# Patient Record
Sex: FEMALE | Race: WHITE | NOT HISPANIC OR LATINO | Employment: OTHER | URBAN - METROPOLITAN AREA
[De-identification: names, ages, dates, MRNs, and addresses within clinical notes are randomized per-mention and may not be internally consistent; named-entity substitution may affect disease eponyms.]

---

## 2017-01-25 ENCOUNTER — GENERIC CONVERSION - ENCOUNTER (OUTPATIENT)
Dept: OTHER | Facility: OTHER | Age: 67
End: 2017-01-25

## 2017-03-26 ENCOUNTER — GENERIC CONVERSION - ENCOUNTER (OUTPATIENT)
Dept: OTHER | Facility: OTHER | Age: 67
End: 2017-03-26

## 2017-05-19 ENCOUNTER — ALLSCRIPTS OFFICE VISIT (OUTPATIENT)
Dept: OTHER | Facility: OTHER | Age: 67
End: 2017-05-19

## 2017-06-07 ENCOUNTER — ALLSCRIPTS OFFICE VISIT (OUTPATIENT)
Dept: OTHER | Facility: OTHER | Age: 67
End: 2017-06-07

## 2017-06-07 DIAGNOSIS — E78.00 PURE HYPERCHOLESTEROLEMIA: ICD-10-CM

## 2017-06-07 DIAGNOSIS — E55.9 VITAMIN D DEFICIENCY: ICD-10-CM

## 2017-06-12 ENCOUNTER — HOSPITAL ENCOUNTER (OUTPATIENT)
Dept: RADIOLOGY | Age: 67
Discharge: HOME | End: 2017-06-12
Payer: MEDICARE

## 2017-06-12 DIAGNOSIS — M81.0 AGE-RELATED OSTEOPOROSIS WITHOUT CURRENT PATHOLOGICAL FRACTURE: ICD-10-CM

## 2017-06-12 PROCEDURE — 77080 DXA BONE DENSITY AXIAL: CPT

## 2017-07-06 ENCOUNTER — GENERIC CONVERSION - ENCOUNTER (OUTPATIENT)
Dept: OTHER | Facility: OTHER | Age: 67
End: 2017-07-06

## 2017-07-12 ENCOUNTER — ALLSCRIPTS OFFICE VISIT (OUTPATIENT)
Dept: OTHER | Facility: OTHER | Age: 67
End: 2017-07-12

## 2017-10-03 ENCOUNTER — ALLSCRIPTS OFFICE VISIT (OUTPATIENT)
Dept: OTHER | Facility: OTHER | Age: 67
End: 2017-10-03

## 2017-10-03 DIAGNOSIS — M25.572 PAIN IN LEFT ANKLE: ICD-10-CM

## 2017-10-03 DIAGNOSIS — M79.671 PAIN OF RIGHT FOOT: ICD-10-CM

## 2017-10-04 ENCOUNTER — HOSPITAL ENCOUNTER (OUTPATIENT)
Dept: RADIOLOGY | Facility: HOSPITAL | Age: 67
Discharge: HOME | End: 2017-10-04
Payer: MEDICARE

## 2017-10-04 DIAGNOSIS — M79.671 PAIN OF RIGHT FOOT: ICD-10-CM

## 2017-10-04 DIAGNOSIS — M25.572 PAIN IN LEFT ANKLE: ICD-10-CM

## 2017-10-04 PROCEDURE — 73630 X-RAY EXAM OF FOOT: CPT

## 2017-10-04 PROCEDURE — 73610 X-RAY EXAM OF ANKLE: CPT

## 2017-10-05 NOTE — PROGRESS NOTES
Assessment  1  Right foot pain (729 5) (M79 671)   2  Urinary incontinence (788 30) (R32)   3  Acute left ankle pain (719 47) (M25 572)   4  Anxiety (300 00) (F41 9)    Plan  Anxiety    · DiazePAM 5 MG Oral Tablet; take 1 tablet daily prn  Right foot pain    · * XR FOOT 3+ VIEW RIGHT; Status:Active; Requested [id number];   Urinary incontinence    · 2 - Kiki HERNÁNDEZ, oSnia Mcgregor (Urology) Co-Management  *  Status: Hold For - Scheduling   Requested for: [id number]  Care Summary provided  : Yes    Discussion/Summary    Explained to patient could be urine overflow from not emptying bladder  Need Urology evaluation  XR and call for results  The patient was counseled regarding instructions for management  Educational resources provided: None  Possible side effects of new medications were reviewed with the patient/guardian today  The treatment plan was reviewed with the patient/guardian  The patient/guardian understands and agrees with the treatment plan     Self Referrals: No      Chief Complaint  pt c/o right foot pain underneath and lump on left ankle      History of Present Illness  HPI: Injured right foot 1 5 years ago  Hurts to put weight on  Pain plantar surface, along lateral foot up and around to balls of foot  Also mild swelling and tender lateral malleolus left anklenot making to bathroom on time sometimes  Started happening more frequently  controlled  Review of Systems    Constitutional: No fever, no chills, feels well, no tiredness, no recent weight gain or loss  ENT: no ear ache, no loss of hearing, no nosebleeds or nasal discharge, no sore throat or hoarseness  Cardiovascular: no complaints of slow or fast heart rate, no chest pain, no palpitations, no leg claudication or lower extremity edema  Respiratory: no complaints of shortness of breath, no wheezing, no dyspnea on exertion, no orthopnea or PND     Gastrointestinal: no complaints of abdominal pain, no constipation, no nausea or diarrhea, no vomiting, no bloody stools  Genitourinary: as noted in HPI  Musculoskeletal: joint swelling, but-as noted in HPI  Integumentary: no complaints of skin rash or lesion, no itching or dry skin, no skin wounds  Active Problems  1  Acute pain of both knees (338 19,719 46) (M25 561,M25 562)   2  Allergic rhinitis (477 9) (J30 9)   3  Anxiety (300 00) (F41 9)   4  Anxiety disorder (300 00) (F41 9)   5  Breast cancer screening (V76 10) (Z12 39)   6  Chronic obstructive pulmonary disease (496) (J44 9)   7  Contusion of skin (924 9) (T14 8XXA)   8  Depression (311) (F32 9)   9  Depression screening (V79 0) (Z13 89)   10  Dystrophy, muscular, 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)   11  Effusion of knee (719 06) (M25 469)   12  Foot arch pain, left (729 5) (M79 672)   13  GERD without esophagitis (530 81) (K21 9)   14  Glaucoma screening (V80 1) (Z13 5)   15  Hypercholesteremia (272 0) (E78 00)   16  Hypercholesterolemia (272 0) (E78 00)   17  Medicare annual wellness visit, subsequent (V70 0) (Z00 00)   18  Muscular dystrophy (359 1) (G71 0)   19  Osteoporosis (733 00) (M81 0)   20  Overactive bladder (596 51) (N32 81)   21  Pre-op evaluation (V72 84) (Z01 818)   22  Restrictive lung disease (518 89) (J98 4)   23  Rib pain on right side (786 50) (R07 81)   24  Scalp pain (784 0) (R51)   25  Sleep related hypoxia (327 24) (G47 34)   26  Strain of right shoulder, initial encounter (840 9) (S46 911A)   27  Vitamin D deficiency (268 9) (E55 9)   28  Wheelchair dependent (V46 3) (Z99 3)    Past Medical History  1  History of Breast cancer, left (174 9) (C50 912)   2  History of athlete's foot (V12 09) (Z86 19)   3  History of upper respiratory infection (V12 09) (Z87 09)   4  History of Pneumonitis (486) (J18 9)   5  History of Shoulder pain, left (719 41) (M25 512)   6  History of Shoulder pain, right (719 41) (M25 511)   7  History of Skin disorder (709 9) (L98 9)   8   History of Visit for screening mammogram (V76 12) (Z12 31)    Family History  Mother    1  Family history of Bone loss  Father    2  Family history of Bone loss   3  Family history of Family Health Status Of Father -    4  Family history of Hypertension, benign   5  Family history of Skin Cancer (V16 8)  Brother    10  Family history of Muscular Dystrophy Of The Limb-girdle  Family History    7  Family history of Muscular Dystrophy Of The Limb-girdle    Social History   · Being A Social Drinker   · Currently On Permanent Disability   · Daily caffeinated cola consumption   · Denied: Daily Coffee Consumption (___ Cups/Day)   · Former smoker (V15 82) (Z87 891)   · 1ppd x 41 years, quit    · Wheelchair dependent (V46 3) (Z99 3)  The social history was reviewed and updated today  The social history was reviewed and is unchanged  Surgical History  1  History of Mastectomy Left Breast   2  History of Oral Surgery Tooth Extraction   3  History of Tonsillectomy   4  History of Tubal Ligation    Current Meds   1  Citalopram Hydrobromide 20 MG Oral Tablet; take 1 tablet by mouth once daily; Therapy: 02Mib9761 to (Evaluate:01Bwu1218)  Requested for: 2017; Last   [id number] Ordered   2  Fluticasone Propionate 50 MCG/ACT Nasal Suspension; USE 2 SPRAYS IN EACH   NOSTRIL ONCE DAILY; Therapy: 13VVQ6737 to (Evaluate:58Mqg6258)  Requested for: 56IEI5473; Last   Rx:2017 Ordered   3  VESIcare 10 MG Oral Tablet; Take 1 tablet daily; Therapy: 45IYI1270 to (Evaluate:93Zll1657)  Requested for: 59JIB0236; Last   Rx:2017 Ordered    The medication list was reviewed and updated today  Allergies  1  Amoxicillin TABS    Vitals   Recorded: 99XVB5344 03:44PM   Temperature 98 3 F, Oral   Heart Rate 70   Systolic 334, RUE   Diastolic 84, RUE   Height 5 ft    O2 Saturation 96     Physical Exam    Constitutional   General appearance: No acute distress, well appearing and well nourished      Eyes   Conjunctiva and lids: No swelling, erythema or discharge  Ears, Nose, Mouth, and Throat   External inspection of ears and nose: Normal     Pulmonary   Respiratory effort: No increased work of breathing or signs of respiratory distress  Musculoskeletal   Gait and station: Abnormal  -Wheel chair dependant  Digits and nails: Normal without clubbing or cyanosis  Inspection/palpation of joints, bones, and muscles: Abnormal  -(Tender right bottom foot laterally and left lateral ankle)        Future Appointments    Date/Time Provider Specialty Site   11/08/2017 01:40 PM BREANNE Borrego   Pulmonary Medicine [facility] [place]   12/13/2017 02:30 PM Surya Salomon DO Family Medicine [facility]     Signatures   Electronically signed by : Val Ocampo DO; Oct  4 2017 12:17PM EST                       (Author)

## 2017-10-11 ENCOUNTER — GENERIC CONVERSION - ENCOUNTER (OUTPATIENT)
Dept: OTHER | Facility: OTHER | Age: 67
End: 2017-10-11

## 2017-10-12 ENCOUNTER — HOSPITAL ENCOUNTER (OUTPATIENT)
Facility: HOSPITAL | Age: 67
Setting detail: OBSERVATION
End: 2017-10-13
Attending: EMERGENCY MEDICINE | Admitting: INTERNAL MEDICINE
Payer: MEDICARE

## 2017-10-12 ENCOUNTER — GENERIC CONVERSION - ENCOUNTER (OUTPATIENT)
Dept: OTHER | Facility: OTHER | Age: 67
End: 2017-10-12

## 2017-10-12 DIAGNOSIS — R53.1 WEAKNESS: ICD-10-CM

## 2017-10-12 DIAGNOSIS — G71.00 MUSCULAR DYSTROPHY (HCC): ICD-10-CM

## 2017-10-12 DIAGNOSIS — M79.671 RIGHT FOOT PAIN: Primary | ICD-10-CM

## 2017-10-12 PROBLEM — R26.2 AMBULATORY DYSFUNCTION: Status: ACTIVE | Noted: 2017-10-12

## 2017-10-12 PROBLEM — M79.673 FOOT PAIN: Status: ACTIVE | Noted: 2017-10-12

## 2017-10-12 LAB
ANION GAP SERPL CALCULATED.3IONS-SCNC: 8 MMOL/L (ref 4–13)
BASOPHILS # BLD AUTO: 0.03 THOUSANDS/ΜL (ref 0–0.1)
BASOPHILS NFR BLD AUTO: 0 % (ref 0–1)
BILIRUB UR QL STRIP: NEGATIVE
BUN SERPL-MCNC: 17 MG/DL (ref 5–25)
CALCIUM SERPL-MCNC: 9.4 MG/DL (ref 8.3–10.1)
CHLORIDE SERPL-SCNC: 101 MMOL/L (ref 100–108)
CLARITY UR: ABNORMAL
CLARITY, POC: NORMAL
CO2 SERPL-SCNC: 31 MMOL/L (ref 21–32)
COLOR UR: YELLOW
COLOR, POC: YELLOW
CREAT SERPL-MCNC: 0.39 MG/DL (ref 0.6–1.3)
EOSINOPHIL # BLD AUTO: 0.13 THOUSAND/ΜL (ref 0–0.61)
EOSINOPHIL NFR BLD AUTO: 1 % (ref 0–6)
ERYTHROCYTE [DISTWIDTH] IN BLOOD BY AUTOMATED COUNT: 14.4 % (ref 11.6–15.1)
GFR SERPL CREATININE-BSD FRML MDRD: 109 ML/MIN/1.73SQ M
GLUCOSE SERPL-MCNC: 112 MG/DL (ref 65–140)
GLUCOSE UR STRIP-MCNC: NEGATIVE MG/DL
HCT VFR BLD AUTO: 42.2 % (ref 34.8–46.1)
HGB BLD-MCNC: 14.5 G/DL (ref 11.5–15.4)
HGB UR QL STRIP.AUTO: NEGATIVE
KETONES UR STRIP-MCNC: ABNORMAL MG/DL
LEUKOCYTE ESTERASE UR QL STRIP: NEGATIVE
LYMPHOCYTES # BLD AUTO: 2.66 THOUSANDS/ΜL (ref 0.6–4.47)
LYMPHOCYTES NFR BLD AUTO: 22 % (ref 14–44)
MCH RBC QN AUTO: 30 PG (ref 26.8–34.3)
MCHC RBC AUTO-ENTMCNC: 34.4 G/DL (ref 31.4–37.4)
MCV RBC AUTO: 87 FL (ref 82–98)
MONOCYTES # BLD AUTO: 0.63 THOUSAND/ΜL (ref 0.17–1.22)
MONOCYTES NFR BLD AUTO: 5 % (ref 4–12)
NEUTROPHILS # BLD AUTO: 8.42 THOUSANDS/ΜL (ref 1.85–7.62)
NEUTS SEG NFR BLD AUTO: 72 % (ref 43–75)
NITRITE UR QL STRIP: NEGATIVE
NRBC BLD AUTO-RTO: 0 /100 WBCS
PH UR STRIP.AUTO: 7.5 [PH] (ref 4.5–8)
PLATELET # BLD AUTO: 356 THOUSANDS/UL (ref 149–390)
PLATELET # BLD AUTO: 365 THOUSANDS/UL (ref 149–390)
PMV BLD AUTO: 10.4 FL (ref 8.9–12.7)
PMV BLD AUTO: 10.7 FL (ref 8.9–12.7)
POTASSIUM SERPL-SCNC: 3.6 MMOL/L (ref 3.5–5.3)
PROT UR STRIP-MCNC: NEGATIVE MG/DL
RBC # BLD AUTO: 4.83 MILLION/UL (ref 3.81–5.12)
SODIUM SERPL-SCNC: 140 MMOL/L (ref 136–145)
SP GR UR STRIP.AUTO: 1.02 (ref 1–1.03)
UROBILINOGEN UR QL STRIP.AUTO: 0.2 E.U./DL
WBC # BLD AUTO: 11.87 THOUSAND/UL (ref 4.31–10.16)

## 2017-10-12 PROCEDURE — 85025 COMPLETE CBC W/AUTO DIFF WBC: CPT | Performed by: EMERGENCY MEDICINE

## 2017-10-12 PROCEDURE — 81003 URINALYSIS AUTO W/O SCOPE: CPT

## 2017-10-12 PROCEDURE — 97163 PT EVAL HIGH COMPLEX 45 MIN: CPT

## 2017-10-12 PROCEDURE — 36415 COLL VENOUS BLD VENIPUNCTURE: CPT | Performed by: EMERGENCY MEDICINE

## 2017-10-12 PROCEDURE — 85049 AUTOMATED PLATELET COUNT: CPT | Performed by: PHYSICIAN ASSISTANT

## 2017-10-12 PROCEDURE — 80048 BASIC METABOLIC PNL TOTAL CA: CPT | Performed by: EMERGENCY MEDICINE

## 2017-10-12 PROCEDURE — G8979 MOBILITY GOAL STATUS: HCPCS

## 2017-10-12 PROCEDURE — 97167 OT EVAL HIGH COMPLEX 60 MIN: CPT

## 2017-10-12 PROCEDURE — G8988 SELF CARE GOAL STATUS: HCPCS

## 2017-10-12 PROCEDURE — 81002 URINALYSIS NONAUTO W/O SCOPE: CPT | Performed by: EMERGENCY MEDICINE

## 2017-10-12 PROCEDURE — 99284 EMERGENCY DEPT VISIT MOD MDM: CPT

## 2017-10-12 PROCEDURE — G8978 MOBILITY CURRENT STATUS: HCPCS

## 2017-10-12 PROCEDURE — G8987 SELF CARE CURRENT STATUS: HCPCS

## 2017-10-12 RX ORDER — DIAZEPAM 5 MG/1
TABLET ORAL
COMMUNITY
Start: 2012-07-09 | End: 2018-01-29 | Stop reason: SDUPTHER

## 2017-10-12 RX ORDER — DIAZEPAM 5 MG/1
5 TABLET ORAL
Status: DISCONTINUED | OUTPATIENT
Start: 2017-10-12 | End: 2017-10-13 | Stop reason: HOSPADM

## 2017-10-12 RX ORDER — HEPARIN SODIUM 5000 [USP'U]/ML
5000 INJECTION, SOLUTION INTRAVENOUS; SUBCUTANEOUS EVERY 8 HOURS SCHEDULED
Status: DISCONTINUED | OUTPATIENT
Start: 2017-10-12 | End: 2017-10-13 | Stop reason: HOSPADM

## 2017-10-12 RX ORDER — ONDANSETRON 2 MG/ML
4 INJECTION INTRAMUSCULAR; INTRAVENOUS EVERY 6 HOURS PRN
Status: DISCONTINUED | OUTPATIENT
Start: 2017-10-12 | End: 2017-10-13 | Stop reason: HOSPADM

## 2017-10-12 RX ORDER — TRAMADOL HYDROCHLORIDE 50 MG/1
50 TABLET ORAL EVERY 6 HOURS PRN
Status: DISCONTINUED | OUTPATIENT
Start: 2017-10-12 | End: 2017-10-13 | Stop reason: HOSPADM

## 2017-10-12 RX ORDER — SOLIFENACIN SUCCINATE 10 MG/1
TABLET, FILM COATED ORAL
COMMUNITY
Start: 2016-12-07 | End: 2018-05-18 | Stop reason: SDUPTHER

## 2017-10-12 RX ORDER — CITALOPRAM 20 MG/1
20 TABLET ORAL
Status: DISCONTINUED | OUTPATIENT
Start: 2017-10-12 | End: 2017-10-13 | Stop reason: HOSPADM

## 2017-10-12 RX ORDER — TOLTERODINE 4 MG/1
4 CAPSULE, EXTENDED RELEASE ORAL
Status: DISCONTINUED | OUTPATIENT
Start: 2017-10-12 | End: 2017-10-13 | Stop reason: HOSPADM

## 2017-10-12 RX ORDER — OXYCODONE HYDROCHLORIDE 5 MG/1
5 TABLET ORAL EVERY 4 HOURS PRN
Status: DISCONTINUED | OUTPATIENT
Start: 2017-10-12 | End: 2017-10-13 | Stop reason: HOSPADM

## 2017-10-12 RX ORDER — ACETAMINOPHEN 325 MG/1
650 TABLET ORAL EVERY 6 HOURS PRN
Status: DISCONTINUED | OUTPATIENT
Start: 2017-10-12 | End: 2017-10-13 | Stop reason: HOSPADM

## 2017-10-12 RX ORDER — CITALOPRAM 20 MG/1
TABLET ORAL
COMMUNITY
Start: 2014-09-02 | End: 2018-07-25 | Stop reason: SDUPTHER

## 2017-10-12 RX ADMIN — DIAZEPAM 5 MG: 5 TABLET ORAL at 22:37

## 2017-10-12 RX ADMIN — CITALOPRAM HYDROBROMIDE 20 MG: 20 TABLET ORAL at 22:37

## 2017-10-12 RX ADMIN — TOLTERODINE TARTRATE 4 MG: 4 CAPSULE, EXTENDED RELEASE ORAL at 22:37

## 2017-10-12 RX ADMIN — TRAMADOL HYDROCHLORIDE 50 MG: 50 TABLET, FILM COATED ORAL at 20:06

## 2017-10-12 RX ADMIN — HEPARIN SODIUM 5000 UNITS: 5000 INJECTION, SOLUTION INTRAVENOUS; SUBCUTANEOUS at 22:37

## 2017-10-12 NOTE — PHYSICAL THERAPY NOTE
PT EVALUATION  There is no problem list on file for this patient  Past Medical History:   Diagnosis Date    GERD (gastroesophageal reflux disease)     Hyperlipidemia     Muscular dystrophy ([address] )      Past Surgical History:   Procedure Laterality Date    MASTECTOMY        10/12/17 4135   Note Type   Note type Eval only   Pain Assessment   Pain Assessment 0-10   Pain Score 8  (during wt  bearing only)   Pain Type Acute pain   Pain Location Foot   Pain Orientation Right   Hospital Pain Intervention(s) Repositioned; Emotional support   Home Living   Type of Home Apartment   Bathroom Equipment Toilet raiser   Bathroom Accessibility Not accessible   Home Equipment Wheelchair-electric; Walker;Cane;Reacher   Prior Function   Level of [place] Modified independent with wheelchair   [facility] ([facility]x)   Comments home health aides 8hrs/wk   Restrictions/Precautions   Weight Bearing Precautions Per Order Yes   RLE Weight Bearing Per Order WBAT  (as per Dr Amanda Cheema)   Other Precautions Fall Risk;Pain   General   Additional Pertinent History muscular dystrophy   Family/Caregiver Present No   Cognition   Overall Cognitive Status WFL   Arousal/Participation Alert   Orientation Level Oriented X4   Following Commands Follows all commands and directions without difficulty   RUE Assessment   RUE Assessment (defer to OT)   LUE Assessment   LUE Assessment (defer to OT)   RLE Assessment   RLE Assessment WFL   Strength RLE   RLE Overall Strength 3+/5   R Hip Flexion 3-/5   LLE Assessment   LLE Assessment WFL   Strength LLE   LLE Overall Strength 3+/5   L Hip Flexion 3-/5   Bed Mobility   Supine to Sit 3  Moderate assistance   Additional Comments unable to assess, as pt OOB in her w/c pre & post session   Transfers   Sit to Stand 3  Moderate assistance   Additional items Assist x 1; Armrests; Increased time required;Verbal cues   Stand to Sit 3  Moderate assistance   Additional items Assist x 1; Armrests; Increased time required;Verbal cues   Toilet transfer 3  Moderate assistance   Additional items Assist x 1; Increased time required;Verbal cues; Commode   Ambulation/Elevation   Gait pattern Decreased foot clearance; Antalgic;Decreased R stance; Foward flexed; Short stride; Step to;Excessively slow   Gait Assistance 3  Moderate assist   Additional items Assist x 1;Verbal cues; Tactile cues   Assistive Device Rolling walker   Distance 5'x2   Balance   Static Sitting Fair +   Static Standing Poor +   Ambulatory Poor   Endurance Deficit   Endurance Deficit Yes   Endurance Deficit Description pain   Activity Tolerance   Activity Tolerance Patient limited by pain   Nurse Made Aware Anabel   Assessment   Prognosis Fair   Problem List Decreased strength;Decreased endurance; Impaired balance;Decreased mobility;Obesity;Pain   Assessment Pt  79 y  o female presented w/ severe R foot pain  Pt referred to PT for mobility assessment & D/C planning  PTA, pt reports being I w/ w/c (motorized) mobility & was able to ambulate short distances w/ RW up until R foot pain started; h/o falls x5  On eval, pt demonstrate dec mobility, balance, endurance & amb 2* to R foot pain  Pt require modAx1 for most functional mobility + cues for techniques  Gait deviations as above but no gross LOB noted  Dec amb tolerance 2* to inc R foot pain  Noted mild R pes cavus deformity  No pain at rest & w/ ROM  Also noted L ankle swelling  Pt may benefit from further imaging & ortho or podiatry consult  Will continue skilled PT to improve function & safety  At this time, due to above mentioned deficits & high risk for falls, pt will benefit from inpt rehab at D/C  CM to follow  Nsg staff to continue to mobilized pt as tolerated to prevent further decline in function  Nsg notified      Barriers to Discharge Decreased caregiver support   Barriers to Discharge Comments pt home alone   Goals   Patient Goals to know what's causing her R foot pain   STG Expiration Date 10/19/17   Short Term Goal #1 Goals to be met in 7 days: 1) inc strength & balance by 1/2 grade; 2) inc functional mobility to S; 3) inc amb w/ RW approx  150' w/ minAx1; 4) inc barthel score to 75; 5) pt/caregiver ed   Treatment Day 0   Plan   Treatment/Interventions Functional transfer training;LE strengthening/ROM; Therapeutic exercise; Endurance training;Patient/family training;Bed mobility;Gait training;OT;Spoke to nursing   PT Frequency 5x/wk   Recommendation   Recommendation Short-term skilled PT; Other (Comment)  (ortho or podiatry consult)   Equipment Recommended Walker; Wheelchair   PT - OK to Discharge Yes  (to inpt rehab)   Barthel Index   Feeding 10   Bathing 0   Grooming Score 5   Dressing Score 5   Bladder Score 10   Bowels Score 10   Toilet Use Score 5   Transfers (Bed/Chair) Score 5   Mobility (Level Surface) Score 5   Stairs Score 0   Barthel Index Score 55   Hx/personal factors: co-morbidities; fall risk; currently functioning below baseline; home alone; pain  Examination: dec mobility, dec balance, dec endurance, dec amb, high risk for falls, pain  Clinical: unpredictable (pain mgt; fall risk)  Complexity: high    David Borja, PT

## 2017-10-12 NOTE — SOCIAL WORK
[address] unable to accept pt without in pt admission prior  [facility] is still evaluating  They're recommending a neuro work up  Pt made herself an OP appointment with [facility] tomorrow at 12:30 but CM suggested she reschedule that because pt continually states that she does not feel she is safe to return home  Pt does not meet criteria for SNF admission  CM recommends that pt comes into hospital as OBS to give [facility] more time to eval pt

## 2017-10-12 NOTE — SOCIAL WORK
CM met with pt in ED  Pt requested to see case management because she is concerned due to her current mobility issues, she is not safe at home  Pt lives in an apartment; she uses a roller walker while in the home and uses a motorized wheelchair while in the community  Her apartment is not set up with WC access and bathroom does not fit a wheelchair  Pt has [facility] and has a home health aid for 8 hours a week who does her laundry and helps her with a shower  She reports her  through [employer], [employer], has told her she is at the maximum amount of time she is allowed through [employer]  Pt states she is not set up at home for her current issues with mobility  Pt was diagnoses with MD in the 18's at the age of 32  She reports her disability has slowly progressed and over the past 2 weeks she has been unable to walk and has significant foot/ankel pain  She goes to a MD out patient clinic with Dr Concha Estrada at [facility]  CM will explore options for rehab  Requested PT/OT consult  CM following

## 2017-10-12 NOTE — PLAN OF CARE
Problem: OCCUPATIONAL THERAPY ADULT  Goal: Performs self-care activities at highest level of function for planned discharge setting  See evaluation for individualized goals  Treatment Interventions: ADL retraining, Functional transfer training, UE strengthening/ROM, Endurance training, Patient/family training, Equipment evaluation/education, Compensatory technique education          See flowsheet documentation for full assessment, interventions and recommendations  Limitation: Decreased ADL status, Decreased UE ROM, Decreased UE strength, Decreased Safe judgement during ADL, Decreased endurance  Prognosis: Good  Assessment: Pt is a 68y/o female admitted to the hospital 2* increased R ankle pain; x-ray(R ankle, 10/4)=neg acute injury  Pt denies acute trauma/injury  ED-MD(Dr Mariela Hurst) allowing WBAT R LE  PTA pt states that she had assistance with her ADLs; states independence with transfers/ambulation--with RW, limited distances; +falls=5, neg ; [facility] 8hrs/wk During initial eval, pt demonstrated deficits with her functional balance, functional mobility, ADL status, b/l UE strength, activity tolerance(currently fair=15-20mins), and transfer safety  Pt would benefit from continued OT tx for the above deficits  3-5x/1-2wks     OT Discharge Recommendation: Short Term Rehab

## 2017-10-12 NOTE — PLAN OF CARE
Problem: PHYSICAL THERAPY ADULT  Goal: Performs mobility at highest level of function for planned discharge setting  See evaluation for individualized goals  Treatment/Interventions: Functional transfer training, LE strengthening/ROM, Therapeutic exercise, Endurance training, Patient/family training, Bed mobility, Gait training, OT, Spoke to nursing  Equipment Recommended: Stacy Basket, Wheelchair       See flowsheet documentation for full assessment, interventions and recommendations  Outcome: Progressing  Prognosis: Fair  Problem List: Decreased strength, Decreased endurance, Impaired balance, Decreased mobility, Obesity, Pain  Assessment: Pt  79 y  o female presented w/ severe R foot pain  Pt referred to PT for mobility assessment & D/C planning  PTA, pt reports being I w/ w/c (motorized) mobility & was able to ambulate short distances w/ RW up until R foot pain started; h/o falls x5  On eval, pt demonstrate dec mobility, balance, endurance & amb 2* to R foot pain  Pt require modAx1 for most functional mobility + cues for techniques  Gait deviations as above but no gross LOB noted  Dec amb tolerance 2* to inc R foot pain  Noted mild R pes cavus deformity  No pain at rest & w/ ROM  Also noted L ankle swelling  Pt may benefit from further imaging & ortho or podiatry consult  Will continue skilled PT to improve function & safety  At this time, due to above mentioned deficits & high risk for falls, pt will benefit from inpt rehab at D/C  CM to follow  Nsg staff to continue to mobilized pt as tolerated to prevent further decline in function  Nsg notified  Barriers to Discharge: Decreased caregiver support  Barriers to Discharge Comments: pt home alone  Recommendation: Short-term skilled PT, Other (Comment) (ortho or podiatry consult)     PT - OK to Discharge: Yes (to inpt rehab)    See flowsheet documentation for full assessment

## 2017-10-12 NOTE — H&P
History and Physical - [facility]    Patient Information: Jalyn Gallagher 79 y o  female MRN: [id number]  Unit/Bed#: [address] [id number] Encounter: [id number]  Admitting Physician: Charline Sin PA-C  PCP: Melida Eason DO  Date of Admission:  10/12/17    Assessment/Plan:    Hospital Problem List:     Principal Problem:    Ambulatory dysfunction  Active Problems:    Foot pain    Muscular dystrophy ([facility] [address] )      Plan for the Primary Problem(s):  · Ambulatory dysfunction  · 2* muscular dystrophy   · Appreciate PT/OT/CM eval, pt not safe to go home and will likely have placement at [place] [id number] prior to neurologic evaluation after discussion w/ CM  · Will monitor overnight, continue PT/OT, will provide further workup as noted below for patient's foot pain    Plan for Additional Problems:   · Right foot pain/paresthesias  · 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 as noted by podiatry who will set up OP bracing  · As pt has mild paresthesias on right foot will check magnesium and phosphorus  · Will kindly consult neurology at noted above per [facility] rehab's request concerning for a possible 'muscular dystrophy exacerbation'  · D/w attending Dr Coles Adjutant     Leukocytosis   No s/sx of infection, appears to be physiologic    Anxiety   Stable   Continue lexapro/valium    OAB   No cauda equina s/sx   Substitute detrol for vesicare as is non formulary   F/u w/ urology in OP setting    VTE Prophylaxis: Heparin  / sequential compression device   Code Status: Full Code  POLST: There is no POLST form on file for this patient (pre-hospital)    Anticipated Length of Stay:  Patient will be admitted on an Observation basis with an anticipated length of stay of  Less than 2 midnights  Justification for Hospital Stay: ambulatory dysfunction    Total Time for Visit, including Counseling / Coordination of Care: 30 minutes  Greater than 50% of this total time spent on direct patient counseling and coordination of care  Chief Complaint:   Right foot pain    History of Present Illness:    Juan Castañeda is a 79 y o  female who presents with pmh of muscular dystrophy coming into ED for worsening right foot pain for the last several weeks  She reports it is a throbbing pain throughout the foot worse with standing  She reports unfortunately over the last 2 days the pain worsened and the day prior to admission shot up to her thigh but now is localized again to her foot  She reports significant difficulty walking on it and has seen her PCP in the OP setting for this  She reports she received OP xrays which were negative for fx or bony abnormality  She reported the pain was so severe that she couldn't stand it in and came in for evaluation  She initially denied numbness/tingling in the right foot but believes she feels some paresthesias in the right foot throughout  She reports she has some decreased sensation on filament test as well  She denies radiating pain from back or throughout let  She denies claudication otherwise  She denies wounds or open sores  No n/v/f/c or abdominal pain or blurry vision  No HA/lightheadedness or dizziness  No SOB  At baseline she uses a walker but also has a WC for ease of mobility  Review of Systems:    Review of Systems   Constitutional: Negative for appetite change, chills and fever  HENT: Negative for congestion, dental problem and sore throat  Eyes: Negative for photophobia and visual disturbance  Respiratory: Negative for cough and shortness of breath  Cardiovascular: Positive for leg swelling (mild pedal edema)  Negative for chest pain     Gastrointestinal: Negative for abdominal pain, nausea and vomiting  Genitourinary: Positive for urgency  Negative for dysuria  Musculoskeletal: Positive for gait problem  Skin: Negative for color change  Neurological: Positive for weakness and numbness  Negative for dizziness, light-headedness and headaches  Psychiatric/Behavioral: The patient is nervous/anxious  All other systems reviewed and are negative  Past Medical and Surgical History:     Past Medical History:   Diagnosis Date    GERD (gastroesophageal reflux disease)     Hyperlipidemia     Muscular dystrophy ([address] )        Past Surgical History:   Procedure Laterality Date    MASTECTOMY         Meds/Allergies:    Prior to Admission medications    Medication Sig Start Date End Date Taking? Authorizing Provider   citalopram (CeleXA) 20 mg tablet Take by mouth 9/2/14  Yes Historical Provider, MD   diazepam (VALIUM) 5 mg tablet Take by mouth 7/9/12  Yes Historical Provider, MD   solifenacin (VESICARE) 10 MG tablet Take by mouth 12/7/16  Yes Historical Provider, MD     I have reviewed home medications with patient personally  Allergies: Allergies   Allergen Reactions    Amoxicillin        Social History:     Marital Status:    Occupation:   Patient Pre-hospital Living Situation:   Patient Pre-hospital Level of Mobility:   Patient Pre-hospital Diet Restrictions:   Substance Use History:   History   Alcohol Use No     History   Smoking Status    Former Smoker   Smokeless Tobacco    Never Used     History   Drug Use No       Family History:    History reviewed  No pertinent family history      Physical Exam:     Vitals:   Blood Pressure: (!) 174/72 (10/12/17 1703)  Pulse: 72 (10/12/17 1703)  Temperature: (!) 97 4 °F (36 3 °C) (10/12/17 1050)  Temp Source: Oral (10/12/17 1050)  Respirations: 16 (10/12/17 1703)  Weight - Scale: 77 1 kg (170 lb) (10/12/17 1051)  SpO2: 95 % (10/12/17 1703)    Physical Exam   Constitutional: She appears well-developed and well-nourished  No distress  HENT:   Head: Normocephalic and atraumatic  Right Ear: External ear normal    Left Ear: External ear normal    Mouth/Throat: Oropharynx is clear and moist  No oropharyngeal exudate  Eyes: Conjunctivae and EOM are normal  Pupils are equal, round, and reactive to light  Right eye exhibits no discharge  Left eye exhibits no discharge  No scleral icterus  Cardiovascular: Normal rate, regular rhythm, normal heart sounds and intact distal pulses  Exam reveals no gallop and no friction rub  No murmur heard  Pulmonary/Chest: Effort normal and breath sounds normal  No respiratory distress  She has no wheezes  She has no rales  She exhibits no tenderness  Abdominal: Soft  She exhibits no distension  There is no tenderness  There is no rebound and no guarding  Musculoskeletal: She exhibits edema (trace pedal edema  no increased calf size at tibial plateau or unilateral leg swelling  no posterior calf tenderness or angelito's)  Lymphadenopathy:     She has no cervical adenopathy  Neurological: She is alert  Pt reports crude sensation intact in LE b/l  Skin: Skin is warm and dry  She is not diaphoretic  No erythema  Psychiatric: She has a normal mood and affect  Vitals reviewed  (  Be Sure to Include Physical Exam: Delete this entire line when you have entered your exam)    Additional Data:     Lab Results: I have personally reviewed pertinent reports  Results from last 7 days  Lab Units 10/12/17  1540   WBC Thousand/uL 11 87*   HEMOGLOBIN g/dL 14 5   HEMATOCRIT % 42 2   PLATELETS Thousands/uL 365   NEUTROS PCT % 72   LYMPHS PCT % 22   MONOS PCT % 5   EOS PCT % 1       Results from last 7 days  Lab Units 10/12/17  1540   SODIUM mmol/L 140   POTASSIUM mmol/L 3 6   CHLORIDE mmol/L 101   CO2 mmol/L 31   BUN mg/dL 17   CREATININE mg/dL 0 39*   CALCIUM mg/dL 9 4   GLUCOSE RANDOM mg/dL 112           Imaging: I have personally reviewed pertinent reports        Martina Cardenas Ankle 3+ Vw Left    Result Date: 10/8/2017  Narrative: LEFT ANKLE INDICATION:  Tender just anterior to the lateral malleolus  Pain of the right foot  COMPARISON: Priors dating to July 3, 2011 although these involve the foot only  VIEWS:  3 IMAGES:  3 FINDINGS: There is no acute fracture or dislocation  No degenerative changes  No lytic or blastic lesions are seen  Soft tissues are unremarkable  Impression: No acute osseous abnormality  Consider MRI if there is continued pain or concern for internal derangement  Workstation performed: [id number]     Xr Foot 3+ Vw Right    Result Date: 10/8/2017  Narrative: RIGHT FOOT INDICATION:  20-year-old female with pain in the base of the fifth metatarsal and cuboid area  Patient had prior injury one half years ago  COMPARISON: July 3, 2011 VIEWS:  3 IMAGES:  3 FINDINGS: There is no acute fracture or dislocation  Mild hallux valgus deformity on frontal view  The cuboid and fifth metatarsal bones appear intact without deformity  No degenerative changes  No lytic or blastic lesions are seen  Soft tissues are unremarkable  Impression: No acute osseous abnormality  Workstation performed: [id number]       EKG, Pathology, and Other Studies Reviewed on Admission:   · EKG:     Allscripts / Epic Records Reviewed: No     ** Please Note: This note has been constructed using a voice recognition system   **

## 2017-10-12 NOTE — CONSULTS
Consult - Jignesh Tourekarsten Doherty 79 y o  female MRN: [id number]  Unit/Bed#: ED 15 Encounter: [id number]    Assessment/Plan     Assessment:  3  78 y/o female with bilateral foot pain most likely due to cavus foot type secondary to muscular dystrophy   2  [place] Muscular dystrophy   3  Left lateral ankle pain with possible lipoma     Plan:  - pain is mostly on plantar forefoot and plantar lateral foot upon ambulation; right is worse than the left  This is  secondary to the cavus foot type that patient has, which is fortunately flexible  Thus she can benefit from bracing which can be set up on an outpatient basis  - Xray images reviewed; no acute osseous abnormality noted   - Right lateral ankle mass likely benign lipoma; can follow up on an outpatient basis   - discussed this plan with Dr Siria Kumar     History of Present Illness     HPI:  Bony Rain is a 79 y o  female who presents with bilateral foot pain that has been going for a year or so however it has been getting worse for the past 2 weeks  She was unable to stand and walk since yesterday therefore she decided to come in to the ED  She has talked about this to her PCP who recommended to see a podiatrist  She has already made an appointment for tomorrow at [facility] facility for the same problem  Complains of tingling to her toes  Left ankle pain laterally  Xrays were done on an out patient basis which are negative  She was diagnosed with muscular dystrophy at age 32  She states her brother and her father has the same diagnosis  She has done genetic testing a year ago which showed that she has "[place] muscular dystrophy " Patient does not normally wear shoes; she ambulated in slippers only at home  Outside of home she gets around in electric wheelchair  Inpatient consult to Podiatry  Consult performed by: Eligio Hurst ordered by: Betty Muñoz        Review of Systems   Constitutional: Negative  HENT: Negative  Eyes: Negative  Respiratory: Negative  Cardiovascular: Negative  Gastrointestinal: Negative  Musculoskeletal: b/l LE pain    Skin:Negative  Neurological: tingling to b/l LE   Psych: negative  Historical Information   Past Medical History:   Diagnosis Date    GERD (gastroesophageal reflux disease)     Hyperlipidemia     Muscular dystrophy ([address] )      Past Surgical History:   Procedure Laterality Date    MASTECTOMY       Social History   History   Alcohol Use No     History   Drug Use No     History   Smoking Status    Former Smoker   Smokeless Tobacco    Never Used     Family History: History reviewed  No pertinent family history      Meds/Allergies     (Not in a hospital admission)  Allergies   Allergen Reactions    Amoxicillin        Objective   First Vitals:   Blood Pressure: 146/66 (10/12/17 1050)  Pulse: 73 (10/12/17 1050)  Temperature: (!) 97 4 °F (36 3 °C) (10/12/17 1050)  Temp Source: Oral (10/12/17 1050)  Respirations: 16 (10/12/17 1050)  Weight - Scale: 77 1 kg (170 lb) (10/12/17 1051)  SpO2: 100 % (10/12/17 1050)    Current Vitals:   Blood Pressure: 143/69 (10/12/17 1418)  Pulse: 72 (10/12/17 1418)  Temperature: (!) 97 4 °F (36 3 °C) (10/12/17 1050)  Temp Source: Oral (10/12/17 1050)  Respirations: 16 (10/12/17 1418)  Weight - Scale: 77 1 kg (170 lb) (10/12/17 1051)  SpO2: 96 % (10/12/17 1418)        /69   Pulse 72   Temp (!) 97 4 °F (36 3 °C) (Oral)   Resp 16   Wt 77 1 kg (170 lb)   SpO2 96%      General Appearance:    Alert, cooperative, no distress   Head:    Normocephalic, without obvious abnormality, atraumatic   Eyes:    PERRL, conjunctiva/corneas clear, EOM's intact        Nose:   Moist mucous membranes   Neck:   Supple, symmetrical, trachea midline   Back:     Symmetric   Lungs:     Respirations unlabored   Heart:    Regular rate and rhythm, S1 and S2 normal, no murmur, rub   or gallop   Abdomen:     Soft, non-tender   Extremities:   B/L flaxible cavus foot type; Right side is worse than left  Pain is only upon ambulation to the plantar forefoot and plantar lateral midfoot secondary to putting weight there  MMT 5/5 except 4/5 on dorsiflexion b/l  Pain on palpation of the left ankle laterally close to sinus tarsi where palpable mass likely lipoma present  Pulses:   DP 2/4, PT 1/4 b/l  Cap refill WNL  Skin temperature is normal     Skin:   Skin is shiny however no open wounds or signs of infection  Neurologic:   Gross sensation is intact  Subjective tingling  Tinel's sign (-)  Lab Results:   No visits with results within 1 Day(s) from this visit     Latest known visit with results is:   Hospital Outpatient Visit on 12/09/2015   Component Date Value    Color, UA 12/09/2015 Yellow     Clarity, UA 12/09/2015 Hazy     Glucose, UA 12/09/2015 Negative     Bilirubin, UA 12/09/2015 Negative     Ketones, UA 12/09/2015 Negative     Specific Gravity, UA 12/09/2015 1 025     Blood, UA 12/09/2015 Trace*    pH, UA 12/09/2015 6 5     Protein, UA 12/09/2015 Negative     Urobilinogen, UA 12/09/2015 0 2     Nitrite, UA 12/09/2015 Negative     Leukocytes, UA 12/09/2015 Negative     Epithelial Cells 12/09/2015 Occasional     WBC, UA 12/09/2015 0-1     RBC, UA 12/09/2015 1-2     Bacteria, UA 12/09/2015 None Seen     WBC 12/09/2015 14 94*    RBC 12/09/2015 4 87     Hemoglobin 12/09/2015 14 3     Hematocrit 12/09/2015 41 5     MCV 12/09/2015 85     MCH 12/09/2015 29 4     MCHC 12/09/2015 34 5     RDW 12/09/2015 14 6     MPV 12/09/2015 10 4     Platelets 99/89/4223 410*    nRBC 12/09/2015 0     Neutrophils Relative 12/09/2015 68     Lymphocytes Relative 12/09/2015 22     Monocytes Relative 12/09/2015 9     Eosinophils Relative 12/09/2015 1     Neutrophils Absolute 12/09/2015 10 16*    Lymphocytes Absolute 12/09/2015 3 29     Monocytes Absolute 12/09/2015 1 34*    Eosinophils Absolute 12/09/2015 0 15     Basophils Absolute 12/09/2015 0 04     Lipase 12/09/2015 116     Sodium 12/09/2015 141     Potassium 12/09/2015 3 8     Chloride 12/09/2015 101     CO2 12/09/2015 32 0     Anion Gap 12/09/2015 8     Total Bilirubin 12/09/2015 0 36     Total Protein 12/09/2015 7 9     Alkaline Phosphatase 12/09/2015 102     ALT 12/09/2015 33     AST 12/09/2015 23     Glucose 12/09/2015 81     Albumin 12/09/2015 3 7     BUN 12/09/2015 13     Calcium 12/09/2015 8 9     Creatinine 12/09/2015 0 33*    AAGFR 12/09/2015 >60     NAAGFR 12/09/2015 >60     Microbiology 12/10/2015 Specimen: Urine Clean Catch     Microbiology 12/10/2015 Collected: 12/09/2015 14:51     Microbiology 12/10/2015       Microbiology 12/10/2015 Status: Final      Last Updated: 12/10/2015 17:45           Microbiology 12/10/2015       Microbiology 12/10/2015       Microbiology 12/10/2015   CULT RSLT URINE (Final)     Microbiology 12/10/2015     Merritt Count <10,000 CFU/mL Mixed Contaminants X2     Microbiology 12/10/2015       Microbiology 12/10/2015                      Invalid input(s): LABAEARO            Imaging: I have personally reviewed pertinent films in PACS  EKG, Pathology, and Other Studies: I have personally reviewed pertinent reports        Code Status: No Order  Advance Directive and Living Will:      Power of :    POLST:

## 2017-10-12 NOTE — ED PROVIDER NOTES
History  Chief Complaint   Patient presents with    Foot Pain     Pt reports right foot pain x 3 weeks  pt reports has evaluated by PCP on week and had xray performed  Pt reports pain worsened last night  Pt reports difficulty ambulating due to pain      59-year-old female with a history of muscular dystrophy complains of intermittent right foot pain for 3 weeks  Pt states she saw her PCP last week who ordered an Xray of the foot and ankle  Xray showed no abnormalities  Pt followed up with PCP yesterday who explained to the pt this may all be related to soft tissue injury and referred her to a podiatrist  Last night patient was in the kitchen standing on her feet when she felt a sharp pain radiate up the right ankle up to the medial knee  Pt states the pain almost dropped her to the floor but was able to grab onto the counter  The pain continue to intensify afterwards  Tylenol has indra found to provide minimal relief but causing discomfort especially with ambulation  Denies trauma, injury, fall, manipulation,or orthopedic procedures in the past  No numbness, weakness, or skin changes noted  Uses an electric wheelchair and walker at baseline  Pt lives alone and is concerned no one would know if she fell a home  History provided by:  Patient   used: No        Prior to Admission Medications   Prescriptions Last Dose Informant Patient Reported? Taking?   citalopram (CeleXA) 20 mg tablet   Yes Yes   Sig: Take by mouth   diazepam (VALIUM) 5 mg tablet   Yes Yes   Sig: Take by mouth   solifenacin (VESICARE) 10 MG tablet   Yes Yes   Sig: Take by mouth      Facility-Administered Medications: None       Past Medical History:   Diagnosis Date    GERD (gastroesophageal reflux disease)     Hyperlipidemia     Muscular dystrophy ([facility] [address] )        Past Surgical History:   Procedure Laterality Date    MASTECTOMY         History reviewed  No pertinent family history    I have reviewed and agree with the history as documented  Social History   Substance Use Topics    Smoking status: Former Smoker    Smokeless tobacco: Never Used    Alcohol use No        Review of Systems   Respiratory: Negative for cough, chest tightness and shortness of breath  Cardiovascular: Negative for chest pain, palpitations and leg swelling  Musculoskeletal: Positive for arthralgias, gait problem and joint swelling  Skin: Negative for color change, pallor and rash  Neurological: Negative for weakness and numbness  Hematological: Does not bruise/bleed easily  All other systems reviewed and are negative  Physical Exam  ED Triage Vitals   Temperature Pulse Respirations Blood Pressure SpO2   10/12/17 1050 10/12/17 1050 10/12/17 1050 10/12/17 1050 10/12/17 1050   (!) 97 4 °F (36 3 °C) 73 16 146/66 100 %      Temp Source Heart Rate Source Patient Position - Orthostatic VS BP Location FiO2 (%)   10/12/17 1050 10/12/17 1050 10/12/17 1050 10/12/17 1050 --   Oral Monitor Sitting Right arm       Pain Score       10/12/17 1051       2           Physical Exam   Constitutional: She is oriented to person, place, and time  She appears well-developed and well-nourished  Sitting comfortably in electrical wheelchair    HENT:   Head: Normocephalic and atraumatic  Cardiovascular: Normal rate, regular rhythm and normal heart sounds  Exam reveals no friction rub  No murmur heard  Pulmonary/Chest: Effort normal and breath sounds normal  No respiratory distress  Abdominal: Soft  Bowel sounds are normal  She exhibits no distension  There is no tenderness  Musculoskeletal:        Right knee: She exhibits no swelling, no effusion, no ecchymosis and no deformity  No tenderness found  Right ankle: She exhibits decreased range of motion and swelling  She exhibits no ecchymosis, no deformity, no laceration and normal pulse  Tenderness  Restricted movement with dorsiflexions  Pedal pulse bounding   Minimal swelling and tenderness in the lateral malleolus  No erythema or warmth  Neurological: She is alert and oriented to person, place, and time  Skin: Skin is warm and dry  Psychiatric: She has a normal mood and affect  Her behavior is normal    Nursing note and vitals reviewed  ED Medications  Medications - No data to display    Diagnostic Studies  Labs Reviewed   CBC AND DIFFERENTIAL - Abnormal        Result Value Ref Range Status    WBC 11 87 (*) 4 31 - 10 16 Thousand/uL Final    Neutrophils Absolute 8 42 (*) 1 85 - 7 62 Thousands/µL Final    RBC 4 83  3 81 - 5 12 Million/uL Final    Hemoglobin 14 5  11 5 - 15 4 g/dL Final    Hematocrit 42 2  34 8 - 46 1 % Final    MCV 87  82 - 98 fL Final    MCH 30 0  26 8 - 34 3 pg Final    MCHC 34 4  31 4 - 37 4 g/dL Final    RDW 14 4  11 6 - 15 1 % Final    MPV 10 7  8 9 - 12 7 fL Final    Platelets 181  336 - 390 Thousands/uL Final    nRBC 0  /100 WBCs Final    Neutrophils Relative 72  43 - 75 % Final    Lymphocytes Relative 22  14 - 44 % Final    Monocytes Relative 5  4 - 12 % Final    Eosinophils Relative 1  0 - 6 % Final    Basophils Relative 0  0 - 1 % Final    Lymphocytes Absolute 2 66  0 60 - 4 47 Thousands/µL Final    Monocytes Absolute 0 63  0 17 - 1 22 Thousand/µL Final    Eosinophils Absolute 0 13  0 00 - 0 61 Thousand/µL Final    Basophils Absolute 0 03  0 00 - 0 10 Thousands/µL Final   BASIC METABOLIC PANEL - Abnormal     Creatinine 0 39 (*) 0 60 - 1 30 mg/dL Final    Comment: Standardized to IDMS reference method    Sodium 140  136 - 145 mmol/L Final    Potassium 3 6  3 5 - 5 3 mmol/L Final    Chloride 101  100 - 108 mmol/L Final    CO2 31  21 - 32 mmol/L Final    Anion Gap 8  4 - 13 mmol/L Final    BUN 17  5 - 25 mg/dL Final    Glucose 112  65 - 140 mg/dL Final    Comment:   If the patient is fasting, the ADA then defines impaired fasting glucose as > 100 mg/dL and diabetes as > or equal to 123 mg/dL    Specimen collection should occur prior to Sulfasalazine administration due to the potential for falsely depressed results  Specimen collection should occur prior to Sulfapyridine administration due to the potential for falsely elevated results  Calcium 9 4  8 3 - 10 1 mg/dL Final    eGFR 109  ml/min/1 73sq m Final    Narrative:     National Kidney Disease Education Program recommendations are as follows:  GFR calculation is accurate only with a steady state creatinine  Chronic Kidney disease less than 60 ml/min/1 73 sq  meters  Kidney failure less than 15 ml/min/1 73 sq  meters  ED URINE MACROSCOPIC - Abnormal     Ketones, UA Trace (*) Negative mg/dl Final    Color, UA Yellow   Final    Clarity, UA Cloudy   Final    pH, UA 7 5  4 5 - 8 0 Final    Leukocytes, UA Negative  Negative Final    Nitrite, UA Negative  Negative Final    Protein, UA Negative  Negative mg/dl Final    Glucose, UA Negative  Negative mg/dl Final    Urobilinogen, UA 0 2  0 2, 1 0 E U /dl E U /dl Final    Bilirubin, UA Negative  Negative Final    Blood, UA Negative  Negative Final    Specific Gravity, UA 1 020  1 003 - 1 030 Final    Narrative:     CLINITEK RESULT   POCT URINALYSIS DIPSTICK - Normal    Color, UA yellow   Final    Clarity, UA cloudy   Final       No orders to display       Procedures  Procedures      Phone Consults  ED Phone Contact    ED Course  ED Course as of Oct 12 1823   Thu Oct 12, 2017   1215 Case management consulted and saw pt at bedside  Recommend PT/OT eval  Consult ordered  [facility]  CritCare Time    Disposition  Final diagnoses:   Right foot pain   Weakness   Muscular dystrophy [facility])     ED Disposition     ED Disposition Condition Comment    Admit  Case was discussed with Liza and the patient's admission status was agreed to be Admission Status: observation status to the service of Dr Carmelina Regan           Follow-up Information    None       Current Discharge Medication List      CONTINUE these medications which have NOT CHANGED    Details   citalopram (CeleXA) 20 mg tablet Take by mouth      diazepam (VALIUM) 5 mg tablet Take by mouth      solifenacin (VESICARE) 10 MG tablet Take by mouth           No discharge procedures on file  ED Provider  Attending physically available and evaluated Harsha Jan RUSSELL managed the patient along with the ED Attending      Electronically Signed by       Rosalva San MD  Resident  10/12/17 7627

## 2017-10-12 NOTE — OCCUPATIONAL THERAPY NOTE
OccupationalTherapy Evaluation(time=0105-1842)     Patient Name: Ml Zhao  GUBNQ'U Date: 10/12/2017  Problem List  There is no problem list on file for this patient      Past Medical History  Past Medical History:   Diagnosis Date    GERD (gastroesophageal reflux disease)     Hyperlipidemia     Muscular dystrophy ([address] )      Past Surgical History  Past Surgical History:   Procedure Laterality Date    MASTECTOMY             10/12/17 1416   Note Type   Note type Eval only   Restrictions/Precautions   Weight Bearing Precautions Per Order Yes   RLE Weight Bearing Per Order WBAT  (Per Dr Dickson Gutierrez)   Pain Assessment   Pain Assessment 0-10   Pain Score 2  (while sitting)   Pain Type Acute pain;Chronic pain   Pain Location Ankle   Pain Orientation Right   Home Living   Type of Home Apartment  (0ste)   Home Equipment Walker;Cane;Wheelchair-electric;Reacher  (RTS, showerchair)   Prior Function   Lives With Alone   Lifestyle   Autonomy PTA pt states that she had assistance with her ADLs; states independence with transfers/ambulation--with RW, limited distances; +falls=5, neg ;HHA 8hrs/wk   Reciprocal Relationships limited family support   Service to Others homemaker   Intrinsic Gratification taking her dog for a walk   Psychosocial   Psychosocial (WDL) WDL   Subjective   Subjective "I cannot always use my wheelchair in the apt, so I have to walk a little "   ADL   Where Assessed Wheelchair   Eating Assistance 5  Supervision/Setup   Grooming Assistance 5  Supervision/Setup   UB Bathing Assistance 4  Minimal Assistance   LB Bathing Assistance 3  Moderate Assistance   UB Dressing Assistance 4  Minimal [address] 2  Maximal [address]  4  Minimal Assistance   Toileting Deficit Clothing management down;Clothing management up   Transfers   Sit to Stand 3  Moderate assistance   Additional items Assist x 1   Stand to Sit 4  Minimal assistance   Additional items Assist x 1   Functional Mobility   Functional Mobility 3  Moderate assistance   Additional Comments x1   Additional items Rolling walker   Balance   Static Sitting Good   Dynamic Sitting Fair +   Static Standing Fair -   Dynamic Standing Poor +   Activity Tolerance   Activity Tolerance Patient limited by fatigue;Patient limited by pain   Medical Staff Made Aware CEE Lee, MD   RUE Assessment   RUE Assessment X  (limited AROM shr(i e flex=40-50*);elbow-distal=grossly WFLs)   RUE Strength   RUE Overall Strength (shr=3-/5, elbow-distal=3+/5)   LUE Assessment   LUE Assessment X  (limited shr AROM(i e flex=30-40*),elbow-distal=grossly WFLS)   LUE Strength   LUE Overall Strength (shr=3-/5, elbow-distal=3+/5)   Hand Function   Gross Motor Coordination Functional   Fine Motor Coordination Functional   Sensation   Light Touch No apparent deficits   Proprioception   Proprioception No apparent deficits   Vision-Basic Assessment   Current Vision Wears glasses all the time   Vision - Complex Assessment   Acuity Able to read clock/calendar on wall without difficulty   Perception   Inattention/Neglect Appears intact   Cognition   Arousal/Participation Alert   Attention Within functional limits   Orientation Level Oriented X4   Memory Within functional limits   Following Commands Follows all commands and directions without difficulty   Assessment   Limitation Decreased ADL status; Decreased UE ROM; Decreased UE strength;Decreased Safe judgement during ADL;Decreased endurance   Prognosis Good   Assessment Pt is a 68y/o female admitted to the hospital 2* increased R ankle pain; x-ray(R ankle, 10/4)=neg acute injury  Pt denies acute trauma/injury  ED-MD(  [facility]) allowing WBAT R LE   PTA pt states that she had assistance with her ADLs; states independence with transfers/ambulation--with RW, limited distances; +falls=5, neg ; HHA 8hrs/wk During initial eval, pt demonstrated deficits with her functional balance, functional mobility, ADL status, b/l UE strength, activity tolerance(currently fair=15-20mins), and transfer safety  Pt would benefit from continued OT tx for the above deficits  3-5x/1-2wks   Goals   Patient Goals "to be able get around better "   STG Time Frame 3-5   Short Term Goal #1 Pt will demonstrate mod I with their sit-stand transfers to assist with completion of their LE dressing  Short Term Goal #2 Pt will demonstrate proper walker/transfer safety 100% of the time  Short Term Goal  Pt will demonstrate improved activity tolerance to good(20-30mins) and standing tolerance to 3-5mins to assist with ADLs  LTG Time Frame (5-10days)   Long Term Goal #1 Pt will demonstrate g/g- balance with all functional activities  Long Term Goal #2 Pt will demonstrate mod I with their UE and LE bathing/dresssing  Long Term Goal Pt will demonstrate improved b/l UE strength by 1/2 MM grade to assist with ADLs/transfers  Plan   Treatment Interventions ADL retraining;Functional transfer training;UE strengthening/ROM; Endurance training;Patient/family training;Equipment evaluation/education; Compensatory technique education   Goal Expiration Date 10/23/17   Treatment Day 0   OT Frequency 3-5x/wk   Recommendation   OT Discharge Recommendation Short Term Rehab   Barthel Index   Feeding 10   Bathing 0   Grooming Score 5   Dressing Score 5   Bladder Score 5   Bowels Score 10   Toilet Use Score 5   Transfers (Bed/Chair) Score 5   Mobility (Level Surface) Score 0   Stairs Score 0   Barthel Index Score 45   Diamond Spaulding, OT

## 2017-10-12 NOTE — ED NOTES
Dinner tray ordered at this time to be sent to room P O  [address] [facility]  10/12/17 9354

## 2017-10-12 NOTE — ED ATTENDING ATTESTATION
Patito Branham MD, saw and evaluated the patient  I have discussed the patient with the resident/non-physician practitioner and agree with the resident's/non-physician practitioner's findings, Plan of Care, and MDM as documented in the resident's/non-physician practitioner's note, except where noted  All available labs and Radiology studies were reviewed  At this point I agree with the current assessment done in the Emergency Department  I have conducted an independent evaluation of this patient a history and physical is as follows:    27-year-old female presents for evaluation of acute on chronic right foot pain  Patient states he has been having pain in her right foot/ankle for the past year to year and a half  She denies precipitating event or trauma  She states she has sharp throbbing pain that radiates through her ankle  She states that it has been getting worse for the past 3 weeks  She states the pain is worse when she steps on it  She had a recent normal do x-ray 1 week ago by her primary care physician  Patient states last night the pain got so severe she had helped herself to the floor because she thought that she is about to pass out  Patient is concerned because she lives alone would not be able to get back up  Patient denies calf pain or calf swelling, history of DVT, risk factors for DVT or pulmonary embolism, redness, swelling, warmth, cough, UR symptoms  10 systems reviewed otherwise negative  On exam acute distress, HEENT normal lungs normal cardiac normal, abdomen normal, right knee/calf exam within normal limits, patient is tender to palpation over the diffuse right ankle, there is no redness, swelling, warmth noted  Patient has full range of motion with pain  Patient is neurovascularly intact  Medical decision making;-patient with muscular dystrophy and right ankle pain-no history exam findings suggest fracture, infectious etiology/gout, DVT    Will consult case management      Critical Care Time  CritCare Time

## 2017-10-13 VITALS
WEIGHT: 170 LBS | OXYGEN SATURATION: 96 % | TEMPERATURE: 96.4 F | RESPIRATION RATE: 18 BRPM | SYSTOLIC BLOOD PRESSURE: 134 MMHG | HEART RATE: 71 BPM | DIASTOLIC BLOOD PRESSURE: 62 MMHG

## 2017-10-13 LAB
MAGNESIUM SERPL-MCNC: 1.9 MG/DL (ref 1.6–2.6)
VIT B12 SERPL-MCNC: 462 PG/ML (ref 100–900)

## 2017-10-13 PROCEDURE — 83735 ASSAY OF MAGNESIUM: CPT | Performed by: PHYSICIAN ASSISTANT

## 2017-10-13 PROCEDURE — 82607 VITAMIN B-12: CPT | Performed by: PHYSICIAN ASSISTANT

## 2017-10-13 RX ADMIN — OXYCODONE HYDROCHLORIDE 5 MG: 5 TABLET ORAL at 10:57

## 2017-10-13 RX ADMIN — HEPARIN SODIUM 5000 UNITS: 5000 INJECTION, SOLUTION INTRAVENOUS; SUBCUTANEOUS at 05:45

## 2017-10-13 NOTE — SOCIAL WORK
Pt is accepted at [address] [place] [address] arranged [facility] there today at 1:30 PM

## 2017-10-13 NOTE — PLAN OF CARE
DISCHARGE PLANNING     Discharge to home or other facility with appropriate resources Progressing        MUSCULOSKELETAL - ADULT     Maintain or return mobility to safest level of function Progressing     Maintain proper alignment of affected body part Progressing        PAIN - ADULT     Verbalizes/displays adequate comfort level or baseline comfort level Progressing        Potential for Falls     Patient will remain free of falls Progressing        Prexisting or High Potential for Compromised Skin Integrity     Skin integrity is maintained or improved Progressing

## 2017-10-13 NOTE — PLAN OF CARE
Problem: Potential for Falls  Goal: Patient will remain free of falls  INTERVENTIONS:  - Assess patient frequently for physical needs  -  Identify cognitive and physical deficits and behaviors that affect risk of falls    -  [place] fall precautions as indicated by assessment   - Educate patient/family on patient safety including physical limitations  - Instruct patient to call for assistance with activity based on assessment  - Modify environment to reduce risk of injury  - Consider OT/PT consult to assist with strengthening/mobility   Outcome: Progressing      Problem: Prexisting or High Potential for Compromised Skin Integrity  Goal: Skin integrity is maintained or improved  INTERVENTIONS:  - Identify patients at risk for skin breakdown  - Assess and monitor skin integrity  - Assess and monitor nutrition and hydration status  - Monitor labs (i e  albumin)  - Assess for incontinence   - Turn and reposition patient  - Assist with mobility/ambulation  - Relieve pressure over bony prominences  - Avoid friction and shearing  - Provide appropriate hygiene as needed including keeping skin clean and dry  - Evaluate need for skin moisturizer/barrier cream  - Collaborate with interdisciplinary team (i e  Nutrition, Rehabilitation, etc )   - Patient/family teaching   Outcome: Progressing      Problem: PAIN - ADULT  Goal: Verbalizes/displays adequate comfort level or baseline comfort level  Interventions:  - Encourage patient to monitor pain and request assistance  - Assess pain using appropriate pain scale  - Administer analgesics based on type and severity of pain and evaluate response  - Implement non-pharmacological measures as appropriate and evaluate response  - Consider cultural and social influences on pain and pain management  - Notify physician/advanced practitioner if interventions unsuccessful or patient reports new pain   Outcome: Progressing      Problem: DISCHARGE PLANNING  Goal: Discharge to home or other facility with appropriate resources  INTERVENTIONS:  - Identify barriers to discharge w/patient and caregiver  - Arrange for needed discharge resources and transportation as appropriate  - Identify discharge learning needs (meds, wound care, etc )  - Arrange for interpretive services to assist at discharge as needed  - Refer to Case Management Department for coordinating discharge planning if the patient needs post-hospital services based on physician/advanced practitioner order or complex needs related to functional status, cognitive ability, or social support system   Outcome: Progressing      Problem: MUSCULOSKELETAL - ADULT  Goal: Maintain or return mobility to safest level of function  INTERVENTIONS:  - Assess patient's ability to carry out ADLs; assess patient's baseline for ADL function and identify physical deficits which impact ability to perform ADLs (bathing, care of mouth/teeth, toileting, grooming, dressing, etc )  - Assess/evaluate cause of self-care deficits   - Assess range of motion  - Assess patient's mobility; develop plan if impaired  - Assess patient's need for assistive devices and provide as appropriate  - Encourage maximum independence but intervene and supervise when necessary  - Involve family in performance of ADLs  - Assess for home care needs following discharge   - Request OT consult to assist with ADL evaluation and planning for discharge  - Provide patient education as appropriate  Outcome: Progressing    Goal: Maintain proper alignment of affected body part  INTERVENTIONS:  - Support, maintain and protect limb and body alignment  - Provide pt/fam with appropriate education  Outcome: Progressing

## 2017-10-13 NOTE — DISCHARGE SUMMARY
Discharge Summary - [address] [facility]    Patient Information: Juan Castañeda 79 y o  female MRN: [id number]  Unit/Bed#: [address] 87 219-01 Encounter: [id number]    Discharging Physician / Practitioner: Fernanda Goyal MD  PCP: Jaswant Chamberlain DO  Admission Date: 10/12/2017  Discharge Date: 10/13/17    Reason for Admission:  Bilateral foot pain    Discharge Diagnoses:  Ambulatory dysfunction with bilateral foot pain secondary to muscular dystrophy    Principal Problem:    Ambulatory dysfunction  Active Problems:    Foot pain    Muscular dystrophy ([address] )  Resolved Problems:    * No resolved hospital problems  *      Consultations During Hospital Stay:  · Podiatry    Procedures Performed:     Xr Ankle 3+ Vw Left    Result Date: 10/8/2017  Narrative: LEFT ANKLE INDICATION:  Tender just anterior to the lateral malleolus  Pain of the right foot  COMPARISON: [facility] dating to July 3, 2011 although these involve the foot only  VIEWS:  3 IMAGES:  3 FINDINGS: There is no acute fracture or dislocation  No degenerative changes  No lytic or blastic lesions are seen  Soft tissues are unremarkable  Impression: No acute osseous abnormality  Consider MRI if there is continued pain or concern for internal derangement  Workstation performed: [id number]     Xr Foot 3+ Vw Right    Result Date: 10/8/2017  Narrative: RIGHT FOOT INDICATION:  59-year-old female with pain in the base of the fifth metatarsal and cuboid area  Patient had prior injury one half years ago  COMPARISON: July 3, 2011 VIEWS:  3 IMAGES:  3 FINDINGS: There is no acute fracture or dislocation  Mild hallux valgus deformity on frontal view  The cuboid and fifth metatarsal bones appear intact without deformity  No degenerative changes  No lytic or blastic lesions are seen  Soft tissues are unremarkable  Impression: No acute osseous abnormality   Workstation performed: [id number]         Significant Findings:     · 59-year-old with a past medical history of muscular dystrophy with worsening right foot pain for last several weeks especially worsening in last 2 days prompting ED evaluation  She had an outpatient workup to include x-rays without evidence of osteomyelitis or acute fracture  Her baseline is usage of a walker but also has a wheelchair for use  · Pain referred mostly to the plantar forefoot and plantar lateral foot upon attempts at ambulation seen by Podiatry x-rays were reviewed without evidence of osseous abnormality right ankle mass felt to be benign lipoma  Impression being [place] muscular dystrophy with left ankle pain and possible lipoma  They felt that the patient suffering from consequences of that cavus foot type that remains flexible and may benefit from bracing which can set up as outpatient  · Should benefit from a rehab course, we gave her oxycodone for p r n  pain relief along with tramadol these will both deep be discontinued and be deferred to rehab for ongoing management for foot pain    Incidental Findings:   ·     Test Results Pending at Discharge (will require follow up):   · No     Outpatient Tests Requested:  · None    Complications:  None    Hospital Course:     Mandei Whalen is a 79 y o  female patient who originally presented to the hospital on 10/12/2017 due to ambulatory dysfunction from foot pain and muscular dystrophy  Please see above short hospital course in preparation for rehab placement    Condition at Discharge: good     Discharge Day Visit / Exam:     * Please refer to separate progress for these details *    Discharge instructions/Information to patient and family:   See after visit summary for information provided to patient and family  Provisions for Follow-Up Care:  See after visit summary for information related to follow-up care and any pertinent home health orders        Disposition:     Acute Rehab at [facility]    For Discharges to [facility]F:   · Not Applicable to this Patient - Not Applicable to this Patient      Discharge Statement:  I spent 45 minutes discharging the patient  This time was spent on the day of discharge  I had direct contact with the patient on the day of discharge  Greater than 50% of the total time was spent examining patient, answering all patient questions, arranging and discussing plan of care with patient as well as directly providing post-discharge instructions  Additional time then spent on discharge activities  Discharge Medications:  See after visit summary for reconciled discharge medications provided to patient and family  ** Please Note: Dragon 360 Dictation voice to text software may have been used in the creation of this document   **

## 2017-10-13 NOTE — CASE MANAGEMENT
Initial Clinical Review    Admission: Date/Time/Statement: OBS 10/12 @ 1657    Orders Placed This Encounter   Procedures    Place in Observation (expected length of stay for this patient is less than two midnights)     Standing Status:   Standing     Number of Occurrences:   1     Order Specific Question:   Admitting Physician     Answer:   Oralia Aleman [[id number]]     Order Specific Question:   Level of Care     Answer:   Med Surg [16]     ED: Date/Time/Mode of Arrival:   ED Arrival Information     Expected Arrival Acuity Means of Arrival Escorted By Service Admission Type    - 10/12/2017 10:41 Urgent Wheelchair Self General Medicine Urgent    Arrival Complaint    foot pain        Chief Complaint:   Chief Complaint   Patient presents with    Foot Pain     Pt reports right foot pain x 3 weeks  pt reports has evaluated by PCP on week and had xray performed  Pt reports pain worsened last night  Pt reports difficulty ambulating due to pain        History of Illness: 51-year-old female with a history of muscular dystrophy complains of intermittent right foot pain for 3 weeks  Pt states she saw her PCP last week who ordered an Xray of the foot and ankle  Xray showed no abnormalities  Pt followed up with PCP yesterday who explained to the pt this may all be related to soft tissue injury and referred her to a podiatrist  Last night patient was in the kitchen standing on her feet when she felt a sharp pain radiate up the right ankle up to the medial knee  Pt states the pain almost dropped her to the floor but was able to grab onto the counter  The pain continue to intensify afterwards  Tylenol has indra found to provide minimal relief but causing discomfort especially with ambulation  Denies trauma, injury, fall, manipulation,or orthopedic procedures in the past  No numbness, weakness, or skin changes noted  Uses an electric wheelchair and walker at baseline   Pt lives alone and is concerned no one would know if she fell a home          Right knee: She exhibits no swelling, no effusion, no ecchymosis and no deformity  No tenderness found  Right ankle: She exhibits decreased range of motion and swelling  She exhibits no ecchymosis, no deformity, no laceration and normal pulse  Tenderness  Restricted movement with dorsiflexions  Pedal pulse bounding  Minimal swelling and tenderness in the lateral malleolus  No erythema or warmth  ED Vital Signs:   ED Triage Vitals   Temperature Pulse Respirations Blood Pressure SpO2   10/12/17 1050 10/12/17 1050 10/12/17 1050 10/12/17 1050 10/12/17 1050   (!) 97 4 °F (36 3 °C) 73 16 146/66 100 %      Temp Source Heart Rate Source Patient Position - Orthostatic VS BP Location FiO2 (%)   10/12/17 1050 10/12/17 1050 10/12/17 1050 10/12/17 1050 --   Oral Monitor Sitting Right arm       Pain Score       10/12/17 1051       2        Wt Readings from Last 1 Encounters:   10/12/17 77 1 kg (170 lb)       Vital Signs (abnormal): /72    Abnormal Labs/Diagnostic Test Results: wbc's 11 87,  Ans 8 42    ED Treatment:   Medication Administration from 10/12/2017 1040 to 10/12/2017 1801     None          Past Medical/Surgical History: Active Ambulatory Problems     Diagnosis Date Noted    No Active Ambulatory Problems     Resolved Ambulatory Problems     Diagnosis Date Noted    No Resolved Ambulatory Problems     Past Medical History:   Diagnosis Date    GERD (gastroesophageal reflux disease)     Hyperlipidemia     Muscular dystrophy ([facility] )        Admitting Diagnosis: Foot pain [M79 673]  Mus[facility] dystrophy ([facility] ) [G71 0]  Weakness [R53 1]  Right foot pain [M79 671]    Age/Sex: 79 y o  female    Assessment/Plan: Principal Problem:    Ambulatory dysfunction  Active Problems:    Foot pain    Muscular dystrophy ([facility] )        Plan for the Primary Problem(s):  · Ambulatory dysfunction  ? 2* muscular dystrophy   ?  Appreciate PT/OT/CM eval, pt not safe to go home and will likely have placement at [facility] prior to neurologic evaluation after discussion w/ CM  ? Will monitor overnight, continue PT/OT, will provide further workup as noted below for patient's foot pain     Plan for Additional Problems:   · Right foot pain/paresthesias  ? 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 Appreciate podiatry recommendations  ? Op xrays reviewed w/o s/sx of infection, acute osseous abnormality, calcium unremarkable, low suspicion for pain from central nervous origin given no no radicular pain or back pain  ? Pt to be fitted for MAFO brace and therapy at [facility] as noted by podiatry who will set up OP bracing  ? As pt has mild paresthesias on right foot will check magnesium and phosphorus  ? Will kindly consult neurology at noted above per [facility] rehab's request concerning for a possible 'muscular dystrophy exacerbation'  ? D/w attending Dr Shell Nicolas      Leukocytosis                  No s/sx of infection, appears to be physiologic     Anxiety                  Stable                  Continue lexapro/valium     OAB                  No cauda equina s/sx                  Substitute detrol for vesicare as is non formulary                  F/u w/ urology in OP setting     VTE Prophylaxis: Heparin  / sequential compression device   Code Status: Full Code  POLST: There is no POLST form on file for this patient (pre-hospital)  Anticipated Length of Stay:  Patient will be admitted on an Observation basis with an anticipated length of stay of  Less than 2 midnights     Justification for Hospital Stay: ambulatory dysfunction    Admission Orders:  OBS  PT / OT EVAL  WBAT  Consult Podiatry  Consult Neuro  SCD's    Scheduled Meds:   citalopram 20 mg Oral HS   diazepam 5 mg Oral HS   heparin (porcine) 5,000 Units Subcutaneous Q8H [facility]   tolterodine 4 mg Oral HS     Continuous Infusions:    PRN Meds:   acetaminophen    ondansetron    oxyCODONE    traMADol  X 1      St  [address] [place] [address] in the [place] by Bora Sahni for 2017  Network Utilization Review Department  Phone: [phone]; Fax [phone]  ATTENTION: The Network Utilization Review Department is now centralized for our 7 Facilities  Make a note that we have a new phone and fax numbers for our Department  Please call with any questions or concerns to [phone] and carefully follow the prompts so that you are directed to the right person  All voicemails are confidential  Fax any determinations, approvals, denials, and requests for initial or continue stay review clinical to [phone]  Due to HIGH CALL volume, it would be easier if you could please send faxed requests to expedite your requests and in part, help us provide discharge notifications faster

## 2017-12-18 ENCOUNTER — ALLSCRIPTS OFFICE VISIT (OUTPATIENT)
Dept: OTHER | Facility: OTHER | Age: 67
End: 2017-12-18

## 2018-01-13 VITALS
BODY MASS INDEX: 32.79 KG/M2 | HEART RATE: 70 BPM | DIASTOLIC BLOOD PRESSURE: 64 MMHG | HEIGHT: 60 IN | RESPIRATION RATE: 16 BRPM | OXYGEN SATURATION: 95 % | SYSTOLIC BLOOD PRESSURE: 116 MMHG | WEIGHT: 167 LBS | TEMPERATURE: 97.3 F

## 2018-01-13 NOTE — MISCELLANEOUS
Message  Pt called and spoke with Nikki, c/o congestion, facial pressure, post nasal drip, ear pressure, headache and fatigue, she did not have fever or rhinitis  She thinks she has a sinus infection because she's had one before  Per Dr Monserrat Tobar, pt can take Zpak and Medrol dose gorge but she is to hold the Simvastatin while she is taking these meds  Pt received and understood instructions given  Medication was called into pharmacy  BF/VFP      Active Problems    1  Allergic rhinitis (477 9) (J30 9)   2  Allergic rhinitis due to pollen (477 0) (J30 1)   3  Anxiety (300 00) (F41 9)   4  Anxiety disorder (300 00) (F41 9)   5  Bronchitis, chronic obstructive, with exacerbation (491 21) (J44 1)   6  Chronic obstructive pulmonary disease (496) (J44 9)   7  Depression (311) (F32 9)   8  Depression screening (V79 0) (Z13 89)   9  Dystrophy, muscular, 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)   10  Edema (782 3) (R60 9)   11  Effusion of knee (719 06) (M25 469)   12  Esophageal reflux (530 81) (K21 9)   13  Glaucoma screening (V80 1) (Z13 5)   14  Hypercholesteremia (272 0) (E78 0)   15  Hypercholesterolemia (272 0) (E78 0)   16  Overactive bladder (596 51) (N32 81)   17  Pain in joint of right shoulder region (719 41) (M25 511)   18  Restrictive lung disease (518 89) (J98 4)   19  Right knee pain (719 46) (M25 561)   20  Scalp pain (784 0) (R51)   21  Screening for genitourinary condition (V81 6) (Z13 89)   22  Screening for neurological condition (V80 09) (Z13 89)   23  Shin splints (844 9) (S86 899A)   24  Shoulder pain, left (719 41) (M25 512)   25  Shoulder pain, right (719 41) (M25 511)   26  Visit for screening mammogram (V76 12) (Z12 31)   27  Vitamin D deficiency (268 9) (E55 9)   28  Wheelchair dependent (V46 3) (Z99 3)    Current Meds   1  Advair -21 MCG/ACT Inhalation Aerosol; INHALE 2 PUFFS,  BY MOUTH, TWICE   DAILY;    Therapy: 76Vgf8689 to (Renew:57Rdo6615)  Requested for: 55SKQ1438; Last Rx:[id number] Ordered   2  Citalopram Hydrobromide 20 MG Oral Tablet; take 1 tablet by mouth once daily; Therapy: 06Xpz5755 to (Evaluate:19Jun2016)  Requested for: 18Iel1913; Last   Rx:20Aqw0616 Ordered   3  Desonide 0 05 % External Cream; APPLY SPARINGLY TO AFFECTED AREA(S) TWICE   DAILY; Therapy: (Recorded:12Mar2015) to Recorded   4  Diazepam 5 MG Oral Tablet; TAKE 1 TABLET AT BEDTIME AS NEEDED FOR SLEEP; Therapy: 72VTH8586 to (Evaluate:19Jun2016)  Requested for: 10Lbl5945; Last   Rx:22Dec2015 Ordered   5  Fluticasone Propionate 50 MCG/ACT Nasal Suspension; USE 2 SPRAYS IN EACH   NOSTRIL ONCE DAILY; Therapy: 71QOZ5169 to (Evaluate:21May2016)  Requested for: 70ERD5686; Last   Rx:22Mar2016 Ordered   6  Omeprazole 20 MG Oral Capsule Delayed Release; take 1 capsule by mouth once daily; Therapy: 77BHD1497 to (Evaluate:11May2016)  Requested for: 80ITH6170; Last   Rx:11Feb2016 Ordered   7  Simvastatin 20 MG Oral Tablet; take 1 tablet by mouth once daily; Therapy: 41UOH1218 to (Evaluate:88Nwh0177)  Requested for: 74QRT5470; Last   Rx:05Jan2016 Ordered   8  Spiriva HandiHaler 18 MCG Inhalation Capsule; inhale contents of 1 capsule by mouth   once daily; Therapy: 46JWO7116 to (Evaluate:11Mar2017)  Requested for: 70UIY9393; Last   Rx:16Mar2016 Ordered   9  VESIcare 5 MG Oral Tablet; TAKE 1 TABLET DAILY; Therapy: 59JLI0639 to (Evaluate:11May2016)  Requested for: 34QQK8648; Last   Rx:75Pau3454 Ordered    Allergies    1   Amoxicillin TABS    Plan  Allergic rhinitis    · Azithromycin 250 MG Oral Tablet; TAKE 2 TABLETS ON DAY 1 THEN TAKE 1  TABLET A DAY FOR 4 DAYS   · Medrol (Kristopher) 4 MG Oral Tablet (MethylPREDNISolone (Kristopher)); TAKE AS DIRECTED    Signatures   Electronically signed by : Twin Piedra DO; Mar 28 2016  1:25PM EST                       (Author)

## 2018-01-14 VITALS
HEART RATE: 73 BPM | SYSTOLIC BLOOD PRESSURE: 128 MMHG | DIASTOLIC BLOOD PRESSURE: 62 MMHG | BODY MASS INDEX: 32.98 KG/M2 | TEMPERATURE: 98.3 F | HEIGHT: 60 IN | OXYGEN SATURATION: 97 % | WEIGHT: 168 LBS

## 2018-01-14 VITALS
HEIGHT: 60 IN | SYSTOLIC BLOOD PRESSURE: 136 MMHG | HEART RATE: 71 BPM | OXYGEN SATURATION: 96 % | DIASTOLIC BLOOD PRESSURE: 70 MMHG | TEMPERATURE: 97.9 F

## 2018-01-15 VITALS
HEIGHT: 60 IN | TEMPERATURE: 98.3 F | SYSTOLIC BLOOD PRESSURE: 122 MMHG | OXYGEN SATURATION: 96 % | DIASTOLIC BLOOD PRESSURE: 84 MMHG | HEART RATE: 70 BPM

## 2018-01-22 VITALS
SYSTOLIC BLOOD PRESSURE: 124 MMHG | DIASTOLIC BLOOD PRESSURE: 76 MMHG | HEART RATE: 70 BPM | OXYGEN SATURATION: 97 % | TEMPERATURE: 97.6 F | HEIGHT: 60 IN

## 2018-01-23 VITALS
HEART RATE: 80 BPM | TEMPERATURE: 98.1 F | DIASTOLIC BLOOD PRESSURE: 80 MMHG | SYSTOLIC BLOOD PRESSURE: 116 MMHG | RESPIRATION RATE: 15 BRPM

## 2018-01-23 NOTE — PROGRESS NOTES
Assessment    1  Medicare annual wellness visit, subsequent (V70 0) (Z00 00)   2  Anxiety (300 00) (F41 9)   3  Depression (311) (F32 9)   4  Acute left ankle pain (719 47) (M25 572)   5  Osteoporosis (733 00) (M81 0)    Plan  Allergic rhinitis    · Fluticasone Propionate 50 MCG/ACT Nasal Suspension; USE 2 SPRAYS IN  EACH NOSTRIL ONCE DAILY  Anxiety disorder    · Citalopram Hydrobromide 20 MG Oral Tablet; take 1 tablet by mouth once daily  Osteoporosis    · Follow-up visit in 4 Months Evaluation and Treatment  Follow-up  Status: Hold For -  Scheduling  Requested for: 43SSC5193  Overactive bladder    · VESIcare 10 MG Oral Tablet; Take 1 tablet daily    Discussion/Summary    At least need POA and advanced directives  Possible side effects of new medications were reviewed with the patient/guardian today  The treatment plan was reviewed with the patient/guardian  The patient/guardian understands and agrees with the treatment plan      Chief Complaint  Pt is here for subsequent Medicare pe  History of Present Illness  HPI: Medicare PE  Had braces made for foot - ankle support  Difficulty getting to see different Drs  Deja to [employer] and Wellness Visits: The patient is being seen for the subsequent annual wellness visit  Medicare Screening and Risk Factors   Hospitalizations: she has been previously hospitalizied and GI bleed  Medicare Screening Tests Risk Questions   Abdominal aortic aneurysm risk assessment: none indicated  Osteoporosis risk assessment: none indicated  HIV risk assessment: none indicated  Drug and Alcohol Use: The patient is a former cigarette smoker  The patient reports never drinking alcohol  Alcohol concern:   The patient has no concerns about alcohol abuse  She has never used illicit drugs  Diet and Physical Activity: Current diet includes well balanced meals  The patient does not exercise     Mood Disorder and Cognitive Impairment Screening: She denies feeling down, depressed, or hopeless over the past two weeks  She denies feeling little interest or pleasure in doing things over the past two weeks  Cognitive impairment screening: denies difficulty learning/retaining new information, denies difficulty handling complex tasks, denies difficulty with reasoning, denies difficulty with spatial ability and orientation, denies difficulty with language and denies difficulty with behavior  Functional Ability/Level of Safety: She denies hearing difficulties  She does not use a hearing aid  The patient is currently able to do activities of daily living with limitations  Activities of daily living details: transportation help needed, needs help doing housework and needs help doing laundry, but does not need help using the phone, does not need help shopping, no meal preparation help needed, does not need help managing medications and does not need help managing money  Injury History: no alcohol use, no visual impairment and no previous fall  Home safety risk factors:  no unfamiliar surroundings, no loose rugs, no poor household lighting, no uneven floors, no household clutter, grab bars in the bathroom and handrails on the stairs  Advance Directives: Advance directives: no living will, no durable power of  for health care directives and no advance directives  end of life decisions were reviewed with the patient  Co-Managers and Medical Equipment/Suppliers: See Patient Care Team   Reviewed Updated [address] [place] [address] [address]:   Last Medicare Wellness Visit Information was reviewed, patient interviewed and updates made to the record today  Patient Care Team    Care Team Member Role Specialty Office Number   Kiersten NUNN   Specialist Pulmonary Medicine [phone]   Kimberley Mcgee DO  Dermatology [phone]   Vlad Romo MD  Plastic Surgery [phone]   [facility]  Obstetrics/Gynecology [phone]   1 [facility] DO  Surgical Oncology [phone]   Darius Espinal MD  Neurology [phone]   Darius Espinal MD  Neurology [phone], Alexandra [facility] [phone]     Review of Systems    Constitutional: negative  Eyes: negative  ENT: negative  Cardiovascular: negative  Respiratory: negative  Gastrointestinal: negative  Genitourinary: negative  Musculoskeletal: negative  Integumentary and Breasts: negative  Neurological: negative  Psychiatric: negative  Endocrine: negative  Hematologic and Lymphatic: negative  Active Problems    1  Acute left ankle pain (719 47) (M25 572)   2  Acute pain of both knees (338 19,719 46) (M25 561,M25 562)   3  Allergic rhinitis (477 9) (J30 9)   4  Anxiety (300 00) (F41 9)   5  Anxiety disorder (300 00) (F41 9)   6  Breast cancer screening (V76 10) (Z12 39)   7  Chronic obstructive pulmonary disease (496) (J44 9)   8  Contusion of skin (924 9) (T14 8XXA)   9  Depression (311) (F32 9)   10  Depression screening (V79 0) (Z13 89)   11  Dystrophy, muscular, 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)   12  Effusion of knee (719 06) (M25 469)   13  Foot arch pain, left (729 5) (M79 672)   14  GERD without esophagitis (530 81) (K21 9)   15  Glaucoma screening (V80 1) (Z13 5)   16  Hypercholesteremia (272 0) (E78 00)   17  Hypercholesterolemia (272 0) (E78 00)   18  Medicare annual wellness visit, subsequent (V70 0) (Z00 00)   19  Muscular dystrophy (359 1) (G71 0)   20  Osteoporosis (733 00) (M81 0)   21  Overactive bladder (596 51) (N32 81)   22  Pre-op evaluation (V72 84) (Z01 818)   23  Restrictive lung disease (518 89) (J98 4)   24  Rib pain on right side (786 50) (R07 81)   25  Right foot pain (729 5) (M79 671)   26  Scalp pain (784 0) (R51)   27  Sleep related hypoxia (327 24) (G47 34)   28  Strain of right shoulder, initial encounter (840 9) (S46 911A)   29  Urinary incontinence (788 30) (R32)   30  Vitamin D deficiency (268 9) (E55 9)   31   Wheelchair dependent (V46 3) (Z99 3)    Past Medical History    · History of Breast cancer, left (174 9) (C50 912)   · History of athlete's foot (V12 09) (Z86 19)   · History of upper respiratory infection (V12 09) (Z87 09)   · History of Pneumonitis (486) (J18 9)   · History of Shoulder pain, left (719 41) (M25 512)   · History of Shoulder pain, right (719 41) (M25 511)   · History of Skin disorder (709 9) (L98 9)   · History of Visit for screening mammogram (V76 12) (Z12 31)    Surgical History    · History of Mastectomy Left Breast   · History of Oral Surgery Tooth Extraction   · History of Tonsillectomy   · History of Tubal Ligation    Family History  Mother    · Family history of Bone loss  Father    · Family history of Bone loss   · Family history of Family Health Status Of Father -    · Family history of Hypertension, benign   · Family history of Skin Cancer (V16 8)  Brother    · Family history of Muscular Dystrophy Of The Limb-girdle  Family History    · Family history of Muscular Dystrophy Of The Limb-girdle    Social History    · Being A Social Drinker   · Currently On Permanent Disability   · Daily caffeinated cola consumption   · Denied: Daily Coffee Consumption (___ Cups/Day)   · Former smoker (V15 82) (Z87 891)   · 1ppd x 41 years, quit    · Wheelchair dependent (V46 3) (Z99 3)  The social history was reviewed and updated today  The social history was reviewed and is unchanged  Current Meds   1  Citalopram Hydrobromide 20 MG Oral Tablet; take 1 tablet by mouth once daily; Therapy: 57Dmb2335 to (Evaluate:99Jfe9827)  Requested for: 2017; Last   KS:55QNE5296 Ordered   2  DiazePAM 5 MG Oral Tablet; take 1 tablet daily prn; Therapy: 08BMZ7813 to (Evaluate:2018)  Requested for: 35AOJ6017; Last   Rx:2017 Ordered   3  Fluticasone Propionate 50 MCG/ACT Nasal Suspension; USE 2 SPRAYS IN EACH   NOSTRIL ONCE DAILY;    Therapy: 76KUD6522 to (Evaluate:78Cqd5654)  Requested for: 41EJF4556; Last   Rx:2017 Ordered   4  VESIcare 10 MG Oral Tablet; Take 1 tablet daily; Therapy: 40FWC1045 to (Evaluate:84Nph6803)  Requested for: 00MDT4667; Last   Rx:07Jun2017 Ordered    The medication list was reviewed and updated today  Allergies    1  Amoxicillin TABS    Immunizations   ** Printed in Appendix #1 below  Vitals  Signs    Temperature: 98 1 F, Oral  Heart Rate: 80  Respiration: 15  Systolic: 548, RUE  Diastolic: 80, RUE  Weight Unobtainable: Yes    Physical Exam    Constitutional   General appearance: No acute distress, well appearing and well nourished  Eyes   Conjunctiva and lids: No swelling, erythema or discharge  Pupils and irises: Equal, round and reactive to light  Ears, Nose, Mouth, and Throat   External inspection of ears and nose: Normal     Otoscopic examination: Tympanic membranes translucent with normal light reflex  Canals patent without erythema  Oropharynx: Normal with no erythema, edema, exudate or lesions  Pulmonary   Respiratory effort: No increased work of breathing or signs of respiratory distress  Auscultation of lungs: Clear to auscultation  Cardiovascular   Auscultation of heart: Normal rate and rhythm, normal S1 and S2, without murmurs  Examination of extremities for edema and/or varicosities: Normal     Abdomen   Abdomen: Non-tender, no masses  Lymphatic   Palpation of lymph nodes in neck: No lymphadenopathy  Musculoskeletal   Gait and station: Abnormal   Wheel chair  Neurologic   Sensation: No sensory loss  Psychiatric   Orientation to person, place, and time: Normal     Mood and affect: Normal        Results/Data  PHQ-9 Adult Depression Screening 76HPV3892 02:19PM UserGreg     Test Name Result Flag Reference   PHQ-9 Adult Depression Score 8     Over the last two weeks, how often have you been bothered by any of the following problems?   Little interest or pleasure in doing things: Several days - 1  Feeling down, depressed, or hopeless: Not at all - 0  Trouble falling or staying asleep, or sleeping too much: Nearly every day - 3  Feeling tired or having little energy: Several days - 1  Poor appetite or over eating: Nearly every day - 3  Feeling bad about yourself - or that you are a failure or have let yourself or your family down: Not at all - 0  Trouble concentrating on things, such as reading the newspaper or watching television: Not at all - 0  Moving or speaking so slowly that other people could have noticed  Or the opposite -  being so fidgety or restless that you have been moving around a lot more than usual: Not at all - 0  Thoughts that you would be better off dead, or of hurting yourself in some way: Not at all - 0   PHQ-9 Adult Depression Screening Negative     PHQ-9 Difficulty Level Somewhat difficult     PHQ-9 Severity Mild Depression         Health Management  Breast cancer screening   * MAMMO SCREENING BILATERAL W CAD; every 1 year; Last 81Fli4579; Next Due:  53Smh9201; Overdue  Depression screening   *VB - Urinary Incontinence Screen (Dx Z13 89 Screen for UI); every 1 year; Last  25Apr2016; Next Due: 79Nly6653; Overdue  Glaucoma screening   *VB - Glaucoma Screen; every 1 year; Next Due: 29QRT8756; Overdue  History of Screening for genitourinary condition   *VB - Urinary Incontinence Screen (Dx Z13 89 Screen for UI); every 1 year; Last  25Apr2016; Next Due: 01Smc6411; Overdue  History of Screening for neurological condition   *VB - Fall Risk Assessment  (Dx Z13 89 Screen for Neurologic Disorder); every 1 year; Last 83Ine7326; Next Due: 07QVJ7787; Overdue  *VB - Urinary Incontinence Screen (Dx Z13 89 Screen for UI); every 1 year; Last  48Fkk0067; Next Due: 34Mqo3785; Overdue  Hypercholesteremia   (1) LIPID PANEL, FASTING; every 1 year; Last 86BNJ9135; Next Due: 56CHE3201; Overdue  Health Maintenance   Medicare Annual Wellness Visit; every 1 year; Last 25GJB4068; Next Due: 08MWS7917;   Overdue    Future Appointments    Date/Time Provider Specialty Site   2018 03:20 PM BREANNE Gamble   Pulmonary Medicine [facility] [place]   2018 02:15 PM Fabrice Main DO [facility]     Signatures   Electronically signed by : Preet Hernandez DO; Dec 19 2017  9:54AM EST                       (Author)    Appendix #1     Patient: Arielle Tinajero ; : 1950; MRN: [id number]      4 2 3 4 5 6    Influenza  Oct 2011 Sep 2014 Oct 2015 2016 Oct-2011 Oct-2012    PCV  Oct 2015         PPSV  Oct 2011 10/11/11 2012 05-Jul-2017 2011     Varicella  Sep 2014

## 2018-01-25 ENCOUNTER — OFFICE VISIT (OUTPATIENT)
Dept: PULMONOLOGY | Facility: CLINIC | Age: 68
End: 2018-01-25

## 2018-01-29 DIAGNOSIS — F41.9 ANXIETY: Primary | ICD-10-CM

## 2018-01-30 RX ORDER — DIAZEPAM 5 MG/1
5 TABLET ORAL DAILY
Qty: 30 TABLET | Refills: 0 | Status: SHIPPED | OUTPATIENT
Start: 2018-01-30 | End: 2018-03-06 | Stop reason: SDUPTHER

## 2018-02-01 ENCOUNTER — OFFICE VISIT (OUTPATIENT)
Dept: FAMILY MEDICINE CLINIC | Facility: CLINIC | Age: 68
End: 2018-02-01
Payer: MEDICARE

## 2018-02-01 VITALS
HEART RATE: 77 BPM | TEMPERATURE: 97.7 F | DIASTOLIC BLOOD PRESSURE: 84 MMHG | SYSTOLIC BLOOD PRESSURE: 126 MMHG | HEIGHT: 60 IN | OXYGEN SATURATION: 97 %

## 2018-02-01 DIAGNOSIS — K21.00 REFLUX ESOPHAGITIS: ICD-10-CM

## 2018-02-01 DIAGNOSIS — R06.2 WHEEZE: ICD-10-CM

## 2018-02-01 DIAGNOSIS — J20.9 ACUTE BRONCHITIS, UNSPECIFIED ORGANISM: Primary | ICD-10-CM

## 2018-02-01 DIAGNOSIS — L21.0 SEBORRHEA CAPITIS: ICD-10-CM

## 2018-02-01 DIAGNOSIS — R05.9 COUGH: ICD-10-CM

## 2018-02-01 PROCEDURE — 99214 OFFICE O/P EST MOD 30 MIN: CPT | Performed by: FAMILY MEDICINE

## 2018-02-01 RX ORDER — FLUTICASONE PROPIONATE 50 MCG
2 SPRAY, SUSPENSION (ML) NASAL DAILY
Refills: 0 | COMMUNITY
Start: 2017-12-18 | End: 2018-10-23 | Stop reason: SDUPTHER

## 2018-02-01 RX ORDER — OMEPRAZOLE 20 MG/1
20 CAPSULE, DELAYED RELEASE ORAL EVERY MORNING
Refills: 0 | COMMUNITY
Start: 2017-10-31 | End: 2018-08-14 | Stop reason: SDUPTHER

## 2018-02-01 RX ORDER — CEFDINIR 300 MG/1
300 CAPSULE ORAL EVERY 12 HOURS SCHEDULED
Qty: 14 CAPSULE | Refills: 1 | Status: SHIPPED | OUTPATIENT
Start: 2018-02-01 | End: 2018-02-08

## 2018-02-01 RX ORDER — KETOCONAZOLE 20 MG/ML
1 SHAMPOO TOPICAL 2 TIMES WEEKLY
Qty: 120 ML | Refills: 0 | Status: SHIPPED | OUTPATIENT
Start: 2018-02-01 | End: 2018-04-16 | Stop reason: SDUPTHER

## 2018-02-01 NOTE — PROGRESS NOTES
Assessment/Plan:patient here today and c/o wheezing,sore throat and Chest tightness     No problem-specific Assessment & Plan notes found for this encounter  There are no diagnoses linked to this encounter  1  Acute bronchitis, unspecified organism     2  Cough     3  Wheeze     4  Reflux esophagitis         Subjective:      Patient ID: Fernando Lawton is a 76 y o  female  Started getting sick 3 days ago  Feels going into chest, mild wheezing  Drinks maybe couple pints of fluid a day  Reflux acting up, restarted Prilosec  Gets a rash with Amoxicillin  The following portions of the patient's history were reviewed and updated as appropriate: allergies, current medications, past family history, past medical history, past social history, past surgical history and problem list     Review of Systems   Constitutional: Negative  HENT: Positive for congestion  Eyes: Negative  Respiratory: Positive for wheezing  Cardiovascular: Negative  Gastrointestinal: Negative  Objective:     Physical Exam   Constitutional: She appears well-developed and well-nourished  HENT:   Head: Normocephalic and atraumatic  Cardiovascular: Normal rate and regular rhythm  Pulmonary/Chest: Effort normal and breath sounds normal    Skin: Skin is warm and dry

## 2018-02-13 DIAGNOSIS — F41.9 ANXIETY: ICD-10-CM

## 2018-02-16 RX ORDER — DIAZEPAM 5 MG/1
5 TABLET ORAL DAILY
Qty: 90 TABLET | Refills: 0 | Status: CANCELLED | OUTPATIENT
Start: 2018-02-16 | End: 2018-05-17

## 2018-02-16 NOTE — TELEPHONE ENCOUNTER
From: Leopoldo Gals  Sent: 2/13/2018 4:39 PM EST  Subject: Medication Renewal Request    Erica Pope   Danilo Ramirez would like a refill of the following medications:     diazepam (VALIUM) 5 mg tablet SHONNA Pat    [facility]: [facility] [address]  : [id number]

## 2018-03-06 DIAGNOSIS — F41.9 ANXIETY: ICD-10-CM

## 2018-03-06 RX ORDER — DIAZEPAM 5 MG/1
5 TABLET ORAL DAILY
Qty: 90 TABLET | Refills: 0 | Status: SHIPPED | OUTPATIENT
Start: 2018-03-06 | End: 2018-10-23

## 2018-04-16 DIAGNOSIS — L21.0 SEBORRHEA CAPITIS: ICD-10-CM

## 2018-04-16 RX ORDER — KETOCONAZOLE 20 MG/ML
1 SHAMPOO TOPICAL 2 TIMES WEEKLY
Qty: 120 ML | Refills: 0 | Status: SHIPPED | OUTPATIENT
Start: 2018-04-16 | End: 2018-06-29 | Stop reason: SDUPTHER

## 2018-04-26 ENCOUNTER — TELEPHONE (OUTPATIENT)
Dept: PULMONOLOGY | Facility: CLINIC | Age: 68
End: 2018-04-26

## 2018-04-26 DIAGNOSIS — J44.9 CHRONIC OBSTRUCTIVE PULMONARY DISEASE, UNSPECIFIED COPD TYPE (HCC): Primary | ICD-10-CM

## 2018-04-26 NOTE — TELEPHONE ENCOUNTER
Patient called requesting refill for Incruse Ellipta, med not listed please place the order and send to [employer]   Thanks

## 2018-05-18 DIAGNOSIS — N32.81 OVERACTIVE BLADDER: Primary | ICD-10-CM

## 2018-05-18 RX ORDER — SOLIFENACIN SUCCINATE 10 MG/1
10 TABLET, FILM COATED ORAL DAILY
Qty: 90 TABLET | Refills: 1 | Status: SHIPPED | OUTPATIENT
Start: 2018-05-18 | End: 2018-11-13 | Stop reason: SDUPTHER

## 2018-06-29 DIAGNOSIS — L21.0 SEBORRHEA CAPITIS: ICD-10-CM

## 2018-06-29 RX ORDER — KETOCONAZOLE 20 MG/ML
SHAMPOO TOPICAL
Qty: 120 ML | Refills: 0 | Status: SHIPPED | OUTPATIENT
Start: 2018-06-29 | End: 2018-08-17 | Stop reason: SDUPTHER

## 2018-07-10 ENCOUNTER — OFFICE VISIT (OUTPATIENT)
Dept: PULMONOLOGY | Facility: CLINIC | Age: 68
End: 2018-07-10
Payer: MEDICARE

## 2018-07-10 VITALS
WEIGHT: 170 LBS | OXYGEN SATURATION: 93 % | SYSTOLIC BLOOD PRESSURE: 120 MMHG | TEMPERATURE: 98.4 F | HEIGHT: 60 IN | HEART RATE: 64 BPM | BODY MASS INDEX: 33.38 KG/M2 | DIASTOLIC BLOOD PRESSURE: 54 MMHG

## 2018-07-10 DIAGNOSIS — J43.2 CENTRILOBULAR EMPHYSEMA (HCC): ICD-10-CM

## 2018-07-10 DIAGNOSIS — J44.9 CHRONIC OBSTRUCTIVE PULMONARY DISEASE, UNSPECIFIED COPD TYPE (HCC): ICD-10-CM

## 2018-07-10 DIAGNOSIS — G47.34 SLEEP RELATED HYPOXIA: ICD-10-CM

## 2018-07-10 DIAGNOSIS — J98.4 RESTRICTIVE LUNG DISEASE: Primary | ICD-10-CM

## 2018-07-10 PROBLEM — G71.09: Status: ACTIVE | Noted: 2017-10-12

## 2018-07-10 PROBLEM — R32 URINARY INCONTINENCE: Status: ACTIVE | Noted: 2017-10-03

## 2018-07-10 PROCEDURE — 99214 OFFICE O/P EST MOD 30 MIN: CPT | Performed by: INTERNAL MEDICINE

## 2018-07-10 RX ORDER — FLUTICASONE PROPIONATE 50 MCG
2 SPRAY, SUSPENSION (ML) NASAL DAILY
COMMUNITY
Start: 2017-06-07 | End: 2018-07-10 | Stop reason: SDUPTHER

## 2018-07-10 RX ORDER — CITALOPRAM 20 MG/1
1 TABLET ORAL DAILY
COMMUNITY
Start: 2014-09-02 | End: 2018-07-10 | Stop reason: SDUPTHER

## 2018-07-10 RX ORDER — DIAZEPAM 5 MG/1
TABLET ORAL
Refills: 0 | COMMUNITY
Start: 2018-06-06 | End: 2018-10-16 | Stop reason: SDUPTHER

## 2018-07-10 RX ORDER — SOLIFENACIN SUCCINATE 10 MG/1
1 TABLET, FILM COATED ORAL DAILY
COMMUNITY
Start: 2016-12-07 | End: 2018-07-10 | Stop reason: SDUPTHER

## 2018-07-10 RX ORDER — KETOCONAZOLE 20 MG/G
CREAM TOPICAL
Refills: 0 | Status: ON HOLD | COMMUNITY
Start: 2018-06-04 | End: 2019-01-21 | Stop reason: HOSPADM

## 2018-07-10 NOTE — ASSESSMENT & PLAN NOTE
· Last spirometry showed very severe restriction with FEV1 and forced vital capacity in the 45% predicted range  This was a couple of years ago    · Anticipate that this will gradually progress as she continues to age

## 2018-07-10 NOTE — LETTER
July 10, 2018     DO Karl Houston [address] [place] [address]    Patient: Maritza Has   YOB: 1950   Date of Visit: 7/10/2018       Dear Dr Tien Maria: Thank you for referring Cassi Sandoval to me for evaluation  Below are my notes for this consultation  If you have questions, please do not hesitate to call me  I look forward to following your patient along with you  Sincerely,        Zach Gallegos MD        CC: No Recipients  Zach Gallegos MD  7/10/2018 12:56 PM  Sign at close encounter    Progress note - Pulmonary Medicine   Maritza Has 76 y o  female MRN: [id number]       Impression & Plan:     Chronic obstructive pulmonary disease ([address] )  · This is primarily a component of emphysema  · She is not currently on bronchodilators but has not been very active  · She plans to resume physical therapy and therefore I have renewed Incruse Ellipta which I would like her to take once daily    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  · Last spirometry showed very severe restriction with FEV1 and forced vital capacity in the 45% predicted range  This was a couple of years ago  · Anticipate that this will gradually progress as she continues to age    Sleep related hypoxia  · Currently on 3 L  · Seems to tolerate this well but does have morning headaches occasionally  · She believes the headaches may be related to her pillow and musculature but would be important to rule out sleep-related hypoxia despite oxygen  · Nocturnal oximetry on 3 L was ordered      I will be in contact with Marleni Patrick with the results of her oximetry  She will also let me know if in cruise is working well for her when I follow-up  ______________________________________________________________________    HPI:    Maritza Has presents today for follow-up of restrictive lung disease related to muscular dystrophy with associated COPD/emphysema and sleep-related hypoxemia      It is been more than a year since I saw Yonas Silverio  She has not had much change in her pulmonary symptoms but she does admit that she has been quite a bit more sedentary  She injured her foot which required her to be wheelchair bound much longer than usual   Previously she was weight-bearing and doing physical therapy fairly routinely  Over the last several months she has not done much weight-bearing or exercise  When she does, her symptoms are more significant  She does occasionally get some chest tightness  She has Incruse Ellipta at home but does not use it with any regularity  She still has a few samples that I gave her from over a year ago  She does not report cough or phlegm production  She does not have any chest pain  She does not typically wheeze  She does have some shortness of breath, particularly with exertion which is predominantly from her restrictive lung disease  At best, she only notices a very mild improvement in symptoms when taking bronchodilators  She is sleeping regularly with the 3 L of oxygen  She does awaken sometimes with a headache  She does not feel particularly fatigued during the day and the headache resolves as soon a she sits upright  She believes it is musculoskeletal in etiology  She does get some cramping in her shoulder and neck  Review of Systems:  Review of Systems   Constitutional: Positive for activity change  Negative for fever and unexpected weight change  HENT: Negative  Respiratory:        As per HPI   Cardiovascular: Negative for chest pain, palpitations and leg swelling  Gastrointestinal: Negative  Musculoskeletal:        As per HPI   Allergic/Immunologic: Positive for environmental allergies ( seasonal)  Psychiatric/Behavioral: Negative  All other systems reviewed and are negative          Past medical history, surgical history, and family history were reviewed and updated as appropriate    Social history updates:  History   Smoking Status    Former Smoker    Packs/day: 1 00    Years: 41 00    Start date: 1967    Quit date: 2008   Smokeless Tobacco    Never Used       PhysicalExamination:  Vitals:   /54 (BP Location: Right arm, Patient Position: Sitting)   Pulse 64   Temp 98 4 °F (36 9 °C) (Tympanic)   Ht 5' (1 524 m)   Wt 77 1 kg (170 lb) Comment: stated  LMP  (LMP Unknown)   SpO2 93%   BMI 33 20 kg/m²      Gen:  Comfortable on room air and without respiratory distress  HEENT: PERRL  Oropharynx is slightly crowded  Neck: Supple  There is no JVD, lymphadenopathy or thyromegaly appreciated  Trachea is midline  Chest:  Chest excursion is reduced  Breath sounds are diminished but equal bilaterally  No wheezes, rales, or rhonchi  Cardiac: Regular rate and rhythm  There are no murmurs  Abdomen: Soft and nontender  Benign  Extremities: No clubbing, cyanosis or edema  Neurologic:  She is in a wheelchair and is significantly debilitated from muscular dystrophy  Skin: No appreciable rashes  Diagnostic Data:  Labs:   I personally reviewed the most recent laboratory data pertinent to today's visit    Lab Results   Component Value Date    WBC 11 87 (H) 10/12/2017    HGB 14 5 10/12/2017    HCT 42 2 10/12/2017    MCV 87 10/12/2017     10/12/2017     Lab Results   Component Value Date    GLUCOSE 112 10/12/2017    CALCIUM 9 4 10/12/2017     10/12/2017    K 3 6 10/12/2017    CO2 31 10/12/2017     10/12/2017    BUN 17 10/12/2017    CREATININE 0 39 (L) 10/12/2017       PFT results:  Last office spirometry was remote but shows severe restriction with FEV1 and forced vital capacity in the mid 40% range    Imaging:  No recent imaging      Magdy Mtz MD

## 2018-07-10 NOTE — PROGRESS NOTES
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  · Last spirometry showed very severe restriction with FEV1 and forced vital capacity in the 45% predicted range  This was a couple of years ago  · Anticipate that this will gradually progress as she continues to age    Sleep related hypoxia  · Currently on 3 L  · Seems to tolerate this well but does have morning headaches occasionally  · She believes the headaches may be related to her pillow and musculature but would be important to rule out sleep-related hypoxia despite oxygen  · Nocturnal oximetry on 3 L was ordered      I will be in contact with Katelynn Goss with the results of her oximetry  She will also let me know if in cruise is working well for her when I follow-up  ______________________________________________________________________    HPI:    Rita Ridley presents today for follow-up of restrictive lung disease related to muscular dystrophy with associated COPD/emphysema and sleep-related hypoxemia  It is been more than a year since I saw Katelynn Goss  She has not had much change in her pulmonary symptoms but she does admit that she has been quite a bit more sedentary  She injured her foot which required her to be wheelchair bound much longer than usual   Previously she was weight-bearing and doing physical therapy fairly routinely  Over the last several months she has not done much weight-bearing or exercise  When she does, her symptoms are more significant  She does occasionally get some chest tightness    She has Incruse Ellipta at home but does not use it with any regularity  She still has a few samples that I gave her from over a year ago  She does not report cough or phlegm production  She does not have any chest pain  She does not typically wheeze  She does have some shortness of breath, particularly with exertion which is predominantly from her restrictive lung disease  At best, she only notices a very mild improvement in symptoms when taking bronchodilators  She is sleeping regularly with the 3 L of oxygen  She does awaken sometimes with a headache  She does not feel particularly fatigued during the day and the headache resolves as soon a she sits upright  She believes it is musculoskeletal in etiology  She does get some cramping in her shoulder and neck  Review of Systems:  Review of Systems   Constitutional: Positive for activity change  Negative for fever and unexpected weight change  HENT: Negative  Respiratory:        As per HPI   Cardiovascular: Negative for chest pain, palpitations and leg swelling  Gastrointestinal: Negative  Musculoskeletal:        As per HPI   Allergic/Immunologic: Positive for environmental allergies ( seasonal)  Psychiatric/Behavioral: Negative  All other systems reviewed and are negative  Past medical history, surgical history, and family history were reviewed and updated as appropriate    Social history updates:  History   Smoking Status    Former Smoker    Packs/day: 1 00    Years: 41 00    Start date: 1967    Quit date: 2008   Smokeless Tobacco    Never Used       PhysicalExamination:  Vitals:   /54 (BP Location: Right arm, Patient Position: Sitting)   Pulse 64   Temp 98 4 °F (36 9 °C) (Tympanic)   Ht 5' (1 524 m)   Wt 77 1 kg (170 lb) Comment: stated  LMP  (LMP Unknown)   SpO2 93%   BMI 33 20 kg/m²     Gen:  Comfortable on room air and without respiratory distress  HEENT: PERRL  Oropharynx is slightly crowded  Neck: Supple   There is no JVD, lymphadenopathy or thyromegaly appreciated  Trachea is midline  Chest:  Chest excursion is reduced  Breath sounds are diminished but equal bilaterally  No wheezes, rales, or rhonchi  Cardiac: Regular rate and rhythm  There are no murmurs  Abdomen: Soft and nontender  Benign  Extremities: No clubbing, cyanosis or edema  Neurologic:  She is in a wheelchair and is significantly debilitated from muscular dystrophy  Skin: No appreciable rashes  Diagnostic Data:  Labs:   I personally reviewed the most recent laboratory data pertinent to today's visit    Lab Results   Component Value Date    WBC 11 87 (H) 10/12/2017    HGB 14 5 10/12/2017    HCT 42 2 10/12/2017    MCV 87 10/12/2017     10/12/2017     Lab Results   Component Value Date    GLUCOSE 112 10/12/2017    CALCIUM 9 4 10/12/2017     10/12/2017    K 3 6 10/12/2017    CO2 31 10/12/2017     10/12/2017    BUN 17 10/12/2017    CREATININE 0 39 (L) 10/12/2017       PFT results:  Last office spirometry was remote but shows severe restriction with FEV1 and forced vital capacity in the mid 40% range    Imaging:  No recent imaging      Natalia Padilla MD

## 2018-07-10 NOTE — ASSESSMENT & PLAN NOTE
· This is primarily a component of emphysema  · She is not currently on bronchodilators but has not been very active  · She plans to resume physical therapy and therefore I have renewed Incruse Ellipta which I would like her to take once daily

## 2018-07-10 NOTE — ASSESSMENT & PLAN NOTE
· Currently on 3 L  · Seems to tolerate this well but does have morning headaches occasionally  · She believes the headaches may be related to her pillow and musculature but would be important to rule out sleep-related hypoxia despite oxygen  · Nocturnal oximetry on 3 L was ordered

## 2018-07-25 DIAGNOSIS — F41.9 ANXIETY: Primary | ICD-10-CM

## 2018-07-27 RX ORDER — CITALOPRAM 20 MG/1
20 TABLET ORAL DAILY
Qty: 90 TABLET | Refills: 0 | Status: SHIPPED | OUTPATIENT
Start: 2018-07-27 | End: 2018-10-23 | Stop reason: SDUPTHER

## 2018-08-14 DIAGNOSIS — R12 HEART BURN: Primary | ICD-10-CM

## 2018-08-14 RX ORDER — OMEPRAZOLE 20 MG/1
20 CAPSULE, DELAYED RELEASE ORAL EVERY MORNING
Qty: 90 CAPSULE | Refills: 0 | Status: SHIPPED | OUTPATIENT
Start: 2018-08-14 | End: 2018-10-23 | Stop reason: SDUPTHER

## 2018-08-17 DIAGNOSIS — L21.0 SEBORRHEA CAPITIS: ICD-10-CM

## 2018-08-17 RX ORDER — KETOCONAZOLE 20 MG/ML
1 SHAMPOO TOPICAL 2 TIMES WEEKLY
Qty: 120 ML | Refills: 3 | Status: SHIPPED | OUTPATIENT
Start: 2018-08-20 | End: 2019-04-23 | Stop reason: SDUPTHER

## 2018-09-17 DIAGNOSIS — F41.9 ANXIETY: Primary | ICD-10-CM

## 2018-09-17 DIAGNOSIS — F41.9 ANXIETY: ICD-10-CM

## 2018-09-17 RX ORDER — DIAZEPAM 5 MG/1
5 TABLET ORAL DAILY PRN
Qty: 90 TABLET | Refills: 0 | Status: SHIPPED | OUTPATIENT
Start: 2018-09-17 | End: 2018-10-23

## 2018-09-17 RX ORDER — DIAZEPAM 5 MG/1
5 TABLET ORAL DAILY
Qty: 90 TABLET | Refills: 0 | OUTPATIENT
Start: 2018-09-17 | End: 2018-12-16

## 2018-10-16 DIAGNOSIS — F41.9 ANXIETY: Primary | ICD-10-CM

## 2018-10-16 RX ORDER — DIAZEPAM 5 MG/1
5 TABLET ORAL DAILY
Qty: 90 TABLET | Refills: 0 | OUTPATIENT
Start: 2018-10-16 | End: 2018-10-23 | Stop reason: SDUPTHER

## 2018-10-23 ENCOUNTER — OFFICE VISIT (OUTPATIENT)
Dept: FAMILY MEDICINE CLINIC | Facility: CLINIC | Age: 68
End: 2018-10-23
Payer: MEDICARE

## 2018-10-23 VITALS
BODY MASS INDEX: 33.2 KG/M2 | OXYGEN SATURATION: 96 % | HEIGHT: 60 IN | HEART RATE: 60 BPM | TEMPERATURE: 97.6 F | DIASTOLIC BLOOD PRESSURE: 74 MMHG | SYSTOLIC BLOOD PRESSURE: 124 MMHG

## 2018-10-23 DIAGNOSIS — E55.9 VITAMIN D DEFICIENCY: ICD-10-CM

## 2018-10-23 DIAGNOSIS — G71.09 DYSTROPHY, MUSCULAR, HEREDITARY PROGRESSIVE (HCC): ICD-10-CM

## 2018-10-23 DIAGNOSIS — E78.00 HYPERCHOLESTEREMIA: ICD-10-CM

## 2018-10-23 DIAGNOSIS — R12 HEART BURN: ICD-10-CM

## 2018-10-23 DIAGNOSIS — H61.21 EXCESSIVE EAR WAX, RIGHT: ICD-10-CM

## 2018-10-23 DIAGNOSIS — L40.9 PSORIASIS: ICD-10-CM

## 2018-10-23 DIAGNOSIS — F41.9 ANXIETY: ICD-10-CM

## 2018-10-23 DIAGNOSIS — J00 ACUTE RHINITIS: Primary | ICD-10-CM

## 2018-10-23 PROCEDURE — 99215 OFFICE O/P EST HI 40 MIN: CPT | Performed by: FAMILY MEDICINE

## 2018-10-23 PROCEDURE — 69210 REMOVE IMPACTED EAR WAX UNI: CPT | Performed by: FAMILY MEDICINE

## 2018-10-23 RX ORDER — DIAZEPAM 5 MG/1
5 TABLET ORAL DAILY
Qty: 90 TABLET | Refills: 1 | Status: SHIPPED | OUTPATIENT
Start: 2018-10-23 | End: 2019-03-22 | Stop reason: SDUPTHER

## 2018-10-23 RX ORDER — OMEPRAZOLE 20 MG/1
20 CAPSULE, DELAYED RELEASE ORAL EVERY MORNING
Qty: 90 CAPSULE | Refills: 1 | Status: SHIPPED | OUTPATIENT
Start: 2018-10-23 | End: 2019-04-23 | Stop reason: SDUPTHER

## 2018-10-23 RX ORDER — CITALOPRAM 20 MG/1
20 TABLET ORAL DAILY
Qty: 90 TABLET | Refills: 2 | Status: SHIPPED | OUTPATIENT
Start: 2018-10-23 | End: 2019-04-23 | Stop reason: SDUPTHER

## 2018-10-23 RX ORDER — FLUTICASONE PROPIONATE 50 MCG
2 SPRAY, SUSPENSION (ML) NASAL DAILY
Qty: 16 G | Refills: 3 | Status: SHIPPED | OUTPATIENT
Start: 2018-10-23 | End: 2019-07-10 | Stop reason: SDUPTHER

## 2018-10-23 RX ORDER — TRIAMCINOLONE ACETONIDE 1 MG/G
CREAM TOPICAL 2 TIMES DAILY
Qty: 30 G | Refills: 0 | Status: SHIPPED | OUTPATIENT
Start: 2018-10-23 | End: 2019-01-07 | Stop reason: SDUPTHER

## 2018-10-23 NOTE — PROGRESS NOTES
Assessment/Plan: patient here today for follow up for medication refills and BW order   Pt c/o clogged right ear  Pt would like to check hep C    No problem-specific Assessment & Plan notes found for this encounter  Diagnoses and all orders for this visit:    Acute rhinitis  -     fluticasone (FLONASE) 50 mcg/act nasal spray; 2 sprays into each nostril daily    Anxiety  -     citalopram (CeleXA) 20 mg tablet; Take 1 tablet (20 mg total) by mouth daily for 180 days  -     diazepam (VALIUM) 5 mg tablet; Take 1 tablet (5 mg total) by mouth daily for 90 days    Heart burn  -     omeprazole (PriLOSEC) 20 mg delayed release capsule; Take 1 capsule (20 mg total) by mouth every morning for 90 days    Psoriasis  -     triamcinolone (KENALOG) 0 1 % cream; Apply topically 2 (two) times a day    Hypercholesteremia  -     Basic metabolic panel; Future  -     CBC and Platelet; Future  -     Hepatic function panel; Future  -     Lipid panel; Future    Vitamin D deficiency  -     Vitamin D 25 hydroxy; Future    Excessive ear wax, right    Dystrophy, muscular, hereditary progressive          Subjective:      Patient ID: Markell Zarate is a 76 y o  female  Follow up  Battery wheel chair  Right ear clogged - Q-Tips  Has been putting cream in ear  Psoriasis right scalp above right ear  Reflux controlled  Anxiety controlled  Needs vitamin D supplement          The following portions of the patient's history were reviewed and updated as appropriate: allergies, current medications, past family history, past medical history, past social history, past surgical history and problem list     Current Outpatient Prescriptions:     citalopram (CeleXA) 20 mg tablet, Take 1 tablet (20 mg total) by mouth daily for 180 days, Disp: 90 tablet, Rfl: 2    diazepam (VALIUM) 5 mg tablet, Take 1 tablet (5 mg total) by mouth daily for 90 days, Disp: 90 tablet, Rfl: 1    fluticasone (FLONASE) 50 mcg/act nasal spray, 2 sprays into each nostril daily, Disp: 16 g, Rfl: 3    ketoconazole (NIZORAL) 2 % cream, , Disp: , Rfl: 0    ketoconazole (NIZORAL) 2 % shampoo, Apply 1 application topically 2 (two) times a week, Disp: 120 mL, Rfl: 3    omeprazole (PriLOSEC) 20 mg delayed release capsule, Take 1 capsule (20 mg total) by mouth every morning for 90 days, Disp: 90 capsule, Rfl: 1    solifenacin (VESICARE) 10 MG tablet, Take 1 tablet (10 mg total) by mouth daily, Disp: 90 tablet, Rfl: 1    umeclidinium bromide (INCRUSE ELLIPTA) 62 5 mcg/inh AEPB inhaler, Inhale 1 puff daily, Disp: 1 each, Rfl: 6    triamcinolone (KENALOG) 0 1 % cream, Apply topically 2 (two) times a day, Disp: 30 g, Rfl: 0    Review of Systems   Constitutional: Negative  HENT: Negative  Eyes: Negative  Respiratory: Negative  Cardiovascular: Negative  Gastrointestinal: Negative  Genitourinary: Negative  Musculoskeletal: Positive for gait problem  Skin: Negative  Psychiatric/Behavioral: Negative  Objective:      /74 (BP Location: Left arm, Patient Position: Sitting, Cuff Size: Standard)   Pulse 60   Temp 97 6 °F (36 4 °C) (Oral)   Ht 5' (1 524 m)   LMP  (LMP Unknown)   SpO2 96%   BMI 33 20 kg/m²          Physical Exam   Constitutional: She is oriented to person, place, and time  She appears well-developed and well-nourished  HENT:   Head: Normocephalic and atraumatic  Left Ear: External ear normal    Nose: Nose normal    Mouth/Throat: Oropharynx is clear and moist    Wax right canal deep  Eyes: Pupils are equal, round, and reactive to light  Conjunctivae and EOM are normal    Neck: Normal range of motion  Neck supple  Cardiovascular: Normal rate, regular rhythm, normal heart sounds and intact distal pulses  Pulmonary/Chest: Effort normal and breath sounds normal    Abdominal: Soft  Bowel sounds are normal    Neurological: She is alert and oriented to person, place, and time  Skin: Skin is warm and dry     Seb Derm scalp, Psoriasis right scalp and ear  Psychiatric: She has a normal mood and affect   Her behavior is normal  Judgment and thought content normal

## 2018-11-13 DIAGNOSIS — N32.81 OVERACTIVE BLADDER: ICD-10-CM

## 2018-11-13 RX ORDER — SOLIFENACIN SUCCINATE 10 MG/1
TABLET, FILM COATED ORAL
Qty: 90 TABLET | Refills: 1 | Status: SHIPPED | OUTPATIENT
Start: 2018-11-13 | End: 2019-08-14 | Stop reason: SDUPTHER

## 2019-01-07 DIAGNOSIS — L40.9 PSORIASIS: ICD-10-CM

## 2019-01-07 RX ORDER — TRIAMCINOLONE ACETONIDE 1 MG/G
CREAM TOPICAL 2 TIMES DAILY
Qty: 30 G | Refills: 0 | Status: SHIPPED | OUTPATIENT
Start: 2019-01-07 | End: 2019-02-19 | Stop reason: SDUPTHER

## 2019-01-07 NOTE — TELEPHONE ENCOUNTER
Patient called to request prescription refill request of Triamcinolone cream to be sent to [facility]

## 2019-01-21 ENCOUNTER — ANESTHESIA EVENT (INPATIENT)
Dept: GASTROENTEROLOGY | Facility: HOSPITAL | Age: 69
DRG: 395 | End: 2019-01-21
Payer: MEDICARE

## 2019-01-21 ENCOUNTER — APPOINTMENT (EMERGENCY)
Dept: RADIOLOGY | Facility: HOSPITAL | Age: 69
DRG: 395 | End: 2019-01-21
Payer: MEDICARE

## 2019-01-21 ENCOUNTER — HOSPITAL ENCOUNTER (INPATIENT)
Facility: HOSPITAL | Age: 69
LOS: 2 days | Discharge: HOME HEALTH | DRG: 395 | End: 2019-01-23
Attending: EMERGENCY MEDICINE | Admitting: COLON & RECTAL SURGERY
Payer: MEDICARE

## 2019-01-21 DIAGNOSIS — K55.9 ISCHEMIC COLITIS (HCC): ICD-10-CM

## 2019-01-21 DIAGNOSIS — R10.9 ABDOMINAL PAIN: Primary | ICD-10-CM

## 2019-01-21 LAB
ALBUMIN SERPL BCP-MCNC: 3.5 G/DL (ref 3.5–5)
ALP SERPL-CCNC: 119 U/L (ref 46–116)
ALT SERPL W P-5'-P-CCNC: 27 U/L (ref 12–78)
ANION GAP SERPL CALCULATED.3IONS-SCNC: 5 MMOL/L (ref 4–13)
AST SERPL W P-5'-P-CCNC: 18 U/L (ref 5–45)
ATRIAL RATE: 61 BPM
ATRIAL RATE: 63 BPM
BASOPHILS # BLD AUTO: 0.05 THOUSANDS/ΜL (ref 0–0.1)
BASOPHILS NFR BLD AUTO: 0 % (ref 0–1)
BILIRUB SERPL-MCNC: 0.22 MG/DL (ref 0.2–1)
BUN SERPL-MCNC: 23 MG/DL (ref 5–25)
CALCIUM SERPL-MCNC: 9.1 MG/DL (ref 8.3–10.1)
CHLORIDE SERPL-SCNC: 102 MMOL/L (ref 100–108)
CO2 SERPL-SCNC: 30 MMOL/L (ref 21–32)
CREAT SERPL-MCNC: 0.52 MG/DL (ref 0.6–1.3)
EOSINOPHIL # BLD AUTO: 0.19 THOUSAND/ΜL (ref 0–0.61)
EOSINOPHIL NFR BLD AUTO: 1 % (ref 0–6)
ERYTHROCYTE [DISTWIDTH] IN BLOOD BY AUTOMATED COUNT: 13.9 % (ref 11.6–15.1)
GFR SERPL CREATININE-BSD FRML MDRD: 98 ML/MIN/1.73SQ M
GLUCOSE SERPL-MCNC: 105 MG/DL (ref 65–140)
HCT VFR BLD AUTO: 45 % (ref 34.8–46.1)
HGB BLD-MCNC: 14.6 G/DL (ref 11.5–15.4)
IMM GRANULOCYTES # BLD AUTO: 0.05 THOUSAND/UL (ref 0–0.2)
IMM GRANULOCYTES NFR BLD AUTO: 0 % (ref 0–2)
LACTATE SERPL-SCNC: 2.2 MMOL/L (ref 0.5–2)
LIPASE SERPL-CCNC: 139 U/L (ref 73–393)
LYMPHOCYTES # BLD AUTO: 2.84 THOUSANDS/ΜL (ref 0.6–4.47)
LYMPHOCYTES NFR BLD AUTO: 18 % (ref 14–44)
MCH RBC QN AUTO: 29 PG (ref 26.8–34.3)
MCHC RBC AUTO-ENTMCNC: 32.4 G/DL (ref 31.4–37.4)
MCV RBC AUTO: 90 FL (ref 82–98)
MONOCYTES # BLD AUTO: 0.98 THOUSAND/ΜL (ref 0.17–1.22)
MONOCYTES NFR BLD AUTO: 6 % (ref 4–12)
NEUTROPHILS # BLD AUTO: 11.38 THOUSANDS/ΜL (ref 1.85–7.62)
NEUTS SEG NFR BLD AUTO: 75 % (ref 43–75)
NRBC BLD AUTO-RTO: 0 /100 WBCS
P AXIS: -24 DEGREES
P AXIS: 0 DEGREES
PLATELET # BLD AUTO: 382 THOUSANDS/UL (ref 149–390)
PMV BLD AUTO: 10.7 FL (ref 8.9–12.7)
POTASSIUM SERPL-SCNC: 5 MMOL/L (ref 3.5–5.3)
PR INTERVAL: 142 MS
PR INTERVAL: 152 MS
PROT SERPL-MCNC: 7.3 G/DL (ref 6.4–8.2)
QRS AXIS: 45 DEGREES
QRS AXIS: 50 DEGREES
QRSD INTERVAL: 76 MS
QRSD INTERVAL: 78 MS
QT INTERVAL: 408 MS
QT INTERVAL: 412 MS
QTC INTERVAL: 410 MS
QTC INTERVAL: 421 MS
RBC # BLD AUTO: 5.03 MILLION/UL (ref 3.81–5.12)
SODIUM SERPL-SCNC: 137 MMOL/L (ref 136–145)
T WAVE AXIS: 52 DEGREES
T WAVE AXIS: 64 DEGREES
TROPONIN I SERPL-MCNC: <0.02 NG/ML
VENTRICULAR RATE: 61 BPM
VENTRICULAR RATE: 63 BPM
WBC # BLD AUTO: 15.49 THOUSAND/UL (ref 4.31–10.16)

## 2019-01-21 PROCEDURE — 87147 CULTURE TYPE IMMUNOLOGIC: CPT | Performed by: EMERGENCY MEDICINE

## 2019-01-21 PROCEDURE — 84484 ASSAY OF TROPONIN QUANT: CPT | Performed by: EMERGENCY MEDICINE

## 2019-01-21 PROCEDURE — 96374 THER/PROPH/DIAG INJ IV PUSH: CPT

## 2019-01-21 PROCEDURE — 87493 C DIFF AMPLIFIED PROBE: CPT | Performed by: SURGERY

## 2019-01-21 PROCEDURE — 87505 NFCT AGENT DETECTION GI: CPT | Performed by: SURGERY

## 2019-01-21 PROCEDURE — 96361 HYDRATE IV INFUSION ADD-ON: CPT

## 2019-01-21 PROCEDURE — 96375 TX/PRO/DX INJ NEW DRUG ADDON: CPT

## 2019-01-21 PROCEDURE — 87040 BLOOD CULTURE FOR BACTERIA: CPT | Performed by: EMERGENCY MEDICINE

## 2019-01-21 PROCEDURE — 74177 CT ABD & PELVIS W/CONTRAST: CPT

## 2019-01-21 PROCEDURE — 36415 COLL VENOUS BLD VENIPUNCTURE: CPT | Performed by: EMERGENCY MEDICINE

## 2019-01-21 PROCEDURE — 80053 COMPREHEN METABOLIC PANEL: CPT | Performed by: EMERGENCY MEDICINE

## 2019-01-21 PROCEDURE — 83605 ASSAY OF LACTIC ACID: CPT | Performed by: EMERGENCY MEDICINE

## 2019-01-21 PROCEDURE — 85025 COMPLETE CBC W/AUTO DIFF WBC: CPT | Performed by: EMERGENCY MEDICINE

## 2019-01-21 PROCEDURE — 1123F ACP DISCUSS/DSCN MKR DOCD: CPT | Performed by: PATHOLOGY

## 2019-01-21 PROCEDURE — 93010 ELECTROCARDIOGRAM REPORT: CPT | Performed by: INTERNAL MEDICINE

## 2019-01-21 PROCEDURE — 71260 CT THORAX DX C+: CPT

## 2019-01-21 PROCEDURE — 99285 EMERGENCY DEPT VISIT HI MDM: CPT

## 2019-01-21 PROCEDURE — 93005 ELECTROCARDIOGRAM TRACING: CPT

## 2019-01-21 PROCEDURE — 83690 ASSAY OF LIPASE: CPT | Performed by: EMERGENCY MEDICINE

## 2019-01-21 RX ORDER — HYDROMORPHONE HCL/PF 1 MG/ML
0.5 SYRINGE (ML) INJECTION
Status: DISCONTINUED | OUTPATIENT
Start: 2019-01-21 | End: 2019-01-23 | Stop reason: HOSPADM

## 2019-01-21 RX ORDER — CEFAZOLIN SODIUM 2 G/50ML
2000 SOLUTION INTRAVENOUS ONCE
Status: DISCONTINUED | OUTPATIENT
Start: 2019-01-21 | End: 2019-01-21

## 2019-01-21 RX ORDER — PANTOPRAZOLE SODIUM 40 MG/1
40 TABLET, DELAYED RELEASE ORAL
Status: DISCONTINUED | OUTPATIENT
Start: 2019-01-22 | End: 2019-01-23 | Stop reason: HOSPADM

## 2019-01-21 RX ORDER — ONDANSETRON 2 MG/ML
4 INJECTION INTRAMUSCULAR; INTRAVENOUS ONCE
Status: COMPLETED | OUTPATIENT
Start: 2019-01-21 | End: 2019-01-21

## 2019-01-21 RX ORDER — HYDROMORPHONE HCL/PF 1 MG/ML
0.5 SYRINGE (ML) INJECTION ONCE
Status: COMPLETED | OUTPATIENT
Start: 2019-01-21 | End: 2019-01-21

## 2019-01-21 RX ORDER — HEPARIN SODIUM 5000 [USP'U]/ML
5000 INJECTION, SOLUTION INTRAVENOUS; SUBCUTANEOUS EVERY 8 HOURS SCHEDULED
Status: DISCONTINUED | OUTPATIENT
Start: 2019-01-21 | End: 2019-01-23 | Stop reason: HOSPADM

## 2019-01-21 RX ORDER — FLUTICASONE PROPIONATE 50 MCG
2 SPRAY, SUSPENSION (ML) NASAL DAILY
Status: DISCONTINUED | OUTPATIENT
Start: 2019-01-22 | End: 2019-01-23 | Stop reason: HOSPADM

## 2019-01-21 RX ORDER — CITALOPRAM 20 MG/1
20 TABLET ORAL DAILY
Status: DISCONTINUED | OUTPATIENT
Start: 2019-01-22 | End: 2019-01-23 | Stop reason: HOSPADM

## 2019-01-21 RX ORDER — DIAZEPAM 5 MG/1
5 TABLET ORAL DAILY
Status: DISCONTINUED | OUTPATIENT
Start: 2019-01-22 | End: 2019-01-23 | Stop reason: HOSPADM

## 2019-01-21 RX ORDER — DIPHENHYDRAMINE HCL 25 MG
25 TABLET ORAL
COMMUNITY
End: 2020-02-18

## 2019-01-21 RX ORDER — SODIUM CHLORIDE 9 MG/ML
125 INJECTION, SOLUTION INTRAVENOUS CONTINUOUS
Status: DISCONTINUED | OUTPATIENT
Start: 2019-01-21 | End: 2019-01-22

## 2019-01-21 RX ORDER — HYDROMORPHONE HCL/PF 1 MG/ML
0.2 SYRINGE (ML) INJECTION
Status: DISCONTINUED | OUTPATIENT
Start: 2019-01-21 | End: 2019-01-23 | Stop reason: HOSPADM

## 2019-01-21 RX ORDER — ONDANSETRON 2 MG/ML
4 INJECTION INTRAMUSCULAR; INTRAVENOUS EVERY 4 HOURS PRN
Status: DISCONTINUED | OUTPATIENT
Start: 2019-01-21 | End: 2019-01-23 | Stop reason: HOSPADM

## 2019-01-21 RX ADMIN — METRONIDAZOLE 500 MG: 500 INJECTION, SOLUTION INTRAVENOUS at 17:51

## 2019-01-21 RX ADMIN — ONDANSETRON 4 MG: 2 INJECTION INTRAMUSCULAR; INTRAVENOUS at 17:52

## 2019-01-21 RX ADMIN — HEPARIN SODIUM 5000 UNITS: 5000 INJECTION INTRAVENOUS; SUBCUTANEOUS at 13:14

## 2019-01-21 RX ADMIN — SODIUM CHLORIDE 1000 ML: 0.9 INJECTION, SOLUTION INTRAVENOUS at 07:18

## 2019-01-21 RX ADMIN — SODIUM CHLORIDE 1000 ML: 0.9 INJECTION, SOLUTION INTRAVENOUS at 11:09

## 2019-01-21 RX ADMIN — HEPARIN SODIUM 5000 UNITS: 5000 INJECTION INTRAVENOUS; SUBCUTANEOUS at 22:24

## 2019-01-21 RX ADMIN — SODIUM CHLORIDE 125 ML/HR: 0.9 INJECTION, SOLUTION INTRAVENOUS at 13:09

## 2019-01-21 RX ADMIN — HYDROMORPHONE HYDROCHLORIDE 0.5 MG: 1 INJECTION, SOLUTION INTRAMUSCULAR; INTRAVENOUS; SUBCUTANEOUS at 11:12

## 2019-01-21 RX ADMIN — IOHEXOL 100 ML: 350 INJECTION, SOLUTION INTRAVENOUS at 09:17

## 2019-01-21 RX ADMIN — ONDANSETRON 4 MG: 2 INJECTION INTRAMUSCULAR; INTRAVENOUS at 11:11

## 2019-01-21 RX ADMIN — POLYETHYLENE GLYCOL 3350, SODIUM SULFATE ANHYDROUS, SODIUM BICARBONATE, SODIUM CHLORIDE, POTASSIUM CHLORIDE 4000 ML: 236; 22.74; 6.74; 5.86; 2.97 POWDER, FOR SOLUTION ORAL at 16:43

## 2019-01-21 NOTE — H&P
H&P Exam - Colorectal  Trudy Pacheco 76 y o  female MRN: [id number]  Unit/Bed#: ED 14 Encounter: [id number]        HPI: Claire Denis is a 76 y o  female who presented after having abdominal pain mid abdomen that awoke her from sleep  Patient states she felt she had to move her bowels and had a vaso-vagal type symptoms  She was straining on the toilet and felt weak and dizzy like she may pass out  She called EMS at this time  She did have a large BM since being in the ED and the pain seemed to be a bit less intense but still present  She states the pain is similar to times when she's had diverticulitis  She has had colonic bleeding episode in March 2010, colonoscopy was done by Dr Efraín Deal at that time which showed shallow ulcerations in her left colon suggestive of ischemic colitis  She has not noted any blood in her stools lately  No nausea, vomiting, fever, chills  Denies hx of abdominal surgery      ROS:   General: Muscular dystrophy dx at age 32  Good appetite, no recent unexplained weight loss, is able to walk about 10 ft with a walker otherwise uses a scooter  HEENT:  No excessive headaches no history of migraines, wears glasses for distance and near, no spontaneous epistaxis, no hearing loss, no dysphagia, no frequent sore throats  Breasts:  Left mastectomy for breast cancer, implants x3 then removal    Cardiac:  No orthopnea no shortness of breath  Respiratory:  No hemoptysis  GI:  History of diverticulosis in the past, history of constipation  CNS:  No history of seizures, syncope    Historical Information   Past Medical History:   Diagnosis Date    Anxiety     [facility] cancer ([facility] )     left    Colitis     [facility]     Depression     Difficult intubation     Excessive daytime sleepiness     GERD (gastroesophageal reflux disease)     Hyperlipidemia     Muscular dystrophy     Limb-girdle    Osteoporosis     Overactive bladder     Pneumonitis     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     Skin cancer     Skin disorder     Vitamin D deficiency      Past Surgical History:   Procedure Laterality Date    MASTECTOMY Left 2007    left breast mastectomy     TONSILLECTOMY      TOOTH EXTRACTION      TUBAL LIGATION       Social History   History   Alcohol Use No     Comment: social drinker per [employer]      History   Drug Use No     History   Smoking Status    Former Smoker    Packs/day: 1 00    Years: 41 00    Start date: 5    Quit date: 2008   Smokeless Tobacco    Never Used     Family History: non-contributory    Meds/Allergies   all medications and allergies reviewed  Allergies   Allergen Reactions    Amoxicillin     Codeine      Other reaction(s): Other (See Comments)  n/v       Objective   First Vitals:   Blood Pressure: 143/76 (01/21/19 0636)  Pulse: 63 (01/21/19 0636)  Temperature: (!) 97 1 °F (36 2 °C) (01/21/19 0636)  Temp Source: Oral (01/21/19 0636)  Respirations: 18 (01/21/19 0636)  Height: 5' (152 4 cm) (01/21/19 0636)  Weight - Scale: 77 kg (169 lb 12 1 oz) (01/21/19 0636)  SpO2: 95 % (01/21/19 0636)    Current Vitals:   Blood Pressure: 153/73 (01/21/19 1000)  Pulse: 74 (01/21/19 1000)  Temperature: (!) 97 1 °F (36 2 °C) (01/21/19 0636)  Temp Source: Oral (01/21/19 0636)  Respirations: (!) 28 (01/21/19 1000)  Height: 5' (152 4 cm) (01/21/19 0636)  Weight - Scale: 77 kg (169 lb 12 1 oz) (01/21/19 0636)  SpO2: 97 % (01/21/19 1000)      Intake/Output Summary (Last 24 hours) at 01/21/19 1112  Last data filed at 01/21/19 0850   Gross per 24 hour   Intake             1000 ml   Output                0 ml   Net             1000 ml       Invasive Devices     Peripheral Intravenous Line            Peripheral IV 01/21/19 Right Antecubital less than 1 day                Physical Exam   Constitutional: She is oriented to person, place, and time  She appears well-developed and well-nourished  No distress  HENT:   Head: Normocephalic  Nose: Nose normal    Mouth/Throat: Oropharynx is clear and moist  No oropharyngeal exudate  Eyes: Pupils are equal, round, and reactive to light  Conjunctivae are normal  Right eye exhibits no discharge  Left eye exhibits no discharge  No scleral icterus  Neck: Neck supple  No JVD present  No tracheal deviation present  No thyromegaly present  No carotid bruits   Cardiovascular: Normal rate, regular rhythm and normal heart sounds  Exam reveals no friction rub  No murmur heard  Pulmonary/Chest: Breath sounds normal  No stridor  No respiratory distress  She has no wheezes  She has no rales  She exhibits no tenderness  Abdominal: Soft  Bowel sounds are normal  She exhibits no distension and no mass  There is tenderness  There is no rebound and no guarding  Slight tenderness LLQ and suprapubic area   Genitourinary: Rectal exam shows guaiac negative stool  Musculoskeletal: She exhibits no edema or tenderness  No calf tenderness   Lymphadenopathy:     She has no cervical adenopathy  Neurological: She is alert and oriented to person, place, and time  Skin: Skin is warm and dry  No rash noted  She is not diaphoretic  No erythema  Psychiatric: She has a normal mood and affect  Her behavior is normal  Thought content normal        Lab Results: WBC: 15 49; Hgb 14 6; Creat: 0 52  Imaging: CTAP: long segment sigmoid colon with ischemic changes  EKG, Pathology, and Other Studies: I have personally reviewed pertinent reports  Assessment:  1  Abdominal pain  2  Ischemic colitis  3  Muscular dystrophy    Plan:  1  Admit to Dr Hal Oconnor  2  IV fluids  3  Clear liquid diet  4  Colonoscopy in am  5  Stool cultures, C-diff  6  Check am labs    Code Status: Level one  Advance Directive and Living Will:      Power of :    POLST:      Counseling / Coordination of Care  Total floor / unit time spent today 60 minutes    Greater than 50% of total time was spent with the patient and / or family counseling and / or coordination of care    A description of the counseling / coordination of care:

## 2019-01-21 NOTE — ED PROVIDER NOTES
History  Chief Complaint   Patient presents with    Abdominal Pain     Patient states she started having lower abdominal pain around 0500; was trying to use the bathroom and felt very constipated and lightheaded; did not pass out      HPI    69yo pmhx breast cancer, COPD, GERD, HLD, restrictive lung disease 2/2 muscle dystrophy, osteoporosis presents for evaluation lightheadedness and abdominal pain  Patient says she was woken up around 5am due to lower abdominal pain  Patient says she felt like she had to go to the bathroom  While sitting on the toilet, patient was unable to go and became lightheaded and short of breath  She also notes feeling weak  Patient denies LOC or falling or head trauma  Patient says pain is improved, currently a 5/10, localizes it to the LLQ with radiation towards the right  Patient says she has had similar abdominal pain before  Patient notes she went to bed feeling fine  Denies fever, chills, chest pain, nausea, vomiting, diarrhea, or dysuria  Patient denies any changes in medication  Prior to Admission Medications   Prescriptions Last Dose Informant Patient Reported? Taking?    VESICARE 10 MG tablet   No Yes   Sig: take 1 tablet by mouth once daily   citalopram (CeleXA) 20 mg tablet   No Yes   Sig: Take 1 tablet (20 mg total) by mouth daily for 180 days   diazepam (VALIUM) 5 mg tablet   No Yes   Sig: Take 1 tablet (5 mg total) by mouth daily for 90 days   fluticasone (FLONASE) 50 mcg/act nasal spray   No Yes   Si sprays into each nostril daily   ketoconazole (NIZORAL) 2 % cream   Yes Yes   ketoconazole (NIZORAL) 2 % shampoo   No Yes   Sig: Apply 1 application topically 2 (two) times a week   omeprazole (PriLOSEC) 20 mg delayed release capsule   No Yes   Sig: Take 1 capsule (20 mg total) by mouth every morning for 90 days   triamcinolone (KENALOG) 0 1 % cream   No Yes   Sig: Apply topically 2 (two) times a day for 90 days   umeclidinium bromide (INCRUSE ELLIPTA) 62 5 mcg/inh AEPB inhaler   No Yes   Sig: Inhale 1 puff daily      Facility-Administered Medications: None       Past Medical History:   Diagnosis Date    Anxiety     Breast cancer ([address] )     left    Colitis     COPD (chronic obstructive pulmonary disease) (HCC)     Depression     Difficult intubation     Excessive daytime sleepiness     GERD (gastroesophageal reflux disease)     Hyperlipidemia     Muscular dystrophy     Limb-girdle    Osteoporosis     Overactive bladder     Pneumonitis     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     Skin cancer     Skin disorder     Vitamin D deficiency        Past Surgical History:   Procedure Laterality Date    MASTECTOMY Left 2007    left breast mastectomy     TONSILLECTOMY      TOOTH EXTRACTION      TUBAL LIGATION         Family History   Problem Relation Age of Onset    Other Mother         bone loss    Skin cancer Father     Hypertension Father         benign     Other Father         bone loss    Muscular dystrophy Brother         of the limb gridle     Muscular dystrophy Family         of the limb girdle     I have reviewed and agree with the history as documented  Social History   Substance Use Topics    Smoking status: Former Smoker     Packs/day: 1 00     Years: 41 00     Start date: 1967     Quit date: 2008    Smokeless tobacco: Never Used    Alcohol use No      Comment: social drinker per [employer]scripts         Review of Systems   Constitutional: Negative for chills, diaphoresis, fatigue and fever  HENT: Negative for congestion, rhinorrhea and sore throat  Eyes: Negative for photophobia and visual disturbance  Respiratory: Positive for shortness of breath  Negative for cough and chest tightness  Cardiovascular: Negative for chest pain and palpitations  Gastrointestinal: Positive for abdominal pain and constipation  Negative for blood in stool, diarrhea, nausea and vomiting     Genitourinary: Negative for decreased urine volume, dysuria, frequency and hematuria  Musculoskeletal: Positive for gait problem  Negative for back pain, myalgias, neck pain and neck stiffness  Chronic 2/2 muscular dystrophy   Skin: Negative for pallor and rash  Neurological: Positive for weakness and light-headedness  Negative for numbness and headaches  Hematological: Negative for adenopathy  Does not bruise/bleed easily  All other systems reviewed and are negative  Physical Exam  ED Triage Vitals [01/21/19 0636]   Temperature Pulse Respirations Blood Pressure SpO2   (!) 97 1 °F (36 2 °C) 63 18 143/76 95 %      Temp Source Heart Rate Source Patient Position - Orthostatic VS BP Location FiO2 (%)   Oral Monitor Lying Right arm --      Pain Score       5           Orthostatic Vital Signs  Vitals:    01/21/19 0845 01/21/19 0930 01/21/19 1000 01/21/19 1030   BP: 134/66 155/79 153/73 160/74   Pulse: 68 70 74 78   Patient Position - Orthostatic VS:           Physical Exam   Constitutional: She is oriented to person, place, and time  No distress  Patient is alert and oriented, appears nontoxic, in no acute distress   HENT:   Head: Normocephalic and atraumatic  Mouth/Throat: Mucous membranes are dry  No oropharyngeal exudate  Eyes: Pupils are equal, round, and reactive to light  Conjunctivae and EOM are normal    Neck: Normal range of motion  Neck supple  Cardiovascular: Normal rate, regular rhythm, normal heart sounds and intact distal pulses  Pulmonary/Chest: Effort normal and breath sounds normal  No respiratory distress  Crackles at bases   Abdominal: Soft  Bowel sounds are normal  She exhibits no distension  There is tenderness  There is no guarding  LLQ and suprapubic tenderness on palpation    Musculoskeletal: Normal range of motion  Lymphadenopathy:     She has no cervical adenopathy  Neurological: She is alert and oriented to person, place, and time  No cranial nerve deficit or sensory deficit  She exhibits normal muscle tone  Patient notes decreased strength throughout 2/2 muscular dystrophy, muscle strength 3/5 throughout    Skin: Skin is warm and dry  Capillary refill takes less than 2 seconds  No rash noted  She is not diaphoretic  No erythema  No pallor  Psychiatric: She has a normal mood and affect  Her behavior is normal  Judgment and thought content normal    Nursing note and vitals reviewed  ED Medications  Medications   sodium chloride 0 9 % bolus 1,000 mL (0 mL Intravenous Stopped 1/21/19 0850)   iohexol (OMNIPAQUE) 350 MG/ML injection (MULTI-DOSE) 100 mL (100 mL Intravenous Given 1/21/19 0917)   sodium chloride 0 9 % bolus 1,000 mL (0 mL Intravenous Stopped 1/21/19 1223)   HYDROmorphone (DILAUDID) injection 0 5 mg (0 5 mg Intravenous Given 1/21/19 1112)   ondansetron (ZOFRAN) injection 4 mg (4 mg Intravenous Given 1/21/19 1111)       Diagnostic Studies  Results Reviewed     Procedure Component Value Units Date/Time    Lactic acid, plasma [[id number]]  (Abnormal) Collected:  01/21/19 1056    Lab Status:  Final result Specimen:  Blood from Arm, Right Updated:  01/21/19 1131     LACTIC ACID 2 2 (HH) mmol/L     Narrative:         Result may be elevated if tourniquet was used during collection  Blood culture #2 [[id number]] Collected:  01/21/19 1056    Lab Status: In process Specimen:  Blood from Arm, Right Updated:  01/21/19 1101    Blood culture #1 [[id number]] Collected:  01/21/19 1036    Lab Status:   In process Specimen:  Blood from Arm, Left Updated:  01/21/19 1040    Comprehensive metabolic panel [[id number]]  (Abnormal) Collected:  01/21/19 0715    Lab Status:  Final result Specimen:  Blood from Arm, Right Updated:  01/21/19 0744     Sodium 137 mmol/L      Potassium 5 0 mmol/L      Chloride 102 mmol/L      CO2 30 mmol/L      ANION GAP 5 mmol/L      BUN 23 mg/dL      Creatinine 0 52 (L) mg/dL      Glucose 105 mg/dL      Calcium 9 1 mg/dL      AST 18 U/L      ALT 27 U/L      Alkaline Phosphatase 119 (H) U/L      Total Protein 7 3 g/dL      Albumin 3 5 g/dL      Total Bilirubin 0 22 mg/dL      eGFR 98 ml/min/1 73sq m     Narrative:         National Kidney Disease Education Program recommendations are as follows:  GFR calculation is accurate only with a steady state creatinine  Chronic Kidney disease less than 60 ml/min/1 73 sq  meters  Kidney failure less than 15 ml/min/1 73 sq  meters  Troponin I [[id number]]  (Normal) Collected:  01/21/19 0715    Lab Status:  Final result Specimen:  Blood from Arm, Right Updated:  01/21/19 0744     Troponin I <0 02 ng/mL     Lipase [[id number]]  (Normal) Collected:  01/21/19 0715    Lab Status:  Final result Specimen:  Blood from Arm, Right Updated:  01/21/19 0738     Lipase 139 u/L     CBC and differential [[id number]]  (Abnormal) Collected:  01/21/19 0715    Lab Status:  Final result Specimen:  Blood from Arm, Right Updated:  01/21/19 0732     WBC 15 49 (H) Thousand/uL      RBC 5 03 Million/uL      Hemoglobin 14 6 g/dL      Hematocrit 45 0 %      MCV 90 fL      MCH 29 0 pg      MCHC 32 4 g/dL      RDW 13 9 %      MPV 10 7 fL      Platelets 662 Thousands/uL      nRBC 0 /100 WBCs      Neutrophils Relative 75 %      Immat GRANS % 0 %      Lymphocytes Relative 18 %      Monocytes Relative 6 %      Eosinophils Relative 1 %      Basophils Relative 0 %      Neutrophils Absolute 11 38 (H) Thousands/µL      Immature Grans Absolute 0 05 Thousand/uL      Lymphocytes Absolute 2 84 Thousands/µL      Monocytes Absolute 0 98 Thousand/µL      Eosinophils Absolute 0 19 Thousand/µL      Basophils Absolute 0 05 Thousands/µL     POCT urinalysis dipstick [[id number]]     Lab Status:  No result Specimen:  Urine                  CT chest abdomen pelvis w contrast   ED Interpretation by Salas Hinton MD (01/21 1217)   CT ordered by my resident and the report from radiologist has been reviewed         Final Result by Delfina Jefferson MD (01/21 0957)      Long segment of abnormal left colonic hyperemic mucosal enhancement, wall thickening and edema with pericolonic inflammation  Appearance and distribution favoring ischemic colitis  Diverticulosis noted without focal diverticulitis evident  Stable myomatous uterus  Stable hepatic hypodensities favoring cysts  Minimal bibasilar linear scarring and dependent atelectasis  Prior left mastectomy  The study was marked in [facility] for immediate notification  Workstation performed: [id number]               Procedures  ECG 12 Lead Documentation  Date/Time: 1/21/2019 6:54 AM  Performed by: Emaline Opitz by: Julio C Ruiz     Indications / Diagnosis:  Pre-syncope  ECG reviewed by me, the ED Provider: yes    Patient location:  ED and bedside  Previous ECG:     Previous ECG:  Compared to current    Similarity:  Changes noted    Comparison to cardiac monitor: Yes    Interpretation:     Interpretation: abnormal    Rate:     ECG rate:  61    ECG rate assessment: normal    Rhythm:     Rhythm: sinus rhythm    Ectopy:     Ectopy: none    QRS:     QRS axis:  Normal    QRS intervals:  Normal  Conduction:     Conduction: normal    ST segments:     ST segments:  Abnormal    Elevation:  II, III, aVF, V2, V3, V4 and V5  T waves:     T waves: normal            Phone Consults  ED Phone Contact    ED Course  ED Course as of Jan 21 1227   Mon Jan 21, 2019   6281 Patient had a bowel movement     1020 CTAP: Long segment of abnormal left colonic hyperemic mucosal enhancement, wall thickening and edema with pericolonic inflammation  Appearance and distribution favoring ischemic colitis  Diverticulosis noted without focal diverticulitis evident  [id number] Consulted white surgery             Identification of Seniors at Risk      Most Recent Value   (ISAR) Identification of Seniors at Risk   Before the illness or injury that brought you to the Emergency, did you need someone to help you on a regular basis?   0 Filed at: 01/21/2019 0641   In the last 24 hours, have you needed more help than usual?  0 Filed at: 01/21/2019 5481   Have you been hospitalized for one or more nights during the past 6 months? 0 Filed at: 01/21/2019 0641   In general, do you see well?  0 Filed at: 01/21/2019 0641   In general, do you have serious problems with your memory? 0 Filed at: 01/21/2019 6645   Do you take more than three different medications every day? 1 Filed at: 01/21/2019 0641   ISAR Score  1 Filed at: 01/21/2019 6878                    Initial Sepsis Screening     Row Name 01/21/19 1206                Is the patient's history suggestive of a new or worsening infection? No  -CL        Suspected source of infection acute abdominal infection  -CL        Are two or more of the following signs & symptoms of infection both present and new to the patient? No  -CL        Indicate SIRS criteria Leukocytosis (WBC > 39812 IJL)  -CL        If the answer is yes to both questions, suspicion of sepsis is present          If severe sepsis is present AND tissue hypoperfusion perists in the hour after fluid resuscitation or lactate > 4, the patient meets criteria for SEPTIC SHOCK          Are any of the following organ dysfunction criteria present within 6 hours of suspected infection and SIRS criteria that are NOT considered to be chronic conditions? No  -CL        Organ dysfunction Lactate > 2 0 mmol/L  -CL        Date of presentation of severe sepsis          Time of presentation of severe sepsis          Tissue hypoperfusion persists in the hour after crystalloid fluid administration, evidenced, by either:          Was hypotension present within one hour of the conclusion of crystalloid fluid administration?           Date of presentation of septic shock          Time of presentation of septic shock            User Key  (r) = Recorded By, (t) = Taken By, (c) = Cosigned By    [address] Name Provider Rosalia Oconnor MD Resident                  MDM  Number of Diagnoses or Management Options  Abdominal pain:   Diagnosis management comments: Impression: 65yo female presents for evaluation of abdominal pain and lightheadedness  Ddx: ACS, diverticulitis, constipation   Plan: cardiac, UA, cmp, lipase    CritCare Time    Disposition  Final diagnoses:   Abdominal pain     Time reflects when diagnosis was documented in both MDM as applicable and the Disposition within this note     Time User Action Codes Description Comment    1/21/2019 10:51 AM Steph Ewing Altagracia [R10 9] Abdominal pain       ED Disposition     None      Follow-up Information    None         Current Discharge Medication List      CONTINUE these medications which have NOT CHANGED    Details   citalopram (CeleXA) 20 mg tablet Take 1 tablet (20 mg total) by mouth daily for 180 days  Qty: 90 tablet, Refills: 2    Associated Diagnoses: Anxiety      diazepam (VALIUM) 5 mg tablet Take 1 tablet (5 mg total) by mouth daily for 90 days  Qty: 90 tablet, Refills: 1    Associated Diagnoses: Anxiety      fluticasone (FLONASE) 50 mcg/act nasal spray 2 sprays into each nostril daily  Qty: 16 g, Refills: 3    Associated Diagnoses: Acute rhinitis      ketoconazole (NIZORAL) 2 % cream Refills: 0      ketoconazole (NIZORAL) 2 % shampoo Apply 1 application topically 2 (two) times a week  Qty: 120 mL, Refills: 3    Associated Diagnoses: Seborrhea capitis      omeprazole (PriLOSEC) 20 mg delayed release capsule Take 1 capsule (20 mg total) by mouth every morning for 90 days  Qty: 90 capsule, Refills: 1    Associated Diagnoses: Heart burn      triamcinolone (KENALOG) 0 1 % cream Apply topically 2 (two) times a day for 90 days  Qty: 30 g, Refills: 0    Associated Diagnoses: Psoriasis      umeclidinium bromide (INCRUSE ELLIPTA) 62 5 mcg/inh AEPB inhaler Inhale 1 puff daily  Qty: 1 each, Refills: 6    Associated Diagnoses: Chronic obstructive pulmonary disease, unspecified COPD type (HCC)      VESICARE 10 MG tablet take 1 tablet by mouth once daily  Qty: 90 tablet, Refills: 1    Associated Diagnoses: Overactive bladder           No discharge procedures on file  ED Provider  Attending physically available and evaluated Merritt Harry RUSSELL managed the patient along with the ED Attending      Electronically Signed by         Stephanie Mcknight MD  01/21/19 8499

## 2019-01-21 NOTE — SOCIAL WORK
CM met with pt and Padmini Summers pt sister [phone] and all aware cm role at discharge  Prior to admission pt was living in an apartment  with assistance from [employer] ( [employer])   Pt is able to ambulate short distances with a walker and uses a wheel chair   Pt has a walker , cane , electric w/c , shower bench , raised toilet seat and has home oxygen for night 3 liters from [facility]  Pt [facility]  is [facility] [phone] and will assists if pt need increased services   Pt currently gets 3 days a week Monday -Wednesday - Friday  5 hours   Pt states [facility] is agency that comes to the house   Dara Soulier Pt also has meals on wheels   Pt denies any mental health or [facility]  [facility] [address]   Pt was at [facility] in the past   When medically clear pt requested w/c van   Pt also uses [facility] for doctors appointments    Cm will follow for OT and PT recommendations   CM reviewed d/c planning process including the following: identifying help at home, patient preference for d/c planning needs, Discharge Lounge, [facility] Meds to Bed program, availability of treatment team to discuss questions or concerns patient and/or family may have regarding understanding medications and recognizing signs and symptoms once discharged  CM also encouraged patient to follow up with all recommended appointments after discharge  Patient advised of importance for patient and family to participate in managing patients medical well being  Discharge checklist discussed with patient and family

## 2019-01-21 NOTE — ED NOTES
Stool sample needs to be collected  Pt aware we need sample         Janis Mitchell RN  84/07/33 8590

## 2019-01-21 NOTE — ED ATTENDING ATTESTATION
Patty Portillo MD, saw and evaluated the patient  All available labs and X-rays were ordered by me or the resident and have been reviewed by myself  I discussed the patient with the resident / non-physician and agree with the resident's / non-physician practitioner's findings and plan as documented in the resident's / non-physician practicitioner's note, except where noted  At this point, I agree with the current assessment done in the ED  Chief Complaint   Patient presents with    Abdominal Pain     Patient states she started having lower abdominal pain around 0500; was trying to use the bathroom and felt very constipated and lightheaded; did not pass out      This is a 75 y/o F presenting for evaluation of abd pain  She states that she was doing well until about 4-5am when she got up to use the bathroom  While sitting down to poop, she remembers feeling lightheaded and felt like she would pass out which is why she called EMS as she lives alone  She felt SOB at the same time  She denies f/ch/n/v/cp  Denies falls, trauma  Denies diaphoresis  She did have LLQ pain like a previous bout of diverticulitis she has had in the past  She was unable to have a BM  By the time EMS arrived, she a little better, pain down to 5/10 without intervention  Upon arrival continues to have LLQ pain  Still feels "weak" which has persisted the entire time  SOB has resolved  Hx of diverticulitis  No f/ch/n/v/cp  Denies any urinary tract infection symptoms (burning, itching, pain, blood, frequency)  Denies any upper respiratory tract infection symptoms (cough, congestion, rhinorrhea, sore throat)  No changes in mentation    PMH:  - Muscular dystrophy w/ resultant ambulatory dysfunction  - GERD  - COPD  - Depression/Anxiety  - Osteoporosis  - Hx of diverticulitis  PSH:  - Mastectomy  - Tubal ligation  - Tonsillectomy  - Tooth extraction  Former smoker  No alcohol/drugs  PE:  Vitals:    01/22/19 1530 01/22/19 1854 01/23/19 0723 01/23/19 0945   BP: 121/60 111/57 129/56 118/69   BP Location: Right arm Right arm Right arm Right arm   Pulse: 82 87 76 74   Resp: 22 22 18 18   Temp: 97 9 °F (36 6 °C) 97 5 °F (36 4 °C) 97 6 °F (36 4 °C) 98 3 °F (36 8 °C)   TempSrc: Oral Oral Oral Oral   SpO2: 95% 93% 97% 94%   Weight:       Height:       General: VSS, NAD, awake, alert  Well-nourished, well-developed  Appears stated age  Speaking normally in full sentences  Head: Normocephalic, atraumatic, nontender  Eyes: PERRL, EOM-I  No diplopia  No hyphema  No subconjunctival hemorrhages  Symmetrical lids  ENT: Atraumatic external nose and ears  Dry MM  No malocclusion  No stridor  Normal phonation  No drooling  Normal swallowing  Neck: Symmetric, trachea midline  No JVD  CV: RRR  +S1/S2  No murmurs or gallops  Peripheral pulses +2 throughout  No chest wall tenderness  Lungs:   Unlabored No retractions  CTAB, lungs sounds equal bilateral    No tachypnea  Abd: +BS, soft, LLQ moderate, mild LUQ tenderness  Says that she felt like she needs to have a BM when I was pressing on her  ND  Heel strike negative  MSK:   FROM   Back:   No rashes  Skin: Dry, intact  Neuro: AAOx3, GCS 15, CN II-XII grossly intact  Motor grossly intact  Psychiatric/Behavioral: Appropriate mood and affect   Exam: deferred  A:  - SOB  - LLQ abdominal pain  P:  - Labs  - CT for acute abdominal pain  - Urine  - Cardiac workup  - Dispo per workup   - 13 point ROS was performed and all are normal unless stated in the history above  - Nursing note reviewed  Vitals reviewed  - Orders placed by myself and/or advanced practitioner / resident     - Previous chart was reviewed  - No language barrier    - History obtained from patient  - There are no limitations to the history obtained  - Critical care time: Not applicable for this patient  Final Diagnosis:  1  Abdominal pain    2   Ischemic colitis [facility])        ED Course as of Jan 25 1846   Mon Jan 21, 2019   6502 Procedure Note: EKG  Date/Time: 01/21/19 7:23 AM   Performed by: Leslye Sharif  Authorized by: Leslye Sharif   Indications / Diagnosis: SOB  ECG reviewed by me, the ED Provider: yes   The EKG demonstrates:  Rhythm: 61normal sinus  Intervals: normal intervals  Axis: normal axis  QRS/Blocks: normal QRS  ST Changes: No acute ST Changes, no STD/ANGELES  Similar to previous ECG of 03-AUG-2012 16:38    0825 WBC: (!) 15 49   1216 LACTIC ACID: (!!) 2 2     Medications   ceFAZolin (ANCEF) 1 g in sodium chloride 0 9% 50 ml IVPB (0 mg Intravenous Stopped 1/22/19 2149)   sodium chloride 0 9 % bolus 1,000 mL (0 mL Intravenous Stopped 1/21/19 0850)   iohexol (OMNIPAQUE) 350 MG/ML injection (MULTI-DOSE) 100 mL (100 mL Intravenous Given 1/21/19 0917)   sodium chloride 0 9 % bolus 1,000 mL (0 mL Intravenous Stopped 1/21/19 1223)   HYDROmorphone (DILAUDID) injection 0 5 mg (0 5 mg Intravenous Given 1/21/19 1112)   ondansetron (ZOFRAN) injection 4 mg (4 mg Intravenous Given 1/21/19 1111)   polyethylene glycol (GOLYTELY) bowel prep 4,000 mL (4,000 mL Oral Given 1/21/19 1643)   potassium chloride (K-DUR,KLOR-CON) CR tablet 40 mEq (40 mEq Oral Given 1/22/19 1720)   potassium chloride (K-DUR,KLOR-CON) CR tablet 40 mEq (40 mEq Oral Given 1/22/19 1115)     CT chest abdomen pelvis w contrast   ED Interpretation   CT ordered by my resident and the report from radiologist has been reviewed  Final Result      Long segment of abnormal left colonic hyperemic mucosal enhancement, wall thickening and edema with pericolonic inflammation  Appearance and distribution favoring ischemic colitis  Diverticulosis noted without focal diverticulitis evident  Stable myomatous uterus  Stable hepatic hypodensities favoring cysts  Minimal bibasilar linear scarring and dependent atelectasis  Prior left mastectomy  The [facility] was [facility] in [facility] for immediate notification              Workstation performed: [id number]           Orders Placed This Encounter   Procedures    ED ECG Documentation Only    Blood culture #1    Blood culture #2    Stool Enteric Bacterial Panel by PCR    Clostridium difficile toxin by PCR    CT chest abdomen pelvis w contrast    CBC and differential    Comprehensive metabolic panel    Lipase    Troponin I    Lactic acid, plasma    Basic metabolic panel    CBC (With Platelets)    Magnesium    Phosphorus    Basic metabolic panel    CBC and differential    Basic metabolic panel    Magnesium    Discharge Diet    Continuous cardiac monitoring    Continuous pulse oximetry    Activity as tolerated    Call provider for:  persistent nausea or vomiting    Call provider for:  severe uncontrolled pain    OT eval and treat    EKG RESULTS    ECG 12 lead    ECG 12 lead    ECG 12 lead    ECG 12 lead    Inpatient Admission     Labs Reviewed   CBC AND DIFFERENTIAL - Abnormal        Result Value Ref Range Status    WBC 15 49 (*) 4 31 - 10 16 Thousand/uL Final    RBC 5 03  3 81 - 5 12 Million/uL Final    Hemoglobin 14 6  11 5 - 15 4 g/dL Final    Hematocrit 45 0  34 8 - 46 1 % Final    MCV 90  82 - 98 fL Final    MCH 29 0  26 8 - 34 3 pg Final    MCHC 32 4  31 4 - 37 4 g/dL Final    RDW 13 9  11 6 - 15 1 % Final    MPV 10 7  8 9 - 12 7 fL Final    Platelets 014  973 - 390 Thousands/uL Final    nRBC 0  /100 WBCs Final    Neutrophils Relative 75  43 - 75 % Final    Immat GRANS % 0  0 - 2 % Final    Lymphocytes Relative 18  14 - 44 % Final    Monocytes Relative 6  4 - 12 % Final    Eosinophils Relative 1  0 - 6 % Final    Basophils Relative 0  0 - 1 % Final    Neutrophils Absolute 11 38 (*) 1 85 - 7 62 Thousands/µL Final    Immature Grans Absolute 0 05  0 00 - 0 20 Thousand/uL Final    Lymphocytes Absolute 2 84  0 60 - 4 47 Thousands/µL Final    Monocytes Absolute 0 98  0 17 - 1 22 Thousand/µL Final    Eosinophils Absolute 0 19  0 00 - 0 61 Thousand/µL Final    Basophils Absolute 0 05  0 00 - 0 10 Thousands/µL Final   COMPREHENSIVE METABOLIC PANEL - Abnormal     Sodium 137  136 - 145 mmol/L Final    Potassium 5 0  3 5 - 5 3 mmol/L Final    Chloride 102  100 - 108 mmol/L Final    CO2 30  21 - 32 mmol/L Final    ANION GAP 5  4 - 13 mmol/L Final    BUN 23  5 - 25 mg/dL Final    Creatinine 0 52 (*) 0 60 - 1 30 mg/dL Final    Comment: Standardized to IDMS reference method    Glucose 105  65 - 140 mg/dL Final    Comment:   If the patient is fasting, the ADA then defines impaired fasting glucose as > 100 mg/dL and diabetes as > or equal to 123 mg/dL  Specimen collection should occur prior to Sulfasalazine administration due to the potential for falsely depressed results  Specimen collection should occur prior to Sulfapyridine administration due to the potential for falsely elevated results  Calcium 9 1  8 3 - 10 1 mg/dL Final    AST 18  5 - 45 U/L Final    Comment:   Specimen collection should occur prior to Sulfasalazine administration due to the potential for falsely depressed results  ALT 27  12 - 78 U/L Final    Comment:   Specimen collection should occur prior to Sulfasalazine and/or Sulfapyridine administration due to the potential for falsely depressed results  Alkaline Phosphatase 119 (*) 46 - 116 U/L Final    Total Protein 7 3  6 4 - 8 2 g/dL Final    Albumin 3 5  3 5 - 5 0 g/dL Final    Total Bilirubin 0 22  0 20 - 1 00 mg/dL Final    eGFR 98  ml/min/1 73sq m Final    Narrative:     National Kidney Disease Education Program recommendations are as follows:  GFR calculation is accurate only with a steady state creatinine  Chronic Kidney disease less than 60 ml/min/1 73 sq  meters  Kidney failure less than 15 ml/min/1 73 sq  meters  LACTIC ACID, PLASMA - Abnormal     LACTIC ACID 2 2 (*) 0 5 - 2 0 mmol/L Final    Narrative:     Result may be elevated if tourniquet was used during collection     LIPASE - Normal    Lipase 139  73 - 393 u/L Final   TROPONIN I - Normal Troponin I <0 02  <=0 04 ng/mL Final    Comment:   [employer] Chemistry analyzer 99% cutoff is > 0 04 ng/mL in network labs     o cTnI 99% cutoff is useful only when applied to patients in the clinical setting of myocardial ischemia   o cTnI 99% cutoff should be interpreted in the context of clinical history, ECG findings and possibly cardiac imaging to establish correct diagnosis  o cTnI 99% cutoff may be suggestive but clearly not indicative of a coronary event without the clinical setting of myocardial ischemia         Time reflects when diagnosis was documented in both MDM as applicable and the Disposition within this note     Time User Action Codes Description Comment    1/21/2019 10:51 AM Cloria Nick Add [R10 9] Abdominal pain     1/22/2019  8:55 AM Floresita Harp A Add [K55 9] Ischemic colitis ([facility] [address] )     1/22/2019  9:12 AM Ambika Yepez Modify [K55 9] Ischemic colitis [facility])       ED Disposition     None      MD Documentation      Most Recent Value   Accepting Facility Name, [place] [address]   home       RN Documentation      [facility] Name, [address]       Follow-up Information     Follow up With Specialties Details Why Contact Info    Bridgett Mccullough MD Colon and Rectal Surgery Schedule an appointment as soon as possible for a visit in 2 week(s)  [address] [facility]  [address], [address] Schedule an appointment as soon as possible for a visit in 2 week(s)  [address]  Follow up  [address]  [id number] [phone]        Discharge Medication List as of 1/23/2019  2:33 PM      START taking these medications    Details   cephalexin (KEFLEX) 500 mg capsule Take 1 capsule (500 mg total) by mouth every 6 (six) hours for 5 days, Starting Wed 1/23/2019, Until Mon 1/28/2019, Print      metroNIDAZOLE (FLAGYL) 500 mg tablet Take 1 tablet (500 mg total) by mouth every 8 (eight) hours for 5 days, Starting Wed 1/23/2019, Until Mon 1/28/2019, Print         CONTINUE these medications which have NOT CHANGED    Details   citalopram (CeleXA) 20 mg tablet Take 1 tablet (20 mg total) by mouth daily for 180 days, Starting Tue 10/23/2018, Until Sun 4/21/2019, Normal      diazepam (VALIUM) 5 mg tablet Take 1 tablet (5 mg total) by mouth daily for 90 days, Starting Tue 10/23/2018, Until Mon 1/21/2019, Print      diphenhydrAMINE (BENADRYL) 25 mg tablet Take 25 mg by mouth daily at bedtime, Historical Med      fluticasone (FLONASE) 50 mcg/act nasal spray 2 sprays into each nostril daily, Starting Tue 10/23/2018, Normal      ketoconazole (NIZORAL) 2 % shampoo Apply 1 application topically 2 (two) times a week, Starting Mon 8/20/2018, Normal      omeprazole (PriLOSEC) 20 mg delayed release capsule Take 1 capsule (20 mg total) by mouth every morning for 90 days, Starting Tue 10/23/2018, Until Mon 1/21/2019, Normal      triamcinolone (KENALOG) 0 1 % cream Apply topically 2 (two) times a day for 90 days, Starting Mon 1/7/2019, Until Sun 4/7/2019, Normal      umeclidinium bromide (INCRUSE ELLIPTA) 62 5 mcg/inh AEPB inhaler Inhale 1 puff daily, Starting Tue 7/10/2018, Normal      VESICARE 10 MG tablet take 1 tablet by mouth once daily, Normal             Outpatient Discharge Orders  Discharge Diet     Activity as tolerated     Call provider for:  persistent nausea or vomiting     Call provider for:  severe uncontrolled pain       Prior to Admission Medications   Prescriptions Last Dose Informant Patient Reported? Taking?    VESICARE 10 MG tablet   No Yes   Sig: take 1 tablet by mouth once daily   citalopram (CeleXA) 20 mg tablet   No Yes   Sig: Take 1 tablet (20 mg total) by mouth daily for 180 days   diazepam (VALIUM) 5 mg tablet   No Yes   Sig: Take 1 tablet (5 mg total) by mouth daily for 90 days   diphenhydrAMINE (BENADRYL) 25 mg tablet  Self Yes Yes   Sig: Take 25 mg by mouth daily at bedtime   fluticasone (FLONASE) 50 mcg/act nasal spray   No Yes   Si sprays into each nostril daily   ketoconazole (NIZORAL) 2 % shampoo   No Yes   Sig: Apply 1 application topically 2 (two) times a week   omeprazole (PriLOSEC) 20 mg delayed release capsule   No Yes   Sig: Take 1 capsule (20 mg total) by mouth every morning for 90 days   triamcinolone (KENALOG) 0 1 % cream   No Yes   Sig: Apply topically 2 (two) times a day for 90 days   umeclidinium bromide (INCRUSE ELLIPTA) 62 5 mcg/inh AEPB inhaler   No Yes   Sig: Inhale 1 puff daily      Facility-Administered Medications: None       Portions of the record may have been created with voice recognition software  Occasional wrong word or "sound a like" substitutions may have occurred due to the inherent limitations of voice recognition software  Read the chart carefully and recognize, using context, where substitutions have occurred      Electronically signed by:  Wiliam Guillen

## 2019-01-21 NOTE — PLAN OF CARE
Problem: DISCHARGE PLANNING - CARE MANAGEMENT  Goal: Discharge to post-acute care or home with appropriate resources  INTERVENTIONS:  - Conduct assessment to determine patient/family and health care team treatment goals, and need for post-acute services based on payer coverage, community resources, and patient preferences, and barriers to discharge  - Address psychosocial, clinical, and financial barriers to discharge as identified in assessment in conjunction with the patient/family and health care team  - Arrange appropriate level of post-acute services according to patient's   needs and preference and payer coverage in collaboration with the physician and health care team  - Communicate with and update the patient/family, physician, and health care team regarding progress on the discharge plan  - Arrange appropriate transportation to post-acute venues  Home vs snf      Outcome: Progressing

## 2019-01-21 NOTE — CONSULTS
Consultation - General Surgery   Alexandre Mayes 76 y o  female MRN: [id number]  Unit/Bed#: ED 14 Encounter: [id number]    Assessment/Plan     Assessment:  ***  Plan:  ***  History of Present Illness     HPI:  Alexandre Mayes is a 76 y o  female who presented after having abdominal pain and vaso-vagal type symptoms since early this AM  She woke up at 5 AM with lower abdominal pain that felt like she needed to have a BM  She was straining on the toilet and felt weak and dizzy like she may pass out  She called EMS at this time  She did have a large BM since being in the ED and the pain seemed to be a bit less intense but still present  She states the pain is similar to times when she's had diverticulitis  She has had colonic bleeding episode in March 2010, colonoscopy was done by Dr Terese George at that time which showed shallow ulcerations in her left colon suggestive of ischemic colitis  She has not noted any blood in her stools lately  No nausea, vomiting, fever, chills   Denies hx of abdominal surgery    Consults    Review of Systems    Historical Information   Past Medical History:   Diagnosis Date    Anxiety     Breast cancer ([facility] [address] )     left    Colitis     COPD (chronic obstructive pulmonary disease) (HCC)     Depression     Excessive daytime sleepiness     GERD (gastroesophageal reflux disease)     Hyperlipidemia     Muscular dystrophy     Limb-girdle    Osteoporosis     Overactive bladder     Pneumonitis     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     Skin cancer     Skin disorder     Vitamin D deficiency      Past Surgical History:   Procedure Laterality Date    MASTECTOMY Left 2007    left breast mastectomy     TONSILLECTOMY      TOOTH EXTRACTION      TUBAL LIGATION       Social History   History   Alcohol Use No     Comment: social drinker per allscripts      History   Drug Use No     History   Smoking Status    Former Smoker    Packs/day: 1 00    Years: 41 00    Start date: 1967    Quit date:    Smokeless Tobacco    Never Used     Family History:   Family History   Problem Relation Age of Onset    Other Mother         bone loss    Skin cancer Father     Hypertension Father         benign     Other Father         bone loss    Muscular dystrophy Brother         of the limb gridle     Muscular dystrophy Family         of the limb girdle       Meds/Allergies   current meds:   Current Facility-Administered Medications   Medication Dose Route Frequency    ceFAZolin (ANCEF) IVPB (premix) 2,000 mg  2,000 mg Intravenous Once    HYDROmorphone (DILAUDID) injection 0 5 mg  0 5 mg Intravenous Once    metroNIDAZOLE (FLAGYL) IVPB (premix) 500 mg  500 mg Intravenous Once    ondansetron (ZOFRAN) injection 4 mg  4 mg Intravenous Once    sodium chloride 0 9 % bolus 1,000 mL  1,000 mL Intravenous Once    and PTA meds:   Prior to Admission Medications   Prescriptions Last Dose Informant Patient Reported? Taking? VESICARE 10 MG tablet   No Yes   Sig: take 1 tablet by mouth once daily   citalopram (CeleXA) 20 mg tablet   No Yes   Sig: Take 1 tablet (20 mg total) by mouth daily for 180 days   diazepam (VALIUM) 5 mg tablet   No Yes   Sig: Take 1 tablet (5 mg total) by mouth daily for 90 days   fluticasone (FLONASE) 50 mcg/act nasal spray   No Yes   Si sprays into each nostril daily   ketoconazole (NIZORAL) 2 % cream   Yes Yes   ketoconazole (NIZORAL) 2 % shampoo   No Yes   Sig: Apply 1 application topically 2 (two) times a week   omeprazole (PriLOSEC) 20 mg delayed release capsule   No Yes   Sig: Take 1 capsule (20 mg total) by mouth every morning for 90 days   triamcinolone (KENALOG) 0 1 % cream   No Yes   Sig: Apply topically 2 (two) times a day for 90 days   umeclidinium bromide (INCRUSE ELLIPTA) 62 5 mcg/inh AEPB inhaler   No Yes   Sig: Inhale 1 puff daily      Facility-Administered Medications: None     Allergies   Allergen Reactions    Amoxicillin     Codeine      Other reaction(s):  Other (See Comments)  n/v       Objective   First Vitals:   Blood Pressure: 143/76 (01/21/19 0636)  Pulse: 63 (01/21/19 0636)  Temperature: (!) 97 1 °F (36 2 °C) (01/21/19 0636)  Temp Source: Oral (01/21/19 0636)  Respirations: 18 (01/21/19 0636)  Height: 5' (152 4 cm) (01/21/19 0636)  Weight - Scale: 77 kg (169 lb 12 1 oz) (01/21/19 0636)  SpO2: 95 % (01/21/19 0636)    Current Vitals:   Blood Pressure: 153/73 (01/21/19 1000)  Pulse: 74 (01/21/19 1000)  Temperature: (!) 97 1 °F (36 2 °C) (01/21/19 0636)  Temp Source: Oral (01/21/19 0636)  Respirations: (!) 28 (01/21/19 1000)  Height: 5' (152 4 cm) (01/21/19 0636)  Weight - Scale: 77 kg (169 lb 12 1 oz) (01/21/19 0636)  SpO2: 97 % (01/21/19 1000)      Intake/Output Summary (Last 24 hours) at 01/21/19 1053  Last data filed at 01/21/19 0850   Gross per 24 hour   Intake             1000 ml   Output                0 ml   Net             1000 ml       Invasive Devices     Peripheral Intravenous Line            Peripheral IV 01/21/19 Right Antecubital less than 1 day                Physical Exam    Lab Results:   CBC:   Lab Results   Component Value Date    WBC 15 49 (H) 01/21/2019    HGB 14 6 01/21/2019    HCT 45 0 01/21/2019    MCV 90 01/21/2019     01/21/2019    MCH 29 0 01/21/2019    MCHC 32 4 01/21/2019    RDW 13 9 01/21/2019    MPV 10 7 01/21/2019    NRBC 0 01/21/2019   , CMP:   Lab Results   Component Value Date    SODIUM 137 01/21/2019    K 5 0 01/21/2019     01/21/2019    CO2 30 01/21/2019    BUN 23 01/21/2019    CREATININE 0 52 (L) 01/21/2019    CALCIUM 9 1 01/21/2019    AST 18 01/21/2019    ALT 27 01/21/2019    ALKPHOS 119 (H) 01/21/2019    EGFR 98 01/21/2019     Imaging: I have personally reviewed pertinent reports  CT chest abdomen pelvis w contrast   Final Result by Noe Manzanares MD (01/21 1671)      Long segment of abnormal left colonic hyperemic mucosal enhancement, wall thickening and edema with pericolonic inflammation    Appearance and distribution favoring ischemic colitis  Diverticulosis noted without focal diverticulitis evident  Stable myomatous uterus  Stable hepatic hypodensities favoring cysts  Minimal bibasilar linear scarring and dependent atelectasis  Prior left mastectomy  The study was marked in [facility] for immediate notification  Workstation performed: [id number]             EKG, Pathology, and Other Studies: I have personally reviewed pertinent reports  Counseling / Coordination of Care  Total floor / unit time spent today *** minutes  Greater than 50% of total time was spent with the patient and / or family counseling and / or coordination of care  A description of the counseling / coordination of care: ***

## 2019-01-22 ENCOUNTER — ANESTHESIA (INPATIENT)
Dept: GASTROENTEROLOGY | Facility: HOSPITAL | Age: 69
DRG: 395 | End: 2019-01-22
Payer: MEDICARE

## 2019-01-22 LAB
ANION GAP SERPL CALCULATED.3IONS-SCNC: 6 MMOL/L (ref 4–13)
ANION GAP SERPL CALCULATED.3IONS-SCNC: 8 MMOL/L (ref 4–13)
BUN SERPL-MCNC: 7 MG/DL (ref 5–25)
BUN SERPL-MCNC: 8 MG/DL (ref 5–25)
C DIFF TOX GENS STL QL NAA+PROBE: NORMAL
CALCIUM SERPL-MCNC: 7.3 MG/DL (ref 8.3–10.1)
CALCIUM SERPL-MCNC: 7.5 MG/DL (ref 8.3–10.1)
CAMPYLOBACTER DNA SPEC NAA+PROBE: NORMAL
CHLORIDE SERPL-SCNC: 109 MMOL/L (ref 100–108)
CHLORIDE SERPL-SCNC: 110 MMOL/L (ref 100–108)
CO2 SERPL-SCNC: 25 MMOL/L (ref 21–32)
CO2 SERPL-SCNC: 26 MMOL/L (ref 21–32)
CREAT SERPL-MCNC: 0.17 MG/DL (ref 0.6–1.3)
CREAT SERPL-MCNC: 0.44 MG/DL (ref 0.6–1.3)
ERYTHROCYTE [DISTWIDTH] IN BLOOD BY AUTOMATED COUNT: 14.1 % (ref 11.6–15.1)
GFR SERPL CREATININE-BSD FRML MDRD: 104 ML/MIN/1.73SQ M
GFR SERPL CREATININE-BSD FRML MDRD: 142 ML/MIN/1.73SQ M
GLUCOSE SERPL-MCNC: 142 MG/DL (ref 65–140)
GLUCOSE SERPL-MCNC: 92 MG/DL (ref 65–140)
HCT VFR BLD AUTO: 35.8 % (ref 34.8–46.1)
HGB BLD-MCNC: 11.6 G/DL (ref 11.5–15.4)
MAGNESIUM SERPL-MCNC: 1.8 MG/DL (ref 1.6–2.6)
MCH RBC QN AUTO: 28.8 PG (ref 26.8–34.3)
MCHC RBC AUTO-ENTMCNC: 32.4 G/DL (ref 31.4–37.4)
MCV RBC AUTO: 89 FL (ref 82–98)
PHOSPHATE SERPL-MCNC: 2.3 MG/DL (ref 2.3–4.1)
PLATELET # BLD AUTO: 320 THOUSANDS/UL (ref 149–390)
PMV BLD AUTO: 10.9 FL (ref 8.9–12.7)
POTASSIUM SERPL-SCNC: 2.8 MMOL/L (ref 3.5–5.3)
POTASSIUM SERPL-SCNC: 4.5 MMOL/L (ref 3.5–5.3)
RBC # BLD AUTO: 4.03 MILLION/UL (ref 3.81–5.12)
SALMONELLA DNA SPEC QL NAA+PROBE: NORMAL
SHIGA TOXIN STX GENE SPEC NAA+PROBE: NORMAL
SHIGELLA DNA SPEC QL NAA+PROBE: NORMAL
SODIUM SERPL-SCNC: 142 MMOL/L (ref 136–145)
SODIUM SERPL-SCNC: 142 MMOL/L (ref 136–145)
WBC # BLD AUTO: 12.72 THOUSAND/UL (ref 4.31–10.16)

## 2019-01-22 PROCEDURE — 80048 BASIC METABOLIC PNL TOTAL CA: CPT | Performed by: SURGERY

## 2019-01-22 PROCEDURE — 88305 TISSUE EXAM BY PATHOLOGIST: CPT | Performed by: PATHOLOGY

## 2019-01-22 PROCEDURE — 99233 SBSQ HOSP IP/OBS HIGH 50: CPT | Performed by: COLON & RECTAL SURGERY

## 2019-01-22 PROCEDURE — 80048 BASIC METABOLIC PNL TOTAL CA: CPT | Performed by: PHYSICIAN ASSISTANT

## 2019-01-22 PROCEDURE — 85027 COMPLETE CBC AUTOMATED: CPT | Performed by: SURGERY

## 2019-01-22 PROCEDURE — 84100 ASSAY OF PHOSPHORUS: CPT | Performed by: SURGERY

## 2019-01-22 PROCEDURE — 45380 COLONOSCOPY AND BIOPSY: CPT | Performed by: COLON & RECTAL SURGERY

## 2019-01-22 PROCEDURE — 83735 ASSAY OF MAGNESIUM: CPT | Performed by: SURGERY

## 2019-01-22 PROCEDURE — 0DBM8ZX EXCISION OF DESCENDING COLON, VIA NATURAL OR ARTIFICIAL OPENING ENDOSCOPIC, DIAGNOSTIC: ICD-10-PCS | Performed by: COLON & RECTAL SURGERY

## 2019-01-22 PROCEDURE — 0DBL8ZX EXCISION OF TRANSVERSE COLON, VIA NATURAL OR ARTIFICIAL OPENING ENDOSCOPIC, DIAGNOSTIC: ICD-10-PCS | Performed by: COLON & RECTAL SURGERY

## 2019-01-22 RX ORDER — POTASSIUM CHLORIDE 14.9 MG/ML
20 INJECTION INTRAVENOUS
Status: DISCONTINUED | OUTPATIENT
Start: 2019-01-22 | End: 2019-01-22

## 2019-01-22 RX ORDER — DIPHENHYDRAMINE HCL 25 MG
25 TABLET ORAL
Status: DISCONTINUED | OUTPATIENT
Start: 2019-01-22 | End: 2019-01-23 | Stop reason: HOSPADM

## 2019-01-22 RX ORDER — PROPOFOL 10 MG/ML
INJECTION, EMULSION INTRAVENOUS AS NEEDED
Status: DISCONTINUED | OUTPATIENT
Start: 2019-01-22 | End: 2019-01-22 | Stop reason: SURG

## 2019-01-22 RX ORDER — DEXTROSE, SODIUM CHLORIDE, AND POTASSIUM CHLORIDE 5; .45; .15 G/100ML; G/100ML; G/100ML
75 INJECTION INTRAVENOUS CONTINUOUS
Status: DISCONTINUED | OUTPATIENT
Start: 2019-01-22 | End: 2019-01-23

## 2019-01-22 RX ORDER — PROPOFOL 10 MG/ML
INJECTION, EMULSION INTRAVENOUS CONTINUOUS PRN
Status: DISCONTINUED | OUTPATIENT
Start: 2019-01-22 | End: 2019-01-22 | Stop reason: SURG

## 2019-01-22 RX ORDER — POTASSIUM CHLORIDE 20 MEQ/1
40 TABLET, EXTENDED RELEASE ORAL ONCE
Status: COMPLETED | OUTPATIENT
Start: 2019-01-22 | End: 2019-01-22

## 2019-01-22 RX ORDER — POTASSIUM CHLORIDE 20 MEQ/1
40 TABLET, EXTENDED RELEASE ORAL 2 TIMES DAILY
Status: COMPLETED | OUTPATIENT
Start: 2019-01-22 | End: 2019-01-22

## 2019-01-22 RX ADMIN — POTASSIUM CHLORIDE 40 MEQ: 1500 TABLET, EXTENDED RELEASE ORAL at 17:20

## 2019-01-22 RX ADMIN — POTASSIUM CHLORIDE 20 MEQ: 200 INJECTION, SOLUTION INTRAVENOUS at 09:36

## 2019-01-22 RX ADMIN — METRONIDAZOLE 500 MG: 500 INJECTION, SOLUTION INTRAVENOUS at 09:36

## 2019-01-22 RX ADMIN — CEFAZOLIN SODIUM 1000 MG: 10 INJECTION, POWDER, FOR SOLUTION INTRAVENOUS at 19:35

## 2019-01-22 RX ADMIN — FLUTICASONE PROPIONATE 2 SPRAY: 50 SPRAY, METERED NASAL at 09:38

## 2019-01-22 RX ADMIN — CEFAZOLIN SODIUM 1000 MG: 10 INJECTION, POWDER, FOR SOLUTION INTRAVENOUS at 10:39

## 2019-01-22 RX ADMIN — CITALOPRAM HYDROBROMIDE 20 MG: 20 TABLET ORAL at 09:36

## 2019-01-22 RX ADMIN — DIPHENHYDRAMINE HCL 25 MG: 25 TABLET, FILM COATED ORAL at 22:00

## 2019-01-22 RX ADMIN — DIAZEPAM 5 MG: 5 TABLET ORAL at 09:36

## 2019-01-22 RX ADMIN — HEPARIN SODIUM 5000 UNITS: 5000 INJECTION INTRAVENOUS; SUBCUTANEOUS at 13:34

## 2019-01-22 RX ADMIN — HEPARIN SODIUM 5000 UNITS: 5000 INJECTION INTRAVENOUS; SUBCUTANEOUS at 05:09

## 2019-01-22 RX ADMIN — POTASSIUM CHLORIDE 40 MEQ: 1500 TABLET, EXTENDED RELEASE ORAL at 11:15

## 2019-01-22 RX ADMIN — HEPARIN SODIUM 5000 UNITS: 5000 INJECTION INTRAVENOUS; SUBCUTANEOUS at 21:51

## 2019-01-22 RX ADMIN — POTASSIUM CHLORIDE 40 MEQ: 1500 TABLET, EXTENDED RELEASE ORAL at 08:00

## 2019-01-22 RX ADMIN — METRONIDAZOLE 500 MG: 500 INJECTION, SOLUTION INTRAVENOUS at 01:49

## 2019-01-22 RX ADMIN — SODIUM CHLORIDE: 0.9 INJECTION, SOLUTION INTRAVENOUS at 08:36

## 2019-01-22 RX ADMIN — PROPOFOL 50 MG: 10 INJECTION, EMULSION INTRAVENOUS at 08:42

## 2019-01-22 RX ADMIN — METRONIDAZOLE 500 MG: 500 INJECTION, SOLUTION INTRAVENOUS at 17:20

## 2019-01-22 RX ADMIN — PROPOFOL 60 MCG/KG/MIN: 10 INJECTION, EMULSION INTRAVENOUS at 08:42

## 2019-01-22 RX ADMIN — SODIUM CHLORIDE 125 ML/HR: 0.9 INJECTION, SOLUTION INTRAVENOUS at 05:29

## 2019-01-22 RX ADMIN — DEXTROSE, SODIUM CHLORIDE, AND POTASSIUM CHLORIDE 75 ML/HR: 5; .45; .15 INJECTION INTRAVENOUS at 10:39

## 2019-01-22 RX ADMIN — PROPOFOL 30 MG: 10 INJECTION, EMULSION INTRAVENOUS at 08:46

## 2019-01-22 RX ADMIN — POTASSIUM CHLORIDE 20 MEQ: 200 INJECTION, SOLUTION INTRAVENOUS at 06:32

## 2019-01-22 NOTE — ANESTHESIA PREPROCEDURE EVALUATION
Review of Systems/Medical History  Patient summary reviewed  Chart reviewed  History of anesthetic complications difficult airway    Cardiovascular  EKG reviewed, Exercise tolerance (METS): <4,  Hyperlipidemia,    Pulmonary  COPD severe- O2 dependent ,        GI/Hepatic    GERD poorly controlled,   Comment: Abdominal pain     Negative  ROS        Endo/Other  Negative endo/other ROS      GYN    Breast cancer left mastectomy       Hematology  Negative hematology ROS      Musculoskeletal  Muscular dystrophy ,        Neurology    Neuromuscular disease ,    Psychology   Anxiety, Depression , being treated for depression,              Physical Exam    Airway    Mallampati score: IV  TM Distance: <3 FB  Neck ROM: limited     Dental       Cardiovascular  Rhythm: regular, Rate: normal,     Pulmonary  Breath sounds clear to auscultation,     Other Findings        Anesthesia Plan  ASA Score- 3     Anesthesia Type- IV sedation with anesthesia with ASA Monitors  Additional Monitors:   Airway Plan:         Plan Factors-    Induction-     Postoperative Plan-     Informed Consent- Anesthetic plan and risks discussed with patient  I personally reviewed this patient with the CRNA  Discussed and agreed on the Anesthesia Plan with the CRNA  Dipika Pedraza

## 2019-01-22 NOTE — PROGRESS NOTES
Progress Note - General Surgery   Roby Crumble 76 y o  female MRN: [id number]  [facility] Encounter: [id number]    Assessment:  77 yo female with ischemic colitis    Plan:  NPO  Colonoscopy today  Continue antibiotics    Subjective/Objective      Subjective: No acute events  Patient states she has had many bowel movements  Does not know if they are clear by this morning  States her abdominal pain has gone away  Objective:   Blood pressure 132/84, pulse 83, temperature 97 6 °F (36 4 °C), temperature source Oral, resp  rate 20, height 5' (1 524 m), weight 79 3 kg (174 lb 14 4 oz), SpO2 100 %  ,Body mass index is 34 16 kg/m²        Intake/Output Summary (Last 24 hours) at 01/22/19 0532  Last data filed at 01/22/19 4318   Gross per 24 hour   Intake          4039 58 ml   Output              650 ml   Net          3389 58 ml       Invasive Devices     Peripheral Intravenous Line            Peripheral IV 01/21/19 Right Hand less than 1 day                Physical Exam:   Gen: NAD, A&O, Comfortable   Chest: Normal work of breathing, no resp distress  Abd: S, ND, NT  Ext: No edema  Skin: warm, dry, intact    Lab, Imaging and other studies:  CBC:   Lab Results   Component Value Date    WBC 15 49 (H) 01/21/2019    HGB 14 6 01/21/2019    HCT 45 0 01/21/2019    MCV 90 01/21/2019     01/21/2019    MCH 29 0 01/21/2019    MCHC 32 4 01/21/2019    RDW 13 9 01/21/2019    MPV 10 7 01/21/2019    NRBC 0 01/21/2019   , CMP:   Lab Results   Component Value Date    SODIUM 137 01/21/2019    K 5 0 01/21/2019     01/21/2019    CO2 30 01/21/2019    BUN 23 01/21/2019    CREATININE 0 52 (L) 01/21/2019    CALCIUM 9 1 01/21/2019    AST 18 01/21/2019    ALT 27 01/21/2019    ALKPHOS 119 (H) 01/21/2019    EGFR 98 01/21/2019   , Coagulation: No results found for: PT, INR, APTT, Urinalysis: No results found for: [address], [address] [facility], [address] [facility], NITRITE, [address] [facility], GLUCOSEU, KETONESU, [address] [facility], BLOODU, Amylase: No results found for: AMYLASE, Lipase:   Lab Results   Component Value Date    LIPASE 139 01/21/2019     VTE Pharmacologic Prophylaxis: Heparin  VTE Mechanical Prophylaxis: sequential compression device

## 2019-01-22 NOTE — UTILIZATION REVIEW
Initial Clinical Review    Admission: Date/Time/Statement: 1/21/19 @ 1120   Orders Placed This Encounter   Procedures    Inpatient Admission     Standing Status:   Standing     Number of Occurrences:   1     Order Specific Question:   Admitting Physician     Answer:   Desmond Hoang     Order Specific Question:   Level of Care     Answer:   Med Surg [16]     Order Specific Question:   Estimated length of stay     Answer:   More than 2 Midnights     Order Specific Question:   Certification     Answer:   I certify that inpatient services are medically necessary for this patient for a duration of greater than two midnights  See H&P and MD Progress Notes for additional information about the patient's course of treatment  ED: Date/Time/Mode of Arrival:   ED Arrival Information     Expected Arrival Acuity Means of Arrival Escorted By Service Admission Type    - 1/21/2019 06:33 Urgent Ambulance [facility] Colorectal Urgent    Arrival Complaint    Weakness        Chief Complaint:   Chief Complaint   Patient presents with    Abdominal Pain     Patient states she started having lower abdominal pain around 0500; was trying to use the bathroom and felt very constipated and lightheaded; did not pass out      History of Illness: Andrés Burkitt a 76 y  o  female who presented after having abdominal pain mid abdomen that awoke her from sleep  Patient states she felt she had to move her bowels and had a vaso-vagal type symptoms  She was straining on the toilet and felt weak and dizzy like she may pass out  She called EMS at this time  She did have a large BM since being in the ED and the pain seemed to be a bit less intense but still present  She states the pain is similar to times when she's had diverticulitis  She has had colonic bleeding episode in March 2010, colonoscopy was done by Dr Jonathan Anaya at that time which showed shallow ulcerations in her left colon suggestive of ischemic colitis   She has not noted any blood in her stools lately  No nausea, vomiting, fever, chills  Denies hx of abdominal surgery  ED Vital Signs:   ED Triage Vitals [01/21/19 0636]   Temperature Pulse Respirations Blood Pressure SpO2   (!) 97 1 °F (36 2 °C) 63 18 143/76 95 %      Temp Source Heart Rate Source Patient Position - Orthostatic VS BP Location FiO2 (%)   Oral Monitor Lying Right arm --      Pain Score       5        Wt Readings from Last 1 Encounters:   01/21/19 79 3 kg (174 lb 14 4 oz)     Vital Signs (abnormal):   01/22/19 0902  --  82  16   89/52  94 %  None (Room air)   01/21/19 1000  --  74   28  153/73  97 %  --   01/21/19 0930  --  70   28  155/79  97 %  --   01/21/19 0845  --  68   28  134/66  99 %  --   01/21/19 0636   97 1 °F (36 2 °C)  63  18  143/76  95 %  None (Room air)     Pertinent Labs:    K 2 8  CREAT 0 52, 0 17  TR 7 3  ALK PHOS 119  LACTIC ACID 2 2  TROP WNL   WBC 15 49, 12 72  C DIFF TOXIN - NEGATIVE     Diagnostic Test Results:     1/21 CT CHEST, ABD, PELVIS - Long segment of abnormal left colonic hyperemic mucosal enhancement, wall thickening and edema with pericolonic inflammation  Appearance and distribution favoring ischemic colitis  Diverticulosis noted without focal diverticulitis evident  Stable myomatous uterus  Stable hepatic hypodensities favoring cysts  Minimal bibasilar linear scarring and dependent atelectasis  Prior left mastectomy      ED Treatment:   Medication Administration from 01/21/2019 0633 to 01/21/2019 1226    Date/Time Order Dose Route Action   01/21/2019 0718 sodium chloride 0 9 % bolus 1,000 mL 1,000 mL Intravenous New Bag   01/21/2019 0917 iohexol (OMNIPAQUE) 350 MG/ML injection (MULTI-DOSE) 100 mL 100 mL Intravenous Given   01/21/2019 1109 sodium chloride 0 9 % bolus 1,000 mL 1,000 mL Intravenous New Bag   01/21/2019 1112 HYDROmorphone (DILAUDID) injection 0 5 mg 0 5 mg Intravenous Given   01/21/2019 1111 ondansetron (ZOFRAN) injection 4 mg 4 mg Intravenous Given Past Medical/Surgical History: Active Ambulatory Problems     Diagnosis Date Noted    Foot pain 10/12/2017    Dystrophy, muscular, hereditary progressive 10/12/2017    Ambulatory dysfunction 10/12/2017    Urinary incontinence 10/03/2017    Sleep related hypoxia 05/26/2016    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 10/04/2012    Osteoporosis 12/07/2016    Hypercholesterolemia 01/04/2013    GERD without esophagitis 10/04/2012    [facility] 10/04/2012    Allergic rhinitis 10/09/2013    Difficult intubation      Resolved Ambulatory Problems     Diagnosis Date Noted    No Resolved Ambulatory Problems     Past Medical History:   Diagnosis Date    Anxiety     [facility] ([facility] )     Colitis     COPD [facility]chronic obstructive pulmonary disease) ([facility] )     Depression     Difficult intubation     Excessive daytime sleepiness     GERD (gastroesophageal reflux disease)     Hyperlipidemia     Muscular dystrophy     Osteoporosis     Overactive bladder     Pneumonitis     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     Skin cancer     Skin disorder     Vitamin D deficiency      Admitting Diagnosis: Abdominal pain [R10 9]  Age/Sex: 76 y o  female     Assessment/Plan:   1  Abdominal pain  2  Ischemic colitis  3  Muscular dystrophy     Plan:  1  Admit to Dr Reinaldo Peralta  2  IV fluids  3  Clear liquid diet  4  Colonoscopy in am  5  Stool cultures, C-diff  6   Check am labs     Code Status: Level one  Admission Orders:  Scheduled Meds:   Current Facility-Administered Medications:  cefazolin 1,000 mg Intravenous Q8H Last Rate: 1,000 mg (01/22/19 1039)   citalopram 20 mg Oral Daily    dextrose 5 % and sodium chloride 0 45 % with KCl 20 mEq/L 75 mL/hr Intravenous Continuous Last Rate: 75 mL/hr (01/22/19 1039)   diazepam 5 mg Oral Daily    fluticasone 2 spray Nasal Daily    heparin (porcine) 5,000 Units Subcutaneous Q8H [address]    HYDROmorphone 0 2 mg Intravenous Q3H PRN    HYDROmorphone 0 5 mg Intravenous Q3H PRN    metroNIDAZOLE 500 mg Intravenous Q8H Last Rate: 500 mg (01/22/19 0936)   ondansetron 4 mg Intravenous Q4H PRN    pantoprazole 40 mg Oral Early Morning    potassium chloride 40 mEq Oral BID      Continuous Infusions:   dextrose 5 % and sodium chloride 0 45 % with KCl 20 mEq/L 75 mL/hr Last Rate: 75 mL/hr (01/22/19 1039)     PRN Meds: HYDROmorphone    HYDROmorphone    Ondansetron X1    TELE  SCDS  OOB AS GERMÁN   REGULAR DIET LO FIBER, LO RESIDUE  CONS COLORECTAL SURGERY   PT/OT EVAL/TX   ________________________  1/22 COLONOSCOPY PROCEDURE NOTE  Patchy mucosal ischemia of the splenic flexure and descending colon  Biopsies taken  Brisk bleeding from biopsy site  No signs of concerning transmural ischemia  Sigmoid diverticulosis  Otherwise normal colon    Fair prep would not allow polyp smaller than 5 mm to be detected reliably         IMPRESSIONS:     Mild left-sided ischemic colitis

## 2019-01-22 NOTE — SOCIAL WORK
CM discussed pt during care coordination rounds and pt for tentative d/c tomorrow   Cm requested PT and OT orders for recommendations  Cm will follow

## 2019-01-22 NOTE — OP NOTE
Alexandria Ramírez 76 y o  female MRN: [id number]  ITE:[birth year]23[birth year]32  Unit/Bed#: ENDO POOL Encounter: [id number]  01/22/19   @NOW    PCP: Diann Frey,     COLONOSCOPY    PROCEDURE: Colonoscopy/ Biopsy    INDICATIONS: Abdominal Pain, Chronic    POST-OP DIAGNOSIS: See the impression below    SEDATION: Monitored anesthesia care, check anesthesia records    PHYSICAL EXAM:    /83   Pulse 86   Temp 98 2 °F (36 8 °C) (Oral)   Resp 16   Ht 5' (1 524 m)   Wt 79 3 kg (174 lb 14 4 oz)   LMP  (LMP Unknown)   SpO2 95%   BMI 34 16 kg/m²   Body mass index is 34 16 kg/m²  General: NAD  Heart: S1 & S2 normal, RRR  Lungs: CTA, No rales or rhonchi  Abdomen: Soft, nontender, nondistended, good bowel sounds    CONSENT:  Informed consent was obtained for the procedure, including sedation after explaining the risks and benefits of the procedure  Risks including but not limited to bleeding, perforation, infection, aspiration were discussed in detail  Also explained about less than 100%$ sensitivity with the exam and other alternatives  PREPARATION:   EKG tracing, pulse oximetry, blood pressure were monitored throughout the procedure  Patient was identified by myself both verbally and by visual inspection of ID band  DESCRIPTION:   Patient was placed in the left lateral decubitus position and was sedated with the above medication  Digital rectal examination was performed  The colonoscope was introduced in to the anal canal and advanced up to cecum, which was identified by the appendiceal orifice and IC valve  A careful inspection was made as the colonoscope was withdrawn, including a retroflexed view of the rectum; findings and interventions are described below  Appropriate photodocumentation was obtained  The quality of the colonic preparation was fair  FINDINGS:    Patchy mucosal ischemia of the splenic flexure and descending colon  Biopsies taken  Brisk bleeding from biopsy site    No signs of concerning transmural ischemia  Sigmoid diverticulosis  Otherwise normal colon  Fair prep would not allow polyp smaller than 5 mm to be detected reliably         IMPRESSIONS:      Mild left-sided ischemic colitis    RECOMMENDATIONS:    Return to floor  Advanced diet as tolerated  Continue antibiotics hydration  Home hopefully soon  COMPLICATIONS:  None; patient tolerated the procedure well      DISPOSITION: PACU           CONDITION: Stable

## 2019-01-23 VITALS
DIASTOLIC BLOOD PRESSURE: 69 MMHG | HEIGHT: 60 IN | RESPIRATION RATE: 18 BRPM | OXYGEN SATURATION: 94 % | WEIGHT: 174.9 LBS | BODY MASS INDEX: 34.34 KG/M2 | SYSTOLIC BLOOD PRESSURE: 118 MMHG | TEMPERATURE: 98.3 F | HEART RATE: 74 BPM

## 2019-01-23 LAB
ANION GAP SERPL CALCULATED.3IONS-SCNC: 5 MMOL/L (ref 4–13)
BASOPHILS # BLD AUTO: 0.03 THOUSANDS/ΜL (ref 0–0.1)
BASOPHILS NFR BLD AUTO: 0 % (ref 0–1)
BUN SERPL-MCNC: 5 MG/DL (ref 5–25)
CALCIUM SERPL-MCNC: 8.2 MG/DL (ref 8.3–10.1)
CHLORIDE SERPL-SCNC: 107 MMOL/L (ref 100–108)
CO2 SERPL-SCNC: 28 MMOL/L (ref 21–32)
CREAT SERPL-MCNC: 0.24 MG/DL (ref 0.6–1.3)
EOSINOPHIL # BLD AUTO: 0.25 THOUSAND/ΜL (ref 0–0.61)
EOSINOPHIL NFR BLD AUTO: 3 % (ref 0–6)
ERYTHROCYTE [DISTWIDTH] IN BLOOD BY AUTOMATED COUNT: 14.5 % (ref 11.6–15.1)
GFR SERPL CREATININE-BSD FRML MDRD: 127 ML/MIN/1.73SQ M
GLUCOSE SERPL-MCNC: 104 MG/DL (ref 65–140)
HCT VFR BLD AUTO: 36.8 % (ref 34.8–46.1)
HGB BLD-MCNC: 11.9 G/DL (ref 11.5–15.4)
IMM GRANULOCYTES # BLD AUTO: 0.02 THOUSAND/UL (ref 0–0.2)
IMM GRANULOCYTES NFR BLD AUTO: 0 % (ref 0–2)
LYMPHOCYTES # BLD AUTO: 2.19 THOUSANDS/ΜL (ref 0.6–4.47)
LYMPHOCYTES NFR BLD AUTO: 25 % (ref 14–44)
MAGNESIUM SERPL-MCNC: 2.1 MG/DL (ref 1.6–2.6)
MCH RBC QN AUTO: 29.2 PG (ref 26.8–34.3)
MCHC RBC AUTO-ENTMCNC: 32.3 G/DL (ref 31.4–37.4)
MCV RBC AUTO: 90 FL (ref 82–98)
MONOCYTES # BLD AUTO: 0.74 THOUSAND/ΜL (ref 0.17–1.22)
MONOCYTES NFR BLD AUTO: 8 % (ref 4–12)
NEUTROPHILS # BLD AUTO: 5.59 THOUSANDS/ΜL (ref 1.85–7.62)
NEUTS SEG NFR BLD AUTO: 64 % (ref 43–75)
NRBC BLD AUTO-RTO: 0 /100 WBCS
PLATELET # BLD AUTO: 295 THOUSANDS/UL (ref 149–390)
PMV BLD AUTO: 11.2 FL (ref 8.9–12.7)
POTASSIUM SERPL-SCNC: 4.3 MMOL/L (ref 3.5–5.3)
RBC # BLD AUTO: 4.08 MILLION/UL (ref 3.81–5.12)
SODIUM SERPL-SCNC: 140 MMOL/L (ref 136–145)
WBC # BLD AUTO: 8.82 THOUSAND/UL (ref 4.31–10.16)

## 2019-01-23 PROCEDURE — 97166 OT EVAL MOD COMPLEX 45 MIN: CPT

## 2019-01-23 PROCEDURE — 83735 ASSAY OF MAGNESIUM: CPT | Performed by: PHYSICIAN ASSISTANT

## 2019-01-23 PROCEDURE — G8989 SELF CARE D/C STATUS: HCPCS

## 2019-01-23 PROCEDURE — G8988 SELF CARE GOAL STATUS: HCPCS

## 2019-01-23 PROCEDURE — G8979 MOBILITY GOAL STATUS: HCPCS

## 2019-01-23 PROCEDURE — 80048 BASIC METABOLIC PNL TOTAL CA: CPT | Performed by: PHYSICIAN ASSISTANT

## 2019-01-23 PROCEDURE — G8978 MOBILITY CURRENT STATUS: HCPCS

## 2019-01-23 PROCEDURE — 99238 HOSP IP/OBS DSCHRG MGMT 30/<: CPT | Performed by: COLON & RECTAL SURGERY

## 2019-01-23 PROCEDURE — 97162 PT EVAL MOD COMPLEX 30 MIN: CPT

## 2019-01-23 PROCEDURE — G8987 SELF CARE CURRENT STATUS: HCPCS

## 2019-01-23 PROCEDURE — 85025 COMPLETE CBC W/AUTO DIFF WBC: CPT | Performed by: PHYSICIAN ASSISTANT

## 2019-01-23 RX ORDER — METRONIDAZOLE 500 MG/1
500 TABLET ORAL EVERY 8 HOURS SCHEDULED
Qty: 15 TABLET | Refills: 0 | Status: SHIPPED | OUTPATIENT
Start: 2019-01-23 | End: 2019-01-28

## 2019-01-23 RX ORDER — CEPHALEXIN 500 MG/1
500 CAPSULE ORAL EVERY 6 HOURS SCHEDULED
Qty: 20 CAPSULE | Refills: 0 | Status: SHIPPED | OUTPATIENT
Start: 2019-01-23 | End: 2019-01-28

## 2019-01-23 RX ADMIN — PANTOPRAZOLE SODIUM 40 MG: 40 TABLET, DELAYED RELEASE ORAL at 05:18

## 2019-01-23 RX ADMIN — CEFAZOLIN SODIUM 1000 MG: 10 INJECTION, POWDER, FOR SOLUTION INTRAVENOUS at 04:36

## 2019-01-23 RX ADMIN — METRONIDAZOLE 500 MG: 500 INJECTION, SOLUTION INTRAVENOUS at 12:06

## 2019-01-23 RX ADMIN — DEXTROSE, SODIUM CHLORIDE, AND POTASSIUM CHLORIDE 75 ML/HR: 5; .45; .15 INJECTION INTRAVENOUS at 00:41

## 2019-01-23 RX ADMIN — HEPARIN SODIUM 5000 UNITS: 5000 INJECTION INTRAVENOUS; SUBCUTANEOUS at 05:18

## 2019-01-23 RX ADMIN — METRONIDAZOLE 500 MG: 500 INJECTION, SOLUTION INTRAVENOUS at 03:01

## 2019-01-23 NOTE — DISCHARGE SUMMARY
Discharge Summary - Colorectal Surgery   Jaci Barrera 76 y o  female MRN: [id number]  [facility] Encounter: [id number]        Admitting Diagnosis: Abdominal pain    Admit Date: 1/21/19    Discharge Diagnosis: Ischemic colitis    Discharge Date: 1/23/19    HPI: Carroll Livingston a 76 y  o  female who presented after having abdominal pain mid abdomen that awoke her from sleep  Patient states she felt she had to move her bowels and had a vaso-vagal type symptoms  She was straining on the toilet and felt weak and dizzy like she may pass out  She called EMS at this time  She did have a large BM since being in the ED and the pain seemed to be a bit less intense but still present  She states the pain is similar to times when she's had diverticulitis  She has had colonic bleeding episode in March 2010, colonoscopy was done by Dr Esther Shay at that time which showed shallow ulcerations in her left colon suggestive of ischemic colitis  She has not noted any blood in her stools lately  No nausea, vomiting, fever, chills  Denies hx of abdominal surgery  Procedures Performed: 1/22/19 Colonoscopy - Dr Marielos Abarca Course: Patient was admitted and kept on a clear liquid diet with IV fluids  Ancef/Flagyl was started after stool cultures and C-diff was sent  The C-diff and stool cultures were all negative  Patient underwent a colonoscopy the following morning revealing patchy mucosal ischemia of the splenic flexure and descending colon  Biopsies taken, bisk bleeding from the biopsy sites  No transmural ischemia seen  Sigmoid diverticulosis also seen  Patient is much better now with very little abdominal pain  Patient is now tolerating a low residue diet  She will be discharged home today with five more days of Keflex 500 mg q6 hrs and Flagyl 500 mg q8 hrs  Patient would prefer not to be on Augmentin  Patient will be transported home in a wheelchair van   Patient has help already at home for her muscular dystrophy  She has a scooter and wheelchair at home to use  Patient was followed with our restorative technician throughout the admission  Complications: None      Condition at Discharge: good     Discharge instructions/Information to patient and family:   See after visit summary for information provided to patient and family  Provisions for Follow-Up Care:  See after visit summary for information related to follow-up care and any pertinent home health orders  Disposition: Home    Planned Readmission: No    Discharge Statement   I spent 40 minutes discharging the patient  This time was spent on the day of discharge  I had direct contact with the patient on the day of discharge  Additional documentation is required if more than 30 minutes were spent on discharge  Discharge Medications:  See after visit summary for reconciled discharge medications provided to patient and family

## 2019-01-23 NOTE — PHYSICIAN ADVISOR
Current patient class: Inpatient  The patient is currently on Hospital Day: 2 at [facility]      The patient was admitted to the hospital at 1120 on 1/21/19 for the following diagnosis:  Abdominal pain [R10 9]       There is documentation in the medical record of an expected length of stay of at least 2 midnights  The patient is therefore expected to satisfy the 2 midnight benchmark and given the 2 midnight presumption is appropriate for INPATIENT ADMISSION  Given this expectation of a satisfying stay, CMS instructs us that the patient is most often appropriate for inpatient admission under part A provided medical necessity is documented in the chart  After review of the relevant documentation, labs, vital signs and test results, the patient is appropriate for INPATIENT ADMISSION  Admission to the hospital as an inpatient is a complex decision making process which requires the practitioner to consider the patients presenting complaint, history and physical examination and all relevant testing  With this in mind, in this case, the patient was deemed appropriate for INPATIENT ADMISSION  After review of the documentation and testing available at the time of the admission I concur with this clinical determination of medical necessity  Rationale is as follows: The patient is a 76 yrs old Female who presented to the ED at 1/21/2019  6:33 AM with a chief complaint of Abdominal Pain (Patient states she started having lower abdominal pain around 0500; was trying to use the bathroom and felt very constipated and lightheaded; did not pass out )     The patient presented with abdominal pain in the mid-abdomen  The patient is being admitted with abdominal pain, ischemic colitis  The plan of care includes IVF, clear liquids diet, colonoscopy  This patient is appropriate for INPATIENT admission       The patients vitals on arrival were ED Triage Vitals [01/21/19 0636] Temperature Pulse Respirations Blood Pressure SpO2   (!) 97 1 °F (36 2 °C) 63 18 143/76 95 %      Temp Source Heart Rate Source Patient Position - Orthostatic VS BP Location FiO2 (%)   Oral Monitor Lying Right arm --      Pain Score       5           Past Medical History:   Diagnosis Date    Anxiety     Breast cancer (HCC)     left    Colitis     COPD (chronic obstructive pulmonary disease) (HCC)     Depression     Difficult intubation     Excessive daytime sleepiness     GERD (gastroesophageal reflux disease)     Hyperlipidemia     Muscular dystrophy     Limb-girdle    Osteoporosis     Overactive bladder     Pneumonitis     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     Skin cancer     Skin disorder     Vitamin D deficiency      Past Surgical History:   Procedure Laterality Date    MASTECTOMY Left 2007    left breast mastectomy     TONSILLECTOMY      TOOTH EXTRACTION      TUBAL LIGATION             Consults have been placed to:   IP CONSULT TO COLORECTAL SURGERY    Vitals:    01/22/19 0917 01/22/19 1100 01/22/19 1530 01/22/19 1854   BP: 102/59 128/60 121/60 111/57   BP Location:  Right arm Right arm Right arm   Pulse: 78 87 82 87   Resp: 17 16 22 22   Temp:  98 °F (36 7 °C) 97 9 °F (36 6 °C) 97 5 °F (36 4 °C)   TempSrc:  Oral Oral Oral   SpO2: 97% 96% 95% 93%   Weight:       Height:           Most recent labs:    Recent Labs      01/21/19   0715  01/22/19   0432  01/22/19   1416   WBC  15 49*  12 72*   --    HGB  14 6  11 6   --    HCT  45 0  35 8   --    PLT  382  320   --    K  5 0  2 8*  4 5   CALCIUM  9 1  7 3*  7 5*   BUN  23  8  7   CREATININE  0 52*  0 17*  0 44*   LIPASE  139   --    --    TROPONINI  <0 02   --    --    AST  18   --    --    ALT  27   --    --    ALKPHOS  119*   --    --        Scheduled Meds:  Current Facility-Administered Medications:  cefazolin 1,000 mg Intravenous Q8H Vicky Jennings PA-C Last Rate: 1,000 mg (01/22/19 1935)   citalopram 20 mg Oral Daily Jailene Weaver MD    dextrose 5 % and sodium chloride 0 45 % with KCl 20 mEq/L 75 mL/hr Intravenous Continuous Vicky Jennings PA-C Last Rate: 75 mL/hr (01/22/19 1039)   diazepam 5 mg Oral Daily Jailene Weaver MD    fluticasone 2 spray Nasal Daily Jailene Weaver MD    heparin (porcine) 5,000 Units Subcutaneous [facility] Jailene Weaver MD    HYDROmorphone 0 2 mg Intravenous Q3H PRN Jailene Weaver MD    HYDROmorphone 0 5 mg Intravenous Q3H PRN Jailene Weaver MD    metroNIDAZOLE 500 mg Intravenous Q8H Jailene Weaver MD Last Rate: 500 mg (01/22/19 1720)   ondansetron 4 mg Intravenous Q4H PRN Jailene Weaver MD    pantoprazole 40 mg Oral Early Morning Jailene Weaver MD      Continuous Infusions:  dextrose 5 % and sodium chloride 0 45 % with KCl 20 mEq/L 75 mL/hr Last Rate: 75 mL/hr (01/22/19 1039)     PRN Meds:  HYDROmorphone    HYDROmorphone    ondansetron    Surgical procedures (if appropriate):  Procedure(s):  COLONOSCOPY

## 2019-01-23 NOTE — PHYSICAL THERAPY NOTE
Physical Therapy Initial Evaluation     01/23/19 4545   Note Type   Note type Eval only   Pain Assessment   Pain Assessment No/denies pain   Home Living   Type of [address] One level  (flat entrance)   Bathroom Shower/Tub Tub/shower unit   Bathroom Toilet Raised   Bathroom Equipment Tub transfer [place]; Wheelchair-manual;Electric scooter;Reacher;Sock aid;Grab bars   Additional Comments pt uses 2 different sized scooters depending on where she is using them   Prior Function   Level of [place] Other (Comment)   Lives With Alone   Receives Help From Home health   ADL Assistance Needs assistance   IADLs Needs assistance   Falls in the last 6 months 1 to 4   Comments pt primarily performs SPT to/from scooter without AD, also ambulates short distances in apt using RW, HHA comes MWF for 5 hours to assist with showers and IADL's     Pt gets MOW   Restrictions/Precautions   Weight Bearing Precautions Per Order No   Braces or Orthoses (pt reports she has custom shoes but does not wear them)   Other Precautions Fall Risk   General   Family/Caregiver Present No   Cognition   Overall Cognitive Status WFL   Arousal/Participation Alert   Orientation Level Oriented X4   Memory Within functional limits   Following Commands Follows all commands and directions without difficulty   RUE Assessment   RUE Assessment X  (distally 3+/5, shoulder 2/5 (chronic))   LUE Assessment   LUE Assessment X  (distally 3+/5, shoulder 2/5 (chronic))   RLE Assessment   RLE Assessment X   Strength RLE   RLE Overall Strength (hip flex at least 2+/5, distally at least 3+/5)   LLE Assessment   LLE Assessment X   Strength LLE   LLE Overall Strength (hip flex at least 2+/5, distally at least 3+/5)   Coordination   Movements are Fluid and Coordinated 1   Sensation WFL   Light Touch   RLE Light Touch Grossly intact   LLE Light Touch Grossly intact   Transfers   Sit to Stand 6  Modified independent   Stand to Sit 6 Modified independent   Additional Comments required 2 attempts (reports this is what she does at home), also uses lift chair at times at home    Ambulation/Elevation   Gait pattern Decreased foot clearance; Short stride; Excessively slow   Gait Assistance 5  Supervision   Assistive Device Rolling walker   Distance 6 ft   Balance   Static Sitting Fair +   Dynamic Sitting Fair   Static Standing Fair   Dynamic Standing Fair   Ambulatory Fair   Endurance Deficit   Endurance Deficit Yes   Endurance Deficit Description chronic endurance/strength deficits 2/2 hx of muscular dystrophy  Increased mobility and resistive muscle testing deferred 2/2 pt plan for d/c to home today and does not want to fatigue    Activity Tolerance   Activity Tolerance Patient limited by fatigue   Nurse Made Aware RN Jeannette aware, cleared pt for PT session   Assessment   Prognosis Good   Problem List Decreased strength;Decreased endurance; Impaired balance;Decreased mobility   Assessment Pt is a 76year old female admitted to [facility] on 1/21/19 with abdominal pain 2/2 ischemic colitis  Pt has PMhx including but not limited to: muscular dystrophy breast Ca s/p L Mastectomy  Pt lives alone in an apt, transfers independently to/from scooter without AD, but also ambulates short distances occasionally using RW  Pt currently presenting with chronic strength, balance, and endurance deficits, but does report feeling more fatigued than usual at this time  Pt currently at a mod I level with transfers, supervision level ambulating 6 ft with RW  Pt requested no resistance during MMT and limited gait distance 2/2 likely d/c to home today and does not want to fatigue herself too much  Pt has HHA 3 days per week for 5 hours who assist with ADL's and IADL's as needed and pt has all necessary DME at home  Anticipate safe d/c to home with continued assistance from [facility]- would recommend home PT services to which pt is agreeable, CM aware    PT will continue to follow pt if not d/c'd today to address the above impairments and maximize safety and independence with functional mobility  Barriers to Discharge None   Goals   Patient Goals go home in my own environment   STG Expiration Date 02/02/19   Short Term Goal #1 1  Pt will perform all bed mobility tasks at a mod I level to facilitate OOB mobility at home 2  Pt will ambulate 15 ft with RW mod I to facilitate pt's goal of functionining safely in her own home 3  Pt will perform SPT without AD to facilitate safety with transfers to/from pt's scooter at home 4  Increase B LE strengt by 1/2 grade to increase safety and independence with upright mobility 5  Pt will improve overall standing balance to Fair+ to decrease fall risk   Treatment Day 0   Plan   Treatment/Interventions Functional transfer training;LE strengthening/ROM; Therapeutic exercise; Endurance training;Bed mobility;Gait training;Spoke to nursing;Spoke to case management   PT Frequency Other (Comment)  (3-5x/wk)   Recommendation   Recommendation Home PT  (and continued assist from [facility] )   Equipment Recommended (continued use of DME at home- pt has no new DME needs )   PT - OK to Discharge Yes   Barthel Index   Feeding 10   Bathing 0   Grooming Score 5   Dressing Score 5   Bladder Score 10   Bowels Score 10   Toilet Use Score 5   Transfers (Bed/Chair) Score 10   Mobility (Level Surface) Score 0   Stairs Score 0   Barthel Index Score 55     Lulu Parikh, PT

## 2019-01-23 NOTE — OCCUPATIONAL THERAPY NOTE
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    Osteoporosis    Hypercholesterolemia    GERD without esophagitis    Chronic obstructive pulmonary disease ([facility] [address] )    Allergic rhinitis    Difficult intubation    [facility] )     Past Medical History  Past Medical History:   Diagnosis Date    Anxiety     Breast cancer ([facility] )     left    Colitis     COPD (chronic obstructive pulmonary disease) ([facility]     Depression     Difficult intubation     Excessive daytime sleepiness     GERD (gastroesophageal reflux disease)     Hyperlipidemia     Muscular dystrophy     Limb-girdle    Osteoporosis     Overactive bladder     Pneumonitis     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     Skin cancer     Skin disorder     Vitamin D deficiency      Past Surgical History  Past Surgical History:   Procedure Laterality Date    COLONOSCOPY N/A 1/22/2019    Procedure: COLONOSCOPY;  Surgeon: Jim Barboza MD;  Location: [facility]; Service: Colorectal    MASTECTOMY Left 2007    left breast mastectomy     TONSILLECTOMY      TOOTH EXTRACTION      TUBAL LIGATION           01/23/19 1200   Note Type   Note type Eval only   Restrictions/Precautions   Weight Bearing Precautions Per Order No   Other Precautions Fall Risk   Pain Assessment   Pain Assessment No/denies pain   Pain Score No Pain   Home Living   Type of [address] One [address]  (0 ANGELES)   Bathroom Shower/Tub Tub/shower unit   Bathroom Toilet Raised   Bathroom Equipment Toilet raiser;Tub transfer bench;Commode   [facility]; Wheelchair-manual;Electric scooter;Reacher;Sock aid;Grab bars Prior Function   Level of [place] Needs assistance with ADLs and functional mobility  (exception of bathing)   Lives With Alone   Receives Help From Home health   ADL Assistance Independent   IADLs Needs assistance   Falls in the last 6 months 1 to 4   Vocational Retired   Lifestyle   Autonomy Pt mostly I w/ ADLs-- she has a HHA that comes 3x/wk for 5 hours at a time to assist w/ bathing and IADLs  Pt also gets MOW  She primarily uses her electric w/c within the home and no AD for transfers  Recently she has been attempting to ambulate more w/ RW within the home to improve her endurance  Reciprocal Relationships Lives alone   Has HHA 3x/wk for 5 hours/day   Service to Others Retired   Intrinsic Gratification mostly sedentary   Lizett Duarte [address] (WDL) WDL   Subjective   Subjective "I feel good enough to go home"   ADL   Where Assessed Chair   Eating Assistance 6  Modified independent   Eating Deficit Setup   Grooming Assistance 5  Supervision/Setup   UB Bathing Assistance 5  Supervision/Setup   LB Bathing Assistance 4  Minimal Assistance   [address] 5  Supervision/Setup   LB Dressing Assistance 5  Supervision/Setup   LB Dressing Deficit Use of adaptive equipment   Toileting Assistance  5  Supervision/Setup   Bed Mobility   Additional Comments Pt seated OOB in chair upon arrival and end of session   Transfers   Sit to Stand 6  Modified independent   Additional items Increased time required   Stand to Sit 6  Modified independent   Additional items Increased time required   Stand pivot 6  Modified independent   Additional items Increased time required   Additional Comments Pt used RW for sit <>stand and to take steps within her room   Balance   Static Sitting Fair +   Dynamic Sitting Fair +   Static Standing Fair   Dynamic Standing Fair -   Activity Tolerance   Activity Tolerance Patient tolerated treatment well;Patient limited by fatigue   Nurse Made Aware Okay to see per RN   RUE Assessment   RUE Assessment WFL   LUE Assessment   LUE Assessment WFL   Hand Function   Gross Motor Coordination Functional   Fine Motor Coordination Functional   Cognition   Overall Cognitive Status WFL   Arousal/Participation Cooperative; Alert; Responsive   Attention Within functional limits   Orientation Level Oriented X4   Memory Within functional limits   Following Commands Follows all commands and directions without difficulty   Comments pleasant and agreeable to session   Assessment   Limitation Decreased endurance;Decreased high-level ADLs; Decreased UE strength   Prognosis Good   Assessment Pt is a 76 y o  female who was [facility] to [facility] on 1/21/2019 with Ischemic colitis ([address] )  Pt presented w/ abdominal pain and was dx w/ shallow ulcerations in her L colon suggestive of ischemic colonitis  Comorbidities include muscle dystrophy, amb dysfunction, restrictive lung disease, osteoporosis, GERD, and COPD  At baseline pt was mostly I w/ ADLs-- she has a HHA that comes 3x/wk for 5 hours at a time to assist w/ bathing and IADLs  Pt also gets MOW  She primarily uses her electric w/c within the home and no AD for transfers  Recently she has been attempting to ambulate more w/ RW within the home to improve her endurance  Pt lives alone in one level apt w/ no ANGELES  Currently pt requires S-min A w/ use of adaptive equipment for overall ADLS and S-mod I for functional mobility/transfers w/ RW  Pt's limitations include decreased endurance and activity tolerance from baseline  She reports she feels close enough to her baseline to be able to return home, however she wants to continue to use the exercises she learned while a rehab once she is home  Pt reports she has adequate home support and denies any further questions or concerns from an OT standpoint  Rec pt return home w previous level of support upon d/c  No further acute OT needs identified at this time   Rec pt cont participation in self care and mobility w/ non OT staff while in the hospital  /DC OT      Goals   Patient Goals to go home   Plan   OT Frequency Eval only   Recommendation   OT Discharge Recommendation Home with family support   OT - OK to Discharge Yes  (when medically stable)   Barthel Index   Feeding 10   Bathing 0   Grooming Score 5   Dressing Score 10   Bladder Score 10   Bowels Score 10   Toilet Use Score 10   Transfers (Bed/Chair) Score 10   Mobility (Level Surface) Score 0   Stairs Score 0   Barthel Index Score 65   Modified Island Scale   Modified Island Scale 3       Elizabeth Smith, OTR/L

## 2019-01-23 NOTE — PROGRESS NOTES
Progress Note - Colorectal Surgery  Sergio Ruiz 76 y o  female MRN: [id number]  [facility] Encounter: [id number]    Assessment:  77 yo female with ischemic colitis, s/p c-scope 1/22   - colonoscopy demonstrated patchy ischemia but no transmural lesions, no active bleeding    Plan:  - continue lo res  - d/c IVF  - transition to oral abx today, complete 10d course  - d/c home today w/outpt f/u      Subjective/Objective      Subjective: denies abdominal pain, tolerating diet with no nausea or vomiting  Afebrile     Objective:   Blood pressure 111/57, pulse 87, temperature 97 5 °F (36 4 °C), temperature source Oral, resp  rate 22, height 5' (1 524 m), weight 79 3 kg (174 lb 14 4 oz), SpO2 93 %  ,Body mass index is 34 16 kg/m²  Intake/Output Summary (Last 24 hours) at 01/23/19 0509  Last data filed at 01/23/19 0401   Gross per 24 hour   Intake          3259 58 ml   Output             2075 ml   Net          1184 58 ml       Invasive Devices     Peripheral Intravenous Line            Peripheral IV 01/21/19 Right Hand 1 day                Physical Exam:   NAD, alert and oriented x3  Normocephalic, atraumatic  MMM, EOMI, PERRLA  Norm resp effort on RA  RRR  Abd soft, NT/ND  No calf tenderness or peripheral edema  Motor/sensation intact in distal extremities  CN grossly intact  -rash/lesions      Lab, Imaging and other studies:  CBC:   No results found for: WBC, HGB, HCT, MCV, PLT, ADJUSTEDWBC, MCH, MCHC, RDW, MPV, NRBC, CMP:   Lab Results   Component Value Date    SODIUM 142 01/22/2019    K 4 5 01/22/2019     (H) 01/22/2019    CO2 26 01/22/2019    BUN 7 01/22/2019    CREATININE 0 44 (L) 01/22/2019    CALCIUM 7 5 (L) 01/22/2019    EGFR 104 01/22/2019   , Coagulation: No results found for: PT, INR, APTT, Urinalysis: No results found for: [place], SPECGRAV, PHUR, LEUKOCYTESUR, NITRITE, PROTEINUA, GLUCOSEU, KETONESU, BILIRUBINUR, BLOODU, Amylase: No results found for: AMYLASE, Lipase:    No results found for: LIPASE  VTE Pharmacologic Prophylaxis: Heparin  VTE Mechanical Prophylaxis: sequential compression device

## 2019-01-23 NOTE — PLAN OF CARE
Problem: PHYSICAL THERAPY ADULT  Goal: Performs mobility at highest level of function for planned discharge setting  See evaluation for individualized goals  Treatment/Interventions: Functional transfer training, LE strengthening/ROM, Therapeutic exercise, Endurance training, Bed mobility, Gait training, Spoke to nursing, Spoke to case management  Equipment Recommended:  (continued use of DME at home- pt has no new DME needs )        See flowsheet documentation for full assessment, interventions and recommendations  Prognosis: Good  Problem List: Decreased strength, Decreased endurance, Impaired balance, Decreased mobility  Assessment: Pt is a 76year old female admitted to [facility] on 1/21/19 with abdominal pain 2/2 ischemic colitis  Pt has PMhx including but not limited to: muscular dystrophy breast Ca s/p L Mastectomy  Pt lives alone in an apt, transfers independently to/from scooter without AD, but also ambulates short distances occasionally using RW  Pt currently presenting with chronic strength, balance, and endurance deficits, but does report feeling more fatigued than usual at this time  Pt currently at a mod I level with transfers, supervision level ambulating 6 ft with RW  Pt requested no resistance during MMT and limited gait distance 2/2 likely d/c to home today and does not want to fatigue herself too much  Pt has HHA 3 days per week for 5 hours who assist with ADL's and IADL's as needed and pt has all necessary DME at home  Anticipate safe d/c to home with continued assistance from [facility]- would recommend home PT services to which pt is agreeable, CM aware  PT will continue to follow pt if not d/c'd today to address the above impairments and maximize safety and independence with functional mobility  Barriers to Discharge: None     Recommendation: Home PT (and continued assist from [facility] )     PT - OK to Discharge: Yes    See flowsheet documentation for full assessment       Delilah Mathias PT

## 2019-01-23 NOTE — NURSING NOTE
Pt  Seen and discharged by surgery  Discharge instructions and medication list reviewed with pt , verbalized understanding of same   Discharged via transport to home

## 2019-01-23 NOTE — RESTORATIVE TECHNICIAN NOTE
Restorative Specialist Mobility Note       Activity: Chair, Dangle, Stand at bedside, Turn (Pt left sitting in chair at bedside with call bell, phone, and tray within reach )     Assistive Device: Front wheel walker        Repositioned: Up in chair, Sitting

## 2019-01-23 NOTE — SOCIAL WORK
CM discussed pt with PT and OT and needs vna for nursing PT and OT   Cm met with pt and pt agreeable to referral to omni , referral in ecin as such

## 2019-01-23 NOTE — PROGRESS NOTES
Pt refusing celexa, valium, and her nasal spray; states "the pills make me drowsy and I take them at bedtime anyway   I also take my nasal spray at bedtime"; flagyl and ancef re-timed to 1200 as they were recently given at 4am; alicia cobb pac aware

## 2019-01-23 NOTE — SOCIAL WORK
CM discussed pt during care coordination rounds and pt medically clear for discharge today    Cm met with pt and pt needs transportation   Cm set up wheel chair [place] for today 2:30   Cm notified Flora MITCHELL , pt nurse , pt  And pt will call her sister   Pt aware fees and agreeable   Cm will followl

## 2019-01-24 LAB
BACTERIA BLD CULT: ABNORMAL
GRAM STN SPEC: ABNORMAL

## 2019-01-25 ENCOUNTER — TRANSITIONAL CARE MANAGEMENT (OUTPATIENT)
Dept: FAMILY MEDICINE CLINIC | Facility: CLINIC | Age: 69
End: 2019-01-25

## 2019-01-26 LAB — BACTERIA BLD CULT: NORMAL

## 2019-01-28 ENCOUNTER — OFFICE VISIT (OUTPATIENT)
Dept: FAMILY MEDICINE CLINIC | Facility: CLINIC | Age: 69
End: 2019-01-28
Payer: MEDICARE

## 2019-01-28 VITALS
DIASTOLIC BLOOD PRESSURE: 68 MMHG | TEMPERATURE: 97.3 F | BODY MASS INDEX: 34.16 KG/M2 | HEART RATE: 90 BPM | HEIGHT: 60 IN | SYSTOLIC BLOOD PRESSURE: 126 MMHG | OXYGEN SATURATION: 98 %

## 2019-01-28 DIAGNOSIS — L85.3 DRY SKIN DERMATITIS: ICD-10-CM

## 2019-01-28 DIAGNOSIS — IMO0001 TRANSITION OF CARE PERFORMED WITH SHARING OF CLINICAL SUMMARY: Primary | ICD-10-CM

## 2019-01-28 DIAGNOSIS — J45.20 MILD INTERMITTENT REACTIVE AIRWAY DISEASE WITHOUT COMPLICATION: ICD-10-CM

## 2019-01-28 DIAGNOSIS — K55.9 ISCHEMIC COLITIS (HCC): ICD-10-CM

## 2019-01-28 DIAGNOSIS — B35.6 TINEA CRURIS: ICD-10-CM

## 2019-01-28 PROCEDURE — 99496 TRANSJ CARE MGMT HIGH F2F 7D: CPT | Performed by: FAMILY MEDICINE

## 2019-01-28 PROCEDURE — 1111F DSCHRG MED/CURRENT MED MERGE: CPT | Performed by: FAMILY MEDICINE

## 2019-01-28 RX ORDER — NYSTATIN AND TRIAMCINOLONE ACETONIDE 100000; 1 [USP'U]/G; MG/G
OINTMENT TOPICAL 2 TIMES DAILY
Qty: 30 G | Refills: 1 | Status: SHIPPED | OUTPATIENT
Start: 2019-01-28 | End: 2019-09-07

## 2019-01-28 NOTE — PROGRESS NOTES
Assessment/Plan: pt here today for follow up from hospital   Pt c/o rash on rear end x 2-3 months     TCM Call (since 12/28/2018)     Date and time call was made  1/25/2019 12:09 PM    Hospital care reviewed  Records reviewed    Patient was hospitial[facility] at  [facility]    Date of Admission  01/21/19    Date of discharge  01/23/19    Diagnosis  Ischemic Colitis     Disposition  Home; Home health services    Current Symptoms  Fatigue; Weakness    Weakness severity  Mild    Fatigue severity  Mild      TCM Call (since 12/28/2018)     Scheduled for follow up? Yes    Patients specialists  Other (comment)    Other specialists names  Dr Giselle Marie surgeon, will schedule shortly    Did you obtain your prescribed medications  Yes    Do you need help managing your prescriptions or medications  No    Is transportation to your appointment needed  No (Will schedule with Lanta Bus to bring and take home for appointment)    I have advised the patient to call PCP with any new or worsening symptoms  Layla Estevez MA     Living Arrangements  Alone    Are you recieving home care services  Yes    Types of home care services  Home PT; Nurse visit    Interperter language line needed  No    Counseling  Patient          No problem-specific Assessment & Plan notes found for this encounter  Diagnoses and all orders for this visit:    Transition of care performed with sharing of clinical summary    Mild intermittent reactive airway disease without complication    Dry skin dermatitis  -     nystatin-triamcinolone (MYCOLOG-II) ointment; Apply topically 2 (two) times a day    Tinea cruris  -     nystatin-triamcinolone (MYCOLOG-II) ointment; Apply topically 2 (two) times a day    Ischemic colitis ([address] )          Subjective:      Patient ID: Frances Daigle is a 76 y o  female  Follow up for ischemic colitis  Finishing last days of Antibiotic, feeling better  Also butt rash          The following portions of the patient's history were reviewed and updated as appropriate: allergies, current medications, past family history, past medical history, past social history, past surgical history and problem list     Current Outpatient Prescriptions:     cephalexin (KEFLEX) 500 mg capsule, Take 1 capsule (500 mg total) by mouth every 6 (six) hours for 5 days, Disp: 20 capsule, Rfl: 0    citalopram (CeleXA) 20 mg tablet, Take 1 tablet (20 mg total) by mouth daily for 180 days, Disp: 90 tablet, Rfl: 2    diazepam (VALIUM) 5 mg tablet, Take 1 tablet (5 mg total) by mouth daily for 90 days, Disp: 90 tablet, Rfl: 1    diphenhydrAMINE (BENADRYL) 25 mg tablet, Take 25 mg by mouth daily at bedtime, Disp: , Rfl:     fluticasone (FLONASE) 50 mcg/act nasal spray, 2 sprays into each nostril daily, Disp: 16 g, Rfl: 3    ketoconazole (NIZORAL) 2 % shampoo, Apply 1 application topically 2 (two) times a week, Disp: 120 mL, Rfl: 3    metroNIDAZOLE (FLAGYL) 500 mg tablet, Take 1 tablet (500 mg total) by mouth every 8 (eight) hours for 5 days, Disp: 15 tablet, Rfl: 0    omeprazole (PriLOSEC) 20 mg delayed release capsule, Take 1 capsule (20 mg total) by mouth every morning for 90 days, Disp: 90 capsule, Rfl: 1    triamcinolone (KENALOG) 0 1 % cream, Apply topically 2 (two) times a day for 90 days, Disp: 30 g, Rfl: 0    umeclidinium bromide (INCRUSE ELLIPTA) 62 5 mcg/inh AEPB inhaler, Inhale 1 puff daily, Disp: 1 each, Rfl: 6    VESICARE 10 MG tablet, take 1 tablet by mouth once daily, Disp: 90 tablet, Rfl: 1    nystatin-triamcinolone (MYCOLOG-II) ointment, Apply topically 2 (two) times a day, Disp: 30 g, Rfl: 1    Review of Systems   Constitutional: Negative  HENT: Negative  Eyes: Negative  Respiratory: Negative  Cardiovascular: Negative  Gastrointestinal: Positive for diarrhea  Genitourinary: Negative  Musculoskeletal: Negative  Skin: Positive for rash  Neurological: Negative  Psychiatric/Behavioral: Negative  Objective:      /68 (BP Location: Right arm, Patient Position: Sitting, Cuff Size: Standard)   Pulse 90   Temp (!) 97 3 °F (36 3 °C) (Oral)   Ht 5' (1 524 m)   LMP  (LMP Unknown)   SpO2 98%   BMI 34 16 kg/m²          Physical Exam   Constitutional: She is oriented to person, place, and time  She appears well-developed and well-nourished  HENT:   Head: Normocephalic and atraumatic  Right Ear: External ear normal    Left Ear: External ear normal    Nose: Nose normal    Mouth/Throat: Oropharynx is clear and moist    Eyes: Pupils are equal, round, and reactive to light  Conjunctivae and EOM are normal    Neck: Normal range of motion  Neck supple  Cardiovascular: Normal rate, regular rhythm and normal heart sounds  Pulmonary/Chest: Effort normal and breath sounds normal    Abdominal: Soft  Neurological: She is alert and oriented to person, place, and time  Skin: Skin is warm and dry  Rash noted  Intense red rash right buttocks  Skin too dry  Psychiatric: She has a normal mood and affect   Her behavior is normal  Judgment and thought content normal

## 2019-02-04 ENCOUNTER — TELEPHONE (OUTPATIENT)
Dept: FAMILY MEDICINE CLINIC | Facility: CLINIC | Age: 69
End: 2019-02-04

## 2019-02-04 NOTE — TELEPHONE ENCOUNTER
Patient said her insurance is not covering Nystatin cream and she would have to pay $93 00 which is out of her budget  Patient was not told of any alternatives

## 2019-02-19 DIAGNOSIS — L40.9 PSORIASIS: ICD-10-CM

## 2019-02-19 RX ORDER — TRIAMCINOLONE ACETONIDE 1 MG/G
CREAM TOPICAL 2 TIMES DAILY
Qty: 80 G | Refills: 3 | Status: SHIPPED | OUTPATIENT
Start: 2019-02-19 | End: 2020-03-31 | Stop reason: HOSPADM

## 2019-03-22 DIAGNOSIS — F41.9 ANXIETY: ICD-10-CM

## 2019-03-22 RX ORDER — DIAZEPAM 5 MG/1
5 TABLET ORAL DAILY
Qty: 90 TABLET | Refills: 0 | Status: SHIPPED | OUTPATIENT
Start: 2019-03-22 | End: 2019-06-19 | Stop reason: SDUPTHER

## 2019-03-22 NOTE — TELEPHONE ENCOUNTER
Patient called for refills for Valium 5 mg to be sent to [facility]  Patient states she has about 4 pills left  She is scheduled for follow up on 4/4/19

## 2019-04-04 ENCOUNTER — OFFICE VISIT (OUTPATIENT)
Dept: PULMONOLOGY | Facility: CLINIC | Age: 69
End: 2019-04-04
Payer: MEDICARE

## 2019-04-04 VITALS
HEART RATE: 71 BPM | DIASTOLIC BLOOD PRESSURE: 74 MMHG | TEMPERATURE: 97.8 F | HEIGHT: 60 IN | SYSTOLIC BLOOD PRESSURE: 112 MMHG | OXYGEN SATURATION: 99 % | WEIGHT: 171 LBS | BODY MASS INDEX: 33.57 KG/M2

## 2019-04-04 DIAGNOSIS — J43.2 CENTRILOBULAR EMPHYSEMA (HCC): Primary | ICD-10-CM

## 2019-04-04 DIAGNOSIS — J98.4 RESTRICTIVE LUNG DISEASE: ICD-10-CM

## 2019-04-04 DIAGNOSIS — G47.34 SLEEP RELATED HYPOXIA: ICD-10-CM

## 2019-04-04 DIAGNOSIS — J44.9 CHRONIC OBSTRUCTIVE PULMONARY DISEASE, UNSPECIFIED COPD TYPE (HCC): ICD-10-CM

## 2019-04-04 PROCEDURE — 99213 OFFICE O/P EST LOW 20 MIN: CPT | Performed by: INTERNAL MEDICINE

## 2019-04-04 RX ORDER — CLOTRIMAZOLE AND BETAMETHASONE DIPROPIONATE 10; .5 MG/ML; MG/ML
LOTION TOPICAL
Refills: 0 | COMMUNITY
Start: 2019-03-18

## 2019-04-23 ENCOUNTER — OFFICE VISIT (OUTPATIENT)
Dept: FAMILY MEDICINE CLINIC | Facility: CLINIC | Age: 69
End: 2019-04-23
Payer: MEDICARE

## 2019-04-23 VITALS
OXYGEN SATURATION: 93 % | SYSTOLIC BLOOD PRESSURE: 116 MMHG | DIASTOLIC BLOOD PRESSURE: 74 MMHG | TEMPERATURE: 97.5 F | HEART RATE: 64 BPM

## 2019-04-23 DIAGNOSIS — R12 HEART BURN: ICD-10-CM

## 2019-04-23 DIAGNOSIS — M79.605 LEFT LEG PAIN: ICD-10-CM

## 2019-04-23 DIAGNOSIS — L21.0 SEBORRHEA CAPITIS: ICD-10-CM

## 2019-04-23 DIAGNOSIS — F41.9 ANXIETY: ICD-10-CM

## 2019-04-23 DIAGNOSIS — Z00.00 MEDICARE ANNUAL WELLNESS VISIT, SUBSEQUENT: ICD-10-CM

## 2019-04-23 DIAGNOSIS — R26.2 AMBULATORY DYSFUNCTION: ICD-10-CM

## 2019-04-23 DIAGNOSIS — G71.09 DYSTROPHY, MUSCULAR, HEREDITARY PROGRESSIVE (HCC): ICD-10-CM

## 2019-04-23 DIAGNOSIS — G47.00 INSOMNIA, UNSPECIFIED TYPE: Primary | ICD-10-CM

## 2019-04-23 PROCEDURE — 99214 OFFICE O/P EST MOD 30 MIN: CPT | Performed by: FAMILY MEDICINE

## 2019-04-23 PROCEDURE — G0438 PPPS, INITIAL VISIT: HCPCS | Performed by: FAMILY MEDICINE

## 2019-04-23 RX ORDER — CITALOPRAM 20 MG/1
20 TABLET ORAL DAILY
Qty: 90 TABLET | Refills: 0 | Status: SHIPPED | OUTPATIENT
Start: 2019-04-23 | End: 2019-09-07

## 2019-04-23 RX ORDER — TRAZODONE HYDROCHLORIDE 50 MG/1
50 TABLET ORAL
Qty: 90 TABLET | Refills: 1 | Status: SHIPPED | OUTPATIENT
Start: 2019-04-23 | End: 2019-08-14 | Stop reason: SDUPTHER

## 2019-04-23 RX ORDER — KETOCONAZOLE 20 MG/ML
1 SHAMPOO TOPICAL 2 TIMES WEEKLY
Qty: 120 ML | Refills: 1 | Status: SHIPPED | OUTPATIENT
Start: 2019-04-25 | End: 2021-07-09 | Stop reason: SDUPTHER

## 2019-04-23 RX ORDER — OMEPRAZOLE 20 MG/1
CAPSULE, DELAYED RELEASE ORAL
Qty: 90 CAPSULE | Refills: 1 | OUTPATIENT
Start: 2019-04-23

## 2019-04-23 RX ORDER — OMEPRAZOLE 20 MG/1
20 CAPSULE, DELAYED RELEASE ORAL EVERY MORNING
Qty: 90 CAPSULE | Refills: 1 | Status: SHIPPED | OUTPATIENT
Start: 2019-04-23 | End: 2019-08-14 | Stop reason: SDUPTHER

## 2019-05-21 DIAGNOSIS — N32.81 OVERACTIVE BLADDER: ICD-10-CM

## 2019-05-21 RX ORDER — SOLIFENACIN SUCCINATE 10 MG/1
TABLET, FILM COATED ORAL
Qty: 90 TABLET | Refills: 1 | OUTPATIENT
Start: 2019-05-21

## 2019-06-19 DIAGNOSIS — F41.9 ANXIETY: ICD-10-CM

## 2019-06-19 RX ORDER — DIAZEPAM 5 MG/1
TABLET ORAL
Qty: 90 TABLET | Refills: 1 | Status: SHIPPED | OUTPATIENT
Start: 2019-06-19 | End: 2019-06-19

## 2019-06-21 RX ORDER — DIAZEPAM 5 MG/1
5 TABLET ORAL DAILY
Qty: 90 TABLET | Refills: 1 | OUTPATIENT
Start: 2019-06-21 | End: 2019-08-14 | Stop reason: SDUPTHER

## 2019-07-10 DIAGNOSIS — J00 ACUTE RHINITIS: ICD-10-CM

## 2019-07-10 RX ORDER — FLUTICASONE PROPIONATE 50 MCG
2 SPRAY, SUSPENSION (ML) NASAL DAILY
Qty: 16 G | Refills: 3 | Status: SHIPPED | OUTPATIENT
Start: 2019-07-10 | End: 2020-01-22 | Stop reason: SDUPTHER

## 2019-08-14 ENCOUNTER — OFFICE VISIT (OUTPATIENT)
Dept: FAMILY MEDICINE CLINIC | Facility: CLINIC | Age: 69
End: 2019-08-14
Payer: MEDICARE

## 2019-08-14 VITALS
HEIGHT: 60 IN | SYSTOLIC BLOOD PRESSURE: 114 MMHG | BODY MASS INDEX: 33.4 KG/M2 | TEMPERATURE: 97.8 F | OXYGEN SATURATION: 95 % | HEART RATE: 75 BPM | DIASTOLIC BLOOD PRESSURE: 82 MMHG

## 2019-08-14 DIAGNOSIS — G71.09 DYSTROPHY, MUSCULAR, HEREDITARY PROGRESSIVE (HCC): Primary | ICD-10-CM

## 2019-08-14 DIAGNOSIS — K21.9 GERD WITHOUT ESOPHAGITIS: ICD-10-CM

## 2019-08-14 DIAGNOSIS — R12 HEART BURN: ICD-10-CM

## 2019-08-14 DIAGNOSIS — G47.00 INSOMNIA, UNSPECIFIED TYPE: ICD-10-CM

## 2019-08-14 DIAGNOSIS — N32.81 OVERACTIVE BLADDER: ICD-10-CM

## 2019-08-14 DIAGNOSIS — F41.9 ANXIETY: ICD-10-CM

## 2019-08-14 PROCEDURE — 99215 OFFICE O/P EST HI 40 MIN: CPT | Performed by: FAMILY MEDICINE

## 2019-08-14 PROCEDURE — 1124F ACP DISCUSS-NO DSCNMKR DOCD: CPT | Performed by: FAMILY MEDICINE

## 2019-08-14 RX ORDER — OMEPRAZOLE 20 MG/1
20 CAPSULE, DELAYED RELEASE ORAL EVERY MORNING
Qty: 90 CAPSULE | Refills: 3 | Status: SHIPPED | OUTPATIENT
Start: 2019-08-14 | End: 2021-06-16 | Stop reason: SDUPTHER

## 2019-08-14 RX ORDER — DIAZEPAM 5 MG/1
5 TABLET ORAL DAILY
Qty: 90 TABLET | Refills: 3 | Status: SHIPPED | OUTPATIENT
Start: 2019-08-14 | End: 2020-04-15 | Stop reason: ALTCHOICE

## 2019-08-14 RX ORDER — SOLIFENACIN SUCCINATE 10 MG/1
10 TABLET, FILM COATED ORAL DAILY
Qty: 90 TABLET | Refills: 3 | Status: SHIPPED | OUTPATIENT
Start: 2019-08-14 | End: 2021-04-26 | Stop reason: SDUPTHER

## 2019-08-14 RX ORDER — TRAZODONE HYDROCHLORIDE 50 MG/1
50 TABLET ORAL
Qty: 90 TABLET | Refills: 3 | Status: SHIPPED | OUTPATIENT
Start: 2019-08-14 | End: 2020-06-23 | Stop reason: SDUPTHER

## 2019-08-14 NOTE — PROGRESS NOTES
Chief Complaint   Patient presents with    Follow-up    Constipation     Assessment/Plan: Patient requires frequent changes in body positions and has an immediate need for change in body positions  The patient has a history of falls  I did complete a risk assessment for falls  A plan of care for falls was documented  Diagnoses and all orders for this visit:    Dystrophy, muscular, 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[facility] [address] )  -     Bed    Heart burn  -     omeprazole (PriLOSEC) 20 mg delayed release capsule; Take 1 capsule (20 mg total) by mouth every morning for 113 days    Insomnia, unspecified type  -     traZODone (DESYREL) 50 mg tablet; Take 1 tablet (50 mg total) by mouth daily at bedtime    Overactive bladder  -     solifenacin (VESICARE) 10 MG tablet; Take 1 tablet (10 mg total) by mouth daily    Anxiety  -     diazepam (VALIUM) 5 mg tablet; Take 1 tablet (5 mg total) by mouth daily    GERD without esophagitis          Subjective:      Patient ID: Huseyin Martines is a 71 y o  female  Needs electric bed that accomodates patient getting out of bed, christie with history of MD  Patient with history of MD and has history of falls  Need wheelchair that elevates LE's  Constipation  Reflux, anxiety and bladder are controlled  Unsteady on feet  LE's can swell after sitting too long with knees bent  Muscles too weak in legs, has to lift legs up  Will be applying for new wheelchair that lifts LE's  SH: neg tob or OH  PMH: no changes  The following portions of the patient's history were reviewed and updated as appropriate: allergies, current medications, past family history, past medical history, past social history, past surgical history and problem list     Review of Systems   Constitutional: Negative  HENT: Negative  Eyes: Negative  Respiratory: Negative  Cardiovascular: Negative  Gastrointestinal: Positive for constipation  Genitourinary: Negative      Musculoskeletal: Positive for back pain and gait problem  Skin: Negative  Neurological: Positive for weakness  Negative for numbness  Muscle weakness  Psychiatric/Behavioral: Negative  Objective:               Physical Exam   Constitutional: She is oriented to person, place, and time  She appears well-developed and well-nourished  HENT:   Head: Normocephalic and atraumatic  Right Ear: External ear normal    Left Ear: External ear normal    Nose: Nose normal    Mouth/Throat: Oropharynx is clear and moist    Eyes: Pupils are equal, round, and reactive to light  Conjunctivae and EOM are normal    Neck: Normal range of motion  Neck supple  Cardiovascular: Normal rate, regular rhythm and normal heart sounds  Pulmonary/Chest: Effort normal and breath sounds normal    Abdominal: Soft  Bowel sounds are normal    Neurological: She is alert and oriented to person, place, and time  She has normal reflexes  Skin: Skin is warm and dry  Psychiatric: She has a normal mood and affect   Her behavior is normal  Judgment and thought content normal

## 2019-09-07 ENCOUNTER — APPOINTMENT (EMERGENCY)
Dept: RADIOLOGY | Facility: HOSPITAL | Age: 69
End: 2019-09-07
Payer: MEDICARE

## 2019-09-07 ENCOUNTER — HOSPITAL ENCOUNTER (EMERGENCY)
Facility: HOSPITAL | Age: 69
Discharge: HOME | End: 2019-09-07
Attending: EMERGENCY MEDICINE
Payer: MEDICARE

## 2019-09-07 VITALS
HEART RATE: 86 BPM | OXYGEN SATURATION: 95 % | TEMPERATURE: 97.9 F | SYSTOLIC BLOOD PRESSURE: 143 MMHG | WEIGHT: 171.08 LBS | RESPIRATION RATE: 18 BRPM | DIASTOLIC BLOOD PRESSURE: 65 MMHG | BODY MASS INDEX: 33.41 KG/M2

## 2019-09-07 DIAGNOSIS — R10.30 LOWER ABDOMINAL PAIN: Primary | ICD-10-CM

## 2019-09-07 LAB
ALBUMIN SERPL BCP-MCNC: 3.6 G/DL (ref 3.5–5)
ALP SERPL-CCNC: 91 U/L (ref 46–116)
ALT SERPL W P-5'-P-CCNC: 35 U/L (ref 12–78)
ANION GAP SERPL CALCULATED.3IONS-SCNC: 4 MMOL/L (ref 4–13)
AST SERPL W P-5'-P-CCNC: 22 U/L (ref 5–45)
BASOPHILS # BLD AUTO: 0.03 THOUSANDS/ΜL (ref 0–0.1)
BASOPHILS NFR BLD AUTO: 0 % (ref 0–1)
BILIRUB SERPL-MCNC: 0.35 MG/DL (ref 0.2–1)
BUN SERPL-MCNC: 14 MG/DL (ref 5–25)
CALCIUM SERPL-MCNC: 9.1 MG/DL (ref 8.3–10.1)
CHLORIDE SERPL-SCNC: 103 MMOL/L (ref 100–108)
CO2 SERPL-SCNC: 31 MMOL/L (ref 21–32)
CREAT SERPL-MCNC: 0.32 MG/DL (ref 0.6–1.3)
EOSINOPHIL # BLD AUTO: 0.17 THOUSAND/ΜL (ref 0–0.61)
EOSINOPHIL NFR BLD AUTO: 1 % (ref 0–6)
ERYTHROCYTE [DISTWIDTH] IN BLOOD BY AUTOMATED COUNT: 14.5 % (ref 11.6–15.1)
GFR SERPL CREATININE-BSD FRML MDRD: 115 ML/MIN/1.73SQ M
GLUCOSE SERPL-MCNC: 96 MG/DL (ref 65–140)
HCT VFR BLD AUTO: 43.1 % (ref 34.8–46.1)
HGB BLD-MCNC: 13.9 G/DL (ref 11.5–15.4)
IMM GRANULOCYTES # BLD AUTO: 0.03 THOUSAND/UL (ref 0–0.2)
IMM GRANULOCYTES NFR BLD AUTO: 0 % (ref 0–2)
LACTATE SERPL-SCNC: 1.2 MMOL/L (ref 0.5–2)
LIPASE SERPL-CCNC: 79 U/L (ref 73–393)
LYMPHOCYTES # BLD AUTO: 2.18 THOUSANDS/ΜL (ref 0.6–4.47)
LYMPHOCYTES NFR BLD AUTO: 16 % (ref 14–44)
MCH RBC QN AUTO: 28.7 PG (ref 26.8–34.3)
MCHC RBC AUTO-ENTMCNC: 32.3 G/DL (ref 31.4–37.4)
MCV RBC AUTO: 89 FL (ref 82–98)
MONOCYTES # BLD AUTO: 1.2 THOUSAND/ΜL (ref 0.17–1.22)
MONOCYTES NFR BLD AUTO: 9 % (ref 4–12)
NEUTROPHILS # BLD AUTO: 10.08 THOUSANDS/ΜL (ref 1.85–7.62)
NEUTS SEG NFR BLD AUTO: 74 % (ref 43–75)
NRBC BLD AUTO-RTO: 0 /100 WBCS
PLATELET # BLD AUTO: 394 THOUSANDS/UL (ref 149–390)
PMV BLD AUTO: 10.2 FL (ref 8.9–12.7)
POTASSIUM SERPL-SCNC: 4.2 MMOL/L (ref 3.5–5.3)
PROT SERPL-MCNC: 7.5 G/DL (ref 6.4–8.2)
RBC # BLD AUTO: 4.84 MILLION/UL (ref 3.81–5.12)
SODIUM SERPL-SCNC: 138 MMOL/L (ref 136–145)
WBC # BLD AUTO: 13.69 THOUSAND/UL (ref 4.31–10.16)

## 2019-09-07 PROCEDURE — 99285 EMERGENCY DEPT VISIT HI MDM: CPT

## 2019-09-07 PROCEDURE — 74177 CT ABD & PELVIS W/CONTRAST: CPT

## 2019-09-07 PROCEDURE — 83605 ASSAY OF LACTIC ACID: CPT | Performed by: EMERGENCY MEDICINE

## 2019-09-07 PROCEDURE — 80053 COMPREHEN METABOLIC PANEL: CPT | Performed by: EMERGENCY MEDICINE

## 2019-09-07 PROCEDURE — 85025 COMPLETE CBC W/AUTO DIFF WBC: CPT | Performed by: EMERGENCY MEDICINE

## 2019-09-07 PROCEDURE — 83690 ASSAY OF LIPASE: CPT | Performed by: EMERGENCY MEDICINE

## 2019-09-07 PROCEDURE — 99284 EMERGENCY DEPT VISIT MOD MDM: CPT | Performed by: EMERGENCY MEDICINE

## 2019-09-07 PROCEDURE — 36415 COLL VENOUS BLD VENIPUNCTURE: CPT

## 2019-09-07 RX ADMIN — IOHEXOL 100 ML: 350 INJECTION, SOLUTION INTRAVENOUS at 19:15

## 2019-09-07 NOTE — ED NOTES
Dr Staci Dempsey at bedside with ultrasound trying to get an IV       Verner Pomfret, RN  09/07/19 9331

## 2019-09-07 NOTE — ED PROVIDER NOTES
History  Chief Complaint   Patient presents with    Abdominal Pain     Pt has c/o lower abdominal pain for the last month but more so in the last week     69F PMH muscular dystrophy, ischemic colitis (admission in January 2019) presenting for worsening abdominal pain and constipation  Per patient, she has been having issues with constipation since her admission in January  She has noticed worsening belching, flatus, increased straining for defecation, increased sensation of fullness and bloating, occassional mild nausea, but has not noticed a decrease in the frequency of bowel movements  Over the last week, she developed an abdominal pain "like a band" across her RLQ, LLQ, and suprapubic area  She describes the pain as a 4-5/10, non-radiating, with no aggravating or alleviating symptoms  She set up an appointment with her gastroenterologist on Tuesday, but was encouraged by her friend to present to the ED today for evaluation  She has been tolerating PO diet and last ate lunch today with no issues  History provided by:  Patient  Abdominal Pain   Pain location:  LLQ, RLQ and suprapubic  Pain quality: dull and fullness    Pain radiates to:  Does not radiate  Pain severity:  Moderate  Duration:  1 week  Timing:  Constant  Progression:  Unchanged  Chronicity:  New  Relieved by:  Nothing  Worsened by:  Nothing  Ineffective treatments:  None tried  Associated symptoms: belching, constipation, flatus and nausea    Associated symptoms: no chest pain, no diarrhea, no dysuria, no fever, no shortness of breath, no vaginal bleeding, no vaginal discharge and no vomiting    Risk factors: being elderly        Prior to Admission Medications   Prescriptions Last Dose Informant Patient Reported? Taking?    clotrimazole-betamethasone (LOTRISONE) 1-0 05 % lotion  Self Yes Yes   diazepam (VALIUM) 5 mg tablet   No Yes   Sig: Take 1 tablet (5 mg total) by mouth daily   diphenhydrAMINE (BENADRYL) 25 mg tablet  Self Yes Yes   Sig: Take 25 mg by mouth daily at bedtime   fluticasone (FLONASE) 50 mcg/act nasal spray   No Yes   Si sprays into each nostril daily   ketoconazole (NIZORAL) 2 % shampoo   No Yes   Sig: Apply 1 application topically 2 (two) times a week   omeprazole (PriLOSEC) 20 mg delayed release capsule   No Yes   Sig: Take 1 capsule (20 mg total) by mouth every morning for 113 days   solifenacin (VESICARE) 10 MG tablet   No Yes   Sig: Take 1 tablet (10 mg total) by mouth daily   traZODone (DESYREL) 50 mg tablet   No Yes   Sig: Take 1 tablet (50 mg total) by mouth daily at bedtime   triamcinolone (KENALOG) 0 1 % cream  Self No No   Sig: Apply topically 2 (two) times a day for 90 days   umeclidinium bromide (INCRUSE ELLIPTA) 62 5 mcg/inh AEPB inhaler   No Yes   Sig: Inhale 1 puff daily      Facility-Administered Medications: None       Past Medical History:   Diagnosis Date    Anxiety     Breast cancer (HCC)     left    Colitis     [facility]ch[facility]     Depression     Difficult intubation     Excessive daytime sleepiness     GERD (gastroesophageal reflux disease)     Hyperlipidemia     Muscular dystrophy ([facility]     Limb-girdle    Osteoporosis     Overactive bladder     Pneumonitis     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[facility] [address] )     Skin cancer     Skin disorder     Vitamin D deficiency        Past Surgical History:   Procedure Laterality Date    COLONOSCOPY N/A 2019    Procedure: COLONOSCOPY;  Surgeon: Daniela Solis MD;  Location: [facility];   Service: Colorectal    MASTECTOMY Left 2007    left breast mastectomy     TONSILLECTOMY      TOOTH EXTRACTION      TUBAL LIGATION         Family History   Problem Relation Age of Onset    Other Mother         bone loss    Skin cancer Father     Hypertension Father         benign     Other Father         bone loss    Muscular dystrophy Brother         of the limb gridle     Muscular dystrophy Family of the limb girdle     I have reviewed and agree with the history as documented  Social History     Tobacco Use    Smoking status: Former Smoker     Packs/day: 1 00     Years: 41 00     Pack years: 41 00     Start date:      Last attempt to quit:      Years since quittin 6    Smokeless tobacco: Never Used   Substance Use Topics    Alcohol use: No     Comment: social drinker per allscripts     Drug use: No        Review of Systems   Constitutional: Negative for appetite change and fever  HENT: Negative  Eyes: Negative  Respiratory: Negative for shortness of breath  Cardiovascular: Negative for chest pain  Gastrointestinal: Positive for abdominal pain, constipation, flatus and nausea  Negative for abdominal distention, blood in stool, diarrhea and vomiting  Genitourinary: Negative for difficulty urinating, dysuria, frequency, vaginal bleeding and vaginal discharge  Neurological: Negative  Psychiatric/Behavioral: Negative  All other systems reviewed and are negative  Physical Exam  ED Triage Vitals [19 1725]   Temperature Pulse Respirations Blood Pressure SpO2   97 9 °F (36 6 °C) 88 18 160/87 99 %      Temp Source Heart Rate Source Patient Position - Orthostatic VS BP Location FiO2 (%)   Oral Monitor Sitting Right arm --      Pain Score       4             Orthostatic Vital Signs  Vitals:    19 1725 19 1745 19 1800   BP: 160/87 153/71 143/65   Pulse: 88 86 86   Patient Position - Orthostatic VS: Sitting Sitting Sitting       Physical Exam   Constitutional: She is oriented to person, place, and time  She appears well-developed and well-nourished  No distress  HENT:   Head: Normocephalic and atraumatic  Right Ear: External ear normal    Left Ear: External ear normal    Eyes: Pupils are equal, round, and reactive to light  Conjunctivae and EOM are normal    Neck: No JVD present  No tracheal deviation present     Cardiovascular: Normal rate, regular rhythm, normal heart sounds and intact distal pulses  No murmur heard  Pulmonary/Chest: Effort normal and breath sounds normal  No stridor  No respiratory distress  She has no wheezes  She has no rales  Abdominal: Soft  Normal appearance and bowel sounds are normal  She exhibits no shifting dullness, no distension, no fluid wave, no pulsatile midline mass and no mass  There is tenderness in the right lower quadrant, suprapubic area and left lower quadrant  There is no rigidity, no rebound, no guarding and no tenderness at McBurney's point  No hernia  Genitourinary:   Genitourinary Comments: Deferred   Musculoskeletal: She exhibits no edema, tenderness or deformity  Neurological: She is alert and oriented to person, place, and time  She exhibits normal muscle tone  Coordination normal    Skin: Skin is warm and dry  Capillary refill takes less than 2 seconds  No rash noted  She is not diaphoretic  No erythema  No pallor  Psychiatric: She has a normal mood and affect  Her behavior is normal  Judgment and thought content normal        ED Medications  Medications   iohexol (OMNIPAQUE) 350 MG/ML injection (MULTI-DOSE) 100 mL (100 mL Intravenous Given 9/7/19 1915)       Diagnostic Studies  Results Reviewed     Procedure Component Value Units Date/Time    Lactic acid, plasma [[id number]]  (Normal) Collected:  09/07/19 1856    Lab Status:  Final result Specimen:  Blood from Arm, Right Updated:  09/07/19 1920     LACTIC ACID 1 2 mmol/L     Narrative:       Result may be elevated if tourniquet was used during collection      Comprehensive metabolic panel [[id number]]  (Abnormal) Collected:  09/07/19 1736    Lab Status:  Final result Specimen:  Blood from Arm, Right Updated:  09/07/19 1822     Sodium 138 mmol/L      Potassium 4 2 mmol/L      Chloride 103 mmol/L      CO2 31 mmol/L      ANION GAP 4 mmol/L      BUN 14 mg/dL      Creatinine 0 32 mg/dL      Glucose 96 mg/dL      Calcium 9 1 mg/dL      AST 22 U/L      ALT 35 U/L      Alkaline Phosphatase 91 U/L      Total Protein 7 5 g/dL      Albumin 3 6 g/dL      Total Bilirubin 0 35 mg/dL      eGFR 115 ml/min/1 73sq m     Narrative:       [place] guidelines for Chronic Kidney Disease (CKD):     Stage 1 with normal or high GFR (GFR > 90 mL/min/1 73 square meters)    Stage 2 Mild CKD (GFR = 60-89 mL/min/1 73 square meters)    Stage 3A Moderate CKD (GFR = 45-59 mL/min/1 73 square meters)    Stage 3B Moderate CKD (GFR = 30-44 mL/min/1 73 square meters)    Stage 4 Severe CKD (GFR = 15-29 mL/min/1 73 square meters)    Stage 5 End Stage CKD (GFR <15 mL/min/1 73 square meters)  Note: GFR calculation is accurate only with a steady state creatinine    Lipase [[id number]]  (Normal) Collected:  09/07/19 1736    Lab Status:  Final result Specimen:  Blood from Arm, Right Updated:  09/07/19 1806     Lipase 79 u/L     CBC and differential [[id number]]  (Abnormal) Collected:  09/07/19 1736    Lab Status:  Final result Specimen:  Blood from Arm, Right Updated:  09/07/19 1749     WBC 13 69 Thousand/uL      RBC 4 84 Million/uL      Hemoglobin 13 9 g/dL      Hematocrit 43 1 %      MCV 89 fL      MCH 28 7 pg      MCHC 32 3 g/dL      RDW 14 5 %      MPV 10 2 fL      Platelets 025 Thousands/uL      nRBC 0 /100 WBCs      Neutrophils Relative 74 %      Immat GRANS % 0 %      Lymphocytes Relative 16 %      Monocytes Relative 9 %      Eosinophils Relative 1 %      Basophils Relative 0 %      Neutrophils Absolute 10 08 Thousands/µL      Immature Grans Absolute 0 03 Thousand/uL      Lymphocytes Absolute 2 18 Thousands/µL      Monocytes Absolute 1 20 Thousand/µL      Eosinophils Absolute 0 17 Thousand/µL      Basophils Absolute 0 03 Thousands/µL                  CT abdomen pelvis with contrast   Final Result by Caron Goldstein MD (09/07 2009)      No evidence of acute abnormality in the abdomen or pelvis              Workstation performed: [id number] Procedures  Procedures        ED Course  ED Course as of Sep 08 0116   Sat Sep 07, 2019   1950 WBC(!): 13 69           Identification of Seniors at Risk      Most Recent Value   (ISAR) Identification of Seniors at Risk   Before the illness or injury that brought you to the Emergency, did you need someone to help you on a regular basis? 0 Filed at: 09/07/2019 1726   In the last 24 hours, have you needed more help than usual?  0 Filed at: 09/07/2019 1726   Have you been hospitalized for one or more nights during the past 6 months? 0 Filed at: 09/07/2019 1726   In general, do you see well?  0 Filed at: 09/07/2019 1726   In general, do you have serious problems with your memory? 0 Filed at: 09/07/2019 1726   Do you take more than three different medications every day? 1 Filed at: 09/07/2019 1726   ISAR Score  1 Filed at: 09/07/2019 1726                          MDM  Number of Diagnoses or Management Options  Lower abdominal pain: new and requires workup  Diagnosis management comments: 69F PMH muscular dystrophy, ischemic colitis (admission in January 2019) presenting for worsening abdominal pain and constipation  She has noticed worsening belching, flatus, increased straining for defecation, increased sensation of fullness and bloating, occassional mild nausea, but has not noticed a decrease in the frequency of bowel movements over the last two weeks with abdominal pain over the last week across her RLQ, LLQ, and suprapubic area  She describes the pain as a 4-5/10, non-radiating, with no aggravating or alleviating symptoms  Given her complex medical history, a CTAP, CBC, CMP, lipase, lactic acid was obtained  Differential diagnosis:  Colitis, diverticulitis, pancreatitis, constipation  Workup without acute findings  Patient be discharged home  Patient given return precautions for increasing abdominal pain, inability tolerate p o  Intake, fevers, blood per rectum, vomiting    Patient expressed understanding with these return precautions  Amount and/or Complexity of Data Reviewed  Clinical lab tests: ordered and reviewed  Tests in the radiology section of CPT®: ordered and reviewed  Decide to obtain previous medical records or to obtain history from someone other than the patient: yes  Review and summarize past medical records: yes  Independent visualization of images, tracings, or specimens: yes        Disposition  Final diagnoses:   Lower abdominal pain     Time reflects when diagnosis was documented in both MDM as applicable and the Disposition within this note     Time User Action Codes Description Comment    9/7/2019  9:07 PM Jose Francisco Add [R10 30] Lower abdominal pain       ED Disposition     ED Disposition Condition Date/Time Comment    Discharge Stable Sat Sep 7, 2019  9:07 PM Roise Modest discharge to home/self care              Follow-up Information     Follow up With Specialties Details Why Contact Info Additional [address]  [facility], DO Family Medicine In 2 days  [address] [facility] [phone]       [address] [place] [address] Emergency Department Emergency Medicine  If symptoms worsen [address]  [phone]  ED, [address] [place] [address], [id number]          Discharge Medication List as of 9/7/2019  9:07 PM      CONTINUE these medications which have NOT CHANGED    Details   clotrimazole-betamethasone (LOTRISONE) 1-0 05 % lotion Starting Mon 3/18/2019, Historical Med      diazepam (VALIUM) 5 mg tablet Take 1 tablet (5 mg total) by mouth daily, Starting Wed 8/14/2019, Print      diphenhydrAMINE (BENADRYL) 25 mg tablet Take 25 mg by mouth daily at bedtime, Historical Med      fluticasone (FLONASE) 50 mcg/act nasal spray 2 sprays into each nostril daily, Starting Wed 7/10/2019, Normal      ketoconazole (NIZORAL) 2 % shampoo Apply 1 application topically 2 (two) times a week, Starting Thu 4/25/2019, Normal omeprazole (PriLOSEC) 20 mg delayed release capsule Take 1 capsule (20 mg total) by mouth every morning for 113 days, Starting Wed 8/14/2019, Until Thu 12/5/2019, Normal      solifenacin (VESICARE) 10 MG tablet Take 1 tablet (10 mg total) by mouth daily, Starting Wed 8/14/2019, Normal      traZODone (DESYREL) 50 mg tablet Take 1 tablet (50 mg total) by mouth daily at bedtime, Starting Wed 8/14/2019, Normal      umeclidinium bromide (INCRUSE ELLIPTA) 62 5 mcg/inh AEPB inhaler Inhale 1 puff daily, Starting Thu 4/4/2019, Normal      triamcinolone (KENALOG) 0 1 % cream Apply topically 2 (two) times a day for 90 days, Starting Tue 2/19/2019, Until Wed 8/14/2019, Normal           No discharge procedures on file  ED Provider  Attending physically available and evaluated [place] RUSSELL managed the patient along with the ED Attending      Electronically Signed by         Elizabeth Mora,   09/08/19 [address] [place] [address],   09/08/19 125

## 2019-09-08 NOTE — ED NOTES
Transportation arranged with wheelchair van with [facility] at [address]        Aaliyah Nicole RN  09/07/19 5040

## 2019-09-10 PROBLEM — R10.30 LOWER ABDOMINAL PAIN: Status: ACTIVE | Noted: 2019-09-10

## 2019-09-20 NOTE — ED ATTENDING ATTESTATION
9/7/2019  IGuerita DO, saw and evaluated the patient  I have discussed the patient with the resident/non-physician practitioner and agree with the resident's/non-physician practitioner's findings, Plan of Care, and MDM as documented in the resident's/non-physician practitioner's note, except where noted  All available labs and Radiology studies were reviewed  I was present for key portions of any procedure(s) performed by the resident/non-physician practitioner and I was immediately available to provide assistance  At this point I agree with the current assessment done in the Emergency Department  I have conducted an independent evaluation of this patient a history and physical is as follows:    70-year-old female with history of muscular just free and ischemic colitis presents for abdominal pain  Patient reports she has worsening gas and bandlike pain over her lower abdomen  It is not necessarily new in terms of her chronic abdominal pain but somewhat different  She has appointment with Gastroenterology on Tuesday of this next week  She denies any vomiting or bloody stool    Plan is to his CT of his abdomen pelvis, lactic, reassess    ED Course         Critical Care Time  Procedures

## 2019-10-22 ENCOUNTER — OFFICE VISIT (OUTPATIENT)
Dept: PULMONOLOGY | Facility: CLINIC | Age: 69
End: 2019-10-22
Payer: MEDICARE

## 2019-10-22 VITALS
WEIGHT: 171 LBS | HEART RATE: 73 BPM | DIASTOLIC BLOOD PRESSURE: 70 MMHG | TEMPERATURE: 97.6 F | HEIGHT: 60 IN | RESPIRATION RATE: 14 BRPM | BODY MASS INDEX: 33.57 KG/M2 | OXYGEN SATURATION: 98 % | SYSTOLIC BLOOD PRESSURE: 118 MMHG

## 2019-10-22 DIAGNOSIS — J98.4 RESTRICTIVE LUNG DISEASE: Primary | ICD-10-CM

## 2019-10-22 DIAGNOSIS — J43.2 CENTRILOBULAR EMPHYSEMA (HCC): ICD-10-CM

## 2019-10-22 DIAGNOSIS — G47.34 SLEEP RELATED HYPOXIA: ICD-10-CM

## 2019-10-22 PROCEDURE — 99213 OFFICE O/P EST LOW 20 MIN: CPT | Performed by: INTERNAL MEDICINE

## 2019-10-22 RX ORDER — ALBUTEROL SULFATE 90 UG/1
2 AEROSOL, METERED RESPIRATORY (INHALATION) EVERY 6 HOURS PRN
Qty: 1 INHALER | Refills: 2 | Status: SHIPPED | OUTPATIENT
Start: 2019-10-22 | End: 2021-10-05 | Stop reason: ALTCHOICE

## 2019-10-22 NOTE — ASSESSMENT & PLAN NOTE
· Using supplemental oxygen at 2 L nocturnally  · No problems with oxygen and sleeping well    Exertional desaturation is due to her muscular dystrophy and restrictive lung disease

## 2019-10-22 NOTE — PROGRESS NOTES
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  · Symptoms have been very slowly progressive  Does not report any new symptoms  · Continue to monitor    Sleep related hypoxia  · Using supplemental oxygen at 2 L nocturnally  · No problems with oxygen and sleeping well  Exertional desaturation is due to her muscular dystrophy and restrictive lung disease    Chronic obstructive pulmonary disease (HCC)  · COPD component is fairly mild  · Uses Incruse Ellipta sporadically  · Has a very old rescue inhaler that  in   I did renew this for emergency      Already received flu vaccination for this season  I will see her back in 6 months for routine pulmonary follow-up  ______________________________________________________________________    HPI:    Hossein Santiago presents today for follow-up of restrictive lung disease secondary to muscular dystrophy, sleep-related hypoxemia, and mild component of COPD  She has been doing well  She reports no new pulmonary symptoms  She does get short of breath with exertion and she has noticed that this has been slowly progressive over many years  She gets a little bit more winded with less activity  No wheezing or chronic cough  She rarely requires bronchodilator therapy  If she has some crackling"she will take Incruse Ellipta and this seems to help  She does have an old and rescue inhaler which she has only used 5 doses out of in several years  She does carry a with her however  No headache or visual change  No sinus complaints  No sore throat or upper respiratory complaints  No chest pain or palpitations  No cardiovascular complaints  No leg swelling  No abdominal pain or GERD  She did have a recent hospitalization and was determined to be constipated  Symptoms now improved  No worsening arthralgias or myalgias    She has chronic neuromuscular weakness and is in a motorized wheelchair for certain activities    She is still able to ambulate with difficulty and an assistive device    Current Medications:    Current Outpatient Medications:     clotrimazole-betamethasone (LOTRISONE) 1-0 05 % lotion, , Disp: , Rfl: 0    diazepam (VALIUM) 5 mg tablet, Take 1 tablet (5 mg total) by mouth daily, Disp: 90 tablet, Rfl: 3    diphenhydrAMINE (BENADRYL) 25 mg tablet, Take 25 mg by mouth daily at bedtime, Disp: , Rfl:     fluticasone (FLONASE) 50 mcg/act nasal spray, 2 sprays into each nostril daily, Disp: 16 g, Rfl: 3    ketoconazole (NIZORAL) 2 % shampoo, Apply 1 application topically 2 (two) times a week, Disp: 120 mL, Rfl: 1    omeprazole (PriLOSEC) 20 mg delayed release capsule, Take 1 capsule (20 mg total) by mouth every morning for 113 days, Disp: 90 capsule, Rfl: 3    polyethylene glycol (MIRALAX) 17 g packet, Take 17 g by mouth daily, Disp: , Rfl:     solifenacin (VESICARE) 10 MG tablet, Take 1 tablet (10 mg total) by mouth daily, Disp: 90 tablet, Rfl: 3    traZODone (DESYREL) 50 mg tablet, Take 1 tablet (50 mg total) by mouth daily at bedtime, Disp: 90 tablet, Rfl: 3    triamcinolone (KENALOG) 0 1 % cream, Apply topically 2 (two) times a day for 90 days, Disp: 80 g, Rfl: 3    umeclidinium bromide (INCRUSE ELLIPTA) 62 5 mcg/inh AEPB inhaler, Inhale 1 puff daily, Disp: 1 each, Rfl: 6    albuterol (PROVENTIL HFA,VENTOLIN HFA) 90 mcg/act inhaler, Inhale 2 puffs every 6 (six) hours as needed for wheezing, Disp: 1 Inhaler, Rfl: 2    polyethylene glycol (GLYCOLAX) powder, Take 17 g by mouth daily As directed (Patient not taking: Reported on 10/22/2019), Disp: 17 g, Rfl: 3    polyethylene glycol (GLYCOLAX) powder, Take 31 g by mouth daily As directed (Patient not taking: Reported on 10/22/2019), Disp: 510 g, Rfl: 3    Review of Systems:  Aside from what is mentioned in the HPI is otherwise negative    Past medical history, surgical history, and family history were reviewed and updated as appropriate    Social history updates:  Social History     Tobacco Use   Smoking Status Former Smoker    Packs/day: 1 00    Years: 41 00    Pack years: 41 00    Types: Cigarettes    Start date: 5    Last attempt to quit:     Years since quittin 8   Smokeless Tobacco Never Used       PhysicalExamination:  Vitals:   /70   Pulse 73   Temp 97 6 °F (36 4 °C)   Resp 14   Ht 5' (1 524 m)   Wt 77 6 kg (171 lb)   LMP  (LMP Unknown)   SpO2 98%   BMI 33 40 kg/m²     Gen: Comfortable at rest on room air  Non-labored  HEENT: PERRL  O/P: clear  moist  Neck: Trachea is midline  No JVD  No adenopathy  No thyromegaly  Chest:  Limited chest wall excursion  Breath sounds are clear bilaterally today  Cardiac:  Regular  no murmur  Abdomen: Soft and nontender  Bowel sounds are present  Extremities: No edema  Neuro:  Presents in a motorized wheelchair  Awake and interactive  Appropriate  Skin:  No appreciable rash      Diagnostic Data:  Labs:   I personally reviewed the most recent laboratory data pertinent to today's visit    Lab Results   Component Value Date    WBC 13 69 (H) 2019    HGB 13 9 2019    HCT 43 1 2019    MCV 89 2019     (H) 2019     Lab Results   Component Value Date    SODIUM 138 2019    K 4 2 2019    CO2 31 2019     2019    BUN 14 2019    CREATININE 0 32 (L) 2019    CALCIUM 9 1 2019         No recent pulmonary diagnostic testing    Cande Espinoza MD

## 2019-10-22 NOTE — ASSESSMENT & PLAN NOTE
· Symptoms have been very slowly progressive    Does not report any new symptoms  · Continue to monitor

## 2019-10-22 NOTE — ASSESSMENT & PLAN NOTE
· COPD component is fairly mild  · Uses Incruse Ellipta sporadically  · Has a very old rescue inhaler that  in     I did renew this for emergency

## 2019-11-17 ENCOUNTER — AMB VIDEO VISIT (OUTPATIENT)
Dept: OTHER | Facility: HOSPITAL | Age: 69
End: 2019-11-17

## 2019-11-17 PROCEDURE — EVISIT: Performed by: FAMILY MEDICINE

## 2019-11-17 NOTE — CARE ANYWHERE EVISITS
Visit Summary for Mariela Liu - Gender: Female - Date of Birth: [id number]  Date: [id number] - Duration: 11 minutes  Patient: Mariela Liu  Provider: Alexis Amaro    Patient Contact Information  Address  [address] [place] [address]  [phone]    Visit Topics  sore throat, drippy nose, coughing, yellow mucus, low grade fever [Added By: Self - 2019-11-17]    Conversation Transcripts  [0A][0A] [Notification] Alejandro Castro, Global Staff, will help you prepare for your visit  He is assisting Alexis Amaro, Family Physician [0A][Navin George] Hi there, let me check if there is a currently available provider who can see you more   quickly  In that event, would you like to be transferred? [0A][Notification] Alejandro Castro has left the room  [0A][Notification] You are connected with Alexis Amaro Family Physician [0A][Notification] SUSY Almazan is located in   [place]  [0A][Notification] Mariela Liu has shared health history  Tamar Console  [0A][Notification] Alexis Shammarisabel has added a diagnosis/procedure code (see the "Visit Notes" tab)  [0A][Notification] [place] [facility] has added a prescription (see the   "Visit Notes" tab)  [0A][Notification] Alexis Shames has added a diagnosis/procedure code (see the "Visit Notes" tab)  [0A]    Diagnosis  Acute upper respiratory infection, unspecified    Procedures  Value: [id number] Code: CPT-4 ONLINE E/M BY PHYS/QHP    Medications Prescribed    cefdinir  Dose : 1 capsule  Route : oral  Frequency : every 12 hours  Refills : 0  Instructions to the Pharmacist : Substitutions allowed      Provider Notes  [0A][0A] Video visit[0A][0A][0A][0A]S: Sore throat, cough, runny nose, low grade fever started 3 days ago  Has crackling and gurling in her lungs  Has chest tightness  Only has 50 % lung capacity due to muscular dystrophy  History of bronchitis and   pneumonia    She is housebound in a wheel chair  [0A][0A]PMH:  muscular dystrophy (has 50% lung function), depression[0A][0A][0A][0A]Meds: oxygen, proair, celexa, valium, vesicare, prilosec[0A][0A][0A][0A]Allergies: Amox[0A][0A][0A][0A]O:  Alert, in no   apparent distress[0A][0A]Talking and breathing normally  No respiratory distress  [0A][0A]Conjunctivae normal   EOMI [0A][0A]No facial asymmetry  [0A][0A]Nose, lips normal [0A][0A][0A][0A]A: Cough[0A]URI[0A][0A][0A][0A]P: Cefdinir [0A][0A]Please follow   up with another online visit or with your primary care provider, urgent care or ER if your symptoms change, persist or worsen  [0A][0A][0A][0A]I recommend that you have an in-person exam with your primary care provider annually  [0A][0A]I recommend that   you share a copy of this visit with your primary care provider to be included in your medical records  [0A][0A][0A][0A]Please call the pharmacy help line at  if you have any questions about your prescriptions  [0A]    Electronically signed bySangeetha Decker(NPI [id number])

## 2019-11-22 ENCOUNTER — OFFICE VISIT (OUTPATIENT)
Dept: FAMILY MEDICINE CLINIC | Facility: CLINIC | Age: 69
End: 2019-11-22
Payer: MEDICARE

## 2019-11-22 VITALS
RESPIRATION RATE: 16 BRPM | HEART RATE: 90 BPM | TEMPERATURE: 98.1 F | OXYGEN SATURATION: 95 % | SYSTOLIC BLOOD PRESSURE: 126 MMHG | DIASTOLIC BLOOD PRESSURE: 88 MMHG

## 2019-11-22 DIAGNOSIS — J43.2 CENTRILOBULAR EMPHYSEMA (HCC): ICD-10-CM

## 2019-11-22 DIAGNOSIS — G71.09 DYSTROPHY, MUSCULAR, HEREDITARY PROGRESSIVE (HCC): ICD-10-CM

## 2019-11-22 DIAGNOSIS — J98.4 RESTRICTIVE LUNG DISEASE: ICD-10-CM

## 2019-11-22 DIAGNOSIS — J40 BRONCHITIS: Primary | ICD-10-CM

## 2019-11-22 DIAGNOSIS — R05.9 COUGH: ICD-10-CM

## 2019-11-22 PROCEDURE — 99214 OFFICE O/P EST MOD 30 MIN: CPT | Performed by: FAMILY MEDICINE

## 2019-11-22 RX ORDER — CEFDINIR 300 MG/1
300 CAPSULE ORAL EVERY 12 HOURS
Refills: 0 | COMMUNITY
Start: 2019-11-18 | End: 2020-03-31 | Stop reason: HOSPADM

## 2019-11-22 RX ORDER — METHYLPREDNISOLONE 4 MG/1
TABLET ORAL
Qty: 21 EACH | Refills: 0 | Status: SHIPPED | OUTPATIENT
Start: 2019-11-22 | End: 2020-02-18

## 2019-11-22 NOTE — PROGRESS NOTES
Chief Complaint   Patient presents with    Cough     productive, some times dry       Assessment/Plan:  Deep breaths  PO water for hydration  Diagnoses and all orders for this visit:    Bronchitis  -     methylPREDNISolone 4 MG tablet therapy pack; Use as directed on package    Cough  -     methylPREDNISolone 4 MG tablet therapy pack; Use as directed on package    Dystrophy, muscular, 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 [address] )    Other orders  -     cefdinir (OMNICEF) 300 mg capsule; Take 300 mg by mouth every 12 (twelve) hours          Subjective:      Patient ID: Sharee Giang is a 71 y o  female  Sick past week  Started in head and traveling to chest   Started Cefdnir 4 days ago  Coughing and hears a crackling in upper chest   Cough with yellow production  Not drinking much water  Drinking grape juice with double water  Eating 75 % of normal   Continues with inhaler for restrictive lung disease  SH: Neg tob or  OH  PMH: no changes  The following portions of the patient's history were reviewed and updated as appropriate: allergies, current medications, past family history, past medical history, past social history, past surgical history and problem list     Review of Systems   Constitutional: Negative  HENT: Positive for congestion and sore throat  Eyes: Negative  Respiratory: Positive for cough  Cardiovascular: Negative  Gastrointestinal: Negative  Genitourinary: Negative  Musculoskeletal: Positive for gait problem  Wheelchair bound   Skin: Negative  Psychiatric/Behavioral: Negative            Objective:      /88 (BP Location: Right arm, Patient Position: Sitting, Cuff Size: Standard)   Pulse 90   Temp 98 1 °F (36 7 °C) (Oral)   Resp 16   LMP  (LMP Unknown)   SpO2 95%       Current Outpatient Medications:     albuterol (PROVENTIL HFA,VENTOLIN HFA) 90 mcg/act inhaler, Inhale 2 puffs every 6 (six) hours as needed for wheezing, Disp: 1 Inhaler, Rfl: 2    clotrimazole-betamethasone (LOTRISONE) 1-0 05 % lotion, , Disp: , Rfl: 0    diazepam (VALIUM) 5 mg tablet, Take 1 tablet (5 mg total) by mouth daily, Disp: 90 tablet, Rfl: 3    diphenhydrAMINE (BENADRYL) 25 mg tablet, Take 25 mg by mouth daily at bedtime, Disp: , Rfl:     fluticasone (FLONASE) 50 mcg/act nasal spray, 2 sprays into each nostril daily, Disp: 16 g, Rfl: 3    omeprazole (PriLOSEC) 20 mg delayed release capsule, Take 1 capsule (20 mg total) by mouth every morning for 113 days, Disp: 90 capsule, Rfl: 3    polyethylene glycol (MIRALAX) 17 g packet, Take 17 g by mouth daily, Disp: , Rfl:     solifenacin (VESICARE) 10 MG tablet, Take 1 tablet (10 mg total) by mouth daily, Disp: 90 tablet, Rfl: 3    traZODone (DESYREL) 50 mg tablet, Take 1 tablet (50 mg total) by mouth daily at bedtime, Disp: 90 tablet, Rfl: 3    umeclidinium bromide (INCRUSE ELLIPTA) 62 5 mcg/inh AEPB inhaler, Inhale 1 puff daily, Disp: 1 each, Rfl: 6    cefdinir (OMNICEF) 300 mg capsule, Take 300 mg by mouth every 12 (twelve) hours, Disp: , Rfl: 0    ketoconazole (NIZORAL) 2 % shampoo, Apply 1 application topically 2 (two) times a week, Disp: 120 mL, Rfl: 1    polyethylene glycol (GLYCOLAX) powder, Take 17 g by mouth daily As directed (Patient not taking: Reported on 10/22/2019), Disp: 17 g, Rfl: 3    polyethylene glycol (GLYCOLAX) powder, Take 31 g by mouth daily As directed (Patient not taking: Reported on 10/22/2019), Disp: 510 g, Rfl: 3    triamcinolone (KENALOG) 0 1 % cream, Apply topically 2 (two) times a day for 90 days, Disp: 80 g, Rfl: 3     Physical Exam   Constitutional: She is oriented to person, place, and time  She appears well-developed and well-nourished  HENT:   Head: Normocephalic and atraumatic     Right Ear: External ear normal    Left Ear: External ear normal    Nose: Nose normal    Mouth/Throat: Oropharynx is clear and moist  Eyes: Pupils are equal, round, and reactive to light  Conjunctivae and EOM are normal    Neck: Normal range of motion  Neck supple  Cardiovascular: Normal rate, regular rhythm and normal heart sounds  Pulmonary/Chest: Effort normal    Mild large air way sounds  Neurological: She is alert and oriented to person, place, and time  She has normal reflexes  Skin: Skin is warm and dry  Psychiatric: She has a normal mood and affect   Her behavior is normal  Judgment and thought content normal

## 2019-12-06 ENCOUNTER — TELEPHONE (OUTPATIENT)
Dept: FAMILY MEDICINE CLINIC | Facility: CLINIC | Age: 69
End: 2019-12-06

## 2019-12-06 DIAGNOSIS — G71.09 DYSTROPHY, MUSCULAR, HEREDITARY PROGRESSIVE (HCC): Primary | ICD-10-CM

## 2019-12-06 NOTE — TELEPHONE ENCOUNTER
Franklin Dhillon from disability services called and said patient would need a script for new alternating pressure mattress for her hospital bed due to her muscular dystrophy  Her current one is in poor shape  Is this ok to send for patient?

## 2020-01-22 DIAGNOSIS — J00 ACUTE RHINITIS: ICD-10-CM

## 2020-01-22 RX ORDER — FLUTICASONE PROPIONATE 50 MCG
2 SPRAY, SUSPENSION (ML) NASAL DAILY
Qty: 16 G | Refills: 3 | Status: SHIPPED | OUTPATIENT
Start: 2020-01-22 | End: 2021-04-07 | Stop reason: SDUPTHER

## 2020-02-18 ENCOUNTER — OFFICE VISIT (OUTPATIENT)
Dept: FAMILY MEDICINE CLINIC | Facility: CLINIC | Age: 70
End: 2020-02-18
Payer: MEDICARE

## 2020-02-18 VITALS
OXYGEN SATURATION: 98 % | DIASTOLIC BLOOD PRESSURE: 76 MMHG | TEMPERATURE: 97.6 F | SYSTOLIC BLOOD PRESSURE: 122 MMHG | HEART RATE: 80 BPM

## 2020-02-18 DIAGNOSIS — R26.2 AMBULATORY DYSFUNCTION: ICD-10-CM

## 2020-02-18 DIAGNOSIS — K21.9 GERD WITHOUT ESOPHAGITIS: ICD-10-CM

## 2020-02-18 DIAGNOSIS — J98.4 RESTRICTIVE LUNG DISEASE: ICD-10-CM

## 2020-02-18 DIAGNOSIS — G71.09 DYSTROPHY, MUSCULAR, HEREDITARY PROGRESSIVE (HCC): Primary | ICD-10-CM

## 2020-02-18 PROCEDURE — 1036F TOBACCO NON-USER: CPT | Performed by: FAMILY MEDICINE

## 2020-02-18 PROCEDURE — 1160F RVW MEDS BY RX/DR IN RCRD: CPT | Performed by: FAMILY MEDICINE

## 2020-02-18 PROCEDURE — 4040F PNEUMOC VAC/ADMIN/RCVD: CPT | Performed by: FAMILY MEDICINE

## 2020-02-18 PROCEDURE — 99214 OFFICE O/P EST MOD 30 MIN: CPT | Performed by: FAMILY MEDICINE

## 2020-02-18 NOTE — PROGRESS NOTES
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    Ambulatory dysfunction    GERD without esophagitis          Subjective:      Patient ID: Reed Leon is a 79 y o  female  Patient does have overactive bladder and experiences urinary incontinence  Present wheelchair is 6to 5years old  Needs new wheelchair  Discussed the need for new power wheel chair with elevating leg rests, tilt, recline, arm rests that lift up and swing away and a seat that will elevate for easier reaching of items  Present medical condition of muscle weakness due to [place] Myopathy Muscular Dystrophy continues to decline and will continue to decline  There is no medical treatment available for [place] Myopathy  Reflux controlled, sleeping OK  The following portions of the patient's history were reviewed and updated as appropriate: allergies, past family history, past medical history, past social history and problem list   I have spent 25 minutes with Patient  today in which greater than 50% of this time was spent in counseling/coordination of care regarding Diagnostic results, Intructions for management, Risk factor reductions and Impressions  Review of Systems   Constitutional: Negative  HENT: Negative  Eyes: Negative  Respiratory: Positive for chest tightness  Cardiovascular: Negative  Gastrointestinal: Negative  Genitourinary: Negative  Musculoskeletal: Positive for gait problem  Skin: Negative  Neurological: Positive for weakness  Negative for tremors  Psychiatric/Behavioral: Negative            Objective:      BP 122/76 (BP Location: Left arm, Patient Position: Sitting, Cuff Size: Standard)   Pulse 80   Temp 97 6 °F (36 4 °C) (Oral)   LMP  (LMP Unknown)   SpO2 98%       Current Outpatient Medications:     albuterol (PROVENTIL HFA,VENTOLIN HFA) 90 mcg/act inhaler, Inhale 2 puffs every 6 (six) hours as needed for wheezing, Disp: 1 Inhaler, Rfl: 2    clotrimazole-betamethasone (LOTRISONE) 1-0 05 % lotion, , Disp: , Rfl: 0    diazepam (VALIUM) 5 mg tablet, Take 1 tablet (5 mg total) by mouth daily, Disp: 90 tablet, Rfl: 3    fluticasone (FLONASE) 50 mcg/act nasal spray, 2 sprays into each nostril daily, Disp: 16 g, Rfl: 3    ketoconazole (NIZORAL) 2 % shampoo, Apply 1 application topically 2 (two) times a week, Disp: 120 mL, Rfl: 1    omeprazole (PriLOSEC) 20 mg delayed release capsule, Take 1 capsule (20 mg total) by mouth every morning for 113 days, Disp: 90 capsule, Rfl: 3    polyethylene glycol (GLYCOLAX) powder, Take 17 g by mouth daily As directed, Disp: 17 g, Rfl: 3    polyethylene glycol (GLYCOLAX) powder, Take 31 g by mouth daily As directed, Disp: 510 g, Rfl: 3    solifenacin (VESICARE) 10 MG tablet, Take 1 tablet (10 mg total) by mouth daily, Disp: 90 tablet, Rfl: 3    traZODone (DESYREL) 50 mg tablet, Take 1 tablet (50 mg total) by mouth daily at bedtime, Disp: 90 tablet, Rfl: 3    triamcinolone (KENALOG) 0 1 % cream, Apply topically 2 (two) times a day for 90 days, Disp: 80 g, Rfl: 3    umeclidinium bromide (INCRUSE ELLIPTA) 62 5 mcg/inh AEPB inhaler, Inhale 1 puff daily, Disp: 1 each, Rfl: 6    cefdinir (OMNICEF) 300 mg capsule, Take 300 mg by mouth every 12 (twelve) hours, Disp: , Rfl: 0    polyethylene glycol (MIRALAX) 17 g packet, Take 17 g by mouth daily, Disp: , Rfl:      Allergies   Allergen Reactions    Amoxicillin     Codeine      Other reaction(s): Other (See Comments)  n/v        Physical Exam   Constitutional: She appears well-developed and well-nourished     HENT:   Head: Normocephalic and atraumatic  Right Ear: External ear normal    L ear canal with hard wax  Eyes: Pupils are equal, round, and reactive to light  Conjunctivae and EOM are normal    Neck: Normal range of motion  Neck supple  Cardiovascular: Normal rate and regular rhythm     Pulmonary/Chest: Effort normal and breath sounds normal

## 2020-03-13 DIAGNOSIS — J44.9 CHRONIC OBSTRUCTIVE PULMONARY DISEASE, UNSPECIFIED COPD TYPE (HCC): ICD-10-CM

## 2020-03-24 ENCOUNTER — OFFICE VISIT (OUTPATIENT)
Dept: FAMILY MEDICINE CLINIC | Facility: CLINIC | Age: 70
End: 2020-03-24
Payer: MEDICARE

## 2020-03-24 VITALS
DIASTOLIC BLOOD PRESSURE: 84 MMHG | OXYGEN SATURATION: 95 % | HEART RATE: 86 BPM | TEMPERATURE: 98.1 F | SYSTOLIC BLOOD PRESSURE: 116 MMHG | RESPIRATION RATE: 18 BRPM

## 2020-03-24 DIAGNOSIS — K57.32 DIVERTICULITIS OF LARGE INTESTINE WITHOUT PERFORATION OR ABSCESS WITHOUT BLEEDING: Primary | ICD-10-CM

## 2020-03-24 DIAGNOSIS — G71.09 DYSTROPHY, MUSCULAR, HEREDITARY PROGRESSIVE (HCC): ICD-10-CM

## 2020-03-24 DIAGNOSIS — F41.9 ANXIETY: ICD-10-CM

## 2020-03-24 DIAGNOSIS — R10.30 LOWER ABDOMINAL PAIN: ICD-10-CM

## 2020-03-24 DIAGNOSIS — K21.9 GERD WITHOUT ESOPHAGITIS: ICD-10-CM

## 2020-03-24 PROCEDURE — 4040F PNEUMOC VAC/ADMIN/RCVD: CPT | Performed by: FAMILY MEDICINE

## 2020-03-24 PROCEDURE — 99214 OFFICE O/P EST MOD 30 MIN: CPT | Performed by: FAMILY MEDICINE

## 2020-03-24 PROCEDURE — 1160F RVW MEDS BY RX/DR IN RCRD: CPT | Performed by: FAMILY MEDICINE

## 2020-03-24 PROCEDURE — 1036F TOBACCO NON-USER: CPT | Performed by: FAMILY MEDICINE

## 2020-03-24 RX ORDER — CIPROFLOXACIN 500 MG/1
500 TABLET, FILM COATED ORAL EVERY 12 HOURS SCHEDULED
Qty: 14 TABLET | Refills: 1 | Status: SHIPPED | OUTPATIENT
Start: 2020-03-24 | End: 2020-03-31 | Stop reason: HOSPADM

## 2020-03-24 RX ORDER — METRONIDAZOLE 250 MG/1
250 TABLET ORAL EVERY 8 HOURS SCHEDULED
Qty: 21 TABLET | Refills: 1 | Status: SHIPPED | OUTPATIENT
Start: 2020-03-24 | End: 2020-03-31 | Stop reason: HOSPADM

## 2020-03-24 NOTE — PROGRESS NOTES
Chief Complaint   Patient presents with    Abdominal Pain     lower abd pain x 3 weeks, consistent sharp pain across abdomen    Gas     excessive burping    Anorexia       Assessment/Plan:  PO fluids, soft diet  Call by Friday for update or sooner if get worse  PHQ-9 Depression Screening    PHQ-9:    Frequency of the following problems over the past two weeks:       Little interest or pleasure in doing things:  1 - several days  Feeling down, depressed, or hopeless:  1 - several days  PHQ-2 Score:  2          Diagnoses and all orders for this visit:    Diverticulitis of large intestine without perforation or abscess without bleeding  -     ciprofloxacin (CIPRO) 500 mg tablet; Take 1 tablet (500 mg total) by mouth every 12 (twelve) hours for 7 days  -     metroNIDAZOLE (FLAGYL) 250 mg tablet; Take 1 tablet (250 mg total) by mouth every 8 (eight) hours for 7 days    GERD without esophagitis    Dystrophy, muscular, 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[address] )    Lower abdominal pain    Anxiety          Subjective:      Patient ID: Jeani Severs is a 79 y o  female  Lower abdominal pains past 3 weeks  Normal BM's and urination  PO intake causes no changes  Patient noted increase burping and decreased appetite  Patient has been stress w/r to COVID - 19  Pain comes and goes but not getting worse or better  Difficult historian to describe the type of pain - sharp, dull, achey    Discomfort across lower abdomen but localized to LLQ on palpation  Has history of diverticulitis times 3  Reflux controlled and anxiety up over COVID        The following portions of the patient's history were reviewed and updated as appropriate: allergies, current medications, past medical history, past social history, past surgical history and problem list   I have spent 25 minutes with Patient  today in which greater than 50% of this time was spent in counseling/coordination of care regarding Risks and benefits of tx options, Intructions for management, Risk factor reductions and Impressions  Review of Systems   Constitutional: Negative  HENT: Negative  Respiratory: Negative  Cardiovascular: Negative  Gastrointestinal: Positive for abdominal pain  Negative for constipation, diarrhea, nausea, rectal pain and vomiting  Genitourinary: Negative  Skin: Negative  Psychiatric/Behavioral: The patient is nervous/anxious  Objective:      /84 (BP Location: Right arm, Patient Position: Sitting, Cuff Size: Standard)   Pulse 86   Temp 98 1 °F (36 7 °C) (Oral)   Resp 18   LMP  (LMP Unknown)   SpO2 95%          Physical Exam   Constitutional: She is oriented to person, place, and time  She appears well-developed and well-nourished  HENT:   Head: Normocephalic and atraumatic  Right Ear: External ear normal    Left Ear: External ear normal    Nose: Nose normal    Mouth/Throat: Oropharynx is clear and moist    Eyes: Pupils are equal, round, and reactive to light  Conjunctivae and EOM are normal    Neck: Normal range of motion  Neck supple  Cardiovascular: Normal rate, regular rhythm and normal heart sounds  Pulmonary/Chest: Effort normal and breath sounds normal    Abdominal: Soft  Bowel sounds are normal  There is tenderness  LLQ tender to palpation  Neurological: She is alert and oriented to person, place, and time  She has normal reflexes  Skin: Skin is warm and dry  Psychiatric: She has a normal mood and affect   Her behavior is normal  Judgment and thought content normal

## 2020-03-27 ENCOUNTER — TELEPHONE (OUTPATIENT)
Dept: FAMILY MEDICINE CLINIC | Facility: CLINIC | Age: 70
End: 2020-03-27

## 2020-03-27 NOTE — TELEPHONE ENCOUNTER
LM for pt to continue medications as prescribed at last OV and to call back if symptoms worsen or do not improve

## 2020-03-27 NOTE — TELEPHONE ENCOUNTER
Pt called stated that at last OV she was told to call back on Friday 3/27/2020 to give an update on symptoms, pt stated that she is still having lower abdominal sharp pain when pushing down on abdomen, pt did mention that her burping has improved, pt denies fever at this time  and pt is still on Cipro as prescribed at last OV

## 2020-03-28 ENCOUNTER — NURSE TRIAGE (OUTPATIENT)
Dept: OTHER | Facility: OTHER | Age: 70
End: 2020-03-28

## 2020-03-28 ENCOUNTER — HOSPITAL ENCOUNTER (INPATIENT)
Facility: HOSPITAL | Age: 70
LOS: 3 days | Discharge: HOME | DRG: 872 | End: 2020-03-31
Attending: EMERGENCY MEDICINE | Admitting: INTERNAL MEDICINE
Payer: MEDICARE

## 2020-03-28 ENCOUNTER — APPOINTMENT (EMERGENCY)
Dept: CT IMAGING | Facility: HOSPITAL | Age: 70
DRG: 872 | End: 2020-03-28
Payer: MEDICARE

## 2020-03-28 DIAGNOSIS — R63.0 DECREASED APPETITE: ICD-10-CM

## 2020-03-28 DIAGNOSIS — K57.32 SIGMOID DIVERTICULITIS: Primary | ICD-10-CM

## 2020-03-28 PROBLEM — D72.829 LEUKOCYTOSIS: Status: ACTIVE | Noted: 2020-03-28

## 2020-03-28 PROBLEM — E87.6 HYPOKALEMIA: Status: ACTIVE | Noted: 2020-03-28

## 2020-03-28 PROBLEM — K57.92 DIVERTICULITIS: Status: ACTIVE | Noted: 2020-03-28

## 2020-03-28 LAB
ALBUMIN SERPL BCP-MCNC: 3.6 G/DL (ref 3.5–5)
ALP SERPL-CCNC: 95 U/L (ref 46–116)
ALT SERPL W P-5'-P-CCNC: 28 U/L (ref 12–78)
ANION GAP SERPL CALCULATED.3IONS-SCNC: 4 MMOL/L (ref 4–13)
AST SERPL W P-5'-P-CCNC: 20 U/L (ref 5–45)
BASOPHILS # BLD AUTO: 0.05 THOUSANDS/ΜL (ref 0–0.1)
BASOPHILS NFR BLD AUTO: 0 % (ref 0–1)
BILIRUB DIRECT SERPL-MCNC: 0.08 MG/DL (ref 0–0.2)
BILIRUB SERPL-MCNC: 0.28 MG/DL (ref 0.2–1)
BILIRUB UR QL STRIP: NEGATIVE
BUN SERPL-MCNC: 18 MG/DL (ref 5–25)
CALCIUM SERPL-MCNC: 9.4 MG/DL (ref 8.3–10.1)
CHLORIDE SERPL-SCNC: 100 MMOL/L (ref 100–108)
CLARITY UR: CLEAR
CO2 SERPL-SCNC: 35 MMOL/L (ref 21–32)
COLOR UR: YELLOW
CREAT SERPL-MCNC: 0.41 MG/DL (ref 0.6–1.3)
EOSINOPHIL # BLD AUTO: 0.11 THOUSAND/ΜL (ref 0–0.61)
EOSINOPHIL NFR BLD AUTO: 1 % (ref 0–6)
ERYTHROCYTE [DISTWIDTH] IN BLOOD BY AUTOMATED COUNT: 14.4 % (ref 11.6–15.1)
GFR SERPL CREATININE-BSD FRML MDRD: 105 ML/MIN/1.73SQ M
GLUCOSE SERPL-MCNC: 90 MG/DL (ref 65–140)
GLUCOSE UR STRIP-MCNC: NEGATIVE MG/DL
HCT VFR BLD AUTO: 46.4 % (ref 34.8–46.1)
HGB BLD-MCNC: 14.9 G/DL (ref 11.5–15.4)
HGB UR QL STRIP.AUTO: NEGATIVE
IMM GRANULOCYTES # BLD AUTO: 0.04 THOUSAND/UL (ref 0–0.2)
IMM GRANULOCYTES NFR BLD AUTO: 0 % (ref 0–2)
KETONES UR STRIP-MCNC: ABNORMAL MG/DL
LEUKOCYTE ESTERASE UR QL STRIP: NEGATIVE
LIPASE SERPL-CCNC: 96 U/L (ref 73–393)
LYMPHOCYTES # BLD AUTO: 2.52 THOUSANDS/ΜL (ref 0.6–4.47)
LYMPHOCYTES NFR BLD AUTO: 17 % (ref 14–44)
MCH RBC QN AUTO: 28.9 PG (ref 26.8–34.3)
MCHC RBC AUTO-ENTMCNC: 32.1 G/DL (ref 31.4–37.4)
MCV RBC AUTO: 90 FL (ref 82–98)
MONOCYTES # BLD AUTO: 1.14 THOUSAND/ΜL (ref 0.17–1.22)
MONOCYTES NFR BLD AUTO: 8 % (ref 4–12)
NEUTROPHILS # BLD AUTO: 10.9 THOUSANDS/ΜL (ref 1.85–7.62)
NEUTS SEG NFR BLD AUTO: 74 % (ref 43–75)
NITRITE UR QL STRIP: NEGATIVE
NRBC BLD AUTO-RTO: 0 /100 WBCS
PH UR STRIP.AUTO: 6 [PH] (ref 4.5–8)
PLATELET # BLD AUTO: 456 THOUSANDS/UL (ref 149–390)
PMV BLD AUTO: 10 FL (ref 8.9–12.7)
POTASSIUM SERPL-SCNC: 3.4 MMOL/L (ref 3.5–5.3)
PROT SERPL-MCNC: 8.1 G/DL (ref 6.4–8.2)
PROT UR STRIP-MCNC: NEGATIVE MG/DL
RBC # BLD AUTO: 5.16 MILLION/UL (ref 3.81–5.12)
SODIUM SERPL-SCNC: 139 MMOL/L (ref 136–145)
SP GR UR STRIP.AUTO: 1.02 (ref 1–1.03)
UROBILINOGEN UR QL STRIP.AUTO: 0.2 E.U./DL
WBC # BLD AUTO: 14.76 THOUSAND/UL (ref 4.31–10.16)

## 2020-03-28 PROCEDURE — 80076 HEPATIC FUNCTION PANEL: CPT | Performed by: PHYSICIAN ASSISTANT

## 2020-03-28 PROCEDURE — 85025 COMPLETE CBC W/AUTO DIFF WBC: CPT | Performed by: PHYSICIAN ASSISTANT

## 2020-03-28 PROCEDURE — 36415 COLL VENOUS BLD VENIPUNCTURE: CPT | Performed by: PHYSICIAN ASSISTANT

## 2020-03-28 PROCEDURE — 99285 EMERGENCY DEPT VISIT HI MDM: CPT

## 2020-03-28 PROCEDURE — 74177 CT ABD & PELVIS W/CONTRAST: CPT

## 2020-03-28 PROCEDURE — 81003 URINALYSIS AUTO W/O SCOPE: CPT

## 2020-03-28 PROCEDURE — 80048 BASIC METABOLIC PNL TOTAL CA: CPT | Performed by: PHYSICIAN ASSISTANT

## 2020-03-28 PROCEDURE — 96360 HYDRATION IV INFUSION INIT: CPT

## 2020-03-28 PROCEDURE — 96361 HYDRATE IV INFUSION ADD-ON: CPT

## 2020-03-28 PROCEDURE — 83690 ASSAY OF LIPASE: CPT | Performed by: PHYSICIAN ASSISTANT

## 2020-03-28 PROCEDURE — 99285 EMERGENCY DEPT VISIT HI MDM: CPT | Performed by: PHYSICIAN ASSISTANT

## 2020-03-28 PROCEDURE — 99223 1ST HOSP IP/OBS HIGH 75: CPT | Performed by: PHYSICIAN ASSISTANT

## 2020-03-28 RX ORDER — HEPARIN SODIUM 5000 [USP'U]/ML
5000 INJECTION, SOLUTION INTRAVENOUS; SUBCUTANEOUS EVERY 8 HOURS SCHEDULED
Status: DISCONTINUED | OUTPATIENT
Start: 2020-03-29 | End: 2020-03-31 | Stop reason: HOSPADM

## 2020-03-28 RX ORDER — PANTOPRAZOLE SODIUM 20 MG/1
20 TABLET, DELAYED RELEASE ORAL
Status: DISCONTINUED | OUTPATIENT
Start: 2020-03-29 | End: 2020-03-31 | Stop reason: HOSPADM

## 2020-03-28 RX ORDER — TRAZODONE HYDROCHLORIDE 50 MG/1
50 TABLET ORAL
Status: DISCONTINUED | OUTPATIENT
Start: 2020-03-28 | End: 2020-03-31 | Stop reason: HOSPADM

## 2020-03-28 RX ORDER — OXYBUTYNIN CHLORIDE 10 MG/1
10 TABLET, EXTENDED RELEASE ORAL DAILY
Status: DISCONTINUED | OUTPATIENT
Start: 2020-03-29 | End: 2020-03-31 | Stop reason: HOSPADM

## 2020-03-28 RX ORDER — ACETAMINOPHEN 325 MG/1
650 TABLET ORAL EVERY 6 HOURS PRN
Status: DISCONTINUED | OUTPATIENT
Start: 2020-03-28 | End: 2020-03-31 | Stop reason: HOSPADM

## 2020-03-28 RX ORDER — CEFAZOLIN SODIUM 2 G/50ML
2000 SOLUTION INTRAVENOUS ONCE
Status: COMPLETED | OUTPATIENT
Start: 2020-03-28 | End: 2020-03-28

## 2020-03-28 RX ORDER — DIAZEPAM 5 MG/1
5 TABLET ORAL
Status: DISCONTINUED | OUTPATIENT
Start: 2020-03-29 | End: 2020-03-31 | Stop reason: HOSPADM

## 2020-03-28 RX ORDER — OXYCODONE HYDROCHLORIDE 10 MG/1
10 TABLET ORAL EVERY 4 HOURS PRN
Status: DISCONTINUED | OUTPATIENT
Start: 2020-03-28 | End: 2020-03-31 | Stop reason: HOSPADM

## 2020-03-28 RX ORDER — OXYCODONE HYDROCHLORIDE 5 MG/1
5 TABLET ORAL EVERY 4 HOURS PRN
Status: DISCONTINUED | OUTPATIENT
Start: 2020-03-28 | End: 2020-03-31 | Stop reason: HOSPADM

## 2020-03-28 RX ORDER — HEPARIN SODIUM 5000 [USP'U]/ML
5000 INJECTION, SOLUTION INTRAVENOUS; SUBCUTANEOUS EVERY 8 HOURS SCHEDULED
Status: DISCONTINUED | OUTPATIENT
Start: 2020-03-28 | End: 2020-03-28

## 2020-03-28 RX ORDER — FLUTICASONE PROPIONATE 50 MCG
2 SPRAY, SUSPENSION (ML) NASAL DAILY
Status: DISCONTINUED | OUTPATIENT
Start: 2020-03-29 | End: 2020-03-31 | Stop reason: HOSPADM

## 2020-03-28 RX ORDER — DIAZEPAM 5 MG/1
5 TABLET ORAL DAILY
Status: DISCONTINUED | OUTPATIENT
Start: 2020-03-29 | End: 2020-03-28

## 2020-03-28 RX ORDER — ALBUTEROL SULFATE 90 UG/1
2 AEROSOL, METERED RESPIRATORY (INHALATION) EVERY 6 HOURS PRN
Status: DISCONTINUED | OUTPATIENT
Start: 2020-03-28 | End: 2020-03-31 | Stop reason: HOSPADM

## 2020-03-28 RX ORDER — ONDANSETRON 2 MG/ML
4 INJECTION INTRAMUSCULAR; INTRAVENOUS EVERY 6 HOURS PRN
Status: DISCONTINUED | OUTPATIENT
Start: 2020-03-28 | End: 2020-03-31 | Stop reason: HOSPADM

## 2020-03-28 RX ADMIN — TRAZODONE HYDROCHLORIDE 50 MG: 50 TABLET ORAL at 22:56

## 2020-03-28 RX ADMIN — HEPARIN SODIUM 5000 UNITS: 5000 INJECTION INTRAVENOUS; SUBCUTANEOUS at 22:56

## 2020-03-28 RX ADMIN — DIAZEPAM 5 MG: 5 TABLET ORAL at 23:53

## 2020-03-28 RX ADMIN — SODIUM CHLORIDE 500 ML: 0.9 INJECTION, SOLUTION INTRAVENOUS at 19:13

## 2020-03-28 RX ADMIN — IOHEXOL 100 ML: 350 INJECTION, SOLUTION INTRAVENOUS at 20:08

## 2020-03-28 RX ADMIN — METRONIDAZOLE 500 MG: 500 INJECTION, SOLUTION INTRAVENOUS at 21:14

## 2020-03-28 RX ADMIN — CEFAZOLIN SODIUM 2000 MG: 2 SOLUTION INTRAVENOUS at 22:56

## 2020-03-28 NOTE — TELEPHONE ENCOUNTER
Regarding: Diverticulitis   ----- Message from Tesfaye Fabian sent at 3/28/2020  1:48 PM EDT -----  "I have a sharp pain lower left side  Pain has gotten worse even on antibiotics    I started them on Tuesday evening "

## 2020-03-28 NOTE — TELEPHONE ENCOUNTER
Reason for Disposition   [1] SEVERE pain AND [2] age > 61    Answer Assessment - Initial Assessment Questions  1  LOCATION: "Where does it hurt?"       Left lower side of the abdomen  2  RADIATION: "Does the pain shoot anywhere else?" (e g , chest, back)      None  3  ONSET: "When did the pain begin?" (e g , minutes, hours or days ago)         Three weeks ago   Started on Cipro/Flagyl for 5 days   4  SUDDEN: "Gradual or sudden onset?"      Gradual  5  PATTERN "Does the pain come and go, or is it constant?"     - If constant: "Is it getting better, staying the same, or worsening?"       (Note: Constant means the pain never goes away completely; most serious pain is constant and it progresses)      - If intermittent: "How long does it last?" "Do you have pain now?"      (Note: Intermittent means the pain goes away completely between bouts)      The pain is constant  6  SEVERITY: "How bad is the pain?"  (e g , Scale 1-10; mild, moderate, or severe)    - MILD (1-3): doesn't interfere with normal activities, abdomen soft and not tender to touch     - MODERATE (4-7): interferes with normal activities or awakens from sleep, tender to touch     - SEVERE (8-10): excruciating pain, doubled over, unable to do any normal activities       Pain level is # 7 if pressed on its a 10  7  RECURRENT SYMPTOM: "Have you ever had this type of abdominal pain before?" If so, ask: "When was the last time?" and "What happened that time?"       She has had flare ups in the past but they usually responds to treatment  8  CAUSE: "What do you think is causing the abdominal pain?"      Antibiotic didn't work  9  RELIEVING/AGGRAVATING FACTORS: "What makes it better or worse?" (e g , movement, antacids, bowel movement)      Pressing on it makes it worse    10  OTHER SYMPTOMS: "Has there been any vomiting, diarrhea, constipation, or urine problems?"        None    Protocols used: ABDOMINAL PAIN - [facility]

## 2020-03-29 ENCOUNTER — APPOINTMENT (INPATIENT)
Dept: RADIOLOGY | Facility: HOSPITAL | Age: 70
DRG: 872 | End: 2020-03-29
Payer: MEDICARE

## 2020-03-29 LAB
ANION GAP SERPL CALCULATED.3IONS-SCNC: 5 MMOL/L (ref 4–13)
BASOPHILS # BLD AUTO: 0.03 THOUSANDS/ΜL (ref 0–0.1)
BASOPHILS NFR BLD AUTO: 0 % (ref 0–1)
BUN SERPL-MCNC: 9 MG/DL (ref 5–25)
CALCIUM SERPL-MCNC: 8.3 MG/DL (ref 8.3–10.1)
CHLORIDE SERPL-SCNC: 105 MMOL/L (ref 100–108)
CO2 SERPL-SCNC: 32 MMOL/L (ref 21–32)
CREAT SERPL-MCNC: 0.34 MG/DL (ref 0.6–1.3)
EOSINOPHIL # BLD AUTO: 0.16 THOUSAND/ΜL (ref 0–0.61)
EOSINOPHIL NFR BLD AUTO: 2 % (ref 0–6)
ERYTHROCYTE [DISTWIDTH] IN BLOOD BY AUTOMATED COUNT: 14.5 % (ref 11.6–15.1)
GFR SERPL CREATININE-BSD FRML MDRD: 112 ML/MIN/1.73SQ M
GLUCOSE SERPL-MCNC: 99 MG/DL (ref 65–140)
HCT VFR BLD AUTO: 38.3 % (ref 34.8–46.1)
HGB BLD-MCNC: 12.3 G/DL (ref 11.5–15.4)
IMM GRANULOCYTES # BLD AUTO: 0.01 THOUSAND/UL (ref 0–0.2)
IMM GRANULOCYTES NFR BLD AUTO: 0 % (ref 0–2)
LYMPHOCYTES # BLD AUTO: 2.22 THOUSANDS/ΜL (ref 0.6–4.47)
LYMPHOCYTES NFR BLD AUTO: 24 % (ref 14–44)
MCH RBC QN AUTO: 28.9 PG (ref 26.8–34.3)
MCHC RBC AUTO-ENTMCNC: 32.1 G/DL (ref 31.4–37.4)
MCV RBC AUTO: 90 FL (ref 82–98)
MONOCYTES # BLD AUTO: 0.98 THOUSAND/ΜL (ref 0.17–1.22)
MONOCYTES NFR BLD AUTO: 10 % (ref 4–12)
NEUTROPHILS # BLD AUTO: 6 THOUSANDS/ΜL (ref 1.85–7.62)
NEUTS SEG NFR BLD AUTO: 64 % (ref 43–75)
NRBC BLD AUTO-RTO: 0 /100 WBCS
NT-PROBNP SERPL-MCNC: 41 PG/ML
PLATELET # BLD AUTO: 362 THOUSANDS/UL (ref 149–390)
PMV BLD AUTO: 10.5 FL (ref 8.9–12.7)
POTASSIUM SERPL-SCNC: 3.4 MMOL/L (ref 3.5–5.3)
RBC # BLD AUTO: 4.25 MILLION/UL (ref 3.81–5.12)
SODIUM SERPL-SCNC: 142 MMOL/L (ref 136–145)
WBC # BLD AUTO: 9.4 THOUSAND/UL (ref 4.31–10.16)

## 2020-03-29 PROCEDURE — 99232 SBSQ HOSP IP/OBS MODERATE 35: CPT | Performed by: INTERNAL MEDICINE

## 2020-03-29 PROCEDURE — 83880 ASSAY OF NATRIURETIC PEPTIDE: CPT | Performed by: INTERNAL MEDICINE

## 2020-03-29 PROCEDURE — 71045 X-RAY EXAM CHEST 1 VIEW: CPT

## 2020-03-29 PROCEDURE — 80048 BASIC METABOLIC PNL TOTAL CA: CPT | Performed by: PHYSICIAN ASSISTANT

## 2020-03-29 PROCEDURE — 85025 COMPLETE CBC W/AUTO DIFF WBC: CPT | Performed by: PHYSICIAN ASSISTANT

## 2020-03-29 RX ORDER — POTASSIUM CHLORIDE 20 MEQ/1
40 TABLET, EXTENDED RELEASE ORAL ONCE
Status: COMPLETED | OUTPATIENT
Start: 2020-03-29 | End: 2020-03-29

## 2020-03-29 RX ADMIN — ACETAMINOPHEN 650 MG: 325 TABLET ORAL at 21:13

## 2020-03-29 RX ADMIN — CEFTRIAXONE 1000 MG: 1 INJECTION, POWDER, FOR SOLUTION INTRAMUSCULAR; INTRAVENOUS at 05:21

## 2020-03-29 RX ADMIN — METRONIDAZOLE 500 MG: 500 INJECTION, SOLUTION INTRAVENOUS at 21:12

## 2020-03-29 RX ADMIN — ONDANSETRON 4 MG: 2 INJECTION INTRAMUSCULAR; INTRAVENOUS at 09:06

## 2020-03-29 RX ADMIN — OXYBUTYNIN 10 MG: 10 TABLET, FILM COATED, EXTENDED RELEASE ORAL at 08:18

## 2020-03-29 RX ADMIN — DIAZEPAM 5 MG: 5 TABLET ORAL at 21:12

## 2020-03-29 RX ADMIN — METRONIDAZOLE 500 MG: 500 INJECTION, SOLUTION INTRAVENOUS at 12:27

## 2020-03-29 RX ADMIN — OXYCODONE HYDROCHLORIDE 5 MG: 5 TABLET ORAL at 08:18

## 2020-03-29 RX ADMIN — FLUTICASONE PROPIONATE 2 SPRAY: 50 SPRAY, METERED NASAL at 08:19

## 2020-03-29 RX ADMIN — HEPARIN SODIUM 5000 UNITS: 5000 INJECTION INTRAVENOUS; SUBCUTANEOUS at 21:12

## 2020-03-29 RX ADMIN — POTASSIUM CHLORIDE 40 MEQ: 1500 TABLET, EXTENDED RELEASE ORAL at 16:15

## 2020-03-29 RX ADMIN — HEPARIN SODIUM 5000 UNITS: 5000 INJECTION INTRAVENOUS; SUBCUTANEOUS at 14:13

## 2020-03-29 RX ADMIN — HEPARIN SODIUM 5000 UNITS: 5000 INJECTION INTRAVENOUS; SUBCUTANEOUS at 05:21

## 2020-03-29 RX ADMIN — PANTOPRAZOLE SODIUM 20 MG: 20 TABLET, DELAYED RELEASE ORAL at 05:21

## 2020-03-29 RX ADMIN — METRONIDAZOLE 500 MG: 500 INJECTION, SOLUTION INTRAVENOUS at 05:59

## 2020-03-29 RX ADMIN — TRAZODONE HYDROCHLORIDE 50 MG: 50 TABLET ORAL at 21:12

## 2020-03-30 LAB
ANION GAP SERPL CALCULATED.3IONS-SCNC: 5 MMOL/L (ref 4–13)
BASOPHILS # BLD AUTO: 0.03 THOUSANDS/ΜL (ref 0–0.1)
BASOPHILS NFR BLD AUTO: 0 % (ref 0–1)
BUN SERPL-MCNC: 7 MG/DL (ref 5–25)
CALCIUM SERPL-MCNC: 8.6 MG/DL (ref 8.3–10.1)
CHLORIDE SERPL-SCNC: 106 MMOL/L (ref 100–108)
CO2 SERPL-SCNC: 28 MMOL/L (ref 21–32)
CREAT SERPL-MCNC: 0.3 MG/DL (ref 0.6–1.3)
EOSINOPHIL # BLD AUTO: 0.13 THOUSAND/ΜL (ref 0–0.61)
EOSINOPHIL NFR BLD AUTO: 2 % (ref 0–6)
ERYTHROCYTE [DISTWIDTH] IN BLOOD BY AUTOMATED COUNT: 14.4 % (ref 11.6–15.1)
GFR SERPL CREATININE-BSD FRML MDRD: 116 ML/MIN/1.73SQ M
GLUCOSE SERPL-MCNC: 95 MG/DL (ref 65–140)
HCT VFR BLD AUTO: 42.1 % (ref 34.8–46.1)
HGB BLD-MCNC: 13 G/DL (ref 11.5–15.4)
IMM GRANULOCYTES # BLD AUTO: 0.03 THOUSAND/UL (ref 0–0.2)
IMM GRANULOCYTES NFR BLD AUTO: 0 % (ref 0–2)
LYMPHOCYTES # BLD AUTO: 2.13 THOUSANDS/ΜL (ref 0.6–4.47)
LYMPHOCYTES NFR BLD AUTO: 25 % (ref 14–44)
MCH RBC QN AUTO: 28.4 PG (ref 26.8–34.3)
MCHC RBC AUTO-ENTMCNC: 30.9 G/DL (ref 31.4–37.4)
MCV RBC AUTO: 92 FL (ref 82–98)
MONOCYTES # BLD AUTO: 0.79 THOUSAND/ΜL (ref 0.17–1.22)
MONOCYTES NFR BLD AUTO: 9 % (ref 4–12)
NEUTROPHILS # BLD AUTO: 5.56 THOUSANDS/ΜL (ref 1.85–7.62)
NEUTS SEG NFR BLD AUTO: 64 % (ref 43–75)
NRBC BLD AUTO-RTO: 0 /100 WBCS
PLATELET # BLD AUTO: 379 THOUSANDS/UL (ref 149–390)
PMV BLD AUTO: 10.3 FL (ref 8.9–12.7)
POTASSIUM SERPL-SCNC: 4.3 MMOL/L (ref 3.5–5.3)
RBC # BLD AUTO: 4.57 MILLION/UL (ref 3.81–5.12)
SODIUM SERPL-SCNC: 139 MMOL/L (ref 136–145)
WBC # BLD AUTO: 8.67 THOUSAND/UL (ref 4.31–10.16)

## 2020-03-30 PROCEDURE — 80048 BASIC METABOLIC PNL TOTAL CA: CPT | Performed by: INTERNAL MEDICINE

## 2020-03-30 PROCEDURE — 99232 SBSQ HOSP IP/OBS MODERATE 35: CPT | Performed by: INTERNAL MEDICINE

## 2020-03-30 PROCEDURE — 85025 COMPLETE CBC W/AUTO DIFF WBC: CPT | Performed by: INTERNAL MEDICINE

## 2020-03-30 RX ORDER — FLUTICASONE FUROATE AND VILANTEROL 200; 25 UG/1; UG/1
1 POWDER RESPIRATORY (INHALATION) DAILY
Status: DISCONTINUED | OUTPATIENT
Start: 2020-03-30 | End: 2020-03-31 | Stop reason: HOSPADM

## 2020-03-30 RX ADMIN — HEPARIN SODIUM 5000 UNITS: 5000 INJECTION INTRAVENOUS; SUBCUTANEOUS at 14:37

## 2020-03-30 RX ADMIN — METRONIDAZOLE 500 MG: 500 INJECTION, SOLUTION INTRAVENOUS at 12:21

## 2020-03-30 RX ADMIN — ONDANSETRON 4 MG: 2 INJECTION INTRAMUSCULAR; INTRAVENOUS at 20:54

## 2020-03-30 RX ADMIN — HEPARIN SODIUM 5000 UNITS: 5000 INJECTION INTRAVENOUS; SUBCUTANEOUS at 05:15

## 2020-03-30 RX ADMIN — ACETAMINOPHEN 650 MG: 325 TABLET ORAL at 08:25

## 2020-03-30 RX ADMIN — CEFTRIAXONE 1000 MG: 1 INJECTION, POWDER, FOR SOLUTION INTRAMUSCULAR; INTRAVENOUS at 05:15

## 2020-03-30 RX ADMIN — METRONIDAZOLE 500 MG: 500 INJECTION, SOLUTION INTRAVENOUS at 20:58

## 2020-03-30 RX ADMIN — FLUTICASONE FUROATE AND VILANTEROL TRIFENATATE 1 PUFF: 200; 25 POWDER RESPIRATORY (INHALATION) at 15:52

## 2020-03-30 RX ADMIN — OXYBUTYNIN 10 MG: 10 TABLET, FILM COATED, EXTENDED RELEASE ORAL at 08:25

## 2020-03-30 RX ADMIN — FLUTICASONE PROPIONATE 2 SPRAY: 50 SPRAY, METERED NASAL at 08:25

## 2020-03-30 RX ADMIN — TRAZODONE HYDROCHLORIDE 50 MG: 50 TABLET ORAL at 21:04

## 2020-03-30 RX ADMIN — HEPARIN SODIUM 5000 UNITS: 5000 INJECTION INTRAVENOUS; SUBCUTANEOUS at 21:02

## 2020-03-30 RX ADMIN — DIAZEPAM 5 MG: 5 TABLET ORAL at 21:05

## 2020-03-30 RX ADMIN — ACETAMINOPHEN 650 MG: 325 TABLET ORAL at 20:52

## 2020-03-30 RX ADMIN — PANTOPRAZOLE SODIUM 20 MG: 20 TABLET, DELAYED RELEASE ORAL at 05:15

## 2020-03-30 RX ADMIN — METRONIDAZOLE 500 MG: 500 INJECTION, SOLUTION INTRAVENOUS at 06:07

## 2020-03-31 VITALS
DIASTOLIC BLOOD PRESSURE: 67 MMHG | TEMPERATURE: 97.9 F | SYSTOLIC BLOOD PRESSURE: 119 MMHG | OXYGEN SATURATION: 99 % | RESPIRATION RATE: 18 BRPM | HEART RATE: 72 BPM

## 2020-03-31 PROBLEM — E87.6 HYPOKALEMIA: Status: RESOLVED | Noted: 2020-03-28 | Resolved: 2020-03-31

## 2020-03-31 PROBLEM — D72.829 LEUKOCYTOSIS: Status: RESOLVED | Noted: 2020-03-28 | Resolved: 2020-03-31

## 2020-03-31 LAB
ANION GAP SERPL CALCULATED.3IONS-SCNC: 4 MMOL/L (ref 4–13)
BUN SERPL-MCNC: 6 MG/DL (ref 5–25)
CALCIUM SERPL-MCNC: 8.3 MG/DL (ref 8.3–10.1)
CHLORIDE SERPL-SCNC: 105 MMOL/L (ref 100–108)
CO2 SERPL-SCNC: 32 MMOL/L (ref 21–32)
CREAT SERPL-MCNC: 0.29 MG/DL (ref 0.6–1.3)
ERYTHROCYTE [DISTWIDTH] IN BLOOD BY AUTOMATED COUNT: 14.4 % (ref 11.6–15.1)
GFR SERPL CREATININE-BSD FRML MDRD: 118 ML/MIN/1.73SQ M
GLUCOSE SERPL-MCNC: 92 MG/DL (ref 65–140)
HCT VFR BLD AUTO: 37.9 % (ref 34.8–46.1)
HGB BLD-MCNC: 11.9 G/DL (ref 11.5–15.4)
MCH RBC QN AUTO: 28.8 PG (ref 26.8–34.3)
MCHC RBC AUTO-ENTMCNC: 31.4 G/DL (ref 31.4–37.4)
MCV RBC AUTO: 92 FL (ref 82–98)
PLATELET # BLD AUTO: 353 THOUSANDS/UL (ref 149–390)
PMV BLD AUTO: 10.2 FL (ref 8.9–12.7)
POTASSIUM SERPL-SCNC: 3.3 MMOL/L (ref 3.5–5.3)
RBC # BLD AUTO: 4.13 MILLION/UL (ref 3.81–5.12)
SODIUM SERPL-SCNC: 141 MMOL/L (ref 136–145)
WBC # BLD AUTO: 8.26 THOUSAND/UL (ref 4.31–10.16)

## 2020-03-31 PROCEDURE — 85027 COMPLETE CBC AUTOMATED: CPT | Performed by: INTERNAL MEDICINE

## 2020-03-31 PROCEDURE — 80048 BASIC METABOLIC PNL TOTAL CA: CPT | Performed by: INTERNAL MEDICINE

## 2020-03-31 PROCEDURE — 99239 HOSP IP/OBS DSCHRG MGMT >30: CPT | Performed by: INTERNAL MEDICINE

## 2020-03-31 RX ORDER — OXYCODONE HYDROCHLORIDE 5 MG/1
5 TABLET ORAL EVERY 6 HOURS PRN
Qty: 8 TABLET | Refills: 0 | Status: SHIPPED | OUTPATIENT
Start: 2020-03-31 | End: 2020-04-10

## 2020-03-31 RX ORDER — CEPHALEXIN 500 MG/1
500 CAPSULE ORAL EVERY 12 HOURS SCHEDULED
Qty: 12 CAPSULE | Refills: 0 | Status: SHIPPED | OUTPATIENT
Start: 2020-03-31 | End: 2020-04-06

## 2020-03-31 RX ORDER — CEPHALEXIN 500 MG/1
500 CAPSULE ORAL EVERY 6 HOURS SCHEDULED
Refills: 0 | Status: CANCELLED | OUTPATIENT
Start: 2020-03-31

## 2020-03-31 RX ORDER — POTASSIUM CHLORIDE 20 MEQ/1
20 TABLET, EXTENDED RELEASE ORAL ONCE
Status: COMPLETED | OUTPATIENT
Start: 2020-03-31 | End: 2020-03-31

## 2020-03-31 RX ORDER — METRONIDAZOLE 500 MG/1
500 TABLET ORAL EVERY 8 HOURS SCHEDULED
Qty: 18 TABLET | Refills: 0 | Status: SHIPPED | OUTPATIENT
Start: 2020-03-31 | End: 2020-04-06

## 2020-03-31 RX ADMIN — CEFTRIAXONE 1000 MG: 1 INJECTION, POWDER, FOR SOLUTION INTRAMUSCULAR; INTRAVENOUS at 05:57

## 2020-03-31 RX ADMIN — FLUTICASONE PROPIONATE 2 SPRAY: 50 SPRAY, METERED NASAL at 09:36

## 2020-03-31 RX ADMIN — METRONIDAZOLE 500 MG: 500 INJECTION, SOLUTION INTRAVENOUS at 04:53

## 2020-03-31 RX ADMIN — POTASSIUM CHLORIDE 20 MEQ: 1500 TABLET, EXTENDED RELEASE ORAL at 11:28

## 2020-03-31 RX ADMIN — OXYBUTYNIN 10 MG: 10 TABLET, FILM COATED, EXTENDED RELEASE ORAL at 09:35

## 2020-03-31 RX ADMIN — HEPARIN SODIUM 5000 UNITS: 5000 INJECTION INTRAVENOUS; SUBCUTANEOUS at 05:54

## 2020-03-31 RX ADMIN — PANTOPRAZOLE SODIUM 20 MG: 20 TABLET, DELAYED RELEASE ORAL at 05:54

## 2020-03-31 RX ADMIN — ACETAMINOPHEN 650 MG: 325 TABLET ORAL at 06:01

## 2020-03-31 RX ADMIN — FLUTICASONE FUROATE AND VILANTEROL TRIFENATATE 1 PUFF: 200; 25 POWDER RESPIRATORY (INHALATION) at 09:36

## 2020-04-01 ENCOUNTER — TRANSITIONAL CARE MANAGEMENT (OUTPATIENT)
Dept: FAMILY MEDICINE CLINIC | Facility: CLINIC | Age: 70
End: 2020-04-01

## 2020-04-02 DIAGNOSIS — F41.9 ANXIETY: ICD-10-CM

## 2020-04-03 DIAGNOSIS — F41.9 ANXIETY: ICD-10-CM

## 2020-04-03 RX ORDER — DIAZEPAM 5 MG/1
5 TABLET ORAL DAILY
Qty: 90 TABLET | Refills: 0 | OUTPATIENT
Start: 2020-04-03 | End: 2020-07-02

## 2020-04-08 DIAGNOSIS — F41.9 ANXIETY: ICD-10-CM

## 2020-04-08 RX ORDER — DIAZEPAM 5 MG/1
5 TABLET ORAL DAILY
Qty: 90 TABLET | Refills: 3 | OUTPATIENT
Start: 2020-04-08

## 2020-04-08 RX ORDER — DIAZEPAM 5 MG/1
5 TABLET ORAL DAILY
Qty: 90 TABLET | OUTPATIENT
Start: 2020-04-08

## 2020-04-14 ENCOUNTER — TELEPHONE (OUTPATIENT)
Dept: FAMILY MEDICINE CLINIC | Facility: CLINIC | Age: 70
End: 2020-04-14

## 2020-04-15 ENCOUNTER — TELEMEDICINE (OUTPATIENT)
Dept: FAMILY MEDICINE CLINIC | Facility: CLINIC | Age: 70
End: 2020-04-15
Payer: MEDICARE

## 2020-04-15 DIAGNOSIS — K21.9 GASTROESOPHAGEAL REFLUX DISEASE WITHOUT ESOPHAGITIS: ICD-10-CM

## 2020-04-15 DIAGNOSIS — G47.00 INSOMNIA, UNSPECIFIED TYPE: Primary | ICD-10-CM

## 2020-04-15 DIAGNOSIS — G71.09 DYSTROPHY, MUSCULAR, HEREDITARY PROGRESSIVE (HCC): ICD-10-CM

## 2020-04-15 DIAGNOSIS — R26.2 AMBULATORY DYSFUNCTION: ICD-10-CM

## 2020-04-15 DIAGNOSIS — F41.9 ANXIETY: ICD-10-CM

## 2020-04-15 DIAGNOSIS — K57.32 DIVERTICULITIS OF LARGE INTESTINE WITHOUT PERFORATION OR ABSCESS WITHOUT BLEEDING: ICD-10-CM

## 2020-04-15 PROCEDURE — 1036F TOBACCO NON-USER: CPT | Performed by: FAMILY MEDICINE

## 2020-04-15 PROCEDURE — 1160F RVW MEDS BY RX/DR IN RCRD: CPT | Performed by: FAMILY MEDICINE

## 2020-04-15 PROCEDURE — 1111F DSCHRG MED/CURRENT MED MERGE: CPT | Performed by: FAMILY MEDICINE

## 2020-04-15 PROCEDURE — 99215 OFFICE O/P EST HI 40 MIN: CPT | Performed by: FAMILY MEDICINE

## 2020-04-15 PROCEDURE — 4040F PNEUMOC VAC/ADMIN/RCVD: CPT | Performed by: FAMILY MEDICINE

## 2020-04-20 ENCOUNTER — TELEMEDICINE (OUTPATIENT)
Dept: PULMONOLOGY | Facility: CLINIC | Age: 70
End: 2020-04-20
Payer: MEDICARE

## 2020-04-20 VITALS — HEIGHT: 60 IN | BODY MASS INDEX: 32.39 KG/M2 | WEIGHT: 165 LBS | HEART RATE: 78 BPM | OXYGEN SATURATION: 92 %

## 2020-04-20 DIAGNOSIS — J43.2 CENTRILOBULAR EMPHYSEMA (HCC): Primary | ICD-10-CM

## 2020-04-20 DIAGNOSIS — G47.34 SLEEP RELATED HYPOXIA: ICD-10-CM

## 2020-04-20 DIAGNOSIS — J98.4 RESTRICTIVE LUNG DISEASE: ICD-10-CM

## 2020-04-20 PROCEDURE — 99214 OFFICE O/P EST MOD 30 MIN: CPT | Performed by: PHYSICIAN ASSISTANT

## 2020-04-20 RX ORDER — INDOLE-3-CARBINOL
POWDER (GRAM) MISCELLANEOUS
COMMUNITY

## 2020-04-20 RX ORDER — MULTIVIT WITH MINERALS/LUTEIN
1000 TABLET ORAL DAILY
COMMUNITY
End: 2022-06-29

## 2020-04-20 RX ORDER — CALCIUM CARBONATE 260MG(650)
TABLET,CHEWABLE ORAL
COMMUNITY
End: 2022-06-29

## 2020-04-20 RX ORDER — UREA 10 %
500 LOTION (ML) TOPICAL 2 TIMES DAILY
COMMUNITY
End: 2022-06-29

## 2020-04-27 ENCOUNTER — TELEPHONE (OUTPATIENT)
Dept: FAMILY MEDICINE CLINIC | Facility: CLINIC | Age: 70
End: 2020-04-27

## 2020-04-27 DIAGNOSIS — K57.32 DIVERTICULITIS OF LARGE INTESTINE WITHOUT PERFORATION OR ABSCESS WITHOUT BLEEDING: Primary | ICD-10-CM

## 2020-04-27 RX ORDER — CEPHALEXIN 500 MG/1
500 CAPSULE ORAL EVERY 12 HOURS SCHEDULED
Qty: 20 CAPSULE | Refills: 0 | Status: SHIPPED | OUTPATIENT
Start: 2020-04-27 | End: 2020-05-07

## 2020-04-27 RX ORDER — METRONIDAZOLE 500 MG/1
500 TABLET ORAL EVERY 8 HOURS SCHEDULED
Qty: 30 TABLET | Refills: 0 | Status: SHIPPED | OUTPATIENT
Start: 2020-04-27 | End: 2020-05-07

## 2020-05-04 ENCOUNTER — TELEPHONE (OUTPATIENT)
Dept: FAMILY MEDICINE CLINIC | Facility: CLINIC | Age: 70
End: 2020-05-04

## 2020-05-04 DIAGNOSIS — G71.09 DYSTROPHY, MUSCULAR, HEREDITARY PROGRESSIVE (HCC): Primary | ICD-10-CM

## 2020-05-04 DIAGNOSIS — J43.2 CENTRILOBULAR EMPHYSEMA (HCC): ICD-10-CM

## 2020-05-04 DIAGNOSIS — R26.2 AMBULATORY DYSFUNCTION: ICD-10-CM

## 2020-05-04 DIAGNOSIS — J98.4 RESTRICTIVE LUNG DISEASE: ICD-10-CM

## 2020-05-04 DIAGNOSIS — N32.81 OVERACTIVE BLADDER: ICD-10-CM

## 2020-05-15 DIAGNOSIS — N32.81 OVERACTIVE BLADDER: ICD-10-CM

## 2020-05-16 ENCOUNTER — NURSE TRIAGE (OUTPATIENT)
Dept: OTHER | Facility: OTHER | Age: 70
End: 2020-05-16

## 2020-05-16 ENCOUNTER — TELEPHONE (OUTPATIENT)
Dept: FAMILY MEDICINE CLINIC | Facility: CLINIC | Age: 70
End: 2020-05-16

## 2020-05-16 DIAGNOSIS — R39.9 UTI SYMPTOMS: Primary | ICD-10-CM

## 2020-05-16 RX ORDER — SULFAMETHOXAZOLE AND TRIMETHOPRIM 800; 160 MG/1; MG/1
1 TABLET ORAL EVERY 12 HOURS SCHEDULED
Qty: 6 TABLET | Refills: 0 | Status: SHIPPED | OUTPATIENT
Start: 2020-05-16 | End: 2020-05-19

## 2020-05-19 RX ORDER — SOLIFENACIN SUCCINATE 10 MG/1
10 TABLET, FILM COATED ORAL DAILY
Qty: 90 TABLET | Refills: 3 | OUTPATIENT
Start: 2020-05-19

## 2020-05-20 ENCOUNTER — TELEMEDICINE (OUTPATIENT)
Dept: FAMILY MEDICINE CLINIC | Facility: CLINIC | Age: 70
End: 2020-05-20
Payer: MEDICARE

## 2020-05-20 DIAGNOSIS — G71.09 DYSTROPHY, MUSCULAR, HEREDITARY PROGRESSIVE (HCC): ICD-10-CM

## 2020-05-20 DIAGNOSIS — F41.0 PANIC ATTACKS: ICD-10-CM

## 2020-05-20 DIAGNOSIS — K21.9 GERD WITHOUT ESOPHAGITIS: ICD-10-CM

## 2020-05-20 DIAGNOSIS — F41.9 ANXIETY: Primary | ICD-10-CM

## 2020-05-20 DIAGNOSIS — G47.34 SLEEP RELATED HYPOXIA: ICD-10-CM

## 2020-05-20 DIAGNOSIS — J98.4 RESTRICTIVE LUNG DISEASE: ICD-10-CM

## 2020-05-20 PROCEDURE — 1036F TOBACCO NON-USER: CPT | Performed by: FAMILY MEDICINE

## 2020-05-20 PROCEDURE — 4040F PNEUMOC VAC/ADMIN/RCVD: CPT | Performed by: FAMILY MEDICINE

## 2020-05-20 PROCEDURE — 1160F RVW MEDS BY RX/DR IN RCRD: CPT | Performed by: FAMILY MEDICINE

## 2020-05-20 PROCEDURE — 1111F DSCHRG MED/CURRENT MED MERGE: CPT | Performed by: FAMILY MEDICINE

## 2020-05-20 PROCEDURE — 99214 OFFICE O/P EST MOD 30 MIN: CPT | Performed by: FAMILY MEDICINE

## 2020-05-20 RX ORDER — DIAZEPAM 5 MG/1
5 TABLET ORAL DAILY PRN
Qty: 30 TABLET | Refills: 0 | Status: SHIPPED | OUTPATIENT
Start: 2020-05-20 | End: 2020-11-23 | Stop reason: ALTCHOICE

## 2020-06-23 ENCOUNTER — OFFICE VISIT (OUTPATIENT)
Dept: FAMILY MEDICINE CLINIC | Facility: CLINIC | Age: 70
End: 2020-06-23
Payer: MEDICARE

## 2020-06-23 VITALS
TEMPERATURE: 98.1 F | SYSTOLIC BLOOD PRESSURE: 124 MMHG | DIASTOLIC BLOOD PRESSURE: 72 MMHG | HEART RATE: 79 BPM | HEIGHT: 60 IN | OXYGEN SATURATION: 96 % | BODY MASS INDEX: 32.22 KG/M2

## 2020-06-23 DIAGNOSIS — G47.00 INSOMNIA, UNSPECIFIED TYPE: ICD-10-CM

## 2020-06-23 DIAGNOSIS — R26.2 AMBULATORY DYSFUNCTION: ICD-10-CM

## 2020-06-23 DIAGNOSIS — G71.09 DYSTROPHY, MUSCULAR, HEREDITARY PROGRESSIVE (HCC): ICD-10-CM

## 2020-06-23 DIAGNOSIS — Z00.00 MEDICARE ANNUAL WELLNESS VISIT, SUBSEQUENT: Primary | ICD-10-CM

## 2020-06-23 DIAGNOSIS — G47.34 SLEEP RELATED HYPOXIA: ICD-10-CM

## 2020-06-23 DIAGNOSIS — J98.4 RESTRICTIVE LUNG DISEASE: ICD-10-CM

## 2020-06-23 DIAGNOSIS — K21.9 GERD WITHOUT ESOPHAGITIS: ICD-10-CM

## 2020-06-23 PROCEDURE — 1036F TOBACCO NON-USER: CPT | Performed by: FAMILY MEDICINE

## 2020-06-23 PROCEDURE — 1125F AMNT PAIN NOTED PAIN PRSNT: CPT | Performed by: FAMILY MEDICINE

## 2020-06-23 PROCEDURE — 99215 OFFICE O/P EST HI 40 MIN: CPT | Performed by: FAMILY MEDICINE

## 2020-06-23 PROCEDURE — 1160F RVW MEDS BY RX/DR IN RCRD: CPT | Performed by: FAMILY MEDICINE

## 2020-06-23 PROCEDURE — 1170F FXNL STATUS ASSESSED: CPT | Performed by: FAMILY MEDICINE

## 2020-06-23 PROCEDURE — 4040F PNEUMOC VAC/ADMIN/RCVD: CPT | Performed by: FAMILY MEDICINE

## 2020-06-23 PROCEDURE — G0439 PPPS, SUBSEQ VISIT: HCPCS | Performed by: FAMILY MEDICINE

## 2020-06-23 RX ORDER — TRAZODONE HYDROCHLORIDE 50 MG/1
50 TABLET ORAL
Qty: 90 TABLET | Refills: 3 | Status: SHIPPED | OUTPATIENT
Start: 2020-06-23 | End: 2020-09-01 | Stop reason: SDUPTHER

## 2020-07-14 ENCOUNTER — TELEPHONE (OUTPATIENT)
Dept: PSYCHIATRY | Facility: CLINIC | Age: 70
End: 2020-07-14

## 2020-08-08 DIAGNOSIS — N32.81 OVERACTIVE BLADDER: ICD-10-CM

## 2020-08-11 RX ORDER — SOLIFENACIN SUCCINATE 10 MG/1
10 TABLET, FILM COATED ORAL DAILY
Qty: 90 TABLET | Refills: 3 | OUTPATIENT
Start: 2020-08-11

## 2020-08-12 ENCOUNTER — TELEPHONE (OUTPATIENT)
Dept: NEUROLOGY | Facility: CLINIC | Age: 70
End: 2020-08-12

## 2020-08-12 NOTE — TELEPHONE ENCOUNTER
Pt called lvm   Returned pt call at 11:46am [facility] for patient to return call

## 2020-08-14 ENCOUNTER — OFFICE VISIT (OUTPATIENT)
Dept: FAMILY MEDICINE CLINIC | Facility: CLINIC | Age: 70
End: 2020-08-14
Payer: MEDICARE

## 2020-08-14 VITALS
HEART RATE: 73 BPM | BODY MASS INDEX: 32.22 KG/M2 | DIASTOLIC BLOOD PRESSURE: 74 MMHG | OXYGEN SATURATION: 92 % | HEIGHT: 60 IN | TEMPERATURE: 97.6 F | SYSTOLIC BLOOD PRESSURE: 132 MMHG

## 2020-08-14 DIAGNOSIS — K59.00 CONSTIPATION, UNSPECIFIED CONSTIPATION TYPE: Primary | ICD-10-CM

## 2020-08-14 DIAGNOSIS — F33.0 MILD EPISODE OF RECURRENT MAJOR DEPRESSIVE DISORDER (HCC): ICD-10-CM

## 2020-08-14 DIAGNOSIS — R14.2 BELCHING: ICD-10-CM

## 2020-08-14 DIAGNOSIS — G71.09 DYSTROPHY, MUSCULAR, HEREDITARY PROGRESSIVE (HCC): ICD-10-CM

## 2020-08-14 DIAGNOSIS — J98.4 RESTRICTIVE LUNG DISEASE: ICD-10-CM

## 2020-08-14 DIAGNOSIS — F41.8 ANXIETY ABOUT HEALTH: ICD-10-CM

## 2020-08-14 DIAGNOSIS — G47.34 SLEEP RELATED HYPOXIA: ICD-10-CM

## 2020-08-14 DIAGNOSIS — K21.9 GERD WITHOUT ESOPHAGITIS: ICD-10-CM

## 2020-08-14 DIAGNOSIS — R14.3 FLATULENCE: ICD-10-CM

## 2020-08-14 DIAGNOSIS — F41.9 ANXIETY: ICD-10-CM

## 2020-08-14 PROCEDURE — 4040F PNEUMOC VAC/ADMIN/RCVD: CPT | Performed by: FAMILY MEDICINE

## 2020-08-14 PROCEDURE — 99214 OFFICE O/P EST MOD 30 MIN: CPT | Performed by: FAMILY MEDICINE

## 2020-08-14 PROCEDURE — 1160F RVW MEDS BY RX/DR IN RCRD: CPT | Performed by: FAMILY MEDICINE

## 2020-08-14 PROCEDURE — 1036F TOBACCO NON-USER: CPT | Performed by: FAMILY MEDICINE

## 2020-08-14 RX ORDER — CITALOPRAM 10 MG/1
10 TABLET ORAL DAILY
Qty: 90 TABLET | Refills: 1 | Status: SHIPPED | OUTPATIENT
Start: 2020-08-14 | End: 2021-06-09 | Stop reason: SDUPTHER

## 2020-08-14 NOTE — PROGRESS NOTES
Chief Complaint   Patient presents with    Abdominal Pain     x 5 months, lower     Shortness of Breath    Constipation    Gas     burping      Assessment/Plan:  If face book irritates you, stop face book  Turn off the news  GI evaluation per patient request          Diagnoses and all orders for this visit:    Constipation, unspecified constipation type  -     Ambulatory referral to Gastroenterology; Future    Belching  -     Ambulatory referral to Gastroenterology; Future    Flatulence  -     Ambulatory referral to Gastroenterology; Future    Anxiety    Mild episode of recurrent major depressive disorder (HCC)  -     citalopram (CeleXA) 10 mg tablet; Take 1 tablet (10 mg total) by mouth daily    Anxiety about health    GERD without esophagitis    Sleep related hypoxia    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    Dystrophy, muscular, 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[facility] [address] )          Subjective:      Patient ID: Alexandria Ramírez is a 79 y o  female  Doesn't feel right since past March when had diverticulitis  Very concerned about covid  Stays home in apt and away from neighbors  Feels bloated, passing gas, some lower abdominal discomfort  This is worse when spends time of face book getting irritated/yelling   at people  Had stopped Celexa 6 months ago and is feeling depressed  with what is going on around country/world  Would like to restart Celexa  Follows with pulmonary for lung disease  Off Ativan at HS  [facility] to have a new power wheel chair  SH: Neg tob or [place]  PMH: No changes  The following portions of the patient's history were reviewed and updated as appropriate: allergies, current medications, past medical history, past social history, past surgical history and problem list     Review of Systems   Constitutional: Negative  HENT: Negative  Eyes: Negative  Respiratory: Negative  Cardiovascular: Negative  Gastrointestinal: Positive for abdominal distention and constipation  Negative for abdominal pain  Genitourinary: Negative  Musculoskeletal: Positive for gait problem  Skin: Negative  Psychiatric/Behavioral: The patient is nervous/anxious            Objective:      /74 (BP Location: Left arm, Patient Position: Sitting, Cuff Size: Standard)   Pulse 73   Temp 97 6 °F (36 4 °C) (Tympanic)   Ht 5' (1 524 m)   LMP  (LMP Unknown)   SpO2 92%   Breastfeeding No   BMI 32 22 kg/m²       Current Outpatient Medications:     albuterol (PROVENTIL HFA,VENTOLIN HFA) 90 mcg/act inhaler, Inhale 2 puffs every 6 (six) hours as needed for wheezing, Disp: 1 Inhaler, Rfl: 2    Ascorbic Acid (VITAMIN C) 1000 MG tablet, Take 1,000 mg by mouth daily, Disp: , Rfl:     clotrimazole-betamethasone (LOTRISONE) 1-0 05 % lotion, , Disp: , Rfl: 0    diazepam (VALIUM) 5 mg tablet, Take 1 tablet (5 mg total) by mouth daily as needed for anxiety, Disp: 30 tablet, Rfl: 0    ELDERBERRY PO, Take by mouth, Disp: , Rfl:     FLAXSEED, LINSEED, PO, Take 10 mg by mouth, Disp: , Rfl:     fluticasone (FLONASE) 50 mcg/act nasal spray, 2 sprays into each nostril daily, Disp: 16 g, Rfl: 3    Indole-3-Carbinol POWD, by Does not apply route, Disp: , Rfl:     ketoconazole (NIZORAL) 2 % shampoo, Apply 1 application topically 2 (two) times a week, Disp: 120 mL, Rfl: 1    magnesium gluconate (MAGONATE) 500 mg tablet, Take 500 mg by mouth 2 (two) times a day, Disp: , Rfl:     Melatonin 3 MG CAPS, Take 3 mg by mouth, Disp: , Rfl:     omeprazole (PriLOSEC) 20 mg delayed release capsule, Take 1 capsule (20 mg total) by mouth every morning for 113 days, Disp: 90 capsule, Rfl: 3    Potassium (POTASSIMIN PO), Take 550 mg by mouth, Disp: , Rfl:     solifenacin (VESICARE) 10 MG tablet, Take 1 tablet (10 mg total) by mouth daily, Disp: 90 tablet, Rfl: 3    traZODone (DESYREL) 50 mg tablet, Take 1 tablet (50 mg total) by mouth daily at bedtime, Disp: 90 tablet, Rfl: 3    umeclidinium bromide (Incruse Ellipta) 62 5 mcg/inh AEPB inhaler, Inhale 1 puff daily, Disp: 1 each, Rfl: 1    VITAMIN D PO, Take by mouth, Disp: , Rfl:     Zinc 10 MG LOZG, Apply to the mouth or throat, Disp: , Rfl:     citalopram (CeleXA) 10 mg tablet, Take 1 tablet (10 mg total) by mouth daily, Disp: 90 tablet, Rfl: 1    STARLA D ARCO PO, Take by mouth, Disp: , Rfl:     Allergies   Allergen Reactions    Amoxicillin     Codeine      Other reaction(s): Other (See Comments)  n/v       Past Surgical History:   Procedure Laterality Date    COLONOSCOPY N/A 1/22/2019    Procedure: COLONOSCOPY;  Surgeon: Madhav Jaimes MD;  Location: [facility]; Service: Colorectal    MASTECTOMY Left 2007    left breast mastectomy     TONSILLECTOMY      TOOTH EXTRACTION      TUBAL LIGATION            Physical Exam  Constitutional:       Appearance: She is well-developed  HENT:      Head: Normocephalic and atraumatic  Right Ear: External ear normal       Left Ear: External ear normal       Nose: Nose normal    Eyes:      Conjunctiva/sclera: Conjunctivae normal       Pupils: Pupils are equal, round, and reactive to light  Neck:      Musculoskeletal: Normal range of motion and neck supple  Cardiovascular:      Rate and Rhythm: Normal rate and regular rhythm  Heart sounds: Normal heart sounds  Pulmonary:      Effort: Pulmonary effort is normal       Breath sounds: Normal breath sounds  Abdominal:      General: Bowel sounds are normal       Palpations: Abdomen is soft  Tenderness: There is no abdominal tenderness  Skin:     General: Skin is warm and dry  Neurological:      Mental Status: She is alert and oriented to person, place, and time  Deep Tendon Reflexes: Reflexes are normal and symmetric  Psychiatric:         Behavior: Behavior normal          Thought Content:  Thought content normal          Judgment: Judgment normal

## 2020-09-01 DIAGNOSIS — G47.00 INSOMNIA, UNSPECIFIED TYPE: ICD-10-CM

## 2020-09-01 RX ORDER — TRAZODONE HYDROCHLORIDE 50 MG/1
50 TABLET ORAL
Qty: 90 TABLET | Refills: 1 | Status: SHIPPED | OUTPATIENT
Start: 2020-09-01 | End: 2020-09-18 | Stop reason: SDUPTHER

## 2020-09-03 ENCOUNTER — CONSULT (OUTPATIENT)
Dept: GASTROENTEROLOGY | Facility: CLINIC | Age: 70
End: 2020-09-03
Payer: MEDICARE

## 2020-09-03 VITALS
WEIGHT: 166 LBS | TEMPERATURE: 99.1 F | DIASTOLIC BLOOD PRESSURE: 87 MMHG | SYSTOLIC BLOOD PRESSURE: 160 MMHG | HEIGHT: 60 IN | HEART RATE: 82 BPM | BODY MASS INDEX: 32.59 KG/M2

## 2020-09-03 DIAGNOSIS — K59.00 CONSTIPATION, UNSPECIFIED CONSTIPATION TYPE: ICD-10-CM

## 2020-09-03 DIAGNOSIS — R14.3 FLATULENCE: ICD-10-CM

## 2020-09-03 DIAGNOSIS — R14.2 BELCHING: ICD-10-CM

## 2020-09-03 DIAGNOSIS — K57.32 DIVERTICULITIS LARGE INTESTINE W/O PERFORATION OR ABSCESS W/O BLEEDING: Primary | ICD-10-CM

## 2020-09-03 PROCEDURE — 99204 OFFICE O/P NEW MOD 45 MIN: CPT | Performed by: INTERNAL MEDICINE

## 2020-09-03 RX ORDER — SACCHAROMYCES BOULARDII 50 MG
CAPSULE ORAL
COMMUNITY

## 2020-09-03 RX ORDER — TRIAMCINOLONE ACETONIDE 1 MG/G
CREAM TOPICAL
COMMUNITY
Start: 2020-06-18 | End: 2022-01-03 | Stop reason: SDUPTHER

## 2020-09-03 RX ORDER — ALCLOMETASONE DIPROPIONATE 0.5 MG/G
CREAM TOPICAL
COMMUNITY
Start: 2020-06-18 | End: 2022-01-03

## 2020-09-03 RX ORDER — KETOCONAZOLE 20 MG/G
CREAM TOPICAL
COMMUNITY
Start: 2020-06-18

## 2020-09-03 RX ORDER — SODIUM, POTASSIUM,MAG SULFATES 17.5-3.13G
180 SOLUTION, RECONSTITUTED, ORAL ORAL ONCE
Qty: 180 ML | Refills: 0 | Status: SHIPPED | OUTPATIENT
Start: 2020-09-03 | End: 2020-10-23 | Stop reason: HOSPADM

## 2020-09-03 NOTE — PATIENT INSTRUCTIONS
Colon/EGD scheduled for 10/23/20 with Dr Mesa [facility] instructions given by Trevin RICH in the office

## 2020-09-03 NOTE — PROGRESS NOTES
[facility] - Outpatient Consultation  Andrew Lobo 79 y o  female MRN: [id number]  Encounter: [id number]          ASSESSMENT AND PLAN:        1  Constipation, unspecified constipation type  2  Diverticulitis large intestine w/o perforation or abscess w/o bleeding    Will assess recent bout of diverticulitis with colonoscopy and evalaute her constipation at this time  She had episode of ischemic colitis and should be evaluated  Also will give her miralax sample for constipation  She does have hernia so will have to be careful with the colonoscope  - Ambulatory referral to Gastroenterology  - Colonoscopy; Future  - Suprep Bowel Prep Kit 17 5-3 13-1 6 GM/177ML SOLN; Take 180 mL by mouth once for 1 dose  Dispense: 180 mL; Refill: 0      2  Belching  3  Flatulence  Avoid all lactose containing foods including milks/cheeses/dairy  Low fiber diet  Small, frequent meals  Avoid drinking from straws  Avoid all carbonated beverages  Will proceed with Gastric emtpying study to assess for bloating and flatulence along with EGD with biopsy of stomach and duodenum   - Ambulatory referral to Gastroenterology  - NM gastric emptying; Future  - EGD; Future    The rationale for colonoscopy and endoscopy with possible biopsy, possible polypectomy, with IV sedation as well as all risks, benefits, and alternatives were discussed with the patient in detail  Risks including but not limited to perforation, bleeding, need for blood transfusion or emergent surgery, and missed neoplasm were reviewed in detail with the patient  The patient demonstrates full understanding and wishes to proceed  ______________________________________________________________    HPI:  Andrew Lobo is a 79y o  year old female who presents to the office for evaluation of abdominal discomfort, bloating/belching and diverticulitis  He has history of muscular dystrophy      She was admitted to [facility] with diverticulitis in March  She had a CT scan which revealed thickening and pericolonic inflammatory stranding involving the proximal sigmoid colon concerning for acute diverticulitis without perforation or abscess  Since the diverticulitis, she hasn't felt normal  She has discomfort on and off  The covid also made things worse  She has excessive burping  She burps 10-15x an hour  She has days with no appetite  She will occasionally feel full after eating  She has persistent discomfort in the abdomen  There is a sharp pain across her belly  She takes prilosec which helps her acid reflux significantly  She did have colonoscopy for ischemia in her colon in jan 2019  She had fair prep at the time and she had patchy mucosal ischemia  She saw her PCP earlier this month and she brought up concerns bloating/flatulence along with GERD  She had a colonoscopy last     REVIEW OF SYSTEMS:    CONSTITUTIONAL: Denies any fever, chills, rigors, and weight loss  HEENT: No earache or tinnitus  Denies hearing loss or visual disturbances  CARDIOVASCULAR: No chest pain or palpitations  RESPIRATORY: Denies any cough, hemoptysis, shortness of breath or dyspnea on exertion  GASTROINTESTINAL: As noted in the History of Present Illness  GENITOURINARY: No problems with urination  Denies any hematuria or dysuria  NEUROLOGIC: No dizziness or vertigo, denies headaches  MUSCULOSKELETAL: Denies any muscle or joint pain  SKIN: Denies skin rashes or itching  ENDOCRINE: Denies excessive thirst  Denies intolerance to heat or cold  PSYCHOSOCIAL: Denies depression or anxiety  Denies any recent memory loss         Historical Information   Past Medical History:   Diagnosis Date    Anxiety     Breast cancer ([facility] )     left    Colitis     [facility]     Depression     Difficult intubation     Excessive daytime sleepiness     GERD (gastroesophageal reflux disease)     Hyperlipidemia  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    Osteoporosis     Overactive bladder     Pneumonitis     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[facility] )     Skin cancer     Skin disorder     Vitamin D deficiency      Past Surgical History:   Procedure Laterality Date    COLONOSCOPY N/A 2019    Procedure: COLONOSCOPY;  Surgeon: Jessy Ellington MD;  Location:  GI LAB;   Service: Colorectal    MASTECTOMY Left 2007    left breast mastectomy     TONSILLECTOMY      TOOTH EXTRACTION      TUBAL LIGATION       Social History   Social History     Substance and Sexual Activity   Alcohol Use No    Comment: social drinker per [employer]      Social History     Substance and Sexual Activity   Drug Use Yes    Types: Marijuana    Comment: oral use     Social History     Tobacco Use   Smoking Status Former Smoker    Packs/day: 1 00    Years: 41 00    Pack years: 41 00    Types: Cigarettes    Start date:     Last attempt to quit:     Years since quittin 6   Smokeless Tobacco Never Used     Family History   Problem Relation Age of Onset    Other Mother         bone loss    Skin cancer Father     Hypertension Father         benign     Other Father         bone loss    Muscular dystrophy Brother         of the limb gridle     Muscular dystrophy Family         of the limb girdle       Meds/Allergies       Current Outpatient Medications:     albuterol (PROVENTIL HFA,VENTOLIN HFA) 90 mcg/act inhaler    alclomethasone (ACLOVATE) 0 05 % cream    citalopram (CeleXA) 10 mg tablet    diazepam (VALIUM) 5 mg tablet    fluticasone (FLONASE) 50 mcg/act nasal spray    Probiotic Product (Florastor Plus) CAPS    S-Adenosylmethionine (SAMe) 400 MG TABS    solifenacin (VESICARE) 10 MG tablet    traZODone (DESYREL) 50 mg tablet    triamcinolone (KENALOG) 0 1 % cream    umeclidinium bromide (Incruse Ellipta) 62 5 mcg/inh AEPB inhaler    Ascorbic Acid (VITAMIN C) 1000 MG tablet   clotrimazole-betamethasone (LOTRISONE) 1-0 05 % lotion    ELDERBERRY PO    FLAXSEED, LINSEED, PO    Indole-3-Carbinol POWD    ketoconazole (NIZORAL) 2 % cream    ketoconazole (NIZORAL) 2 % shampoo    magnesium gluconate (MAGONATE) 500 mg tablet    Melatonin 3 MG CAPS    omeprazole (PriLOSEC) 20 mg delayed release capsule    STARLA D ARCO PO    Potassium (POTASSIMIN PO)    VITAMIN D PO    Zinc 10 MG LOZG    Allergies   Allergen Reactions    Amoxicillin     Codeine      Other reaction(s): Other (See Comments)  n/v           Objective     Blood pressure 160/87, pulse 82, temperature 99 1 °F (37 3 °C), temperature source Tympanic, height 5' (1 524 m), weight 75 3 kg (166 lb), not currently breastfeeding  Body mass index is 32 42 kg/m²  PHYSICAL EXAM:      General Appearance:   Alert, cooperative, no distress   HEENT:   Normocephalic, atraumatic, anicteric  Lungs:   Equal chest rise and unlabored breathing, normal cough   Heart:   No visualized JVD   Abdomen:   Soft, non-tender, non-distended; no masses, no organomegaly    Genitalia:   Deferred    Rectal:   Deferred    Extremities:  No cyanosis, clubbing or edema    Pulses:  Musculoskeletal:  2+ and symmetric  Normal range of motion visualized    Skin:  Neuro:  No jaundice, rashes, or lesions   Alert and appropriate       Lab Results:   No visits with results within 1 Day(s) from this visit     Latest known visit with results is:   Admission on 03/28/2020, Discharged on 03/31/2020   Component Date Value    WBC 03/28/2020 14 76*    RBC 03/28/2020 5 16*    Hemoglobin 03/28/2020 14 9     Hematocrit 03/28/2020 46 4*    MCV 03/28/2020 90     MCH 03/28/2020 28 9     MCHC 03/28/2020 32 1     RDW 03/28/2020 14 4     MPV 03/28/2020 10 0     Platelets 44/17/4924 456*    nRBC 03/28/2020 0     Neutrophils Relative 03/28/2020 74     Immat GRANS % 03/28/2020 0     Lymphocytes Relative 03/28/2020 17     Monocytes Relative 03/28/2020 8     Eosinophils Relative 03/28/2020 1     Basophils Relative 03/28/2020 0     Neutrophils Absolute 03/28/2020 10 90*    Immature Grans Absolute 03/28/2020 0 04     Lymphocytes Absolute 03/28/2020 2 52     Monocytes Absolute 03/28/2020 1 14     Eosinophils Absolute 03/28/2020 0 11     Basophils Absolute 03/28/2020 0 05     Sodium 03/28/2020 139     Potassium 03/28/2020 3 4*    Chloride 03/28/2020 100     CO2 03/28/2020 35*    ANION GAP 03/28/2020 4     BUN 03/28/2020 18     Creatinine 03/28/2020 0 41*    Glucose 03/28/2020 90     Calcium 03/28/2020 9 4     eGFR 03/28/2020 105     Total Bilirubin 03/28/2020 0 28     Bilirubin, Direct 03/28/2020 0 08     Alkaline Phosphatase 03/28/2020 95     AST 03/28/2020 20     ALT 03/28/2020 28     Total Protein 03/28/2020 8 1     Albumin 03/28/2020 3 6     Lipase 03/28/2020 96     Color, UA 03/28/2020 Yellow     Clarity, UA 03/28/2020 Clear     pH, UA 03/28/2020 6 0     Leukocytes, UA 03/28/2020 Negative     Nitrite, UA 03/28/2020 Negative     Protein, UA 03/28/2020 Negative     Glucose, UA 03/28/2020 Negative     Ketones, UA 03/28/2020 >=160 (4+)*    Urobilinogen, UA 03/28/2020 0 2     Bilirubin, UA 03/28/2020 Negative     Blood, UA 03/28/2020 Negative     Specific Gravity, UA 03/28/2020 1 025     Sodium 03/29/2020 142     Potassium 03/29/2020 3 4*    Chloride 03/29/2020 105     CO2 03/29/2020 32     ANION GAP 03/29/2020 5     BUN 03/29/2020 9     Creatinine 03/29/2020 0 34*    Glucose 03/29/2020 99     Calcium 03/29/2020 8 3     eGFR 03/29/2020 112     WBC 03/29/2020 9 40     RBC 03/29/2020 4 25     Hemoglobin 03/29/2020 12 3     Hematocrit 03/29/2020 38 3     MCV 03/29/2020 90     MCH 03/29/2020 28 9     MCHC 03/29/2020 32 1     RDW 03/29/2020 14 5     MPV 03/29/2020 10 5     Platelets 60/62/6038 362     nRBC 03/29/2020 0     Neutrophils Relative 03/29/2020 64     Immat GRANS % 03/29/2020 0     Lymphocytes Relative 03/29/2020 24     Monocytes Relative 03/29/2020 10     Eosinophils Relative 03/29/2020 2     Basophils Relative 03/29/2020 0     Neutrophils Absolute 03/29/2020 6 00     Immature Grans Absolute 03/29/2020 0 01     Lymphocytes Absolute 03/29/2020 2 22     Monocytes Absolute 03/29/2020 0 98     Eosinophils Absolute 03/29/2020 0 16     Basophils Absolute 03/29/2020 0 03     NT-proBNP 03/29/2020 41     WBC 03/30/2020 8 67     RBC 03/30/2020 4 57     Hemoglobin 03/30/2020 13 0     Hematocrit 03/30/2020 42 1     MCV 03/30/2020 92     MCH 03/30/2020 28 4     MCHC 03/30/2020 30 9*    RDW 03/30/2020 14 4     MPV 03/30/2020 10 3     Platelets 24/86/0538 379     nRBC 03/30/2020 0     Neutrophils Relative 03/30/2020 64     Immat GRANS % 03/30/2020 0     Lymphocytes Relative 03/30/2020 25     Monocytes Relative 03/30/2020 9     Eosinophils Relative 03/30/2020 2     Basophils Relative 03/30/2020 0     Neutrophils Absolute 03/30/2020 5 56     Immature Grans Absolute 03/30/2020 0 03     Lymphocytes Absolute 03/30/2020 2 13     Monocytes Absolute 03/30/2020 0 79     Eosinophils Absolute 03/30/2020 0 13     Basophils Absolute 03/30/2020 0 03     Sodium 03/30/2020 139     Potassium 03/30/2020 4 3     Chloride 03/30/2020 106     CO2 03/30/2020 28     ANION GAP 03/30/2020 5     BUN 03/30/2020 7     Creatinine 03/30/2020 0 30*    Glucose 03/30/2020 95     Calcium 03/30/2020 8 6     eGFR 03/30/2020 116     WBC 03/31/2020 8 26     RBC 03/31/2020 4 13     Hemoglobin 03/31/2020 11 9     Hematocrit 03/31/2020 37 9     MCV 03/31/2020 92     MCH 03/31/2020 28 8     MCHC 03/31/2020 31 4     RDW 03/31/2020 14 4     Platelets 60/30/6973 353     MPV 03/31/2020 10 2     Sodium 03/31/2020 141     Potassium 03/31/2020 3 3*    Chloride 03/31/2020 105     CO2 03/31/2020 32     ANION GAP 03/31/2020 4     BUN 03/31/2020 6     Creatinine 03/31/2020 0 29*    Glucose 03/31/2020 92     Calcium 03/31/2020 8 3     eGFR 03/31/2020 118          Radiology Results:   No results found    Answers for HPI/ROS submitted by the patient on 9/2/2020   Abdominal pain  Chronicity: chronic  Onset: more than 1 month ago  Onset quality: sudden  Frequency: constantly  Episode duration: 24 weeks  Progression since onset: waxing and waning  Pain location: LLQ  Pain - numeric: 8/10  Pain quality: sharp  Radiates to: suprapubic region  anorexia: Yes  arthralgias: No  belching: Yes  constipation: Yes  diarrhea: No  dysuria: No  fever: No  flatus: Yes  frequency: No  headaches: No  hematochezia: No  hematuria: No  melena: No  myalgias: No  nausea: No  weight loss: Yes  vomiting: No  Aggravated by: nothing  Relieved by: nothing  Diagnostic workup: CT scan, lower endoscopy

## 2020-09-04 DIAGNOSIS — K59.09 OTHER CONSTIPATION: Primary | ICD-10-CM

## 2020-09-04 RX ORDER — POLYETHYLENE GLYCOL 3350 17 G/17G
POWDER, FOR SOLUTION ORAL
Qty: 16 EACH | Refills: 0 | Status: SHIPPED | COMMUNITY
Start: 2020-09-04 | End: 2020-10-23 | Stop reason: HOSPADM

## 2020-09-11 DIAGNOSIS — K57.32 DIVERTICULITIS LARGE INTESTINE W/O PERFORATION OR ABSCESS W/O BLEEDING: Primary | ICD-10-CM

## 2020-09-11 DIAGNOSIS — K59.00 CONSTIPATION, UNSPECIFIED CONSTIPATION TYPE: ICD-10-CM

## 2020-09-11 RX ORDER — DICYCLOMINE HCL 20 MG
20 TABLET ORAL EVERY 6 HOURS
Qty: 120 TABLET | Refills: 1 | Status: SHIPPED | OUTPATIENT
Start: 2020-09-11 | End: 2020-10-23 | Stop reason: HOSPADM

## 2020-09-11 NOTE — TELEPHONE ENCOUNTER
Patients GI provider:  Dr Bg Coronado    Number to return call: [phone]    Reason for call: Pt calling because she is now currently having low grade fevers(pt states her normal temp is always 97 4 and now her temp is 98 4) as well as abd pain     Scheduled procedure/appointment date if applicable: 1/0/15

## 2020-09-11 NOTE — TELEPHONE ENCOUNTER
Spoke with patient  She would like to try the bentyl  I explained this can cause constipation  She is aware if she starts to become constipated, she can use her miralax prn  Patient will call back if symptoms worsen

## 2020-09-11 NOTE — TELEPHONE ENCOUNTER
Agree with the patient reassurance you provided, she can try gas-x or beano OTC  We could consider bentyl for the pain if this is ineffective but bentyl can worsen constipation and she has some underlying constipation

## 2020-09-11 NOTE — TELEPHONE ENCOUNTER
Dr Flaca Manzanares patient hx constipation, diverticulitis, belching, flatulence    Patient calls today with continued lower abdominal pain/ cramping  She states this pain is "random" and not related to what she eats  She also has sharp pains that come and go, states once she passes gas the pain goes away  Denies chills, vomiting, nausea  Has decreased appetite and bloating  Patient states ever since she was hospitalized for diverticulitis in March, she has "not felt the same"  Her temperature today was 98 7, patient is very concerned and states this is a "low grade temperature because her normal temperature is 97 8"  She denies chills  I explained 98 7 is not a fever, temperature can fluctuate throughout the day based on many factors (exercise, room temperature, clothing, etc )  Patient started crying and stated she is afraid she has diverticulitis again and is unsure if she should call an ambulance  I reassured her that she does not need to be evaluated in the emergency room at this time  Patient is taking omeprazole 20 mg daily, and many nutritional supplements her daughter has recommended as she is a nutritionist  Patient just recently started taking swedish bitters  She takes multiple probiotics as well  She states she stopped all of these supplements as she thought they may be causing her symptoms but did not notice a difference and has restarted them  Patient has been having daily formed BMs, denies blood or mucus, uses miralax prn  She is also concerned as she has lost 3 pounds over the past 2 weeks which she states is a lot for her as she is not trying to lose weight  Patient is avoiding lactose, drinking through straws and carbonated beverages  She is on a low fiber diet and is eating small frequent meals  Avoiding trigger foods  She is scheduled for a gastric emptying study on 10/17  Colonoscopy/ EGD scheduled for 10/23   Patient will be taking tylenol and gaviscon today to help with her symptoms as I have recommended  Patient asked that I let the provider know about her continued symptoms and wants to know if anything else can be prescribed for her symptoms prior to her gastric emptying study  I have reassured her that I do not believe her symptoms are consistent with diverticulitis but she would like a provider to reassure her as well  Please advise

## 2020-09-18 ENCOUNTER — OFFICE VISIT (OUTPATIENT)
Dept: FAMILY MEDICINE CLINIC | Facility: CLINIC | Age: 70
End: 2020-09-18
Payer: MEDICARE

## 2020-09-18 VITALS
DIASTOLIC BLOOD PRESSURE: 88 MMHG | HEART RATE: 95 BPM | OXYGEN SATURATION: 96 % | SYSTOLIC BLOOD PRESSURE: 136 MMHG | TEMPERATURE: 98.1 F

## 2020-09-18 DIAGNOSIS — G47.00 INSOMNIA, UNSPECIFIED TYPE: ICD-10-CM

## 2020-09-18 DIAGNOSIS — Z91.14 OVERUSE OF MEDICATION: ICD-10-CM

## 2020-09-18 DIAGNOSIS — H61.20 WAX IN EAR: Primary | ICD-10-CM

## 2020-09-18 PROCEDURE — 99213 OFFICE O/P EST LOW 20 MIN: CPT | Performed by: FAMILY MEDICINE

## 2020-09-18 RX ORDER — TRAZODONE HYDROCHLORIDE 50 MG/1
50 TABLET ORAL
Qty: 90 TABLET | Refills: 1 | Status: SHIPPED | OUTPATIENT
Start: 2020-09-18 | End: 2021-06-09 | Stop reason: SDUPTHER

## 2020-09-18 NOTE — PROGRESS NOTES
Chief Complaint   Patient presents with    Ear Fullness     right ear feels clogged    Nasal Drainage    Nasal Congestion       Assessment/Plan:  DeBrox daily for 4 days, DC the [address] is too drying for you  Wax may need to be sucked out  Diagnoses and all orders for this visit:    Wax in ear    Insomnia, unspecified type  -     traZODone (DESYREL) 50 mg tablet; Take 1 tablet (50 mg total) by mouth daily at bedtime    Overuse of medication          Subjective:      Patient ID: Alexandria Ramírez is a 79 y o  female  Nasal congestion past 2 weeks  Right ear feels full, uses q tips  Using Benadryl 25 mg  at HS to help sleep  The following portions of the patient's history were reviewed and updated as appropriate: allergies, current medications, past medical history, past social history and problem list     Review of Systems   Constitutional: Negative  HENT: Positive for congestion and hearing loss           Right ear         Objective:      /88 (BP Location: Left arm, Patient Position: Sitting, Cuff Size: Standard)   Pulse 95   Temp 98 1 °F (36 7 °C) (Tympanic)   LMP  (LMP Unknown)   SpO2 96%       Current Outpatient Medications:     albuterol (PROVENTIL HFA,VENTOLIN HFA) 90 mcg/act inhaler, Inhale 2 puffs every 6 (six) hours as needed for wheezing, Disp: 1 Inhaler, Rfl: 2    alclomethasone (ACLOVATE) 0 05 % cream, APPLY A THIN LAYER TWICE A DAY TO AFFECTED AREA(S), Disp: , Rfl:     Ascorbic Acid (VITAMIN C) 1000 MG tablet, Take 1,000 mg by mouth daily, Disp: , Rfl:     citalopram (CeleXA) 10 mg tablet, Take 1 tablet (10 mg total) by mouth daily, Disp: 90 tablet, Rfl: 1    clotrimazole-betamethasone (LOTRISONE) 1-0 05 % lotion, , Disp: , Rfl: 0    diazepam (VALIUM) 5 mg tablet, Take 1 tablet (5 mg total) by mouth daily as needed for anxiety, Disp: 30 tablet, Rfl: 0    dicyclomine (BENTYL) 20 mg tablet, Take 1 tablet (20 mg total) by mouth every 6 (six) hours, Disp: 120 tablet, Rfl: 1    ELDERBERRY PO, Take by mouth, Disp: , Rfl:     FLAXSEED, LINSEED, PO, Take 10 mg by mouth, Disp: , Rfl:     fluticasone (FLONASE) 50 mcg/act nasal spray, 2 sprays into each nostril daily, Disp: 16 g, Rfl: 3    Indole-3-Carbinol POWD, by Does not apply route, Disp: , Rfl:     ketoconazole (NIZORAL) 2 % cream, APPLY TWICE A DAY TO THE AFFECTED AREA(S), Disp: , Rfl:     ketoconazole (NIZORAL) 2 % shampoo, Apply 1 application topically 2 (two) times a week, Disp: 120 mL, Rfl: 1    magnesium gluconate (MAGONATE) 500 mg tablet, Take 500 mg by mouth 2 (two) times a day, Disp: , Rfl:     Melatonin 3 MG CAPS, Take 3 mg by mouth, Disp: , Rfl:     STARLA D ARCO PO, Take by mouth, Disp: , Rfl:     polyethylene glycol (MIRALAX) 17 g packet, Lot [id number] Exp 04/21, Disp: 16 each, Rfl: 0    Potassium (POTASSIMIN PO), Take 550 mg by mouth, Disp: , Rfl:     Probiotic Product (Florastor Plus) CAPS, , Disp: , Rfl:     S-Adenosylmethionine (SAMe) 400 MG TABS, , Disp: , Rfl:     solifenacin (VESICARE) 10 MG tablet, Take 1 tablet (10 mg total) by mouth daily, Disp: 90 tablet, Rfl: 3    traZODone (DESYREL) 50 mg tablet, Take 1 tablet (50 mg total) by mouth daily at bedtime, Disp: 90 tablet, Rfl: 1    triamcinolone (KENALOG) 0 1 % cream, APPLY A THIN LAYER TWICE A DAY TO THE AFFECTED AREA(S) OF TRUNK, Disp: , Rfl:     umeclidinium bromide (Incruse Ellipta) 62 5 mcg/inh AEPB inhaler, Inhale 1 puff daily, Disp: 1 each, Rfl: 1    VITAMIN D PO, Take by mouth, Disp: , Rfl:     Zinc 10 MG LOZG, Apply to the mouth or throat, Disp: , Rfl:     omeprazole (PriLOSEC) 20 mg delayed release capsule, Take 1 capsule (20 mg total) by mouth every morning for 113 days, Disp: 90 capsule, Rfl: 3    Suprep Bowel Prep Kit 17 5-3 13-1 6 GM/177ML SOLN, Take 180 mL by mouth once for 1 dose, Disp: 180 mL, Rfl: 0     Physical Exam  Constitutional:       Appearance: Normal appearance  HENT:      Head: Normocephalic and atraumatic  Right Ear: External ear normal       Ears:      Comments: Wax packed deep in canal      Nose:      Comments: Septum is dry, no secretions  Mouth/Throat:      Mouth: Mucous membranes are dry  Pharynx: Oropharynx is clear  Neurological:      Mental Status: She is alert

## 2020-09-23 ENCOUNTER — HOSPITAL ENCOUNTER (INPATIENT)
Facility: HOSPITAL | Age: 70
LOS: 30 days | Discharge: NURSING, REHAB OR LONG-TERM CARE | DRG: 854 | End: 2020-10-23
Attending: EMERGENCY MEDICINE | Admitting: SURGERY
Payer: MEDICARE

## 2020-09-23 ENCOUNTER — APPOINTMENT (EMERGENCY)
Dept: CT IMAGING | Facility: HOSPITAL | Age: 70
DRG: 854 | End: 2020-09-23
Payer: MEDICARE

## 2020-09-23 DIAGNOSIS — J98.4 RESTRICTIVE LUNG DISEASE: ICD-10-CM

## 2020-09-23 DIAGNOSIS — J43.2 CENTRILOBULAR EMPHYSEMA (HCC): ICD-10-CM

## 2020-09-23 DIAGNOSIS — A41.9 SEPSIS (HCC): ICD-10-CM

## 2020-09-23 DIAGNOSIS — G47.34 SLEEP RELATED HYPOXIA: ICD-10-CM

## 2020-09-23 DIAGNOSIS — D75.839 THROMBOCYTOSIS: ICD-10-CM

## 2020-09-23 DIAGNOSIS — R10.30 LOWER ABDOMINAL PAIN: ICD-10-CM

## 2020-09-23 DIAGNOSIS — K57.92 DIVERTICULITIS: ICD-10-CM

## 2020-09-23 DIAGNOSIS — G71.09 DYSTROPHY, MUSCULAR, HEREDITARY PROGRESSIVE (HCC): Primary | ICD-10-CM

## 2020-09-23 DIAGNOSIS — K57.20 DIVERTICULITIS OF LARGE INTESTINE WITH ABSCESS: ICD-10-CM

## 2020-09-23 DIAGNOSIS — K57.80 PERFORATED DIVERTICULUM: ICD-10-CM

## 2020-09-23 LAB
ALBUMIN SERPL BCP-MCNC: 2.8 G/DL (ref 3.5–5)
ALP SERPL-CCNC: 111 U/L (ref 46–116)
ALT SERPL W P-5'-P-CCNC: 23 U/L (ref 12–78)
ANION GAP SERPL CALCULATED.3IONS-SCNC: 11 MMOL/L (ref 4–13)
AST SERPL W P-5'-P-CCNC: 16 U/L (ref 5–45)
ATRIAL RATE: 93 BPM
BACTERIA UR QL AUTO: ABNORMAL /HPF
BASOPHILS # BLD AUTO: 0.06 THOUSANDS/ΜL (ref 0–0.1)
BASOPHILS NFR BLD AUTO: 0 % (ref 0–1)
BILIRUB SERPL-MCNC: 0.34 MG/DL (ref 0.2–1)
BILIRUB UR QL STRIP: ABNORMAL
BUN SERPL-MCNC: 11 MG/DL (ref 5–25)
CALCIUM ALBUM COR SERPL-MCNC: 10.2 MG/DL (ref 8.3–10.1)
CALCIUM SERPL-MCNC: 9.2 MG/DL (ref 8.3–10.1)
CHLORIDE SERPL-SCNC: 98 MMOL/L (ref 100–108)
CLARITY UR: ABNORMAL
CO2 SERPL-SCNC: 30 MMOL/L (ref 21–32)
COLOR UR: YELLOW
CREAT SERPL-MCNC: 0.39 MG/DL (ref 0.6–1.3)
EOSINOPHIL # BLD AUTO: 0.02 THOUSAND/ΜL (ref 0–0.61)
EOSINOPHIL NFR BLD AUTO: 0 % (ref 0–6)
ERYTHROCYTE [DISTWIDTH] IN BLOOD BY AUTOMATED COUNT: 13.3 % (ref 11.6–15.1)
GFR SERPL CREATININE-BSD FRML MDRD: 107 ML/MIN/1.73SQ M
GLUCOSE SERPL-MCNC: 98 MG/DL (ref 65–140)
GLUCOSE UR STRIP-MCNC: NEGATIVE MG/DL
HCT VFR BLD AUTO: 45.4 % (ref 34.8–46.1)
HGB BLD-MCNC: 14.1 G/DL (ref 11.5–15.4)
HGB UR QL STRIP.AUTO: ABNORMAL
IMM GRANULOCYTES # BLD AUTO: 0.11 THOUSAND/UL (ref 0–0.2)
IMM GRANULOCYTES NFR BLD AUTO: 1 % (ref 0–2)
KETONES UR STRIP-MCNC: ABNORMAL MG/DL
LACTATE SERPL-SCNC: 1.2 MMOL/L (ref 0.5–2)
LEUKOCYTE ESTERASE UR QL STRIP: NEGATIVE
LIPASE SERPL-CCNC: 56 U/L (ref 73–393)
LYMPHOCYTES # BLD AUTO: 1.92 THOUSANDS/ΜL (ref 0.6–4.47)
LYMPHOCYTES NFR BLD AUTO: 11 % (ref 14–44)
MCH RBC QN AUTO: 27.8 PG (ref 26.8–34.3)
MCHC RBC AUTO-ENTMCNC: 31.1 G/DL (ref 31.4–37.4)
MCV RBC AUTO: 90 FL (ref 82–98)
MONOCYTES # BLD AUTO: 1.57 THOUSAND/ΜL (ref 0.17–1.22)
MONOCYTES NFR BLD AUTO: 9 % (ref 4–12)
NEUTROPHILS # BLD AUTO: 14.3 THOUSANDS/ΜL (ref 1.85–7.62)
NEUTS SEG NFR BLD AUTO: 79 % (ref 43–75)
NITRITE UR QL STRIP: NEGATIVE
NON-SQ EPI CELLS URNS QL MICRO: ABNORMAL /HPF
NRBC BLD AUTO-RTO: 0 /100 WBCS
P AXIS: -3 DEGREES
PH UR STRIP.AUTO: 6 [PH] (ref 4.5–8)
PLATELET # BLD AUTO: 712 THOUSANDS/UL (ref 149–390)
PLATELET # BLD AUTO: 788 THOUSANDS/UL (ref 149–390)
PMV BLD AUTO: 10 FL (ref 8.9–12.7)
PMV BLD AUTO: 9.5 FL (ref 8.9–12.7)
POTASSIUM SERPL-SCNC: 3.8 MMOL/L (ref 3.5–5.3)
PR INTERVAL: 138 MS
PROT SERPL-MCNC: 8.2 G/DL (ref 6.4–8.2)
PROT UR STRIP-MCNC: ABNORMAL MG/DL
QRS AXIS: 48 DEGREES
QRSD INTERVAL: 74 MS
QT INTERVAL: 350 MS
QTC INTERVAL: 435 MS
RBC # BLD AUTO: 5.07 MILLION/UL (ref 3.81–5.12)
RBC #/AREA URNS AUTO: ABNORMAL /HPF
SODIUM SERPL-SCNC: 139 MMOL/L (ref 136–145)
SP GR UR STRIP.AUTO: 1.01 (ref 1–1.03)
T WAVE AXIS: 54 DEGREES
UROBILINOGEN UR QL STRIP.AUTO: 0.2 E.U./DL
VENTRICULAR RATE: 93 BPM
WBC # BLD AUTO: 17.98 THOUSAND/UL (ref 4.31–10.16)
WBC #/AREA URNS AUTO: ABNORMAL /HPF

## 2020-09-23 PROCEDURE — 96361 HYDRATE IV INFUSION ADD-ON: CPT

## 2020-09-23 PROCEDURE — 93005 ELECTROCARDIOGRAM TRACING: CPT

## 2020-09-23 PROCEDURE — G1004 CDSM NDSC: HCPCS

## 2020-09-23 PROCEDURE — 74177 CT ABD & PELVIS W/CONTRAST: CPT

## 2020-09-23 PROCEDURE — 80053 COMPREHEN METABOLIC PANEL: CPT | Performed by: PHYSICIAN ASSISTANT

## 2020-09-23 PROCEDURE — 83605 ASSAY OF LACTIC ACID: CPT | Performed by: PHYSICIAN ASSISTANT

## 2020-09-23 PROCEDURE — 83690 ASSAY OF LIPASE: CPT | Performed by: PHYSICIAN ASSISTANT

## 2020-09-23 PROCEDURE — 96375 TX/PRO/DX INJ NEW DRUG ADDON: CPT

## 2020-09-23 PROCEDURE — 93010 ELECTROCARDIOGRAM REPORT: CPT | Performed by: INTERNAL MEDICINE

## 2020-09-23 PROCEDURE — 99285 EMERGENCY DEPT VISIT HI MDM: CPT

## 2020-09-23 PROCEDURE — 81001 URINALYSIS AUTO W/SCOPE: CPT

## 2020-09-23 PROCEDURE — 85025 COMPLETE CBC W/AUTO DIFF WBC: CPT | Performed by: PHYSICIAN ASSISTANT

## 2020-09-23 PROCEDURE — 87040 BLOOD CULTURE FOR BACTERIA: CPT | Performed by: PHYSICIAN ASSISTANT

## 2020-09-23 PROCEDURE — 99285 EMERGENCY DEPT VISIT HI MDM: CPT | Performed by: PHYSICIAN ASSISTANT

## 2020-09-23 PROCEDURE — 36415 COLL VENOUS BLD VENIPUNCTURE: CPT | Performed by: PHYSICIAN ASSISTANT

## 2020-09-23 PROCEDURE — 85049 AUTOMATED PLATELET COUNT: CPT | Performed by: SURGERY

## 2020-09-23 PROCEDURE — 1124F ACP DISCUSS-NO DSCNMKR DOCD: CPT | Performed by: PHYSICIAN ASSISTANT

## 2020-09-23 PROCEDURE — 96365 THER/PROPH/DIAG IV INF INIT: CPT

## 2020-09-23 RX ORDER — KETOROLAC TROMETHAMINE 30 MG/ML
15 INJECTION, SOLUTION INTRAMUSCULAR; INTRAVENOUS ONCE
Status: COMPLETED | OUTPATIENT
Start: 2020-09-23 | End: 2020-09-23

## 2020-09-23 RX ORDER — OXYCODONE HYDROCHLORIDE 5 MG/1
5 TABLET ORAL EVERY 4 HOURS PRN
Status: DISCONTINUED | OUTPATIENT
Start: 2020-09-23 | End: 2020-10-23 | Stop reason: HOSPADM

## 2020-09-23 RX ORDER — HEPARIN SODIUM 5000 [USP'U]/ML
5000 INJECTION, SOLUTION INTRAVENOUS; SUBCUTANEOUS EVERY 8 HOURS SCHEDULED
Status: DISCONTINUED | OUTPATIENT
Start: 2020-09-23 | End: 2020-10-02

## 2020-09-23 RX ORDER — HYDROMORPHONE HCL/PF 1 MG/ML
0.5 SYRINGE (ML) INJECTION EVERY 4 HOURS PRN
Status: DISCONTINUED | OUTPATIENT
Start: 2020-09-23 | End: 2020-10-15

## 2020-09-23 RX ORDER — PANTOPRAZOLE SODIUM 40 MG/1
40 INJECTION, POWDER, FOR SOLUTION INTRAVENOUS
Status: DISCONTINUED | OUTPATIENT
Start: 2020-09-24 | End: 2020-09-24

## 2020-09-23 RX ORDER — SODIUM CHLORIDE 9 MG/ML
150 INJECTION, SOLUTION INTRAVENOUS CONTINUOUS
Status: DISCONTINUED | OUTPATIENT
Start: 2020-09-23 | End: 2020-09-24

## 2020-09-23 RX ORDER — ONDANSETRON 2 MG/ML
4 INJECTION INTRAMUSCULAR; INTRAVENOUS ONCE
Status: COMPLETED | OUTPATIENT
Start: 2020-09-23 | End: 2020-09-23

## 2020-09-23 RX ORDER — OXYCODONE HYDROCHLORIDE 10 MG/1
10 TABLET ORAL EVERY 4 HOURS PRN
Status: DISCONTINUED | OUTPATIENT
Start: 2020-09-23 | End: 2020-10-23 | Stop reason: HOSPADM

## 2020-09-23 RX ORDER — ONDANSETRON 2 MG/ML
4 INJECTION INTRAMUSCULAR; INTRAVENOUS EVERY 6 HOURS PRN
Status: DISCONTINUED | OUTPATIENT
Start: 2020-09-23 | End: 2020-10-05

## 2020-09-23 RX ADMIN — SODIUM CHLORIDE 125 ML/HR: 0.9 INJECTION, SOLUTION INTRAVENOUS at 16:40

## 2020-09-23 RX ADMIN — KETOROLAC TROMETHAMINE 15 MG: 30 INJECTION, SOLUTION INTRAMUSCULAR at 13:02

## 2020-09-23 RX ADMIN — CEFEPIME HYDROCHLORIDE 2000 MG: 2 INJECTION, POWDER, FOR SOLUTION INTRAVENOUS at 15:42

## 2020-09-23 RX ADMIN — SODIUM CHLORIDE 1000 ML: 0.9 INJECTION, SOLUTION INTRAVENOUS at 13:02

## 2020-09-23 RX ADMIN — HEPARIN SODIUM 5000 UNITS: 5000 INJECTION INTRAVENOUS; SUBCUTANEOUS at 16:41

## 2020-09-23 RX ADMIN — PIPERACILLIN AND TAZOBACTAM 4.5 G: 4; .5 INJECTION, POWDER, LYOPHILIZED, FOR SOLUTION INTRAVENOUS at 21:44

## 2020-09-23 RX ADMIN — HEPARIN SODIUM 5000 UNITS: 5000 INJECTION INTRAVENOUS; SUBCUTANEOUS at 21:44

## 2020-09-23 RX ADMIN — ONDANSETRON 4 MG: 2 INJECTION INTRAMUSCULAR; INTRAVENOUS at 13:02

## 2020-09-23 RX ADMIN — PIPERACILLIN AND TAZOBACTAM 4.5 G: 4; .5 INJECTION, POWDER, LYOPHILIZED, FOR SOLUTION INTRAVENOUS at 17:42

## 2020-09-23 RX ADMIN — IOHEXOL 100 ML: 350 INJECTION, SOLUTION INTRAVENOUS at 14:19

## 2020-09-23 RX ADMIN — OXYCODONE HYDROCHLORIDE 5 MG: 5 TABLET ORAL at 21:44

## 2020-09-24 ENCOUNTER — TELEPHONE (OUTPATIENT)
Dept: GASTROENTEROLOGY | Facility: CLINIC | Age: 70
End: 2020-09-24

## 2020-09-24 ENCOUNTER — APPOINTMENT (INPATIENT)
Dept: CT IMAGING | Facility: HOSPITAL | Age: 70
DRG: 854 | End: 2020-09-24
Payer: MEDICARE

## 2020-09-24 PROBLEM — K57.80 PERFORATED DIVERTICULUM: Status: ACTIVE | Noted: 2020-03-28

## 2020-09-24 PROBLEM — K57.92 ACUTE DIVERTICULITIS: Status: ACTIVE | Noted: 2020-09-24

## 2020-09-24 LAB
ALBUMIN SERPL BCP-MCNC: 2.5 G/DL (ref 3.5–5)
ALP SERPL-CCNC: 109 U/L (ref 46–116)
ALT SERPL W P-5'-P-CCNC: 23 U/L (ref 12–78)
ANION GAP SERPL CALCULATED.3IONS-SCNC: 16 MMOL/L (ref 4–13)
AST SERPL W P-5'-P-CCNC: 18 U/L (ref 5–45)
BASOPHILS # BLD AUTO: 0.05 THOUSANDS/ΜL (ref 0–0.1)
BASOPHILS NFR BLD AUTO: 0 % (ref 0–1)
BILIRUB SERPL-MCNC: 0.43 MG/DL (ref 0.2–1)
BUN SERPL-MCNC: 9 MG/DL (ref 5–25)
CALCIUM ALBUM COR SERPL-MCNC: 9.8 MG/DL (ref 8.3–10.1)
CALCIUM SERPL-MCNC: 8.6 MG/DL (ref 8.3–10.1)
CHLORIDE SERPL-SCNC: 103 MMOL/L (ref 100–108)
CO2 SERPL-SCNC: 22 MMOL/L (ref 21–32)
CREAT FLD-MCNC: 0.29 MG/DL
CREAT SERPL-MCNC: 0.32 MG/DL (ref 0.6–1.3)
EOSINOPHIL # BLD AUTO: 0.05 THOUSAND/ΜL (ref 0–0.61)
EOSINOPHIL NFR BLD AUTO: 0 % (ref 0–6)
ERYTHROCYTE [DISTWIDTH] IN BLOOD BY AUTOMATED COUNT: 13.6 % (ref 11.6–15.1)
GFR SERPL CREATININE-BSD FRML MDRD: 114 ML/MIN/1.73SQ M
GLUCOSE SERPL-MCNC: 62 MG/DL (ref 65–140)
HCT VFR BLD AUTO: 37.3 % (ref 34.8–46.1)
HGB BLD-MCNC: 11.8 G/DL (ref 11.5–15.4)
IMM GRANULOCYTES # BLD AUTO: 0.09 THOUSAND/UL (ref 0–0.2)
IMM GRANULOCYTES NFR BLD AUTO: 1 % (ref 0–2)
INR PPP: 1.12 (ref 0.84–1.19)
LYMPHOCYTES # BLD AUTO: 1.71 THOUSANDS/ΜL (ref 0.6–4.47)
LYMPHOCYTES NFR BLD AUTO: 9 % (ref 14–44)
MCH RBC QN AUTO: 28.9 PG (ref 26.8–34.3)
MCHC RBC AUTO-ENTMCNC: 31.6 G/DL (ref 31.4–37.4)
MCV RBC AUTO: 91 FL (ref 82–98)
MONOCYTES # BLD AUTO: 1.79 THOUSAND/ΜL (ref 0.17–1.22)
MONOCYTES NFR BLD AUTO: 10 % (ref 4–12)
NEUTROPHILS # BLD AUTO: 14.67 THOUSANDS/ΜL (ref 1.85–7.62)
NEUTS SEG NFR BLD AUTO: 80 % (ref 43–75)
NRBC BLD AUTO-RTO: 0 /100 WBCS
PLATELET # BLD AUTO: 577 THOUSANDS/UL (ref 149–390)
PMV BLD AUTO: 9.5 FL (ref 8.9–12.7)
POTASSIUM SERPL-SCNC: 3.7 MMOL/L (ref 3.5–5.3)
PROT SERPL-MCNC: 7.8 G/DL (ref 6.4–8.2)
PROTHROMBIN TIME: 14.2 SECONDS (ref 11.6–14.5)
RBC # BLD AUTO: 4.08 MILLION/UL (ref 3.81–5.12)
SODIUM SERPL-SCNC: 141 MMOL/L (ref 136–145)
WBC # BLD AUTO: 18.36 THOUSAND/UL (ref 4.31–10.16)

## 2020-09-24 PROCEDURE — 99232 SBSQ HOSP IP/OBS MODERATE 35: CPT | Performed by: HOSPITALIST

## 2020-09-24 PROCEDURE — 99152 MOD SED SAME PHYS/QHP 5/>YRS: CPT | Performed by: RADIOLOGY

## 2020-09-24 PROCEDURE — 87205 SMEAR GRAM STAIN: CPT | Performed by: SURGERY

## 2020-09-24 PROCEDURE — 49406 IMAGE CATH FLUID PERI/RETRO: CPT | Performed by: RADIOLOGY

## 2020-09-24 PROCEDURE — 87075 CULTR BACTERIA EXCEPT BLOOD: CPT | Performed by: SURGERY

## 2020-09-24 PROCEDURE — 85025 COMPLETE CBC W/AUTO DIFF WBC: CPT | Performed by: SURGERY

## 2020-09-24 PROCEDURE — NC001 PR NO CHARGE: Performed by: PHYSICIAN ASSISTANT

## 2020-09-24 PROCEDURE — 80053 COMPREHEN METABOLIC PANEL: CPT | Performed by: HOSPITALIST

## 2020-09-24 PROCEDURE — 10030 IMG GID FLU COLL DRG SFT TIS: CPT

## 2020-09-24 PROCEDURE — 99152 MOD SED SAME PHYS/QHP 5/>YRS: CPT

## 2020-09-24 PROCEDURE — 85610 PROTHROMBIN TIME: CPT | Performed by: PHYSICIAN ASSISTANT

## 2020-09-24 PROCEDURE — 99222 1ST HOSP IP/OBS MODERATE 55: CPT | Performed by: INTERNAL MEDICINE

## 2020-09-24 PROCEDURE — NC001 PR NO CHARGE: Performed by: SURGERY

## 2020-09-24 PROCEDURE — 99223 1ST HOSP IP/OBS HIGH 75: CPT | Performed by: SURGERY

## 2020-09-24 PROCEDURE — 99223 1ST HOSP IP/OBS HIGH 75: CPT | Performed by: NURSE PRACTITIONER

## 2020-09-24 PROCEDURE — C1769 GUIDE WIRE: HCPCS

## 2020-09-24 PROCEDURE — 0W9J30Z DRAINAGE OF PELVIC CAVITY WITH DRAINAGE DEVICE, PERCUTANEOUS APPROACH: ICD-10-PCS | Performed by: RADIOLOGY

## 2020-09-24 PROCEDURE — 99153 MOD SED SAME PHYS/QHP EA: CPT

## 2020-09-24 PROCEDURE — 82570 ASSAY OF URINE CREATININE: CPT | Performed by: SURGERY

## 2020-09-24 PROCEDURE — C9113 INJ PANTOPRAZOLE SODIUM, VIA: HCPCS | Performed by: NURSE PRACTITIONER

## 2020-09-24 PROCEDURE — C1729 CATH, DRAINAGE: HCPCS

## 2020-09-24 PROCEDURE — 87070 CULTURE OTHR SPECIMN AEROBIC: CPT | Performed by: SURGERY

## 2020-09-24 PROCEDURE — C9113 INJ PANTOPRAZOLE SODIUM, VIA: HCPCS | Performed by: SURGERY

## 2020-09-24 PROCEDURE — 94640 AIRWAY INHALATION TREATMENT: CPT

## 2020-09-24 RX ORDER — FENTANYL CITRATE 50 UG/ML
INJECTION, SOLUTION INTRAMUSCULAR; INTRAVENOUS CODE/TRAUMA/SEDATION MEDICATION
Status: COMPLETED | OUTPATIENT
Start: 2020-09-24 | End: 2020-09-24

## 2020-09-24 RX ORDER — MIDAZOLAM HYDROCHLORIDE 2 MG/2ML
INJECTION, SOLUTION INTRAMUSCULAR; INTRAVENOUS CODE/TRAUMA/SEDATION MEDICATION
Status: COMPLETED | OUTPATIENT
Start: 2020-09-24 | End: 2020-09-24

## 2020-09-24 RX ORDER — LIDOCAINE WITH 8.4% SOD BICARB 0.9%(10ML)
SYRINGE (ML) INJECTION CODE/TRAUMA/SEDATION MEDICATION
Status: COMPLETED | OUTPATIENT
Start: 2020-09-24 | End: 2020-09-24

## 2020-09-24 RX ORDER — POTASSIUM CHLORIDE 14.9 MG/ML
20 INJECTION INTRAVENOUS ONCE
Status: COMPLETED | OUTPATIENT
Start: 2020-09-24 | End: 2020-09-24

## 2020-09-24 RX ORDER — PANTOPRAZOLE SODIUM 40 MG/1
40 INJECTION, POWDER, FOR SOLUTION INTRAVENOUS EVERY 12 HOURS SCHEDULED
Status: DISCONTINUED | OUTPATIENT
Start: 2020-09-24 | End: 2020-09-26

## 2020-09-24 RX ORDER — IPRATROPIUM BROMIDE AND ALBUTEROL SULFATE 2.5; .5 MG/3ML; MG/3ML
3 SOLUTION RESPIRATORY (INHALATION) 3 TIMES DAILY
Status: DISCONTINUED | OUTPATIENT
Start: 2020-09-24 | End: 2020-09-28

## 2020-09-24 RX ORDER — SODIUM CHLORIDE 9 MG/ML
75 INJECTION, SOLUTION INTRAVENOUS CONTINUOUS
Status: DISCONTINUED | OUTPATIENT
Start: 2020-09-24 | End: 2020-09-28

## 2020-09-24 RX ADMIN — PIPERACILLIN AND TAZOBACTAM 4.5 G: 4; .5 INJECTION, POWDER, LYOPHILIZED, FOR SOLUTION INTRAVENOUS at 17:25

## 2020-09-24 RX ADMIN — PANTOPRAZOLE SODIUM 40 MG: 40 INJECTION, POWDER, FOR SOLUTION INTRAVENOUS at 22:06

## 2020-09-24 RX ADMIN — PIPERACILLIN AND TAZOBACTAM 4.5 G: 4; .5 INJECTION, POWDER, LYOPHILIZED, FOR SOLUTION INTRAVENOUS at 03:40

## 2020-09-24 RX ADMIN — PIPERACILLIN AND TAZOBACTAM 4.5 G: 4; .5 INJECTION, POWDER, LYOPHILIZED, FOR SOLUTION INTRAVENOUS at 12:25

## 2020-09-24 RX ADMIN — IPRATROPIUM BROMIDE AND ALBUTEROL SULFATE 3 ML: .5; 3 SOLUTION RESPIRATORY (INHALATION) at 19:28

## 2020-09-24 RX ADMIN — OXYCODONE HYDROCHLORIDE 5 MG: 5 TABLET ORAL at 05:13

## 2020-09-24 RX ADMIN — MIDAZOLAM 1 MG: 1 INJECTION INTRAMUSCULAR; INTRAVENOUS at 11:20

## 2020-09-24 RX ADMIN — FENTANYL CITRATE 50 MCG: 50 INJECTION, SOLUTION INTRAMUSCULAR; INTRAVENOUS at 11:39

## 2020-09-24 RX ADMIN — MIDAZOLAM 1 MG: 1 INJECTION INTRAMUSCULAR; INTRAVENOUS at 11:29

## 2020-09-24 RX ADMIN — HEPARIN SODIUM 5000 UNITS: 5000 INJECTION INTRAVENOUS; SUBCUTANEOUS at 13:43

## 2020-09-24 RX ADMIN — OXYCODONE HYDROCHLORIDE 5 MG: 5 TABLET ORAL at 20:29

## 2020-09-24 RX ADMIN — SODIUM CHLORIDE 125 ML/HR: 0.9 INJECTION, SOLUTION INTRAVENOUS at 17:24

## 2020-09-24 RX ADMIN — HEPARIN SODIUM 5000 UNITS: 5000 INJECTION INTRAVENOUS; SUBCUTANEOUS at 05:13

## 2020-09-24 RX ADMIN — FENTANYL CITRATE 25 MCG: 50 INJECTION, SOLUTION INTRAMUSCULAR; INTRAVENOUS at 11:29

## 2020-09-24 RX ADMIN — FENTANYL CITRATE 25 MCG: 50 INJECTION, SOLUTION INTRAMUSCULAR; INTRAVENOUS at 11:20

## 2020-09-24 RX ADMIN — Medication 10 ML: at 11:29

## 2020-09-24 RX ADMIN — SODIUM CHLORIDE 125 ML/HR: 0.9 INJECTION, SOLUTION INTRAVENOUS at 01:44

## 2020-09-24 RX ADMIN — IPRATROPIUM BROMIDE AND ALBUTEROL SULFATE 3 ML: .5; 3 SOLUTION RESPIRATORY (INHALATION) at 13:19

## 2020-09-24 RX ADMIN — SODIUM CHLORIDE 125 ML/HR: 0.9 INJECTION, SOLUTION INTRAVENOUS at 10:27

## 2020-09-24 RX ADMIN — PANTOPRAZOLE SODIUM 40 MG: 40 INJECTION, POWDER, FOR SOLUTION INTRAVENOUS at 08:17

## 2020-09-24 RX ADMIN — POTASSIUM CHLORIDE 20 MEQ: 14.9 INJECTION, SOLUTION INTRAVENOUS at 08:16

## 2020-09-24 RX ADMIN — HEPARIN SODIUM 5000 UNITS: 5000 INJECTION INTRAVENOUS; SUBCUTANEOUS at 22:07

## 2020-09-24 RX ADMIN — OXYCODONE HYDROCHLORIDE 5 MG: 5 TABLET ORAL at 10:25

## 2020-09-24 RX ADMIN — PIPERACILLIN AND TAZOBACTAM 4.5 G: 4; .5 INJECTION, POWDER, LYOPHILIZED, FOR SOLUTION INTRAVENOUS at 22:06

## 2020-09-24 NOTE — TELEPHONE ENCOUNTER
Procedure cancelled per Dr Law request  Pt admitted at [facility]    Patient will need 3-4 month f-up

## 2020-09-25 LAB
ANION GAP SERPL CALCULATED.3IONS-SCNC: 9 MMOL/L (ref 4–13)
BUN SERPL-MCNC: 4 MG/DL (ref 5–25)
CALCIUM SERPL-MCNC: 8.5 MG/DL (ref 8.3–10.1)
CHLORIDE SERPL-SCNC: 102 MMOL/L (ref 100–108)
CO2 SERPL-SCNC: 25 MMOL/L (ref 21–32)
CREAT SERPL-MCNC: 0.32 MG/DL (ref 0.6–1.3)
ERYTHROCYTE [DISTWIDTH] IN BLOOD BY AUTOMATED COUNT: 13.6 % (ref 11.6–15.1)
GFR SERPL CREATININE-BSD FRML MDRD: 114 ML/MIN/1.73SQ M
GLUCOSE SERPL-MCNC: 79 MG/DL (ref 65–140)
HCT VFR BLD AUTO: 37.4 % (ref 34.8–46.1)
HGB BLD-MCNC: 11.6 G/DL (ref 11.5–15.4)
MCH RBC QN AUTO: 28.4 PG (ref 26.8–34.3)
MCHC RBC AUTO-ENTMCNC: 31 G/DL (ref 31.4–37.4)
MCV RBC AUTO: 92 FL (ref 82–98)
PLATELET # BLD AUTO: 603 THOUSANDS/UL (ref 149–390)
PMV BLD AUTO: 9.5 FL (ref 8.9–12.7)
POTASSIUM SERPL-SCNC: 3.6 MMOL/L (ref 3.5–5.3)
RBC # BLD AUTO: 4.08 MILLION/UL (ref 3.81–5.12)
SODIUM SERPL-SCNC: 136 MMOL/L (ref 136–145)
WBC # BLD AUTO: 19.36 THOUSAND/UL (ref 4.31–10.16)

## 2020-09-25 PROCEDURE — 99232 SBSQ HOSP IP/OBS MODERATE 35: CPT | Performed by: SURGERY

## 2020-09-25 PROCEDURE — 85027 COMPLETE CBC AUTOMATED: CPT | Performed by: SURGERY

## 2020-09-25 PROCEDURE — 97167 OT EVAL HIGH COMPLEX 60 MIN: CPT

## 2020-09-25 PROCEDURE — 80048 BASIC METABOLIC PNL TOTAL CA: CPT | Performed by: SURGERY

## 2020-09-25 PROCEDURE — 94640 AIRWAY INHALATION TREATMENT: CPT

## 2020-09-25 PROCEDURE — C9113 INJ PANTOPRAZOLE SODIUM, VIA: HCPCS | Performed by: NURSE PRACTITIONER

## 2020-09-25 PROCEDURE — 94762 N-INVAS EAR/PLS OXIMTRY CONT: CPT

## 2020-09-25 PROCEDURE — 99232 SBSQ HOSP IP/OBS MODERATE 35: CPT | Performed by: PHYSICIAN ASSISTANT

## 2020-09-25 PROCEDURE — 99232 SBSQ HOSP IP/OBS MODERATE 35: CPT | Performed by: HOSPITALIST

## 2020-09-25 PROCEDURE — 99232 SBSQ HOSP IP/OBS MODERATE 35: CPT | Performed by: NURSE PRACTITIONER

## 2020-09-25 PROCEDURE — 99223 1ST HOSP IP/OBS HIGH 75: CPT | Performed by: INTERNAL MEDICINE

## 2020-09-25 PROCEDURE — 97163 PT EVAL HIGH COMPLEX 45 MIN: CPT

## 2020-09-25 RX ORDER — CALCIUM CARBONATE 200(500)MG
500 TABLET,CHEWABLE ORAL DAILY PRN
Status: DISCONTINUED | OUTPATIENT
Start: 2020-09-25 | End: 2020-10-23 | Stop reason: HOSPADM

## 2020-09-25 RX ORDER — ALBUTEROL SULFATE 90 UG/1
2 AEROSOL, METERED RESPIRATORY (INHALATION) EVERY 6 HOURS PRN
Status: DISCONTINUED | OUTPATIENT
Start: 2020-09-25 | End: 2020-10-23 | Stop reason: HOSPADM

## 2020-09-25 RX ORDER — OXYBUTYNIN CHLORIDE 10 MG/1
10 TABLET, EXTENDED RELEASE ORAL DAILY
Status: DISCONTINUED | OUTPATIENT
Start: 2020-09-25 | End: 2020-10-23 | Stop reason: HOSPADM

## 2020-09-25 RX ADMIN — METRONIDAZOLE 500 MG: 500 INJECTION, SOLUTION INTRAVENOUS at 23:47

## 2020-09-25 RX ADMIN — OXYCODONE HYDROCHLORIDE 5 MG: 5 TABLET ORAL at 05:23

## 2020-09-25 RX ADMIN — CEFTRIAXONE 1000 MG: 2 INJECTION, POWDER, FOR SOLUTION INTRAMUSCULAR; INTRAVENOUS at 16:00

## 2020-09-25 RX ADMIN — OXYCODONE HYDROCHLORIDE 5 MG: 5 TABLET ORAL at 11:33

## 2020-09-25 RX ADMIN — SODIUM CHLORIDE 125 ML/HR: 0.9 INJECTION, SOLUTION INTRAVENOUS at 20:41

## 2020-09-25 RX ADMIN — METRONIDAZOLE 500 MG: 500 INJECTION, SOLUTION INTRAVENOUS at 15:14

## 2020-09-25 RX ADMIN — PANTOPRAZOLE SODIUM 40 MG: 40 INJECTION, POWDER, FOR SOLUTION INTRAVENOUS at 08:00

## 2020-09-25 RX ADMIN — HYDROMORPHONE HYDROCHLORIDE 0.5 MG: 1 INJECTION, SOLUTION INTRAMUSCULAR; INTRAVENOUS; SUBCUTANEOUS at 23:50

## 2020-09-25 RX ADMIN — IPRATROPIUM BROMIDE AND ALBUTEROL SULFATE 3 ML: .5; 3 SOLUTION RESPIRATORY (INHALATION) at 13:00

## 2020-09-25 RX ADMIN — PANTOPRAZOLE SODIUM 40 MG: 40 INJECTION, POWDER, FOR SOLUTION INTRAVENOUS at 21:10

## 2020-09-25 RX ADMIN — CALCIUM CARBONATE (ANTACID) CHEW TAB 500 MG 500 MG: 500 CHEW TAB at 15:20

## 2020-09-25 RX ADMIN — HEPARIN SODIUM 5000 UNITS: 5000 INJECTION INTRAVENOUS; SUBCUTANEOUS at 21:10

## 2020-09-25 RX ADMIN — HEPARIN SODIUM 5000 UNITS: 5000 INJECTION INTRAVENOUS; SUBCUTANEOUS at 15:00

## 2020-09-25 RX ADMIN — HEPARIN SODIUM 5000 UNITS: 5000 INJECTION INTRAVENOUS; SUBCUTANEOUS at 05:24

## 2020-09-25 RX ADMIN — IPRATROPIUM BROMIDE AND ALBUTEROL SULFATE 3 ML: .5; 3 SOLUTION RESPIRATORY (INHALATION) at 08:34

## 2020-09-25 RX ADMIN — OXYBUTYNIN 10 MG: 10 TABLET, FILM COATED, EXTENDED RELEASE ORAL at 15:13

## 2020-09-25 RX ADMIN — OXYCODONE HYDROCHLORIDE 5 MG: 5 TABLET ORAL at 16:05

## 2020-09-25 RX ADMIN — SODIUM CHLORIDE 125 ML/HR: 0.9 INJECTION, SOLUTION INTRAVENOUS at 02:20

## 2020-09-25 RX ADMIN — IPRATROPIUM BROMIDE AND ALBUTEROL SULFATE 3 ML: .5; 3 SOLUTION RESPIRATORY (INHALATION) at 19:46

## 2020-09-25 RX ADMIN — PIPERACILLIN AND TAZOBACTAM 4.5 G: 4; .5 INJECTION, POWDER, LYOPHILIZED, FOR SOLUTION INTRAVENOUS at 10:54

## 2020-09-25 RX ADMIN — OXYCODONE HYDROCHLORIDE 5 MG: 5 TABLET ORAL at 21:09

## 2020-09-25 RX ADMIN — PIPERACILLIN AND TAZOBACTAM 4.5 G: 4; .5 INJECTION, POWDER, LYOPHILIZED, FOR SOLUTION INTRAVENOUS at 04:09

## 2020-09-26 LAB
ANION GAP SERPL CALCULATED.3IONS-SCNC: 12 MMOL/L (ref 4–13)
BUN SERPL-MCNC: 2 MG/DL (ref 5–25)
CALCIUM SERPL-MCNC: 8.3 MG/DL (ref 8.3–10.1)
CHLORIDE SERPL-SCNC: 101 MMOL/L (ref 100–108)
CO2 SERPL-SCNC: 24 MMOL/L (ref 21–32)
CREAT SERPL-MCNC: 0.23 MG/DL (ref 0.6–1.3)
ERYTHROCYTE [DISTWIDTH] IN BLOOD BY AUTOMATED COUNT: 13.5 % (ref 11.6–15.1)
GFR SERPL CREATININE-BSD FRML MDRD: 127 ML/MIN/1.73SQ M
GLUCOSE SERPL-MCNC: 76 MG/DL (ref 65–140)
HCT VFR BLD AUTO: 36.7 % (ref 34.8–46.1)
HGB BLD-MCNC: 11.6 G/DL (ref 11.5–15.4)
MAGNESIUM SERPL-MCNC: 1.7 MG/DL (ref 1.6–2.6)
MCH RBC QN AUTO: 28.4 PG (ref 26.8–34.3)
MCHC RBC AUTO-ENTMCNC: 31.6 G/DL (ref 31.4–37.4)
MCV RBC AUTO: 90 FL (ref 82–98)
PLATELET # BLD AUTO: 601 THOUSANDS/UL (ref 149–390)
PMV BLD AUTO: 9.9 FL (ref 8.9–12.7)
POTASSIUM SERPL-SCNC: 2.8 MMOL/L (ref 3.5–5.3)
RBC # BLD AUTO: 4.08 MILLION/UL (ref 3.81–5.12)
SODIUM SERPL-SCNC: 137 MMOL/L (ref 136–145)
WBC # BLD AUTO: 18.14 THOUSAND/UL (ref 4.31–10.16)

## 2020-09-26 PROCEDURE — 80048 BASIC METABOLIC PNL TOTAL CA: CPT | Performed by: INTERNAL MEDICINE

## 2020-09-26 PROCEDURE — 99232 SBSQ HOSP IP/OBS MODERATE 35: CPT | Performed by: INTERNAL MEDICINE

## 2020-09-26 PROCEDURE — 83735 ASSAY OF MAGNESIUM: CPT | Performed by: HOSPITALIST

## 2020-09-26 PROCEDURE — 85027 COMPLETE CBC AUTOMATED: CPT | Performed by: INTERNAL MEDICINE

## 2020-09-26 PROCEDURE — C9113 INJ PANTOPRAZOLE SODIUM, VIA: HCPCS | Performed by: NURSE PRACTITIONER

## 2020-09-26 PROCEDURE — 94640 AIRWAY INHALATION TREATMENT: CPT

## 2020-09-26 PROCEDURE — 99232 SBSQ HOSP IP/OBS MODERATE 35: CPT | Performed by: SURGERY

## 2020-09-26 PROCEDURE — 99232 SBSQ HOSP IP/OBS MODERATE 35: CPT | Performed by: HOSPITALIST

## 2020-09-26 RX ORDER — POTASSIUM CHLORIDE 20 MEQ/1
40 TABLET, EXTENDED RELEASE ORAL 2 TIMES DAILY
Status: COMPLETED | OUTPATIENT
Start: 2020-09-26 | End: 2020-09-27

## 2020-09-26 RX ORDER — POTASSIUM CHLORIDE 14.9 MG/ML
20 INJECTION INTRAVENOUS
Status: COMPLETED | OUTPATIENT
Start: 2020-09-26 | End: 2020-09-26

## 2020-09-26 RX ORDER — PANTOPRAZOLE SODIUM 40 MG/1
40 TABLET, DELAYED RELEASE ORAL
Status: DISCONTINUED | OUTPATIENT
Start: 2020-09-26 | End: 2020-10-15

## 2020-09-26 RX ORDER — POTASSIUM CHLORIDE 29.8 MG/ML
40 INJECTION INTRAVENOUS ONCE
Status: DISCONTINUED | OUTPATIENT
Start: 2020-09-26 | End: 2020-09-26 | Stop reason: SDUPTHER

## 2020-09-26 RX ORDER — POTASSIUM CHLORIDE 20 MEQ/1
20 TABLET, EXTENDED RELEASE ORAL ONCE
Status: COMPLETED | OUTPATIENT
Start: 2020-09-26 | End: 2020-09-26

## 2020-09-26 RX ORDER — MAGNESIUM SULFATE HEPTAHYDRATE 40 MG/ML
4 INJECTION, SOLUTION INTRAVENOUS ONCE
Status: COMPLETED | OUTPATIENT
Start: 2020-09-26 | End: 2020-09-26

## 2020-09-26 RX ADMIN — CALCIUM CARBONATE (ANTACID) CHEW TAB 500 MG 500 MG: 500 CHEW TAB at 21:23

## 2020-09-26 RX ADMIN — POTASSIUM CHLORIDE 40 MEQ: 1500 TABLET, EXTENDED RELEASE ORAL at 18:08

## 2020-09-26 RX ADMIN — OXYCODONE HYDROCHLORIDE 10 MG: 10 TABLET ORAL at 21:23

## 2020-09-26 RX ADMIN — CEFTRIAXONE 1000 MG: 2 INJECTION, POWDER, FOR SOLUTION INTRAMUSCULAR; INTRAVENOUS at 15:28

## 2020-09-26 RX ADMIN — HEPARIN SODIUM 5000 UNITS: 5000 INJECTION INTRAVENOUS; SUBCUTANEOUS at 21:24

## 2020-09-26 RX ADMIN — METRONIDAZOLE 500 MG: 500 INJECTION, SOLUTION INTRAVENOUS at 15:29

## 2020-09-26 RX ADMIN — CARBAMIDE PEROXIDE 6.5% 5 DROP: 6.5 LIQUID AURICULAR (OTIC) at 18:14

## 2020-09-26 RX ADMIN — MAGNESIUM SULFATE IN WATER 4 G: 40 INJECTION, SOLUTION INTRAVENOUS at 11:08

## 2020-09-26 RX ADMIN — HEPARIN SODIUM 5000 UNITS: 5000 INJECTION INTRAVENOUS; SUBCUTANEOUS at 05:07

## 2020-09-26 RX ADMIN — IPRATROPIUM BROMIDE AND ALBUTEROL SULFATE 3 ML: .5; 3 SOLUTION RESPIRATORY (INHALATION) at 08:53

## 2020-09-26 RX ADMIN — POTASSIUM CHLORIDE 20 MEQ: 14.9 INJECTION, SOLUTION INTRAVENOUS at 12:37

## 2020-09-26 RX ADMIN — POTASSIUM CHLORIDE 20 MEQ: 14.9 INJECTION, SOLUTION INTRAVENOUS at 16:41

## 2020-09-26 RX ADMIN — HYDROMORPHONE HYDROCHLORIDE 0.5 MG: 1 INJECTION, SOLUTION INTRAMUSCULAR; INTRAVENOUS; SUBCUTANEOUS at 08:23

## 2020-09-26 RX ADMIN — POTASSIUM CHLORIDE 20 MEQ: 14.9 INJECTION, SOLUTION INTRAVENOUS at 10:27

## 2020-09-26 RX ADMIN — SODIUM CHLORIDE 125 ML/HR: 0.9 INJECTION, SOLUTION INTRAVENOUS at 05:09

## 2020-09-26 RX ADMIN — PANTOPRAZOLE SODIUM 40 MG: 40 INJECTION, POWDER, FOR SOLUTION INTRAVENOUS at 08:17

## 2020-09-26 RX ADMIN — OXYBUTYNIN 10 MG: 10 TABLET, FILM COATED, EXTENDED RELEASE ORAL at 08:17

## 2020-09-26 RX ADMIN — IPRATROPIUM BROMIDE AND ALBUTEROL SULFATE 3 ML: .5; 3 SOLUTION RESPIRATORY (INHALATION) at 20:40

## 2020-09-26 RX ADMIN — POTASSIUM CHLORIDE 20 MEQ: 1500 TABLET, EXTENDED RELEASE ORAL at 10:27

## 2020-09-26 RX ADMIN — METRONIDAZOLE 500 MG: 500 INJECTION, SOLUTION INTRAVENOUS at 06:47

## 2020-09-26 RX ADMIN — HEPARIN SODIUM 5000 UNITS: 5000 INJECTION INTRAVENOUS; SUBCUTANEOUS at 14:30

## 2020-09-26 RX ADMIN — OXYCODONE HYDROCHLORIDE 10 MG: 10 TABLET ORAL at 06:47

## 2020-09-26 RX ADMIN — POTASSIUM CHLORIDE 20 MEQ: 14.9 INJECTION, SOLUTION INTRAVENOUS at 14:29

## 2020-09-26 RX ADMIN — PANTOPRAZOLE SODIUM 40 MG: 40 TABLET, DELAYED RELEASE ORAL at 18:09

## 2020-09-26 RX ADMIN — METRONIDAZOLE 500 MG: 500 INJECTION, SOLUTION INTRAVENOUS at 23:15

## 2020-09-27 ENCOUNTER — APPOINTMENT (INPATIENT)
Dept: CT IMAGING | Facility: HOSPITAL | Age: 70
DRG: 854 | End: 2020-09-27
Payer: MEDICARE

## 2020-09-27 LAB
ANION GAP SERPL CALCULATED.3IONS-SCNC: 11 MMOL/L (ref 4–13)
BACTERIA SPEC ANAEROBE CULT: NO GROWTH
BACTERIA SPEC BFLD CULT: NO GROWTH
BASOPHILS # BLD AUTO: 0.06 THOUSANDS/ΜL (ref 0–0.1)
BASOPHILS NFR BLD AUTO: 0 % (ref 0–1)
BUN SERPL-MCNC: 1 MG/DL (ref 5–25)
CALCIUM SERPL-MCNC: 8.3 MG/DL (ref 8.3–10.1)
CHLORIDE SERPL-SCNC: 99 MMOL/L (ref 100–108)
CO2 SERPL-SCNC: 26 MMOL/L (ref 21–32)
CREAT SERPL-MCNC: 0.3 MG/DL (ref 0.6–1.3)
EOSINOPHIL # BLD AUTO: 0.06 THOUSAND/ΜL (ref 0–0.61)
EOSINOPHIL NFR BLD AUTO: 0 % (ref 0–6)
ERYTHROCYTE [DISTWIDTH] IN BLOOD BY AUTOMATED COUNT: 13.5 % (ref 11.6–15.1)
GFR SERPL CREATININE-BSD FRML MDRD: 116 ML/MIN/1.73SQ M
GLUCOSE SERPL-MCNC: 99 MG/DL (ref 65–140)
GRAM STN SPEC: NORMAL
HCT VFR BLD AUTO: 36.5 % (ref 34.8–46.1)
HGB BLD-MCNC: 11.7 G/DL (ref 11.5–15.4)
IMM GRANULOCYTES # BLD AUTO: 0.17 THOUSAND/UL (ref 0–0.2)
IMM GRANULOCYTES NFR BLD AUTO: 1 % (ref 0–2)
LYMPHOCYTES # BLD AUTO: 1.93 THOUSANDS/ΜL (ref 0.6–4.47)
LYMPHOCYTES NFR BLD AUTO: 9 % (ref 14–44)
MAGNESIUM SERPL-MCNC: 2.1 MG/DL (ref 1.6–2.6)
MCH RBC QN AUTO: 28.5 PG (ref 26.8–34.3)
MCHC RBC AUTO-ENTMCNC: 32.1 G/DL (ref 31.4–37.4)
MCV RBC AUTO: 89 FL (ref 82–98)
MONOCYTES # BLD AUTO: 1.97 THOUSAND/ΜL (ref 0.17–1.22)
MONOCYTES NFR BLD AUTO: 9 % (ref 4–12)
NEUTROPHILS # BLD AUTO: 17.34 THOUSANDS/ΜL (ref 1.85–7.62)
NEUTS SEG NFR BLD AUTO: 81 % (ref 43–75)
NRBC BLD AUTO-RTO: 0 /100 WBCS
PLATELET # BLD AUTO: 595 THOUSANDS/UL (ref 149–390)
PMV BLD AUTO: 9.4 FL (ref 8.9–12.7)
POTASSIUM SERPL-SCNC: 4 MMOL/L (ref 3.5–5.3)
RBC # BLD AUTO: 4.11 MILLION/UL (ref 3.81–5.12)
SODIUM SERPL-SCNC: 136 MMOL/L (ref 136–145)
WBC # BLD AUTO: 21.53 THOUSAND/UL (ref 4.31–10.16)

## 2020-09-27 PROCEDURE — 99232 SBSQ HOSP IP/OBS MODERATE 35: CPT | Performed by: INTERNAL MEDICINE

## 2020-09-27 PROCEDURE — 74177 CT ABD & PELVIS W/CONTRAST: CPT

## 2020-09-27 PROCEDURE — 94640 AIRWAY INHALATION TREATMENT: CPT

## 2020-09-27 PROCEDURE — G1004 CDSM NDSC: HCPCS

## 2020-09-27 PROCEDURE — 83735 ASSAY OF MAGNESIUM: CPT | Performed by: HOSPITALIST

## 2020-09-27 PROCEDURE — 85025 COMPLETE CBC W/AUTO DIFF WBC: CPT | Performed by: SURGERY

## 2020-09-27 PROCEDURE — 99232 SBSQ HOSP IP/OBS MODERATE 35: CPT | Performed by: HOSPITALIST

## 2020-09-27 PROCEDURE — NC001 PR NO CHARGE: Performed by: SURGERY

## 2020-09-27 PROCEDURE — 80048 BASIC METABOLIC PNL TOTAL CA: CPT | Performed by: SURGERY

## 2020-09-27 RX ADMIN — IOHEXOL 100 ML: 350 INJECTION, SOLUTION INTRAVENOUS at 15:28

## 2020-09-27 RX ADMIN — PANTOPRAZOLE SODIUM 40 MG: 40 TABLET, DELAYED RELEASE ORAL at 16:00

## 2020-09-27 RX ADMIN — POTASSIUM CHLORIDE 40 MEQ: 1500 TABLET, EXTENDED RELEASE ORAL at 09:33

## 2020-09-27 RX ADMIN — CARBAMIDE PEROXIDE 6.5% 5 DROP: 6.5 LIQUID AURICULAR (OTIC) at 09:33

## 2020-09-27 RX ADMIN — METRONIDAZOLE 500 MG: 500 INJECTION, SOLUTION INTRAVENOUS at 16:00

## 2020-09-27 RX ADMIN — IPRATROPIUM BROMIDE AND ALBUTEROL SULFATE 3 ML: .5; 3 SOLUTION RESPIRATORY (INHALATION) at 08:13

## 2020-09-27 RX ADMIN — CARBAMIDE PEROXIDE 6.5% 5 DROP: 6.5 LIQUID AURICULAR (OTIC) at 18:10

## 2020-09-27 RX ADMIN — IPRATROPIUM BROMIDE AND ALBUTEROL SULFATE 3 ML: .5; 3 SOLUTION RESPIRATORY (INHALATION) at 13:58

## 2020-09-27 RX ADMIN — OXYCODONE HYDROCHLORIDE 5 MG: 5 TABLET ORAL at 02:47

## 2020-09-27 RX ADMIN — HEPARIN SODIUM 5000 UNITS: 5000 INJECTION INTRAVENOUS; SUBCUTANEOUS at 16:00

## 2020-09-27 RX ADMIN — SODIUM CHLORIDE 75 ML/HR: 0.9 INJECTION, SOLUTION INTRAVENOUS at 16:02

## 2020-09-27 RX ADMIN — OXYCODONE HYDROCHLORIDE 10 MG: 10 TABLET ORAL at 09:33

## 2020-09-27 RX ADMIN — SODIUM CHLORIDE 75 ML/HR: 0.9 INJECTION, SOLUTION INTRAVENOUS at 01:19

## 2020-09-27 RX ADMIN — CALCIUM CARBONATE (ANTACID) CHEW TAB 500 MG 500 MG: 500 CHEW TAB at 22:51

## 2020-09-27 RX ADMIN — METRONIDAZOLE 500 MG: 500 INJECTION, SOLUTION INTRAVENOUS at 07:05

## 2020-09-27 RX ADMIN — OXYCODONE HYDROCHLORIDE 5 MG: 5 TABLET ORAL at 18:04

## 2020-09-27 RX ADMIN — HEPARIN SODIUM 5000 UNITS: 5000 INJECTION INTRAVENOUS; SUBCUTANEOUS at 05:36

## 2020-09-27 RX ADMIN — METRONIDAZOLE 500 MG: 500 INJECTION, SOLUTION INTRAVENOUS at 22:51

## 2020-09-27 RX ADMIN — HEPARIN SODIUM 5000 UNITS: 5000 INJECTION INTRAVENOUS; SUBCUTANEOUS at 22:27

## 2020-09-27 RX ADMIN — OXYBUTYNIN 10 MG: 10 TABLET, FILM COATED, EXTENDED RELEASE ORAL at 09:33

## 2020-09-27 RX ADMIN — PANTOPRAZOLE SODIUM 40 MG: 40 TABLET, DELAYED RELEASE ORAL at 07:05

## 2020-09-27 RX ADMIN — IPRATROPIUM BROMIDE AND ALBUTEROL SULFATE 3 ML: .5; 3 SOLUTION RESPIRATORY (INHALATION) at 20:03

## 2020-09-27 RX ADMIN — CALCIUM CARBONATE (ANTACID) CHEW TAB 500 MG 500 MG: 500 CHEW TAB at 18:04

## 2020-09-27 RX ADMIN — CEFTRIAXONE 1000 MG: 2 INJECTION, POWDER, FOR SOLUTION INTRAMUSCULAR; INTRAVENOUS at 16:00

## 2020-09-28 ENCOUNTER — ANESTHESIA EVENT (INPATIENT)
Dept: PERIOP | Facility: HOSPITAL | Age: 70
DRG: 854 | End: 2020-09-28
Payer: MEDICARE

## 2020-09-28 ENCOUNTER — APPOINTMENT (INPATIENT)
Dept: RADIOLOGY | Facility: HOSPITAL | Age: 70
DRG: 854 | End: 2020-09-28
Payer: MEDICARE

## 2020-09-28 PROBLEM — A41.9 SEPSIS (HCC): Status: ACTIVE | Noted: 2020-09-28

## 2020-09-28 PROBLEM — D75.839 THROMBOCYTOSIS: Status: ACTIVE | Noted: 2020-09-28

## 2020-09-28 LAB
ANION GAP SERPL CALCULATED.3IONS-SCNC: 17 MMOL/L (ref 4–13)
BACTERIA BLD CULT: NORMAL
BACTERIA BLD CULT: NORMAL
BASOPHILS # BLD AUTO: 0.06 THOUSANDS/ΜL (ref 0–0.1)
BASOPHILS NFR BLD AUTO: 0 % (ref 0–1)
BUN SERPL-MCNC: 2 MG/DL (ref 5–25)
CALCIUM SERPL-MCNC: 8.5 MG/DL (ref 8.3–10.1)
CHLORIDE SERPL-SCNC: 95 MMOL/L (ref 100–108)
CO2 SERPL-SCNC: 22 MMOL/L (ref 21–32)
CREAT SERPL-MCNC: 0.3 MG/DL (ref 0.6–1.3)
EOSINOPHIL # BLD AUTO: 0.06 THOUSAND/ΜL (ref 0–0.61)
EOSINOPHIL NFR BLD AUTO: 0 % (ref 0–6)
ERYTHROCYTE [DISTWIDTH] IN BLOOD BY AUTOMATED COUNT: 13.4 % (ref 11.6–15.1)
GFR SERPL CREATININE-BSD FRML MDRD: 116 ML/MIN/1.73SQ M
GLUCOSE SERPL-MCNC: 83 MG/DL (ref 65–140)
HCT VFR BLD AUTO: 37.4 % (ref 34.8–46.1)
HGB BLD-MCNC: 12 G/DL (ref 11.5–15.4)
IMM GRANULOCYTES # BLD AUTO: 0.25 THOUSAND/UL (ref 0–0.2)
IMM GRANULOCYTES NFR BLD AUTO: 1 % (ref 0–2)
INR PPP: 1.21 (ref 0.84–1.19)
LYMPHOCYTES # BLD AUTO: 1.86 THOUSANDS/ΜL (ref 0.6–4.47)
LYMPHOCYTES NFR BLD AUTO: 8 % (ref 14–44)
MCH RBC QN AUTO: 28.2 PG (ref 26.8–34.3)
MCHC RBC AUTO-ENTMCNC: 32.1 G/DL (ref 31.4–37.4)
MCV RBC AUTO: 88 FL (ref 82–98)
MONOCYTES # BLD AUTO: 2.04 THOUSAND/ΜL (ref 0.17–1.22)
MONOCYTES NFR BLD AUTO: 9 % (ref 4–12)
NEUTROPHILS # BLD AUTO: 18.39 THOUSANDS/ΜL (ref 1.85–7.62)
NEUTS SEG NFR BLD AUTO: 82 % (ref 43–75)
NRBC BLD AUTO-RTO: 0 /100 WBCS
PLATELET # BLD AUTO: 695 THOUSANDS/UL (ref 149–390)
PMV BLD AUTO: 10.1 FL (ref 8.9–12.7)
POTASSIUM SERPL-SCNC: 3.7 MMOL/L (ref 3.5–5.3)
PROTHROMBIN TIME: 15.1 SECONDS (ref 11.6–14.5)
RBC # BLD AUTO: 4.26 MILLION/UL (ref 3.81–5.12)
SODIUM SERPL-SCNC: 134 MMOL/L (ref 136–145)
WBC # BLD AUTO: 22.66 THOUSAND/UL (ref 4.31–10.16)

## 2020-09-28 PROCEDURE — 97530 THERAPEUTIC ACTIVITIES: CPT

## 2020-09-28 PROCEDURE — 75984 XRAY CONTROL CATHETER CHANGE: CPT

## 2020-09-28 PROCEDURE — 99233 SBSQ HOSP IP/OBS HIGH 50: CPT | Performed by: INTERNAL MEDICINE

## 2020-09-28 PROCEDURE — 99232 SBSQ HOSP IP/OBS MODERATE 35: CPT | Performed by: INTERNAL MEDICINE

## 2020-09-28 PROCEDURE — 85025 COMPLETE CBC W/AUTO DIFF WBC: CPT | Performed by: SURGERY

## 2020-09-28 PROCEDURE — C1769 GUIDE WIRE: HCPCS

## 2020-09-28 PROCEDURE — 97535 SELF CARE MNGMENT TRAINING: CPT

## 2020-09-28 PROCEDURE — 0W2JX0Z CHANGE DRAINAGE DEVICE IN PELVIC CAVITY, EXTERNAL APPROACH: ICD-10-PCS | Performed by: ANESTHESIOLOGY

## 2020-09-28 PROCEDURE — 49423 EXCHANGE DRAINAGE CATHETER: CPT | Performed by: RADIOLOGY

## 2020-09-28 PROCEDURE — 94640 AIRWAY INHALATION TREATMENT: CPT

## 2020-09-28 PROCEDURE — 49423 EXCHANGE DRAINAGE CATHETER: CPT

## 2020-09-28 PROCEDURE — 85610 PROTHROMBIN TIME: CPT | Performed by: SURGERY

## 2020-09-28 PROCEDURE — 99232 SBSQ HOSP IP/OBS MODERATE 35: CPT | Performed by: SURGERY

## 2020-09-28 PROCEDURE — C1729 CATH, DRAINAGE: HCPCS

## 2020-09-28 PROCEDURE — 75984 XRAY CONTROL CATHETER CHANGE: CPT | Performed by: RADIOLOGY

## 2020-09-28 PROCEDURE — 80048 BASIC METABOLIC PNL TOTAL CA: CPT | Performed by: SURGERY

## 2020-09-28 RX ORDER — FENTANYL CITRATE 50 UG/ML
INJECTION, SOLUTION INTRAMUSCULAR; INTRAVENOUS CODE/TRAUMA/SEDATION MEDICATION
Status: DISCONTINUED | OUTPATIENT
Start: 2020-09-28 | End: 2020-10-23 | Stop reason: HOSPADM

## 2020-09-28 RX ORDER — IPRATROPIUM BROMIDE AND ALBUTEROL SULFATE 2.5; .5 MG/3ML; MG/3ML
3 SOLUTION RESPIRATORY (INHALATION) 3 TIMES DAILY PRN
Status: DISCONTINUED | OUTPATIENT
Start: 2020-09-28 | End: 2020-10-11

## 2020-09-28 RX ORDER — SODIUM CHLORIDE 9 MG/ML
100 INJECTION, SOLUTION INTRAVENOUS CONTINUOUS
Status: DISCONTINUED | OUTPATIENT
Start: 2020-09-28 | End: 2020-09-30

## 2020-09-28 RX ADMIN — IPRATROPIUM BROMIDE AND ALBUTEROL SULFATE 3 ML: 2.5; .5 SOLUTION RESPIRATORY (INHALATION) at 13:26

## 2020-09-28 RX ADMIN — HEPARIN SODIUM 5000 UNITS: 5000 INJECTION INTRAVENOUS; SUBCUTANEOUS at 14:13

## 2020-09-28 RX ADMIN — IPRATROPIUM BROMIDE AND ALBUTEROL SULFATE 3 ML: .5; 3 SOLUTION RESPIRATORY (INHALATION) at 07:15

## 2020-09-28 RX ADMIN — CARBAMIDE PEROXIDE 6.5% 5 DROP: 6.5 LIQUID AURICULAR (OTIC) at 08:25

## 2020-09-28 RX ADMIN — OXYCODONE HYDROCHLORIDE 5 MG: 5 TABLET ORAL at 21:39

## 2020-09-28 RX ADMIN — METRONIDAZOLE 500 MG: 500 INJECTION, SOLUTION INTRAVENOUS at 14:13

## 2020-09-28 RX ADMIN — HEPARIN SODIUM 5000 UNITS: 5000 INJECTION INTRAVENOUS; SUBCUTANEOUS at 21:36

## 2020-09-28 RX ADMIN — SODIUM CHLORIDE 100 ML/HR: 0.9 INJECTION, SOLUTION INTRAVENOUS at 06:30

## 2020-09-28 RX ADMIN — OXYCODONE HYDROCHLORIDE 5 MG: 5 TABLET ORAL at 17:30

## 2020-09-28 RX ADMIN — SODIUM CHLORIDE 100 ML/HR: 0.9 INJECTION, SOLUTION INTRAVENOUS at 20:38

## 2020-09-28 RX ADMIN — FENTANYL CITRATE 50 MCG: 50 INJECTION, SOLUTION INTRAMUSCULAR; INTRAVENOUS at 11:13

## 2020-09-28 RX ADMIN — CARBAMIDE PEROXIDE 6.5% 5 DROP: 6.5 LIQUID AURICULAR (OTIC) at 17:30

## 2020-09-28 RX ADMIN — CEFTRIAXONE 1000 MG: 2 INJECTION, POWDER, FOR SOLUTION INTRAMUSCULAR; INTRAVENOUS at 15:01

## 2020-09-28 RX ADMIN — OXYCODONE HYDROCHLORIDE 10 MG: 10 TABLET ORAL at 04:28

## 2020-09-28 RX ADMIN — PANTOPRAZOLE SODIUM 40 MG: 40 TABLET, DELAYED RELEASE ORAL at 15:01

## 2020-09-28 RX ADMIN — PANTOPRAZOLE SODIUM 40 MG: 40 TABLET, DELAYED RELEASE ORAL at 06:30

## 2020-09-28 RX ADMIN — IOHEXOL 15 ML: 350 INJECTION, SOLUTION INTRAVENOUS at 11:29

## 2020-09-28 RX ADMIN — METRONIDAZOLE 500 MG: 500 INJECTION, SOLUTION INTRAVENOUS at 06:30

## 2020-09-28 RX ADMIN — METRONIDAZOLE 500 MG: 500 INJECTION, SOLUTION INTRAVENOUS at 22:15

## 2020-09-28 RX ADMIN — OXYBUTYNIN 10 MG: 10 TABLET, FILM COATED, EXTENDED RELEASE ORAL at 08:25

## 2020-09-28 RX ADMIN — OXYCODONE HYDROCHLORIDE 5 MG: 5 TABLET ORAL at 09:48

## 2020-09-29 PROBLEM — C50.919 BREAST CANCER (HCC): Status: ACTIVE | Noted: 2020-09-29

## 2020-09-29 LAB
ANION GAP SERPL CALCULATED.3IONS-SCNC: 15 MMOL/L (ref 4–13)
BASOPHILS # BLD AUTO: 0.05 THOUSANDS/ΜL (ref 0–0.1)
BASOPHILS NFR BLD AUTO: 0 % (ref 0–1)
BUN SERPL-MCNC: 2 MG/DL (ref 5–25)
CALCIUM SERPL-MCNC: 8.2 MG/DL (ref 8.3–10.1)
CHLORIDE SERPL-SCNC: 98 MMOL/L (ref 100–108)
CO2 SERPL-SCNC: 23 MMOL/L (ref 21–32)
CREAT SERPL-MCNC: 0.24 MG/DL (ref 0.6–1.3)
EOSINOPHIL # BLD AUTO: 0.28 THOUSAND/ΜL (ref 0–0.61)
EOSINOPHIL NFR BLD AUTO: 1 % (ref 0–6)
ERYTHROCYTE [DISTWIDTH] IN BLOOD BY AUTOMATED COUNT: 13.6 % (ref 11.6–15.1)
GFR SERPL CREATININE-BSD FRML MDRD: 125 ML/MIN/1.73SQ M
GLUCOSE SERPL-MCNC: 88 MG/DL (ref 65–140)
HCT VFR BLD AUTO: 37.3 % (ref 34.8–46.1)
HGB BLD-MCNC: 12 G/DL (ref 11.5–15.4)
IMM GRANULOCYTES # BLD AUTO: 0.2 THOUSAND/UL (ref 0–0.2)
IMM GRANULOCYTES NFR BLD AUTO: 1 % (ref 0–2)
LYMPHOCYTES # BLD AUTO: 1.64 THOUSANDS/ΜL (ref 0.6–4.47)
LYMPHOCYTES NFR BLD AUTO: 8 % (ref 14–44)
MAGNESIUM SERPL-MCNC: 1.7 MG/DL (ref 1.6–2.6)
MCH RBC QN AUTO: 28.3 PG (ref 26.8–34.3)
MCHC RBC AUTO-ENTMCNC: 32.2 G/DL (ref 31.4–37.4)
MCV RBC AUTO: 88 FL (ref 82–98)
MONOCYTES # BLD AUTO: 1.87 THOUSAND/ΜL (ref 0.17–1.22)
MONOCYTES NFR BLD AUTO: 9 % (ref 4–12)
NEUTROPHILS # BLD AUTO: 16.85 THOUSANDS/ΜL (ref 1.85–7.62)
NEUTS SEG NFR BLD AUTO: 81 % (ref 43–75)
NRBC BLD AUTO-RTO: 0 /100 WBCS
PLATELET # BLD AUTO: 698 THOUSANDS/UL (ref 149–390)
PMV BLD AUTO: 9.8 FL (ref 8.9–12.7)
POTASSIUM SERPL-SCNC: 2.9 MMOL/L (ref 3.5–5.3)
RBC # BLD AUTO: 4.24 MILLION/UL (ref 3.81–5.12)
SODIUM SERPL-SCNC: 136 MMOL/L (ref 136–145)
WBC # BLD AUTO: 20.89 THOUSAND/UL (ref 4.31–10.16)

## 2020-09-29 PROCEDURE — 99232 SBSQ HOSP IP/OBS MODERATE 35: CPT | Performed by: INTERNAL MEDICINE

## 2020-09-29 PROCEDURE — 83735 ASSAY OF MAGNESIUM: CPT | Performed by: SURGERY

## 2020-09-29 PROCEDURE — 99232 SBSQ HOSP IP/OBS MODERATE 35: CPT | Performed by: SURGERY

## 2020-09-29 PROCEDURE — 85025 COMPLETE CBC W/AUTO DIFF WBC: CPT | Performed by: SURGERY

## 2020-09-29 PROCEDURE — 80048 BASIC METABOLIC PNL TOTAL CA: CPT | Performed by: SURGERY

## 2020-09-29 PROCEDURE — 99233 SBSQ HOSP IP/OBS HIGH 50: CPT | Performed by: INTERNAL MEDICINE

## 2020-09-29 RX ORDER — POTASSIUM CHLORIDE 20 MEQ/1
40 TABLET, EXTENDED RELEASE ORAL ONCE
Status: COMPLETED | OUTPATIENT
Start: 2020-09-29 | End: 2020-09-29

## 2020-09-29 RX ORDER — POTASSIUM CHLORIDE 20MEQ/15ML
40 LIQUID (ML) ORAL 2 TIMES DAILY
Status: DISCONTINUED | OUTPATIENT
Start: 2020-09-29 | End: 2020-10-01

## 2020-09-29 RX ORDER — CEFAZOLIN SODIUM 2 G/50ML
2000 SOLUTION INTRAVENOUS
Status: COMPLETED | OUTPATIENT
Start: 2020-09-30 | End: 2020-09-30

## 2020-09-29 RX ORDER — POTASSIUM CHLORIDE 14.9 MG/ML
20 INJECTION INTRAVENOUS
Status: DISCONTINUED | OUTPATIENT
Start: 2020-09-29 | End: 2020-09-29

## 2020-09-29 RX ORDER — POTASSIUM CHLORIDE 14.9 MG/ML
20 INJECTION INTRAVENOUS ONCE
Status: DISCONTINUED | OUTPATIENT
Start: 2020-09-29 | End: 2020-09-29

## 2020-09-29 RX ORDER — POTASSIUM CHLORIDE 20MEQ/15ML
40 LIQUID (ML) ORAL ONCE
Status: DISCONTINUED | OUTPATIENT
Start: 2020-09-29 | End: 2020-09-29

## 2020-09-29 RX ADMIN — OXYBUTYNIN 10 MG: 10 TABLET, FILM COATED, EXTENDED RELEASE ORAL at 08:04

## 2020-09-29 RX ADMIN — OXYCODONE HYDROCHLORIDE 5 MG: 5 TABLET ORAL at 15:01

## 2020-09-29 RX ADMIN — POTASSIUM CHLORIDE 20 MEQ: 14.9 INJECTION, SOLUTION INTRAVENOUS at 08:04

## 2020-09-29 RX ADMIN — METRONIDAZOLE 500 MG: 500 INJECTION, SOLUTION INTRAVENOUS at 22:52

## 2020-09-29 RX ADMIN — HEPARIN SODIUM 5000 UNITS: 5000 INJECTION INTRAVENOUS; SUBCUTANEOUS at 05:39

## 2020-09-29 RX ADMIN — METRONIDAZOLE 500 MG: 500 INJECTION, SOLUTION INTRAVENOUS at 14:28

## 2020-09-29 RX ADMIN — OXYCODONE HYDROCHLORIDE 5 MG: 5 TABLET ORAL at 10:48

## 2020-09-29 RX ADMIN — SODIUM CHLORIDE 100 ML/HR: 0.9 INJECTION, SOLUTION INTRAVENOUS at 05:43

## 2020-09-29 RX ADMIN — HEPARIN SODIUM 5000 UNITS: 5000 INJECTION INTRAVENOUS; SUBCUTANEOUS at 14:26

## 2020-09-29 RX ADMIN — PANTOPRAZOLE SODIUM 40 MG: 40 TABLET, DELAYED RELEASE ORAL at 15:07

## 2020-09-29 RX ADMIN — METRONIDAZOLE 500 MG: 500 INJECTION, SOLUTION INTRAVENOUS at 06:12

## 2020-09-29 RX ADMIN — CALCIUM CARBONATE (ANTACID) CHEW TAB 500 MG 500 MG: 500 CHEW TAB at 15:01

## 2020-09-29 RX ADMIN — PANTOPRAZOLE SODIUM 40 MG: 40 TABLET, DELAYED RELEASE ORAL at 06:12

## 2020-09-29 RX ADMIN — OXYCODONE HYDROCHLORIDE 5 MG: 5 TABLET ORAL at 19:48

## 2020-09-29 RX ADMIN — OXYCODONE HYDROCHLORIDE 5 MG: 5 TABLET ORAL at 23:32

## 2020-09-29 RX ADMIN — HEPARIN SODIUM 5000 UNITS: 5000 INJECTION INTRAVENOUS; SUBCUTANEOUS at 21:49

## 2020-09-29 RX ADMIN — CEFTRIAXONE 1000 MG: 2 INJECTION, POWDER, FOR SOLUTION INTRAMUSCULAR; INTRAVENOUS at 15:10

## 2020-09-29 RX ADMIN — CARBAMIDE PEROXIDE 6.5% 5 DROP: 6.5 LIQUID AURICULAR (OTIC) at 08:04

## 2020-09-29 RX ADMIN — CARBAMIDE PEROXIDE 6.5% 5 DROP: 6.5 LIQUID AURICULAR (OTIC) at 17:13

## 2020-09-29 RX ADMIN — POTASSIUM CHLORIDE 40 MEQ: 20 SOLUTION ORAL at 10:48

## 2020-09-29 RX ADMIN — POTASSIUM CHLORIDE 40 MEQ: 1500 TABLET, EXTENDED RELEASE ORAL at 08:04

## 2020-09-29 RX ADMIN — OXYCODONE HYDROCHLORIDE 5 MG: 5 TABLET ORAL at 03:54

## 2020-09-29 RX ADMIN — SODIUM CHLORIDE 100 ML/HR: 0.9 INJECTION, SOLUTION INTRAVENOUS at 16:04

## 2020-09-29 NOTE — ANESTHESIA PREPROCEDURE EVALUATION
Procedure:  HARTMANS PROCEDURE (N/A Hip)    Relevant Problems   ANESTHESIA   (+) Difficult intubation      CARDIO  stress test nml 2014   (+) Hypercholesterolemia      GI/HEPATIC   (+) GERD without esophagitis      GYN   (+) Breast cancer (HCC) (Left)      MUSCULOSKELETAL   (+) Dystrophy, muscular, 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[address] ) (mildly progressive type, can transfer, but doesn't ambulate)      PULMONARY   (+) COPD (chronic obstructive pulmonary disease) (HCC) (O2 dependent at night)      Other   (+) Acute diverticulitis   (+) Perforated diverticulum   (+) 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   (+) Sleep related hypoxia      · Last spirometry showed very severe restriction with FEV1 and forced vital capacity in the 45% predicted range      Physical Exam    Airway    Mallampati score: IV  TM Distance: <3 FB  Neck ROM: limited     Dental       Cardiovascular  Rhythm: regular, Rate: normal,     Pulmonary  Breath sounds clear to auscultation,     Other Findings     Results for Shandra Main (MRN [id number]) as of 9/29/2020 08:16   Ref   Range 9/29/2020 05:06   Sodium Latest Ref Range: 136 - 145 mmol/L 136   Potassium Latest Ref Range: 3 5 - 5 3 mmol/L 2 9 (L)   Chloride Latest Ref Range: 100 - 108 mmol/L 98 (L)   CO2 Latest Ref Range: 21 - 32 mmol/L 23   Anion Gap Latest Ref Range: 4 - 13 mmol/L 15 (H)   BUN Latest Ref Range: 5 - 25 mg/dL 2 (L)   Creatinine Latest Ref Range: 0 60 - 1 30 mg/dL 0 24 (L)   Glucose, Random Latest Ref Range: 65 - 140 mg/dL 88   Calcium Latest Ref Range: 8 3 - 10 1 mg/dL 8 2 (L)   eGFR Latest Units: ml/min/1 73sq m 125   WBC Latest Ref Range: 4 31 - 10 16 Thousand/uL 20 89 (H)   Red Blood Cell Count Latest Ref Range: 3 81 - 5 12 Million/uL 4 24   Hemoglobin Latest Ref Range: 11 5 - 15 4 g/dL 12 0   HCT Latest Ref Range: 34 8 - 46 1 % 37 3   MCV Latest Ref Range: 82 - 98 fL 88   MCH Latest Ref Range: 26 8 - 34 3 pg 28 3   MCHC Latest Ref Range: 31 4 - 37 4 g/dL 32 2   RDW Latest Ref Range: 11 6 - 15 1 % 13 6   Platelet Count Latest Ref Range: 149 - 390 Thousands/uL 698 (H)   MPV Latest Ref Range: 8 9 - 12 7 fL 9 8       Anesthesia Plan  ASA Score- 3     Anesthesia Type- general with ASA Monitors  Additional Monitors:   Airway Plan: ETT  Comment: H/o difficult airway, avoid succ, consider glidescope  Plan Factors-Exercise tolerance (METS): <4 METS  Chart reviewed  EKG reviewed  Imaging results reviewed  Existing labs reviewed  Patient summary reviewed  Patient is not a current smoker  Patient not instructed to abstain from smoking on day of procedure  Patient did not smoke on day of surgery  Induction- intravenous and awake intubation  Postoperative Plan- Plan for postoperative opioid use  Planned trial extubation    Informed Consent- Anesthetic plan and risks discussed with patient

## 2020-09-30 ENCOUNTER — TELEPHONE (OUTPATIENT)
Dept: UROLOGY | Facility: CLINIC | Age: 70
End: 2020-09-30

## 2020-09-30 ENCOUNTER — ANESTHESIA (INPATIENT)
Dept: PERIOP | Facility: HOSPITAL | Age: 70
DRG: 854 | End: 2020-09-30
Payer: MEDICARE

## 2020-09-30 ENCOUNTER — APPOINTMENT (INPATIENT)
Dept: RADIOLOGY | Facility: HOSPITAL | Age: 70
DRG: 854 | End: 2020-09-30
Payer: MEDICARE

## 2020-09-30 ENCOUNTER — APPOINTMENT (INPATIENT)
Dept: CT IMAGING | Facility: HOSPITAL | Age: 70
DRG: 854 | End: 2020-09-30
Payer: MEDICARE

## 2020-09-30 VITALS — HEART RATE: 91 BPM

## 2020-09-30 DIAGNOSIS — S37.20XS INJURY OF BLADDER, SEQUELA: Primary | ICD-10-CM

## 2020-09-30 PROBLEM — S37.20XA BLADDER INJURY: Status: ACTIVE | Noted: 2020-09-30

## 2020-09-30 PROBLEM — J96.11 CHRONIC RESPIRATORY FAILURE WITH HYPOXIA (HCC): Status: ACTIVE | Noted: 2020-09-30

## 2020-09-30 PROBLEM — K57.92 ACUTE DIVERTICULITIS: Status: RESOLVED | Noted: 2020-09-24 | Resolved: 2020-09-30

## 2020-09-30 PROBLEM — C50.919 BREAST CANCER (HCC): Status: RESOLVED | Noted: 2020-09-29 | Resolved: 2020-09-30

## 2020-09-30 LAB
ABO GROUP BLD: NORMAL
ABO GROUP BLD: NORMAL
ANION GAP SERPL CALCULATED.3IONS-SCNC: 13 MMOL/L (ref 4–13)
ANION GAP SERPL CALCULATED.3IONS-SCNC: 14 MMOL/L (ref 4–13)
BASOPHILS # BLD AUTO: 0.06 THOUSANDS/ΜL (ref 0–0.1)
BASOPHILS NFR BLD AUTO: 0 % (ref 0–1)
BLD GP AB SCN SERPL QL: NEGATIVE
BUN SERPL-MCNC: 2 MG/DL (ref 5–25)
BUN SERPL-MCNC: 3 MG/DL (ref 5–25)
CALCIUM SERPL-MCNC: 6.7 MG/DL (ref 8.3–10.1)
CALCIUM SERPL-MCNC: 8.5 MG/DL (ref 8.3–10.1)
CHLORIDE SERPL-SCNC: 105 MMOL/L (ref 100–108)
CHLORIDE SERPL-SCNC: 95 MMOL/L (ref 100–108)
CO2 SERPL-SCNC: 20 MMOL/L (ref 21–32)
CO2 SERPL-SCNC: 25 MMOL/L (ref 21–32)
CREAT SERPL-MCNC: 0.25 MG/DL (ref 0.6–1.3)
CREAT SERPL-MCNC: 0.25 MG/DL (ref 0.6–1.3)
EOSINOPHIL # BLD AUTO: 0.11 THOUSAND/ΜL (ref 0–0.61)
EOSINOPHIL NFR BLD AUTO: 1 % (ref 0–6)
ERYTHROCYTE [DISTWIDTH] IN BLOOD BY AUTOMATED COUNT: 13.5 % (ref 11.6–15.1)
GFR SERPL CREATININE-BSD FRML MDRD: 124 ML/MIN/1.73SQ M
GFR SERPL CREATININE-BSD FRML MDRD: 124 ML/MIN/1.73SQ M
GLUCOSE SERPL-MCNC: 159 MG/DL (ref 65–140)
GLUCOSE SERPL-MCNC: 88 MG/DL (ref 65–140)
HCT VFR BLD AUTO: 38.7 % (ref 34.8–46.1)
HGB BLD-MCNC: 12.6 G/DL (ref 11.5–15.4)
IMM GRANULOCYTES # BLD AUTO: 0.36 THOUSAND/UL (ref 0–0.2)
IMM GRANULOCYTES NFR BLD AUTO: 2 % (ref 0–2)
LYMPHOCYTES # BLD AUTO: 1.88 THOUSANDS/ΜL (ref 0.6–4.47)
LYMPHOCYTES NFR BLD AUTO: 9 % (ref 14–44)
MCH RBC QN AUTO: 28.3 PG (ref 26.8–34.3)
MCHC RBC AUTO-ENTMCNC: 32.6 G/DL (ref 31.4–37.4)
MCV RBC AUTO: 87 FL (ref 82–98)
MONOCYTES # BLD AUTO: 1.86 THOUSAND/ΜL (ref 0.17–1.22)
MONOCYTES NFR BLD AUTO: 9 % (ref 4–12)
NEUTROPHILS # BLD AUTO: 16.8 THOUSANDS/ΜL (ref 1.85–7.62)
NEUTS SEG NFR BLD AUTO: 79 % (ref 43–75)
NRBC BLD AUTO-RTO: 0 /100 WBCS
PLATELET # BLD AUTO: 670 THOUSANDS/UL (ref 149–390)
PMV BLD AUTO: 9.3 FL (ref 8.9–12.7)
POTASSIUM SERPL-SCNC: 3 MMOL/L (ref 3.5–5.3)
POTASSIUM SERPL-SCNC: 3.3 MMOL/L (ref 3.5–5.3)
RBC # BLD AUTO: 4.45 MILLION/UL (ref 3.81–5.12)
RH BLD: POSITIVE
RH BLD: POSITIVE
SODIUM SERPL-SCNC: 134 MMOL/L (ref 136–145)
SODIUM SERPL-SCNC: 138 MMOL/L (ref 136–145)
SPECIMEN EXPIRATION DATE: NORMAL
WBC # BLD AUTO: 21.07 THOUSAND/UL (ref 4.31–10.16)

## 2020-09-30 PROCEDURE — 0DTN0ZZ RESECTION OF SIGMOID COLON, OPEN APPROACH: ICD-10-PCS | Performed by: SURGERY

## 2020-09-30 PROCEDURE — 36569 INSJ PICC 5 YR+ W/O IMAGING: CPT

## 2020-09-30 PROCEDURE — 02HV33Z INSERTION OF INFUSION DEVICE INTO SUPERIOR VENA CAVA, PERCUTANEOUS APPROACH: ICD-10-PCS | Performed by: INTERNAL MEDICINE

## 2020-09-30 PROCEDURE — 80048 BASIC METABOLIC PNL TOTAL CA: CPT | Performed by: SURGERY

## 2020-09-30 PROCEDURE — 86901 BLOOD TYPING SEROLOGIC RH(D): CPT | Performed by: SURGERY

## 2020-09-30 PROCEDURE — 88304 TISSUE EXAM BY PATHOLOGIST: CPT | Performed by: PATHOLOGY

## 2020-09-30 PROCEDURE — 87176 TISSUE HOMOGENIZATION CULTR: CPT | Performed by: SURGERY

## 2020-09-30 PROCEDURE — 71045 X-RAY EXAM CHEST 1 VIEW: CPT

## 2020-09-30 PROCEDURE — 0DN80ZZ RELEASE SMALL INTESTINE, OPEN APPROACH: ICD-10-PCS | Performed by: SURGERY

## 2020-09-30 PROCEDURE — 87106 FUNGI IDENTIFICATION YEAST: CPT | Performed by: SURGERY

## 2020-09-30 PROCEDURE — 71260 CT THORAX DX C+: CPT

## 2020-09-30 PROCEDURE — 0TQB0ZZ REPAIR BLADDER, OPEN APPROACH: ICD-10-PCS | Performed by: UROLOGY

## 2020-09-30 PROCEDURE — 0T9880Z DRAINAGE OF BILATERAL URETERS WITH DRAINAGE DEVICE, VIA NATURAL OR ARTIFICIAL OPENING ENDOSCOPIC: ICD-10-PCS | Performed by: UROLOGY

## 2020-09-30 PROCEDURE — 99291 CRITICAL CARE FIRST HOUR: CPT | Performed by: NURSE PRACTITIONER

## 2020-09-30 PROCEDURE — 51860 REPAIR OF BLADDER WOUND: CPT | Performed by: UROLOGY

## 2020-09-30 PROCEDURE — 87077 CULTURE AEROBIC IDENTIFY: CPT | Performed by: SURGERY

## 2020-09-30 PROCEDURE — 0D1L0Z4 BYPASS TRANSVERSE COLON TO CUTANEOUS, OPEN APPROACH: ICD-10-PCS | Performed by: SURGERY

## 2020-09-30 PROCEDURE — 87205 SMEAR GRAM STAIN: CPT | Performed by: SURGERY

## 2020-09-30 PROCEDURE — 87070 CULTURE OTHR SPECIMN AEROBIC: CPT | Performed by: SURGERY

## 2020-09-30 PROCEDURE — 86850 RBC ANTIBODY SCREEN: CPT | Performed by: SURGERY

## 2020-09-30 PROCEDURE — 74177 CT ABD & PELVIS W/CONTRAST: CPT

## 2020-09-30 PROCEDURE — 52005 CYSTO W/URTRL CATHJ: CPT | Performed by: UROLOGY

## 2020-09-30 PROCEDURE — 87075 CULTR BACTERIA EXCEPT BLOOD: CPT | Performed by: SURGERY

## 2020-09-30 PROCEDURE — 0DTJ0ZZ RESECTION OF APPENDIX, OPEN APPROACH: ICD-10-PCS | Performed by: SURGERY

## 2020-09-30 PROCEDURE — 88307 TISSUE EXAM BY PATHOLOGIST: CPT | Performed by: PATHOLOGY

## 2020-09-30 PROCEDURE — G9197 ORDER FOR CEPH: HCPCS | Performed by: SURGERY

## 2020-09-30 PROCEDURE — 85025 COMPLETE CBC W/AUTO DIFF WBC: CPT | Performed by: SURGERY

## 2020-09-30 PROCEDURE — 44141 PARTIAL REMOVAL OF COLON: CPT | Performed by: SURGERY

## 2020-09-30 PROCEDURE — C9290 INJ, BUPIVACAINE LIPOSOME: HCPCS | Performed by: ANESTHESIOLOGY

## 2020-09-30 PROCEDURE — 86900 BLOOD TYPING SEROLOGIC ABO: CPT | Performed by: SURGERY

## 2020-09-30 PROCEDURE — 99024 POSTOP FOLLOW-UP VISIT: CPT | Performed by: SURGERY

## 2020-09-30 PROCEDURE — 80048 BASIC METABOLIC PNL TOTAL CA: CPT | Performed by: NURSE PRACTITIONER

## 2020-09-30 PROCEDURE — C1751 CATH, INF, PER/CENT/MIDLINE: HCPCS

## 2020-09-30 PROCEDURE — 0DNN0ZZ RELEASE SIGMOID COLON, OPEN APPROACH: ICD-10-PCS | Performed by: SURGERY

## 2020-09-30 PROCEDURE — 87186 SC STD MICRODIL/AGAR DIL: CPT | Performed by: SURGERY

## 2020-09-30 PROCEDURE — G1004 CDSM NDSC: HCPCS

## 2020-09-30 RX ORDER — SODIUM CHLORIDE 9 MG/ML
INJECTION, SOLUTION INTRAVENOUS CONTINUOUS PRN
Status: DISCONTINUED | OUTPATIENT
Start: 2020-09-30 | End: 2020-09-30

## 2020-09-30 RX ORDER — MIDAZOLAM HYDROCHLORIDE 2 MG/2ML
INJECTION, SOLUTION INTRAMUSCULAR; INTRAVENOUS AS NEEDED
Status: DISCONTINUED | OUTPATIENT
Start: 2020-09-30 | End: 2020-09-30

## 2020-09-30 RX ORDER — PROPOFOL 10 MG/ML
INJECTION, EMULSION INTRAVENOUS CONTINUOUS PRN
Status: DISCONTINUED | OUTPATIENT
Start: 2020-09-30 | End: 2020-09-30

## 2020-09-30 RX ORDER — SODIUM CHLORIDE, SODIUM LACTATE, POTASSIUM CHLORIDE, CALCIUM CHLORIDE 600; 310; 30; 20 MG/100ML; MG/100ML; MG/100ML; MG/100ML
50 INJECTION, SOLUTION INTRAVENOUS CONTINUOUS
Status: DISCONTINUED | OUTPATIENT
Start: 2020-09-30 | End: 2020-10-01

## 2020-09-30 RX ORDER — LIDOCAINE HYDROCHLORIDE 20 MG/ML
INJECTION, SOLUTION INFILTRATION; PERINEURAL AS NEEDED
Status: DISCONTINUED | OUTPATIENT
Start: 2020-09-30 | End: 2020-09-30

## 2020-09-30 RX ORDER — POTASSIUM CHLORIDE 14.9 MG/ML
20 INJECTION INTRAVENOUS
Status: DISCONTINUED | OUTPATIENT
Start: 2020-09-30 | End: 2020-09-30

## 2020-09-30 RX ORDER — ONDANSETRON 2 MG/ML
INJECTION INTRAMUSCULAR; INTRAVENOUS AS NEEDED
Status: DISCONTINUED | OUTPATIENT
Start: 2020-09-30 | End: 2020-09-30

## 2020-09-30 RX ORDER — ONDANSETRON 2 MG/ML
4 INJECTION INTRAMUSCULAR; INTRAVENOUS EVERY 6 HOURS PRN
Status: DISCONTINUED | OUTPATIENT
Start: 2020-09-30 | End: 2020-09-30 | Stop reason: HOSPADM

## 2020-09-30 RX ORDER — FENTANYL CITRATE 50 UG/ML
INJECTION, SOLUTION INTRAMUSCULAR; INTRAVENOUS AS NEEDED
Status: DISCONTINUED | OUTPATIENT
Start: 2020-09-30 | End: 2020-09-30

## 2020-09-30 RX ORDER — ALBUMIN, HUMAN INJ 5% 5 %
SOLUTION INTRAVENOUS CONTINUOUS PRN
Status: DISCONTINUED | OUTPATIENT
Start: 2020-09-30 | End: 2020-09-30

## 2020-09-30 RX ORDER — MAGNESIUM HYDROXIDE 1200 MG/15ML
LIQUID ORAL AS NEEDED
Status: DISCONTINUED | OUTPATIENT
Start: 2020-09-30 | End: 2020-09-30 | Stop reason: HOSPADM

## 2020-09-30 RX ORDER — ROCURONIUM BROMIDE 10 MG/ML
INJECTION, SOLUTION INTRAVENOUS AS NEEDED
Status: DISCONTINUED | OUTPATIENT
Start: 2020-09-30 | End: 2020-09-30

## 2020-09-30 RX ORDER — HYDROMORPHONE HCL/PF 1 MG/ML
0.5 SYRINGE (ML) INJECTION
Status: COMPLETED | OUTPATIENT
Start: 2020-09-30 | End: 2020-09-30

## 2020-09-30 RX ORDER — LORAZEPAM 2 MG/ML
0.5 INJECTION INTRAMUSCULAR ONCE
Status: COMPLETED | OUTPATIENT
Start: 2020-09-30 | End: 2020-09-30

## 2020-09-30 RX ORDER — PROPOFOL 10 MG/ML
INJECTION, EMULSION INTRAVENOUS AS NEEDED
Status: DISCONTINUED | OUTPATIENT
Start: 2020-09-30 | End: 2020-09-30

## 2020-09-30 RX ORDER — HYDROMORPHONE HCL/PF 1 MG/ML
0.5 SYRINGE (ML) INJECTION
Status: DISCONTINUED | OUTPATIENT
Start: 2020-09-30 | End: 2020-09-30 | Stop reason: HOSPADM

## 2020-09-30 RX ADMIN — SODIUM CHLORIDE: 0.9 INJECTION, SOLUTION INTRAVENOUS at 18:49

## 2020-09-30 RX ADMIN — MIDAZOLAM 1 MG: 1 INJECTION INTRAMUSCULAR; INTRAVENOUS at 15:27

## 2020-09-30 RX ADMIN — OXYBUTYNIN 10 MG: 10 TABLET, FILM COATED, EXTENDED RELEASE ORAL at 08:53

## 2020-09-30 RX ADMIN — SODIUM CHLORIDE, SODIUM LACTATE, POTASSIUM CHLORIDE, AND CALCIUM CHLORIDE 100 ML/HR: .6; .31; .03; .02 INJECTION, SOLUTION INTRAVENOUS at 21:50

## 2020-09-30 RX ADMIN — FENTANYL CITRATE 50 MCG: 50 INJECTION, SOLUTION INTRAMUSCULAR; INTRAVENOUS at 16:24

## 2020-09-30 RX ADMIN — HYDROMORPHONE HYDROCHLORIDE 0.5 MG: 1 INJECTION, SOLUTION INTRAMUSCULAR; INTRAVENOUS; SUBCUTANEOUS at 19:32

## 2020-09-30 RX ADMIN — REMIFENTANIL HYDROCHLORIDE 0.1 MCG/KG/MIN: 1 INJECTION, POWDER, LYOPHILIZED, FOR SOLUTION INTRAVENOUS at 15:27

## 2020-09-30 RX ADMIN — PHENYLEPHRINE HYDROCHLORIDE 200 MCG: 10 INJECTION INTRAVENOUS at 17:30

## 2020-09-30 RX ADMIN — POTASSIUM CHLORIDE 20 MEQ: 14.9 INJECTION, SOLUTION INTRAVENOUS at 13:13

## 2020-09-30 RX ADMIN — HYDROMORPHONE HYDROCHLORIDE 0.5 MG: 1 INJECTION, SOLUTION INTRAMUSCULAR; INTRAVENOUS; SUBCUTANEOUS at 19:42

## 2020-09-30 RX ADMIN — PHENYLEPHRINE HYDROCHLORIDE 200 MCG: 10 INJECTION INTRAVENOUS at 16:00

## 2020-09-30 RX ADMIN — FENTANYL CITRATE 50 MCG: 50 INJECTION, SOLUTION INTRAMUSCULAR; INTRAVENOUS at 17:56

## 2020-09-30 RX ADMIN — PHENYLEPHRINE HYDROCHLORIDE 100 MCG: 10 INJECTION INTRAVENOUS at 16:17

## 2020-09-30 RX ADMIN — PHENYLEPHRINE HYDROCHLORIDE 100 MCG: 10 INJECTION INTRAVENOUS at 16:37

## 2020-09-30 RX ADMIN — SUGAMMADEX 200 MG: 100 INJECTION, SOLUTION INTRAVENOUS at 18:50

## 2020-09-30 RX ADMIN — CARBAMIDE PEROXIDE 6.5% 5 DROP: 6.5 LIQUID AURICULAR (OTIC) at 08:53

## 2020-09-30 RX ADMIN — PHENYLEPHRINE HYDROCHLORIDE 200 MCG: 10 INJECTION INTRAVENOUS at 15:43

## 2020-09-30 RX ADMIN — OXYCODONE HYDROCHLORIDE 5 MG: 5 TABLET ORAL at 03:35

## 2020-09-30 RX ADMIN — HEPARIN SODIUM 5000 UNITS: 5000 INJECTION INTRAVENOUS; SUBCUTANEOUS at 22:13

## 2020-09-30 RX ADMIN — LORAZEPAM 0.5 MG: 2 INJECTION INTRAMUSCULAR; INTRAVENOUS at 20:00

## 2020-09-30 RX ADMIN — ROCURONIUM BROMIDE 10 MG: 10 INJECTION, SOLUTION INTRAVENOUS at 16:31

## 2020-09-30 RX ADMIN — PHENYLEPHRINE HYDROCHLORIDE 50 MCG/MIN: 10 INJECTION INTRAVENOUS at 17:29

## 2020-09-30 RX ADMIN — HYDROMORPHONE HYDROCHLORIDE 0.5 MG: 1 INJECTION, SOLUTION INTRAMUSCULAR; INTRAVENOUS; SUBCUTANEOUS at 20:34

## 2020-09-30 RX ADMIN — METRONIDAZOLE 500 MG: 500 INJECTION, SOLUTION INTRAVENOUS at 22:37

## 2020-09-30 RX ADMIN — ALBUMIN (HUMAN): 12.5 INJECTION, SOLUTION INTRAVENOUS at 17:29

## 2020-09-30 RX ADMIN — METRONIDAZOLE 500 MG: 500 INJECTION, SOLUTION INTRAVENOUS at 06:12

## 2020-09-30 RX ADMIN — PHENYLEPHRINE HYDROCHLORIDE 100 MCG: 10 INJECTION INTRAVENOUS at 15:49

## 2020-09-30 RX ADMIN — LIDOCAINE HYDROCHLORIDE 3 ML: 20 INJECTION, SOLUTION INFILTRATION; PERINEURAL at 15:27

## 2020-09-30 RX ADMIN — SODIUM CHLORIDE: 0.9 INJECTION, SOLUTION INTRAVENOUS at 15:22

## 2020-09-30 RX ADMIN — HYDROMORPHONE HYDROCHLORIDE 0.5 MG: 1 INJECTION, SOLUTION INTRAMUSCULAR; INTRAVENOUS; SUBCUTANEOUS at 19:13

## 2020-09-30 RX ADMIN — IOHEXOL 100 ML: 350 INJECTION, SOLUTION INTRAVENOUS at 08:52

## 2020-09-30 RX ADMIN — CALCIUM CARBONATE (ANTACID) CHEW TAB 500 MG 500 MG: 500 CHEW TAB at 08:56

## 2020-09-30 RX ADMIN — OXYCODONE HYDROCHLORIDE 10 MG: 10 TABLET ORAL at 22:37

## 2020-09-30 RX ADMIN — OXYCODONE HYDROCHLORIDE 5 MG: 5 TABLET ORAL at 11:17

## 2020-09-30 RX ADMIN — PHENYLEPHRINE HYDROCHLORIDE 200 MCG: 10 INJECTION INTRAVENOUS at 17:15

## 2020-09-30 RX ADMIN — PHENYLEPHRINE HYDROCHLORIDE 200 MCG: 10 INJECTION INTRAVENOUS at 15:39

## 2020-09-30 RX ADMIN — FENTANYL CITRATE 100 MCG: 50 INJECTION, SOLUTION INTRAMUSCULAR; INTRAVENOUS at 15:27

## 2020-09-30 RX ADMIN — PHENYLEPHRINE HYDROCHLORIDE 200 MCG: 10 INJECTION INTRAVENOUS at 16:54

## 2020-09-30 RX ADMIN — HYDROMORPHONE HYDROCHLORIDE 0.5 MG: 1 INJECTION, SOLUTION INTRAMUSCULAR; INTRAVENOUS; SUBCUTANEOUS at 19:23

## 2020-09-30 RX ADMIN — MIDAZOLAM 1 MG: 1 INJECTION INTRAMUSCULAR; INTRAVENOUS at 15:13

## 2020-09-30 RX ADMIN — PROPOFOL 150 MG: 10 INJECTION, EMULSION INTRAVENOUS at 15:27

## 2020-09-30 RX ADMIN — PHENYLEPHRINE HYDROCHLORIDE 100 MCG: 10 INJECTION INTRAVENOUS at 16:22

## 2020-09-30 RX ADMIN — PHENYLEPHRINE HYDROCHLORIDE 100 MCG: 10 INJECTION INTRAVENOUS at 16:27

## 2020-09-30 RX ADMIN — PANTOPRAZOLE SODIUM 40 MG: 40 TABLET, DELAYED RELEASE ORAL at 06:13

## 2020-09-30 RX ADMIN — ROCURONIUM BROMIDE 10 MG: 10 INJECTION, SOLUTION INTRAVENOUS at 17:51

## 2020-09-30 RX ADMIN — Medication 500 MG: at 15:22

## 2020-09-30 RX ADMIN — CEFAZOLIN SODIUM 2000 MG: 2 SOLUTION INTRAVENOUS at 15:09

## 2020-09-30 RX ADMIN — ROCURONIUM BROMIDE 50 MG: 10 INJECTION, SOLUTION INTRAVENOUS at 15:27

## 2020-09-30 RX ADMIN — ONDANSETRON 4 MG: 2 INJECTION INTRAMUSCULAR; INTRAVENOUS at 18:35

## 2020-09-30 RX ADMIN — IOHEXOL 50 ML: 240 INJECTION, SOLUTION INTRATHECAL; INTRAVASCULAR; INTRAVENOUS; ORAL at 04:45

## 2020-09-30 RX ADMIN — ROCURONIUM BROMIDE 10 MG: 10 INJECTION, SOLUTION INTRAVENOUS at 17:42

## 2020-09-30 RX ADMIN — PROPOFOL 100 MCG/KG/MIN: 10 INJECTION, EMULSION INTRAVENOUS at 15:27

## 2020-09-30 RX ADMIN — FENTANYL CITRATE 50 MCG: 50 INJECTION, SOLUTION INTRAMUSCULAR; INTRAVENOUS at 16:31

## 2020-09-30 RX ADMIN — PHENYLEPHRINE HYDROCHLORIDE 100 MCG: 10 INJECTION INTRAVENOUS at 15:36

## 2020-09-30 RX ADMIN — FENTANYL CITRATE 50 MCG: 50 INJECTION, SOLUTION INTRAMUSCULAR; INTRAVENOUS at 18:50

## 2020-09-30 NOTE — ANESTHESIA PROCEDURE NOTES
Peripheral Block    Start time: 9/30/2020 3:44 PM  Reason for block: at surgeon's request and post-op pain management  Staffing  Anesthesiologist: Geri Bee DO  Performed: anesthesiologist   Preanesthetic Checklist  Completed: patient identified, site marked, surgical consent, pre-op evaluation, timeout performed, IV checked, risks and benefits discussed and monitors and equipment checked  Peripheral Block  Patient position: supine  Prep: ChloraPrep  Patient monitoring: heart rate, continuous pulse ox and frequent blood pressure checks  Block type: TAP  Laterality: left  Injection technique: single-shot  Procedures: ultrasound guided, Ultrasound guidance required for the procedure to increase accuracy and safety of medication placement and decrease risk of complications    Ultrasound permanent image saved  Needle  Needle type: Stimuplex   Needle gauge: 22 G  Needle length: 10 cm  Needle localization: ultrasound guidance  Assessment  Injection assessment: incremental injection, negative aspiration for heme and no paresthesia on injection  Paresthesia pain: none  Heart rate change: no  Slow fractionated injection: yes  Post-procedure:  site cleaned  patient tolerated the procedure well with no immediate complications

## 2020-09-30 NOTE — TELEPHONE ENCOUNTER
Patient had apparent bladder injury at the time of surgery  This was repaired  Patient will need cystogram in 2 weeks with Henry catheter removal to follow

## 2020-10-01 ENCOUNTER — TELEPHONE (OUTPATIENT)
Dept: SURGERY | Facility: CLINIC | Age: 70
End: 2020-10-01

## 2020-10-01 LAB
ANION GAP SERPL CALCULATED.3IONS-SCNC: 14 MMOL/L (ref 4–13)
BASOPHILS # BLD AUTO: 0.07 THOUSANDS/ΜL (ref 0–0.1)
BASOPHILS NFR BLD AUTO: 0 % (ref 0–1)
BUN SERPL-MCNC: 4 MG/DL (ref 5–25)
CALCIUM SERPL-MCNC: 7.5 MG/DL (ref 8.3–10.1)
CHLORIDE SERPL-SCNC: 103 MMOL/L (ref 100–108)
CO2 SERPL-SCNC: 20 MMOL/L (ref 21–32)
CREAT SERPL-MCNC: 0.28 MG/DL (ref 0.6–1.3)
EOSINOPHIL # BLD AUTO: 0 THOUSAND/ΜL (ref 0–0.61)
EOSINOPHIL NFR BLD AUTO: 0 % (ref 0–6)
ERYTHROCYTE [DISTWIDTH] IN BLOOD BY AUTOMATED COUNT: 14.3 % (ref 11.6–15.1)
GFR SERPL CREATININE-BSD FRML MDRD: 119 ML/MIN/1.73SQ M
GLUCOSE SERPL-MCNC: 108 MG/DL (ref 65–140)
HCT VFR BLD AUTO: 34.6 % (ref 34.8–46.1)
HGB BLD-MCNC: 10.9 G/DL (ref 11.5–15.4)
IMM GRANULOCYTES # BLD AUTO: 0.49 THOUSAND/UL (ref 0–0.2)
IMM GRANULOCYTES NFR BLD AUTO: 2 % (ref 0–2)
LYMPHOCYTES # BLD AUTO: 1.75 THOUSANDS/ΜL (ref 0.6–4.47)
LYMPHOCYTES NFR BLD AUTO: 6 % (ref 14–44)
MAGNESIUM SERPL-MCNC: 1.2 MG/DL (ref 1.6–2.6)
MCH RBC QN AUTO: 28.6 PG (ref 26.8–34.3)
MCHC RBC AUTO-ENTMCNC: 31.5 G/DL (ref 31.4–37.4)
MCV RBC AUTO: 91 FL (ref 82–98)
MONOCYTES # BLD AUTO: 2.68 THOUSAND/ΜL (ref 0.17–1.22)
MONOCYTES NFR BLD AUTO: 9 % (ref 4–12)
NEUTROPHILS # BLD AUTO: 25.87 THOUSANDS/ΜL (ref 1.85–7.62)
NEUTS SEG NFR BLD AUTO: 83 % (ref 43–75)
NRBC BLD AUTO-RTO: 0 /100 WBCS
PHOSPHATE SERPL-MCNC: 4.7 MG/DL (ref 2.3–4.1)
PLATELET # BLD AUTO: 661 THOUSANDS/UL (ref 149–390)
PMV BLD AUTO: 9.2 FL (ref 8.9–12.7)
POTASSIUM SERPL-SCNC: 3.8 MMOL/L (ref 3.5–5.3)
RBC # BLD AUTO: 3.81 MILLION/UL (ref 3.81–5.12)
SODIUM SERPL-SCNC: 137 MMOL/L (ref 136–145)
WBC # BLD AUTO: 30.86 THOUSAND/UL (ref 4.31–10.16)

## 2020-10-01 PROCEDURE — 99232 SBSQ HOSP IP/OBS MODERATE 35: CPT | Performed by: INTERNAL MEDICINE

## 2020-10-01 PROCEDURE — 99024 POSTOP FOLLOW-UP VISIT: CPT | Performed by: PHYSICIAN ASSISTANT

## 2020-10-01 PROCEDURE — 99233 SBSQ HOSP IP/OBS HIGH 50: CPT | Performed by: INTERNAL MEDICINE

## 2020-10-01 PROCEDURE — 99024 POSTOP FOLLOW-UP VISIT: CPT | Performed by: SURGERY

## 2020-10-01 PROCEDURE — 84100 ASSAY OF PHOSPHORUS: CPT | Performed by: SURGERY

## 2020-10-01 PROCEDURE — 85025 COMPLETE CBC W/AUTO DIFF WBC: CPT | Performed by: SURGERY

## 2020-10-01 PROCEDURE — 83735 ASSAY OF MAGNESIUM: CPT | Performed by: SURGERY

## 2020-10-01 PROCEDURE — 80048 BASIC METABOLIC PNL TOTAL CA: CPT | Performed by: SURGERY

## 2020-10-01 RX ORDER — CITALOPRAM 20 MG/1
10 TABLET ORAL DAILY
Status: DISCONTINUED | OUTPATIENT
Start: 2020-10-02 | End: 2020-10-23 | Stop reason: HOSPADM

## 2020-10-01 RX ORDER — POTASSIUM CHLORIDE 14.9 MG/ML
20 INJECTION INTRAVENOUS
Status: COMPLETED | OUTPATIENT
Start: 2020-10-01 | End: 2020-10-01

## 2020-10-01 RX ORDER — DEXTROSE, SODIUM CHLORIDE, AND POTASSIUM CHLORIDE 5; .9; .15 G/100ML; G/100ML; G/100ML
50 INJECTION INTRAVENOUS CONTINUOUS
Status: DISCONTINUED | OUTPATIENT
Start: 2020-10-01 | End: 2020-10-04

## 2020-10-01 RX ORDER — CALCIUM GLUCONATE 20 MG/ML
1 INJECTION, SOLUTION INTRAVENOUS ONCE
Status: COMPLETED | OUTPATIENT
Start: 2020-10-01 | End: 2020-10-01

## 2020-10-01 RX ADMIN — POTASSIUM CHLORIDE 20 MEQ: 14.9 INJECTION, SOLUTION INTRAVENOUS at 03:30

## 2020-10-01 RX ADMIN — OXYCODONE HYDROCHLORIDE 10 MG: 10 TABLET ORAL at 01:35

## 2020-10-01 RX ADMIN — OXYCODONE HYDROCHLORIDE 10 MG: 10 TABLET ORAL at 07:43

## 2020-10-01 RX ADMIN — METRONIDAZOLE 500 MG: 500 INJECTION, SOLUTION INTRAVENOUS at 23:00

## 2020-10-01 RX ADMIN — MAGNESIUM SULFATE HEPTAHYDRATE 3 G: 500 INJECTION, SOLUTION INTRAMUSCULAR; INTRAVENOUS at 06:30

## 2020-10-01 RX ADMIN — HYDROMORPHONE HYDROCHLORIDE 0.5 MG: 1 INJECTION, SOLUTION INTRAMUSCULAR; INTRAVENOUS; SUBCUTANEOUS at 21:29

## 2020-10-01 RX ADMIN — SODIUM CHLORIDE, SODIUM LACTATE, POTASSIUM CHLORIDE, AND CALCIUM CHLORIDE 100 ML/HR: .6; .31; .03; .02 INJECTION, SOLUTION INTRAVENOUS at 06:28

## 2020-10-01 RX ADMIN — OXYBUTYNIN 10 MG: 10 TABLET, FILM COATED, EXTENDED RELEASE ORAL at 09:58

## 2020-10-01 RX ADMIN — OXYCODONE HYDROCHLORIDE 10 MG: 10 TABLET ORAL at 20:08

## 2020-10-01 RX ADMIN — DEXTROSE, SODIUM CHLORIDE, AND POTASSIUM CHLORIDE 100 ML/HR: 5; .9; .15 INJECTION INTRAVENOUS at 21:25

## 2020-10-01 RX ADMIN — HEPARIN SODIUM 5000 UNITS: 5000 INJECTION INTRAVENOUS; SUBCUTANEOUS at 13:32

## 2020-10-01 RX ADMIN — HYDROMORPHONE HYDROCHLORIDE 0.5 MG: 1 INJECTION, SOLUTION INTRAMUSCULAR; INTRAVENOUS; SUBCUTANEOUS at 09:57

## 2020-10-01 RX ADMIN — CEFTRIAXONE 1000 MG: 2 INJECTION, POWDER, FOR SOLUTION INTRAMUSCULAR; INTRAVENOUS at 14:22

## 2020-10-01 RX ADMIN — POTASSIUM CHLORIDE 20 MEQ: 14.9 INJECTION, SOLUTION INTRAVENOUS at 08:38

## 2020-10-01 RX ADMIN — METRONIDAZOLE 500 MG: 500 INJECTION, SOLUTION INTRAVENOUS at 14:54

## 2020-10-01 RX ADMIN — CALCIUM GLUCONATE 1 G: 20 INJECTION, SOLUTION INTRAVENOUS at 04:00

## 2020-10-01 RX ADMIN — POTASSIUM CHLORIDE 20 MEQ: 14.9 INJECTION, SOLUTION INTRAVENOUS at 05:00

## 2020-10-01 RX ADMIN — HEPARIN SODIUM 5000 UNITS: 5000 INJECTION INTRAVENOUS; SUBCUTANEOUS at 06:24

## 2020-10-01 RX ADMIN — CARBAMIDE PEROXIDE 6.5% 5 DROP: 6.5 LIQUID AURICULAR (OTIC) at 21:26

## 2020-10-01 RX ADMIN — PANTOPRAZOLE SODIUM 40 MG: 40 TABLET, DELAYED RELEASE ORAL at 17:29

## 2020-10-01 RX ADMIN — HEPARIN SODIUM 5000 UNITS: 5000 INJECTION INTRAVENOUS; SUBCUTANEOUS at 21:33

## 2020-10-01 RX ADMIN — POTASSIUM CHLORIDE 20 MEQ: 14.9 INJECTION, SOLUTION INTRAVENOUS at 10:51

## 2020-10-01 RX ADMIN — POTASSIUM CHLORIDE 40 MEQ: 20 SOLUTION ORAL at 17:29

## 2020-10-01 RX ADMIN — PANTOPRAZOLE SODIUM 40 MG: 40 TABLET, DELAYED RELEASE ORAL at 07:49

## 2020-10-01 RX ADMIN — METRONIDAZOLE 500 MG: 500 INJECTION, SOLUTION INTRAVENOUS at 07:51

## 2020-10-01 NOTE — ANESTHESIA POSTPROCEDURE EVALUATION
Post-Op Assessment Note    CV Status:  Stable  Pain Score: 4    Pain management: adequate     Mental Status:  Alert and awake   Hydration Status:  Euvolemic   PONV Controlled:  Controlled   Airway Patency:  Patent      Post Op Vitals Reviewed: Yes      Staff: Anesthesiologist         No complications documented      BP      Temp      Pulse     Resp      SpO2

## 2020-10-02 PROBLEM — D64.9 ANEMIA: Status: ACTIVE | Noted: 2020-10-02

## 2020-10-02 PROBLEM — R00.0 SINUS TACHYCARDIA: Status: ACTIVE | Noted: 2020-10-02

## 2020-10-02 LAB
ANION GAP SERPL CALCULATED.3IONS-SCNC: 8 MMOL/L (ref 4–13)
BUN SERPL-MCNC: 8 MG/DL (ref 5–25)
CALCIUM SERPL-MCNC: 8.1 MG/DL (ref 8.3–10.1)
CHLORIDE SERPL-SCNC: 106 MMOL/L (ref 100–108)
CO2 SERPL-SCNC: 24 MMOL/L (ref 21–32)
CREAT SERPL-MCNC: 0.51 MG/DL (ref 0.6–1.3)
ERYTHROCYTE [DISTWIDTH] IN BLOOD BY AUTOMATED COUNT: 14.6 % (ref 11.6–15.1)
GFR SERPL CREATININE-BSD FRML MDRD: 98 ML/MIN/1.73SQ M
GLUCOSE SERPL-MCNC: 184 MG/DL (ref 65–140)
HCT VFR BLD AUTO: 28.1 % (ref 34.8–46.1)
HGB BLD-MCNC: 8.5 G/DL (ref 11.5–15.4)
HGB BLD-MCNC: 9.6 G/DL (ref 11.5–15.4)
MCH RBC QN AUTO: 28.4 PG (ref 26.8–34.3)
MCHC RBC AUTO-ENTMCNC: 30.2 G/DL (ref 31.4–37.4)
MCV RBC AUTO: 94 FL (ref 82–98)
PLATELET # BLD AUTO: 477 THOUSANDS/UL (ref 149–390)
PMV BLD AUTO: 9.2 FL (ref 8.9–12.7)
POTASSIUM SERPL-SCNC: 5 MMOL/L (ref 3.5–5.3)
RBC # BLD AUTO: 2.99 MILLION/UL (ref 3.81–5.12)
SODIUM SERPL-SCNC: 138 MMOL/L (ref 136–145)
WBC # BLD AUTO: 24.15 THOUSAND/UL (ref 4.31–10.16)

## 2020-10-02 PROCEDURE — 97530 THERAPEUTIC ACTIVITIES: CPT

## 2020-10-02 PROCEDURE — 99024 POSTOP FOLLOW-UP VISIT: CPT | Performed by: PHYSICIAN ASSISTANT

## 2020-10-02 PROCEDURE — 97535 SELF CARE MNGMENT TRAINING: CPT

## 2020-10-02 PROCEDURE — 99222 1ST HOSP IP/OBS MODERATE 55: CPT | Performed by: INTERNAL MEDICINE

## 2020-10-02 PROCEDURE — 99024 POSTOP FOLLOW-UP VISIT: CPT | Performed by: SURGERY

## 2020-10-02 PROCEDURE — 92610 EVALUATE SWALLOWING FUNCTION: CPT

## 2020-10-02 PROCEDURE — 99233 SBSQ HOSP IP/OBS HIGH 50: CPT | Performed by: INTERNAL MEDICINE

## 2020-10-02 PROCEDURE — 97110 THERAPEUTIC EXERCISES: CPT

## 2020-10-02 PROCEDURE — 85027 COMPLETE CBC AUTOMATED: CPT | Performed by: NURSE PRACTITIONER

## 2020-10-02 PROCEDURE — 85018 HEMOGLOBIN: CPT | Performed by: NURSE PRACTITIONER

## 2020-10-02 PROCEDURE — 80048 BASIC METABOLIC PNL TOTAL CA: CPT | Performed by: NURSE PRACTITIONER

## 2020-10-02 RX ADMIN — CITALOPRAM HYDROBROMIDE 10 MG: 20 TABLET ORAL at 16:48

## 2020-10-02 RX ADMIN — HYDROMORPHONE HYDROCHLORIDE 0.5 MG: 1 INJECTION, SOLUTION INTRAMUSCULAR; INTRAVENOUS; SUBCUTANEOUS at 18:43

## 2020-10-02 RX ADMIN — ONDANSETRON 4 MG: 2 INJECTION INTRAMUSCULAR; INTRAVENOUS at 22:20

## 2020-10-02 RX ADMIN — CEFEPIME HYDROCHLORIDE 2000 MG: 2 INJECTION, POWDER, FOR SOLUTION INTRAVENOUS at 17:15

## 2020-10-02 RX ADMIN — SODIUM CHLORIDE, SODIUM LACTATE, POTASSIUM CHLORIDE, AND CALCIUM CHLORIDE 500 ML: .6; .31; .03; .02 INJECTION, SOLUTION INTRAVENOUS at 05:04

## 2020-10-02 RX ADMIN — CALCIUM CARBONATE (ANTACID) CHEW TAB 500 MG 500 MG: 500 CHEW TAB at 14:48

## 2020-10-02 RX ADMIN — DEXTROSE, SODIUM CHLORIDE, AND POTASSIUM CHLORIDE 100 ML/HR: 5; .9; .15 INJECTION INTRAVENOUS at 08:54

## 2020-10-02 RX ADMIN — OXYCODONE HYDROCHLORIDE 10 MG: 10 TABLET ORAL at 04:58

## 2020-10-02 RX ADMIN — METRONIDAZOLE 500 MG: 500 INJECTION, SOLUTION INTRAVENOUS at 14:47

## 2020-10-02 RX ADMIN — CARBAMIDE PEROXIDE 6.5% 5 DROP: 6.5 LIQUID AURICULAR (OTIC) at 08:54

## 2020-10-02 RX ADMIN — CARBAMIDE PEROXIDE 6.5% 5 DROP: 6.5 LIQUID AURICULAR (OTIC) at 17:16

## 2020-10-02 RX ADMIN — ONDANSETRON 4 MG: 2 INJECTION INTRAMUSCULAR; INTRAVENOUS at 15:01

## 2020-10-02 RX ADMIN — OXYBUTYNIN 10 MG: 10 TABLET, FILM COATED, EXTENDED RELEASE ORAL at 16:48

## 2020-10-02 RX ADMIN — PANTOPRAZOLE SODIUM 40 MG: 40 TABLET, DELAYED RELEASE ORAL at 16:48

## 2020-10-02 RX ADMIN — DEXTROSE, SODIUM CHLORIDE, AND POTASSIUM CHLORIDE 100 ML/HR: 5; .9; .15 INJECTION INTRAVENOUS at 19:32

## 2020-10-02 RX ADMIN — HYDROMORPHONE HYDROCHLORIDE 0.5 MG: 1 INJECTION, SOLUTION INTRAMUSCULAR; INTRAVENOUS; SUBCUTANEOUS at 06:23

## 2020-10-02 RX ADMIN — METRONIDAZOLE 500 MG: 500 INJECTION, SOLUTION INTRAVENOUS at 08:53

## 2020-10-02 RX ADMIN — HYDROMORPHONE HYDROCHLORIDE 0.5 MG: 1 INJECTION, SOLUTION INTRAMUSCULAR; INTRAVENOUS; SUBCUTANEOUS at 14:55

## 2020-10-02 RX ADMIN — MICAFUNGIN SODIUM 100 MG: 50 INJECTION, POWDER, LYOPHILIZED, FOR SOLUTION INTRAVENOUS at 17:16

## 2020-10-02 RX ADMIN — HEPARIN SODIUM 5000 UNITS: 5000 INJECTION INTRAVENOUS; SUBCUTANEOUS at 05:05

## 2020-10-02 RX ADMIN — CEFTRIAXONE 1000 MG: 2 INJECTION, POWDER, FOR SOLUTION INTRAMUSCULAR; INTRAVENOUS at 14:48

## 2020-10-03 LAB
ANION GAP SERPL CALCULATED.3IONS-SCNC: 4 MMOL/L (ref 4–13)
BACTERIA SPEC ANAEROBE CULT: NORMAL
BACTERIA TISS AEROBE CULT: ABNORMAL
BACTERIA TISS AEROBE CULT: ABNORMAL
BASOPHILS # BLD MANUAL: 0 THOUSAND/UL (ref 0–0.1)
BASOPHILS NFR MAR MANUAL: 0 % (ref 0–1)
BUN SERPL-MCNC: 6 MG/DL (ref 5–25)
CALCIUM SERPL-MCNC: 8.1 MG/DL (ref 8.3–10.1)
CHLORIDE SERPL-SCNC: 108 MMOL/L (ref 100–108)
CO2 SERPL-SCNC: 26 MMOL/L (ref 21–32)
CREAT SERPL-MCNC: 0.47 MG/DL (ref 0.6–1.3)
EOSINOPHIL # BLD MANUAL: 0 THOUSAND/UL (ref 0–0.4)
EOSINOPHIL NFR BLD MANUAL: 0 % (ref 0–6)
ERYTHROCYTE [DISTWIDTH] IN BLOOD BY AUTOMATED COUNT: 14.8 % (ref 11.6–15.1)
GFR SERPL CREATININE-BSD FRML MDRD: 100 ML/MIN/1.73SQ M
GLUCOSE SERPL-MCNC: 215 MG/DL (ref 65–140)
GLUCOSE SERPL-MCNC: 216 MG/DL (ref 65–140)
GRAM STN SPEC: ABNORMAL
GRAM STN SPEC: ABNORMAL
HCT VFR BLD AUTO: 28.4 % (ref 34.8–46.1)
HGB BLD-MCNC: 8.9 G/DL (ref 11.5–15.4)
LYMPHOCYTES # BLD AUTO: 0.99 THOUSAND/UL (ref 0.6–4.47)
LYMPHOCYTES # BLD AUTO: 4 % (ref 14–44)
MAGNESIUM SERPL-MCNC: 1.4 MG/DL (ref 1.6–2.6)
MCH RBC QN AUTO: 28.7 PG (ref 26.8–34.3)
MCHC RBC AUTO-ENTMCNC: 31.3 G/DL (ref 31.4–37.4)
MCV RBC AUTO: 92 FL (ref 82–98)
MONOCYTES # BLD AUTO: 0.99 THOUSAND/UL (ref 0–1.22)
MONOCYTES NFR BLD: 4 % (ref 4–12)
NEUTROPHILS # BLD MANUAL: 22.71 THOUSAND/UL (ref 1.85–7.62)
NEUTS BAND NFR BLD MANUAL: 5 % (ref 0–8)
NEUTS SEG NFR BLD AUTO: 87 % (ref 43–75)
NRBC BLD AUTO-RTO: 0 /100 WBCS
PHOSPHATE SERPL-MCNC: 2.7 MG/DL (ref 2.3–4.1)
PLATELET # BLD AUTO: 524 THOUSANDS/UL (ref 149–390)
PLATELET BLD QL SMEAR: ABNORMAL
PMV BLD AUTO: 9.2 FL (ref 8.9–12.7)
POTASSIUM SERPL-SCNC: 4.5 MMOL/L (ref 3.5–5.3)
RBC # BLD AUTO: 3.1 MILLION/UL (ref 3.81–5.12)
RBC MORPH BLD: NORMAL
SODIUM SERPL-SCNC: 138 MMOL/L (ref 136–145)
TOTAL CELLS COUNTED SPEC: 100
WBC # BLD AUTO: 24.69 THOUSAND/UL (ref 4.31–10.16)

## 2020-10-03 PROCEDURE — 99024 POSTOP FOLLOW-UP VISIT: CPT | Performed by: SURGERY

## 2020-10-03 PROCEDURE — 99232 SBSQ HOSP IP/OBS MODERATE 35: CPT | Performed by: INTERNAL MEDICINE

## 2020-10-03 PROCEDURE — 85027 COMPLETE CBC AUTOMATED: CPT | Performed by: SURGERY

## 2020-10-03 PROCEDURE — 85007 BL SMEAR W/DIFF WBC COUNT: CPT | Performed by: SURGERY

## 2020-10-03 PROCEDURE — 82948 REAGENT STRIP/BLOOD GLUCOSE: CPT

## 2020-10-03 PROCEDURE — 80048 BASIC METABOLIC PNL TOTAL CA: CPT | Performed by: SURGERY

## 2020-10-03 PROCEDURE — 83735 ASSAY OF MAGNESIUM: CPT | Performed by: SURGERY

## 2020-10-03 PROCEDURE — 84100 ASSAY OF PHOSPHORUS: CPT | Performed by: SURGERY

## 2020-10-03 RX ORDER — MAGNESIUM SULFATE HEPTAHYDRATE 40 MG/ML
2 INJECTION, SOLUTION INTRAVENOUS ONCE
Status: COMPLETED | OUTPATIENT
Start: 2020-10-03 | End: 2020-10-03

## 2020-10-03 RX ORDER — HEPARIN SODIUM 5000 [USP'U]/ML
5000 INJECTION, SOLUTION INTRAVENOUS; SUBCUTANEOUS EVERY 8 HOURS SCHEDULED
Status: DISCONTINUED | OUTPATIENT
Start: 2020-10-03 | End: 2020-10-23 | Stop reason: HOSPADM

## 2020-10-03 RX ORDER — METOCLOPRAMIDE HYDROCHLORIDE 5 MG/ML
5 INJECTION INTRAMUSCULAR; INTRAVENOUS EVERY 6 HOURS SCHEDULED
Status: COMPLETED | OUTPATIENT
Start: 2020-10-03 | End: 2020-10-03

## 2020-10-03 RX ADMIN — CEFEPIME HYDROCHLORIDE 2000 MG: 2 INJECTION, POWDER, FOR SOLUTION INTRAVENOUS at 01:02

## 2020-10-03 RX ADMIN — METRONIDAZOLE 500 MG: 500 INJECTION, SOLUTION INTRAVENOUS at 00:11

## 2020-10-03 RX ADMIN — CITALOPRAM HYDROBROMIDE 10 MG: 20 TABLET ORAL at 08:35

## 2020-10-03 RX ADMIN — CARBAMIDE PEROXIDE 6.5% 5 DROP: 6.5 LIQUID AURICULAR (OTIC) at 09:13

## 2020-10-03 RX ADMIN — DEXTROSE, SODIUM CHLORIDE, AND POTASSIUM CHLORIDE 100 ML/HR: 5; .9; .15 INJECTION INTRAVENOUS at 17:38

## 2020-10-03 RX ADMIN — MAGNESIUM SULFATE IN WATER 2 G: 40 INJECTION, SOLUTION INTRAVENOUS at 12:07

## 2020-10-03 RX ADMIN — DEXTROSE, SODIUM CHLORIDE, AND POTASSIUM CHLORIDE 100 ML/HR: 5; .9; .15 INJECTION INTRAVENOUS at 07:30

## 2020-10-03 RX ADMIN — MICAFUNGIN SODIUM 100 MG: 50 INJECTION, POWDER, LYOPHILIZED, FOR SOLUTION INTRAVENOUS at 18:34

## 2020-10-03 RX ADMIN — HYDROMORPHONE HYDROCHLORIDE 0.5 MG: 1 INJECTION, SOLUTION INTRAMUSCULAR; INTRAVENOUS; SUBCUTANEOUS at 16:19

## 2020-10-03 RX ADMIN — CEFEPIME HYDROCHLORIDE 2000 MG: 2 INJECTION, POWDER, FOR SOLUTION INTRAVENOUS at 08:35

## 2020-10-03 RX ADMIN — CEFEPIME HYDROCHLORIDE 2000 MG: 2 INJECTION, POWDER, FOR SOLUTION INTRAVENOUS at 17:36

## 2020-10-03 RX ADMIN — METRONIDAZOLE 500 MG: 500 INJECTION, SOLUTION INTRAVENOUS at 16:19

## 2020-10-03 RX ADMIN — METOCLOPRAMIDE 5 MG: 5 INJECTION, SOLUTION INTRAMUSCULAR; INTRAVENOUS at 17:39

## 2020-10-03 RX ADMIN — HYDROMORPHONE HYDROCHLORIDE 0.5 MG: 1 INJECTION, SOLUTION INTRAMUSCULAR; INTRAVENOUS; SUBCUTANEOUS at 05:25

## 2020-10-03 RX ADMIN — OXYCODONE HYDROCHLORIDE 10 MG: 10 TABLET ORAL at 23:21

## 2020-10-03 RX ADMIN — PANTOPRAZOLE SODIUM 40 MG: 40 TABLET, DELAYED RELEASE ORAL at 17:39

## 2020-10-03 RX ADMIN — HYDROMORPHONE HYDROCHLORIDE 0.5 MG: 1 INJECTION, SOLUTION INTRAMUSCULAR; INTRAVENOUS; SUBCUTANEOUS at 11:22

## 2020-10-03 RX ADMIN — CARBAMIDE PEROXIDE 6.5% 5 DROP: 6.5 LIQUID AURICULAR (OTIC) at 17:40

## 2020-10-03 RX ADMIN — METOCLOPRAMIDE 5 MG: 5 INJECTION, SOLUTION INTRAMUSCULAR; INTRAVENOUS at 12:24

## 2020-10-03 RX ADMIN — METRONIDAZOLE 500 MG: 500 INJECTION, SOLUTION INTRAVENOUS at 07:30

## 2020-10-03 RX ADMIN — PANTOPRAZOLE SODIUM 40 MG: 40 TABLET, DELAYED RELEASE ORAL at 08:35

## 2020-10-03 RX ADMIN — METOCLOPRAMIDE 5 MG: 5 INJECTION, SOLUTION INTRAMUSCULAR; INTRAVENOUS at 08:35

## 2020-10-03 RX ADMIN — OXYBUTYNIN 10 MG: 10 TABLET, FILM COATED, EXTENDED RELEASE ORAL at 08:35

## 2020-10-03 RX ADMIN — HEPARIN SODIUM 5000 UNITS: 5000 INJECTION INTRAVENOUS; SUBCUTANEOUS at 23:23

## 2020-10-03 RX ADMIN — HEPARIN SODIUM 5000 UNITS: 5000 INJECTION INTRAVENOUS; SUBCUTANEOUS at 14:08

## 2020-10-03 RX ADMIN — METRONIDAZOLE 500 MG: 500 INJECTION, SOLUTION INTRAVENOUS at 23:23

## 2020-10-03 RX ADMIN — ONDANSETRON 4 MG: 2 INJECTION INTRAMUSCULAR; INTRAVENOUS at 05:23

## 2020-10-03 RX ADMIN — SODIUM PHOSPHATE, MONOBASIC, MONOHYDRATE 6 MMOL: 276; 142 INJECTION, SOLUTION INTRAVENOUS at 14:00

## 2020-10-04 LAB
ALBUMIN SERPL BCP-MCNC: 1.5 G/DL (ref 3.5–5)
ALP SERPL-CCNC: 63 U/L (ref 46–116)
ALT SERPL W P-5'-P-CCNC: 11 U/L (ref 12–78)
ANION GAP SERPL CALCULATED.3IONS-SCNC: 4 MMOL/L (ref 4–13)
AST SERPL W P-5'-P-CCNC: 8 U/L (ref 5–45)
BASOPHILS # BLD MANUAL: 0 THOUSAND/UL (ref 0–0.1)
BASOPHILS NFR MAR MANUAL: 0 % (ref 0–1)
BILIRUB DIRECT SERPL-MCNC: 0.08 MG/DL (ref 0–0.2)
BILIRUB SERPL-MCNC: 0.15 MG/DL (ref 0.2–1)
BUN SERPL-MCNC: 5 MG/DL (ref 5–25)
CALCIUM SERPL-MCNC: 7.9 MG/DL (ref 8.3–10.1)
CHLORIDE SERPL-SCNC: 110 MMOL/L (ref 100–108)
CO2 SERPL-SCNC: 28 MMOL/L (ref 21–32)
CREAT SERPL-MCNC: 0.38 MG/DL (ref 0.6–1.3)
EOSINOPHIL # BLD MANUAL: 0 THOUSAND/UL (ref 0–0.4)
EOSINOPHIL NFR BLD MANUAL: 0 % (ref 0–6)
ERYTHROCYTE [DISTWIDTH] IN BLOOD BY AUTOMATED COUNT: 15 % (ref 11.6–15.1)
GFR SERPL CREATININE-BSD FRML MDRD: 108 ML/MIN/1.73SQ M
GLUCOSE SERPL-MCNC: 155 MG/DL (ref 65–140)
HCT VFR BLD AUTO: 28 % (ref 34.8–46.1)
HGB BLD-MCNC: 8.6 G/DL (ref 11.5–15.4)
LG PLATELETS BLD QL SMEAR: PRESENT
LYMPHOCYTES # BLD AUTO: 11 % (ref 14–44)
LYMPHOCYTES # BLD AUTO: 2.01 THOUSAND/UL (ref 0.6–4.47)
MCH RBC QN AUTO: 28.4 PG (ref 26.8–34.3)
MCHC RBC AUTO-ENTMCNC: 30.7 G/DL (ref 31.4–37.4)
MCV RBC AUTO: 92 FL (ref 82–98)
MONOCYTES # BLD AUTO: 1.64 THOUSAND/UL (ref 0–1.22)
MONOCYTES NFR BLD: 9 % (ref 4–12)
NEUTROPHILS # BLD MANUAL: 14.6 THOUSAND/UL (ref 1.85–7.62)
NEUTS BAND NFR BLD MANUAL: 3 % (ref 0–8)
NEUTS SEG NFR BLD AUTO: 77 % (ref 43–75)
NRBC BLD AUTO-RTO: 0 /100 WBCS
PLATELET # BLD AUTO: 525 THOUSANDS/UL (ref 149–390)
PLATELET BLD QL SMEAR: ABNORMAL
PMV BLD AUTO: 9.1 FL (ref 8.9–12.7)
POLYCHROMASIA BLD QL SMEAR: PRESENT
POTASSIUM SERPL-SCNC: 4 MMOL/L (ref 3.5–5.3)
PROT SERPL-MCNC: 5.3 G/DL (ref 6.4–8.2)
RBC # BLD AUTO: 3.03 MILLION/UL (ref 3.81–5.12)
SODIUM SERPL-SCNC: 142 MMOL/L (ref 136–145)
TOTAL CELLS COUNTED SPEC: 100
TRIGL SERPL-MCNC: 74 MG/DL
WBC # BLD AUTO: 18.25 THOUSAND/UL (ref 4.31–10.16)

## 2020-10-04 PROCEDURE — 85007 BL SMEAR W/DIFF WBC COUNT: CPT | Performed by: INTERNAL MEDICINE

## 2020-10-04 PROCEDURE — 84478 ASSAY OF TRIGLYCERIDES: CPT | Performed by: SURGERY

## 2020-10-04 PROCEDURE — 85027 COMPLETE CBC AUTOMATED: CPT | Performed by: INTERNAL MEDICINE

## 2020-10-04 PROCEDURE — 99232 SBSQ HOSP IP/OBS MODERATE 35: CPT | Performed by: INTERNAL MEDICINE

## 2020-10-04 PROCEDURE — 80048 BASIC METABOLIC PNL TOTAL CA: CPT | Performed by: SURGERY

## 2020-10-04 PROCEDURE — 80076 HEPATIC FUNCTION PANEL: CPT | Performed by: SURGERY

## 2020-10-04 PROCEDURE — 99024 POSTOP FOLLOW-UP VISIT: CPT | Performed by: SURGERY

## 2020-10-04 RX ORDER — METOCLOPRAMIDE HYDROCHLORIDE 5 MG/ML
10 INJECTION INTRAMUSCULAR; INTRAVENOUS EVERY 6 HOURS PRN
Status: DISCONTINUED | OUTPATIENT
Start: 2020-10-04 | End: 2020-10-05

## 2020-10-04 RX ADMIN — METOCLOPRAMIDE 10 MG: 5 INJECTION, SOLUTION INTRAMUSCULAR; INTRAVENOUS at 22:29

## 2020-10-04 RX ADMIN — ONDANSETRON 4 MG: 2 INJECTION INTRAMUSCULAR; INTRAVENOUS at 08:27

## 2020-10-04 RX ADMIN — Medication: at 22:13

## 2020-10-04 RX ADMIN — CEFEPIME HYDROCHLORIDE 2000 MG: 2 INJECTION, POWDER, FOR SOLUTION INTRAVENOUS at 09:15

## 2020-10-04 RX ADMIN — ONDANSETRON 4 MG: 2 INJECTION INTRAMUSCULAR; INTRAVENOUS at 19:42

## 2020-10-04 RX ADMIN — METOCLOPRAMIDE 10 MG: 5 INJECTION, SOLUTION INTRAMUSCULAR; INTRAVENOUS at 13:10

## 2020-10-04 RX ADMIN — PANTOPRAZOLE SODIUM 40 MG: 40 TABLET, DELAYED RELEASE ORAL at 06:21

## 2020-10-04 RX ADMIN — METRONIDAZOLE 500 MG: 500 INJECTION, SOLUTION INTRAVENOUS at 16:27

## 2020-10-04 RX ADMIN — OXYCODONE HYDROCHLORIDE 10 MG: 10 TABLET ORAL at 08:29

## 2020-10-04 RX ADMIN — OXYCODONE HYDROCHLORIDE 10 MG: 10 TABLET ORAL at 04:22

## 2020-10-04 RX ADMIN — OXYCODONE HYDROCHLORIDE 10 MG: 10 TABLET ORAL at 13:27

## 2020-10-04 RX ADMIN — METRONIDAZOLE 500 MG: 500 INJECTION, SOLUTION INTRAVENOUS at 22:13

## 2020-10-04 RX ADMIN — OXYBUTYNIN 10 MG: 10 TABLET, FILM COATED, EXTENDED RELEASE ORAL at 09:15

## 2020-10-04 RX ADMIN — HYDROMORPHONE HYDROCHLORIDE 0.5 MG: 1 INJECTION, SOLUTION INTRAMUSCULAR; INTRAVENOUS; SUBCUTANEOUS at 01:17

## 2020-10-04 RX ADMIN — CEFEPIME HYDROCHLORIDE 2000 MG: 2 INJECTION, POWDER, FOR SOLUTION INTRAVENOUS at 00:13

## 2020-10-04 RX ADMIN — CEFEPIME HYDROCHLORIDE 2000 MG: 2 INJECTION, POWDER, FOR SOLUTION INTRAVENOUS at 17:22

## 2020-10-04 RX ADMIN — HEPARIN SODIUM 5000 UNITS: 5000 INJECTION INTRAVENOUS; SUBCUTANEOUS at 06:21

## 2020-10-04 RX ADMIN — METRONIDAZOLE 500 MG: 500 INJECTION, SOLUTION INTRAVENOUS at 06:21

## 2020-10-04 RX ADMIN — PANTOPRAZOLE SODIUM 40 MG: 40 TABLET, DELAYED RELEASE ORAL at 16:31

## 2020-10-04 RX ADMIN — HEPARIN SODIUM 5000 UNITS: 5000 INJECTION INTRAVENOUS; SUBCUTANEOUS at 22:13

## 2020-10-04 RX ADMIN — HEPARIN SODIUM 5000 UNITS: 5000 INJECTION INTRAVENOUS; SUBCUTANEOUS at 13:10

## 2020-10-04 RX ADMIN — CEFEPIME HYDROCHLORIDE 2000 MG: 2 INJECTION, POWDER, FOR SOLUTION INTRAVENOUS at 23:41

## 2020-10-04 RX ADMIN — MICAFUNGIN SODIUM 100 MG: 50 INJECTION, POWDER, LYOPHILIZED, FOR SOLUTION INTRAVENOUS at 18:16

## 2020-10-04 RX ADMIN — DEXTROSE, SODIUM CHLORIDE, AND POTASSIUM CHLORIDE 100 ML/HR: 5; .9; .15 INJECTION INTRAVENOUS at 03:51

## 2020-10-05 LAB
ANION GAP SERPL CALCULATED.3IONS-SCNC: 5 MMOL/L (ref 4–13)
ATRIAL RATE: 75 BPM
BASOPHILS # BLD AUTO: 0.05 THOUSANDS/ΜL (ref 0–0.1)
BASOPHILS NFR BLD AUTO: 0 % (ref 0–1)
BUN SERPL-MCNC: 6 MG/DL (ref 5–25)
CALCIUM SERPL-MCNC: 7.9 MG/DL (ref 8.3–10.1)
CHLORIDE SERPL-SCNC: 105 MMOL/L (ref 100–108)
CO2 SERPL-SCNC: 30 MMOL/L (ref 21–32)
CREAT SERPL-MCNC: 0.43 MG/DL (ref 0.6–1.3)
EOSINOPHIL # BLD AUTO: 0.08 THOUSAND/ΜL (ref 0–0.61)
EOSINOPHIL NFR BLD AUTO: 1 % (ref 0–6)
ERYTHROCYTE [DISTWIDTH] IN BLOOD BY AUTOMATED COUNT: 14.7 % (ref 11.6–15.1)
GFR SERPL CREATININE-BSD FRML MDRD: 103 ML/MIN/1.73SQ M
GLUCOSE SERPL-MCNC: 148 MG/DL (ref 65–140)
HCT VFR BLD AUTO: 28.5 % (ref 34.8–46.1)
HGB BLD-MCNC: 8.9 G/DL (ref 11.5–15.4)
IMM GRANULOCYTES # BLD AUTO: >0.5 THOUSAND/UL (ref 0–0.2)
IMM GRANULOCYTES NFR BLD AUTO: 5 % (ref 0–2)
LYMPHOCYTES # BLD AUTO: 2.02 THOUSANDS/ΜL (ref 0.6–4.47)
LYMPHOCYTES NFR BLD AUTO: 12 % (ref 14–44)
MAGNESIUM SERPL-MCNC: 1.9 MG/DL (ref 1.6–2.6)
MCH RBC QN AUTO: 28.6 PG (ref 26.8–34.3)
MCHC RBC AUTO-ENTMCNC: 31.2 G/DL (ref 31.4–37.4)
MCV RBC AUTO: 92 FL (ref 82–98)
MONOCYTES # BLD AUTO: 1.86 THOUSAND/ΜL (ref 0.17–1.22)
MONOCYTES NFR BLD AUTO: 11 % (ref 4–12)
NEUTROPHILS # BLD AUTO: 12.12 THOUSANDS/ΜL (ref 1.85–7.62)
NEUTS SEG NFR BLD AUTO: 71 % (ref 43–75)
NRBC BLD AUTO-RTO: 0 /100 WBCS
P AXIS: 42 DEGREES
PHOSPHATE SERPL-MCNC: 2.6 MG/DL (ref 2.3–4.1)
PLATELET # BLD AUTO: 546 THOUSANDS/UL (ref 149–390)
PMV BLD AUTO: 9.1 FL (ref 8.9–12.7)
POTASSIUM SERPL-SCNC: 3.4 MMOL/L (ref 3.5–5.3)
PR INTERVAL: 150 MS
QRS AXIS: -22 DEGREES
QRSD INTERVAL: 75 MS
QT INTERVAL: 417 MS
QTC INTERVAL: 466 MS
RBC # BLD AUTO: 3.11 MILLION/UL (ref 3.81–5.12)
SODIUM SERPL-SCNC: 140 MMOL/L (ref 136–145)
T WAVE AXIS: -2 DEGREES
VENTRICULAR RATE: 75 BPM
WBC # BLD AUTO: 17.01 THOUSAND/UL (ref 4.31–10.16)

## 2020-10-05 PROCEDURE — 99232 SBSQ HOSP IP/OBS MODERATE 35: CPT | Performed by: STUDENT IN AN ORGANIZED HEALTH CARE EDUCATION/TRAINING PROGRAM

## 2020-10-05 PROCEDURE — 80048 BASIC METABOLIC PNL TOTAL CA: CPT | Performed by: SURGERY

## 2020-10-05 PROCEDURE — 92526 ORAL FUNCTION THERAPY: CPT

## 2020-10-05 PROCEDURE — 83735 ASSAY OF MAGNESIUM: CPT | Performed by: SURGERY

## 2020-10-05 PROCEDURE — 99232 SBSQ HOSP IP/OBS MODERATE 35: CPT | Performed by: INTERNAL MEDICINE

## 2020-10-05 PROCEDURE — 84100 ASSAY OF PHOSPHORUS: CPT | Performed by: SURGERY

## 2020-10-05 PROCEDURE — 85027 COMPLETE CBC AUTOMATED: CPT | Performed by: NURSE PRACTITIONER

## 2020-10-05 PROCEDURE — 99024 POSTOP FOLLOW-UP VISIT: CPT | Performed by: SURGERY

## 2020-10-05 PROCEDURE — 93010 ELECTROCARDIOGRAM REPORT: CPT

## 2020-10-05 PROCEDURE — 99222 1ST HOSP IP/OBS MODERATE 55: CPT | Performed by: NURSE PRACTITIONER

## 2020-10-05 PROCEDURE — 93005 ELECTROCARDIOGRAM TRACING: CPT

## 2020-10-05 RX ORDER — ONDANSETRON 2 MG/ML
4 INJECTION INTRAMUSCULAR; INTRAVENOUS EVERY 6 HOURS
Status: DISCONTINUED | OUTPATIENT
Start: 2020-10-05 | End: 2020-10-15

## 2020-10-05 RX ORDER — POTASSIUM CHLORIDE 20 MEQ/1
40 TABLET, EXTENDED RELEASE ORAL ONCE
Status: DISCONTINUED | OUTPATIENT
Start: 2020-10-05 | End: 2020-10-06

## 2020-10-05 RX ORDER — LANOLIN ALCOHOL/MO/W.PET/CERES
3 CREAM (GRAM) TOPICAL
Status: DISCONTINUED | OUTPATIENT
Start: 2020-10-05 | End: 2020-10-23 | Stop reason: HOSPADM

## 2020-10-05 RX ORDER — POTASSIUM CHLORIDE 14.9 MG/ML
20 INJECTION INTRAVENOUS ONCE
Status: COMPLETED | OUTPATIENT
Start: 2020-10-05 | End: 2020-10-05

## 2020-10-05 RX ORDER — METOCLOPRAMIDE HYDROCHLORIDE 5 MG/ML
10 INJECTION INTRAMUSCULAR; INTRAVENOUS EVERY 6 HOURS SCHEDULED
Status: COMPLETED | OUTPATIENT
Start: 2020-10-05 | End: 2020-10-06

## 2020-10-05 RX ORDER — PROCHLORPERAZINE MALEATE 10 MG
5 TABLET ORAL EVERY 6 HOURS PRN
Status: DISCONTINUED | OUTPATIENT
Start: 2020-10-05 | End: 2020-10-05

## 2020-10-05 RX ADMIN — METOCLOPRAMIDE 10 MG: 5 INJECTION, SOLUTION INTRAMUSCULAR; INTRAVENOUS at 12:34

## 2020-10-05 RX ADMIN — METOCLOPRAMIDE 10 MG: 5 INJECTION, SOLUTION INTRAMUSCULAR; INTRAVENOUS at 17:08

## 2020-10-05 RX ADMIN — METRONIDAZOLE 500 MG: 500 INJECTION, SOLUTION INTRAVENOUS at 07:57

## 2020-10-05 RX ADMIN — Medication: at 20:52

## 2020-10-05 RX ADMIN — ONDANSETRON 4 MG: 2 INJECTION INTRAMUSCULAR; INTRAVENOUS at 09:07

## 2020-10-05 RX ADMIN — HEPARIN SODIUM 5000 UNITS: 5000 INJECTION INTRAVENOUS; SUBCUTANEOUS at 05:45

## 2020-10-05 RX ADMIN — ONDANSETRON 4 MG: 2 INJECTION INTRAMUSCULAR; INTRAVENOUS at 20:34

## 2020-10-05 RX ADMIN — METRONIDAZOLE 500 MG: 500 INJECTION, SOLUTION INTRAVENOUS at 23:06

## 2020-10-05 RX ADMIN — OXYCODONE HYDROCHLORIDE 10 MG: 10 TABLET ORAL at 13:48

## 2020-10-05 RX ADMIN — METOCLOPRAMIDE 10 MG: 5 INJECTION, SOLUTION INTRAMUSCULAR; INTRAVENOUS at 05:45

## 2020-10-05 RX ADMIN — CEFEPIME HYDROCHLORIDE 2000 MG: 2 INJECTION, POWDER, FOR SOLUTION INTRAVENOUS at 09:07

## 2020-10-05 RX ADMIN — MICAFUNGIN SODIUM 100 MG: 50 INJECTION, POWDER, LYOPHILIZED, FOR SOLUTION INTRAVENOUS at 15:42

## 2020-10-05 RX ADMIN — HEPARIN SODIUM 5000 UNITS: 5000 INJECTION INTRAVENOUS; SUBCUTANEOUS at 23:06

## 2020-10-05 RX ADMIN — HEPARIN SODIUM 5000 UNITS: 5000 INJECTION INTRAVENOUS; SUBCUTANEOUS at 14:34

## 2020-10-05 RX ADMIN — METOCLOPRAMIDE 10 MG: 5 INJECTION, SOLUTION INTRAMUSCULAR; INTRAVENOUS at 09:07

## 2020-10-05 RX ADMIN — HYDROMORPHONE HYDROCHLORIDE 0.5 MG: 1 INJECTION, SOLUTION INTRAMUSCULAR; INTRAVENOUS; SUBCUTANEOUS at 20:46

## 2020-10-05 RX ADMIN — METRONIDAZOLE 500 MG: 500 INJECTION, SOLUTION INTRAVENOUS at 14:34

## 2020-10-05 RX ADMIN — CEFEPIME HYDROCHLORIDE 2000 MG: 2 INJECTION, POWDER, FOR SOLUTION INTRAVENOUS at 17:08

## 2020-10-05 RX ADMIN — POTASSIUM CHLORIDE 20 MEQ: 14.9 INJECTION, SOLUTION INTRAVENOUS at 09:56

## 2020-10-05 RX ADMIN — OXYCODONE HYDROCHLORIDE 10 MG: 10 TABLET ORAL at 18:26

## 2020-10-05 RX ADMIN — ONDANSETRON 4 MG: 2 INJECTION INTRAMUSCULAR; INTRAVENOUS at 14:35

## 2020-10-05 RX ADMIN — OXYBUTYNIN 10 MG: 10 TABLET, FILM COATED, EXTENDED RELEASE ORAL at 09:06

## 2020-10-05 RX ADMIN — PANTOPRAZOLE SODIUM 40 MG: 40 TABLET, DELAYED RELEASE ORAL at 15:42

## 2020-10-05 RX ADMIN — CITALOPRAM HYDROBROMIDE 10 MG: 20 TABLET ORAL at 09:06

## 2020-10-05 RX ADMIN — PANTOPRAZOLE SODIUM 40 MG: 40 TABLET, DELAYED RELEASE ORAL at 07:06

## 2020-10-05 RX ADMIN — ONDANSETRON 4 MG: 2 INJECTION INTRAMUSCULAR; INTRAVENOUS at 01:52

## 2020-10-06 PROBLEM — F32.A DEPRESSION: Status: ACTIVE | Noted: 2020-10-06

## 2020-10-06 LAB
ANION GAP SERPL CALCULATED.3IONS-SCNC: 3 MMOL/L (ref 4–13)
BUN SERPL-MCNC: 10 MG/DL (ref 5–25)
CALCIUM SERPL-MCNC: 8.1 MG/DL (ref 8.3–10.1)
CHLORIDE SERPL-SCNC: 106 MMOL/L (ref 100–108)
CO2 SERPL-SCNC: 32 MMOL/L (ref 21–32)
CREAT SERPL-MCNC: 0.47 MG/DL (ref 0.6–1.3)
ERYTHROCYTE [DISTWIDTH] IN BLOOD BY AUTOMATED COUNT: 14.8 % (ref 11.6–15.1)
GFR SERPL CREATININE-BSD FRML MDRD: 100 ML/MIN/1.73SQ M
GLUCOSE SERPL-MCNC: 156 MG/DL (ref 65–140)
HCT VFR BLD AUTO: 26.7 % (ref 34.8–46.1)
HGB BLD-MCNC: 8.6 G/DL (ref 11.5–15.4)
MAGNESIUM SERPL-MCNC: 1.9 MG/DL (ref 1.6–2.6)
MCH RBC QN AUTO: 29.2 PG (ref 26.8–34.3)
MCHC RBC AUTO-ENTMCNC: 32.2 G/DL (ref 31.4–37.4)
MCV RBC AUTO: 91 FL (ref 82–98)
PLATELET # BLD AUTO: 488 THOUSANDS/UL (ref 149–390)
PMV BLD AUTO: 9 FL (ref 8.9–12.7)
POTASSIUM SERPL-SCNC: 3.3 MMOL/L (ref 3.5–5.3)
PROCALCITONIN SERPL-MCNC: <0.05 NG/ML
RBC # BLD AUTO: 2.95 MILLION/UL (ref 3.81–5.12)
SODIUM SERPL-SCNC: 141 MMOL/L (ref 136–145)
WBC # BLD AUTO: 19.54 THOUSAND/UL (ref 4.31–10.16)

## 2020-10-06 PROCEDURE — 84145 PROCALCITONIN (PCT): CPT | Performed by: STUDENT IN AN ORGANIZED HEALTH CARE EDUCATION/TRAINING PROGRAM

## 2020-10-06 PROCEDURE — 80048 BASIC METABOLIC PNL TOTAL CA: CPT | Performed by: SURGERY

## 2020-10-06 PROCEDURE — 99232 SBSQ HOSP IP/OBS MODERATE 35: CPT | Performed by: INTERNAL MEDICINE

## 2020-10-06 PROCEDURE — 99232 SBSQ HOSP IP/OBS MODERATE 35: CPT | Performed by: STUDENT IN AN ORGANIZED HEALTH CARE EDUCATION/TRAINING PROGRAM

## 2020-10-06 PROCEDURE — NC001 PR NO CHARGE: Performed by: SURGERY

## 2020-10-06 PROCEDURE — 83735 ASSAY OF MAGNESIUM: CPT | Performed by: STUDENT IN AN ORGANIZED HEALTH CARE EDUCATION/TRAINING PROGRAM

## 2020-10-06 PROCEDURE — 97110 THERAPEUTIC EXERCISES: CPT

## 2020-10-06 PROCEDURE — 97530 THERAPEUTIC ACTIVITIES: CPT

## 2020-10-06 PROCEDURE — 85027 COMPLETE CBC AUTOMATED: CPT | Performed by: STUDENT IN AN ORGANIZED HEALTH CARE EDUCATION/TRAINING PROGRAM

## 2020-10-06 RX ORDER — POTASSIUM CHLORIDE 20MEQ/15ML
20 LIQUID (ML) ORAL
Status: DISCONTINUED | OUTPATIENT
Start: 2020-10-06 | End: 2020-10-06

## 2020-10-06 RX ADMIN — OXYCODONE HYDROCHLORIDE 5 MG: 5 TABLET ORAL at 17:15

## 2020-10-06 RX ADMIN — PANTOPRAZOLE SODIUM 40 MG: 40 TABLET, DELAYED RELEASE ORAL at 06:20

## 2020-10-06 RX ADMIN — HEPARIN SODIUM 5000 UNITS: 5000 INJECTION INTRAVENOUS; SUBCUTANEOUS at 14:41

## 2020-10-06 RX ADMIN — MICAFUNGIN SODIUM 100 MG: 50 INJECTION, POWDER, LYOPHILIZED, FOR SOLUTION INTRAVENOUS at 18:15

## 2020-10-06 RX ADMIN — HEPARIN SODIUM 5000 UNITS: 5000 INJECTION INTRAVENOUS; SUBCUTANEOUS at 06:20

## 2020-10-06 RX ADMIN — METRONIDAZOLE 500 MG: 500 INJECTION, SOLUTION INTRAVENOUS at 06:19

## 2020-10-06 RX ADMIN — CEFEPIME HYDROCHLORIDE 2000 MG: 2 INJECTION, POWDER, FOR SOLUTION INTRAVENOUS at 00:11

## 2020-10-06 RX ADMIN — CARBAMIDE PEROXIDE 6.5% 5 DROP: 6.5 LIQUID AURICULAR (OTIC) at 08:03

## 2020-10-06 RX ADMIN — HEPARIN SODIUM 5000 UNITS: 5000 INJECTION INTRAVENOUS; SUBCUTANEOUS at 22:17

## 2020-10-06 RX ADMIN — METOCLOPRAMIDE 10 MG: 5 INJECTION, SOLUTION INTRAMUSCULAR; INTRAVENOUS at 00:11

## 2020-10-06 RX ADMIN — Medication: at 22:15

## 2020-10-06 RX ADMIN — OXYBUTYNIN 10 MG: 10 TABLET, FILM COATED, EXTENDED RELEASE ORAL at 08:02

## 2020-10-06 RX ADMIN — ONDANSETRON 4 MG: 2 INJECTION INTRAMUSCULAR; INTRAVENOUS at 08:03

## 2020-10-06 RX ADMIN — PANTOPRAZOLE SODIUM 40 MG: 40 TABLET, DELAYED RELEASE ORAL at 17:15

## 2020-10-06 RX ADMIN — CEFEPIME HYDROCHLORIDE 2000 MG: 2 INJECTION, POWDER, FOR SOLUTION INTRAVENOUS at 17:14

## 2020-10-06 RX ADMIN — CITALOPRAM HYDROBROMIDE 10 MG: 20 TABLET ORAL at 08:02

## 2020-10-06 RX ADMIN — MELATONIN 3 MG: at 00:11

## 2020-10-06 RX ADMIN — ONDANSETRON 4 MG: 2 INJECTION INTRAMUSCULAR; INTRAVENOUS at 19:51

## 2020-10-06 RX ADMIN — METRONIDAZOLE 500 MG: 500 INJECTION, SOLUTION INTRAVENOUS at 14:41

## 2020-10-06 RX ADMIN — CARBAMIDE PEROXIDE 6.5% 5 DROP: 6.5 LIQUID AURICULAR (OTIC) at 17:15

## 2020-10-06 RX ADMIN — CEFEPIME HYDROCHLORIDE 2000 MG: 2 INJECTION, POWDER, FOR SOLUTION INTRAVENOUS at 08:03

## 2020-10-06 RX ADMIN — OXYCODONE HYDROCHLORIDE 5 MG: 5 TABLET ORAL at 08:15

## 2020-10-06 RX ADMIN — ONDANSETRON 4 MG: 2 INJECTION INTRAMUSCULAR; INTRAVENOUS at 14:41

## 2020-10-06 RX ADMIN — METRONIDAZOLE 500 MG: 500 INJECTION, SOLUTION INTRAVENOUS at 22:18

## 2020-10-06 RX ADMIN — ONDANSETRON 4 MG: 2 INJECTION INTRAMUSCULAR; INTRAVENOUS at 03:09

## 2020-10-07 ENCOUNTER — APPOINTMENT (INPATIENT)
Dept: CT IMAGING | Facility: HOSPITAL | Age: 70
DRG: 854 | End: 2020-10-07
Payer: MEDICARE

## 2020-10-07 LAB
ALBUMIN SERPL BCP-MCNC: 1.7 G/DL (ref 3.5–5)
ALP SERPL-CCNC: 61 U/L (ref 46–116)
ALT SERPL W P-5'-P-CCNC: 13 U/L (ref 12–78)
ANION GAP SERPL CALCULATED.3IONS-SCNC: 2 MMOL/L (ref 4–13)
AST SERPL W P-5'-P-CCNC: 17 U/L (ref 5–45)
BILIRUB SERPL-MCNC: 0.23 MG/DL (ref 0.2–1)
BUN SERPL-MCNC: 13 MG/DL (ref 5–25)
CALCIUM ALBUM COR SERPL-MCNC: 9.7 MG/DL (ref 8.3–10.1)
CALCIUM SERPL-MCNC: 7.9 MG/DL (ref 8.3–10.1)
CHLORIDE SERPL-SCNC: 104 MMOL/L (ref 100–108)
CO2 SERPL-SCNC: 34 MMOL/L (ref 21–32)
CREAT SERPL-MCNC: 0.39 MG/DL (ref 0.6–1.3)
ERYTHROCYTE [DISTWIDTH] IN BLOOD BY AUTOMATED COUNT: 14.5 % (ref 11.6–15.1)
GFR SERPL CREATININE-BSD FRML MDRD: 107 ML/MIN/1.73SQ M
GLUCOSE SERPL-MCNC: 137 MG/DL (ref 65–140)
HCT VFR BLD AUTO: 27 % (ref 34.8–46.1)
HGB BLD-MCNC: 8.8 G/DL (ref 11.5–15.4)
MAGNESIUM SERPL-MCNC: 1.6 MG/DL (ref 1.6–2.6)
MCH RBC QN AUTO: 28.9 PG (ref 26.8–34.3)
MCHC RBC AUTO-ENTMCNC: 32.6 G/DL (ref 31.4–37.4)
MCV RBC AUTO: 89 FL (ref 82–98)
NRBC BLD AUTO-RTO: 0 /100 WBCS
PHOSPHATE SERPL-MCNC: 2.8 MG/DL (ref 2.3–4.1)
PLATELET # BLD AUTO: 500 THOUSANDS/UL (ref 149–390)
PMV BLD AUTO: 9.1 FL (ref 8.9–12.7)
POTASSIUM SERPL-SCNC: 3.4 MMOL/L (ref 3.5–5.3)
PROCALCITONIN SERPL-MCNC: <0.05 NG/ML
PROT SERPL-MCNC: 5.5 G/DL (ref 6.4–8.2)
RBC # BLD AUTO: 3.05 MILLION/UL (ref 3.81–5.12)
SODIUM SERPL-SCNC: 140 MMOL/L (ref 136–145)
WBC # BLD AUTO: 23.65 THOUSAND/UL (ref 4.31–10.16)

## 2020-10-07 PROCEDURE — 99232 SBSQ HOSP IP/OBS MODERATE 35: CPT | Performed by: STUDENT IN AN ORGANIZED HEALTH CARE EDUCATION/TRAINING PROGRAM

## 2020-10-07 PROCEDURE — 99024 POSTOP FOLLOW-UP VISIT: CPT | Performed by: SURGERY

## 2020-10-07 PROCEDURE — 84145 PROCALCITONIN (PCT): CPT | Performed by: STUDENT IN AN ORGANIZED HEALTH CARE EDUCATION/TRAINING PROGRAM

## 2020-10-07 PROCEDURE — 92526 ORAL FUNCTION THERAPY: CPT

## 2020-10-07 PROCEDURE — 85027 COMPLETE CBC AUTOMATED: CPT | Performed by: NURSE PRACTITIONER

## 2020-10-07 PROCEDURE — 84100 ASSAY OF PHOSPHORUS: CPT | Performed by: SURGERY

## 2020-10-07 PROCEDURE — 83735 ASSAY OF MAGNESIUM: CPT | Performed by: SURGERY

## 2020-10-07 PROCEDURE — 74177 CT ABD & PELVIS W/CONTRAST: CPT

## 2020-10-07 PROCEDURE — 80053 COMPREHEN METABOLIC PANEL: CPT | Performed by: SURGERY

## 2020-10-07 PROCEDURE — G1004 CDSM NDSC: HCPCS

## 2020-10-07 PROCEDURE — 71260 CT THORAX DX C+: CPT

## 2020-10-07 PROCEDURE — 99232 SBSQ HOSP IP/OBS MODERATE 35: CPT | Performed by: INTERNAL MEDICINE

## 2020-10-07 RX ORDER — FUROSEMIDE 10 MG/ML
40 INJECTION INTRAMUSCULAR; INTRAVENOUS ONCE
Status: COMPLETED | OUTPATIENT
Start: 2020-10-07 | End: 2020-10-07

## 2020-10-07 RX ORDER — MAGNESIUM SULFATE HEPTAHYDRATE 40 MG/ML
2 INJECTION, SOLUTION INTRAVENOUS ONCE
Status: COMPLETED | OUTPATIENT
Start: 2020-10-07 | End: 2020-10-07

## 2020-10-07 RX ORDER — METOCLOPRAMIDE HYDROCHLORIDE 5 MG/ML
5 INJECTION INTRAMUSCULAR; INTRAVENOUS EVERY 6 HOURS SCHEDULED
Status: DISCONTINUED | OUTPATIENT
Start: 2020-10-07 | End: 2020-10-15

## 2020-10-07 RX ADMIN — METOCLOPRAMIDE HYDROCHLORIDE 5 MG: 5 INJECTION INTRAMUSCULAR; INTRAVENOUS at 11:29

## 2020-10-07 RX ADMIN — POTASSIUM PHOSPHATE, MONOBASIC POTASSIUM PHOSPHATE, DIBASIC 12 MMOL: 224; 236 INJECTION, SOLUTION, CONCENTRATE INTRAVENOUS at 11:04

## 2020-10-07 RX ADMIN — ONDANSETRON 4 MG: 2 INJECTION INTRAMUSCULAR; INTRAVENOUS at 14:07

## 2020-10-07 RX ADMIN — HEPARIN SODIUM 5000 UNITS: 5000 INJECTION INTRAVENOUS; SUBCUTANEOUS at 05:50

## 2020-10-07 RX ADMIN — IOHEXOL 100 ML: 350 INJECTION, SOLUTION INTRAVENOUS at 16:11

## 2020-10-07 RX ADMIN — PANTOPRAZOLE SODIUM 40 MG: 40 TABLET, DELAYED RELEASE ORAL at 05:51

## 2020-10-07 RX ADMIN — METRONIDAZOLE 500 MG: 500 INJECTION, SOLUTION INTRAVENOUS at 05:52

## 2020-10-07 RX ADMIN — CALCIUM CARBONATE (ANTACID) CHEW TAB 500 MG 500 MG: 500 CHEW TAB at 00:49

## 2020-10-07 RX ADMIN — HEPARIN SODIUM 5000 UNITS: 5000 INJECTION INTRAVENOUS; SUBCUTANEOUS at 14:07

## 2020-10-07 RX ADMIN — LIDOCAINE HYDROCHLORIDE 10 ML: 20 SOLUTION ORAL; TOPICAL at 00:49

## 2020-10-07 RX ADMIN — OXYBUTYNIN 10 MG: 10 TABLET, FILM COATED, EXTENDED RELEASE ORAL at 08:12

## 2020-10-07 RX ADMIN — METRONIDAZOLE 500 MG: 500 INJECTION, SOLUTION INTRAVENOUS at 16:47

## 2020-10-07 RX ADMIN — ONDANSETRON 4 MG: 2 INJECTION INTRAMUSCULAR; INTRAVENOUS at 01:02

## 2020-10-07 RX ADMIN — OXYCODONE HYDROCHLORIDE 5 MG: 5 TABLET ORAL at 00:49

## 2020-10-07 RX ADMIN — ONDANSETRON 4 MG: 2 INJECTION INTRAMUSCULAR; INTRAVENOUS at 08:12

## 2020-10-07 RX ADMIN — HEPARIN SODIUM 5000 UNITS: 5000 INJECTION INTRAVENOUS; SUBCUTANEOUS at 21:40

## 2020-10-07 RX ADMIN — CEFEPIME HYDROCHLORIDE 2000 MG: 2 INJECTION, POWDER, FOR SOLUTION INTRAVENOUS at 08:12

## 2020-10-07 RX ADMIN — CITALOPRAM HYDROBROMIDE 10 MG: 20 TABLET ORAL at 08:12

## 2020-10-07 RX ADMIN — PANTOPRAZOLE SODIUM 40 MG: 40 TABLET, DELAYED RELEASE ORAL at 16:47

## 2020-10-07 RX ADMIN — METOCLOPRAMIDE HYDROCHLORIDE 5 MG: 5 INJECTION INTRAMUSCULAR; INTRAVENOUS at 08:11

## 2020-10-07 RX ADMIN — CARBAMIDE PEROXIDE 6.5% 5 DROP: 6.5 LIQUID AURICULAR (OTIC) at 08:12

## 2020-10-07 RX ADMIN — CEFEPIME HYDROCHLORIDE 2000 MG: 2 INJECTION, POWDER, FOR SOLUTION INTRAVENOUS at 17:55

## 2020-10-07 RX ADMIN — OXYCODONE HYDROCHLORIDE 10 MG: 10 TABLET ORAL at 19:32

## 2020-10-07 RX ADMIN — CARBAMIDE PEROXIDE 6.5% 5 DROP: 6.5 LIQUID AURICULAR (OTIC) at 16:59

## 2020-10-07 RX ADMIN — METOCLOPRAMIDE HYDROCHLORIDE 5 MG: 5 INJECTION INTRAMUSCULAR; INTRAVENOUS at 16:47

## 2020-10-07 RX ADMIN — ONDANSETRON 4 MG: 2 INJECTION INTRAMUSCULAR; INTRAVENOUS at 19:33

## 2020-10-07 RX ADMIN — MAGNESIUM SULFATE IN WATER 2 G: 40 INJECTION, SOLUTION INTRAVENOUS at 08:53

## 2020-10-07 RX ADMIN — FUROSEMIDE 40 MG: 10 INJECTION, SOLUTION INTRAMUSCULAR; INTRAVENOUS at 19:34

## 2020-10-07 RX ADMIN — MICAFUNGIN SODIUM 100 MG: 50 INJECTION, POWDER, LYOPHILIZED, FOR SOLUTION INTRAVENOUS at 18:31

## 2020-10-07 RX ADMIN — Medication: at 21:40

## 2020-10-07 RX ADMIN — CEFEPIME HYDROCHLORIDE 2000 MG: 2 INJECTION, POWDER, FOR SOLUTION INTRAVENOUS at 00:50

## 2020-10-08 ENCOUNTER — APPOINTMENT (INPATIENT)
Dept: RADIOLOGY | Facility: HOSPITAL | Age: 70
DRG: 854 | End: 2020-10-08
Payer: MEDICARE

## 2020-10-08 LAB
ANION GAP SERPL CALCULATED.3IONS-SCNC: 3 MMOL/L (ref 4–13)
ANISOCYTOSIS BLD QL SMEAR: PRESENT
BACTERIA UR QL AUTO: ABNORMAL /HPF
BASOPHILS # BLD MANUAL: 0 THOUSAND/UL (ref 0–0.1)
BASOPHILS NFR MAR MANUAL: 0 % (ref 0–1)
BILIRUB UR QL STRIP: NEGATIVE
BUN SERPL-MCNC: 17 MG/DL (ref 5–25)
CA-I BLD-SCNC: 1.04 MMOL/L (ref 1.12–1.32)
CALCIUM SERPL-MCNC: 7.8 MG/DL (ref 8.3–10.1)
CHLORIDE SERPL-SCNC: 100 MMOL/L (ref 100–108)
CLARITY UR: CLEAR
CO2 SERPL-SCNC: 35 MMOL/L (ref 21–32)
COLOR UR: YELLOW
CREAT SERPL-MCNC: 0.48 MG/DL (ref 0.6–1.3)
EOSINOPHIL # BLD MANUAL: 0.55 THOUSAND/UL (ref 0–0.4)
EOSINOPHIL NFR BLD MANUAL: 2 % (ref 0–6)
ERYTHROCYTE [DISTWIDTH] IN BLOOD BY AUTOMATED COUNT: 14.6 % (ref 11.6–15.1)
GFR SERPL CREATININE-BSD FRML MDRD: 100 ML/MIN/1.73SQ M
GLUCOSE SERPL-MCNC: 152 MG/DL (ref 65–140)
GLUCOSE UR STRIP-MCNC: NEGATIVE MG/DL
HCT VFR BLD AUTO: 28.7 % (ref 34.8–46.1)
HGB BLD-MCNC: 9.1 G/DL (ref 11.5–15.4)
HGB UR QL STRIP.AUTO: ABNORMAL
KETONES UR STRIP-MCNC: NEGATIVE MG/DL
LEUKOCYTE ESTERASE UR QL STRIP: NEGATIVE
LYMPHOCYTES # BLD AUTO: 11 % (ref 14–44)
LYMPHOCYTES # BLD AUTO: 3.01 THOUSAND/UL (ref 0.6–4.47)
MAGNESIUM SERPL-MCNC: 1.8 MG/DL (ref 1.6–2.6)
MCH RBC QN AUTO: 28.3 PG (ref 26.8–34.3)
MCHC RBC AUTO-ENTMCNC: 31.7 G/DL (ref 31.4–37.4)
MCV RBC AUTO: 89 FL (ref 82–98)
METAMYELOCYTES NFR BLD MANUAL: 1 % (ref 0–1)
MONOCYTES # BLD AUTO: 0.82 THOUSAND/UL (ref 0–1.22)
MONOCYTES NFR BLD: 3 % (ref 4–12)
NEUTROPHILS # BLD MANUAL: 22.7 THOUSAND/UL (ref 1.85–7.62)
NEUTS BAND NFR BLD MANUAL: 5 % (ref 0–8)
NEUTS SEG NFR BLD AUTO: 78 % (ref 43–75)
NITRITE UR QL STRIP: NEGATIVE
NON-SQ EPI CELLS URNS QL MICRO: ABNORMAL /HPF
NRBC BLD AUTO-RTO: 0 /100 WBCS
PH UR STRIP.AUTO: 6 [PH]
PHOSPHATE SERPL-MCNC: 3.6 MG/DL (ref 2.3–4.1)
PLATELET # BLD AUTO: 475 THOUSANDS/UL (ref 149–390)
PLATELET BLD QL SMEAR: ABNORMAL
PMV BLD AUTO: 9.1 FL (ref 8.9–12.7)
POTASSIUM SERPL-SCNC: 3.4 MMOL/L (ref 3.5–5.3)
PROT UR STRIP-MCNC: ABNORMAL MG/DL
RBC # BLD AUTO: 3.22 MILLION/UL (ref 3.81–5.12)
RBC #/AREA URNS AUTO: ABNORMAL /HPF
SODIUM SERPL-SCNC: 138 MMOL/L (ref 136–145)
SP GR UR STRIP.AUTO: 1.02 (ref 1–1.03)
TOTAL CELLS COUNTED SPEC: 100
UROBILINOGEN UR QL STRIP.AUTO: 0.2 E.U./DL
WBC # BLD AUTO: 27.35 THOUSAND/UL (ref 4.31–10.16)
WBC #/AREA URNS AUTO: ABNORMAL /HPF

## 2020-10-08 PROCEDURE — 87493 C DIFF AMPLIFIED PROBE: CPT | Performed by: SURGERY

## 2020-10-08 PROCEDURE — 81001 URINALYSIS AUTO W/SCOPE: CPT | Performed by: PHYSICIAN ASSISTANT

## 2020-10-08 PROCEDURE — 97530 THERAPEUTIC ACTIVITIES: CPT

## 2020-10-08 PROCEDURE — 80048 BASIC METABOLIC PNL TOTAL CA: CPT | Performed by: STUDENT IN AN ORGANIZED HEALTH CARE EDUCATION/TRAINING PROGRAM

## 2020-10-08 PROCEDURE — 97110 THERAPEUTIC EXERCISES: CPT

## 2020-10-08 PROCEDURE — 87040 BLOOD CULTURE FOR BACTERIA: CPT | Performed by: STUDENT IN AN ORGANIZED HEALTH CARE EDUCATION/TRAINING PROGRAM

## 2020-10-08 PROCEDURE — 84100 ASSAY OF PHOSPHORUS: CPT | Performed by: SURGERY

## 2020-10-08 PROCEDURE — 83735 ASSAY OF MAGNESIUM: CPT | Performed by: STUDENT IN AN ORGANIZED HEALTH CARE EDUCATION/TRAINING PROGRAM

## 2020-10-08 PROCEDURE — 85007 BL SMEAR W/DIFF WBC COUNT: CPT | Performed by: STUDENT IN AN ORGANIZED HEALTH CARE EDUCATION/TRAINING PROGRAM

## 2020-10-08 PROCEDURE — 85027 COMPLETE CBC AUTOMATED: CPT | Performed by: STUDENT IN AN ORGANIZED HEALTH CARE EDUCATION/TRAINING PROGRAM

## 2020-10-08 PROCEDURE — 82330 ASSAY OF CALCIUM: CPT | Performed by: SURGERY

## 2020-10-08 PROCEDURE — 99232 SBSQ HOSP IP/OBS MODERATE 35: CPT | Performed by: INTERNAL MEDICINE

## 2020-10-08 PROCEDURE — 97535 SELF CARE MNGMENT TRAINING: CPT

## 2020-10-08 PROCEDURE — 99024 POSTOP FOLLOW-UP VISIT: CPT | Performed by: SURGERY

## 2020-10-08 PROCEDURE — 99232 SBSQ HOSP IP/OBS MODERATE 35: CPT | Performed by: STUDENT IN AN ORGANIZED HEALTH CARE EDUCATION/TRAINING PROGRAM

## 2020-10-08 RX ORDER — POTASSIUM CHLORIDE 14.9 MG/ML
20 INJECTION INTRAVENOUS ONCE
Status: COMPLETED | OUTPATIENT
Start: 2020-10-08 | End: 2020-10-09

## 2020-10-08 RX ORDER — FUROSEMIDE 10 MG/ML
20 INJECTION INTRAMUSCULAR; INTRAVENOUS ONCE
Status: COMPLETED | OUTPATIENT
Start: 2020-10-08 | End: 2020-10-08

## 2020-10-08 RX ORDER — POTASSIUM CHLORIDE 29.8 MG/ML
40 INJECTION INTRAVENOUS ONCE
Status: COMPLETED | OUTPATIENT
Start: 2020-10-08 | End: 2020-10-08

## 2020-10-08 RX ORDER — MAGNESIUM SULFATE 1 G/100ML
1 INJECTION INTRAVENOUS ONCE
Status: COMPLETED | OUTPATIENT
Start: 2020-10-08 | End: 2020-10-09

## 2020-10-08 RX ORDER — POTASSIUM CHLORIDE 20 MEQ/1
40 TABLET, EXTENDED RELEASE ORAL ONCE
Status: DISCONTINUED | OUTPATIENT
Start: 2020-10-08 | End: 2020-10-08

## 2020-10-08 RX ADMIN — ONDANSETRON 4 MG: 2 INJECTION INTRAMUSCULAR; INTRAVENOUS at 19:47

## 2020-10-08 RX ADMIN — PANTOPRAZOLE SODIUM 40 MG: 40 TABLET, DELAYED RELEASE ORAL at 15:34

## 2020-10-08 RX ADMIN — MAGNESIUM SULFATE HEPTAHYDRATE 1 G: 1 INJECTION, SOLUTION INTRAVENOUS at 11:00

## 2020-10-08 RX ADMIN — CARBAMIDE PEROXIDE 6.5% 5 DROP: 6.5 LIQUID AURICULAR (OTIC) at 10:12

## 2020-10-08 RX ADMIN — HEPARIN SODIUM 5000 UNITS: 5000 INJECTION INTRAVENOUS; SUBCUTANEOUS at 13:17

## 2020-10-08 RX ADMIN — METOCLOPRAMIDE HYDROCHLORIDE 5 MG: 5 INJECTION INTRAMUSCULAR; INTRAVENOUS at 23:43

## 2020-10-08 RX ADMIN — OXYCODONE HYDROCHLORIDE 10 MG: 10 TABLET ORAL at 00:28

## 2020-10-08 RX ADMIN — POTASSIUM CHLORIDE 40 MEQ: 29.8 INJECTION, SOLUTION INTRAVENOUS at 16:57

## 2020-10-08 RX ADMIN — OXYCODONE HYDROCHLORIDE 10 MG: 10 TABLET ORAL at 23:43

## 2020-10-08 RX ADMIN — Medication: at 22:20

## 2020-10-08 RX ADMIN — OXYCODONE HYDROCHLORIDE 10 MG: 10 TABLET ORAL at 19:46

## 2020-10-08 RX ADMIN — PANTOPRAZOLE SODIUM 40 MG: 40 TABLET, DELAYED RELEASE ORAL at 06:36

## 2020-10-08 RX ADMIN — ONDANSETRON 4 MG: 2 INJECTION INTRAMUSCULAR; INTRAVENOUS at 01:24

## 2020-10-08 RX ADMIN — METOCLOPRAMIDE HYDROCHLORIDE 5 MG: 5 INJECTION INTRAMUSCULAR; INTRAVENOUS at 17:18

## 2020-10-08 RX ADMIN — OXYCODONE HYDROCHLORIDE 10 MG: 10 TABLET ORAL at 15:10

## 2020-10-08 RX ADMIN — CEFEPIME HYDROCHLORIDE 2000 MG: 2 INJECTION, POWDER, FOR SOLUTION INTRAVENOUS at 10:08

## 2020-10-08 RX ADMIN — CEFEPIME HYDROCHLORIDE 2000 MG: 2 INJECTION, POWDER, FOR SOLUTION INTRAVENOUS at 01:16

## 2020-10-08 RX ADMIN — OXYBUTYNIN 10 MG: 10 TABLET, FILM COATED, EXTENDED RELEASE ORAL at 10:09

## 2020-10-08 RX ADMIN — METOCLOPRAMIDE HYDROCHLORIDE 5 MG: 5 INJECTION INTRAMUSCULAR; INTRAVENOUS at 12:45

## 2020-10-08 RX ADMIN — METRONIDAZOLE 500 MG: 500 INJECTION, SOLUTION INTRAVENOUS at 06:33

## 2020-10-08 RX ADMIN — HEPARIN SODIUM 5000 UNITS: 5000 INJECTION INTRAVENOUS; SUBCUTANEOUS at 06:30

## 2020-10-08 RX ADMIN — POTASSIUM CHLORIDE 20 MEQ: 14.9 INJECTION, SOLUTION INTRAVENOUS at 12:46

## 2020-10-08 RX ADMIN — METOCLOPRAMIDE HYDROCHLORIDE 5 MG: 5 INJECTION INTRAMUSCULAR; INTRAVENOUS at 06:37

## 2020-10-08 RX ADMIN — HEPARIN SODIUM 5000 UNITS: 5000 INJECTION INTRAVENOUS; SUBCUTANEOUS at 22:20

## 2020-10-08 RX ADMIN — MELATONIN 3 MG: at 00:32

## 2020-10-08 RX ADMIN — MICAFUNGIN SODIUM 100 MG: 50 INJECTION, POWDER, LYOPHILIZED, FOR SOLUTION INTRAVENOUS at 15:37

## 2020-10-08 RX ADMIN — METOCLOPRAMIDE HYDROCHLORIDE 5 MG: 5 INJECTION INTRAMUSCULAR; INTRAVENOUS at 00:13

## 2020-10-08 RX ADMIN — ONDANSETRON 4 MG: 2 INJECTION INTRAMUSCULAR; INTRAVENOUS at 13:17

## 2020-10-08 RX ADMIN — ONDANSETRON 4 MG: 2 INJECTION INTRAMUSCULAR; INTRAVENOUS at 10:09

## 2020-10-08 RX ADMIN — METRONIDAZOLE 500 MG: 500 INJECTION, SOLUTION INTRAVENOUS at 00:13

## 2020-10-08 RX ADMIN — FUROSEMIDE 20 MG: 10 INJECTION, SOLUTION INTRAMUSCULAR; INTRAVENOUS at 12:45

## 2020-10-08 RX ADMIN — CITALOPRAM HYDROBROMIDE 10 MG: 20 TABLET ORAL at 10:09

## 2020-10-08 RX ADMIN — HYDROMORPHONE HYDROCHLORIDE 0.5 MG: 1 INJECTION, SOLUTION INTRAMUSCULAR; INTRAVENOUS; SUBCUTANEOUS at 15:37

## 2020-10-09 ENCOUNTER — APPOINTMENT (INPATIENT)
Dept: RADIOLOGY | Facility: HOSPITAL | Age: 70
DRG: 854 | End: 2020-10-09
Payer: MEDICARE

## 2020-10-09 PROBLEM — E87.6 HYPOKALEMIA: Status: RESOLVED | Noted: 2020-03-28 | Resolved: 2020-10-09

## 2020-10-09 LAB
ALBUMIN SERPL BCP-MCNC: 1.8 G/DL (ref 3.5–5)
ALP SERPL-CCNC: 66 U/L (ref 46–116)
ALT SERPL W P-5'-P-CCNC: 15 U/L (ref 12–78)
ANION GAP SERPL CALCULATED.3IONS-SCNC: 2 MMOL/L (ref 4–13)
AST SERPL W P-5'-P-CCNC: 21 U/L (ref 5–45)
BILIRUB SERPL-MCNC: 0.22 MG/DL (ref 0.2–1)
BUN SERPL-MCNC: 18 MG/DL (ref 5–25)
C DIFF TOX B TCDB STL QL NAA+PROBE: NEGATIVE
CALCIUM ALBUM COR SERPL-MCNC: 10 MG/DL (ref 8.3–10.1)
CALCIUM SERPL-MCNC: 8.2 MG/DL (ref 8.3–10.1)
CHLORIDE SERPL-SCNC: 98 MMOL/L (ref 100–108)
CO2 SERPL-SCNC: 34 MMOL/L (ref 21–32)
CREAT SERPL-MCNC: 0.52 MG/DL (ref 0.6–1.3)
ERYTHROCYTE [DISTWIDTH] IN BLOOD BY AUTOMATED COUNT: 15.1 % (ref 11.6–15.1)
GFR SERPL CREATININE-BSD FRML MDRD: 97 ML/MIN/1.73SQ M
GLUCOSE SERPL-MCNC: 152 MG/DL (ref 65–140)
HCT VFR BLD AUTO: 29.9 % (ref 34.8–46.1)
HGB BLD-MCNC: 9.4 G/DL (ref 11.5–15.4)
MAGNESIUM SERPL-MCNC: 1.8 MG/DL (ref 1.6–2.6)
MCH RBC QN AUTO: 28.1 PG (ref 26.8–34.3)
MCHC RBC AUTO-ENTMCNC: 31.4 G/DL (ref 31.4–37.4)
MCV RBC AUTO: 90 FL (ref 82–98)
NRBC BLD AUTO-RTO: 0 /100 WBCS
PHOSPHATE SERPL-MCNC: 3.3 MG/DL (ref 2.3–4.1)
PLATELET # BLD AUTO: 550 THOUSANDS/UL (ref 149–390)
PMV BLD AUTO: 9.6 FL (ref 8.9–12.7)
POTASSIUM SERPL-SCNC: 4.3 MMOL/L (ref 3.5–5.3)
PROT SERPL-MCNC: 5.8 G/DL (ref 6.4–8.2)
RBC # BLD AUTO: 3.34 MILLION/UL (ref 3.81–5.12)
SODIUM SERPL-SCNC: 134 MMOL/L (ref 136–145)
WBC # BLD AUTO: 30.22 THOUSAND/UL (ref 4.31–10.16)

## 2020-10-09 PROCEDURE — 99222 1ST HOSP IP/OBS MODERATE 55: CPT | Performed by: INTERNAL MEDICINE

## 2020-10-09 PROCEDURE — 84100 ASSAY OF PHOSPHORUS: CPT | Performed by: SURGERY

## 2020-10-09 PROCEDURE — 97530 THERAPEUTIC ACTIVITIES: CPT

## 2020-10-09 PROCEDURE — 71045 X-RAY EXAM CHEST 1 VIEW: CPT

## 2020-10-09 PROCEDURE — 83735 ASSAY OF MAGNESIUM: CPT | Performed by: SURGERY

## 2020-10-09 PROCEDURE — NC001 PR NO CHARGE: Performed by: SURGERY

## 2020-10-09 PROCEDURE — 99232 SBSQ HOSP IP/OBS MODERATE 35: CPT | Performed by: STUDENT IN AN ORGANIZED HEALTH CARE EDUCATION/TRAINING PROGRAM

## 2020-10-09 PROCEDURE — 97110 THERAPEUTIC EXERCISES: CPT

## 2020-10-09 PROCEDURE — 99232 SBSQ HOSP IP/OBS MODERATE 35: CPT | Performed by: INTERNAL MEDICINE

## 2020-10-09 PROCEDURE — 80053 COMPREHEN METABOLIC PANEL: CPT | Performed by: SURGERY

## 2020-10-09 PROCEDURE — 94760 N-INVAS EAR/PLS OXIMETRY 1: CPT

## 2020-10-09 PROCEDURE — 94640 AIRWAY INHALATION TREATMENT: CPT

## 2020-10-09 PROCEDURE — 88185 FLOWCYTOMETRY/TC ADD-ON: CPT

## 2020-10-09 PROCEDURE — 88184 FLOWCYTOMETRY/ TC 1 MARKER: CPT | Performed by: SURGERY

## 2020-10-09 PROCEDURE — 85027 COMPLETE CBC AUTOMATED: CPT | Performed by: SURGERY

## 2020-10-09 RX ADMIN — CITALOPRAM HYDROBROMIDE 10 MG: 20 TABLET ORAL at 08:57

## 2020-10-09 RX ADMIN — ONDANSETRON 4 MG: 2 INJECTION INTRAMUSCULAR; INTRAVENOUS at 03:28

## 2020-10-09 RX ADMIN — ONDANSETRON 4 MG: 2 INJECTION INTRAMUSCULAR; INTRAVENOUS at 08:57

## 2020-10-09 RX ADMIN — METOCLOPRAMIDE HYDROCHLORIDE 5 MG: 5 INJECTION INTRAMUSCULAR; INTRAVENOUS at 23:58

## 2020-10-09 RX ADMIN — PANTOPRAZOLE SODIUM 40 MG: 40 TABLET, DELAYED RELEASE ORAL at 06:18

## 2020-10-09 RX ADMIN — ONDANSETRON 4 MG: 2 INJECTION INTRAMUSCULAR; INTRAVENOUS at 15:15

## 2020-10-09 RX ADMIN — OXYCODONE HYDROCHLORIDE 10 MG: 10 TABLET ORAL at 03:43

## 2020-10-09 RX ADMIN — METOCLOPRAMIDE HYDROCHLORIDE 5 MG: 5 INJECTION INTRAMUSCULAR; INTRAVENOUS at 17:30

## 2020-10-09 RX ADMIN — METOCLOPRAMIDE HYDROCHLORIDE 5 MG: 5 INJECTION INTRAMUSCULAR; INTRAVENOUS at 06:19

## 2020-10-09 RX ADMIN — OXYCODONE HYDROCHLORIDE 5 MG: 5 TABLET ORAL at 09:02

## 2020-10-09 RX ADMIN — CARBAMIDE PEROXIDE 6.5% 5 DROP: 6.5 LIQUID AURICULAR (OTIC) at 17:30

## 2020-10-09 RX ADMIN — CARBAMIDE PEROXIDE 6.5% 5 DROP: 6.5 LIQUID AURICULAR (OTIC) at 08:58

## 2020-10-09 RX ADMIN — HEPARIN SODIUM 5000 UNITS: 5000 INJECTION INTRAVENOUS; SUBCUTANEOUS at 15:15

## 2020-10-09 RX ADMIN — ONDANSETRON 4 MG: 2 INJECTION INTRAMUSCULAR; INTRAVENOUS at 20:21

## 2020-10-09 RX ADMIN — IPRATROPIUM BROMIDE AND ALBUTEROL SULFATE 3 ML: 2.5; .5 SOLUTION RESPIRATORY (INHALATION) at 06:08

## 2020-10-09 RX ADMIN — HEPARIN SODIUM 5000 UNITS: 5000 INJECTION INTRAVENOUS; SUBCUTANEOUS at 21:44

## 2020-10-09 RX ADMIN — PANTOPRAZOLE SODIUM 40 MG: 40 TABLET, DELAYED RELEASE ORAL at 15:15

## 2020-10-09 RX ADMIN — METOCLOPRAMIDE HYDROCHLORIDE 5 MG: 5 INJECTION INTRAMUSCULAR; INTRAVENOUS at 12:02

## 2020-10-09 RX ADMIN — Medication: at 21:44

## 2020-10-09 RX ADMIN — OXYBUTYNIN 10 MG: 10 TABLET, FILM COATED, EXTENDED RELEASE ORAL at 08:57

## 2020-10-09 RX ADMIN — HEPARIN SODIUM 5000 UNITS: 5000 INJECTION INTRAVENOUS; SUBCUTANEOUS at 06:18

## 2020-10-10 LAB
ANION GAP SERPL CALCULATED.3IONS-SCNC: 3 MMOL/L (ref 4–13)
BUN SERPL-MCNC: 15 MG/DL (ref 5–25)
CALCIUM SERPL-MCNC: 8.2 MG/DL (ref 8.3–10.1)
CHLORIDE SERPL-SCNC: 101 MMOL/L (ref 100–108)
CO2 SERPL-SCNC: 33 MMOL/L (ref 21–32)
CREAT SERPL-MCNC: 0.42 MG/DL (ref 0.6–1.3)
ERYTHROCYTE [DISTWIDTH] IN BLOOD BY AUTOMATED COUNT: 15.6 % (ref 11.6–15.1)
GFR SERPL CREATININE-BSD FRML MDRD: 104 ML/MIN/1.73SQ M
GLUCOSE SERPL-MCNC: 139 MG/DL (ref 65–140)
HCT VFR BLD AUTO: 28.5 % (ref 34.8–46.1)
HGB BLD-MCNC: 8.9 G/DL (ref 11.5–15.4)
MCH RBC QN AUTO: 28.4 PG (ref 26.8–34.3)
MCHC RBC AUTO-ENTMCNC: 31.2 G/DL (ref 31.4–37.4)
MCV RBC AUTO: 91 FL (ref 82–98)
NRBC BLD AUTO-RTO: 0 /100 WBCS
PLATELET # BLD AUTO: 477 THOUSANDS/UL (ref 149–390)
PMV BLD AUTO: 10.1 FL (ref 8.9–12.7)
POTASSIUM SERPL-SCNC: 5.2 MMOL/L (ref 3.5–5.3)
RBC # BLD AUTO: 3.13 MILLION/UL (ref 3.81–5.12)
SODIUM SERPL-SCNC: 137 MMOL/L (ref 136–145)
WBC # BLD AUTO: 24.54 THOUSAND/UL (ref 4.31–10.16)

## 2020-10-10 PROCEDURE — 85027 COMPLETE CBC AUTOMATED: CPT | Performed by: STUDENT IN AN ORGANIZED HEALTH CARE EDUCATION/TRAINING PROGRAM

## 2020-10-10 PROCEDURE — 94640 AIRWAY INHALATION TREATMENT: CPT

## 2020-10-10 PROCEDURE — 99232 SBSQ HOSP IP/OBS MODERATE 35: CPT | Performed by: INTERNAL MEDICINE

## 2020-10-10 PROCEDURE — 99232 SBSQ HOSP IP/OBS MODERATE 35: CPT | Performed by: STUDENT IN AN ORGANIZED HEALTH CARE EDUCATION/TRAINING PROGRAM

## 2020-10-10 PROCEDURE — 94760 N-INVAS EAR/PLS OXIMETRY 1: CPT

## 2020-10-10 PROCEDURE — 80048 BASIC METABOLIC PNL TOTAL CA: CPT | Performed by: STUDENT IN AN ORGANIZED HEALTH CARE EDUCATION/TRAINING PROGRAM

## 2020-10-10 PROCEDURE — NC001 PR NO CHARGE: Performed by: SURGERY

## 2020-10-10 RX ORDER — FUROSEMIDE 10 MG/ML
40 INJECTION INTRAMUSCULAR; INTRAVENOUS ONCE
Status: COMPLETED | OUTPATIENT
Start: 2020-10-10 | End: 2020-10-10

## 2020-10-10 RX ADMIN — Medication: at 22:18

## 2020-10-10 RX ADMIN — IPRATROPIUM BROMIDE AND ALBUTEROL SULFATE 3 ML: 2.5; .5 SOLUTION RESPIRATORY (INHALATION) at 11:15

## 2020-10-10 RX ADMIN — METOCLOPRAMIDE HYDROCHLORIDE 5 MG: 5 INJECTION INTRAMUSCULAR; INTRAVENOUS at 16:23

## 2020-10-10 RX ADMIN — HEPARIN SODIUM 5000 UNITS: 5000 INJECTION INTRAVENOUS; SUBCUTANEOUS at 14:34

## 2020-10-10 RX ADMIN — ONDANSETRON 4 MG: 2 INJECTION INTRAMUSCULAR; INTRAVENOUS at 19:24

## 2020-10-10 RX ADMIN — OXYCODONE HYDROCHLORIDE 5 MG: 5 TABLET ORAL at 14:34

## 2020-10-10 RX ADMIN — OXYCODONE HYDROCHLORIDE 5 MG: 5 TABLET ORAL at 08:21

## 2020-10-10 RX ADMIN — METOCLOPRAMIDE HYDROCHLORIDE 5 MG: 5 INJECTION INTRAMUSCULAR; INTRAVENOUS at 12:49

## 2020-10-10 RX ADMIN — PANTOPRAZOLE SODIUM 40 MG: 40 TABLET, DELAYED RELEASE ORAL at 05:45

## 2020-10-10 RX ADMIN — CITALOPRAM HYDROBROMIDE 10 MG: 20 TABLET ORAL at 08:19

## 2020-10-10 RX ADMIN — CARBAMIDE PEROXIDE 6.5% 5 DROP: 6.5 LIQUID AURICULAR (OTIC) at 08:32

## 2020-10-10 RX ADMIN — CARBAMIDE PEROXIDE 6.5% 5 DROP: 6.5 LIQUID AURICULAR (OTIC) at 16:24

## 2020-10-10 RX ADMIN — OXYBUTYNIN 10 MG: 10 TABLET, FILM COATED, EXTENDED RELEASE ORAL at 08:19

## 2020-10-10 RX ADMIN — HEPARIN SODIUM 5000 UNITS: 5000 INJECTION INTRAVENOUS; SUBCUTANEOUS at 22:18

## 2020-10-10 RX ADMIN — METOCLOPRAMIDE HYDROCHLORIDE 5 MG: 5 INJECTION INTRAMUSCULAR; INTRAVENOUS at 05:45

## 2020-10-10 RX ADMIN — ONDANSETRON 4 MG: 2 INJECTION INTRAMUSCULAR; INTRAVENOUS at 08:19

## 2020-10-10 RX ADMIN — OXYCODONE HYDROCHLORIDE 5 MG: 5 TABLET ORAL at 18:30

## 2020-10-10 RX ADMIN — FUROSEMIDE 40 MG: 10 INJECTION, SOLUTION INTRAMUSCULAR; INTRAVENOUS at 19:23

## 2020-10-10 RX ADMIN — HEPARIN SODIUM 5000 UNITS: 5000 INJECTION INTRAVENOUS; SUBCUTANEOUS at 05:45

## 2020-10-10 RX ADMIN — ONDANSETRON 4 MG: 2 INJECTION INTRAMUSCULAR; INTRAVENOUS at 14:34

## 2020-10-10 RX ADMIN — PANTOPRAZOLE SODIUM 40 MG: 40 TABLET, DELAYED RELEASE ORAL at 16:23

## 2020-10-10 RX ADMIN — ONDANSETRON 4 MG: 2 INJECTION INTRAMUSCULAR; INTRAVENOUS at 02:57

## 2020-10-10 RX ADMIN — OXYCODONE HYDROCHLORIDE 10 MG: 10 TABLET ORAL at 22:30

## 2020-10-11 ENCOUNTER — APPOINTMENT (INPATIENT)
Dept: RADIOLOGY | Facility: HOSPITAL | Age: 70
DRG: 854 | End: 2020-10-11
Payer: MEDICARE

## 2020-10-11 PROBLEM — A41.9 SEPSIS (HCC): Status: RESOLVED | Noted: 2020-09-28 | Resolved: 2020-10-11

## 2020-10-11 LAB
ANION GAP SERPL CALCULATED.3IONS-SCNC: 0 MMOL/L (ref 4–13)
BUN SERPL-MCNC: 20 MG/DL (ref 5–25)
CALCIUM SERPL-MCNC: 8.2 MG/DL (ref 8.3–10.1)
CHLORIDE SERPL-SCNC: 97 MMOL/L (ref 100–108)
CO2 SERPL-SCNC: 38 MMOL/L (ref 21–32)
CREAT SERPL-MCNC: 0.45 MG/DL (ref 0.6–1.3)
ERYTHROCYTE [DISTWIDTH] IN BLOOD BY AUTOMATED COUNT: 15.8 % (ref 11.6–15.1)
GFR SERPL CREATININE-BSD FRML MDRD: 102 ML/MIN/1.73SQ M
GLUCOSE SERPL-MCNC: 119 MG/DL (ref 65–140)
GLUCOSE SERPL-MCNC: 179 MG/DL (ref 65–140)
HCT VFR BLD AUTO: 29.5 % (ref 34.8–46.1)
HGB BLD-MCNC: 9 G/DL (ref 11.5–15.4)
MAGNESIUM SERPL-MCNC: 1.9 MG/DL (ref 1.6–2.6)
MCH RBC QN AUTO: 28 PG (ref 26.8–34.3)
MCHC RBC AUTO-ENTMCNC: 30.5 G/DL (ref 31.4–37.4)
MCV RBC AUTO: 92 FL (ref 82–98)
NRBC BLD AUTO-RTO: 0 /100 WBCS
NT-PROBNP SERPL-MCNC: 62 PG/ML
PLATELET # BLD AUTO: 487 THOUSANDS/UL (ref 149–390)
PMV BLD AUTO: 10.1 FL (ref 8.9–12.7)
POTASSIUM SERPL-SCNC: 5 MMOL/L (ref 3.5–5.3)
RBC # BLD AUTO: 3.22 MILLION/UL (ref 3.81–5.12)
SODIUM SERPL-SCNC: 135 MMOL/L (ref 136–145)
WBC # BLD AUTO: 23.94 THOUSAND/UL (ref 4.31–10.16)

## 2020-10-11 PROCEDURE — 83735 ASSAY OF MAGNESIUM: CPT | Performed by: STUDENT IN AN ORGANIZED HEALTH CARE EDUCATION/TRAINING PROGRAM

## 2020-10-11 PROCEDURE — 94640 AIRWAY INHALATION TREATMENT: CPT

## 2020-10-11 PROCEDURE — 99223 1ST HOSP IP/OBS HIGH 75: CPT | Performed by: INTERNAL MEDICINE

## 2020-10-11 PROCEDURE — 99232 SBSQ HOSP IP/OBS MODERATE 35: CPT | Performed by: STUDENT IN AN ORGANIZED HEALTH CARE EDUCATION/TRAINING PROGRAM

## 2020-10-11 PROCEDURE — 97110 THERAPEUTIC EXERCISES: CPT

## 2020-10-11 PROCEDURE — 94002 VENT MGMT INPAT INIT DAY: CPT

## 2020-10-11 PROCEDURE — NC001 PR NO CHARGE: Performed by: SURGERY

## 2020-10-11 PROCEDURE — 82948 REAGENT STRIP/BLOOD GLUCOSE: CPT

## 2020-10-11 PROCEDURE — 80048 BASIC METABOLIC PNL TOTAL CA: CPT | Performed by: STUDENT IN AN ORGANIZED HEALTH CARE EDUCATION/TRAINING PROGRAM

## 2020-10-11 PROCEDURE — 71045 X-RAY EXAM CHEST 1 VIEW: CPT

## 2020-10-11 PROCEDURE — 85027 COMPLETE CBC AUTOMATED: CPT | Performed by: STUDENT IN AN ORGANIZED HEALTH CARE EDUCATION/TRAINING PROGRAM

## 2020-10-11 PROCEDURE — 83880 ASSAY OF NATRIURETIC PEPTIDE: CPT | Performed by: INTERNAL MEDICINE

## 2020-10-11 PROCEDURE — 94760 N-INVAS EAR/PLS OXIMETRY 1: CPT

## 2020-10-11 RX ORDER — IPRATROPIUM BROMIDE AND ALBUTEROL SULFATE 2.5; .5 MG/3ML; MG/3ML
3 SOLUTION RESPIRATORY (INHALATION) 3 TIMES DAILY
Status: DISCONTINUED | OUTPATIENT
Start: 2020-10-11 | End: 2020-10-16

## 2020-10-11 RX ADMIN — OXYCODONE HYDROCHLORIDE 10 MG: 10 TABLET ORAL at 21:26

## 2020-10-11 RX ADMIN — ONDANSETRON 4 MG: 2 INJECTION INTRAMUSCULAR; INTRAVENOUS at 14:18

## 2020-10-11 RX ADMIN — HEPARIN SODIUM 5000 UNITS: 5000 INJECTION INTRAVENOUS; SUBCUTANEOUS at 05:30

## 2020-10-11 RX ADMIN — SODIUM CHLORIDE: 234 INJECTION INTRAMUSCULAR; INTRAVENOUS; SUBCUTANEOUS at 21:27

## 2020-10-11 RX ADMIN — METOCLOPRAMIDE HYDROCHLORIDE 5 MG: 5 INJECTION INTRAMUSCULAR; INTRAVENOUS at 12:19

## 2020-10-11 RX ADMIN — HEPARIN SODIUM 5000 UNITS: 5000 INJECTION INTRAVENOUS; SUBCUTANEOUS at 14:18

## 2020-10-11 RX ADMIN — METOCLOPRAMIDE HYDROCHLORIDE 5 MG: 5 INJECTION INTRAMUSCULAR; INTRAVENOUS at 17:40

## 2020-10-11 RX ADMIN — CITALOPRAM HYDROBROMIDE 10 MG: 20 TABLET ORAL at 08:45

## 2020-10-11 RX ADMIN — HYDROMORPHONE HYDROCHLORIDE 0.5 MG: 1 INJECTION, SOLUTION INTRAMUSCULAR; INTRAVENOUS; SUBCUTANEOUS at 12:30

## 2020-10-11 RX ADMIN — METOCLOPRAMIDE HYDROCHLORIDE 5 MG: 5 INJECTION INTRAMUSCULAR; INTRAVENOUS at 05:30

## 2020-10-11 RX ADMIN — CARBAMIDE PEROXIDE 6.5% 5 DROP: 6.5 LIQUID AURICULAR (OTIC) at 17:41

## 2020-10-11 RX ADMIN — OXYCODONE HYDROCHLORIDE 10 MG: 10 TABLET ORAL at 08:45

## 2020-10-11 RX ADMIN — HEPARIN SODIUM 5000 UNITS: 5000 INJECTION INTRAVENOUS; SUBCUTANEOUS at 21:25

## 2020-10-11 RX ADMIN — OXYBUTYNIN 10 MG: 10 TABLET, FILM COATED, EXTENDED RELEASE ORAL at 08:44

## 2020-10-11 RX ADMIN — ONDANSETRON 4 MG: 2 INJECTION INTRAMUSCULAR; INTRAVENOUS at 08:46

## 2020-10-11 RX ADMIN — CARBAMIDE PEROXIDE 6.5% 5 DROP: 6.5 LIQUID AURICULAR (OTIC) at 08:53

## 2020-10-11 RX ADMIN — IPRATROPIUM BROMIDE AND ALBUTEROL SULFATE 3 ML: 2.5; .5 SOLUTION RESPIRATORY (INHALATION) at 14:24

## 2020-10-11 RX ADMIN — ONDANSETRON 4 MG: 2 INJECTION INTRAMUSCULAR; INTRAVENOUS at 03:16

## 2020-10-11 RX ADMIN — PANTOPRAZOLE SODIUM 40 MG: 40 TABLET, DELAYED RELEASE ORAL at 17:40

## 2020-10-11 RX ADMIN — ONDANSETRON 4 MG: 2 INJECTION INTRAMUSCULAR; INTRAVENOUS at 21:26

## 2020-10-11 RX ADMIN — METOCLOPRAMIDE HYDROCHLORIDE 5 MG: 5 INJECTION INTRAMUSCULAR; INTRAVENOUS at 00:12

## 2020-10-11 RX ADMIN — OXYCODONE HYDROCHLORIDE 10 MG: 10 TABLET ORAL at 17:42

## 2020-10-11 RX ADMIN — IPRATROPIUM BROMIDE AND ALBUTEROL SULFATE 3 ML: 2.5; .5 SOLUTION RESPIRATORY (INHALATION) at 19:14

## 2020-10-11 RX ADMIN — PANTOPRAZOLE SODIUM 40 MG: 40 TABLET, DELAYED RELEASE ORAL at 05:30

## 2020-10-12 ENCOUNTER — APPOINTMENT (INPATIENT)
Dept: NON INVASIVE DIAGNOSTICS | Facility: HOSPITAL | Age: 70
DRG: 854 | End: 2020-10-12
Payer: MEDICARE

## 2020-10-12 LAB
ALBUMIN SERPL BCP-MCNC: 1.8 G/DL (ref 3.5–5)
ALP SERPL-CCNC: 67 U/L (ref 46–116)
ALT SERPL W P-5'-P-CCNC: 24 U/L (ref 12–78)
ANION GAP SERPL CALCULATED.3IONS-SCNC: 3 MMOL/L (ref 4–13)
AST SERPL W P-5'-P-CCNC: 31 U/L (ref 5–45)
BASOPHILS # BLD AUTO: 0.1 THOUSANDS/ΜL (ref 0–0.1)
BASOPHILS NFR BLD AUTO: 1 % (ref 0–1)
BILIRUB SERPL-MCNC: 0.24 MG/DL (ref 0.2–1)
BUN SERPL-MCNC: 17 MG/DL (ref 5–25)
CALCIUM ALBUM COR SERPL-MCNC: 9.3 MG/DL (ref 8.3–10.1)
CALCIUM SERPL-MCNC: 7.5 MG/DL (ref 8.3–10.1)
CHLORIDE SERPL-SCNC: 94 MMOL/L (ref 100–108)
CO2 SERPL-SCNC: 32 MMOL/L (ref 21–32)
CREAT SERPL-MCNC: 0.59 MG/DL (ref 0.6–1.3)
EOSINOPHIL # BLD AUTO: 0.66 THOUSAND/ΜL (ref 0–0.61)
EOSINOPHIL NFR BLD AUTO: 3 % (ref 0–6)
ERYTHROCYTE [DISTWIDTH] IN BLOOD BY AUTOMATED COUNT: 17 % (ref 11.6–15.1)
GFR SERPL CREATININE-BSD FRML MDRD: 93 ML/MIN/1.73SQ M
GLUCOSE SERPL-MCNC: 926 MG/DL (ref 65–140)
HCT VFR BLD AUTO: 28.2 % (ref 34.8–46.1)
HGB BLD-MCNC: 8.6 G/DL (ref 11.5–15.4)
IMM GRANULOCYTES # BLD AUTO: 0.42 THOUSAND/UL (ref 0–0.2)
IMM GRANULOCYTES NFR BLD AUTO: 2 % (ref 0–2)
LYMPHOCYTES # BLD AUTO: 2.97 THOUSANDS/ΜL (ref 0.6–4.47)
LYMPHOCYTES NFR BLD AUTO: 14 % (ref 14–44)
MCH RBC QN AUTO: 30.1 PG (ref 26.8–34.3)
MCHC RBC AUTO-ENTMCNC: 30.5 G/DL (ref 31.4–37.4)
MCV RBC AUTO: 99 FL (ref 82–98)
MONOCYTES # BLD AUTO: 2.05 THOUSAND/ΜL (ref 0.17–1.22)
MONOCYTES NFR BLD AUTO: 10 % (ref 4–12)
NEUTROPHILS # BLD AUTO: 15.39 THOUSANDS/ΜL (ref 1.85–7.62)
NEUTS SEG NFR BLD AUTO: 70 % (ref 43–75)
NRBC BLD AUTO-RTO: 0 /100 WBCS
PLATELET # BLD AUTO: 433 THOUSANDS/UL (ref 149–390)
PMV BLD AUTO: 10.7 FL (ref 8.9–12.7)
POTASSIUM SERPL-SCNC: 4.3 MMOL/L (ref 3.5–5.3)
PROT SERPL-MCNC: 5.9 G/DL (ref 6.4–8.2)
RBC # BLD AUTO: 2.86 MILLION/UL (ref 3.81–5.12)
SODIUM SERPL-SCNC: 129 MMOL/L (ref 136–145)
WBC # BLD AUTO: 21.59 THOUSAND/UL (ref 4.31–10.16)

## 2020-10-12 PROCEDURE — 97530 THERAPEUTIC ACTIVITIES: CPT

## 2020-10-12 PROCEDURE — 99232 SBSQ HOSP IP/OBS MODERATE 35: CPT | Performed by: INTERNAL MEDICINE

## 2020-10-12 PROCEDURE — 94760 N-INVAS EAR/PLS OXIMETRY 1: CPT

## 2020-10-12 PROCEDURE — 94660 CPAP INITIATION&MGMT: CPT

## 2020-10-12 PROCEDURE — 85025 COMPLETE CBC W/AUTO DIFF WBC: CPT | Performed by: SURGERY

## 2020-10-12 PROCEDURE — 97110 THERAPEUTIC EXERCISES: CPT

## 2020-10-12 PROCEDURE — 99024 POSTOP FOLLOW-UP VISIT: CPT | Performed by: SURGERY

## 2020-10-12 PROCEDURE — 93306 TTE W/DOPPLER COMPLETE: CPT

## 2020-10-12 PROCEDURE — 93306 TTE W/DOPPLER COMPLETE: CPT | Performed by: INTERNAL MEDICINE

## 2020-10-12 PROCEDURE — 97535 SELF CARE MNGMENT TRAINING: CPT

## 2020-10-12 PROCEDURE — 94640 AIRWAY INHALATION TREATMENT: CPT

## 2020-10-12 PROCEDURE — 80053 COMPREHEN METABOLIC PANEL: CPT | Performed by: SURGERY

## 2020-10-12 RX ADMIN — IPRATROPIUM BROMIDE AND ALBUTEROL SULFATE 3 ML: 2.5; .5 SOLUTION RESPIRATORY (INHALATION) at 19:30

## 2020-10-12 RX ADMIN — OXYCODONE HYDROCHLORIDE 10 MG: 10 TABLET ORAL at 17:21

## 2020-10-12 RX ADMIN — CITALOPRAM HYDROBROMIDE 10 MG: 20 TABLET ORAL at 09:14

## 2020-10-12 RX ADMIN — METOCLOPRAMIDE HYDROCHLORIDE 5 MG: 5 INJECTION INTRAMUSCULAR; INTRAVENOUS at 12:45

## 2020-10-12 RX ADMIN — HYDROMORPHONE HYDROCHLORIDE 0.5 MG: 1 INJECTION, SOLUTION INTRAMUSCULAR; INTRAVENOUS; SUBCUTANEOUS at 20:21

## 2020-10-12 RX ADMIN — METOCLOPRAMIDE HYDROCHLORIDE 5 MG: 5 INJECTION INTRAMUSCULAR; INTRAVENOUS at 06:27

## 2020-10-12 RX ADMIN — IPRATROPIUM BROMIDE AND ALBUTEROL SULFATE 3 ML: 2.5; .5 SOLUTION RESPIRATORY (INHALATION) at 13:28

## 2020-10-12 RX ADMIN — HEPARIN SODIUM 5000 UNITS: 5000 INJECTION INTRAVENOUS; SUBCUTANEOUS at 06:08

## 2020-10-12 RX ADMIN — HEPARIN SODIUM 5000 UNITS: 5000 INJECTION INTRAVENOUS; SUBCUTANEOUS at 21:08

## 2020-10-12 RX ADMIN — OXYCODONE HYDROCHLORIDE 10 MG: 10 TABLET ORAL at 09:14

## 2020-10-12 RX ADMIN — ONDANSETRON 4 MG: 2 INJECTION INTRAMUSCULAR; INTRAVENOUS at 20:17

## 2020-10-12 RX ADMIN — ONDANSETRON 4 MG: 2 INJECTION INTRAMUSCULAR; INTRAVENOUS at 09:14

## 2020-10-12 RX ADMIN — ONDANSETRON 4 MG: 2 INJECTION INTRAMUSCULAR; INTRAVENOUS at 03:31

## 2020-10-12 RX ADMIN — HEPARIN SODIUM 5000 UNITS: 5000 INJECTION INTRAVENOUS; SUBCUTANEOUS at 14:24

## 2020-10-12 RX ADMIN — METOCLOPRAMIDE HYDROCHLORIDE 5 MG: 5 INJECTION INTRAMUSCULAR; INTRAVENOUS at 17:21

## 2020-10-12 RX ADMIN — HYDROMORPHONE HYDROCHLORIDE 0.5 MG: 1 INJECTION, SOLUTION INTRAMUSCULAR; INTRAVENOUS; SUBCUTANEOUS at 12:45

## 2020-10-12 RX ADMIN — ONDANSETRON 4 MG: 2 INJECTION INTRAMUSCULAR; INTRAVENOUS at 14:24

## 2020-10-12 RX ADMIN — OXYBUTYNIN 10 MG: 10 TABLET, FILM COATED, EXTENDED RELEASE ORAL at 09:14

## 2020-10-12 RX ADMIN — PANTOPRAZOLE SODIUM 40 MG: 40 TABLET, DELAYED RELEASE ORAL at 06:08

## 2020-10-12 RX ADMIN — PANTOPRAZOLE SODIUM 40 MG: 40 TABLET, DELAYED RELEASE ORAL at 17:22

## 2020-10-12 RX ADMIN — METOCLOPRAMIDE HYDROCHLORIDE 5 MG: 5 INJECTION INTRAMUSCULAR; INTRAVENOUS at 01:02

## 2020-10-12 RX ADMIN — IPRATROPIUM BROMIDE AND ALBUTEROL SULFATE 3 ML: 2.5; .5 SOLUTION RESPIRATORY (INHALATION) at 08:22

## 2020-10-13 LAB
ANION GAP SERPL CALCULATED.3IONS-SCNC: 3 MMOL/L (ref 4–13)
BACTERIA BLD CULT: NORMAL
BACTERIA BLD CULT: NORMAL
BASOPHILS # BLD AUTO: 0.09 THOUSANDS/ΜL (ref 0–0.1)
BASOPHILS NFR BLD AUTO: 0 % (ref 0–1)
BUN SERPL-MCNC: 17 MG/DL (ref 5–25)
CALCIUM SERPL-MCNC: 8.9 MG/DL (ref 8.3–10.1)
CHLORIDE SERPL-SCNC: 98 MMOL/L (ref 100–108)
CO2 SERPL-SCNC: 35 MMOL/L (ref 21–32)
CREAT SERPL-MCNC: 0.39 MG/DL (ref 0.6–1.3)
EOSINOPHIL # BLD AUTO: 0.62 THOUSAND/ΜL (ref 0–0.61)
EOSINOPHIL NFR BLD AUTO: 3 % (ref 0–6)
ERYTHROCYTE [DISTWIDTH] IN BLOOD BY AUTOMATED COUNT: 16.4 % (ref 11.6–15.1)
GFR SERPL CREATININE-BSD FRML MDRD: 107 ML/MIN/1.73SQ M
GLUCOSE SERPL-MCNC: 90 MG/DL (ref 65–140)
HCT VFR BLD AUTO: 26.8 % (ref 34.8–46.1)
HGB BLD-MCNC: 8.2 G/DL (ref 11.5–15.4)
IMM GRANULOCYTES # BLD AUTO: 0.33 THOUSAND/UL (ref 0–0.2)
IMM GRANULOCYTES NFR BLD AUTO: 2 % (ref 0–2)
LYMPHOCYTES # BLD AUTO: 3.5 THOUSANDS/ΜL (ref 0.6–4.47)
LYMPHOCYTES NFR BLD AUTO: 16 % (ref 14–44)
MCH RBC QN AUTO: 28.3 PG (ref 26.8–34.3)
MCHC RBC AUTO-ENTMCNC: 30.6 G/DL (ref 31.4–37.4)
MCV RBC AUTO: 92 FL (ref 82–98)
MONOCYTES # BLD AUTO: 2.09 THOUSAND/ΜL (ref 0.17–1.22)
MONOCYTES NFR BLD AUTO: 10 % (ref 4–12)
NEUTROPHILS # BLD AUTO: 14.74 THOUSANDS/ΜL (ref 1.85–7.62)
NEUTS SEG NFR BLD AUTO: 69 % (ref 43–75)
NRBC BLD AUTO-RTO: 0 /100 WBCS
PLATELET # BLD AUTO: 430 THOUSANDS/UL (ref 149–390)
PMV BLD AUTO: 10.5 FL (ref 8.9–12.7)
POTASSIUM SERPL-SCNC: 4.5 MMOL/L (ref 3.5–5.3)
RBC # BLD AUTO: 2.9 MILLION/UL (ref 3.81–5.12)
SODIUM SERPL-SCNC: 136 MMOL/L (ref 136–145)
WBC # BLD AUTO: 21.37 THOUSAND/UL (ref 4.31–10.16)

## 2020-10-13 PROCEDURE — 94760 N-INVAS EAR/PLS OXIMETRY 1: CPT

## 2020-10-13 PROCEDURE — 80048 BASIC METABOLIC PNL TOTAL CA: CPT | Performed by: SURGERY

## 2020-10-13 PROCEDURE — 99232 SBSQ HOSP IP/OBS MODERATE 35: CPT | Performed by: INTERNAL MEDICINE

## 2020-10-13 PROCEDURE — 85025 COMPLETE CBC W/AUTO DIFF WBC: CPT | Performed by: SURGERY

## 2020-10-13 PROCEDURE — 94640 AIRWAY INHALATION TREATMENT: CPT

## 2020-10-13 PROCEDURE — 94660 CPAP INITIATION&MGMT: CPT

## 2020-10-13 PROCEDURE — 99024 POSTOP FOLLOW-UP VISIT: CPT | Performed by: SURGERY

## 2020-10-13 RX ORDER — IPRATROPIUM BROMIDE AND ALBUTEROL SULFATE 2.5; .5 MG/3ML; MG/3ML
3 SOLUTION RESPIRATORY (INHALATION) 3 TIMES DAILY
Refills: 0 | Status: CANCELLED
Start: 2020-10-13

## 2020-10-13 RX ADMIN — ONDANSETRON 4 MG: 2 INJECTION INTRAMUSCULAR; INTRAVENOUS at 01:13

## 2020-10-13 RX ADMIN — IPRATROPIUM BROMIDE AND ALBUTEROL SULFATE 3 ML: 2.5; .5 SOLUTION RESPIRATORY (INHALATION) at 08:11

## 2020-10-13 RX ADMIN — PANTOPRAZOLE SODIUM 40 MG: 40 TABLET, DELAYED RELEASE ORAL at 17:23

## 2020-10-13 RX ADMIN — OXYCODONE HYDROCHLORIDE 5 MG: 5 TABLET ORAL at 05:11

## 2020-10-13 RX ADMIN — OXYCODONE HYDROCHLORIDE 10 MG: 10 TABLET ORAL at 00:52

## 2020-10-13 RX ADMIN — OXYCODONE HYDROCHLORIDE 5 MG: 5 TABLET ORAL at 09:26

## 2020-10-13 RX ADMIN — IPRATROPIUM BROMIDE AND ALBUTEROL SULFATE 3 ML: 2.5; .5 SOLUTION RESPIRATORY (INHALATION) at 13:32

## 2020-10-13 RX ADMIN — OXYBUTYNIN 10 MG: 10 TABLET, FILM COATED, EXTENDED RELEASE ORAL at 09:27

## 2020-10-13 RX ADMIN — METOCLOPRAMIDE HYDROCHLORIDE 5 MG: 5 INJECTION INTRAMUSCULAR; INTRAVENOUS at 12:03

## 2020-10-13 RX ADMIN — CITALOPRAM HYDROBROMIDE 10 MG: 20 TABLET ORAL at 09:27

## 2020-10-13 RX ADMIN — OXYCODONE HYDROCHLORIDE 5 MG: 5 TABLET ORAL at 14:58

## 2020-10-13 RX ADMIN — ONDANSETRON 4 MG: 2 INJECTION INTRAMUSCULAR; INTRAVENOUS at 14:57

## 2020-10-13 RX ADMIN — HEPARIN SODIUM 5000 UNITS: 5000 INJECTION INTRAVENOUS; SUBCUTANEOUS at 21:02

## 2020-10-13 RX ADMIN — ONDANSETRON 4 MG: 2 INJECTION INTRAMUSCULAR; INTRAVENOUS at 09:26

## 2020-10-13 RX ADMIN — HEPARIN SODIUM 5000 UNITS: 5000 INJECTION INTRAVENOUS; SUBCUTANEOUS at 05:11

## 2020-10-13 RX ADMIN — HYDROMORPHONE HYDROCHLORIDE 0.5 MG: 1 INJECTION, SOLUTION INTRAMUSCULAR; INTRAVENOUS; SUBCUTANEOUS at 12:03

## 2020-10-13 RX ADMIN — METOCLOPRAMIDE HYDROCHLORIDE 5 MG: 5 INJECTION INTRAMUSCULAR; INTRAVENOUS at 23:23

## 2020-10-13 RX ADMIN — HYDROMORPHONE HYDROCHLORIDE 0.5 MG: 1 INJECTION, SOLUTION INTRAMUSCULAR; INTRAVENOUS; SUBCUTANEOUS at 17:22

## 2020-10-13 RX ADMIN — IPRATROPIUM BROMIDE AND ALBUTEROL SULFATE 3 ML: 2.5; .5 SOLUTION RESPIRATORY (INHALATION) at 19:44

## 2020-10-13 RX ADMIN — METOCLOPRAMIDE HYDROCHLORIDE 5 MG: 5 INJECTION INTRAMUSCULAR; INTRAVENOUS at 17:22

## 2020-10-13 RX ADMIN — ONDANSETRON 4 MG: 2 INJECTION INTRAMUSCULAR; INTRAVENOUS at 20:51

## 2020-10-13 RX ADMIN — METOCLOPRAMIDE HYDROCHLORIDE 5 MG: 5 INJECTION INTRAMUSCULAR; INTRAVENOUS at 00:53

## 2020-10-13 RX ADMIN — OXYCODONE HYDROCHLORIDE 10 MG: 10 TABLET ORAL at 21:02

## 2020-10-13 RX ADMIN — PANTOPRAZOLE SODIUM 40 MG: 40 TABLET, DELAYED RELEASE ORAL at 05:11

## 2020-10-13 RX ADMIN — HEPARIN SODIUM 5000 UNITS: 5000 INJECTION INTRAVENOUS; SUBCUTANEOUS at 14:57

## 2020-10-13 RX ADMIN — METOCLOPRAMIDE HYDROCHLORIDE 5 MG: 5 INJECTION INTRAMUSCULAR; INTRAVENOUS at 05:19

## 2020-10-14 DIAGNOSIS — R12 HEART BURN: ICD-10-CM

## 2020-10-14 LAB
ANION GAP SERPL CALCULATED.3IONS-SCNC: 3 MMOL/L (ref 4–13)
BASOPHILS # BLD AUTO: 0.09 THOUSANDS/ΜL (ref 0–0.1)
BASOPHILS NFR BLD AUTO: 1 % (ref 0–1)
BUN SERPL-MCNC: 13 MG/DL (ref 5–25)
CALCIUM SERPL-MCNC: 8.7 MG/DL (ref 8.3–10.1)
CHLORIDE SERPL-SCNC: 98 MMOL/L (ref 100–108)
CO2 SERPL-SCNC: 36 MMOL/L (ref 21–32)
CREAT SERPL-MCNC: 0.42 MG/DL (ref 0.6–1.3)
EOSINOPHIL # BLD AUTO: 0.51 THOUSAND/ΜL (ref 0–0.61)
EOSINOPHIL NFR BLD AUTO: 3 % (ref 0–6)
ERYTHROCYTE [DISTWIDTH] IN BLOOD BY AUTOMATED COUNT: 16.8 % (ref 11.6–15.1)
GFR SERPL CREATININE-BSD FRML MDRD: 104 ML/MIN/1.73SQ M
GLUCOSE SERPL-MCNC: 97 MG/DL (ref 65–140)
HCT VFR BLD AUTO: 26.3 % (ref 34.8–46.1)
HGB BLD-MCNC: 8.2 G/DL (ref 11.5–15.4)
IMM GRANULOCYTES # BLD AUTO: 0.19 THOUSAND/UL (ref 0–0.2)
IMM GRANULOCYTES NFR BLD AUTO: 1 % (ref 0–2)
LYMPHOCYTES # BLD AUTO: 2.48 THOUSANDS/ΜL (ref 0.6–4.47)
LYMPHOCYTES NFR BLD AUTO: 13 % (ref 14–44)
MCH RBC QN AUTO: 28.8 PG (ref 26.8–34.3)
MCHC RBC AUTO-ENTMCNC: 31.2 G/DL (ref 31.4–37.4)
MCV RBC AUTO: 92 FL (ref 82–98)
MONOCYTES # BLD AUTO: 1.77 THOUSAND/ΜL (ref 0.17–1.22)
MONOCYTES NFR BLD AUTO: 9 % (ref 4–12)
NEUTROPHILS # BLD AUTO: 14.09 THOUSANDS/ΜL (ref 1.85–7.62)
NEUTS SEG NFR BLD AUTO: 73 % (ref 43–75)
NRBC BLD AUTO-RTO: 0 /100 WBCS
PLATELET # BLD AUTO: 411 THOUSANDS/UL (ref 149–390)
PMV BLD AUTO: 10.7 FL (ref 8.9–12.7)
POTASSIUM SERPL-SCNC: 4.1 MMOL/L (ref 3.5–5.3)
RBC # BLD AUTO: 2.85 MILLION/UL (ref 3.81–5.12)
SODIUM SERPL-SCNC: 137 MMOL/L (ref 136–145)
WBC # BLD AUTO: 19.13 THOUSAND/UL (ref 4.31–10.16)

## 2020-10-14 PROCEDURE — 99024 POSTOP FOLLOW-UP VISIT: CPT | Performed by: SURGERY

## 2020-10-14 PROCEDURE — 80048 BASIC METABOLIC PNL TOTAL CA: CPT | Performed by: STUDENT IN AN ORGANIZED HEALTH CARE EDUCATION/TRAINING PROGRAM

## 2020-10-14 PROCEDURE — 85025 COMPLETE CBC W/AUTO DIFF WBC: CPT | Performed by: STUDENT IN AN ORGANIZED HEALTH CARE EDUCATION/TRAINING PROGRAM

## 2020-10-14 PROCEDURE — 97530 THERAPEUTIC ACTIVITIES: CPT

## 2020-10-14 PROCEDURE — 97110 THERAPEUTIC EXERCISES: CPT

## 2020-10-14 PROCEDURE — 94640 AIRWAY INHALATION TREATMENT: CPT

## 2020-10-14 PROCEDURE — 97535 SELF CARE MNGMENT TRAINING: CPT

## 2020-10-14 PROCEDURE — 94760 N-INVAS EAR/PLS OXIMETRY 1: CPT

## 2020-10-14 PROCEDURE — 99232 SBSQ HOSP IP/OBS MODERATE 35: CPT | Performed by: INTERNAL MEDICINE

## 2020-10-14 RX ORDER — OMEPRAZOLE 20 MG/1
CAPSULE, DELAYED RELEASE ORAL
Qty: 90 CAPSULE | Refills: 3 | OUTPATIENT
Start: 2020-10-14

## 2020-10-14 RX ADMIN — METOCLOPRAMIDE HYDROCHLORIDE 5 MG: 5 INJECTION INTRAMUSCULAR; INTRAVENOUS at 17:00

## 2020-10-14 RX ADMIN — HEPARIN SODIUM 5000 UNITS: 5000 INJECTION INTRAVENOUS; SUBCUTANEOUS at 13:16

## 2020-10-14 RX ADMIN — IPRATROPIUM BROMIDE AND ALBUTEROL SULFATE 3 ML: 2.5; .5 SOLUTION RESPIRATORY (INHALATION) at 07:20

## 2020-10-14 RX ADMIN — OXYCODONE HYDROCHLORIDE 10 MG: 10 TABLET ORAL at 20:54

## 2020-10-14 RX ADMIN — CALCIUM CARBONATE (ANTACID) CHEW TAB 500 MG 500 MG: 500 CHEW TAB at 22:25

## 2020-10-14 RX ADMIN — ONDANSETRON 4 MG: 2 INJECTION INTRAMUSCULAR; INTRAVENOUS at 08:45

## 2020-10-14 RX ADMIN — METOCLOPRAMIDE HYDROCHLORIDE 5 MG: 5 INJECTION INTRAMUSCULAR; INTRAVENOUS at 13:17

## 2020-10-14 RX ADMIN — OXYBUTYNIN 10 MG: 10 TABLET, FILM COATED, EXTENDED RELEASE ORAL at 08:45

## 2020-10-14 RX ADMIN — OXYCODONE HYDROCHLORIDE 10 MG: 10 TABLET ORAL at 16:54

## 2020-10-14 RX ADMIN — IPRATROPIUM BROMIDE AND ALBUTEROL SULFATE 3 ML: 2.5; .5 SOLUTION RESPIRATORY (INHALATION) at 19:08

## 2020-10-14 RX ADMIN — ONDANSETRON 4 MG: 2 INJECTION INTRAMUSCULAR; INTRAVENOUS at 02:24

## 2020-10-14 RX ADMIN — PANTOPRAZOLE SODIUM 40 MG: 40 TABLET, DELAYED RELEASE ORAL at 15:02

## 2020-10-14 RX ADMIN — ONDANSETRON 4 MG: 2 INJECTION INTRAMUSCULAR; INTRAVENOUS at 20:55

## 2020-10-14 RX ADMIN — ONDANSETRON 4 MG: 2 INJECTION INTRAMUSCULAR; INTRAVENOUS at 13:17

## 2020-10-14 RX ADMIN — HEPARIN SODIUM 5000 UNITS: 5000 INJECTION INTRAVENOUS; SUBCUTANEOUS at 06:02

## 2020-10-14 RX ADMIN — HEPARIN SODIUM 5000 UNITS: 5000 INJECTION INTRAVENOUS; SUBCUTANEOUS at 22:25

## 2020-10-14 RX ADMIN — METOCLOPRAMIDE HYDROCHLORIDE 5 MG: 5 INJECTION INTRAMUSCULAR; INTRAVENOUS at 06:10

## 2020-10-14 RX ADMIN — CITALOPRAM HYDROBROMIDE 10 MG: 20 TABLET ORAL at 08:45

## 2020-10-14 RX ADMIN — OXYCODONE HYDROCHLORIDE 5 MG: 5 TABLET ORAL at 02:27

## 2020-10-14 RX ADMIN — PANTOPRAZOLE SODIUM 40 MG: 40 TABLET, DELAYED RELEASE ORAL at 06:02

## 2020-10-15 ENCOUNTER — APPOINTMENT (INPATIENT)
Dept: RADIOLOGY | Facility: HOSPITAL | Age: 70
DRG: 854 | End: 2020-10-15
Payer: MEDICARE

## 2020-10-15 LAB
ANION GAP SERPL CALCULATED.3IONS-SCNC: 6 MMOL/L (ref 4–13)
BASOPHILS # BLD AUTO: 0.09 THOUSANDS/ΜL (ref 0–0.1)
BASOPHILS NFR BLD AUTO: 1 % (ref 0–1)
BUN SERPL-MCNC: 14 MG/DL (ref 5–25)
CALCIUM SERPL-MCNC: 9.2 MG/DL (ref 8.3–10.1)
CHLORIDE SERPL-SCNC: 97 MMOL/L (ref 100–108)
CO2 SERPL-SCNC: 34 MMOL/L (ref 21–32)
CREAT SERPL-MCNC: 0.42 MG/DL (ref 0.6–1.3)
EOSINOPHIL # BLD AUTO: 0.37 THOUSAND/ΜL (ref 0–0.61)
EOSINOPHIL NFR BLD AUTO: 2 % (ref 0–6)
ERYTHROCYTE [DISTWIDTH] IN BLOOD BY AUTOMATED COUNT: 16.5 % (ref 11.6–15.1)
GFR SERPL CREATININE-BSD FRML MDRD: 104 ML/MIN/1.73SQ M
GLUCOSE SERPL-MCNC: 154 MG/DL (ref 65–140)
HCT VFR BLD AUTO: 27.1 % (ref 34.8–46.1)
HGB BLD-MCNC: 8.4 G/DL (ref 11.5–15.4)
IMM GRANULOCYTES # BLD AUTO: 0.15 THOUSAND/UL (ref 0–0.2)
IMM GRANULOCYTES NFR BLD AUTO: 1 % (ref 0–2)
LYMPHOCYTES # BLD AUTO: 1.81 THOUSANDS/ΜL (ref 0.6–4.47)
LYMPHOCYTES NFR BLD AUTO: 11 % (ref 14–44)
MCH RBC QN AUTO: 28.5 PG (ref 26.8–34.3)
MCHC RBC AUTO-ENTMCNC: 31 G/DL (ref 31.4–37.4)
MCV RBC AUTO: 92 FL (ref 82–98)
MONOCYTES # BLD AUTO: 1.31 THOUSAND/ΜL (ref 0.17–1.22)
MONOCYTES NFR BLD AUTO: 8 % (ref 4–12)
NEUTROPHILS # BLD AUTO: 12.58 THOUSANDS/ΜL (ref 1.85–7.62)
NEUTS SEG NFR BLD AUTO: 77 % (ref 43–75)
NRBC BLD AUTO-RTO: 0 /100 WBCS
PLATELET # BLD AUTO: 427 THOUSANDS/UL (ref 149–390)
PMV BLD AUTO: 11 FL (ref 8.9–12.7)
POTASSIUM SERPL-SCNC: 3.8 MMOL/L (ref 3.5–5.3)
RBC # BLD AUTO: 2.95 MILLION/UL (ref 3.81–5.12)
SARS-COV-2 RNA RESP QL NAA+PROBE: NEGATIVE
SODIUM SERPL-SCNC: 137 MMOL/L (ref 136–145)
WBC # BLD AUTO: 16.31 THOUSAND/UL (ref 4.31–10.16)

## 2020-10-15 PROCEDURE — 85025 COMPLETE CBC W/AUTO DIFF WBC: CPT | Performed by: SURGERY

## 2020-10-15 PROCEDURE — 94760 N-INVAS EAR/PLS OXIMETRY 1: CPT

## 2020-10-15 PROCEDURE — 99232 SBSQ HOSP IP/OBS MODERATE 35: CPT | Performed by: INTERNAL MEDICINE

## 2020-10-15 PROCEDURE — 99024 POSTOP FOLLOW-UP VISIT: CPT | Performed by: SURGERY

## 2020-10-15 PROCEDURE — 80048 BASIC METABOLIC PNL TOTAL CA: CPT | Performed by: SURGERY

## 2020-10-15 PROCEDURE — 74430 CONTRAST X-RAY BLADDER: CPT

## 2020-10-15 PROCEDURE — 87635 SARS-COV-2 COVID-19 AMP PRB: CPT | Performed by: INTERNAL MEDICINE

## 2020-10-15 PROCEDURE — 94640 AIRWAY INHALATION TREATMENT: CPT

## 2020-10-15 RX ORDER — POTASSIUM CHLORIDE 14.9 MG/ML
20 INJECTION INTRAVENOUS ONCE
Status: COMPLETED | OUTPATIENT
Start: 2020-10-15 | End: 2020-10-15

## 2020-10-15 RX ORDER — PANTOPRAZOLE SODIUM 40 MG/1
40 TABLET, DELAYED RELEASE ORAL
Status: DISCONTINUED | OUTPATIENT
Start: 2020-10-16 | End: 2020-10-18

## 2020-10-15 RX ORDER — POTASSIUM CHLORIDE 20 MEQ/1
20 TABLET, EXTENDED RELEASE ORAL ONCE
Status: DISCONTINUED | OUTPATIENT
Start: 2020-10-15 | End: 2020-10-15

## 2020-10-15 RX ADMIN — ONDANSETRON 4 MG: 2 INJECTION INTRAMUSCULAR; INTRAVENOUS at 02:39

## 2020-10-15 RX ADMIN — OXYCODONE HYDROCHLORIDE 10 MG: 10 TABLET ORAL at 22:41

## 2020-10-15 RX ADMIN — PANTOPRAZOLE SODIUM 40 MG: 40 TABLET, DELAYED RELEASE ORAL at 15:15

## 2020-10-15 RX ADMIN — IPRATROPIUM BROMIDE AND ALBUTEROL SULFATE 3 ML: 2.5; .5 SOLUTION RESPIRATORY (INHALATION) at 19:34

## 2020-10-15 RX ADMIN — IOHEXOL 175 ML: 350 INJECTION, SOLUTION INTRAVENOUS at 11:25

## 2020-10-15 RX ADMIN — OXYCODONE HYDROCHLORIDE 10 MG: 10 TABLET ORAL at 01:12

## 2020-10-15 RX ADMIN — ONDANSETRON 4 MG: 2 INJECTION INTRAMUSCULAR; INTRAVENOUS at 13:33

## 2020-10-15 RX ADMIN — METOCLOPRAMIDE HYDROCHLORIDE 5 MG: 5 INJECTION INTRAMUSCULAR; INTRAVENOUS at 12:36

## 2020-10-15 RX ADMIN — HEPARIN SODIUM 5000 UNITS: 5000 INJECTION INTRAVENOUS; SUBCUTANEOUS at 22:35

## 2020-10-15 RX ADMIN — HEPARIN SODIUM 5000 UNITS: 5000 INJECTION INTRAVENOUS; SUBCUTANEOUS at 05:18

## 2020-10-15 RX ADMIN — METOCLOPRAMIDE HYDROCHLORIDE 5 MG: 5 INJECTION INTRAMUSCULAR; INTRAVENOUS at 01:12

## 2020-10-15 RX ADMIN — CALCIUM CARBONATE (ANTACID) CHEW TAB 500 MG 500 MG: 500 CHEW TAB at 22:48

## 2020-10-15 RX ADMIN — IPRATROPIUM BROMIDE AND ALBUTEROL SULFATE 3 ML: 2.5; .5 SOLUTION RESPIRATORY (INHALATION) at 12:45

## 2020-10-15 RX ADMIN — METOCLOPRAMIDE HYDROCHLORIDE 5 MG: 5 INJECTION INTRAMUSCULAR; INTRAVENOUS at 05:18

## 2020-10-15 RX ADMIN — IPRATROPIUM BROMIDE AND ALBUTEROL SULFATE 3 ML: 2.5; .5 SOLUTION RESPIRATORY (INHALATION) at 07:39

## 2020-10-15 RX ADMIN — OXYBUTYNIN 10 MG: 10 TABLET, FILM COATED, EXTENDED RELEASE ORAL at 09:18

## 2020-10-15 RX ADMIN — POTASSIUM CHLORIDE 20 MEQ: 14.9 INJECTION, SOLUTION INTRAVENOUS at 09:54

## 2020-10-15 RX ADMIN — HEPARIN SODIUM 5000 UNITS: 5000 INJECTION INTRAVENOUS; SUBCUTANEOUS at 13:33

## 2020-10-15 RX ADMIN — CITALOPRAM HYDROBROMIDE 10 MG: 20 TABLET ORAL at 09:18

## 2020-10-15 RX ADMIN — OXYCODONE HYDROCHLORIDE 10 MG: 10 TABLET ORAL at 18:04

## 2020-10-15 RX ADMIN — ONDANSETRON 4 MG: 2 INJECTION INTRAMUSCULAR; INTRAVENOUS at 09:18

## 2020-10-15 RX ADMIN — CALCIUM CARBONATE (ANTACID) CHEW TAB 500 MG 500 MG: 500 CHEW TAB at 01:12

## 2020-10-15 RX ADMIN — PANTOPRAZOLE SODIUM 40 MG: 40 TABLET, DELAYED RELEASE ORAL at 05:18

## 2020-10-16 PROCEDURE — NC001 PR NO CHARGE: Performed by: SURGERY

## 2020-10-16 PROCEDURE — 99232 SBSQ HOSP IP/OBS MODERATE 35: CPT | Performed by: INTERNAL MEDICINE

## 2020-10-16 PROCEDURE — 94640 AIRWAY INHALATION TREATMENT: CPT

## 2020-10-16 PROCEDURE — 97535 SELF CARE MNGMENT TRAINING: CPT

## 2020-10-16 PROCEDURE — 94760 N-INVAS EAR/PLS OXIMETRY 1: CPT

## 2020-10-16 PROCEDURE — 97530 THERAPEUTIC ACTIVITIES: CPT

## 2020-10-16 PROCEDURE — 97110 THERAPEUTIC EXERCISES: CPT

## 2020-10-16 RX ORDER — ONDANSETRON 2 MG/ML
4 INJECTION INTRAMUSCULAR; INTRAVENOUS ONCE
Status: COMPLETED | OUTPATIENT
Start: 2020-10-16 | End: 2020-10-16

## 2020-10-16 RX ORDER — IPRATROPIUM BROMIDE AND ALBUTEROL SULFATE 2.5; .5 MG/3ML; MG/3ML
3 SOLUTION RESPIRATORY (INHALATION)
Status: DISCONTINUED | OUTPATIENT
Start: 2020-10-16 | End: 2020-10-22

## 2020-10-16 RX ADMIN — OXYBUTYNIN 10 MG: 10 TABLET, FILM COATED, EXTENDED RELEASE ORAL at 08:00

## 2020-10-16 RX ADMIN — HEPARIN SODIUM 5000 UNITS: 5000 INJECTION INTRAVENOUS; SUBCUTANEOUS at 21:50

## 2020-10-16 RX ADMIN — IPRATROPIUM BROMIDE AND ALBUTEROL SULFATE 3 ML: 2.5; .5 SOLUTION RESPIRATORY (INHALATION) at 20:12

## 2020-10-16 RX ADMIN — HEPARIN SODIUM 5000 UNITS: 5000 INJECTION INTRAVENOUS; SUBCUTANEOUS at 06:13

## 2020-10-16 RX ADMIN — OXYCODONE HYDROCHLORIDE 10 MG: 10 TABLET ORAL at 13:21

## 2020-10-16 RX ADMIN — CITALOPRAM HYDROBROMIDE 10 MG: 20 TABLET ORAL at 08:00

## 2020-10-16 RX ADMIN — IPRATROPIUM BROMIDE AND ALBUTEROL SULFATE 3 ML: 2.5; .5 SOLUTION RESPIRATORY (INHALATION) at 07:18

## 2020-10-16 RX ADMIN — HEPARIN SODIUM 5000 UNITS: 5000 INJECTION INTRAVENOUS; SUBCUTANEOUS at 13:22

## 2020-10-16 RX ADMIN — ONDANSETRON 4 MG: 2 INJECTION INTRAMUSCULAR; INTRAVENOUS at 14:42

## 2020-10-16 RX ADMIN — OXYCODONE HYDROCHLORIDE 10 MG: 10 TABLET ORAL at 06:13

## 2020-10-16 RX ADMIN — OXYCODONE HYDROCHLORIDE 5 MG: 5 TABLET ORAL at 21:51

## 2020-10-16 RX ADMIN — PANTOPRAZOLE SODIUM 40 MG: 40 TABLET, DELAYED RELEASE ORAL at 06:13

## 2020-10-17 PROCEDURE — 99232 SBSQ HOSP IP/OBS MODERATE 35: CPT | Performed by: INTERNAL MEDICINE

## 2020-10-17 PROCEDURE — 94640 AIRWAY INHALATION TREATMENT: CPT

## 2020-10-17 PROCEDURE — NC001 PR NO CHARGE: Performed by: SURGERY

## 2020-10-17 PROCEDURE — 94760 N-INVAS EAR/PLS OXIMETRY 1: CPT

## 2020-10-17 RX ORDER — ONDANSETRON 4 MG/1
4 TABLET, ORALLY DISINTEGRATING ORAL EVERY 6 HOURS PRN
Status: DISCONTINUED | OUTPATIENT
Start: 2020-10-17 | End: 2020-10-23 | Stop reason: HOSPADM

## 2020-10-17 RX ADMIN — IPRATROPIUM BROMIDE AND ALBUTEROL SULFATE 3 ML: 2.5; .5 SOLUTION RESPIRATORY (INHALATION) at 13:42

## 2020-10-17 RX ADMIN — OXYBUTYNIN 10 MG: 10 TABLET, FILM COATED, EXTENDED RELEASE ORAL at 09:02

## 2020-10-17 RX ADMIN — HEPARIN SODIUM 5000 UNITS: 5000 INJECTION INTRAVENOUS; SUBCUTANEOUS at 21:17

## 2020-10-17 RX ADMIN — PANTOPRAZOLE SODIUM 40 MG: 40 TABLET, DELAYED RELEASE ORAL at 06:01

## 2020-10-17 RX ADMIN — OXYCODONE HYDROCHLORIDE 5 MG: 5 TABLET ORAL at 21:19

## 2020-10-17 RX ADMIN — HEPARIN SODIUM 5000 UNITS: 5000 INJECTION INTRAVENOUS; SUBCUTANEOUS at 05:36

## 2020-10-17 RX ADMIN — ONDANSETRON 4 MG: 4 TABLET, ORALLY DISINTEGRATING ORAL at 10:58

## 2020-10-17 RX ADMIN — ONDANSETRON 4 MG: 4 TABLET, ORALLY DISINTEGRATING ORAL at 17:46

## 2020-10-17 RX ADMIN — HEPARIN SODIUM 5000 UNITS: 5000 INJECTION INTRAVENOUS; SUBCUTANEOUS at 14:32

## 2020-10-17 RX ADMIN — IPRATROPIUM BROMIDE AND ALBUTEROL SULFATE 3 ML: 2.5; .5 SOLUTION RESPIRATORY (INHALATION) at 07:54

## 2020-10-17 RX ADMIN — CITALOPRAM HYDROBROMIDE 10 MG: 20 TABLET ORAL at 09:02

## 2020-10-17 RX ADMIN — IPRATROPIUM BROMIDE AND ALBUTEROL SULFATE 3 ML: 2.5; .5 SOLUTION RESPIRATORY (INHALATION) at 19:26

## 2020-10-18 PROCEDURE — 94640 AIRWAY INHALATION TREATMENT: CPT

## 2020-10-18 PROCEDURE — 99232 SBSQ HOSP IP/OBS MODERATE 35: CPT | Performed by: INTERNAL MEDICINE

## 2020-10-18 PROCEDURE — 99024 POSTOP FOLLOW-UP VISIT: CPT | Performed by: SURGERY

## 2020-10-18 PROCEDURE — 94760 N-INVAS EAR/PLS OXIMETRY 1: CPT

## 2020-10-18 RX ORDER — PANTOPRAZOLE SODIUM 40 MG/1
40 TABLET, DELAYED RELEASE ORAL
Status: DISCONTINUED | OUTPATIENT
Start: 2020-10-19 | End: 2020-10-23 | Stop reason: HOSPADM

## 2020-10-18 RX ADMIN — IPRATROPIUM BROMIDE AND ALBUTEROL SULFATE 3 ML: 2.5; .5 SOLUTION RESPIRATORY (INHALATION) at 20:25

## 2020-10-18 RX ADMIN — HEPARIN SODIUM 5000 UNITS: 5000 INJECTION INTRAVENOUS; SUBCUTANEOUS at 21:37

## 2020-10-18 RX ADMIN — CITALOPRAM HYDROBROMIDE 10 MG: 20 TABLET ORAL at 09:08

## 2020-10-18 RX ADMIN — HEPARIN SODIUM 5000 UNITS: 5000 INJECTION INTRAVENOUS; SUBCUTANEOUS at 05:24

## 2020-10-18 RX ADMIN — IPRATROPIUM BROMIDE AND ALBUTEROL SULFATE 3 ML: 2.5; .5 SOLUTION RESPIRATORY (INHALATION) at 13:39

## 2020-10-18 RX ADMIN — PANTOPRAZOLE SODIUM 40 MG: 40 TABLET, DELAYED RELEASE ORAL at 05:24

## 2020-10-18 RX ADMIN — OXYBUTYNIN 10 MG: 10 TABLET, FILM COATED, EXTENDED RELEASE ORAL at 09:09

## 2020-10-18 RX ADMIN — HEPARIN SODIUM 5000 UNITS: 5000 INJECTION INTRAVENOUS; SUBCUTANEOUS at 15:16

## 2020-10-19 LAB — SCAN RESULT: NORMAL

## 2020-10-19 PROCEDURE — 99024 POSTOP FOLLOW-UP VISIT: CPT | Performed by: SURGERY

## 2020-10-19 PROCEDURE — 94640 AIRWAY INHALATION TREATMENT: CPT

## 2020-10-19 PROCEDURE — 99232 SBSQ HOSP IP/OBS MODERATE 35: CPT | Performed by: INTERNAL MEDICINE

## 2020-10-19 PROCEDURE — 94760 N-INVAS EAR/PLS OXIMETRY 1: CPT

## 2020-10-19 RX ADMIN — PANTOPRAZOLE SODIUM 40 MG: 40 TABLET, DELAYED RELEASE ORAL at 05:32

## 2020-10-19 RX ADMIN — HEPARIN SODIUM 5000 UNITS: 5000 INJECTION INTRAVENOUS; SUBCUTANEOUS at 05:33

## 2020-10-19 RX ADMIN — IPRATROPIUM BROMIDE AND ALBUTEROL SULFATE 3 ML: 2.5; .5 SOLUTION RESPIRATORY (INHALATION) at 18:23

## 2020-10-19 RX ADMIN — NYSTATIN 500000 UNITS: 100000 SUSPENSION ORAL at 22:30

## 2020-10-19 RX ADMIN — IPRATROPIUM BROMIDE AND ALBUTEROL SULFATE 3 ML: 2.5; .5 SOLUTION RESPIRATORY (INHALATION) at 12:52

## 2020-10-19 RX ADMIN — IPRATROPIUM BROMIDE AND ALBUTEROL SULFATE 3 ML: 2.5; .5 SOLUTION RESPIRATORY (INHALATION) at 08:26

## 2020-10-19 RX ADMIN — HEPARIN SODIUM 5000 UNITS: 5000 INJECTION INTRAVENOUS; SUBCUTANEOUS at 22:30

## 2020-10-19 RX ADMIN — OXYBUTYNIN 10 MG: 10 TABLET, FILM COATED, EXTENDED RELEASE ORAL at 08:11

## 2020-10-19 RX ADMIN — HEPARIN SODIUM 5000 UNITS: 5000 INJECTION INTRAVENOUS; SUBCUTANEOUS at 14:32

## 2020-10-19 RX ADMIN — NYSTATIN 500000 UNITS: 100000 SUSPENSION ORAL at 14:32

## 2020-10-19 RX ADMIN — CITALOPRAM HYDROBROMIDE 10 MG: 20 TABLET ORAL at 08:11

## 2020-10-20 LAB
ALBUMIN SERPL BCP-MCNC: 2.7 G/DL (ref 3.5–5)
ALP SERPL-CCNC: 72 U/L (ref 46–116)
ALT SERPL W P-5'-P-CCNC: 75 U/L (ref 12–78)
ANION GAP SERPL CALCULATED.3IONS-SCNC: 7 MMOL/L (ref 4–13)
AST SERPL W P-5'-P-CCNC: 36 U/L (ref 5–45)
BILIRUB SERPL-MCNC: 0.29 MG/DL (ref 0.2–1)
BUN SERPL-MCNC: 11 MG/DL (ref 5–25)
CALCIUM ALBUM COR SERPL-MCNC: 10.1 MG/DL (ref 8.3–10.1)
CALCIUM SERPL-MCNC: 9.1 MG/DL (ref 8.3–10.1)
CHLORIDE SERPL-SCNC: 98 MMOL/L (ref 100–108)
CO2 SERPL-SCNC: 32 MMOL/L (ref 21–32)
CREAT SERPL-MCNC: 0.3 MG/DL (ref 0.6–1.3)
ERYTHROCYTE [DISTWIDTH] IN BLOOD BY AUTOMATED COUNT: 16.1 % (ref 11.6–15.1)
GFR SERPL CREATININE-BSD FRML MDRD: 116 ML/MIN/1.73SQ M
GLUCOSE SERPL-MCNC: 94 MG/DL (ref 65–140)
HCT VFR BLD AUTO: 32.1 % (ref 34.8–46.1)
HGB BLD-MCNC: 10.2 G/DL (ref 11.5–15.4)
MCH RBC QN AUTO: 28.9 PG (ref 26.8–34.3)
MCHC RBC AUTO-ENTMCNC: 31.8 G/DL (ref 31.4–37.4)
MCV RBC AUTO: 91 FL (ref 82–98)
PLATELET # BLD AUTO: 513 THOUSANDS/UL (ref 149–390)
PMV BLD AUTO: 10.7 FL (ref 8.9–12.7)
POTASSIUM SERPL-SCNC: 3.5 MMOL/L (ref 3.5–5.3)
PROT SERPL-MCNC: 7.3 G/DL (ref 6.4–8.2)
RBC # BLD AUTO: 3.53 MILLION/UL (ref 3.81–5.12)
SODIUM SERPL-SCNC: 137 MMOL/L (ref 136–145)
WBC # BLD AUTO: 10.31 THOUSAND/UL (ref 4.31–10.16)

## 2020-10-20 PROCEDURE — 80053 COMPREHEN METABOLIC PANEL: CPT | Performed by: INTERNAL MEDICINE

## 2020-10-20 PROCEDURE — 97530 THERAPEUTIC ACTIVITIES: CPT

## 2020-10-20 PROCEDURE — 94640 AIRWAY INHALATION TREATMENT: CPT

## 2020-10-20 PROCEDURE — 99232 SBSQ HOSP IP/OBS MODERATE 35: CPT | Performed by: INTERNAL MEDICINE

## 2020-10-20 PROCEDURE — 97110 THERAPEUTIC EXERCISES: CPT

## 2020-10-20 PROCEDURE — 99024 POSTOP FOLLOW-UP VISIT: CPT | Performed by: SURGERY

## 2020-10-20 PROCEDURE — 85027 COMPLETE CBC AUTOMATED: CPT | Performed by: INTERNAL MEDICINE

## 2020-10-20 PROCEDURE — 97535 SELF CARE MNGMENT TRAINING: CPT

## 2020-10-20 PROCEDURE — 94760 N-INVAS EAR/PLS OXIMETRY 1: CPT

## 2020-10-20 RX ORDER — TEMAZEPAM 15 MG/1
15 CAPSULE ORAL
Status: DISCONTINUED | OUTPATIENT
Start: 2020-10-20 | End: 2020-10-23 | Stop reason: HOSPADM

## 2020-10-20 RX ORDER — POTASSIUM CHLORIDE 20 MEQ/1
40 TABLET, EXTENDED RELEASE ORAL ONCE
Status: COMPLETED | OUTPATIENT
Start: 2020-10-20 | End: 2020-10-20

## 2020-10-20 RX ADMIN — PANTOPRAZOLE SODIUM 40 MG: 40 TABLET, DELAYED RELEASE ORAL at 05:45

## 2020-10-20 RX ADMIN — OXYBUTYNIN 10 MG: 10 TABLET, FILM COATED, EXTENDED RELEASE ORAL at 08:37

## 2020-10-20 RX ADMIN — HEPARIN SODIUM 5000 UNITS: 5000 INJECTION INTRAVENOUS; SUBCUTANEOUS at 13:32

## 2020-10-20 RX ADMIN — NYSTATIN 500000 UNITS: 100000 SUSPENSION ORAL at 08:38

## 2020-10-20 RX ADMIN — NYSTATIN 500000 UNITS: 100000 SUSPENSION ORAL at 17:38

## 2020-10-20 RX ADMIN — HEPARIN SODIUM 5000 UNITS: 5000 INJECTION INTRAVENOUS; SUBCUTANEOUS at 05:44

## 2020-10-20 RX ADMIN — NYSTATIN 500000 UNITS: 100000 SUSPENSION ORAL at 11:53

## 2020-10-20 RX ADMIN — POTASSIUM CHLORIDE 40 MEQ: 1500 TABLET, EXTENDED RELEASE ORAL at 17:38

## 2020-10-20 RX ADMIN — HEPARIN SODIUM 5000 UNITS: 5000 INJECTION INTRAVENOUS; SUBCUTANEOUS at 22:13

## 2020-10-20 RX ADMIN — CITALOPRAM HYDROBROMIDE 10 MG: 20 TABLET ORAL at 08:38

## 2020-10-20 RX ADMIN — TEMAZEPAM 15 MG: 15 CAPSULE ORAL at 22:13

## 2020-10-20 RX ADMIN — IPRATROPIUM BROMIDE AND ALBUTEROL SULFATE 3 ML: 2.5; .5 SOLUTION RESPIRATORY (INHALATION) at 19:09

## 2020-10-20 RX ADMIN — NYSTATIN 500000 UNITS: 100000 SUSPENSION ORAL at 22:13

## 2020-10-20 RX ADMIN — IPRATROPIUM BROMIDE AND ALBUTEROL SULFATE 3 ML: 2.5; .5 SOLUTION RESPIRATORY (INHALATION) at 07:00

## 2020-10-21 PROBLEM — G47.00 INSOMNIA: Status: ACTIVE | Noted: 2020-10-21

## 2020-10-21 PROCEDURE — 94640 AIRWAY INHALATION TREATMENT: CPT

## 2020-10-21 PROCEDURE — 94760 N-INVAS EAR/PLS OXIMETRY 1: CPT

## 2020-10-21 PROCEDURE — 99232 SBSQ HOSP IP/OBS MODERATE 35: CPT | Performed by: INTERNAL MEDICINE

## 2020-10-21 PROCEDURE — 99024 POSTOP FOLLOW-UP VISIT: CPT | Performed by: SURGERY

## 2020-10-21 RX ADMIN — NYSTATIN 500000 UNITS: 100000 SUSPENSION ORAL at 21:13

## 2020-10-21 RX ADMIN — IPRATROPIUM BROMIDE AND ALBUTEROL SULFATE 3 ML: 2.5; .5 SOLUTION RESPIRATORY (INHALATION) at 07:54

## 2020-10-21 RX ADMIN — CITALOPRAM HYDROBROMIDE 10 MG: 20 TABLET ORAL at 10:11

## 2020-10-21 RX ADMIN — PANTOPRAZOLE SODIUM 40 MG: 40 TABLET, DELAYED RELEASE ORAL at 06:34

## 2020-10-21 RX ADMIN — NYSTATIN 500000 UNITS: 100000 SUSPENSION ORAL at 10:11

## 2020-10-21 RX ADMIN — OXYBUTYNIN 10 MG: 10 TABLET, FILM COATED, EXTENDED RELEASE ORAL at 10:10

## 2020-10-21 RX ADMIN — HEPARIN SODIUM 5000 UNITS: 5000 INJECTION INTRAVENOUS; SUBCUTANEOUS at 21:13

## 2020-10-21 RX ADMIN — IPRATROPIUM BROMIDE AND ALBUTEROL SULFATE 3 ML: 2.5; .5 SOLUTION RESPIRATORY (INHALATION) at 13:06

## 2020-10-21 RX ADMIN — HEPARIN SODIUM 5000 UNITS: 5000 INJECTION INTRAVENOUS; SUBCUTANEOUS at 06:33

## 2020-10-21 RX ADMIN — NYSTATIN 500000 UNITS: 100000 SUSPENSION ORAL at 12:35

## 2020-10-21 RX ADMIN — IPRATROPIUM BROMIDE AND ALBUTEROL SULFATE 3 ML: 2.5; .5 SOLUTION RESPIRATORY (INHALATION) at 19:37

## 2020-10-21 RX ADMIN — TEMAZEPAM 15 MG: 15 CAPSULE ORAL at 21:13

## 2020-10-21 RX ADMIN — HEPARIN SODIUM 5000 UNITS: 5000 INJECTION INTRAVENOUS; SUBCUTANEOUS at 15:14

## 2020-10-21 RX ADMIN — NYSTATIN 500000 UNITS: 100000 SUSPENSION ORAL at 17:15

## 2020-10-22 PROCEDURE — 94760 N-INVAS EAR/PLS OXIMETRY 1: CPT

## 2020-10-22 PROCEDURE — 99024 POSTOP FOLLOW-UP VISIT: CPT | Performed by: SURGERY

## 2020-10-22 PROCEDURE — 97110 THERAPEUTIC EXERCISES: CPT

## 2020-10-22 PROCEDURE — 94640 AIRWAY INHALATION TREATMENT: CPT

## 2020-10-22 RX ORDER — IPRATROPIUM BROMIDE AND ALBUTEROL SULFATE 2.5; .5 MG/3ML; MG/3ML
3 SOLUTION RESPIRATORY (INHALATION) EVERY 6 HOURS PRN
Status: DISCONTINUED | OUTPATIENT
Start: 2020-10-22 | End: 2020-10-23 | Stop reason: HOSPADM

## 2020-10-22 RX ADMIN — CALCIUM CARBONATE (ANTACID) CHEW TAB 500 MG 500 MG: 500 CHEW TAB at 17:56

## 2020-10-22 RX ADMIN — NYSTATIN 500000 UNITS: 100000 SUSPENSION ORAL at 09:52

## 2020-10-22 RX ADMIN — IPRATROPIUM BROMIDE AND ALBUTEROL SULFATE 3 ML: 2.5; .5 SOLUTION RESPIRATORY (INHALATION) at 07:56

## 2020-10-22 RX ADMIN — HEPARIN SODIUM 5000 UNITS: 5000 INJECTION INTRAVENOUS; SUBCUTANEOUS at 21:07

## 2020-10-22 RX ADMIN — OXYBUTYNIN 10 MG: 10 TABLET, FILM COATED, EXTENDED RELEASE ORAL at 09:07

## 2020-10-22 RX ADMIN — NYSTATIN 500000 UNITS: 100000 SUSPENSION ORAL at 17:48

## 2020-10-22 RX ADMIN — HEPARIN SODIUM 5000 UNITS: 5000 INJECTION INTRAVENOUS; SUBCUTANEOUS at 05:14

## 2020-10-22 RX ADMIN — NYSTATIN 500000 UNITS: 100000 SUSPENSION ORAL at 21:07

## 2020-10-22 RX ADMIN — HEPARIN SODIUM 5000 UNITS: 5000 INJECTION INTRAVENOUS; SUBCUTANEOUS at 13:05

## 2020-10-22 RX ADMIN — TEMAZEPAM 15 MG: 15 CAPSULE ORAL at 21:07

## 2020-10-22 RX ADMIN — NYSTATIN 500000 UNITS: 100000 SUSPENSION ORAL at 13:08

## 2020-10-22 RX ADMIN — PANTOPRAZOLE SODIUM 40 MG: 40 TABLET, DELAYED RELEASE ORAL at 05:14

## 2020-10-23 VITALS
TEMPERATURE: 98.3 F | OXYGEN SATURATION: 98 % | RESPIRATION RATE: 18 BRPM | BODY MASS INDEX: 33.24 KG/M2 | WEIGHT: 169.31 LBS | HEART RATE: 95 BPM | DIASTOLIC BLOOD PRESSURE: 69 MMHG | HEIGHT: 60 IN | SYSTOLIC BLOOD PRESSURE: 134 MMHG

## 2020-10-23 PROBLEM — B37.0 ORAL CANDIDIASIS: Status: ACTIVE | Noted: 2020-10-23

## 2020-10-23 LAB — SARS-COV-2 RNA RESP QL NAA+PROBE: NEGATIVE

## 2020-10-23 PROCEDURE — NC001 PR NO CHARGE: Performed by: SURGERY

## 2020-10-23 PROCEDURE — 87635 SARS-COV-2 COVID-19 AMP PRB: CPT | Performed by: SURGERY

## 2020-10-23 PROCEDURE — 99024 POSTOP FOLLOW-UP VISIT: CPT | Performed by: SURGERY

## 2020-10-23 PROCEDURE — 99232 SBSQ HOSP IP/OBS MODERATE 35: CPT | Performed by: INTERNAL MEDICINE

## 2020-10-23 RX ADMIN — PANTOPRAZOLE SODIUM 40 MG: 40 TABLET, DELAYED RELEASE ORAL at 05:19

## 2020-10-23 RX ADMIN — OXYBUTYNIN 10 MG: 10 TABLET, FILM COATED, EXTENDED RELEASE ORAL at 08:35

## 2020-10-23 RX ADMIN — NYSTATIN 500000 UNITS: 100000 SUSPENSION ORAL at 08:35

## 2020-10-23 RX ADMIN — NYSTATIN 500000 UNITS: 100000 SUSPENSION ORAL at 16:02

## 2020-10-23 RX ADMIN — HEPARIN SODIUM 5000 UNITS: 5000 INJECTION INTRAVENOUS; SUBCUTANEOUS at 05:19

## 2020-10-23 RX ADMIN — NYSTATIN 500000 UNITS: 100000 SUSPENSION ORAL at 12:10

## 2020-10-23 RX ADMIN — HEPARIN SODIUM 5000 UNITS: 5000 INJECTION INTRAVENOUS; SUBCUTANEOUS at 14:09

## 2020-10-26 ENCOUNTER — TRANSITIONAL CARE MANAGEMENT (OUTPATIENT)
Dept: FAMILY MEDICINE CLINIC | Facility: CLINIC | Age: 70
End: 2020-10-26

## 2020-10-26 NOTE — TELEPHONE ENCOUNTER
Looks like cystogram was done on 10/15/20 while patient was in house  What follow up would patient need?

## 2020-10-26 NOTE — TELEPHONE ENCOUNTER
Cystogram and gomez removal completed while inpatient  Can followup in 8 weeks for symptom check, no testing needed

## 2020-10-27 ENCOUNTER — TELEPHONE (OUTPATIENT)
Dept: GASTROENTEROLOGY | Facility: CLINIC | Age: 70
End: 2020-10-27

## 2020-10-29 NOTE — TELEPHONE ENCOUNTER
This is a patient that was in the hospital and saw Dr Francy Clark from [facility] called and asked if patient had bilateral stents and it shows she needs an appointment  Please call Gama Clark at [phone]

## 2020-10-29 NOTE — TELEPHONE ENCOUNTER
Her ureteral catheters were removed at the time of surgery  Can be AP appointment if there is something available

## 2020-10-30 NOTE — TELEPHONE ENCOUNTER
Called and spoke with Pattie Republic at [facility]  Patient scheduled 01/08/21 at 1:30pm with Mckenzie Pemberton in the [facility] office

## 2020-12-29 ENCOUNTER — TELEPHONE (OUTPATIENT)
Dept: UROLOGY | Facility: CLINIC | Age: 70
End: 2020-12-29

## 2021-01-12 ENCOUNTER — TELEMEDICINE (OUTPATIENT)
Dept: UROLOGY | Age: 71
End: 2021-01-12
Payer: MEDICARE

## 2021-01-12 DIAGNOSIS — S37.20XS INJURY OF BLADDER, SEQUELA: Primary | ICD-10-CM

## 2021-01-12 PROCEDURE — 99213 OFFICE O/P EST LOW 20 MIN: CPT | Performed by: UROLOGY

## 2021-01-12 NOTE — PATIENT INSTRUCTIONS
Overactive Bladder   AMBULATORY CARE:   Overactive bladder  is a sudden urge to urinate that is difficult for you to control  It occurs when the muscles of the bladder contract (tighten) more than normal  This causes a frequent or sudden need to urinate  You usually have to urinate more than 8 times in 24 hours  You may need to get up more than once in the middle of the night to urinate  You may also leak urine before you are able to make it to the bathroom  Call your doctor if:   · Your urine is pink, or you notice blood in your urine  · You have pain with urination  · You continue to have symptoms even after you take your medicine  · You have questions or concerns about your condition or care  Self-care:   · Train your bladder  Go to the bathroom at set times, such as every 2 hours, even if you do not feel the urge to go  You can also try to hold your urine when you feel the urge to go  For example, hold your urine for 5 minutes when you feel the urge to go  As that becomes easier, hold your urine for 10 minutes  Work up to every 3 or 4 hours to help control your bladder  · Limit liquids as directed  This may help decrease the amount you urinate  Ask how much liquid to drink each day and which liquids are best for you  Avoid liquids several hours before you go to sleep  Limit caffeine and alcohol  · Exercise regularly and maintain a healthy weight  Ask your healthcare provider how much you should weigh and about the best exercise plan for you  Extra weight puts pressure on your bladder and may make your symptoms worse  Ask him or her to help you create a weight loss plan if you are overweight  · Do pelvic muscle exercises often  Your pelvic muscles help you stop urinating  Squeeze these muscles tightly for 5 seconds, then relax for 5 seconds  Gradually work up to squeezing for 10 seconds  Do 3 sets of 15 repetitions a day, or as directed   This will help strengthen your pelvic muscles and improve bladder control  Medicines:   · Medicines  may be given to relax your bladder and decrease urination  · Take your medicine as directed  Contact your healthcare provider if you think your medicine is not helping or if you have side effects  Tell him or her if you are allergic to any medicine  Keep a list of the medicines, vitamins, and herbs you take  Include the amounts, and when and why you take them  Bring the list or the pill bottles to follow-up visits  Carry your medicine list with you in case of an emergency  Follow up with your doctor as directed: If you keep a urination log, take it with you to your follow-up visits  Write down your questions so you remember to ask them during your visits  © Copyright [address] Information is for End User's use only and may not be sold, redistributed or otherwise used for commercial purposes  All illustrations and images included in CareNotes® are the copyrighted property of A D A M , Inc  or [facility] Hernando Velasquez   The above information is an  only  It is not intended as medical advice for individual conditions or treatments  Talk to your doctor, nurse or pharmacist before following any medical regimen to see if it is safe and effective for you

## 2021-01-12 NOTE — ASSESSMENT & PLAN NOTE
Her bladder injury is healed and she is back to baseline  She is satisfied with her voiding pattern  She will follow up with Urology on a p r n  Basis

## 2021-01-12 NOTE — PROGRESS NOTES
Virtual Regular Visit    It was my intent to perform this visit via video technology but the patient was not able to do a video connection so the visit was completed via audio telephone only  Assessment/Plan:    She is doing well from a urologic standpoint  She appears to have resolved the acute effects of her surgery and bladder injury  Ureteral catheters were placed at the time of surgery but were both removed by October 2nd  She remains on VESIcare and is satisfied with her voiding pattern  She will return to urology on a p r n  Basis  Reason for visit is intraoperative bladder injury      Encounter provider Jeffy Grullon MD    Provider located at [address] [place] [address] [address] [phone]      Recent Visits  No visits were found meeting these conditions  Showing recent visits within past 7 days and meeting all other requirements     Today's Visits  Date Type Provider Dept   01/12/21 Telemedicine Jeffy Grullon MD [facility]   Showing today's visits and meeting all other requirements     Future Appointments  No visits were found meeting these conditions  Showing future appointments within next 150 days and meeting all other requirements        The patient was identified by name and date of birth  Lucía Reyna was informed that this is a telemedicine visit and that the visit is being conducted through [employer] telephone  and patient was informed that this is a secure, HIPAA-compliant platform  She agrees to proceed  My office door was closed  No one else was in the room  She acknowledged consent and understanding of privacy and security of the video platform  The patient has agreed to participate and understands they can discontinue the visit at any time  Patient is aware this is a billable service       Subjective  Lucía Reyna is a 79 y o  female with bladder injury at time of diverticulitis surgery on September 30, 2020  Bladder injury was noted and repaired intraoperatively  Follow-up cystogram on October 15 was unremarkable  Chief complaint:  Bladder injury    HPI:  20-year-old female who had a bladder injury noted intraoperatively during colon surgery on September 30th 2020  The injury was noted intraoperatively and Urology was consulted  The bladder was repaired  Ureteral catheters were placed and removed postoperatively  A cystogram done on October 15th was negative  Her Henry was removed and initially she did have some bladder pain with urination  The patient reports that at the present time her voiding is back to baseline  She is on VESIcare for frequency and urgency  She is satisfied with her voiding pattern  There is no bladder pain, gross hematuria or symptoms of infection at this time  Past Medical History:   Diagnosis Date    Anxiety     Breast cancer ([place] [address] )     left    Breast cancer ([place] [address] ) 9/29/2020    Colitis     [facility]) [facility]     Depression     Difficult intubation     Excessive daytime sleepiness     GERD (gastroesophageal reflux disease)     Hyperlipidemia     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    Osteoporosis     Overactive bladder     Pneumonitis     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[place] [address] )     Skin cancer     Skin disorder     Vitamin D deficiency        Past Surgical History:   Procedure Laterality Date    COLONOSCOPY N/A 1/22/2019    Procedure: COLONOSCOPY;  Surgeon: Coral Cazares MD;  Location:  GI LAB;   Service: Colorectal    CYSTOSCOPY N/A 9/30/2020    Procedure: CYSTOSCOPY; BILATERAL URETERAL CATHETER PLACEMENT, CYSTOTOMY;  Surgeon: Shima Escalante MD;  Location: [facility] OR;  Service: Urology    FL CYSTOGRAM  10/15/2020    HARTMANS PROCEDURE N/A 9/30/2020    Procedure: EXPLORATORY LAPAROTOMY; LYSIS OF ADHESIONS; WASHOUT PELVIC ABSCESS; COMPLEX SIGMOID COLON RESECTION; LOOP TRANSVERSE COLOSTOMY;  Surgeon: Oumou Gonzales MD;  Location[facility];  Service: General    IR DRAINAGE TUBE PLACEMENT  9/24/2020    MASTECTOMY Left 2007    left breast mastectomy     TONSILLECTOMY      TOOTH EXTRACTION      TUBAL LIGATION         Current Outpatient Medications   Medication Sig Dispense Refill    albuterol (PROVENTIL HFA,VENTOLIN HFA) 90 mcg/act inhaler Inhale 2 puffs every 6 (six) hours as needed for wheezing 1 Inhaler 2    alclomethasone (ACLOVATE) 0 05 % cream APPLY A THIN LAYER TWICE A DAY TO AFFECTED AREA(S)      Ascorbic Acid (VITAMIN C) 1000 MG tablet Take 1,000 mg by mouth daily      citalopram (CeleXA) 10 mg tablet Take 1 tablet (10 mg total) by mouth daily 90 tablet 1    clotrimazole-betamethasone (LOTRISONE) 1-0 05 % lotion   0    ELDERBERRY PO Take by mouth      FLAXSEED, LINSEED, PO Take 10 mg by mouth      fluticasone (FLONASE) 50 mcg/act nasal spray 2 sprays into each nostril daily 16 g 3    Indole-3-Carbinol POWD by Does not apply route      ketoconazole (NIZORAL) 2 % cream APPLY TWICE A DAY TO THE AFFECTED AREA(S)      ketoconazole (NIZORAL) 2 % shampoo Apply 1 application topically 2 (two) times a week 120 mL 1    magnesium gluconate (MAGONATE) 500 mg tablet Take 500 mg by mouth 2 (two) times a day      Melatonin 3 MG CAPS Take 3 mg by mouth      omeprazole (PriLOSEC) 20 mg delayed release capsule Take 1 capsule (20 mg total) by mouth every morning for 113 days 90 capsule 3    STARLA D ARCO PO Take by mouth      Potassium (POTASSIMIN PO) Take 550 mg by mouth      Probiotic Product (Florastor Plus) CAPS       S-Adenosylmethionine (SAMe) 400 MG TABS       solifenacin (VESICARE) 10 MG tablet Take 1 tablet (10 mg total) by mouth daily 90 tablet 3    traZODone (DESYREL) 50 mg tablet Take 1 tablet (50 mg total) by mouth daily at bedtime 90 tablet 1    triamcinolone (KENALOG) 0 1 % cream APPLY A THIN LAYER TWICE A DAY TO THE AFFECTED AREA(S) OF TRUNK      umeclidinium bromide (Incruse Ellipta) 62 5 mcg/inh AEPB inhaler Inhale 1 puff daily 1 each 1    VITAMIN D PO Take by mouth      Zinc 10 MG LOZG Apply to the mouth or throat       No current facility-administered medications for this visit  Allergies   Allergen Reactions    Amoxicillin      Vague rash in the 90s, tolerated zosyn on 9/2020 admission     Codeine      Other reaction(s): Other (See Comments)  n/v       Review of Systems   Constitutional: Negative  Negative for activity change, appetite change, fatigue and fever  HENT: Negative  Eyes: Negative  Respiratory: Negative  Cardiovascular: Negative  Gastrointestinal: Negative for blood in stool, constipation, diarrhea and vomiting  Ostomy   Endocrine: Negative  Genitourinary:        See HPI   Musculoskeletal: Negative  Skin: Negative  Allergic/Immunologic: Negative  Neurological: Negative  Hematological: Negative  Psychiatric/Behavioral: Negative  Video Exam    There were no vitals filed for this visit  Physical Exam  Pulmonary:      Effort: Pulmonary effort is normal    Neurological:      Mental Status: She is alert  Psychiatric:         Mood and Affect: Mood normal          Thought Content: Thought content normal          Judgment: Judgment normal           I spent 20 minutes directly with the patient during this visit      VIRTUAL VISIT [id number] [facility] acknowledges that she has consented to an online visit or consultation  She understands that the online visit is based solely on information provided by her, and that, in the absence of a face-to-face physical evaluation by the physician, the diagnosis she receives is both limited and provisional in terms of accuracy and completeness  This is not intended to replace a full medical face-to-face evaluation by the physician  Zain Ferrera understands and accepts these terms

## 2021-03-02 ENCOUNTER — TELEPHONE (OUTPATIENT)
Dept: FAMILY MEDICINE CLINIC | Facility: CLINIC | Age: 71
End: 2021-03-02

## 2021-03-02 DIAGNOSIS — R50.81 FEVER IN OTHER DISEASES: ICD-10-CM

## 2021-03-02 DIAGNOSIS — R07.89 CHEST TIGHTNESS: Primary | ICD-10-CM

## 2021-03-02 NOTE — TELEPHONE ENCOUNTER
Called pt visiting nurse told him that pt should go to ED and be evaluated per DR Hudson  Nurse stated that pt refused to go to ED for evaluation earlier today but will try calling pt again to tell her that Dr Hudson thinks she should go to ED

## 2021-03-02 NOTE — TELEPHONE ENCOUNTER
Visiting nurse called stated that pt has a fever of 101 5 temp was taken by tympanic thermometer, pt has chest tightness as well  Nurse stated that pt lungs are currently clear  Please advise what pt should do

## 2021-03-02 NOTE — TELEPHONE ENCOUNTER
Where is the fever coming from ? How log has she had the fever ? Does it respond to tylenol ? Any abdominal issues or concerns ?

## 2021-03-02 NOTE — TELEPHONE ENCOUNTER
Pt is having a pasty stool, stool was harder than normal so pt is taking miralax at this time pt has not taken tylenol and denies abdominal pain

## 2021-03-09 ENCOUNTER — TELEPHONE (OUTPATIENT)
Dept: PULMONOLOGY | Facility: CLINIC | Age: 71
End: 2021-03-09

## 2021-03-09 NOTE — TELEPHONE ENCOUNTER
Patient calling to schedule a follow up  I offered her a date in April and she stated she can't wait that long and she might be in the ER by then  I asked if she was sick she stated she has been since 2/10  I clarified this and she stated with stomach issues  I asked her if she was having any issues with her lungs and she stated yes that she has been getting very easily short of breath even just talking to me  She states she has been having these issues since sometime in march  Ever since her abdominal surgery in [facility] she has been in the bed but now since trying to do things she has noticed this sob and tightness  She stated she doesn't want to schedule an appt unless it's sooner   Please advise [phone]

## 2021-03-11 ENCOUNTER — OFFICE VISIT (OUTPATIENT)
Dept: PULMONOLOGY | Facility: CLINIC | Age: 71
End: 2021-03-11
Payer: MEDICARE

## 2021-03-11 VITALS
OXYGEN SATURATION: 97 % | SYSTOLIC BLOOD PRESSURE: 120 MMHG | DIASTOLIC BLOOD PRESSURE: 70 MMHG | TEMPERATURE: 98 F | HEART RATE: 74 BPM | RESPIRATION RATE: 16 BRPM

## 2021-03-11 DIAGNOSIS — G71.09 DYSTROPHY, MUSCULAR, HEREDITARY PROGRESSIVE (HCC): ICD-10-CM

## 2021-03-11 DIAGNOSIS — J98.4 RESTRICTIVE LUNG DISEASE: Primary | ICD-10-CM

## 2021-03-11 DIAGNOSIS — J43.2 CENTRILOBULAR EMPHYSEMA (HCC): ICD-10-CM

## 2021-03-11 PROCEDURE — 99214 OFFICE O/P EST MOD 30 MIN: CPT | Performed by: INTERNAL MEDICINE

## 2021-03-12 NOTE — ASSESSMENT & PLAN NOTE
· This has worsened after her recent hospital stay   · I do think a lot of her worsening respiratory symptoms are due to neuromuscular weakness  · She has become more dependent upon supplemental oxygen at nighttime and has noticed more shortness of breath in the last exercise tolerance throughout the day  She has even reported some conversational dyspnea  · I have recommended a complete pulmonary function test with assessment of respiratory muscle parameters  · I believe she may be getting close to the point where she may need BiPAP therapy at nighttime    She does not wish to pursue this at this time  · Given the small pleural effusion on imaging, I did suggest that we obtain a PA and lateral chest x-ray as well

## 2021-03-12 NOTE — ASSESSMENT & PLAN NOTE
· He has had less exercise tolerance and very weak after recent surgery and prolonged hospitalization      · Suspect rehabilitation will need to be prolonged in order to come close to prehospitalization baseline

## 2021-03-12 NOTE — ASSESSMENT & PLAN NOTE
· Previously maintained on Incruse Ellipta with reasonable response to therapy   · Has rescue inhaler   · During her stay in the nursing home, has been transition to [employer]    Given her increased symptomatology, I do think it is reasonable to continue this for now pending the results of her pulmonary function study

## 2021-03-12 NOTE — PROGRESS NOTES
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  · This has worsened after her recent hospital stay   · I do think a lot of her worsening respiratory symptoms are due to neuromuscular weakness  · She has become more dependent upon supplemental oxygen at nighttime and has noticed more shortness of breath in the last exercise tolerance throughout the day  She has even reported some conversational dyspnea  · I have recommended a complete pulmonary function test with assessment of respiratory muscle parameters  · I believe she may be getting close to the point where she may need BiPAP therapy at nighttime  She does not wish to pursue this at this time  · Given the small pleural effusion on imaging, I did suggest that we obtain a PA and lateral chest x-ray as well    COPD (chronic obstructive pulmonary disease) ([address] )  · Previously maintained on Incruse Ellipta with reasonable response to therapy   · Has rescue inhaler   · During her stay in the nursing home, has been transition to [employer]  Given her increased symptomatology, I do think it is reasonable to continue this for now pending the results of her pulmonary function study    Dystrophy, muscular, 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[address] )  · He has had less exercise tolerance and very weak after recent surgery and prolonged hospitalization  · Suspect rehabilitation will need to be prolonged in order to come close to prehospitalization baseline    I will contact her with the results of the testing  We will determine the appropriate follow-up interval or any changes pending the results of the ordered studies  ______________________________________________________________________    HPI:    Una Damon presents today for follow-up of hospitalization and her combined restrictive and obstructive lung disease from COPD and muscular dystrophy    She had a very prolonged hospitalization after perforated diverticulum surgery  She had several abscesses in remained in the hospital for the better part of a month  She did get discharged to rehab in a very weak condition  She was substantially below her prehospitalization baseline  She has had some improvement in her strength and stamina but still reports that she is quite short of breath with minimal activity and also reports that she has not been able to make further gains in her strength due to shortness of breath  After the series illness, she has moved closer to her sister insulating to an area  She has no reports of chest pain, palpitations, or cardiac complaints  No leg swelling  No other signs or symptoms of DVT or PE  No headache or visual change  No fever, chills, or sick contacts  She is frustrated by her shortness of breath  She has shortness of breath at nighttime  She has to sleep on her side  She uses supplemental oxygen for sleep  She cannot sleep laying flat  If she takes a nap in the chair, she may awaken with shortness of breath  She does not typically use her oxygen for naps      Current Medications:    Current Outpatient Medications:     albuterol (PROVENTIL HFA,VENTOLIN HFA) 90 mcg/act inhaler, Inhale 2 puffs every 6 (six) hours as needed for wheezing, Disp: 1 Inhaler, Rfl: 2    alclomethasone (ACLOVATE) 0 05 % cream, APPLY A THIN LAYER TWICE A DAY TO AFFECTED AREA(S), Disp: , Rfl:     Ascorbic Acid (VITAMIN C) 1000 MG tablet, Take 1,000 mg by mouth daily, Disp: , Rfl:     citalopram (CeleXA) 10 mg tablet, Take 1 tablet (10 mg total) by mouth daily, Disp: 90 tablet, Rfl: 1    clotrimazole-betamethasone (LOTRISONE) 1-0 05 % lotion, , Disp: , Rfl: 0    ELDERBERRY PO, Take by mouth, Disp: , Rfl:     FLAXSEED, LINSEED, PO, Take 10 mg by mouth, Disp: , Rfl:     fluticasone (FLONASE) 50 mcg/act nasal spray, 2 sprays into each nostril daily, Disp: 16 g, Rfl: 3   Indole-3-Carbinol POWD, by Does not apply route, Disp: , Rfl:     ketoconazole (NIZORAL) 2 % cream, APPLY TWICE A DAY TO THE AFFECTED AREA(S), Disp: , Rfl:     ketoconazole (NIZORAL) 2 % shampoo, Apply 1 application topically 2 (two) times a week, Disp: 120 mL, Rfl: 1    magnesium gluconate (MAGONATE) 500 mg tablet, Take 500 mg by mouth 2 (two) times a day, Disp: , Rfl:     Melatonin 3 MG CAPS, Take 3 mg by mouth, Disp: , Rfl:     STARLA D ARCO PO, Take by mouth, Disp: , Rfl:     Potassium (POTASSIMIN PO), Take 550 mg by mouth, Disp: , Rfl:     Probiotic Product (Florastor Plus) CAPS, , Disp: , Rfl:     S-Adenosylmethionine (SAMe) 400 MG TABS, , Disp: , Rfl:     solifenacin (VESICARE) 10 MG tablet, Take 1 tablet (10 mg total) by mouth daily, Disp: 90 tablet, Rfl: 3    traZODone (DESYREL) 50 mg tablet, Take 1 tablet (50 mg total) by mouth daily at bedtime, Disp: 90 tablet, Rfl: 1    triamcinolone (KENALOG) 0 1 % cream, APPLY A THIN LAYER TWICE A DAY TO THE AFFECTED AREA(S) OF TRUNK, Disp: , Rfl:     umeclidinium bromide (Incruse Ellipta) 62 5 mcg/inh AEPB inhaler, Inhale 1 puff daily, Disp: 1 each, Rfl: 1    VITAMIN D PO, Take by mouth, Disp: , Rfl:     Zinc 10 MG LOZG, Apply to the mouth or throat, Disp: , Rfl:     omeprazole (PriLOSEC) 20 mg delayed release capsule, Take 1 capsule (20 mg total) by mouth every morning for 113 days, Disp: 90 capsule, Rfl: 3    umeclidinium-vilanterol (ANORO ELLIPTA) 62 5-25 MCG/INH inhaler, Inhale 1 puff daily, Disp: 1 Inhaler, Rfl: 6    Review of Systems:  Review of Systems   Constitutional: Positive for activity change and fatigue  Negative for fever and unexpected weight change  HENT: Negative for postnasal drip and sore throat  Eyes: Negative for visual disturbance  Respiratory: Negative for cough, choking and wheezing  Cardiovascular: Negative for chest pain and palpitations  Musculoskeletal: Positive for gait problem (Uses a motorized wheelchair)  Allergic/Immunologic: Negative for immunocompromised state  Neurological: Negative for headaches  All other systems reviewed and are negative  Aside from what is mentioned in the HPI, the review of systems is otherwise negative    Past medical history, surgical history, and family history were reviewed and updated as appropriate    Social history updates:  Social History     Tobacco Use   Smoking Status Former Smoker    Packs/day: 1 50    Years: 41 00    Pack years: 61 50    Types: Cigarettes    Start date:     Quit date:     Years since quittin 2   Smokeless Tobacco Never Used       PhysicalExamination:  Vitals:   /70   Pulse 74   Temp 98 °F (36 7 °C)   Resp 16   LMP  (LMP Unknown)   SpO2 97%   Gen: Comfortable at rest on room air  She does get a little bit of breathlessness with prolonged conversation  HEENT: PERRL  O/P:  Exam deferred due to COVID-19 and face mask  Neck: Trachea is midline  No JVD  No adenopathy  No thyromegaly  Chest:  Limited chest wall excursion  Air entry is decreased  No rales or crackles  No wheezes  Cardiac:  Regular  no murmur  Abdomen: Soft and nontender  Bowel sounds are present  Extremities:  Trace edema  Neuro:  Awake, alert  Mentation and speech normal   She presents in a wheelchair due to chronic neuromuscular weakness  Skin:  No appreciable rash      Diagnostic Data:  Labs:   I personally reviewed the most recent laboratory data pertinent to today's visit    Lab Results   Component Value Date    WBC 10 31 (H) 10/20/2020    HGB 10 2 (L) 10/20/2020    HCT 32 1 (L) 10/20/2020    MCV 91 10/20/2020     (H) 10/20/2020     Lab Results   Component Value Date    SODIUM 137 10/20/2020    K 3 5 10/20/2020    CO2 32 10/20/2020    CL 98 (L) 10/20/2020    BUN 11 10/20/2020    CREATININE 0 30 (L) 10/20/2020    CALCIUM 9 1 10/20/2020       Imaging:  I personally reviewed the images on the [facility] system pertinent to today's visit  Last chest x-ray from the hospital stay still it did show some basilar atelectasis and a tiny pleural effusion      Ton Olsen MD

## 2021-03-24 ENCOUNTER — TELEPHONE (OUTPATIENT)
Dept: PULMONOLOGY | Facility: CLINIC | Age: 71
End: 2021-03-24

## 2021-03-29 ENCOUNTER — HOSPITAL ENCOUNTER (OUTPATIENT)
Dept: PULMONOLOGY | Facility: HOSPITAL | Age: 71
Discharge: HOME | End: 2021-03-29
Attending: INTERNAL MEDICINE
Payer: MEDICARE

## 2021-03-29 DIAGNOSIS — J43.2 CENTRILOBULAR EMPHYSEMA (HCC): ICD-10-CM

## 2021-03-29 DIAGNOSIS — J98.4 RESTRICTIVE LUNG DISEASE: ICD-10-CM

## 2021-03-29 DIAGNOSIS — G71.09 DYSTROPHY, MUSCULAR, HEREDITARY PROGRESSIVE (HCC): ICD-10-CM

## 2021-03-29 PROCEDURE — 94760 N-INVAS EAR/PLS OXIMETRY 1: CPT

## 2021-03-29 PROCEDURE — 94010 BREATHING CAPACITY TEST: CPT

## 2021-03-29 PROCEDURE — 94200 LUNG FUNCTION TEST (MBC/MVV): CPT

## 2021-03-29 PROCEDURE — 94729 DIFFUSING CAPACITY: CPT

## 2021-03-29 PROCEDURE — 94726 PLETHYSMOGRAPHY LUNG VOLUMES: CPT | Performed by: INTERNAL MEDICINE

## 2021-03-29 PROCEDURE — 94726 PLETHYSMOGRAPHY LUNG VOLUMES: CPT

## 2021-03-29 PROCEDURE — 94010 BREATHING CAPACITY TEST: CPT | Performed by: INTERNAL MEDICINE

## 2021-03-29 PROCEDURE — 94729 DIFFUSING CAPACITY: CPT | Performed by: INTERNAL MEDICINE

## 2021-04-02 ENCOUNTER — OFFICE VISIT (OUTPATIENT)
Dept: FAMILY MEDICINE CLINIC | Facility: CLINIC | Age: 71
End: 2021-04-02
Payer: MEDICARE

## 2021-04-02 VITALS
SYSTOLIC BLOOD PRESSURE: 122 MMHG | WEIGHT: 154 LBS | HEIGHT: 60 IN | OXYGEN SATURATION: 96 % | BODY MASS INDEX: 30.23 KG/M2 | HEART RATE: 86 BPM | DIASTOLIC BLOOD PRESSURE: 70 MMHG | TEMPERATURE: 98.6 F

## 2021-04-02 DIAGNOSIS — D75.839 THROMBOCYTOSIS: ICD-10-CM

## 2021-04-02 DIAGNOSIS — K57.80 PERFORATED DIVERTICULUM: ICD-10-CM

## 2021-04-02 DIAGNOSIS — G71.09 DYSTROPHY, MUSCULAR, HEREDITARY PROGRESSIVE (HCC): ICD-10-CM

## 2021-04-02 DIAGNOSIS — Z87.891 FORMER SMOKER: ICD-10-CM

## 2021-04-02 DIAGNOSIS — J43.2 CENTRILOBULAR EMPHYSEMA (HCC): ICD-10-CM

## 2021-04-02 DIAGNOSIS — Z87.898 HISTORY OF ABDOMINAL ABSCESS: ICD-10-CM

## 2021-04-02 DIAGNOSIS — J96.11 CHRONIC RESPIRATORY FAILURE WITH HYPOXIA (HCC): ICD-10-CM

## 2021-04-02 DIAGNOSIS — I05.9 MITRAL ANNULAR CALCIFICATION: ICD-10-CM

## 2021-04-02 DIAGNOSIS — Z90.49 HX OF COLECTOMY: ICD-10-CM

## 2021-04-02 DIAGNOSIS — I07.1 TRICUSPID VALVE INSUFFICIENCY, UNSPECIFIED ETIOLOGY: ICD-10-CM

## 2021-04-02 DIAGNOSIS — R14.2 BELCHING: ICD-10-CM

## 2021-04-02 DIAGNOSIS — R00.0 INCREASED HEART RATE: ICD-10-CM

## 2021-04-02 DIAGNOSIS — Z99.81 ON HOME OXYGEN THERAPY: ICD-10-CM

## 2021-04-02 DIAGNOSIS — R01.1 CARDIAC MURMUR: ICD-10-CM

## 2021-04-02 DIAGNOSIS — D05.12 DUCTAL CARCINOMA IN SITU (DCIS) OF LEFT BREAST: ICD-10-CM

## 2021-04-02 DIAGNOSIS — I35.8 AORTIC VALVE SCLEROSIS: ICD-10-CM

## 2021-04-02 DIAGNOSIS — J98.4 RESTRICTIVE LUNG DISEASE: ICD-10-CM

## 2021-04-02 DIAGNOSIS — Z87.19 HISTORY OF ISCHEMIC COLITIS: ICD-10-CM

## 2021-04-02 DIAGNOSIS — Z86.59 HISTORY OF MAJOR DEPRESSION: ICD-10-CM

## 2021-04-02 DIAGNOSIS — Z93.3 COLOSTOMY STATUS (HCC): ICD-10-CM

## 2021-04-02 DIAGNOSIS — R10.9 ABDOMINAL PAIN, UNSPECIFIED ABDOMINAL LOCATION: ICD-10-CM

## 2021-04-02 DIAGNOSIS — K31.84 GASTROPARESIS: ICD-10-CM

## 2021-04-02 DIAGNOSIS — Z76.89 ENCOUNTER TO ESTABLISH CARE WITH NEW DOCTOR: Primary | ICD-10-CM

## 2021-04-02 PROBLEM — I34.81 MITRAL ANNULAR CALCIFICATION: Status: ACTIVE | Noted: 2021-04-02

## 2021-04-02 PROBLEM — F33.0 MILD EPISODE OF RECURRENT MAJOR DEPRESSIVE DISORDER (HCC): Status: ACTIVE | Noted: 2021-04-02

## 2021-04-02 PROBLEM — Z85.828 HISTORY OF MALIGNANT NEOPLASM OF SKIN: Status: ACTIVE | Noted: 2018-09-13

## 2021-04-02 PROBLEM — Z90.12 HISTORY OF LEFT MASTECTOMY: Status: ACTIVE | Noted: 2021-04-02

## 2021-04-02 PROCEDURE — 99215 OFFICE O/P EST HI 40 MIN: CPT | Performed by: FAMILY MEDICINE

## 2021-04-02 NOTE — PROGRESS NOTES
Assessment/Plan:         Diagnoses and all orders for this visit:    Encounter to establish care with new doctor    [place]  -     Ambulatory referral to Gastroenterology; Future    Abdominal pain, unspecified abdominal location  -     Ambulatory referral to Gastroenterology; Future  -     CT abdomen pelvis w wo contrast; Future  -     CBC and differential; Future  -     Comprehensive metabolic panel; Future    Gastroparesis  -     Ambulatory referral to Gastroenterology; Future    History of abdominal abscess  -     Ambulatory referral to Gastroenterology; Future  -     CT abdomen pelvis w wo contrast; Future    Hx of colectomy  -     Ambulatory referral to Gastroenterology; Future  -     CT abdomen pelvis w wo contrast; Future    Colostomy status ([facility] )  -     Ambulatory referral to Gastroenterology; Future  -     CT abdomen pelvis w wo contrast; Future    Perforated diverticulum  Comments:  history 09/2020    History of ischemic colitis    Dystrophy, muscular, 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[facility] )  -     Ambulatory referral to Gastroenterology; Future  -     Comprehensive metabolic panel; Future    Centrilobular emphysema ([facility] )  Comments:  managed by [facility] pulmonology, cpm    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    Chronic respiratory failure with hypoxia ([facility] )    On home oxygen therapy  Comments:  just uses overnight    Former smoker    Increased heart rate  Comments:  normal HR here today; pt has noticed her heart rate at home is higher than normal, but no associated sx; she had EKG in hospital 10/2020 that was abnormal  Orders:  -     Ambulatory referral to Cardiology; Future  -     Echo complete with contrast if indicated; Future    Cardiac murmur    Aortic valve sclerosis  -     Ambulatory referral to Cardiology; Future  -     Echo complete with contrast if indicated; Future    Tricuspid valve insufficiency, unspecified etiology  -     Ambulatory referral to Cardiology;  Future  -     Echo complete with contrast if indicated; Future    Mitral annular calcification  -     Ambulatory referral to Cardiology; Future  -     Echo complete with contrast if indicated; Future    Thrombocytosis (HCC)  -     CT abdomen pelvis w wo contrast; Future  -     CBC and differential; Future    History of major depression    Ductal carcinoma in situ (DCIS) of left breast  Comments:  left breast 2007, s/p left mastectomy; just had f/u with her GYN this month, cpm          Subjective:   Chief Complaint   Patient presents with   [address] patient   Abdominal Pain     Patient is havng abdominal discomfort since having abdominal surgery   DME     Patient would like to discuss new DME   Mattress, bra, colostomy supplies, and other asseccories of dme    GI Problem     Discuss Gastro peresis        Patient ID: Yesenia Landaverde is a 70 y o  female      New pt to our office, but not to [facility], here with her caregiver - ok to be in room per pt  Just moved to [facility]  Stomach pain bothering even more, lost a coupl more pounds, was supposed to go in for gastr test ion October, but was in [facility] in ICU,, colostomy Nancy Leo got abd disc and I dont know what it is, and concerning bec last time had stomch pain ended up almost dead, the lead up to my big event was very deceptive  Have it every day, does get worse when lay on my side for a long time, and have had low grade fever couple days this last week, then fine  Never sees blood or black stools in bag; no N/V  Admits she did have period of time after surgery that pain was gone  Have excessive burping all day every day and now feel sort of hollow up high and volume of food consuming has decreased   back is hurting, prob from mattress- need new mattress for hosp bed  Colostomy placed October 1st  Was in a NH until feb 1st recovering  Wears adult briefs when out of homoe or in home when she knows she's going tot be alone for hours because of difficulty with her physical disabilities, uses female urinal at night  Sees Dr Thao once or twice a year- she comes up from [facility] to [facility] [place]  Jan 20th was second COVID dose at [facility]  Received shingles vaccine at [facility] about 3 yrs ago  discussing the murmur I hear and the echo done 10/2020- pt denies any card c/o except reports last 4-5 months HR about 5-10 beats higher than normal, no sx palp or racing      9/23/2020 - 10/23/2020 (30 days)  [facility]  Summary of Hospital Course: The patient was admitted to the hospital and commenced on intravenous antibiotics on the 23rd of September  A CT scan at the time of admission showed acute mid sigmoid diverticulitis with a peridiverticular abscess as well as some  Free pelvic fluid  On hospital day 1, she underwent IR drain placement which returned 90 cc of fluid  Initial cultures from the fluid aspirate did not return  Growth of any organisms  A repeat CT scan was done on 09/27 which showed a new enlarging collection as well as multiple new foci of possible developing abscess  On 09/28 interventional Radiology was consulted again and reposition her drain into the pelvic collection returning some fluid  A PICC line was placed on 09/29  The patient has leukocytosis, pain, and overall clinical condition continued to worsen over the ensuing days, until a CT scan obtained on 09/30 showed perforated diverticulitis with new foci of abscesses  At this time her abdomen was exquisitely tender, and she was taken to the operating room for exploratory laparotomy, lysis of adhesions, sigmoidectomy, anterior bladder repair, bilateral ureteral stenting, and creation of a transverse loop colostomy  Following the operation, she was maintained with urethral stents and Henry catheter in place and was transferred back to the hospital floor    She received intravenous regimen via PICC line of cefepime, Flagyl, and micafungin through the 8th of October for leukocytosis and intraoperative contamination  Gradually, her diet was advanced and she began to have ostomy function  TPN was initiated on postoperative day 4, and continued for a total of 1 week until the patient began to tolerate p o  intake more consistently  The stoma dena was removed on postoperative day 5  On postoperative day 15, she underwent a fluoroscopically monitored cystogram which showed no bladder leakage, and her Henry catheter was uneventfully removed  On postoperative day 17, the patient's PICC line was removed as she no longer required daily laboratory analyses, and was also tolerating enteral nutrition with no further need for TPN or intravenous antibiotics  Throughout her admission, the patient was closely involved with physical and occupational therapy and working toward functional rehabilitation, efforts were made over the course of her admission to place her in an acute rehabilitation facility to help her regain strength lost due to her acute illness  An additional 7 days of admission past as the patient awaited placement due in large part to [employer] and [employer] paperwork which took an exorbitant length of time to be verified  Over the course of her last 7 days of hospitalization, the patient remained clinically well, had her staples removed from her incision, continue to tolerated diet, have stoma output, and work with her physical and occupational therapists  On postoperative day 23/hospital day number 30,  The patient was verified for placement in a skilled nursing facility for acute rehabilitation, and arrangements were made for transportation with case management  The patient was discharged to this facility in good  Physical condition without need for further surgical follow-up       Consultation - Pulmonary Medicine   Yesenia Landaverde 79 y o  female MRN: [id number]  Unit/Bed#: E5 -01 Encounter: [id number]  Dipika Sotvall MD  Physician  Pulmonology  Consults  Signed  Date of Service:  10/11/2020 10:54 AM  Physician Requesting Consult: Dr Silver Mueller  Reason for Consult: restrictive lung disease, COPD, shortness of breath  Assessment/Plan:  1  Restrictive lung disease - secondary to muscular dystrophy and possible component of obesity  Previous spirometry from 2016 was reviewed  Patient has had overall progression of her MDS is now mostly wheelchair bound  · Have change DuoNebs to t i d   · Continue pulmonary toilet:  Sit upright in bed, use incentive spirometer, bronchodilators, have asked for flutter valve  · MIP/MEP  2  COPD- without evidence of exacerbation  Patient quit smoking over 10 years ago  · Continue bronchodilators  No need for steroids at this time  3  Chronic hypoxic respiratory failure is- nighttime hypoxia  - continue supplemental O2 at night   -have asked RT to start BiPAP at night to see if she tolerates it  She does qualify based upon muscular dystrophy  4  Obesity-weight loss encouraged  5  Shortness of breath- likely multifactorial due to muscular dystrophy, atelectasis, COPD, deconditioning, recent prolonged hospitalization and previous abdominal surgery   -treatment as above  -echocardiogram pending  -have ordered BNP  -will need repeat outpatient PFTs  -checking mid been map  ______________________________________________________________________     HPI:    Tianna Morrow is a 79 y o  female with history of chronic hypoxic respiratory failure with underlying progressive neuromuscular disease muscular dystrophy, mild COPD, obesity who was initially admitted on September 23rd due to diverticulitis resulting in abscess  Initially underwent IR drainage on September 24th with repositioning of trans gluteal drain September 28    Patient is status post perforated diverticulitis status post exploratory laparotomy, sigmoid resection and transverse loop colostomy on September 30, 2020       Patient was previously seen by the pulmonary service for the aforementioned respiratory conditions  At that time supplemental 02 at night was recommended after overnight oximetry: 2L She qualified for BiPAP based upon progressive neuromuscular disease  DuoNebs were recommended        Patient is normally wheelchair bound  Quit tobacco use greater than 10 years ago      Home respiratory meds:  Albuterol inhaler, Incruse Ellipta     Patient reports increased exertional dyspnea recently  Repeat chest x-ray was obtained today shows overall improvement      Patient seen this afternoon and did not have any acute complaints  She does tell me that she has had progressive dyspnea over the past 6 months  She initially noticed it when she lied down  She uses her supplemental O2 at night  She also noticed gasping in her recliner during naps  She has not had any significant coughing or wheezing  She endorses postnasal drip  She denies any other new complaints      PFT results:  Previous pulmonary function test showed a severe restrictive ventilatory limitation  This shows severe restriction with an FEV1 of 0 89 L which is 44% predicted and an FVC of 1 20 L which is 45% predicted       Review of Systems:    Transthoracic Echocardiogram  2D, M-mode, Doppler, and Color Doppler     Study date:  12-Oct-2020     Patient: Moises Hinojosa  MR number: [id number]  Account number: [de-identified]  : 1950  Age: 79 years  Gender: Female  Status: Inpatient  Location: Bedside  Height: 60 in 60 in  Weight: 171 6 lb 172 lb  BP: 136/ 65 mmHg     Indications: SOB     Diagnoses: R06 02 - Shortness of breath    SUMMARY:  1  This is a technically adequate study  2  Left ventricle is normal in size with borderline hyperdynamic systolic function  Left ventricular ejection fraction is estimated at 70%  3  Moderate concentric left ventricular hypertrophy  4  Grade 1 diastolic dysfunction  5  Left atrium is top-normal in size  6   Aortic valve sclerotic with adequate separation  7  Mild mitral annular calcification with trace mitral regurgitation  8  Trace tricuspid regurgitation with pulmonary artery systolic pressure is estimated at 40-45 mm Hg  9  There is no study for comparison        The following portions of the patient's history were reviewed and updated as appropriate: allergies, current medications, past family history, past medical history, past social history, past surgical history and problem list     Review of Systems      Objective:      /70 (BP Location: Right arm, Patient Position: Sitting, Cuff Size: Standard)   Pulse 86   Temp 98 6 °F (37 °C) (Tympanic)   Ht 5' (1 524 m)   Wt 69 9 kg (154 lb)   LMP  (LMP Unknown)   SpO2 96%   BMI 30 08 kg/m²          Physical Exam  Vitals signs and nursing note reviewed  Constitutional:       General: She is not in acute distress  Appearance: She is well-developed  She is not toxic-appearing or diaphoretic  Comments: Appears much younger than stated age, wheelchair-bound, comfortable, extremely pleasant   HENT:      Head: Normocephalic and atraumatic  Eyes:      General: Lids are normal       Conjunctiva/sclera: Conjunctivae normal       Pupils: Pupils are equal, round, and reactive to light  Neck:      Musculoskeletal: Neck supple  Thyroid: No thyroid mass or thyromegaly  Vascular: No JVD  Trachea: Trachea normal    Cardiovascular:      Rate and Rhythm: Normal rate and regular rhythm  Pulses: Normal pulses  Heart sounds: S1 normal and S2 normal  Murmur present  No friction rub  No gallop  Comments: No edema  Pulmonary:      Effort: Pulmonary effort is normal       Breath sounds: Normal breath sounds  Abdominal:      General: Bowel sounds are increased  There is no distension  Palpations: Abdomen is soft  There is no hepatomegaly, splenomegaly or mass  Tenderness: There is generalized abdominal tenderness  There is no guarding or rebound  Hernia: There is no hernia in the ventral area  Lymphadenopathy:      Cervical: No cervical adenopathy  Upper Body:      Right upper body: No supraclavicular adenopathy  Left upper body: No supraclavicular adenopathy  Skin:     General: Skin is warm and dry  Capillary Refill: Capillary refill takes less than 2 seconds  Coloration: Skin is not pale  Neurological:      Mental Status: She is alert and oriented to person, place, and time  Cranial Nerves: No facial asymmetry  Psychiatric:         Mood and Affect: Mood normal          Behavior: Behavior normal  Behavior is cooperative

## 2021-04-06 ENCOUNTER — TELEPHONE (OUTPATIENT)
Dept: LAB | Facility: HOSPITAL | Age: 71
End: 2021-04-06

## 2021-04-06 ENCOUNTER — TELEPHONE (OUTPATIENT)
Dept: FAMILY MEDICINE CLINIC | Facility: CLINIC | Age: 71
End: 2021-04-06

## 2021-04-06 NOTE — TELEPHONE ENCOUNTER
Pam Mattson from [facility] called for records for home oxygen  Pam Mattson made aware patient uses oxygen overnight per note with Dr Mackenzie Yusuf 4/2/2021  She will be faxing a request to our office for documentation

## 2021-04-07 DIAGNOSIS — J00 ACUTE RHINITIS: ICD-10-CM

## 2021-04-07 NOTE — TELEPHONE ENCOUNTER
Message left requesting a refill of Flonase  Pharmacy is [facility]      Last refill date: 1/22/20  16g with three refills  Last appointment: 4/2/21  Next appointment: 5/13/21

## 2021-04-09 RX ORDER — FLUTICASONE PROPIONATE 50 MCG
2 SPRAY, SUSPENSION (ML) NASAL DAILY
Qty: 16 G | Refills: 3 | Status: SHIPPED | OUTPATIENT
Start: 2021-04-09 | End: 2021-06-16

## 2021-04-10 ENCOUNTER — NURSE TRIAGE (OUTPATIENT)
Dept: OTHER | Facility: OTHER | Age: 71
End: 2021-04-10

## 2021-04-10 ENCOUNTER — TELEMEDICINE (OUTPATIENT)
Dept: FAMILY MEDICINE CLINIC | Facility: CLINIC | Age: 71
End: 2021-04-10
Payer: MEDICARE

## 2021-04-10 DIAGNOSIS — R30.0 DYSURIA: Primary | ICD-10-CM

## 2021-04-10 DIAGNOSIS — R35.0 URINARY FREQUENCY: ICD-10-CM

## 2021-04-10 DIAGNOSIS — J96.11 CHRONIC RESPIRATORY FAILURE WITH HYPOXIA (HCC): ICD-10-CM

## 2021-04-10 DIAGNOSIS — G71.00 MUSCULAR DYSTROPHY (HCC): ICD-10-CM

## 2021-04-10 PROCEDURE — 99213 OFFICE O/P EST LOW 20 MIN: CPT | Performed by: FAMILY MEDICINE

## 2021-04-10 RX ORDER — NITROFURANTOIN 25; 75 MG/1; MG/1
100 CAPSULE ORAL 2 TIMES DAILY
Qty: 14 CAPSULE | Refills: 0 | Status: SHIPPED | OUTPATIENT
Start: 2021-04-10 | End: 2021-04-17

## 2021-04-10 NOTE — PROGRESS NOTES
Virtual Brief Visit "on-the-fly"    Assessment/Plan:  Oh Rosario was seen today for virtual brief visit  Diagnoses and all orders for this visit:    Dysuria  -     nitrofurantoin (MACROBID) 100 mg capsule; Take 1 capsule (100 mg total) by mouth 2 (two) times a day for 7 days    Urinary frequency  -     nitrofurantoin (MACROBID) 100 mg capsule; Take 1 capsule (100 mg total) by mouth 2 (two) times a day for 7 days    Muscular dystrophy ([address] )  Comments:  comorbidity  Orders:  -     nitrofurantoin (MACROBID) 100 mg capsule; Take 1 capsule (100 mg total) by mouth 2 (two) times a day for 7 days    Chronic respiratory failure with hypoxia (HCC)  Comments:  comorbidity  Orders:  -     nitrofurantoin (MACROBID) 100 mg capsule;  Take 1 capsule (100 mg total) by mouth 2 (two) times a day for 7 days    call back if sx no better next 48hours, go to ER if sx worsen    Problem List Items Addressed This Visit        Respiratory    Chronic respiratory failure with hypoxia (HCC)    Relevant Medications    nitrofurantoin (MACROBID) 100 mg capsule       Musculoskeletal and Integument    Muscular dystrophy ([address] )    Relevant Medications    nitrofurantoin (MACROBID) 100 mg capsule      Other Visit Diagnoses     Dysuria    -  Primary    Relevant Medications    nitrofurantoin (MACROBID) 100 mg capsule    Urinary frequency        Relevant Medications    nitrofurantoin (MACROBID) 100 mg capsule                Reason for visit is called answering service regarding acute urinary symptoms  Chief Complaint   Patient presents with    Virtual Brief Visit        Encounter provider Zbigniew Riddle DO    Provider located at [address] [place] [address], [place]  [address] [place] [id number]    Recent Visits  Date Type Provider Dept   04/06/21 Telephone Liz Paget [address] recent visits within past 7 days and meeting all other requirements     Today's Visits  Date Type Provider Dept 04/10/21 Telemedicine Julienne Tucker DO [facility] today's visits and meeting all other requirements     Future Appointments  No visits were found meeting these conditions  Showing future appointments within next 150 days and meeting all other requirements       It was my intent to perform this visit via video technology but the patient was not able to do a video connection so the visit was completed via audio telephone only  After connecting through telephone, the patient was identified by name and date of birth  Henry Chacon was informed that this is a telemedicine visit and that the visit is being conducted through telephone  My office door was closed  No one else was in the room  She acknowledged consent and understanding of privacy and security of the platform  The patient has agreed to participate and understands she can discontinue the visit at any time  Patient is aware this is a billable service  Subjective    Henry Chacon is a 70 y o  female who called answering service regarding acute urinary symptoms     Sx started Thursday, getting worse  States she does not get recurrent UTI's - "20 yrs ago was the only time I had one, the home health aide thinks it might be that when she is not here I wear a pullup because cant get on and off the toilet by myself, but have rehabbed enough that right now if wear a skirt, can get my skirt up and get on the toilet"  "I'm very upset about this incontinence, with this UTI business, it's dribbling as soon as feel urge to go it's dripping down my leg"   No hematuria  Had fever on Thursday, none since, no chills  No N/V  Pulse ox is her normal baseline  "Have home health aide 7-8 hrs a day right now, hope to get back to where I was that only needed one 12hours a week"    HPI     Past Medical History:   Diagnosis Date    Anxiety     Colitis     COPD (chronic obstructive pulmonary disease) ([address] )     Depression     Difficult intubation     Excessive daytime sleepiness     GERD (gastroesophageal reflux disease)     Hyperlipidemia     Muscular dystrophy ([facility] )     Limb-girdle    Osteoporosis     Overactive bladder     Pneumonitis     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[facility] )     Skin cancer     Skin disorder     Vitamin D deficiency        Past Surgical History:   Procedure Laterality Date    COLONOSCOPY N/A 1/22/2019    Procedure: COLONOSCOPY;  Surgeon: Jaycee Oliveira MD;  Location:  GI LAB;   Service: Colorectal    CYSTOSCOPY N/A 9/30/2020    Procedure: CYSTOSCOPY; BILATERAL URETERAL CATHETER PLACEMENT, CYSTOTOMY;  Surgeon: Camden Diamond MD;  [facility];  Service: Urology    FL CYSTOGRAM  10/15/2020    HARTMANS PROCEDURE N/A 9/30/2020    Procedure: EXPLORATORY LAPAROTOMY; LYSIS OF ADHESIONS; WASHOUT PELVIC ABSCESS; COMPLEX SIGMOID COLON RESECTION; LOOP TRANSVERSE COLOSTOMY;  Surgeon: Mala Penaloza MD;  [facility];  Service: General    IR DRAINAGE TUBE PLACEMENT  9/24/2020    MASTECTOMY Left 2007    left breast mastectomy     TONSILLECTOMY      TOOTH EXTRACTION      TUBAL LIGATION         Current Outpatient Medications   Medication Sig Dispense Refill    albuterol (PROVENTIL HFA,VENTOLIN HFA) 90 mcg/act inhaler Inhale 2 puffs every 6 (six) hours as needed for wheezing 1 Inhaler 2    alclomethasone (ACLOVATE) 0 05 % cream APPLY A THIN LAYER TWICE A DAY TO AFFECTED AREA(S)      Ascorbic Acid (VITAMIN C) 1000 MG tablet Take 1,000 mg by mouth daily      citalopram (CeleXA) 10 mg tablet Take 1 tablet (10 mg total) by mouth daily (Patient not taking: Reported on 4/2/2021) 90 tablet 1    clotrimazole-betamethasone (LOTRISONE) 1-0 05 % lotion   0    ELDERBERRY PO Take by mouth      FLAXSEED, LINSEED, PO Take 10 mg by mouth      fluticasone (FLONASE) 50 mcg/act nasal spray 2 sprays into each nostril daily 16 g 3    Indole-3-Carbinol POWD by Does not apply route  ketoconazole (NIZORAL) 2 % cream APPLY TWICE A DAY TO THE AFFECTED AREA(S)      ketoconazole (NIZORAL) 2 % shampoo Apply 1 application topically 2 (two) times a week 120 mL 1    magnesium gluconate (MAGONATE) 500 mg tablet Take 500 mg by mouth 2 (two) times a day      Melatonin 3 MG CAPS Take 3 mg by mouth      nitrofurantoin (MACROBID) 100 mg capsule Take 1 capsule (100 mg total) by mouth 2 (two) times a day for 7 days 14 capsule 0    omeprazole (PriLOSEC) 20 mg delayed release capsule Take 1 capsule (20 mg total) by mouth every morning for 113 days 90 capsule 3    STARLA D ARCO PO Take by mouth      Potassium (POTASSIMIN PO) Take 550 mg by mouth      Probiotic Product (Florastor Plus) CAPS       S-Adenosylmethionine (SAMe) 400 MG TABS       solifenacin (VESICARE) 10 MG tablet Take 1 tablet (10 mg total) by mouth daily 90 tablet 3    traZODone (DESYREL) 50 mg tablet Take 1 tablet (50 mg total) by mouth daily at bedtime 90 tablet 1    triamcinolone (KENALOG) 0 1 % cream APPLY A THIN LAYER TWICE A DAY TO THE AFFECTED AREA(S) OF TRUNK      umeclidinium bromide (Incruse Ellipta) 62 5 mcg/inh AEPB inhaler Inhale 1 puff daily 1 each 1    umeclidinium-vilanterol (ANORO ELLIPTA) 62 5-25 MCG/INH inhaler Inhale 1 puff daily 1 Inhaler 6    VITAMIN D PO Take by mouth      Zinc 10 MG LOZG Apply to the mouth or throat       No current facility-administered medications for this visit  Allergies   Allergen Reactions    Amoxicillin      Vague rash in the 90s, tolerated zosyn on 9/2020 admission     Codeine      Other reaction(s): Other (See Comments)  n/v    Medical Tape Itching       Review of Systems    There were no vitals filed for this visit  I spent 17 minutes directly with the patient during this visit    VIRTUAL VISIT [id number] [facility] acknowledges that she has consented to an online visit or consultation   She understands that the online visit is based solely on information provided by her, and that, in the absence of a face-to-face physical evaluation by the physician, the diagnosis she receives is both limited and provisional in terms of accuracy and completeness  This is not intended to replace a full medical face-to-face evaluation by the physician  Mary Ford understands and accepts these terms

## 2021-04-10 NOTE — TELEPHONE ENCOUNTER
Reason for Disposition   Urinating more frequently than usual (i e , frequency)    Answer Assessment - Initial Assessment Questions  1  SYMPTOM: "What's the main symptom you're concerned about?" (e g , frequency, incontinence)      frequency  2  ONSET: "When did the  S/S start?"      Couple days ago  3  PAIN: "Is there any pain?" If so, ask: "How bad is it?" (Scale: 1-10; mild, moderate, severe)      denies  4  CAUSE: "What do you think is causing the symptoms?"      "UTI, was wearing depends and I am disabled and was sitting in urine until my aid comes to change me"  5   OTHER SYMPTOMS: "Do you have any other symptoms?" (e g , fever, flank pain, blood in urine, pain with urination)      Burning with urination    Protocols used: [facility]

## 2021-04-10 NOTE — TELEPHONE ENCOUNTER
Notified patient that Dr Elfego Wadsworth would be calling patient for a virtual visit soon  Patient verbalized understanding

## 2021-04-14 NOTE — TELEPHONE ENCOUNTER
Karissa Francisco returned call that patient needs an overnight oximetry test, a fact to face with the provider after oximetry test and a new oxygen script

## 2021-04-14 NOTE — TELEPHONE ENCOUNTER
[facility] called and left [facility] to return the call for patients oxygen  They provided an acct # of [id number]    I returned a call to Lucila August at [phone] to see if she is able to help assist with this patient

## 2021-04-15 NOTE — TELEPHONE ENCOUNTER
I called and left a msg for Pramod Solitario- will she accept the Oximetry report from 5/2016? If not, this request will have to go through her Pulmonologist, Dr Demetria Ely

## 2021-04-19 ENCOUNTER — APPOINTMENT (OUTPATIENT)
Dept: LAB | Facility: HOSPITAL | Age: 71
End: 2021-04-19
Payer: MEDICARE

## 2021-04-19 DIAGNOSIS — G71.09 DYSTROPHY, MUSCULAR, HEREDITARY PROGRESSIVE (HCC): ICD-10-CM

## 2021-04-19 DIAGNOSIS — D75.839 THROMBOCYTOSIS: ICD-10-CM

## 2021-04-19 DIAGNOSIS — R10.9 ABDOMINAL PAIN, UNSPECIFIED ABDOMINAL LOCATION: ICD-10-CM

## 2021-04-19 LAB
ALBUMIN SERPL BCP-MCNC: 3.4 G/DL (ref 3.5–5)
ALP SERPL-CCNC: 84 U/L (ref 46–116)
ALT SERPL W P-5'-P-CCNC: 18 U/L (ref 12–78)
ANION GAP SERPL CALCULATED.3IONS-SCNC: 3 MMOL/L (ref 4–13)
AST SERPL W P-5'-P-CCNC: 13 U/L (ref 5–45)
BASOPHILS # BLD AUTO: 0.07 THOUSANDS/ΜL (ref 0–0.1)
BASOPHILS NFR BLD AUTO: 1 % (ref 0–1)
BILIRUB SERPL-MCNC: 0.29 MG/DL (ref 0.2–1)
BUN SERPL-MCNC: 16 MG/DL (ref 5–25)
CALCIUM ALBUM COR SERPL-MCNC: 9.7 MG/DL (ref 8.3–10.1)
CALCIUM SERPL-MCNC: 9.2 MG/DL (ref 8.3–10.1)
CHLORIDE SERPL-SCNC: 104 MMOL/L (ref 100–108)
CO2 SERPL-SCNC: 33 MMOL/L (ref 21–32)
CREAT SERPL-MCNC: 0.37 MG/DL (ref 0.6–1.3)
EOSINOPHIL # BLD AUTO: 0.53 THOUSAND/ΜL (ref 0–0.61)
EOSINOPHIL NFR BLD AUTO: 6 % (ref 0–6)
ERYTHROCYTE [DISTWIDTH] IN BLOOD BY AUTOMATED COUNT: 15.6 % (ref 11.6–15.1)
GFR SERPL CREATININE-BSD FRML MDRD: 108 ML/MIN/1.73SQ M
GLUCOSE P FAST SERPL-MCNC: 91 MG/DL (ref 65–99)
HCT VFR BLD AUTO: 41.2 % (ref 34.8–46.1)
HGB BLD-MCNC: 12.6 G/DL (ref 11.5–15.4)
IMM GRANULOCYTES # BLD AUTO: 0.03 THOUSAND/UL (ref 0–0.2)
IMM GRANULOCYTES NFR BLD AUTO: 0 % (ref 0–2)
LYMPHOCYTES # BLD AUTO: 2.46 THOUSANDS/ΜL (ref 0.6–4.47)
LYMPHOCYTES NFR BLD AUTO: 28 % (ref 14–44)
MCH RBC QN AUTO: 27 PG (ref 26.8–34.3)
MCHC RBC AUTO-ENTMCNC: 30.6 G/DL (ref 31.4–37.4)
MCV RBC AUTO: 88 FL (ref 82–98)
MONOCYTES # BLD AUTO: 0.71 THOUSAND/ΜL (ref 0.17–1.22)
MONOCYTES NFR BLD AUTO: 8 % (ref 4–12)
NEUTROPHILS # BLD AUTO: 5.04 THOUSANDS/ΜL (ref 1.85–7.62)
NEUTS SEG NFR BLD AUTO: 57 % (ref 43–75)
NRBC BLD AUTO-RTO: 0 /100 WBCS
PLATELET # BLD AUTO: 468 THOUSANDS/UL (ref 149–390)
PMV BLD AUTO: 10.9 FL (ref 8.9–12.7)
POTASSIUM SERPL-SCNC: 4.6 MMOL/L (ref 3.5–5.3)
PROT SERPL-MCNC: 7.4 G/DL (ref 6.4–8.2)
RBC # BLD AUTO: 4.67 MILLION/UL (ref 3.81–5.12)
SODIUM SERPL-SCNC: 140 MMOL/L (ref 136–145)
WBC # BLD AUTO: 8.84 THOUSAND/UL (ref 4.31–10.16)

## 2021-04-19 PROCEDURE — 85025 COMPLETE CBC W/AUTO DIFF WBC: CPT

## 2021-04-19 PROCEDURE — 80053 COMPREHEN METABOLIC PANEL: CPT

## 2021-04-19 PROCEDURE — 36415 COLL VENOUS BLD VENIPUNCTURE: CPT

## 2021-04-21 ENCOUNTER — HOSPITAL ENCOUNTER (OUTPATIENT)
Dept: CT IMAGING | Facility: HOSPITAL | Age: 71
Discharge: HOME | End: 2021-04-21
Payer: MEDICARE

## 2021-04-21 DIAGNOSIS — Z90.49 HX OF COLECTOMY: ICD-10-CM

## 2021-04-21 DIAGNOSIS — R10.9 ABDOMINAL PAIN, UNSPECIFIED ABDOMINAL LOCATION: ICD-10-CM

## 2021-04-21 DIAGNOSIS — D75.839 THROMBOCYTOSIS: ICD-10-CM

## 2021-04-21 DIAGNOSIS — Z87.898 HISTORY OF ABDOMINAL ABSCESS: ICD-10-CM

## 2021-04-21 DIAGNOSIS — Z93.3 COLOSTOMY STATUS (HCC): ICD-10-CM

## 2021-04-21 PROCEDURE — 74177 CT ABD & PELVIS W/CONTRAST: CPT

## 2021-04-21 RX ADMIN — IOHEXOL 100 ML: 350 INJECTION, SOLUTION INTRAVENOUS at 14:17

## 2021-04-23 NOTE — TELEPHONE ENCOUNTER
I called Anahi Plascencia of [facility] for clarification of what is needed for the oxygen order based on the message left 04/22/2021  After her research, she stated they did not get the full office note from 04/02/2021 - only had 5 of 12 pages - nor did they receive the oximetry report from 2016  All was refaxed to to her at fax# [phone], while we still on the phone  Anahi Plascencia did inform that patients need to re-qualify every five years per Medicare guidelines and this is where we are at with this patient  Once all the steps are completed, they will replace the patient's concentrator, which she will have for the next five years  Anahi Plascencia is aware that if what we sent is not sufficient, they will be referred to contact Dr Theodore Frye stated she knows him well and understood  Anahi Plascencia stated that once the fax comes through to her she will review and let us know what to do next

## 2021-04-23 NOTE — TELEPHONE ENCOUNTER
04/22/2021 2:37 pm  Message left on office voice mail from [facility] (?) of [facility]  Due to a strong accent and way of speaking I was not able to understand everything she said  From what I could understand, she is looking for an addendum of the 04/02/2021 office note for benefit, usage with special level, patient benefits, and plan of care  It was not specifically mentioned for oxygen, however, that is what we have sent to [facility] and have been working on

## 2021-04-26 DIAGNOSIS — N32.81 OVERACTIVE BLADDER: ICD-10-CM

## 2021-04-26 NOTE — TELEPHONE ENCOUNTER
RECEIVED DISC FROM [facility] SENDING [facility] [address] TO [facility] RADIOLOGY TO BE UPLOADED

## 2021-04-26 NOTE — TELEPHONE ENCOUNTER
Message left requesting a refill of [facility]  Pharmacy is [facility]       Last refill date: not filled by office yet  Last appointment: 4/12/21  Next appointment: 5/13/21

## 2021-04-29 RX ORDER — SOLIFENACIN SUCCINATE 10 MG/1
10 TABLET, FILM COATED ORAL DAILY
Qty: 90 TABLET | Refills: 1 | Status: SHIPPED | OUTPATIENT
Start: 2021-04-29 | End: 2021-11-03 | Stop reason: SDUPTHER

## 2021-05-07 ENCOUNTER — HOSPITAL ENCOUNTER (OUTPATIENT)
Dept: NON INVASIVE DIAGNOSTICS | Facility: HOSPITAL | Age: 71
Discharge: HOME | End: 2021-05-07
Payer: MEDICARE

## 2021-05-07 DIAGNOSIS — I07.1 TRICUSPID VALVE INSUFFICIENCY, UNSPECIFIED ETIOLOGY: ICD-10-CM

## 2021-05-07 DIAGNOSIS — I35.8 AORTIC VALVE SCLEROSIS: ICD-10-CM

## 2021-05-07 DIAGNOSIS — R00.0 INCREASED HEART RATE: ICD-10-CM

## 2021-05-07 DIAGNOSIS — I05.9 MITRAL ANNULAR CALCIFICATION: ICD-10-CM

## 2021-05-07 PROCEDURE — 93306 TTE W/DOPPLER COMPLETE: CPT

## 2021-05-07 PROCEDURE — 93306 TTE W/DOPPLER COMPLETE: CPT | Performed by: INTERNAL MEDICINE

## 2021-05-09 ENCOUNTER — NURSE TRIAGE (OUTPATIENT)
Dept: OTHER | Facility: OTHER | Age: 71
End: 2021-05-09

## 2021-05-09 DIAGNOSIS — G71.00 MUSCULAR DYSTROPHY (HCC): ICD-10-CM

## 2021-05-09 DIAGNOSIS — R30.0 DYSURIA: Primary | ICD-10-CM

## 2021-05-09 DIAGNOSIS — R35.0 URINARY FREQUENCY: ICD-10-CM

## 2021-05-09 RX ORDER — NITROFURANTOIN 25; 75 MG/1; MG/1
100 CAPSULE ORAL 2 TIMES DAILY
Qty: 10 CAPSULE | Refills: 0 | Status: SHIPPED | OUTPATIENT
Start: 2021-05-09 | End: 2021-05-14

## 2021-05-09 NOTE — TELEPHONE ENCOUNTER
TC to On-call, "Pt calling with urinary symptoms of frequency, urgency and some burning with urination  Started yesterday, afebrile  Pt states she has muscular dystrophy, uses a wheelchair and is recovering from prior surgery  Due to above she has been having difficulty with mobility and is using depends or incontinence pads frequently  Pt states she had a UTI 1 month ago and was prescribed Macrobid  Pt asking for Abx as she was up all night trying to get to the bathroom  Pt states she does not drive and would be difficult for her to go anywhere but she has someone that picks up Rx's for her  How should I advise?"    Per provider, will order abx, follow up with virtual appt tomorrow  Pt advised, appt scheduled  Reason for Disposition   Urinating more frequently than usual (i e , frequency)    Answer Assessment - Initial Assessment Questions  1  SYMPTOM: "What's the main symptom you're concerned about?" (e g , frequency, incontinence)      Urgency/frequency, urinating small amounts  2  ONSET: "When did the s/s start?"      7pm  3  PAIN: "Is there any pain?" If so, ask: "How bad is it?" (Scale: 1-10; mild, moderate, severe)      Mild  4  CAUSE: "What do you think is causing the symptoms?"      UTI? 5  OTHER SYMPTOMS: "Do you have any other symptoms?" (e g , fever, flank pain, blood in urine, pain with urination)     Denies    Pads/ Pull-ups recently, difficulty with mobility        Muscular dystrophy    Protocols used: [facility]

## 2021-05-09 NOTE — TELEPHONE ENCOUNTER
Regarding: Possible UTI  ----- Message from Candy Noble sent at 5/9/2021 10:31 AM EDT -----  "I believe I have another UTI  I just got one recently   I am in a wheelchair and don't have a vehicle, so I can't go to a clinic, but I do have someone who can  a prescription for me if prescribed "

## 2021-05-10 ENCOUNTER — TELEMEDICINE (OUTPATIENT)
Dept: FAMILY MEDICINE CLINIC | Facility: CLINIC | Age: 71
End: 2021-05-10
Payer: MEDICARE

## 2021-05-10 DIAGNOSIS — G71.09 DYSTROPHY, MUSCULAR, HEREDITARY PROGRESSIVE (HCC): ICD-10-CM

## 2021-05-10 DIAGNOSIS — R39.9 UTI SYMPTOMS: Primary | ICD-10-CM

## 2021-05-10 PROCEDURE — 99213 OFFICE O/P EST LOW 20 MIN: CPT | Performed by: FAMILY MEDICINE

## 2021-05-10 NOTE — PROGRESS NOTES
Virtual Regular Visit      Assessment/Plan:    Problem List Items Addressed This Visit        Musculoskeletal and Integument    Dystrophy, muscular, 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[facility] [address] )      Other Visit Diagnoses     UTI symptoms    -  Primary    resolving on macrobid, cpm             reschedule 05/13 appt for next month      Reason for visit is acute problem  Chief Complaint   Patient presents with    Virtual Regular Visit        Encounter provider Christie Caba DO    Provider located at [address] [place] [address], [place]  [facility] [id number]      Recent Visits  Date Type Provider Dept   05/10/21 Telemedicine Christie Caba, [address] [place] [address] recent visits within past 7 days and meeting all other requirements     Future Appointments  No visits were found meeting these conditions  Showing future appointments within next 150 days and meeting all other requirements      It was my intent to perform this visit via video technology but the patient was not able to do a video connection so the visit was completed via audio telephone only  The patient was identified by name and date of birth  Rocky Sawyer was informed that this is a telemedicine visit and that the visit is being conducted through telephone  My office door was closed  No one else was in the room  She acknowledged consent and understanding of privacy and security of the video platform  The patient has agreed to participate and understands they can discontinue the visit at any time  Patient is aware this is a billable service  Subjective  Rocky Sawyer is a 70 y o  female   Pt had called answering service on Sunday- had developed urinary frequency and urgency "came on me so quickly" and then later burning dysuria on Saturday and was concerned about another UTI- had similar in March  macrobid was escribed and she has already noticed improvement  No fever  No hematuria  Also states, "My muscles have gotten stronger that I can stand up longer and be able to pull up my panties myself, so won't be as dependent on pull-ups now when my aide is not here"      HPI     Past Medical History:   Diagnosis Date    Anxiety     Colitis     COPD [facility]ronic obstructive [facility] disease) [facility]     Depression     Difficult intubation     Excessive daytime sleepiness     GERD (gastroesophageal reflux disease)     Hyperlipidemia     Muscular dystrophy ([facility] )     Limb-girdle    Osteoporosis     Overactive bladder     Pneumonitis     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[facility] )     Skin cancer     Skin disorder     Vitamin D deficiency        Past Surgical History:   Procedure Laterality Date    COLONOSCOPY N/A 1/22/2019    Procedure: COLONOSCOPY;  Surgeon: Wendy Pelletier MD;  Location:  GI LAB;   Service: Colorectal    CYSTOSCOPY N/A 9/30/2020    Procedure: CYSTOSCOPY; BILATERAL URETERAL CATHETER PLACEMENT, CYSTOTOMY;  Surgeon: Binta Elkins MD;  [facility];  Service: Urology    FL CYSTOGRAM  10/15/2020    HARTMANS PROCEDURE N/A 9/30/2020    Procedure: EXPLORATORY LAPAROTOMY; LYSIS OF ADHESIONS; WASHOUT PELVIC ABSCESS; COMPLEX SIGMOID COLON RESECTION; LOOP TRANSVERSE COLOSTOMY;  Surgeon: Deloris Hickman MD;  [facility];  Service: General    IR DRAINAGE TUBE PLACEMENT  9/24/2020    MASTECTOMY Left 2007    left breast mastectomy     TONSILLECTOMY      TOOTH EXTRACTION      TUBAL LIGATION         Current Outpatient Medications   Medication Sig Dispense Refill    albuterol (PROVENTIL HFA,VENTOLIN HFA) 90 mcg/act inhaler Inhale 2 puffs every 6 (six) hours as needed for wheezing 1 Inhaler 2    alclomethasone (ACLOVATE) 0 05 % cream APPLY A THIN LAYER TWICE A DAY TO AFFECTED AREA(S)      Ascorbic Acid (VITAMIN C) 1000 MG tablet Take 1,000 mg by mouth daily      citalopram (CeleXA) 10 mg tablet Take 1 tablet (10 mg total) by mouth daily (Patient not taking: Reported on 4/2/2021) 90 tablet 1    clotrimazole-betamethasone (LOTRISONE) 1-0 05 % lotion   0    ELDERBERRY PO Take by mouth      FLAXSEED, LINSEED, PO Take 10 mg by mouth      fluticasone (FLONASE) 50 mcg/act nasal spray 2 sprays into each nostril daily 16 g 3    Indole-3-Carbinol POWD by Does not apply route      ketoconazole (NIZORAL) 2 % cream APPLY TWICE A DAY TO THE AFFECTED AREA(S)      ketoconazole (NIZORAL) 2 % shampoo Apply 1 application topically 2 (two) times a week 120 mL 1    magnesium gluconate (MAGONATE) 500 mg tablet Take 500 mg by mouth 2 (two) times a day      Melatonin 3 MG CAPS Take 3 mg by mouth      omeprazole (PriLOSEC) 20 mg delayed release capsule Take 1 capsule (20 mg total) by mouth every morning for 113 days 90 capsule 3    STARLA D ARCO PO Take by mouth      Potassium (POTASSIMIN PO) Take 550 mg by mouth      Probiotic Product (Florastor Plus) CAPS       S-Adenosylmethionine (SAMe) 400 MG TABS       solifenacin (VESIcare) 10 MG tablet Take 1 tablet (10 mg total) by mouth daily 90 tablet 1    traZODone (DESYREL) 50 mg tablet Take 1 tablet (50 mg total) by mouth daily at bedtime 90 tablet 1    triamcinolone (KENALOG) 0 1 % cream APPLY A THIN LAYER TWICE A DAY TO THE AFFECTED AREA(S) OF TRUNK      umeclidinium bromide (Incruse Ellipta) 62 5 mcg/inh AEPB inhaler Inhale 1 puff daily 1 each 1    umeclidinium-vilanterol (ANORO ELLIPTA) 62 5-25 MCG/INH inhaler Inhale 1 puff daily 1 Inhaler 6    VITAMIN D PO Take by mouth      Zinc 10 MG LOZG Apply to the mouth or throat       No current facility-administered medications for this visit  Allergies   Allergen Reactions    Amoxicillin      Vague rash in the 90s, tolerated zosyn on 9/2020 admission     Codeine      Other reaction(s): Other (See Comments)  n/v    Medical Tape Itching       Review of Systems   Gastrointestinal: Negative  Genitourinary: Negative for hematuria         Video Exam    There were no vitals filed for this visit  Physical Exam  Constitutional:       General: She is not in acute distress  Appearance: She is well-developed  She is not toxic-appearing or diaphoretic  HENT:      Head: Normocephalic and atraumatic  Mouth/Throat:      Pharynx: Uvula midline  Neck:      Trachea: Phonation normal    Pulmonary:      Effort: Pulmonary effort is normal    Skin:     Coloration: Skin is not pale  Neurological:      Mental Status: She is alert and oriented to person, place, and time  Psychiatric:         Behavior: Behavior is cooperative  I spent 10 minutes directly with the patient during this visit      VIRTUAL VISIT [id number] [facility] acknowledges that she has consented to an online visit or consultation  She understands that the online visit is based solely on information provided by her, and that, in the absence of a face-to-face physical evaluation by the physician, the diagnosis she receives is both limited and provisional in terms of accuracy and completeness  This is not intended to replace a full medical face-to-face evaluation by the physician  Mary Peter understands and accepts these terms

## 2021-05-12 NOTE — TELEPHONE ENCOUNTER
Spoke with Kirstin Thacker of [facility] and she is looking to confirm that the patient's O2 order is complete  She will call back when she has an answer

## 2021-05-19 NOTE — TELEPHONE ENCOUNTER
After two requests of [employer] as to whether any further information is needed in regard to this patient's oxygen order with no response or request for further information, this task is being closed

## 2021-05-26 ENCOUNTER — CONSULT (OUTPATIENT)
Dept: GASTROENTEROLOGY | Facility: MEDICAL CENTER | Age: 71
End: 2021-05-26
Payer: MEDICARE

## 2021-05-26 VITALS — HEART RATE: 89 BPM | DIASTOLIC BLOOD PRESSURE: 78 MMHG | SYSTOLIC BLOOD PRESSURE: 124 MMHG | TEMPERATURE: 98.7 F

## 2021-05-26 DIAGNOSIS — Z12.11 ENCOUNTER FOR COLORECTAL CANCER SCREENING: ICD-10-CM

## 2021-05-26 DIAGNOSIS — Z12.12 ENCOUNTER FOR COLORECTAL CANCER SCREENING: ICD-10-CM

## 2021-05-26 DIAGNOSIS — K55.9 ISCHEMIC COLITIS (HCC): ICD-10-CM

## 2021-05-26 DIAGNOSIS — R14.2 BELCHING: Primary | ICD-10-CM

## 2021-05-26 DIAGNOSIS — K57.20 DIVERTICULITIS OF LARGE INTESTINE WITH PERFORATION WITHOUT BLEEDING: ICD-10-CM

## 2021-05-26 PROCEDURE — 99214 OFFICE O/P EST MOD 30 MIN: CPT | Performed by: INTERNAL MEDICINE

## 2021-05-26 RX ORDER — SIMETHICONE 125 MG
125 TABLET,CHEWABLE ORAL EVERY 6 HOURS PRN
Qty: 30 TABLET | Refills: 3 | Status: SHIPPED | OUTPATIENT
Start: 2021-05-26 | End: 2022-06-29

## 2021-05-26 NOTE — PROGRESS NOTES
[facility] - Outpatient Consultation  Andrew Lobo 70 y o  female MRN: [id number]  Encounter: [id number]    HPI:    Andrew Lobo is a 70 y o  female who presents muscular dystrophy, COPD, valvular heart disease, h/o ischemic colitis, and history of perforated diverticulitis requiring L hemicolectomy with colostomy  She was referred to our clinic to discuss intermittent lower abdominal pain  She reports history of abdominal pain for several years and her last colonoscopy in 2019 showed patch ischemic colitis  She then developed worsening LLQ pain in 9/2020 and was hospitalized and found to have perforated sigmoid diverticulitis with abscess and sepsis  She underwent left hemicolectomy with Clayton's pouch and end colostomy  She also required bladder repair   She had a a prolonged hospital admission and was critically ill  She reports that, overall, she has done well since her surgery  She reports intermittent lower abdominal pain that is 2-3/10 ever since her surgery that can be exacerbated by movement (e g  from a bumpy ride in a vehicle)  The pain is not related to food or bowel function  The colostomy has been working well  She reports that for the past several weeks, this abdominal pain has improved and really does not bother her currently  She also had a non-contrast CT of the abdomen and pelvis on 4/21  which did not show abnormalities  Other issues we discussed today:  - Frequent belching  She was suspected to have gastroparesis in the past but has not had GES  She is not nauseated and does not have GERD symptoms  She takes daily omeprazole  She is not losing weight and, overall, is eating normally  - There is no FH of colon cancer and she has not had polyps previously  - She still has occasional nonbloody rectal discharge  1/22/2019 Colonoscopy:  Patchy mucosal ischemia of the splenic flexure and descending colon  Biopsies taken    Brisk bleeding from biopsy site  No signs of concerning transmural ischemia  Sigmoid diverticulosis  Otherwise normal colon  Fair prep would not allow polyp smaller than 5 mm to be detected reliably  A  Large Intestine, Left/Descending Colon, descending colon bx-cold:  -Benign colonic mucosa with superficial mucosal necrosis, regenerative- appearing crypts in the deeper portion of the mucosa and a hyalinized appearance of lamina propria  -Separate fragment of necroinflammatory exudate present  -No intravascular fibrin thrombi observed   -No evidence of dysplasia or malignancy  Note:  These features are suggestive of ischemic colitis, however Pseudo membranes colitis can not be ruled out  Clinical correlation is recommended  CT a/p 4/21/2021  STOMACH AND BOWEL:  Limited evaluation without enteric contrast   Left lower quadrant colostomy containing the transverse colon  Status post partial colonic resection  Rectal pouch identified  CT a/p 9/23/2020  STOMACH AND BOWEL:  Again identified are scattered colonic diverticula particularly in the sigmoid colon  There is evidence for acute diverticulitis in the mid sigmoid colon with a focal contained perforation along the mesenteric aspect of the colon   immediately adjacent to and with mass effect upon the left posterior uterine fundus  There is both gas and fluid identified in this vicinity measuring overall 3 4 x 2 7 x 3 0 cm, consistent with a developing abscess  Surrounding mesenteric inflammatory   change is identified  There is bowel wall thickening and pericolic inflammatory stranding along the length of the sigmoid colon present  No intestinal obstruction is identified  REVIEW OF SYSTEMS:  CONSTITUTIONAL: Denies any fever, chills, rigors, and weight loss  HEENT: No earache or tinnitus  Denies hearing loss or visual disturbances  CARDIOVASCULAR: No chest pain or palpitations     RESPIRATORY: Denies any cough, hemoptysis, shortness of breath or dyspnea on exertion  GASTROINTESTINAL: As noted in the History of Present Illness  GENITOURINARY: No problems with urination  Denies any hematuria or dysuria  NEUROLOGIC: No dizziness or vertigo, denies headaches  MUSCULOSKELETAL: Chronic muscle pain  SKIN: Denies skin rashes or itching  ENDOCRINE: Denies excessive thirst  Denies intolerance to heat or cold  PSYCHOSOCIAL: Denies depression or anxiety  Denies any recent memory loss  Historical Information   Past Medical History:   Diagnosis Date    Anxiety     Colitis     COPD (chronic obstructive pulmonary disease) ([place] [address] )     Depression     Difficult intubation     Excessive daytime sleepiness     GERD (gastroesophageal reflux disease)     Hyperlipidemia     Muscular dystrophy ([place] [address] )     Limb-girdle    Osteoporosis     Overactive bladder     Pneumonitis     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[place] [address] )     Skin cancer     Skin disorder     Vitamin D deficiency      Past Surgical History:   Procedure Laterality Date    COLONOSCOPY N/A 1/22/2019    Procedure: COLONOSCOPY;  Surgeon: Corie Corrigan MD;  Location:  GI LAB;   Service: Colorectal    CYSTOSCOPY N/A 9/30/2020    Procedure: CYSTOSCOPY; BILATERAL URETERAL CATHETER PLACEMENT, CYSTOTOMY;  Surgeon: Alie Cross MD;  [facility];  Service: Urology    FL CYSTOGRAM  10/15/2020    HARTMANS PROCEDURE N/A 9/30/2020    Procedure: EXPLORATORY LAPAROTOMY; LYSIS OF ADHESIONS; WASHOUT PELVIC ABSCESS; COMPLEX SIGMOID COLON RESECTION; LOOP TRANSVERSE COLOSTOMY;  Surgeon: Crystal Lopez MD;  [facility];  Service: General    IR DRAINAGE TUBE PLACEMENT  9/24/2020    MASTECTOMY Left 2007    left breast mastectomy     TONSILLECTOMY      TOOTH EXTRACTION      TUBAL LIGATION       Social History   Social History     Substance and Sexual Activity   Alcohol Use Not Currently    Frequency: Monthly or less    Drinks per session: 1 or 2    Binge frequency: Never    Comment: social drinker per allscripts      Social History     Substance and Sexual Activity   Drug Use Yes    Types: Marijuana    Comment: oral use     Social History     Tobacco Use   Smoking Status Former Smoker    Packs/day: 1 50    Years: 41 00    Pack years: 61 50    Types: Cigarettes    Start date: 5    Quit date:     Years since quittin 4   Smokeless Tobacco Never Used     Family History   Problem Relation Age of Onset    Other Mother         bone loss    Skin cancer Father     Hypertension Father         benign     Other Father         bone loss    Muscular dystrophy Father     Muscular dystrophy Brother         of the limb gridle     Muscular dystrophy Family         of the limb girdle    Hypertension Sister     No Known Problems Sister     No Known Problems Brother        Meds/Allergies       Current Outpatient Medications:     albuterol (PROVENTIL HFA,VENTOLIN HFA) 90 mcg/act inhaler    alclomethasone (ACLOVATE) 0 05 % cream    Ascorbic Acid (VITAMIN C) 1000 MG tablet    clotrimazole-betamethasone (LOTRISONE) 1-0 05 % lotion    ELDERBERRY PO    FLAXSEED, LINSEED, PO    fluticasone (FLONASE) 50 mcg/act nasal spray    Indole-3-Carbinol POWD    ketoconazole (NIZORAL) 2 % cream    ketoconazole (NIZORAL) 2 % shampoo    magnesium gluconate (MAGONATE) 500 mg tablet    Melatonin 3 MG CAPS    STARLA D ARCO PO    Potassium (POTASSIMIN PO)    Probiotic Product (Florastor Plus) CAPS    S-Adenosylmethionine (SAMe) 400 MG TABS    solifenacin (VESIcare) 10 MG tablet    triamcinolone (KENALOG) 0 1 % cream    umeclidinium bromide (Incruse Ellipta) 62 5 mcg/inh AEPB inhaler    umeclidinium-vilanterol (ANORO ELLIPTA) 62 5-25 MCG/INH inhaler    VITAMIN D PO    Zinc 10 MG LOZG    citalopram (CeleXA) 10 mg tablet    omeprazole (PriLOSEC) 20 mg delayed release capsule    simethicone (MYLICON) 679 MG chewable tablet    traZODone (DESYREL) 50 mg tablet    Allergies   Allergen Reactions    Amoxicillin      Vague rash in the 90s, tolerated zosyn on 9/2020 admission     Codeine      Other reaction(s): Other (See Comments)  n/v    Medical Tape Itching       Objective   Blood pressure 124/78, pulse 89, temperature 98 7 °F (37 1 °C), not currently breastfeeding  There is no height or weight on file to calculate BMI  PHYSICAL EXAM:    General Appearance:   Alert, cooperative, no distress   HEENT:   Normocephalic, atraumatic, anicteric  Neck:  Supple, symmetrical, trachea midline   Lungs:   Clear to auscultation bilaterally; no rales, rhonchi or wheezing; respirations unlabored    Heart[de-identified]   Regular rate and rhythm; no murmur, rub, or gallop  Abdomen:   Soft, mild RLQ ttp, non-distended; normal bowel sounds; no masses, no organomegaly    Genitalia:   Deferred    Rectal:   Deferred    Extremities:  No cyanosis, clubbing or edema    Pulses:  2+ and symmetric    Skin:  No jaundice, rashes, or lesions    Lymph nodes:  No palpable cervical lymphadenopathy        Lab Results:   No visits with results within 1 Day(s) from this visit     Latest known visit with results is:   Appointment on 04/19/2021   Component Date Value    WBC 04/19/2021 8 84     RBC 04/19/2021 4 67     Hemoglobin 04/19/2021 12 6     Hematocrit 04/19/2021 41 2     MCV 04/19/2021 88     MCH 04/19/2021 27 0     MCHC 04/19/2021 30 6*    RDW 04/19/2021 15 6*    MPV 04/19/2021 10 9     Platelets 56/38/4744 468*    nRBC 04/19/2021 0     Neutrophils Relative 04/19/2021 57     Immat GRANS % 04/19/2021 0     Lymphocytes Relative 04/19/2021 28     Monocytes Relative 04/19/2021 8     Eosinophils Relative 04/19/2021 6     Basophils Relative 04/19/2021 1     Neutrophils Absolute 04/19/2021 5 04     Immature Grans Absolute 04/19/2021 0 03     Lymphocytes Absolute 04/19/2021 2 46     Monocytes Absolute 04/19/2021 0 71     Eosinophils Absolute 04/19/2021 0 53     Basophils Absolute 04/19/2021 0 07     Sodium 04/19/2021 140     Potassium 04/19/2021 4 6     Chloride 04/19/2021 104     CO2 04/19/2021 33*    ANION GAP 04/19/2021 3*    BUN 04/19/2021 16     Creatinine 04/19/2021 0 37*    Glucose, Fasting 04/19/2021 91     Calcium 04/19/2021 9 2     Corrected Calcium 04/19/2021 9 7     AST 04/19/2021 13     ALT 04/19/2021 18     Alkaline Phosphatase 04/19/2021 84     Total Protein 04/19/2021 7 4     Albumin 04/19/2021 3 4*    Total Bilirubin 04/19/2021 0 29     eGFR 04/19/2021 108        Lab Results   Component Value Date    WBC 8 84 04/19/2021    HGB 12 6 04/19/2021    HCT 41 2 04/19/2021    MCV 88 04/19/2021     (H) 04/19/2021       Lab Results   Component Value Date     12/09/2015    SODIUM 140 04/19/2021    K 4 6 04/19/2021     04/19/2021    CO2 33 (H) 04/19/2021    ANIONGAP 8 12/09/2015    AGAP 3 (L) 04/19/2021    BUN 16 04/19/2021    CREATININE 0 37 (L) 04/19/2021    GLUC 94 10/20/2020    GLUF 91 04/19/2021    CALCIUM 9 2 04/19/2021    AST 13 04/19/2021    ALT 18 04/19/2021    ALKPHOS 84 04/19/2021    PROT 7 9 12/09/2015    TP 7 4 04/19/2021    BILITOT 0 36 12/09/2015    TBILI 0 29 04/19/2021    EGFR 108 04/19/2021       No results found for: CRP    No results found for: [id number], TSH    No results found for: IRON, TIBC, FERRITIN    Radiology Results:   No results found  ______________________________________________________________________  ASSESSMENT AND PLAN:    Antonio Cervantes is a 70 y o  female with muscular dystrophy, COPD, restrictive lung disease, and recent history of ischemic colitis and perforated diverticulitis  She has intermittent lower abdominal pain, which is chronic and has recently improved  She also has suspected gastroparesis but has not had GES and is tolerating POs  We discussed the following issues  (1) Abdominal pain  Possibly related to adhesions  Not bothering her currently  CT reassuring    We are not planning additional workup at this time  (2) Colorectal cancer screening  No history of polyps in the past and no FH of colon cancer  She is at high risk for complications due to chronic lung disease and muscular dystrophy  Last colonoscopy in 2019  Not due for screening now  Can discuss risks and benefits of future scopes (Clayton pouch and through colostomy) but probably risks > benefits especially if she is asymptomatic  (3) Mucus discharge from rectum - suspect she has diversion colitis  Explained that the remaining rectum will continue to produce mucus and may be prone to bleeding  (4) Suspected gastroparesis in the past - again, not something that is an issue fr her now  We can consider GES in the future but she is able to eat currently  Recommend small frequent meals  She is on daily PPI for GERD  (5) Belching - will try simethicone  She is at risk for SIBO especially if she has a motility issue  Return in 3 months    1  Belching    2  Ischemic colitis ([address] )    3  Diverticulitis of large intestine with perforation without bleeding    4  Encounter for colorectal cancer screening        No orders of the defined types were placed in this encounter

## 2021-06-02 ENCOUNTER — OFFICE VISIT (OUTPATIENT)
Dept: CARDIOLOGY CLINIC | Facility: CLINIC | Age: 71
End: 2021-06-02
Payer: MEDICARE

## 2021-06-02 VITALS
OXYGEN SATURATION: 96 % | HEART RATE: 91 BPM | SYSTOLIC BLOOD PRESSURE: 118 MMHG | BODY MASS INDEX: 30.08 KG/M2 | HEIGHT: 60 IN | DIASTOLIC BLOOD PRESSURE: 72 MMHG

## 2021-06-02 DIAGNOSIS — J43.2 CENTRILOBULAR EMPHYSEMA (HCC): Primary | ICD-10-CM

## 2021-06-02 DIAGNOSIS — J96.11 CHRONIC RESPIRATORY FAILURE WITH HYPOXIA (HCC): ICD-10-CM

## 2021-06-02 DIAGNOSIS — R00.0 TACHYCARDIA: ICD-10-CM

## 2021-06-02 DIAGNOSIS — I35.8 AORTIC VALVE SCLEROSIS: ICD-10-CM

## 2021-06-02 DIAGNOSIS — I05.9 MITRAL ANNULAR CALCIFICATION: ICD-10-CM

## 2021-06-02 DIAGNOSIS — I07.1 TRICUSPID VALVE INSUFFICIENCY, UNSPECIFIED ETIOLOGY: ICD-10-CM

## 2021-06-02 DIAGNOSIS — R00.0 INCREASED HEART RATE: ICD-10-CM

## 2021-06-02 PROCEDURE — 99203 OFFICE O/P NEW LOW 30 MIN: CPT | Performed by: INTERNAL MEDICINE

## 2021-06-02 NOTE — PROGRESS NOTES
Consultation - Cardiology   Li Gordillo 70 y o  female MRN: [id number]  Unit/Bed#:  Encounter: [id number]  Physician Requesting Consult: No att  providers found  Reason for Consult / Principal Problem: cardiac evaluation    Assessment:  1  Aortic sclerosis  2  Pulmonary HTN  3  Tachycardia  4  COPD  5  Multiple Sclerosis / on O2 at night    Plan:  I reviewed all of her records and her cardiac testing  Her echos show a normal EF with normal valve function  Her Sinus Tachycardia, resting HR in the 90s, and mild pulmonary HTN are secondary to her MS and COPD  No treatment is needed  She does not have significant risk factors for CAD  I have reviewed her medications and made no changes  No cardiac testing is needed  Return to office as needed  History of Present Illness     HPI: Li Gordillo is a 70y o  year old female who denies any past cardiac history including MI, CAD, cardiac arrhythmia, CHF  She has MS and needs O2 at night  She is a former smoker and has mild COPD     2 recent echos have shown a normal EF with normal valve function  She did have  mild aortic sclerosis and mild pulmonary HTN  BP is well controlled  Her resting HR is in the 90s  It does increased appropriately with activity  I reviewed her records for 2020 when she was very sick with a perforated colon and required emergent surgery and colostomy  She had no cardiac problems or cardiac arrhythmias at that time  She denies CP, SOB, palpitations, dizziness        Review of Systems:    Alert awake oriented, comfortable, denies any complaints  No fevers chills nausea vomiting  No weakness, dizziness, seizures  no cough, shortness of breath, or wheezing  Denies any palpitations, chest pain, diaphoresis  Denies leg edema, pain or paresthesias  Denies any skin rashes  Denies abdominal pain, bloody stools, masses  Denies any depression or suicidal ideations      Historical Information   Past Medical History:   Diagnosis Date    Anxiety     Colitis     COPD (chronic obstructive pulmonary disease) (HCC)     Depression     Difficult intubation     Excessive daytime sleepiness     GERD (gastroesophageal reflux disease)     Hyperlipidemia     Muscular dystrophy ([facility] )     Limb-girdle    Osteoporosis     Overactive bladder     Pneumonitis     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[facility] )     Skin cancer     Skin disorder     Vitamin D deficiency      Past Surgical History:   Procedure Laterality Date    COLONOSCOPY N/A 2019    Procedure: COLONOSCOPY;  Surgeon: Bridgett Mccullough MD;  Location:  GI LAB;   Service: Colorectal    CYSTOSCOPY N/A 2020    Procedure: CYSTOSCOPY; BILATERAL URETERAL CATHETER PLACEMENT, CYSTOTOMY;  Surgeon: Brant Marshall MD;  [facility];  Service: Urology    FL CYSTOGRAM  10/15/2020    HARTMANS PROCEDURE N/A 2020    Procedure: EXPLORATORY LAPAROTOMY; LYSIS OF ADHESIONS; WASHOUT PELVIC ABSCESS; COMPLEX SIGMOID COLON RESECTION; LOOP TRANSVERSE COLOSTOMY;  Surgeon: Sangeetha Yun MD;  [facility];  Service: General    IR DRAINAGE TUBE PLACEMENT  2020    MASTECTOMY Left 2007    left breast mastectomy     TONSILLECTOMY      TOOTH EXTRACTION      TUBAL LIGATION       Social History     Substance and Sexual Activity   Alcohol Use Not Currently    Frequency: Monthly or less    Drinks per session: 1 or 2    Binge frequency: Never    Comment: social drinker per allscripts      Social History     Substance and Sexual Activity   Drug Use Yes    Types: Marijuana    Comment: oral use     Social History     Tobacco Use   Smoking Status Former Smoker    Packs/day: 1 50    Years: 41 00    Pack years: 61 50    Types: Cigarettes    Start date: 5    Quit date:     Years since quittin 4   Smokeless Tobacco Never Used     Family History: non-contributory    Meds/Allergies   all current active meds have been reviewed  Allergies Allergen Reactions    Amoxicillin      Vague rash in the 90s, tolerated zosyn on 9/2020 admission     Codeine      Other reaction(s): Other (See Comments)  n/v    Medical Tape Itching       Objective   Vitals: Blood pressure 118/72, pulse 91, height 5' (1 524 m), SpO2 96 %, not currently breastfeeding , Body mass index is 30 08 kg/m²  ,   Weight (last 2 days)     None                    Physical Exam:  GEN: Alexandre Gerber appears well, alert and oriented x 3, pleasant and cooperative   HEENT: pupils equal, round, and reactive to light; extraocular muscles intact  NECK: supple, no carotid bruits   HEART: regular rhythm, normal S1 and S2, no murmurs, clicks, gallops or rubs   LUNGS: clear to auscultation bilaterally; no wheezes, rales, or rhonchi   ABDOMEN: normal bowel sounds, soft, no tenderness, no distention  EXTREMITIES: peripheral pulses normal; no clubbing, cyanosis, or edema  NEURO: no focal findings   SKIN: normal without suspicious lesions on exposed skin    Lab Results:   No visits with results within 1 Day(s) from this visit     Latest known visit with results is:   Appointment on 04/19/2021   Component Date Value Ref Range Status    WBC 04/19/2021 8 84  4 31 - 10 16 Thousand/uL Final    RBC 04/19/2021 4 67  3 81 - 5 12 Million/uL Final    Hemoglobin 04/19/2021 12 6  11 5 - 15 4 g/dL Final    Hematocrit 04/19/2021 41 2  34 8 - 46 1 % Final    MCV 04/19/2021 88  82 - 98 fL Final    MCH 04/19/2021 27 0  26 8 - 34 3 pg Final    MCHC 04/19/2021 30 6* 31 4 - 37 4 g/dL Final    RDW 04/19/2021 15 6* 11 6 - 15 1 % Final    MPV 04/19/2021 10 9  8 9 - 12 7 fL Final    Platelets 09/15/9130 468* 149 - 390 Thousands/uL Final    nRBC 04/19/2021 0  /100 WBCs Final    Neutrophils Relative 04/19/2021 57  43 - 75 % Final    Immat GRANS % 04/19/2021 0  0 - 2 % Final    Lymphocytes Relative 04/19/2021 28  14 - 44 % Final    Monocytes Relative 04/19/2021 8  4 - 12 % Final    Eosinophils Relative 04/19/2021 6  0 - 6 % Final    Basophils Relative 04/19/2021 1  0 - 1 % Final    Neutrophils Absolute 04/19/2021 5 04  1 85 - 7 62 Thousands/µL Final    Immature Grans Absolute 04/19/2021 0 03  0 00 - 0 20 Thousand/uL Final    Lymphocytes Absolute 04/19/2021 2 46  0 60 - 4 47 Thousands/µL Final    Monocytes Absolute 04/19/2021 0 71  0 17 - 1 22 Thousand/µL Final    Eosinophils Absolute 04/19/2021 0 53  0 00 - 0 61 Thousand/µL Final    Basophils Absolute 04/19/2021 0 07  0 00 - 0 10 Thousands/µL Final    Sodium 04/19/2021 140  136 - 145 mmol/L Final    Potassium 04/19/2021 4 6  3 5 - 5 3 mmol/L Final    Chloride 04/19/2021 104  100 - 108 mmol/L Final    CO2 04/19/2021 33* 21 - 32 mmol/L Final    ANION GAP 04/19/2021 3* 4 - 13 mmol/L Final    BUN 04/19/2021 16  5 - 25 mg/dL Final    Creatinine 04/19/2021 0 37* 0 60 - 1 30 mg/dL Final    Standardized to IDMS reference method    Glucose, Fasting 04/19/2021 91  65 - 99 mg/dL Final    Specimen collection should occur prior to Sulfasalazine administration due to the potential for falsely depressed results  Specimen collection should occur prior to Sulfapyridine administration due to the potential for falsely elevated results   Calcium 04/19/2021 9 2  8 3 - 10 1 mg/dL Final    Corrected Calcium 04/19/2021 9 7  8 3 - 10 1 mg/dL Final    AST 04/19/2021 13  5 - 45 U/L Final    Specimen collection should occur prior to Sulfasalazine administration due to the potential for falsely depressed results   ALT 04/19/2021 18  12 - 78 U/L Final    Specimen collection should occur prior to Sulfasalazine and/or Sulfapyridine administration due to the potential for falsely depressed results       Alkaline Phosphatase 04/19/2021 84  46 - 116 U/L Final    Total Protein 04/19/2021 7 4  6 4 - 8 2 g/dL Final    Albumin 04/19/2021 3 4* 3 5 - 5 0 g/dL Final    Total Bilirubin 04/19/2021 0 29  0 20 - 1 00 mg/dL Final    Use of this assay is not recommended for patients undergoing treatment with eltrombopag due to the potential for falsely elevated results   eGFR 04/19/2021 108  ml/min/1 73sq m Final                       Imaging: I have personally reviewed pertinent reports

## 2021-06-07 DIAGNOSIS — J44.9 CHRONIC OBSTRUCTIVE PULMONARY DISEASE, UNSPECIFIED COPD TYPE (HCC): ICD-10-CM

## 2021-06-09 DIAGNOSIS — G47.00 INSOMNIA, UNSPECIFIED TYPE: ICD-10-CM

## 2021-06-09 DIAGNOSIS — F33.0 MILD EPISODE OF RECURRENT MAJOR DEPRESSIVE DISORDER (HCC): ICD-10-CM

## 2021-06-09 NOTE — TELEPHONE ENCOUNTER
Last refill date:     trazadone  9/18/2020  Fluticasone  4/9/2021    Last appointment: 5/10/2021  Next appointment: 6/28/2021

## 2021-06-10 RX ORDER — CITALOPRAM 10 MG/1
10 TABLET ORAL DAILY
Qty: 90 TABLET | Refills: 1 | Status: SHIPPED | OUTPATIENT
Start: 2021-06-10 | End: 2022-03-15

## 2021-06-10 RX ORDER — TRAZODONE HYDROCHLORIDE 50 MG/1
50 TABLET ORAL
Qty: 90 TABLET | Refills: 1 | Status: SHIPPED | OUTPATIENT
Start: 2021-06-10 | End: 2021-11-26

## 2021-06-10 RX ORDER — UMECLIDINIUM 62.5 UG/1
1 AEROSOL, POWDER ORAL DAILY
Qty: 1 EACH | Refills: 1 | Status: SHIPPED | OUTPATIENT
Start: 2021-06-10 | End: 2021-10-05 | Stop reason: ALTCHOICE

## 2021-06-16 DIAGNOSIS — J00 ACUTE RHINITIS: ICD-10-CM

## 2021-06-16 DIAGNOSIS — R12 HEART BURN: ICD-10-CM

## 2021-06-16 RX ORDER — FLUTICASONE PROPIONATE 50 MCG
SPRAY, SUSPENSION (ML) NASAL
Qty: 16 G | Refills: 3 | Status: SHIPPED | OUTPATIENT
Start: 2021-06-16

## 2021-06-16 NOTE — TELEPHONE ENCOUNTER
Message left requesting a refill of Omeprazole  Pharmacy is [facility] in [facility]      Last refill date: 8/14/19 #90 with three refills   Last appointment: 5/10/21  Next appointment: 6/28/21

## 2021-06-18 RX ORDER — OMEPRAZOLE 20 MG/1
20 CAPSULE, DELAYED RELEASE ORAL EVERY MORNING
Qty: 90 CAPSULE | Refills: 1 | Status: SHIPPED | OUTPATIENT
Start: 2021-06-18 | End: 2021-09-29

## 2021-06-24 ENCOUNTER — TELEPHONE (OUTPATIENT)
Dept: FAMILY MEDICINE CLINIC | Facility: CLINIC | Age: 71
End: 2021-06-24

## 2021-06-24 DIAGNOSIS — G71.09 DYSTROPHY, MUSCULAR, HEREDITARY PROGRESSIVE (HCC): ICD-10-CM

## 2021-06-24 DIAGNOSIS — Z93.3 COLOSTOMY STATUS (HCC): ICD-10-CM

## 2021-06-24 DIAGNOSIS — K94.02: Primary | ICD-10-CM

## 2021-06-24 DIAGNOSIS — J98.4 RESTRICTIVE LUNG DISEASE: ICD-10-CM

## 2021-06-24 DIAGNOSIS — Z99.81 ON HOME OXYGEN THERAPY: ICD-10-CM

## 2021-06-24 NOTE — TELEPHONE ENCOUNTER
Pt is looking to get in home wound  nurse care  She's having issues around her stoma? Says it is all red and inflamed  Itchy and oozing  Please advise thank you

## 2021-06-24 NOTE — TELEPHONE ENCOUNTER
Please ask patient if she has a homecare company in mind to use, or is it ok with her for me to send order to [address] [place] [address]?

## 2021-06-25 ENCOUNTER — TELEPHONE (OUTPATIENT)
Dept: FAMILY MEDICINE CLINIC | Facility: CLINIC | Age: 71
End: 2021-06-25

## 2021-06-25 NOTE — TELEPHONE ENCOUNTER
Right now, patient's concern is her new ostomy abnormality, but any general nursing assessment would be appreciated

## 2021-06-25 NOTE — TELEPHONE ENCOUNTER
Isaiah Gordon called to thank you for the referral of this patient  She wants to confirm that you are asking only for nursing for wound care? Please confirm

## 2021-06-28 ENCOUNTER — OFFICE VISIT (OUTPATIENT)
Dept: FAMILY MEDICINE CLINIC | Facility: CLINIC | Age: 71
End: 2021-06-28
Payer: MEDICARE

## 2021-06-28 VITALS
TEMPERATURE: 97.6 F | SYSTOLIC BLOOD PRESSURE: 120 MMHG | RESPIRATION RATE: 17 BRPM | DIASTOLIC BLOOD PRESSURE: 74 MMHG | WEIGHT: 155 LBS | HEIGHT: 60 IN | OXYGEN SATURATION: 98 % | BODY MASS INDEX: 30.43 KG/M2 | HEART RATE: 80 BPM

## 2021-06-28 DIAGNOSIS — J96.11 CHRONIC RESPIRATORY FAILURE WITH HYPOXIA (HCC): ICD-10-CM

## 2021-06-28 DIAGNOSIS — Z11.59 NEED FOR HEPATITIS C SCREENING TEST: ICD-10-CM

## 2021-06-28 DIAGNOSIS — Z93.3 COLOSTOMY STATUS (HCC): ICD-10-CM

## 2021-06-28 DIAGNOSIS — J98.4 RESTRICTIVE LUNG DISEASE: ICD-10-CM

## 2021-06-28 DIAGNOSIS — Z13.29 THYROID DISORDER SCREEN: ICD-10-CM

## 2021-06-28 DIAGNOSIS — D75.839 THROMBOCYTOSIS: ICD-10-CM

## 2021-06-28 DIAGNOSIS — M81.0 AGE-RELATED OSTEOPOROSIS WITHOUT CURRENT PATHOLOGICAL FRACTURE: ICD-10-CM

## 2021-06-28 DIAGNOSIS — N32.81 OVERACTIVE BLADDER: ICD-10-CM

## 2021-06-28 DIAGNOSIS — Z86.39 HISTORY OF VITAMIN D DEFICIENCY: ICD-10-CM

## 2021-06-28 DIAGNOSIS — G71.00 MUSCULAR DYSTROPHY (HCC): ICD-10-CM

## 2021-06-28 DIAGNOSIS — Z78.0 POSTMENOPAUSAL: ICD-10-CM

## 2021-06-28 DIAGNOSIS — M81.0 OSTEOPOROSIS WITHOUT CURRENT PATHOLOGICAL FRACTURE, UNSPECIFIED OSTEOPOROSIS TYPE: ICD-10-CM

## 2021-06-28 DIAGNOSIS — D05.12 DUCTAL CARCINOMA IN SITU (DCIS) OF LEFT BREAST: ICD-10-CM

## 2021-06-28 DIAGNOSIS — Z99.81 ON HOME OXYGEN THERAPY: ICD-10-CM

## 2021-06-28 DIAGNOSIS — J43.2 CENTRILOBULAR EMPHYSEMA (HCC): ICD-10-CM

## 2021-06-28 DIAGNOSIS — K55.9 ISCHEMIC COLITIS (HCC): ICD-10-CM

## 2021-06-28 DIAGNOSIS — Z00.00 MEDICARE ANNUAL WELLNESS VISIT, SUBSEQUENT: Primary | ICD-10-CM

## 2021-06-28 DIAGNOSIS — Z87.898 HISTORY OF ABDOMINAL ABSCESS: ICD-10-CM

## 2021-06-28 DIAGNOSIS — Z86.59 HISTORY OF DEPRESSION: ICD-10-CM

## 2021-06-28 DIAGNOSIS — Z13.220 LIPID SCREENING: ICD-10-CM

## 2021-06-28 PROCEDURE — 1123F ACP DISCUSS/DSCN MKR DOCD: CPT | Performed by: FAMILY MEDICINE

## 2021-06-28 PROCEDURE — G0439 PPPS, SUBSEQ VISIT: HCPCS | Performed by: FAMILY MEDICINE

## 2021-06-28 PROCEDURE — 99214 OFFICE O/P EST MOD 30 MIN: CPT | Performed by: FAMILY MEDICINE

## 2021-06-28 RX ORDER — DIAZEPAM 5 MG/1
5 TABLET ORAL EVERY 6 HOURS PRN
COMMUNITY
End: 2022-01-03 | Stop reason: SDUPTHER

## 2021-06-28 NOTE — PATIENT INSTRUCTIONS
Medicare Preventive Visit Patient Instructions  Thank you for completing your Welcome to Medicare Visit or Medicare Annual Wellness Visit today  Your next wellness visit will be due in one year (6/29/2022)  The screening/preventive services that you may require over the next 5-10 years are detailed below  Some tests may not apply to you based off risk factors and/or age  Screening tests ordered at today's visit but not completed yet may show as past due  Also, please note that scanned in results may not display below  Preventive Screenings:  Service Recommendations Previous Testing/Comments   Colorectal Cancer Screening  * Colonoscopy    * Fecal Occult Blood Test (FOBT)/Fecal Immunochemical Test (FIT)  * Fecal DNA/Cologuard Test  * Flexible Sigmoidoscopy Age: 54-65 years old   Colonoscopy: every 10 years (may be performed more frequently if at higher risk)  OR  FOBT/FIT: every 1 year  OR  Cologuard: every 3 years  OR  Sigmoidoscopy: every 5 years  Screening may be recommended earlier than age 48 if at higher risk for colorectal cancer  Also, an individualized decision between you and your healthcare provider will decide whether screening between the ages of 74-80 would be appropriate  Colonoscopy: 01/22/2019  FOBT/FIT: Not on file  Cologuard: Not on file  Sigmoidoscopy: Not on file    Screening Current     Breast Cancer Screening Age: 36 years old  Frequency: every 1-2 years  Not required if history of left and right mastectomy Mammogram: 10/17/2019    History Breast Cancer   Cervical Cancer Screening Between the ages of 21-29, pap smear recommended once every 3 years  Between the ages of 33-67, can perform pap smear with HPV co-testing every 5 years     Recommendations may differ for women with a history of total hysterectomy, cervical cancer, or abnormal pap smears in past  Pap Smear: Not on file    Screening Not Indicated   Hepatitis C Screening Once for adults born between 1945 and 1965  More frequently in patients at high risk for Hepatitis C Hep C Antibody: Not on file        Diabetes Screening 1-2 times per year if you're at risk for diabetes or have pre-diabetes Fasting glucose: 91 mg/dL   A1C: No results in last 5 years    Screening Current   Cholesterol Screening Once every 5 years if you don't have a lipid disorder  May order more often based on risk factors  Lipid panel: Not on file    Screening Not Indicated  History Lipid Disorder     Other Preventive Screenings Covered by Medicare:  1  Abdominal Aortic Aneurysm (AAA) Screening: covered once if your at risk  You're considered to be at risk if you have a family history of AAA  2  Lung Cancer Screening: covers low dose CT scan once per year if you meet all of the following conditions: (1) Age 50-69; (2) No signs or symptoms of lung cancer; (3) Current smoker or have quit smoking within the last 15 years; (4) You have a tobacco smoking history of at least 30 pack years (packs per day multiplied by number of years you smoked); (5) You get a written order from a healthcare provider  3  Glaucoma Screening: covered annually if you're considered high risk: (1) You have diabetes OR (2) Family history of glaucoma OR (3)  aged 48 and older OR (3)  American aged 72 and older  3  Osteoporosis Screening: covered every 2 years if you meet one of the following conditions: (1) You're estrogen deficient and at risk for osteoporosis based off medical history and other findings; (2) Have a vertebral abnormality; (3) On glucocorticoid therapy for more than 3 months; (4) Have primary hyperparathyroidism; (5) On osteoporosis medications and need to assess response to drug therapy  · Last bone density test (DXA Scan): 03/19/2019   5  HIV Screening: covered annually if you're between the age of 15-65  Also covered annually if you are younger than 13 and older than 72 with risk factors for HIV infection   For pregnant patients, it is covered up to 3 times per pregnancy  Immunizations:  Immunization Recommendations   Influenza Vaccine Annual influenza vaccination during flu season is recommended for all persons aged >= 6 months who do not have contraindications   Pneumococcal Vaccine (Prevnar and Pneumovax)  * Prevnar = PCV13  * Pneumovax = PPSV23   Adults 25-60 years old: 1-3 doses may be recommended based on certain risk factors  Adults 72 years old: Prevnar (PCV13) vaccine recommended followed by Pneumovax (PPSV23) vaccine  If already received PPSV23 since turning 65, then PCV13 recommended at least one year after PPSV23 dose  Hepatitis B Vaccine 3 dose series if at intermediate or high risk (ex: diabetes, end stage renal disease, liver disease)   Tetanus (Td) Vaccine - COST NOT COVERED BY MEDICARE PART B Following completion of primary series, a booster dose should be given every 10 years to maintain immunity against tetanus  Td may also be given as tetanus wound prophylaxis  Tdap Vaccine - COST NOT COVERED BY MEDICARE PART B Recommended at least once for all adults  For pregnant patients, recommended with each pregnancy  Shingles Vaccine (Shingrix) - COST NOT COVERED BY MEDICARE PART B  2 shot series recommended in those aged 48 and above     Health Maintenance Due:      Topic Date Due    Hepatitis C Screening  Never done    Colorectal Cancer Screening  01/22/2029     Immunizations Due:      Topic Date Due    COVID-19 Vaccine (2 - Pfizer 2-dose series) 01/20/2021     Advance Directives   What are advance directives? Advance directives are legal documents that state your wishes and plans for medical care  These plans are made ahead of time in case you lose your ability to make decisions for yourself  Advance directives can apply to any medical decision, such as the treatments you want, and if you want to donate organs  What are the types of advance directives?   There are many types of advance directives, and each state has rules about how to use them  You may choose a combination of any of the following:  · Living will: This is a written record of the treatment you want  You can also choose which treatments you do not want, which to limit, and which to stop at a certain time  This includes surgery, medicine, IV fluid, and tube feedings  · Durable power of  for healthcare [facility]): This is a written record that states who you want to make healthcare choices for you when you are unable to make them for yourself  This person, called a proxy, is usually a family member or a friend  You may choose more than 1 proxy  · Do not resuscitate (DNR) order:  A DNR order is used in case your heart stops beating or you stop breathing  It is a request not to have certain forms of treatment, such as CPR  A DNR order may be included in other types of advance directives  · Medical directive: This covers the care that you want if you are in a coma, near death, or unable to make decisions for yourself  You can list the treatments you want for each condition  Treatment may include pain medicine, surgery, blood transfusions, dialysis, IV or tube feedings, and a ventilator (breathing machine)  · Values history: This document has questions about your views, beliefs, and how you feel and think about life  This information can help others choose the care that you would choose  Why are advance directives important? An advance directive helps you control your care  Although spoken wishes may be used, it is better to have your wishes written down  Spoken wishes can be misunderstood, or not followed  Treatments may be given even if you do not want them  An advance directive may make it easier for your family to make difficult choices about your care  Urinary Incontinence   Urinary incontinence (UI)  is when you lose control of your bladder  UI develops because your bladder cannot store or empty urine properly   The 3 most common types of UI are stress incontinence, urge incontinence, or both  Medicines:   · May be given to help strengthen your bladder control  Report any side effects of medication to your healthcare provider  Do pelvic muscle exercises often:  Your pelvic muscles help you stop urinating  Squeeze these muscles tight for 5 seconds, then relax for 5 seconds  Gradually work up to squeezing for 10 seconds  Do 3 sets of 15 repetitions a day, or as directed  This will help strengthen your pelvic muscles and improve bladder control  Train your bladder:  Go to the bathroom at set times, such as every 2 hours, even if you do not feel the urge to go  You can also try to hold your urine when you feel the urge to go  For example, hold your urine for 5 minutes when you feel the urge to go  As that becomes easier, hold your urine for 10 minutes  Self-care:   · Keep a UI record  Write down how often you leak urine and how much you leak  Make a note of what you were doing when you leaked urine  · Drink liquids as directed  You may need to limit the amount of liquid you drink to help control your urine leakage  Do not drink any liquid right before you go to bed  Limit or do not have drinks that contain caffeine or alcohol  · Prevent constipation  Eat a variety of high-fiber foods  Good examples are high-fiber cereals, beans, vegetables, and whole-grain breads  Walking is the best way to trigger your intestines to have a bowel movement  · Exercise regularly and maintain a healthy weight  Weight loss and exercise will decrease pressure on your bladder and help you control your leakage  · Use a catheter as directed  to help empty your bladder  A catheter is a tiny, plastic tube that is put into your bladder to drain your urine  · Go to behavior therapy as directed  Behavior therapy may be used to help you learn to control your urge to urinate      Weight Management   Why it is important to manage your weight:  Being overweight increases your risk of health conditions such as heart disease, high blood pressure, type 2 diabetes, and certain types of cancer  It can also increase your risk for osteoarthritis, sleep apnea, and other respiratory problems  Aim for a slow, steady weight loss  Even a small amount of weight loss can lower your risk of health problems  How to lose weight safely:  A safe and healthy way to lose weight is to eat fewer calories and get regular exercise  You can lose up about 1 pound a week by decreasing the number of calories you eat by 500 calories each day  Healthy meal plan for weight management:  A healthy meal plan includes a variety of foods, contains fewer calories, and helps you stay healthy  A healthy meal plan includes the following:  · Eat whole-grain foods more often  A healthy meal plan should contain fiber  Fiber is the part of grains, fruits, and vegetables that is not broken down by your body  Whole-grain foods are healthy and provide extra fiber in your diet  Some examples of whole-grain foods are whole-wheat breads and pastas, oatmeal, brown rice, and bulgur  · Eat a variety of vegetables every day  Include dark, leafy greens such as spinach, kale, kyara greens, and mustard greens  Eat yellow and orange vegetables such as carrots, sweet potatoes, and winter squash  · Eat a variety of fruits every day  Choose fresh or canned fruit (canned in its own juice or light syrup) instead of juice  Fruit juice has very little or no fiber  · Eat low-fat dairy foods  Drink fat-free (skim) milk or 1% milk  Eat fat-free yogurt and low-fat cottage cheese  Try low-fat cheeses such as mozzarella and other reduced-fat cheeses  · Choose meat and other protein foods that are low in fat  Choose beans or other legumes such as split peas or lentils  Choose fish, skinless poultry (chicken or turkey), or lean cuts of red meat (beef or pork)  Before you cook meat or poultry, cut off any visible fat  · Use less fat and oil    Try baking foods instead of frying them  Add less fat, such as margarine, sour cream, regular salad dressing and mayonnaise to foods  Eat fewer high-fat foods  Some examples of high-fat foods include french fries, doughnuts, ice cream, and cakes  · Eat fewer sweets  Limit foods and drinks that are high in sugar  This includes candy, cookies, regular soda, and sweetened drinks  Exercise:  Exercise at least 30 minutes per day on most days of the week  Some examples of exercise include walking, biking, dancing, and swimming  You can also fit in more physical activity by taking the stairs instead of the elevator or parking farther away from stores  Ask your healthcare provider about the best exercise plan for you  © Copyright [employer] 2018 Information is for End User's use only and may not be sold, redistributed or otherwise used for commercial purposes   All illustrations and images included in CareNotes® are the copyrighted property of [employer]  or [address] [place] [address]

## 2021-06-28 NOTE — PROGRESS NOTES
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    COPD (chronic obstructive pulmonary disease) ([facility] )    Chronic respiratory failure with hypoxia (HCC)       Musculoskeletal and Integument    Osteoporosis    Relevant Orders    TSH, 3rd generation with Free T4 reflex    DXA bone density spine hip and pelvis    Muscular dystrophy ([facility] )    Relevant Orders    Comprehensive metabolic panel    UA w Reflex to Microscopic w Reflex to Culture -Lab Collect    TSH, 3rd generation with Free T4 reflex    Ambulatory referral to Urogynecology       Hematopoietic and Hemostatic    Thrombocytosis    Relevant Orders    CBC and differential       Other    RESOLVED: On home oxygen therapy    Medicare annual wellness visit, subsequent - Primary    History of vitamin D deficiency    Relevant Orders    Vitamin D 25 hydroxy    History of depression    History of abdominal abscess    Ductal carcinoma in situ (DCIS) of left breast    Colostomy status ([place] [address] )      Other Visit Diagnoses     Overactive bladder        Relevant Orders    UA w Reflex to Microscopic w Reflex to Culture -Lab Collect    Ambulatory referral to Urogynecology    Postmenopausal        Relevant Orders    DXA bone density spine hip and pelvis    Thyroid disorder screen        Relevant Orders    TSH, 3rd generation with Free T4 reflex    Need for hepatitis C screening test        Relevant Orders    Hepatitis C antibody    Lipid screening        Relevant Orders    Lipid panel    Body mass index (BMI)30 0-30 9, adult         Relevant Orders    Vitamin D 25 hydroxy           Preventive health issues were discussed with patient, and age appropriate screening tests were ordered as noted in patient's After Visit Summary    Personalized health advice and appropriate referrals for health education or preventive services given if needed, as noted in patient's After Visit Summary       History of Present Illness:     Patient presents for Medicare Annual Wellness visit    Patient Care Team:  Kaleigh Deras DO as PCP - General (Family Medicine)  MD Tara Bloom DO Barth Creamer, MD as Endoscopist     Problem List:     Patient Active Problem List   Diagnosis    Dystrophy, muscular, 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[address] )    Ambulatory dysfunction    Urinary incontinence    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    Osteoporosis    GERD without esophagitis    COPD (chronic obstructive pulmonary disease) ([facility] [address] )    Allergic rhinitis    Difficult intubation    Ischemic colitis ([address] )    Leukocytosis    Perforated diverticulum    Thrombocytosis    Chronic respiratory failure with hypoxia ([address] )    Bladder injury    Anemia    Sinus tachycardia    Depression    Insomnia    Oral candidiasis    Muscular dystrophy ([address] )    Belching    Abdominal pain    Gastroparesis    Aortic valve sclerosis    Tricuspid valve insufficiency    Mitral annular calcification    Increased heart rate    Ductal carcinoma in situ (DCIS) of left breast    History of malignant neoplasm of skin    Hx of colectomy    Colostomy status ([facility] [address] )    History of abdominal abscess    History of depression    Former smoker    History of left mastectomy    Tachycardia    Medicare annual wellness visit, subsequent    History of vitamin D deficiency      Past Medical and Surgical History:     Past Medical History:   Diagnosis Date    Anxiety     Colitis     COPD (chronic obstructive pulmonary disease) ([address] )     Depression     Difficult intubation     Excessive daytime sleepiness     GERD (gastroesophageal reflux disease)     Hyperlipidemia     Muscular dystrophy ([address] )     Limb-girdle    Osteoporosis     Overactive bladder     Pneumonitis     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 )     Skin cancer     Skin disorder     Vitamin D deficiency      Past Surgical History:   Procedure Laterality Date    COLONOSCOPY N/A 2019    Procedure: COLONOSCOPY;  Surgeon: Shawn Ansari MD;  [facility];   Service: Colorectal    CYSTOSCOPY N/A 2020    Procedure: CYSTOSCOPY; BILATERAL URETERAL CATHETER PLACEMENT, CYSTOTOMY;  Surgeon: Peggyann Bosworth, MD;  [facility];  Service: Urology    FL CYSTOGRAM  10/15/2020    HARTMANS PROCEDURE N/A 2020    Procedure: EXPLORATORY LAPAROTOMY; LYSIS OF ADHESIONS; WASHOUT PELVIC ABSCESS; COMPLEX SIGMOID COLON RESECTION; LOOP TRANSVERSE COLOSTOMY;  Surgeon: Clark Khan MD;  [facility];  Service: General    IR DRAINAGE TUBE PLACEMENT  2020    MASTECTOMY Left 2007    left breast mastectomy     TONSILLECTOMY      TOOTH EXTRACTION      TUBAL LIGATION        Family History:     Family History   Problem Relation Age of Onset    Other Mother         bone loss    Arthritis Mother     Skin cancer Father     Hypertension Father         benign     Other Father         bone loss    Muscular dystrophy Father     Muscular dystrophy Brother         of the limb gridle     Muscular dystrophy Family         of the limb girdle    Hypertension Sister     No Known Problems Sister     No Known Problems Brother       Social History:     Social History     Socioeconomic History    Marital status:      Spouse name: None    Number of children: None    Years of education: None    Highest education level: None   Occupational History    None   Tobacco Use    Smoking status: Former Smoker     Packs/day: 1 50     Years: 41 00     Pack years: 61 50     Types: Cigarettes     Start date:      Quit date:      Years since quittin 8    Smokeless tobacco: Never Used   Vaping Use    Vaping Use: Never used   Substance and Sexual Activity    Alcohol use: Not Currently     Comment: social drinker per allscripts     Drug use: Yes     Types: Marijuana     Comment: oral use    Sexual activity: Not Currently   Other Topics Concern    None   Social History Narrative    Currently on permanent disability due to musc dystrophy    Denied daily coffee consumption     Wheelchair dependent since about 2010    No children     Social Determinants of Health     Financial Resource Strain:     Difficulty of Paying Living Expenses:    Food Insecurity:     Worried About Running Out of Food in the Last Year:     Ran Out of Food in the Last Year:    Transportation Needs: Unmet Transportation Needs    Lack of Transportation (Medical):  Yes    Lack of Transportation (Non-Medical): Yes   Physical Activity:     Days of Exercise per Week:     Minutes of Exercise per Session:    Stress:     Feeling of Stress :    Social Connections:     Frequency of Communication with Friends and Family:     Frequency of Social Gatherings with Friends and Family:     Attends Religion Services:     Active Member of Clubs or Organizations:     Attends Club or Organization Meetings:     Marital Status:    Intimate Partner Violence:     Fear of Current or Ex-Partner:     Emotionally Abused:     Physically Abused:     Sexually Abused:       Medications and Allergies:     Current Outpatient Medications   Medication Sig Dispense Refill    alclomethasone (ACLOVATE) 0 05 % cream APPLY A THIN LAYER TWICE A DAY TO AFFECTED AREA(S)      Ascorbic Acid (VITAMIN C) 1000 MG tablet Take 1,000 mg by mouth daily      citalopram (CeleXA) 10 mg tablet Take 1 tablet (10 mg total) by mouth daily 90 tablet 1    clotrimazole-betamethasone (LOTRISONE) 1-0 05 % lotion   0    diazepam (VALIUM) 5 mg tablet Take 5 mg by mouth every 6 (six) hours as needed for anxiety      ELDERBERRY PO Take by mouth      FLAXSEED, LINSEED, PO Take 10 mg by mouth      fluticasone (FLONASE) 50 mcg/act nasal spray instill 2 sprays into each nostril once daily 16 g 3    Indole-3-Carbinol POWD by Does not apply route      ketoconazole (NIZORAL) 2 % cream APPLY TWICE A DAY TO THE AFFECTED AREA(S)      magnesium gluconate (MAGONATE) 500 mg tablet Take 500 mg by mouth 2 (two) times a day      Melatonin 3 MG CAPS Take 3 mg by mouth      STARLA D ARCO PO Take by mouth      Potassium (POTASSIMIN PO) Take 550 mg by mouth      Probiotic Product (Florastor Plus) CAPS       S-Adenosylmethionine (SAMe) 400 MG TABS       simethicone (MYLICON) 072 MG chewable tablet Chew 1 tablet (125 mg total) every 6 (six) hours as needed for flatulence 30 tablet 3    traZODone (DESYREL) 50 mg tablet Take 1 tablet (50 mg total) by mouth daily at bedtime 90 tablet 1    triamcinolone (KENALOG) 0 1 % cream APPLY A THIN LAYER TWICE A DAY TO THE AFFECTED AREA(S) OF TRUNK      VITAMIN D PO Take by mouth      Zinc 10 MG LOZG Apply to the mouth or throat      ketoconazole (NIZORAL) 2 % shampoo Apply 1 application topically 2 (two) times a week 120 mL 1    nystatin (MYCOSTATIN) powder Apply topically 2 (two) times a day 30 g 1    omeprazole (PriLOSEC) 20 mg delayed release capsule take 1 capsule by mouth every morning 90 capsule 1    solifenacin (VESIcare) 10 MG tablet Take 1 tablet (10 mg total) by mouth daily 90 tablet 1    umeclidinium-vilanterol (ANORO ELLIPTA) 62 5-25 MCG/INH inhaler Inhale 1 puff daily 60 blister 1     No current facility-administered medications for this visit  Allergies   Allergen Reactions    Amoxicillin      Vague rash in the 90s, tolerated zosyn on 9/2020 admission     Codeine      Other reaction(s):  Other (See Comments)  n/v    Medical Tape Itching      Immunizations:     Immunization History   Administered Date(s) Administered    COVID-19 PFIZER VACCINE 0 3 ML IM 12/30/2020, 01/20/2021, 09/14/2021    INFLUENZA 09/01/2014, 09/03/2014, 10/13/2015, 10/13/2015, 09/10/2016, 11/01/2016, 08/31/2017, 09/28/2018, 08/18/2020, 09/14/2021    Influenza Split High Dose Preservative Free IM 10/01/2015, 11/01/2016    Influenza, nasal 10/01/2011    Influenza, seasonal, injectable 1950, 1950, 09/01/2014    Pneumococcal Conjugate 13-Valent 10/01/2015    Pneumococcal Polysaccharide PPV23 1950, 10/01/2011, 10/11/2011, 01/01/2012, 07/05/2017    Tdap 07/05/2017    Varicella 09/01/2014    influenza, trivalent, adjuvanted 09/15/2019      Health Maintenance:         Topic Date Due    Hepatitis C Screening  Never done    Breast Cancer Screening: Mammogram  03/22/2022    Colorectal Cancer Screening  01/22/2029     There are no preventive care reminders to display for this patient  Medicare Health Risk Assessment:     /74 (BP Location: Right arm, Patient Position: Sitting)   Pulse 80   Temp 97 6 °F (36 4 °C)   Resp 17   Ht 5' (1 524 m)   Wt 70 3 kg (155 lb) Comment: per patient, patient in wheelchair  LMP  (LMP Unknown)   SpO2 98%   BMI 30 27 kg/m²      Shanae Hairston is here for her Subsequent Wellness visit  Health Risk Assessment:   Patient rates overall health as fair  Patient feels that their physical health rating is slightly better  Patient is dissatisfied with their life  Eyesight was rated as slightly worse  Hearing was rated as slightly worse  Patient feels that their emotional and mental health rating is slightly better  Patients states they are sometimes angry  Patient states they are sometimes unusually tired/fatigued  Pain experienced in the last 7 days has been some  Patient's pain rating has been 2/10  Patient states that she has experienced no weight loss or gain in last 6 months  Depression Screening:   PHQ-2 Score: 1  PHQ-9 Score: 8      Fall Risk Screening: In the past year, patient has experienced: no history of falling in past year      Urinary Incontinence Screening:   Patient has leaked urine accidently in the last six months  Home Safety:  Patient has trouble with stairs inside or outside of their home   Patient has working smoke alarms and has no working carbon monoxide detector  Home safety hazards include: none  Nutrition:   Current diet is Regular  Medications:   Patient is currently taking over-the-counter supplements  OTC medications include: see medication list  Patient is able to manage medications  Activities of Daily Living (ADLs)/Instrumental Activities of Daily Living (IADLs):   Walk and transfer into and out of bed and chair?: Yes  Dress and groom yourself?: No    Bathe or shower yourself?: No    Feed yourself? Yes  Do your laundry/housekeeping?: No  Manage your money, pay your bills and track your expenses?: Yes  Make your own meals?: No    Do your own shopping?: No    Previous Hospitalizations:   Any hospitalizations or ED visits within the last 12 months?: Yes    How many hospitalizations have you had in the last year?: 1-2    Advance Care Planning:   Living will: Yes    Advanced directive: Yes      PREVENTIVE SCREENINGS      Cardiovascular Screening:    General: Screening Not Indicated and History Lipid Disorder      Diabetes Screening:     General: Screening Current      Colorectal Cancer Screening:     General: Screening Current      Breast Cancer Screening:     General: History Breast Cancer      Cervical Cancer Screening:    General: Screening Not Indicated      Osteoporosis Screening:    General: History Osteoporosis and Risks and Benefits Discussed    Due for: DXA Axial      Abdominal Aortic Aneurysm (AAA) Screening:        General: Screening Not Indicated      Lung Cancer Screening:     General: Screening Not Indicated      Hepatitis C Screening:    General: Risks and Benefits Discussed    Hep C Screening Accepted: Yes      Screening, Brief Intervention, and Referral to Treatment (SBIRT)    Screening  Typical number of drinks in a day: 0  Typical number of drinks in a week: 0  Interpretation: Low risk drinking behavior      AUDIT-C Screenin) How often did you have a drink containing alcohol in the past year? never  2) How many drinks did you have on a typical day when you were drinking in the past year? 0  3) How often did you have 6 or more drinks on one occasion in the past year? never    AUDIT-C Score: 0  Interpretation: Score 0-2 (female): Negative screen for alcohol misuse    Single Item Drug Screening:  How often have you used an illegal drug (including marijuana) or a prescription medication for non-medical reasons in the past year? never    Single Item Drug Screen Score: 0  Interpretation: Negative screen for possible drug use disorder      Regina Cook, DO

## 2021-06-28 NOTE — PROGRESS NOTES
Assessment/Plan:     Diagnoses and all orders for this visit:    Medicare annual wellness visit, subsequent    Muscular dystrophy ([facility] )  Comments:  stable, cpm  Orders:  -     Comprehensive metabolic panel; Future  -     UA w Reflex to Microscopic w Reflex to Culture -Lab Collect  -     TSH, 3rd generation with Free T4 reflex; Future  -     Ambulatory referral to Urogynecology; Future    History of depression    Ischemic colitis ([facility] )  Comments:  stable, cpm  Orders:  -     Comprehensive metabolic panel; Future    History of abdominal abscess    Centrilobular emphysema (HCC)  Comments:  stable, cpm    Chronic respiratory failure with hypoxia (HCC)  Comments:  stable, cpm    On home oxygen therapy    Thrombocytosis (HCC)  Comments:  recheck  Orders:  -     CBC and differential; Future    History of vitamin D deficiency  -     Vitamin D 25 hydroxy; Future    Overactive bladder  -     UA w Reflex to Microscopic w Reflex to Culture -Lab Collect  -     Ambulatory referral to Urogynecology; Future    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    Ductal carcinoma in situ (DCIS) of left breast    Postmenopausal  -     DXA bone density spine hip and pelvis; Future    Thyroid disorder screen  -     TSH, 3rd generation with Free T4 reflex; Future    Osteoporosis without current pathological fracture, unspecified osteoporosis type  -     DXA bone density spine hip and pelvis; Future    Colostomy status ([place] [address] )  Comments:  stable, cpm    Need for hepatitis C screening test  -     Hepatitis C antibody; Future    Lipid screening  -     Lipid panel; Future    Body mass index (BMI)30 0-30 9, adult   -     Vitamin D 25 hydroxy; Future    Age-related osteoporosis without current pathological fracture   -     TSH, 3rd generation with Free T4 reflex; Future    Other orders  -     diazepam (VALIUM) 5 mg tablet;  Take 5 mg by mouth every 6 (six) hours as needed for anxiety          Subjective:   Chief Complaint   Patient presents with    Follow-up    Skin Irritation     Around stoma - does have a visiting nurse coming to house tomorrow     Medication Management     Discuss possible dosage increase of the [address] Medicare Wellness Visit        Patient ID: Henry Chacon is a 70 y o  female  Medicare wellness and f/u  Telephone message that "She's having issues around her stoma Says it is all red and inflamed   Itchy and oozing"  Pt states visiting nurse comes tomorrow  "For now just not applying the skin prep in case that's irritating it- is a new product have been using"  "Skin starting to weep enough that going to have failure 24-36 hours after putting it on, usually stays on for a week"  Bag/wafer of her ostomy bag look the same- "same product for months and months"  "Seems like need something stronger than the vesicare- been on that for years, but bladder control getting to be an issue"  Swelling in feet and ankles even in winter, but much worse in heat/humidity  "This is the first year noticed breathing affected by the humidity, also harder to breath with mask on ,but separately harder in the humidity, but breathing better than I was 6 months ago - 6 months ago I was short of breath carrying on a conversation like this and can lay down in bed without gasping for breath"  "Have had poor short term memory since was a child- worse in older age"  "Postnasal drip constant from allergies- worse this year"  Pt is clearing her throat frequently throughout visit, so I asked if she has any reflux sx, pt states, "have Gastroparesis"- and admits that she does get reflux with it      The following portions of the patient's history were reviewed and updated as appropriate: allergies, current medications, past family history, past medical history, past social history, past surgical history and problem list     Review of Systems   Constitutional: Negative for activity change, appetite change, chills, diaphoresis, fever and unexpected weight change  HENT: Negative for mouth sores, nosebleeds and trouble swallowing  Respiratory: Negative for apnea, choking, chest tightness, wheezing and stridor  Cardiovascular: Negative for chest pain  Gastrointestinal: Negative for abdominal distention, abdominal pain, nausea and vomiting  Genitourinary: Negative for decreased urine volume, difficulty urinating and hematuria  Hematological: Negative  Objective:      /74 (BP Location: Right arm, Patient Position: Sitting)   Pulse 80   Temp 97 6 °F (36 4 °C)   Resp 17   Ht 5' (1 524 m)   Wt 70 3 kg (155 lb) Comment: per patient, patient in wheelchair  LMP  (LMP Unknown)   SpO2 98%   BMI 30 27 kg/m²          Physical Exam  Vitals and nursing note reviewed  Constitutional:       General: She is not in acute distress  Appearance: She is well-developed  She is not toxic-appearing or diaphoretic  Comments: Unchanged general physical apperance   HENT:      Head: Normocephalic and atraumatic  Eyes:      General: Lids are normal       Conjunctiva/sclera: Conjunctivae normal       Pupils: Pupils are equal, round, and reactive to light  Neck:      Thyroid: No thyroid mass or thyromegaly  Vascular: No JVD  Trachea: Trachea normal    Cardiovascular:      Rate and Rhythm: Normal rate and regular rhythm  Heart sounds: Normal heart sounds  Comments: Trace pretib edema b/l  Pulmonary:      Effort: Pulmonary effort is normal       Breath sounds: Normal breath sounds  Abdominal:      General: Bowel sounds are normal  There is no distension  Palpations: Abdomen is soft  There is no hepatomegaly, splenomegaly or mass  Tenderness: There is no abdominal tenderness  Musculoskeletal:      Cervical back: Neck supple  Lymphadenopathy:      Cervical: No cervical adenopathy  Upper Body:      Right upper body: No supraclavicular adenopathy  Left upper body: No supraclavicular adenopathy     Skin: General: Skin is warm and dry  Coloration: Skin is not pale  Neurological:      Mental Status: She is alert and oriented to person, place, and time  Psychiatric:         Mood and Affect: Mood normal          Behavior: Behavior normal  Behavior is cooperative

## 2021-06-29 ENCOUNTER — TELEPHONE (OUTPATIENT)
Dept: FAMILY MEDICINE CLINIC | Facility: CLINIC | Age: 71
End: 2021-06-29

## 2021-06-29 DIAGNOSIS — Z93.3 COLOSTOMY STATUS (HCC): Primary | ICD-10-CM

## 2021-06-29 DIAGNOSIS — R21 RASH: ICD-10-CM

## 2021-06-29 RX ORDER — NYSTATIN 100000 [USP'U]/G
POWDER TOPICAL 2 TIMES DAILY
Qty: 30 G | Refills: 1 | Status: SHIPPED | OUTPATIENT
Start: 2021-06-29

## 2021-06-29 NOTE — TELEPHONE ENCOUNTER
Called and spoke with Trisha Rico states that she looked at patients stoma area  Trisha is asking if PCP can order Nystatin powder to help with the irritation  Patient did have appointment with PCP yesterday, June 28 where stoma irritation was mentioned  Please advise  Medications that were in questioned were reviewed with Trisha

## 2021-06-29 NOTE — TELEPHONE ENCOUNTER
Trisha of [facility] left message on office voicemail stating she met with patient for start of care today  She needs to review medication discrepancies  Also, she asked that Nystatin powder be called in to [facility] for the patient

## 2021-06-30 PROBLEM — Z86.59 HISTORY OF DEPRESSION: Status: ACTIVE | Noted: 2021-04-02

## 2021-07-02 ENCOUNTER — TELEPHONE (OUTPATIENT)
Dept: FAMILY MEDICINE CLINIC | Facility: CLINIC | Age: 71
End: 2021-07-02

## 2021-07-08 ENCOUNTER — TELEPHONE (OUTPATIENT)
Dept: FAMILY MEDICINE CLINIC | Facility: CLINIC | Age: 71
End: 2021-07-08

## 2021-07-08 DIAGNOSIS — R39.9 UTI SYMPTOMS: Primary | ICD-10-CM

## 2021-07-08 RX ORDER — NITROFURANTOIN 25; 75 MG/1; MG/1
100 CAPSULE ORAL 2 TIMES DAILY
Qty: 10 CAPSULE | Refills: 0 | Status: SHIPPED | OUTPATIENT
Start: 2021-07-08 | End: 2021-07-13

## 2021-07-08 NOTE — TELEPHONE ENCOUNTER
07/08/2021  8:22 pm  Patient left message stating she has UTI and would like prescription sent into [facility] today so that her care aide can pick it up for her and she can start it as soon as possible  Please call her back when it is completed  Thank you

## 2021-07-09 DIAGNOSIS — L21.0 SEBORRHEA CAPITIS: ICD-10-CM

## 2021-07-10 RX ORDER — KETOCONAZOLE 20 MG/ML
1 SHAMPOO TOPICAL 2 TIMES WEEKLY
Qty: 120 ML | Refills: 1 | Status: SHIPPED | OUTPATIENT
Start: 2021-07-12 | End: 2022-04-25

## 2021-07-16 ENCOUNTER — TELEPHONE (OUTPATIENT)
Dept: FAMILY MEDICINE CLINIC | Facility: CLINIC | Age: 71
End: 2021-07-16

## 2021-07-16 NOTE — TELEPHONE ENCOUNTER
Trisha of [facility] called stating the she needs to add visits next week due to fungal rash around the patient's ostomy  She will accept a verbal     She will be out of work some of next week as she has to travel out of town to place her father on hospice

## 2021-07-20 ENCOUNTER — TELEPHONE (OUTPATIENT)
Dept: FAMILY MEDICINE CLINIC | Facility: CLINIC | Age: 71
End: 2021-07-20

## 2021-07-20 NOTE — TELEPHONE ENCOUNTER
[facility] called to follow up their fax dated 07/15/2021 asking for chart notes and testing results to renew patient's O2  I do not see anything in chart     Fax to:  [phone]

## 2021-07-21 NOTE — TELEPHONE ENCOUNTER
I spoke with Mckenna Paul who transferred me to [place] [facility] will contact patients Pulmonologist for the necessary paperwork

## 2021-07-21 NOTE — TELEPHONE ENCOUNTER
Dr Matthew Hawley -- do you by any chance have O2 paperwork to complete on this patient? Just checking as I don't see everything coming in via fax  Thank you!

## 2021-08-03 ENCOUNTER — TELEPHONE (OUTPATIENT)
Dept: FAMILY MEDICINE CLINIC | Facility: CLINIC | Age: 71
End: 2021-08-03

## 2021-08-03 NOTE — TELEPHONE ENCOUNTER
Eliezer Goodell from [facility] called to see if an order can be placed for a ? The agency that patient is currently using is having a hard time with staff showing up at patients home

## 2021-08-10 DIAGNOSIS — J98.4 RESTRICTIVE LUNG DISEASE: ICD-10-CM

## 2021-08-10 DIAGNOSIS — R26.2 AMBULATORY DYSFUNCTION: ICD-10-CM

## 2021-08-10 DIAGNOSIS — Z99.81 ON HOME OXYGEN THERAPY: ICD-10-CM

## 2021-08-10 DIAGNOSIS — J96.11 CHRONIC RESPIRATORY FAILURE WITH HYPOXIA (HCC): ICD-10-CM

## 2021-08-10 DIAGNOSIS — J43.2 CENTRILOBULAR EMPHYSEMA (HCC): ICD-10-CM

## 2021-08-10 DIAGNOSIS — G71.09 DYSTROPHY, MUSCULAR, HEREDITARY PROGRESSIVE (HCC): Primary | ICD-10-CM

## 2021-08-10 DIAGNOSIS — Z93.3 COLOSTOMY STATUS (HCC): ICD-10-CM

## 2021-08-19 ENCOUNTER — TELEPHONE (OUTPATIENT)
Dept: FAMILY MEDICINE CLINIC | Facility: CLINIC | Age: 71
End: 2021-08-19

## 2021-08-19 DIAGNOSIS — G71.09 DYSTROPHY, MUSCULAR, HEREDITARY PROGRESSIVE (HCC): Primary | ICD-10-CM

## 2021-08-19 DIAGNOSIS — Z99.81 ON HOME OXYGEN THERAPY: ICD-10-CM

## 2021-08-19 DIAGNOSIS — J96.11 CHRONIC RESPIRATORY FAILURE WITH HYPOXIA (HCC): ICD-10-CM

## 2021-08-19 DIAGNOSIS — Z93.3 COLOSTOMY STATUS (HCC): ICD-10-CM

## 2021-08-19 DIAGNOSIS — N32.81 OVERACTIVE BLADDER: ICD-10-CM

## 2021-08-19 DIAGNOSIS — R26.2 AMBULATORY DYSFUNCTION: ICD-10-CM

## 2021-08-19 NOTE — TELEPHONE ENCOUNTER
Trisha, nurse with [facility], called requesting an order for them to have home health aid come in to assist the patient  Patient's regular care givers have not been coming and patient has not had a shower in a month, and Trisha/Steph would like to help the patient further  Please advise

## 2021-09-29 ENCOUNTER — TELEPHONE (OUTPATIENT)
Dept: FAMILY MEDICINE CLINIC | Facility: CLINIC | Age: 71
End: 2021-09-29

## 2021-09-29 DIAGNOSIS — R12 HEART BURN: ICD-10-CM

## 2021-09-29 RX ORDER — OMEPRAZOLE 20 MG/1
CAPSULE, DELAYED RELEASE ORAL
Qty: 90 CAPSULE | Refills: 1 | Status: SHIPPED | OUTPATIENT
Start: 2021-09-29 | End: 2022-05-17

## 2021-09-29 NOTE — TELEPHONE ENCOUNTER
Trisha from [facility] left message on line  Trisha states that she was seeing patient for her skin  Patient is requesting a telephone discharge  Trisha is calling for a verbal from provider for okay to do discharge

## 2021-10-05 ENCOUNTER — DOCUMENTATION (OUTPATIENT)
Dept: PULMONOLOGY | Facility: CLINIC | Age: 71
End: 2021-10-05

## 2021-10-05 ENCOUNTER — OFFICE VISIT (OUTPATIENT)
Dept: PULMONOLOGY | Facility: CLINIC | Age: 71
End: 2021-10-05
Payer: MEDICARE

## 2021-10-05 VITALS
WEIGHT: 155 LBS | HEIGHT: 60 IN | RESPIRATION RATE: 18 BRPM | OXYGEN SATURATION: 99 % | TEMPERATURE: 97.3 F | SYSTOLIC BLOOD PRESSURE: 120 MMHG | DIASTOLIC BLOOD PRESSURE: 70 MMHG | HEART RATE: 89 BPM | BODY MASS INDEX: 30.43 KG/M2

## 2021-10-05 DIAGNOSIS — J96.11 CHRONIC RESPIRATORY FAILURE WITH HYPOXIA (HCC): ICD-10-CM

## 2021-10-05 DIAGNOSIS — J98.4 RESTRICTIVE LUNG DISEASE: ICD-10-CM

## 2021-10-05 DIAGNOSIS — G71.09 DYSTROPHY, MUSCULAR, HEREDITARY PROGRESSIVE (HCC): ICD-10-CM

## 2021-10-05 DIAGNOSIS — J43.2 CENTRILOBULAR EMPHYSEMA (HCC): Primary | ICD-10-CM

## 2021-10-05 PROBLEM — Z99.81 ON HOME OXYGEN THERAPY: Status: RESOLVED | Noted: 2021-04-02 | Resolved: 2021-10-05

## 2021-10-05 PROCEDURE — 99213 OFFICE O/P EST LOW 20 MIN: CPT | Performed by: INTERNAL MEDICINE

## 2021-11-03 DIAGNOSIS — N32.81 OVERACTIVE BLADDER: ICD-10-CM

## 2021-11-03 RX ORDER — SOLIFENACIN SUCCINATE 10 MG/1
10 TABLET, FILM COATED ORAL DAILY
Qty: 90 TABLET | Refills: 1 | Status: SHIPPED | OUTPATIENT
Start: 2021-11-03 | End: 2022-05-05

## 2021-11-17 ENCOUNTER — TELEMEDICINE (OUTPATIENT)
Dept: GASTROENTEROLOGY | Facility: MEDICAL CENTER | Age: 71
End: 2021-11-17
Payer: MEDICARE

## 2021-11-17 DIAGNOSIS — K55.9 ISCHEMIC COLITIS (HCC): Primary | ICD-10-CM

## 2021-11-17 DIAGNOSIS — K31.84 GASTROPARESIS: ICD-10-CM

## 2021-11-17 PROCEDURE — G2010 REMOT IMAGE SUBMIT BY PT: HCPCS | Performed by: INTERNAL MEDICINE

## 2021-11-26 DIAGNOSIS — G47.00 INSOMNIA, UNSPECIFIED TYPE: ICD-10-CM

## 2021-11-26 RX ORDER — TRAZODONE HYDROCHLORIDE 50 MG/1
TABLET ORAL
Qty: 90 TABLET | Refills: 1 | Status: SHIPPED | OUTPATIENT
Start: 2021-11-26 | End: 2022-06-28 | Stop reason: ALTCHOICE

## 2022-01-03 ENCOUNTER — OFFICE VISIT (OUTPATIENT)
Dept: FAMILY MEDICINE CLINIC | Facility: CLINIC | Age: 72
End: 2022-01-03
Payer: MEDICARE

## 2022-01-03 VITALS
DIASTOLIC BLOOD PRESSURE: 78 MMHG | OXYGEN SATURATION: 97 % | SYSTOLIC BLOOD PRESSURE: 110 MMHG | HEART RATE: 75 BPM | TEMPERATURE: 97.3 F

## 2022-01-03 DIAGNOSIS — J98.4 RESTRICTIVE LUNG DISEASE DUE TO MUSCULAR DYSTROPHY (HCC): ICD-10-CM

## 2022-01-03 DIAGNOSIS — D75.839 THROMBOCYTOSIS, UNSPECIFIED: ICD-10-CM

## 2022-01-03 DIAGNOSIS — J43.2 CENTRILOBULAR EMPHYSEMA (HCC): ICD-10-CM

## 2022-01-03 DIAGNOSIS — Z93.3 COLOSTOMY STATUS (HCC): ICD-10-CM

## 2022-01-03 DIAGNOSIS — J96.11 CHRONIC RESPIRATORY FAILURE WITH HYPOXIA (HCC): ICD-10-CM

## 2022-01-03 DIAGNOSIS — G71.00 MUSCULAR DYSTROPHY (HCC): Primary | ICD-10-CM

## 2022-01-03 DIAGNOSIS — G71.00 MUSCULAR DYSTROPHY (HCC): ICD-10-CM

## 2022-01-03 DIAGNOSIS — G71.09 DYSTROPHY, MUSCULAR, HEREDITARY PROGRESSIVE (HCC): Primary | ICD-10-CM

## 2022-01-03 DIAGNOSIS — F33.0 MILD EPISODE OF RECURRENT MAJOR DEPRESSIVE DISORDER (HCC): ICD-10-CM

## 2022-01-03 DIAGNOSIS — G71.00 RESTRICTIVE LUNG DISEASE DUE TO MUSCULAR DYSTROPHY (HCC): ICD-10-CM

## 2022-01-03 DIAGNOSIS — R21 RASH: ICD-10-CM

## 2022-01-03 DIAGNOSIS — H61.21 IMPACTED CERUMEN OF RIGHT EAR: ICD-10-CM

## 2022-01-03 PROBLEM — K55.9 ISCHEMIC COLITIS (HCC): Status: RESOLVED | Noted: 2019-01-21 | Resolved: 2022-01-03

## 2022-01-03 PROBLEM — D47.3 ESSENTIAL (HEMORRHAGIC) THROMBOCYTHEMIA (HCC): Status: ACTIVE | Noted: 2020-09-28

## 2022-01-03 PROBLEM — D72.829 LEUKOCYTOSIS: Status: RESOLVED | Noted: 2020-03-28 | Resolved: 2022-01-03

## 2022-01-03 PROCEDURE — 99214 OFFICE O/P EST MOD 30 MIN: CPT | Performed by: FAMILY MEDICINE

## 2022-01-03 RX ORDER — DIAZEPAM 5 MG/1
5 TABLET ORAL EVERY 6 HOURS PRN
Qty: 30 TABLET | Refills: 1 | Status: CANCELLED | OUTPATIENT
Start: 2022-01-03

## 2022-01-03 RX ORDER — DIAZEPAM 5 MG/1
5 TABLET ORAL EVERY 8 HOURS PRN
Qty: 30 TABLET | Refills: 0 | Status: SHIPPED | OUTPATIENT
Start: 2022-01-03 | End: 2022-03-03 | Stop reason: SDUPTHER

## 2022-01-03 RX ORDER — TRIAMCINOLONE ACETONIDE 1 MG/G
CREAM TOPICAL 2 TIMES DAILY
Qty: 80 G | Refills: 1 | Status: SHIPPED | OUTPATIENT
Start: 2022-01-03

## 2022-01-03 NOTE — PROGRESS NOTES
Assessment/Plan:         Diagnoses and all orders for this visit:    Dystrophy, muscular, 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[facility] )  Comments:  stable, cpm    Muscular dystrophy ([facility] )    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[facility] )  Comments:  stable, cpm    Centrilobular emphysema ([facility] )  Comments:  stable, cpm  Orders:  -     umeclidinium-vilanterol (ANORO ELLIPTA) 62 5-25 MCG/INH inhaler; Inhale 1 puff daily    Chronic respiratory failure with hypoxia (HCC)  Comments:  stable, cpm    Colostomy status (HCC)  Comments:  no issues, cpm    Thrombocytosis, unspecified  Comments:  stable, mild, had seen Hem/Onc in hospital in 2020    Mild episode of recurrent major depressive disorder ([facility] )  Comments:  controlled, cpm    Rash  -     triamcinolone (KENALOG) 0 1 % cream; Apply topically 2 (two) times a day    Impacted cerumen of right ear  Comments:  advised debrox drops and return for flushing if necessary, and/or ENT eval          Subjective:   Chief Complaint   Patient presents with    Follow-up     6 month        Patient ID: Fernando Lawton is a 70 y o  female      Routine f/u  Reports "Had no home health aides for 4 days, didn't have anyone to help," so has been wearing extra pad- always wears liner-type pad due to leakage when she stands up to use toilet  "Have been having ear pan right ear last couple of days- only lay on the right- zings and goes up head- just few seconds, but it will repeat itself, once up and moving around, I'm ok"  Otherwise feels in usual state of health, no interval acute problems or illnesses  Reports that her third sister (and brother-in-law) moved to [place] from [place] where they had lived for over 30 years, now all three of them, sisters, live near each other, which makes pt happy      The following portions of the patient's history were reviewed and updated as appropriate: allergies, current medications, past family history, past medical history, past social history, past surgical history and problem list     Review of Systems      Objective:      /78 (BP Location: Left arm, Patient Position: Sitting, Cuff Size: Standard)   Pulse 75   Temp (!) 97 3 °F (36 3 °C) (Tympanic)   LMP  (LMP Unknown)   SpO2 97%          Physical Exam  Vitals and nursing note reviewed  Constitutional:       General: She is not in acute distress  Appearance: She is well-developed  She is not ill-appearing, toxic-appearing or diaphoretic  HENT:      Head: Normocephalic and atraumatic  Right Ear: Decreased hearing noted  There is impacted cerumen  Left Ear: Hearing, tympanic membrane, ear canal and external ear normal    Eyes:      General: Lids are normal       Conjunctiva/sclera: Conjunctivae normal       Pupils: Pupils are equal, round, and reactive to light  Neck:      Thyroid: No thyroid mass or thyromegaly  Vascular: No JVD  Trachea: Trachea normal    Cardiovascular:      Rate and Rhythm: Normal rate and regular rhythm  Pulses: Normal pulses  Heart sounds: Normal heart sounds  Comments: No edema  Pulmonary:      Effort: Pulmonary effort is normal       Breath sounds: Normal breath sounds  Chest:   Breasts:      Right: No supraclavicular adenopathy  Left: No supraclavicular adenopathy  Abdominal:      General: Bowel sounds are normal  There is no distension  Palpations: Abdomen is soft  There is no hepatomegaly, splenomegaly or mass  Tenderness: There is no abdominal tenderness  Musculoskeletal:      Cervical back: Neck supple  Lymphadenopathy:      Cervical: No cervical adenopathy  Upper Body:      Right upper body: No supraclavicular adenopathy  Left upper body: No supraclavicular adenopathy  Skin:     General: Skin is warm and dry  Coloration: Skin is not pale  Neurological:      Mental Status: She is alert and oriented to person, place, and time        Comments: Unchanged w/c bound status   Psychiatric:         Mood and Affect: Mood normal          Behavior: Behavior normal  Behavior is cooperative

## 2022-01-04 ENCOUNTER — TELEPHONE (OUTPATIENT)
Dept: FAMILY MEDICINE CLINIC | Facility: CLINIC | Age: 72
End: 2022-01-04

## 2022-01-24 ENCOUNTER — TELEPHONE (OUTPATIENT)
Dept: FAMILY MEDICINE CLINIC | Facility: CLINIC | Age: 72
End: 2022-01-24

## 2022-01-24 DIAGNOSIS — Z99.81 ON HOME OXYGEN THERAPY: ICD-10-CM

## 2022-01-24 DIAGNOSIS — R32 URINARY INCONTINENCE, UNSPECIFIED TYPE: ICD-10-CM

## 2022-01-24 DIAGNOSIS — J98.4 RESTRICTIVE LUNG DISEASE DUE TO MUSCULAR DYSTROPHY (HCC): ICD-10-CM

## 2022-01-24 DIAGNOSIS — N32.81 OVERACTIVE BLADDER: ICD-10-CM

## 2022-01-24 DIAGNOSIS — Z93.3 COLOSTOMY STATUS (HCC): ICD-10-CM

## 2022-01-24 DIAGNOSIS — J43.2 CENTRILOBULAR EMPHYSEMA (HCC): ICD-10-CM

## 2022-01-24 DIAGNOSIS — D05.12 DUCTAL CARCINOMA IN SITU (DCIS) OF LEFT BREAST: ICD-10-CM

## 2022-01-24 DIAGNOSIS — G71.00 RESTRICTIVE LUNG DISEASE DUE TO MUSCULAR DYSTROPHY (HCC): ICD-10-CM

## 2022-01-24 DIAGNOSIS — G71.00 MUSCULAR DYSTROPHY (HCC): Primary | ICD-10-CM

## 2022-01-24 DIAGNOSIS — J96.11 CHRONIC RESPIRATORY FAILURE WITH HYPOXIA (HCC): ICD-10-CM

## 2022-01-24 DIAGNOSIS — R26.2 AMBULATORY DYSFUNCTION: ICD-10-CM

## 2022-01-24 NOTE — TELEPHONE ENCOUNTER
Pt called for a script for a new hospital bed  She stated she uses    [facility]- [phone]      Script will have to be faxed with chart notes and demographics

## 2022-01-25 DIAGNOSIS — Z93.3 COLOSTOMY STATUS (HCC): ICD-10-CM

## 2022-01-25 DIAGNOSIS — J96.11 CHRONIC RESPIRATORY FAILURE WITH HYPOXIA (HCC): ICD-10-CM

## 2022-01-25 DIAGNOSIS — Z99.81 ON HOME OXYGEN THERAPY: ICD-10-CM

## 2022-01-25 DIAGNOSIS — N32.81 OVERACTIVE BLADDER: ICD-10-CM

## 2022-01-25 DIAGNOSIS — J98.4 RESTRICTIVE LUNG DISEASE DUE TO MUSCULAR DYSTROPHY (HCC): ICD-10-CM

## 2022-01-25 DIAGNOSIS — G71.00 RESTRICTIVE LUNG DISEASE DUE TO MUSCULAR DYSTROPHY (HCC): ICD-10-CM

## 2022-01-25 DIAGNOSIS — D05.12 DUCTAL CARCINOMA IN SITU (DCIS) OF LEFT BREAST: ICD-10-CM

## 2022-01-25 DIAGNOSIS — R26.2 AMBULATORY DYSFUNCTION: ICD-10-CM

## 2022-01-25 DIAGNOSIS — G71.00 MUSCULAR DYSTROPHY (HCC): Primary | ICD-10-CM

## 2022-01-26 NOTE — TELEPHONE ENCOUNTER
[facility] called and relayed that there needs to be an order for a Gel Overlay for the Hospital Bed  She stated that it can be added to the order on the hospital bed script or a whole separate order  Please advise  Fax order to [phone]

## 2022-02-07 ENCOUNTER — TELEPHONE (OUTPATIENT)
Dept: FAMILY MEDICINE CLINIC | Facility: CLINIC | Age: 72
End: 2022-02-07

## 2022-02-07 NOTE — TELEPHONE ENCOUNTER
[facility] called to see if CMN for hospital bed has been rec'd  As of now it has not, waiting for Ann Gaona to refax

## 2022-02-17 ENCOUNTER — OFFICE VISIT (OUTPATIENT)
Dept: PULMONOLOGY | Facility: CLINIC | Age: 72
End: 2022-02-17
Payer: MEDICARE

## 2022-02-17 ENCOUNTER — DOCUMENTATION (OUTPATIENT)
Dept: PULMONOLOGY | Facility: CLINIC | Age: 72
End: 2022-02-17

## 2022-02-17 VITALS
DIASTOLIC BLOOD PRESSURE: 66 MMHG | TEMPERATURE: 97.7 F | BODY MASS INDEX: 32.59 KG/M2 | SYSTOLIC BLOOD PRESSURE: 110 MMHG | WEIGHT: 166 LBS | RESPIRATION RATE: 18 BRPM | HEIGHT: 60 IN | OXYGEN SATURATION: 95 % | HEART RATE: 80 BPM

## 2022-02-17 DIAGNOSIS — J43.2 CENTRILOBULAR EMPHYSEMA (HCC): Primary | ICD-10-CM

## 2022-02-17 DIAGNOSIS — J96.11 CHRONIC RESPIRATORY FAILURE WITH HYPOXIA (HCC): ICD-10-CM

## 2022-02-17 DIAGNOSIS — J98.4 RESTRICTIVE LUNG DISEASE DUE TO MUSCULAR DYSTROPHY (HCC): ICD-10-CM

## 2022-02-17 DIAGNOSIS — G71.00 RESTRICTIVE LUNG DISEASE DUE TO MUSCULAR DYSTROPHY (HCC): ICD-10-CM

## 2022-02-17 DIAGNOSIS — G71.09 DYSTROPHY, MUSCULAR, HEREDITARY PROGRESSIVE (HCC): ICD-10-CM

## 2022-02-17 DIAGNOSIS — J43.2 CENTRILOBULAR EMPHYSEMA (HCC): ICD-10-CM

## 2022-02-17 PROBLEM — R14.2 BELCHING: Status: RESOLVED | Noted: 2021-04-02 | Resolved: 2022-02-17

## 2022-02-17 PROBLEM — R00.0 SINUS TACHYCARDIA: Status: RESOLVED | Noted: 2020-10-02 | Resolved: 2022-02-17

## 2022-02-17 PROBLEM — B37.0 ORAL CANDIDIASIS: Status: RESOLVED | Noted: 2020-10-23 | Resolved: 2022-02-17

## 2022-02-17 PROBLEM — K57.80 PERFORATED DIVERTICULUM: Status: RESOLVED | Noted: 2020-03-28 | Resolved: 2022-02-17

## 2022-02-17 PROCEDURE — 94618 PULMONARY STRESS TESTING: CPT | Performed by: INTERNAL MEDICINE

## 2022-02-17 PROCEDURE — 99214 OFFICE O/P EST MOD 30 MIN: CPT | Performed by: INTERNAL MEDICINE

## 2022-02-17 NOTE — PROGRESS NOTES
Progress note - Pulmonary Medicine   Rocky Sawyer 67 y o  female MRN: [id number]       Impression & Plan:     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[facility] )  · Has significant restrictive lung disease from her muscular dystrophy   · This has progressed over the years and is essentially wheelchair bound with only limited activity outside the wheelchair   · Does report shortness of breath did progress significantly after her major surgery last year  · She has not had blood work since June of last year  Carbon dioxide level at top normal  · Will check blood work and VBG    COPD (chronic obstructive pulmonary disease) ([facility] )  · Using Anoro Ellipta with reasonable response to therapy  She does notice that it helps with some of her mucus and shortness of breath  · Continue present therapy    Chronic respiratory failure with hypoxia (HCC)  · Previously on 2 L of supplemental oxygen at nighttime but has more dyspnea during the daytime   · Performed a very limited ambulatory oximetry today  She did promptly desaturate after 8 m in 1 minute to 88%   · She had improvement on oxygen but had to perform only 1 minute of ambulation because of leg weakness  Titration likely incomplete but maintain saturation on 2 L  · Home oxygen ordered  If she can qualify, portable concentrator may be the best device  She has difficulty with oxygen cords in the house and her mobile wheelchair  Would not be able to wheel a tank based oxygen  Follow-up in 6 months    ______________________________________________________________________    HPI:    Rocky Sawyer presents today for follow-up of restrictive lung disease and COPD  She has had progressive shortness of breath, particularly noticeable since her last surgery and ICU stay  She has regained some capacity but still notices she has very limited ability to perform rehabilitation and gets winded very easily  No increased cough or phlegm production    She takes Anoro Ellipta which she does find helpful for shortness of breath and a rattling/mucus production  She has been more consistent with regular use recently  No chest pain to report  No lightheadedness or dizziness  No abdominal pain      Current Medications:    Current Outpatient Medications:     Ascorbic Acid (VITAMIN C) 1000 MG tablet, Take 1,000 mg by mouth daily, Disp: , Rfl:     citalopram (CeleXA) 10 mg tablet, Take 1 tablet (10 mg total) by mouth daily, Disp: 90 tablet, Rfl: 1    clotrimazole-betamethasone (LOTRISONE) 1-0 05 % lotion, , Disp: , Rfl: 0    diazepam (VALIUM) 5 mg tablet, Take 1 tablet (5 mg total) by mouth every 8 (eight) hours as needed for anxiety or muscle spasms, Disp: 30 tablet, Rfl: 0    ELDERBERRY PO, Take by mouth, Disp: , Rfl:     FLAXSEED, LINSEED, PO, Take 10 mg by mouth, Disp: , Rfl:     fluticasone (FLONASE) 50 mcg/act nasal spray, instill 2 sprays into each nostril once daily, Disp: 16 g, Rfl: 3    Indole-3-Carbinol POWD, by Does not apply route, Disp: , Rfl:     ketoconazole (NIZORAL) 2 % cream, APPLY TWICE A DAY TO THE AFFECTED AREA(S), Disp: , Rfl:     ketoconazole (NIZORAL) 2 % shampoo, Apply 1 application topically 2 (two) times a week, Disp: 120 mL, Rfl: 1    magnesium gluconate (MAGONATE) 500 mg tablet, Take 500 mg by mouth 2 (two) times a day, Disp: , Rfl:     Melatonin 3 MG CAPS, Take 3 mg by mouth, Disp: , Rfl:     nystatin (MYCOSTATIN) powder, Apply topically 2 (two) times a day, Disp: 30 g, Rfl: 1    omeprazole (PriLOSEC) 20 mg delayed release capsule, take 1 capsule by mouth every morning, Disp: 90 capsule, Rfl: 1    STARLA D ARCO PO, Take by mouth, Disp: , Rfl:     Potassium (POTASSIMIN PO), Take 550 mg by mouth, Disp: , Rfl:     Probiotic Product (Florastor Plus) CAPS, , Disp: , Rfl:     S-Adenosylmethionine (SAMe) 400 MG TABS, , Disp: , Rfl:     simethicone (MYLICON) 406 MG chewable tablet, Chew 1 tablet (125 mg total) every 6 (six) hours as needed for flatulence, Disp: 30 tablet, Rfl: 3    solifenacin (VESIcare) 10 MG tablet, Take 1 tablet (10 mg total) by mouth daily, Disp: 90 tablet, Rfl: 1    traZODone (DESYREL) 50 mg tablet, take 1 tablet by mouth at bedtime, Disp: 90 tablet, Rfl: 1    triamcinolone (KENALOG) 0 1 % cream, Apply topically 2 (two) times a day, Disp: 80 g, Rfl: 1    umeclidinium-vilanterol (ANORO ELLIPTA) 62 5-25 MCG/INH inhaler, Inhale 1 puff daily, Disp: 60 blister, Rfl: 6    VITAMIN D PO, Take by mouth, Disp: , Rfl:     Zinc 10 MG LOZG, Apply to the mouth or throat, Disp: , Rfl:     Review of Systems:  Answers for HPI/ROS submitted by the patient on 2/16/2022  Do you have shortness of breath?: Yes  Chronicity: chronic  When did you first notice your symptoms?: more than 1 year ago  How often do your symptoms occur?: constantly  Since you first noticed this problem, how has it changed?: unchanged  Have you had a change in appetite?: No  Do you have chest pain?: No  Do you have shortness of breath that occurs with effort or exertion?: Yes  Do you have ear congestion?: No  Do you have ear pain?: No  Do you have a fever?: No  Do you have headaches?: No  Do you have heartburn?: No  Do you have fatigue?: Yes  Do you have muscle pain?: Yes  Do you have nasal congestion?: Yes  Do you have shortness of breath when lying flat?: Yes  Do you have shortness of breath when you wake up?: Yes  Do you have post-nasal drip?: Yes  Do you have a runny nose?: Yes  Do you have sneezing?: No  Do you have a sore throat?: No  Do you have sweats?: No  Do you have trouble swallowing?: No  Have you experienced weight loss?: No  Which of the following makes your symptoms worse?: animal exposure, any activity, change in weather, climbing stairs, exposure to fumes, exposure to smoke, lying down, minimal activity, pollen, strenuous activity, URI  Which of the following makes your symptoms better?: cold air, steroid inhaler  Risk factors for lung disease: smoking/tobacco exposure     Aside from what is mentioned in the HPI, the review of systems is otherwise negative    Past medical history, surgical history, and family history were reviewed and updated as appropriate    Social history updates:  Social History     Tobacco Use   Smoking Status Former Smoker    Packs/day: 1 50    Years: 37 00    Pack years: 55 50    Types: Cigarettes    Start date:     Quit date:     Years since quittin 1   Smokeless Tobacco Never Used       PhysicalExamination:  Vitals:   /66   Pulse 80   Temp 97 7 °F (36 5 °C)   Resp 18   Ht 5' (1 524 m)   Wt 75 3 kg (166 lb)   LMP  (LMP Unknown)   SpO2 95%   BMI 32 42 kg/m²   Gen: Comfortable at rest on room air  HEENT:  conjugate gaze  Sclerae anicteric  Oropharynx moist  Neck: Trachea is midline  No JVD  No adenopathy  No thyromegaly  Chest:  Very limited chest wall excursion  No rales or crackles  No wheezes  Cardiac:  Regular  no murmur  Abdomen:  Benign  Extremities: No edema  Neuro:  normal speech and mentation        Diagnostic Data:  Labs: I personally reviewed the most recent laboratory data pertinent to today's visit    Lab Results   Component Value Date    WBC 8 84 2021    HGB 12 6 2021    HCT 41 2 2021    MCV 88 2021     (H) 2021     Lab Results   Component Value Date    SODIUM 140 2021    K 4 6 2021    CO2 33 (H) 2021     2021    BUN 16 2021    CREATININE 0 37 (L) 2021    CALCIUM 9 2 2021     6 minute walk test:  Patient underwent limited ambulatory oximetry in the office today  Resting room air saturation 98%  She was ambulated but only for a very short distance  After 2 minutes had demonstrated desaturation to 88%  She was placed on supplemental oxygen at 2 L  Saturation on supplemental oxygen at rest was 100%    Due to her muscular dystrophy, was only able to walk for a very short distance with oxygen saturation maintained at 100%  This is likely an incomplete titration  Based on her decline in oxygenation with ambulation on room air, she does qualify for supplemental oxygen        William Metz MD

## 2022-02-17 NOTE — ASSESSMENT & PLAN NOTE
· Previously on 2 L of supplemental oxygen at nighttime but has more dyspnea during the daytime   · Performed a very limited ambulatory oximetry today  She did promptly desaturate after 8 m in 1 minute to 88%   · She had improvement on oxygen but had to perform only 1 minute of ambulation because of leg weakness  Titration likely incomplete but maintain saturation on 2 L  · Home oxygen ordered  If she can qualify, portable concentrator may be the best device  She has difficulty with oxygen cords in the house and her mobile wheelchair  Would not be able to wheel a tank based oxygen

## 2022-02-17 NOTE — ASSESSMENT & PLAN NOTE
· Using Anoro Ellipta with reasonable response to therapy    She does notice that it helps with some of her mucus and shortness of breath  · Continue present therapy

## 2022-02-17 NOTE — ASSESSMENT & PLAN NOTE
· Has significant restrictive lung disease from her muscular dystrophy   · This has progressed over the years and is essentially wheelchair bound with only limited activity outside the wheelchair   · Does report shortness of breath did progress significantly after her major surgery last year  · She has not had blood work since June of last year    Carbon dioxide level at top normal  · Will check blood work and VBG

## 2022-02-25 LAB

## 2022-03-03 DIAGNOSIS — Z93.3 COLOSTOMY STATUS (HCC): ICD-10-CM

## 2022-03-03 DIAGNOSIS — G71.00 MUSCULAR DYSTROPHY (HCC): ICD-10-CM

## 2022-03-03 DIAGNOSIS — J98.4 RESTRICTIVE LUNG DISEASE DUE TO MUSCULAR DYSTROPHY (HCC): ICD-10-CM

## 2022-03-03 DIAGNOSIS — G71.00 RESTRICTIVE LUNG DISEASE DUE TO MUSCULAR DYSTROPHY (HCC): ICD-10-CM

## 2022-03-06 RX ORDER — DIAZEPAM 5 MG/1
5 TABLET ORAL EVERY 8 HOURS PRN
Qty: 30 TABLET | Refills: 0 | Status: SHIPPED | OUTPATIENT
Start: 2022-03-06 | End: 2022-05-09 | Stop reason: SDUPTHER

## 2022-03-07 ENCOUNTER — TELEPHONE (OUTPATIENT)
Dept: FAMILY MEDICINE CLINIC | Facility: CLINIC | Age: 72
End: 2022-03-07

## 2022-03-07 NOTE — TELEPHONE ENCOUNTER
Received notification that prior authorization is required for Diazepam  Prior authorization started on covermymeds  Key code is [address]  Awaiting response from insurance

## 2022-03-14 NOTE — TELEPHONE ENCOUNTER
Called [facility] and spoke to Won  The primary diagnosis used was the muscular dystrophy  An appeal would need to be completed   Awaiting fax to complete for appeal   Call reference number is [id number]

## 2022-03-14 NOTE — TELEPHONE ENCOUNTER
Please contact insurance to correct their inaccurate info   The Primary diagnosis for which the diazepam is rx'd is <<Muscular dystrophy ([facility] ) (G71 00)>>   Two Secondary diagnoses on the rx are:<< 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[facility] ) (J98 4 , G71 00) and Colostomy status ([facility] ) (Z93 3)>> but these are Secondary diagnoses

## 2022-04-22 ENCOUNTER — APPOINTMENT (OUTPATIENT)
Dept: LAB | Facility: HOSPITAL | Age: 72
End: 2022-04-22
Payer: MEDICARE

## 2022-04-22 DIAGNOSIS — J96.11 CHRONIC RESPIRATORY FAILURE WITH HYPOXIA (HCC): ICD-10-CM

## 2022-04-22 DIAGNOSIS — M81.0 AGE-RELATED OSTEOPOROSIS WITHOUT CURRENT PATHOLOGICAL FRACTURE: ICD-10-CM

## 2022-04-22 DIAGNOSIS — Z86.39 HISTORY OF VITAMIN D DEFICIENCY: ICD-10-CM

## 2022-04-22 DIAGNOSIS — G71.09 DYSTROPHY, MUSCULAR, HEREDITARY PROGRESSIVE (HCC): ICD-10-CM

## 2022-04-22 DIAGNOSIS — Z11.59 NEED FOR HEPATITIS C SCREENING TEST: ICD-10-CM

## 2022-04-22 DIAGNOSIS — K55.9 ISCHEMIC COLITIS (HCC): ICD-10-CM

## 2022-04-22 DIAGNOSIS — G71.00 MUSCULAR DYSTROPHY (HCC): ICD-10-CM

## 2022-04-22 DIAGNOSIS — Z13.220 LIPID SCREENING: ICD-10-CM

## 2022-04-22 DIAGNOSIS — Z13.29 THYROID DISORDER SCREEN: ICD-10-CM

## 2022-04-22 DIAGNOSIS — D75.839 THROMBOCYTOSIS: ICD-10-CM

## 2022-04-22 DIAGNOSIS — L21.0 SEBORRHEA CAPITIS: ICD-10-CM

## 2022-04-22 LAB
25(OH)D3 SERPL-MCNC: 31.6 NG/ML (ref 30–100)
ALBUMIN SERPL BCP-MCNC: 3.5 G/DL (ref 3.5–5)
ALP SERPL-CCNC: 97 U/L (ref 46–116)
ALT SERPL W P-5'-P-CCNC: 19 U/L (ref 12–78)
ANION GAP SERPL CALCULATED.3IONS-SCNC: 6 MMOL/L (ref 4–13)
AST SERPL W P-5'-P-CCNC: 18 U/L (ref 5–45)
BASE EX.OXY STD BLDV CALC-SCNC: 69.4 % (ref 60–80)
BASE EXCESS BLDV CALC-SCNC: 4.5 MMOL/L
BASOPHILS # BLD AUTO: 0.06 THOUSANDS/ΜL (ref 0–0.1)
BASOPHILS NFR BLD AUTO: 1 % (ref 0–1)
BILIRUB SERPL-MCNC: 0.18 MG/DL (ref 0.2–1)
BILIRUB UR QL STRIP: NEGATIVE
BUN SERPL-MCNC: 15 MG/DL (ref 5–25)
CALCIUM SERPL-MCNC: 9 MG/DL (ref 8.3–10.1)
CHLORIDE SERPL-SCNC: 105 MMOL/L (ref 100–108)
CHOLEST SERPL-MCNC: 234 MG/DL
CLARITY UR: CLEAR
CO2 SERPL-SCNC: 33 MMOL/L (ref 21–32)
COLOR UR: YELLOW
CREAT SERPL-MCNC: 0.42 MG/DL (ref 0.6–1.3)
EOSINOPHIL # BLD AUTO: 0.33 THOUSAND/ΜL (ref 0–0.61)
EOSINOPHIL NFR BLD AUTO: 3 % (ref 0–6)
ERYTHROCYTE [DISTWIDTH] IN BLOOD BY AUTOMATED COUNT: 14.8 % (ref 11.6–15.1)
GFR SERPL CREATININE-BSD FRML MDRD: 102 ML/MIN/1.73SQ M
GLUCOSE P FAST SERPL-MCNC: 85 MG/DL (ref 65–99)
GLUCOSE UR STRIP-MCNC: NEGATIVE MG/DL
HCO3 BLDV-SCNC: 33.5 MMOL/L (ref 24–30)
HCT VFR BLD AUTO: 43.3 % (ref 34.8–46.1)
HCV AB SER QL: NORMAL
HDLC SERPL-MCNC: 82 MG/DL
HGB BLD-MCNC: 13 G/DL (ref 11.5–15.4)
HGB UR QL STRIP.AUTO: NEGATIVE
IMM GRANULOCYTES # BLD AUTO: 0.03 THOUSAND/UL (ref 0–0.2)
IMM GRANULOCYTES NFR BLD AUTO: 0 % (ref 0–2)
KETONES UR STRIP-MCNC: NEGATIVE MG/DL
LDLC SERPL CALC-MCNC: 131 MG/DL (ref 0–100)
LEUKOCYTE ESTERASE UR QL STRIP: NEGATIVE
LYMPHOCYTES # BLD AUTO: 2.43 THOUSANDS/ΜL (ref 0.6–4.47)
LYMPHOCYTES NFR BLD AUTO: 24 % (ref 14–44)
MCH RBC QN AUTO: 26.3 PG (ref 26.8–34.3)
MCHC RBC AUTO-ENTMCNC: 30 G/DL (ref 31.4–37.4)
MCV RBC AUTO: 88 FL (ref 82–98)
MONOCYTES # BLD AUTO: 0.87 THOUSAND/ΜL (ref 0.17–1.22)
MONOCYTES NFR BLD AUTO: 9 % (ref 4–12)
NEUTROPHILS # BLD AUTO: 6.29 THOUSANDS/ΜL (ref 1.85–7.62)
NEUTS SEG NFR BLD AUTO: 63 % (ref 43–75)
NITRITE UR QL STRIP: NEGATIVE
NONHDLC SERPL-MCNC: 152 MG/DL
NRBC BLD AUTO-RTO: 0 /100 WBCS
O2 CT BLDV-SCNC: 13.8 ML/DL
PCO2 BLDV: 70.7 MM HG (ref 42–50)
PH BLDV: 7.29 [PH] (ref 7.3–7.4)
PH UR STRIP.AUTO: 7 [PH]
PLATELET # BLD AUTO: 462 THOUSANDS/UL (ref 149–390)
PMV BLD AUTO: 10 FL (ref 8.9–12.7)
PO2 BLDV: 41.4 MM HG (ref 35–45)
POTASSIUM SERPL-SCNC: 4.5 MMOL/L (ref 3.5–5.3)
PROT SERPL-MCNC: 7.8 G/DL (ref 6.4–8.2)
PROT UR STRIP-MCNC: NEGATIVE MG/DL
RBC # BLD AUTO: 4.95 MILLION/UL (ref 3.81–5.12)
SODIUM SERPL-SCNC: 144 MMOL/L (ref 136–145)
SP GR UR STRIP.AUTO: 1.02 (ref 1–1.03)
TRIGL SERPL-MCNC: 103 MG/DL
TSH SERPL DL<=0.05 MIU/L-ACNC: 1.53 UIU/ML (ref 0.45–4.5)
UROBILINOGEN UR QL STRIP.AUTO: 0.2 E.U./DL
WBC # BLD AUTO: 10.01 THOUSAND/UL (ref 4.31–10.16)

## 2022-04-22 PROCEDURE — 85025 COMPLETE CBC W/AUTO DIFF WBC: CPT

## 2022-04-22 PROCEDURE — 36415 COLL VENOUS BLD VENIPUNCTURE: CPT

## 2022-04-22 PROCEDURE — 86803 HEPATITIS C AB TEST: CPT

## 2022-04-22 PROCEDURE — 80061 LIPID PANEL: CPT

## 2022-04-22 PROCEDURE — 80053 COMPREHEN METABOLIC PANEL: CPT

## 2022-04-22 PROCEDURE — 81003 URINALYSIS AUTO W/O SCOPE: CPT | Performed by: FAMILY MEDICINE

## 2022-04-22 PROCEDURE — 82306 VITAMIN D 25 HYDROXY: CPT

## 2022-04-22 PROCEDURE — 82805 BLOOD GASES W/O2 SATURATION: CPT

## 2022-04-22 PROCEDURE — 84443 ASSAY THYROID STIM HORMONE: CPT

## 2022-04-25 RX ORDER — KETOCONAZOLE 20 MG/ML
SHAMPOO TOPICAL
Qty: 120 ML | Refills: 1 | Status: SHIPPED | OUTPATIENT
Start: 2022-04-25

## 2022-05-04 DIAGNOSIS — N32.81 OVERACTIVE BLADDER: ICD-10-CM

## 2022-05-05 RX ORDER — SOLIFENACIN SUCCINATE 10 MG/1
TABLET, FILM COATED ORAL
Qty: 90 TABLET | Refills: 1 | Status: SHIPPED | OUTPATIENT
Start: 2022-05-05 | End: 2022-05-09 | Stop reason: SDUPTHER

## 2022-05-09 DIAGNOSIS — Z93.3 COLOSTOMY STATUS (HCC): ICD-10-CM

## 2022-05-09 DIAGNOSIS — J98.4 RESTRICTIVE LUNG DISEASE DUE TO MUSCULAR DYSTROPHY (HCC): ICD-10-CM

## 2022-05-09 DIAGNOSIS — G71.00 MUSCULAR DYSTROPHY (HCC): ICD-10-CM

## 2022-05-09 DIAGNOSIS — G71.00 RESTRICTIVE LUNG DISEASE DUE TO MUSCULAR DYSTROPHY (HCC): ICD-10-CM

## 2022-05-09 DIAGNOSIS — N32.81 OVERACTIVE BLADDER: ICD-10-CM

## 2022-05-09 RX ORDER — SOLIFENACIN SUCCINATE 10 MG/1
10 TABLET, FILM COATED ORAL DAILY
Qty: 90 TABLET | Refills: 1 | Status: SHIPPED | OUTPATIENT
Start: 2022-05-09

## 2022-05-11 RX ORDER — DIAZEPAM 5 MG/1
5 TABLET ORAL EVERY 8 HOURS PRN
Qty: 30 TABLET | Refills: 0 | Status: SHIPPED | OUTPATIENT
Start: 2022-05-11

## 2022-05-11 NOTE — TELEPHONE ENCOUNTER
Please schedule appt for our new documentation requirements for controlled substances- 40 minutes please  Thank you

## 2022-05-17 DIAGNOSIS — R12 HEART BURN: ICD-10-CM

## 2022-05-17 RX ORDER — OMEPRAZOLE 20 MG/1
CAPSULE, DELAYED RELEASE ORAL
Qty: 90 CAPSULE | Refills: 1 | Status: SHIPPED | OUTPATIENT
Start: 2022-05-17

## 2022-06-02 ENCOUNTER — TELEPHONE (OUTPATIENT)
Dept: PULMONOLOGY | Facility: CLINIC | Age: 72
End: 2022-06-02

## 2022-06-24 NOTE — TELEPHONE ENCOUNTER
Pt called and scheduled appt for 9/14/22 @ 10:40am w/ Dr Heidi Gorman in [facility]

## 2022-06-28 RX ORDER — BUPROPION HYDROCHLORIDE 75 MG/1
TABLET ORAL
COMMUNITY
Start: 2022-05-26

## 2022-06-29 ENCOUNTER — OFFICE VISIT (OUTPATIENT)
Dept: FAMILY MEDICINE CLINIC | Facility: CLINIC | Age: 72
End: 2022-06-29
Payer: MEDICARE

## 2022-06-29 VITALS
DIASTOLIC BLOOD PRESSURE: 82 MMHG | OXYGEN SATURATION: 95 % | HEART RATE: 75 BPM | TEMPERATURE: 97.2 F | SYSTOLIC BLOOD PRESSURE: 120 MMHG

## 2022-06-29 DIAGNOSIS — Z12.31 BREAST CANCER SCREENING BY MAMMOGRAM: ICD-10-CM

## 2022-06-29 DIAGNOSIS — D05.12 DUCTAL CARCINOMA IN SITU (DCIS) OF LEFT BREAST: ICD-10-CM

## 2022-06-29 DIAGNOSIS — J43.2 CENTRILOBULAR EMPHYSEMA (HCC): ICD-10-CM

## 2022-06-29 DIAGNOSIS — J98.4 RESTRICTIVE LUNG DISEASE DUE TO MUSCULAR DYSTROPHY (HCC): ICD-10-CM

## 2022-06-29 DIAGNOSIS — M81.0 OSTEOPOROSIS WITHOUT CURRENT PATHOLOGICAL FRACTURE, UNSPECIFIED OSTEOPOROSIS TYPE: ICD-10-CM

## 2022-06-29 DIAGNOSIS — Z00.00 MEDICARE ANNUAL WELLNESS VISIT, SUBSEQUENT: Primary | ICD-10-CM

## 2022-06-29 DIAGNOSIS — Z93.3 COLOSTOMY STATUS (HCC): ICD-10-CM

## 2022-06-29 DIAGNOSIS — G71.00 RESTRICTIVE LUNG DISEASE DUE TO MUSCULAR DYSTROPHY (HCC): ICD-10-CM

## 2022-06-29 DIAGNOSIS — J96.11 CHRONIC RESPIRATORY FAILURE WITH HYPOXIA (HCC): ICD-10-CM

## 2022-06-29 DIAGNOSIS — D75.839 THROMBOCYTOSIS, UNSPECIFIED: ICD-10-CM

## 2022-06-29 DIAGNOSIS — R32 URINARY INCONTINENCE, UNSPECIFIED TYPE: ICD-10-CM

## 2022-06-29 DIAGNOSIS — G71.09 DYSTROPHY, MUSCULAR, HEREDITARY PROGRESSIVE (HCC): ICD-10-CM

## 2022-06-29 DIAGNOSIS — Z23 IMMUNIZATION DUE: ICD-10-CM

## 2022-06-29 PROCEDURE — 99214 OFFICE O/P EST MOD 30 MIN: CPT | Performed by: FAMILY MEDICINE

## 2022-06-29 PROCEDURE — 90677 PCV20 VACCINE IM: CPT

## 2022-06-29 PROCEDURE — G0439 PPPS, SUBSEQ VISIT: HCPCS | Performed by: FAMILY MEDICINE

## 2022-06-29 PROCEDURE — G0009 ADMIN PNEUMOCOCCAL VACCINE: HCPCS

## 2022-06-29 NOTE — PROGRESS NOTES
Assessment and Plan:   Kathy Dhillon was seen today for medicare wellness visit  Diagnoses and all orders for this visit:    Medicare annual wellness visit, subsequent    Chronic respiratory failure with hypoxia ([facility] )    Centrilobular emphysema ([facility] )    Ductal carcinoma in situ (DCIS) of left breast  -     Mammo screening right w 3d & cad; Future    Breast cancer screening by mammogram  -     Mammo screening right w 3d & cad; Future    Colostomy status ([facility] )    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[facility] )    Dystrophy, muscular, 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[facility] )    Urinary incontinence, unspecified type    Thrombocytosis, unspecified    Osteoporosis without current pathological fracture, unspecified osteoporosis type    Immunization due  -     Pneumococcal Conjugate Vaccine 20-valent (Pcv20)        Problem List Items Addressed This Visit        Respiratory    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[facility] )    COPD (chronic obstructive pulmonary disease) ([facility] )    Chronic respiratory failure with hypoxia ([facility] [facility] )       Musculoskeletal and Integument    Osteoporosis    Dystrophy, muscular, 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[facility] )       Hematopoietic and Hemostatic    Thrombocytosis, unspecified       Other    Urinary incontinence    Medicare annual wellness visit, subsequent - Primary    Ductal carcinoma in situ (DCIS) of left breast    Relevant Orders    Mammo screening right w 3d & cad    Colostomy status ([facility] )      Other Visit Diagnoses     Breast cancer screening by mammogram        Relevant Orders    Mammo screening right w 3d & cad    Immunization due        Relevant Orders    Pneumococcal Conjugate Vaccine 20-valent (Pcv20) (Completed)           Preventive health issues were discussed with patient, and age appropriate screening tests were ordered as noted in patient's After Visit Summary    Personalized health advice and appropriate referrals for health education or preventive services given if needed, as noted in patient's After Visit Summary       History of Present Illness:     Patient presents for a Medicare Wellness Visit with f/u    Medicare wellness and f/u visit  Pt here with her aide, who drove her  Pt states, "I am doing some counseling and they're doing some prescribing, I had been complaining of some restless legs to them and they said that can happen from the trazodone, so they took me of it and put me on the valium to help me sleep and they will take over prescribing that" states she is going to [facility]     Patient Care Team:  Rodolfo Mayberry DO as PCP - General (Family Medicine)  Sharren Leaven, MD Yolande Mcburney, DO Ethel Shape, MD as Endoscopist     Review of Systems:     Review of Systems     Problem List:     Patient Active Problem List   Diagnosis    Dystrophy, muscular, 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[facility] )    Ambulatory dysfunction    Urinary incontinence    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[facility] )    Osteoporosis    GERD without esophagitis    COPD (chronic obstructive pulmonary disease) ([facility] [facility]    Allergic rhinitis    Difficult intubation    Thrombocytosis, unspecified    Chronic respiratory failure with hypoxia ([address]    Bladder injury    Anemia    Depression    Insomnia    Abdominal pain    Gastroparesis    Aortic valve sclerosis    Tricuspid valve insufficiency    Mitral annular calcification    Increased heart rate    Ductal carcinoma in situ (DCIS) of left breast    History of malignant neoplasm of skin    Hx of colectomy    Colostomy status ([address] )    History of abdominal abscess    History of depression    Former smoker    History of left mastectomy    Tachycardia    Medicare annual wellness visit, subsequent    History of vitamin D deficiency    Mild episode of recurrent major depressive disorder ([facility] [facility] )      Past Medical and Surgical History:     Past Medical History:   Diagnosis Date    Anxiety     Colitis     COPD (chronic obstructive pulmonary disease) (HCC)     Depression     Difficult intubation     Excessive daytime sleepiness     GERD (gastroesophageal reflux disease)     Hyperlipidemia     [facility]schemic colitis ([facility] ) 1/21/2019    Leukocytosis 3/28/2020    Muscular dystrophy ([facility] )     Limb-girdle    Osteoporosis     Overactive bladder     Perforated diverticulum 3/28/2020    Pneumonitis     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[facility] )     Skin cancer     Skin disorder     Vitamin D deficiency      Past Surgical History:   Procedure Laterality Date    COLONOSCOPY N/A 1/22/2019    Procedure: COLONOSCOPY;  Surgeon: Kyra Mclean MD;  Location: [facility];   Service: Colorectal    CYSTOSCOPY N/A 9/30/2020    Procedure: CYSTOSCOPY; BILATERAL URETERAL CATHETER PLACEMENT, CYSTOTOMY;  Surgeon: Tariq Markham MD;  [facility];  Service: Urology    [place] CYSTOGRAM  10/15/2020    HARTMANS PROCEDURE N/A 9/30/2020    Procedure: EXPLORATORY LAPAROTOMY; LYSIS OF ADHESIONS; WASHOUT PELVIC ABSCESS; COMPLEX SIGMOID COLON RESECTION; LOOP TRANSVERSE COLOSTOMY;  Surgeon: Hank Bal MD;  Location: [facility];  Service: General    IR DRAINAGE TUBE PLACEMENT  9/24/2020    MASTECTOMY Left 2007    left breast mastectomy     TONSILLECTOMY      TOOTH EXTRACTION      TUBAL LIGATION        Family History:     Family History   Problem Relation Age of Onset    Other Mother         bone loss    Arthritis Mother     Skin cancer Father     Hypertension Father         benign     Other Father         bone loss    Muscular dystrophy Father     Muscular dystrophy Brother         of the limb gridle     Muscular dystrophy Family         of the limb girdle    Hypertension Sister     No Known Problems Sister     No Known Problems Brother       Social History:     Social History     Socioeconomic History    Marital status:      Spouse name: None    Number of children: None    Years of education: None    Highest education level: None   Occupational History    None   Tobacco Use    Smoking status: Former Smoker     Packs/day: 1 50     Years: 37 00     Pack years: 55 50     Types: Cigarettes     Start date:      Quit date:      Years since quittin 5    Smokeless tobacco: Never Used   Vaping Use    Vaping Use: Never used   Substance and Sexual Activity    Alcohol use: Not Currently     Comment: social drinker per allscripts     Drug use: Yes     Types: Marijuana     Comment: oral use    Sexual activity: Not Currently   Other Topics Concern    None   Social History Narrative    Currently on permanent disability due to musc dystrophy    Denied daily coffee consumption     Wheelchair dependent since about     No children     Social Determinants of Health     Financial Resource Strain: Not on file   Food Insecurity: Not on file   Transportation Needs: Not on file   Physical Activity: Not on file   Stress: Not on file   Social Connections: Not on file   Intimate Partner Violence: Not on file   Housing Stability: Not on file      Medications and Allergies:     Current Outpatient Medications   Medication Sig Dispense Refill    buPROPion (WELLBUTRIN) 75 mg tablet take 1 tablet by mouth every morning for DEPRESSION-MOTIVATION,AND FOCUS      citalopram (CeleXA) 10 mg tablet take 1 tablet by mouth once daily 90 tablet 1    clotrimazole-betamethasone (LOTRISONE) 1-0 05 % lotion   0    diazepam (VALIUM) 5 mg tablet Take 1 tablet (5 mg total) by mouth every 8 (eight) hours as needed for anxiety or muscle spasms 30 tablet 0    fluticasone (FLONASE) 50 mcg/act nasal spray instill 2 sprays into each nostril once daily 16 g 3    ketoconazole (NIZORAL) 2 % cream APPLY TWICE A DAY TO THE AFFECTED AREA(S)      ketoconazole (NIZORAL) 2 % shampoo apply to affected area two times a week 120 mL 1    Melatonin 3 MG CAPS Take 3 mg by mouth      omeprazole (PriLOSEC) 20 mg delayed release capsule take 1 capsule by mouth every morning 90 capsule 1    Probiotic Product (Florastor Plus) CAPS       solifenacin (VESICARE) 10 MG tablet Take 1 tablet (10 mg total) by mouth daily 90 tablet 1    triamcinolone (KENALOG) 0 1 % cream Apply topically 2 (two) times a day 80 g 1    umeclidinium-vilanterol (ANORO ELLIPTA) 62 5-25 MCG/INH inhaler Inhale 1 puff daily 60 blister 6    Indole-3-Carbinol POWD by Does not apply route (Patient not taking: Reported on 6/29/2022)      nystatin (MYCOSTATIN) powder Apply topically 2 (two) times a day (Patient not taking: Reported on 6/29/2022) 30 g 1     No current facility-administered medications for this visit  Allergies   Allergen Reactions    Amoxicillin      Vague rash in the 90s, tolerated zosyn on 9/2020 admission     Codeine      Other reaction(s):  Other (See Comments)  n/v    Medical Tape Itching      Immunizations:     Immunization History   Administered Date(s) Administered    COVID-19 PFIZER VACCINE 0 3 ML IM 12/30/2020, 01/20/2021, 09/14/2021    INFLUENZA 09/01/2014, 09/03/2014, 10/13/2015, 10/13/2015, 09/10/2016, 11/01/2016, 08/31/2017, 09/28/2018, 08/18/2020, 09/14/2021    Influenza Split High Dose Preservative Free IM 10/01/2015, 11/01/2016    Influenza, nasal 10/01/2011    Influenza, seasonal, injectable 1950, 1950, 09/01/2014    Pneumococcal Conjugate 13-Valent 10/01/2015    Pneumococcal Conjugate Vaccine 20-valent (Pcv20), Polysace 06/29/2022    Pneumococcal Polysaccharide PPV23 1950, 10/01/2011, 10/11/2011, 01/01/2012, 07/05/2017    Tdap 07/05/2017    Varicella 09/01/2014    influenza, trivalent, adjuvanted 09/15/2019      Health Maintenance:         Topic Date Due    Breast Cancer Screening: Mammogram  03/22/2022    Colorectal Cancer Screening  01/22/2029    Hepatitis C Screening  Completed         Topic Date Due    COVID-19 Vaccine (4 - Booster for [email or web address] series) 01/14/2022      Medicare Screening Tests and Risk Assessments: Health Risk Assessment:   Patient rates overall health as fair  Patient feels that their physical health rating is same  Patient is satisfied with their life  Eyesight was rated as slightly worse  Hearing was rated as same  Patient feels that their emotional and mental health rating is slightly better  Patients states they are never, rarely angry  Pain experienced in the last 7 days has been some  Patient's pain rating has been 3/10  Patient states that she has experienced weight loss or gain in last 6 months  Depression Screening:   PHQ-9 Score: 9      Fall Risk Screening: In the past year, patient has experienced: history of falling in past year    Number of falls: 1  Injured during fall?: No    Feels unsteady when standing or walking?: Yes    Worried about falling?: Yes      Urinary Incontinence Screening:   Patient has leaked urine accidently in the last six months  Home Safety:  Patient has trouble with stairs inside or outside of their home  Patient has working smoke alarms and has no working carbon monoxide detector  Home safety hazards include: none  Nutrition:   Current diet is Regular  Medications:   Patient is currently taking over-the-counter supplements  OTC medications include: see medication list  Patient is able to manage medications  Activities of Daily Living (ADLs)/Instrumental Activities of Daily Living (IADLs):   Walk and transfer into and out of bed and chair?: No  Dress and groom yourself?: No    Bathe or shower yourself?: No    Feed yourself?  Yes  Do your laundry/housekeeping?: No  Manage your money, pay your bills and track your expenses?: Yes  Make your own meals?: No    Do your own shopping?: No    Previous Hospitalizations:   Any hospitalizations or ED visits within the last 12 months?: No      PREVENTIVE SCREENINGS      Cardiovascular Screening:    General: Screening Current      Diabetes Screening:     General: Screening Current      Colorectal Cancer Screening:     General: Screening Current      Breast Cancer Screening:     General: History Breast Cancer and Risks and Benefits Discussed    Due for: Mammogram        Cervical Cancer Screening:    General: Screening Not Indicated      Osteoporosis Screening:    General: History Osteoporosis and Risks and Benefits Discussed    Due for: DXA Axial      Abdominal Aortic Aneurysm (AAA) Screening:        General: Screening Not Indicated      Lung Cancer Screening:     General: Screening Not Indicated      Hepatitis C Screening:    General: Screening Current    Screening, Brief Intervention, and Referral to Treatment (SBIRT)    Screening  Typical number of drinks in a day: 0  Typical number of drinks in a week: 0  Interpretation: Low risk drinking behavior  Single Item Drug Screening:  How often have you used an illegal drug (including marijuana) or a prescription medication for non-medical reasons in the past year? never    Single Item Drug Screen Score: 0  Interpretation: Negative screen for possible drug use disorder    No exam data present     Physical Exam:     /82 (BP Location: Left arm, Patient Position: Sitting, Cuff Size: Standard)   Pulse 75   Temp (!) 97 2 °F (36 2 °C) (Tympanic)   LMP  (LMP Unknown)   SpO2 95%     Physical Exam  Constitutional:       General: She is not in acute distress  Appearance: She is not toxic-appearing or diaphoretic  Comments: Extremely pleasant as always, unchanged chronically ill appearance, wheelchair-bound   HENT:      Head: Normocephalic and atraumatic  Nose: Nose normal       Mouth/Throat:      Mouth: Mucous membranes are moist    Eyes:      General: Lids are normal       Conjunctiva/sclera: Conjunctivae normal    Cardiovascular:      Rate and Rhythm: Normal rate and regular rhythm  Heart sounds: S1 normal and S2 normal      No friction rub  No gallop     Pulmonary:      Effort: Pulmonary effort is normal       Breath sounds: Normal breath sounds and air entry  Abdominal:      Palpations: Abdomen is soft  Tenderness: There is no abdominal tenderness  Musculoskeletal:      Right lower leg: No edema  Left lower leg: No edema  Skin:     General: Skin is warm and dry  Coloration: Skin is not pale  Neurological:      Mental Status: She is alert and oriented to person, place, and time  Psychiatric:         Mood and Affect: Mood normal          Behavior: Behavior normal  Behavior is cooperative        Comments: MMSE score 30/31 recall was 2/3 after 5 minutes          Ladonna Gomez DO

## 2022-06-29 NOTE — PATIENT INSTRUCTIONS
Medicare Preventive Visit Patient Instructions  Thank you for completing your Welcome to Medicare Visit or Medicare Annual Wellness Visit today  Your next wellness visit will be due in one year (6/30/2023)  The screening/preventive services that you may require over the next 5-10 years are detailed below  Some tests may not apply to you based off risk factors and/or age  Screening tests ordered at today's visit but not completed yet may show as past due  Also, please note that scanned in results may not display below  Preventive Screenings:  Service Recommendations Previous Testing/Comments   Colorectal Cancer Screening  * Colonoscopy    * Fecal Occult Blood Test (FOBT)/Fecal Immunochemical Test (FIT)  * Fecal DNA/Cologuard Test  * Flexible Sigmoidoscopy Age: 54-65 years old   Colonoscopy: every 10 years (may be performed more frequently if at higher risk)  OR  FOBT/FIT: every 1 year  OR  Cologuard: every 3 years  OR  Sigmoidoscopy: every 5 years  Screening may be recommended earlier than age 48 if at higher risk for colorectal cancer  Also, an individualized decision between you and your healthcare provider will decide whether screening between the ages of 74-80 would be appropriate  Colonoscopy: 01/22/2019  FOBT/FIT: Not on file  Cologuard: Not on file  Sigmoidoscopy: Not on file    Screening Current     Breast Cancer Screening Age: 36 years old  Frequency: every 1-2 years  Not required if history of left and right mastectomy Mammogram: 03/22/2021    History Breast Cancer   Cervical Cancer Screening Between the ages of 21-29, pap smear recommended once every 3 years  Between the ages of 33-67, can perform pap smear with HPV co-testing every 5 years     Recommendations may differ for women with a history of total hysterectomy, cervical cancer, or abnormal pap smears in past  Pap Smear: Not on file    Screening Not Indicated   Hepatitis C Screening Once for adults born between 1945 and 1965  More frequently in patients at high risk for Hepatitis C Hep C Antibody: 04/22/2022    Screening Current   Diabetes Screening 1-2 times per year if you're at risk for diabetes or have pre-diabetes Fasting glucose: 85 mg/dL   A1C: No results in last 5 years    Screening Current   Cholesterol Screening Once every 5 years if you don't have a lipid disorder  May order more often based on risk factors  Lipid panel: 04/22/2022    Screening Current     Other Preventive Screenings Covered by Medicare:  1  Abdominal Aortic Aneurysm (AAA) Screening: covered once if your at risk  You're considered to be at risk if you have a family history of AAA  2  Lung Cancer Screening: covers low dose CT scan once per year if you meet all of the following conditions: (1) Age 50-69; (2) No signs or symptoms of lung cancer; (3) Current smoker or have quit smoking within the last 15 years; (4) You have a tobacco smoking history of at least 30 pack years (packs per day multiplied by number of years you smoked); (5) You get a written order from a healthcare provider  3  Glaucoma Screening: covered annually if you're considered high risk: (1) You have diabetes OR (2) Family history of glaucoma OR (3)  aged 48 and older OR (3)  American aged 72 and older  3  Osteoporosis Screening: covered every 2 years if you meet one of the following conditions: (1) You're estrogen deficient and at risk for osteoporosis based off medical history and other findings; (2) Have a vertebral abnormality; (3) On glucocorticoid therapy for more than 3 months; (4) Have primary hyperparathyroidism; (5) On osteoporosis medications and need to assess response to drug therapy  · Last bone density test (DXA Scan): 03/19/2019   5  HIV Screening: covered annually if you're between the age of 15-65  Also covered annually if you are younger than 13 and older than 72 with risk factors for HIV infection   For pregnant patients, it is covered up to 3 times per pregnancy  Immunizations:  Immunization Recommendations   Influenza Vaccine Annual influenza vaccination during flu season is recommended for all persons aged >= 6 months who do not have contraindications   Pneumococcal Vaccine (Prevnar and Pneumovax)  * Prevnar = PCV13  * Pneumovax = PPSV23   Adults 25-60 years old: 1-3 doses may be recommended based on certain risk factors  Adults 72 years old: Prevnar (PCV13) vaccine recommended followed by Pneumovax (PPSV23) vaccine  If already received PPSV23 since turning 65, then PCV13 recommended at least one year after PPSV23 dose  Hepatitis B Vaccine 3 dose series if at intermediate or high risk (ex: diabetes, end stage renal disease, liver disease)   Tetanus (Td) Vaccine - COST NOT COVERED BY MEDICARE PART B Following completion of primary series, a booster dose should be given every 10 years to maintain immunity against tetanus  Td may also be given as tetanus wound prophylaxis  Tdap Vaccine - COST NOT COVERED BY MEDICARE PART B Recommended at least once for all adults  For pregnant patients, recommended with each pregnancy  Shingles Vaccine (Shingrix) - COST NOT COVERED BY MEDICARE PART B  2 shot series recommended in those aged 48 and above     Health Maintenance Due:      Topic Date Due    Breast Cancer Screening: Mammogram  03/22/2022    Colorectal Cancer Screening  01/22/2029    Hepatitis C Screening  Completed     Immunizations Due:      Topic Date Due    COVID-19 Vaccine (4 - Booster for Oconnor Peter series) 01/14/2022     Advance Directives   What are advance directives? Advance directives are legal documents that state your wishes and plans for medical care  These plans are made ahead of time in case you lose your ability to make decisions for yourself  Advance directives can apply to any medical decision, such as the treatments you want, and if you want to donate organs  What are the types of advance directives?   There are many types of advance directives, and each state has rules about how to use them  You may choose a combination of any of the following:  · Living will: This is a written record of the treatment you want  You can also choose which treatments you do not want, which to limit, and which to stop at a certain time  This includes surgery, medicine, IV fluid, and tube feedings  · Durable power of  for healthcare [facility]): This is a written record that states who you want to make healthcare choices for you when you are unable to make them for yourself  This person, called a proxy, is usually a family member or a friend  You may choose more than 1 proxy  · Do not resuscitate (DNR) order:  A DNR order is used in case your heart stops beating or you stop breathing  It is a request not to have certain forms of treatment, such as CPR  A DNR order may be included in other types of advance directives  · Medical directive: This covers the care that you want if you are in a coma, near death, or unable to make decisions for yourself  You can list the treatments you want for each condition  Treatment may include pain medicine, surgery, blood transfusions, dialysis, IV or tube feedings, and a ventilator (breathing machine)  · Values history: This document has questions about your views, beliefs, and how you feel and think about life  This information can help others choose the care that you would choose  Why are advance directives important? An advance directive helps you control your care  Although spoken wishes may be used, it is better to have your wishes written down  Spoken wishes can be misunderstood, or not followed  Treatments may be given even if you do not want them  An advance directive may make it easier for your family to make difficult choices about your care  Fall Prevention    Fall prevention  includes ways to make your home and other areas safer  It also includes ways you can move more carefully to prevent a fall   Health conditions that cause changes in your blood pressure, vision, or muscle strength and coordination may increase your risk for falls  Medicines may also increase your risk for falls if they make you dizzy, weak, or sleepy  Fall prevention tips:   · Stand or sit up slowly  · Use assistive devices as directed  · Wear shoes that fit well and have soles that   · Wear a personal alarm  · Stay active  · Manage your medical conditions  Home Safety Tips:  · Add items to prevent falls in the bathroom  · Keep paths clear  · Install bright lights in your home  · Keep items you use often on shelves within reach  · Paint or place reflective tape on the edges of your stairs  Urinary Incontinence   Urinary incontinence (UI)  is when you lose control of your bladder  UI develops because your bladder cannot store or empty urine properly  The 3 most common types of UI are stress incontinence, urge incontinence, or both  Medicines:   · May be given to help strengthen your bladder control  Report any side effects of medication to your healthcare provider  Do pelvic muscle exercises often:  Your pelvic muscles help you stop urinating  Squeeze these muscles tight for 5 seconds, then relax for 5 seconds  Gradually work up to squeezing for 10 seconds  Do 3 sets of 15 repetitions a day, or as directed  This will help strengthen your pelvic muscles and improve bladder control  Train your bladder:  Go to the bathroom at set times, such as every 2 hours, even if you do not feel the urge to go  You can also try to hold your urine when you feel the urge to go  For example, hold your urine for 5 minutes when you feel the urge to go  As that becomes easier, hold your urine for 10 minutes  Self-care:   · Keep a UI record  Write down how often you leak urine and how much you leak  Make a note of what you were doing when you leaked urine  · Drink liquids as directed   You may need to limit the amount of liquid you drink to help control your urine leakage  Do not drink any liquid right before you go to bed  Limit or do not have drinks that contain caffeine or alcohol  · Prevent constipation  Eat a variety of high-fiber foods  Good examples are high-fiber cereals, beans, vegetables, and whole-grain breads  Walking is the best way to trigger your intestines to have a bowel movement  · Exercise regularly and maintain a healthy weight  Weight loss and exercise will decrease pressure on your bladder and help you control your leakage  · Use a catheter as directed  to help empty your bladder  A catheter is a tiny, plastic tube that is put into your bladder to drain your urine  · Go to behavior therapy as directed  Behavior therapy may be used to help you learn to control your urge to urinate  Weight Management   Why it is important to manage your weight:  Being overweight increases your risk of health conditions such as heart disease, high blood pressure, type 2 diabetes, and certain types of cancer  It can also increase your risk for osteoarthritis, sleep apnea, and other respiratory problems  Aim for a slow, steady weight loss  Even a small amount of weight loss can lower your risk of health problems  How to lose weight safely:  A safe and healthy way to lose weight is to eat fewer calories and get regular exercise  You can lose up about 1 pound a week by decreasing the number of calories you eat by 500 calories each day  Healthy meal plan for weight management:  A healthy meal plan includes a variety of foods, contains fewer calories, and helps you stay healthy  A healthy meal plan includes the following:  · Eat whole-grain foods more often  A healthy meal plan should contain fiber  Fiber is the part of grains, fruits, and vegetables that is not broken down by your body  Whole-grain foods are healthy and provide extra fiber in your diet   Some examples of whole-grain foods are whole-wheat breads and pastas, oatmeal, brown rice, and bulgur  · Eat a variety of vegetables every day  Include dark, leafy greens such as spinach, kale, kyara greens, and mustard greens  Eat yellow and orange vegetables such as carrots, sweet potatoes, and winter squash  · Eat a variety of fruits every day  Choose fresh or canned fruit (canned in its own juice or light syrup) instead of juice  Fruit juice has very little or no fiber  · Eat low-fat dairy foods  Drink fat-free (skim) milk or 1% milk  Eat fat-free yogurt and low-fat cottage cheese  Try low-fat cheeses such as mozzarella and other reduced-fat cheeses  · Choose meat and other protein foods that are low in fat  Choose beans or other legumes such as split peas or lentils  Choose fish, skinless poultry (chicken or turkey), or lean cuts of red meat (beef or pork)  Before you cook meat or poultry, cut off any visible fat  · Use less fat and oil  Try baking foods instead of frying them  Add less fat, such as margarine, sour cream, regular salad dressing and mayonnaise to foods  Eat fewer high-fat foods  Some examples of high-fat foods include french fries, doughnuts, ice cream, and cakes  · Eat fewer sweets  Limit foods and drinks that are high in sugar  This includes candy, cookies, regular soda, and sweetened drinks  Exercise:  Exercise at least 30 minutes per day on most days of the week  Some examples of exercise include walking, biking, dancing, and swimming  You can also fit in more physical activity by taking the stairs instead of the elevator or parking farther away from stores  Ask your healthcare provider about the best exercise plan for you  © Copyright [employer] 2018 Information is for End User's use only and may not be sold, redistributed or otherwise used for commercial purposes   All illustrations and images included in CareNotes® are the copyrighted property of [employer]  or [address] [place] [address]

## 2022-07-01 ENCOUNTER — TELEPHONE (OUTPATIENT)
Dept: FAMILY MEDICINE CLINIC | Facility: CLINIC | Age: 72
End: 2022-07-01

## 2022-07-01 NOTE — TELEPHONE ENCOUNTER
Pt called had Pneumococcal Conjugate Vaccine 20-valent on Wednesday  The upper arm in the area is 2 in  very red around the injection site, raised, red, swollen, warm to the touch  Has not taken any pain medication or ice  She is asking if this is "normal" reaction from injection?

## 2022-07-05 NOTE — TELEPHONE ENCOUNTER
Please let pt know that this is a common reaction to many vaccines   If not resolving/resolved, then would need eval

## 2022-07-13 ENCOUNTER — TELEPHONE (OUTPATIENT)
Dept: FAMILY MEDICINE CLINIC | Facility: CLINIC | Age: 72
End: 2022-07-13

## 2022-07-13 NOTE — TELEPHONE ENCOUNTER
Called pt back, pt stated she did research and thinks its the vagus nerve, she followed some home massage video on you tube and feels 90% better  Told pt if it happens again she needs to go to the ER  Pt was happy and said will do

## 2022-07-13 NOTE — TELEPHONE ENCOUNTER
Pt called states for the last 3 days she has been experiencing Jaw pain and tightness in her chest   She thinks she needs a breathing treatment in the office  When asked if she had any shortness of breath she states no  She is asking for direction as to what to do  ( Pt doesn't want to go to the ER)  Please let pt know  Please advise    Thank you

## 2022-07-20 ENCOUNTER — HOSPITAL ENCOUNTER (OUTPATIENT)
Dept: MAMMOGRAPHY | Facility: HOSPITAL | Age: 72
Discharge: HOME | End: 2022-07-20
Payer: MEDICARE

## 2022-07-20 ENCOUNTER — HOSPITAL ENCOUNTER (OUTPATIENT)
Dept: BONE DENSITY | Facility: HOSPITAL | Age: 72
Discharge: HOME | End: 2022-07-20
Payer: MEDICARE

## 2022-07-20 VITALS — HEIGHT: 60 IN | BODY MASS INDEX: 33.38 KG/M2 | WEIGHT: 170 LBS

## 2022-07-20 DIAGNOSIS — D05.12 DUCTAL CARCINOMA IN SITU (DCIS) OF LEFT BREAST: ICD-10-CM

## 2022-07-20 DIAGNOSIS — Z12.31 BREAST CANCER SCREENING BY MAMMOGRAM: ICD-10-CM

## 2022-07-20 DIAGNOSIS — M81.0 OSTEOPOROSIS WITHOUT CURRENT PATHOLOGICAL FRACTURE, UNSPECIFIED OSTEOPOROSIS TYPE: ICD-10-CM

## 2022-07-20 DIAGNOSIS — Z78.0 POSTMENOPAUSAL: ICD-10-CM

## 2022-07-20 PROCEDURE — 77080 DXA BONE DENSITY AXIAL: CPT

## 2022-07-20 PROCEDURE — 77067 SCR MAMMO BI INCL CAD: CPT

## 2022-07-20 PROCEDURE — 77063 BREAST TOMOSYNTHESIS BI: CPT

## 2022-09-14 ENCOUNTER — OFFICE VISIT (OUTPATIENT)
Dept: PULMONOLOGY | Facility: CLINIC | Age: 72
End: 2022-09-14
Payer: MEDICARE

## 2022-09-14 ENCOUNTER — TELEPHONE (OUTPATIENT)
Dept: PULMONOLOGY | Facility: CLINIC | Age: 72
End: 2022-09-14

## 2022-09-14 VITALS
HEART RATE: 73 BPM | SYSTOLIC BLOOD PRESSURE: 120 MMHG | BODY MASS INDEX: 33.2 KG/M2 | TEMPERATURE: 97.7 F | DIASTOLIC BLOOD PRESSURE: 74 MMHG | OXYGEN SATURATION: 93 % | HEIGHT: 60 IN

## 2022-09-14 DIAGNOSIS — G71.09 DYSTROPHY, MUSCULAR, HEREDITARY PROGRESSIVE (HCC): ICD-10-CM

## 2022-09-14 DIAGNOSIS — J43.2 CENTRILOBULAR EMPHYSEMA (HCC): ICD-10-CM

## 2022-09-14 DIAGNOSIS — G71.00 RESTRICTIVE LUNG DISEASE DUE TO MUSCULAR DYSTROPHY (HCC): Primary | ICD-10-CM

## 2022-09-14 DIAGNOSIS — J96.11 CHRONIC RESPIRATORY FAILURE WITH HYPOXIA (HCC): ICD-10-CM

## 2022-09-14 DIAGNOSIS — J98.4 RESTRICTIVE LUNG DISEASE DUE TO MUSCULAR DYSTROPHY (HCC): Primary | ICD-10-CM

## 2022-09-14 LAB
DME PARACHUTE DELIVERY DATE REQUESTED: NORMAL
DME PARACHUTE ITEM DESCRIPTION: NORMAL
DME PARACHUTE ORDER STATUS: NORMAL
DME PARACHUTE SUPPLIER NAME: NORMAL
DME PARACHUTE SUPPLIER PHONE: NORMAL

## 2022-09-14 PROCEDURE — 99214 OFFICE O/P EST MOD 30 MIN: CPT | Performed by: INTERNAL MEDICINE

## 2022-09-14 RX ORDER — TRAZODONE HYDROCHLORIDE 50 MG/1
50 TABLET ORAL
COMMUNITY
Start: 2022-08-12

## 2022-09-14 NOTE — ASSESSMENT & PLAN NOTE
· COPD component is mild    · Not benefiting from bronchodilator therapy and stopped Anoro Ellipta  · No need for inhaler therapy at this time

## 2022-09-14 NOTE — ASSESSMENT & PLAN NOTE
· Using oxygen only at nighttime   · Does not typically required oxygen during the daytime  · I do suspect she has element of hypercapnia and would likely benefit from nocturnal BiPAP  · Will have her undergo arterial blood gas testing  · Check nocturnal oximetry on 2 L  · Her need for BiPAP is on the basis of neuromuscular disease

## 2022-09-14 NOTE — ASSESSMENT & PLAN NOTE
· Has severe restrictive lung disease on the basis of muscular dystrophy this has been progressive  · We discussed possibility of performing inspiratory muscle testing but this is difficult for her to get to and therefore will be deferred   · I suspect that she likely has respiratory muscle function in the realm of 30-40% predicted

## 2022-09-14 NOTE — PROGRESS NOTES
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[facility] )  · Has severe restrictive lung disease on the basis of muscular dystrophy this has been progressive  · We discussed possibility of performing inspiratory muscle testing but this is difficult for her to get to and therefore will be deferred   · I suspect that she likely has respiratory muscle function in the realm of 30-40% predicted    Chronic respiratory failure with hypoxia (H[facility])  · Using oxygen only at nighttime   · Does not typically required oxygen during the daytime  · I do suspect she has element of hypercapnia and would likely benefit from nocturnal BiPAP  · Will have her undergo arterial blood gas testing  · Check nocturnal oximetry on 2 L  · Her need for BiPAP is on the basis of neuromuscular disease    Follow-up will be dependent upon the results of the testing but would anticipate that this will be 3 months after the initiation of BiPAP    ______________________________________________________________________    HPI:    Merritt Mcdonald presents today for follow-up of COPD, restrictive lung disease, and chronic respiratory failure with hypoxia  Predominance of her breathing issues on the basis of her muscular dystrophy and restrictive lung disease  Recently she has stopped Anoro Ellipta and has not noticed any difference in her breathing since discontinuation  She continues to use oxygen nocturnally at 2 L  She does still have issues where she will wake up with a headache from time to time which may indicate suboptimal treatment    He was recently seen I [facility] for the neuromuscular clinic and she was asked to discussed the possibility of need for BiPAP therapy  She does not report snoring and does not report any signs or symptoms to suggest obstructive sleep apnea    Otherwise she reports no new symptoms  She she does have significant weakness  She reports that she has a brother that also has muscular dystrophy and was recently diagnosed with Parkinson's as well       Current Medications:    Current Outpatient Medications:     buPROPion (WELLBUTRIN) 75 mg tablet, take 1 tablet by mouth every morning for DEPRESSION-MOTIVATION,AND FOCUS, Disp: , Rfl:     citalopram (CeleXA) 10 mg tablet, take 1 tablet by mouth once daily, Disp: 90 tablet, Rfl: 1    clotrimazole-betamethasone (LOTRISONE) 1-0 05 % lotion, , Disp: , Rfl: 0    diazepam (VALIUM) 5 mg tablet, Take 1 tablet (5 mg total) by mouth every 8 (eight) hours as needed for anxiety or muscle spasms, Disp: 30 tablet, Rfl: 0    fluticasone (FLONASE) 50 mcg/act nasal spray, instill 2 sprays into each nostril once daily, Disp: 16 g, Rfl: 3    ketoconazole (NIZORAL) 2 % cream, APPLY TWICE A DAY TO THE AFFECTED AREA(S), Disp: , Rfl:     ketoconazole (NIZORAL) 2 % shampoo, apply to affected area two times a week, Disp: 120 mL, Rfl: 1    Melatonin 3 MG CAPS, Take 3 mg by mouth, Disp: , Rfl:     omeprazole (PriLOSEC) 20 mg delayed release capsule, take 1 capsule by mouth every morning, Disp: 90 capsule, Rfl: 1    Probiotic Product (Florastor Plus) CAPS, , Disp: , Rfl:     solifenacin (VESICARE) 10 MG tablet, Take 1 tablet (10 mg total) by mouth daily, Disp: 90 tablet, Rfl: 1    traZODone (DESYREL) 50 mg tablet, Take 50 mg by mouth daily at bedtime as needed, Disp: , Rfl:     triamcinolone (KENALOG) 0 1 % cream, Apply topically 2 (two) times a day, Disp: 80 g, Rfl: 1    Review of Systems:  Aside from what is mentioned in the HPI, the review of systems is otherwise negative    Past medical history, surgical history, and family history were reviewed and updated as appropriate    Social history updates:  Social History     Tobacco Use   Smoking Status Former Smoker    Packs/day: 1 50    Years: 37 00    Pack years: 55 50    Types: Cigarettes    Start date:     Quit date:     Years since quittin 7   Smokeless Tobacco Never Used       PhysicalExamination:  Vitals:   /74   Pulse 73   Temp 97 7 °F (36 5 °C)   Ht 5' (1 524 m)   LMP  (LMP Unknown)   SpO2 93% Comment: with 3 lts of oxygen  BMI 33 20 kg/m²   Gen:  She was placed on 3 L of oxygen but suspect the plethysmography was poor due to very cold fingers  She was not symptomatic at this time  HEENT:  Conjugate gaze  sclerae anicteric  Oropharynx moist  Neck: Trachea is midline  No JVD  No adenopathy  Chest:  Very shallow breath sounds and very limited chest wall excursion  Lungs are otherwise clear however  Cardiac:  Regular  no murmur  Abdomen:  benign  Extremities: No edema  Neuro:  Normal speech and mentation    Diagnostic Data:  Labs: I personally reviewed the most recent laboratory data pertinent to today's visit    Lab Results   Component Value Date    WBC 10 01 2022    HGB 13 0 2022    HCT 43 3 2022    MCV 88 2022     (H) 2022     Lab Results   Component Value Date    SODIUM 144 2022    K 4 5 2022    CO2 33 (H) 2022     2022    BUN 15 2022    CREATININE 0 42 (L) 2022    CALCIUM 9 0 2022       Imaging:  I personally reviewed the images on the [facility] system pertinent to today's visit  Last chest x-ray was 2021 and did show some subtle reticular changes    This was performed at the time of clinical infection however      Venous blood gas from this past April did suggest significant component of carbon dioxide elevation    Bala Cochran MD

## 2022-09-20 ENCOUNTER — HOSPITAL ENCOUNTER (OUTPATIENT)
Dept: PULMONOLOGY | Facility: HOSPITAL | Age: 72
Discharge: HOME | End: 2022-09-20
Attending: INTERNAL MEDICINE
Payer: MEDICARE

## 2022-09-20 DIAGNOSIS — J98.4 RESTRICTIVE LUNG DISEASE DUE TO MUSCULAR DYSTROPHY (HCC): ICD-10-CM

## 2022-09-20 DIAGNOSIS — G71.09 DYSTROPHY, MUSCULAR, HEREDITARY PROGRESSIVE (HCC): ICD-10-CM

## 2022-09-20 DIAGNOSIS — G71.00 RESTRICTIVE LUNG DISEASE DUE TO MUSCULAR DYSTROPHY (HCC): ICD-10-CM

## 2022-09-20 LAB
ARTERIAL PATENCY WRIST A: YES
BASE EXCESS BLDA CALC-SCNC: 7.2 MMOL/L
HCO3 BLDA-SCNC: 32.9 MMOL/L (ref 22–28)
NON VENT ROOM AIR: 0.21 %
O2 CT BLDA-SCNC: 16 ML/DL (ref 16–23)
OXYHGB MFR BLDA: 93.3 % (ref 94–97)
PCO2 BLDA: 51.4 MM HG (ref 36–44)
PH BLDA: 7.42 [PH] (ref 7.35–7.45)
PO2 BLDA: 67.5 MM HG (ref 75–129)
SPECIMEN SOURCE: ABNORMAL

## 2022-09-20 PROCEDURE — 36600 WITHDRAWAL OF ARTERIAL BLOOD: CPT

## 2022-09-20 PROCEDURE — 82805 BLOOD GASES W/O2 SATURATION: CPT

## 2022-09-28 NOTE — RESULT ENCOUNTER NOTE
I reviewed this result with the patient by phone  She still needs to complete the overnight oximetry    I did inform her that I will likely order BiPAP once I have the results of that study

## 2022-10-12 PROBLEM — Z00.00 MEDICARE ANNUAL WELLNESS VISIT, SUBSEQUENT: Status: RESOLVED | Noted: 2021-06-28 | Resolved: 2022-10-12

## 2022-11-09 DIAGNOSIS — J96.12 CHRONIC RESPIRATORY FAILURE WITH HYPOXIA AND HYPERCAPNIA (HCC): ICD-10-CM

## 2022-11-09 DIAGNOSIS — G71.00 RESTRICTIVE LUNG DISEASE DUE TO MUSCULAR DYSTROPHY (HCC): Primary | ICD-10-CM

## 2022-11-09 DIAGNOSIS — J96.11 CHRONIC RESPIRATORY FAILURE WITH HYPOXIA AND HYPERCAPNIA (HCC): ICD-10-CM

## 2022-11-09 DIAGNOSIS — J98.4 RESTRICTIVE LUNG DISEASE DUE TO MUSCULAR DYSTROPHY (HCC): Primary | ICD-10-CM

## 2022-11-09 DIAGNOSIS — G71.09 DYSTROPHY, MUSCULAR, HEREDITARY PROGRESSIVE (HCC): ICD-10-CM

## 2022-11-09 NOTE — PROGRESS NOTES
Telephone note- Pulmonary Medicine   Vaughn Kelley 67 y o  female MRN: [id number]    Reason for call:  Reviewed ABG and overnight oximetry      Pertinent details:  Discuss the results of the testing with the patient  Her ABG does show compensated hypercapnia with a pCO2 of 51 and her overnight oximetry did demonstrate desaturation for 25 minutes despite the use of continuous 2 L of supplemental oxygen  Respiratory muscle strength has deteriorated and she would benefit from BiPAP nocturnally on the basis of her restrictive lung disease from muscular dystrophy and evidence of hypercapnia    She does also report impaired concentration/mental acuity, excessive sleeping, and fatigued throughout the day    This should help with some of her symptomatology as well    I will see her back in 2-3 months after initiation    Ton Olsen

## 2022-11-14 DIAGNOSIS — N32.81 OVERACTIVE BLADDER: ICD-10-CM

## 2022-11-14 RX ORDER — SOLIFENACIN SUCCINATE 10 MG/1
10 TABLET, FILM COATED ORAL DAILY
Qty: 90 TABLET | Refills: 1 | Status: SHIPPED | OUTPATIENT
Start: 2022-11-14

## 2022-11-21 LAB

## 2022-12-01 ENCOUNTER — OFFICE VISIT (OUTPATIENT)
Dept: FAMILY MEDICINE CLINIC | Facility: CLINIC | Age: 72
End: 2022-12-01

## 2022-12-01 VITALS
SYSTOLIC BLOOD PRESSURE: 136 MMHG | DIASTOLIC BLOOD PRESSURE: 72 MMHG | HEIGHT: 60 IN | TEMPERATURE: 97.3 F | OXYGEN SATURATION: 93 % | BODY MASS INDEX: 33.2 KG/M2 | HEART RATE: 82 BPM

## 2022-12-01 DIAGNOSIS — E55.9 VITAMIN D DEFICIENCY: ICD-10-CM

## 2022-12-01 DIAGNOSIS — G71.00 MUSCULAR DYSTROPHY (HCC): ICD-10-CM

## 2022-12-01 DIAGNOSIS — Z86.39 HISTORY OF VITAMIN D DEFICIENCY: ICD-10-CM

## 2022-12-01 DIAGNOSIS — R27.0 ATAXIA, UNSPECIFIED: ICD-10-CM

## 2022-12-01 DIAGNOSIS — R42 ACUTE ONSET OF SEVERE VERTIGO: ICD-10-CM

## 2022-12-01 DIAGNOSIS — K55.9 ISCHEMIC COLITIS (HCC): ICD-10-CM

## 2022-12-01 DIAGNOSIS — R42 VERTIGO: Primary | ICD-10-CM

## 2022-12-01 RX ORDER — MECLIZINE HYDROCHLORIDE 25 MG/1
25 TABLET ORAL 3 TIMES DAILY PRN
Qty: 30 TABLET | Refills: 0 | Status: SHIPPED | OUTPATIENT
Start: 2022-12-01 | End: 2022-12-15 | Stop reason: SDUPTHER

## 2022-12-01 NOTE — PROGRESS NOTES
Name: Levis Soulier      : 1950      MRN: [id number]  Encounter Provider: Jorge Garay DO  Encounter Date: 2022   Encounter department: [address] [place] [address]     1  Vertigo  -     meclizine (ANTIVERT) 25 mg tablet; Take 1 tablet (25 mg total) by mouth 3 (three) times a day as needed for dizziness    2  Acute onset of severe vertigo  -     Vitamin B12; Future  -     Vitamin D 25 hydroxy; Future  -     Magnesium; Future  -     Zinc; Future    3  Muscular dystrophy ([facility] [place] [facility] )  -     Vitamin B12; Future  -     Vitamin D 25 hydroxy; Future  -     Magnesium; Future  -     Zinc; Future    4  Ataxia, unspecified  -     Vitamin B12; Future    5  Ischemic colitis ([facility] [place] [facility] )  Comments:  chronic problem, unchanged  Orders:  -     Vitamin B12; Future  -     Vitamin D 25 hydroxy; Future  -     Magnesium; Future  -     Zinc; Future    6  History of vitamin D deficiency  -     Vitamin D 25 hydroxy; Future    7  Vitamin D deficiency  -     Vitamin D 25 hydroxy;  Future           Subjective      Chief Complaint   Patient presents with   • Dizziness     Episodes of dizziness for the last 4 or 5 days, no headache or vision disturbances        Here for Acute problem - vertigo episodes started Saturday or    "It was terrifying- was alone when it was first happening"  Each episode lasting for about 30-60 seconds of the vertigo itself, "but then can't move and get up for another several minutes, and if I'm standing up and they come on bad, I would definitely fall - if my aide didn't maneuver me to the toilet" or chair - wherever she had been standing up from her wheelchair to transfer to   "Moving the head and changing elevation of the head is mostly what brings it on" per pt  No nausea assoc  Never had this problem before   "at first I thought it was getting better, because I was having mid-level ones, but now back - right now about 5%, and gives you a foggy brain"  +admist ear plugging "sometimes"  "I have a drippy nose, but it's the drippy nose I've had for awhile"   no fever, ST, ear pain or other acute illness sx  No HA sx  Pt here with her sister today- states she rode her motorized w/c here from her home in [place], with her sister walking alongside    Review of Systems    Current Outpatient Medications on File Prior to Visit   Medication Sig   • buPROPion (WELLBUTRIN) 75 mg tablet take 1 tablet by mouth every morning for DEPRESSION-MOTIVATION,AND FOCUS   • citalopram (CeleXA) 10 mg tablet take 1 tablet by mouth once daily   • clotrimazole-betamethasone (LOTRISONE) 1-0 05 % lotion    • diazepam (VALIUM) 5 mg tablet Take 1 tablet (5 mg total) by mouth every 8 (eight) hours as needed for anxiety or muscle spasms   • fluticasone (FLONASE) 50 mcg/act nasal spray instill 2 sprays into each nostril once daily   • ketoconazole (NIZORAL) 2 % cream APPLY TWICE A DAY TO THE AFFECTED AREA(S)   • ketoconazole (NIZORAL) 2 % shampoo apply to affected area two times a week   • Melatonin 3 MG CAPS Take 3 mg by mouth   • omeprazole (PriLOSEC) 20 mg delayed release capsule take 1 capsule by mouth every morning   • Probiotic Product (Florastor Plus) CAPS    • solifenacin (VESICARE) 10 MG tablet Take 1 tablet (10 mg total) by mouth daily   • traZODone (DESYREL) 50 mg tablet Take 50 mg by mouth daily at bedtime as needed   • triamcinolone (KENALOG) 0 1 % cream Apply topically 2 (two) times a day       Objective     /72 (BP Location: Right arm, Patient Position: Sitting, Cuff Size: Standard)   Pulse 82   Temp (!) 97 3 °F (36 3 °C) (Tympanic)   Ht 5' (1 524 m)   LMP  (LMP Unknown)   SpO2 93%   BMI 33 20 kg/m²     Physical Exam  Vitals and nursing note reviewed  Constitutional:       General: She is not in acute distress  Appearance: She is well-developed and well-groomed  She is not toxic-appearing or diaphoretic        Comments: Unchanged chronically-ill appearance, w/c bound in motorized w/c HENT:      Head: Normocephalic and atraumatic  Right Ear: Hearing, tympanic membrane, ear canal and external ear normal       Left Ear: Hearing, tympanic membrane, ear canal and external ear normal       Nose: Nose normal       Mouth/Throat:      Lips: Pink  Mouth: Mucous membranes are moist       Pharynx: Oropharynx is clear  Uvula midline  Eyes:      General: Lids are normal       Extraocular Movements: Extraocular movements intact  Right eye: No nystagmus  Left eye: No nystagmus  Conjunctiva/sclera: Conjunctivae normal    Neck:      Thyroid: No thyroid mass, thyromegaly or thyroid tenderness  Trachea: Trachea and phonation normal    Pulmonary:      Effort: Pulmonary effort is normal    Musculoskeletal:      Cervical back: Neck supple  Lymphadenopathy:      Cervical: No cervical adenopathy  Skin:     Coloration: Skin is not pale  Neurological:      Mental Status: She is alert and oriented to person, place, and time  Psychiatric:         Mood and Affect: Mood normal          Behavior: Behavior normal  Behavior is cooperative         Corie Seat, DO

## 2022-12-13 DIAGNOSIS — R12 HEART BURN: ICD-10-CM

## 2022-12-14 LAB
DME PARACHUTE DELIVERY DATE ACTUAL: NORMAL
DME PARACHUTE DELIVERY DATE EXPECTED: NORMAL
DME PARACHUTE DELIVERY DATE REQUESTED: NORMAL
DME PARACHUTE ITEM DESCRIPTION: NORMAL
DME PARACHUTE ORDER STATUS: NORMAL
DME PARACHUTE SUPPLIER NAME: NORMAL
DME PARACHUTE SUPPLIER PHONE: NORMAL

## 2022-12-14 RX ORDER — OMEPRAZOLE 20 MG/1
CAPSULE, DELAYED RELEASE ORAL
Qty: 90 CAPSULE | Refills: 1 | Status: SHIPPED | OUTPATIENT
Start: 2022-12-14

## 2022-12-15 DIAGNOSIS — R42 VERTIGO: ICD-10-CM

## 2022-12-15 RX ORDER — MECLIZINE HYDROCHLORIDE 25 MG/1
25 TABLET ORAL 3 TIMES DAILY PRN
Qty: 30 TABLET | Refills: 0 | Status: SHIPPED | OUTPATIENT
Start: 2022-12-15 | End: 2022-12-19 | Stop reason: SDUPTHER

## 2022-12-19 ENCOUNTER — OFFICE VISIT (OUTPATIENT)
Dept: FAMILY MEDICINE CLINIC | Facility: CLINIC | Age: 72
End: 2022-12-19

## 2022-12-19 ENCOUNTER — APPOINTMENT (OUTPATIENT)
Dept: LAB | Facility: CLINIC | Age: 72
End: 2022-12-19

## 2022-12-19 ENCOUNTER — PATIENT OUTREACH (OUTPATIENT)
Dept: FAMILY MEDICINE CLINIC | Facility: CLINIC | Age: 72
End: 2022-12-19

## 2022-12-19 VITALS
TEMPERATURE: 96.4 F | BODY MASS INDEX: 33.2 KG/M2 | HEIGHT: 60 IN | SYSTOLIC BLOOD PRESSURE: 130 MMHG | OXYGEN SATURATION: 92 % | HEART RATE: 69 BPM | DIASTOLIC BLOOD PRESSURE: 63 MMHG

## 2022-12-19 DIAGNOSIS — E55.9 VITAMIN D DEFICIENCY: ICD-10-CM

## 2022-12-19 DIAGNOSIS — R42 VERTIGO: Primary | ICD-10-CM

## 2022-12-19 DIAGNOSIS — T88.7XXA MEDICATION SIDE EFFECT: ICD-10-CM

## 2022-12-19 DIAGNOSIS — G71.09 DYSTROPHY, MUSCULAR, HEREDITARY PROGRESSIVE (HCC): ICD-10-CM

## 2022-12-19 DIAGNOSIS — Z86.39 HISTORY OF VITAMIN D DEFICIENCY: ICD-10-CM

## 2022-12-19 DIAGNOSIS — R42 ACUTE ONSET OF SEVERE VERTIGO: ICD-10-CM

## 2022-12-19 DIAGNOSIS — G71.00 MUSCULAR DYSTROPHY (HCC): ICD-10-CM

## 2022-12-19 DIAGNOSIS — K55.9 ISCHEMIC COLITIS (HCC): ICD-10-CM

## 2022-12-19 DIAGNOSIS — R27.0 ATAXIA, UNSPECIFIED: ICD-10-CM

## 2022-12-19 DIAGNOSIS — Z99.3 WHEELCHAIR DEPENDENT: ICD-10-CM

## 2022-12-19 LAB
25(OH)D3 SERPL-MCNC: 25.8 NG/ML (ref 30–100)
MAGNESIUM SERPL-MCNC: 2.3 MG/DL (ref 1.6–2.6)
VIT B12 SERPL-MCNC: 723 PG/ML (ref 100–900)

## 2022-12-19 RX ORDER — MECLIZINE HYDROCHLORIDE 25 MG/1
25 TABLET ORAL 3 TIMES DAILY PRN
Qty: 30 TABLET | Refills: 0 | Status: SHIPPED | OUTPATIENT
Start: 2022-12-19 | End: 2023-01-03 | Stop reason: SDUPTHER

## 2022-12-19 NOTE — PROGRESS NOTES
OP CM called to pt and she states that she has been approved for 8 hours of a home health aide a day 7 days a week  Pt states that her daytime aide is taking a week off but now her nighttime aide will be filling in  Pt has a  Anthony Goyal through [facility] [phone]  Explained to pt that anything with her aide services needs to go through her   Pt states that Dr Jayde Hernandez would like her to have more hours but pt feels she has enough  Explained again if she decides she wants more hours she can discuss this with her coordinator Anthony Goyal  Pt states her home health aide through [facility] drives her to appts  Pt is wheelchair bound  Pt is on 02 and has a bipap  Pt has a hospital bed  Pt resides alone in an apartment  Pt needs a new psychiatrist and therapist and messaged PCP to place order  Pt was at [facility] but they shut down  Pt states for a distraction she likes to like a variety of Youtube videos  Pt started to laugh and states she likes watching Pimple Poppers  Emotional support provided

## 2022-12-19 NOTE — PROGRESS NOTES
Name: Cindy Kay      : 1950      MRN: [id number]  Encounter Provider: Selena Weber DO  Encounter Date: 2022   Encounter department: [address] [place] [address]  Vertigo  Comments:  vestibular thx ordered  Orders:  -     meclizine (ANTIVERT) 25 mg tablet; Take 1 tablet (25 mg total) by mouth 3 (three) times a day as needed for dizziness  -     Ambulatory Referral to Physical Therapy; Future  -     Ambulatory Referral to Social Work Care Management Program; Future    2  Medication side effect  Comments:  meclizine makes pt very drowsy, but if she doesn't take med, her vertigo worsens    3  Dystrophy, muscular, 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[facility] )  Comments:  pt having difficulties with staffing issues with her home health aide company, and family not always available or capable of helping in the home- pt would agree to short term rehab if/when that became necessary- for now will try to see if possible to use other home health aide company that has better staffing to fit hours for which pt is approved  Orders:  -     Ambulatory Referral to Physical Therapy; Future  -     Ambulatory Referral to Social Work Care Management Program; Future    4   Wheelchair dependent  -     Ambulatory Referral to Social Work Care Management Program; Future           Subjective      Chief Complaint   Patient presents with   • Follow-up     palm of hands are dry peeling  for a few weeks, pt had flu vaccine but can't remember when       Scheduled short interval f/u of visit couple of weeks ago for acute severe vertigo sx- here with her sister and one of her home health aides   "Slightly better dizziness sx, though today is bad" per pt   admits the meclizine is helping, but states, "haven't been taking 3x a day because makes very drowsy"  "Was going to try weaning off," but sister "said not to- things got a little worse" when tried not taking per pt  Concerned she is going to fall- seems certain of that- states "haven't fallen in 5 years- the level of cautiousness prevented that," but now doesn't feel as steady  Also reports "No caregivers available- as it is she's going to be taking a week off and we're scrambling, as it is there are hours that approved for and no staff to come"   "Can stay in the recliner for the whole 3-6 hours between aides" and wears adult incontinence briefs  Also states, "Want to go off the vesicare- not really working and now that lost so much muscle tone and wearing pullups all the time"  wellbutrin on med list- pt states was rx'd by psychiatrist- "they're closed now, had a phone interview with new one, haven't really gotten started with- hard to fnd ones who will take patients on medicare- was going for counseling"  Also c/o "Also-going on for a week noticing when  get pain in here (rubs her DIP joints), but when isolate then they don't hurt"- no discoloration in fingers per pt  Agrees for Home PT/OT once vestibular thx completed  Sister, Alicia Tang, who is with pt today had written down some questions/concerns about her sister:   need medical POA- (I advised would need to see a  about this); also, as pt had discussed- vertigo med makes pt groggy, sleeping more, losing muscle tone, increased trouble transferring, can't get BiPap on independently, unable to get out of bed or dress independently, and home health aides not always available and family not always capable or available, and believes pt is in denial of current situation   I ask pt whether she would consider short term rehab until vertigo improved- she does not believe this is necessary at this time    Review of Systems    Current Outpatient Medications on File Prior to Visit   Medication Sig   • buPROPion (WELLBUTRIN) 75 mg tablet take 1 tablet by mouth every morning for DEPRESSION-MOTIVATION,AND FOCUS   • citalopram (CeleXA) 10 mg tablet take 1 tablet by mouth once daily   • clotrimazole-betamethasone (LOTRISONE) 1-0 05 % lotion    • diazepam (VALIUM) 5 mg tablet Take 1 tablet (5 mg total) by mouth every 8 (eight) hours as needed for anxiety or muscle spasms   • fluticasone (FLONASE) 50 mcg/act nasal spray instill 2 sprays into each nostril once daily   • ketoconazole (NIZORAL) 2 % cream APPLY TWICE A DAY TO THE AFFECTED AREA(S)   • ketoconazole (NIZORAL) 2 % shampoo apply to affected area two times a week   • Melatonin 3 MG CAPS Take 3 mg by mouth   • omeprazole (PriLOSEC) 20 mg delayed release capsule take 1 capsule by mouth every morning   • Probiotic Product (Florastor Plus) CAPS    • triamcinolone (KENALOG) 0 1 % cream Apply topically 2 (two) times a day   • traZODone (DESYREL) 50 mg tablet Take 50 mg by mouth daily at bedtime as needed       Objective     /63 (BP Location: Right arm, Patient Position: Sitting, Cuff Size: Standard)   Pulse 69   Temp (!) 96 4 °F (35 8 °C) (Tympanic)   Ht 5' (1 524 m)   LMP  (LMP Unknown)   SpO2 92%   BMI 33 20 kg/m²     Physical Exam  Vitals and nursing note reviewed  Constitutional:       General: She is not in acute distress  Appearance: She is well-developed and well-groomed  She is not toxic-appearing or diaphoretic  Comments: Unchanged chronically-ill appearance and remains w/c-bound, but appears somewhat shaky today as she sits in w/c during visit   HENT:      Head: Normocephalic and atraumatic  Mouth/Throat:      Pharynx: Uvula midline  Eyes:      General: Lids are normal       Conjunctiva/sclera: Conjunctivae normal    Neck:      Trachea: Phonation normal    Cardiovascular:      Rate and Rhythm: Normal rate and regular rhythm  Pulses: Normal pulses  Heart sounds: S1 normal and S2 normal    Pulmonary:      Effort: Pulmonary effort is normal       Breath sounds: Normal breath sounds and air entry  Abdominal:      Palpations: Abdomen is soft  Tenderness: There is no abdominal tenderness     Musculoskeletal:      Right lower leg: No edema  Left lower leg: No edema  Skin:     General: Skin is warm and dry  Coloration: Skin is not pale  Neurological:      Mental Status: She is alert and oriented to person, place, and time  Psychiatric:         Attention and Perception: Attention normal          Mood and Affect: Mood normal          Behavior: Behavior normal  Behavior is cooperative           Cognition and Memory: Cognition normal        Jessie Chau DO

## 2022-12-22 ENCOUNTER — TELEPHONE (OUTPATIENT)
Dept: FAMILY MEDICINE CLINIC | Facility: CLINIC | Age: 72
End: 2022-12-22

## 2022-12-22 LAB — ZINC SERPL-MCNC: 68 UG/DL (ref 44–115)

## 2022-12-22 NOTE — TELEPHONE ENCOUNTER
Pt called, she is requesting for an order for Home care at home to treat vertigo  ( pt is homebound due to wheelchair)  Pt was given a phone number to call [address] [place] [address], [phone]  They do not treat at the home for vertigo perhaps [facility] might  Pt states she suffers from vertigo and has trouble moving around because she is wheelchair bound as well     Assessment  would be needed for homecare -- eval and treat

## 2022-12-26 NOTE — TELEPHONE ENCOUNTER
I don't believe that any of the homecare PT/OT companies do vestibular therapy in the home, but please call [facility] to make sure

## 2022-12-27 NOTE — TELEPHONE ENCOUNTER
Left a message with Gen from [facility] ([phone]) asking if in home vestibular therapy is a service that [facility] offers  Awaiting a call back

## 2022-12-27 NOTE — TELEPHONE ENCOUNTER
Gen contacted office back stating that the physical therapist with [facility] are trained in vestibular therapy but are not certified  If it is just due to vertigo the physical therapists should be able to handle it  Robin Munoz does recommended that the order is written for PT and OT due to the vertigo

## 2023-01-03 DIAGNOSIS — R42 VERTIGO: Primary | ICD-10-CM

## 2023-01-03 DIAGNOSIS — G71.09 DYSTROPHY, MUSCULAR, HEREDITARY PROGRESSIVE (HCC): ICD-10-CM

## 2023-01-03 DIAGNOSIS — Z99.3 WHEELCHAIR DEPENDENT: ICD-10-CM

## 2023-01-03 DIAGNOSIS — R42 VERTIGO: ICD-10-CM

## 2023-01-03 RX ORDER — MECLIZINE HYDROCHLORIDE 25 MG/1
25 TABLET ORAL 3 TIMES DAILY PRN
Qty: 30 TABLET | Refills: 0 | Status: SHIPPED | OUTPATIENT
Start: 2023-01-03

## 2023-02-09 ENCOUNTER — TELEPHONE (OUTPATIENT)
Dept: PULMONOLOGY | Facility: CLINIC | Age: 73
End: 2023-02-09

## 2023-02-09 NOTE — TELEPHONE ENCOUNTER
Saw the notes on appointment desk regarding patients Bipap and needing to take SD card to [facility]  I called the patient to advise her if she could to take SD card to [facility] prior to office visit  Closest one to her is [facility]  She is not able to take SD card prior to office visit but will  Rescheduled 31-91 day compliance appointment on  3/16 at 10am      Patient also feels she does not have the proper machine or mask  She will take everything to [facility] to make sure she has the right machine and mask

## 2023-02-19 ENCOUNTER — TELEPHONE (OUTPATIENT)
Dept: OTHER | Facility: OTHER | Age: 73
End: 2023-02-19

## 2023-02-19 NOTE — TELEPHONE ENCOUNTER
Patient is calling regarding cancelling an appointment      Date/Time:2- @ 11:20 am    Patient was rescheduled: YES [] NO [x]    Patient requesting call back to reschedule: YES [x] NO []

## 2023-03-06 ENCOUNTER — TELEPHONE (OUTPATIENT)
Dept: PULMONOLOGY | Facility: CLINIC | Age: 73
End: 2023-03-06

## 2023-03-06 NOTE — TELEPHONE ENCOUNTER
Yonas won't be available on 3/21, lm to reschedule appt  We have openings 3/7 & 3/8 in [place] w/ [facility]

## 2023-03-08 ENCOUNTER — OFFICE VISIT (OUTPATIENT)
Dept: PULMONOLOGY | Facility: CLINIC | Age: 73
End: 2023-03-08

## 2023-03-08 VITALS — DIASTOLIC BLOOD PRESSURE: 68 MMHG | HEART RATE: 82 BPM | SYSTOLIC BLOOD PRESSURE: 130 MMHG | OXYGEN SATURATION: 91 %

## 2023-03-08 DIAGNOSIS — J43.2 CENTRILOBULAR EMPHYSEMA (HCC): ICD-10-CM

## 2023-03-08 DIAGNOSIS — J96.11 CHRONIC RESPIRATORY FAILURE WITH HYPOXIA AND HYPERCAPNIA (HCC): ICD-10-CM

## 2023-03-08 DIAGNOSIS — G71.00 RESTRICTIVE LUNG DISEASE DUE TO MUSCULAR DYSTROPHY (HCC): Primary | ICD-10-CM

## 2023-03-08 DIAGNOSIS — J96.12 CHRONIC RESPIRATORY FAILURE WITH HYPOXIA AND HYPERCAPNIA (HCC): ICD-10-CM

## 2023-03-08 DIAGNOSIS — J98.4 RESTRICTIVE LUNG DISEASE DUE TO MUSCULAR DYSTROPHY (HCC): Primary | ICD-10-CM

## 2023-03-08 NOTE — PROGRESS NOTES
Pulmonary Follow Up Note   Carlota Pierson 68 y o  female MRN: [id number]  3/8/2023      Assessment/Plan:     Chronic respiratory failure with hypoxia and hypercapnia (HCC)  Patient's oxygen saturation on room air at beginning of visit was initially 87%  This increased to 91%, suspect this can be due to the patient's very cold hands  Patient is currently without any respiratory distress or shortness of breath  Patient unable to perform a 6-minute walk secondary to muscular dystrophy as she is wheelchair-bound  She uses 2L NC at nighttime with her BiPAP  I discussed with patient that she should continue to wear the BiPAP throughout the whole night, and to also wear during naps  At today's visit, I only have access to her BiPAP compliance report from 1/17/2023 to 2/15/2023  During that time, patient used the BiPAP 30 out of 30 days with 100% compliance  She used the BiPAP for greater than 4 hours for 25 of those days, or 83%  The average usage on days used is 5 hours and 25 minutes  She uses an AirCurve 10 Vauto with a max IPAP of 20 cmH2O, min EPAP of 6 cmH2O  She also has a pressure support of 6  Her median leak is 0 7 L/min and she has an AHI of 1 0  We will have her follow up in 6 months and as long as the BiPAP continues to work well for her, can hopefully follow up once a year  COPD (chronic obstructive pulmonary disease) ([facility] [address] )  This is mild and patient is maintained without use of inhalers at this time  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 [facility])  Patient is currently in no respiratory distress  She uses BiPAP at night with 2 L of oxygen  Visit orders:    Problem List Items Addressed This Visit        Respiratory    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 [facility]) - Primary     Patient is currently in no respiratory distress  She uses BiPAP at night with 2 L of oxygen           COPD (chronic obstructive pulmonary disease) ([address] )     This is mild and patient is maintained without use of inhalers at this time  Chronic respiratory failure with hypoxia and hypercapnia (HCC)     Patient's oxygen saturation on room air at beginning of visit was initially 87%  This increased to 91%, suspect this can be due to the patient's very cold hands  Patient is currently without any respiratory distress or shortness of breath  Patient unable to perform a 6-minute walk secondary to muscular dystrophy as she is wheelchair-bound  She uses 2L NC at nighttime with her BiPAP  I discussed with patient that she should continue to wear the BiPAP throughout the whole night, and to also wear during naps  At today's visit, I only have access to her BiPAP compliance report from 1/17/2023 to 2/15/2023  During that time, patient used the BiPAP 30 out of 30 days with 100% compliance  She used the BiPAP for greater than 4 hours for 25 of those days, or 83%  The average usage on days used is 5 hours and 25 minutes  She uses an AirCurve 10 Vauto with a max IPAP of 20 cmH2O, min EPAP of 6 cmH2O  She also has a pressure support of 6  Her median leak is 0 7 L/min and she has an AHI of 1 0  We will have her follow up in 6 months and as long as the BiPAP continues to work well for her, can hopefully follow up once a year  Return in about 6 months (around 9/8/2023), or if symptoms worsen or fail to improve  History of Present Illness   HPI:  Shannon Shay is a 68 y o  female who presents for follow-up after starting BiPAP  Patient has a past medical history significant for restrictive lung disease secondary to muscular dystrophy, COPD, and chronic respiratory failure with hypoxia  Patient offers no complaints today  She denies any shortness of breath, cough, chest pain, or wheezing  She states that she spends most of her days in her recliner or bed  She uses 2 L nasal cannula at bedtime  She has been using her BiPAP continuously at night    She does state that she has increased vivid dreams since starting the BiPAP  Review of Systems   Constitutional: Negative for activity change, chills, fatigue, fever and unexpected weight change  HENT: Negative for congestion, postnasal drip and rhinorrhea  Respiratory: Negative for cough, chest tightness, shortness of breath and wheezing  Cardiovascular: Negative for chest pain  Musculoskeletal: Positive for gait problem (wheelchair bound)  Psychiatric/Behavioral: Positive for sleep disturbance (vivid dreams)  All other systems reviewed and are negative  Medical, Family and Social history reviewed and updated as appropriate    Historical Information   Past Medical History:   Diagnosis Date   • Anxiety    • Breast cancer ([place] [address] ) 2007   • Colitis    • COPD (chronic obstructive pulmonary disease) ([place] [address] )    • Depression    • Difficult intubation    • Excessive daytime sleepiness    • GERD (gastroesophageal reflux disease)    • Hyperlipidemia    • Ischemic colitis ([place] [address] ) 01/21/2019   • Leukocytosis 03/28/2020   • Muscular dystrophy ([place] [address] )     Limb-girdle   • Osteoporosis    • Overactive bladder    • Perforated diverticulum 03/28/2020   • Pneumonitis    • 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[place] [address] )    • Skin cancer    • Skin disorder    • Vitamin D deficiency      Past Surgical History:   Procedure Laterality Date   • COLONOSCOPY N/A 1/22/2019    Procedure: COLONOSCOPY;  Surgeon: Manoj Hammonds MD;  Location: [facility];   Service: Colorectal   • CYSTOSCOPY N/A 9/30/2020    Procedure: CYSTOSCOPY; BILATERAL URETERAL CATHETER PLACEMENT, CYSTOTOMY;  Surgeon: Jac Ling MD;  Location: [facility];  Service: Urology   • FL CYSTOGRAM  10/15/2020   • HARTMANS PROCEDURE N/A 9/30/2020    Procedure: EXPLORATORY LAPAROTOMY; LYSIS OF ADHESIONS; WASHOUT PELVIC ABSCESS; COMPLEX SIGMOID COLON RESECTION; LOOP TRANSVERSE COLOSTOMY;  Surgeon: Simon Holter, MD;  Location: [facility];  Service: General   • IR DRAINAGE TUBE PLACEMENT  2020   • MASTECTOMY Left 2007    left breast mastectomy    • TONSILLECTOMY     • TOOTH EXTRACTION     • TUBAL LIGATION       Family History   Problem Relation Age of Onset   • Other Mother         bone loss   • Arthritis Mother    • Skin cancer Father    • Hypertension Father         benign    • Other Father         bone loss   • Muscular dystrophy Father    • Hypertension Sister    • No Known Problems Sister    • Muscular dystrophy Brother         of the limb gridle    • Parkinsonism Brother    • No Known Problems Brother    • No Known Problems Maternal Grandmother    • No Known Problems Paternal Grandmother    • No Known Problems Maternal Aunt    • Muscular dystrophy Family         of the limb girdle       Social History     Tobacco Use   Smoking Status Former   • Packs/day: 1 50   • Years: 37 00   • Pack years: 55 50   • Types: Cigarettes   • Start date:    • Quit date:    • Years since quittin 1   Smokeless Tobacco Never         Meds/Allergies     Current Outpatient Medications:   •  buPROPion (WELLBUTRIN) 75 mg tablet, take 1 tablet by mouth every morning for DEPRESSION-MOTIVATION,AND FOCUS, Disp: , Rfl:   •  citalopram (CeleXA) 10 mg tablet, take 1 tablet by mouth once daily, Disp: 90 tablet, Rfl: 1  •  clotrimazole-betamethasone (LOTRISONE) 1-0 05 % lotion, , Disp: , Rfl: 0  •  diazepam (VALIUM) 5 mg tablet, Take 1 tablet (5 mg total) by mouth every 8 (eight) hours as needed for anxiety or muscle spasms, Disp: 30 tablet, Rfl: 0  •  fluticasone (FLONASE) 50 mcg/act nasal spray, instill 2 sprays into each nostril once daily, Disp: 16 g, Rfl: 3  •  ketoconazole (NIZORAL) 2 % cream, APPLY TWICE A DAY TO THE AFFECTED AREA(S), Disp: , Rfl:   •  ketoconazole (NIZORAL) 2 % shampoo, apply to affected area two times a week, Disp: 120 mL, Rfl: 1  •  Melatonin 3 MG CAPS, Take 3 mg by mouth, Disp: , Rfl:   •  omeprazole (PriLOSEC) 20 mg delayed release capsule, take 1 capsule by mouth every morning, Disp: 90 capsule, Rfl: 1  •  Probiotic Product (Florastor Plus) CAPS, , Disp: , Rfl:   •  triamcinolone (KENALOG) 0 1 % cream, Apply topically 2 (two) times a day, Disp: 80 g, Rfl: 1  Allergies   Allergen Reactions   • Amoxicillin      Vague rash in the 90s, tolerated zosyn on 9/2020 admission    • Codeine      Other reaction(s): Other (See Comments)  n/v   • Medical Tape Itching       Vitals: Blood pressure 130/68, pulse 82, SpO2 91 %, not currently breastfeeding  There is no height or weight on file to calculate BMI  Oxygen Therapy  SpO2: 91 %  Oxygen Therapy: None (Room air)      Physical Exam  Vitals reviewed  Constitutional:       General: She is not in acute distress  Appearance: She is not ill-appearing or toxic-appearing  HENT:      Head: Normocephalic and atraumatic  Nose: Nose normal       Mouth/Throat:      Pharynx: Oropharynx is clear  Eyes:      Conjunctiva/sclera: Conjunctivae normal    Cardiovascular:      Rate and Rhythm: Normal rate and regular rhythm  Heart sounds: Normal heart sounds  Pulmonary:      Effort: Pulmonary effort is normal  No respiratory distress  Breath sounds: No stridor  No wheezing, rhonchi or rales  Chest:      Chest wall: No tenderness  Abdominal:      Palpations: Abdomen is soft  Musculoskeletal:      Cervical back: Normal range of motion  Right lower leg: Edema present  Left lower leg: Edema present  Comments: Patient is wheelchair bound     Skin:     General: Skin is warm and dry  Neurological:      General: No focal deficit present  Mental Status: She is alert and oriented to person, place, and time  Psychiatric:         Mood and Affect: Mood normal          Behavior: Behavior normal          Labs: I have personally reviewed pertinent lab results    Lab Results   Component Value Date    WBC 10 01 04/22/2022    HGB 13 0 04/22/2022    HCT 43 3 04/22/2022    MCV 88 04/22/2022     (H) 04/22/2022 Lab Results   Component Value Date    GLUCOSE 81 12/09/2015    CALCIUM 9 0 04/22/2022     12/09/2015    K 4 5 04/22/2022    CO2 33 (H) 04/22/2022     04/22/2022    BUN 15 04/22/2022    CREATININE 0 42 (L) 04/22/2022     No results found for: IGE  Lab Results   Component Value Date    ALT 19 04/22/2022    AST 18 04/22/2022    ALKPHOS 97 04/22/2022    BILITOT 0 36 12/09/2015       Imaging and other studies: I have personally reviewed pertinent reports  and I have personally reviewed pertinent films in PACS     Pulmonary function testing:  Performed 3/29/21 and personally interpreted  FEV1/FVC ratio 86%   FEV1 44% predicted  FVC 40% predicted  TLC 48 % predicted  RV 58 % predicted  DLCO corrected for hemoglobin 44 % predicted  Struct of pattern on spirometry  Lung volumes indicative of restrictive lung disease  Moderate decrease in diffusion capacity  Restrictive pattern on flow volume loops  Other Studies: I have personally reviewed pertinent reports  POCT 6 minute walk 2/18/22: Due to patient's muscular dystrophy, patient was only able to ambulate a very short distance and desaturated to 88% after 2 minutes  She was placed on supplemental oxygen at 2 L/min  Based on her declining oxygen with ambulation on room air, she qualified for supplemental oxygen at that time

## 2023-03-08 NOTE — ASSESSMENT & PLAN NOTE
Patient's oxygen saturation on room air at beginning of visit was initially 87%  This increased to 91%, suspect this can be due to the patient's very cold hands  Patient is currently without any respiratory distress or shortness of breath  Patient unable to perform a 6-minute walk secondary to muscular dystrophy as she is wheelchair-bound  She uses 2L NC at nighttime with her BiPAP  I discussed with patient that she should continue to wear the BiPAP throughout the whole night, and to also wear during naps  At today's visit, I only have access to her BiPAP compliance report from 1/17/2023 to 2/15/2023  During that time, patient used the BiPAP 30 out of 30 days with 100% compliance  She used the BiPAP for greater than 4 hours for 25 of those days, or 83%  The average usage on days used is 5 hours and 25 minutes  She uses an AirCurve 10 Vauto with a max IPAP of 20 cmH2O, min EPAP of 6 cmH2O  She also has a pressure support of 6  Her median leak is 0 7 L/min and she has an AHI of 1 0  We will have her follow up in 6 months and as long as the BiPAP continues to work well for her, can hopefully follow up once a year

## 2023-03-31 ENCOUNTER — RA CDI HCC (OUTPATIENT)
Dept: OTHER | Facility: HOSPITAL | Age: 73
End: 2023-03-31

## 2023-04-06 ENCOUNTER — APPOINTMENT (OUTPATIENT)
Dept: LAB | Facility: CLINIC | Age: 73
End: 2023-04-06

## 2023-04-06 DIAGNOSIS — Z13.1 ENCOUNTER FOR SCREENING EXAMINATION FOR IMPAIRED GLUCOSE REGULATION AND DIABETES MELLITUS: ICD-10-CM

## 2023-04-06 DIAGNOSIS — J96.11 CHRONIC RESPIRATORY FAILURE WITH HYPOXIA AND HYPERCAPNIA (HCC): ICD-10-CM

## 2023-04-06 DIAGNOSIS — M79.641 BILATERAL HAND PAIN: ICD-10-CM

## 2023-04-06 DIAGNOSIS — Z13.220 LIPID SCREENING: ICD-10-CM

## 2023-04-06 DIAGNOSIS — F33.0 MILD EPISODE OF RECURRENT MAJOR DEPRESSIVE DISORDER (HCC): ICD-10-CM

## 2023-04-06 DIAGNOSIS — D75.839 THROMBOCYTOSIS, UNSPECIFIED: ICD-10-CM

## 2023-04-06 DIAGNOSIS — G71.09 DYSTROPHY, MUSCULAR, HEREDITARY PROGRESSIVE (HCC): ICD-10-CM

## 2023-04-06 DIAGNOSIS — J96.12 CHRONIC RESPIRATORY FAILURE WITH HYPOXIA AND HYPERCAPNIA (HCC): ICD-10-CM

## 2023-04-06 DIAGNOSIS — M81.0 OSTEOPOROSIS WITHOUT CURRENT PATHOLOGICAL FRACTURE, UNSPECIFIED OSTEOPOROSIS TYPE: ICD-10-CM

## 2023-04-06 DIAGNOSIS — M79.642 BILATERAL HAND PAIN: ICD-10-CM

## 2023-04-06 DIAGNOSIS — D64.9 ANEMIA, UNSPECIFIED TYPE: ICD-10-CM

## 2023-04-06 DIAGNOSIS — Z86.39 HISTORY OF VITAMIN D DEFICIENCY: ICD-10-CM

## 2023-04-06 DIAGNOSIS — Z13.29 THYROID DISORDER SCREEN: ICD-10-CM

## 2023-04-06 PROBLEM — Z87.898 HISTORY OF PERIPHERAL EDEMA: Status: ACTIVE | Noted: 2023-04-06

## 2023-04-06 LAB
25(OH)D3 SERPL-MCNC: 24 NG/ML (ref 30–100)
ALBUMIN SERPL BCP-MCNC: 3.2 G/DL (ref 3.5–5)
ALP SERPL-CCNC: 77 U/L (ref 46–116)
ALT SERPL W P-5'-P-CCNC: 19 U/L (ref 12–78)
ANION GAP SERPL CALCULATED.3IONS-SCNC: -1 MMOL/L (ref 4–13)
AST SERPL W P-5'-P-CCNC: 15 U/L (ref 5–45)
BASOPHILS # BLD AUTO: 0.04 THOUSANDS/ÂΜL (ref 0–0.1)
BASOPHILS NFR BLD AUTO: 1 % (ref 0–1)
BILIRUB SERPL-MCNC: 0.23 MG/DL (ref 0.2–1)
BUN SERPL-MCNC: 14 MG/DL (ref 5–25)
CALCIUM ALBUM COR SERPL-MCNC: 10.1 MG/DL (ref 8.3–10.1)
CALCIUM SERPL-MCNC: 9.5 MG/DL (ref 8.3–10.1)
CHLORIDE SERPL-SCNC: 102 MMOL/L (ref 96–108)
CHOLEST SERPL-MCNC: 229 MG/DL
CK SERPL-CCNC: 80 U/L (ref 26–192)
CO2 SERPL-SCNC: 35 MMOL/L (ref 21–32)
CREAT SERPL-MCNC: 0.32 MG/DL (ref 0.6–1.3)
CRP SERPL QL: 10.8 MG/L
EOSINOPHIL # BLD AUTO: 0.21 THOUSAND/ÂΜL (ref 0–0.61)
EOSINOPHIL NFR BLD AUTO: 2 % (ref 0–6)
ERYTHROCYTE [DISTWIDTH] IN BLOOD BY AUTOMATED COUNT: 15.4 % (ref 11.6–15.1)
GFR SERPL CREATININE-BSD FRML MDRD: 111 ML/MIN/1.73SQ M
GLUCOSE P FAST SERPL-MCNC: 90 MG/DL (ref 65–99)
HCT VFR BLD AUTO: 41.4 % (ref 34.8–46.1)
HDLC SERPL-MCNC: 110 MG/DL
HGB BLD-MCNC: 12.6 G/DL (ref 11.5–15.4)
IMM GRANULOCYTES # BLD AUTO: 0.02 THOUSAND/UL (ref 0–0.2)
IMM GRANULOCYTES NFR BLD AUTO: 0 % (ref 0–2)
LDLC SERPL CALC-MCNC: 100 MG/DL (ref 0–100)
LYMPHOCYTES # BLD AUTO: 2.39 THOUSANDS/ÂΜL (ref 0.6–4.47)
LYMPHOCYTES NFR BLD AUTO: 27 % (ref 14–44)
MCH RBC QN AUTO: 26.8 PG (ref 26.8–34.3)
MCHC RBC AUTO-ENTMCNC: 30.4 G/DL (ref 31.4–37.4)
MCV RBC AUTO: 88 FL (ref 82–98)
MONOCYTES # BLD AUTO: 0.63 THOUSAND/ÂΜL (ref 0.17–1.22)
MONOCYTES NFR BLD AUTO: 7 % (ref 4–12)
NEUTROPHILS # BLD AUTO: 5.53 THOUSANDS/ÂΜL (ref 1.85–7.62)
NEUTS SEG NFR BLD AUTO: 63 % (ref 43–75)
NONHDLC SERPL-MCNC: 119 MG/DL
NRBC BLD AUTO-RTO: 0 /100 WBCS
PLATELET # BLD AUTO: 384 THOUSANDS/UL (ref 149–390)
PMV BLD AUTO: 11.4 FL (ref 8.9–12.7)
POTASSIUM SERPL-SCNC: 4.3 MMOL/L (ref 3.5–5.3)
PROT SERPL-MCNC: 7.3 G/DL (ref 6.4–8.4)
RBC # BLD AUTO: 4.71 MILLION/UL (ref 3.81–5.12)
SODIUM SERPL-SCNC: 136 MMOL/L (ref 135–147)
TRIGL SERPL-MCNC: 94 MG/DL
TSH SERPL DL<=0.05 MIU/L-ACNC: 2.16 UIU/ML (ref 0.45–4.5)
WBC # BLD AUTO: 8.82 THOUSAND/UL (ref 4.31–10.16)

## 2023-04-07 LAB
ANA TITR SER IF: NEGATIVE {TITER}
RHEUMATOID FACT SER QL LA: NEGATIVE

## 2023-04-09 PROBLEM — M79.642 BILATERAL HAND PAIN: Status: ACTIVE | Noted: 2023-04-09

## 2023-04-09 PROBLEM — M79.641 BILATERAL HAND PAIN: Status: ACTIVE | Noted: 2023-04-09

## 2023-04-28 NOTE — PLAN OF CARE
CBI resumed per EDMD verbal order   DISCHARGE PLANNING - CARE MANAGEMENT     Discharge to post-acute care or home with appropriate resources Completed        PAIN - ADULT     Verbalizes/displays adequate comfort level or baseline comfort level Completed        Potential for Falls     Patient will remain free of falls Completed        Prexisting or High Potential for Compromised Skin Integrity     Skin integrity is maintained or improved Completed        SAFETY ADULT     Maintain or return to baseline ADL function Completed     Maintain or return mobility status to optimal level Completed Occupational Therapy Evaluation     Visit Count: 1     Referred by: No ref. provider found;   Medical Diagnosis (from order): See medical order.  Treatment Diagnosis: impaired muscle strength, specific developmental disorder of motor function, unspecified lack of coordination, impaired motor function/performance/coordination, attention deficits, sensory processing and regulation deficits   YOB: 2012, 9 year old  Corrected Gestational Age:blank      SUBJECTIVE   Present and reporting subjective information: father and mother.      Function:   Activities Reported by Family as Painful and/or Challenging: dressing, bathing, grooming, inability to perform motor milestones at age appropriate level  Personal Occupations Profile Affected: bathing/showering, toileting/toilet hygiene, upper body dressing, lower body dressing, personal hygiene/grooming, play/play participation, school/school participation, social participation peer, social participation friend  Patient/Family Goals/Concerns: Pauline's parents reports concerns with attention, fine motor strength and motor planning, and ability to complete age-appropriate ADLs independently.    Pain: Patient reports pain is not an issue/concern     Prior Treatment:   Early Intervention Therapy History: Physical, Occupational, Developmental and Speech Therapy weekly until 36 months.   Early Childhood Program School: Attended school 4 days a week for 2 ½ hours; received Speech Therapy, Occupational Therapy, Physical Therapy and  at [employer] [facility] [employer] in [facility].  Outpatient Therapies: Pauline received Occupational Therapy, Physical Therapy, and Speech Therapy weekly at either [facility] located in [place] or [facility].     Current Therapy: Pauline currently receives OT and PT at [facility]. She also recieves OT, PT, and ST through school.   Pauline's mother reports she is currently working on \"hand  exercises\" and handwriting during school. In OP OT, she is working on motor planning, turn-taking, and buttoning.  Home Environment/Social Support: Patient lives with parent(s)/guardian.  Patient has caregiver assist as needed.      Safety:  Do you feel safe at home, work and/or school? yes, per patient  Falls: balance history in last year (< or equal to 2 falls/near falls and/or reasons) does not indicate further testing      OBJECTIVE   Standardized Assessments:  Bruininks-Oseretsky Test of Motor Proficiency, Second Edition  Subtest Total Point Score Scale Score Standard Score Percentile Rank Age Equivalent (years:months) Descriptive Category   Fine Motor Precision 11 2   <4 Well Below Average   Fine Motor Integration 10 2   4.0-4.1 Well Below Average   Fine Manual Control  4 20 <1%     Manual Dexterity 17 5   5.4-5.5 Well Below Average   Upper Limb Coordination 9 3   5.2-5.3 Well Below Average   Manual Coordination  8 24 1%     Bilateral Coordination         Balance         Body Coordination         Running Speed and Agility         Strength         Strength and Agility         Summary of test performance: Pauline was observed to rosales through tasks. She required ongoing redirection and repetition of directions for improved follow though as she was observed to initiate testing activities prior to therapist completing/providing all instructions.     Fine Motor Precision: During paper-pencil tasks, Pauline utilized a very tight  with her right hand. She was observed to utilize the back of the chair as well as the table for postural support. She was observed to rush through these tasks and often times verbalize, \"I am not good at this.\" She benefited from verbal encouragement and positive reinforcement for engagement and increased effort during tasks. Pauline was observed to perform better when visual cues were provided (ie connect the dots and complete the maze). She demonstrated increased difficulty completing  tasks that required attention to detail and bilateral hand coordination such as cutting a [place].     Fine Motor Integration: Pauline was observed to rosales through shape-copying tasks. For the shapes she was awarded points, she often had difficulty with overall size and shape closure. She was not awarded points for copying a triangle, jessica, star, or overlapping pencils.    Manual Dexterity: Pauline was very motivated by timed trials and task completion. She required redirection and repetition of directions for follow through as she was observed to rush through tasks and not listen to directions. Increased difficulty with tip pinch manipulation of playing cards observed as Pauline was unable to manipulation only one playing card at a time.     Upper Limb Coordination: Pauline demonstrated difficulty with hand-eye coordination and body mechanics during ball handling tasks including playing catch, dribbling ball, and throwing ball at a target. Of note, abdominal circumference appeared to affect Joshuas mobility and ability to retrieve items from the floor using proper body mechanics. Ongoing assessment for gravitational insecurities and/or vestibular deficits warranted.    Fine Motor  Emerging skills: cutting a [place], cutting a square.  Hand Preference: right    Behavioral Observations:  a decreased attention span, distractibility    Sensory Processing:  Quadrants Section Raw Score Total Description Category   Seeking/Seeker 42/95  Just like the majority of others   Avoiding/Avoider 46/100  Just like the majority of others   Sensitivity/Sensory 42/95  Just like the majority of others   Registration/Bystander  44/110  More than others   Sensory Sections      Auditory 18/40  Just like the majority of others   Visual 9/30  Just like the majority of others   Touch 22/55  More than others   Movement 22/40  More than others   Body Position 11/40  Just like the majority of others   Oral  27/50 More than others    Behavioral Sections      Conduct 16/45  Just like the majority of others   Social Emotional  39/70  More than others   Attentional  22/50  Just like the majority of others     Pauline's parents completed the Sensory Profile 2 indicating she has difficulty with registering sensory information 'more than others.' This was observed during tasks at the table as Pauline relied on the back of the chair and the surface of the table to increase her body awareness and provide postural supports. Pauline is affected by touch, movement, and oral sensory stimulation 'more than other children.' Finally, Joshuas social emotional development and skills are affected by the above-mentioned sensory deficits 'more than other children.'    The following are sensory scenarios Pauline's parents report as \"almost always\" or \"frequently\" interfering with Pauline's daily life:  - Pauline almost always struggles to complete tasks when the tv or music is playing.   - Pauline frequently reacts strongly to unexpected or loud noises.  - Pauline almost always seems unaware of pain. This may be correlated to Pauline's decreased body awareness and sensory registration.   - Pauline frequently becomes anxious when standing close to others (ie in a line).  - Pauline frequently becomes unaware of temperature changes.  - Pauline frequently pursues movement to the point it interferes with daily routines. It is likely Pauline is seeking input to increase her body awareness.  - Pauline frequently rocks in her chair, floor, or while standing. It is likely Pauline is seeking input to increase her body awareness.  - Pauline almost always hesitates going up/down curbs or steps. This may be correlated to Pauline's decreased body awareness as well as gravitation insecurities.  - Pauline frequently bumps into things, failing to notice objects or people in the way. This may be correlated to Pauline's decreased body awareness and sensory registration.   -  Pauline almost always only eats certain tastes.  - Pauline almost always shows a strong preference for certain tastes.  - Pauline frequently craves certain foods, tastes, or smells.  - Pauline frequently puts objects in her mouth.  - Pauline almost always rushes through coloring, writing, or drawing.  - Pauline almost always needs positive support to return to challenging situations.  - Pauline almost always has definite, predictable fears.  - Pauline almost always interacts or participates in groups less than same-aged children.  - Pauline almost always has difficulty with friendships.  - Pauline frequently looks away from tasks to notice all actions in the room.  - Pauline almost always has a hard time finding an object in competing backgrounds.    Muscle Tone:  not assessed    Neuromotor:   Impaired Not Impaired   RUE PROM     LUE PROM     RUE AROM     LUE AROM      RUE strength X    LUE strength X    Trunk strength X    All motions within functional/normal limits except as noted.   Comments: No formal assessment of ROM or strength was completed. However, it is evident that Pauline has core weakness and correlated UE weakness affecting her ability to complete fine motor and bilateral hand activities at an age appropriate level.     Visual Skills:  No concerns.     Self-Care Skills:  Feeding Skills  Pauline's parent report she is a very picky eater. She does not orally consume enough to meet her nutritional needs, therefore, 2 supplemental feeds are provided via G-tube per day.     Dressing Skills  Parents reports Pauline is able to done clothings, however, requires assistance for orientation. She is working on manupulation of fasteners, specifically buttons, in OP OT at this time.      Toileting/Bathing/Grooming Skills  Earlsam is toilet trained. She required assistance for hygeine following a BM.    Sleeping Habits  Parents report no concerns with sleep habits. Pauline does utilize oxygen when sleeping at  night.    Initial Treatment    Initial evaluation completed.    ASSESSMENT   9 year old female patient seen for a formal occupational therapy evaluation in cardiac school readiness clinic.    Functional Strengths: Pauline is a very sweet little girl with an extremely supportive family. She is also very supported at school and within the community as she receives school-based and outpatient occupational therapy services. During formal testing, Pauline benefited from and responded well to verbal encouragement, positive reinforcement, and redirection.      Functional Challenges: Pauline presents with sensory regulation and processing deficits affecting her attention, body mechanics, and progression of fine motor skills. She has difficulty with body awareness and gravitation insecurity which is affecting her balance as well as ability to engage in dynamic sitting and standing tasks with age appropriate coordination. She would benefit from continued outpatient and school-based occupational therapy services to address sensory processing and regulation skills, fine motor precision and integration, and independence with ADLs.    PLAN   Treatment Recommendations:  1) Continue school-based and outpatient occupational therapy services. Consider discussing with current therapist integration of sensory heavy work prior to fine motor activities to improve attention and coordination, vestibular-based activities to improve gravitation insecurities, trial SPIO vest for static compression to improve body awareness and registration, and explore other sensory-rich activities to progress processing and regulation.    Recommendations:  1. Encourage pre-writing skills by tracing and copying simple lines using various types of media (paint, food, chalk, sand, crayons, markers, shaving cream, etc.).  2. Use squeeze bottles, squirt bottles, clothespins, and safety scissors or Play Jabier scissors to help strengthen the small muscles of the hand  and for practice with pre-cutting skills.  3. Encourage activities that promote general strengthening such as bearing weight over arms, four-point/hands and knees position, crawling up or over objects, and pulling to stand.  4. Encourage play with different types of textures on the hands and feet  5. Use heavy work and big movements to help \"wake-up\" or \"jump-start\" your child's body; this type of sensory input can be alerting to the brain and muscles and set the foundation for strengthening the core and legs.  These types of activities include pushing or pulling something heavy, climbing, doing obstacle courses, weight bearing (animal walks or wheelbarrow walks), swinging on swings, etc.   6. Practice bilateral coordination activities with the hands using activities like threading a dry noodle through a colander, or stringing a noodle onto a   7. Encourage multi-sensory play whenever possible  8. Encourage activities to promote full body strengthening, gross motor coordination, and social emotional skill development such as swimming, dance, soccer, or gymnastics  9. Encourage play on the playground for gross motor coordination and strengthening  10. Use a \"backward chaining\" technique to help teach buttoning.  Complete the first few steps for your child, then have him or her finish the task. Use hand-over-hand help if needed.  11. Read lots of books! This is a great activity to work on cognition, language, and fine motor/hand/reaching skills.  12. Encourage participation with dressing and undressing activities.  13. Focus on BIG MOVEMENTS while playing.  Set up fun, simple (2-3 step) obstacle courses that include activities like crawling over pillows, rolling, jumping, stomping, etc.    14. Please see fine motor coordination handout provided.   15. Please see sensory handout including activities and environmental modifications.     Goals: To be obtained by end of this plan of care:  1. Complete eval in  clinic  2. Provide HEP to caregiver      Frequency/Duration: patient seen one time for 60 minutes.    The plan of care and goals were established with the patient and patient's parent(s) who concurs.     Patient/Caregiver Education:   Who will be receiving education: patient and patient's parent(s)  Are they ready to learn: yes  Preferred learning style: written, verbal, demonstration  Barriers to learning: no barriers apparent at this time   Result of initial outlined education: Verbalizes understanding    Procedures and total treatment time documented in Time Entry flowsheet.

## 2023-05-01 DIAGNOSIS — F33.0 MILD EPISODE OF RECURRENT MAJOR DEPRESSIVE DISORDER (HCC): Primary | ICD-10-CM

## 2023-05-01 NOTE — TELEPHONE ENCOUNTER
Requested medication(s) are due for refill today: Yes  Patient has already received a courtesy refill: No  Other reason request has been forwarded to provider: Pt just needs a 30 day supply till her appointment at [facility] dr [address]  No longer in p[ractice

## 2023-05-04 RX ORDER — BUPROPION HYDROCHLORIDE 75 MG/1
75 TABLET ORAL DAILY
Qty: 30 TABLET | Refills: 0 | Status: SHIPPED | OUTPATIENT
Start: 2023-05-04

## 2023-06-11 ENCOUNTER — APPOINTMENT (EMERGENCY)
Dept: RADIOLOGY | Facility: HOSPITAL | Age: 73
End: 2023-06-11
Payer: COMMERCIAL

## 2023-06-11 ENCOUNTER — HOSPITAL ENCOUNTER (EMERGENCY)
Facility: HOSPITAL | Age: 73
Discharge: HOME | End: 2023-06-11
Attending: EMERGENCY MEDICINE | Admitting: EMERGENCY MEDICINE
Payer: COMMERCIAL

## 2023-06-11 VITALS
HEART RATE: 85 BPM | SYSTOLIC BLOOD PRESSURE: 154 MMHG | OXYGEN SATURATION: 95 % | TEMPERATURE: 98.9 F | DIASTOLIC BLOOD PRESSURE: 70 MMHG | RESPIRATION RATE: 20 BRPM

## 2023-06-11 DIAGNOSIS — J06.9 VIRAL URI: ICD-10-CM

## 2023-06-11 DIAGNOSIS — J44.1 COPD EXACERBATION (HCC): Primary | ICD-10-CM

## 2023-06-11 DIAGNOSIS — R06.02 SHORTNESS OF BREATH: ICD-10-CM

## 2023-06-11 LAB
ALBUMIN SERPL BCP-MCNC: 3.7 G/DL (ref 3.5–5)
ALP SERPL-CCNC: 70 U/L (ref 34–104)
ALT SERPL W P-5'-P-CCNC: 10 U/L (ref 7–52)
ANION GAP SERPL CALCULATED.3IONS-SCNC: 6 MMOL/L (ref 4–13)
AST SERPL W P-5'-P-CCNC: 13 U/L (ref 13–39)
ATRIAL RATE: 79 BPM
BASOPHILS # BLD AUTO: 0.04 THOUSANDS/ÂΜL (ref 0–0.1)
BASOPHILS NFR BLD AUTO: 0 % (ref 0–1)
BILIRUB SERPL-MCNC: 0.35 MG/DL (ref 0.2–1)
BNP SERPL-MCNC: 26 PG/ML (ref 0–100)
BUN SERPL-MCNC: 14 MG/DL (ref 5–25)
CALCIUM SERPL-MCNC: 8.5 MG/DL (ref 8.4–10.2)
CARDIAC TROPONIN I PNL SERPL HS: 3 NG/L
CHLORIDE SERPL-SCNC: 97 MMOL/L (ref 96–108)
CO2 SERPL-SCNC: 35 MMOL/L (ref 21–32)
CREAT SERPL-MCNC: 0.26 MG/DL (ref 0.6–1.3)
EOSINOPHIL # BLD AUTO: 0.15 THOUSAND/ÂΜL (ref 0–0.61)
EOSINOPHIL NFR BLD AUTO: 1 % (ref 0–6)
ERYTHROCYTE [DISTWIDTH] IN BLOOD BY AUTOMATED COUNT: 15.9 % (ref 11.6–15.1)
FLUAV RNA RESP QL NAA+PROBE: NEGATIVE
FLUBV RNA RESP QL NAA+PROBE: NEGATIVE
GFR SERPL CREATININE-BSD FRML MDRD: 119 ML/MIN/1.73SQ M
GLUCOSE SERPL-MCNC: 84 MG/DL (ref 65–140)
HCT VFR BLD AUTO: 37.7 % (ref 34.8–46.1)
HGB BLD-MCNC: 11.4 G/DL (ref 11.5–15.4)
IMM GRANULOCYTES # BLD AUTO: 0.03 THOUSAND/UL (ref 0–0.2)
IMM GRANULOCYTES NFR BLD AUTO: 0 % (ref 0–2)
LYMPHOCYTES # BLD AUTO: 2.28 THOUSANDS/ÂΜL (ref 0.6–4.47)
LYMPHOCYTES NFR BLD AUTO: 19 % (ref 14–44)
MCH RBC QN AUTO: 26.8 PG (ref 26.8–34.3)
MCHC RBC AUTO-ENTMCNC: 30.2 G/DL (ref 31.4–37.4)
MCV RBC AUTO: 89 FL (ref 82–98)
MONOCYTES # BLD AUTO: 0.95 THOUSAND/ÂΜL (ref 0.17–1.22)
MONOCYTES NFR BLD AUTO: 8 % (ref 4–12)
NEUTROPHILS # BLD AUTO: 8.39 THOUSANDS/ÂΜL (ref 1.85–7.62)
NEUTS SEG NFR BLD AUTO: 72 % (ref 43–75)
NRBC BLD AUTO-RTO: 0 /100 WBCS
P AXIS: -26 DEGREES
PLATELET # BLD AUTO: 353 THOUSANDS/UL (ref 149–390)
PMV BLD AUTO: 10.7 FL (ref 8.9–12.7)
POTASSIUM SERPL-SCNC: 3.8 MMOL/L (ref 3.5–5.3)
PR INTERVAL: 142 MS
PROCALCITONIN SERPL-MCNC: <0.05 NG/ML
PROT SERPL-MCNC: 6.6 G/DL (ref 6.4–8.4)
QRS AXIS: 39 DEGREES
QRSD INTERVAL: 74 MS
QT INTERVAL: 368 MS
QTC INTERVAL: 421 MS
RBC # BLD AUTO: 4.26 MILLION/UL (ref 3.81–5.12)
RSV RNA RESP QL NAA+PROBE: NEGATIVE
SARS-COV-2 RNA RESP QL NAA+PROBE: NEGATIVE
SODIUM SERPL-SCNC: 138 MMOL/L (ref 135–147)
T WAVE AXIS: 59 DEGREES
VENTRICULAR RATE: 79 BPM
WBC # BLD AUTO: 11.84 THOUSAND/UL (ref 4.31–10.16)

## 2023-06-11 PROCEDURE — 94760 N-INVAS EAR/PLS OXIMETRY 1: CPT

## 2023-06-11 PROCEDURE — 84145 PROCALCITONIN (PCT): CPT | Performed by: EMERGENCY MEDICINE

## 2023-06-11 PROCEDURE — 36415 COLL VENOUS BLD VENIPUNCTURE: CPT | Performed by: EMERGENCY MEDICINE

## 2023-06-11 PROCEDURE — 85025 COMPLETE CBC W/AUTO DIFF WBC: CPT | Performed by: EMERGENCY MEDICINE

## 2023-06-11 PROCEDURE — 94644 CONT INHLJ TX 1ST HOUR: CPT

## 2023-06-11 PROCEDURE — 83880 ASSAY OF NATRIURETIC PEPTIDE: CPT | Performed by: EMERGENCY MEDICINE

## 2023-06-11 PROCEDURE — 99285 EMERGENCY DEPT VISIT HI MDM: CPT

## 2023-06-11 PROCEDURE — 93010 ELECTROCARDIOGRAM REPORT: CPT | Performed by: INTERNAL MEDICINE

## 2023-06-11 PROCEDURE — 93005 ELECTROCARDIOGRAM TRACING: CPT

## 2023-06-11 PROCEDURE — 84484 ASSAY OF TROPONIN QUANT: CPT | Performed by: EMERGENCY MEDICINE

## 2023-06-11 PROCEDURE — 80053 COMPREHEN METABOLIC PANEL: CPT | Performed by: EMERGENCY MEDICINE

## 2023-06-11 PROCEDURE — 94150 VITAL CAPACITY TEST: CPT

## 2023-06-11 PROCEDURE — 96365 THER/PROPH/DIAG IV INF INIT: CPT

## 2023-06-11 PROCEDURE — 0241U HB NFCT DS VIR RESP RNA 4 TRGT: CPT | Performed by: EMERGENCY MEDICINE

## 2023-06-11 PROCEDURE — 96375 TX/PRO/DX INJ NEW DRUG ADDON: CPT

## 2023-06-11 PROCEDURE — 71045 X-RAY EXAM CHEST 1 VIEW: CPT

## 2023-06-11 RX ORDER — SODIUM CHLORIDE FOR INHALATION 0.9 %
3 VIAL, NEBULIZER (ML) INHALATION ONCE
Status: COMPLETED | OUTPATIENT
Start: 2023-06-11 | End: 2023-06-11

## 2023-06-11 RX ORDER — METHYLPREDNISOLONE SODIUM SUCCINATE 125 MG/2ML
80 INJECTION, POWDER, LYOPHILIZED, FOR SOLUTION INTRAMUSCULAR; INTRAVENOUS ONCE
Status: COMPLETED | OUTPATIENT
Start: 2023-06-11 | End: 2023-06-11

## 2023-06-11 RX ORDER — PREDNISONE 20 MG/1
40 TABLET ORAL DAILY
Qty: 8 TABLET | Refills: 0 | Status: SHIPPED | OUTPATIENT
Start: 2023-06-11 | End: 2023-06-15

## 2023-06-11 RX ORDER — MAGNESIUM SULFATE HEPTAHYDRATE 40 MG/ML
2 INJECTION, SOLUTION INTRAVENOUS ONCE
Status: COMPLETED | OUTPATIENT
Start: 2023-06-11 | End: 2023-06-11

## 2023-06-11 RX ADMIN — IPRATROPIUM BROMIDE 1 MG: 0.5 SOLUTION RESPIRATORY (INHALATION) at 16:42

## 2023-06-11 RX ADMIN — MAGNESIUM SULFATE HEPTAHYDRATE 2 G: 40 INJECTION, SOLUTION INTRAVENOUS at 16:35

## 2023-06-11 RX ADMIN — METHYLPREDNISOLONE SODIUM SUCCINATE 80 MG: 125 INJECTION, POWDER, FOR SOLUTION INTRAMUSCULAR; INTRAVENOUS at 16:35

## 2023-06-11 RX ADMIN — ISODIUM CHLORIDE 3 ML: 0.03 SOLUTION RESPIRATORY (INHALATION) at 16:42

## 2023-06-11 RX ADMIN — ALBUTEROL SULFATE 10 MG: 2.5 SOLUTION RESPIRATORY (INHALATION) at 16:41

## 2023-06-11 NOTE — ED PROVIDER NOTES
History  Chief Complaint   Patient presents with   • Shortness of Breath     Patient reports SOB with hypoxia for approximately 1 week  Worsened by the poor air quality for the past few days  O2 saturations at home while on 2L NC ranging 82-90%  • Headache     Patient reports starting over the past 2 days; sore throat, congestion  Congestion and sore throat for 3-4 days  Sob coming on and worsening over the last few days  Chest tightness  Coughing starting today with small amount of mucous   Home pulse ox reading at 82% on her normal 2L NC  Denies abdominal pain, N/V/D/constipation, dysuria, hematuria  Prior to Admission Medications   Prescriptions Last Dose Informant Patient Reported? Taking?    Melatonin 3 MG CAPS Past Week Self Yes Yes   Sig: Take 3 mg by mouth   Probiotic Product (Florastor Plus) CAPS 6/11/2023 Self Yes Yes   buPROPion (WELLBUTRIN) 75 mg tablet 6/11/2023  No Yes   Sig: Take 1 tablet (75 mg total) by mouth in the morning   citalopram (CeleXA) 10 mg tablet 6/10/2023  No Yes   Sig: Take 1 tablet (10 mg total) by mouth daily   clotrimazole-betamethasone (LOTRISONE) 1-0 05 % lotion Past Week Self Yes Yes   diazepam (VALIUM) 5 mg tablet Past Week  No Yes   Sig: Take 1 tablet (5 mg total) by mouth every 8 (eight) hours as needed for anxiety or muscle spasms   fluticasone (FLONASE) 50 mcg/act nasal spray Past Month Self No Yes   Sig: instill 2 sprays into each nostril once daily   ketoconazole (NIZORAL) 2 % cream Past Week Self Yes Yes   Sig: APPLY TWICE A DAY TO THE AFFECTED AREA(S)   ketoconazole (NIZORAL) 2 % shampoo   No No   Sig: apply to affected area two times a week   omeprazole (PriLOSEC) 20 mg delayed release capsule 6/11/2023  No Yes   Sig: take 1 capsule by mouth every morning   triamcinolone (KENALOG) 0 1 % cream Past Week Self No Yes   Sig: Apply topically 2 (two) times a day      Facility-Administered Medications: None       Past Medical History:   Diagnosis Date   • Anxiety    • Breast cancer ([place] [address] ) 2007   • Colitis    • COPD (chronic obstructive pulmonary disease) ([facility])    • Depression    • Difficult intubation    • Excessive daytime sleepiness    • GERD (gastroesophageal reflux disease)    • Hyperlipidemia    • Ischemic colitis ([place] [address] ) 01/21/2019   • Leukocytosis 03/28/2020   • 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   • Osteoporosis    • Overactive bladder    • Perforated diverticulum 03/28/2020   • Pneumonitis    • 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[place] [address] )    • Skin cancer    • Skin disorder    • Vitamin D deficiency        Past Surgical History:   Procedure Laterality Date   • COLONOSCOPY N/A 1/22/2019    Procedure: COLONOSCOPY;  Surgeon: Crystal Barbour MD;  Location:  GI LAB;   Service: Colorectal   • CYSTOSCOPY N/A 9/30/2020    Procedure: CYSTOSCOPY; BILATERAL URETERAL CATHETER PLACEMENT, CYSTOTOMY;  Surgeon: Alexandria Daigle MD;  Location: [facility];  Service: Urology   • FL CYSTOGRAM  10/15/2020   • HARTMANS PROCEDURE N/A 9/30/2020    Procedure: EXPLORATORY LAPAROTOMY; LYSIS OF ADHESIONS; WASHOUT PELVIC ABSCESS; COMPLEX SIGMOID COLON RESECTION; LOOP TRANSVERSE COLOSTOMY;  Surgeon: Nikunj Kaminski MD;  Location: [facility];  Service: General   • IR DRAINAGE TUBE PLACEMENT  9/24/2020   • MASTECTOMY Left 2007    left breast mastectomy    • TONSILLECTOMY     • TOOTH EXTRACTION     • TUBAL LIGATION         Family History   Problem Relation Age of Onset   • Other Mother         bone loss   • Arthritis Mother    • Skin cancer Father    • Hypertension Father         benign    • Other Father         bone loss   • Muscular dystrophy Father    • Hypertension Sister    • No Known Problems Sister    • Muscular dystrophy Brother         of the limb gridle    • Parkinsonism Brother    • No Known Problems Brother    • No Known Problems Maternal Grandmother    • No Known Problems Paternal Grandmother    • No Known Problems Maternal Aunt    • Muscular dystrophy Family         of the limb girdle     I have reviewed and agree with the history as documented  E-Cigarette/Vaping   • E-Cigarette Use Never User      E-Cigarette/Vaping Substances   • Nicotine No    • THC No    • CBD No    • Flavoring No      Social History     Tobacco Use   • Smoking status: Former     Packs/day: 1 50     Years: 37 00     Total pack years: 55 50     Types: Cigarettes     Start date: 5     Quit date:      Years since quittin 4   • Smokeless tobacco: Never   Vaping Use   • Vaping Use: Never used   Substance Use Topics   • Alcohol use: Not Currently     Comment: social drinker per allscripts    • Drug use: Yes     Types: Marijuana     Comment: oral use       Review of Systems   HENT: Positive for congestion and sore throat  Respiratory: Positive for cough, chest tightness and shortness of breath  All other systems reviewed and are negative  Physical Exam  Physical Exam  Vitals and nursing note reviewed  Constitutional:       General: She is not in acute distress  Appearance: She is well-developed  She is not diaphoretic  Interventions: Nasal cannula in place  Comments: Baseline wears nasal cannula  On her home 2 L  HENT:      Head: Normocephalic and atraumatic  Right Ear: External ear normal       Left Ear: External ear normal       Nose: Nose normal       Mouth/Throat:      Mouth: Mucous membranes are moist       Pharynx: Oropharynx is clear  No pharyngeal swelling or oropharyngeal exudate  Eyes:      General: No scleral icterus  Right eye: No discharge  Left eye: No discharge  Conjunctiva/sclera: Conjunctivae normal       Pupils: Pupils are equal, round, and reactive to light  Neck:      Thyroid: No thyromegaly  Vascular: No hepatojugular reflux or JVD  Trachea: No tracheal deviation  Cardiovascular:      Rate and Rhythm: Normal rate and regular rhythm  Heart sounds: Normal heart sounds   No murmur heard      No friction rub  No gallop  Pulmonary:      Effort: Pulmonary effort is normal  No respiratory distress  Breath sounds: No stridor  Examination of the right-upper field reveals wheezing  Examination of the left-upper field reveals wheezing  Examination of the right-middle field reveals wheezing  Examination of the left-middle field reveals wheezing  Examination of the right-lower field reveals wheezing  Examination of the left-lower field reveals wheezing  Wheezing present  No decreased breath sounds, rhonchi or rales  Abdominal:      General: Bowel sounds are normal  There is no distension  Palpations: Abdomen is soft  There is no mass  Tenderness: There is no abdominal tenderness  There is no guarding  Musculoskeletal:         General: No tenderness or deformity  Normal range of motion  Cervical back: Normal range of motion and neck supple  Right lower leg: No tenderness  Edema (1+) present  Left lower leg: No tenderness  Edema (1+) present  Lymphadenopathy:      Cervical: No cervical adenopathy  Skin:     General: Skin is warm and dry  Coloration: Skin is not pale  Findings: No erythema or rash  Neurological:      Mental Status: She is alert and oriented to person, place, and time  Cranial Nerves: No cranial nerve deficit  Sensory: No sensory deficit  Motor: No abnormal muscle tone  Psychiatric:         Behavior: Behavior normal          Thought Content:  Thought content normal          Judgment: Judgment normal          Vital Signs  ED Triage Vitals [06/11/23 1533]   Temperature Pulse Respirations Blood Pressure SpO2   98 9 °F (37 2 °C) 85 20 154/70 (!) 80 %      Temp Source Heart Rate Source Patient Position - Orthostatic VS BP Location FiO2 (%)   Tympanic Monitor Sitting Right arm --      Pain Score       --           Vitals:    06/11/23 1533   BP: 154/70   Pulse: 85   Patient Position - Orthostatic VS: Sitting         Visual Acuity      ED Medications  Medications   albuterol inhalation solution 10 mg (10 mg Nebulization Given 6/11/23 1641)     And   ipratropium (ATROVENT) 0 02 % inhalation solution 1 mg (1 mg Nebulization Given 6/11/23 1642)     And   sodium chloride 0 9 % inhalation solution 3 mL (3 mL Nebulization Given 6/11/23 1642)   magnesium sulfate 2 g/50 mL IVPB (premix) 2 g (0 g Intravenous Stopped 6/11/23 1710)   methylPREDNISolone sodium succinate (Solu-MEDROL) injection 80 mg (80 mg Intravenous Given 6/11/23 1635)       Diagnostic Studies  Results Reviewed     Procedure Component Value Units Date/Time    FLU/RSV/COVID - if FLU/RSV clinically relevant [[id number]]  (Normal) Collected: 06/11/23 1653    Lab Status: Final result Specimen: Nares from Nose Updated: 06/11/23 1740     SARS-CoV-2 Negative     INFLUENZA A PCR Negative     INFLUENZA B PCR Negative     RSV PCR Negative    Narrative:      FOR PEDIATRIC PATIENTS - copy/paste COVID Guidelines URL to browser: [email or web address]  [email or web address]    SARS-CoV-2 assay is a Nucleic Acid Amplification assay intended for the  qualitative detection of nucleic acid from SARS-CoV-2 in nasopharyngeal  swabs  Results are for the presumptive identification of SARS-CoV-2 RNA  Positive results are indicative of infection with SARS-CoV-2, the virus  causing COVID-19, but do not rule out bacterial infection or co-infection  with other viruses  Laboratories within the [place] and its  territories are required to report all positive results to the appropriate  public health authorities  Negative results do not preclude SARS-CoV-2  infection and should not be used as the sole basis for treatment or other  patient management decisions  Negative results must be combined with  clinical observations, patient history, and epidemiological information  This test has not been FDA cleared or approved      This test has been authorized by FDA under an Emergency Use Authorization  (EUA)  This test is only authorized for the duration of time the  declaration that circumstances exist justifying the authorization of the  emergency use of an in vitro diagnostic tests for detection of SARS-CoV-2  virus and/or diagnosis of COVID-19 infection under section 564(b)(1) of  the Act, 21 U  S C  333CPA-7(N)(9), unless the authorization is terminated  or revoked sooner  The test has been validated but independent review by FDA  and CLIA is pending  Test performed using [employer] GeneXpert: This RT-PCR assay targets N2,  a region unique to SARS-CoV-2  A conserved region in the E-gene was chosen  for pan-Sarbecovirus detection which includes SARS-CoV-2  According to CMS-2020-01-R, this platform meets the definition of high-throughput technology      Procalcitonin [[id number]]  (Normal) Collected: 06/11/23 1619    Lab Status: Final result Specimen: Blood from Arm, Right Updated: 06/11/23 1731     Procalcitonin <0 05 ng/ml     B-Type Natriuretic Peptide(BNP) [[id number]]  (Normal) Collected: 06/11/23 1619    Lab Status: Final result Specimen: Blood from Arm, Right Updated: 06/11/23 1654     BNP 26 pg/mL     HS Troponin 0hr (reflex protocol) [[id number]]  (Normal) Collected: 06/11/23 1619    Lab Status: Final result Specimen: Blood from Arm, Right Updated: 06/11/23 1654     hs TnI 0hr 3 ng/L     Comprehensive metabolic panel [[id number]]  (Abnormal) Collected: 06/11/23 1619    Lab Status: Final result Specimen: Blood from Arm, Right Updated: 06/11/23 1653     Sodium 138 mmol/L      Potassium 3 8 mmol/L      Chloride 97 mmol/L      CO2 35 mmol/L      ANION GAP 6 mmol/L      BUN 14 mg/dL      Creatinine 0 26 mg/dL      Glucose 84 mg/dL      Calcium 8 5 mg/dL      AST 13 U/L      ALT 10 U/L      Alkaline Phosphatase 70 U/L      Total Protein 6 6 g/dL      Albumin 3 7 g/dL      Total Bilirubin 0 35 mg/dL      eGFR 119 ml/min/1 73sq m     Narrative:      [place] guidelines for Chronic Kidney Disease (CKD):   •  Stage 1 with normal or high GFR (GFR > 90 mL/min/1 73 square meters)  •  Stage 2 Mild CKD (GFR = 60-89 mL/min/1 73 square meters)  •  Stage 3A Moderate CKD (GFR = 45-59 mL/min/1 73 square meters)  •  Stage 3B Moderate CKD (GFR = 30-44 mL/min/1 73 square meters)  •  Stage 4 Severe CKD (GFR = 15-29 mL/min/1 73 square meters)  •  Stage 5 End Stage CKD (GFR <15 mL/min/1 73 square meters)  Note: GFR calculation is accurate only with a steady state creatinine    CBC and differential [[id number]]  (Abnormal) Collected: 06/11/23 1619    Lab Status: Final result Specimen: Blood from Arm, Right Updated: 06/11/23 1628     WBC 11 84 Thousand/uL      RBC 4 26 Million/uL      Hemoglobin 11 4 g/dL      Hematocrit 37 7 %      MCV 89 fL      MCH 26 8 pg      MCHC 30 2 g/dL      RDW 15 9 %      MPV 10 7 fL      Platelets 209 Thousands/uL      nRBC 0 /100 WBCs      Neutrophils Relative 72 %      Immat GRANS % 0 %      Lymphocytes Relative 19 %      Monocytes Relative 8 %      Eosinophils Relative 1 %      Basophils Relative 0 %      Neutrophils Absolute 8 39 Thousands/µL      Immature Grans Absolute 0 03 Thousand/uL      Lymphocytes Absolute 2 28 Thousands/µL      Monocytes Absolute 0 95 Thousand/µL      Eosinophils Absolute 0 15 Thousand/µL      Basophils Absolute 0 04 Thousands/µL                  XR chest 1 view portable    (Results Pending)              Procedures  ECG 12 Lead Documentation Only    Date/Time: 6/11/2023 6:23 PM    Performed by: Casey Rico DO  Authorized by: Casey Rico DO    Patient location:  ED  Interpretation:     Interpretation: normal    Rate:     ECG rate:  79    ECG rate assessment: normal    Rhythm:     Rhythm: sinus rhythm    Ectopy:     Ectopy: none    QRS:     QRS axis:  Normal    QRS intervals:  Normal  Conduction:     Conduction: normal    ST segments:     ST segments:  Normal  T waves:     T waves: normal    CriticalCare Time    Date/Time: 6/11/2023 6:23 PM    Performed by: Olivier Negro DO  Authorized by: Olivier Negro DO    Critical care provider statement:     Critical care time (minutes):  35    Critical care time was exclusive of:  Separately billable procedures and treating other patients and teaching time    Critical care was necessary to treat or prevent imminent or life-threatening deterioration of the following conditions:  Respiratory failure    Critical care was time spent personally by me on the following activities:  Blood draw for specimens, obtaining history from patient or surrogate, development of treatment plan with patient or surrogate, evaluation of patient's response to treatment, examination of patient, review of old charts, ordering and review of radiographic studies, re-evaluation of patient's condition, ordering and review of laboratory studies, ordering and performing treatments and interventions and interpretation of cardiac output measurements    I assumed direction of critical care for this patient from another provider in my specialty: no               ED Course  ED Course as of 06/11/23 1824   Sun Jun 11, 2023   1706 NIF with a value of -30 per respiratory  1731 Procalcitonin: <0 05   1731 hs TnI 0hr: 3   1731 BNP: 26   1820 SpO2(!): 80 %  Initial O2 saturation reportedly in triage  Upon my evaluation patient never below 90% in the room on her normal nasal cannula oxygen  Patient given hour-long albuterol and ipratropium nebulizer along with Solu-Medrol magnesium with significant improvement in oxygenation along with patient subjective breathing and objective wheezing on my examination               HEART Risk Score    Flowsheet Row Most Recent Value   Heart Score Risk Calculator    History 0 Filed at: 06/11/2023 1824   ECG 0 Filed at: 06/11/2023 1824   Age 2 Filed at: 06/11/2023 1824   Risk Factors 2 Filed at: 06/11/2023 1824   Troponin 0 Filed at: 06/11/2023 1824   HEART Score 4 Filed at: 06/11/2023 1824                        SBIRT 20yo+    Flowsheet Row Most Recent Value   Initial Alcohol Screen: US AUDIT-C     1  How often do you have a drink containing alcohol? 0 Filed at: 06/11/2023 1535   2  How many drinks containing alcohol do you have on a typical day you are drinking? 0 Filed at: 06/11/2023 1535   3a  Male UNDER 65: How often do you have five or more drinks on one occasion? 0 Filed at: 06/11/2023 1535   3b  FEMALE Any Age, or MALE 65+: How often do you have 4 or more drinks on one occassion? 0 Filed at: 06/11/2023 1535   Audit-C Score 0 Filed at: 06/11/2023 1535   BAKARI: How many times in the past year have you    Used an illegal drug or used a prescription medication for non-medical reasons? Never Filed at: 06/11/2023 1535                    Medical Decision Making  Initially presenting with shortness of breath at home along with reports of low O2 saturation with home O2 monitor  Patient with wheezing on examination initially  With baseline COPD and muscular dystrophy on 2 L nasal cannula  Cardiac work-up along with procalcitonin ordered with negative procalcitonin, negative troponin, nonischemic EKG, chest x-ray showing no obvious signs of pneumonia and improvement of patient's pulmonary exam with hour-long ipratropium and albuterol neb, Solu-Medrol and magnesium  NIF measured at -30  Patient feeling significantly better after symptomatic treatment  Will place patient on prednisone burst for 4 more days and recommend follow-up with her pulmonologist   Did send message via [employer] to patient's pulmonologist to try to expedite outpatient appointment  Strict return precautions discussed and provided with patient and patient's family members in the room  Patient patient's family members all state they feel comfortable going home at this time and understand strict return precautions  Amount and/or Complexity of Data Reviewed  Labs: ordered   Decision-making details documented in ED Course  Radiology: ordered  Risk  Prescription drug management  Disposition  Final diagnoses:   COPD exacerbation ([address] )   Viral URI   Shortness of breath     Time reflects when diagnosis was documented in both MDM as applicable and the Disposition within this note     Time User Action Codes Description Comment    6/11/2023  6:18 PM Alaina Sleigh Add [J44 1] COPD exacerbation ([address] )     6/11/2023  6:18 PM Alaina Sleigh Add [J06 9] Viral URI     6/11/2023  6:18 PM Alaina Sleigh Add [R06 02] Shortness of breath       ED Disposition     ED Disposition   Discharge    Condition   Stable    Date/Time   Sun Jun 11, 2023  6:18 PM    [address] discharge to home/self care  Follow-up Information     Follow up With Specialties Details Why Contact Info Additional [address], [facility] [address]  [address], MD Pulmonary Disease, Pulmonology, Critical Care Medicine, Intensive Care   [address] Emergency Department Emergency Medicine Go to  As needed, If symptoms worsen [address]  [place] [address]  [facility] Emergency Department, [address], [address], [address]          Patient's Medications   Discharge Prescriptions    PREDNISONE 20 MG TABLET    Take 2 tablets (40 mg total) by mouth daily for 4 days       Start Date: 6/11/2023 End Date: 6/15/2023       Order Dose: 40 mg       Quantity: 8 tablet    Refills: 0       No discharge procedures on file      PDMP Review       Value Time User    PDMP Reviewed  Yes 4/17/2023 12:02 AM Julia Staton DO          ED Provider  Electronically Signed by           Gemma Mendez DO  06/11/23 3444

## 2023-06-11 NOTE — DISCHARGE INSTRUCTIONS
If you develop any new or worsening symptoms please immediately return to your nearest emergency department  Please make sure to follow up with your Pulmonologist as soon as possible

## 2023-06-12 ENCOUNTER — VBI (OUTPATIENT)
Dept: ADMINISTRATIVE | Facility: OTHER | Age: 73
End: 2023-06-12

## 2023-06-12 NOTE — TELEPHONE ENCOUNTER
Jesusita Hawkins    ED Visit Information     Ed visit date: 6/11/23  Diagnosis Description:   COPD exacerbation ([address] )   In Network? Yes, Lyn Holiday   Discharge status: Home  Discharged with meds ? Yes  Number of ED visits to date: 1  ED Severity:N/A     Outreach Information    Outreach successful: Yes 1  Date letter mailed:N/A  Date Finalized:6/11/23    Care Coordination    Follow up appointment with pcp: no patient states awaiting a call back from PCP regarding F/U  Transportation issues ? No    Value [employer] type: 7 Day Outreach  [facility] PCP: Yes  Transportation: Friend/Family Transport  Ambulance - Was Pt given choice of of ED: No  If able to choose an ED  Would you have chosen [facility]: Yes  Called PCP first?: No  Feels able to call PCP for urgent problems ?: Yes  Understands what emergencies can be handled by PCP ?: Yes  Ever any problems getting appointment with PCP for minor emergency/urgency problems?: No  Practice Contacted Patient ?: No  Pt had ED follow up with pcp/staff ?: No    Seen for follow-up out of network ?: No  Reason Patient went to ED instead of Urgent Care or PCP?: Perceived Severity of Illness  Urgent care Education?: Yes  06/12/2023 10:44 AM EDT by Cruz Carrizales 06/12/2023 10:44 AM EDT by Cruz Baker (Self) [phone] (QQMAGX)[phone] (Mobile)  [phone] (Mobile) Remove  - Call Complete    Spoke with patient regarding recent visit in ED - stated doing better since discharged   Pt called pcp to schedule f/u and awaiting a call back from them

## 2023-06-15 ENCOUNTER — OFFICE VISIT (OUTPATIENT)
Dept: FAMILY MEDICINE CLINIC | Facility: CLINIC | Age: 73
End: 2023-06-15
Payer: MEDICARE

## 2023-06-15 VITALS
SYSTOLIC BLOOD PRESSURE: 150 MMHG | BODY MASS INDEX: 31.72 KG/M2 | DIASTOLIC BLOOD PRESSURE: 80 MMHG | HEART RATE: 73 BPM | WEIGHT: 162.4 LBS | TEMPERATURE: 97.8 F | OXYGEN SATURATION: 98 %

## 2023-06-15 DIAGNOSIS — J31.0 RHINITIS, UNSPECIFIED TYPE: ICD-10-CM

## 2023-06-15 DIAGNOSIS — Z99.81 DEPENDENCE ON NOCTURNAL OXYGEN THERAPY: ICD-10-CM

## 2023-06-15 DIAGNOSIS — J00 ACUTE RHINITIS: ICD-10-CM

## 2023-06-15 DIAGNOSIS — J44.1 COPD WITH ACUTE EXACERBATION (HCC): Primary | ICD-10-CM

## 2023-06-15 DIAGNOSIS — J01.90 ACUTE NON-RECURRENT SINUSITIS, UNSPECIFIED LOCATION: ICD-10-CM

## 2023-06-15 DIAGNOSIS — J43.2 CENTRILOBULAR EMPHYSEMA (HCC): ICD-10-CM

## 2023-06-15 PROCEDURE — 99214 OFFICE O/P EST MOD 30 MIN: CPT | Performed by: FAMILY MEDICINE

## 2023-06-15 RX ORDER — FLUTICASONE PROPIONATE 50 MCG
2 SPRAY, SUSPENSION (ML) NASAL DAILY
Qty: 16 G | Refills: 3 | Status: SHIPPED | OUTPATIENT
Start: 2023-06-15

## 2023-06-15 RX ORDER — AZITHROMYCIN 250 MG/1
TABLET, FILM COATED ORAL
Qty: 6 TABLET | Refills: 0 | Status: SHIPPED | OUTPATIENT
Start: 2023-06-15 | End: 2023-06-19

## 2023-06-15 RX ORDER — BUDESONIDE 0.5 MG/2ML
0.5 INHALANT ORAL 2 TIMES DAILY
Qty: 60 ML | Refills: 0 | Status: SHIPPED | OUTPATIENT
Start: 2023-06-15

## 2023-06-15 RX ORDER — ALBUTEROL SULFATE 2.5 MG/3ML
2.5 SOLUTION RESPIRATORY (INHALATION) EVERY 4 HOURS PRN
Qty: 180 ML | Refills: 0 | Status: SHIPPED | OUTPATIENT
Start: 2023-06-15

## 2023-06-15 NOTE — PROGRESS NOTES
"Name: Bronwyn Officer      : 1950      MRN: [id number]  Encounter Provider: Samuel Galvan DO  Encounter Date: 6/15/2023   Encounter department: [address] [place] [address]     1  COPD with acute exacerbation (HCC)  -     Nebulizer  -     Nebulizer Supplies  -     azithromycin (ZITHROMAX) 250 mg tablet; Take 2 tablets today then 1 tablet daily x 4 days  -     albuterol (2 5 mg/3 mL) 0 083 % nebulizer solution; Take 3 mL (2 5 mg total) by nebulization every 4 (four) hours as needed for wheezing or shortness of breath    2  Dependence on nocturnal oxygen therapy    3  Acute non-recurrent sinusitis, unspecified location  -     azithromycin (ZITHROMAX) 250 mg tablet; Take 2 tablets today then 1 tablet daily x 4 days    4  Centrilobular emphysema (HCC)  -     budesonide (Pulmicort) 0 5 mg/2 mL nebulizer solution; Take 2 mL (0 5 mg total) by nebulization 2 (two) times a day Rinse mouth after use  5  Rhinitis, unspecified type  -     fluticasone (FLONASE) 50 mcg/act nasal spray; 2 sprays into each nostril daily    6   Acute rhinitis    pt's COPD had been mild and maintained without use of inhalers until now; I am starting pt on nebulized meds instead of handheld inhalers due to the restrictive component of her lung disease/her MS- not likely to generate enough force/volume to get the meds down into the lungs using handheld inhaler   she follows with pulmonologist routinely and has f/u there in a few days  Her oxygen levels have been back to 90% range       Subjective      Chief Complaint   Patient presents with   • Follow-up     Er follow 23 for sob       ER f/u  Has appt with her pulmonologist in 5 days  Finished steroid course this morning from ER  \"I think that smoke (from [employer]) put me over the edge and think I have a sinus infection- what's coming out of nasal passages and what I'm coughing up is yellow\"   \"from cheeks up, my whole head is in a vice\"  \"I do have " "a dull sore throat down low- it's worked its way down\"  Pt asks whether I could rx a daily inhaler- pt's med list no inhalers at all- I ask pt about this why she doesn't have any at home- she reports she used to have Advair a couple of years ago, but didn't notice a benefit, so she stopped taking   Per pt's pulmonologist f/u visit notes in March this year: \"COPD (chronic obstructive pulmonary disease) (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 [facility]) Patient is currently in no respiratory distress  She uses BiPAP at night with 2 L of oxygen  \"  I discuss with pt that I would advise nebulized meds instead of handheld inhalers due to the restrictive component of her lung disease/her MS- not likely to generate enough force/volume to get the meds down into the lungs using handheld inhalers        6/11/2023 (2 hours)  [facility]  History  Chief Complaint  Patient presents with  • Shortness of Breath      Patient reports SOB with hypoxia for approximately 1 week  Worsened by the poor air quality for the past few days  O2 saturations at home while on 2L NC ranging 82-90%  • Headache      Patient reports starting over the past 2 days; sore throat, congestion  Congestion and sore throat for 3-4 days  Sob coming on and worsening over the last few days     Chest tightness  Coughing starting today with small amount of mucous   Home pulse ox reading at 82% on her normal 2L NC  Denies abdominal pain, N/V/D/constipation, dysuria, hematuria  ED Medications  Medications  albuterol inhalation solution 10 mg (10 mg Nebulization Given 6/11/23 1641)    And  ipratropium (ATROVENT) 0 02 % inhalation solution 1 mg (1 mg Nebulization Given 6/11/23 1642)    And  sodium chloride 0 9 % inhalation solution 3 mL (3 mL Nebulization Given 6/11/23 1642)  magnesium sulfate 2 g/50 mL IVPB (premix) 2 g (0 g Intravenous " Stopped 6/11/23 1710)  methylPREDNISolone sodium succinate (Solu-MEDROL) injection 80 mg (80 mg Intravenous Given 6/11/23 1635)  Diagnostic Studies  Results Reviewed     Procedure Component Value Units Date/Time    FLU/RSV/COVID - if FLU/RSV clinically relevant ([id number])  (Normal) Collected: 06/11/23 1653    Lab Status: Final result Specimen: Nares from Nose Updated: 06/11/23 9400      SARS-CoV-2 Negative      INFLUENZA A PCR Negative      INFLUENZA B PCR Negative      RSV PCR Negative  Medical Decision Making  Initially presenting with shortness of breath at home along with reports of low O2 saturation with home O2 monitor  Patient with wheezing on examination initially  With baseline COPD and muscular dystrophy on 2 L nasal cannula  Cardiac work-up along with procalcitonin ordered with negative procalcitonin, negative troponin, nonischemic EKG, chest x-ray showing no obvious signs of pneumonia and improvement of patient's pulmonary exam with hour-long ipratropium and albuterol neb, Solu-Medrol and magnesium  NIF measured at -27    Patient feeling significantly better after symptomatic treatment  Will place patient on prednisone burst for 4 more days and recommend follow-up with her pulmonologist   Did send message via [employer] to patient's pulmonologist to try to expedite outpatient appointment  Strict return precautions discussed and provided with patient and patient's family members in the room    Patient patient's family members all state they feel comfortable going home at this time and understand strict return precautions          Review of Systems    Current Outpatient Medications on File Prior to Visit   Medication Sig   • buPROPion (WELLBUTRIN) 75 mg tablet Take 1 tablet (75 mg total) by mouth in the morning   • citalopram (CeleXA) 10 mg tablet Take 1 tablet (10 mg total) by mouth daily   • diazepam (VALIUM) 5 mg tablet Take 1 tablet (5 mg total) by mouth every 8 (eight) hours as needed for anxiety or muscle spasms   • ketoconazole (NIZORAL) 2 % cream APPLY TWICE A DAY TO THE AFFECTED AREA(S)   • ketoconazole (NIZORAL) 2 % shampoo apply to affected area two times a week   • Melatonin 3 MG CAPS Take 3 mg by mouth   • omeprazole (PriLOSEC) 20 mg delayed release capsule take 1 capsule by mouth every morning   • Probiotic Product (Florastor Plus) CAPS    • clotrimazole-betamethasone (LOTRISONE) 1-0 05 % lotion  (Patient not taking: Reported on 6/15/2023)   • triamcinolone (KENALOG) 0 1 % cream Apply topically 2 (two) times a day (Patient not taking: Reported on 6/15/2023)       Objective     /80 (BP Location: Left arm, Patient Position: Sitting, Cuff Size: Standard)   Pulse 73   Temp 97 8 °F (36 6 °C) (Tympanic)   Wt 73 7 kg (162 lb 6 4 oz)   LMP  (LMP Unknown)   SpO2 98%   BMI 31 72 kg/m²     Physical Exam  Sushil Bending, DO

## 2023-06-18 PROBLEM — Z99.81 DEPENDENCE ON NOCTURNAL OXYGEN THERAPY: Status: ACTIVE | Noted: 2023-06-18

## 2023-06-20 ENCOUNTER — OFFICE VISIT (OUTPATIENT)
Dept: PULMONOLOGY | Facility: CLINIC | Age: 73
End: 2023-06-20
Payer: COMMERCIAL

## 2023-06-20 VITALS
WEIGHT: 162 LBS | DIASTOLIC BLOOD PRESSURE: 82 MMHG | HEART RATE: 80 BPM | OXYGEN SATURATION: 91 % | BODY MASS INDEX: 31.64 KG/M2 | SYSTOLIC BLOOD PRESSURE: 130 MMHG

## 2023-06-20 DIAGNOSIS — J44.1 COPD WITH ACUTE EXACERBATION (HCC): Primary | ICD-10-CM

## 2023-06-20 PROCEDURE — 99213 OFFICE O/P EST LOW 20 MIN: CPT

## 2023-06-20 NOTE — PROGRESS NOTES
Pulmonary Follow Up Note   Lois Rutherford 68 y o  female MRN: [id number]  6/21/2023      Assessment/Plan:     COPD with acute exacerbation [facility])  This is overall been stable until recently, likely in the setting of acute sinusitis/poor air quality  Patient noticed tightness in her chest and was seen in the emergency department and received an hour-long nebulizer treatment  She was treated with p o  steroids and a Z-Kristopher with improvement in symptoms  Her PCP ordered her Pulmicort nebulized twice daily as well as albuterol nebulized as needed  She has been unable to use this medication as she has not received her nebulizer yet  I have placed an order for a nebulizer and will ask staff to facilitate this for her  Visit orders:    Diagnoses and all orders for this visit:    [facility]  -     [facility]        Return in about 3 months (around 9/20/2023), or if symptoms worsen or fail to improve  History of Present Illness   HPI:  Lois Rutherford is a 68 y o  female who presents to the office today for follow-up after recent emergency department visit regarding shortness of breath  She was seen on 6/11 as her shortness of breath has been worsening over the past week secondary to the poor air quality  She states that she was given an hour-long breathing treatment and discharged home with a 5 day course of prednisone  She also states that she was congested and had a sore throat prior to her emergency department visit  She notes that she had a low pulse ox reading at home between 82 and 84%  She increased her oxygen from 2 L to 3 L  At today's visit, she is feeling less congested in her nose and her chest   She still has yellow mucus and some tightness in her chest but this has improved    She states that her PCP sent her in azithromycin and a nebulizer last week however she has not received this yet, although she has the medication as needed for this       Review of Systems   Constitutional: Negative for activity change, chills, diaphoresis, fatigue and fever  HENT: Positive for congestion and postnasal drip  Negative for ear pain, sore throat and trouble swallowing  Respiratory: Positive for cough, chest tightness and shortness of breath (chronic )  Negative for wheezing  Cardiovascular: Negative for chest pain  Musculoskeletal: Positive for gait problem  Allergic/Immunologic: Positive for environmental allergies  All other systems reviewed and are negative  Medical, Family and Social history reviewed and updated as appropriate    Historical Information   Past Medical History:   Diagnosis Date   • Anxiety    • Breast cancer ([place] [address] ) 2007   • Colitis    • COPD (chronic obstructive pulmonary disease) ([place] [address] )    • Depression    • Difficult intubation    • Excessive daytime sleepiness    • GERD (gastroesophageal reflux disease)    • Hyperlipidemia    • Ischemic colitis ([place] [address] ) 01/21/2019   • Leukocytosis 03/28/2020   • 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   • Osteoporosis    • Overactive bladder    • Perforated diverticulum 03/28/2020   • Pneumonitis    • 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[place] [address] )    • Skin cancer    • Skin disorder    • Vitamin D deficiency      Past Surgical History:   Procedure Laterality Date   • COLONOSCOPY N/A 1/22/2019    Procedure: COLONOSCOPY;  Surgeon: Elva Shoemaker MD;  Location:  GI LAB;   Service: Colorectal   • CYSTOSCOPY N/A 9/30/2020    Procedure: CYSTOSCOPY; BILATERAL URETERAL CATHETER PLACEMENT, CYSTOTOMY;  Surgeon: Aleks Simeon MD;  Location: [facility];  Service: Urology   • FL CYSTOGRAM  10/15/2020   • HARTMANS PROCEDURE N/A 9/30/2020    Procedure: EXPLORATORY LAPAROTOMY; LYSIS OF ADHESIONS; WASHOUT PELVIC ABSCESS; COMPLEX SIGMOID COLON RESECTION; LOOP TRANSVERSE COLOSTOMY;  Surgeon: Saba Saldivar MD;  Location: [facility];  Service: General   • IR DRAINAGE TUBE PLACEMENT 2020   • MASTECTOMY Left 2007    left breast mastectomy    • TONSILLECTOMY     • TOOTH EXTRACTION     • TUBAL LIGATION       Family History   Problem Relation Age of Onset   • Other Mother         bone loss   • Arthritis Mother    • Skin cancer Father    • Hypertension Father         benign    • Other Father         bone loss   • Muscular dystrophy Father    • Hypertension Sister    • No Known Problems Sister    • Muscular dystrophy Brother         of the limb gridle    • Parkinsonism Brother    • No Known Problems Brother    • No Known Problems Maternal Grandmother    • No Known Problems Paternal Grandmother    • No Known Problems Maternal Aunt    • Muscular dystrophy Family         of the limb girdle       Social History     Tobacco Use   Smoking Status Former   • Packs/day: 1 50   • Years: 37 00   • Total pack years: 55 50   • Types: Cigarettes   • Start date:    • Quit date:    • Years since quittin 4   Smokeless Tobacco Never         Meds/Allergies     Current Outpatient Medications:   •  albuterol (2 5 mg/3 mL) 0 083 % nebulizer solution, Take 3 mL (2 5 mg total) by nebulization every 4 (four) hours as needed for wheezing or shortness of breath, Disp: 180 mL, Rfl: 0  •  budesonide (Pulmicort) 0 5 mg/2 mL nebulizer solution, Take 2 mL (0 5 mg total) by nebulization 2 (two) times a day Rinse mouth after use , Disp: 60 mL, Rfl: 0  •  buPROPion (WELLBUTRIN) 75 mg tablet, Take 1 tablet (75 mg total) by mouth in the morning, Disp: 30 tablet, Rfl: 0  •  citalopram (CeleXA) 10 mg tablet, Take 1 tablet (10 mg total) by mouth daily, Disp: 90 tablet, Rfl: 1  •  diazepam (VALIUM) 5 mg tablet, Take 1 tablet (5 mg total) by mouth every 8 (eight) hours as needed for anxiety or muscle spasms, Disp: 30 tablet, Rfl: 0  •  fluticasone (FLONASE) 50 mcg/act nasal spray, 2 sprays into each nostril daily, Disp: 16 g, Rfl: 3  •  ketoconazole (NIZORAL) 2 % cream, APPLY TWICE A DAY TO THE AFFECTED AREA(S), Disp: , Rfl:   •  ketoconazole (NIZORAL) 2 % shampoo, apply to affected area two times a week, Disp: 120 mL, Rfl: 1  •  Melatonin 3 MG CAPS, Take 3 mg by mouth, Disp: , Rfl:   •  omeprazole (PriLOSEC) 20 mg delayed release capsule, take 1 capsule by mouth every morning, Disp: 90 capsule, Rfl: 1  •  Probiotic Product (Florastor Plus) CAPS, , Disp: , Rfl:   Allergies   Allergen Reactions   • Amoxicillin      Vague rash in the 90s, tolerated zosyn on 9/2020 admission    • Codeine      Other reaction(s): Other (See Comments)  n/v   • Medical Tape Itching       Vitals: Blood pressure 130/82, pulse 80, weight 73 5 kg (162 lb), SpO2 91 %, not currently breastfeeding  Body mass index is 31 64 kg/m²  Oxygen Therapy  SpO2: 91 %  Oxygen Therapy: None (Room air)    Physical Exam  Vitals reviewed  Constitutional:       General: She is not in acute distress  Appearance: She is not ill-appearing or toxic-appearing  HENT:      Head: Normocephalic and atraumatic  Nose: Congestion present  Mouth/Throat:      Pharynx: Oropharynx is clear  Eyes:      Conjunctiva/sclera: Conjunctivae normal    Cardiovascular:      Rate and Rhythm: Normal rate and regular rhythm  Heart sounds: Normal heart sounds  Pulmonary:      Effort: Pulmonary effort is normal  No tachypnea, accessory muscle usage, respiratory distress or retractions  Breath sounds: Normal breath sounds and air entry  No stridor or decreased air movement  No decreased breath sounds, wheezing, rhonchi or rales  Chest:      Chest wall: No tenderness  Abdominal:      Palpations: Abdomen is soft  Musculoskeletal:      Cervical back: Normal range of motion  Right lower leg: No edema  Left lower leg: No edema  Skin:     General: Skin is warm and dry  Neurological:      General: No focal deficit present  Mental Status: She is alert and oriented to person, place, and time     Psychiatric:         Mood and Affect: Mood normal          Behavior: "Behavior normal          Labs: I have personally reviewed pertinent lab results  Lab Results   Component Value Date    WBC 11 84 (H) 06/11/2023    HGB 11 4 (L) 06/11/2023    HCT 37 7 06/11/2023    MCV 89 06/11/2023     06/11/2023     Lab Results   Component Value Date    GLUCOSE 81 12/09/2015    CALCIUM 8 5 06/11/2023     12/09/2015    K 3 8 06/11/2023    CO2 35 (H) 06/11/2023    CL 97 06/11/2023    BUN 14 06/11/2023    CREATININE 0 26 (L) 06/11/2023     No results found for: \"IGE\"  Lab Results   Component Value Date    ALT 10 06/11/2023    AST 13 06/11/2023    ALKPHOS 70 06/11/2023    BILITOT 0 36 12/09/2015       Imaging and other studies: I have personally reviewed pertinent reports  and I have personally reviewed pertinent films in PACS     6/11/23 CXR: No acute cardiopulmonary disease  Other Studies: I have personally reviewed pertinent reports        "

## 2023-06-20 NOTE — PATIENT INSTRUCTIONS
Start taking Claritin  Start using Flonase  Use budesonide twice daily, rinse mouth out after each use   Use the albuterol in your nebulizer 4 times daily, if needed

## 2023-06-21 LAB
DME PARACHUTE DELIVERY DATE ACTUAL: NORMAL
DME PARACHUTE DELIVERY DATE REQUESTED: NORMAL
DME PARACHUTE ITEM DESCRIPTION: NORMAL
DME PARACHUTE ORDER STATUS: NORMAL
DME PARACHUTE SUPPLIER NAME: NORMAL
DME PARACHUTE SUPPLIER PHONE: NORMAL

## 2023-06-21 NOTE — ASSESSMENT & PLAN NOTE
This is overall been stable until recently when the air quality worsened in the [facility]  Patient noticed tightness in her chest and was seen in the emergency department and received an hour-long nebulizer treatment  She visited her PCP who prescribed her nebulized Pulmicort and albuterol to use with the nebulizer but states that she has not received her nebulizer yet so she has been unable to use these medications  Overall she feels improvement since her encounter in the ED

## 2023-06-21 NOTE — ASSESSMENT & PLAN NOTE
This is overall been stable until recently, likely in the setting of acute sinusitis/poor air quality  Patient noticed tightness in her chest and was seen in the emergency department and received an hour-long nebulizer treatment  She was treated with p o  steroids and a Z-Kristopher with improvement in symptoms  Her PCP ordered her Pulmicort nebulized twice daily as well as albuterol nebulized as needed  She has been unable to use this medication as she has not received her nebulizer yet  I have placed an order for a nebulizer and will ask staff to facilitate this for her

## 2023-06-26 ENCOUNTER — TELEPHONE (OUTPATIENT)
Dept: FAMILY MEDICINE CLINIC | Facility: CLINIC | Age: 73
End: 2023-06-26

## 2023-06-26 NOTE — TELEPHONE ENCOUNTER
Patient called into the office this afternoon as she is still experiencing the same sinus symptoms as she did on her visit on 6/15  Patient would like to know if it would be possible to have another antibiotic to be placed

## 2023-06-26 NOTE — TELEPHONE ENCOUNTER
Please advise if pt can have more of the antibiotic, she is still having the symptoms that she had at her visit on 06-

## 2023-06-27 DIAGNOSIS — J32.9 SINUSITIS, UNSPECIFIED CHRONICITY, UNSPECIFIED LOCATION: Primary | ICD-10-CM

## 2023-06-27 RX ORDER — AZITHROMYCIN 250 MG/1
TABLET, FILM COATED ORAL
Qty: 6 TABLET | Refills: 0 | Status: SHIPPED | OUTPATIENT
Start: 2023-06-27 | End: 2023-07-01

## 2023-07-05 ENCOUNTER — OFFICE VISIT (OUTPATIENT)
Dept: FAMILY MEDICINE CLINIC | Facility: CLINIC | Age: 73
End: 2023-07-05
Payer: COMMERCIAL

## 2023-07-05 VITALS
TEMPERATURE: 97.7 F | DIASTOLIC BLOOD PRESSURE: 82 MMHG | WEIGHT: 162 LBS | BODY MASS INDEX: 31.64 KG/M2 | HEART RATE: 80 BPM | OXYGEN SATURATION: 97 % | SYSTOLIC BLOOD PRESSURE: 142 MMHG

## 2023-07-05 DIAGNOSIS — G71.00 RESTRICTIVE LUNG DISEASE DUE TO MUSCULAR DYSTROPHY (HCC): ICD-10-CM

## 2023-07-05 DIAGNOSIS — Z12.31 ENCOUNTER FOR SCREENING MAMMOGRAM FOR BREAST CANCER: ICD-10-CM

## 2023-07-05 DIAGNOSIS — Z59.82 INABILITY TO ACQUIRE TRANSPORTATION: ICD-10-CM

## 2023-07-05 DIAGNOSIS — N39.498 TOTAL URINARY INCONTINENCE: ICD-10-CM

## 2023-07-05 DIAGNOSIS — Z00.00 MEDICARE ANNUAL WELLNESS VISIT, SUBSEQUENT: Primary | ICD-10-CM

## 2023-07-05 DIAGNOSIS — G71.00 MUSCULAR DYSTROPHY (HCC): ICD-10-CM

## 2023-07-05 DIAGNOSIS — H61.21 IMPACTED CERUMEN OF RIGHT EAR: ICD-10-CM

## 2023-07-05 DIAGNOSIS — Z93.3 COLOSTOMY STATUS (HCC): ICD-10-CM

## 2023-07-05 DIAGNOSIS — F33.0 MILD EPISODE OF RECURRENT MAJOR DEPRESSIVE DISORDER (HCC): ICD-10-CM

## 2023-07-05 DIAGNOSIS — J98.4 RESTRICTIVE LUNG DISEASE DUE TO MUSCULAR DYSTROPHY (HCC): ICD-10-CM

## 2023-07-05 DIAGNOSIS — R21 RASH: ICD-10-CM

## 2023-07-05 PROCEDURE — 99214 OFFICE O/P EST MOD 30 MIN: CPT | Performed by: FAMILY MEDICINE

## 2023-07-05 PROCEDURE — G0439 PPPS, SUBSEQ VISIT: HCPCS | Performed by: FAMILY MEDICINE

## 2023-07-05 RX ORDER — TRIAMCINOLONE ACETONIDE 1 MG/G
CREAM TOPICAL 2 TIMES DAILY PRN
Qty: 30 G | Refills: 0 | Status: SHIPPED | OUTPATIENT
Start: 2023-07-05

## 2023-07-05 RX ORDER — DIAZEPAM 5 MG/1
5 TABLET ORAL EVERY 8 HOURS PRN
Qty: 30 TABLET | Refills: 0 | Status: SHIPPED | OUTPATIENT
Start: 2023-07-05

## 2023-07-05 RX ORDER — BUPROPION HYDROCHLORIDE 75 MG/1
75 TABLET ORAL DAILY
Qty: 90 TABLET | Refills: 1 | Status: SHIPPED | OUTPATIENT
Start: 2023-07-05

## 2023-07-05 RX ORDER — DIAZEPAM 5 MG/1
5 TABLET ORAL EVERY 8 HOURS PRN
Qty: 30 TABLET | Refills: 0 | Status: CANCELLED | OUTPATIENT
Start: 2023-07-05

## 2023-07-05 SDOH — ECONOMIC STABILITY - TRANSPORTATION SECURITY: TRANSPORTATION INSECURITY: Z59.82

## 2023-07-05 NOTE — PATIENT INSTRUCTIONS
Debrox ear wax drops    Medicare Preventive Visit Patient Instructions  Thank you for completing your Welcome to Medicare Visit or Medicare Annual Wellness Visit today. Your next wellness visit will be due in one year (7/5/2024). The screening/preventive services that you may require over the next 5-10 years are detailed below. Some tests may not apply to you based off risk factors and/or age. Screening tests ordered at today's visit but not completed yet may show as past due. Also, please note that scanned in results may not display below. Preventive Screenings:  Service Recommendations Previous Testing/Comments   Colorectal Cancer Screening  * Colonoscopy    * Fecal Occult Blood Test (FOBT)/Fecal Immunochemical Test (FIT)  * Fecal DNA/Cologuard Test  * Flexible Sigmoidoscopy Age: 43-73 years old   Colonoscopy: every 10 years (may be performed more frequently if at higher risk)  OR  FOBT/FIT: every 1 year  OR  Cologuard: every 3 years  OR  Sigmoidoscopy: every 5 years  Screening may be recommended earlier than age 39 if at higher risk for colorectal cancer. Also, an individualized decision between you and your healthcare provider will decide whether screening between the ages of 77-80 would be appropriate. Colonoscopy: 01/22/2019  FOBT/FIT: Not on file  Cologuard: Not on file  Sigmoidoscopy: Not on file    Screening Current     Breast Cancer Screening Age: 36 years old  Frequency: every 1-2 years  Not required if history of left and right mastectomy Mammogram: 07/20/2022    History Breast Cancer   Cervical Cancer Screening Between the ages of 21-29, pap smear recommended once every 3 years. Between the ages of 32-69, can perform pap smear with HPV co-testing every 5 years.    Recommendations may differ for women with a history of total hysterectomy, cervical cancer, or abnormal pap smears in past. Pap Smear: Not on file    Screening Not Indicated   Hepatitis C Screening Once for adults born between 1945 and 1965  More frequently in patients at high risk for Hepatitis C Hep C Antibody: 04/22/2022    Screening Current   Diabetes Screening 1-2 times per year if you're at risk for diabetes or have pre-diabetes Fasting glucose: 90 mg/dL (4/6/2023)  A1C: No results in last 5 years (No results in last 5 years)  Screening Current   Cholesterol Screening Once every 5 years if you don't have a lipid disorder. May order more often based on risk factors. Lipid panel: 04/06/2023    Screening Current     Other Preventive Screenings Covered by Medicare:  Abdominal Aortic Aneurysm (AAA) Screening: covered once if your at risk. You're considered to be at risk if you have a family history of AAA. Lung Cancer Screening: covers low dose CT scan once per year if you meet all of the following conditions: (1) Age 48-67; (2) No signs or symptoms of lung cancer; (3) Current smoker or have quit smoking within the last 15 years; (4) You have a tobacco smoking history of at least 20 pack years (packs per day multiplied by number of years you smoked); (5) You get a written order from a healthcare provider. Glaucoma Screening: covered annually if you're considered high risk: (1) You have diabetes OR (2) Family history of glaucoma OR (3)  aged 48 and older OR (3)  American aged 72 and older  Osteoporosis Screening: covered every 2 years if you meet one of the following conditions: (1) You're estrogen deficient and at risk for osteoporosis based off medical history and other findings; (2) Have a vertebral abnormality; (3) On glucocorticoid therapy for more than 3 months; (4) Have primary hyperparathyroidism; (5) On osteoporosis medications and need to assess response to drug therapy. Last bone density test (DXA Scan): 07/20/2022. HIV Screening: covered annually if you're between the age of 14-79. Also covered annually if you are younger than 13 and older than 72 with risk factors for HIV infection.  For pregnant patients, it is covered up to 3 times per pregnancy. Immunizations:  Immunization Recommendations   Influenza Vaccine Annual influenza vaccination during flu season is recommended for all persons aged >= 6 months who do not have contraindications   Pneumococcal Vaccine   * Pneumococcal conjugate vaccine = PCV13 (Prevnar 13), PCV15 (Vaxneuvance), PCV20 (Prevnar 20)  * Pneumococcal polysaccharide vaccine = PPSV23 (Pneumovax) Adults 20-63 years old: 1-3 doses may be recommended based on certain risk factors  Adults 72 years old: 1-2 doses may be recommended based off what pneumonia vaccine you previously received   Hepatitis B Vaccine 3 dose series if at intermediate or high risk (ex: diabetes, end stage renal disease, liver disease)   Tetanus (Td) Vaccine - COST NOT COVERED BY MEDICARE PART B Following completion of primary series, a booster dose should be given every 10 years to maintain immunity against tetanus. Td may also be given as tetanus wound prophylaxis. Tdap Vaccine - COST NOT COVERED BY MEDICARE PART B Recommended at least once for all adults. For pregnant patients, recommended with each pregnancy. Shingles Vaccine (Shingrix) - COST NOT COVERED BY MEDICARE PART B  2 shot series recommended in those aged 48 and above     Health Maintenance Due:      Topic Date Due    Breast Cancer Screening: Mammogram  07/20/2023    Colorectal Cancer Screening  01/22/2029    Hepatitis C Screening  Completed     Immunizations Due:      Topic Date Due    COVID-19 Vaccine (5 - Pfizer series) 02/08/2023    Influenza Vaccine (1) 09/01/2023     Advance Directives   What are advance directives? Advance directives are legal documents that state your wishes and plans for medical care. These plans are made ahead of time in case you lose your ability to make decisions for yourself. Advance directives can apply to any medical decision, such as the treatments you want, and if you want to donate organs.    What are the types of advance directives? There are many types of advance directives, and each state has rules about how to use them. You may choose a combination of any of the following:  Living will: This is a written record of the treatment you want. You can also choose which treatments you do not want, which to limit, and which to stop at a certain time. This includes surgery, medicine, IV fluid, and tube feedings. Durable power of  for [facility]): This is a written record that states who you want to make healthcare choices for you when you are unable to make them for yourself. This person, called a proxy, is usually a family member or a friend. You may choose more than 1 proxy. Do not resuscitate (DNR) order:  A DNR order is used in case your heart stops beating or you stop breathing. It is a request not to have certain forms of treatment, such as CPR. A DNR order may be included in other types of advance directives. Medical directive: This covers the care that you want if you are in a coma, near death, or unable to make decisions for yourself. You can list the treatments you want for each condition. Treatment may include pain medicine, surgery, blood transfusions, dialysis, IV or tube feedings, and a ventilator (breathing machine). Values history: This document has questions about your views, beliefs, and how you feel and think about life. This information can help others choose the care that you would choose. Why are advance directives important? An advance directive helps you control your care. Although spoken wishes may be used, it is better to have your wishes written down. Spoken wishes can be misunderstood, or not followed. Treatments may be given even if you do not want them. An advance directive may make it easier for your family to make difficult choices about your care. Urinary Incontinence   Urinary incontinence (UI)  is when you lose control of your bladder.  UI develops because your bladder cannot store or empty urine properly. The 3 most common types of UI are stress incontinence, urge incontinence, or both. Medicines:   May be given to help strengthen your bladder control. Report any side effects of medication to your healthcare provider. Do pelvic muscle exercises often:  Your pelvic muscles help you stop urinating. Squeeze these muscles tight for 5 seconds, then relax for 5 seconds. Gradually work up to squeezing for 10 seconds. Do 3 sets of 15 repetitions a day, or as directed. This will help strengthen your pelvic muscles and improve bladder control. Train your bladder:  Go to the bathroom at set times, such as every 2 hours, even if you do not feel the urge to go. You can also try to hold your urine when you feel the urge to go. For example, hold your urine for 5 minutes when you feel the urge to go. As that becomes easier, hold your urine for 10 minutes. Self-care:   Keep a UI record. Write down how often you leak urine and how much you leak. Make a note of what you were doing when you leaked urine. Drink liquids as directed. You may need to limit the amount of liquid you drink to help control your urine leakage. Do not drink any liquid right before you go to bed. Limit or do not have drinks that contain caffeine or alcohol. Prevent constipation. Eat a variety of high-fiber foods. Good examples are high-fiber cereals, beans, vegetables, and whole-grain breads. Walking is the best way to trigger your intestines to have a bowel movement. Exercise regularly and maintain a healthy weight. Weight loss and exercise will decrease pressure on your bladder and help you control your leakage. Use a catheter as directed  to help empty your bladder. A catheter is a tiny, plastic tube that is put into your bladder to drain your urine. Go to behavior therapy as directed. Behavior therapy may be used to help you learn to control your urge to urinate.     Weight Management   Why it is important to manage your weight:  Being overweight increases your risk of health conditions such as heart disease, high blood pressure, type 2 diabetes, and certain types of cancer. It can also increase your risk for osteoarthritis, sleep apnea, and other respiratory problems. Aim for a slow, steady weight loss. Even a small amount of weight loss can lower your risk of health problems. How to lose weight safely:  A safe and healthy way to lose weight is to eat fewer calories and get regular exercise. You can lose up about 1 pound a week by decreasing the number of calories you eat by 500 calories each day. Healthy meal plan for weight management:  A healthy meal plan includes a variety of foods, contains fewer calories, and helps you stay healthy. A healthy meal plan includes the following:  Eat whole-grain foods more often. A healthy meal plan should contain fiber. Fiber is the part of grains, fruits, and vegetables that is not broken down by your body. Whole-grain foods are healthy and provide extra fiber in your diet. Some examples of whole-grain foods are whole-wheat breads and pastas, oatmeal, brown rice, and bulgur. Eat a variety of vegetables every day. Include dark, leafy greens such as spinach, kale, kyara greens, and mustard greens. Eat yellow and orange vegetables such as carrots, sweet potatoes, and winter squash. Eat a variety of fruits every day. Choose fresh or canned fruit (canned in its own juice or light syrup) instead of juice. Fruit juice has very little or no fiber. Eat low-fat dairy foods. Drink fat-free (skim) milk or 1% milk. Eat fat-free yogurt and low-fat cottage cheese. Try low-fat cheeses such as mozzarella and other reduced-fat cheeses. Choose meat and other protein foods that are low in fat. Choose beans or other legumes such as split peas or lentils. Choose fish, skinless poultry (chicken or turkey), or lean cuts of red meat (beef or pork).  Before you cook meat or poultry, cut off any visible fat. Use less fat and oil. Try baking foods instead of frying them. Add less fat, such as margarine, sour cream, regular salad dressing and mayonnaise to foods. Eat fewer high-fat foods. Some examples of high-fat foods include french fries, doughnuts, ice cream, and cakes. Eat fewer sweets. Limit foods and drinks that are high in sugar. This includes candy, cookies, regular soda, and sweetened drinks. Exercise:  Exercise at least 30 minutes per day on most days of the week. Some examples of exercise include walking, biking, dancing, and swimming. You can also fit in more physical activity by taking the stairs instead of the elevator or parking farther away from stores. Ask your healthcare provider about the best exercise plan for you. © Copyright [employer] 2018 Information is for End User's use only and may not be sold, redistributed or otherwise used for commercial purposes. All illustrations and images included in CareNotes® are the copyrighted property of [employer] or [address] [place] [address]    Urinary Incontinence   AMBULATORY CARE:   Urinary incontinence (UI)  is when you lose control of your bladder. UI develops because your bladder cannot store or empty urine properly. The 3 most common types of UI are stress incontinence, urge incontinence, or both. Common symptoms include the following: You feel like your bladder does not empty completely when you urinate. You urinate often and need to urinate immediately. You leak urine when you sleep, or you wake up with the urge to urinate. You leak urine when you cough, sneeze, exercise, or laugh. Call your doctor if:   You have severe pain. You are confused or cannot think clearly. You have a fever. You see blood in your urine. You have pain when you urinate. You have new or worse pain, even after treatment. Your mouth feels dry or you have vision changes. Your urine is cloudy or smells bad.     You have questions or concerns about your condition or care. Medicines:   Medicines  may be given to help strengthen your bladder control. Take your medicine as directed. Contact your healthcare provider if you think your medicine is not helping or if you have side effects. Tell your provider if you are allergic to any medicine. Keep a list of the medicines, vitamins, and herbs you take. Include the amounts, and when and why you take them. Bring the list or the pill bottles to follow-up visits. Carry your medicine list with you in case of an emergency. Do pelvic muscle exercises often:  Your pelvic muscles help you stop urinating. Squeeze these muscles tight for 5 seconds, then relax for 5 seconds. Gradually work up to squeezing for 10 seconds. Do 3 sets of 15 repetitions a day, or as directed. This will help strengthen your pelvic muscles and improve bladder control. Train your bladder:  Go to the bathroom at set times, such as every 2 hours, even if you do not feel the urge to go. You can also try to hold your urine when you feel the urge to go. For example, hold your urine for 5 minutes when you feel the urge to go. As that becomes easier, hold your urine for 10 minutes. Self-care:   Keep a UI record. Write down how often you leak urine and how much you leak. Make a note of what you were doing when you leaked urine. Drink liquids as directed. Ask your healthcare provider how much liquid to drink each day and which liquids are best for you. You may need to limit the amount of liquid you drink to help control your urine leakage. Do not drink any liquid right before you go to bed. Limit or do not have drinks that contain caffeine or alcohol. Prevent constipation. Eat a variety of high-fiber foods. Good examples are high-fiber cereals, beans, vegetables, and whole-grain breads. Prune juice may help make your bowel movement softer.  Walking is the best way to trigger your intestines to have a bowel movement. Exercise regularly and maintain a healthy weight. Ask your healthcare provider how much you should weigh and about the best exercise plan for you. Weight loss and exercise will decrease pressure on your bladder and help you control your leakage. Ask him or her to help you create a weight loss plan if you are overweight. Use a catheter as directed  to help empty your bladder. A catheter is a tiny, plastic tube that is put into your bladder to drain your urine. Your healthcare provider may tell you to use a catheter to prevent your bladder from getting too full and leaking urine. Go to behavior therapy as directed. Behavior therapy may be used to help you learn to control your urge to urinate. Follow up with your doctor as directed:  Write down your questions so you remember to ask them during your visits. © Copyright [employer] 2022 Information is for End User's use only and may not be sold, redistributed or otherwise used for commercial purposes. The above information is an  only. It is not intended as medical advice for individual conditions or treatments. Talk to your doctor, nurse or pharmacist before following any medical regimen to see if it is safe and effective for you.

## 2023-07-05 NOTE — PROGRESS NOTES
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[address])    Relevant Medications    diazepam (VALIUM) 5 mg tablet       Other    Medicare annual wellness visit, subsequent - Primary    Depression    Relevant Medications    buPROPion (WELLBUTRIN) 75 mg tablet    diazepam (VALIUM) 5 mg tablet    Colostomy status (HCC)    Relevant Medications    diazepam (VALIUM) 5 mg tablet   Other Visit Diagnoses     Muscular dystrophy ([address])        Relevant Medications    diazepam (VALIUM) 5 mg tablet    Inability to acquire transportation        Rash        Relevant Medications    triamcinolone (KENALOG) 0.1 % cream    Total urinary incontinence        Impacted cerumen of right ear        Encounter for screening mammogram for breast cancer        Relevant Orders    Mammo screening bilateral w 3d & cad          Urinary Incontinence Plan of Care: counseling topics discussed: limiting fluid intake 3-4 hours before bed and preventing constipation. Preventive health issues were discussed with patient, and age appropriate screening tests were ordered as noted in patient's After Visit Summary. Personalized health advice and appropriate referrals for health education or preventive services given if needed, as noted in patient's After Visit Summary.   Pt was instructed to use OTC Debrox x 1-2 weeks for the right ear cerumen impaction and if sx no better, then return for cerumen removal here; also advise dto stop using Q-tips in the ear       Chief Complaint   Patient presents with   • Medicare Wellness Visit     Last AWV 6/29/2022, also has a clogged ear          History of Present Illness:     Patient presents for a Medicare Wellness Visit + f/u    Medicare AWV + f/u visit  C/o "Right ear feels like I'm under water past 2 days" no remedies tried, pt admits she uses Q-tips in her ears "even though I know I shouldn't"       Patient Care Team:  Diamond Randall DO as PCP - General (Family Medicine)  Diamond Randall DO as PCP - [facility] ([facility])  MD Mar Negron DO Corean Claw, MD as Endoscopist     Review of Systems:     Review of Systems     Problem List:     Patient Active Problem List   Diagnosis   • [facility], muscular[facility]   • Ambulatory dysfunction   • Urinary incontinence   • 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[address])   • Osteoporosis   • GERD without esophagitis   • COPD (chronic obstructive pulmonary disease) ([address])   • Allergic rhinitis   • Difficult intubation   • Thrombocytosis, unspecified   • Chronic respiratory failure with hypoxia and hypercapnia ([address])   • Bladder injury   • Anemia   • Depression   • Insomnia   • Abdominal pain   • Gastroparesis   • Aortic valve sclerosis   • Tricuspid valve insufficiency   • Mitral annular calcification   • Increased heart rate   • Ductal carcinoma in situ (DCIS) of left breast   • History of malignant neoplasm of skin   • Hx of colectomy   • Colostomy status ([address])   • History of abdominal abscess   • History of depression   • Former smoker   • History of left mastectomy   • Tachycardia   • Medicare annual wellness visit, subsequent   • History of vitamin D deficiency   • Mild episode of recurrent major depressive disorder ([address])   • History of peripheral edema   • Bilateral hand pain   • COPD with acute exacerbation ([address])   • Dependence on nocturnal oxygen therapy      Past Medical and Surgical History:     Past Medical History:   Diagnosis Date   • Anxiety    • Breast cancer ([address]) 2007   • Colitis    • COPD (chronic obstructive pulmonary disease) ([address])    • Depression    • Difficult intubation    • Excessive daytime sleepiness    • GERD (gastroesophageal reflux disease)    • Hyperlipidemia    • Ischemic colitis ([address]) 01/21/2019   • Leukocytosis 03/28/2020   • Muscular dystrophy ([address])     Limb-girdle   • Osteoporosis • Overactive bladder    • Perforated diverticulum 03/28/2020   • Pneumonitis    • 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[address])    • Skin cancer    • Skin disorder    • Vitamin D deficiency      Past Surgical History:   Procedure Laterality Date   • COLONOSCOPY N/A 1/22/2019    Procedure: COLONOSCOPY;  Surgeon: Verenice Padilla MD;  Location:  GI LAB;   Service: Colorectal   • CYSTOSCOPY N/A 9/30/2020    Procedure: CYSTOSCOPY; BILATERAL URETERAL CATHETER PLACEMENT, CYSTOTOMY;  Surgeon: Suki Gonzalez MD;  Location: [facility];  Service: Urology   • FL CYSTOGRAM  10/15/2020   • HARTMANS PROCEDURE N/A 9/30/2020    Procedure: EXPLORATORY LAPAROTOMY; LYSIS OF ADHESIONS; WASHOUT PELVIC ABSCESS; COMPLEX SIGMOID COLON RESECTION; LOOP TRANSVERSE COLOSTOMY;  Surgeon: Erika Jean MD;  Location: [facility];  Service: General   • IR DRAINAGE TUBE PLACEMENT  9/24/2020   • MASTECTOMY Left 2007    left breast mastectomy    • TONSILLECTOMY     • TOOTH EXTRACTION     • TUBAL LIGATION        Family History:     Family History   Problem Relation Age of Onset   • Other Mother         bone loss   • Arthritis Mother    • Skin cancer Father    • Hypertension Father         benign    • Other Father         bone loss   • Muscular dystrophy Father    • Hypertension Sister    • No Known Problems Sister    • Muscular dystrophy Brother         of the limb gridle    • Parkinsonism Brother    • No Known Problems Brother    • No Known Problems Maternal Grandmother    • No Known Problems Paternal Grandmother    • No Known Problems Maternal Aunt    • Muscular dystrophy Family         of the limb girdle      Social History:     Social History     Socioeconomic History   • Marital status:      Spouse name: None   • Number of children: None   • Years of education: None   • Highest education level: None   Occupational History   • None   Tobacco Use   • Smoking status: Former     Packs/day: 1.50     Years: 37.00 Total pack years: 55.50     Types: Cigarettes     Start date: 5     Quit date: 2004     Years since quittin.5   • Smokeless tobacco: Never   Vaping Use   • Vaping Use: Never used   Substance and Sexual Activity   • Alcohol use: Not Currently     Comment: social drinker per allscripts    • Drug use: Yes     Types: Marijuana     Comment: oral use   • Sexual activity: Not Currently   Other Topics Concern   • None   Social History Narrative    Currently on permanent disability due to musc dystrophy    Denied daily coffee consumption     Wheelchair dependent since about     No children     Social Determinants of Health     Financial Resource Strain: Low Risk  (2023)    Overall Financial Resource Strain (CARDIA)    • Difficulty of Paying Living Expenses: Not hard at all   Food Insecurity: Not on file   Transportation Needs: No Transportation Needs (2023)    PRAPARE - Transportation    • Lack of Transportation (Medical): No    • Lack of Transportation (Non-Medical): No   Physical Activity: Not on file   Stress: Not on file   Social Connections: Not on file   Intimate Partner Violence: Not on file   Housing Stability: Not on file      Medications and Allergies:     Current Outpatient Medications   Medication Sig Dispense Refill   • albuterol (2.5 mg/3 mL) 0.083 % nebulizer solution Take 3 mL (2.5 mg total) by nebulization every 4 (four) hours as needed for wheezing or shortness of breath 180 mL 0   • budesonide (Pulmicort) 0.5 mg/2 mL nebulizer solution Take 2 mL (0.5 mg total) by nebulization 2 (two) times a day Rinse mouth after use.  60 mL 0   • buPROPion (WELLBUTRIN) 75 mg tablet Take 1 tablet (75 mg total) by mouth in the morning 90 tablet 1   • citalopram (CeleXA) 10 mg tablet Take 1 tablet (10 mg total) by mouth daily 90 tablet 1   • diazepam (VALIUM) 5 mg tablet Take 1 tablet (5 mg total) by mouth every 8 (eight) hours as needed for anxiety or muscle spasms 30 tablet 0   • fluticasone (FLONASE) 50 mcg/act nasal spray 2 sprays into each nostril daily 16 g 3   • ketoconazole (NIZORAL) 2 % cream APPLY TWICE A DAY TO THE AFFECTED AREA(S)     • ketoconazole (NIZORAL) 2 % shampoo apply to affected area two times a week 120 mL 1   • Melatonin 3 MG CAPS Take 3 mg by mouth     • omeprazole (PriLOSEC) 20 mg delayed release capsule take 1 capsule by mouth every morning 90 capsule 1   • Probiotic Product (Florastor Plus) CAPS      • triamcinolone (KENALOG) 0.1 % cream Apply topically 2 (two) times a day as needed for irritation or rash 30 g 0     No current facility-administered medications for this visit. Allergies   Allergen Reactions   • Amoxicillin      Vague rash in the 90s, tolerated zosyn on 9/2020 admission    • Codeine      Other reaction(s):  Other (See Comments)  n/v   • Medical Tape Itching      Immunizations:     Immunization History   Administered Date(s) Administered   • COVID-19 PFIZER VACCINE 0.3 ML IM 12/30/2020, 01/20/2021, 09/14/2021   • COVID-19 Pfizer vac (Jb-sucrose, gray cap) 12 yr+ IM 12/14/2022   • INFLUENZA 09/01/2014, 09/03/2014, 10/13/2015, 10/13/2015, 09/10/2016, 11/01/2016, 08/31/2017, 09/28/2018, 08/18/2020, 09/14/2021, 08/16/2022   • Influenza Split High Dose Preservative Free IM 10/01/2015, 11/01/2016   • Influenza, nasal 10/01/2011   • Influenza, seasonal, injectable 1950, 1950, 09/01/2014   • Pneumococcal Conjugate 13-Valent 10/01/2015   • Pneumococcal Conjugate Vaccine 20-valent (Pcv20), Polysace 06/29/2022   • Pneumococcal Polysaccharide PPV23 1950, 10/01/2011, 10/11/2011, 01/01/2012, 07/05/2017   • Tdap 07/05/2017   • Varicella 09/01/2014   • influenza, trivalent, adjuvanted 09/15/2019      Health Maintenance:         Topic Date Due   • Breast Cancer Screening: Mammogram  07/20/2023   • Colorectal Cancer Screening  01/22/2029   • Hepatitis C Screening  Completed         Topic Date Due   • COVID-19 Vaccine (5 - Pfizer series) 02/08/2023   • Influenza Vaccine (1) 09/01/2023      Medicare Screening Tests and Risk Assessments:     Jaciel Briseno is here for her Subsequent Wellness visit. Last Medicare Wellness visit information reviewed, patient interviewed and updates made to the record today. Health Risk Assessment:   Patient rates overall health as good. Patient feels that their physical health rating is slightly worse. Patient is satisfied with their life. Eyesight was rated as slightly worse. Hearing was rated as same. Patient feels that their emotional and mental health rating is slightly better. Patients states they are sometimes angry. Patient states they are always unusually tired/fatigued. Pain experienced in the last 7 days has been some. Patient's pain rating has been 3/10. Patient states that she has experienced no weight loss or gain in last 6 months. Depression Screening:   PHQ-9 Score: 8      Fall Risk Screening: In the past year, patient has experienced: no history of falling in past year      Urinary Incontinence Screening:   Patient has leaked urine accidently in the last six months. Home Safety:  Patient has trouble with stairs inside or outside of their home. Patient has working smoke alarms and has no working carbon monoxide detector. Home safety hazards include: none. Nutrition:   Current diet is Regular. Medications:   Patient is currently taking over-the-counter supplements. OTC medications include: see medication list. Patient is able to manage medications. Activities of Daily Living (ADLs)/Instrumental Activities of Daily Living (IADLs):   Walk and transfer into and out of bed and chair?: Yes  Dress and groom yourself?: Yes    Bathe or shower yourself?: No    Feed yourself?  Yes  Do your laundry/housekeeping?: No  Manage your money, pay your bills and track your expenses?: Yes  Make your own meals?: No    Do your own shopping?: No    Previous Hospitalizations:   Any hospitalizations or ED visits within the last 12 months?: No Advance Care Planning:   Living will: Yes    Durable POA for healthcare: Yes    Advanced directive: Yes      Cognitive Screening:   Provider or family/friend/caregiver concerned regarding cognition?: No    PREVENTIVE SCREENINGS      Cardiovascular Screening:    General: Screening Current      Diabetes Screening:     General: Screening Current      Colorectal Cancer Screening:     General: Screening Current      Breast Cancer Screening:     General: History Breast Cancer and Risks and Benefits Discussed    Due for: Mammogram        Cervical Cancer Screening:    General: Screening Not Indicated      Osteoporosis Screening:    General: History Osteoporosis and Screening Current      Abdominal Aortic Aneurysm (AAA) Screening:        General: Screening Not Indicated      Lung Cancer Screening:     General: Screening Not Indicated      Hepatitis C Screening:    General: Screening Current    Screening, Brief Intervention, and Referral to Treatment (SBIRT)    Screening  Typical number of drinks in a day: 0  Typical number of drinks in a week: 0  Interpretation: Low risk drinking behavior. Single Item Drug Screening:  How often have you used an illegal drug (including marijuana) or a prescription medication for non-medical reasons in the past year? never    Single Item Drug Screen Score: 0  Interpretation: Negative screen for possible drug use disorder    No results found. Physical Exam:     /82 (BP Location: Right arm, Patient Position: Sitting, Cuff Size: Standard)   Pulse 80   Temp 97.7 °F (36.5 °C) (Tympanic)   Wt 73.5 kg (162 lb)   LMP  (LMP Unknown)   SpO2 97%   BMI 31.64 kg/m²     Physical Exam  Vitals and nursing note reviewed. Constitutional:       General: She is not in acute distress. Appearance: She is well-groomed. She is ill-appearing (unchanged chronically ill appearance). She is not toxic-appearing or diaphoretic.       Comments: W/c- dependent   HENT:      Head: Normocephalic and atraumatic. Right Ear: There is impacted cerumen. Left Ear: Tympanic membrane and ear canal normal.      Ears:      Comments: Scant dry wax left canal     Nose: Nose normal.      Mouth/Throat:      Lips: Pink. Mouth: Mucous membranes are moist.      Pharynx: Oropharynx is clear. Eyes:      Conjunctiva/sclera: Conjunctivae normal.   Neck:      Vascular: No JVD. Cardiovascular:      Rate and Rhythm: Normal rate and regular rhythm. Pulses: Normal pulses. Heart sounds: S1 normal and S2 normal.      No friction rub. No gallop. Pulmonary:      Effort: Pulmonary effort is normal.      Breath sounds: Normal breath sounds and air entry. Abdominal:      Palpations: Abdomen is soft. Tenderness: There is no abdominal tenderness. Musculoskeletal:      Cervical back: Neck supple. Right lower leg: No edema. Left lower leg: No edema. Skin:     General: Skin is warm and dry. Coloration: Skin is not pale. Neurological:      Mental Status: She is alert and oriented to person, place, and time. Psychiatric:         Mood and Affect: Mood normal.         Behavior: Behavior normal. Behavior is cooperative.           Elidia Miles DO

## 2023-09-03 PROBLEM — Z00.00 MEDICARE ANNUAL WELLNESS VISIT, SUBSEQUENT: Status: RESOLVED | Noted: 2021-06-28 | Resolved: 2023-09-03

## 2023-09-14 ENCOUNTER — OFFICE VISIT (OUTPATIENT)
Dept: PULMONOLOGY | Facility: CLINIC | Age: 73
End: 2023-09-14
Payer: COMMERCIAL

## 2023-09-14 VITALS
RESPIRATION RATE: 18 BRPM | OXYGEN SATURATION: 97 % | HEART RATE: 72 BPM | HEIGHT: 60 IN | BODY MASS INDEX: 31.64 KG/M2 | DIASTOLIC BLOOD PRESSURE: 68 MMHG | SYSTOLIC BLOOD PRESSURE: 118 MMHG

## 2023-09-14 DIAGNOSIS — J43.2 CENTRILOBULAR EMPHYSEMA (HCC): ICD-10-CM

## 2023-09-14 DIAGNOSIS — J98.4 RESTRICTIVE LUNG DISEASE DUE TO MUSCULAR DYSTROPHY (HCC): Primary | ICD-10-CM

## 2023-09-14 DIAGNOSIS — G71.00 RESTRICTIVE LUNG DISEASE DUE TO MUSCULAR DYSTROPHY (HCC): Primary | ICD-10-CM

## 2023-09-14 PROBLEM — Z99.81 DEPENDENCE ON NOCTURNAL OXYGEN THERAPY: Status: RESOLVED | Noted: 2023-06-18 | Resolved: 2023-09-14

## 2023-09-14 PROBLEM — J44.1 COPD WITH ACUTE EXACERBATION (HCC): Status: RESOLVED | Noted: 2023-06-15 | Resolved: 2023-09-14

## 2023-09-14 PROCEDURE — 99214 OFFICE O/P EST MOD 30 MIN: CPT | Performed by: INTERNAL MEDICINE

## 2023-09-14 RX ORDER — RAMELTEON 8 MG/1
8 TABLET ORAL
COMMUNITY
Start: 2023-08-17

## 2023-09-14 NOTE — ASSESSMENT & PLAN NOTE
· Has been using AutoSet CPAP at nighttime with excellent compliance  · Machine brought today and device reviewed.   AutoSet 20/6  · Averaging over 10 hours nightly  · Average AHI is 0.1  · 2 L with Pap therapy

## 2023-09-14 NOTE — ASSESSMENT & PLAN NOTE
· COPD component is mild  · Using Pulmicort when needed and albuterol nebulizer when needed.   Cannot utilize inhalers due to muscular dystrophy

## 2023-09-14 NOTE — PROGRESS NOTES
Progress note - Pulmonary Medicine   Patricio Carmen 68 y.o. 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 No new issues. · Degree of restriction and activity level seems stable    COPD (chronic obstructive pulmonary disease) (HCC)  · COPD component is mild  · Using Pulmicort when needed and albuterol nebulizer when needed. Cannot utilize inhalers due to muscular dystrophy    Chronic respiratory failure with hypoxia and hypercapnia (HCC)  · Has been using AutoSet CPAP at nighttime with excellent compliance  · Machine brought today and device reviewed. AutoSet 20/6  · Averaging over 10 hours nightly  · Average AHI is 0.1  · 2 L with Pap therapy    Follow-up in 6 months    ______________________________________________________________________    HPI:    Patricio Carmen presents today for follow-up of restrictive lung disease secondary to muscular dystrophy, chronic hypoxemic and hypercapnic respiratory failure and mild component of COPD. She has been doing well. She is using CPAP nightly. She is averaging woman 10 hours a night with her CPAP and does have a full facemask without leak problems. She is using oxygen with the CPAP at 2 L although likely does not require oxygen for this. She does use oxygen during the daytime when she is active with therapist or doing anything slightly strenuous. Most of the time she is in her motorized wheelchair however. She does also use supplemental oxygen for her naps as it is too difficult for her to be able to move her CPAP device and she does well while sleeping during a nap in her recliner with the portable oxygen    No appetite or weight change. No new respiratory complaints. No chest pain or cardiac issues.   No new abdominal pain or abdominal issues    Current Medications:    Current Outpatient Medications:   •  albuterol (2.5 mg/3 mL) 0.083 % nebulizer solution, Take 3 mL (2.5 mg total) by nebulization every 4 (four) hours as needed for wheezing or shortness of breath, Disp: 180 mL, Rfl: 0  •  buPROPion (WELLBUTRIN) 75 mg tablet, Take 1 tablet (75 mg total) by mouth in the morning, Disp: 90 tablet, Rfl: 1  •  citalopram (CeleXA) 10 mg tablet, Take 1 tablet (10 mg total) by mouth daily, Disp: 90 tablet, Rfl: 1  •  diazepam (VALIUM) 5 mg tablet, Take 1 tablet (5 mg total) by mouth every 8 (eight) hours as needed for anxiety or muscle spasms, Disp: 30 tablet, Rfl: 0  •  fluticasone (FLONASE) 50 mcg/act nasal spray, 2 sprays into each nostril daily, Disp: 16 g, Rfl: 3  •  ketoconazole (NIZORAL) 2 % cream, APPLY TWICE A DAY TO THE AFFECTED AREA(S), Disp: , Rfl:   •  ketoconazole (NIZORAL) 2 % shampoo, apply to affected area two times a week, Disp: 120 mL, Rfl: 1  •  omeprazole (PriLOSEC) 20 mg delayed release capsule, take 1 capsule by mouth every morning, Disp: 90 capsule, Rfl: 1  •  Probiotic Product (Florastor Plus) CAPS, , Disp: , Rfl:   •  triamcinolone (KENALOG) 0.1 % cream, Apply topically 2 (two) times a day as needed for irritation or rash, Disp: 30 g, Rfl: 0  •  budesonide (Pulmicort) 0.5 mg/2 mL nebulizer solution, Take 2 mL (0.5 mg total) by nebulization 2 (two) times a day Rinse mouth after use., Disp: 60 mL, Rfl: 0  •  ramelteon (ROZEREM) 8 mg tablet, Take 8 mg by mouth daily at bedtime, Disp: , Rfl:     Review of Systems:  Aside from what is mentioned in the HPI, the review of systems is otherwise negative    Past medical history, surgical history, and family history were reviewed and updated as appropriate    Social history updates:  Social History     Tobacco Use   Smoking Status Former   • Packs/day: 1.50   • Years: 37.00   • Total pack years: 55.50   • Types: Cigarettes   • Start date:    • Quit date:    • Years since quittin.7   Smokeless Tobacco Never       PhysicalExamination:  Vitals:   /68 (BP Location: Right arm, Patient Position: Sitting, Cuff Size: Standard)   Pulse 72   Resp 18   Ht 5' (1.524 m)   LMP  (LMP Unknown)   SpO2 97%   BMI 31.64 kg/m²   Gen: Presents in her motorized wheelchair comfortable on room air. No conversational dyspnea  HEENT:  Conjugate gaze. sclerae anicteric. Oropharynx moist  Neck: Trachea is midline. No JVD. No adenopathy  Chest: Symmetric chest wall excursion. The degree of chest wall excursion is very limited however due to her muscular dystrophy. No wheezes or crackles appreciated  Cardiac: Regular. no murmur  Abdomen:  benign  Extremities: No edema  Neuro:  Normal speech and mentation    Diagnostic Data:  Labs: I personally reviewed the most recent laboratory data pertinent to today's visit    Lab Results   Component Value Date    WBC 11.84 (H) 06/11/2023    HGB 11.4 (L) 06/11/2023    HCT 37.7 06/11/2023    MCV 89 06/11/2023     06/11/2023     Lab Results   Component Value Date    SODIUM 138 06/11/2023    K 3.8 06/11/2023    CO2 35 (H) 06/11/2023    CL 97 06/11/2023    BUN 14 06/11/2023    CREATININE 0.26 (L) 06/11/2023    GLUCOSE 81 12/09/2015    CALCIUM 8.5 06/11/2023         Imaging:  I personally reviewed the images on the [facility] system pertinent to today's visit  She did have a chest x-ray in June which shows shallow air entry but otherwise clear lung fields    Former smoker. Quit 19 years ago.   Does not meet lung cancer screening criteria    Rosas Aburto MD

## 2023-09-25 ENCOUNTER — TELEMEDICINE (OUTPATIENT)
Dept: FAMILY MEDICINE CLINIC | Facility: CLINIC | Age: 73
End: 2023-09-25
Payer: COMMERCIAL

## 2023-09-25 DIAGNOSIS — R05.8 PRODUCTIVE COUGH: Primary | ICD-10-CM

## 2023-09-25 DIAGNOSIS — R09.81 SINUS CONGESTION: ICD-10-CM

## 2023-09-25 PROCEDURE — 99213 OFFICE O/P EST LOW 20 MIN: CPT | Performed by: FAMILY MEDICINE

## 2023-09-25 RX ORDER — AZITHROMYCIN 250 MG/1
TABLET, FILM COATED ORAL
Qty: 6 TABLET | Refills: 1 | Status: SHIPPED | OUTPATIENT
Start: 2023-09-25 | End: 2023-09-29

## 2023-09-25 NOTE — PROGRESS NOTES
Virtual Brief Visit    This Visit is being completed by telephone. The Patient is located at Home and in the following state in which I hold an active license [place]    It was my intent to perform this visit via video technology but the patient was not able to do a video connection so the visit was completed via audio telephone only. The patient was identified by name and date of birth. Candelaria Yo was informed that this is a telemedicine visit and that the visit is being conducted through Telephone. My office door was closed. No one else was in the room. She acknowledged consent and understanding of privacy and security of the video platform. The patient has agreed to participate and understands they can discontinue the visit at any time. Patient is aware this is a billable service. Chief Complaint   Patient presents with   • Virtual Regular Visit     Headache, sinus pressure/congestion, coughing up yellow mucus, started with sore throat for 2 days, no fever, symptoms started Saturday   • Virtual Brief Visit       Productive cough- both collecting at back of the throat and from feeling chest congestion per pt  SOB her "usual"  Admits "chest tightness a little, but have my nebulizer here and that helps"  Admits sinus tenderness    Assessment/Plan:    Problem List Items Addressed This Visit    None  Visit Diagnoses     Productive cough    -  Primary    Relevant Medications    azithromycin (ZITHROMAX) 250 mg tablet    Sinus congestion        Relevant Medications    azithromycin (ZITHROMAX) 250 mg tablet          Recent Visits  No visits were found meeting these conditions. Showing recent visits within past 7 days and meeting all other requirements  Today's Visits  Date Type Provider Dept   09/25/23 Telemedicine Ying Faulkner DO [facility] [address] today's visits and meeting all other requirements  Future Appointments  No visits were found meeting these conditions.   Showing future Your patient Dillon Reyes 2023 was transferred out of PICU to the General Pediatric Unit at [facility] on 2023. The admitting physician is Dr. Johnson, who can be reached via [employer]. Thank you.   appointments within next 150 days and meeting all other requirements         Visit Time  Total Visit Duration: 15

## 2023-10-06 ENCOUNTER — TELEPHONE (OUTPATIENT)
Dept: PULMONOLOGY | Facility: CLINIC | Age: 73
End: 2023-10-06

## 2023-10-06 NOTE — TELEPHONE ENCOUNTER
[facility] called, they were notified that the Doctor wanted to get a BiPap ordered for this member. He said the order does not meet the guidelines for medicare. There is no diagnosis for MICHEL and not enough clinical notes supporting this. They will need to set up a peer to peer in order to move forward.  This needs to be completed no later than 10/10 at 2pm. Please advise    [facility]  Direct #[phone]

## 2023-10-06 NOTE — TELEPHONE ENCOUNTER
Spoke with the rep from [facility]. Explained the patients need for Bipap is on the basis of her neuromuscular disease. I have also explained  that we provided documentation of the patients overnight testing and ABG results. I also faxed him a copy of the records again and the Medicare guidelines.

## 2023-10-09 ENCOUNTER — TELEPHONE (OUTPATIENT)
Dept: FAMILY MEDICINE CLINIC | Facility: CLINIC | Age: 73
End: 2023-10-09

## 2023-10-10 NOTE — TELEPHONE ENCOUNTER
Patient called back into the office this afternoon stating that she will need to be re-evaluated by the doctor as she is still not feeling better. Informed her that I would discuss it with the doctor as she is booked up for appointments.

## 2023-11-08 RX ORDER — AZITHROMYCIN 250 MG/1
TABLET, FILM COATED ORAL
COMMUNITY
Start: 2023-10-01 | End: 2023-11-09 | Stop reason: ALTCHOICE

## 2023-11-09 ENCOUNTER — OFFICE VISIT (OUTPATIENT)
Dept: FAMILY MEDICINE CLINIC | Facility: CLINIC | Age: 73
End: 2023-11-09
Payer: COMMERCIAL

## 2023-11-09 VITALS
DIASTOLIC BLOOD PRESSURE: 68 MMHG | SYSTOLIC BLOOD PRESSURE: 122 MMHG | BODY MASS INDEX: 31.64 KG/M2 | HEART RATE: 72 BPM | HEIGHT: 60 IN | OXYGEN SATURATION: 92 % | TEMPERATURE: 96.6 F

## 2023-11-09 DIAGNOSIS — N32.81 OVERACTIVE BLADDER: ICD-10-CM

## 2023-11-09 DIAGNOSIS — I07.1 TRICUSPID VALVE INSUFFICIENCY, UNSPECIFIED ETIOLOGY: ICD-10-CM

## 2023-11-09 DIAGNOSIS — R21 RASH: ICD-10-CM

## 2023-11-09 DIAGNOSIS — G71.09 DYSTROPHY, MUSCULAR, HEREDITARY PROGRESSIVE (HCC): Primary | ICD-10-CM

## 2023-11-09 DIAGNOSIS — I35.8 AORTIC VALVE SCLEROSIS: ICD-10-CM

## 2023-11-09 DIAGNOSIS — F33.1 MODERATE EPISODE OF RECURRENT MAJOR DEPRESSIVE DISORDER (HCC): ICD-10-CM

## 2023-11-09 DIAGNOSIS — I34.81 MITRAL ANNULAR CALCIFICATION: ICD-10-CM

## 2023-11-09 DIAGNOSIS — I34.0 MITRAL VALVE INSUFFICIENCY, UNSPECIFIED ETIOLOGY: ICD-10-CM

## 2023-11-09 DIAGNOSIS — R00.2 PALPITATIONS: ICD-10-CM

## 2023-11-09 PROBLEM — R00.0 TACHYCARDIA: Status: RESOLVED | Noted: 2021-06-02 | Resolved: 2023-11-09

## 2023-11-09 PROCEDURE — 99214 OFFICE O/P EST MOD 30 MIN: CPT | Performed by: FAMILY MEDICINE

## 2023-11-09 RX ORDER — TRIAMCINOLONE ACETONIDE 1 MG/G
CREAM TOPICAL 2 TIMES DAILY PRN
Qty: 30 G | Refills: 0 | Status: SHIPPED | OUTPATIENT
Start: 2023-11-09

## 2023-11-09 RX ORDER — SOLIFENACIN SUCCINATE 10 MG/1
10 TABLET, FILM COATED ORAL DAILY
Qty: 90 TABLET | Refills: 1 | Status: SHIPPED | OUTPATIENT
Start: 2023-11-09

## 2023-11-09 RX ORDER — SOLIFENACIN SUCCINATE 10 MG/1
10 TABLET, FILM COATED ORAL DAILY
COMMUNITY
End: 2023-11-09 | Stop reason: SDUPTHER

## 2023-11-09 NOTE — PROGRESS NOTES
Name: Rylie David      : 1950      MRN: [id number]  Encounter Provider: Diann Farrell DO  Encounter Date: 2023   Encounter department: [address] [place] [address]     1. Dystrophy, muscular, 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[address])  -     Holter monitor; Future; Expected date: 2023  -     Echo complete w/ contrast if indicated; Future; Expected date: 2023    2. Moderate episode of recurrent major depressive disorder (HCC)    3. Palpitations  -     Holter monitor; Future; Expected date: 2023  -     Echo complete w/ contrast if indicated; Future; Expected date: 2023    4. Aortic valve sclerosis  -     Holter monitor; Future; Expected date: 2023  -     Echo complete w/ contrast if indicated; Future; Expected date: 2023    5. Mitral annular calcification  -     Holter monitor; Future; Expected date: 2023  -     Echo complete w/ contrast if indicated; Future; Expected date: 2023    6. Tricuspid valve insufficiency, unspecified etiology  -     Holter monitor; Future; Expected date: 2023  -     Echo complete w/ contrast if indicated; Future; Expected date: 2023    7. Mitral valve insufficiency, unspecified etiology  -     Holter monitor; Future; Expected date: 2023  -     Echo complete w/ contrast if indicated; Future; Expected date: 2023    8. Rash  -     triamcinolone (KENALOG) 0.1 % cream; Apply topically 2 (two) times a day as needed for irritation or rash    9. Overactive bladder  -     solifenacin (VESICARE) 10 MG tablet; Take 1 tablet (10 mg total) by mouth daily    Just refilling triamcinolone today- no active rash    Depression Screening Follow-up Plan: Patient's depression screening was positive with a PHQ-2 score of . Their PHQ-9 score was 9. Continue regular follow-up with their psychologist/therapist/psychiatrist who is managing their mental health condition(s).          Subjective      Chief Complaint Patient presents with   • Follow-up     4-6 mo f/u, would like to start Vesicare again        Scheduled f/u  "Give it a try again- I wake up too many times at night"  Received flu vaccine and first of 2 shingles vaccines at pharmacy  "Going in for talk therapy and they require me to come in every 6 weeks to see the nurse practitioner who prescribes the meds - he's weaning me off of valium right now"  "Oh, I was supposed to say something to you- a few years ago in the hospital they said it was a heart palpitation- I used to get them just once in a blue moon, but the last several weeks been getting them more often- have been under a lot of stress last several weeks - haven't been able to get a daytime aide" - "I happened to mention this to the nurse practitioner and she said go to your family doctor- my mother used to get heart palpitations that would run for hours"  Last EKG was in June  Talks about her colon perf and abd abscesses- 2020 perfed divertic, necessitating colostomy- "Used to be able to  the kitchen and make a sandwich - lost all the core muscle strength - also my breathing muscles got a lot worse, too"  "I have to be careful not to overeat- the regurg from the gastroparesis- and belching"  No CP or SOB, no edema "in fact you gave me a script for compressions stockings in the spring, but I didn't need them - last summer they were real swollen, not this sumemr"  "Only other thing is having irritation around the skin of the ostomy bag and using the triamcinolone cream"    Review of Systems    Current Outpatient Medications on File Prior to Visit   Medication Sig   • albuterol (2.5 mg/3 mL) 0.083 % nebulizer solution Take 3 mL (2.5 mg total) by nebulization every 4 (four) hours as needed for wheezing or shortness of breath   • budesonide (Pulmicort) 0.5 mg/2 mL nebulizer solution Take 2 mL (0.5 mg total) by nebulization 2 (two) times a day Rinse mouth after use.    • buPROPion (WELLBUTRIN) 75 mg tablet Take 1 tablet (75 mg total) by mouth in the morning   • citalopram (CeleXA) 10 mg tablet Take 1 tablet (10 mg total) by mouth daily   • diazepam (VALIUM) 5 mg tablet Take 1 tablet (5 mg total) by mouth every 8 (eight) hours as needed for anxiety or muscle spasms   • fluticasone (FLONASE) 50 mcg/act nasal spray 2 sprays into each nostril daily   • ketoconazole (NIZORAL) 2 % cream APPLY TWICE A DAY TO THE AFFECTED AREA(S)   • ketoconazole (NIZORAL) 2 % shampoo apply to affected area two times a week   • omeprazole (PriLOSEC) 20 mg delayed release capsule take 1 capsule by mouth every morning   • Probiotic Product (Florastor Plus) CAPS    • ramelteon (ROZEREM) 8 mg tablet Take 8 mg by mouth daily at bedtime       Objective     /68 (BP Location: Left arm, Patient Position: Sitting, Cuff Size: Standard)   Pulse 72   Temp (!) 96.6 °F (35.9 °C) (Tympanic)   Ht 5' (1.524 m)   LMP  (LMP Unknown)   SpO2 92%   BMI 31.64 kg/m²     Physical Exam  Vitals and nursing note reviewed. Constitutional:       General: She is not in acute distress. Appearance: She is well-groomed. She is ill-appearing (unchanged chronically-ill appearance). She is not toxic-appearing or diaphoretic. HENT:      Head: Normocephalic and atraumatic. Mouth/Throat:      Pharynx: Uvula midline. Eyes:      General: Lids are normal.      Conjunctiva/sclera: Conjunctivae normal.   Neck:      Thyroid: No thyroid mass, thyromegaly or thyroid tenderness. Vascular: No JVD. Trachea: Trachea and phonation normal.   Cardiovascular:      Rate and Rhythm: Normal rate and regular rhythm. Pulses: Normal pulses. Heart sounds: S1 normal and S2 normal. No murmur heard. No friction rub. No gallop. Pulmonary:      Effort: Pulmonary effort is normal.      Breath sounds: Normal breath sounds and air entry. Abdominal:      Palpations: Abdomen is soft. Tenderness: There is no abdominal tenderness.    Musculoskeletal: Cervical back: Neck supple. Right lower leg: No edema. Left lower leg: No edema. Lymphadenopathy:      Cervical: No cervical adenopathy. Skin:     General: Skin is warm and dry. Coloration: Skin is not pale. Neurological:      Mental Status: She is alert and oriented to person, place, and time. Psychiatric:         Mood and Affect: Mood normal.         Behavior: Behavior normal. Behavior is cooperative.          Cognition and Memory: Cognition normal.     Solange Garcia, DO

## 2023-12-20 DIAGNOSIS — R12 HEART BURN: ICD-10-CM

## 2023-12-20 NOTE — TELEPHONE ENCOUNTER
Requested medication(s) are due for refill today: Yes  Patient has already received a courtesy refill: No  Other reason request has been forwarded to provider: pt has not gotten med refilled for a year, is med appropriate to be refilled?

## 2023-12-23 RX ORDER — OMEPRAZOLE 20 MG/1
20 CAPSULE, DELAYED RELEASE ORAL EVERY MORNING
Qty: 90 CAPSULE | Refills: 0 | Status: SHIPPED | OUTPATIENT
Start: 2023-12-23

## 2024-01-17 DIAGNOSIS — F33.0 MILD EPISODE OF RECURRENT MAJOR DEPRESSIVE DISORDER (HCC): ICD-10-CM

## 2024-01-17 RX ORDER — BUPROPION HYDROCHLORIDE 75 MG/1
75 TABLET ORAL DAILY
Qty: 90 TABLET | Refills: 1 | Status: SHIPPED | OUTPATIENT
Start: 2024-01-17

## 2024-01-17 NOTE — TELEPHONE ENCOUNTER
Reason for call:   [x] Refill   [] Prior Auth  [] Other:     Office:   [x] PCP/Provider - Caitlin  [] Specialty/Provider -     Medication: buPROPion (WELLBUTRIN) 75 mg tablet     Dose/Frequency: Take 1 tablet (75 mg total) by mouth in the morning     Quantity: 90    Pharmacy: [facility]     Does the patient have enough for 3 days?   [x] Yes   [] No - Send as HP to POD

## 2024-01-26 ENCOUNTER — HOSPITAL ENCOUNTER (OUTPATIENT)
Dept: NON INVASIVE DIAGNOSTICS | Facility: HOSPITAL | Age: 74
Discharge: HOME | End: 2024-01-26
Payer: COMMERCIAL

## 2024-01-26 VITALS
WEIGHT: 162 LBS | SYSTOLIC BLOOD PRESSURE: 122 MMHG | DIASTOLIC BLOOD PRESSURE: 68 MMHG | HEIGHT: 60 IN | BODY MASS INDEX: 31.8 KG/M2 | HEART RATE: 64 BPM

## 2024-01-26 DIAGNOSIS — I07.1 TRICUSPID VALVE INSUFFICIENCY, UNSPECIFIED ETIOLOGY: ICD-10-CM

## 2024-01-26 DIAGNOSIS — R00.2 PALPITATIONS: ICD-10-CM

## 2024-01-26 DIAGNOSIS — I34.81 MITRAL ANNULAR CALCIFICATION: ICD-10-CM

## 2024-01-26 DIAGNOSIS — I34.0 MITRAL VALVE INSUFFICIENCY, UNSPECIFIED ETIOLOGY: ICD-10-CM

## 2024-01-26 DIAGNOSIS — I35.8 AORTIC VALVE SCLEROSIS: ICD-10-CM

## 2024-01-26 DIAGNOSIS — G71.09 DYSTROPHY, MUSCULAR, HEREDITARY PROGRESSIVE (HCC): ICD-10-CM

## 2024-01-26 LAB
AORTIC ROOT: 2.5 CM
AORTIC VALVE MEAN VELOCITY: 9.5 M/S
ASCENDING AORTA: 3 CM
AV LVOT MEAN GRADIENT: 2 MMHG
AV LVOT PEAK GRADIENT: 5 MMHG
AV MEAN GRADIENT: 4 MMHG
AV PEAK GRADIENT: 8 MMHG
AV VELOCITY RATIO: 0.78
BSA FOR ECHO PROCEDURE: 1.71 M2
DOP CALC AO PEAK VEL: 1.39 M/S
DOP CALC AO VTI: 31.69 CM
DOP CALC LVOT PEAK VEL VTI: 27.06 CM
DOP CALC LVOT PEAK VEL: 1.08 M/S
E WAVE DECELERATION TIME: 193 MS
E/A RATIO: 0.95
FRACTIONAL SHORTENING: 40 (ref 28–44)
INTERVENTRICULAR SEPTUM IN DIASTOLE (PARASTERNAL SHORT AXIS VIEW): 0.8 CM
INTERVENTRICULAR SEPTUM: 0.8 CM (ref 0.6–1.1)
IVC: 19 MM
LAAS-AP2: 14.8 CM2
LAAS-AP4: 7.3 CM2
LEFT ATRIUM SIZE: 2.6 CM
LEFT ATRIUM VOLUME (MOD BIPLANE): 25 ML
LEFT ATRIUM VOLUME INDEX (MOD BIPLANE): 14.6 ML/M2
LEFT INTERNAL DIMENSION IN SYSTOLE: 2.4 CM (ref 2.1–4)
LEFT VENTRICULAR INTERNAL DIMENSION IN DIASTOLE: 4 CM (ref 3.5–6)
LEFT VENTRICULAR POSTERIOR WALL IN END DIASTOLE: 0.8 CM
LEFT VENTRICULAR STROKE VOLUME: 51 ML
LVSV (TEICH): 51 ML
MV E'TISSUE VEL-SEP: 7 CM/S
MV PEAK A VEL: 0.81 M/S
MV PEAK E VEL: 77 CM/S
RA PRESSURE ESTIMATED: 8 MMHG
RIGHT ATRIUM AREA SYSTOLE A4C: 9.6 CM2
RIGHT VENTRICLE ID DIMENSION: 2.7 CM
RV PSP: 15 MMHG
SL CV LEFT ATRIUM LENGTH A2C: 3.8 CM
SL CV LV EF: 55
SL CV PED ECHO LEFT VENTRICLE DIASTOLIC VOLUME (MOD BIPLANE) 2D: 71 ML
SL CV PED ECHO LEFT VENTRICLE SYSTOLIC VOLUME (MOD BIPLANE) 2D: 19 ML
TR MAX PG: 7 MMHG
TR PEAK VELOCITY: 1.3 M/S
TRICUSPID ANNULAR PLANE SYSTOLIC EXCURSION: 1.9 CM
TRICUSPID VALVE PEAK REGURGITATION VELOCITY: 1.33 M/S

## 2024-01-26 PROCEDURE — 93306 TTE W/DOPPLER COMPLETE: CPT | Performed by: INTERNAL MEDICINE

## 2024-01-26 PROCEDURE — 93306 TTE W/DOPPLER COMPLETE: CPT

## 2024-01-26 PROCEDURE — 93226 XTRNL ECG REC<48 HR SCAN A/R: CPT

## 2024-01-26 PROCEDURE — 93225 XTRNL ECG REC<48 HRS REC: CPT

## 2024-01-31 ENCOUNTER — HOSPITAL ENCOUNTER (OUTPATIENT)
Dept: MAMMOGRAPHY | Facility: HOSPITAL | Age: 74
Discharge: HOME | End: 2024-01-31
Payer: COMMERCIAL

## 2024-01-31 DIAGNOSIS — Z12.31 ENCOUNTER FOR SCREENING MAMMOGRAM FOR BREAST CANCER: ICD-10-CM

## 2024-01-31 PROCEDURE — 77067 SCR MAMMO BI INCL CAD: CPT

## 2024-01-31 PROCEDURE — 77063 BREAST TOMOSYNTHESIS BI: CPT

## 2024-02-02 PROCEDURE — 93227 XTRNL ECG REC<48 HR R&I: CPT | Performed by: INTERNAL MEDICINE

## 2024-03-07 ENCOUNTER — TELEPHONE (OUTPATIENT)
Age: 74
End: 2024-03-07

## 2024-03-07 NOTE — TELEPHONE ENCOUNTER
[facility] medical is calling for an update on the paperwork that was sent over to be filled out and faxed back. I do see that it was scanned into the chart 2/27 but was not completed. Please advise

## 2024-04-09 ENCOUNTER — OFFICE VISIT (OUTPATIENT)
Dept: PULMONOLOGY | Facility: CLINIC | Age: 74
End: 2024-04-09
Payer: COMMERCIAL

## 2024-04-09 VITALS
TEMPERATURE: 98 F | WEIGHT: 162 LBS | HEART RATE: 72 BPM | SYSTOLIC BLOOD PRESSURE: 136 MMHG | DIASTOLIC BLOOD PRESSURE: 78 MMHG | HEIGHT: 60 IN | OXYGEN SATURATION: 93 % | BODY MASS INDEX: 31.8 KG/M2

## 2024-04-09 DIAGNOSIS — J43.2 CENTRILOBULAR EMPHYSEMA (HCC): ICD-10-CM

## 2024-04-09 DIAGNOSIS — J30.1 SEASONAL ALLERGIC RHINITIS DUE TO POLLEN: Primary | ICD-10-CM

## 2024-04-09 DIAGNOSIS — J96.12 CHRONIC RESPIRATORY FAILURE WITH HYPOXIA AND HYPERCAPNIA (HCC): ICD-10-CM

## 2024-04-09 DIAGNOSIS — J98.4 RESTRICTIVE LUNG DISEASE DUE TO MUSCULAR DYSTROPHY (HCC): ICD-10-CM

## 2024-04-09 DIAGNOSIS — G71.00 RESTRICTIVE LUNG DISEASE DUE TO MUSCULAR DYSTROPHY (HCC): ICD-10-CM

## 2024-04-09 DIAGNOSIS — J96.11 CHRONIC RESPIRATORY FAILURE WITH HYPOXIA AND HYPERCAPNIA (HCC): ICD-10-CM

## 2024-04-09 PROCEDURE — 99214 OFFICE O/P EST MOD 30 MIN: CPT | Performed by: INTERNAL MEDICINE

## 2024-04-10 NOTE — ASSESSMENT & PLAN NOTE
Using BiPAP with 2 L oxygen  Most recent compliance reviewed and has excellent nightly compliance  Does not report any problems with mask, machine, humidification, or leak.  She feels that the machine works well for her and has led to increased level of alertness and energy during the day  Continue present BiPAP

## 2024-04-10 NOTE — ASSESSMENT & PLAN NOTE
She is not on any maintenance inhalers but does use budesonide and albuterol when she gets sick or if she has flare of her COPD  Currently has done well without daily therapy

## 2024-04-10 NOTE — ASSESSMENT & PLAN NOTE
She does have allergic rhinitis and she has had increased nasal congestion recently with the spring bloom  Continue as needed OTC antihistamine therapy

## 2024-04-10 NOTE — ASSESSMENT & PLAN NOTE
Claire does report that her stamina and shortness of breath have increased since the last visit with me.  This is anticipated given her muscular dystrophy  Does not require oxygen at rest but does wear with her BiPAP at night

## 2024-04-10 NOTE — PROGRESS NOTES
Progress note - Pulmonary Medicine   Claire Doherty 74 y.o. 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  This is anticipated given her muscular dystrophy  Does not require oxygen at rest but does wear with her BiPAP at night    COPD (chronic obstructive pulmonary disease) ([facility])  She is not on any maintenance inhalers but does use budesonide and albuterol when she gets sick or if she has flare of her COPD  Currently has done well without daily therapy    Chronic respiratory failure with hypoxia and hypercapnia ([facility])  Using BiPAP with 2 L oxygen  Most recent compliance reviewed and has excellent nightly compliance  Does not report any problems with mask, machine, humidification, or leak.  She feels that the machine works well for her and has led to increased level of alertness and energy during the day  Continue present BiPAP    Allergic rhinitis  She does have allergic rhinitis and she has had increased nasal congestion recently with the spring bloom  Continue as needed OTC antihistamine therapy    Return in about 6 months (around 10/9/2024).      ______________________________________________________________________    HPI:    Claire Doherty presents today for follow-up of restrictive lung disease secondary to muscular dystrophy, component of COPD, and her chronic respiratory failure with hypoxemia and hypercapnia predominantly related to her neuromuscular disease.  She is on BiPAP with oxygen and doing well at nighttime.  No reported concerns with this.  She does have increased shortness of breath with activity which is anticipated given slow progression of her muscular dystrophy.  She otherwise has done well and reports no new symptomatology.  No increased cough, phlegm, wheezing, or other respiratory symptoms.  She has now quit smoking  approximately 20 years ago    No fever or chills.  No sick contacts.  No headache or neurologic symptoms.  No abdominal pain.  No increasing GERD symptoms.  Weight and appetite remain stable    Current Medications:    Current Outpatient Medications:     albuterol (2.5 mg/3 mL) 0.083 % nebulizer solution, Take 3 mL (2.5 mg total) by nebulization every 4 (four) hours as needed for wheezing or shortness of breath, Disp: 180 mL, Rfl: 0    budesonide (Pulmicort) 0.5 mg/2 mL nebulizer solution, Take 2 mL (0.5 mg total) by nebulization 2 (two) times a day Rinse mouth after use., Disp: 60 mL, Rfl: 0    buPROPion (WELLBUTRIN) 75 mg tablet, Take 1 tablet (75 mg total) by mouth in the morning, Disp: 90 tablet, Rfl: 1    citalopram (CeleXA) 10 mg tablet, Take 1 tablet (10 mg total) by mouth daily, Disp: 90 tablet, Rfl: 1    diazepam (VALIUM) 5 mg tablet, Take 1 tablet (5 mg total) by mouth every 8 (eight) hours as needed for anxiety or muscle spasms, Disp: 30 tablet, Rfl: 0    fluticasone (FLONASE) 50 mcg/act nasal spray, 2 sprays into each nostril daily, Disp: 16 g, Rfl: 3    ketoconazole (NIZORAL) 2 % cream, APPLY TWICE A DAY TO THE AFFECTED AREA(S), Disp: , Rfl:     ketoconazole (NIZORAL) 2 % shampoo, apply to affected area two times a week, Disp: 120 mL, Rfl: 1    omeprazole (PriLOSEC) 20 mg delayed release capsule, Take 1 capsule (20 mg total) by mouth every morning, Disp: 90 capsule, Rfl: 0    Probiotic Product (Florastor Plus) CAPS, , Disp: , Rfl:     ramelteon (ROZEREM) 8 mg tablet, Take 8 mg by mouth daily at bedtime, Disp: , Rfl:     solifenacin (VESICARE) 10 MG tablet, Take 1 tablet (10 mg total) by mouth daily, Disp: 90 tablet, Rfl: 1    triamcinolone (KENALOG) 0.1 % cream, Apply topically 2 (two) times a day as needed for irritation or rash, Disp: 30 g, Rfl: 0    Review of Systems:    Aside from what is mentioned in the HPI, the review of systems is otherwise negative    Past medical history, surgical history, and  family history were reviewed and updated as appropriate    Social history updates:  Social History     Tobacco Use   Smoking Status Former    Current packs/day: 0.00    Average packs/day: 1.5 packs/day for 37.0 years (55.5 ttl pk-yrs)    Types: Cigarettes    Start date:     Quit date:     Years since quittin.2   Smokeless Tobacco Never       PhysicalExamination:  Vitals:   /78 (BP Location: Right arm, Patient Position: Sitting, Cuff Size: Standard)   Pulse 72   Temp 98 °F (36.7 °C) (Tympanic)   Ht 5' (1.524 m)   Wt 73.5 kg (162 lb)   LMP  (LMP Unknown)   SpO2 93%   BMI 31.64 kg/m²     Gen:  Comfortable on room air.  No conversational dyspnea  HEENT:  Conjugate gaze.  sclerae anicteric.  Oropharynx moist  Neck: Trachea is midline. No JVD. No adenopathy  Chest: Very limited chest wall excursion with clear breath sounds otherwise  Cardiac: Regular. no murmur  Abdomen:  benign  Extremities: No edema  Neuro:  Normal speech and mentation    Diagnostic Data:  Labs:  I personally reviewed the most recent laboratory data pertinent to today's visit    Lab Results   Component Value Date    WBC 11.84 (H) 2023    HGB 11.4 (L) 2023    HCT 37.7 2023    MCV 89 2023     2023     Lab Results   Component Value Date    SODIUM 138 2023    K 3.8 2023    CO2 35 (H) 2023    CL 97 2023    BUN 14 2023    CREATININE 0.26 (L) 2023    GLUCOSE 81 2015    CALCIUM 8.5 2023       Imaging:  I personally reviewed the images on the PAC system pertinent to today's visit  Most recent imaging is a portable chest x-ray in 2023.  At that time no focal pulmonary abnormality noted    Israel Mandujano MD

## 2024-04-30 DIAGNOSIS — N32.81 OVERACTIVE BLADDER: ICD-10-CM

## 2024-05-01 RX ORDER — SOLIFENACIN SUCCINATE 10 MG/1
10 TABLET, FILM COATED ORAL DAILY
Qty: 90 TABLET | Refills: 1 | Status: SHIPPED | OUTPATIENT
Start: 2024-05-01

## 2024-05-06 ENCOUNTER — TELEPHONE (OUTPATIENT)
Age: 74
End: 2024-05-06

## 2024-05-06 NOTE — TELEPHONE ENCOUNTER
[facility] calling for chart notes and office visit notes for continued use of oxygen machine. They will be sending over a fax request as well    Fax #[phone]

## 2024-05-06 NOTE — TELEPHONE ENCOUNTER
Brittani from [facility] called regarding pts last office note and continuous use of oxygen. Brittani faxed over for request of all she needs to continue providing oxygen for pt, please look out for fax, and for further information you can contact her at [phone] thank you  .

## 2024-05-06 NOTE — TELEPHONE ENCOUNTER
Attempted to call Brittani back. Unable to get through due to the automated voice box asking for an extension. If Brittani or anyone else calls back please give them the fax number [phone] to refax forms as we did not receive them. Will keep looking out for fax in the mean time.

## 2024-05-13 NOTE — TELEPHONE ENCOUNTER
[facility] is calling because they need the oxygen test from the patient 's last visit showing O2 lower than  88%.     Fax: [phone]   4

## 2024-05-20 ENCOUNTER — TELEPHONE (OUTPATIENT)
Age: 74
End: 2024-05-20

## 2024-05-20 NOTE — TELEPHONE ENCOUNTER
[employer] called stating they faxed over a sheet for the doctor to fill out for incontinence supplies for the patient on 05/11 . Made her aware we didn't receive it , will be refaxing .

## 2024-05-24 ENCOUNTER — TELEPHONE (OUTPATIENT)
Age: 74
End: 2024-05-24

## 2024-05-24 NOTE — TELEPHONE ENCOUNTER
Called Paula RAYMUNDO from [employer] so that she can tell me how to go about dealing with the O2 for this patient since her muscular dystrophy doesn't allow us to perform a 6MWT that may qualify her for O2. Awaiting Paula's response.

## 2024-05-29 ENCOUNTER — DOCUMENTATION (OUTPATIENT)
Dept: PULMONOLOGY | Facility: CLINIC | Age: 74
End: 2024-05-29

## 2024-05-29 ENCOUNTER — OFFICE VISIT (OUTPATIENT)
Dept: PULMONOLOGY | Facility: CLINIC | Age: 74
End: 2024-05-29
Payer: COMMERCIAL

## 2024-05-29 VITALS
HEART RATE: 72 BPM | HEIGHT: 60 IN | BODY MASS INDEX: 31.64 KG/M2 | SYSTOLIC BLOOD PRESSURE: 122 MMHG | OXYGEN SATURATION: 92 % | DIASTOLIC BLOOD PRESSURE: 72 MMHG | TEMPERATURE: 97.9 F

## 2024-05-29 DIAGNOSIS — J01.10 ACUTE NON-RECURRENT FRONTAL SINUSITIS: ICD-10-CM

## 2024-05-29 DIAGNOSIS — J98.4 RESTRICTIVE LUNG DISEASE DUE TO MUSCULAR DYSTROPHY (HCC): Primary | ICD-10-CM

## 2024-05-29 DIAGNOSIS — G71.00 RESTRICTIVE LUNG DISEASE DUE TO MUSCULAR DYSTROPHY (HCC): Primary | ICD-10-CM

## 2024-05-29 DIAGNOSIS — J96.11 CHRONIC RESPIRATORY FAILURE WITH HYPOXIA AND HYPERCAPNIA (HCC): ICD-10-CM

## 2024-05-29 DIAGNOSIS — J30.1 SEASONAL ALLERGIC RHINITIS DUE TO POLLEN: ICD-10-CM

## 2024-05-29 DIAGNOSIS — G71.09 DYSTROPHY, MUSCULAR, HEREDITARY PROGRESSIVE (HCC): ICD-10-CM

## 2024-05-29 DIAGNOSIS — J43.2 CENTRILOBULAR EMPHYSEMA (HCC): ICD-10-CM

## 2024-05-29 DIAGNOSIS — J96.12 CHRONIC RESPIRATORY FAILURE WITH HYPOXIA AND HYPERCAPNIA (HCC): ICD-10-CM

## 2024-05-29 PROCEDURE — 99214 OFFICE O/P EST MOD 30 MIN: CPT | Performed by: INTERNAL MEDICINE

## 2024-05-29 PROCEDURE — G2211 COMPLEX E/M VISIT ADD ON: HCPCS | Performed by: INTERNAL MEDICINE

## 2024-05-29 RX ORDER — AZITHROMYCIN 250 MG/1
TABLET, FILM COATED ORAL
Qty: 6 TABLET | Refills: 0 | Status: SHIPPED | OUTPATIENT
Start: 2024-05-29 | End: 2024-06-02

## 2024-05-29 NOTE — ASSESSMENT & PLAN NOTE
Does not require oxygen at rest but does wear with her BiPAP at night  GAY test in 2022 - qualified for O2 recert

## 2024-05-29 NOTE — PROGRESS NOTES
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 female.    PABLO Rangel is a 73 yo female who presents to the office today for follow up of her restrictive lung disease secondary to muscular dystrophy and COPD. Her last visit was 4/9/2024. Today she presents for possible recert for O2 therapy.         Review of Systems   Constitutional:  Negative for fatigue and fever.   HENT:  Negative for congestion.    Respiratory:  Negative for cough and shortness of breath.    Allergic/Immunologic: Positive for environmental allergies.         Objective:    Physical Exam          Imaging- none pertinent    Lab Review:   Lab Results   Component Value Date     12/09/2015    K 3.8 06/11/2023    K 3.8 12/09/2015    CL 97 06/11/2023     12/09/2015    CO2 35 (H) 06/11/2023    CO2 32.0 12/09/2015    BUN 14 06/11/2023    BUN 13 12/09/2015    CREATININE 0.26 (L) 06/11/2023    CREATININE 0.33 (L) 12/09/2015    GLUCOSE 81 12/09/2015    CALCIUM 8.5 06/11/2023    CALCIUM 8.9 12/09/2015     Lab Results   Component Value Date    WBC 11.84 (H) 06/11/2023    WBC 14.94 (H) 12/09/2015    HGB 11.4 (L) 06/11/2023    HGB 14.3 12/09/2015    HCT 37.7 06/11/2023    HCT 41.5 12/09/2015    MCV 89 06/11/2023    MCV 85 12/09/2015     06/11/2023     (H) 12/09/2015

## 2024-06-04 ENCOUNTER — TELEPHONE (OUTPATIENT)
Age: 74
End: 2024-06-04

## 2024-06-04 NOTE — TELEPHONE ENCOUNTER
[facility]    Please fax [phone]    05/29/24 O2 Recert Certificate  O2 Medical Necessity Letter  05/29/24 OV Note

## 2024-06-11 ENCOUNTER — TELEPHONE (OUTPATIENT)
Age: 74
End: 2024-06-11

## 2024-06-11 NOTE — TELEPHONE ENCOUNTER
[facility] needs pts most recent OV notes / O2 testing results/ Med necessity note/ and note stating usage of O2 with saturation level of 88% or lower.    [phone]    PHONE [phone] Option 5

## 2024-07-08 ENCOUNTER — OFFICE VISIT (OUTPATIENT)
Dept: FAMILY MEDICINE CLINIC | Facility: CLINIC | Age: 74
End: 2024-07-08
Payer: COMMERCIAL

## 2024-07-08 VITALS
HEART RATE: 81 BPM | SYSTOLIC BLOOD PRESSURE: 126 MMHG | OXYGEN SATURATION: 96 % | BODY MASS INDEX: 32.2 KG/M2 | TEMPERATURE: 98.1 F | HEIGHT: 60 IN | WEIGHT: 164 LBS | DIASTOLIC BLOOD PRESSURE: 80 MMHG

## 2024-07-08 DIAGNOSIS — R21 RASH: ICD-10-CM

## 2024-07-08 DIAGNOSIS — G89.29 CHRONIC LEFT SHOULDER PAIN: ICD-10-CM

## 2024-07-08 DIAGNOSIS — Z00.00 MEDICARE ANNUAL WELLNESS VISIT, SUBSEQUENT: Primary | ICD-10-CM

## 2024-07-08 DIAGNOSIS — J98.4 RESTRICTIVE LUNG DISEASE DUE TO MUSCULAR DYSTROPHY (HCC): ICD-10-CM

## 2024-07-08 DIAGNOSIS — G71.09 DYSTROPHY, MUSCULAR, HEREDITARY PROGRESSIVE (HCC): ICD-10-CM

## 2024-07-08 DIAGNOSIS — F33.0 MILD RECURRENT MAJOR DEPRESSION (HCC): ICD-10-CM

## 2024-07-08 DIAGNOSIS — R32 URINARY INCONTINENCE, UNSPECIFIED TYPE: ICD-10-CM

## 2024-07-08 DIAGNOSIS — Z99.81 ON HOME OXYGEN THERAPY: ICD-10-CM

## 2024-07-08 DIAGNOSIS — Z93.3 COLOSTOMY STATUS (HCC): ICD-10-CM

## 2024-07-08 DIAGNOSIS — M25.9 SHOULDER JOINT DYSFUNCTION: ICD-10-CM

## 2024-07-08 DIAGNOSIS — G71.00 RESTRICTIVE LUNG DISEASE DUE TO MUSCULAR DYSTROPHY (HCC): ICD-10-CM

## 2024-07-08 DIAGNOSIS — M25.512 CHRONIC LEFT SHOULDER PAIN: ICD-10-CM

## 2024-07-08 PROBLEM — R00.0 INCREASED HEART RATE: Status: RESOLVED | Noted: 2021-04-02 | Resolved: 2024-07-08

## 2024-07-08 PROCEDURE — G0439 PPPS, SUBSEQ VISIT: HCPCS | Performed by: FAMILY MEDICINE

## 2024-07-08 PROCEDURE — 99214 OFFICE O/P EST MOD 30 MIN: CPT | Performed by: FAMILY MEDICINE

## 2024-07-08 RX ORDER — TRIAMCINOLONE ACETONIDE 1 MG/G
CREAM TOPICAL 2 TIMES DAILY PRN
Qty: 30 G | Refills: 2 | Status: SHIPPED | OUTPATIENT
Start: 2024-07-08

## 2024-07-08 NOTE — PROGRESS NOTES
"Ambulatory Visit  Name: Claire Doherty      : 1950      MRN: [id number]  Encounter Provider: Julienne Tucker DO  Encounter Date: 2024   Encounter department: [facility]    Assessment & Plan   1. Medicare annual wellness visit, subsequent  2. Dystrophy, muscular, 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HCC)  -     [employer] [employer]  3. 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 On home oxygen therapy  5. Rash  Comments:  just refilling triamcinolone  Orders:  -     triamcinolone (KENALOG) 0.1 % cream; Apply topically 2 (two) times a day as needed for irritation or rash  6. Colostomy status ([facility])  7. 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[facility])  8. Chronic left shoulder pain  -     XR shoulder 2+ vw left; Future; Expected date: 2024  9. Shoulder joint dysfunction  -     XR shoulder 2+ vw left; Future; Expected date: 2024  10. Urinary incontinence, unspecified type  -     [employer] [employer]  Monitor palpitations sx  If shoulder xray neg then will place order for homecare OT/PT     Preventive health issues were discussed with patient, and age appropriate screening tests were ordered as noted in patient's After Visit Summary. Personalized health advice and appropriate referrals for health education or preventive services given if needed, as noted in patient's After Visit Summary.  Depression Screening Follow-up Plan: Patient's depression screening was positive with a PHQ-2 score of . Their PHQ-9 score was . Patient advised to follow-up with PCP for further management.          Chief Complaint   Patient presents with    Medicare Wellness Visit    Follow-up       History of Present Illness     Scheduled Medicare AWV + f/u  Here with home health aide/caregiver; pt states she dove her motorized w/c here- wore sunscreen  C/o \"Getting palpitations\", on and off for a couple of months, \"mainly in the evening, mainly when reclining, sometimes feel a little " "lightheaded\"  \"My mother had palpitations for decades\"  Pt had reported this problem at visit here in November and Holter+echo was ordered- normal - EKG was normal May last year - pt declines cardiol eval  Needs new hospital bed mattress- worn out, \"huge valley in the center\"  Also c/o \"Skin tags- popping up alot last 6 months - I have a dermatologist- my sister got tea tree oil to try\"  Also c/o \"Was having a lot of restless legs- was taking loratadine for allergies- but went back to claritin and that took care of it\"  Also c/o \"Been having a lot of interrupted sleep\"  Saw her pulmonol for f/u in May  Also c/o \"My shoulder (left) - comes on and off- lifting arm out and up really hurts - have had problems on and off for several years, but getting worse- happening more often - and am left handed - think it's the muscular dystrophy\"         Patient Care Team:  Julienne Tucker DO as PCP - General (Family Medicine)  Julienne Tucker DO as PCP - [facility] ([facility])  MD Robin Gonzalez DO Daniel Joseph Bowers, MD as Endoscopist    Review of Systems   Constitutional:  Negative for activity change, appetite change, chills, fever and unexpected weight change.   Respiratory: Negative.     Cardiovascular: Negative.    Hematological: Negative.      Medical History Reviewed by provider this encounter:  Tobacco  Allergies  Meds  Problems  Med Hx  Surg Hx  Fam Hx       Annual Wellness Visit Questionnaire   Claire is here for her Subsequent Wellness visit. Last Medicare Wellness visit information reviewed, patient interviewed and updates made to the record today.      Health Risk Assessment:   Patient rates overall health as good. Patient feels that their physical health rating is slightly worse. Patient is very satisfied with their life. Eyesight was rated as slightly worse. Hearing was rated as same. Patient feels that their emotional and mental health rating is much better. Patients states " they are never, rarely angry. Patient states they are sometimes unusually tired/fatigued. Pain experienced in the last 7 days has been some. Patient's pain rating has been 2/10. Patient states that she has experienced no weight loss or gain in last 6 months.     Fall Risk Screening:   In the past year, patient has experienced: no history of falling in past year      Urinary Incontinence Screening:   Patient has leaked urine accidently in the last six months.     Home Safety:  Patient has trouble with stairs inside or outside of their home. Patient has working smoke alarms and has no working carbon monoxide detector. Home safety hazards include: none.     Nutrition:   Current diet is Regular.     Medications:   Patient is currently taking over-the-counter supplements. OTC medications include: see medication list. Patient is able to manage medications.     Activities of Daily Living (ADLs)/Instrumental Activities of Daily Living (IADLs):   Walk and transfer into and out of bed and chair?: No  Dress and groom yourself?: Yes    Bathe or shower yourself?: No    Feed yourself? Yes  Do your laundry/housekeeping?: Yes  Manage your money, pay your bills and track your expenses?: No  Make your own meals?: No    Do your own shopping?: No    Previous Hospitalizations:   Any hospitalizations or ED visits within the last 12 months?: No      PREVENTIVE SCREENINGS      Cardiovascular Screening:    General: Screening Current      Colorectal Cancer Screening:     General: Screening Current      Breast Cancer Screening:     General: History Breast Cancer      Cervical Cancer Screening:    General: Screening Not Indicated      Osteoporosis Screening:    General: Screening Not Indicated and History Osteoporosis      Lung Cancer Screening:     General: Screening Not Indicated      Hepatitis C Screening:    General: Screening Current    Screening, Brief Intervention, and Referral to Treatment (SBIRT)    Screening  Typical number of drinks  in a day: 0  Typical number of drinks in a week: 0  Interpretation: Low risk drinking behavior.    Single Item Drug Screening:  How often have you used an illegal drug (including marijuana) or a prescription medication for non-medical reasons in the past year? never    Single Item Drug Screen Score: 0  Interpretation: Negative screen for possible drug use disorder    Social Determinants of Health     Financial Resource Strain: Low Risk  (7/5/2023)    Overall Financial Resource Strain (CARDIA)     Difficulty of Paying Living Expenses: Not hard at all   Food Insecurity: No Food Insecurity (7/8/2024)    Hunger Vital Sign     Worried About Running Out of Food in the Last Year: Never true     Ran Out of Food in the Last Year: Never true   Transportation Needs: No Transportation Needs (7/8/2024)    PRAPARE - Transportation     Lack of Transportation (Medical): No     Lack of Transportation (Non-Medical): No   Housing Stability: Unknown (7/8/2024)    Housing Stability Vital Sign     Unable to Pay for Housing in the Last Year: No   Utilities: Not At Risk (7/8/2024)    [facility] Utilities     Threatened with loss of utilities: No     No results found.    Objective     /80 (BP Location: Left arm, Patient Position: Sitting, Cuff Size: Adult)   Pulse 81   Temp 98.1 °F (36.7 °C)   Ht 5' (1.524 m)   Wt 74.4 kg (164 lb)   LMP  (LMP Unknown)   SpO2 96%   BMI 32.03 kg/m²     Physical Exam  Vitals and nursing note reviewed.   Constitutional:       General: She is not in acute distress.     Appearance: She is well-groomed. She is not ill-appearing, toxic-appearing or diaphoretic.      Comments: W/c-bound   HENT:      Head: Normocephalic and atraumatic.      Nose: Nose normal.      Mouth/Throat:      Lips: Pink.      Mouth: Mucous membranes are moist.      Pharynx: Oropharynx is clear. Uvula midline.   Eyes:      General: Lids are normal.      Conjunctiva/sclera: Conjunctivae normal.      Pupils: Pupils are equal, round, and  reactive to light.   Cardiovascular:      Rate and Rhythm: Normal rate and regular rhythm.      Pulses: Normal pulses.      Heart sounds: Normal heart sounds.   Pulmonary:      Effort: Pulmonary effort is normal.      Breath sounds: Normal breath sounds and air entry.   Abdominal:      General: Bowel sounds are normal.      Palpations: Abdomen is soft. There is no hepatomegaly, splenomegaly or mass.      Tenderness: There is no abdominal tenderness.       Musculoskeletal:      Left shoulder: Tenderness present. No swelling, deformity, effusion, bony tenderness or crepitus. Decreased range of motion. Normal strength. Normal pulse.      Right lower leg: No edema.      Left lower leg: No edema.   Skin:     General: Skin is warm and dry.      Coloration: Skin is not pale.   Neurological:      Mental Status: She is alert and oriented to person, place, and time.      Comments: Chronic unchanged abn neurological findings   Psychiatric:         Mood and Affect: Mood normal.         Behavior: Behavior normal. Behavior is cooperative.         Cognition and Memory: Cognition and memory normal.       Administrative Statements

## 2024-07-11 PROBLEM — R32 URINARY INCONTINENCE, UNSPECIFIED TYPE: Status: ACTIVE | Noted: 2024-07-11

## 2024-07-22 DIAGNOSIS — F33.0 MILD EPISODE OF RECURRENT MAJOR DEPRESSIVE DISORDER (HCC): ICD-10-CM

## 2024-07-22 RX ORDER — BUPROPION HYDROCHLORIDE 75 MG/1
75 TABLET ORAL EVERY MORNING
Qty: 100 TABLET | Refills: 1 | Status: SHIPPED | OUTPATIENT
Start: 2024-07-22

## 2024-07-23 ENCOUNTER — APPOINTMENT (EMERGENCY)
Dept: CT IMAGING | Facility: HOSPITAL | Age: 74
DRG: 603 | End: 2024-07-23
Payer: COMMERCIAL

## 2024-07-23 ENCOUNTER — HOSPITAL ENCOUNTER (OUTPATIENT)
Dept: ULTRASOUND IMAGING | Facility: HOSPITAL | Age: 74
Discharge: HOME | End: 2024-07-23

## 2024-07-23 ENCOUNTER — APPOINTMENT (EMERGENCY)
Dept: RADIOLOGY | Facility: HOSPITAL | Age: 74
DRG: 603 | End: 2024-07-23
Payer: COMMERCIAL

## 2024-07-23 ENCOUNTER — HOSPITAL ENCOUNTER (INPATIENT)
Facility: HOSPITAL | Age: 74
LOS: 1 days | Discharge: HOME HEALTH | DRG: 603 | End: 2024-07-24
Attending: EMERGENCY MEDICINE | Admitting: INTERNAL MEDICINE
Payer: COMMERCIAL

## 2024-07-23 DIAGNOSIS — L03.90 CELLULITIS: Primary | ICD-10-CM

## 2024-07-23 DIAGNOSIS — N30.90 CYSTITIS: ICD-10-CM

## 2024-07-23 DIAGNOSIS — L03.115 CELLULITIS OF RIGHT FOOT: ICD-10-CM

## 2024-07-23 DIAGNOSIS — D72.829 LEUKOCYTOSIS: ICD-10-CM

## 2024-07-23 PROBLEM — J96.12 CHRONIC RESPIRATORY FAILURE WITH HYPOXIA AND HYPERCAPNIA (HCC): Chronic | Status: ACTIVE | Noted: 2020-09-30

## 2024-07-23 PROBLEM — Z86.59 HISTORY OF DEPRESSION: Status: RESOLVED | Noted: 2021-04-02 | Resolved: 2024-07-23

## 2024-07-23 PROBLEM — F32.A DEPRESSION: Chronic | Status: ACTIVE | Noted: 2020-10-06

## 2024-07-23 PROBLEM — Z87.898 HISTORY OF PERIPHERAL EDEMA: Status: RESOLVED | Noted: 2023-04-06 | Resolved: 2024-07-23

## 2024-07-23 PROBLEM — Z87.898 HISTORY OF ABDOMINAL ABSCESS: Status: RESOLVED | Noted: 2021-04-02 | Resolved: 2024-07-23

## 2024-07-23 PROBLEM — Z90.12 HISTORY OF LEFT MASTECTOMY: Status: RESOLVED | Noted: 2021-04-02 | Resolved: 2024-07-23

## 2024-07-23 PROBLEM — Z85.828 HISTORY OF MALIGNANT NEOPLASM OF SKIN: Status: RESOLVED | Noted: 2018-09-13 | Resolved: 2024-07-23

## 2024-07-23 PROBLEM — R32 URINARY INCONTINENCE: Chronic | Status: ACTIVE | Noted: 2017-10-03

## 2024-07-23 PROBLEM — J96.11 CHRONIC RESPIRATORY FAILURE WITH HYPOXIA AND HYPERCAPNIA (HCC): Chronic | Status: ACTIVE | Noted: 2020-09-30

## 2024-07-23 PROBLEM — N30.00 ACUTE CYSTITIS: Status: ACTIVE | Noted: 2024-07-23

## 2024-07-23 PROBLEM — R10.9 ABDOMINAL PAIN: Status: RESOLVED | Noted: 2021-04-02 | Resolved: 2024-07-23

## 2024-07-23 LAB
ALBUMIN SERPL BCG-MCNC: 4.1 G/DL (ref 3.5–5)
ALP SERPL-CCNC: 73 U/L (ref 34–104)
ALT SERPL W P-5'-P-CCNC: 14 U/L (ref 7–52)
ANION GAP SERPL CALCULATED.3IONS-SCNC: 7 MMOL/L (ref 4–13)
APTT PPP: 28 SECONDS (ref 23–37)
AST SERPL W P-5'-P-CCNC: 18 U/L (ref 13–39)
ATRIAL RATE: 89 BPM
BASE EX.OXY STD BLDV CALC-SCNC: 66.6 % (ref 60–80)
BASE EXCESS BLDV CALC-SCNC: 1.6 MMOL/L
BASOPHILS # BLD AUTO: 0.07 THOUSANDS/ÂΜL (ref 0–0.1)
BASOPHILS NFR BLD AUTO: 0 % (ref 0–1)
BILIRUB DIRECT SERPL-MCNC: 0.04 MG/DL (ref 0–0.2)
BILIRUB SERPL-MCNC: 0.26 MG/DL (ref 0.2–1)
BILIRUB UR QL STRIP: NEGATIVE
BUN SERPL-MCNC: 24 MG/DL (ref 5–25)
CALCIUM SERPL-MCNC: 9.2 MG/DL (ref 8.4–10.2)
CARDIAC TROPONIN I PNL SERPL HS: 3 NG/L
CHLORIDE SERPL-SCNC: 100 MMOL/L (ref 96–108)
CLARITY UR: CLEAR
CO2 SERPL-SCNC: 30 MMOL/L (ref 21–32)
COLOR UR: YELLOW
CREAT SERPL-MCNC: 0.36 MG/DL (ref 0.6–1.3)
D DIMER PPP FEU-MCNC: 0.59 UG/ML FEU
EOSINOPHIL # BLD AUTO: 0.24 THOUSAND/ÂΜL (ref 0–0.61)
EOSINOPHIL NFR BLD AUTO: 1 % (ref 0–6)
ERYTHROCYTE [DISTWIDTH] IN BLOOD BY AUTOMATED COUNT: 18 % (ref 11.6–15.1)
FLUAV RNA RESP QL NAA+PROBE: NEGATIVE
FLUBV RNA RESP QL NAA+PROBE: NEGATIVE
GFR SERPL CREATININE-BSD FRML MDRD: 106 ML/MIN/1.73SQ M
GLUCOSE SERPL-MCNC: 143 MG/DL (ref 65–140)
GLUCOSE UR STRIP-MCNC: NEGATIVE MG/DL
HCO3 BLDV-SCNC: 27.6 MMOL/L (ref 24–30)
HCT VFR BLD AUTO: 43.4 % (ref 34.8–46.1)
HGB BLD-MCNC: 13.2 G/DL (ref 11.5–15.4)
HGB UR QL STRIP.AUTO: NEGATIVE
IMM GRANULOCYTES # BLD AUTO: 0.13 THOUSAND/UL (ref 0–0.2)
IMM GRANULOCYTES NFR BLD AUTO: 1 % (ref 0–2)
INR PPP: 0.9 (ref 0.84–1.19)
KETONES UR STRIP-MCNC: NEGATIVE MG/DL
LACTATE SERPL-SCNC: 1.5 MMOL/L (ref 0.5–2)
LEUKOCYTE ESTERASE UR QL STRIP: NEGATIVE
LIPASE SERPL-CCNC: 22 U/L (ref 11–82)
LYMPHOCYTES # BLD AUTO: 3.5 THOUSANDS/ÂΜL (ref 0.6–4.47)
LYMPHOCYTES NFR BLD AUTO: 14 % (ref 14–44)
MAGNESIUM SERPL-MCNC: 2 MG/DL (ref 1.9–2.7)
MCH RBC QN AUTO: 25.9 PG (ref 26.8–34.3)
MCHC RBC AUTO-ENTMCNC: 30.4 G/DL (ref 31.4–37.4)
MCV RBC AUTO: 85 FL (ref 82–98)
MONOCYTES # BLD AUTO: 1.66 THOUSAND/ÂΜL (ref 0.17–1.22)
MONOCYTES NFR BLD AUTO: 7 % (ref 4–12)
NEUTROPHILS # BLD AUTO: 19.78 THOUSANDS/ÂΜL (ref 1.85–7.62)
NEUTS SEG NFR BLD AUTO: 77 % (ref 43–75)
NITRITE UR QL STRIP: NEGATIVE
NRBC BLD AUTO-RTO: 0 /100 WBCS
O2 CT BLDV-SCNC: 13.4 ML/DL
P AXIS: 54 DEGREES
PCO2 BLDV: 49 MM HG (ref 42–50)
PH BLDV: 7.37 [PH] (ref 7.3–7.4)
PH UR STRIP.AUTO: 5.5 [PH]
PLATELET # BLD AUTO: 466 THOUSANDS/UL (ref 149–390)
PMV BLD AUTO: 10.4 FL (ref 8.9–12.7)
PO2 BLDV: 36.7 MM HG (ref 35–45)
POTASSIUM SERPL-SCNC: 4.4 MMOL/L (ref 3.5–5.3)
PR INTERVAL: 154 MS
PROCALCITONIN SERPL-MCNC: <0.05 NG/ML
PROT SERPL-MCNC: 7.5 G/DL (ref 6.4–8.4)
PROT UR STRIP-MCNC: NEGATIVE MG/DL
PROTHROMBIN TIME: 12.3 SECONDS (ref 11.6–14.5)
QRS AXIS: 22 DEGREES
QRSD INTERVAL: 78 MS
QT INTERVAL: 368 MS
QTC INTERVAL: 447 MS
RBC # BLD AUTO: 5.1 MILLION/UL (ref 3.81–5.12)
RSV RNA RESP QL NAA+PROBE: NEGATIVE
SARS-COV-2 RNA RESP QL NAA+PROBE: NEGATIVE
SODIUM SERPL-SCNC: 137 MMOL/L (ref 135–147)
SP GR UR STRIP.AUTO: 1.01
T WAVE AXIS: 44 DEGREES
UROBILINOGEN UR QL STRIP.AUTO: 0.2 E.U./DL
VENTRICULAR RATE: 89 BPM
WBC # BLD AUTO: 25.38 THOUSAND/UL (ref 4.31–10.16)

## 2024-07-23 PROCEDURE — 93010 ELECTROCARDIOGRAM REPORT: CPT | Performed by: INTERNAL MEDICINE

## 2024-07-23 PROCEDURE — 83690 ASSAY OF LIPASE: CPT | Performed by: EMERGENCY MEDICINE

## 2024-07-23 PROCEDURE — 81003 URINALYSIS AUTO W/O SCOPE: CPT | Performed by: EMERGENCY MEDICINE

## 2024-07-23 PROCEDURE — 99285 EMERGENCY DEPT VISIT HI MDM: CPT

## 2024-07-23 PROCEDURE — 99285 EMERGENCY DEPT VISIT HI MDM: CPT | Performed by: EMERGENCY MEDICINE

## 2024-07-23 PROCEDURE — 74177 CT ABD & PELVIS W/CONTRAST: CPT

## 2024-07-23 PROCEDURE — 80076 HEPATIC FUNCTION PANEL: CPT | Performed by: EMERGENCY MEDICINE

## 2024-07-23 PROCEDURE — 36415 COLL VENOUS BLD VENIPUNCTURE: CPT | Performed by: EMERGENCY MEDICINE

## 2024-07-23 PROCEDURE — 85025 COMPLETE CBC W/AUTO DIFF WBC: CPT | Performed by: EMERGENCY MEDICINE

## 2024-07-23 PROCEDURE — 99223 1ST HOSP IP/OBS HIGH 75: CPT | Performed by: INTERNAL MEDICINE

## 2024-07-23 PROCEDURE — 85379 FIBRIN DEGRADATION QUANT: CPT | Performed by: EMERGENCY MEDICINE

## 2024-07-23 PROCEDURE — 85610 PROTHROMBIN TIME: CPT | Performed by: EMERGENCY MEDICINE

## 2024-07-23 PROCEDURE — 84145 PROCALCITONIN (PCT): CPT | Performed by: EMERGENCY MEDICINE

## 2024-07-23 PROCEDURE — 84484 ASSAY OF TROPONIN QUANT: CPT | Performed by: EMERGENCY MEDICINE

## 2024-07-23 PROCEDURE — 94760 N-INVAS EAR/PLS OXIMETRY 1: CPT

## 2024-07-23 PROCEDURE — 87040 BLOOD CULTURE FOR BACTERIA: CPT | Performed by: EMERGENCY MEDICINE

## 2024-07-23 PROCEDURE — 94664 DEMO&/EVAL PT USE INHALER: CPT

## 2024-07-23 PROCEDURE — 85730 THROMBOPLASTIN TIME PARTIAL: CPT | Performed by: EMERGENCY MEDICINE

## 2024-07-23 PROCEDURE — 83735 ASSAY OF MAGNESIUM: CPT | Performed by: EMERGENCY MEDICINE

## 2024-07-23 PROCEDURE — 0241U HB NFCT DS VIR RESP RNA 4 TRGT: CPT | Performed by: EMERGENCY MEDICINE

## 2024-07-23 PROCEDURE — 71260 CT THORAX DX C+: CPT

## 2024-07-23 PROCEDURE — 83605 ASSAY OF LACTIC ACID: CPT | Performed by: EMERGENCY MEDICINE

## 2024-07-23 PROCEDURE — 96365 THER/PROPH/DIAG IV INF INIT: CPT

## 2024-07-23 PROCEDURE — 82805 BLOOD GASES W/O2 SATURATION: CPT | Performed by: EMERGENCY MEDICINE

## 2024-07-23 PROCEDURE — 80048 BASIC METABOLIC PNL TOTAL CA: CPT | Performed by: EMERGENCY MEDICINE

## 2024-07-23 PROCEDURE — 93005 ELECTROCARDIOGRAM TRACING: CPT

## 2024-07-23 PROCEDURE — 71045 X-RAY EXAM CHEST 1 VIEW: CPT

## 2024-07-23 RX ORDER — ONDANSETRON 2 MG/ML
4 INJECTION INTRAMUSCULAR; INTRAVENOUS EVERY 6 HOURS PRN
Status: DISCONTINUED | OUTPATIENT
Start: 2024-07-23 | End: 2024-07-24 | Stop reason: HOSPADM

## 2024-07-23 RX ORDER — BUPROPION HYDROCHLORIDE 75 MG/1
75 TABLET ORAL
Status: DISCONTINUED | OUTPATIENT
Start: 2024-07-23 | End: 2024-07-24 | Stop reason: HOSPADM

## 2024-07-23 RX ORDER — ALBUTEROL SULFATE 2.5 MG/3ML
2.5 SOLUTION RESPIRATORY (INHALATION) EVERY 4 HOURS PRN
Status: DISCONTINUED | OUTPATIENT
Start: 2024-07-23 | End: 2024-07-24 | Stop reason: HOSPADM

## 2024-07-23 RX ORDER — TRIAMCINOLONE ACETONIDE 1 MG/G
CREAM TOPICAL 2 TIMES DAILY PRN
Status: DISCONTINUED | OUTPATIENT
Start: 2024-07-23 | End: 2024-07-24 | Stop reason: HOSPADM

## 2024-07-23 RX ORDER — DIAZEPAM 5 MG/1
5 TABLET ORAL EVERY 8 HOURS PRN
Status: DISCONTINUED | OUTPATIENT
Start: 2024-07-23 | End: 2024-07-24 | Stop reason: HOSPADM

## 2024-07-23 RX ORDER — SODIUM CHLORIDE 9 MG/ML
125 INJECTION, SOLUTION INTRAVENOUS CONTINUOUS
Status: DISCONTINUED | OUTPATIENT
Start: 2024-07-23 | End: 2024-07-23

## 2024-07-23 RX ORDER — VANCOMYCIN HYDROCHLORIDE 1 G/200ML
15 INJECTION, SOLUTION INTRAVENOUS ONCE
Status: DISCONTINUED | OUTPATIENT
Start: 2024-07-23 | End: 2024-07-23

## 2024-07-23 RX ORDER — ENOXAPARIN SODIUM 100 MG/ML
40 INJECTION SUBCUTANEOUS DAILY
Status: DISCONTINUED | OUTPATIENT
Start: 2024-07-23 | End: 2024-07-24 | Stop reason: HOSPADM

## 2024-07-23 RX ORDER — BUDESONIDE 0.5 MG/2ML
0.5 INHALANT ORAL 2 TIMES DAILY
Status: DISCONTINUED | OUTPATIENT
Start: 2024-07-23 | End: 2024-07-23

## 2024-07-23 RX ORDER — CEFEPIME HYDROCHLORIDE 2 G/50ML
2000 INJECTION, SOLUTION INTRAVENOUS ONCE
Status: COMPLETED | OUTPATIENT
Start: 2024-07-23 | End: 2024-07-23

## 2024-07-23 RX ORDER — RAMELTEON 8 MG/1
8 TABLET ORAL
Status: DISCONTINUED | OUTPATIENT
Start: 2024-07-23 | End: 2024-07-24 | Stop reason: HOSPADM

## 2024-07-23 RX ORDER — METHYLPREDNISOLONE SOD SUCC 125 MG
1 VIAL (EA) INJECTION ONCE
Status: COMPLETED | OUTPATIENT
Start: 2024-07-23 | End: 2024-07-23

## 2024-07-23 RX ORDER — OXYBUTYNIN CHLORIDE 5 MG/1
10 TABLET, EXTENDED RELEASE ORAL
Status: DISCONTINUED | OUTPATIENT
Start: 2024-07-23 | End: 2024-07-24 | Stop reason: HOSPADM

## 2024-07-23 RX ORDER — CEFTRIAXONE 2 G/50ML
2000 INJECTION, SOLUTION INTRAVENOUS EVERY 24 HOURS
Status: DISCONTINUED | OUTPATIENT
Start: 2024-07-23 | End: 2024-07-23

## 2024-07-23 RX ORDER — CEFTRIAXONE 1 G/50ML
1000 INJECTION, SOLUTION INTRAVENOUS ONCE
Status: DISCONTINUED | OUTPATIENT
Start: 2024-07-23 | End: 2024-07-23

## 2024-07-23 RX ORDER — CITALOPRAM HYDROBROMIDE 10 MG/1
10 TABLET ORAL
Status: DISCONTINUED | OUTPATIENT
Start: 2024-07-23 | End: 2024-07-24 | Stop reason: HOSPADM

## 2024-07-23 RX ORDER — BUDESONIDE 0.5 MG/2ML
0.5 INHALANT ORAL 2 TIMES DAILY PRN
Status: DISCONTINUED | OUTPATIENT
Start: 2024-07-23 | End: 2024-07-24 | Stop reason: HOSPADM

## 2024-07-23 RX ORDER — SACCHAROMYCES BOULARDII 250 MG
250 CAPSULE ORAL 2 TIMES DAILY
Status: DISCONTINUED | OUTPATIENT
Start: 2024-07-23 | End: 2024-07-24 | Stop reason: HOSPADM

## 2024-07-23 RX ORDER — ONDANSETRON 2 MG/ML
1 INJECTION INTRAMUSCULAR; INTRAVENOUS ONCE
Status: COMPLETED | OUTPATIENT
Start: 2024-07-23 | End: 2024-07-23

## 2024-07-23 RX ORDER — CEFAZOLIN SODIUM 2 G/50ML
2000 SOLUTION INTRAVENOUS EVERY 8 HOURS
Status: DISCONTINUED | OUTPATIENT
Start: 2024-07-23 | End: 2024-07-24 | Stop reason: HOSPADM

## 2024-07-23 RX ORDER — IPRATROPIUM BROMIDE AND ALBUTEROL SULFATE .5; 3 MG/3ML; MG/3ML
1 SOLUTION RESPIRATORY (INHALATION) ONCE
Status: COMPLETED | OUTPATIENT
Start: 2024-07-23 | End: 2024-07-23

## 2024-07-23 RX ORDER — ACETAMINOPHEN 325 MG/1
650 TABLET ORAL EVERY 4 HOURS PRN
Status: DISCONTINUED | OUTPATIENT
Start: 2024-07-23 | End: 2024-07-24 | Stop reason: HOSPADM

## 2024-07-23 RX ADMIN — OXYBUTYNIN 10 MG: 5 TABLET, FILM COATED, EXTENDED RELEASE ORAL at 23:05

## 2024-07-23 RX ADMIN — ACETAMINOPHEN 650 MG: 325 TABLET ORAL at 20:55

## 2024-07-23 RX ADMIN — ENOXAPARIN SODIUM 40 MG: 40 INJECTION SUBCUTANEOUS at 08:48

## 2024-07-23 RX ADMIN — Medication 250 MG: at 08:48

## 2024-07-23 RX ADMIN — CEFAZOLIN SODIUM 2000 MG: 2 SOLUTION INTRAVENOUS at 11:25

## 2024-07-23 RX ADMIN — CEFEPIME HYDROCHLORIDE 2000 MG: 2 INJECTION, SOLUTION INTRAVENOUS at 04:00

## 2024-07-23 RX ADMIN — Medication 250 MG: at 17:56

## 2024-07-23 RX ADMIN — CITALOPRAM HYDROBROMIDE 10 MG: 10 TABLET ORAL at 23:06

## 2024-07-23 RX ADMIN — ACETAMINOPHEN 650 MG: 325 TABLET ORAL at 06:28

## 2024-07-23 RX ADMIN — COLLAGENASE SANTYL: 250 OINTMENT TOPICAL at 12:29

## 2024-07-23 RX ADMIN — IOHEXOL 100 ML: 350 INJECTION, SOLUTION INTRAVENOUS at 03:30

## 2024-07-23 RX ADMIN — ACETAMINOPHEN 650 MG: 325 TABLET ORAL at 12:56

## 2024-07-23 RX ADMIN — BUPROPION HYDROCHLORIDE TABLETS 75 MG: 75 TABLET, FILM COATED ORAL at 23:05

## 2024-07-23 RX ADMIN — SODIUM CHLORIDE 125 ML/HR: 0.9 INJECTION, SOLUTION INTRAVENOUS at 05:30

## 2024-07-23 RX ADMIN — CEFAZOLIN SODIUM 2000 MG: 2 SOLUTION INTRAVENOUS at 20:55

## 2024-07-23 NOTE — ASSESSMENT & PLAN NOTE
Patient presents to the ED secondary to sudden nausea, chest pain, flank pain and shortness of breath,  Hx of right foot wound last week  WBC 25k  Lactic and procalcitonin normal  UA negative  IV abx  Podiatry consult for wound recommendations

## 2024-07-23 NOTE — PLAN OF CARE
Assumed pt care- pt in stable condition. Pt is A&Ox4. She has noted limited mobility, but attempts to reposition in bed- RN offered repositioning wedges- pt stated she is not sure if she wants them- RN educated what they are and how they help with skin breakdown. Pt swallows small pills with water, but needs larger pills in applesauce. Wound care order noted- RN to complete wound care once medication delivered to bedside from pharmacy. Safety precautions maintained during shift. Hourly shift rounding and medication education and teach back completed with pt during shift.     Update 1238: Wound care completed. Pt tolerated well.     Update 1256: Pt reported her pain was 2/10 in her chest, and 5/10 for a headache. Advised RN can call provider for another order for pain with 5/10 score for headache- pt declined and stated she does not want to take anything other than tylenol. Tylenol administered per order.     Update 1356: Pt's headache 0/10 and chest pain 2/10 with deep breaths.       Problem: PAIN - ADULT  Goal: Verbalizes/displays adequate comfort level or baseline comfort level  Description: Interventions:  - Encourage patient to monitor pain and request assistance  - Assess pain using appropriate pain scale  - Administer analgesics based on type and severity of pain and evaluate response  - Implement non-pharmacological measures as appropriate and evaluate response  - Consider cultural and social influences on pain and pain management  - Notify physician/advanced practitioner if interventions unsuccessful or patient reports new pain  Outcome: Progressing     Problem: SAFETY ADULT  Goal: Patient will remain free of falls  Description: INTERVENTIONS:  - Educate patient/family on patient safety including physical limitations  - Instruct patient to call for assistance with activity   - Consult OT/PT to assist with strengthening/mobility   - Keep Call bell within reach  - Keep bed low and locked with side rails  Patient does have shortness of breath on exertion  She did have a PFT done in November 2021 that showed a restrictive pattern  Patient have a CTA of her chest on that I personally reviewed  Lungs were clear and no PE was seen  She is hypoxic now with exertion but defers any supplemental oxygen  She does follow with her primary care physician as well as cardiology  Have ordered a chest x-ray for her    She has agreed to follow-up with adjusted as appropriate  - Keep care items and personal belongings within reach  - Initiate and maintain comfort rounds  - Make Fall Risk Sign visible to staff  - Offer Toileting every 2 Hours, in advance of need  - Initiate/Maintain bed alarm  - Apply yellow socks and bracelet for high fall risk patients  - Consider moving patient to room near nurses station  Outcome: Progressing     Problem: DISCHARGE PLANNING  Goal: Discharge to home or other facility with appropriate resources  Description: INTERVENTIONS:  - Identify barriers to discharge w/patient and caregiver  - Arrange for needed discharge resources and transportation as appropriate  - Identify discharge learning needs (meds, wound care, etc.)    - Refer to Case Management Department for coordinating discharge planning if the patient - Arrange for ineeds post-hospital services based on physician/advanced practitioner order or complex needs related to functional status, cognitive ability, or social support system  Outcome: Progressing     Problem: Knowledge Deficit  Goal: Patient/family/caregiver demonstrates understanding of disease process, treatment plan, medications, and discharge instructions  Description: Complete learning assessment and assess knowledge base.  Interventions:  - Provide teaching at level of understanding  - Provide teaching via preferred learning methods  Outcome: Progressing     Problem: RESPIRATORY - ADULT  Goal: Achieves optimal ventilation and oxygenation  Description: INTERVENTIONS:  - Assess for changes in respiratory status  - Assess for changes in mentation and behavior  - Position to facilitate oxygenation and minimize respiratory effort  - Oxygen administered by appropriate delivery if ordered  - Initiate smoking cessation education as indicated  - Encourage broncho-pulmonary hygiene including cough, deep breathe, Incentive Spirometry  - Assess the need for suctioning and aspirate as needed  - Assess and instruct to report SOB  or any respiratory difficulty  - Respiratory Therapy support as indicated  Outcome: Progressing     Problem: SKIN/TISSUE INTEGRITY - ADULT  Goal: Skin Integrity remains intact(Skin Breakdown Prevention)  Description: Assess:  -Perform Collins assessment every shift  -Clean and moisturize skin every shift  -Inspect skin when repositioning, toileting, and assisting with ADLS  -Assess under medical devices such as masimo every shift  -Assess extremities for adequate circulation and sensation     Bed Management:  -Have minimal linens on bed & keep smooth, unwrinkled  -Change linens as needed when moist or perspiring  -Avoid sitting or lying in one position for more than 2 hours while in bed  -Keep HOB at 30 degrees     Toileting:  -Offer bedside commode  -Assess for incontinence every 2 hours  -Use incontinent care products after each incontinent episode such as hydraguard    Activity:    -Encourage or provide ROM exercises   -Turn and reposition patient every 2 Hours  -Use appropriate equipment to lift or move patient in bed  -Instruct/ Assist with weight shifting every 20 min when out of bed in chair  -Consider limitation of chair time 2 hour intervals    Skin Care:  -Avoid use of baby powder, tape, friction and shearing, hot water or constrictive clothing  -Relieve pressure over bony prominences using hydraguard  -Do not massage red bony areas    Outcome: Progressing

## 2024-07-23 NOTE — DISCHARGE INSTR - OTHER ORDERS
Discharge Instructions - Podiatry    Weight Bearing Status: Weight bearing instructions not necessary at this time                    Pain: Continue analgesics as directed    Follow-up appointment instructions: Please make an appointment within one week of discharge with [facility] wound care, Dr. Guy or Dr. Ruiz. Contact sooner if any increase in pain, or signs of infection occur    Wound Care: Leave dressings clean, dry, and intact between professional dressing changes    Nursing Instructions: Please apply santyl, xeroform. Then cover with Gauze and secure with Kerlix and tape. Please change dressing every Monday, Wednesday, and Friday .

## 2024-07-23 NOTE — ED PROVIDER NOTES
"History  Chief Complaint   Patient presents with    Shortness of Breath     SOB that began after \"doing something difficult\"; pt received 125mg solumedrol, 1 duoneb, and 4mg zofran from EMS     74-year-old female with history of muscular dystrophy, COPD, presents with shortness of breath and wheezing.  EMS gave DuoNeb prior to arrival.          Prior to Admission Medications   Prescriptions Last Dose Informant Patient Reported? Taking?   Probiotic Product (Florastor Plus) CAPS 2024 at 0900 Self Yes Yes   albuterol (2.5 mg/3 mL) 0.083 % nebulizer solution 2024 at 0100  No Yes   Sig: Take 3 mL (2.5 mg total) by nebulization every 4 (four) hours as needed for wheezing or shortness of breath   buPROPion (WELLBUTRIN) 75 mg tablet 2024 at 1800  No Yes   Sig: take 1 tablet by mouth every morning   budesonide (Pulmicort) 0.5 mg/2 mL nebulizer solution Unknown  No No   Sig: Take 2 mL (0.5 mg total) by nebulization 2 (two) times a day Rinse mouth after use.   citalopram (CeleXA) 10 mg tablet 2024 at 2230  No Yes   Sig: Take 1 tablet (10 mg total) by mouth daily   diazepam (VALIUM) 5 mg tablet More than a month  No No   Sig: Take 1 tablet (5 mg total) by mouth every 8 (eight) hours as needed for anxiety or muscle spasms   fluticasone (FLONASE) 50 mcg/act nasal spray Past Month  No Yes   Si sprays into each nostril daily   ketoconazole (NIZORAL) 2 % cream Past Month Self Yes Yes   Sig: APPLY TWICE A DAY TO THE AFFECTED AREA(S)   ketoconazole (NIZORAL) 2 % shampoo Past Month  No Yes   Sig: apply to affected area two times a week   ramelteon (ROZEREM) 8 mg tablet 2024 at 2230  Yes Yes   Sig: Take 8 mg by mouth daily at bedtime   solifenacin (VESICARE) 10 MG tablet 2024 at 0900  No Yes   Sig: take 1 tablet by mouth once daily   triamcinolone (KENALOG) 0.1 % cream Past Month  No Yes   Sig: Apply topically 2 (two) times a day as needed for irritation or rash      Facility-Administered Medications: " None       Past Medical History:   Diagnosis Date    Anxiety     Breast cancer ([facility]) 2007    Colitis     Depression     Difficult intubation     Excessive daytime sleepiness     GERD (gastroesophageal reflux disease)     Hyperlipidemia     Ischemic colitis ([facility]) 01/21/2019    Leukocytosis 03/28/2020    Muscular dystrophy ([facility])     Limb-girdle    Osteoporosis     Overactive bladder     Perforated diverticulum 03/28/2020    Pneumonitis     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[facility])     Skin cancer     Skin disorder     Tachycardia 06/02/2021    Vitamin D deficiency        Past Surgical History:   Procedure Laterality Date    COLONOSCOPY N/A 1/22/2019    Procedure: COLONOSCOPY;  Surgeon: Anthony Mejía MD;  Location: [facility];  Service: Colorectal    CYSTOSCOPY N/A 9/30/2020    Procedure: CYSTOSCOPY; BILATERAL URETERAL CATHETER PLACEMENT, CYSTOTOMY;  Surgeon: Zach Tyler MD;  Location: [facility];  Service: Urology    FL CYSTOGRAM  10/15/2020    HARTMANS PROCEDURE N/A 9/30/2020    Procedure: EXPLORATORY LAPAROTOMY; LYSIS OF ADHESIONS; WASHOUT PELVIC ABSCESS; COMPLEX SIGMOID COLON RESECTION; LOOP TRANSVERSE COLOSTOMY;  Surgeon: Thania Jaffe MD;  Location: [facility];  Service: General    IR DRAINAGE TUBE PLACEMENT  9/24/2020    MASTECTOMY Left 2007    left breast mastectomy     TONSILLECTOMY      TOOTH EXTRACTION      TUBAL LIGATION         Family History   Problem Relation Age of Onset    Other Mother         bone loss    Arthritis Mother     Skin cancer Father     Hypertension Father         benign     Other Father         bone loss    Muscular dystrophy Father     Hypertension Sister     No Known Problems Sister     Muscular dystrophy Brother         of the limb gridle     Parkinsonism Brother     No Known Problems Brother     No Known Problems Maternal Grandmother     No Known Problems Paternal Grandmother     No Known Problems Maternal Aunt     Muscular dystrophy Family          of the limb girdle     I have reviewed and agree with the history as documented.    E-Cigarette/Vaping    E-Cigarette Use Never User      E-Cigarette/Vaping Substances    Nicotine No     THC No     CBD No     Flavoring No      Social History     Tobacco Use    Smoking status: Former     Current packs/day: 0.00     Average packs/day: 1.5 packs/day for 37.0 years (55.5 ttl pk-yrs)     Types: Cigarettes     Start date:      Quit date:      Years since quittin.5    Smokeless tobacco: Never   Vaping Use    Vaping status: Never Used   Substance Use Topics    Alcohol use: Not Currently     Comment: social drinker per allscripts     Drug use: Yes     Types: Marijuana     Comment: medical marijuana       Review of Systems    Physical Exam  Physical Exam  Vitals and nursing note reviewed.   Constitutional:       Appearance: Normal appearance. She is well-developed.   HENT:      Head: Normocephalic and atraumatic.   Eyes:      Conjunctiva/sclera: Conjunctivae normal.      Pupils: Pupils are equal, round, and reactive to light.   Neck:      Trachea: No tracheal deviation.   Cardiovascular:      Rate and Rhythm: Normal rate and regular rhythm.      Heart sounds: Normal heart sounds. No murmur heard.  Pulmonary:      Effort: Pulmonary effort is normal. No respiratory distress.      Breath sounds: Wheezing (scattered) present. No rales.   Abdominal:      General: Bowel sounds are normal. There is no distension.      Palpations: Abdomen is soft.      Tenderness: There is no abdominal tenderness.      Comments: ostomy   Musculoskeletal:         General: No deformity.      Cervical back: Normal range of motion and neck supple.   Skin:     General: Skin is warm and dry.      Capillary Refill: Capillary refill takes less than 2 seconds.      Comments: Small ulceration approximately 3 cm across the dorsum of the right foot with surrounding erythema, no crepitus no pain out of proportion no discharge   Neurological:       General: No focal deficit present.      Mental Status: She is alert and oriented to person, place, and time.      Sensory: No sensory deficit.   Psychiatric:         Mood and Affect: Mood normal.         Judgment: Judgment normal.         Vital Signs  ED Triage Vitals   Temperature Pulse Respirations Blood Pressure SpO2   07/23/24 0232 07/23/24 0228 07/23/24 0228 07/23/24 0228 07/23/24 0228   (!) 97.2 °F (36.2 °C) 86 20 125/60 92 %      Temp Source Heart Rate Source Patient Position - Orthostatic VS BP Location FiO2 (%)   07/23/24 0232 07/23/24 0228 07/23/24 0228 07/23/24 0228 --   Tympanic Monitor Lying Right arm       Pain Score       07/23/24 0531       7           Vitals:    07/23/24 0228 07/23/24 0245 07/23/24 0533   BP: 125/60 133/61 135/71   Pulse: 86 87 96   Patient Position - Orthostatic VS: Lying           Visual Acuity      ED Medications  Medications   acetaminophen (TYLENOL) tablet 650 mg (has no administration in time range)   ondansetron (ZOFRAN) injection 4 mg (has no administration in time range)   sodium chloride 0.9 % infusion (has no administration in time range)   enoxaparin (LOVENOX) subcutaneous injection 40 mg (has no administration in time range)   cefTRIAXone (ROCEPHIN) IVPB (premix in dextrose) 2,000 mg 50 mL (has no administration in time range)   budesonide (PULMICORT) inhalation solution 0.5 mg (has no administration in time range)   buPROPion (WELLBUTRIN) tablet 75 mg (has no administration in time range)   citalopram (CeleXA) tablet 10 mg (has no administration in time range)   ramelteon (ROZEREM) tablet 8 mg (has no administration in time range)   oxybutynin (DITROPAN-XL) 24 hr tablet 10 mg (has no administration in time range)   albuterol inhalation solution 2.5 mg (has no administration in time range)   diazepam (VALIUM) tablet 5 mg (has no administration in time range)   triamcinolone (KENALOG) 0.1 % cream (has no administration in time range)   saccharomyces boulardii (FLORASTOR)  capsule 250 mg (has no administration in time range)   ipratropium-albuterol (FOR EMS ONLY) (DUO-NEB) 0.5-2.5 mg/3 mL inhalation solution 3 mL (0 mL Does not apply Given to EMS 7/23/24 0237)   methylPREDNISolone sodium succinate (FOR EMS ONLY) (Solu-MEDROL) 125 MG injection 125 mg (0 mg Does not apply Given to EMS 7/23/24 0238)   ondansetron (FOR EMS ONLY) (ZOFRAN) 4 mg/2 mL injection 4 mg (0 mg Does not apply Given to EMS 7/23/24 0238)   cefepime (MAXIPIME) IVPB (premix in dextrose) 2,000 mg 50 mL (0 mg Intravenous Stopped 7/23/24 0430)   iohexol (OMNIPAQUE) 350 MG/ML injection (MULTI-DOSE) 100 mL (100 mL Intravenous Given 7/23/24 0330)       Diagnostic Studies  Results Reviewed       Procedure Component Value Units Date/Time    UA w Reflex to Microscopic w Reflex to Culture [[id number]]  (Normal) Collected: 07/23/24 0446    Lab Status: Final result Specimen: Urine, Other Updated: 07/23/24 0452     Color, UA Yellow     Clarity, UA Clear     Specific Gravity, UA 1.015     pH, UA 5.5     Leukocytes, UA Negative     Nitrite, UA Negative     Protein, UA Negative mg/dl      Glucose, UA Negative mg/dl      Ketones, UA Negative mg/dl      Urobilinogen, UA 0.2 E.U./dl      Bilirubin, UA Negative     Occult Blood, UA Negative    Blood culture #2 [[id number]] Collected: 07/23/24 0359    Lab Status: In process Specimen: Blood from Arm, Right Updated: 07/23/24 0407    Blood culture #1 [[id number]] Collected: 07/23/24 0359    Lab Status: In process Specimen: Blood from Hand, Right Updated: 07/23/24 0407    Procalcitonin [[id number]]  (Normal) Collected: 07/23/24 0317    Lab Status: Final result Specimen: Blood from Arm, Right Updated: 07/23/24 0347     Procalcitonin <0.05 ng/ml     Lactic acid [[id number]]  (Normal) Collected: 07/23/24 0317    Lab Status: Final result Specimen: Blood from Arm, Right Updated: 07/23/24 0338     LACTIC ACID 1.5 mmol/L     Narrative:      Result may be elevated if tourniquet was used during collection.     FLU/RSV/COVID - if FLU/RSV clinically relevant [[id number]]  (Normal) Collected: 07/23/24 0239    Lab Status: Final result Specimen: Nares from Nose Updated: 07/23/24 0322     SARS-CoV-2 Negative     INFLUENZA A PCR Negative     INFLUENZA B PCR Negative     RSV PCR Negative    Narrative:      FOR PEDIATRIC PATIENTS - copy/paste COVID Guidelines URL to browser: [email or web address]    SARS-CoV-2 assay is a Nucleic Acid Amplification assay intended for the  qualitative detection of nucleic acid from SARS-CoV-2 in nasopharyngeal  swabs. Results are for the presumptive identification of SARS-CoV-2 RNA.    Positive results are indicative of infection with SARS-CoV-2, the virus  causing COVID-19, but do not rule out bacterial infection or co-infection  with other viruses. Laboratories within the [place] and its  territories are required to report all positive results to the appropriate  public health authorities. Negative results do not preclude SARS-CoV-2  infection and should not be used as the sole basis for treatment or other  patient management decisions. Negative results must be combined with  clinical observations, patient history, and epidemiological information.  This test has not been FDA cleared or approved.    This test has been authorized by FDA under an Emergency Use Authorization  (EUA). This test is only authorized for the duration of time the  declaration that circumstances exist justifying the authorization of the  emergency use of an in vitro diagnostic tests for detection of SARS-CoV-2  virus and/or diagnosis of COVID-19 infection under section 564(b)(1) of  the Act, 21 U.S.C. 360bbb-3(b)(1), unless the authorization is terminated  or revoked sooner. The test has been validated but independent review by FDA  and CLIA is pending.    Test performed using [employer]: This RT-PCR assay targets N2,  a region unique to SARS-CoV-2. A  conserved region in the E-gene was chosen  for pan-Sarbecovirus detection which includes SARS-CoV-2.    According to CMS-2020-01-R, this platform meets the definition of high-throughput technology.    HS Troponin 0hr (reflex protocol) [[id number]]  (Normal) Collected: 07/23/24 0238    Lab Status: Final result Specimen: Blood from Arm, Right Updated: 07/23/24 0310     hs TnI 0hr 3 ng/L     Hepatic function panel [[id number]]  (Normal) Collected: 07/23/24 0238    Lab Status: Final result Specimen: Blood from Arm, Right Updated: 07/23/24 0306     Total Bilirubin 0.26 mg/dL      Bilirubin, Direct 0.04 mg/dL      Alkaline Phosphatase 73 U/L      AST 18 U/L      ALT 14 U/L      Total Protein 7.5 g/dL      Albumin 4.1 g/dL     Lipase [[id number]]  (Normal) Collected: 07/23/24 0238    Lab Status: Final result Specimen: Blood from Arm, Right Updated: 07/23/24 0306     Lipase 22 u/L     Basic metabolic panel [[id number]]  (Abnormal) Collected: 07/23/24 0238    Lab Status: Final result Specimen: Blood from Arm, Right Updated: 07/23/24 0306     Sodium 137 mmol/L      Potassium 4.4 mmol/L      Chloride 100 mmol/L      CO2 30 mmol/L      ANION GAP 7 mmol/L      BUN 24 mg/dL      Creatinine 0.36 mg/dL      Glucose 143 mg/dL      Calcium 9.2 mg/dL      eGFR 106 ml/min/1.73sq m     Narrative:      National Kidney Disease Foundation guidelines for Chronic Kidney Disease (CKD):     Stage 1 with normal or high GFR (GFR > 90 mL/min/1.73 square meters)    Stage 2 Mild CKD (GFR = 60-89 mL/min/1.73 square meters)    Stage 3A Moderate CKD (GFR = 45-59 mL/min/1.73 square meters)    Stage 3B Moderate CKD (GFR = 30-44 mL/min/1.73 square meters)    Stage 4 Severe CKD (GFR = 15-29 mL/min/1.73 square meters)    Stage 5 End Stage CKD (GFR <15 mL/min/1.73 square meters)  Note: GFR calculation is accurate only with a steady state creatinine    Magnesium [[id number]]  (Normal) Collected: 07/23/24 0238    Lab Status: Final result Specimen: Blood from Arm,  Right Updated: 07/23/24 0306     Magnesium 2.0 mg/dL     D-Dimer [[id number]]  (Abnormal) Collected: 07/23/24 0238    Lab Status: Final result Specimen: Blood from Arm, Right Updated: 07/23/24 0302     D-Dimer, Quant 0.59 ug/ml FEU     Narrative:      In the evaluation for possible pulmonary embolism, in the appropriate (Well's Score of 4 or less) patient, the age adjusted d-dimer cutoff for this patient can be calculated as:    Age x 0.01 (in ug/mL) for Age-adjusted D-dimer exclusion threshold for a patient over 50 years.    Protime-INR [[id number]]  (Normal) Collected: 07/23/24 0238    Lab Status: Final result Specimen: Blood from Arm, Right Updated: 07/23/24 0259     Protime 12.3 seconds      INR 0.90    APTT [[id number]]  (Normal) Collected: 07/23/24 0238    Lab Status: Final result Specimen: Blood from Arm, Right Updated: 07/23/24 0259     PTT 28 seconds     CBC and differential [[id number]]  (Abnormal) Collected: 07/23/24 0238    Lab Status: Final result Specimen: Blood from Arm, Right Updated: 07/23/24 0245     WBC 25.38 Thousand/uL      RBC 5.10 Million/uL      Hemoglobin 13.2 g/dL      Hematocrit 43.4 %      MCV 85 fL      MCH 25.9 pg      MCHC 30.4 g/dL      RDW 18.0 %      MPV 10.4 fL      Platelets 466 Thousands/uL      nRBC 0 /100 WBCs      Segmented % 77 %      Immature Grans % 1 %      Lymphocytes % 14 %      Monocytes % 7 %      Eosinophils Relative 1 %      Basophils Relative 0 %      Absolute Neutrophils 19.78 Thousands/µL      Absolute Immature Grans 0.13 Thousand/uL      Absolute Lymphocytes 3.50 Thousands/µL      Absolute Monocytes 1.66 Thousand/µL      Eosinophils Absolute 0.24 Thousand/µL      Basophils Absolute 0.07 Thousands/µL     Blood gas, venous [[id number]] Collected: 07/23/24 0238    Lab Status: Final result Specimen: Blood from Arm, Right Updated: 07/23/24 0245     pH, Rodo 7.369     pCO2, Rodo 49.0 mm Hg      pO2, Rodo 36.7 mm Hg      HCO3, Rodo 27.6 mmol/L      Base Excess, Rodo 1.6 mmol/L       O2 Content, Rodo 13.4 ml/dL      O2 HGB, VENOUS 66.6 %                    CT chest abdomen pelvis w contrast   Final Result by Ana Bruno MD (07/23 0357)      Findings suggestive of cystitis.      Bibasilar atelectasis               Workstation performed: [id number]         XR chest 1 view portable    (Results Pending)              Procedures  Procedures         ED Course  ED Course as of 07/23/24 0600   Tue Jul 23, 2024   0245 Xrays viewed and interpreted independently by me: No acute disease.     0253 WBC(!): 25.38  Patient has wound on the dorsum of the right foot which could be the source however also complaining chest pain shortness of breath although no obvious infiltrate on x-ray.   0317 History of diverticulitis in the past with similar robust leukocytosis                                 SBIRT 22yo+      Flowsheet Row Most Recent Value   Initial Alcohol Screen: US AUDIT-C     1. How often do you have a drink containing alcohol? 0 Filed at: 07/23/2024 0229   2. How many drinks containing alcohol do you have on a typical day you are drinking?  0 Filed at: 07/23/2024 0229   3a. Male UNDER 65: How often do you have five or more drinks on one occasion? 0 Filed at: 07/23/2024 0229   3b. FEMALE Any Age, or MALE 65+: How often do you have 4 or more drinks on one occassion? 0 Filed at: 07/23/2024 0229   Audit-C Score 0 Filed at: 07/23/2024 0229   BAKARI: How many times in the past year have you...    Used an illegal drug or used a prescription medication for non-medical reasons? Never Filed at: 07/23/2024 0229                      Medical Decision Making  74-year-old female history of muscular dystrophy, COPD presents with shortness of breath, as well as pleuritic chest pain.    Differential includes PE, COPD exacerbation although only scattered wheezing at this time.  Patient says symptoms have improved since she made contact with EMS who provided DuoNeb and Medrol.  Will also get troponin.  EKG nonischemic.   Will get a chest x-ray to look for pneumothorax and pneumonia.    Problems Addressed:  Cellulitis: acute illness or injury  Cystitis: acute illness or injury  Leukocytosis: acute illness or injury    Amount and/or Complexity of Data Reviewed  Labs: ordered. Decision-making details documented in ED Course.  Radiology: ordered and independent interpretation performed. Decision-making details documented in ED Course.    Risk  Prescription drug management.  Decision regarding hospitalization.                 Disposition  Final diagnoses:   Cellulitis   Cystitis   Leukocytosis     Time reflects when diagnosis was documented in both MDM as applicable and the Disposition within this note       Time User Action Codes Description Comment    7/23/2024  4:08 AM Jason De La Cruz [L03.90] Cellulitis     7/23/2024  4:08 AM Jason De La Cruz [N30.90] Cystitis     7/23/2024  4:09 AM Jason De La Cruz [D72.829] Leukocytosis           ED Disposition       ED Disposition   Admit    Condition   Stable    Date/Time   Tue Jul 23, 2024 0408    Comment   Case was discussed with efrain and the patient's admission status was agreed to be Admission Status: inpatient status to the service of Dr. lozada .               Follow-up Information    None         Current Discharge Medication List        CONTINUE these medications which have NOT CHANGED    Details   albuterol (2.5 mg/3 mL) 0.083 % nebulizer solution Take 3 mL (2.5 mg total) by nebulization every 4 (four) hours as needed for wheezing or shortness of breath  Qty: 180 mL, Refills: 0    Associated Diagnoses: COPD with acute exacerbation (HCC)      buPROPion (WELLBUTRIN) 75 mg tablet take 1 tablet by mouth every morning  Qty: 100 tablet, Refills: 1    Associated Diagnoses: Mild episode of recurrent major depressive disorder (HCC)      citalopram (CeleXA) 10 mg tablet Take 1 tablet (10 mg total) by mouth daily  Qty: 90 tablet, Refills: 1    Associated Diagnoses: Mild episode of  recurrent major depressive disorder (HCC)      fluticasone (FLONASE) 50 mcg/act nasal spray 2 sprays into each nostril daily  Qty: 16 g, Refills: 3    Associated Diagnoses: Rhinitis, unspecified type      ketoconazole (NIZORAL) 2 % cream APPLY TWICE A DAY TO THE AFFECTED AREA(S)      ketoconazole (NIZORAL) 2 % shampoo apply to affected area two times a week  Qty: 120 mL, Refills: 1    Associated Diagnoses: Seborrhea capitis      Probiotic Product (Florastor Plus) CAPS       ramelteon (ROZEREM) 8 mg tablet Take 8 mg by mouth daily at bedtime      solifenacin (VESICARE) 10 MG tablet take 1 tablet by mouth once daily  Qty: 90 tablet, Refills: 1    Associated Diagnoses: Overactive bladder      triamcinolone (KENALOG) 0.1 % cream Apply topically 2 (two) times a day as needed for irritation or rash  Qty: 30 g, Refills: 2    Associated Diagnoses: Rash      budesonide (Pulmicort) 0.5 mg/2 mL nebulizer solution Take 2 mL (0.5 mg total) by nebulization 2 (two) times a day Rinse mouth after use.  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    PDMP Review         Value Time User    PDMP Reviewed  Yes 7/5/2023 11:42 AM Julienne Tucker DO            ED Provider  Electronically Signed by             Jason De La Cruz DO  07/23/24 0600

## 2024-07-23 NOTE — CONSULTS
Podiatry - Consultation    Patient Information:   Claire Doherty 74 y.o. female MRN: [id number]  Unit/Bed#: -01 Encounter: [id number]  PCP: Julienne Tucker DO  Date of Admission:  7/23/2024  Date of Consultation: 07/23/24  Requesting Physician: Sosa Tang DO      ASSESSMENT:    Claire Doherty is a 74 y.o. female with:    Dorsal right foot wound with localized erythema  Former smoker      PLAN:    Right foot dressings changed, no need for podiatric surgical intervention at this time. Noted localized erythema, low concern for infection at this time, can continue antibiotic course per primary team.  Local wound care consisting of santyl, xeroform, DSD. Wound care instructions placed.  Elevation on green foam wedges or pillows when non-ambulatory.  Rest of care per primary team.  Recommend VNA 3x a week for wound care upon discharge.  F/u outpatient in woundcare.   Podiatry signing off at this time, please reconsult as needed.    Weightbearing status: as tolerated    SUBJECTIVE:    History of Present Illness:    Claire Doherty is a 74 y.o. female who is originally admitted 7/23/2024 due to chest/flank pain with SOB. Patient has a past medical history of restrictive lung disease due to muscular dystrophy, chronic respiratory failure, depression, GERD, urinary incontinence.    We are consulted for right foot wound. Patient report fall last week where she scraped her foot and has since has noticed redness. She states redness has remained about the same since last week. She denies and pain, or swelling to foot.    Review of Systems:    Constitutional: Negative.    HENT: Negative.    Eyes: Negative.    Respiratory: SOB   Cardiovascular: chest pain  Gastrointestinal: Negative.    Musculoskeletal: muscular dystrophy, arthralgias  Skin:right foot wound  Neurological: generalized weakness  Psych: Negative.     Past Medical and Surgical History:     Past Medical History:   Diagnosis Date    Anxiety      Breast cancer ([facility]) 2007    Colitis     Depression     Difficult intubation     Excessive daytime sleepiness     GERD (gastroesophageal reflux disease)     Hyperlipidemia     Ischemic colitis ([facility]) 01/21/2019    Leukocytosis 03/28/2020    Muscular dystrophy ([facility])     Limb-girdle    Osteoporosis     Overactive bladder     Perforated diverticulum 03/28/2020    Pneumonitis     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[facility])     Skin cancer     Skin disorder     Tachycardia 06/02/2021    Vitamin D deficiency        Past Surgical History:   Procedure Laterality Date    COLONOSCOPY N/A 1/22/2019    Procedure: COLONOSCOPY;  Surgeon: Anthony Mejía MD;  Location: [facility];  Service: Colorectal    CYSTOSCOPY N/A 9/30/2020    Procedure: CYSTOSCOPY; BILATERAL URETERAL CATHETER PLACEMENT, CYSTOTOMY;  Surgeon: Zach Tyler MD;  Location[facility];  Service: Urology    FL CYSTOGRAM  10/15/2020    HARTMANS PROCEDURE N/A 9/30/2020    Procedure: EXPLORATORY LAPAROTOMY; LYSIS OF ADHESIONS; WASHOUT PELVIC ABSCESS; COMPLEX SIGMOID COLON RESECTION; LOOP TRANSVERSE COLOSTOMY;  Surgeon: Thania Jaffe MD;  Location: [facility];  Service: General    IR DRAINAGE TUBE PLACEMENT  9/24/2020    MASTECTOMY Left 2007    left breast mastectomy     TONSILLECTOMY      TOOTH EXTRACTION      TUBAL LIGATION         Meds/Allergies:      Medications Prior to Admission:     albuterol (2.5 mg/3 mL) 0.083 % nebulizer solution    buPROPion (WELLBUTRIN) 75 mg tablet    citalopram (CeleXA) 10 mg tablet    fluticasone (FLONASE) 50 mcg/act nasal spray    ketoconazole (NIZORAL) 2 % cream    ketoconazole (NIZORAL) 2 % shampoo    Probiotic Product (Florastor Plus) CAPS    ramelteon (ROZEREM) 8 mg tablet    solifenacin (VESICARE) 10 MG tablet    triamcinolone (KENALOG) 0.1 % cream    budesonide (Pulmicort) 0.5 mg/2 mL nebulizer solution    diazepam (VALIUM) 5 mg tablet    Allergies   Allergen Reactions    Amoxicillin      Vague rash in  the 90s, tolerated zosyn on 2020 admission     Codeine      Other reaction(s): Other (See Comments)  n/v    Medical Tape Itching     bandaids       Social History:     Marital Status:     Substance Use History:   Social History     Substance and Sexual Activity   Alcohol Use Not Currently    Comment: social drinker per allscripts      Social History     Tobacco Use   Smoking Status Former    Current packs/day: 0.00    Average packs/day: 1.5 packs/day for 37.0 years (55.5 ttl pk-yrs)    Types: Cigarettes    Start date:     Quit date:     Years since quittin.5   Smokeless Tobacco Never     Social History     Substance and Sexual Activity   Drug Use Yes    Types: Marijuana    Comment: medical marijuana       Family History:    Family History   Problem Relation Age of Onset    Other Mother         bone loss    Arthritis Mother     Skin cancer Father     Hypertension Father         benign     Other Father         bone loss    Muscular dystrophy Father     Hypertension Sister     No Known Problems Sister     Muscular dystrophy Brother         of the limb gridle     Parkinsonism Brother     No Known Problems Brother     No Known Problems Maternal Grandmother     No Known Problems Paternal Grandmother     No Known Problems Maternal Aunt     Muscular dystrophy Family         of the limb girdle         OBJECTIVE:    Vitals:   Blood Pressure: 145/73 (24 0742)  Pulse: 97 (24 0742)  Temperature: 98.6 °F (37 °C) (24 0742)  Temp Source: Tympanic (24 0742)  Respirations: 18 (24 0742)  SpO2: 91 % (24 0742)    Physical Exam:    General Appearance: Alert, cooperative, no distress.  HEENT: Head normocephalic, atraumatic, without obvious abnormality.  Heart: Normal rate and rhythm.  Lungs: Non-labored breathing. No respiratory distress.  Abdomen: Without distension.  Psychiatric: AAOx3  Lower Extremity:  Vascular:   Right DP and PT pulses are present. Left DP and PT pulses are  present. CRT < 3 seconds at the digits. +0/4 edema noted at bilateral lower extremities. Pedal hair is absent. Skin temperature is WNL bilaterally.    Musculoskeletal:  MMT is 4/5 in all muscle compartments bilaterally. ROM at the 1st MPJ and ankle joint are inact bilaterally with the leg extended. Pain on palpation of right dorsal foot wound. No gross deformities noted.     Dermatological:  Lower extremity wound(s) as noted below:    Wound #: 1  Location: right dorsal foot  Length 1cm: Width 0.8cm: Depth 0.2cm:   Deepest Tissue Noted in Base: subcutaneous  Probe to Bone: No  Peripheral Skin Description: Attached  Granulation: 10% Fibrotic Tissue: 90% Necrotic Tissue: 0%   Drainage Amount: minimal, clear  Signs of Infection: No      Neurological:  Gross sensation is intact. Protective sensation is intact. Patient Denies numbness and/or paresthesias.    Clinical Images 07/23/24:      Additional data:     Lab Results: I have personally reviewed pertinent labs including:    Results from last 7 days   Lab Units 07/23/24  0238   WBC Thousand/uL 25.38*   HEMOGLOBIN g/dL 13.2   HEMATOCRIT % 43.4   PLATELETS Thousands/uL 466*   SEGS PCT % 77*   LYMPHO PCT % 14   MONO PCT % 7   EOS PCT % 1     Results from last 7 days   Lab Units 07/23/24  0238   POTASSIUM mmol/L 4.4   CHLORIDE mmol/L 100   CO2 mmol/L 30   BUN mg/dL 24   CREATININE mg/dL 0.36*   CALCIUM mg/dL 9.2   ALK PHOS U/L 73   ALT U/L 14   AST U/L 18     Results from last 7 days   Lab Units 07/23/24  0238   INR  0.90       Cultures: I have personally reviewed pertinent cultures including:    Results from last 7 days   Lab Units 07/23/24  0359   BLOOD CULTURE  Received in Microbiology Lab. Culture in Progress.  Received in Microbiology Lab. Culture in Progress.           Imaging: I have personally reviewed pertinent reports in PACS.  EKG, Pathology, and Other Studies: I have personally reviewed pertinent reports.        ** Please Note: Portions of the record may have  "been created with voice recognition software. Occasional wrong word or \"sound a like\" substitutions may have occurred due to the inherent limitations of voice recognition software. Read the chart carefully and recognize, using context, where substitutions have occurred. **   "  used

## 2024-07-23 NOTE — H&P
[facility]  H&P  Name: Claire Doherty 74 y.o. female I MRN: [id number]  Unit/Bed#: -01 I Date of Admission: 7/23/2024   Date of Service: 7/23/2024 I Hospital Day: 0      Assessment & Plan   * Cellulitis of right foot  Assessment & Plan  Patient presents to the ED secondary to sudden nausea, chest pain, flank pain and shortness of breath,  Hx of right foot wound last week  WBC 25k  Lactic and procalcitonin normal  UA negative  IV abx  Podiatry consult for wound recommendations      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)  Assessment & Plan  Supportive care  Respiratory protocol    Chronic respiratory failure with hypoxia and hypercapnia (HCC)  Assessment & Plan  Continue home oxygen    Depression  Assessment & Plan  Continue home medications    GERD without esophagitis  Assessment & Plan  Continue PPI    Urinary incontinence  Assessment & Plan  Oxybutynin substituted for Vesicare         VTE Pharmacologic Prophylaxis: VTE Score: 7 High Risk (Score >/= 5) - Pharmacological DVT Prophylaxis Ordered: enoxaparin (Lovenox). Sequential Compression Devices Ordered.  Code Status: Level 3  Discussion with patient    Anticipated Length of Stay: Patient will be admitted on an inpatient basis with an anticipated length of stay of greater than 2 midnights secondary to IV abx, follow up cultures.    Total Time Spent on Date of Encounter in care of patient: 75 mins. This time was spent on one or more of the following: performing physical exam; counseling and coordination of care; obtaining or reviewing history; documenting in the medical record; reviewing/ordering tests, medications or procedures; communicating with other healthcare professionals and discussing with patient's family/caregivers.    Chief Complaint: nausea, flank pain, chest pain, sob.     History of Present Illness:  Claire Doherty is a 74 y.o. female with a PMH of muscular dystrophy, restrictive lung disease on 2L NC  chronic, depression, overactive bladder, history of breast cancer who presents with nausea, flank pain, chest pain, sob. Patient was brought in by EMS secondary to sudden onset nausea, flank pain, chest pain and SOB. EMS gave her a DuoNeb, solumedrol 125mg and 4mg Zofran. Wears chronic home oxygen. Patient reports pain in left side, chest pain. She noted she did physical challenging trying to get out of bed and then laid back down, She tried to go back to sleep she felt nauseas and dizzy, had low oxygen. She feels that her chest hurts when she breathes. She had a fall last Thursday or Friday and scraped her foot, she notes some redness around wound.     Review of Systems:  Review of Systems   Constitutional:  Negative for chills and fever.   HENT:  Negative for rhinorrhea, sore throat and trouble swallowing.    Eyes:  Negative for discharge and redness.   Respiratory:  Positive for shortness of breath. Negative for cough.    Cardiovascular:  Positive for chest pain. Negative for leg swelling.   Gastrointestinal:  Negative for abdominal pain, diarrhea, nausea and vomiting.   Genitourinary:  Negative for dysuria and hematuria.   Musculoskeletal:  Positive for arthralgias. Negative for back pain, myalgias and neck pain.   Skin:  Positive for color change and wound.   Neurological:  Positive for weakness. Negative for dizziness and headaches.   Psychiatric/Behavioral:  Negative for agitation and confusion.        Past Medical and Surgical History:   Past Medical History:   Diagnosis Date    Anxiety     Breast cancer ([facility]) 2007    Colitis     Depression     Difficult intubation     Excessive daytime sleepiness     GERD (gastroesophageal reflux disease)     Hyperlipidemia     Ischemic colitis ([facility]) 01/21/2019    Leukocytosis 03/28/2020    Muscular dystrophy ([facility])     Limb-girdle    Osteoporosis     Overactive bladder     Perforated diverticulum 03/28/2020    Pneumonitis     Restrictive lung disease due to muscular  dystrophy (HCC)     Skin cancer     Skin disorder     Tachycardia 06/02/2021    Vitamin D deficiency        Past Surgical History:   Procedure Laterality Date    COLONOSCOPY N/A 1/22/2019    Procedure: COLONOSCOPY;  Surgeon: Anthony Mejía MD;  Location: [facility];  Service: Colorectal    CYSTOSCOPY N/A 9/30/2020    Procedure: CYSTOSCOPY; BILATERAL URETERAL CATHETER PLACEMENT, CYSTOTOMY;  Surgeon: Zach Tyler MD;  [facility];  Service: Urology    FL CYSTOGRAM  10/15/2020    HARTMANS PROCEDURE N/A 9/30/2020    Procedure: EXPLORATORY LAPAROTOMY; LYSIS OF ADHESIONS; WASHOUT PELVIC ABSCESS; COMPLEX SIGMOID COLON RESECTION; LOOP TRANSVERSE COLOSTOMY;  Surgeon: Thania Jaffe MD;  [facility];  Service: General    IR DRAINAGE TUBE PLACEMENT  9/24/2020    MASTECTOMY Left 2007    left breast mastectomy     TONSILLECTOMY      TOOTH EXTRACTION      TUBAL LIGATION         Meds/Allergies:  Prior to Admission medications    Medication Sig Start Date End Date Taking? Authorizing Provider   albuterol (2.5 mg/3 mL) 0.083 % nebulizer solution Take 3 mL (2.5 mg total) by nebulization every 4 (four) hours as needed for wheezing or shortness of breath 6/15/23   Julienne Tucker,    budesonide (Pulmicort) 0.5 mg/2 mL nebulizer solution Take 2 mL (0.5 mg total) by nebulization 2 (two) times a day Rinse mouth after use. 6/15/23   Julienne Tucker, DO   buPROPion (WELLBUTRIN) 75 mg tablet take 1 tablet by mouth every morning 7/22/24   Julienne Tucker DO   citalopram (CeleXA) 10 mg tablet Take 1 tablet (10 mg total) by mouth daily 4/6/23   Julienne Tucker DO   diazepam (VALIUM) 5 mg tablet Take 1 tablet (5 mg total) by mouth every 8 (eight) hours as needed for anxiety or muscle spasms 7/5/23   Julienne Tucker DO   fluticasone (FLONASE) 50 mcg/act nasal spray 2 sprays into each nostril daily 6/15/23   Julienne Tucker DO   ketoconazole (NIZORAL) 2 % cream APPLY TWICE A DAY TO THE AFFECTED  AREA(S) 20   Historical Provider, MD   ketoconazole (NIZORAL) 2 % shampoo apply to affected area two times a week 22   Julienne Tucker DO   omeprazole (PriLOSEC) 20 mg delayed release capsule Take 1 capsule (20 mg total) by mouth every morning 23   Julienne Tucker DO   Probiotic Product (Florastor Plus) CAPS     Historical Provider, MD   ramelteon (ROZEREM) 8 mg tablet Take 8 mg by mouth daily at bedtime 23   Historical Provider, MD   solifenacin (VESICARE) 10 MG tablet take 1 tablet by mouth once daily 24   Julienne Tucker DO   triamcinolone (KENALOG) 0.1 % cream Apply topically 2 (two) times a day as needed for irritation or rash 24   Julienne Tucker DO     I have reviewed home medications with patient personally.    Allergies:   Allergies   Allergen Reactions    Amoxicillin      Vague rash in the 90s, tolerated zosyn on 2020 admission     Codeine      Other reaction(s): Other (See Comments)  n/v    Medical Tape Itching     bandaids       Social History:  Marital Status:    Occupation: Retired  Patient Pre-hospital Living Situation: Home  Patient Pre-hospital Level of Mobility: power wheelchair  Patient Pre-hospital Diet Restrictions: none  Substance Use History:   Social History     Substance and Sexual Activity   Alcohol Use Not Currently    Comment: social drinker per allscripts      Social History     Tobacco Use   Smoking Status Former    Current packs/day: 0.00    Average packs/day: 1.5 packs/day for 37.0 years (55.5 ttl pk-yrs)    Types: Cigarettes    Start date:     Quit date:     Years since quittin.5   Smokeless Tobacco Never     Social History     Substance and Sexual Activity   Drug Use Yes    Types: Marijuana    Comment: medical marijuana       Family History:  Family History   Problem Relation Age of Onset    Other Mother         bone loss    Arthritis Mother     Skin cancer Father     Hypertension Father         benign     Other Father          bone loss    Muscular dystrophy Father     Hypertension Sister     No Known Problems Sister     Muscular dystrophy Brother         of the limb gridle     Parkinsonism Brother     No Known Problems Brother     No Known Problems Maternal Grandmother     No Known Problems Paternal Grandmother     No Known Problems Maternal Aunt     Muscular dystrophy Family         of the limb girdle       Physical Exam:     Vitals:   Blood Pressure: 135/71 (07/23/24 0533)  Pulse: 96 (07/23/24 0533)  Temperature: (!) 97.2 °F (36.2 °C) (07/23/24 0232)  Temp Source: Tympanic (07/23/24 0232)  Respirations: 17 (07/23/24 0533)  SpO2: 94 % (07/23/24 0533)    Physical Exam  Vitals reviewed.   Constitutional:       Appearance: Normal appearance. She is obese.      Comments: Elderly  female with nasal cannula in place   HENT:      Head: Normocephalic and atraumatic.      Nose: Nose normal.   Eyes:      General:         Right eye: No discharge.         Left eye: No discharge.      Extraocular Movements: Extraocular movements intact.      Conjunctiva/sclera: Conjunctivae normal.      Comments: Glasses in place   Cardiovascular:      Rate and Rhythm: Normal rate and regular rhythm.   Pulmonary:      Effort: Pulmonary effort is normal. No respiratory distress.      Breath sounds: Decreased breath sounds present. No wheezing.   Abdominal:      General: Bowel sounds are normal. There is no distension.      Palpations: Abdomen is soft.      Tenderness: There is no abdominal tenderness. There is no guarding.   Musculoskeletal:         General: No swelling or tenderness. Normal range of motion.      Cervical back: Normal range of motion.      Right lower leg: No edema.      Left lower leg: No edema.   Skin:     General: Skin is warm and dry.      Capillary Refill: Capillary refill takes less than 2 seconds.      Comments: Right foot with erythema and warmth and small ulceration dorsum left foot    Neurological:      General: No focal deficit  "present.      Mental Status: She is alert and oriented to person, place, and time. Mental status is at baseline.      Motor: Weakness present.   Psychiatric:         Mood and Affect: Mood normal.         Behavior: Behavior normal.         Thought Content: Thought content normal.         Judgment: Judgment normal.            Additional Data:     Lab Results:  Results from last 7 days   Lab Units 07/23/24  0238   WBC Thousand/uL 25.38*   HEMOGLOBIN g/dL 13.2   HEMATOCRIT % 43.4   PLATELETS Thousands/uL 466*   SEGS PCT % 77*   LYMPHO PCT % 14   MONO PCT % 7   EOS PCT % 1     Results from last 7 days   Lab Units 07/23/24  0238   SODIUM mmol/L 137   POTASSIUM mmol/L 4.4   CHLORIDE mmol/L 100   CO2 mmol/L 30   BUN mg/dL 24   CREATININE mg/dL 0.36*   ANION GAP mmol/L 7   CALCIUM mg/dL 9.2   ALBUMIN g/dL 4.1   TOTAL BILIRUBIN mg/dL 0.26   ALK PHOS U/L 73   ALT U/L 14   AST U/L 18   GLUCOSE RANDOM mg/dL 143*     Results from last 7 days   Lab Units 07/23/24  0238   INR  0.90         No results found for: \"HGBA1C\"  Results from last 7 days   Lab Units 07/23/24  0317   LACTIC ACID mmol/L 1.5   PROCALCITONIN ng/ml <0.05       Lines/Drains:  Invasive Devices       Peripheral Intravenous Line  Duration             Peripheral IV 07/23/24 Dorsal (posterior);Right Wrist <1 day    Peripheral IV 07/23/24 Right;Ventral (anterior) Forearm <1 day              Drain  Duration             Colostomy Loop LUQ 1391 days    Ureteral Drain/Stent Left ureter 5 Fr. 1391 days    Ureteral Drain/Stent Right ureter 5 Fr. 1391 days                  Imaging: Reviewed radiology reports from this admission including: chest CT scan and abdominal/pelvic CT  CT chest abdomen pelvis w contrast   Final Result by Ana Bruno MD (07/23 0357)      Findings suggestive of cystitis.      Bibasilar atelectasis               Workstation performed: [id number]         XR chest 1 view portable    (Results Pending)       EKG and Other Studies Reviewed on Admission: "   EKG: Personally Reviewed. NSR. HR 89.    ** Please Note: This note has been constructed using a voice recognition system. **

## 2024-07-23 NOTE — PLAN OF CARE
Problem: Prexisting or High Potential for Compromised Skin Integrity  Goal: Skin integrity is maintained or improved  Description: INTERVENTIONS:  - Identify patients at risk for skin breakdown  - Assess and monitor skin integrity  - Assess and monitor nutrition and hydration status  - Monitor labs   - Assess for incontinence   - Turn and reposition patient  - Assist with mobility/ambulation  - Relieve pressure over bony prominences  - Avoid friction and shearing  - Provide appropriate hygiene as needed including keeping skin clean and dry  - Evaluate need for skin moisturizer/barrier cream  - Collaborate with interdisciplinary team   - Patient/family teaching  - Consider wound care consult   Outcome: Progressing     Problem: PAIN - ADULT  Goal: Verbalizes/displays adequate comfort level or baseline comfort level  Description: Interventions:  - Encourage patient to monitor pain and request assistance  - Assess pain using appropriate pain scale  - Administer analgesics based on type and severity of pain and evaluate response  - Implement non-pharmacological measures as appropriate and evaluate response  - Consider cultural and social influences on pain and pain management  - Notify physician/advanced practitioner if interventions unsuccessful or patient reports new pain  Outcome: Progressing     Problem: INFECTION - ADULT  Goal: Absence or prevention of progression during hospitalization  Description: INTERVENTIONS:  - Assess and monitor for signs and symptoms of infection  - Monitor lab/diagnostic results  - Monitor all insertion sites, i.e. indwelling lines, tubes, and drains  - Administer medications as ordered  - Instruct and encourage patient and family to use good hand hygiene technique  - Identify and instruct in appropriate isolation precautions for identified infection/condition  Outcome: Progressing     Problem: SAFETY ADULT  Goal: Patient will remain free of falls  Description: INTERVENTIONS:  - Educate  patient/family on patient safety including physical limitations  - Instruct patient to call for assistance with activity   - Consult OT/PT to assist with strengthening/mobility   - Keep Call bell within reach  - Keep bed low and locked with side rails adjusted as appropriate  - Keep care items and personal belongings within reach  - Initiate and maintain comfort rounds  - Make Fall Risk Sign visible to staff  - Offer Toileting every 2 Hours, in advance of need  - Initiate/Maintain bed alarm  - Apply yellow socks and bracelet for high fall risk patients  - Consider moving patient to room near nurses station  Outcome: Progressing     Problem: DISCHARGE PLANNING  Goal: Discharge to home or other facility with appropriate resources  Description: INTERVENTIONS:  - Identify barriers to discharge w/patient and caregiver  - Arrange for needed discharge resources and transportation as appropriate  - Identify discharge learning needs (meds, wound care, etc.)  - Arrange for interpretive services to assist at discharge as needed  - Refer to Case Management Department for coordinating discharge planning if the patient needs post-hospital services based on physician/advanced practitioner order or complex needs related to functional status, cognitive ability, or social support system  Outcome: Progressing     Problem: Knowledge Deficit  Goal: Patient/family/caregiver demonstrates understanding of disease process, treatment plan, medications, and discharge instructions  Description: Complete learning assessment and assess knowledge base.  Interventions:  - Provide teaching at level of understanding  - Provide teaching via preferred learning methods  Outcome: Progressing     Problem: RESPIRATORY - ADULT  Goal: Achieves optimal ventilation and oxygenation  Description: INTERVENTIONS:  - Assess for changes in respiratory status  - Assess for changes in mentation and behavior  - Position to facilitate oxygenation and minimize  respiratory effort  - Oxygen administered by appropriate delivery if ordered  - Initiate smoking cessation education as indicated  - Encourage broncho-pulmonary hygiene including cough, deep breathe, Incentive Spirometry  - Assess the need for suctioning and aspirate as needed  - Assess and instruct to report SOB or any respiratory difficulty  - Respiratory Therapy support as indicated  Outcome: Progressing     Problem: SKIN/TISSUE INTEGRITY - ADULT  Goal: Skin Integrity remains intact(Skin Breakdown Prevention)  Description: Assess:  -Perform Collins assessment every shift  -Clean and moisturize skin every shift  -Inspect skin when repositioning, toileting, and assisting with ADLS  -Assess under medical devices such as masimo every shift  -Assess extremities for adequate circulation and sensation     Bed Management:  -Have minimal linens on bed & keep smooth, unwrinkled  -Change linens as needed when moist or perspiring  -Avoid sitting or lying in one position for more than 2 hours while in bed  -Keep HOB at 30 degrees     Toileting:  -Offer bedside commode  -Assess for incontinence every 2 hours  -Use incontinent care products after each incontinent episode such as hydraguard    Activity:    -Encourage or provide ROM exercises   -Turn and reposition patient every 2 Hours  -Use appropriate equipment to lift or move patient in bed  -Instruct/ Assist with weight shifting every 20 min when out of bed in chair  -Consider limitation of chair time 2 hour intervals    Skin Care:  -Avoid use of baby powder, tape, friction and shearing, hot water or constrictive clothing  -Relieve pressure over bony prominences using hydraguard  -Do not massage red bony areas    Outcome: Progressing

## 2024-07-23 NOTE — DISCHARGE INSTR - AVS FIRST PAGE
Discharge Instructions - Podiatry    Weight Bearing Status: Weight bearing instructions not necessary at this time                    Pain: Continue analgesics as directed    Follow-up appointment instructions: Please make an appointment within one week of discharge with [facility] wound care, Dr. Guy or Dr. Ruiz. Contact sooner if any increase in pain, or signs of infection occur    Wound Care: Leave dressings clean, dry, and intact between professional dressing changes    Nursing Instructions: Please apply  santyl, xeroform . Then cover with Gauze and secure with Kerlix and tape. Please change dressing every Monday, Wednesday, and Friday .

## 2024-07-23 NOTE — RESPIRATORY THERAPY NOTE
RT Protocol Note  Claire Doherty 74 y.o. 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[facility])    GERD without esophagitis    Chronic respiratory failure with hypoxia and hypercapnia ([facility])    Depression      Home Pulmonary Medications:  Albuterol Neb PRN  Pulmicort Neb PRN     Home Devices/Therapy: BiPAP/CPAP, Home O2    Past Medical History:   Diagnosis Date    Anxiety     Breast cancer ([facility])     Colitis     Depression     Difficult intubation     Excessive daytime sleepiness     GERD (gastroesophageal reflux disease)     Hyperlipidemia     Ischemic colitis ([facility]) 2019    Leukocytosis 2020    Muscular dystrophy ([facility])     Limb-girdle    Osteoporosis     Overactive bladder     Perforated diverticulum 2020    Pneumonitis     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[facility])     Skin cancer     Skin disorder     Tachycardia 2021    Vitamin D deficiency      Social History     Socioeconomic History    Marital status:      Spouse name: None    Number of children: None    Years of education: None    Highest education level: None   Occupational History    None   Tobacco Use    Smoking status: Former     Current packs/day: 0.00     Average packs/day: 1.5 packs/day for 37.0 years (55.5 ttl pk-yrs)     Types: Cigarettes     Start date:      Quit date:      Years since quittin.5    Smokeless tobacco: Never   Vaping Use    Vaping status: Never Used   Substance and Sexual Activity    Alcohol use: Not Currently     Comment: social drinker per allscripts     Drug use: Yes     Types: Marijuana     Comment: medical marijuana    Sexual activity: Not Currently   Other Topics Concern    None   Social History Narrative    Currently on permanent disability due to musc dystrophy    Denied daily coffee  consumption     Wheelchair dependent since about 2010    No children     Social Determinants of Health     Financial Resource Strain: Low Risk  (7/5/2023)    Overall Financial Resource Strain (CARDIA)     Difficulty of Paying Living Expenses: Not hard at all   Food Insecurity: No Food Insecurity (7/23/2024)    Hunger Vital Sign     Worried About Running Out of Food in the Last Year: Never true     Ran Out of Food in the Last Year: Never true   Transportation Needs: No Transportation Needs (7/23/2024)    PRAPARE - Transportation     Lack of Transportation (Medical): No     Lack of Transportation (Non-Medical): No   Physical Activity: Not on file   Stress: Not on file   Social Connections: Not on file   Intimate Partner Violence: Not on file   Housing Stability: Unknown (7/23/2024)    Housing Stability Vital Sign     Unable to Pay for Housing in the Last Year: No     Number of Times Moved in the Last Year: Not on file     Homeless in the Last Year: No       Subjective         Objective    Physical Exam:   Assessment Type: Assess only  General Appearance: Alert, Awake  Chest Assessment: Chest expansion symmetrical  Bilateral Breath Sounds: Clear, Diminished  Cough: None    Vitals:  Blood pressure 112/52, pulse 84, temperature 98.4 °F (36.9 °C), temperature source Oral, resp. rate 16, height 5' (1.524 m), weight 81.1 kg (178 lb 12.7 oz), SpO2 94%, not currently breastfeeding.          Imaging and other studies:           Plan    Respiratory Plan: Home Bronchodilator Patient pathway        Resp Comments: Spoke with patient and she states she only takes her albuterol nebs and pulmicort nebs as needed at home. She has 2 liters O2 on exertion and HS with her Cpap / bipap pt unsure what she has . Patient will call if she feels she needs a breathing treatment. Will continue to monitor the patient.

## 2024-07-24 VITALS
BODY MASS INDEX: 35.1 KG/M2 | TEMPERATURE: 97.8 F | RESPIRATION RATE: 18 BRPM | HEIGHT: 60 IN | SYSTOLIC BLOOD PRESSURE: 114 MMHG | WEIGHT: 178.79 LBS | HEART RATE: 79 BPM | DIASTOLIC BLOOD PRESSURE: 63 MMHG | OXYGEN SATURATION: 94 %

## 2024-07-24 PROBLEM — R07.9 CHEST PAIN: Status: ACTIVE | Noted: 2024-07-24

## 2024-07-24 LAB
ANION GAP SERPL CALCULATED.3IONS-SCNC: 2 MMOL/L (ref 4–13)
BUN SERPL-MCNC: 15 MG/DL (ref 5–25)
CALCIUM SERPL-MCNC: 8.6 MG/DL (ref 8.4–10.2)
CHLORIDE SERPL-SCNC: 102 MMOL/L (ref 96–108)
CO2 SERPL-SCNC: 35 MMOL/L (ref 21–32)
CREAT SERPL-MCNC: 0.26 MG/DL (ref 0.6–1.3)
ERYTHROCYTE [DISTWIDTH] IN BLOOD BY AUTOMATED COUNT: 18 % (ref 11.6–15.1)
GFR SERPL CREATININE-BSD FRML MDRD: 118 ML/MIN/1.73SQ M
GLUCOSE SERPL-MCNC: 107 MG/DL (ref 65–140)
HCT VFR BLD AUTO: 37 % (ref 34.8–46.1)
HGB BLD-MCNC: 11.5 G/DL (ref 11.5–15.4)
MCH RBC QN AUTO: 26 PG (ref 26.8–34.3)
MCHC RBC AUTO-ENTMCNC: 31.1 G/DL (ref 31.4–37.4)
MCV RBC AUTO: 84 FL (ref 82–98)
PLATELET # BLD AUTO: 355 THOUSANDS/UL (ref 149–390)
PMV BLD AUTO: 10.6 FL (ref 8.9–12.7)
POTASSIUM SERPL-SCNC: 4 MMOL/L (ref 3.5–5.3)
PROCALCITONIN SERPL-MCNC: 0.35 NG/ML
RBC # BLD AUTO: 4.42 MILLION/UL (ref 3.81–5.12)
SODIUM SERPL-SCNC: 139 MMOL/L (ref 135–147)
WBC # BLD AUTO: 14.11 THOUSAND/UL (ref 4.31–10.16)

## 2024-07-24 PROCEDURE — 99239 HOSP IP/OBS DSCHRG MGMT >30: CPT | Performed by: INTERNAL MEDICINE

## 2024-07-24 PROCEDURE — 84145 PROCALCITONIN (PCT): CPT | Performed by: PHYSICIAN ASSISTANT

## 2024-07-24 PROCEDURE — 94760 N-INVAS EAR/PLS OXIMETRY 1: CPT

## 2024-07-24 PROCEDURE — 94664 DEMO&/EVAL PT USE INHALER: CPT

## 2024-07-24 PROCEDURE — 80048 BASIC METABOLIC PNL TOTAL CA: CPT | Performed by: PHYSICIAN ASSISTANT

## 2024-07-24 PROCEDURE — 94660 CPAP INITIATION&MGMT: CPT

## 2024-07-24 PROCEDURE — 85027 COMPLETE CBC AUTOMATED: CPT | Performed by: PHYSICIAN ASSISTANT

## 2024-07-24 RX ORDER — CEPHALEXIN 500 MG/1
500 CAPSULE ORAL EVERY 6 HOURS SCHEDULED
Qty: 28 CAPSULE | Refills: 0 | Status: SHIPPED | OUTPATIENT
Start: 2024-07-24 | End: 2024-07-31

## 2024-07-24 RX ADMIN — Medication 250 MG: at 08:39

## 2024-07-24 RX ADMIN — ENOXAPARIN SODIUM 40 MG: 40 INJECTION SUBCUTANEOUS at 08:39

## 2024-07-24 RX ADMIN — COLLAGENASE SANTYL: 250 OINTMENT TOPICAL at 10:00

## 2024-07-24 RX ADMIN — CEFAZOLIN SODIUM 2000 MG: 2 SOLUTION INTRAVENOUS at 04:53

## 2024-07-24 RX ADMIN — CEFAZOLIN SODIUM 2000 MG: 2 SOLUTION INTRAVENOUS at 12:30

## 2024-07-24 NOTE — RESPIRATORY THERAPY NOTE
07/24/24 0336   Respiratory Assessment   Resp Comments pt found not on bipap, pt asleep, per rn pt removed self did not zev bipap as did not like out unit, rn did not message pt removed around 0130

## 2024-07-24 NOTE — UTILIZATION REVIEW
"Initial Clinical Review    Admission: Date/Time/Statement:   Admission Orders (From admission, onward)       Ordered        07/23/24 0440  INPATIENT ADMISSION  Once                          Orders Placed This Encounter   Procedures    INPATIENT ADMISSION     Standing Status:   Standing     Number of Occurrences:   1     Order Specific Question:   Level of Care     Answer:   Med Surg [16]     Order Specific Question:   Estimated length of stay     Answer:   More than 2 Midnights     Order Specific Question:   Certification     Answer:   I certify that inpatient services are medically necessary for this patient for a duration of greater than two midnights. See H&P and MD Progress Notes for additional information about the patient's course of treatment.     ED Arrival Information       Expected   7/23/2024     Arrival   7/23/2024 02:25    Acuity   Urgent              Means of arrival   Ambulance    Escorted by   [facility]    Admission type   Emergency              Arrival complaint   sob             Chief Complaint   Patient presents with    Shortness of Breath     SOB that began after \"doing something difficult\"; pt received 125mg solumedrol, 1 duoneb, and 4mg zofran from EMS       Initial Presentation: 74 y.o. female to the ED from home via EMS with complaints of shortness of breath. H/O muscular dystrophy, COPD . Admitted to inpatient for cellulitis right foot. Arrives with wheezing breath sounds, weakness, right foot with erythema, warmth, small ulceration to dorsum left foot. EMS gave duoneb prior to arrival.In the ED, given IV zofran, Started on IV steroids, iv abx. WBCs 25.  UA neg.  Podiatry consult. Blood cultures pending.   POdiatry consult: Right foot dressing changed.  No need for acute podiatric surgical intervention at this time. Local wound care with santyl, xeroform, DSD.  Elevate on green foam wedge.      Anticipated Length of Stay/Certification Statement: Patient will be " admitted on an inpatient basis with an anticipated length of stay of greater than 2 midnights secondary to IV abx, follow up cultures.     Date: 7/24   Day 2: Continue with IV abx, 2LNC.  CT c/a/p suggests cystitis.     ED Triage Vitals   Temperature Pulse Respirations Blood Pressure SpO2 Pain Score   07/23/24 0232 07/23/24 0228 07/23/24 0228 07/23/24 0228 07/23/24 0228 07/23/24 0531   (!) 97.2 °F (36.2 °C) 86 20 125/60 92 % 7     Weight (last 2 days)       Date/Time Weight    07/23/24 1802 81.1 (178.79)            Vital Signs (last 3 days)       Date/Time Temp Pulse Resp BP MAP (mmHg) SpO2 FiO2 (%) Calculated FIO2 (%) - Nasal Cannula Nasal Cannula O2 Flow Rate (L/min) O2 Device O2 Interface Device Mask Size Patient Position - Orthostatic VS José Miguel Coma Scale Score Pain    07/24/24 0738 -- -- -- -- -- 94 % -- 28 2 L/min Nasal cannula -- -- -- -- --    07/24/24 06:56:37 97.8 °F (36.6 °C) 79 18 114/63 80 94 % -- -- -- -- -- -- -- -- --    07/24/24 0038 -- -- 40 -- -- -- 28 -- -- BiPAP Full face mask Small -- -- --    07/23/24 2300 -- -- -- -- -- 93 % -- -- -- -- -- -- -- -- --    07/23/24 2100 -- 91 -- -- -- 89 % -- -- -- -- -- -- -- -- --    07/23/24 2055 -- -- -- -- -- 92 % -- 28 2 L/min Nasal cannula -- -- -- 15 4    07/23/24 15:38:04 98.4 °F (36.9 °C) 84 16 112/52 72 94 % -- -- -- None (Room air) -- -- Lying -- --    07/23/24 1356 -- -- -- -- -- -- -- -- -- -- -- -- -- -- 2    07/23/24 1256 -- -- -- -- -- -- -- -- -- -- -- -- -- -- 5    07/23/24 1123 -- -- -- -- -- -- -- -- -- -- -- -- -- -- 2    07/23/24 0848 -- 92 -- -- -- -- -- -- -- -- -- -- -- 15 3    07/23/24 07:42:22 98.6 °F (37 °C) 97 18 145/73 97 91 % -- -- -- -- -- -- Sitting -- --    07/23/24 0716 -- -- -- -- -- -- -- 28 2 L/min Nasal cannula -- -- -- -- --    07/23/24 0628 -- -- -- -- -- -- -- -- -- -- -- -- -- -- 7    07/23/24 0539 -- -- -- -- -- -- -- 28 2 L/min Nasal cannula -- -- -- -- --    07/23/24 05:33:54 -- 96 17 135/71 92 94 % -- -- -- -- --  -- -- -- --    07/23/24 0531 -- -- -- -- -- -- -- -- -- -- -- -- -- -- 7 07/23/24 0249 -- -- -- -- -- -- -- -- -- Nasal cannula -- -- -- -- --    07/23/24 0247 -- -- -- -- -- -- -- -- -- -- -- -- -- 15 --    07/23/24 0245 -- 87 19 133/61 88 97 % -- -- -- -- -- -- -- -- --    07/23/24 0232 97.2 °F (36.2 °C) -- -- -- -- -- -- -- -- -- -- -- -- -- --    07/23/24 0228 -- 86 20 125/60 87 92 % -- 28 2 L/min Nasal cannula -- -- Lying -- --            Pertinent Labs/Diagnostic Test Results:   Radiology:  CT chest abdomen pelvis w contrast   Final Interpretation by Ana Bruno MD (07/23 3562)      Findings suggestive of cystitis.      Bibasilar atelectasis               Workstation performed: [id number]         XR chest 1 view portable   Final Interpretation by Jose Angel Morley MD (07/23 0833)      No acute cardiopulmonary disease.            Workstation performed: [id number]           Cardiology:  ECG 12 lead   Final Result by Aidan Gutierrez MD (07/23 1233)   Normal sinus rhythm   Tracing is within normal limits   When compared with ECG of 11-JUN-2023 16:45,   No significant change was found   Confirmed by Aidan Gutierrez ([id number]) on 7/23/2024 12:33:11 PM        Results from last 7 days   Lab Units 07/23/24  0239   SARS-COV-2  Negative     Results from last 7 days   Lab Units 07/24/24  0502 07/23/24  0238   WBC Thousand/uL 14.11* 25.38*   HEMOGLOBIN g/dL 11.5 13.2   HEMATOCRIT % 37.0 43.4   PLATELETS Thousands/uL 355 466*   TOTAL NEUT ABS Thousands/µL  --  19.78*         Results from last 7 days   Lab Units 07/24/24  0502 07/23/24  0238   SODIUM mmol/L 139 137   POTASSIUM mmol/L 4.0 4.4   CHLORIDE mmol/L 102 100   CO2 mmol/L 35* 30   ANION GAP mmol/L 2* 7   BUN mg/dL 15 24   CREATININE mg/dL 0.26* 0.36*   EGFR ml/min/1.73sq m 118 106   CALCIUM mg/dL 8.6 9.2   MAGNESIUM mg/dL  --  2.0     Results from last 7 days   Lab Units 07/23/24  0238   AST U/L 18   ALT U/L 14   ALK PHOS U/L 73   TOTAL PROTEIN g/dL 7.5    ALBUMIN g/dL 4.1   TOTAL BILIRUBIN mg/dL 0.26   BILIRUBIN DIRECT mg/dL 0.04         Results from last 7 days   Lab Units 07/24/24  0502 07/23/24  0238   GLUCOSE RANDOM mg/dL 107 143*       Results from last 7 days   Lab Units 07/23/24  0238   PH SIDNEY  7.369   PCO2 SIDNEY mm Hg 49.0   PO2 SIDNEY mm Hg 36.7   HCO3 SIDNEY mmol/L 27.6   BASE EXC SIDNEY mmol/L 1.6   O2 CONTENT SIDNEY ml/dL 13.4   O2 HGB, VENOUS % 66.6             Results from last 7 days   Lab Units 07/23/24  0238   HS TNI 0HR ng/L 3     Results from last 7 days   Lab Units 07/23/24  0238   D-DIMER QUANTITATIVE ug/ml FEU 0.59*     Results from last 7 days   Lab Units 07/23/24  0238   PROTIME seconds 12.3   INR  0.90   PTT seconds 28         Results from last 7 days   Lab Units 07/24/24  0502 07/23/24  0317   PROCALCITONIN ng/ml 0.35* <0.05     Results from last 7 days   Lab Units 07/23/24  0317   LACTIC ACID mmol/L 1.5         Results from last 7 days   Lab Units 07/23/24  0238   LIPASE u/L 22     Results from last 7 days   Lab Units 07/23/24  0446   CLARITY UA  Clear   COLOR UA  Yellow   SPEC GRAV UA  1.015   PH UA  5.5   GLUCOSE UA mg/dl Negative   KETONES UA mg/dl Negative   BLOOD UA  Negative   PROTEIN UA mg/dl Negative   NITRITE UA  Negative   BILIRUBIN UA  Negative   UROBILINOGEN UA E.U./dl 0.2   LEUKOCYTES UA  Negative     Results from last 7 days   Lab Units 07/23/24  0239   INFLUENZA A PCR  Negative   INFLUENZA B PCR  Negative   RSV PCR  Negative     Results from last 7 days   Lab Units 07/23/24  0359   BLOOD CULTURE  No Growth at 24 hrs.  No Growth at 24 hrs.     ED Treatment-Medication Administration from 07/23/2024 0221 to 07/23/2024 0517         Date/Time Order Dose Route Action     07/23/2024 0237 ipratropium-albuterol (FOR EMS ONLY) (DUO-NEB) 0.5-2.5 mg/3 mL inhalation solution 3 mL 0 mL Does not apply Given to EMS     07/23/2024 0238 methylPREDNISolone sodium succinate (FOR EMS ONLY) (Solu-MEDROL) 125 MG injection 125 mg 0 mg Does not apply Given to  EMS     07/23/2024 0238 ondansetron (FOR EMS ONLY) (ZOFRAN) 4 mg/2 mL injection 4 mg 0 mg Does not apply Given to EMS     07/23/2024 0400 cefepime (MAXIPIME) IVPB (premix in dextrose) 2,000 mg 50 mL 2,000 mg Intravenous New Bag     07/23/2024 0330 iohexol (OMNIPAQUE) 350 MG/ML injection (MULTI-DOSE) 100 mL 100 mL Intravenous Given            Past Medical History:   Diagnosis Date    Anxiety     Breast cancer ([facility]) 2007    Colitis     Depression     Difficult intubation     Excessive daytime sleepiness     GERD (gastroesophageal reflux disease)     Hyperlipidemia     Ischemic colitis ([facility]) 01/21/2019    Leukocytosis 03/28/2020    Muscular dystrophy ([facility])     Limb-girdle    Osteoporosis     Overactive bladder     Perforated diverticulum 03/28/2020    Pneumonitis     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[facility])     Skin cancer     Skin disorder     Tachycardia 06/02/2021    Vitamin D deficiency            Admitting Diagnosis: Shortness of breath [R06.02]  Cellulitis [L03.90]  Leukocytosis [D72.829]  Cystitis [N30.90]  Age/Sex: 74 y.o. female  Admission Orders:  Scheduled Medications:  buPROPion, 75 mg, Oral, HS  cefazolin, 2,000 mg, Intravenous, Q8H  citalopram, 10 mg, Oral, HS  collagenase, , Topical, Daily  enoxaparin, 40 mg, Subcutaneous, Daily  oxybutynin, 10 mg, Oral, HS  ramelteon, 8 mg, Oral, HS  saccharomyces boulardii, 250 mg, Oral, BID      Continuous IV Infusions:     PRN Meds:  acetaminophen, 650 mg, Oral, Q4H PRN  albuterol, 2.5 mg, Nebulization, Q4H PRN  budesonide, 0.5 mg, Nebulization, BID PRN  diazepam, 5 mg, Oral, Q8H PRN  ondansetron, 4 mg, Intravenous, Q6H PRN  triamcinolone, , Topical, BID PRN        IP CONSULT TO PODIATRY    Network Utilization Review Department  ATTENTION: Please call with any questions or concerns to [phone] and carefully listen to the prompts so that you are directed to the right person. All voicemails are confidential.   For Discharge needs, contact Care  Management DC Support Team at [phone] opt. [phone]  Send all requests for admission clinical reviews, approved or denied determinations and any other requests to dedicated fax number below belonging to the campus where the patient is receiving treatment. List of dedicated fax numbers for the Facilities:  FACILITY NAME UR FAX NUMBER   ADMISSION DENIALS (Administrative/Medical Necessity) [phone]   DISCHARGE SUPPORT TEAM (NETWORK) [phone]   PARENT CHILD HEALTH (Maternity/NICU/Pediatrics) [phone]   [facility] [phone]   [facility] [phone]   [facility] [phone]   [facility] [phone]   [facility] [phone]   [facility] [phone]   [facility] [phone]   [facility] [phone]   [facility] [phone]   [facility] [phone]   [facility] [phone]   [facility] [phone]

## 2024-07-24 NOTE — DISCHARGE SUMMARY
[facility]  Discharge- Claire Doherty 1950, 74 y.o. female MRN: [id number]  Unit/Bed#: -01 Encounter: [id number]  Primary Care Provider: Julienne Tucker DO   Date and time admitted to hospital: 7/23/2024  2:26 AM    * Cellulitis of right foot  Assessment & Plan  Foot wound after a fall last week  Will discharge home with PO Keflex for an additional 7 days  Podiatry evaluation appreciated  No need for surgical intervention   Continue local wound care with VNA 3x a week    Chest pain  Assessment & Plan  Presented with chest and flank pain associated with shortness of breath and nausea  This occurred after strenuous effort to get herself out of bed and is resolved at this time  CT C/A/P (7/23): Findings suggestive of cystitis. Bibasilar atelectasis.    Chronic respiratory failure with hypoxia and hypercapnia (HCC)  Assessment & Plan  Continue home 2L NC continuously and BiPAP QHS    Depression  Assessment & Plan  Continue home Wellbutrin, Celexa, Valium, and Ramelteon    Urinary incontinence  Assessment & Plan  Resume home Vesicare     Medical Problems       Resolved Problems  Date Reviewed: 7/23/2024   None       Discharging Physician / Practitioner: Sosa Tang DO  PCP: Julienne Tucker DO  Admission Date:   Admission Orders (From admission, onward)       Ordered        07/23/24 0440  INPATIENT ADMISSION  Once                          Discharge Date: 07/24/24    Consultations During Hospital Stay:  Podiatry     Procedures Performed:   None    Significant Findings / Test Results:   CT C/A/P (7/23): Findings suggestive of cystitis. Bibasilar atelectasis.    Incidental Findings:   None     Test Results Pending at Discharge (will require follow up):   None     Outpatient Tests Requested:  To be determined upon outpatient follow-up with PCP    Complications:  None    Reason for Admission: Chest pain, R Foot Cellulitis     Hospital Course:   Claire Doherty is a 74 y.o.  female patient who originally presented to the hospital on 7/23/2024 due to chest pain.  In brief, the patient has a significant past medical history for muscular dystrophy and chronic respiratory failure on continuous oxygen and BiPAP at night who presented for evaluation of chest and flank pain.  She noted that she was attempting to get out of her bed and unfortunately struggling to sit up as her pillow was improperly positioned.  Following the his strenuous activity she developed chest and flank discomfort associated with shortness of breath and nausea.  Upon presentation to the emergency department a CT of the chest, abdomen, and pelvis was performed without any significant or acute findings.  The morning following admission she had resolution of this discomfort and was deemed likely related to her strenuous activity.  Further, she did have a right foot cellulitis and was evaluated by podiatry who recommended local wound care and antibiotics.  The patient was ultimately discharged home with oral Keflex to complete treatment course.  She was discharged in stable condition and all of her questions were answered prior to departure.  This a brief discharge summary please see full medical record for more details.    Please see above list of diagnoses and related plan for additional information.     Condition at Discharge: stable    Discharge Day Visit / Exam:   Subjective:  Patient sitting up in bed without any acute complaints. She has no further chest or flank pain.    Vitals: Blood Pressure: 114/63 (07/24/24 0656)  Pulse: 79 (07/24/24 0656)  Temperature: 97.8 °F (36.6 °C) (07/24/24 0656)  Temp Source: Oral (07/23/24 1538)  Respirations: 18 (07/24/24 0656)  Height: 5' (152.4 cm) (07/23/24 1802)  Weight - Scale: 81.1 kg (178 lb 12.7 oz) (07/23/24 1802)  SpO2: 94 % (07/24/24 0738)    Exam:   Physical Exam  Vitals and nursing note reviewed.   Constitutional:       General: She is not in acute distress.     Appearance:  She is obese.   Cardiovascular:      Rate and Rhythm: Normal rate and regular rhythm.      Pulses: Normal pulses.      Heart sounds: Normal heart sounds.   Pulmonary:      Effort: Pulmonary effort is normal. No respiratory distress.      Breath sounds: Normal breath sounds.   Abdominal:      General: Bowel sounds are normal. There is no distension.      Palpations: Abdomen is soft.   Skin:     Comments: R foot erythema with small ulceration    Neurological:      General: No focal deficit present.      Mental Status: She is alert and oriented to person, place, and time. Mental status is at baseline.          Discussion with Family:  Patient updated on plan of care.     Discharge instructions/Information to patient and family:   See after visit summary for information provided to patient and family.      Provisions for Follow-Up Care:  See after visit summary for information related to follow-up care and any pertinent home health orders.      Mobility at time of Discharge:   Basic Mobility Inpatient Raw Score: 10  JH-HLM Goal: 4: Move to chair/commode  JH-HLM Achieved: 2: Bed activities/Dependent transfer  HLM Goal NOT achieved. Continue to encourage mobility in post discharge setting.     Disposition:   Home with VNA Services (Reminder: Complete face to face encounter)    Planned Readmission: None     Discharge Statement:  I spent > 35 minutes discharging the patient. This time was spent on the day of discharge. I had direct contact with the patient on the day of discharge. Greater than 50% of the total time was spent examining patient, answering all patient questions, arranging and discussing plan of care with patient as well as directly providing post-discharge instructions.  Additional time then spent on discharge activities.    Discharge Medications:  See after visit summary for reconciled discharge medications provided to patient and/or family.      **Please Note: This note may have been constructed using a  voice recognition system**

## 2024-07-24 NOTE — ASSESSMENT & PLAN NOTE
Presented with chest and flank pain associated with shortness of breath and nausea  This occurred after strenuous effort to get herself out of bed and is resolved at this time  CT C/A/P (7/23): Findings suggestive of cystitis. Bibasilar atelectasis.

## 2024-07-24 NOTE — PROGRESS NOTES
Patient:    MRN:  [id number]    Inés Request ID:  [id number]    Level of care reserved:  Home Health Agency    [facility] [place] [address] [phone]    Clinical needs requested:    Geography searched:  [id number]    Start of Service:    Request sent:  2:49pm EDT on 7/23/2024 by Elva Calle    Partner reserved:  9:55am EDT on 7/24/2024 by Elva Calle    Choice list shared:  8:54am EDT on 7/24/2024 by Elva Calle

## 2024-07-24 NOTE — CASE MANAGEMENT
Case Management Discharge Planning Note    Patient name Claire Doherty  Location /MS - MRN [id number]  : 1950 Date 2024       Current Admission Date: 2024  Current Admission Diagnosis:Cellulitis of right foot   Patient Active Problem List    Diagnosis Date Noted Date Diagnosed    Chest pain 2024     Cellulitis of right foot 2024     Chronic left shoulder pain 2024     Shoulder joint dysfunction 2024     Bilateral hand pain 2023     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HCC) 2022     Gastroparesis 2021     Aortic valve sclerosis 2021     Tricuspid valve insufficiency 2021     Mitral annular calcification 2021     Former smoker 2021     On home oxygen therapy 2021     Colostomy status (HCC)      Ductal carcinoma in situ (DCIS) of left breast 03/15/2021     Insomnia 10/21/2020     Depression 10/06/2020     Anemia 10/02/2020     Chronic respiratory failure with hypoxia and hypercapnia (HCC) 2020     Bladder injury 2020     Thrombocytosis, unspecified 2020     Difficult intubation      Dystrophy, muscular, 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HCC) 10/12/2017     Ambulatory dysfunction 10/12/2017     Urinary incontinence 10/03/2017     Osteoporosis 2016     Allergic rhinitis 10/09/2013     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) 10/04/2012     GERD without esophagitis 10/04/2012       LOS (days): 1  Geometric Mean LOS (GMLOS) (days): 3.2  Days to GMLOS:2     OBJECTIVE:  Risk of Unplanned Readmission Score: 9.97         Current admission status: Inpatient   Preferred Pharmacy:   [id number] [address]  [address] [facility] [address]  Phone: [phone] Fax: [phone]    Primary Care Provider: Julienne Tucker DO    Primary Insurance: [facility]  Secondary Insurance: [facility]    DISCHARGE  DETAILS:    Requested Home Health Care         Home Health Agency Name:: [facility]   PT chose [facility] from the accepting agencies.  Reserved in [facility].  Pt needs transport home.  Sent for transport via [employer].  Pt will be transported at 1300 via [facility].  PT made aware and called her CG from [facility]. Notified Ibeth TOVAR of transport time.

## 2024-07-24 NOTE — PLAN OF CARE
Problem: PAIN - ADULT  Goal: Verbalizes/displays adequate comfort level or baseline comfort level  Description: Interventions:  - Encourage patient to monitor pain and request assistance  - Assess pain using appropriate pain scale  - Administer analgesics based on type and severity of pain and evaluate response  - Implement non-pharmacological measures as appropriate and evaluate response  - Consider cultural and social influences on pain and pain management  - Notify physician/advanced practitioner if interventions unsuccessful or patient reports new pain  Outcome: Progressing     Problem: SAFETY ADULT  Goal: Patient will remain free of falls  Description: INTERVENTIONS:  - Educate patient/family on patient safety including physical limitations  - Instruct patient to call for assistance with activity   - Consult OT/PT to assist with strengthening/mobility   - Keep Call bell within reach  - Keep bed low and locked with side rails adjusted as appropriate  - Keep care items and personal belongings within reach  - Initiate and maintain comfort rounds  - Make Fall Risk Sign visible to staff  - Offer Toileting every 2 Hours, in advance of need  - Initiate/Maintain bed alarm  - Apply yellow socks and bracelet for high fall risk patients  - Consider moving patient to room near nurses station  Outcome: Progressing     Problem: DISCHARGE PLANNING  Goal: Discharge to home or other facility with appropriate resources  Description: INTERVENTIONS:  - Identify barriers to discharge w/patient and caregiver  - Arrange for needed discharge resources and transportation as appropriate  - Identify discharge learning needs (meds, wound care, etc.)    - Refer to Case Management Department for coordinating discharge planning if the patient - Arrange for ineeds post-hospital services based on physician/advanced practitioner order or complex needs related to functional status, cognitive ability, or social support system  Outcome:  Progressing

## 2024-07-24 NOTE — SEPSIS NOTE
Sepsis Note   Claire Doherty 74 y.o. female MRN: [id number]  Unit/Bed#: -01 Encounter: [id number]       Initial Sepsis Screening       Row Name 07/24/24 0044                Is the patient's history suggestive of a new or worsening infection? No  -AI                  User Key  (r) = Recorded By, (t) = Taken By, (c) = Cosigned By      Initials Name Provider Type    AI Edith Neville PA-C Physician Assistant                  Sepsis alert, no worsening infection, patient placed on her chronic bipap      Body mass index is 34.92 kg/m².  Wt Readings from Last 1 Encounters:   07/23/24 81.1 kg (178 lb 12.7 oz)     IBW (Ideal Body Weight): 45.5 kg    Ideal body weight: 45.5 kg (100 lb 4.9 oz)  Adjusted ideal body weight: 59.7 kg (131 lb 11.2 oz)

## 2024-07-24 NOTE — UTILIZATION REVIEW
NOTIFICATION OF INPATIENT ADMISSION   AUTHORIZATION REQUEST   SERVICING FACILITY:   [place] [address]  Tax ID: [id number]  NPI: [id number]   ATTENDING PROVIDER:  Attending Name and NPI#: Sosa Tang Do [[id number]]  Address: [address] [place] [address]  Phone: [phone]     ADMISSION INFORMATION:  Place of Service: Inpatient Acute [facility]  Place of Service Code: 21  Inpatient Admission Date/Time: 7/23/24  4:40 AM  Discharge Date/Time: No discharge date for patient encounter.  Admitting Diagnosis Code/Description:  Shortness of breath [R06.02]  Cellulitis [L03.90]  Leukocytosis [D72.829]  Cystitis [N30.90]     UTILIZATION REVIEW CONTACT:  Alvaro Edward, Supervisor, Utilization Review Assistants  Network Utilization Review Department  Phone: [phone]  Fax [phone]  Email: [email or web address]  Contact for approvals/pending authorizations, clinical reviews, and discharge.     PHYSICIAN ADVISORY SERVICES:  Medical Necessity Denial & Efir-ci-Nxdk Review  Phone: [phone]  Fax: [phone]  Email: [email or web address]     DISCHARGE SUPPORT TEAM:  For Patients Discharge Needs & Updates  Phone: [phone] opt. 2 Fax: [phone]  Email: [email or web address]        SW/CM Discharge Plan  Informed patient is ready for discharge. Writer electronically sent updated information to [facility] on this date and contacted facility to confirm their ability to admit pt today.  [facility] can accept today.  Patient to be picked up by [facility] at 1200. Patient/interested person has been counseled for post hospitalization care.  Patient agrees and understands goals and plan. Initial implementation of the patient’s discharge plan has been arranged, including any devices/equipment needed for discharge. Discharge plan [facility] to pt, nursing, physician and [facility]    Patient’s discharge destination is Rehabilitation/Skilled Care.    Selected Continued Care - Admitted Since 8/17/2023     Destination  Coordination complete.    Service Provider Selected Services Address Phone Fax    [facility] [address] [facility] [address] [phone]

## 2024-07-24 NOTE — CASE MANAGEMENT
[facility] received request for transport authorization from Care Manager.   Date of transport: 07/24  Type of transport: [employer]  Transport company: [facility]     NPI: [id number]  Start Location:[facility]   End Location: Home - [facility] [address] [facility] [address]   Med Necessity: Oxygen Dependent, wound to foot  Authorization initiated by contacting insurance: [employer]   Via: Fax #  [phone]     Care Manager notified: Elva Calle     Please reach out to  for updates on any clinical information.

## 2024-07-24 NOTE — ASSESSMENT & PLAN NOTE
Foot wound after a fall last week  Will discharge home with PO Keflex for an additional 7 days  Podiatry evaluation appreciated  No need for surgical intervention   Continue local wound care with VNA 3x a week

## 2024-07-24 NOTE — PLAN OF CARE
Problem: Prexisting or High Potential for Compromised Skin Integrity  Goal: Skin integrity is maintained or improved  Description: INTERVENTIONS:  - Identify patients at risk for skin breakdown  - Assess and monitor skin integrity  - Assess and monitor nutrition and hydration status  - Monitor labs   - Assess for incontinence   - Turn and reposition patient  - Assist with mobility/ambulation  - Relieve pressure over bony prominences  - Avoid friction and shearing  - Provide appropriate hygiene as needed including keeping skin clean and dry  - Evaluate need for skin moisturizer/barrier cream  - Collaborate with interdisciplinary team   - Patient/family teaching  - Consider wound care consult   7/24/2024 0336 by Anabelle Danielle RN  Outcome: Progressing  7/24/2024 0336 by Anabelle Danielle RN  Outcome: Progressing     Problem: PAIN - ADULT  Goal: Verbalizes/displays adequate comfort level or baseline comfort level  Description: Interventions:  - Encourage patient to monitor pain and request assistance  - Assess pain using appropriate pain scale  - Administer analgesics based on type and severity of pain and evaluate response  - Implement non-pharmacological measures as appropriate and evaluate response  - Consider cultural and social influences on pain and pain management  - Notify physician/advanced practitioner if interventions unsuccessful or patient reports new pain  7/24/2024 0336 by Anabelle Danielle RN  Outcome: Progressing  7/24/2024 0336 by Anabelle Danielle RN  Outcome: Progressing     Problem: INFECTION - ADULT  Goal: Absence or prevention of progression during hospitalization  Description: INTERVENTIONS:  - Assess and monitor for signs and symptoms of infection  - Monitor lab/diagnostic results  - Monitor all insertion sites, i.e. indwelling lines, tubes, and drains  - Administer medications as ordered  - Instruct and encourage patient and family to use good hand hygiene technique  - Identify and instruct in  appropriate isolation precautions for identified infection/condition  7/24/2024 0336 by Anabelle Danielle RN  Outcome: Progressing  7/24/2024 0336 by Anabelle Danielle RN  Outcome: Progressing     Problem: SAFETY ADULT  Goal: Patient will remain free of falls  Description: INTERVENTIONS:  - Educate patient/family on patient safety including physical limitations  - Instruct patient to call for assistance with activity   - Consult OT/PT to assist with strengthening/mobility   - Keep Call bell within reach  - Keep bed low and locked with side rails adjusted as appropriate  - Keep care items and personal belongings within reach  - Initiate and maintain comfort rounds  - Make Fall Risk Sign visible to staff  - Offer Toileting every 2 Hours, in advance of need  - Initiate/Maintain bed alarm  - Apply yellow socks and bracelet for high fall risk patients  - Consider moving patient to room near nurses station  7/24/2024 0336 by Anabelle Danielle RN  Outcome: Progressing  7/24/2024 0336 by Anabelle Danielle RN  Outcome: Progressing     Problem: SKIN/TISSUE INTEGRITY - ADULT  Goal: Skin Integrity remains intact(Skin Breakdown Prevention)  Description: Assess:  -Perform Collins assessment every shift  -Clean and moisturize skin every shift  -Inspect skin when repositioning, toileting, and assisting with ADLS  -Assess under medical devices such as masimo every shift  -Assess extremities for adequate circulation and sensation     Bed Management:  -Have minimal linens on bed & keep smooth, unwrinkled  -Change linens as needed when moist or perspiring  -Avoid sitting or lying in one position for more than 2 hours while in bed  -Keep HOB at 30 degrees     Toileting:  -Offer bedside commode  -Assess for incontinence every 2 hours  -Use incontinent care products after each incontinent episode such as hydraguard    Activity:    -Encourage or provide ROM exercises   -Turn and reposition patient every 2 Hours  -Use appropriate equipment to lift or move  patient in bed  -Instruct/ Assist with weight shifting every 20 min when out of bed in chair  -Consider limitation of chair time 2 hour intervals    Skin Care:  -Avoid use of baby powder, tape, friction and shearing, hot water or constrictive clothing  -Relieve pressure over bony prominences using hydraguard  -Do not massage red bony areas    7/24/2024 0336 by Anabelle Danielle, RN  Outcome: Progressing  7/24/2024 0336 by Anabelle Danielle, RN  Outcome: Progressing

## 2024-07-24 NOTE — RESPIRATORY THERAPY NOTE
07/24/24 0038   Respiratory Assessment   Assessment Type Assess only   General Appearance Alert;Awake   Respiratory Pattern Assisted;Spontaneous;Symmetrical   Chest Assessment Chest expansion symmetrical   Bilateral Breath Sounds Clear;Diminished   Resp Comments pt palced on bipap 12/6 28% pt gentile snot know settings per protocol palced on 12/6 pt care on going   O2 Device bipap   Non-Invasive Information   O2 Interface Device Full face mask   Non-Invasive Ventilation Mode BiPAP  (v60)   $ Intermittent NIV Yes   $ Pulse Oximetry Spot Check Charge Completed   Non-Invasive Settings   IPAP (cm) 12 cm   EPAP (cm) 6 cm   Rate (Set) 12   FiO2 (%) 28   Flow (lpm) 11.7   Pressure Support (cm H2O) 6   Rise Time 3   Inspiratory Time (Set) 1.25   Non-Invasive Readings   Skin Intervention Skin intact   Total Rate 40   MAP (Obs) 12   MV (Mech) 10.8   Peak Pressure (Obs) 12   Spontaneous Vt (mL) 223   I/E Ratio (Obs) 1:3   Leak (lpm) 8   Non-Invasive Alarms   Insp Pressure High (cm H20) 30   Insp Pressure Low (cm H20) 5   Low Insp Pressure Time (sec) 20 sec   MV Low (L/min) 3   Vt High (mL) 1200   Vt Low (mL) 200   High Resp Rate (BPM) 45 BPM   Low Resp Rate (BPM) 8 BPM   Apnea Interval (sec) 20   Apnea Rate 12   Apnea Pressure 12   OTHER   Mask Size Small

## 2024-07-24 NOTE — RESPIRATORY THERAPY NOTE
07/24/24 0105   Respiratory Assessment   Resp Comments called back to room bipap alarm for low Vt, hi Rr, low Vt alarm set at 150, increased bipap to 14/6 to increase vol will cont to monitor,  pt keeps moving mask, instructing pt out unit is more sensitive with alarms and that are needed to alert staff, did aplogize for the inconvience but unable to turn off the alarm, pt RR up and assured that when she settles she will not set that alarm , High RR set at 45, RN aware   Non-Invasive Settings   IPAP (cm) (S)  14 cm   Non-Invasive Readings   Spontaneous Vt (mL) 375   Non-Invasive Alarms   Vt Low (mL) 150

## 2024-07-25 ENCOUNTER — TRANSITIONAL CARE MANAGEMENT (OUTPATIENT)
Dept: FAMILY MEDICINE CLINIC | Facility: CLINIC | Age: 74
End: 2024-07-25

## 2024-07-26 NOTE — CASE MANAGEMENT
Called [facility] @ [phone] to check the status Spoke to Allyson who stated the auth is still pending at this time. Pending auth  #[id number]   notified  Elva Calle

## 2024-07-28 LAB
BACTERIA BLD CULT: NORMAL
BACTERIA BLD CULT: NORMAL

## 2024-08-06 ENCOUNTER — TELEPHONE (OUTPATIENT)
Age: 74
End: 2024-08-06

## 2024-08-06 NOTE — TELEPHONE ENCOUNTER
Jessica from [facility] called to inform pt missed a wound care appt yesterday.  Pt states she missed the call from the home care.    Home care is scheduled to see her on 8/7

## 2024-08-14 ENCOUNTER — TELEPHONE (OUTPATIENT)
Dept: FAMILY MEDICINE CLINIC | Facility: CLINIC | Age: 74
End: 2024-08-14

## 2024-08-15 ENCOUNTER — OFFICE VISIT (OUTPATIENT)
Dept: FAMILY MEDICINE CLINIC | Facility: CLINIC | Age: 74
End: 2024-08-15
Payer: COMMERCIAL

## 2024-08-15 VITALS
TEMPERATURE: 92.2 F | BODY MASS INDEX: 34.92 KG/M2 | HEIGHT: 60 IN | DIASTOLIC BLOOD PRESSURE: 70 MMHG | OXYGEN SATURATION: 90 % | SYSTOLIC BLOOD PRESSURE: 130 MMHG | HEART RATE: 82 BPM

## 2024-08-15 DIAGNOSIS — Z87.440 HISTORY OF ACUTE CYSTITIS: ICD-10-CM

## 2024-08-15 DIAGNOSIS — S91.301D OPEN WOUND OF RIGHT FOOT, SUBSEQUENT ENCOUNTER: ICD-10-CM

## 2024-08-15 DIAGNOSIS — Z23 ENCOUNTER FOR IMMUNIZATION: ICD-10-CM

## 2024-08-15 DIAGNOSIS — Z76.89 ENCOUNTER FOR SUPPORT AND COORDINATION OF TRANSITION OF CARE: Primary | ICD-10-CM

## 2024-08-15 DIAGNOSIS — S90.811D ABRASION, RIGHT FOOT, SUBSEQUENT ENCOUNTER: ICD-10-CM

## 2024-08-15 DIAGNOSIS — Z99.81 ON HOME OXYGEN THERAPY: ICD-10-CM

## 2024-08-15 DIAGNOSIS — G71.00 RESTRICTIVE LUNG DISEASE DUE TO MUSCULAR DYSTROPHY (HCC): Chronic | ICD-10-CM

## 2024-08-15 DIAGNOSIS — J98.4 RESTRICTIVE LUNG DISEASE DUE TO MUSCULAR DYSTROPHY (HCC): Chronic | ICD-10-CM

## 2024-08-15 DIAGNOSIS — Z87.898 HISTORY OF CHEST PAIN: ICD-10-CM

## 2024-08-15 DIAGNOSIS — Z87.2 HISTORY OF CELLULITIS: ICD-10-CM

## 2024-08-15 PROCEDURE — 90714 TD VACC NO PRESV 7 YRS+ IM: CPT | Performed by: FAMILY MEDICINE

## 2024-08-15 PROCEDURE — 99495 TRANSJ CARE MGMT MOD F2F 14D: CPT | Performed by: FAMILY MEDICINE

## 2024-08-15 PROCEDURE — 90471 IMMUNIZATION ADMIN: CPT | Performed by: FAMILY MEDICINE

## 2024-08-15 NOTE — PROGRESS NOTES
Transition of Care Visit  Name: Claire Doherty      : 1950      MRN: [id number]  Encounter Provider: Julienne Tucker DO  Encounter Date: 8/15/2024   Encounter department: [facility] [place]    Assessment & Plan   1. Encounter for support and coordination of transition of care  2. Encounter for immunization  -     TD VACCINE GREATER THAN OR EQUAL TO 6YO PRESERVATIVE FREE IM  3. Open wound of right foot, subsequent encounter  Comments:  healing well, cpm  Orders:  -     TD VACCINE GREATER THAN OR EQUAL TO 6YO PRESERVATIVE FREE IM  4. Abrasion, right foot, subsequent encounter  Comments:  healing well, cpm  Orders:  -     TD VACCINE GREATER THAN OR EQUAL TO 6YO PRESERVATIVE FREE IM  5. History of cellulitis  6. History of acute cystitis  7. History of chest pain  8. 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)  9. On home oxygen therapy  Last tetanus was 2017 - needs tetanus coverage for the right foot abrasion/wound  Homecare nursing visits will continue       History of Present Illness     Transitional Care Management Review:   Claire Doherty is a 74 y.o. female here for TCM follow up.     During the TCM phone call patient stated:  TCM Call       Date and time call was made  2024  7:52 AM    Hospital care reviewed  Records reviewed    Patient was hospitial[facility] at  [facility]    Date of Admission  24    Date of discharge  24    Diagnosis  Cellulitis of right foot    Disposition  Home    Were the patients medications reviewed and updated  Yes    Current Symptoms  None    Weakness severity  Mild    Fatigue severity  Mild          TCM Call       Post hospital issues  Reduced activity    Scheduled for follow up?  Yes    Patient refusal reason  COVID-19 pt is medium risk.     Patients specialists  Other (comment)    Other specialists names  Dr. Anthony Mejía- [facility] Rectal surgeon, will schedule shortly    Did you obtain your prescribed medications  Yes    Do you  "need help managing your prescriptions or medications  No    Is transportation to your appointment needed  No    I have advised the patient to call PCP with any new or worsening symptoms  Mohini MCKENZIE MA    Living Arrangements  Alone    Support System  Family; Friends; Neighboors    The type of support provided  Emotional; Financial; Physical    Do you have social support  Yes, as much as I need    Are you recieving any outpatient services  No    Are you recieving home care services  Yes    Types of home care services  Nurse visit    Are you using any community resources  No    Have you fallen in the last 12 months  Yes    How many times  2    Interperter language line needed  No    Counseling  Patient          TCM visit, per c/c, DELMI and hosp notes reviewed by me  Visiting nurse still coming 3x a week  States her foot wound is healing well  Pt rode her motorized w/c here today for appt- by herself today  Also asks whether she needs to go in to get routine GYN exams - no previous abnormalities  Also reports that she never did the testing to confirm gastroparesis, wonders if she needs to get it done - was ordered by 3-4 years ago by GI- her c/o is \"burping all the time for 3 or 4 years, pretty likely that I have gastroparesis\" - states no pain assoc - \"not since\" she had perforated diverticulum and sepsis 4 yrs ago -\"and lost so much strength since then\"  \"Lung doctor had said that those bottom parts of the lungs just won't open- dont have enough strength to get good deep breaths\"              7/23/2024 - 7/24/2024 (35 hours)  [facility]  Consultations During Hospital Stay:  · Podiatry   Procedures Performed:   · None  Significant Findings / Test Results:   · CT C/A/P (7/23): Findings suggestive of cystitis. Bibasilar atelectasis.  Incidental Findings:   · None   Test Results Pending at Discharge (will require follow up):   · None  Outpatient Tests Requested:  · To be determined upon outpatient " follow-up with PCP  Complications:  None  Reason for Admission: Chest pain, R Foot Cellulitis   Hospital Course:   Claire Doherty is a 74 y.o. female patient who originally presented to the hospital on 7/23/2024 due to chest pain.  In brief, the patient has a significant past medical history for muscular dystrophy and chronic respiratory failure on continuous oxygen and BiPAP at night who presented for evaluation of chest and flank pain.  She noted that she was attempting to get out of her bed and unfortunately struggling to sit up as her pillow was improperly positioned.  Following the his strenuous activity she developed chest and flank discomfort associated with shortness of breath and nausea.  Upon presentation to the emergency department a CT of the chest, abdomen, and pelvis was performed without any significant or acute findings.  The morning following admission she had resolution of this discomfort and was deemed likely related to her strenuous activity.  Further, she did have a right foot cellulitis and was evaluated by podiatry who recommended local wound care and antibiotics.  The patient was ultimately discharged home with oral Keflex to complete treatment course.  She was discharged in stable condition and all of her questions were answered prior to departure.  This a brief discharge summary please see full medical record for more details.  * Cellulitis of right foot  Assessment & Plan  · Foot wound after a fall last week  · Will discharge home with PO Keflex for an additional 7 days  · Podiatry evaluation appreciated  º No need for surgical intervention   º Continue local wound care with VNA 3x a week  Chest pain  Assessment & Plan  · Presented with chest and flank pain associated with shortness of breath and nausea  · This occurred after strenuous effort to get herself out of bed and is resolved at this time  · CT C/A/P (7/23): Findings suggestive of cystitis. Bibasilar atelectasis.  Chronic  respiratory failure with hypoxia and hypercapnia (HCC)  Assessment & Plan  · Continue home 2L NC continuously and BiPAP QHS  Depression  Assessment & Plan  · Continue home Wellbutrin, Celexa, Valium, and Ramelteon  Urinary incontinence  Assessment & Plan  · Resume home Vesicare                Review of Systems  Objective     /70 (BP Location: Left arm, Patient Position: Sitting, Cuff Size: Standard)   Pulse 82   Temp (!) 92.2 °F (33.4 °C) (Tympanic)   Ht 5' (1.524 m)   LMP  (LMP Unknown)   SpO2 90%   BMI 34.92 kg/m²     Physical Exam  Vitals and nursing note reviewed.   Constitutional:       General: She is not in acute distress.     Appearance: She is well-groomed. She is ill-appearing (unchanged chronically-ill appearance). She is not toxic-appearing or diaphoretic.      Comments: W/c-bound  Extremely pleasant as always   HENT:      Head: Normocephalic and atraumatic.      Nose: Nose normal.      Mouth/Throat:      Lips: Pink.      Mouth: Mucous membranes are moist.      Pharynx: Oropharynx is clear. Uvula midline.   Eyes:      Conjunctiva/sclera: Conjunctivae normal.   Cardiovascular:      Rate and Rhythm: Normal rate and regular rhythm.      Pulses: Normal pulses.      Heart sounds: Normal heart sounds.   Pulmonary:      Effort: Pulmonary effort is normal.      Breath sounds: Normal air entry. No decreased breath sounds, wheezing or rhonchi.      Comments: Faint bibasilar crackles  Abdominal:      Palpations: Abdomen is soft. There is no hepatomegaly, splenomegaly or mass.      Tenderness: There is no abdominal tenderness.      Comments: Colostomy intact   Musculoskeletal:      Right lower leg: No edema.      Left lower leg: No edema.   Skin:     General: Skin is warm and dry.      Capillary Refill: Capillary refill takes less than 2 seconds.      Coloration: Skin is not pale.             Comments: Right dorsal foot scabbed abrasion, no erythema, fluctuance or d/c   Neurological:      Mental Status:  She is alert and oriented to person, place, and time.   Psychiatric:         Mood and Affect: Mood normal.         Behavior: Behavior normal. Behavior is cooperative.         Cognition and Memory: Cognition and memory normal.         Judgment: Judgment normal.       Medications have been reviewed by provider in current encounter    Administrative Statements

## 2024-08-21 ENCOUNTER — APPOINTMENT (INPATIENT)
Dept: NON INVASIVE DIAGNOSTICS | Facility: HOSPITAL | Age: 74
DRG: 315 | End: 2024-08-21
Payer: COMMERCIAL

## 2024-08-21 ENCOUNTER — APPOINTMENT (EMERGENCY)
Dept: RADIOLOGY | Facility: HOSPITAL | Age: 74
End: 2024-08-21
Payer: COMMERCIAL

## 2024-08-21 ENCOUNTER — HOSPITAL ENCOUNTER (INPATIENT)
Facility: HOSPITAL | Age: 74
LOS: 1 days | Discharge: HOME | DRG: 315 | End: 2024-08-22
Attending: INTERNAL MEDICINE | Admitting: FAMILY MEDICINE
Payer: COMMERCIAL

## 2024-08-21 ENCOUNTER — HOSPITAL ENCOUNTER (EMERGENCY)
Facility: HOSPITAL | Age: 74
End: 2024-08-21
Attending: EMERGENCY MEDICINE
Payer: COMMERCIAL

## 2024-08-21 VITALS
OXYGEN SATURATION: 94 % | HEART RATE: 94 BPM | RESPIRATION RATE: 18 BRPM | DIASTOLIC BLOOD PRESSURE: 60 MMHG | TEMPERATURE: 97.9 F | SYSTOLIC BLOOD PRESSURE: 126 MMHG

## 2024-08-21 DIAGNOSIS — I21.3 STEMI (ST ELEVATION MYOCARDIAL INFARCTION) (HCC): Primary | ICD-10-CM

## 2024-08-21 DIAGNOSIS — J43.2 CENTRILOBULAR EMPHYSEMA (HCC): ICD-10-CM

## 2024-08-21 LAB
ALBUMIN SERPL BCG-MCNC: 3.9 G/DL (ref 3.5–5)
ALP SERPL-CCNC: 66 U/L (ref 34–104)
ALT SERPL W P-5'-P-CCNC: 13 U/L (ref 7–52)
ANION GAP SERPL CALCULATED.3IONS-SCNC: 7 MMOL/L (ref 4–13)
AORTIC ROOT: 2.8 CM
APICAL FOUR CHAMBER EJECTION FRACTION: 66 %
APTT PPP: 26 SECONDS (ref 23–34)
ASCENDING AORTA: 3.3 CM
AST SERPL W P-5'-P-CCNC: 17 U/L (ref 13–39)
BASOPHILS # BLD AUTO: 0.05 THOUSANDS/ÂΜL (ref 0–0.1)
BASOPHILS NFR BLD AUTO: 0 % (ref 0–1)
BILIRUB SERPL-MCNC: 0.43 MG/DL (ref 0.2–1)
BSA FOR ECHO PROCEDURE: 1.78 M2
BUN SERPL-MCNC: 17 MG/DL (ref 5–25)
CALCIUM SERPL-MCNC: 9.5 MG/DL (ref 8.4–10.2)
CARDIAC TROPONIN I PNL SERPL HS: 3 NG/L
CHLORIDE SERPL-SCNC: 97 MMOL/L (ref 96–108)
CHOLEST SERPL-MCNC: 131 MG/DL
CO2 SERPL-SCNC: 32 MMOL/L (ref 21–32)
CREAT SERPL-MCNC: 0.3 MG/DL (ref 0.6–1.3)
CRP SERPL QL: 174.3 MG/L
E WAVE DECELERATION TIME: 196 MS
E/A RATIO: 1.14
EOSINOPHIL # BLD AUTO: 0.17 THOUSAND/ÂΜL (ref 0–0.61)
EOSINOPHIL NFR BLD AUTO: 1 % (ref 0–6)
ERYTHROCYTE [DISTWIDTH] IN BLOOD BY AUTOMATED COUNT: 17.6 % (ref 11.6–15.1)
ERYTHROCYTE [SEDIMENTATION RATE] IN BLOOD: 38 MM/HOUR (ref 0–29)
FRACTIONAL SHORTENING: 45 (ref 28–44)
GFR SERPL CREATININE-BSD FRML MDRD: 113 ML/MIN/1.73SQ M
GLUCOSE SERPL-MCNC: 140 MG/DL (ref 65–140)
HCT VFR BLD AUTO: 40.9 % (ref 34.8–46.1)
HDLC SERPL-MCNC: 59 MG/DL
HGB BLD-MCNC: 12.8 G/DL (ref 11.5–15.4)
IMM GRANULOCYTES # BLD AUTO: 0.03 THOUSAND/UL (ref 0–0.2)
IMM GRANULOCYTES NFR BLD AUTO: 0 % (ref 0–2)
INR PPP: 1.05 (ref 0.85–1.19)
INTERVENTRICULAR SEPTUM IN DIASTOLE (PARASTERNAL SHORT AXIS VIEW): 0.8 CM
INTERVENTRICULAR SEPTUM: 0.8 CM (ref 0.6–1.1)
LAAS-AP2: 10.5 CM2
LAAS-AP4: 12.8 CM2
LDLC SERPL CALC-MCNC: 57 MG/DL (ref 0–100)
LEFT ATRIUM SIZE: 2.3 CM
LEFT ATRIUM VOLUME (MOD BIPLANE): 25 ML
LEFT ATRIUM VOLUME INDEX (MOD BIPLANE): 14 ML/M2
LEFT INTERNAL DIMENSION IN SYSTOLE: 2.2 CM (ref 2.1–4)
LEFT VENTRICULAR INTERNAL DIMENSION IN DIASTOLE: 4 CM (ref 3.5–6)
LEFT VENTRICULAR POSTERIOR WALL IN END DIASTOLE: 0.6 CM
LEFT VENTRICULAR STROKE VOLUME: 54 ML
LVSV (TEICH): 54 ML
LYMPHOCYTES # BLD AUTO: 2.21 THOUSANDS/ÂΜL (ref 0.6–4.47)
LYMPHOCYTES NFR BLD AUTO: 15 % (ref 14–44)
MCH RBC QN AUTO: 26.4 PG (ref 26.8–34.3)
MCHC RBC AUTO-ENTMCNC: 31.3 G/DL (ref 31.4–37.4)
MCV RBC AUTO: 85 FL (ref 82–98)
MONOCYTES # BLD AUTO: 1.56 THOUSAND/ÂΜL (ref 0.17–1.22)
MONOCYTES NFR BLD AUTO: 11 % (ref 4–12)
MV E'TISSUE VEL-SEP: 9 CM/S
MV PEAK A VEL: 0.87 M/S
MV PEAK E VEL: 99 CM/S
MV STENOSIS PRESSURE HALF TIME: 57 MS
MV VALVE AREA P 1/2 METHOD: 3.86
NEUTROPHILS # BLD AUTO: 10.75 THOUSANDS/ÂΜL (ref 1.85–7.62)
NEUTS SEG NFR BLD AUTO: 73 % (ref 43–75)
NONHDLC SERPL-MCNC: 72 MG/DL
NRBC BLD AUTO-RTO: 0 /100 WBCS
PLATELET # BLD AUTO: 277 THOUSANDS/UL (ref 149–390)
PLATELET # BLD AUTO: 348 THOUSANDS/UL (ref 149–390)
PMV BLD AUTO: 10.3 FL (ref 8.9–12.7)
PMV BLD AUTO: 10.5 FL (ref 8.9–12.7)
POTASSIUM SERPL-SCNC: 4.2 MMOL/L (ref 3.5–5.3)
PROT SERPL-MCNC: 7.3 G/DL (ref 6.4–8.4)
PROTHROMBIN TIME: 14.2 SECONDS (ref 12.3–15)
RBC # BLD AUTO: 4.84 MILLION/UL (ref 3.81–5.12)
RIGHT ATRIUM AREA SYSTOLE A4C: 11.4 CM2
RIGHT VENTRICLE ID DIMENSION: 3.1 CM
SL CV LEFT ATRIUM LENGTH A2C: 4 CM
SL CV LV EF: 65
SL CV PED ECHO LEFT VENTRICLE DIASTOLIC VOLUME (MOD BIPLANE) 2D: 70 ML
SL CV PED ECHO LEFT VENTRICLE SYSTOLIC VOLUME (MOD BIPLANE) 2D: 16 ML
SODIUM SERPL-SCNC: 136 MMOL/L (ref 135–147)
TR MAX PG: 30 MMHG
TR PEAK VELOCITY: 2.7 M/S
TRICUSPID ANNULAR PLANE SYSTOLIC EXCURSION: 1.9 CM
TRICUSPID VALVE PEAK REGURGITATION VELOCITY: 2.73 M/S
TRIGL SERPL-MCNC: 73 MG/DL
WBC # BLD AUTO: 14.77 THOUSAND/UL (ref 4.31–10.16)

## 2024-08-21 PROCEDURE — 99223 1ST HOSP IP/OBS HIGH 75: CPT | Performed by: FAMILY MEDICINE

## 2024-08-21 PROCEDURE — 99152 MOD SED SAME PHYS/QHP 5/>YRS: CPT | Performed by: INTERNAL MEDICINE

## 2024-08-21 PROCEDURE — 94660 CPAP INITIATION&MGMT: CPT

## 2024-08-21 PROCEDURE — C1894 INTRO/SHEATH, NON-LASER: HCPCS | Performed by: INTERNAL MEDICINE

## 2024-08-21 PROCEDURE — 80053 COMPREHEN METABOLIC PANEL: CPT | Performed by: EMERGENCY MEDICINE

## 2024-08-21 PROCEDURE — 71045 X-RAY EXAM CHEST 1 VIEW: CPT

## 2024-08-21 PROCEDURE — 93306 TTE W/DOPPLER COMPLETE: CPT

## 2024-08-21 PROCEDURE — 86140 C-REACTIVE PROTEIN: CPT

## 2024-08-21 PROCEDURE — 85025 COMPLETE CBC W/AUTO DIFF WBC: CPT | Performed by: EMERGENCY MEDICINE

## 2024-08-21 PROCEDURE — C1769 GUIDE WIRE: HCPCS | Performed by: INTERNAL MEDICINE

## 2024-08-21 PROCEDURE — 99285 EMERGENCY DEPT VISIT HI MDM: CPT

## 2024-08-21 PROCEDURE — 96374 THER/PROPH/DIAG INJ IV PUSH: CPT

## 2024-08-21 PROCEDURE — 80061 LIPID PANEL: CPT | Performed by: INTERNAL MEDICINE

## 2024-08-21 PROCEDURE — 93454 CORONARY ARTERY ANGIO S&I: CPT | Performed by: INTERNAL MEDICINE

## 2024-08-21 PROCEDURE — 93306 TTE W/DOPPLER COMPLETE: CPT | Performed by: INTERNAL MEDICINE

## 2024-08-21 PROCEDURE — 99153 MOD SED SAME PHYS/QHP EA: CPT | Performed by: INTERNAL MEDICINE

## 2024-08-21 PROCEDURE — 94760 N-INVAS EAR/PLS OXIMETRY 1: CPT

## 2024-08-21 PROCEDURE — 99291 CRITICAL CARE FIRST HOUR: CPT | Performed by: EMERGENCY MEDICINE

## 2024-08-21 PROCEDURE — NC001 PR NO CHARGE: Performed by: INTERNAL MEDICINE

## 2024-08-21 PROCEDURE — C1760 CLOSURE DEV, VASC: HCPCS | Performed by: INTERNAL MEDICINE

## 2024-08-21 PROCEDURE — 85049 AUTOMATED PLATELET COUNT: CPT | Performed by: FAMILY MEDICINE

## 2024-08-21 PROCEDURE — 85652 RBC SED RATE AUTOMATED: CPT

## 2024-08-21 PROCEDURE — 85730 THROMBOPLASTIN TIME PARTIAL: CPT | Performed by: EMERGENCY MEDICINE

## 2024-08-21 PROCEDURE — 93005 ELECTROCARDIOGRAM TRACING: CPT

## 2024-08-21 PROCEDURE — 36415 COLL VENOUS BLD VENIPUNCTURE: CPT | Performed by: EMERGENCY MEDICINE

## 2024-08-21 PROCEDURE — 84484 ASSAY OF TROPONIN QUANT: CPT | Performed by: EMERGENCY MEDICINE

## 2024-08-21 PROCEDURE — 85610 PROTHROMBIN TIME: CPT | Performed by: EMERGENCY MEDICINE

## 2024-08-21 DEVICE — ANGIO-SEAL VIP VASCULAR CLOSURE DEVICE
Type: IMPLANTABLE DEVICE | Status: FUNCTIONAL
Brand: ANGIO-SEAL

## 2024-08-21 RX ORDER — HEPARIN SODIUM 1000 [USP'U]/ML
4000 INJECTION, SOLUTION INTRAVENOUS; SUBCUTANEOUS ONCE
Status: COMPLETED | OUTPATIENT
Start: 2024-08-21 | End: 2024-08-21

## 2024-08-21 RX ORDER — LIDOCAINE HYDROCHLORIDE 10 MG/ML
INJECTION, SOLUTION EPIDURAL; INFILTRATION; INTRACAUDAL; PERINEURAL CODE/TRAUMA/SEDATION MEDICATION
Status: DISCONTINUED | OUTPATIENT
Start: 2024-08-21 | End: 2024-08-21 | Stop reason: HOSPADM

## 2024-08-21 RX ORDER — CITALOPRAM HYDROBROMIDE 10 MG/1
10 TABLET ORAL DAILY
Status: DISCONTINUED | OUTPATIENT
Start: 2024-08-21 | End: 2024-08-22 | Stop reason: HOSPADM

## 2024-08-21 RX ORDER — ALBUTEROL SULFATE 0.83 MG/ML
2.5 SOLUTION RESPIRATORY (INHALATION) EVERY 4 HOURS PRN
Status: DISCONTINUED | OUTPATIENT
Start: 2024-08-21 | End: 2024-08-22 | Stop reason: HOSPADM

## 2024-08-21 RX ORDER — NITROGLYCERIN 0.4 MG/1
0.4 TABLET SUBLINGUAL
Status: DISCONTINUED | OUTPATIENT
Start: 2024-08-21 | End: 2024-08-22 | Stop reason: HOSPADM

## 2024-08-21 RX ORDER — FLUTICASONE PROPIONATE 50 MCG
2 SPRAY, SUSPENSION (ML) NASAL DAILY
Status: DISCONTINUED | OUTPATIENT
Start: 2024-08-21 | End: 2024-08-22 | Stop reason: HOSPADM

## 2024-08-21 RX ORDER — HEPARIN SODIUM 10000 [USP'U]/100ML
3-20 INJECTION, SOLUTION INTRAVENOUS
Status: DISCONTINUED | OUTPATIENT
Start: 2024-08-21 | End: 2024-08-21 | Stop reason: HOSPADM

## 2024-08-21 RX ORDER — ASPIRIN 81 MG/1
324 TABLET, CHEWABLE ORAL ONCE
Status: COMPLETED | OUTPATIENT
Start: 2024-08-21 | End: 2024-08-21

## 2024-08-21 RX ORDER — MIDAZOLAM HYDROCHLORIDE 2 MG/2ML
INJECTION, SOLUTION INTRAMUSCULAR; INTRAVENOUS CODE/TRAUMA/SEDATION MEDICATION
Status: DISCONTINUED | OUTPATIENT
Start: 2024-08-21 | End: 2024-08-21 | Stop reason: HOSPADM

## 2024-08-21 RX ORDER — ONDANSETRON 2 MG/ML
4 INJECTION INTRAMUSCULAR; INTRAVENOUS EVERY 6 HOURS PRN
Status: DISCONTINUED | OUTPATIENT
Start: 2024-08-21 | End: 2024-08-22 | Stop reason: HOSPADM

## 2024-08-21 RX ORDER — ENOXAPARIN SODIUM 100 MG/ML
40 INJECTION SUBCUTANEOUS DAILY
Status: DISCONTINUED | OUTPATIENT
Start: 2024-08-21 | End: 2024-08-22 | Stop reason: HOSPADM

## 2024-08-21 RX ORDER — OXYBUTYNIN CHLORIDE 10 MG/1
10 TABLET, EXTENDED RELEASE ORAL DAILY
Status: DISCONTINUED | OUTPATIENT
Start: 2024-08-21 | End: 2024-08-22 | Stop reason: HOSPADM

## 2024-08-21 RX ORDER — ACETAMINOPHEN 325 MG/1
650 TABLET ORAL EVERY 6 HOURS PRN
Status: DISCONTINUED | OUTPATIENT
Start: 2024-08-21 | End: 2024-08-21

## 2024-08-21 RX ORDER — BUDESONIDE 0.5 MG/2ML
0.5 INHALANT ORAL
Status: DISCONTINUED | OUTPATIENT
Start: 2024-08-22 | End: 2024-08-22 | Stop reason: HOSPADM

## 2024-08-21 RX ORDER — BUPROPION HYDROCHLORIDE 75 MG/1
75 TABLET ORAL EVERY MORNING
Status: DISCONTINUED | OUTPATIENT
Start: 2024-08-22 | End: 2024-08-22 | Stop reason: HOSPADM

## 2024-08-21 RX ORDER — ONDANSETRON 2 MG/ML
4 INJECTION INTRAMUSCULAR; INTRAVENOUS EVERY 6 HOURS PRN
Status: DISCONTINUED | OUTPATIENT
Start: 2024-08-21 | End: 2024-08-21

## 2024-08-21 RX ORDER — TRIAMCINOLONE ACETONIDE 1 MG/G
CREAM TOPICAL 2 TIMES DAILY PRN
Status: DISCONTINUED | OUTPATIENT
Start: 2024-08-21 | End: 2024-08-22 | Stop reason: HOSPADM

## 2024-08-21 RX ORDER — DIAZEPAM 5 MG
5 TABLET ORAL EVERY 8 HOURS PRN
Status: DISCONTINUED | OUTPATIENT
Start: 2024-08-21 | End: 2024-08-22 | Stop reason: HOSPADM

## 2024-08-21 RX ORDER — SODIUM CHLORIDE 9 MG/ML
100 INJECTION, SOLUTION INTRAVENOUS CONTINUOUS
Status: DISPENSED | OUTPATIENT
Start: 2024-08-21 | End: 2024-08-21

## 2024-08-21 RX ORDER — RAMELTEON 8 MG/1
8 TABLET ORAL
Status: DISCONTINUED | OUTPATIENT
Start: 2024-08-21 | End: 2024-08-22 | Stop reason: HOSPADM

## 2024-08-21 RX ORDER — FENTANYL CITRATE 50 UG/ML
INJECTION, SOLUTION INTRAMUSCULAR; INTRAVENOUS CODE/TRAUMA/SEDATION MEDICATION
Status: DISCONTINUED | OUTPATIENT
Start: 2024-08-21 | End: 2024-08-21 | Stop reason: HOSPADM

## 2024-08-21 RX ORDER — PANTOPRAZOLE SODIUM 40 MG/1
40 TABLET, DELAYED RELEASE ORAL
Status: DISCONTINUED | OUTPATIENT
Start: 2024-08-22 | End: 2024-08-22 | Stop reason: HOSPADM

## 2024-08-21 RX ORDER — IBUPROFEN 400 MG/1
800 TABLET, FILM COATED ORAL EVERY 8 HOURS SCHEDULED
Status: DISCONTINUED | OUTPATIENT
Start: 2024-08-21 | End: 2024-08-22 | Stop reason: HOSPADM

## 2024-08-21 RX ORDER — ACETAMINOPHEN 325 MG/1
650 TABLET ORAL EVERY 4 HOURS PRN
Status: DISCONTINUED | OUTPATIENT
Start: 2024-08-21 | End: 2024-08-22 | Stop reason: HOSPADM

## 2024-08-21 RX ADMIN — CITALOPRAM HYDROBROMIDE 10 MG: 10 TABLET ORAL at 17:30

## 2024-08-21 RX ADMIN — IBUPROFEN 800 MG: 400 TABLET, FILM COATED ORAL at 17:30

## 2024-08-21 RX ADMIN — HEPARIN SODIUM 4000 UNITS: 1000 INJECTION INTRAVENOUS; SUBCUTANEOUS at 12:31

## 2024-08-21 RX ADMIN — IBUPROFEN 800 MG: 400 TABLET, FILM COATED ORAL at 21:11

## 2024-08-21 RX ADMIN — ASPIRIN 81 MG CHEWABLE TABLET 324 MG: 81 TABLET CHEWABLE at 12:28

## 2024-08-21 RX ADMIN — TICAGRELOR 180 MG: 90 TABLET ORAL at 12:28

## 2024-08-21 RX ADMIN — DIAZEPAM 5 MG: 5 TABLET ORAL at 21:11

## 2024-08-21 RX ADMIN — OXYBUTYNIN 10 MG: 10 TABLET, FILM COATED, EXTENDED RELEASE ORAL at 17:30

## 2024-08-21 RX ADMIN — ENOXAPARIN SODIUM 40 MG: 40 INJECTION SUBCUTANEOUS at 21:10

## 2024-08-21 RX ADMIN — FLUTICASONE PROPIONATE 2 SPRAY: 50 SPRAY, METERED NASAL at 17:30

## 2024-08-21 RX ADMIN — SODIUM CHLORIDE 100 ML/HR: 0.9 INJECTION, SOLUTION INTRAVENOUS at 17:31

## 2024-08-21 NOTE — PLAN OF CARE
Problem: DISCHARGE PLANNING  Goal: Discharge to home or other facility with appropriate resources  Description: INTERVENTIONS:  - Identify barriers to discharge w/patient and caregiver  - Arrange for needed discharge resources and transportation as appropriate  - Identify discharge learning needs (meds, wound care, etc.)  - Arrange for interpretive services to assist at discharge as needed  - Refer to Case Management Department for coordinating discharge planning if the patient needs post-hospital services based on physician/advanced practitioner order or complex needs related to functional status, cognitive ability, or social support system  8/21/2024 1917 by Vianey Cassidy RN  Outcome: Progressing  8/21/2024 1811 by Vianey Cassidy RN  Outcome: Progressing     Problem: Knowledge Deficit  Goal: Patient/family/caregiver demonstrates understanding of disease process, treatment plan, medications, and discharge instructions  Description: Complete learning assessment and assess knowledge base.  Interventions:  - Provide teaching at level of understanding  - Provide teaching via preferred learning methods  8/21/2024 1917 by Vianey Cassidy RN  Outcome: Progressing  8/21/2024 1811 by Vianey Cassidy RN  Outcome: Progressing     Problem: CARDIOVASCULAR - ADULT  Goal: Maintains optimal cardiac output and hemodynamic stability  Description: INTERVENTIONS:  - Monitor I/O, vital signs and rhythm  - Monitor for S/S and trends of decreased cardiac output  - Administer and titrate ordered vasoactive medications to optimize hemodynamic stability  - Assess quality of pulses, skin color and temperature  - Assess for signs of decreased coronary artery perfusion  - Instruct patient to report change in severity of symptoms  8/21/2024 1917 by Vianey Cassidy RN  Outcome: Progressing  8/21/2024 1811 by Vianey Cassidy RN  Outcome: Progressing  Goal: Absence of cardiac dysrhythmias or at baseline  rhythm  Description: INTERVENTIONS:  - Continuous cardiac monitoring, vital signs, obtain 12 lead EKG if ordered  - Administer antiarrhythmic and heart rate control medications as ordered  - Monitor electrolytes and administer replacement therapy as ordered  8/21/2024 1917 by Vianey Cassidy, RN  Outcome: Progressing  8/21/2024 1811 by Vianey Cassidy, RN  Outcome: Progressing

## 2024-08-21 NOTE — EMTALA/ACUTE CARE TRANSFER
[facility] EMERGENCY DEPARTMENT  [address] [place] [address]  Dept: [phone]      EMTALA TRANSFER CONSENT    NAME Claire GOINS 1950                              MRN [id number]    I have been informed of my rights regarding examination, treatment, and transfer   by Dr. Ron Mcghee Jr., *    Benefits: Specialized equipment and/or services available at the receiving facility (Include comment)________________________    Risks: Potential for delay in receiving treatment, Potential deterioration of medical condition, Loss of IV, Increased discomfort during transfer, Possible worsening of condition or death during transfer      Consent for Transfer:  I acknowledge that my medical condition has been evaluated and explained to me by the emergency department physician or other qualified medical person and/or my attending physician, who has recommended that I be transferred to the service of  Accepting Physician: Alina at Accepting Facility Name, City & State : [facility]. The above potential benefits of such transfer, the potential risks associated with such transfer, and the probable risks of not being transferred have been explained to me, and I fully understand them.  The doctor has explained that, in my case, the benefits of transfer outweigh the risks.  I agree to be transferred.    I authorize the performance of emergency medical procedures and treatments upon me in both transit and upon arrival at the receiving facility.  Additionally, I authorize the release of any and all medical records to the receiving facility and request they be transported with me, if possible.  I understand that the safest mode of transportation during a medical emergency is an ambulance and that the Hospital advocates the use of this mode of transport. Risks of traveling to the receiving facility by car, including absence of medical control, life sustaining  equipment, such as oxygen, and medical personnel has been explained to me and I fully understand them.    (ELIOT CORRECT BOX BELOW)  [X]  I consent to the stated transfer and to be transported by ambulance/helicopter.  [  ]  I consent to the stated transfer, but refuse transportation by ambulance and accept full responsibility for my transportation by car.  I understand the risks of non-ambulance transfers and I exonerate the Hospital and its staff from any deterioration in my condition that results from this refusal.    X___________________________________________    DATE  24  TIME________  Signature of patient or legally responsible individual signing on patient behalf           RELATIONSHIP TO PATIENT_________________________          Provider Certification    NAME Claire Doherty                                        [facility] 1950                              MRN [id number]    A medical screening exam was performed on the above named patient.  Based on the examination:    Condition Necessitating Transfer The encounter diagnosis was STEMI (ST elevation myocardial infarction) (HCC).    Patient Condition: The patient has been stabilized such that within reasonable medical probability, no material deterioration of the patient condition or the condition of the unborn child(quynh) is likely to result from the transfer    Reason for Transfer: Level of Care needed not available at this facility    Transfer Requirements: Facility [facility]   Space available and qualified personnel available for treatment as acknowledged by PAC  Agreed to accept transfer and to provide appropriate medical treatment as acknowledged by       Alina  Appropriate medical records of the examination and treatment of the patient are provided at the time of transfer   STAFF INITIAL WHEN COMPLETED _______  Transfer will be performed by qualified personnel from [facility]  and appropriate transfer equipment as required, including the use of necessary and  appropriate life support measures.    Provider Certification: I have examined the patient and explained the following risks and benefits of being transferred/refusing transfer to the patient/family:  Unanticipated needs of medical equipment and personnel during transport, General risk, such as traffic hazards, adverse weather conditions, rough terrain or turbulence, possible failure of equipment (including vehicle or aircraft), or consequences of actions of persons outside the control of the transport personnel, The possibility of a transport vehicle being unavailable, Consent was not obtained as patient is committed to psychiatric facility and transfer is mandated      Based on these reasonable risks and benefits to the patient and/or the unborn child(quynh), and based upon the information available at the time of the patient’s examination, I certify that the medical benefits reasonably to be expected from the provision of appropriate medical treatments at another medical facility outweigh the increasing risks, if any, to the individual’s medical condition, and in the case of labor to the unborn child, from effecting the transfer.    X____________________________________________ DATE 08/21/24        TIME_______      ORIGINAL - SEND TO MEDICAL RECORDS   COPY - SEND WITH PATIENT DURING TRANSFER

## 2024-08-21 NOTE — ASSESSMENT & PLAN NOTE
With history of muscular dystrophy and follows up with neurology as outpatient  Patient uses BiPAP at night and also uses walker/wheelchair for ambulation  Continue with supportive care

## 2024-08-21 NOTE — ED PROVIDER NOTES
History  Chief Complaint   Patient presents with    Chest Pain     When breathing. Onset 2 nights ago.     74-year-old female presents by ambulance due to chest discomfort which she describes as an ache in the substernal region that appears to be worse with taking a deep breath.  The patient states that the pain is a 3 out of 10 with taking a deep breath and has no pain when not inhaling.  The patient notes though she had more significant pain over the past 48 hours and states that is much better today.  Patient notes that she was in the ER in July with a similar complaint but was told that it was likely an infection.  The patient notes that she was being evaluated by a visiting nurse and was told that she should be reevaluated by an ER.        Prior to Admission Medications   Prescriptions Last Dose Informant Patient Reported? Taking?   Probiotic Product (Florastor Plus) CAPS  Self Yes No   albuterol (2.5 mg/3 mL) 0.083 % nebulizer solution   No No   Sig: Take 3 mL (2.5 mg total) by nebulization every 4 (four) hours as needed for wheezing or shortness of breath   buPROPion (WELLBUTRIN) 75 mg tablet   No No   Sig: take 1 tablet by mouth every morning   budesonide (Pulmicort) 0.5 mg/2 mL nebulizer solution   No No   Sig: Take 2 mL (0.5 mg total) by nebulization 2 (two) times a day Rinse mouth after use.   Patient taking differently: Take 0.5 mg by nebulization as needed Rinse mouth after use.   citalopram (CeleXA) 10 mg tablet   No No   Sig: Take 1 tablet (10 mg total) by mouth daily   collagenase (SANTYL) ointment   No No   Sig: Apply topically daily   diazepam (VALIUM) 5 mg tablet   No No   Sig: Take 1 tablet (5 mg total) by mouth every 8 (eight) hours as needed for anxiety or muscle spasms   fluticasone (FLONASE) 50 mcg/act nasal spray   No No   Si sprays into each nostril daily   ketoconazole (NIZORAL) 2 % cream  Self Yes No   Sig: APPLY TWICE A DAY TO THE AFFECTED AREA(S)   ketoconazole (NIZORAL) 2 % shampoo    No No   Sig: apply to affected area two times a week   ramelteon (ROZEREM) 8 mg tablet   Yes No   Sig: Take 8 mg by mouth daily at bedtime   solifenacin (VESICARE) 10 MG tablet   No No   Sig: take 1 tablet by mouth once daily   triamcinolone (KENALOG) 0.1 % cream   No No   Sig: Apply topically 2 (two) times a day as needed for irritation or rash      Facility-Administered Medications: None       Past Medical History:   Diagnosis Date    Anxiety     Breast cancer ([facility]) 2007    Colitis     Depression     Difficult intubation     Excessive daytime sleepiness     GERD (gastroesophageal reflux disease)     Hyperlipidemia     Ischemic colitis ([facility]) 01/21/2019    Leukocytosis 03/28/2020    Muscular dystrophy (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)     Skin cancer     Skin disorder     Tachycardia 06/02/2021    Vitamin D deficiency        Past Surgical History:   Procedure Laterality Date    COLONOSCOPY N/A 1/22/2019    Procedure: COLONOSCOPY;  Surgeon: Anthony Mejía MD;  Location: [facility];  Service: Colorectal    CYSTOSCOPY N/A 9/30/2020    Procedure: CYSTOSCOPY; BILATERAL URETERAL CATHETER PLACEMENT, CYSTOTOMY;  Surgeon: Zach Tyler MD;  Location: [facility];  Service: Urology    FL CYSTOGRAM  10/15/2020    HARTMANS PROCEDURE N/A 9/30/2020    Procedure: EXPLORATORY LAPAROTOMY; LYSIS OF ADHESIONS; WASHOUT PELVIC ABSCESS; COMPLEX SIGMOID COLON RESECTION; LOOP TRANSVERSE COLOSTOMY;  Surgeon: Thania Jaffe MD;  Location: [facility];  Service: General    IR DRAINAGE TUBE PLACEMENT  9/24/2020    MASTECTOMY Left 2007    left breast mastectomy     TONSILLECTOMY      TOOTH EXTRACTION      TUBAL LIGATION         Family History   Problem Relation Age of Onset    Other Mother         bone loss    Arthritis Mother     Skin cancer Father     Hypertension Father         benign      Other Father         bone loss    Muscular dystrophy Father     Hypertension Sister     No Known Problems Sister     Muscular dystrophy Brother         of the limb gridle     Parkinsonism Brother     No Known Problems Brother     No Known Problems Maternal Grandmother     No Known Problems Paternal Grandmother     No Known Problems Maternal Aunt     Muscular dystrophy Family         of the limb girdle     I have reviewed and agree with the history as documented.    E-Cigarette/Vaping    E-Cigarette Use Never User      E-Cigarette/Vaping Substances    Nicotine No     THC No     CBD No     Flavoring No      Social History     Tobacco Use    Smoking status: Former     Current packs/day: 0.00     Average packs/day: 1.5 packs/day for 37.0 years (55.5 ttl pk-yrs)     Types: Cigarettes     Start date:      Quit date:      Years since quittin.6    Smokeless tobacco: Never   Vaping Use    Vaping status: Never Used   Substance Use Topics    Alcohol use: Not Currently     Comment: social drinker per allscripts     Drug use: Yes     Types: Marijuana     Comment: medical marijuana       Review of Systems   Constitutional:  Positive for activity change and fatigue. Negative for chills and fever.   HENT:  Negative for ear pain and sore throat.    Eyes:  Negative for pain and visual disturbance.   Respiratory:  Negative for cough and shortness of breath.    Cardiovascular:  Positive for chest pain. Negative for palpitations.   Gastrointestinal:  Negative for abdominal pain and vomiting.   Genitourinary:  Negative for dysuria and hematuria.   Musculoskeletal:  Negative for arthralgias and back pain.   Skin:  Negative for color change and rash.   Neurological:  Negative for seizures and syncope.   All other systems reviewed and are negative.      Physical Exam  Physical Exam  Vitals and nursing note reviewed.   Constitutional:       General: She is not in acute distress.     Appearance: Normal appearance. She is  well-developed.   HENT:      Head: Normocephalic and atraumatic.      Right Ear: External ear normal.      Left Ear: External ear normal.      Nose: Nose normal.      Mouth/Throat:      Mouth: Mucous membranes are moist.   Eyes:      Conjunctiva/sclera: Conjunctivae normal.   Cardiovascular:      Rate and Rhythm: Normal rate and regular rhythm.      Pulses: Normal pulses.      Heart sounds: Normal heart sounds. No murmur heard.  Pulmonary:      Effort: Pulmonary effort is normal. No respiratory distress.      Breath sounds: Normal breath sounds.   Chest:      Chest wall: No tenderness.   Abdominal:      General: Bowel sounds are normal.      Palpations: Abdomen is soft.      Tenderness: There is no abdominal tenderness.   Musculoskeletal:         General: No swelling or deformity.      Cervical back: Neck supple.   Skin:     General: Skin is warm and dry.      Capillary Refill: Capillary refill takes less than 2 seconds.   Neurological:      General: No focal deficit present.      Mental Status: She is alert and oriented to person, place, and time. Mental status is at baseline.         Vital Signs  ED Triage Vitals   Temperature Pulse Respirations Blood Pressure SpO2   08/21/24 1210 08/21/24 1204 08/21/24 1204 08/21/24 1204 08/21/24 1204   97.9 °F (36.6 °C) 98 18 124/67 95 %      Temp Source Heart Rate Source Patient Position - Orthostatic VS BP Location FiO2 (%)   08/21/24 1210 08/21/24 1204 08/21/24 1204 08/21/24 1204 --   Tympanic Monitor Sitting Left arm       Pain Score       08/21/24 1204       3           Vitals:    08/21/24 1204 08/21/24 1230 08/21/24 1245   BP: 124/67 137/63 126/60   Pulse: 98 100 94   Patient Position - Orthostatic VS: Sitting Sitting Sitting         Visual Acuity      ED Medications  Medications   aspirin chewable tablet 324 mg (324 mg Oral Given 8/21/24 1228)   ticagrelor (BRILINTA) tablet 180 mg (180 mg Oral Given 8/21/24 1228)   heparin (porcine) injection 4,000 Units (4,000 Units  Intravenous Given 8/21/24 1231)       Diagnostic Studies  Results Reviewed       Procedure Component Value Units Date/Time    HS Troponin 0hr (reflex protocol) [[id number]]  (Normal) Collected: 08/21/24 1223    Lab Status: Final result Specimen: Blood from Arm, Right Updated: 08/21/24 1250     hs TnI 0hr 3 ng/L     Comprehensive metabolic panel [[id number]]  (Abnormal) Collected: 08/21/24 1223    Lab Status: Final result Specimen: Blood from Arm, Right Updated: 08/21/24 1243     Sodium 136 mmol/L      Potassium 4.2 mmol/L      Chloride 97 mmol/L      CO2 32 mmol/L      ANION GAP 7 mmol/L      BUN 17 mg/dL      Creatinine 0.30 mg/dL      Glucose 140 mg/dL      Calcium 9.5 mg/dL      AST 17 U/L      ALT 13 U/L      Alkaline Phosphatase 66 U/L      Total Protein 7.3 g/dL      Albumin 3.9 g/dL      Total Bilirubin 0.43 mg/dL      eGFR 113 ml/min/1.73sq m     Narrative:      National Kidney Disease Foundation guidelines for Chronic Kidney Disease (CKD):     Stage 1 with normal or high GFR (GFR > 90 mL/min/1.73 square meters)    Stage 2 Mild CKD (GFR = 60-89 mL/min/1.73 square meters)    Stage 3A Moderate CKD (GFR = 45-59 mL/min/1.73 square meters)    Stage 3B Moderate CKD (GFR = 30-44 mL/min/1.73 square meters)    Stage 4 Severe CKD (GFR = 15-29 mL/min/1.73 square meters)    Stage 5 End Stage CKD (GFR <15 mL/min/1.73 square meters)  Note: GFR calculation is accurate only with a steady state creatinine    Protime-INR [[id number]]  (Normal) Collected: 08/21/24 1223    Lab Status: Final result Specimen: Blood from Arm, Right Updated: 08/21/24 1239     Protime 14.2 seconds      INR 1.05    Narrative:      INR Therapeutic Range    Indication                                             INR Range      Atrial Fibrillation                                               2.0-3.0  Hypercoagulable State                                    2.0.2.3  Left Ventricular Asist Device                            2.0-3.0  Mechanical Heart Valve                                   -    Aortic(with afib, MI, embolism, HF, LA enlargement,    and/or coagulopathy)                                     2.0-3.0 (2.5-3.5)     Mitral                                                             2.5-3.5  Prosthetic/Bioprosthetic Heart Valve               2.0-3.0  Venous thromboembolism (VTE: VT, PE        2.0-3.0    APTT [[id number]]  (Normal) Collected: 08/21/24 1223    Lab Status: Final result Specimen: Blood from Arm, Right Updated: 08/21/24 1239     PTT 26 seconds     CBC and differential [[id number]]  (Abnormal) Collected: 08/21/24 1223    Lab Status: Final result Specimen: Blood from Arm, Right Updated: 08/21/24 1228     WBC 14.77 Thousand/uL      RBC 4.84 Million/uL      Hemoglobin 12.8 g/dL      Hematocrit 40.9 %      MCV 85 fL      MCH 26.4 pg      MCHC 31.3 g/dL      RDW 17.6 %      MPV 10.5 fL      Platelets 348 Thousands/uL      nRBC 0 /100 WBCs      Segmented % 73 %      Immature Grans % 0 %      Lymphocytes % 15 %      Monocytes % 11 %      Eosinophils Relative 1 %      Basophils Relative 0 %      Absolute Neutrophils 10.75 Thousands/µL      Absolute Immature Grans 0.03 Thousand/uL      Absolute Lymphocytes 2.21 Thousands/µL      Absolute Monocytes 1.56 Thousand/µL      Eosinophils Absolute 0.17 Thousand/µL      Basophils Absolute 0.05 Thousands/µL                    XR chest 1 view portable   Final Result by Aleena Roy MD (08/21 1327)   Unchanged discoid densities at the left lung base suggestive of subsegmental atelectasis or scarring. Unchanged mild interstitial marking prominence.         Workstation performed: [id number]                    Procedures  ECG 12 Lead Documentation Only    Date/Time: 8/21/2024 12:35 PM    Performed by: Ron Mcghee Jr., DO  Authorized by: Ron Mcghee Jr., DO    ECG reviewed by me, the ED Provider: yes    Patient location:  ED  Comments:      EKG shows a sinus rhythm at 92 beats a minute with a normal axis.  There  is what appears to be ST segment elevation in the inferior leads.  I do not see definitive reciprocal changes on this graft.  The patient has nonspecific anterior lateral changes as well.  This is a change however from a previous EKG done in July of this year.  CriticalCare Time    Date/Time: 8/21/2024 12:38 PM    Performed by: Ron Mcghee Jr., DO  Authorized by: Ron Mcghee Jr., DO    Critical care provider statement:     Critical care time (minutes):  35    Critical care was necessary to treat or prevent imminent or life-threatening deterioration of the following conditions:  Cardiac failure    Critical care was time spent personally by me on the following activities:  Examination of patient, discussions with consultants, development of treatment plan with patient or surrogate, ordering and performing treatments and interventions, ordering and review of laboratory studies, ordering and review of radiographic studies and review of old charts           ED Course  ED Course as of 08/21/24 2052   Wed Aug 21, 2024   1226 Case discussed with Dr. Plascencia who recommends that the patient be sent to [facility] for cath.  STEMI alert activated.                                 SBIRT 20yo+      Flowsheet Row Most Recent Value   Initial Alcohol Screen: US AUDIT-C     1. How often do you have a drink containing alcohol? 0 Filed at: 08/21/2024 1220   2. How many drinks containing alcohol do you have on a typical day you are drinking?  0 Filed at: 08/21/2024 1220   3a. Male UNDER 65: How often do you have five or more drinks on one occasion? 0 Filed at: 08/21/2024 1220   3b. FEMALE Any Age, or MALE 65+: How often do you have 4 or more drinks on one occassion? 0 Filed at: 08/21/2024 1220   Audit-C Score 0 Filed at: 08/21/2024 1220   BAKARI: How many times in the past year have you...    Used an illegal drug or used a prescription medication for non-medical reasons? Never Filed at: 08/21/2024 1220                       Medical Decision Making  74-year-old female presents the emergency department secondary to chest pain.  Patient describes it as a achiness in the substernal region.  Patient denies any fever or chills but notes that she had similar complaints in July and was in the ER and was told that she may have an infection was put on antibiotics.  Patient notes that the pain was more severe over the last 48 hours and is now only a 3 out of 10 in severity but is hurting mostly when she takes a deep breath.  Patient denies any sharp pain.  Patient has a history of MS and was seen by a visiting nurse and told to come to the ER.  Differential diagnosis is PostMI pericarditis/myocarditis or STEMI.  Patient with EKG changes that are concerning for acute injury pattern.  Case was discussed with Dr. Plascencia in interventional who agrees to except the patient to run a cardiac catheter today.  Patient was given Brilinta, aspirin, and heparin.  The patient's overall symptomatology does not sound typical for STEMI as her pain is aching with deep breathing only.  This may represent a post MI syndrome being that her pain was much more severe over the last 48 hours.  Patient transferred to [facility].    Amount and/or Complexity of Data Reviewed  Labs: ordered.  Radiology: ordered.    Risk  OTC drugs.  Prescription drug management.                 Disposition  Final diagnoses:   STEMI (ST elevation myocardial infarction) (HCC)     Time reflects when diagnosis was documented in both MDM as applicable and the Disposition within this note       Time User Action Codes Description Comment    8/21/2024 12:30 PM Ron Mcghee Add [I21.3] STEMI (ST elevation myocardial infarction) (HCC)           ED Disposition       ED Disposition   Transfer to Another Facility-In Network    Condition   --    Date/Time   Wed Aug 21, 2024 1230    Comment   Claire Doherty should be transferred out to [facility] [place].               MD Documentation       Flowsheet Row Most Recent Value   Patient Condition The patient has been stabilized such that within reasonable medical probability, no material deterioration of the patient condition or the condition of the unborn child(quynh) is likely to result from the transfer   Reason for Transfer Level of Care needed not available at this facility   Benefits of Transfer Specialized equipment and/or services available at the receiving facility (Include comment)________________________   Risks of Transfer Potential for delay in receiving treatment, Potential deterioration of medical condition, Loss of IV, Increased discomfort during transfer, Possible worsening of condition or death during transfer   Accepting Physician Alina   Accepting Facility Name, [facility]    (Name & Tel number) [facility]   Transported by (Company and Unit #) [facility]   Sending MD Mcghee   Provider Certification Unanticipated needs of medical equipment and personnel during transport, General risk, such as traffic hazards, adverse weather conditions, rough terrain or turbulence, possible failure of equipment (including vehicle or aircraft), or consequences of actions of persons outside the control of the transport personnel, The possibility of a transport vehicle being unavailable, Consent was not obtained as patient is committed to psychiatric facility and transfer is mandated          RN Documentation      Flowsheet Row Most Recent Value   Accepting Facility Name, [facility]    (Name & Tel number) [facility]   Transported by (Company and Unit #) [facility]          Follow-up Information    None         Discharge Medication List as of 8/21/2024  1:03 PM        CONTINUE these medications which have NOT CHANGED    Details   albuterol (2.5 mg/3 mL) 0.083 % nebulizer solution Take 3 mL (2.5 mg total) by nebulization every 4 (four) hours as needed for wheezing or shortness of breath, Starting u 6/15/2023, Normal       budesonide (Pulmicort) 0.5 mg/2 mL nebulizer solution Take 2 mL (0.5 mg total) by nebulization 2 (two) times a day Rinse mouth after use., Starting Thu 6/15/2023, Normal      buPROPion (WELLBUTRIN) 75 mg tablet take 1 tablet by mouth every morning, Starting Mon 7/22/2024, Normal      citalopram (CeleXA) 10 mg tablet Take 1 tablet (10 mg total) by mouth daily, Starting Thu 4/6/2023, Normal      collagenase (SANTYL) ointment Apply topically daily, Starting Wed 7/24/2024, Until Fri 8/23/2024, Normal      diazepam (VALIUM) 5 mg tablet Take 1 tablet (5 mg total) by mouth every 8 (eight) hours as needed for anxiety or muscle spasms, Starting Wed 7/5/2023, Normal      fluticasone (FLONASE) 50 mcg/act nasal spray 2 sprays into each nostril daily, Starting Thu 6/15/2023, Normal      ketoconazole (NIZORAL) 2 % cream APPLY TWICE A DAY TO THE AFFECTED AREA(S), Historical Med      ketoconazole (NIZORAL) 2 % shampoo apply to affected area two times a week, Normal      Probiotic Product (Florastor Plus) CAPS Historical Med      ramelteon (ROZEREM) 8 mg tablet Take 8 mg by mouth daily at bedtime, Starting Thu 8/17/2023, Historical Med      solifenacin (VESICARE) 10 MG tablet take 1 tablet by mouth once daily, Starting Wed 5/1/2024, Normal      triamcinolone (KENALOG) 0.1 % cream Apply topically 2 (two) times a day as needed for irritation or rash, Starting Mon 7/8/2024, Normal             No discharge procedures on file.    PDMP Review         Value Time User    PDMP Reviewed  Yes 7/5/2023 11:42 AM Julienne Tucker DO ED Provider  Electronically Signed by             Ron Mcghee Jr., DO  08/21/24 2052

## 2024-08-21 NOTE — RESPIRATORY THERAPY NOTE
RT Protocol Note  Claire Doherty 74 y.o. female MRN: [id number]  Unit/Bed#: PPHP-320-01 Encounter: [id number]    Assessment    Active Problems:    Dystrophy, muscular, 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HCC)    Ambulatory dysfunction    Leukocytosis    Chronic hypoxic respiratory failure ([facility])    Depression    Chest pain      Home Pulmonary Medications:  Albuterol Inhal Soln PRN usage  Pulmicort Rxs BID       Past Medical History:   Diagnosis Date    Anxiety     Breast cancer ([facility])     Colitis     Depression     Difficult intubation     Excessive daytime sleepiness     GERD (gastroesophageal reflux disease)     Hyperlipidemia     Ischemic colitis ([facility]) 2019    Leukocytosis 2020    Muscular dystrophy ([facility])     Limb-girdle    Osteoporosis     Overactive bladder     Perforated diverticulum 2020    Pneumonitis     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[facility])     Skin cancer     Skin disorder     Tachycardia 2021    Vitamin D deficiency      Social History     Socioeconomic History    Marital status:      Spouse name: None    Number of children: None    Years of education: None    Highest education level: None   Occupational History    None   Tobacco Use    Smoking status: Former     Current packs/day: 0.00     Average packs/day: 1.5 packs/day for 37.0 years (55.5 ttl pk-yrs)     Types: Cigarettes     Start date:      Quit date: 2004     Years since quittin.6    Smokeless tobacco: Never   Vaping Use    Vaping status: Never Used   Substance and Sexual Activity    Alcohol use: Not Currently     Comment: social drinker per allscripts     Drug use: Yes     Types: Marijuana     Comment: medical marijuana    Sexual activity: Not Currently   Other Topics Concern    None   Social History Narrative    Currently on permanent disability due to musc dystrophy    Denied daily coffee consumption     Wheelchair dependent since about     No children     Social Determinants of Health      Financial Resource Strain: Low Risk  (7/5/2023)    Overall Financial Resource Strain (CARDIA)     Difficulty of Paying Living Expenses: Not hard at all   Food Insecurity: No Food Insecurity (7/23/2024)    Hunger Vital Sign     Worried About Running Out of Food in the Last Year: Never true     Ran Out of Food in the Last Year: Never true   Transportation Needs: No Transportation Needs (7/23/2024)    PRAPARE - Transportation     Lack of Transportation (Medical): No     Lack of Transportation (Non-Medical): No   Physical Activity: Not on file   Stress: Not on file   Social Connections: Not on file   Intimate Partner Violence: Not on file   Housing Stability: Unknown (7/23/2024)    Housing Stability Vital Sign     Unable to Pay for Housing in the Last Year: No     Number of Times Moved in the Last Year: Not on file     Homeless in the Last Year: No       Subjective         Objective    Physical Exam:   Assessment Type: Assess only  General Appearance: Awake, Alert  Respiratory Pattern: Normal, Shallow  Chest Assessment: Chest expansion symmetrical  Bilateral Breath Sounds: Diminished  Cough: None  O2 Device: 4 lpm NC    Vitals:  Blood pressure 138/72, pulse 94, temperature 97.7 °F (36.5 °C), resp. rate 20, height 5' (1.524 m), weight 80.7 kg (178 lb), SpO2 97%, not currently breastfeeding.          Imaging and other studies: I have personally reviewed pertinent reports.  Unchanged discoid densities at the left lung base suggestive of subsegmental atelectasis or scarring. Unchanged mild interstitial marking prominence. No pneumothorax or pleural effusion.     O2 Device: 4 lpm NC     Plan    Respiratory Plan: Mild Distress pathway        Resp Comments: 74 y.o. year old female with a history of muscular dystrophy, chronic respiratory failure on oxygen and BiPAP at night, anxiety, depression, ischemic colitis and perforated diverticulitis status post left hemicolectomy, COPD who presented to [facility]  with chest pain for the last 48 hours.  EKG showed ST elevation in the inferior leads.  Patient was transferred to [facility]  With lifeflight for emergent left heart cath. Cath completed w/o current call for any interventions.  ECHO also performed.  Possible discharge for tomorrow.  Pt has agreed to wear our BiPAP with O2 supplemented into BiPAP tonight.  Thereafter will recommend d/c ing this RCP if plans to possibly discharge occurs.  CXR:  Unchanged discoid densities at the left lung base suggestive of subsegmental atelectasis or scarring. Unchanged mild interstitial marking prominence. No pneumothorax or pleural effusion. Pt has a Pulmicort Rx already ordered and Albuterol Inhal Soln for PRN usage.  No other items to suggest or add at this time.

## 2024-08-21 NOTE — CONSULTS
Consultation - General Cardiology Team 2  Claire Doherty 74 y.o. female MRN: [id number]  Unit/Bed#: [facility] C[facility] ROOM Encounter: [id number]      Consults  PCP: Julienne Tucker DO   Outpatient Cardiologist: None    History of Present Illness   Physician Requesting Consult: No att. providers found  Reason for Consult / Principal Problem: STEMI    Assessment & Plan     Chest Pain   Patient with past medical history of muscular dystrophy and chronic respiratory failure on oxygen and anxiety who presented to [facility] with chest pain for the last 48 hours  Patient reported that the pain woke Her up from sleep on Monday night and was getting better since then  Patient reported pain increased with deep breathing  EKG showed ST elevation in lead II, III, aVF, V5  Patient was transferred to [facility] for emergent cath by [facility]  Last echo January 2024 showed EF of 55% normal systolic function, mildly abnormal diastolic function, mild tricuspid regurg  Patient had Holter monitor on January 2024 for palpitation which was unremarkable  Patient was discharged from the hospital on July 24 with chest pain which resolved spontaneously and was suspected due to strenuous effort.  Patient was vitally stable but anxious on exam.  Troponin was 3  Plan  Left heart cath showed no significant coronary artery disease  Suspecting pericarditis  Continue telemetry monitor  Will Check ESR and CRP  Continue IV fluid  Consider starting colchicine and NSAIDs  Follow-up on echocardiogram    Chronic respiratory failure  Patient has history of muscular dystrophy with chronic respiratory failure  Patient is on home 2 L of oxygen and BiPAP at night  Plan  Continue home oxygen    Anxiety and depression  Patient has history of anxiety and depression  Patient is on home Wellbutrin, Celexa, Valium and   Plan  Continue home regimen    GERD  Patient has history of GERD  Plan  Continue PPI    HPI: Claire Doherty is a 74 y.o. year old  female with a history of muscular dystrophy, chronic respiratory failure on oxygen and BiPAP at night, anxiety, depression, ischemic colitis and perforated diverticulitis status post left hemicolectomy, COPD who presented to [facility] with chest pain for the last 48 hours.  EKG showed ST elevation in the inferior leads.  Patient was transferred to [facility]  With lifeflight for emergent left heart cath.  In the ED vitals, blood pressure 124/67, respiration 18, temperature 97.9, O2 saturation 95% on 3 L.  Labs white cell count 14.77, hemoglobin 12.8, creatinine 0.30, potassium 4.3.  Patient reports smoking for 41 years and she quit 15 years ago.      Review of Systems   Constitutional:  Positive for fatigue. Negative for activity change, appetite change, chills, diaphoresis, fever and unexpected weight change.   HENT:  Negative for congestion, ear discharge, facial swelling, nosebleeds, rhinorrhea, sinus pressure, sinus pain, tinnitus and trouble swallowing.    Eyes:  Negative for redness.   Respiratory:  Positive for shortness of breath. Negative for apnea, cough, choking, chest tightness, wheezing and stridor.    Cardiovascular:  Positive for chest pain. Negative for palpitations and leg swelling.   Gastrointestinal:  Negative for abdominal distention, abdominal pain, anal bleeding, blood in stool and diarrhea.   Endocrine: Negative for polydipsia, polyphagia and polyuria.   Genitourinary:  Negative for decreased urine volume, difficulty urinating, dyspareunia, enuresis, genital sores and menstrual problem.   Musculoskeletal:  Negative for arthralgias, gait problem and joint swelling.   Neurological:  Negative for tremors, syncope, facial asymmetry, speech difficulty, weakness and light-headedness.   Psychiatric/Behavioral:  Negative for agitation, behavioral problems, confusion, decreased concentration, self-injury and sleep disturbance. The patient is nervous/anxious.        Historical Information    Past Medical History:   Diagnosis Date    Anxiety     Breast cancer (HCC) 2007    Colitis     Depression     Difficult intubation     Excessive daytime sleepiness     GERD (gastroesophageal reflux disease)     Hyperlipidemia     Ischemic colitis (HCC) 2019    Leukocytosis 2020    Muscular dystrophy ([facility])     Limb-girdle    Osteoporosis     Overactive bladder     Perforated diverticulum 2020    Pneumonitis     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[facility])     Skin cancer     Skin disorder     Tachycardia 2021    Vitamin D deficiency      Past Surgical History:   Procedure Laterality Date    COLONOSCOPY N/A 2019    Procedure: COLONOSCOPY;  Surgeon: Anthony Mejía MD;  Location: [facility];  Service: Colorectal    CYSTOSCOPY N/A 2020    Procedure: CYSTOSCOPY; BILATERAL URETERAL CATHETER PLACEMENT, CYSTOTOMY;  Surgeon: Zach Tyler MD;  Location: [facility];  Service: Urology    FL CYSTOGRAM  10/15/2020    HARTMANS PROCEDURE N/A 2020    Procedure: EXPLORATORY LAPAROTOMY; LYSIS OF ADHESIONS; WASHOUT PELVIC ABSCESS; COMPLEX SIGMOID COLON RESECTION; LOOP TRANSVERSE COLOSTOMY;  Surgeon: Thania Jaffe MD;  Location: [facility];  Service: General    IR DRAINAGE TUBE PLACEMENT  2020    MASTECTOMY Left 2007    left breast mastectomy     TONSILLECTOMY      TOOTH EXTRACTION      TUBAL LIGATION       Social History     Substance and Sexual Activity   Alcohol Use Not Currently    Comment: social drinker per allscripts      Social History     Substance and Sexual Activity   Drug Use Yes    Types: Marijuana    Comment: medical marijuana     Social History     Tobacco Use   Smoking Status Former    Current packs/day: 0.00    Average packs/day: 1.5 packs/day for 37.0 years (55.5 ttl pk-yrs)    Types: Cigarettes    Start date:     Quit date:     Years since quittin.6   Smokeless Tobacco Never     Family History: non-contributory    Meds/Allergies    Hospital Medications:   Current Facility-Administered Medications   Medication Dose Route Frequency    fentaNYL injection   Intravenous Code/Trauma/Sedation Med    midazolam (VERSED) injection   Intravenous Code/Trauma/Sedation Med     Home Medications:   Medications Prior to Admission:     albuterol (2.5 mg/3 mL) 0.083 % nebulizer solution    budesonide (Pulmicort) 0.5 mg/2 mL nebulizer solution    buPROPion (WELLBUTRIN) 75 mg tablet    citalopram (CeleXA) 10 mg tablet    collagenase (SANTYL) ointment    diazepam (VALIUM) 5 mg tablet    fluticasone (FLONASE) 50 mcg/act nasal spray    ketoconazole (NIZORAL) 2 % cream    ketoconazole (NIZORAL) 2 % shampoo    Probiotic Product (Florastor Plus) CAPS    ramelteon (ROZEREM) 8 mg tablet    solifenacin (VESICARE) 10 MG tablet    triamcinolone (KENALOG) 0.1 % cream    Allergies   Allergen Reactions    Amoxicillin      Vague rash in the 90s, tolerated zosyn on 9/2020 admission     Codeine      Other reaction(s): Other (See Comments)  n/v    Medical Tape Itching     bandaids       Objective   Vitals: SpO2 99%, not currently breastfeeding.        Invasive Devices       Peripheral Intravenous Line  Duration             Peripheral IV 08/21/24 Distal;Right;Ventral (anterior) Forearm <1 day              Drain  Duration             Colostomy Loop LUQ 1420 days    Ureteral Drain/Stent Left ureter 5 Fr. 1420 days    Ureteral Drain/Stent Right ureter 5 Fr. 1420 days                    Physical Exam  Constitutional:       General: She is in acute distress.      Appearance: She is not ill-appearing, toxic-appearing or diaphoretic.   HENT:      Head: Normocephalic and atraumatic.      Nose: Nose normal. No congestion or rhinorrhea.   Cardiovascular:      Rate and Rhythm: Normal rate and regular rhythm.      Pulses: Normal pulses.      Heart sounds: No murmur heard.     No friction rub. No gallop.   Pulmonary:      Effort: No respiratory distress.      Breath sounds: No stridor. No  wheezing or rhonchi.   Abdominal:      General: Abdomen is flat. There is no distension.      Palpations: There is no mass.      Tenderness: There is no abdominal tenderness. There is no guarding or rebound.      Hernia: No hernia is present.   Musculoskeletal:         General: No swelling, tenderness, deformity or signs of injury. Normal range of motion.      Cervical back: Normal range of motion. No rigidity or tenderness.   Skin:     General: Skin is warm.      Coloration: Skin is not jaundiced or pale.      Findings: No bruising or erythema.   Neurological:      Mental Status: She is alert.         Lab Results: I have personally reviewed pertinent lab results.        Results from last 7 days   Lab Units 24  1223   POTASSIUM mmol/L 4.2   CO2 mmol/L 32   CHLORIDE mmol/L 97   BUN mg/dL 17   CREATININE mg/dL 0.30*     Results from last 7 days   Lab Units 24  1223   HEMOGLOBIN g/dL 12.8   HEMATOCRIT % 40.9   PLATELETS Thousands/uL 348     Results from last 7 days   Lab Units 24  1223   PTT seconds 26             Imaging: I have personally reviewed pertinent reports.      Holter/Telemetry: Reviewed     ECHO: Results for orders placed during the hospital encounter of 21    Echo complete with contrast if indicated    Narrative  [place] [id number] [phone]    Transthoracic Echocardiogram  2D, M-mode, Doppler, and Color Doppler    Study date:  07-May-2021    Patient: SUSY WILLOUGHBY  MR number: [id number]  Account number: [id number]  : 1950  Age: 71 years  Gender: Female  Status: Outpatient  Location: Echo lab  Height: 60 in  Weight: 153.8 lb  BP: 122/ 70 mmHg    Indications: Aortic Valve Sclerosis    Diagnoses: I35.8 - Other nonrheumatic aortic valve disorders    Sonographer:  Krystina Barrera RDCS  Referring Physician:  Julienne Tucker DO  Group:  [facility]  Interpreting Physician:  Jory Restrepo  DO    SUMMARY    LEFT VENTRICLE:  Systolic function was normal. Ejection fraction was estimated to be 65 %.  There were no regional wall motion abnormalities.  Wall thickness was at the upper limits of normal.  Left ventricular diastolic function parameters were normal.    VENTRICULAR SEPTUM:  There was sigmoid septal appearance.    RIGHT VENTRICLE:  The size was normal.  Systolic function was normal.    HISTORY: PRIOR HISTORY: COPD; AS; Hyperlipidemia; GERD    PROCEDURE: The procedure was performed in the echo lab. This was a routine study. The transthoracic approach was used. The study included complete 2D imaging, M-mode, complete spectral Doppler, and color Doppler. The heart rate was 90 bpm,  at the start of the study. Images were obtained from the parasternal, apical, subcostal, and suprasternal notch acoustic windows. Image quality was adequate.    LEFT VENTRICLE: Size was normal. Systolic function was normal. Ejection fraction was estimated to be 65 %. There were no regional wall motion abnormalities. Wall thickness was at the upper limits of normal. DOPPLER: Left ventricular  diastolic function parameters were normal.    VENTRICULAR SEPTUM: There was sigmoid septal appearance.    RIGHT VENTRICLE: The size was normal. Systolic function was normal. Wall thickness was normal.    LEFT ATRIUM: Size was normal.    RIGHT ATRIUM: Size was normal.    MITRAL VALVE: There was mild annular calcification. There was normal leaflet separation. DOPPLER: The transmitral velocity was within the normal range. There was no evidence for stenosis. There was trace regurgitation.    AORTIC VALVE: The valve was trileaflet. Leaflets exhibited normal cuspal separation and sclerosis. DOPPLER: Transaortic velocity was within the normal range. There was no evidence for stenosis. There was no regurgitation.    TRICUSPID VALVE: The valve structure was normal. There was normal leaflet separation. DOPPLER: The transtricuspid velocity was  within the normal range. There was no evidence for stenosis. There was trace regurgitation. Pulmonary artery  systolic pressure was within the normal range.    PULMONIC VALVE: Leaflets exhibited normal thickness, no calcification, and normal cuspal separation. DOPPLER: The transpulmonic velocity was within the normal range. There was no regurgitation.    PERICARDIUM: There was no pericardial effusion. The pericardium was normal in appearance.    AORTA: The root exhibited normal size.    SYSTEMIC VEINS: IVC: The inferior vena cava was normal in size and course. Respirophasic changes were normal.    SYSTEM MEASUREMENT TABLES    2D  %FS: 39.43 %  Ao Diam: 2.88 cm  EDV(Teich): 66.21 ml  EF(Teich): 70.59 %  ESV(Teich): 19.47 ml  IVSd: 0.9 cm  LA Area: 10.05 cm2  LA Diam: 2.24 cm  LVEDV MOD A4C: 64.94 ml  LVEF MOD A4C: 61.61 %  LVESV MOD A4C: 24.93 ml  LVIDd: 3.91 cm  LVIDs: 2.37 cm  LVLd A4C: 6.97 cm  LVLs A4C: 5.58 cm  LVPWd: 0.88 cm  RA Area: 9.46 cm2  RVIDd: 2.47 cm  SV MOD A4C: 40.01 ml  SV(Teich): 46.74 ml    CW  TR Vmax: 2.59 m/s  TR maxP.76 mmHg    MM  TAPSE: 2.06 cm    PW  E' Sept: 0.08 m/s  E/E' Sept: 7.64  MV A Marco: 0.58 m/s  MV Dec [place]: 4.13 m/s2  MV DecT: 143.56 ms  MV E Marco: 0.59 m/s  MV E/A Ratio: 1.03  MV PHT: 41.63 ms  MVA By PHT: 5.28 cm2    [employer] Accredited Echocardiography Laboratory    Prepared and electronically signed by    Jory Restrepo DO  Signed 07-May-2021 14:03:25      CATH/STRESS TEST:   No significant coronary artery disease    EKG:   Interpretation: ST elevation lead II, III and aVF and V5      VTE Prophylaxis: Heparin           Case discussed and reviewed with Dr. Fuller who agrees with my assessment and plan.    Thank you for involving us in the care of your patient.      Cj Argueta DO PGY-3  [facility]      ==========================================================================================    Counseling / Coordination of  Care  Total floor / unit time spent today 30 minutes minutes.      ** Please Note: Fluency DirectDictation voice to text software may have been used in the creation of this document. **

## 2024-08-21 NOTE — ASSESSMENT & PLAN NOTE
Patient follows up with psychiatry as an outpatient.  Currently on Celexa and Valium which we will continue

## 2024-08-21 NOTE — H&P
[facility]                       History and physical  Name: Claire Doherty 74 y.o. female I MRN: [id number]  Unit/Bed#: [facility]-320-01 I Date of Admission: 8/21/2024   Date of Service: 8/21/2024  Hospital Day: 0    Chest pain  Assessment & Plan  Patient initially presented to [facility] with chest pain.-Has been going on for more than a day at the time of presentation.  Patient reported that she gets pain with deep breath.  Pain was in the front to back and also to the juice.  The initial EKG showed ST elevation in 2 3 aVF concerning for STEMI.  Transferred to [facility] urgently via [facility] for cardiac catheterization  Status postcardiac cath.  Nonobstructive coronaries unlikely this is ACS  Given EKG changes concern for pericarditis.  Discussed with cardiology.    Echocardiac gram pending.  CRP ESR ordered  Monitor on telemetry   Nsaids  per cardiology    Depression  Assessment & Plan  Patient follows up with psychiatry as an outpatient.  Currently on Celexa and Valium which we will continue    Chronic hypoxic respiratory failure (HCC)  Assessment & Plan  Patient uses oxygen at home.  Tolerating her baseline also uses BiPAP at night due to muscular dystrophy    Leukocytosis  Assessment & Plan  Leukocytosis noted.  Likely reactive.  Monitor for now    Ambulatory dysfunction  Assessment & Plan  Patient with history of muscular dystrophy.  Essentially wheelchair-bound or walks with a walker.  Patient have caregivers at home    Dystrophy, muscular, 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HCC)  Assessment & Plan  With history of muscular dystrophy and follows up with neurology as outpatient  Patient uses BiPAP at night and also uses walker/wheelchair for ambulation  Continue with supportive care              VTE Pharmacologic Prophylaxis: VTE Score: 3 Moderate Risk (Score 3-4) - Pharmacological DVT Prophylaxis Ordered: enoxaparin (Lovenox).  Code Status:  Level 1 - Full Code   Discussion with family: Updated  (sister) at bedside.    Anticipated Length of Stay: Patient will be admitted on an inpatient basis with an anticipated length of stay of greater than 2 midnights secondary to chest pain.    Total Time Spent on Date of Encounter in care of patient: 65 mins. This time was spent on one or more of the following: performing physical exam; counseling and coordination of care; obtaining or reviewing history; documenting in the medical record; reviewing/ordering tests, medications or procedures; communicating with other healthcare professionals and discussing with patient's family/caregivers.    Chief Complaint: Chest pain    History of Present Illness:  Claire Doherty is a 74 y.o. female with a PMH of muscular dystrophy, chronic respiratory failure, anxiety and depression, COPD/restrictive lung disease, status post left hemicolectomy for perforated diverticulitis who presents as a transfer from [facility].  Patient initially presented to the hospital in [facility] due to chest pain.  Started on Monday.  Pain worse with deep inspiration.  Has been there in the front of the chest going to the back and also sometimes to the neck.  Pain is mainly when she takes a deep breath.  Patient is essentially wheelchair-bound or uses a walker for ambulation.  KG was concerning for ST elevation.  Case was discussed with cardiology over here.  Patient was a STEMI alert and was airlifted to [facility].  Patient was taken to the Cath Lab.This could be secondary to pericarditis.  Patient will be admitted under medicine service.  Currently patient reported that her pain is significantly better.  Patient reported that she gets aching pains from her muscular dystrophy and initially thought that the pain is secondary to her muscular dystrophy.  Today according to patient her family does not think that she was looking good and they were  insistent on her coming to the hospital.  I evaluated the patient after the cardiac catheterization.  Patient was having some oozing from the groin site and nurses were putting pressure on it.  Cardiology was at bedside and is asked with Dr. Conn  regarding management.  He was having some bleeding from the cath site and nurses were putting pressure on it.  Also noted to have some bleeding from the  scratch on left chest  wall   Review of Systems:  Review of Systems   Constitutional:  Positive for fatigue.   HENT: Negative.     Eyes: Negative.    Respiratory:  Positive for shortness of breath.    Cardiovascular:  Positive for chest pain. Negative for palpitations and leg swelling.   Endocrine: Negative for cold intolerance.   Genitourinary: Negative.    Musculoskeletal:  Positive for gait problem, myalgias and neck pain.   Skin: Negative.    Allergic/Immunologic: Negative.    Hematological: Negative.    Psychiatric/Behavioral: Negative.         Past Medical and Surgical History:   Past Medical History:   Diagnosis Date    Anxiety     Breast cancer (formerly [facility]) 2007    Colitis     Depression     Difficult intubation     Excessive daytime sleepiness     GERD (gastroesophageal reflux disease)     Hyperlipidemia     Ischemic colitis (formerly [facility]) 01/21/2019    Leukocytosis 03/28/2020    Muscular dystrophy (formerly [facility])     Limb-girdle    Osteoporosis     Overactive bladder     Perforated diverticulum 03/28/2020    Pneumonitis     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formerly [facility])     Skin cancer     Skin disorder     Tachycardia 06/02/2021    Vitamin D deficiency        Past Surgical History:   Procedure Laterality Date    COLONOSCOPY N/A 1/22/2019    Procedure: COLONOSCOPY;  Surgeon: Anthony Mejía MD;  Location:  GI LAB;  Service: Colorectal    CYSTOSCOPY N/A 9/30/2020    Procedure: CYSTOSCOPY; BILATERAL URETERAL CATHETER PLACEMENT, CYSTOTOMY;  Surgeon: Zach Tyler MD;  Location: [facility] OR;  Service: Urology    FL  CYSTOGRAM  10/15/2020    HARTMANS PROCEDURE N/A 9/30/2020    Procedure: EXPLORATORY LAPAROTOMY; LYSIS OF ADHESIONS; WASHOUT PELVIC ABSCESS; COMPLEX SIGMOID COLON RESECTION; LOOP TRANSVERSE COLOSTOMY;  Surgeon: Thania Jaffe MD;  Location: [facility];  Service: General    IR DRAINAGE TUBE PLACEMENT  9/24/2020    MASTECTOMY Left 2007    left breast mastectomy     TONSILLECTOMY      TOOTH EXTRACTION      TUBAL LIGATION         Meds/Allergies:  Prior to Admission medications    Medication Sig Start Date End Date Taking? Authorizing Provider   albuterol (2.5 mg/3 mL) 0.083 % nebulizer solution Take 3 mL (2.5 mg total) by nebulization every 4 (four) hours as needed for wheezing or shortness of breath 6/15/23   Julienne Tucker,    budesonide (Pulmicort) 0.5 mg/2 mL nebulizer solution Take 2 mL (0.5 mg total) by nebulization 2 (two) times a day Rinse mouth after use.  Patient taking differently: Take 0.5 mg by nebulization as needed Rinse mouth after use. 6/15/23   Julienne Tucker DO   buPROPion (WELLBUTRIN) 75 mg tablet take 1 tablet by mouth every morning 7/22/24   Julienne Tucker DO   citalopram (CeleXA) 10 mg tablet Take 1 tablet (10 mg total) by mouth daily 4/6/23   Julienne Tucker DO   collagenase (SANTYL) ointment Apply topically daily 7/24/24 8/23/24  Sosa Tang, DO   diazepam (VALIUM) 5 mg tablet Take 1 tablet (5 mg total) by mouth every 8 (eight) hours as needed for anxiety or muscle spasms 7/5/23   Julienne Tucker DO   fluticasone (FLONASE) 50 mcg/act nasal spray 2 sprays into each nostril daily 6/15/23   Julienne Tucker DO   ketoconazole (NIZORAL) 2 % cream APPLY TWICE A DAY TO THE AFFECTED AREA(S) 6/18/20   Historical Provider, MD   ketoconazole (NIZORAL) 2 % shampoo apply to affected area two times a week 4/25/22   Julienne Tucker DO   Probiotic Product (Florastor Plus) CAPS     Historical Provider, MD   ramelteon (ROZEREM) 8 mg tablet Take 8 mg by mouth daily at bedtime 8/17/23    Historical Provider, MD   solifenacin (VESICARE) 10 MG tablet take 1 tablet by mouth once daily 24   Julienne Tucker DO   triamcinolone (KENALOG) 0.1 % cream Apply topically 2 (two) times a day as needed for irritation or rash 24   Julienne Tucker DO     I have reviewed home medications with a medical source (PCP, Pharmacy, other).    Allergies:   Allergies   Allergen Reactions    Amoxicillin      Vague rash in the 90s, tolerated zosyn on 2020 admission     Codeine      Other reaction(s): Other (See Comments)  n/v    Medical Tape Itching     bandaids       Social History:  Marital Status:    Occupation:   Patient Pre-hospital Living Situation: Home  Patient Pre-hospital Level of Mobility: walks with walker/wheelchair  Patient Pre-hospital Diet Restrictions:   Substance Use History:   Social History     Substance and Sexual Activity   Alcohol Use Not Currently    Comment: social drinker per allscripts      Social History     Tobacco Use   Smoking Status Former    Current packs/day: 0.00    Average packs/day: 1.5 packs/day for 37.0 years (55.5 ttl pk-yrs)    Types: Cigarettes    Start date:     Quit date:     Years since quittin.6   Smokeless Tobacco Never     Social History     Substance and Sexual Activity   Drug Use Yes    Types: Marijuana    Comment: medical marijuana       Family History:  Family History   Problem Relation Age of Onset    Other Mother         bone loss    Arthritis Mother     Skin cancer Father     Hypertension Father         benign     Other Father         bone loss    Muscular dystrophy Father     Hypertension Sister     No Known Problems Sister     Muscular dystrophy Brother         of the limb gridle     Parkinsonism Brother     No Known Problems Brother     No Known Problems Maternal Grandmother     No Known Problems Paternal Grandmother     No Known Problems Maternal Aunt     Muscular dystrophy Family         of the limb girdle       Physical Exam:  "    Vitals:   Blood Pressure: 138/72 (08/21/24 1530)  Pulse: 94 (08/21/24 1530)  Temperature: 97.7 °F (36.5 °C) (08/21/24 1530)  Respirations: 18 (08/21/24 1530)  Height: 5' (152.4 cm) (08/21/24 1530)  Weight - Scale: 80.7 kg (178 lb) (08/21/24 1530)  SpO2: 97 % (08/21/24 1530)    Physical Exam  Constitutional:       General: She is not in acute distress.     Appearance: She is not ill-appearing.   HENT:      Head: Normocephalic.      Nose: Nose normal.   Eyes:      General: No scleral icterus.  Cardiovascular:      Rate and Rhythm: Normal rate and regular rhythm.   Pulmonary:      Effort: No respiratory distress.      Breath sounds: Normal breath sounds.   Abdominal:      General: There is no distension.      Palpations: There is no mass.   Musculoskeletal:         General: No swelling. Normal range of motion.   Skin:     General: Skin is warm.      Coloration: Skin is not jaundiced.   Neurological:      Mental Status: She is alert. Mental status is at baseline.          Additional Data:     Lab Results:  Results from last 7 days   Lab Units 08/21/24  1223   WBC Thousand/uL 14.77*   HEMOGLOBIN g/dL 12.8   HEMATOCRIT % 40.9   PLATELETS Thousands/uL 348   SEGS PCT % 73   LYMPHO PCT % 15   MONO PCT % 11   EOS PCT % 1     Results from last 7 days   Lab Units 08/21/24  1223   SODIUM mmol/L 136   POTASSIUM mmol/L 4.2   CHLORIDE mmol/L 97   CO2 mmol/L 32   BUN mg/dL 17   CREATININE mg/dL 0.30*   ANION GAP mmol/L 7   CALCIUM mg/dL 9.5   ALBUMIN g/dL 3.9   TOTAL BILIRUBIN mg/dL 0.43   ALK PHOS U/L 66   ALT U/L 13   AST U/L 17   GLUCOSE RANDOM mg/dL 140     Results from last 7 days   Lab Units 08/21/24  1223   INR  1.05         No results found for: \"HGBA1C\"        Lines/Drains:  Invasive Devices       Peripheral Intravenous Line  Duration             Peripheral IV 08/21/24 Distal;Right;Ventral (anterior) Forearm <1 day              Drain  Duration             Colostomy Loop LUQ 1420 days    Ureteral Drain/Stent Left ureter " 5 Fr. 1420 days    Ureteral Drain/Stent Right ureter 5 Fr. 1420 days                        Imaging: Reviewed radiology reports from this admission including: chest xray  No orders to display       EKG and Other Studies Reviewed on Admission:   EKG: Personally Reviewed.    ** Please Note: This note has been constructed using a voice recognition system. **

## 2024-08-21 NOTE — ASSESSMENT & PLAN NOTE
Patient uses oxygen at home.  Tolerating her baseline also uses BiPAP at night due to muscular dystrophy

## 2024-08-21 NOTE — ASSESSMENT & PLAN NOTE
Patient with history of muscular dystrophy.  Essentially wheelchair-bound or walks with a walker.  Patient have caregivers at home

## 2024-08-22 ENCOUNTER — TRANSITIONAL CARE MANAGEMENT (OUTPATIENT)
Dept: FAMILY MEDICINE CLINIC | Facility: CLINIC | Age: 74
End: 2024-08-22

## 2024-08-22 ENCOUNTER — TELEPHONE (OUTPATIENT)
Dept: CARDIOLOGY CLINIC | Facility: CLINIC | Age: 74
End: 2024-08-22

## 2024-08-22 VITALS
WEIGHT: 178 LBS | DIASTOLIC BLOOD PRESSURE: 58 MMHG | SYSTOLIC BLOOD PRESSURE: 132 MMHG | TEMPERATURE: 97.8 F | HEART RATE: 91 BPM | HEIGHT: 60 IN | BODY MASS INDEX: 34.95 KG/M2 | OXYGEN SATURATION: 97 % | RESPIRATION RATE: 20 BRPM

## 2024-08-22 PROBLEM — I30.0 ACUTE IDIOPATHIC PERICARDITIS: Status: ACTIVE | Noted: 2024-07-24

## 2024-08-22 PROBLEM — R07.9 CHEST PAIN: Status: ACTIVE | Noted: 2024-08-22

## 2024-08-22 LAB
ANION GAP SERPL CALCULATED.3IONS-SCNC: 6 MMOL/L (ref 4–13)
ATRIAL RATE: 92 BPM
ATRIAL RATE: 94 BPM
BUN SERPL-MCNC: 18 MG/DL (ref 5–25)
CALCIUM SERPL-MCNC: 8.5 MG/DL (ref 8.4–10.2)
CHLORIDE SERPL-SCNC: 102 MMOL/L (ref 96–108)
CO2 SERPL-SCNC: 33 MMOL/L (ref 21–32)
CREAT SERPL-MCNC: 0.33 MG/DL (ref 0.6–1.3)
ERYTHROCYTE [DISTWIDTH] IN BLOOD BY AUTOMATED COUNT: 17.9 % (ref 11.6–15.1)
GFR SERPL CREATININE-BSD FRML MDRD: 109 ML/MIN/1.73SQ M
GLUCOSE SERPL-MCNC: 102 MG/DL (ref 65–140)
HCT VFR BLD AUTO: 39.2 % (ref 34.8–46.1)
HGB BLD-MCNC: 11.9 G/DL (ref 11.5–15.4)
MCH RBC QN AUTO: 26.3 PG (ref 26.8–34.3)
MCHC RBC AUTO-ENTMCNC: 30.4 G/DL (ref 31.4–37.4)
MCV RBC AUTO: 87 FL (ref 82–98)
P AXIS: 17 DEGREES
P AXIS: 18 DEGREES
PLATELET # BLD AUTO: 333 THOUSANDS/UL (ref 149–390)
PMV BLD AUTO: 10.6 FL (ref 8.9–12.7)
POTASSIUM SERPL-SCNC: 3.8 MMOL/L (ref 3.5–5.3)
PR INTERVAL: 130 MS
PR INTERVAL: 130 MS
QRS AXIS: 29 DEGREES
QRS AXIS: 34 DEGREES
QRSD INTERVAL: 74 MS
QRSD INTERVAL: 88 MS
QT INTERVAL: 342 MS
QT INTERVAL: 344 MS
QTC INTERVAL: 425 MS
QTC INTERVAL: 427 MS
RBC # BLD AUTO: 4.52 MILLION/UL (ref 3.81–5.12)
SODIUM SERPL-SCNC: 141 MMOL/L (ref 135–147)
T WAVE AXIS: 57 DEGREES
T WAVE AXIS: 62 DEGREES
VENTRICULAR RATE: 92 BPM
VENTRICULAR RATE: 94 BPM
WBC # BLD AUTO: 11.68 THOUSAND/UL (ref 4.31–10.16)

## 2024-08-22 PROCEDURE — 80048 BASIC METABOLIC PNL TOTAL CA: CPT | Performed by: INTERNAL MEDICINE

## 2024-08-22 PROCEDURE — 94760 N-INVAS EAR/PLS OXIMETRY 1: CPT

## 2024-08-22 PROCEDURE — 99232 SBSQ HOSP IP/OBS MODERATE 35: CPT | Performed by: INTERNAL MEDICINE

## 2024-08-22 PROCEDURE — 94660 CPAP INITIATION&MGMT: CPT

## 2024-08-22 PROCEDURE — 93010 ELECTROCARDIOGRAM REPORT: CPT | Performed by: INTERNAL MEDICINE

## 2024-08-22 PROCEDURE — 85027 COMPLETE CBC AUTOMATED: CPT | Performed by: INTERNAL MEDICINE

## 2024-08-22 RX ORDER — COLCHICINE 0.6 MG/1
0.6 TABLET ORAL DAILY
Qty: 30 TABLET | Refills: 0 | Status: ON HOLD | OUTPATIENT
Start: 2024-08-22 | End: 2024-09-21

## 2024-08-22 RX ORDER — ACETAMINOPHEN 325 MG/1
650 TABLET ORAL EVERY 4 HOURS PRN
Status: ON HOLD
Start: 2024-08-22

## 2024-08-22 RX ORDER — IBUPROFEN 800 MG/1
800 TABLET, FILM COATED ORAL EVERY 8 HOURS SCHEDULED
Qty: 90 TABLET | Refills: 0 | Status: ON HOLD | OUTPATIENT
Start: 2024-08-22 | End: 2024-09-21

## 2024-08-22 RX ORDER — PANTOPRAZOLE SODIUM 40 MG/1
40 TABLET, DELAYED RELEASE ORAL
Qty: 30 TABLET | Refills: 0 | Status: ON HOLD | OUTPATIENT
Start: 2024-08-23

## 2024-08-22 RX ORDER — BUDESONIDE 0.5 MG/2ML
0.5 INHALANT ORAL AS NEEDED
Status: ON HOLD
Start: 2024-08-22

## 2024-08-22 RX ADMIN — BUPROPION HYDROCHLORIDE 75 MG: 75 TABLET, FILM COATED ORAL at 08:55

## 2024-08-22 RX ADMIN — IBUPROFEN 800 MG: 400 TABLET, FILM COATED ORAL at 13:41

## 2024-08-22 RX ADMIN — ENOXAPARIN SODIUM 40 MG: 40 INJECTION SUBCUTANEOUS at 08:55

## 2024-08-22 RX ADMIN — IBUPROFEN 800 MG: 400 TABLET, FILM COATED ORAL at 05:37

## 2024-08-22 RX ADMIN — PANTOPRAZOLE SODIUM 40 MG: 40 TABLET, DELAYED RELEASE ORAL at 05:37

## 2024-08-22 RX ADMIN — OXYBUTYNIN 10 MG: 10 TABLET, FILM COATED, EXTENDED RELEASE ORAL at 08:55

## 2024-08-22 RX ADMIN — CITALOPRAM HYDROBROMIDE 10 MG: 10 TABLET ORAL at 08:55

## 2024-08-22 NOTE — CASE MANAGEMENT
Case Management Assessment & Discharge Planning Note    Patient name Claire Doherty  Location [facility]-01 MRN [id number]  : 1950 Date 2024       Current Admission Date: 2024  Current Admission Diagnosis:Ambulatory dysfunction   Patient Active Problem List    Diagnosis Date Noted Date Diagnosed    Acute idiopathic pericarditis 2024     Cellulitis of right foot 2024     Chronic left shoulder pain 2024     Shoulder joint dysfunction 2024     Bilateral hand pain 2023     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HCC) 2022     Gastroparesis 2021     Aortic valve sclerosis 2021     Tricuspid valve insufficiency 2021     Mitral annular calcification 2021     Former smoker 2021     On home oxygen therapy 2021     Colostomy status (HCC)      Ductal carcinoma in situ (DCIS) of left breast 03/15/2021     Insomnia 10/21/2020     Depression 10/06/2020     Anemia 10/02/2020     Chronic hypoxic respiratory failure (HCC) 2020     Bladder injury 2020     Thrombocytosis, unspecified 2020     Leukocytosis 2020     Difficult intubation      Dystrophy, muscular, 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HCC) 10/12/2017     Ambulatory dysfunction 10/12/2017     Urinary incontinence 10/03/2017     Osteoporosis 2016     Allergic rhinitis 10/09/2013     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) 10/04/2012     GERD without esophagitis 10/04/2012       LOS (days): 1  Geometric Mean LOS (GMLOS) (days): 2.7  Days to GMLOS:1.8     OBJECTIVE:  PATIENT READMITTED TO HOSPITAL  Risk of Unplanned Readmission Score: 9.31         Current admission status: Inpatient       Preferred Pharmacy:   [id number] - [place], [place] - [address]  [address] [address]  Phone: [phone] Fax: [phone]    Primary Care Provider: Julienne Tucker DO    Primary Insurance: [facility]  REP  Secondary Insurance:     ASSESSMENT:  Active Health Care Proxies    There are no active Health Care Proxies on file.       Advance Directives  Does patient have a Health Care POA?: Yes  Does patient have Advance Directives?: Yes  Advance Directives: Living will      Patient Information  Admitted from:: Home  Mental Status: Alert  During Assessment patient was accompanied by: Not accompanied during assessment  Assessment information provided by:: Patient  Primary Caregiver: Other (Comment)  Caregiver's Name:: [employer]  Support Systems: Private Caregivers, Family members  Home entry access options. Select all that apply.: No steps to enter home  Type of Current Residence: Apartment  Floor Level: 1  Upon entering residence, is there a bedroom on the main floor (no further steps)?: Yes  Upon entering residence, is there a bathroom on the main floor (no further steps)?: Yes  Living Arrangements: Lives Alone    Activities of Daily Living Prior to Admission  Functional Status: Independent  Completes ADLs independently?: Yes  Ambulates independently?: Yes  Does patient use assisted devices?: Yes  Assisted Devices (DME) used: BiPAP, Home Oxygen concentrator  O2 Rate(s): 2-3 L  Does patient have a history of Outpatient Therapy (PT/OT)?: No  Does the patient have a history of Short-Term Rehab?: Yes  Does patient have a history of HHC?: Yes  Does patient currently have HHC?: No    Current Home Health Care  Type of Current Home Care Services: Home health aide    Patient Information Continued  Income Source: Pension/longterm  Does patient have prescription coverage?: Yes  Does patient receive dialysis treatments?: No  Does patient have a history of substance abuse?: No  Does patient have a history of Mental Health Diagnosis?: Yes (Depression)  Is patient receiving treatment for mental health?: Yes         Means of Transportation  Means of Transport to [facility]:: Other (Comment) (Home Health Aides provide  transport)      Social Determinants of Health (SDOH)      Flowsheet Row Most Recent Value   Housing Stability    In the last 12 months, was there a time when you were not able to pay the mortgage or rent on time? N   At any time in the past 12 months, were you homeless or living in a shelter (including now)? N   Transportation Needs    In the past 12 months, has lack of transportation kept you from medical appointments or from getting medications? no   In the past 12 months, has lack of transportation kept you from meetings, work, or from getting things needed for daily living? No   Food Insecurity    Within the past 12 months, you worried that your food would run out before you got the money to buy more. Never true   Within the past 12 months, the food you bought just didn't last and you didn't have money to get more. Never true   Utilities    In the past 12 months has the electric, gas, oil, or water company threatened to shut off services in your home? No            DISCHARGE DETAILS:    Discharge planning discussed with:: Patient  Freedom of Choice: Yes     CM contacted family/caregiver?: No- see comments (Patient alert and oriented)       Additional Comments: Patient reports that she needs a ride home. Patient reports that she does not have portable oxygen with her and does not have someone to drop it off.

## 2024-08-22 NOTE — CASE MANAGEMENT
[facility] received request for transport authorization from Care Manager.   Date of transport: 8/22  Type of transport: BLS  Transport company: [facility] NPI: [id number]  Start Location: [facility]  End Location: Home  Med Necessity: Patient has muscular dystrophy and ambulatory dysfunction. Patient is on oxygen.   Authorization initiated by contacting insurance: [employer]  Via: Fax ([phone])    Care Manager notified: Mackenzie Carter    Please reach out to  for updates on any clinical information.

## 2024-08-22 NOTE — CASE MANAGEMENT
Case Management Discharge Planning Note    Patient name Claire Doherty  Location [facility]-01 MRN [id number]  : 1950 Date 2024       Current Admission Date: 2024  Current Admission Diagnosis:Chest pain   Patient Active Problem List    Diagnosis Date Noted Date Diagnosed    Chest pain 2024     Acute idiopathic pericarditis 2024     Cellulitis of right foot 2024     Chronic left shoulder pain 2024     Shoulder joint dysfunction 2024     Bilateral hand pain 2023     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HCC) 2022     Gastroparesis 2021     Aortic valve sclerosis 2021     Tricuspid valve insufficiency 2021     Mitral annular calcification 2021     Former smoker 2021     On home oxygen therapy 2021     Colostomy status (HCC)      Ductal carcinoma in situ (DCIS) of left breast 03/15/2021     Insomnia 10/21/2020     Depression 10/06/2020     Anemia 10/02/2020     Chronic hypoxic respiratory failure (HCC) 2020     Bladder injury 2020     Thrombocytosis, unspecified 2020     Leukocytosis 2020     Difficult intubation      Dystrophy, muscular, 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HCC) 10/12/2017     Ambulatory dysfunction 10/12/2017     Urinary incontinence 10/03/2017     Osteoporosis 2016     Allergic rhinitis 10/09/2013     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) 10/04/2012     GERD without esophagitis 10/04/2012       LOS (days): 1  Geometric Mean LOS (GMLOS) (days): 2.7  Days to GMLOS:1.7     OBJECTIVE:  Risk of Unplanned Readmission Score: 9.31         Current admission status: Inpatient   Preferred Pharmacy:   [id number] [address]  [address] [address]  Phone: [phone] Fax: [phone]    Primary Care Provider: Julienne Tucker DO    Primary Insurance: [facility]  Secondary Insurance:     DISCHARGE  DETAILS:      Treatment Team Recommendation: Home  Discharge Destination Plan:: Home  Transport at Discharge : [facility]        Transported by (Company and Unit #): [facility]  ETA of Transport (Date): 08/22/24  ETA of Transport (Time): 1430        Additional Comments: Patient is medically cleared for discharge. CM schedueld transport and updated patient's nurse regarding  time.

## 2024-08-22 NOTE — UTILIZATION REVIEW
Initial Clinical Review    Admission: Date/Time/Statement:   Admission Orders (From admission, onward)       Ordered        08/21/24 1420  INPATIENT ADMISSION  Once                          Orders Placed This Encounter   Procedures    INPATIENT ADMISSION     Standing Status:   Standing     Number of Occurrences:   1     Order Specific Question:   Level of Care     Answer:   Med Surg [16]     Comments:   tele     Order Specific Question:   Estimated length of stay     Answer:   More than 2 Midnights     Order Specific Question:   Certification     Answer:   I certify that inpatient services are medically necessary for this patient for a duration of greater than two midnights. See H&P and MD Progress Notes for additional information about the patient's course of treatment.     ED Arrival Information       Patient not seen in ED                       No chief complaint on file.      Initial Presentation: 74 y.o. female ***    Anticipated Length of Stay/Certification Statement: ***    Date: ***   Day 2: ***    ED Triage Vitals   Temperature Pulse Respirations Blood Pressure SpO2 Pain Score   08/21/24 1530 08/21/24 1402 08/21/24 1402 08/21/24 1402 08/21/24 1327 08/21/24 1351   97.7 °F (36.5 °C) 92 18 136/59 99 % No Pain     Weight (last 2 days)       Date/Time Weight    08/21/24 15:30:31 80.7 (178)            Vital Signs (last 3 days)       Date/Time Temp Pulse Resp BP MAP (mmHg) SpO2 Calculated FIO2 (%) - Nasal Cannula Nasal Cannula O2 Flow Rate (L/min) O2 Device O2 Interface Device Patient Position - Orthostatic VS José Miguel Coma Scale Score Pain    08/22/24 11:09:54 97.8 °F (36.6 °C) 91 -- 132/58 83 97 % -- -- -- -- -- -- --    08/22/24 0800 -- -- -- -- -- -- -- -- -- -- -- 15 No Pain    08/22/24 07:40:04 97.2 °F (36.2 °C) 87 -- 116/58 77 98 % -- -- -- -- -- -- --    08/22/24 07:39:48 97.2 °F (36.2 °C) 87 -- 116/58 77 98 % -- -- -- -- -- -- --    08/22/24 02:48:01 97.3 °F (36.3 °C) 81 20 107/57 74 98 % -- -- -- -- -- --  --    08/22/24 0122 -- -- -- -- -- -- -- -- -- Face mask -- -- --    08/21/24 2307 -- -- 18 -- -- -- -- -- -- -- -- -- --    08/21/24 23:03:44 97.7 °F (36.5 °C) 83 24 101/52 68 97 % -- -- -- -- -- -- --    08/21/24 2245 -- -- -- -- -- 93 % -- -- -- Face mask -- -- --    08/21/24 2211 -- 83 16 96/54 68 98 % -- -- -- -- -- -- --    08/21/24 2111 -- 87 18 92/61 71 97 % -- -- -- -- -- -- --    08/21/24 1917 -- 89 -- 121/60 80 97 % -- -- -- -- -- -- --    08/21/24 19:16:12 98.4 °F (36.9 °C) 96 18 121/60 80 96 % -- -- -- -- -- -- --    08/21/24 1911 -- -- -- -- -- -- 36 4 L/min Nasal cannula -- -- 15 No Pain    08/21/24 1845 -- 101 18 146/61 89 97 % -- -- -- -- -- -- --    08/21/24 1811 97.9 °F (36.6 °C) 103 16 116/53 74 96 % 36 4 L/min Nasal cannula -- Lying -- No Pain    08/21/24 1808 -- -- 20 -- -- -- -- -- -- -- -- -- --    08/21/24 1730 -- -- -- -- -- -- -- -- -- -- -- -- No Pain    08/21/24 15:30:31 97.7 °F (36.5 °C) 94 18 138/72 94 97 % -- -- Nasal cannula -- -- -- --    08/21/24 1530 -- -- -- -- -- 98 % 36 4 L/min Nasal cannula -- -- 15 No Pain    08/21/24 1430 -- 98 20 141/59 -- 99 % -- -- -- -- Lying -- No Pain    08/21/24 1420 -- 92 18 130/61 -- -- -- -- -- -- Lying -- No Pain    08/21/24 1410 -- 95 20 122/59 -- -- -- -- -- -- Lying -- No Pain    08/21/24 1402 -- 92 18 136/59 -- -- -- -- -- -- Lying -- No Pain    08/21/24 13:51:23 -- -- -- -- -- -- -- -- -- -- -- -- No Pain    08/21/24 13:27:56 -- -- -- -- -- 99 % 28 2 L/min Nasal cannula -- -- -- --              Pertinent Labs/Diagnostic Test Results:   Radiology:  No orders to display     Cardiology:  Echo complete w/ contrast if indicated   Final Result by Israel Elder DO (08/21 6973)        Left Ventricle: Left ventricular cavity size is normal. Wall thickness    is normal. The left ventricular ejection fraction is 65%. Systolic    function is normal. Wall motion is normal. Diastolic function is    moderately abnormal, consistent with grade II  "(pseudonormal) relaxation.     Right Ventricle: Systolic function is normal.     Left Atrium: The atrium is mildly dilated.     Tricuspid Valve: There is mild regurgitation.     Pericardium: There is a trivial pericardial effusion.         Cardiac catheterization   Final Result by Juan Fuller MD (08/21 1731)   No obstructive epicardial CAD.           GI:  No orders to display           Results from last 7 days   Lab Units 08/22/24  0515 08/21/24  1823 08/21/24  1223   WBC Thousand/uL 11.68*  --  14.77*   HEMOGLOBIN g/dL 11.9  --  12.8   HEMATOCRIT % 39.2  --  40.9   PLATELETS Thousands/uL 333 277 348   TOTAL NEUT ABS Thousands/µL  --   --  10.75*         Results from last 7 days   Lab Units 08/22/24  0515 08/21/24  1223   SODIUM mmol/L 141 136   POTASSIUM mmol/L 3.8 4.2   CHLORIDE mmol/L 102 97   CO2 mmol/L 33* 32   ANION GAP mmol/L 6 7   BUN mg/dL 18 17   CREATININE mg/dL 0.33* 0.30*   EGFR ml/min/1.73sq m 109 113   CALCIUM mg/dL 8.5 9.5     Results from last 7 days   Lab Units 08/21/24  1223   AST U/L 17   ALT U/L 13   ALK PHOS U/L 66   TOTAL PROTEIN g/dL 7.3   ALBUMIN g/dL 3.9   TOTAL BILIRUBIN mg/dL 0.43         Results from last 7 days   Lab Units 08/22/24  0515 08/21/24  1223   GLUCOSE RANDOM mg/dL 102 140             No results found for: \"BETA-HYDROXYBUTYRATE\"                   Results from last 7 days   Lab Units 08/21/24  1223   HS TNI 0HR ng/L 3         Results from last 7 days   Lab Units 08/21/24  1223   PROTIME seconds 14.2   INR  1.05   PTT seconds 26                                                 Results from last 7 days   Lab Units 08/21/24  1823   CRP mg/L 174.3*   SED RATE mm/hour 38*                                                               ED Treatment-Medication Administration - No Administrations Displayed (No Start Event Found)       None            Past Medical History:   Diagnosis Date    Anxiety     Breast cancer (HCC) 2007    Colitis     Depression     Difficult intubation     " Excessive daytime sleepiness     GERD (gastroesophageal reflux disease)     Hyperlipidemia     Ischemic colitis ([facility]) 01/21/2019    Leukocytosis 03/28/2020    Muscular dystrophy ([facility])     Limb-girdle    Osteoporosis     Overactive bladder     Perforated diverticulum 03/28/2020    Pneumonitis     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[facility])     Skin cancer     Skin disorder     Tachycardia 06/02/2021    Vitamin D deficiency      Present on Admission:   Ambulatory dysfunction   Acute idiopathic pericarditis   Depression   Leukocytosis   Dystrophy, muscular, 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[facility])   Chronic hypoxic respiratory failure ([facility])      Admitting Diagnosis: STEMI  Age/Sex: 74 y.o. female  Admission Orders:  Scheduled Medications:  budesonide, 0.5 mg, Nebulization, Daily  buPROPion, 75 mg, Oral, QAM  citalopram, 10 mg, Oral, Daily  enoxaparin, 40 mg, Subcutaneous, Daily  fluticasone, 2 spray, Nasal, Daily  ibuprofen, 800 mg, Oral, Q8H NANCY  oxybutynin, 10 mg, Oral, Daily  pantoprazole, 40 mg, Oral, Early Morning  ramelteon, 8 mg, Oral, HS      Continuous IV Infusions:     PRN Meds:  acetaminophen, 650 mg, Oral, Q4H PRN  albuterol, 2.5 mg, Nebulization, Q4H PRN  diazepam, 5 mg, Oral, Q8H PRN  nitroglycerin, 0.4 mg, Sublingual, Q5 Min PRN  ondansetron, 4 mg, Intravenous, Q6H PRN  triamcinolone, , Topical, BID PRN            Network Utilization Review Department  ATTENTION: Please call with any questions or concerns to [phone] and carefully listen to the prompts so that you are directed to the right person. All voicemails are confidential.   For Discharge needs, contact Care Management DC Support Team at [phone] opt. [phone]  Send all requests for admission clinical reviews, approved or denied determinations and any other requests to dedicated fax number below belonging to the campus where the patient is receiving treatment. List of dedicated fax numbers for the Facilities:  FACILITY NAME UR FAX NUMBER    ADMISSION DENIALS (Administrative/Medical Necessity) [phone]   DISCHARGE SUPPORT TEAM (NETWORK) [phone]   PARENT CHILD HEALTH (Maternity/NICU/Pediatrics) [phone]   [facility] [phone]   [facility] [phone]   [facility] [phone]   [facility] [phone]   [facility] [phone]   [facility] [phone]   [facility] [phone]   [facility] [phone]   [facility] [phone]   [facility] [phone]   [facility] [phone]   [facility] [phone]

## 2024-08-22 NOTE — DISCHARGE SUMMARY
[facility]  Discharge- Claire Doherty 1950, 74 y.o. female MRN: [id number]  Unit/Bed#: PPHP-320-01 Encounter: [id number]  Primary Care Provider: Julienne Tucker DO   Date and time admitted to hospital: 8/21/2024  1:22 PM    Acute idiopathic pericarditis  Assessment & Plan  Patient initially presented to [facility] with chest pain.-Has been going on for more than a day at the time of presentation.  Patient reported that she gets pain with deep breath.  Pain was in the front to back and also to the juice.  The initial EKG showed ST elevation in 2 3 aVF concerning for STEMI.  Transferred to [facility] urgently via [facility] for cardiac catheterization  Status postcardiac cath.  Nonobstructive coronaries unlikely this is ACS  Given EKG changes concern for pericarditis.  Discussed with cardiology.    Echocardiac gram pending.  CRP ESR ordered  Monitor on telemetry   Nsaids  per cardiology    Depression  Assessment & Plan  Patient follows up with psychiatry as an outpatient.  Currently on Celexa and Valium which we will continue    Chronic hypoxic respiratory failure (HCC)  Assessment & Plan  Patient uses oxygen at home.  Tolerating her baseline also uses BiPAP at night due to muscular dystrophy    Leukocytosis  Assessment & Plan  Leukocytosis noted.  Likely reactive.  Monitor for now    Ambulatory dysfunction  Assessment & Plan  Patient with history of muscular dystrophy.  Essentially wheelchair-bound or walks with a walker.  Patient have caregivers at home    Dystrophy, muscular, 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HCC)  Assessment & Plan  With history of muscular dystrophy and follows up with neurology as outpatient  Patient uses BiPAP at night and also uses walker/wheelchair for ambulation  Continue with supportive care      Medical Problems       Resolved Problems  Date Reviewed: 8/22/2024   None       Discharging Physician / Practitioner: Charly Gandhi  Wei  PCP: Julienne Tucker DO  Admission Date:   Admission Orders (From admission, onward)       Ordered        08/21/24 1420  INPATIENT ADMISSION  Once                          Discharge Date: 08/22/24    Consultations During Hospital Stay:  Cardiology    Procedures Performed:   Cardiac Cath 8/21/24- See cath report    Significant Findings / Test Results:   Echocardiogram- LVEF 45%, Grade II diastolic dysfunction (Result paraphrased by myself, see chart for full details)  .3  Sed Rate 38    Incidental Findings:   N/A    Test Results Pending at Discharge (will require follow up):   N/A     Outpatient Tests Requested:  N/A    Complications:  None    Reason for Admission: Chest pain secondary to pericarditis    Hospital Course:   Claire Doherty is a 74 y.o. female patient who originally presented to the hospital on 8/21/2024 due to Chest pain. She was brought to the ED where there as concern for ACS with EKG showing diffuse ST elevations. Cardiology was consulted and patient was taken to cath which showed nonobstructive CAD. This raised suspicion for pericarditis. Cardiology recommended NSAID course for possible pericarditis and patient had resolution in chest pain by the time she was evaluated in the morning by the IM team. She was deemed appropriate for discharge on a short NSAID course.    Please see above list of diagnoses and related plan for additional information.     Condition at Discharge: good    Discharge Day Visit / Exam:   Subjective:  No chest pain. Resting comfortably  Vitals: Blood Pressure: 132/58 (08/22/24 1109)  Pulse: 91 (08/22/24 1109)  Temperature: 97.8 °F (36.6 °C) (08/22/24 1109)  Respirations: 20 (08/22/24 0248)  Height: 5' (152.4 cm) (08/21/24 1530)  Weight - Scale: 80.7 kg (178 lb) (08/21/24 1530)  SpO2: 97 % (08/22/24 1109)  Exam:   Physical Exam  Vitals reviewed.   Constitutional:       General: She is not in acute distress.     Appearance: She is not ill-appearing.   HENT:       Head: Normocephalic.      Mouth/Throat:      Mouth: Mucous membranes are moist.   Eyes:      General: No scleral icterus.     Extraocular Movements: Extraocular movements intact.   Cardiovascular:      Rate and Rhythm: Normal rate and regular rhythm.      Pulses: Normal pulses.      Heart sounds: No murmur heard.     No friction rub.   Pulmonary:      Effort: Pulmonary effort is normal. No respiratory distress.      Breath sounds: No wheezing, rhonchi or rales.   Abdominal:      General: Abdomen is flat. Bowel sounds are normal. There is no distension.      Palpations: Abdomen is soft.      Tenderness: There is no abdominal tenderness. There is no guarding or rebound.   Musculoskeletal:      Right lower leg: No edema.      Left lower leg: No edema.   Skin:     General: Skin is warm.      Capillary Refill: Capillary refill takes less than 2 seconds.      Coloration: Skin is not jaundiced.      Findings: No rash.   Neurological:      Mental Status: She is alert and oriented to person, place, and time. Mental status is at baseline.      Sensory: No sensory deficit.      Motor: No weakness.   Psychiatric:         Mood and Affect: Mood normal.         Behavior: Behavior normal.          Discussion with Family: Patient declined call to .     Discharge instructions/Information to patient and family:   See after visit summary for information provided to patient and family.      Provisions for Follow-Up Care:  See after visit summary for information related to follow-up care and any pertinent home health orders.      Mobility at time of Discharge:   Basic Mobility Inpatient Raw Score: 11  JH-HLM Goal: 4: Move to chair/commode  JH-HLM Achieved: 4: Move to chair/commode  HLM Goal achieved. Continue to encourage appropriate mobility.     Disposition:   Home    Planned Readmission: No     Discharge Statement:  I spent 45 minutes discharging the patient. This time was spent on the day of discharge. I had direct  contact with the patient on the day of discharge. Greater than 50% of the total time was spent examining patient, answering all patient questions, arranging and discussing plan of care with patient as well as directly providing post-discharge instructions.  Additional time then spent on discharge activities.    Discharge Medications:  See after visit summary for reconciled discharge medications provided to patient and/or family.      **Please Note: This note may have been constructed using a voice recognition system**

## 2024-08-22 NOTE — UTILIZATION REVIEW
Initial Clinical Review    Admission: Date/Time/Statement:   Admission Orders (From admission, onward)       Ordered        08/21/24 1420  INPATIENT ADMISSION  Once                          Orders Placed This Encounter   Procedures    INPATIENT ADMISSION     Standing Status:   Standing     Number of Occurrences:   1     Order Specific Question:   Level of Care     Answer:   Med Surg [16]     Comments:   tele     Order Specific Question:   Estimated length of stay     Answer:   More than 2 Midnights     Order Specific Question:   Certification     Answer:   I certify that inpatient services are medically necessary for this patient for a duration of greater than two midnights. See H&P and MD Progress Notes for additional information about the patient's course of treatment.     ED Arrival Information       Patient not seen in ED                           Initial Presentation: 74 y.o. female airlifted from [facility] ED to [facility] as Inpatient admission due to chest pain  PMH  muscular dystrophy, chronic respiratory failure, anxiety and depression, COPD/restrictive lung disease on NC/BiPAP HS , status post left hemicolectomy for perforated diverticulitis     .Pain described as an ache in the substernal region & neck region that appears to be worse with taking a deep breath. States that the pain is a 3 out of 10 with taking a deep breath and has no pain when not inhaling. Reports she had more significant pain over the past 48 hours and states that is much better today. @ referring ED, initial EKG showed ST elevation in 2 3 aVF concerning for STEMI. Taken to cathlab for concern of STEMI w status post cardiac cath  on Medicine service for CP/ concern for pericarditis  Recs per cardiology consult Obtain ECHO, obtain CRP/ESR, tele, NSAIDs scheduled , BiPAP HS  Anticipated Length of Stay/Certification Statement: Patient will be admitted on an inpatient basis with an anticipated length of stay of greater than 2 midnights  secondary to chest pain.   Date: 8/22/2024   Day 2:   No CP,   Acute idiopathic pericarditis  Started Ibuprofen, will add colchicine  CP resolved.    ED Triage Vitals   Temperature Pulse Respirations Blood Pressure SpO2 Pain Score   08/21/24 1530 08/21/24 1402 08/21/24 1402 08/21/24 1402 08/21/24 1327 08/21/24 1351   97.7 °F (36.5 °C) 92 18 136/59 99 % No Pain     Weight (last 2 days)       Date/Time Weight    08/21/24 15:30:31 80.7 (178)            Vital Signs (last 3 days)       Date/Time Temp Pulse Resp BP MAP (mmHg) SpO2 Calculated FIO2 (%) - Nasal Cannula Nasal Cannula O2 Flow Rate (L/min) O2 Device O2 Interface Device Patient Position - Orthostatic VS José Miguel Coma Scale Score Pain    08/22/24 11:09:54 97.8 °F (36.6 °C) 91 -- 132/58 83 97 % -- -- -- -- -- -- --    08/22/24 0800 -- -- -- -- -- -- -- -- -- -- -- 15 No Pain    08/22/24 07:40:04 97.2 °F (36.2 °C) 87 -- 116/58 77 98 % -- -- -- -- -- -- --    08/22/24 07:39:48 97.2 °F (36.2 °C) 87 -- 116/58 77 98 % -- -- -- -- -- -- --    08/22/24 02:48:01 97.3 °F (36.3 °C) 81 20 107/57 74 98 % -- -- -- -- -- -- --    08/22/24 0122 -- -- -- -- -- -- -- -- -- Face mask -- -- --    08/21/24 2307 -- -- 18 -- -- -- -- -- -- -- -- -- --    08/21/24 23:03:44 97.7 °F (36.5 °C) 83 24 101/52 68 97 % -- -- -- -- -- -- --    08/21/24 2245 -- -- -- -- -- 93 % -- -- -- Face mask -- -- --    08/21/24 2211 -- 83 16 96/54 68 98 % -- -- -- -- -- -- --    08/21/24 2111 -- 87 18 92/61 71 97 % -- -- -- -- -- -- --    08/21/24 1917 -- 89 -- 121/60 80 97 % -- -- -- -- -- -- --    08/21/24 19:16:12 98.4 °F (36.9 °C) 96 18 121/60 80 96 % -- -- -- -- -- -- --    08/21/24 1911 -- -- -- -- -- -- 36 4 L/min Nasal cannula -- -- 15 No Pain    08/21/24 1845 -- 101 18 146/61 89 97 % -- -- -- -- -- -- --    08/21/24 1811 97.9 °F (36.6 °C) 103 16 116/53 74 96 % 36 4 L/min Nasal cannula -- Lying -- No Pain    08/21/24 1808 -- -- 20 -- -- -- -- -- -- -- -- -- --    08/21/24 1730 -- -- -- -- --  -- -- -- -- -- -- -- No Pain    08/21/24 15:30:31 97.7 °F (36.5 °C) 94 18 138/72 94 97 % -- -- Nasal cannula -- -- -- --    08/21/24 1530 -- -- -- -- -- 98 % 36 4 L/min Nasal cannula -- -- 15 No Pain    08/21/24 1430 -- 98 20 141/59 -- 99 % -- -- -- -- Lying -- No Pain    08/21/24 1420 -- 92 18 130/61 -- -- -- -- -- -- Lying -- No Pain    08/21/24 1410 -- 95 20 122/59 -- -- -- -- -- -- Lying -- No Pain    08/21/24 1402 -- 92 18 136/59 -- -- -- -- -- -- Lying -- No Pain    08/21/24 13:51:23 -- -- -- -- -- -- -- -- -- -- -- -- No Pain    08/21/24 13:27:56 -- -- -- -- -- 99 % 28 2 L/min Nasal cannula -- -- -- --              Pertinent Labs/Diagnostic Test Results:   Radiology:  No orders to display     Cardiology:  Echo complete w/ contrast if indicated   Final Result by Israel Elder DO (08/21 1629)        Left Ventricle: Left ventricular cavity size is normal. Wall thickness    is normal. The left ventricular ejection fraction is 65%. Systolic    function is normal. Wall motion is normal. Diastolic function is    moderately abnormal, consistent with grade II (pseudonormal) relaxation.     Right Ventricle: Systolic function is normal.     Left Atrium: The atrium is mildly dilated.     Tricuspid Valve: There is mild regurgitation.     Pericardium: There is a trivial pericardial effusion.         Cardiac catheterization   Final Result by Juan Fuller MD (08/21 9321)   No obstructive epicardial CAD.           GI:  No orders to display           Results from last 7 days   Lab Units 08/22/24  0515 08/21/24  1823 08/21/24  1223   WBC Thousand/uL 11.68*  --  14.77*   HEMOGLOBIN g/dL 11.9  --  12.8   HEMATOCRIT % 39.2  --  40.9   PLATELETS Thousands/uL 333 277 348   TOTAL NEUT ABS Thousands/µL  --   --  10.75*         Results from last 7 days   Lab Units 08/22/24  0515 08/21/24  1223   SODIUM mmol/L 141 136   POTASSIUM mmol/L 3.8 4.2   CHLORIDE mmol/L 102 97   CO2 mmol/L 33* 32   ANION GAP mmol/L 6 7   BUN mg/dL 18  "17   CREATININE mg/dL 0.33* 0.30*   EGFR ml/min/1.73sq m 109 113   CALCIUM mg/dL 8.5 9.5     Results from last 7 days   Lab Units 08/21/24  1223   AST U/L 17   ALT U/L 13   ALK PHOS U/L 66   TOTAL PROTEIN g/dL 7.3   ALBUMIN g/dL 3.9   TOTAL BILIRUBIN mg/dL 0.43         Results from last 7 days   Lab Units 08/22/24  0515 08/21/24  1223   GLUCOSE RANDOM mg/dL 102 140             No results found for: \"BETA-HYDROXYBUTYRATE\"                   Results from last 7 days   Lab Units 08/21/24  1223   HS TNI 0HR ng/L 3         Results from last 7 days   Lab Units 08/21/24  1223   PROTIME seconds 14.2   INR  1.05   PTT seconds 26           Results from last 7 days   Lab Units 08/21/24  1823   CRP mg/L 174.3*   SED RATE mm/hour 38*                     ED Treatment-Medication Administration - No Administrations Displayed (No Start Event Found)       None            Past Medical History:   Diagnosis Date    Anxiety     Breast cancer ([facility]) 2007    Colitis     Depression     Difficult intubation     Excessive daytime sleepiness     GERD (gastroesophageal reflux disease)     Hyperlipidemia     Ischemic colitis ([facility]) 01/21/2019    Leukocytosis 03/28/2020    Muscular dystrophy ([facility])     Limb-girdle    Osteoporosis     Overactive bladder     Perforated diverticulum 03/28/2020    Pneumonitis     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[facility])     Skin cancer     Skin disorder     Tachycardia 06/02/2021    Vitamin D deficiency      Present on Admission:   Ambulatory dysfunction   Acute idiopathic pericarditis   Depression   Leukocytosis   Dystrophy, muscular, 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[facility])   Chronic hypoxic respiratory failure ([facility])      Admitting Diagnosis: STEMI  Age/Sex: 74 y.o. female  Admission Orders:  Scheduled Medications:  budesonide, 0.5 mg, Nebulization, Daily  buPROPion, 75 mg, Oral, QAM  citalopram, 10 mg, Oral, Daily  enoxaparin, 40 mg, Subcutaneous, Daily  fluticasone, 2 spray, Nasal, Daily  ibuprofen, 800 mg, Oral, " Q8H NANCY  oxybutynin, 10 mg, Oral, Daily  pantoprazole, 40 mg, Oral, Early Morning  ramelteon, 8 mg, Oral, HS    Continuous IV Infusions:     PRN Meds:  acetaminophen, 650 mg, Oral, Q4H PRN  albuterol, 2.5 mg, Nebulization, Q4H PRN  diazepam, 5 mg, Oral, Q8H PRN  nitroglycerin, 0.4 mg, Sublingual, Q5 Min PRN  ondansetron, 4 mg, Intravenous, Q6H PRN  triamcinolone, , Topical, BID PRN    Network Utilization Review Department  ATTENTION: Please call with any questions or concerns to [phone] and carefully listen to the prompts so that you are directed to the right person. All voicemails are confidential.   For Discharge needs, contact Care Management DC Support Team at [phone] opt. 2  Send all requests for admission clinical reviews, approved or denied determinations and any other requests to dedicated fax number below belonging to the [facility] where the patient is receiving treatment. List of dedicated fax numbers for the Facilities:  FACILITY NAME UR FAX NUMBER   ADMISSION DENIALS (Administrative/Medical Necessity) [phone]   DISCHARGE SUPPORT TEAM (NETWORK) [phone]   PARENT CHILD HEALTH (Maternity/NICU/Pediatrics) [phone]   [facility] [phone]   [facility] [phone]   [facility] [phone]   [facility] [phone]   [facility] [phone]   [facility] [phone]   [facility] [phone]   [facility] [phone]   [facility] [phone]   [facility] [phone]   [facility] [phone]   [facility] [place] [phone]

## 2024-08-22 NOTE — TELEPHONE ENCOUNTER
Kristy THEODORE  [facility] Clerical; Jered Conn MD  Forwarding to  cardio clerical     ----- Message -----  From: Jered Conn MD  Sent: 8/22/2024   9:45 AM EDT  [facility]  Subject: Not sure who to contact                          Please arrange 4 week f/u at [facility].  I searched all clerical cardiology roles but have no idea which one is for the [facility] office.    Jered

## 2024-08-22 NOTE — ASSESSMENT & PLAN NOTE
ECHO with normal wall motion, trivial pericardial effusion (personally reviewed.)  Cath neg (presented as STEMI ecg, but no ACS)  Started Ibuprofen, will add colchicine  CP resolved.  POA/sister updated by ernesto

## 2024-08-22 NOTE — ASSESSMENT & PLAN NOTE
Patient initially presented to [facility] with chest pain.-Has been going on for more than a day at the time of presentation.  Patient reported that she gets pain with deep breath.  Pain was in the front to back and also to the juice.  The initial EKG showed ST elevation in 2 3 aVF concerning for STEMI.  Transferred to [facility] urgently via [facility] for cardiac catheterization  Status postcardiac cath.  Nonobstructive coronaries unlikely this is ACS  Given EKG changes concern for pericarditis.  Discussed with cardiology.    Echocardiac gram pending.  CRP ESR ordered  Monitor on telemetry   Nsaids  per cardiology

## 2024-08-22 NOTE — DISCHARGE INSTR - AVS FIRST PAGE
Cardiology will call for followup with Cardiology at [facility].  If you do not receive a call for scheduling by 8/27/24, call your PCP regarding cardiology referral.  Continue scheduled ibuprofen for another 7 days.  If chest pain remains fully resolved, switch to as needed afterwards, use sparingly and talk to your PCP and cardiology about continued ibuprofen

## 2024-08-22 NOTE — PROGRESS NOTES
General Cardiology Progress Note   Claire Doherty 74 y.o. female MRN: [id number]  Unit/Bed#: PPHP-320-01 Encounter: [id number]      Assessment:  Assessment & Plan  Dystrophy, muscular, 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HCC)  Uses a wheelchair  Ambulatory dysfunction  Uses a wheelchair  Acute idiopathic pericarditis    ECHO with normal wall motion, trivial pericardial effusion (personally reviewed.)  Cath neg (presented as STEMI ecg, but no ACS)  Started Ibuprofen, will add colchicine  CP resolved.  POA/sister updated by phne       Plan:    Stable for DC home  Will set up with cardiology at [facility]    Subjective:   Patient seen and examined.      No CP, feels well,     Review of Systems   Constitutional: Negative for diaphoresis, fever, malaise/fatigue and night sweats.   Cardiovascular:  Negative for chest pain, dyspnea on exertion, leg swelling, orthopnea, palpitations and syncope.   Respiratory:  Negative for cough, shortness of breath, sputum production and wheezing.    Skin:  Negative for itching and rash.   Gastrointestinal:  Negative for abdominal pain, change in bowel habit and diarrhea.   Neurological:  Positive for weakness. Negative for dizziness, focal weakness and light-headedness.       Objective   Vitals: Blood pressure 116/58, pulse 87, temperature (!) 97.2 °F (36.2 °C), resp. rate 20, height 5' (1.524 m), weight 80.7 kg (178 lb), SpO2 98%, not currently breastfeeding., Body mass index is 34.76 kg/m²., I/O last 3 completed shifts:  In: 400 [P.O.:400]  Out: 250 [Urine:250]  I/O this shift:  In: 120 [P.O.:120]  Out: -   Wt Readings from Last 3 Encounters:   08/21/24 80.7 kg (178 lb)   07/23/24 81.1 kg (178 lb 12.7 oz)   07/08/24 74.4 kg (164 lb)       Intake/Output Summary (Last 24 hours) at 8/22/2024 0940  Last data filed at 8/22/2024 0824  Gross per 24 hour   Intake 520 ml   Output 250 ml   Net 270 ml     I/O last 3 completed shifts:  In: 400 [P.O.:400]  Out: 250 [Urine:250]    No  significant arrhythmias seen on telemetry review.     Physical Exam  Constitutional:       General: She is not in acute distress.     Appearance: She is not diaphoretic.   HENT:      Head: Normocephalic.   Eyes:      Conjunctiva/sclera: Conjunctivae normal.   Neck:      Vascular: No JVD.   Cardiovascular:      Rate and Rhythm: Normal rate and regular rhythm.      Heart sounds: Normal heart sounds. No murmur heard.     No gallop.   Pulmonary:      Effort: Pulmonary effort is normal. No respiratory distress.      Breath sounds: Normal breath sounds. No wheezing or rales.   Abdominal:      General: Bowel sounds are normal. There is no distension.      Palpations: Abdomen is soft.      Tenderness: There is no abdominal tenderness.   Musculoskeletal:         General: Normal range of motion.      Cervical back: Normal range of motion and neck supple.      Right lower leg: No edema.      Left lower leg: No edema.   Skin:     General: Skin is warm and dry.   Neurological:      Mental Status: She is alert and oriented to person, place, and time.         Meds/Allergies   Allergies   Allergen Reactions    Amoxicillin      Vague rash in the 90s, tolerated zosyn on 9/2020 admission     Codeine      Other reaction(s): Other (See Comments)  n/v    Medical Tape Itching     bandaids       Current Facility-Administered Medications:     acetaminophen (TYLENOL) tablet 650 mg, 650 mg, Oral, Q4H PRN, SHAKIRA Burgos    albuterol inhalation solution 2.5 mg, 2.5 mg, Nebulization, Q4H PRN, SHAKIRA Burgos    budesonide (PULMICORT) inhalation solution 0.5 mg, 0.5 mg, Nebulization, Daily, SHAKIRA Burgos    buPROPion (WELLBUTRIN) tablet 75 mg, 75 mg, Oral, QAM, SHAKIRA Burgos, 75 mg at 08/22/24 0855    citalopram (CeleXA) tablet 10 mg, 10 mg, Oral, Daily, SHAKIRA Burgos, 10 mg at 08/22/24 0855    diazepam (VALIUM) tablet 5 mg, 5 mg, Oral, Q8H PRN, SHAKIRA Burgos, 5 mg at 08/21/24 2111    enoxaparin (LOVENOX) subcutaneous  injection 40 mg, 40 mg, Subcutaneous, Daily, Tabitha Dai MD, 40 mg at 08/22/24 0855    fluticasone (FLONASE) 50 mcg/act nasal spray 2 spray, 2 spray, Nasal, Daily, SHAKIRA Burgos, 2 spray at 08/21/24 1730    ibuprofen (MOTRIN) tablet 800 mg, 800 mg, Oral, Q8H NANCY, Jered Conn MD, 800 mg at 08/22/24 0537    nitroglycerin (NITROSTAT) SL tablet 0.4 mg, 0.4 mg, Sublingual, Q5 Min PRN, SHAKIRA Burgos    ondansetron (ZOFRAN) injection 4 mg, 4 mg, Intravenous, Q6H PRN, SHAKIRA Burgos    oxybutynin (DITROPAN-XL) 24 hr tablet 10 mg, 10 mg, Oral, Daily, SHAKIRA Burgos, 10 mg at 08/22/24 0855    pantoprazole (PROTONIX) EC tablet 40 mg, 40 mg, Oral, Early Morning, Tabitha Dai MD, 40 mg at 08/22/24 0537    ramelteon (ROZEREM) tablet 8 mg, 8 mg, Oral, HS, SHAKIRA Burgos    triamcinolone (KENALOG) 0.1 % cream, , Topical, BID PRN, SHAKIRA Burgos    Laboratory Results:        CBC with diff:   Results from last 7 days   Lab Units 08/22/24  0515 08/21/24  1823 08/21/24  1223   WBC Thousand/uL 11.68*  --  14.77*   HEMOGLOBIN g/dL 11.9  --  12.8   HEMATOCRIT % 39.2  --  40.9   MCV fL 87  --  85   PLATELETS Thousands/uL 333 277 348   RBC Million/uL 4.52  --  4.84   MCH pg 26.3*  --  26.4*   MCHC g/dL 30.4*  --  31.3*   RDW % 17.9*  --  17.6*   MPV fL 10.6 10.3 10.5   NRBC AUTO /100 WBCs  --   --  0       CMP:  Results from last 7 days   Lab Units 08/22/24  0515 08/21/24  1223   SODIUM mmol/L 141 136   POTASSIUM mmol/L 3.8 4.2   CHLORIDE mmol/L 102 97   CO2 mmol/L 33* 32   BUN mg/dL 18 17   CREATININE mg/dL 0.33* 0.30*   CALCIUM mg/dL 8.5 9.5   AST U/L  --  17   ALT U/L  --  13   ALK PHOS U/L  --  66   EGFR ml/min/1.73sq m 109 113       BMP:  Results from last 7 days   Lab Units 08/22/24  0515 08/21/24  1223   SODIUM mmol/L 141 136   POTASSIUM mmol/L 3.8 4.2   CHLORIDE mmol/L 102 97   CO2 mmol/L 33* 32   BUN mg/dL 18 17   CREATININE mg/dL 0.33* 0.30*   CALCIUM mg/dL 8.5 9.5       NT-proBNP: No results  "for input(s): \"NTBNP\" in the last 72 hours.     Magnesium:       Coags:   Results from last 7 days   Lab Units 08/21/24  1223   PTT seconds 26   INR  1.05       TSH:        Hemoglobin A1C )      Lipid Profile:   Lab Results   Component Value Date    CHOL 167 06/19/2015    CHOL 179 03/10/2014     Lab Results   Component Value Date    HDL 59 08/21/2024     04/06/2023    HDL 82 04/22/2022     Lab Results   Component Value Date    LDLCALC 57 08/21/2024    LDLCALC 100 04/06/2023    LDLCALC 131 (H) 04/22/2022     No results found for: \"LDLDIRECT\"  Lab Results   Component Value Date    TRIG 73 08/21/2024    TRIG 94 04/06/2023    TRIG 103 04/22/2022       Cardiac testing:   EKG personally reviewed by Jered Conn MD.     Cath:  ECHO:  Stress TEST:  Other:        Jered Conn MD    Portions of the record may have been created with voice recognition software.  Occasional wrong word or \"sound a like\" substitutions may have occurred due to the inherent limitations of voice recognition software.  Read the chart carefully and recognize, using context, where substitutions have occurred.        "

## 2024-08-22 NOTE — UTILIZATION REVIEW
"NOTIFICATION OF INPATIENT ADMISSION   AUTHORIZATION REQUEST   SERVICING FACILITY:   [facility]  Address: [address] [place] [address]  Tax ID: [id number]  NPI: [id number] ATTENDING PROVIDER:  Attending Name and NPI#: Charly Carvajal [[id number]]  Address: [address] [place] [address]  Phone: [phone]   ADMISSION INFORMATION:  Place of Service: Inpatient [facility]  Place of Service Code: 21  Inpatient Admission Date/Time: 8/21/24  1:48 PM  Discharge Date/Time: 8/22/2024  3:29 PM  Admitting Diagnosis Code/Description:  STEMI     UTILIZATION REVIEW CONTACT:  Georgette \"Kathe\" John Utilization   Network Utilization Review Department  Phone: [phone]  Fax: [phone]  Email: [email or web address]  Contact for approvals/pending authorizations, clinical reviews, and discharge.     PHYSICIAN ADVISORY SERVICES:  Medical Necessity Denial & Hqgd-mn-Vpcq Review  Phone: [phone]  Fax: [phone]  Email: [email or web address]     DISCHARGE SUPPORT TEAM:  For Patients Discharge Needs & Updates  Phone: [phone] opt. 2 Fax: [phone]  Email: [email or web address]     "

## 2024-08-23 ENCOUNTER — PATIENT OUTREACH (OUTPATIENT)
Dept: CASE MANAGEMENT | Facility: OTHER | Age: 74
End: 2024-08-23

## 2024-08-23 DIAGNOSIS — Z71.89 COMPLEX CARE COORDINATION: Primary | ICD-10-CM

## 2024-08-23 NOTE — CASE MANAGEMENT
Called [facility] ([phone]) to check status of authorization. Spoke to Viviana who stated request was received and pending at this time. Pending ref: [id number].

## 2024-08-23 NOTE — PROGRESS NOTES
Contacted patient for f/u post hospitalization for acute pericarditis.  She lives alone but has daily caregiver assistance.  She uses oxygen at 3 L during the day and BIPAP with 2 L at night.  SpO2 91-93%.  She relays she has felt somewhat more sob since discharge but feels it's related to her being exhausted.   Nutritional intake adequate.  Reviewed medications and she is taking all as prescribed.  Follow up with PCP and cardiology scheduled.  Her caregiver provides transportation.  Patient has hx of muscular dystrophy and is mostly in wheelchair.  She is managing well at home and denies any needs at this time.

## 2024-08-26 NOTE — CASE MANAGEMENT
Called [facility] ([phone]) to check status of authorization. Spoke to Velvet who stated auth still pending at this time. Auth can take up to 14 calendar days to review. Due date for request is 9/4. Pending ref: [id number].

## 2024-08-27 ENCOUNTER — HOSPITAL ENCOUNTER (OUTPATIENT)
Dept: MAMMOGRAPHY | Facility: HOSPITAL | Age: 74
Discharge: HOME | End: 2024-08-27
Payer: COMMERCIAL

## 2024-08-27 ENCOUNTER — HOSPITAL ENCOUNTER (OUTPATIENT)
Dept: ULTRASOUND IMAGING | Facility: HOSPITAL | Age: 74
Discharge: HOME | End: 2024-08-27
Payer: COMMERCIAL

## 2024-08-27 DIAGNOSIS — R92.8 ABNORMAL MAMMOGRAM: ICD-10-CM

## 2024-08-27 PROCEDURE — 77065 DX MAMMO INCL CAD UNI: CPT

## 2024-08-27 PROCEDURE — 76642 ULTRASOUND BREAST LIMITED: CPT

## 2024-08-27 PROCEDURE — G0279 TOMOSYNTHESIS, MAMMO: HCPCS

## 2024-08-27 NOTE — PROGRESS NOTES
Met with patient and   regarding recommendation for;    __x___ RIGHT ______LEFT      ___x__Ultrasound guided  ______Stereotactic breast biopsy.      __X___Verbalized understanding.    Reviewed clip placement with patient, pt states understanding: Yes: ____x__ No: ______  Comments:    Blood thinners:  No: __x___ Yes: ______ What:          Biopsy teaching sheet given:  Yes: ___X___ No: ________    Pt given contact information and adv to call with any questions/needs    Patient advised to arrive at 12:30pm  for a 1:00pm appointment on 9/24/24

## 2024-08-28 NOTE — CASE MANAGEMENT
Called [facility] ([phone]) to check status of authorization. Spoke to Mackenzie who stated auth still pending at this time.

## 2024-08-30 ENCOUNTER — HOSPITAL ENCOUNTER (INPATIENT)
Facility: HOSPITAL | Age: 74
LOS: 8 days | Discharge: NURSING, REHAB OR LONG-TERM CARE | DRG: 862 | End: 2024-09-07
Attending: EMERGENCY MEDICINE | Admitting: INTERNAL MEDICINE
Payer: COMMERCIAL

## 2024-08-30 ENCOUNTER — APPOINTMENT (EMERGENCY)
Dept: CT IMAGING | Facility: HOSPITAL | Age: 74
DRG: 862 | End: 2024-08-30
Payer: COMMERCIAL

## 2024-08-30 ENCOUNTER — NURSE TRIAGE (OUTPATIENT)
Age: 74
End: 2024-08-30

## 2024-08-30 DIAGNOSIS — R11.0 NAUSEA: ICD-10-CM

## 2024-08-30 DIAGNOSIS — G71.00 RESTRICTIVE LUNG DISEASE DUE TO MUSCULAR DYSTROPHY (HCC): ICD-10-CM

## 2024-08-30 DIAGNOSIS — J90 PLEURAL EFFUSION: ICD-10-CM

## 2024-08-30 DIAGNOSIS — R60.9 INTERSTITIAL EDEMA: ICD-10-CM

## 2024-08-30 DIAGNOSIS — I31.39 PERICARDIAL EFFUSION: ICD-10-CM

## 2024-08-30 DIAGNOSIS — L08.9 WOUND INFECTION: Primary | ICD-10-CM

## 2024-08-30 DIAGNOSIS — J98.4 RESTRICTIVE LUNG DISEASE DUE TO MUSCULAR DYSTROPHY (HCC): ICD-10-CM

## 2024-08-30 DIAGNOSIS — Z93.3 COLOSTOMY STATUS (HCC): ICD-10-CM

## 2024-08-30 DIAGNOSIS — G71.00 MUSCULAR DYSTROPHY (HCC): ICD-10-CM

## 2024-08-30 DIAGNOSIS — T14.8XXA WOUND INFECTION: Primary | ICD-10-CM

## 2024-08-30 PROBLEM — R19.09 INGUINAL SWELLING: Status: ACTIVE | Noted: 2024-08-30

## 2024-08-30 LAB
ALBUMIN SERPL BCG-MCNC: 3.7 G/DL (ref 3.5–5)
ALP SERPL-CCNC: 63 U/L (ref 34–104)
ALT SERPL W P-5'-P-CCNC: 12 U/L (ref 7–52)
ANION GAP SERPL CALCULATED.3IONS-SCNC: 11 MMOL/L (ref 4–13)
AST SERPL W P-5'-P-CCNC: 25 U/L (ref 13–39)
BASOPHILS # BLD AUTO: 0.05 THOUSANDS/ΜL (ref 0–0.1)
BASOPHILS NFR BLD AUTO: 0 % (ref 0–1)
BILIRUB SERPL-MCNC: 0.27 MG/DL (ref 0.2–1)
BNP SERPL-MCNC: 52 PG/ML (ref 0–100)
BUN SERPL-MCNC: 13 MG/DL (ref 5–25)
CALCIUM SERPL-MCNC: 9.3 MG/DL (ref 8.4–10.2)
CHLORIDE SERPL-SCNC: 94 MMOL/L (ref 96–108)
CO2 SERPL-SCNC: 34 MMOL/L (ref 21–32)
CREAT SERPL-MCNC: 0.33 MG/DL (ref 0.6–1.3)
EOSINOPHIL # BLD AUTO: 0.33 THOUSAND/ΜL (ref 0–0.61)
EOSINOPHIL NFR BLD AUTO: 2 % (ref 0–6)
ERYTHROCYTE [DISTWIDTH] IN BLOOD BY AUTOMATED COUNT: 17.4 % (ref 11.6–15.1)
GFR SERPL CREATININE-BSD FRML MDRD: 109 ML/MIN/1.73SQ M
GLUCOSE SERPL-MCNC: 120 MG/DL (ref 65–140)
HCT VFR BLD AUTO: 39.9 % (ref 34.8–46.1)
HGB BLD-MCNC: 12.1 G/DL (ref 11.5–15.4)
IMM GRANULOCYTES # BLD AUTO: 0.07 THOUSAND/UL (ref 0–0.2)
IMM GRANULOCYTES NFR BLD AUTO: 0 % (ref 0–2)
LYMPHOCYTES # BLD AUTO: 1.79 THOUSANDS/ΜL (ref 0.6–4.47)
LYMPHOCYTES NFR BLD AUTO: 11 % (ref 14–44)
MCH RBC QN AUTO: 26.2 PG (ref 26.8–34.3)
MCHC RBC AUTO-ENTMCNC: 30.3 G/DL (ref 31.4–37.4)
MCV RBC AUTO: 87 FL (ref 82–98)
MONOCYTES # BLD AUTO: 1.5 THOUSAND/ΜL (ref 0.17–1.22)
MONOCYTES NFR BLD AUTO: 9 % (ref 4–12)
NEUTROPHILS # BLD AUTO: 13.03 THOUSANDS/ΜL (ref 1.85–7.62)
NEUTS SEG NFR BLD AUTO: 78 % (ref 43–75)
NRBC BLD AUTO-RTO: 0 /100 WBCS
PLATELET # BLD AUTO: 490 THOUSANDS/UL (ref 149–390)
PMV BLD AUTO: 9.7 FL (ref 8.9–12.7)
POTASSIUM SERPL-SCNC: 4.8 MMOL/L (ref 3.5–5.3)
PROT SERPL-MCNC: 7.6 G/DL (ref 6.4–8.4)
RBC # BLD AUTO: 4.61 MILLION/UL (ref 3.81–5.12)
SODIUM SERPL-SCNC: 139 MMOL/L (ref 135–147)
WBC # BLD AUTO: 16.77 THOUSAND/UL (ref 4.31–10.16)

## 2024-08-30 PROCEDURE — 80053 COMPREHEN METABOLIC PANEL: CPT | Performed by: EMERGENCY MEDICINE

## 2024-08-30 PROCEDURE — 99284 EMERGENCY DEPT VISIT MOD MDM: CPT

## 2024-08-30 PROCEDURE — 99223 1ST HOSP IP/OBS HIGH 75: CPT | Performed by: NURSE PRACTITIONER

## 2024-08-30 PROCEDURE — 94760 N-INVAS EAR/PLS OXIMETRY 1: CPT

## 2024-08-30 PROCEDURE — 87070 CULTURE OTHR SPECIMN AEROBIC: CPT | Performed by: EMERGENCY MEDICINE

## 2024-08-30 PROCEDURE — 74177 CT ABD & PELVIS W/CONTRAST: CPT

## 2024-08-30 PROCEDURE — 87077 CULTURE AEROBIC IDENTIFY: CPT | Performed by: EMERGENCY MEDICINE

## 2024-08-30 PROCEDURE — 99285 EMERGENCY DEPT VISIT HI MDM: CPT | Performed by: EMERGENCY MEDICINE

## 2024-08-30 PROCEDURE — 85025 COMPLETE CBC W/AUTO DIFF WBC: CPT | Performed by: EMERGENCY MEDICINE

## 2024-08-30 PROCEDURE — 87205 SMEAR GRAM STAIN: CPT | Performed by: EMERGENCY MEDICINE

## 2024-08-30 PROCEDURE — 1123F ACP DISCUSS/DSCN MKR DOCD: CPT | Performed by: INTERNAL MEDICINE

## 2024-08-30 PROCEDURE — 83880 ASSAY OF NATRIURETIC PEPTIDE: CPT | Performed by: EMERGENCY MEDICINE

## 2024-08-30 PROCEDURE — 87186 SC STD MICRODIL/AGAR DIL: CPT | Performed by: EMERGENCY MEDICINE

## 2024-08-30 PROCEDURE — 94660 CPAP INITIATION&MGMT: CPT

## 2024-08-30 PROCEDURE — 36415 COLL VENOUS BLD VENIPUNCTURE: CPT | Performed by: EMERGENCY MEDICINE

## 2024-08-30 RX ORDER — ACETAMINOPHEN 325 MG/1
650 TABLET ORAL EVERY 8 HOURS PRN
Status: DISCONTINUED | OUTPATIENT
Start: 2024-08-30 | End: 2024-09-07 | Stop reason: HOSPADM

## 2024-08-30 RX ORDER — RAMELTEON 8 MG/1
8 TABLET ORAL
Status: DISCONTINUED | OUTPATIENT
Start: 2024-08-30 | End: 2024-08-31

## 2024-08-30 RX ORDER — CEFAZOLIN SODIUM 2 G/50ML
2000 SOLUTION INTRAVENOUS EVERY 8 HOURS
Status: DISCONTINUED | OUTPATIENT
Start: 2024-08-31 | End: 2024-09-01

## 2024-08-30 RX ORDER — CITALOPRAM HYDROBROMIDE 10 MG/1
10 TABLET ORAL
Status: DISCONTINUED | OUTPATIENT
Start: 2024-08-30 | End: 2024-09-07 | Stop reason: HOSPADM

## 2024-08-30 RX ORDER — DIAZEPAM 5 MG
5 TABLET ORAL EVERY 8 HOURS PRN
Status: DISCONTINUED | OUTPATIENT
Start: 2024-08-30 | End: 2024-09-07 | Stop reason: HOSPADM

## 2024-08-30 RX ORDER — BUPROPION HYDROCHLORIDE 75 MG/1
75 TABLET ORAL
Status: DISCONTINUED | OUTPATIENT
Start: 2024-08-30 | End: 2024-08-31

## 2024-08-30 RX ORDER — GUAIFENESIN 600 MG/1
600 TABLET, EXTENDED RELEASE ORAL EVERY 12 HOURS SCHEDULED
Status: DISCONTINUED | OUTPATIENT
Start: 2024-08-30 | End: 2024-09-07 | Stop reason: HOSPADM

## 2024-08-30 RX ORDER — BUDESONIDE 0.5 MG/2ML
0.5 INHALANT ORAL EVERY 12 HOURS PRN
Status: DISCONTINUED | OUTPATIENT
Start: 2024-08-30 | End: 2024-09-07 | Stop reason: HOSPADM

## 2024-08-30 RX ORDER — FUROSEMIDE 10 MG/ML
40 INJECTION INTRAMUSCULAR; INTRAVENOUS ONCE
Status: COMPLETED | OUTPATIENT
Start: 2024-08-30 | End: 2024-08-30

## 2024-08-30 RX ORDER — FUROSEMIDE 10 MG/ML
40 INJECTION INTRAMUSCULAR; INTRAVENOUS
Status: DISCONTINUED | OUTPATIENT
Start: 2024-08-31 | End: 2024-09-02

## 2024-08-30 RX ORDER — HEPARIN SODIUM 5000 [USP'U]/ML
5000 INJECTION, SOLUTION INTRAVENOUS; SUBCUTANEOUS EVERY 8 HOURS SCHEDULED
Status: DISCONTINUED | OUTPATIENT
Start: 2024-08-30 | End: 2024-09-07 | Stop reason: HOSPADM

## 2024-08-30 RX ORDER — IBUPROFEN 800 MG/1
800 TABLET, FILM COATED ORAL EVERY 8 HOURS SCHEDULED
Status: DISCONTINUED | OUTPATIENT
Start: 2024-08-30 | End: 2024-09-03

## 2024-08-30 RX ORDER — ALBUTEROL SULFATE 0.83 MG/ML
2.5 SOLUTION RESPIRATORY (INHALATION) EVERY 4 HOURS PRN
Status: DISCONTINUED | OUTPATIENT
Start: 2024-08-30 | End: 2024-09-07 | Stop reason: HOSPADM

## 2024-08-30 RX ORDER — COLCHICINE 0.6 MG/1
0.6 TABLET ORAL DAILY
Status: DISCONTINUED | OUTPATIENT
Start: 2024-08-31 | End: 2024-09-07 | Stop reason: HOSPADM

## 2024-08-30 RX ORDER — ACETAMINOPHEN 325 MG/1
650 TABLET ORAL EVERY 4 HOURS PRN
Status: DISCONTINUED | OUTPATIENT
Start: 2024-08-30 | End: 2024-08-30

## 2024-08-30 RX ORDER — CEFAZOLIN SODIUM 2 G/50ML
2000 SOLUTION INTRAVENOUS ONCE
Status: COMPLETED | OUTPATIENT
Start: 2024-08-30 | End: 2024-08-30

## 2024-08-30 RX ORDER — ONDANSETRON 2 MG/ML
4 INJECTION INTRAMUSCULAR; INTRAVENOUS EVERY 6 HOURS PRN
Status: DISCONTINUED | OUTPATIENT
Start: 2024-08-30 | End: 2024-09-07 | Stop reason: HOSPADM

## 2024-08-30 RX ORDER — FLUTICASONE PROPIONATE 50 MCG
2 SPRAY, SUSPENSION (ML) NASAL DAILY
Status: DISCONTINUED | OUTPATIENT
Start: 2024-08-31 | End: 2024-09-07 | Stop reason: HOSPADM

## 2024-08-30 RX ADMIN — BUPROPION HYDROCHLORIDE TABLETS 75 MG: 75 TABLET, FILM COATED ORAL at 21:10

## 2024-08-30 RX ADMIN — GUAIFENESIN 600 MG: 600 TABLET ORAL at 21:10

## 2024-08-30 RX ADMIN — CEFAZOLIN SODIUM 2000 MG: 2 SOLUTION INTRAVENOUS at 17:10

## 2024-08-30 RX ADMIN — HEPARIN SODIUM 5000 UNITS: 5000 INJECTION, SOLUTION INTRAVENOUS; SUBCUTANEOUS at 21:10

## 2024-08-30 RX ADMIN — IBUPROFEN 800 MG: 800 TABLET, FILM COATED ORAL at 21:10

## 2024-08-30 RX ADMIN — CITALOPRAM HYDROBROMIDE 10 MG: 10 TABLET ORAL at 21:10

## 2024-08-30 RX ADMIN — IOHEXOL 100 ML: 350 INJECTION, SOLUTION INTRAVENOUS at 14:44

## 2024-08-30 RX ADMIN — FUROSEMIDE 40 MG: 10 INJECTION, SOLUTION INTRAMUSCULAR; INTRAVENOUS at 17:12

## 2024-08-30 NOTE — ED PROVIDER NOTES
History  Chief Complaint   Patient presents with    Wound Check     Cardiac cath incision check. Possible infection     Patient is a 74-year-old female who presents for evaluation of a wound evaluation.  Patient says that she is having drainage from the site of her cardiac catheterization at the right groin.  Patient had the cath done on 8-21.  She says she has been home since 8/22.  The visiting nurse came today and noticed purulent drainage from the area so she was sent in for evaluation.  She denies any fevers or chills, nausea or vomiting.  Patient does admit to some discomfort in that area.  The patient says that the dressing was changed yesterday by the visiting nurse but there was no drainage at that time        Prior to Admission Medications   Prescriptions Last Dose Informant Patient Reported? Taking?   Probiotic Product (Florastor Plus) CAPS 8/30/2024 Self Yes Yes   acetaminophen (TYLENOL) 325 mg tablet   No No   Sig: Take 2 tablets (650 mg total) by mouth every 4 (four) hours as needed for mild pain   albuterol (2.5 mg/3 mL) 0.083 % nebulizer solution   No No   Sig: Take 3 mL (2.5 mg total) by nebulization every 4 (four) hours as needed for wheezing or shortness of breath   buPROPion (WELLBUTRIN) 75 mg tablet 8/30/2024  No Yes   Sig: take 1 tablet by mouth every morning   budesonide (Pulmicort) 0.5 mg/2 mL nebulizer solution Past Month  No Yes   Sig: Take 2 mL (0.5 mg total) by nebulization as needed (SOB) Rinse mouth after use.   citalopram (CeleXA) 10 mg tablet 8/29/2024  No Yes   Sig: Take 1 tablet (10 mg total) by mouth daily   colchicine (COLCRYS) 0.6 mg tablet 8/30/2024  No Yes   Sig: Take 1 tablet (0.6 mg total) by mouth daily   collagenase (SANTYL) ointment   No No   Sig: Apply topically daily   diazepam (VALIUM) 5 mg tablet Past Month  No Yes   Sig: Take 1 tablet (5 mg total) by mouth every 8 (eight) hours as needed for anxiety or muscle spasms   fluticasone (FLONASE) 50 mcg/act nasal spray  Unknown  No No   Si sprays into each nostril daily   ibuprofen (MOTRIN) 800 mg tablet 2024  No Yes   Sig: Take 1 tablet (800 mg total) by mouth every 8 (eight) hours   ketoconazole (NIZORAL) 2 % cream  Self Yes No   Sig: As needed   ketoconazole (NIZORAL) 2 % shampoo   No No   Sig: apply to affected area two times a week   Patient taking differently: As needed   pantoprazole (PROTONIX) 40 mg tablet 2024  No Yes   Sig: Take 1 tablet (40 mg total) by mouth daily in the early morning   ramelteon (ROZEREM) 8 mg tablet 2024  Yes Yes   Sig: Take 8 mg by mouth daily at bedtime   solifenacin (VESICARE) 10 MG tablet 2024  No Yes   Sig: take 1 tablet by mouth once daily   triamcinolone (KENALOG) 0.1 % cream   No No   Sig: Apply topically 2 (two) times a day as needed for irritation or rash      Facility-Administered Medications: None       Past Medical History:   Diagnosis Date    Anxiety     Breast cancer ([facility])     Colitis     Depression     Difficult intubation     Excessive daytime sleepiness     GERD (gastroesophageal reflux disease)     Hyperlipidemia     Ischemic colitis ([facility]) 2019    Leukocytosis 2020    Muscular dystrophy ([facility])     Limb-girdle    Osteoporosis     Overactive bladder     Perforated diverticulum 2020    Pneumonitis     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[facility])     Skin cancer     Skin disorder     Tachycardia 2021    Vitamin D deficiency        Past Surgical History:   Procedure Laterality Date    CARDIAC CATHETERIZATION N/A 2024    Procedure: Cardiac pci;  Surgeon: Juan Fuller MD;  [facility]: [facility];  Service: Cardiology    CARDIAC CATHETERIZATION N/A 2024    Procedure: Cardiac PCI Stent;  Surgeon: Juan Fuller MD;  [facility];  Service: Cardiology    CARDIAC CATHETERIZATION N/A 2024    Procedure: Cardiac Coronary Angiogram;  Surgeon: Juan Fuller MD;  [facility]: [facility];   Service: Cardiology    COLONOSCOPY N/A 2019    Procedure: COLONOSCOPY;  Surgeon: Anthony Mejía MD;  Location:  GI LAB;  Service: Colorectal    CYSTOSCOPY N/A 2020    Procedure: CYSTOSCOPY; BILATERAL URETERAL CATHETER PLACEMENT, CYSTOTOMY;  Surgeon: Zach Tyler MD;  Location: [facility];  Service: Urology    FL CYSTOGRAM  10/15/2020    HARTMANS PROCEDURE N/A 2020    Procedure: EXPLORATORY LAPAROTOMY; LYSIS OF ADHESIONS; WASHOUT PELVIC ABSCESS; COMPLEX SIGMOID COLON RESECTION; LOOP TRANSVERSE COLOSTOMY;  Surgeon: Thania Jaffe MD;  Location: [facility];  Service: General    IR DRAINAGE TUBE PLACEMENT  2020    MASTECTOMY Left 2007    left breast mastectomy     TONSILLECTOMY      TOOTH EXTRACTION      TUBAL LIGATION         Family History   Problem Relation Age of Onset    Other Mother         bone loss    Arthritis Mother     Skin cancer Father     Hypertension Father         benign     Other Father         bone loss    Muscular dystrophy Father     Hypertension Sister     No Known Problems Sister     Muscular dystrophy Brother         of the limb gridle     Parkinsonism Brother     No Known Problems Brother     No Known Problems Maternal Grandmother     No Known Problems Paternal Grandmother     No Known Problems Maternal Aunt     Muscular dystrophy Family         of the limb girdle     I have reviewed and agree with the history as documented.    E-Cigarette/Vaping    E-Cigarette Use Never User      E-Cigarette/Vaping Substances    Nicotine No     THC No     CBD No     Flavoring No     Other No     Unknown No      Social History     Tobacco Use    Smoking status: Former     Current packs/day: 0.00     Average packs/day: 1.5 packs/day for 37.0 years (55.5 ttl pk-yrs)     Types: Cigarettes     Start date:      Quit date:      Years since quittin.6    Smokeless tobacco: Never   Vaping Use    Vaping status: Never Used   Substance Use Topics    Alcohol use: Not  Currently     Comment: social drinker per allscripts     Drug use: Yes     Types: Marijuana     Comment: medical marijuana       Review of Systems   Constitutional:  Negative for fever and unexpected weight change.   HENT:  Negative for congestion, ear pain, sore throat and trouble swallowing.    Eyes:  Negative for pain and redness.   Respiratory:  Negative for cough, chest tightness and shortness of breath.    Cardiovascular:  Negative for chest pain and leg swelling.   Gastrointestinal:  Negative for abdominal distention, abdominal pain, diarrhea and vomiting.   Endocrine: Negative for polyuria.   Genitourinary:  Negative for dysuria, hematuria, pelvic pain and vaginal bleeding.   Musculoskeletal:  Negative for back pain and myalgias.   Skin:  Positive for wound (R groin). Negative for color change and rash.   Neurological:  Negative for dizziness, syncope, weakness, light-headedness and headaches.       Physical Exam  Physical Exam  Vitals and nursing note reviewed.   Constitutional:       General: She is not in acute distress.     Appearance: She is well-developed.   HENT:      Head: Normocephalic and atraumatic.      Right Ear: External ear normal.      Left Ear: External ear normal.      Nose: Nose normal.      Mouth/Throat:      Mouth: Mucous membranes are moist.      Pharynx: No oropharyngeal exudate.   Eyes:      Conjunctiva/sclera: Conjunctivae normal.      Pupils: Pupils are equal, round, and reactive to light.   Cardiovascular:      Rate and Rhythm: Normal rate and regular rhythm.      Heart sounds: Normal heart sounds. No murmur heard.     No friction rub. No gallop.   Pulmonary:      Effort: Pulmonary effort is normal. No respiratory distress.      Breath sounds: Normal breath sounds. No wheezing or rales.   Abdominal:      General: There is no distension.      Palpations: Abdomen is soft.      Tenderness: There is no abdominal tenderness. There is no guarding.       Musculoskeletal:         General:  No swelling, tenderness or deformity. Normal range of motion.      Cervical back: Normal range of motion and neck supple.   Lymphadenopathy:      Cervical: No cervical adenopathy.   Skin:     General: Skin is warm and dry.   Neurological:      General: No focal deficit present.      Mental Status: She is alert and oriented to person, place, and time. Mental status is at baseline.      Cranial Nerves: No cranial nerve deficit.      Sensory: No sensory deficit.      Motor: No weakness or abnormal muscle tone.      Coordination: Coordination normal.         Vital Signs  ED Triage Vitals [08/30/24 1300]   Temperature Pulse Respirations Blood Pressure SpO2   97.8 °F (36.6 °C) 88 20 116/62 96 %      Temp Source Heart Rate Source Patient Position - Orthostatic VS BP Location FiO2 (%)   Temporal Monitor Lying Left arm --      Pain Score       6           Vitals:    08/30/24 1700 08/30/24 1756 08/30/24 1832 08/30/24 1911   BP: 116/60 154/77  103/68   Pulse: 91 96 95 104   Patient Position - Orthostatic VS: Lying            Visual Acuity      ED Medications  Medications   buPROPion (WELLBUTRIN) tablet 75 mg (75 mg Oral Given 8/30/24 2110)   citalopram (CeleXA) tablet 10 mg (10 mg Oral Given 8/30/24 2110)   colchicine (COLCRYS) tablet 0.6 mg (has no administration in time range)   fluticasone (FLONASE) 50 mcg/act nasal spray 2 spray (has no administration in time range)   diazepam (VALIUM) tablet 5 mg (has no administration in time range)   budesonide (PULMICORT) inhalation solution 0.5 mg (has no administration in time range)   albuterol inhalation solution 2.5 mg (has no administration in time range)   ramelteon (ROZEREM) tablet 8 mg (8 mg Oral Not Given 8/30/24 2111)   ibuprofen (MOTRIN) tablet 800 mg (800 mg Oral Given 8/30/24 2110)   ceFAZolin (ANCEF) IVPB (premix in dextrose) 2,000 mg 50 mL (has no administration in time range)   furosemide (LASIX) injection 40 mg (has no administration in time range)   heparin (porcine)  subcutaneous injection 5,000 Units (5,000 Units Subcutaneous Given 8/30/24 2110)   ondansetron (ZOFRAN) injection 4 mg (has no administration in time range)   acetaminophen (TYLENOL) tablet 650 mg (has no administration in time range)   guaiFENesin (MUCINEX) 12 hr tablet 600 mg (600 mg Oral Given 8/30/24 2110)   iohexol (OMNIPAQUE) 350 MG/ML injection (MULTI-DOSE) 100 mL (100 mL Intravenous Given 8/30/24 1444)   ceFAZolin (ANCEF) IVPB (premix in dextrose) 2,000 mg 50 mL (2,000 mg Intravenous New Bag 8/30/24 1710)   furosemide (LASIX) injection 40 mg (40 mg Intravenous Given 8/30/24 1712)       Diagnostic Studies  Results Reviewed       Procedure Component Value Units Date/Time    Wound culture and Gram stain [[id number]] Collected: 08/30/24 1707    Lab Status: In process Specimen: Wound from Groin Updated: 08/30/24 1719    B-Type Natriuretic Peptide(BNP) [[id number]]  (Normal) Collected: 08/30/24 1345    Lab Status: Final result Specimen: Blood from Arm, Right Updated: 08/30/24 1648     BNP 52 pg/mL     Comprehensive metabolic panel [[id number]]  (Abnormal) Collected: 08/30/24 1345    Lab Status: Final result Specimen: Blood from Arm, Right Updated: 08/30/24 1412     Sodium 139 mmol/L      Potassium 4.8 mmol/L      Chloride 94 mmol/L      CO2 34 mmol/L      ANION GAP 11 mmol/L      BUN 13 mg/dL      Creatinine 0.33 mg/dL      Glucose 120 mg/dL      Calcium 9.3 mg/dL      AST 25 U/L      ALT 12 U/L      Alkaline Phosphatase 63 U/L      Total Protein 7.6 g/dL      Albumin 3.7 g/dL      Total Bilirubin 0.27 mg/dL      eGFR 109 ml/min/1.73sq m     Narrative:      National Kid[employer] guidelines for Chronic Kidney Disease (CKD):     Stage 1 with normal or high GFR (GFR > 90 mL/min/1.73 square meters)    Stage 2 Mild CKD (GFR = 60-89 mL/min/1.73 square meters)    Stage 3A Moderate CKD (GFR = 45-59 mL/min/1.73 square meters)    Stage 3B Moderate CKD (GFR = 30-44 mL/min/1.73 square meters)    Stage 4 Severe CKD  (GFR = 15-29 mL/min/1.73 square meters)    Stage 5 End Stage CKD (GFR <15 mL/min/1.73 square meters)  Note: GFR calculation is accurate only with a steady state creatinine    CBC and differential [[id number]]  (Abnormal) Collected: 08/30/24 1345    Lab Status: Final result Specimen: Blood from Arm, Right Updated: 08/30/24 1351     WBC 16.77 Thousand/uL      RBC 4.61 Million/uL      Hemoglobin 12.1 g/dL      Hematocrit 39.9 %      MCV 87 fL      MCH 26.2 pg      MCHC 30.3 g/dL      RDW 17.4 %      MPV 9.7 fL      Platelets 490 Thousands/uL      nRBC 0 /100 WBCs      Segmented % 78 %      Immature Grans % 0 %      Lymphocytes % 11 %      Monocytes % 9 %      Eosinophils Relative 2 %      Basophils Relative 0 %      Absolute Neutrophils 13.03 Thousands/µL      Absolute Immature Grans 0.07 Thousand/uL      Absolute Lymphocytes 1.79 Thousands/µL      Absolute Monocytes 1.50 Thousand/µL      Eosinophils Absolute 0.33 Thousand/µL      Basophils Absolute 0.05 Thousands/µL                    CT abdomen pelvis with contrast   Final Result by Anthony Ross MD (08/30 1510)      1.  Inflammation of the right inguinal superficial and deep subcutaneous tissues without an abscess.   2.  Pericardial effusion is new since July 23, 2024.   3.  Suspected interstitial edema with increased size of the small left pleural effusion.         Workstation performed: [id number]                    Procedures  Procedures         ED Course                                               Medical Decision Making  Patient is a 74-year-old female presenting for evaluation of a wound to the right groin.  Had a heart catheterization on 8-21.  Visiting nurse noticed some purulent drainage from the wound earlier today.  Does have some tenderness to that area.  No fevers or chills.  Vitals within normal limits.  Does have purulent drainage.  The symptoms are likely secondary to abscess.  Given the catheterization, will obtain CT abdomen pelvis to rule out  worsening deep space infection.  Will obtain CBC, CMP  CBC shows leukocytosis of 16.  CMP within normal limits.  CT abdomen pelvis shows inflammation of the subcu tissues but no obvious abscess.  Will obtain a wound culture.  CT also shows a new pericardial effusion compared to July.  Chart review shows a trace pericardial effusion after the catheterization on 8-21.  Patient does says she has been having some increased shortness of breath since the procedure.  Given the shortness of breath, enlarging pericardial effusion, interstitial edema, will admit for IV diuretics.  Will treat with IV antibiotics for the infection    Problems Addressed:  Interstitial edema: acute illness or injury  Pericardial effusion: acute illness or injury  Pleural effusion: acute illness or injury  Wound infection: acute illness or injury    Amount and/or Complexity of Data Reviewed  Labs: ordered.  Radiology: ordered.    Risk  Prescription drug management.  Decision regarding hospitalization.                 Disposition  Final diagnoses:   Wound infection   Pericardial effusion   Pleural effusion   Interstitial edema     Time reflects when diagnosis was documented in both MDM as applicable and the Disposition within this note       Time User Action Codes Description Comment    8/30/2024  4:53 PM Mina Corral Add [T14.8XXA,  L08.9] Wound infection     8/30/2024  4:53 PM Mina Corral Add [I31.39] Pericardial effusion     8/30/2024  4:53 PM Mina Corral [J90] Pleural effusion     8/30/2024  4:53 PM Mina Corral [R60.9] Interstitial edema           ED Disposition       ED Disposition   Admit    Condition   Stable    Date/Time   Fri Aug 30, 2024  4:53 PM    Comment   Case was discussed with ISABELA and the patient's admission status was agreed to be Admission Status: inpatient status to the service of Dr. Krishna .               Follow-up Information    None         Current Discharge Medication List        CONTINUE these medications  which have NOT CHANGED    Details   budesonide (Pulmicort) 0.5 mg/2 mL nebulizer solution Take 2 mL (0.5 mg total) by nebulization as needed (SOB) Rinse mouth after use.    Associated Diagnoses: Centrilobular emphysema (HCC)      buPROPion (WELLBUTRIN) 75 mg tablet take 1 tablet by mouth every morning  Qty: 100 tablet, Refills: 1    Associated Diagnoses: Mild episode of recurrent major depressive disorder (HCC)      citalopram (CeleXA) 10 mg tablet Take 1 tablet (10 mg total) by mouth daily  Qty: 90 tablet, Refills: 1    Associated Diagnoses: Mild episode of recurrent major depressive disorder (HCC)      colchicine (COLCRYS) 0.6 mg tablet Take 1 tablet (0.6 mg total) by mouth daily  Qty: 30 tablet, Refills: 0    Associated Diagnoses: Centrilobular emphysema (HCC)      diazepam (VALIUM) 5 mg tablet Take 1 tablet (5 mg total) by mouth every 8 (eight) hours as needed for anxiety or muscle spasms  Qty: 30 tablet, Refills: 0    Associated Diagnoses: Muscular dystrophy (HCC); 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); Colostomy status (HCC)      ibuprofen (MOTRIN) 800 mg tablet Take 1 tablet (800 mg total) by mouth every 8 (eight) hours  Qty: 90 tablet, Refills: 0    Associated Diagnoses: Centrilobular emphysema (HCC)      pantoprazole (PROTONIX) 40 mg tablet Take 1 tablet (40 mg total) by mouth daily in the early morning  Qty: 30 tablet, Refills: 0    Associated Diagnoses: Centrilobular emphysema (HCC)      Probiotic Product (Florastor Plus) CAPS       ramelteon (ROZEREM) 8 mg tablet Take 8 mg by mouth daily at bedtime      solifenacin (VESICARE) 10 MG tablet take 1 tablet by mouth once daily  Qty: 90 tablet, Refills: 1    Associated Diagnoses: Overactive bladder      acetaminophen (TYLENOL) 325 mg tablet Take 2 tablets (650 mg total) by mouth every 4 (four) hours as needed for mild pain    Associated Diagnoses: Centrilobular emphysema (HCC)      albuterol (2.5 mg/3 mL) 0.083 % nebulizer solution Take 3 mL  (2.5 mg total) by nebulization every 4 (four) hours as needed for wheezing or shortness of breath  Qty: 180 mL, Refills: 0    Associated Diagnoses: COPD with acute exacerbation (HCC)      collagenase (SANTYL) ointment Apply topically daily  Qty: 30 g, Refills: 0    Associated Diagnoses: Cellulitis      fluticasone (FLONASE) 50 mcg/act nasal spray 2 sprays into each nostril daily  Qty: 16 g, Refills: 3    Associated Diagnoses: Rhinitis, unspecified type      ketoconazole (NIZORAL) 2 % cream As needed      ketoconazole (NIZORAL) 2 % shampoo apply to affected area two times a week  Qty: 120 mL, Refills: 1    Associated Diagnoses: Seborrhea capitis      triamcinolone (KENALOG) 0.1 % cream Apply topically 2 (two) times a day as needed for irritation or rash  Qty: 30 g, Refills: 2    Associated Diagnoses: Rash             No discharge procedures on file.    PDMP Review         Value Time User    PDMP Reviewed  Yes 7/5/2023 11:42 AM Julienne Tucker DO            ED Provider  Electronically Signed by             Mina Corral DO  08/30/24 1826

## 2024-08-30 NOTE — ASSESSMENT & PLAN NOTE
Presented for suspected wound infection to right groin status post cardiac catheterization 8/21/2024  CT abdomen revealed pericardial effusion new since July 23, 2024  Per chart review, patient was treated for acute idiopathic pericarditis with colchicine and ibuprofen during hospitalization for above 8/22/2024  Echocardiogram 8/26/2024: The left ventricular ejection fraction is 65%. Systolic function is normal. Wall motion is normal. Diastolic function is moderately abnormal, consistent with grade II (pseudonormal) relaxation  ER provider discussed with cardiology, okay for patient to stay here  Telemetry monitoring x 24 hours  Will continue colchicine and ibuprofen for now  Cardiology consultation  Monitor labs and vital signs, conduct serial physical assessments

## 2024-08-30 NOTE — ASSESSMENT & PLAN NOTE
Uses by Bi[facility] at night  Lives alone in an apartment with home health care, at baseline can stand and pivot to wheelchair  Continue diazepam PRN  Supportive care

## 2024-08-30 NOTE — CASE MANAGEMENT
Called [facility] ([phone]) to check status of authorization. Spoke to Viviana who stated auth still pending at this time. Pending ref: [id number].

## 2024-08-30 NOTE — ASSESSMENT & PLAN NOTE
Presented for suspected wound infection to right groin with swelling and drainage  Status post cardiac catheterization 8/21/2024 with insertion site right groin  CT abdomen and pelvis with inflammation of the right inguinal superficial and deep subcutaneous tissues without an abscess  Initiated on Ancef 2 g IV in the ER, continue this Q8H  Admitted to medicine, will consult general surgery

## 2024-08-30 NOTE — H&P
[facility]  H&P  Name: Claire Doherty 74 y.o. female I MRN: [id number]  Unit/Bed#: -01 I Date of Admission: 8/30/2024   Date of Service: 8/30/2024 I Hospital Day: 0      Assessment & Plan     Pericardial effusion  Assessment & Plan  Presented for suspected wound infection to right groin status post cardiac catheterization 8/21/2024  CT abdomen revealed pericardial effusion new since July 23, 2024  Per chart review, patient was treated for acute idiopathic pericarditis with colchicine and ibuprofen during hospitalization for above 8/22/2024  Echocardiogram 8/26/2024: The left ventricular ejection fraction is 65%. Systolic function is normal. Wall motion is normal. Diastolic function is moderately abnormal, consistent with grade II (pseudonormal) relaxation  ER provider discussed with cardiology, okay for patient to stay here  Telemetry monitoring x 24 hours  Will continue colchicine and ibuprofen for now  Cardiology consultation  Monitor labs and vital signs, conduct serial physical assessments    Pleural effusion  Assessment & Plan  Presented for suspected wound infection to right groin status post cardiac catheterization 8/21/2024  CT abdomen: Suspected interstitial edema with increased size of the small left pleural effusion   Received furosemide 40 mg IV.  Continue twice daily for now  Monitor vital signs, daily weights and I&O's    * Infected wound  Assessment & Plan  Presented for suspected wound infection to right groin with swelling and drainage  Status post cardiac catheterization 8/21/2024 with insertion site right groin  CT abdomen and pelvis with inflammation of the right inguinal superficial and deep subcutaneous tissues without an abscess  Initiated on Ancef 2 g IV in the ER, continue this Q8H  Admitted to medicine, will consult general surgery    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HCC)  Assessment & Plan  Currently controlled  Continue bupropion and  citalopram    Insomnia  Assessment & Plan  Continue ramelteon    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 Sequential Compression Devices Ordered.  Code Status: Level 3 - DNAR and DNI   Discussion with family: Patient declined call to .     Anticipated Length of Stay: Patient will be admitted on an inpatient basis with an anticipated length of stay of greater than 2 midnights secondary to suspected wound infection, pericardial effusion, pleural effusion.    Total Time Spent on Date of Encounter in care of patient: 45 mins. This time was spent on one or more of the following: performing physical exam; counseling and coordination of care; obtaining or reviewing history; documenting in the medical record; reviewing/ordering tests, medications or procedures; communicating with other healthcare professionals and discussing with patient's family/caregivers.    Chief Complaint: Wound infection    History of Present Illness:  Claire Doherty is a 74 y.o. female with a PMH of muscular dystrophy, restrictive lung disease, chronic respiratory failure, BiPAP, and GERD who presents with suspected wound infection.  She had a cardiac catheterization about a week ago and noted that her right groin insertion site was swollen and had some drainage.  She came to the ER for evaluation where imaging revealed a pericardial effusion and pleural effusion.  Patient had been treated for  pericardial effusion identified during her hospitalization for cardiac catheterization.  She is admitted to medicine and started on IV antibiotics and IV diuretics.  Will continue colchicine and ibuprofen for now.  Will obtain cardiology and general surgery consultation.  Patient denies fever, shortness of breath, chest pain, abdominal pain, color change, myalgia, or dizziness.    Review of Systems:  Review of Systems   Constitutional:  Negative for chills and fever.   HENT:  Negative for congestion and sore throat.    Eyes:  Negative for visual disturbance.   Respiratory:  Negative for cough and shortness of breath.    Cardiovascular:  Negative for chest pain.   Gastrointestinal:  Negative for abdominal pain, diarrhea and vomiting.   Genitourinary:  Negative for dysuria.   Musculoskeletal:  Positive for gait problem. Negative for arthralgias, back pain and myalgias.   Skin:  Positive for wound. Negative for color change.   Neurological:  Positive for weakness. Negative for dizziness and syncope.   Psychiatric/Behavioral:  Negative for confusion.    All other systems reviewed and are negative.      Past Medical and Surgical History:   Past Medical History:   Diagnosis Date    Anxiety     Breast cancer ([facility]) 2007    Colitis     Depression     Difficult intubation     Excessive daytime sleepiness     GERD (gastroesophageal reflux disease)     Hyperlipidemia     Ischemic colitis ([facility]) 01/21/2019    Leukocytosis 03/28/2020    Muscular dystrophy ([facility])     Limb-girdle    Osteoporosis     Overactive bladder     Perforated diverticulum 03/28/2020    Pneumonitis     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[facility])     Skin cancer     Skin disorder     Tachycardia 06/02/2021    Vitamin D deficiency        Past Surgical History:   Procedure Laterality Date    CARDIAC CATHETERIZATION N/A 8/21/2024    Procedure: Cardiac pci;  Surgeon: Juan Fuller MD;  [facility]: [facility];  Service: Cardiology    CARDIAC  CATHETERIZATION N/A 8/21/2024    Procedure: Cardiac PCI Stent;  Surgeon: Juan Fuller MD;  [facility]: [facility];  Service: Cardiology    CARDIAC CATHETERIZATION N/A 8/21/2024    Procedure: Cardiac Coronary Angiogram;  Surgeon: Juan Fuller MD;  [facility]: [facility];  Service: Cardiology    COLONOSCOPY N/A 1/22/2019    Procedure: COLONOSCOPY;  Surgeon: Anthony Mejía MD;  Location: [facility] GI [facility];  Service: Colorectal    CYSTOSCOPY N/A 9/30/2020    Procedure: CYSTOSCOPY; BILATERAL URETERAL CATHETER PLACEMENT, CYSTOTOMY;  Surgeon: Zach Tyler MD;  Location: [facility];  Service: Urology    FL CYSTOGRAM  10/15/2020    HARTMANS PROCEDURE N/A 9/30/2020    Procedure: EXPLORATORY LAPAROTOMY; LYSIS OF ADHESIONS; WASHOUT PELVIC ABSCESS; COMPLEX SIGMOID COLON RESECTION; LOOP TRANSVERSE COLOSTOMY;  Surgeon: Thania Jaffe MD;  Location: [facility];  Service: General    IR DRAINAGE TUBE PLACEMENT  9/24/2020    MASTECTOMY Left 2007    left breast mastectomy     TONSILLECTOMY      TOOTH EXTRACTION      TUBAL LIGATION         Meds/Allergies:  Prior to Admission medications    Medication Sig Start Date End Date Taking? Authorizing Provider   acetaminophen (TYLENOL) 325 mg tablet Take 2 tablets (650 mg total) by mouth every 4 (four) hours as needed for mild pain 8/22/24   Charly Carvajal   albuterol (2.5 mg/3 mL) 0.083 % nebulizer solution Take 3 mL (2.5 mg total) by nebulization every 4 (four) hours as needed for wheezing or shortness of breath 6/15/23   Julienne Tucker DO   budesonide (Pulmicort) 0.5 mg/2 mL nebulizer solution Take 2 mL (0.5 mg total) by nebulization as needed (SOB) Rinse mouth after use. 8/22/24   Charly Carvajal   buPROPion (WELLBUTRIN) 75 mg tablet take 1 tablet by mouth every morning 7/22/24   Julienne Tucker DO   citalopram (CeleXA) 10 mg tablet Take 1 tablet (10 mg total) by mouth daily 4/6/23   Julienne Tucker DO   colchicine (COLCRYS) 0.6 mg tablet Take 1  tablet (0.6 mg total) by mouth daily 8/22/24 9/21/24  Charly Carvajal   collagenase (SANTYL) ointment Apply topically daily 7/24/24 8/23/24  Sosa Tang,    diazepam (VALIUM) 5 mg tablet Take 1 tablet (5 mg total) by mouth every 8 (eight) hours as needed for anxiety or muscle spasms 7/5/23   Julienne Tucker DO   fluticasone (FLONASE) 50 mcg/act nasal spray 2 sprays into each nostril daily 6/15/23   Julienne Tucker DO   ibuprofen (MOTRIN) 800 mg tablet Take 1 tablet (800 mg total) by mouth every 8 (eight) hours 8/22/24 9/21/24  Charly Carvajal   ketoconazole (NIZORAL) 2 % cream APPLY TWICE A DAY TO THE AFFECTED AREA(S) 6/18/20   Historical Provider, MD   ketoconazole (NIZORAL) 2 % shampoo apply to affected area two times a week 4/25/22   Julienne Tucker DO   pantoprazole (PROTONIX) 40 mg tablet Take 1 tablet (40 mg total) by mouth daily in the early morning 8/23/24   Charly Carvajal   Probiotic Product (Florastor Plus) CAPS     Historical Provider, MD   ramelteon (ROZEREM) 8 mg tablet Take 8 mg by mouth daily at bedtime 8/17/23   Historical Provider, MD   solifenacin (VESICARE) 10 MG tablet take 1 tablet by mouth once daily 5/1/24   Julienne Tucker DO   triamcinolone (KENALOG) 0.1 % cream Apply topically 2 (two) times a day as needed for irritation or rash 7/8/24   Jluienne Tucker DO     I have reviewed home medications with patient personally.    Allergies:   Allergies   Allergen Reactions    Amoxicillin      Vague rash in the 90s, tolerated zosyn on 9/2020 admission     Codeine      Other reaction(s): Other (See Comments)  n/v    Medical Tape Itching     bandaids       Social History:  Marital Status:    Occupation: Not employed  Patient Pre-hospital Living Situation: Apartment  Patient Pre-hospital Level of Mobility: manual wheelchair  Patient Pre-hospital Diet Restrictions: None  Substance Use History:   Social History     Substance and Sexual Activity   Alcohol Use Not  Currently    Comment: social drinker per allscripts      Social History     Tobacco Use   Smoking Status Former    Current packs/day: 0.00    Average packs/day: 1.5 packs/day for 37.0 years (55.5 ttl pk-yrs)    Types: Cigarettes    Start date:     Quit date:     Years since quittin.6   Smokeless Tobacco Never     Social History     Substance and Sexual Activity   Drug Use Yes    Types: Marijuana    Comment: medical marijuana       Family History:  Family History   Problem Relation Age of Onset    Other Mother         bone loss    Arthritis Mother     Skin cancer Father     Hypertension Father         benign     Other Father         bone loss    Muscular dystrophy Father     Hypertension Sister     No Known Problems Sister     Muscular dystrophy Brother         of the limb gridle     Parkinsonism Brother     No Known Problems Brother     No Known Problems Maternal Grandmother     No Known Problems Paternal Grandmother     No Known Problems Maternal Aunt     Muscular dystrophy Family         of the limb girdle       Physical Exam:     Vitals:   Blood Pressure: 154/77 (24 1756)  Pulse: 95 (24 1832)  Temperature: 97.8 °F (36.6 °C) (24 175)  Temp Source: Temporal (24 1300)  Respirations: 17 (24 175)  Height: 5' (152.4 cm) (24 1756)  Weight - Scale: 83.9 kg (184 lb 15.5 oz) (24 175)  SpO2: 97 % (24 1832)    Physical Exam  Vitals and nursing note reviewed.   Constitutional:       General: She is not in acute distress.     Appearance: She is obese.   HENT:      Head: Normocephalic and atraumatic.      Mouth/Throat:      Pharynx: Oropharynx is clear.   Eyes:      Pupils: Pupils are equal, round, and reactive to light.   Neck:      Vascular: No JVD.      Comments: There is no JVD  Cardiovascular:      Rate and Rhythm: Normal rate and regular rhythm.      Heart sounds: Normal heart sounds, S1 normal and S2 normal. No murmur heard.     No friction rub.       "Comments: Heart sounds are clear  Pulmonary:      Effort: Pulmonary effort is normal. No respiratory distress.      Breath sounds: Rales present.   Abdominal:      General: Bowel sounds are normal.      Palpations: Abdomen is soft.      Tenderness: There is no abdominal tenderness.      Comments: Ostomy in place   Musculoskeletal:      Cervical back: Neck supple.   Skin:     General: Skin is warm and dry.      Capillary Refill: Capillary refill takes less than 2 seconds.   Neurological:      General: No focal deficit present.      Mental Status: She is alert.      Motor: Weakness present.      Comments: With generalized weakness, required assist to sit up, legs are weak        Additional Data:     Lab Results:  Results from last 7 days   Lab Units 08/30/24  1345   WBC Thousand/uL 16.77*   HEMOGLOBIN g/dL 12.1   HEMATOCRIT % 39.9   PLATELETS Thousands/uL 490*   SEGS PCT % 78*   LYMPHO PCT % 11*   MONO PCT % 9   EOS PCT % 2     Results from last 7 days   Lab Units 08/30/24  1345   SODIUM mmol/L 139   POTASSIUM mmol/L 4.8   CHLORIDE mmol/L 94*   CO2 mmol/L 34*   BUN mg/dL 13   CREATININE mg/dL 0.33*   ANION GAP mmol/L 11   CALCIUM mg/dL 9.3   ALBUMIN g/dL 3.7   TOTAL BILIRUBIN mg/dL 0.27   ALK PHOS U/L 63   ALT U/L 12   AST U/L 25   GLUCOSE RANDOM mg/dL 120             No results found for: \"HGBA1C\"        Lines/Drains:  Invasive Devices       Peripheral Intravenous Line  Duration             Peripheral IV 08/30/24 Right;Ventral (anterior) Hand <1 day              Drain  Duration             Colostomy Loop LUQ 1430 days    Ureteral Drain/Stent Left ureter 5 Fr. 1430 days    Ureteral Drain/Stent Right ureter 5 Fr. 1430 days    External Urinary Catheter 8 days                        Imaging: Reviewed radiology reports from this admission including: chest CT scan  CT abdomen pelvis with contrast   Final Result by Anthony Ross MD (08/30 1510)      1.  Inflammation of the right inguinal superficial and deep subcutaneous " tissues without an abscess.   2.  Pericardial effusion is new since July 23, 2024.   3.  Suspected interstitial edema with increased size of the small left pleural effusion.         Workstation performed: [id number]             ** Please Note: This note has been constructed using a voice recognition system. **

## 2024-08-30 NOTE — ASSESSMENT & PLAN NOTE
Presented for suspected wound infection to right groin status post cardiac catheterization 8/21/2024  CT abdomen: Suspected interstitial edema with increased size of the small left pleural effusion   Received furosemide 40 mg IV.  Continue twice daily for now  Monitor vital signs, daily weights and I&O's

## 2024-08-30 NOTE — PLAN OF CARE
Problem: SAFETY ADULT  Goal: Patient will remain free of falls  Description: INTERVENTIONS:  - Educate patient/family on patient safety including physical limitations  - Instruct patient to call for assistance with activity   - Consult OT/PT to assist with strengthening/mobility   - Keep Call bell within reach  - Keep bed low and locked with side rails adjusted as appropriate  - Keep care items and personal belongings within reach  - Initiate and maintain comfort rounds  - Make Fall Risk Sign visible to staff  - Apply yellow socks and bracelet for high fall risk patients  - Consider moving patient to room near nurses station  Outcome: Progressing   Pt will ring call bell when in need of assistance.

## 2024-08-30 NOTE — TELEPHONE ENCOUNTER
Chief Complaint: bleeding from incision    History of Present Illness (HPI): pt had coronary angiogram and pci on 8/21/24.  Pt states since procedure she has been having pain to right groin site.  Today she has been having gelatinous red drainage coming out, noticeable through gauze.  She says there is a hard, raised area underneath incision, about 1/2 in. X 1 inch. Bleeding/drainage has not stopped since it started.  Pt's home health aide concerned about infection.    VS/Weight: unknown    Pain: Yes     -Pain Score: 6    -Location: right groin incision     -Radiation: denies     -Duration: since procedure 8/21    -Type (tightness, crushing etc.): unsure     Risk Factors: Other s/p pci    Recent Testing: Cardiac Catheterization    Medicine: see med list     Upcoming Office Visit: Yes- new pt 9/20 Dr. Mcclain    Last Office Visit: Dr. Amanda 6/2/21, Dr. Fuller performed pci/angiogram    Additional Comments: advised pt to go to ED for evaluation per protocol. Pt agreeable.  States she will go to [facility]. Placed pt's name on ED tracking board

## 2024-08-30 NOTE — RESPIRATORY THERAPY NOTE
RT Protocol Note  Claire Doherty 74 y.o. female MRN: [id number]  Unit/Bed#: -01 Encounter: [id number]    Assessment    Principal Problem:    Infected wound  Active Problems:    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[facility])    Pleural effusion    Pericardial effusion      Home Pulmonary Medications:  Albuterol, pulmicort nebs prn  Home Devices/Therapy: Home O2, BiPAP/CPAP    Past Medical History:   Diagnosis Date    Anxiety     Breast cancer ([facility])     Colitis     Depression     Difficult intubation     Excessive daytime sleepiness     GERD (gastroesophageal reflux disease)     Hyperlipidemia     Ischemic colitis ([facility]) 2019    Leukocytosis 2020    Muscular dystrophy ([facility])     Limb-girdle    Osteoporosis     Overactive bladder     Perforated diverticulum 2020    Pneumonitis     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[facility])     Skin cancer     Skin disorder     Tachycardia 2021    Vitamin D deficiency      Social History     Socioeconomic History    Marital status:      Spouse name: None    Number of children: None    Years of education: None    Highest education level: None   Occupational History    None   Tobacco Use    Smoking status: Former     Current packs/day: 0.00     Average packs/day: 1.5 packs/day for 37.0 years (55.5 ttl pk-yrs)     Types: Cigarettes     Start date:      Quit date: 2004     Years since quittin.6    Smokeless tobacco: Never   Vaping Use    Vaping status: Never Used   Substance and Sexual Activity    Alcohol use: Not Currently     Comment: social drinker per allscripts     Drug use: Yes     Types: Marijuana     Comment: medical marijuana    Sexual activity: Not Currently   Other Topics Concern    None   Social History Narrative    Currently on permanent disability due to musc dystrophy    Denied daily coffee consumption     Wheelchair dependent since about     No children     Social Determinants of Health      Financial Resource Strain: Low Risk  (7/5/2023)    Overall Financial Resource Strain (CARDIA)     Difficulty of Paying Living Expenses: Not hard at all   Food Insecurity: No Food Insecurity (8/22/2024)    Hunger Vital Sign     Worried About Running Out of Food in the Last Year: Never true     Ran Out of Food in the Last Year: Never true   Transportation Needs: No Transportation Needs (8/22/2024)    PRAPARE - Transportation     Lack of Transportation (Medical): No     Lack of Transportation (Non-Medical): No   Physical Activity: Not on file   Stress: Not on file   Social Connections: Unknown (8/21/2024)    Received from [employer]     How often do you feel lonely or isolated from those around you? (Adult - for ages 18 years and over): Not on file   Intimate Partner Violence: Not on file   Housing Stability: Unknown (8/22/2024)    Housing Stability Vital Sign     Unable to Pay for Housing in the Last Year: No     Number of Times Moved in the Last Year: Not on file     Homeless in the Last Year: No       Subjective         Objective    Physical Exam:   Assessment Type: Assess only  General Appearance: Alert, Awake  Respiratory Pattern: Normal  Chest Assessment: Chest expansion symmetrical  Bilateral Breath Sounds: Diminished  O2 Device: nc    Vitals:  Blood pressure 154/77, pulse 96, temperature 97.8 °F (36.6 °C), resp. rate 17, height 5' (1.524 m), weight 83.9 kg (184 lb 15.5 oz), SpO2 97%, not currently breastfeeding.          Imaging and other studies: I have personally reviewed pertinent reports.      O2 Device: nc     Plan             Resp Comments: Pt admitted for infected wound. She has hx of restrictive lung disease due to muscular dystrophy. She wears 2-3L at baseline. She also wears bipap at HS. She states she uses pulmicort and albuterol nebs prn only when she gets sick. Will continue current prn nebs.

## 2024-08-30 NOTE — TELEPHONE ENCOUNTER
"Reason for Disposition   Bleeding from incision and won't stop after 10 minutes of direct pressure    Answer Assessment - Initial Assessment Questions  1. SYMPTOM: \"What's the main symptom you're concerned about?\" (e.g., redness, pain, drainage)      Pain and drainage   2. ONSET: \"When did drainage  start?\"      Today. Pain since procedure   3. SURGERY: \"What surgery was performed?\"      Cardiac angiogram and pci   4. DATE of SURGERY: \"When was surgery performed?\"       8/21  5. INCISION SITE: \"Where is the incision located?\"       Right groin   6. REDNESS: \"Is there any redness at the incision site?\" If yes, ask: \"How wide across is the redness?\" (Inches, centimeters)       Raised, hard area   7. PAIN: \"Is there any pain?\" If Yes, ask: \"How bad is it?\"  (Scale 1-10; or mild, moderate, severe)      6/10  8. BLEEDING: \"Is there any bleeding?\" If Yes, ask: \"How much?\" and \"Where?\"      Yes, leaking through gauze   9. DRAINAGE: \"Is there any drainage from the incision site?\" If yes, ask: \"What color and how much?\" (e.g., red, cloudy, pus; drops, teaspoon)      Gelatinous looking, red color, gooey/mucus appearance   10. FEVER: \"Do you have a fever?\" If Yes, ask: \"What is your temperature, how was it measured, and when did it start?\"        Denies   11. OTHER SYMPTOMS: \"Do you have any other symptoms?\" (e.g., shaking chills, weakness, rash elsewhere on body)        Shakey/weaker    Protocols used: Post-Op Incision Symptoms and Questions-ADULT-[place]    "

## 2024-08-31 ENCOUNTER — APPOINTMENT (INPATIENT)
Dept: NON INVASIVE DIAGNOSTICS | Facility: HOSPITAL | Age: 74
DRG: 862 | End: 2024-08-31
Payer: COMMERCIAL

## 2024-08-31 LAB
ANION GAP SERPL CALCULATED.3IONS-SCNC: 7 MMOL/L (ref 4–13)
BASOPHILS # BLD AUTO: 0.04 THOUSANDS/ΜL (ref 0–0.1)
BASOPHILS NFR BLD AUTO: 0 % (ref 0–1)
BUN SERPL-MCNC: 13 MG/DL (ref 5–25)
CALCIUM SERPL-MCNC: 8.6 MG/DL (ref 8.4–10.2)
CHLORIDE SERPL-SCNC: 94 MMOL/L (ref 96–108)
CO2 SERPL-SCNC: 39 MMOL/L (ref 21–32)
CREAT SERPL-MCNC: 0.37 MG/DL (ref 0.6–1.3)
EOSINOPHIL # BLD AUTO: 0.4 THOUSAND/ΜL (ref 0–0.61)
EOSINOPHIL NFR BLD AUTO: 3 % (ref 0–6)
ERYTHROCYTE [DISTWIDTH] IN BLOOD BY AUTOMATED COUNT: 17.5 % (ref 11.6–15.1)
GFR SERPL CREATININE-BSD FRML MDRD: 105 ML/MIN/1.73SQ M
GLUCOSE SERPL-MCNC: 94 MG/DL (ref 65–140)
HCT VFR BLD AUTO: 36.7 % (ref 34.8–46.1)
HGB BLD-MCNC: 10.8 G/DL (ref 11.5–15.4)
IMM GRANULOCYTES # BLD AUTO: 0.06 THOUSAND/UL (ref 0–0.2)
IMM GRANULOCYTES NFR BLD AUTO: 0 % (ref 0–2)
LYMPHOCYTES # BLD AUTO: 2.49 THOUSANDS/ΜL (ref 0.6–4.47)
LYMPHOCYTES NFR BLD AUTO: 17 % (ref 14–44)
MCH RBC QN AUTO: 25.7 PG (ref 26.8–34.3)
MCHC RBC AUTO-ENTMCNC: 29.4 G/DL (ref 31.4–37.4)
MCV RBC AUTO: 87 FL (ref 82–98)
MONOCYTES # BLD AUTO: 1.55 THOUSAND/ΜL (ref 0.17–1.22)
MONOCYTES NFR BLD AUTO: 11 % (ref 4–12)
NEUTROPHILS # BLD AUTO: 9.89 THOUSANDS/ΜL (ref 1.85–7.62)
NEUTS SEG NFR BLD AUTO: 69 % (ref 43–75)
NRBC BLD AUTO-RTO: 0 /100 WBCS
PLATELET # BLD AUTO: 507 THOUSANDS/UL (ref 149–390)
PMV BLD AUTO: 9.9 FL (ref 8.9–12.7)
POTASSIUM SERPL-SCNC: 3.4 MMOL/L (ref 3.5–5.3)
RBC # BLD AUTO: 4.2 MILLION/UL (ref 3.81–5.12)
SODIUM SERPL-SCNC: 140 MMOL/L (ref 135–147)
WBC # BLD AUTO: 14.43 THOUSAND/UL (ref 4.31–10.16)

## 2024-08-31 PROCEDURE — 94664 DEMO&/EVAL PT USE INHALER: CPT

## 2024-08-31 PROCEDURE — 93971 EXTREMITY STUDY: CPT | Performed by: SURGERY

## 2024-08-31 PROCEDURE — 85025 COMPLETE CBC W/AUTO DIFF WBC: CPT | Performed by: NURSE PRACTITIONER

## 2024-08-31 PROCEDURE — 80048 BASIC METABOLIC PNL TOTAL CA: CPT | Performed by: NURSE PRACTITIONER

## 2024-08-31 PROCEDURE — 93926 LOWER EXTREMITY STUDY: CPT

## 2024-08-31 PROCEDURE — 99233 SBSQ HOSP IP/OBS HIGH 50: CPT | Performed by: NURSE PRACTITIONER

## 2024-08-31 PROCEDURE — 99222 1ST HOSP IP/OBS MODERATE 55: CPT | Performed by: NURSE PRACTITIONER

## 2024-08-31 PROCEDURE — 94760 N-INVAS EAR/PLS OXIMETRY 1: CPT

## 2024-08-31 RX ORDER — BUPROPION HYDROCHLORIDE 75 MG/1
75 TABLET ORAL DAILY
Status: DISCONTINUED | OUTPATIENT
Start: 2024-08-31 | End: 2024-09-07 | Stop reason: HOSPADM

## 2024-08-31 RX ORDER — TRAZODONE HYDROCHLORIDE 50 MG/1
50 TABLET, FILM COATED ORAL
Status: DISCONTINUED | OUTPATIENT
Start: 2024-08-31 | End: 2024-09-07 | Stop reason: HOSPADM

## 2024-08-31 RX ADMIN — CEFAZOLIN SODIUM 2000 MG: 2 SOLUTION INTRAVENOUS at 17:46

## 2024-08-31 RX ADMIN — HEPARIN SODIUM 5000 UNITS: 5000 INJECTION, SOLUTION INTRAVENOUS; SUBCUTANEOUS at 05:40

## 2024-08-31 RX ADMIN — HEPARIN SODIUM 5000 UNITS: 5000 INJECTION, SOLUTION INTRAVENOUS; SUBCUTANEOUS at 22:05

## 2024-08-31 RX ADMIN — COLCHICINE 0.6 MG: 0.6 TABLET ORAL at 08:35

## 2024-08-31 RX ADMIN — GUAIFENESIN 600 MG: 600 TABLET ORAL at 08:35

## 2024-08-31 RX ADMIN — IBUPROFEN 800 MG: 800 TABLET, FILM COATED ORAL at 15:56

## 2024-08-31 RX ADMIN — FUROSEMIDE 40 MG: 10 INJECTION, SOLUTION INTRAMUSCULAR; INTRAVENOUS at 08:35

## 2024-08-31 RX ADMIN — IBUPROFEN 800 MG: 800 TABLET, FILM COATED ORAL at 22:05

## 2024-08-31 RX ADMIN — TRAZODONE HYDROCHLORIDE 50 MG: 50 TABLET ORAL at 22:05

## 2024-08-31 RX ADMIN — BUPROPION HYDROCHLORIDE TABLETS 75 MG: 75 TABLET, FILM COATED ORAL at 10:39

## 2024-08-31 RX ADMIN — CEFAZOLIN SODIUM 2000 MG: 2 SOLUTION INTRAVENOUS at 08:36

## 2024-08-31 RX ADMIN — CITALOPRAM HYDROBROMIDE 10 MG: 10 TABLET ORAL at 22:05

## 2024-08-31 RX ADMIN — IBUPROFEN 800 MG: 800 TABLET, FILM COATED ORAL at 05:40

## 2024-08-31 RX ADMIN — DIAZEPAM 5 MG: 5 TABLET ORAL at 20:04

## 2024-08-31 RX ADMIN — GUAIFENESIN 600 MG: 600 TABLET ORAL at 22:04

## 2024-08-31 RX ADMIN — HEPARIN SODIUM 5000 UNITS: 5000 INJECTION, SOLUTION INTRAVENOUS; SUBCUTANEOUS at 15:56

## 2024-08-31 RX ADMIN — CEFAZOLIN SODIUM 2000 MG: 2 SOLUTION INTRAVENOUS at 01:04

## 2024-08-31 RX ADMIN — FUROSEMIDE 40 MG: 10 INJECTION, SOLUTION INTRAMUSCULAR; INTRAVENOUS at 15:56

## 2024-08-31 NOTE — ASSESSMENT & PLAN NOTE
On supplemental oxygen 2 L nasal cannula chronically, uses BiPAP at night  Respiratory protocol  Continue nebulized medication as needed  Supportive care

## 2024-08-31 NOTE — PLAN OF CARE
Problem: INFECTION - ADULT  Goal: Absence or prevention of progression during hospitalization  Description: INTERVENTIONS:  - Assess and monitor for signs and symptoms of infection  - Monitor lab/diagnostic results  - Monitor all insertion sites, i.e. indwelling lines, tubes, and drains  - Monitor endotracheal if appropriate and nasal secretions for changes in amount and color  - Jay appropriate cooling/warming therapies per order  - Administer medications as ordered  - Instruct and encourage patient and family to use good hand hygiene technique  - Identify and instruct in appropriate isolation precautions for identified infection/condition  Outcome: Progressing  Goal: Absence of fever/infection during neutropenic period  Description: INTERVENTIONS:  - Monitor WBC    Outcome: Progressing     Problem: PAIN - ADULT  Goal: Verbalizes/displays adequate comfort level or baseline comfort level  Description: Interventions:  - Encourage patient to monitor pain and request assistance  - Assess pain using appropriate pain scale  - Administer analgesics based on type and severity of pain and evaluate response  - Implement non-pharmacological measures as appropriate and evaluate response  - Consider cultural and social influences on pain and pain management  - Notify physician/advanced practitioner if interventions unsuccessful or patient reports new pain  Outcome: Progressing

## 2024-08-31 NOTE — CONSULTS
Formerly [facility]  Consult  Name: Claire Doherty 74 y.o. female I MRN: [id number]  Unit/Bed#: -01 I Date of Admission: 8/30/2024   Date of Service: 8/31/2024 I Hospital Day: 1    Inpatient consult to Cardiology  Consult performed by: SHAKIRA Manuel  Consult ordered by: SHAKIRA Juarez          Assessment & Plan   Pericardial effusion  Assessment & Plan  Trivial pericardial effusion noted on recent echo  No evidence of tamponade  No further workup needed    Acute idiopathic pericarditis  Assessment & Plan  Recently hospitalized with chest discomfort and EKG changes  Cardiac catheterization without evidence of coronary disease  Symptoms are resolved-continue colchicine ibuprofen and Protonix    * Infected wound  Assessment & Plan  Concern for infected cath site  White count is elevated, though this appears chronic  Patient is afebrile and denies fever or chills prior to admission  Preliminary wound culture results noted           Other summary comments:   Claire was recently diagnosed with pericarditis after presenting with chest pain for EKG changes.  Cardiac catheterization was negative and she was started on appropriate medical treatment for pericarditis.  Recommend continuing prehospital ibuprofen, colchicine and Protonix as previously instructed.    She presented with right groin discomfort and drainage, concerning for infection.  She has been started on antibiotics.  The right groin cath site is firm with minimal oozing.  Given recent vascular access, a vascular ultrasound is recommended to rule out pseudoaneurysm.  This test has been completed and preliminarily is unremarkable.  Recommend continuing antibiotics, follow up wound culture.    Outpatient Cardiologist: Saw Dr. Amanda in the past    HPI: Claire Doherty is a 74 y.o. year old female who presented with right groin discomfort and drainage.    Claire was hospitalized 10 days ago after presenting with chest pain  and was found to have EKG changes.  She was transferred to [facility] and underwent cardiac catheterization.  There was no evidence of coronary disease.  CRP was noted to be elevated and description of her symptoms was felt to be consistent with pericarditis.  She was started on appropriate medications (ibuprofen and colchicine) and discharged home.    Claire has been doing well since her hospitalization.  She reports that her chest discomfort has resolved on her current medication regimen.  Because of muscular dystrophy, she is wheelchair-bound.  She noted mild discomfort in the right groin which became progressively worse over the course of the week.  She seem to notice it more when bending forward in her wheelchair.  Her healthcare aide also noted some drainage yesterday and for these reasons presented for evaluation.    Claire denies any fever or chills prior to her presentation.  A preliminary wound culture shows gram-positive cocci.  She is noted to have an elevated white count, though this appears chronic.  She has been afebrile since presentation.    [id number] normal sinus rhythm, ST elevation in the inferior leads, no change compared to prior tracing 2024      MOST  RECENT CARDIAC IMAGING:   Echo 2024  EF 65%, moderate diastolic dysfunction  Mild LAE  Mild TR  Trivial pericardial effusion    Cardiac catheterization 2024  No obstructive epicardial CAD      Review of Systems: a 10 point review of systems was conducted and is negative except for as mentioned in the HPI or as below.        Historical Information   Past Medical History:   Diagnosis Date    Anxiety     Breast cancer (Former[facility])     Colitis     Depression     Difficult intubation     Excessive daytime sleepiness     GERD (gastroesophageal reflux disease)     Hyperlipidemia     Ischemic colitis (HCC) 2019    Leukocytosis 2020    Muscular dystrophy ([facility])     Limb-girdle    Osteoporosis     Overactive bladder      Perforated diverticulum 03/28/2020    Pneumonitis     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)     Skin cancer     Skin disorder     Tachycardia 06/02/2021    Vitamin D deficiency      Past Surgical History:   Procedure Laterality Date    CARDIAC CATHETERIZATION N/A 8/21/2024    Procedure: Cardiac pci;  Surgeon: Juan Fuller MD;  Location: [facility];  Service: Cardiology    CARDIAC CATHETERIZATION N/A 8/21/2024    Procedure: Cardiac PCI Stent;  Surgeon: Juan Fuller MD;  [facility]: [facility];  Service: Cardiology    CARDIAC CATHETERIZATION N/A 8/21/2024    Procedure: Cardiac Coronary Angiogram;  Surgeon: Juan Fuller MD;  [facility]: [facility];  Service: Cardiology    COLONOSCOPY N/A 1/22/2019    Procedure: COLONOSCOPY;  Surgeon: Anthony Mejía MD;  Location: [facility];  Service: Colorectal    CYSTOSCOPY N/A 9/30/2020    Procedure: CYSTOSCOPY; BILATERAL URETERAL CATHETER PLACEMENT, CYSTOTOMY;  Surgeon: Zach Tyler MD;  Location: [facility];  Service: Urology    FL CYSTOGRAM  10/15/2020    HARTMANS PROCEDURE N/A 9/30/2020    Procedure: EXPLORATORY LAPAROTOMY; LYSIS OF ADHESIONS; WASHOUT PELVIC ABSCESS; COMPLEX SIGMOID COLON RESECTION; LOOP TRANSVERSE COLOSTOMY;  Surgeon: Thania Jaffe MD;  Location: [facility];  Service: General    IR DRAINAGE TUBE PLACEMENT  9/24/2020    MASTECTOMY Left 2007    left breast mastectomy     TONSILLECTOMY      TOOTH EXTRACTION      TUBAL LIGATION       Social History     Substance and Sexual Activity   Alcohol Use Not Currently    Comment: social drinker per [employer]scripts      Social History     Substance and Sexual Activity   Drug Use Yes    Types: Marijuana    Comment: medical marijuana     Social History     Tobacco Use   Smoking Status Former    Current packs/day: 0.00    Average packs/day: 1.5 packs/day for 37.0 years (55.5 ttl pk-yrs)    Types: Cigarettes    Start date: 1967    Quit date: 2004    Years since  quittin.6   Smokeless Tobacco Never       Family History: No longer relevant due to patient age      Meds/Allergies   all current active meds have been reviewed    Medications Prior to Admission:     budesonide (Pulmicort) 0.5 mg/2 mL nebulizer solution    buPROPion (WELLBUTRIN) 75 mg tablet    citalopram (CeleXA) 10 mg tablet    colchicine (COLCRYS) 0.6 mg tablet    diazepam (VALIUM) 5 mg tablet    ibuprofen (MOTRIN) 800 mg tablet    pantoprazole (PROTONIX) 40 mg tablet    Probiotic Product (Florastor Plus) CAPS    ramelteon (ROZEREM) 8 mg tablet    solifenacin (VESICARE) 10 MG tablet    acetaminophen (TYLENOL) 325 mg tablet    albuterol (2.5 mg/3 mL) 0.083 % nebulizer solution    collagenase (SANTYL) ointment    fluticasone (FLONASE) 50 mcg/act nasal spray    ketoconazole (NIZORAL) 2 % cream    ketoconazole (NIZORAL) 2 % shampoo    triamcinolone (KENALOG) 0.1 % cream    Allergies   Allergen Reactions    Amoxicillin      Vague rash in the 90s, tolerated zosyn on 2020 admission     Codeine      Other reaction(s): Other (See Comments)  n/v    Medical Tape Itching     bandaids       Objective   Vitals: Blood pressure 113/58, pulse 84, temperature 98 °F (36.7 °C), resp. rate 18, height 5' (1.524 m), weight 75.4 kg (166 lb 3.6 oz), SpO2 96%, not currently breastfeeding., Body mass index is 32.46 kg/m².,   Orthostatic Blood Pressures      Flowsheet Row Most Recent Value   Blood Pressure 113/58 filed at 2024 0712   Patient Position - Orthostatic VS Lying filed at 2024 1700            Systolic (24hrs), Av , Min:99 , Max:154     Diastolic (24hrs), Av, Min:53, Max:102    Physical Exam:    General:  Normal appearance in no distress.  Eyes:  Anicteric.  Oral mucosa:  Moist.  Neck:  No JVD. Carotid upstrokes are brisk without bruits.  No masses.  Chest:  Clear to auscultation.  Cardiac:  No palpable PMI.  Normal S1 and S2.  No murmur gallop or rub.  Abdomen:  Soft and nontender. No palpable  organomegaly or aortic enlargement.  Extremities:  No peripheral edema.  Bilateral foot drop noted  Musculoskeletal:  Symmetric.   Vascular:  Femoral pulses are brisk without bruits.  Popliteal  pulses are intact bilaterally.  Pedal pulses are intact.  Right groin cath site with some sanguinous oozing.  Area feels firm to touch  Neuro:  Grossly symmetric.  Psych:  Alert and oriented x3.        Lab Results:   Labs reviewed and prominent abnormalities reviewed above and/or below.    Troponins:      BNP:   Results from last 6 Months   Lab Units 08/30/24  1345   BNP pg/mL 52

## 2024-08-31 NOTE — ASSESSMENT & PLAN NOTE
Trivial pericardial effusion noted on recent echo  No evidence of tamponade  No further workup needed

## 2024-08-31 NOTE — PLAN OF CARE
Problem: PAIN - ADULT  Goal: Verbalizes/displays adequate comfort level or baseline comfort level  Description: Interventions:  - Encourage patient to monitor pain and request assistance  - Assess pain using appropriate pain scale  - Administer analgesics based on type and severity of pain and evaluate response  - Implement non-pharmacological measures as appropriate and evaluate response  - Consider cultural and social influences on pain and pain management  - Notify physician/advanced practitioner if interventions unsuccessful or patient reports new pain  Outcome: Progressing     Problem: INFECTION - ADULT  Goal: Absence or prevention of progression during hospitalization  Description: INTERVENTIONS:  - Assess and monitor for signs and symptoms of infection  - Monitor lab/diagnostic results  - Monitor all insertion sites, i.e. indwelling lines, tubes, and drains  - Monitor endotracheal if appropriate and nasal secretions for changes in amount and color  - [place] appropriate cooling/warming therapies per order  - Administer medications as ordered  - Instruct and encourage patient and family to use good hand hygiene technique  - Identify and instruct in appropriate isolation precautions for identified infection/condition  Outcome: Progressing  Goal: Absence of fever/infection during neutropenic period  Description: INTERVENTIONS:  - Monitor WBC    Outcome: Progressing     Problem: SAFETY ADULT  Goal: Patient will remain free of falls  Description: INTERVENTIONS:  - Educate patient/family on patient safety including physical limitations  - Instruct patient to call for assistance with activity   - Consult OT/PT to assist with strengthening/mobility   - Keep Call bell within reach  - Keep bed low and locked with side rails adjusted as appropriate  - Keep care items and personal belongings within reach  - Initiate and maintain comfort rounds  - Make Fall Risk Sign visible to staff  - Offer Toileting every 2 Hours,  in advance of need  - Initiate/Maintain bed alarm  - Obtain necessary fall risk management equipment:  socks  - Apply yellow socks and bracelet for high fall risk patients  - Consider moving patient to room near nurses station  Outcome: Progressing  Goal: Maintain or return to baseline ADL function  Description: INTERVENTIONS:  -  Assess patient's ability to carry out ADLs; assess patient's baseline for ADL function and identify physical deficits which impact ability to perform ADLs (bathing, care of mouth/teeth, toileting, grooming, dressing, etc.)  - Assess/evaluate cause of self-care deficits   - Assess range of motion  - Assess patient's mobility; develop plan if impaired  - Assess patient's need for assistive devices and provide as appropriate  - Encourage maximum independence but intervene and supervise when necessary  - Involve family in performance of ADLs  - Assess for home care needs following discharge   - Consider OT consult to assist with ADL evaluation and planning for discharge  - Provide patient education as appropriate  Outcome: Progressing  Problem: DISCHARGE PLANNING  Goal: Discharge to home or other facility with appropriate resources  Description: INTERVENTIONS:  - Identify barriers to discharge w/patient and caregiver  - Arrange for needed discharge resources and transportation as appropriate  - Identify discharge learning needs (meds, wound care, etc.)  - Arrange for interpretive services to assist at discharge as needed  - Refer to Case Management Department for coordinating discharge planning if the patient needs post-hospital services based on physician/advanced practitioner order or complex needs related to functional status, cognitive ability, or social support system  Outcome: Progressing     Problem: Knowledge Deficit  Goal: Patient/family/caregiver demonstrates understanding of disease process, treatment plan, medications, and discharge instructions  Description: Complete learning  assessment and assess knowledge base.  Interventions:  - Provide teaching at level of understanding  - Provide teaching via preferred learning methods  Outcome: Progressing     Problem: Prexisting or High Potential for Compromised Skin Integrity  Goal: Skin integrity is maintained or improved  Description: INTERVENTIONS:  - Identify patients at risk for skin breakdown  - Assess and monitor skin integrity  - Assess and monitor nutrition and hydration status  - Monitor labs   - Assess for incontinence   - Turn and reposition patient  - Assist with mobility/ambulation  - Relieve pressure over bony prominences  - Avoid friction and shearing  - Provide appropriate hygiene as needed including keeping skin clean and dry  - Evaluate need for skin moisturizer/barrier cream  - Collaborate with interdisciplinary team   - Patient/family teaching  - Consider wound care consult   Outcome: Progressing

## 2024-08-31 NOTE — ASSESSMENT & PLAN NOTE
Ramelteon is nonformulary, discussed with the patient will use trazodone 50 mg p.o. at bedtime.  Also, per chart review she has been on this previously  Monitor effectiveness

## 2024-08-31 NOTE — UTILIZATION REVIEW
Initial Clinical Review    Admission: Date/Time/Statement:   Admission Orders (From admission, onward)       Ordered        08/30/24 1654  INPATIENT ADMISSION  Once                          Orders Placed This Encounter   Procedures    INPATIENT ADMISSION     Standing Status:   Standing     Number of Occurrences:   1     Order Specific Question:   Level of Care     Answer:   Med Surg [16]     Order Specific Question:   Estimated length of stay     Answer:   More than 2 Midnights     Order Specific Question:   Certification     Answer:   I certify that inpatient services are medically necessary for this patient for a duration of greater than two midnights. See H&P and MD Progress Notes for additional information about the patient's course of treatment.     ED Arrival Information       Expected   8/30/2024 12:56    Arrival   8/30/2024 12:56    Acuity   Urgent              Means of arrival   Ambulance    Escorted by   [facility] Hospitalist    Admission type   Emergency              Arrival complaint   Incision bleeding             Chief Complaint   Patient presents with    Wound Check     Cardiac cath incision check. Possible infection       Initial Presentation: 74 y.o. female to ED via EMS from home.    Admitted to inpatient with Dx: Infected wound.  Presented to ED with suspected wound infection.  She had a cardiac catheterization about a week ago and noted that her right groin insertion site was swollen and had some drainage.  She came to the ER for evaluation where imaging revealed a pericardial effusion and pleural effusion.  .   PMHx: muscular dystrophy, restrictive lung disease, chronic respiratory failure, BiPAP, and GERD .   Date: 08/30/2024   On exam: + for weakness.  Obese.  Breath sounds - rales.  + for wound.  + for gait problem. With generalized weakness, required assist to sit up, legs are weak     Imaging shows CT AP: Inflammation of the right inguinal superficial and deep  "subcutaneous tissues without an abscess.  Pericardial effusion is new since July 23, 2024. Suspected interstitial edema with increased size of the small left pleural effusion.  ED treatment PO Motrin.  IV ancef.  Sq heparin.  IV lasix 40mg.    Plan includes Telemetry. Will continue colchicine and ibuprofen for now.  Consult Cardio.  Continue IV lasix.  Daily weight.  I&O.  Continue IV ancef.  Consult general Surgery.        Anticipated Length of Stay/Certification Statement: Patient will be admitted on an inpatient basis with an anticipated length of stay of greater than 2 midnights secondary to suspected wound infection, pericardial effusion, pleural effusion.     Day 2: 08/31/2024  Telemetry.  Colchicine / Ibuprofen.  Continue IV lasix BID.  Daily weight .  I&O.  IV Ancef.      08/31/2024  Consult General Surgery:  74 year old female with PMH significant for HLD, pericarditis, chronic respiratory failure on home oxygen, muscular dystrophy, recent cardiac cath with R groin access 8/21/24 presents for the evaluation of wound at R groin site  Afebrile, vitals stable  WBC 14.43 (16.77)  Hgb 10.8, stable   K 3.4   Cr 0.37  CTAP: \"Inflammation of the right inguinal superficial and deep subcutaneous tissues without an abscess. Pericardial effusion is new since July 23, 2024. Suspected interstitial edema with increased size of the small left pleural effusion.\"  Right groin access site with surrounding induration.  Incision with bloody drainage.  No purulent drainage noted   Plan:  No surgical intervention indicated at this time, likely hematoma vs developing infection  Monitor labs and vitals  Continue IV Ancef  Appreciate cardiology consult  Diet as tolerated  Continue local wound care    08/31/2024  Consult Cardio:    Pericardial effusion  Trivial pericardial effusion noted on recent echo  No evidence of tamponade  No further workup needed   Acute idiopathic pericarditis  Recently hospitalized with chest discomfort and " 4 EKG changes  Cardiac catheterization without evidence of coronary disease  Symptoms are resolved-continue colchicine ibuprofen and Protonix   * Infected wound  Concern for infected cath site  White count is elevated, though this appears chronic  Patient is afebrile and denies fever or chills prior to admission  Preliminary wound culture results noted    ED Triage Vitals [08/30/24 1300]   Temperature Pulse Respirations Blood Pressure SpO2 Pain Score   97.8 °F (36.6 °C) 88 20 116/62 96 % 6     Weight (last 2 days)       Date/Time Weight    08/31/24 0551 75.4 (166.23)    08/31/24 0550 75.4 (166.23)    08/31/24 0535 75.4 (166.23)    08/30/24 17:56:30 83.9 (184.97)    08/30/24 1300 77.1 (170)            Vital Signs (last 3 days)       Date/Time Temp Pulse Resp BP MAP (mmHg) SpO2 Calculated FIO2 (%) - Nasal Cannula Nasal Cannula O2 Flow Rate (L/min) O2 Device O2 Interface Device Patient Position - Orthostatic VS [place] Coma Scale Score Pain    08/31/24 11:23:57 98.2 °F (36.8 °C) 92 20 122/72 89 95 % -- -- -- -- -- -- --    08/31/24 0835 -- -- -- -- -- -- -- -- -- -- -- 15 No Pain    08/31/24 07:12:53 98 °F (36.7 °C) 84 18 113/58 76 96 % 28 2 L/min Nasal cannula -- -- -- --    08/31/24 0704 -- -- -- -- -- 96 % 28 2 L/min Nasal cannula -- -- -- --    08/31/24 0540 -- -- -- -- -- -- -- -- -- -- -- -- 8    08/31/24 03:02:35 98.1 °F (36.7 °C) 84 16 109/57 74 96 % -- -- -- -- -- -- --    08/31/24 00:58:39 -- 86 -- 119/60 80 97 % -- -- -- -- -- -- --    08/30/24 2355 -- -- -- -- -- 90 % -- -- -- -- -- -- --    08/30/24 23:09:21 98 °F (36.7 °C) 89 17 130/102 111 93 % -- -- -- -- -- -- --    08/30/24 2305 -- -- -- -- -- -- -- -- -- Full face mask -- -- --    08/30/24 2110 -- -- -- -- -- -- -- -- -- -- -- -- No Pain    08/30/24 2039 -- -- -- -- -- -- -- -- -- -- -- 15 No Pain    08/30/24 19:11:36 98.1 °F (36.7 °C) 104 18 103/68 80 95 % -- -- -- -- -- -- --    08/30/24 1832 -- 95 -- -- -- 97 % 28 2 L/min Nasal cannula -- -- -- --     08/30/24 17:56:30 97.8 °F (36.6 °C) 96 17 154/77 103 94 % -- -- -- -- -- -- No Pain    08/30/24 1730 -- -- -- -- -- -- 28 2 L/min Nasal cannula -- -- -- No Pain    08/30/24 1700 -- 91 -- 116/60 82 93 % 28 2 L/min Nasal cannula -- Lying -- --    08/30/24 1600 -- 86 -- 111/57 79 95 % -- -- -- -- -- -- --    08/30/24 1515 -- 84 18 112/58 77 97 % -- -- None (Room air) -- Lying -- --    08/30/24 1512 -- 85 18 112/58 77 -- -- -- -- -- Lying -- --    08/30/24 1401 -- 83 18 99/53 69 -- -- -- -- -- Lying -- --    08/30/24 1342 -- -- -- -- -- -- -- -- -- -- -- 15 --    08/30/24 1300 97.8 °F (36.6 °C) 88 20 116/62 -- 96 % -- -- None (Room air) -- Lying -- 6              Pertinent Labs/Diagnostic Test Results:   Radiology:  VAS DUPLEX EVALUATION FOR PSEUDOANEURYSM   Final Interpretation by Chino Goldberg MD (08/31 1355)      CT abdomen pelvis with contrast   Final Interpretation by Anthony Ross MD (08/30 1510)   1.  Inflammation of the right inguinal superficial and deep subcutaneous tissues without an abscess.   2.  Pericardial effusion is new since July 23, 2024.   3.  Suspected interstitial edema with increased size of the small left pleural effusion.        08/21/2024  Chest X:  Unchanged discoid densities at the left lung base suggestive of subsegmental atelectasis or scarring. Unchanged mild interstitial marking prominence.     08/27/2024  Diagnostic Mammo right:  Suspicious right breast masses at the 5-6 o'clock axis with a dominant   mass at the 6:00 axis, 3 cm from the nipple.  Recommend representative   ultrasound-guided biopsy.   Possibly reactive right axillary lymph nodes given patient's recent   diagnosis of pericarditis.  Recommend short interval follow-up ultrasound   of the right axillary tissue in 3 months pending results of right breast   biopsy.  If right breast biopsy yields carcinoma, ultrasound-guided biopsy   or fine-needle aspiration of 1 of these nodes should be considered as    clinically warranted.     Findings and recommendations were discussed directly with the patient   prior to leaving the breast center who is in agreement with this plan.     08/27/2024  UR right breast:  Suspicious right breast masses at the 5-6 o'clock axis with a dominant   mass at the 6:00 axis, 3 cm from the nipple.  Recommend representative   ultrasound-guided biopsy.   Possibly reactive right axillary lymph nodes given patient's recent   diagnosis of pericarditis.  Recommend short interval follow-up ultrasound   of the right axillary tissue in 3 months pending results of right breast   biopsy.  If right breast biopsy yields carcinoma, ultrasound-guided biopsy   or fine-needle aspiration of 1 of these nodes should be considered as   clinically warranted.     Findings and recommendations were discussed directly with the patient   prior to leaving the breast center who is in agreement with this plan.     Cardiology:  No orders to display     GI:  No orders to display     Results from last 7 days   Lab Units 08/31/24  0523 08/30/24  1345   WBC Thousand/uL 14.43* 16.77*   HEMOGLOBIN g/dL 10.8* 12.1   HEMATOCRIT % 36.7 39.9   PLATELETS Thousands/uL 507* 490*   TOTAL NEUT ABS Thousands/µL 9.89* 13.03*     Results from last 7 days   Lab Units 08/31/24  0523 08/30/24  1345   SODIUM mmol/L 140 139   POTASSIUM mmol/L 3.4* 4.8   CHLORIDE mmol/L 94* 94*   CO2 mmol/L 39* 34*   ANION GAP mmol/L 7 11   BUN mg/dL 13 13   CREATININE mg/dL 0.37* 0.33*   EGFR ml/min/1.73sq m 105 109   CALCIUM mg/dL 8.6 9.3     Results from last 7 days   Lab Units 08/30/24  1345   AST U/L 25   ALT U/L 12   ALK PHOS U/L 63   TOTAL PROTEIN g/dL 7.6   ALBUMIN g/dL 3.7   TOTAL BILIRUBIN mg/dL 0.27     Results from last 7 days   Lab Units 08/31/24  0523 08/30/24  1345   GLUCOSE RANDOM mg/dL 94 120     Results from last 7 days   Lab Units 08/30/24  1345   BNP pg/mL 52     Results from last 7 days   Lab Units 08/30/24  1707   GRAM STAIN RESULT  2+  Disintegrating polys*  Rare Gram positive cocci in clusters*   WOUND CULTURE  Culture too young- will reincubate     ED Treatment-Medication Administration from 08/30/2024 1156 to 08/30/2024 1749         Date/Time Order Dose Route Action     08/30/2024 1444 iohexol (OMNIPAQUE) 350 MG/ML injection (MULTI-DOSE) 100 mL 100 mL Intravenous Given     08/30/2024 1710 ceFAZolin (ANCEF) IVPB (premix in dextrose) 2,000 mg 50 mL 2,000 mg Intravenous New Bag     08/30/2024 1712 furosemide (LASIX) injection 40 mg 40 mg Intravenous Given            Past Medical History:   Diagnosis Date    Anxiety     Breast cancer (Formerly [facility]) 2007    Colitis     Depression     Difficult intubation     Excessive daytime sleepiness     GERD (gastroesophageal reflux disease)     Hyperlipidemia     Ischemic colitis ([facility]) 01/21/2019    Leukocytosis 03/28/2020    Muscular dystrophy (Formerly [facility])     Limb-girdle    Osteoporosis     Overactive bladder     Perforated diverticulum 03/28/2020    Pneumonitis     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Formerly [facility])     Skin cancer     Skin disorder     Tachycardia 06/02/2021    Vitamin D deficiency      Present on Admission:   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[facility]ly [facility])   Insomnia   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[facility])   Dystrophy, muscular, 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[facility]ly [facility])   Acute idiopathic pericarditis      Admitting Diagnosis: Pericardial effusion [I31.39]  Pleural effusion [J90]  Interstitial edema [R60.9]  Wound infection [T14.8XXA, L08.9]  Visit for wound check [Z51.89]  Age/Sex: 74 y.o. female  Admission Orders:  Telemetry  Regular diet  Up & OOB as tolerated  Daily weight  I&O q8h  BiPAP HS  PT/OT  Jono SCDs    Scheduled Medications:  buPROPion, 75 mg, Oral, Daily  cefazolin, 2,000 mg, Intravenous, Q8H  citalopram, 10 mg, Oral, HS  colchicine, 0.6 mg, Oral, Daily  fluticasone, 2 spray, Nasal, Daily  furosemide, 40 mg, Intravenous, BID (diuretic)  guaiFENesin, 600 mg, Oral, Q12H NANCY  heparin  (porcine), 5,000 Units, Subcutaneous, Q8H NANCY  ibuprofen, 800 mg, Oral, Q8H NANCY  traZODone, 50 mg, Oral, HS      Continuous IV Infusions:     PRN Meds:  acetaminophen, 650 mg, Oral, Q8H PRN  albuterol, 2.5 mg, Nebulization, Q4H PRN  budesonide, 0.5 mg, Nebulization, Q12H PRN  diazepam, 5 mg, Oral, Q8H PRN  ondansetron, 4 mg, Intravenous, Q6H PRN        IP CONSULT TO ACUTE CARE SURGERY  IP CONSULT TO CARDIOLOGY    Network Utilization Review Department  ATTENTION: Please call with any questions or concerns to [phone] and carefully listen to the prompts so that you are directed to the right person. All voicemails are confidential.   For Discharge needs, contact Care Management DC Support Team at [phone] opt. 2  Send all requests for admission clinical reviews, approved or denied determinations and any other requests to dedicated fax number below belonging to the [facility] where the patient is receiving treatment. List of dedicated fax numbers for the Facilities:  FACILITY NAME UR FAX NUMBER   ADMISSION DENIALS (Administrative/Medical Necessity) [phone]   DISCHARGE SUPPORT TEAM (NETWORK) [phone]   PARENT CHILD HEALTH (Maternity/NICU/Pediatrics) [phone]   [facility] [phone]   [facility] [phone]   [facility] [phone]   [facility] [phone]   [facility] [phone]   [facility] [phone]   [facility] [phone]   [facility] [phone]   [facility] [phone]   [facility] [phone]   [facility] [phone]   [facility] [phone]

## 2024-08-31 NOTE — ASSESSMENT & PLAN NOTE
Recently hospitalized with chest discomfort and EKG changes  Cardiac catheterization without evidence of coronary disease  Symptoms are resolved-continue colchicine, ibuprofen, and Protonix as previously directed  Has cardiology f/u appt scheduled for 9/20

## 2024-08-31 NOTE — PROGRESS NOTES
[facility]  Progress Note  Name: Claire Doherty I  MRN: [id number]  Unit/Bed#: -01 I Date of Admission: 8/30/2024   Date of Service: 8/31/2024 I Hospital Day: 1    Assessment & Plan     Pericardial effusion  Assessment & Plan  Presented for suspected wound infection to right groin status post cardiac catheterization 8/21/2024  CT abdomen revealed pericardial effusion new since July 23, 2024  Per chart review, patient was treated for acute idiopathic pericarditis with colchicine and ibuprofen during hospitalization for above 8/22/2024  Echocardiogram 8/26/2024: The left ventricular ejection fraction is 65%. Systolic function is normal. Wall motion is normal. Diastolic function is moderately abnormal, consistent with grade II (pseudonormal) relaxation  ER provider discussed with cardiology, okay for patient to stay here  Telemetry monitoring x 24 hours  Will continue colchicine and ibuprofen for now  Cardiology consultation  Monitor labs and vital signs, conduct serial physical assessments    Pleural effusion  Assessment & Plan  Presented for suspected wound infection to right groin status post cardiac catheterization 8/21/2024  CT abdomen: Suspected interstitial edema with increased size of the small left pleural effusion   Received furosemide 40 mg IV.  Continue twice daily for now  Monitor vital signs, daily weights and I&O's    * Infected wound  Assessment & Plan  Presented for suspected wound infection to right groin with swelling and drainage  Status post cardiac catheterization 8/21/2024 with insertion site right groin  CT abdomen and pelvis with inflammation of the right inguinal superficial and deep subcutaneous tissues without an abscess  Initiated on Ancef 2 g IV in the ER, continue this Q8H  Admitted to medicine, will consult general surgery    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HCC)  Assessment & Plan  Currently controlled  Continue bupropion and  citalopram    Insomnia  Assessment & Plan  Ramelteon is nonformulary, discussed with the patient will use trazodone 50 mg p.o. at bedtime.  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 Sequential Compression Devices Ordered.    Mobility:   Basic Mobility Inpatient Raw Score: 10  JH-HLM Goal: 4: Move to chair/commode  JH-HLM Achieved: 2: Bed activities/Dependent transfer  JH-HLM Goal NOT achieved. Continue with multidisciplinary rounding and encourage appropriate mobility to improve upon JH-HLM goals.    Patient Centered Rounds: I performed bedside rounds with nursing staff today.   Discussions with Specialists or Other Care Team Provider: CM    Education and Discussions with Family / Patient: Patient declined call to .     Total Time Spent on Date of Encounter in care of patient: 45 mins. This time was spent on one or more of the following: performing physical exam; counseling and coordination of care; obtaining or reviewing history; documenting in the medical record; reviewing/ordering tests, medications or procedures; communicating with other healthcare professionals and discussing with patient's family/caregivers.    Current Length of Stay: 1 day(s)  Current Patient Status: Inpatient   Certification Statement: The patient will continue to require additional inpatient hospital stay  due to wound infection, pericardial effusion, pleural effusion, interstitial edema, specialty consultations, IV antibiotics, IV diuretics.  Discharge Plan: Anticipate discharge in 48 hrs to home.    Code Status: Level 3 - DNAR and DNI    Subjective:   Patient evaluated at bedside.  No new complaints offered.    Objective:     Vitals:   Temp (24hrs), Av °F (36.7 °C), Min:97.8 °F (36.6 °C), Max:98.1 °F (36.7 °C)    Temp:  [97.8 °F (36.6 °C)-98.1 °F (36.7 °C)] 98 °F (36.7 °C)  HR:  [] 84  Resp:  [16-20] 18  BP: ()/() 113/58  SpO2:  [90 %-97 %] 96 %  Body mass index is 32.46 kg/m².     Input and Output Summary (last 24 hours):     Intake/Output Summary (Last 24 hours) at 2024 0857  Last data filed at 2024 0500  Gross per 24 hour   Intake --   Output 1500 ml   Net -1500 ml       Physical Exam:   Physical Exam  Vitals and nursing note reviewed.   Constitutional:       General: She is not in acute distress.  HENT:      Head: Normocephalic and atraumatic.      Mouth/Throat:      Pharynx: Oropharynx is clear.   Eyes:      Pupils: Pupils are equal, round, and reactive to light.   Neck:      Vascular: No JVD.   Cardiovascular:      Rate and Rhythm: Normal rate and regular rhythm.      Pulses: Normal pulses.      Heart sounds: Normal heart sounds.      No friction rub.      Comments: Heart sounds clear  Pulmonary:      Effort: Pulmonary effort is normal. No respiratory distress.      Breath sounds: Normal breath sounds.   Abdominal:      General: Bowel sounds are normal.      Palpations: Abdomen is soft.      Tenderness: There is no abdominal tenderness.   Musculoskeletal:      Cervical back: Neck supple.      Comments: Trace lower extremity edema   Skin:     General: Skin is warm and dry.      Capillary Refill: Capillary refill takes less than 2 seconds.      Comments: Right groin wound draining small amount of cloudy red fluid   Neurological:      General: No focal deficit present.      Mental  Status: She is alert.          Additional Data:     Labs:  Results from last 7 days   Lab Units 08/31/24  0523   WBC Thousand/uL 14.43*   HEMOGLOBIN g/dL 10.8*   HEMATOCRIT % 36.7   PLATELETS Thousands/uL 507*   SEGS PCT % 69   LYMPHO PCT % 17   MONO PCT % 11   EOS PCT % 3     Results from last 7 days   Lab Units 08/31/24  0523 08/30/24  1345   SODIUM mmol/L 140 139   POTASSIUM mmol/L 3.4* 4.8   CHLORIDE mmol/L 94* 94*   CO2 mmol/L 39* 34*   BUN mg/dL 13 13   CREATININE mg/dL 0.37* 0.33*   ANION GAP mmol/L 7 11   CALCIUM mg/dL 8.6 9.3   ALBUMIN g/dL  --  3.7   TOTAL BILIRUBIN mg/dL  --  0.27   ALK PHOS U/L  --  63   ALT U/L  --  12   AST U/L  --  25   GLUCOSE RANDOM mg/dL 94 120                       Lines/Drains:  Invasive Devices       Peripheral Intravenous Line  Duration             Peripheral IV 08/30/24 Right;Ventral (anterior) Hand 1 day              Drain  Duration             Colostomy Loop LUQ 1430 days    Ureteral Drain/Stent Left ureter 5 Fr. 1430 days    Ureteral Drain/Stent Right ureter 5 Fr. 1430 days    External Urinary Catheter 9 days                      Telemetry:  Telemetry Orders (From admission, onward)               24 Hour Telemetry Monitoring  Continuous x 24 Hours (Telem)        Question:  Reason for 24 Hour Telemetry  Answer:  Patients with cherise/marti/endocarditis; cardiac contusion                     Telemetry Reviewed: Normal Sinus Rhythm  Indication for Continued Telemetry Use: No indication for continued use. Will discontinue.            Recent Cultures (last 7 days):   Results from last 7 days   Lab Units 08/30/24  1707   GRAM STAIN RESULT  2+ Disintegrating polys*  Rare Gram positive cocci in clusters*       Last 24 Hours Medication List:   Current Facility-Administered Medications   Medication Dose Route Frequency Provider Last Rate    acetaminophen  650 mg Oral Q8H PRN SHAKIRA Juarez      albuterol  2.5 mg Nebulization Q4H PRN SHAKIRA Juarez      budesonide  0.5 mg  Nebulization Q12H PRN Toshia Anand, SHAKIRA      buPROPion  75 mg Oral HS Tsohia Anand, CRNP      cefazolin  2,000 mg Intravenous Q8H Toshia Anand, FANINP 2,000 mg (08/31/24 0836)    citalopram  10 mg Oral HS Toshia Anand, FANINP      colchicine  0.6 mg Oral Daily Toshia Anand, FANINP      diazepam  5 mg Oral Q8H PRN Toshia Anand, FANINP      fluticasone  2 spray Nasal Daily Toshia Anand, FANINP      furosemide  40 mg Intravenous BID (diuretic) Toshia Anand, SHAKIRA      guaiFENesin  600 mg Oral Q12H NANCY Rashid Hassan PA-C      heparin (porcine)  5,000 Units Subcutaneous Q8H NANCY Toshia Anand, FANINP      ibuprofen  800 mg Oral Q8H [facility] Toshia Anand, FANINP      ondansetron  4 mg Intravenous Q6H PRN Toshia Anand, SHAKIRA      traZODone  50 mg Oral HS SHAKIRA Juarez          Today, Patient Was Seen By: SHAKIRA Juarez    **Please Note: This note may have been constructed using a voice recognition system.**     No not applicable

## 2024-08-31 NOTE — ASSESSMENT & PLAN NOTE
Recently hospitalized with chest discomfort and EKG changes  Cardiac catheterization without evidence of coronary disease  Symptoms are resolved-continue colchicine ibuprofen and Protonix

## 2024-08-31 NOTE — CONSULTS
"Consultation - General Surgery   Claire Doherty 74 y.o. female MRN: [id number]  Unit/Bed#: -01 Encounter: [id number]    Assessment:  74 year old female with PMH significant for HLD, pericarditis, chronic respiratory failure on home oxygen, muscular dystrophy, recent cardiac cath with R groin access 8/21/24 presents for the evaluation of wound at R groin site  Afebrile, vitals stable  WBC 14.43 (16.77)  Hgb 10.8, stable   K 3.4   Cr 0.37  CTAP: \"Inflammation of the right inguinal superficial and deep subcutaneous tissues without an abscess. Pericardial effusion is new since July 23, 2024. Suspected interstitial edema with increased size of the small left pleural effusion.\"  Right groin access site with surrounding induration.  Incision with bloody drainage.  No purulent drainage noted    Plan:  No surgical intervention indicated at this time, likely hematoma vs developing infection  Monitor labs and vitals  Continue IV Ancef  Appreciate cardiology consult  Diet as tolerated  Continue local wound care  Rest of medical management per ISABELA  Discussed with Dr. Coburn    History of Present Illness   Chief Complaint: drainage from R groin site    HPI:  Claire Doherty is a 74 y.o. female with past medical history significant for HLD, pericarditis, chronic respiratory failure on home oxygen, muscular dystrophy, recent cardiac cath with R groin access 8/21/24 who initially presented to [facility] ED with complaints of R groin site drainage.  Patient recently had a STEMI and is s/p cardiac cath 10 days ago.  During that admission, patient was also noted to be treated for a pericardial effusion.  Patient reports yesterday she and her visiting nurse noted some \"mucus\" drainage from the right groin access site.  She called the cardiology office and they instructed her to present to the emergency room.  Workup was significant for a CAT scan that showed inflammation without an abscess.    Inpatient consult to Acute Care " Surgery  Consult performed by: Juani Olsen PA-C  Consult ordered by: SHAKIRA Juarez          Review of Systems   Constitutional:  Negative for chills and fever.   HENT:  Negative for congestion and sore throat.    Respiratory:  Positive for shortness of breath. Negative for cough.    Cardiovascular:  Negative for chest pain and leg swelling.   Gastrointestinal:  Negative for abdominal pain, nausea and vomiting.   Endocrine: Negative for polydipsia and polyuria.   Genitourinary:  Negative for difficulty urinating, frequency and urgency.   Musculoskeletal:  Positive for gait problem. Negative for joint swelling.   Skin:  Positive for color change and wound.   Neurological:  Negative for dizziness, weakness and headaches.       Historical Information   Past Medical History:   Diagnosis Date    Anxiety     Breast cancer ([facility]) 2007    Colitis     Depression     Difficult intubation     Excessive daytime sleepiness     GERD (gastroesophageal reflux disease)     Hyperlipidemia     Ischemic colitis ([facility]) 01/21/2019    Leukocytosis 03/28/2020    Muscular dystrophy ([facility])     Limb-girdle    Osteoporosis     Overactive bladder     Perforated diverticulum 03/28/2020    Pneumonitis     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[facility])     Skin cancer     Skin disorder     Tachycardia 06/02/2021    Vitamin D deficiency      Past Surgical History:   Procedure Laterality Date    CARDIAC CATHETERIZATION N/A 8/21/2024    Procedure: Cardiac pci;  Surgeon: Juan Fuller MD;  Location: [facility];  Service: Cardiology    CARDIAC CATHETERIZATION N/A 8/21/2024    Procedure: Cardiac PCI Stent;  Surgeon: Juan Fuller MD;  Location: [facility];  Service: Cardiology    CARDIAC CATHETERIZATION N/A 8/21/2024    Procedure: Cardiac Coronary Angiogram;  Surgeon: Juan Fuller MD;  Location: [facility];  Service: Cardiology    COLONOSCOPY N/A 1/22/2019    Procedure: COLONOSCOPY;  Surgeon: Anthony  Kristian Mejía MD;  Location: [facility] GI [facility];  Service: Colorectal    CYSTOSCOPY N/A 2020    Procedure: CYSTOSCOPY; BILATERAL URETERAL CATHETER PLACEMENT, CYSTOTOMY;  Surgeon: Zach Tyler MD;  Location: [facility];  Service: Urology    FL CYSTOGRAM  10/15/2020    HARTMANS PROCEDURE N/A 2020    Procedure: EXPLORATORY LAPAROTOMY; LYSIS OF ADHESIONS; WASHOUT PELVIC ABSCESS; COMPLEX SIGMOID COLON RESECTION; LOOP TRANSVERSE COLOSTOMY;  Surgeon: Thania Jaffe MD;  Location: [facility];  Service: General    IR DRAINAGE TUBE PLACEMENT  2020    MASTECTOMY Left 2007    left breast mastectomy     TONSILLECTOMY      TOOTH EXTRACTION      TUBAL LIGATION       Social History   Social History     Substance and Sexual Activity   Alcohol Use Not Currently    Comment: social drinker per allscripts      Social History     Substance and Sexual Activity   Drug Use Yes    Types: Marijuana    Comment: medical marijuana     E-Cigarette/Vaping    E-Cigarette Use Never User      E-Cigarette/Vaping Substances    Nicotine No     THC No     CBD No     Flavoring No     Other No     Unknown No      Social History     Tobacco Use   Smoking Status Former    Current packs/day: 0.00    Average packs/day: 1.5 packs/day for 37.0 years (55.5 ttl pk-yrs)    Types: Cigarettes    Start date:     Quit date:     Years since quittin.6   Smokeless Tobacco Never     Family History:   Family History   Problem Relation Age of Onset    Other Mother         bone loss    Arthritis Mother     Skin cancer Father     Hypertension Father         benign     Other Father         bone loss    Muscular dystrophy Father     Hypertension Sister     No Known Problems Sister     Muscular dystrophy Brother         of the limb gridle     Parkinsonism Brother     No Known Problems Brother     No Known Problems Maternal Grandmother     No Known Problems Paternal Grandmother     No Known Problems Maternal Aunt     Muscular dystrophy Family          of the limb girdle       Meds/Allergies   all current active meds have been reviewed and current meds:   Current Facility-Administered Medications   Medication Dose Route Frequency    acetaminophen (TYLENOL) tablet 650 mg  650 mg Oral Q8H PRN    albuterol inhalation solution 2.5 mg  2.5 mg Nebulization Q4H PRN    budesonide (PULMICORT) inhalation solution 0.5 mg  0.5 mg Nebulization Q12H PRN    buPROPion (WELLBUTRIN) tablet 75 mg  75 mg Oral HS    ceFAZolin (ANCEF) IVPB (premix in dextrose) 2,000 mg 50 mL  2,000 mg Intravenous Q8H    citalopram (CeleXA) tablet 10 mg  10 mg Oral HS    colchicine (COLCRYS) tablet 0.6 mg  0.6 mg Oral Daily    diazepam (VALIUM) tablet 5 mg  5 mg Oral Q8H PRN    fluticasone (FLONASE) 50 mcg/act nasal spray 2 spray  2 spray Nasal Daily    furosemide (LASIX) injection 40 mg  40 mg Intravenous BID (diuretic)    guaiFENesin (MUCINEX) 12 hr tablet 600 mg  600 mg Oral Q12H NANCY    heparin (porcine) subcutaneous injection 5,000 Units  5,000 Units Subcutaneous Q8H NANCY    ibuprofen (MOTRIN) tablet 800 mg  800 mg Oral Q8H NANCY    ondansetron (ZOFRAN) injection 4 mg  4 mg Intravenous Q6H PRN    ramelteon (ROZEREM) tablet 8 mg  8 mg Oral HS     Allergies   Allergen Reactions    Amoxicillin      Vague rash in the 90s, tolerated zosyn on 9/2020 admission     Codeine      Other reaction(s): Other (See Comments)  n/v    Medical Tape Itching     bandaids       Objective   First Vitals:   Blood Pressure: 116/62 (08/30/24 1300)  Pulse: 88 (08/30/24 1300)  Temperature: 97.8 °F (36.6 °C) (08/30/24 1300)  Temp Source: Temporal (08/30/24 1300)  Respirations: 20 (08/30/24 1300)  Height: 5' (152.4 cm) (08/30/24 1300)  Weight - Scale: 77.1 kg (170 lb) (08/30/24 1300)  SpO2: 96 % (08/30/24 1300)    Current Vitals:   Blood Pressure: 113/58 (08/31/24 0712)  Pulse: 84 (08/31/24 0712)  Temperature: 98 °F (36.7 °C) (08/31/24 0712)  Temp Source: Temporal (08/30/24 1300)  Respirations: 18 (08/31/24 0712)  Height: 5' (152.4  cm) (08/30/24 1756)  Weight - Scale: 75.4 kg (166 lb 3.6 oz) (08/31/24 0551)  SpO2: 96 % (08/31/24 0712)      Intake/Output Summary (Last 24 hours) at 8/31/2024 0822  Last data filed at 8/31/2024 0500  Gross per 24 hour   Intake --   Output 1500 ml   Net -1500 ml       Invasive Devices       Peripheral Intravenous Line  Duration             Peripheral IV 08/30/24 Right;Ventral (anterior) Hand 1 day              Drain  Duration             Colostomy Loop LUQ 1430 days    Ureteral Drain/Stent Left ureter 5 Fr. 1430 days    Ureteral Drain/Stent Right ureter 5 Fr. 1430 days    External Urinary Catheter 9 days                    Physical Exam  Vitals reviewed.   Constitutional:       General: She is not in acute distress.     Appearance: She is obese. She is not ill-appearing.   HENT:      Head: Normocephalic and atraumatic.   Cardiovascular:      Rate and Rhythm: Normal rate.   Pulmonary:      Effort: Pulmonary effort is normal. No respiratory distress.   Abdominal:      General: Abdomen is flat. There is no distension.      Palpations: Abdomen is soft.      Tenderness: There is no abdominal tenderness. There is no guarding.   Musculoskeletal:      Right lower leg: No edema.      Left lower leg: No edema.   Skin:     General: Skin is warm and dry.      Comments: R groin site with surrounding induration and inflammation. Incision site with bloody drainage.    Neurological:      General: No focal deficit present.      Mental Status: She is alert. Mental status is at baseline.   Psychiatric:         Mood and Affect: Mood normal.         Behavior: Behavior normal.         Lab Results: I have personally reviewed pertinent lab results.  , CBC:   Lab Results   Component Value Date    WBC 14.43 (H) 08/31/2024    HGB 10.8 (L) 08/31/2024    HCT 36.7 08/31/2024    MCV 87 08/31/2024     (H) 08/31/2024    RBC 4.20 08/31/2024    MCH 25.7 (L) 08/31/2024    MCHC 29.4 (L) 08/31/2024    RDW 17.5 (H) 08/31/2024    MPV 9.9  08/31/2024    NRBC 0 08/31/2024   , CMP:   Lab Results   Component Value Date    SODIUM 140 08/31/2024    K 3.4 (L) 08/31/2024    CL 94 (L) 08/31/2024    CO2 39 (H) 08/31/2024    BUN 13 08/31/2024    CREATININE 0.37 (L) 08/31/2024    CALCIUM 8.6 08/31/2024    AST 25 08/30/2024    ALT 12 08/30/2024    ALKPHOS 63 08/30/2024    EGFR 105 08/31/2024     Imaging: I have personally reviewed pertinent reports.    EKG, Pathology, and Other Studies: I have personally reviewed pertinent reports.        Code Status: Level 3 - DNAR and DNI  Advance Directive and Living Will:      Power of : Yes  POLST:      Counseling / Coordination of Care  Total floor / unit time spent today 25 minutes.  Greater than 50% of total time was spent with the patient and / or family counseling and / or coordination of care.  A description of the counseling / coordination of care: evaluation of patient, review of diagnostic and laboratory studies and discussion with attending.    Juani Olsen PA-C

## 2024-08-31 NOTE — ASSESSMENT & PLAN NOTE
Concern for infected cath site  White count is elevated, patient is afebrile   Preliminary wound culture results noted-continue antibiotics per SLIM

## 2024-08-31 NOTE — CASE MANAGEMENT
Case Management Assessment & Discharge Planning Note    Patient name Claire Doherty  Location /-01 MRN [id number]  : 1950 Date 2024       Current Admission Date: 2024  Current Admission Diagnosis:Infected wound   Patient Active Problem List    Diagnosis Date Noted Date Diagnosed    Pleural effusion 2024     Pericardial effusion 2024     Infected wound 2024     Chest pain 2024     Acute idiopathic pericarditis 2024     Cellulitis of right foot 2024     Chronic left shoulder pain 2024     Shoulder joint dysfunction 2024     Bilateral hand pain 2023     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HCC) 2022     Gastroparesis 2021     Aortic valve sclerosis 2021     Tricuspid valve insufficiency 2021     Mitral annular calcification 2021     Former smoker 2021     On home oxygen therapy 2021     Colostomy status (HCC)      Ductal carcinoma in situ (DCIS) of left breast 03/15/2021     Insomnia 10/21/2020     Depression 10/06/2020     Anemia 10/02/2020     Chronic hypoxic respiratory failure (HCC) 2020     Bladder injury 2020     Thrombocytosis, unspecified 2020     Leukocytosis 2020     Difficult intubation      Dystrophy, muscular, 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HCC) 10/12/2017     Ambulatory dysfunction 10/12/2017     Urinary incontinence 10/03/2017     Osteoporosis 2016     Allergic rhinitis 10/09/2013     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) 10/04/2012     GERD without esophagitis 10/04/2012       LOS (days): 1  Geometric Mean LOS (GMLOS) (days):   Days to GMLOS:     OBJECTIVE:  PATIENT READMITTED TO HOSPITAL  Risk of Unplanned Readmission Score: 12.5      Current admission status: Inpatient    Preferred Pharmacy:   [id number] [place], [place] - [address] [place] [address]  [address] [facility] [address]  Phone: [phone] Fax:  [phone]    Primary Care Provider: Julienne Tucker DO    Primary Insurance: [facility]  Secondary Insurance: [facility]    ASSESSMENT:  Active Health Care Proxies    There are no active Health Care Proxies on file.       Advance Directives  Does patient have a Health Care POA?: Yes  Does patient have Advance Directives?: Yes  Advance Directives: Living will, Power of  for health care  Primary Contact: Hillary Doherty sister    Readmission Root Cause  30 Day Readmission: Yes  Who directed you to return to the hospital?: Self  Did you understand whom to contact if you had questions or problems?: Yes  Did you get your prescriptions before you left the hospital?: No  Reason:: Declined service  Were you able to get your prescriptions filled when you left the hospital?: Yes  Did you take your medications as prescribed?: Yes  Were you able to get to your follow-up appointments?: No  Reason:: Readmitted prior to appointment (Appt next week)  Patient was readmitted due to: Pericardial effusion and infection to groin  Action Plan: IVABX, IV lasix    Patient Information  Admitted from:: Home  Mental Status: Alert  During Assessment patient was accompanied by: Not accompanied during assessment  Assessment information provided by:: Patient  Primary Caregiver: Other (Comment)  Caregiver's Name:: [employer]  Caregiver's Relationship to Patient:: Other (Specify)  Support Systems: Family members  County of Residence: [place]  What [facility] do you live in?: [place]  Home entry access options. Select all that apply.: No steps to enter home  Type of Current Residence: Apartment  Floor Level: 1  Upon entering residence, is there a bedroom on the main floor (no further steps)?: Yes  Upon entering residence, is there a bathroom on the main floor (no further steps)?: Yes  Living Arrangements: Lives Alone  Is patient a ?: No    Activities of Daily Living Prior to  Admission  Functional Status: Assistance  Completes ADLs independently?: No  Level of ADL dependence: Assistance  Ambulates independently?: No  Level of ambulatory dependence: Assistance  Does patient use assisted devices?: Yes  Assisted Devices (DME) used: Wheelchair, Home Oxygen concentrator, Shower Chair, Walker, Other (Comment), BiPAP, Portable Oxygen tanks, Nebulizer (LIFT toilet seat)  DME Company Name (respiratory supplies): [employer]  O2 Rate(s): 2-3L  Does patient currently own DME?: Yes  What DME does the patient currently own?: BiPAP, Home Oxygen concentrator, Portable Oxygen tanks, Hospital Bed, Other (Comment), Shower Chair, Walker, Nebulizer (LIFT toilet seat automatically raises pt)  Does patient have a history of Outpatient Therapy (PT/OT)?: No  Does the patient have a history of Short-Term Rehab?: Yes  Does patient have a history of HHC?: Yes  Does patient currently have HHC?: No    Current Home Health Care  Type of Current Home Care Services: Attendant    Patient Information Continued  Income Source: SSI/SSD  Does patient have prescription coverage?: Yes  Does patient receive dialysis treatments?: No  Does patient have a history of substance abuse?: No    PHQ 2/9 Screening   Reviewed PHQ 2/9 Depression Screening Score?: No    Means of Transportation  Means of Transport to Appts:: Family transport      Social Determinants of Health (SDOH)      Flowsheet Row Most Recent Value   Housing Stability    In the last 12 months, was there a time when you were not able to pay the mortgage or rent on time? N   In the past 12 months, how many times have you moved where you were living? 0   At any time in the past 12 months, were you homeless or living in a shelter (including now)? N   Transportation Needs    In the past 12 months, has lack of transportation kept you from medical appointments or from getting medications? no   In the past 12 months, has lack of transportation kept you from meetings, work, or  from getting things needed for daily living? No   Food Insecurity    Within the past 12 months, you worried that your food would run out before you got the money to buy more. Never true   Within the past 12 months, the food you bought just didn't last and you didn't have money to get more. Never true   Utilities    In the past 12 months has the electric, gas, oil, or water company threatened to shut off services in your home? No            DISCHARGE DETAILS:   Pt has a hx of muscular dystrophy.  Pt had a recent cardiac cath and has infection to groin.  Pt states she is on the waiver program 50hr/week with [employer].  Pt has all the necessary DME at home including a toilet lift.  Pt states she receives MOW Mon-Fri with a daily hot and cold meal.  Gets 425/month for food stamps.  Aides provide all ADL and IADL services including driving the pt to the MD appointments.  Will need transport home upon DC.

## 2024-08-31 NOTE — ASSESSMENT & PLAN NOTE
Concern for infected cath site  White count is elevated, though this appears chronic  Patient is afebrile and denies fever or chills prior to admission  Wound culture results noted

## 2024-09-01 PROBLEM — R11.0 NAUSEA: Status: ACTIVE | Noted: 2024-09-01

## 2024-09-01 LAB
ANION GAP SERPL CALCULATED.3IONS-SCNC: 8 MMOL/L (ref 4–13)
BASOPHILS # BLD AUTO: 0.04 THOUSANDS/ΜL (ref 0–0.1)
BASOPHILS NFR BLD AUTO: 0 % (ref 0–1)
BUN SERPL-MCNC: 18 MG/DL (ref 5–25)
CALCIUM SERPL-MCNC: 8.9 MG/DL (ref 8.4–10.2)
CHLORIDE SERPL-SCNC: 88 MMOL/L (ref 96–108)
CO2 SERPL-SCNC: 44 MMOL/L (ref 21–32)
CREAT SERPL-MCNC: 0.61 MG/DL (ref 0.6–1.3)
EOSINOPHIL # BLD AUTO: 0.43 THOUSAND/ΜL (ref 0–0.61)
EOSINOPHIL NFR BLD AUTO: 3 % (ref 0–6)
ERYTHROCYTE [DISTWIDTH] IN BLOOD BY AUTOMATED COUNT: 17.3 % (ref 11.6–15.1)
GFR SERPL CREATININE-BSD FRML MDRD: 89 ML/MIN/1.73SQ M
GLUCOSE SERPL-MCNC: 104 MG/DL (ref 65–140)
HCT VFR BLD AUTO: 37.1 % (ref 34.8–46.1)
HGB BLD-MCNC: 11.2 G/DL (ref 11.5–15.4)
IMM GRANULOCYTES # BLD AUTO: 0.07 THOUSAND/UL (ref 0–0.2)
IMM GRANULOCYTES NFR BLD AUTO: 1 % (ref 0–2)
LYMPHOCYTES # BLD AUTO: 2.15 THOUSANDS/ΜL (ref 0.6–4.47)
LYMPHOCYTES NFR BLD AUTO: 16 % (ref 14–44)
MCH RBC QN AUTO: 26.2 PG (ref 26.8–34.3)
MCHC RBC AUTO-ENTMCNC: 30.2 G/DL (ref 31.4–37.4)
MCV RBC AUTO: 87 FL (ref 82–98)
MONOCYTES # BLD AUTO: 1.53 THOUSAND/ΜL (ref 0.17–1.22)
MONOCYTES NFR BLD AUTO: 12 % (ref 4–12)
NEUTROPHILS # BLD AUTO: 8.87 THOUSANDS/ΜL (ref 1.85–7.62)
NEUTS SEG NFR BLD AUTO: 68 % (ref 43–75)
NRBC BLD AUTO-RTO: 0 /100 WBCS
PLATELET # BLD AUTO: 499 THOUSANDS/UL (ref 149–390)
PMV BLD AUTO: 10 FL (ref 8.9–12.7)
POTASSIUM SERPL-SCNC: 3.3 MMOL/L (ref 3.5–5.3)
RBC # BLD AUTO: 4.27 MILLION/UL (ref 3.81–5.12)
SODIUM SERPL-SCNC: 140 MMOL/L (ref 135–147)
WBC # BLD AUTO: 13.09 THOUSAND/UL (ref 4.31–10.16)

## 2024-09-01 PROCEDURE — 99232 SBSQ HOSP IP/OBS MODERATE 35: CPT | Performed by: NURSE PRACTITIONER

## 2024-09-01 PROCEDURE — 94760 N-INVAS EAR/PLS OXIMETRY 1: CPT

## 2024-09-01 PROCEDURE — 85025 COMPLETE CBC W/AUTO DIFF WBC: CPT | Performed by: NURSE PRACTITIONER

## 2024-09-01 PROCEDURE — 99233 SBSQ HOSP IP/OBS HIGH 50: CPT | Performed by: NURSE PRACTITIONER

## 2024-09-01 PROCEDURE — 80048 BASIC METABOLIC PNL TOTAL CA: CPT | Performed by: NURSE PRACTITIONER

## 2024-09-01 PROCEDURE — 97163 PT EVAL HIGH COMPLEX 45 MIN: CPT

## 2024-09-01 PROCEDURE — 87081 CULTURE SCREEN ONLY: CPT | Performed by: NURSE PRACTITIONER

## 2024-09-01 RX ORDER — CEFEPIME HYDROCHLORIDE 2 G/50ML
2000 INJECTION, SOLUTION INTRAVENOUS EVERY 12 HOURS
Status: DISCONTINUED | OUTPATIENT
Start: 2024-09-01 | End: 2024-09-02

## 2024-09-01 RX ORDER — VANCOMYCIN HYDROCHLORIDE 750 MG/150ML
750 INJECTION, SOLUTION INTRAVENOUS EVERY 12 HOURS
Status: DISCONTINUED | OUTPATIENT
Start: 2024-09-01 | End: 2024-09-02

## 2024-09-01 RX ORDER — PANTOPRAZOLE SODIUM 40 MG/1
40 TABLET, DELAYED RELEASE ORAL
Status: DISCONTINUED | OUTPATIENT
Start: 2024-09-01 | End: 2024-09-07 | Stop reason: HOSPADM

## 2024-09-01 RX ADMIN — HEPARIN SODIUM 5000 UNITS: 5000 INJECTION, SOLUTION INTRAVENOUS; SUBCUTANEOUS at 21:41

## 2024-09-01 RX ADMIN — GUAIFENESIN 600 MG: 600 TABLET ORAL at 21:41

## 2024-09-01 RX ADMIN — HEPARIN SODIUM 5000 UNITS: 5000 INJECTION, SOLUTION INTRAVENOUS; SUBCUTANEOUS at 14:33

## 2024-09-01 RX ADMIN — CEFAZOLIN SODIUM 2000 MG: 2 SOLUTION INTRAVENOUS at 08:18

## 2024-09-01 RX ADMIN — IBUPROFEN 800 MG: 800 TABLET, FILM COATED ORAL at 05:19

## 2024-09-01 RX ADMIN — ONDANSETRON 4 MG: 2 INJECTION INTRAMUSCULAR; INTRAVENOUS at 09:16

## 2024-09-01 RX ADMIN — FLUTICASONE PROPIONATE 2 SPRAY: 50 SPRAY, METERED NASAL at 08:21

## 2024-09-01 RX ADMIN — IBUPROFEN 800 MG: 800 TABLET, FILM COATED ORAL at 14:33

## 2024-09-01 RX ADMIN — ONDANSETRON 4 MG: 2 INJECTION INTRAMUSCULAR; INTRAVENOUS at 16:33

## 2024-09-01 RX ADMIN — CEFEPIME HYDROCHLORIDE 2000 MG: 2 INJECTION, SOLUTION INTRAVENOUS at 21:41

## 2024-09-01 RX ADMIN — IBUPROFEN 800 MG: 800 TABLET, FILM COATED ORAL at 21:41

## 2024-09-01 RX ADMIN — HEPARIN SODIUM 5000 UNITS: 5000 INJECTION, SOLUTION INTRAVENOUS; SUBCUTANEOUS at 05:19

## 2024-09-01 RX ADMIN — VANCOMYCIN HYDROCHLORIDE 750 MG: 750 INJECTION, SOLUTION INTRAVENOUS at 10:41

## 2024-09-01 RX ADMIN — PANTOPRAZOLE SODIUM 40 MG: 40 TABLET, DELAYED RELEASE ORAL at 09:52

## 2024-09-01 RX ADMIN — FUROSEMIDE 40 MG: 10 INJECTION, SOLUTION INTRAMUSCULAR; INTRAVENOUS at 08:18

## 2024-09-01 RX ADMIN — GUAIFENESIN 600 MG: 600 TABLET ORAL at 08:17

## 2024-09-01 RX ADMIN — CEFAZOLIN SODIUM 2000 MG: 2 SOLUTION INTRAVENOUS at 01:49

## 2024-09-01 RX ADMIN — BUPROPION HYDROCHLORIDE TABLETS 75 MG: 75 TABLET, FILM COATED ORAL at 08:17

## 2024-09-01 RX ADMIN — CEFEPIME HYDROCHLORIDE 2000 MG: 2 INJECTION, SOLUTION INTRAVENOUS at 09:52

## 2024-09-01 RX ADMIN — VANCOMYCIN HYDROCHLORIDE 750 MG: 750 INJECTION, SOLUTION INTRAVENOUS at 22:36

## 2024-09-01 RX ADMIN — FUROSEMIDE 40 MG: 10 INJECTION, SOLUTION INTRAMUSCULAR; INTRAVENOUS at 16:17

## 2024-09-01 RX ADMIN — TRAZODONE HYDROCHLORIDE 50 MG: 50 TABLET ORAL at 21:41

## 2024-09-01 RX ADMIN — COLCHICINE 0.6 MG: 0.6 TABLET ORAL at 08:17

## 2024-09-01 RX ADMIN — CITALOPRAM HYDROBROMIDE 10 MG: 10 TABLET ORAL at 21:41

## 2024-09-01 NOTE — PLAN OF CARE
Problem: SAFETY ADULT  Goal: Patient will remain free of falls  Description: INTERVENTIONS:  - Educate patient/family on patient safety including physical limitations  - Instruct patient to call for assistance with activity   - Consult OT/PT to assist with strengthening/mobility   - Keep Call bell within reach  - Keep bed low and locked with side rails adjusted as appropriate  - Keep care items and personal belongings within reach  - Initiate and maintain comfort rounds  - Make Fall Risk Sign visible to staff  - Offer Toileting every 2 Hours, in advance of need  - Initiate/Maintain bed alarm  - Obtain necessary fall risk management equipment: non skid socks  - Apply yellow socks and bracelet for high fall risk patients  - Consider moving patient to room near nurses station  Outcome: Progressing     Problem: Prexisting or High Potential for Compromised Skin Integrity  Goal: Skin integrity is maintained or improved  Description: INTERVENTIONS:  - Identify patients at risk for skin breakdown  - Assess and monitor skin integrity  - Assess and monitor nutrition and hydration status  - Monitor labs   - Assess for incontinence   - Turn and reposition patient  - Assist with mobility/ambulation  - Relieve pressure over bony prominences  - Avoid friction and shearing  - Provide appropriate hygiene as needed including keeping skin clean and dry  - Evaluate need for skin moisturizer/barrier cream  - Collaborate with interdisciplinary team   - Patient/family teaching  - Consider wound care consult   Outcome: Progressing

## 2024-09-01 NOTE — PROGRESS NOTES
Progress Note - General Surgery   Claire Doherty 74 y.o. female MRN: [id number]  Unit/Bed#: -01 Encounter: [id number]    Assessment:  74 year old female with PMH significant for HLD, pericarditis, chronic respiratory failure on home oxygen, muscular dystrophy, recent cardiac cath with R groin access 8/21/24 presents for the evaluation of wound at R groin site   Afebrile, vitals stable  Leukocytosis downtrending, WBC 13.09 (14.43)  Hgb 11.2 (10.8), stable  K 3.3 (3.4)  Cr 0.61  R groin site with improved inflammation and induration. Less bloody drainage noted today    Plan:  No surgical intervention indicated at this time. Continue to monitor hematoma  Monitor labs and vitals   Continue IV Ancef  Appreciate cardiology recs  Diet as tolerated  Analgesia and antiemetics prn   Rest of medical management per SLIM  Discussed with Dr. Coburn     Subjective/Objective   Subjective: Patient has no complaints. Denies pain at R groin site    Objective:   Blood pressure 113/70, pulse 86, temperature 97.8 °F (36.6 °C), resp. rate 16, height 5' (1.524 m), weight 76 kg (167 lb 8.8 oz), SpO2 97%, not currently breastfeeding.,Body mass index is 32.72 kg/m².      Intake/Output Summary (Last 24 hours) at 9/1/2024 0833  Last data filed at 9/1/2024 0501  Gross per 24 hour   Intake 630 ml   Output 1750 ml   Net -1120 ml       Invasive Devices       Peripheral Intravenous Line  Duration             Peripheral IV 08/31/24 Distal;Right;Ventral (anterior) Forearm <1 day              Drain  Duration             Colostomy Loop LUQ 1431 days    Ureteral Drain/Stent Left ureter 5 Fr. 1431 days    Ureteral Drain/Stent Right ureter 5 Fr. 1431 days    External Urinary Catheter 10 days                    Physical Exam  Vitals reviewed.   Constitutional:       General: She is not in acute distress.     Appearance: She is obese. She is not ill-appearing.   HENT:      Head: Normocephalic and atraumatic.   Cardiovascular:      Rate and Rhythm:  Normal rate.   Pulmonary:      Effort: Pulmonary effort is normal. No respiratory distress.   Abdominal:      General: Abdomen is flat. There is no distension.      Palpations: Abdomen is soft.      Tenderness: There is no abdominal tenderness. There is no guarding.   Skin:     General: Skin is warm and dry.      Comments: R groin site with less induration and inflammation today. Less bloody drainage noted today   Neurological:      General: No focal deficit present.      Mental Status: She is alert. Mental status is at baseline.   Psychiatric:         Mood and Affect: Mood normal.          Lab, Imaging and other studies:I have personally reviewed pertinent lab results.  , CBC:   Lab Results   Component Value Date    WBC 13.09 (H) 09/01/2024    HGB 11.2 (L) 09/01/2024    HCT 37.1 09/01/2024    MCV 87 09/01/2024     (H) 09/01/2024    RBC 4.27 09/01/2024    MCH 26.2 (L) 09/01/2024    MCHC 30.2 (L) 09/01/2024    RDW 17.3 (H) 09/01/2024    MPV 10.0 09/01/2024    NRBC 0 09/01/2024   , CMP:   Lab Results   Component Value Date    SODIUM 140 09/01/2024    K 3.3 (L) 09/01/2024    CL 88 (L) 09/01/2024    CO2 44 (H) 09/01/2024    BUN 18 09/01/2024    CREATININE 0.61 09/01/2024    CALCIUM 8.9 09/01/2024    EGFR 89 09/01/2024     VTE Pharmacologic Prophylaxis: Heparin  VTE Mechanical Prophylaxis: sequential compression device    Juani Olsen PA-C

## 2024-09-01 NOTE — ASSESSMENT & PLAN NOTE
Presented for suspected wound infection to right groin with swelling and drainage. Improving  Status post cardiac catheterization 8/21/2024 with insertion site right groin  CT abdomen and pelvis with inflammation of the right inguinal superficial and deep subcutaneous tissues without an abscess  Initiated on Ancef 2 g IV in the ER, continued on admission  Surgery recommendations appreciated: Possible hematoma vs developing infection.  Continue IV Ancef  Wound culture results reviewed: 2+ growth of nonlactose fermenting gram-negative dena.  2+ disintegrating polyps.  Rare gram-positive cocci in clusters  Discussed with infectious disease, recommendation is to broaden antibiotics to cefepime and vancomycin until speciation complete  Monitor labs and vitals

## 2024-09-01 NOTE — PLAN OF CARE
Problem: PAIN - ADULT  Goal: Verbalizes/displays adequate comfort level or baseline comfort level  Description: Interventions:  - Encourage patient to monitor pain and request assistance  - Assess pain using appropriate pain scale  - Administer analgesics based on type and severity of pain and evaluate response  - Implement non-pharmacological measures as appropriate and evaluate response  - Consider cultural and social influences on pain and pain management  - Notify physician/advanced practitioner if interventions unsuccessful or patient reports new pain  Outcome: Progressing     Problem: INFECTION - ADULT  Goal: Absence or prevention of progression during hospitalization  Description: INTERVENTIONS:  - Assess and monitor for signs and symptoms of infection  - Monitor lab/diagnostic results  - Monitor all insertion sites, i.e. indwelling lines, tubes, and drains  - Monitor endotracheal if appropriate and nasal secretions for changes in amount and color  - Register appropriate cooling/warming therapies per order  - Administer medications as ordered  - Instruct and encourage patient and family to use good hand hygiene technique  - Identify and instruct in appropriate isolation precautions for identified infection/condition  Outcome: Progressing  Goal: Absence of fever/infection during neutropenic period  Description: INTERVENTIONS:  - Monitor WBC    Outcome: Progressing     Problem: SAFETY ADULT  Goal: Patient will remain free of falls  Description: INTERVENTIONS:  - Educate patient/family on patient safety including physical limitations  - Instruct patient to call for assistance with activity   - Consult OT/PT to assist with strengthening/mobility   - Keep Call bell within reach  - Keep bed low and locked with side rails adjusted as appropriate  - Keep care items and personal belongings within reach  - Initiate and maintain comfort rounds  - Make Fall Risk Sign visible to staff  - Offer Toileting every 2 Hours,  in advance of need  - Initiate/Maintain bed alarm  - Obtain necessary fall risk management equipment:  socks  - Apply yellow socks and bracelet for high fall risk patients  - Consider moving patient to room near nurses station  Outcome: Progressing  Goal: Maintain or return to baseline ADL function  Description: INTERVENTIONS:  -  Assess patient's ability to carry out ADLs; assess patient's baseline for ADL function and identify physical deficits which impact ability to perform ADLs (bathing, care of mouth/teeth, toileting, grooming, dressing, etc.)  - Assess/evaluate cause of self-care deficits   - Assess range of motion  - Assess patient's mobility; develop plan if impaired  - Assess patient's need for assistive devices and provide as appropriate  - Encourage maximum independence but intervene and supervise when necessary  - Involve family in performance of ADLs  - Assess for home care needs following discharge   - Consider OT consult to assist with ADL evaluation and planning for discharge  - Provide patient education as appropriate  Outcome: Progressing  Problem: DISCHARGE PLANNING  Goal: Discharge to home or other facility with appropriate resources  Description: INTERVENTIONS:  - Identify barriers to discharge w/patient and caregiver  - Arrange for needed discharge resources and transportation as appropriate  - Identify discharge learning needs (meds, wound care, etc.)  - Arrange for interpretive services to assist at discharge as needed  - Refer to Case Management Department for coordinating discharge planning if the patient needs post-hospital services based on physician/advanced practitioner order or complex needs related to functional status, cognitive ability, or social support system  Outcome: Progressing     Problem: Knowledge Deficit  Goal: Patient/family/caregiver demonstrates understanding of disease process, treatment plan, medications, and discharge instructions  Description: Complete learning  assessment and assess knowledge base.  Interventions:  - Provide teaching at level of understanding  - Provide teaching via preferred learning methods  Outcome: Progressing     Problem: Prexisting or High Potential for Compromised Skin Integrity  Goal: Skin integrity is maintained or improved  Description: INTERVENTIONS:  - Identify patients at risk for skin breakdown  - Assess and monitor skin integrity  - Assess and monitor nutrition and hydration status  - Monitor labs   - Assess for incontinence   - Turn and reposition patient  - Assist with mobility/ambulation  - Relieve pressure over bony prominences  - Avoid friction and shearing  - Provide appropriate hygiene as needed including keeping skin clean and dry  - Evaluate need for skin moisturizer/barrier cream  - Collaborate with interdisciplinary team   - Patient/family teaching  - Consider wound care consult   Outcome: Progressing      Mother "better" flat

## 2024-09-01 NOTE — PROGRESS NOTES
[facility]  Progress Note  Name: Claire Doherty I  MRN: [id number]  Unit/Bed#: -01 I Date of Admission: 8/30/2024   Date of Service: 9/1/2024 I Hospital Day: 2    Assessment & Plan   Pericardial effusion  Assessment & Plan  Trivial pericardial effusion noted on recent echo  No evidence of tamponade  No further workup needed    Acute idiopathic pericarditis  Assessment & Plan  Recently hospitalized with chest discomfort and EKG changes  Cardiac catheterization without evidence of coronary disease  Symptoms are resolved-continue colchicine, ibuprofen, and Protonix as previously directed  Has cardiology f/u appt scheduled for 9/20    * Infected wound  Assessment & Plan  Concern for infected cath site  White count is elevated, patient is afebrile   Preliminary wound culture results noted-continue antibiotics per [facility]           Outpatient Cardiologist: Appt noted with Dr Mcclain      Subjective:   Patient seen and examined.  No significant events overnight.      Summary comments:  Presented for R groin pain and drainage, 10 days following cardiac catheterization.  Vascular ultrasound was negative for pseudoaneurysm.  Currently being treated with antibiotics for suspected infection.  White count is downtrending, site no longer draining.  Recommend continuing antibiotics per medical team.  Culture results pending.    Claire was recently diagnosed with acute pericarditis.  Her symptoms have improved significantly from the time of diagnosis.  She notes a little chest discomfort with deep inspiration but notes this has significantly improved.  Recommend continuing ibuprofen and colchicine with prophylactic Protonix as previously directed.    Cardiology will sign off, we will remain available as needed.  She is scheduled for follow-up in our office from her previous hospitalization.  Recommend keeping this appointment for 9/20/2024.    Telemetry/ECG/Cardiac testing:   Echo 8/21/2024  EF 65%,  moderate diastolic dysfunction  Mild LAE  Mild TR  Trivial pericardial effusion     Cardiac catheterization 8/21/2024  No obstructive epicardial CAD    Vitals: Blood pressure 113/70, pulse 86, temperature 97.8 °F (36.6 °C), resp. rate 16, height 5' (1.524 m), weight 76 kg (167 lb 8.8 oz), SpO2 97%, not currently breastfeeding.,   Orthostatic Blood Pressures      Flowsheet Row Most Recent Value   Blood Pressure 113/70 filed at 09/01/2024 0659   Patient Position - Orthostatic VS Lying filed at 08/31/2024 1927        ,   Weight (last 2 days)       Date/Time Weight    09/01/24 0600 76 (167.55)    08/31/24 0551 75.4 (166.23)    08/31/24 0550 75.4 (166.23)    08/31/24 0535 75.4 (166.23)    08/30/24 17:56:30 83.9 (184.97)    08/30/24 1300 77.1 (170)            Physical Exam:    General:  Normal appearance in no distress.  Eyes:  Anicteric.  Oral mucosa:  Moist.  Neck:  No JVD. Carotid upstrokes are brisk without bruits.  No masses.  Chest:  Clear to auscultation  Cardiac:  No palpable PMI.  Normal S1 and S2.  No murmur gallop or rub.  Abdomen:  Soft and nontender. No palpable organomegaly or aortic enlargement.  Extremities:  No peripheral edema; R groin cath site without bleeding or drainage.  Area firm to touch.  Neuro:  Grossly symmetric.  Psych:  Alert and oriented x3.      Medications:      Current Facility-Administered Medications:     acetaminophen (TYLENOL) tablet 650 mg, 650 mg, Oral, Q8H PRN, Toshia Anand, FANINP    albuterol inhalation solution 2.5 mg, 2.5 mg, Nebulization, Q4H PRN, Toshia Anand, SHAKIRA    budesonide (PULMICORT) inhalation solution 0.5 mg, 0.5 mg, Nebulization, Q12H PRN, Toshia nAand, FANINP    buPROPion (WELLBUTRIN) tablet 75 mg, 75 mg, Oral, Daily, SHAKIRA Juarez, 75 mg at 09/01/24 0817    cefepime (MAXIPIME) IVPB (premix in dextrose) 2,000 mg 50 mL, 2,000 mg, Intravenous, Q12H, SHAKIRA Juarez    citalopram (CeleXA) tablet 10 mg, 10 mg, Oral, HS, SHAKIRA Juarez, 10 mg  at 08/31/24 2205    colchicine (COLCRYS) tablet 0.6 mg, 0.6 mg, Oral, Daily, Toshia Anand, CRNP, 0.6 mg at 09/01/24 0817    diazepam (VALIUM) tablet 5 mg, 5 mg, Oral, Q8H PRN, Toshia Rosarioust, CRNP, 5 mg at 08/31/24 2004    fluticasone (FLONASE) 50 mcg/act nasal spray 2 spray, 2 spray, Nasal, Daily, Toshia Rosarioust, CRNP, 2 spray at 09/01/24 0821    furosemide (LASIX) injection 40 mg, 40 mg, Intravenous, BID (diuretic), Toshia Rosarioust, CRNP, 40 mg at 09/01/24 0818    guaiFENesin (MUCINEX) 12 hr tablet 600 mg, 600 mg, Oral, Q12H NANCY, Rashid Hassan PA-C, 600 mg at 09/01/24 0817    heparin (porcine) subcutaneous injection 5,000 Units, 5,000 Units, Subcutaneous, Q8H NANCY, Toshia Rosarioust, CRNP, 5,000 Units at 09/01/24 0519    ibuprofen (MOTRIN) tablet 800 mg, 800 mg, Oral, Q8H NANCY, Toshia Rosarioust, CRNP, 800 mg at 09/01/24 0519    ondansetron (ZOFRAN) injection 4 mg, 4 mg, Intravenous, Q6H PRN, Toshia Rosarioust, CRNP, 4 mg at 09/01/24 0916    pantoprazole (PROTONIX) EC tablet 40 mg, 40 mg, Oral, Early Morning, Toshia Rosarioust, CRNP    traZODone (DESYREL) tablet 50 mg, 50 mg, Oral, HS, Toshia RAYMUNDO Cheng, CRNP, 50 mg at 08/31/24 2205    vancomycin (VANCOCIN) 1,250 mg in sodium chloride 0.9 % 250 mL IVPB, 15 mg/kg, Intravenous, Q12H, Toshia RAYMUNDO Cheng, CRNP     Labs & Results:  Labs reviewed and prominent abnormalities reviewed above and/or below.  Troponins:         BNP:   Results from last 6 Months   Lab Units 08/30/24  1345   BNP pg/mL 52

## 2024-09-01 NOTE — PHYSICAL THERAPY NOTE
PHYSICAL THERAPY EVALUATION  NAME:  Claire Doherty  DATE: 09/01/24    AGE:   74 y.o.  Mrn:   [id number]  ADMIT DX:  Pericardial effusion [I31.39]  Pleural effusion [J90]  Interstitial edema [R60.9]  Wound infection [T14.8XXA, L08.9]  Visit for wound check [Z51.89]  Problem List:   Patient Active Problem List   Diagnosis    Dystrophy, muscular, 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HCC)    Ambulatory dysfunction    Urinary incontinence    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[facility])    Osteoporosis    GERD without esophagitis    Allergic rhinitis    Difficult intubation    Leukocytosis    Thrombocytosis, unspecified    Chronic hypoxic respiratory failure ([facility])    Bladder injury    Anemia    Depression    Insomnia    Gastroparesis    Aortic valve sclerosis    Tricuspid valve insufficiency    Mitral annular calcification    Ductal carcinoma in situ (DCIS) of left breast    Colostomy status ([facility])    Former smoker    On home oxygen therapy    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[facility])    Bilateral hand pain    Chronic left shoulder pain    Shoulder joint dysfunction    Cellulitis of right foot    Acute idiopathic pericarditis    Chest pain    Pleural effusion    Pericardial effusion    Infected wound    Nausea       Past Medical History  Past Medical History:   Diagnosis Date    Anxiety     Breast cancer ([facility]) 2007    Colitis     Depression     Difficult intubation     Excessive daytime sleepiness     GERD (gastroesophageal reflux disease)     Hyperlipidemia     Ischemic colitis ([facility]) 01/21/2019    Leukocytosis 03/28/2020    Muscular dystrophy ([facility])     Limb-girdle    Osteoporosis     Overactive bladder     Perforated diverticulum 03/28/2020    Pneumonitis     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[facility])     Skin cancer     Skin disorder     Tachycardia 06/02/2021    Vitamin D deficiency        Past Surgical History  Past Surgical History:   Procedure Laterality Date    CARDIAC CATHETERIZATION N/A 8/21/2024     Procedure: Cardiac pci;  Surgeon: Juan Fuller MD;  [facility]: [facility];  Service: Cardiology    CARDIAC CATHETERIZATION N/A 8/21/2024    Procedure: Cardiac PCI Stent;  Surgeon: Juan Fuller MD;  [facility];  Service: Cardiology    CARDIAC CATHETERIZATION N/A 8/21/2024    Procedure: Cardiac Coronary Angiogram;  Surgeon: Juan Fuller MD;  [facility]: [facility];  Service: Cardiology    COLONOSCOPY N/A 1/22/2019    Procedure: COLONOSCOPY;  Surgeon: Anthony Mejía MD;  Location: [facility];  Service: Colorectal    CYSTOSCOPY N/A 9/30/2020    Procedure: CYSTOSCOPY; BILATERAL URETERAL CATHETER PLACEMENT, CYSTOTOMY;  Surgeon: Zach Tyler MD;  Location: [facility];  Service: Urology    FL CYSTOGRAM  10/15/2020    HARTMANS PROCEDURE N/A 9/30/2020    Procedure: EXPLORATORY LAPAROTOMY; LYSIS OF ADHESIONS; WASHOUT PELVIC ABSCESS; COMPLEX SIGMOID COLON RESECTION; LOOP TRANSVERSE COLOSTOMY;  Surgeon: Thania Jaffe MD;  Location: [facility];  Service: General    IR DRAINAGE TUBE PLACEMENT  9/24/2020    MASTECTOMY Left 2007    left breast mastectomy     TONSILLECTOMY      TOOTH EXTRACTION      TUBAL LIGATION         Length Of Stay: 2  Performed at least 2 patient identifiers during session: Name and ID bracelet         09/01/24 1431   PT Last Visit   PT Visit Date 09/01/24   Note Type   Note type Evaluation   Pain Assessment   Pain Assessment Tool 0-10   Pain Score 2   Pain Location/Orientation Location: Chest   Pain Onset/Description   (only with deep breathing)   Hospital Pain Intervention(s) Rest   Restrictions/Precautions   Weight Bearing Precautions Per Order No   Other Precautions O2;Telemetry;Fall Risk;Pain;Bed Alarm;Chair Alarm;Limb alert   Home Living   Type of Home Apartment   Home Layout One level  (0 ANGELES)   Bathroom Shower/Tub Walk-in shower   Bathroom Toilet Raised  (electic raiser)   Bathroom Equipment Grab bars in shower;Shower chair;Grab bars around  toilet   Home Equipment Walker;Wheelchair-manual  (lift chair)   Prior Function   Level of [place] Needs assistance with IADLS;Modified independent with wheelchair;Needs assistance with ADLs  (performs Indep surface to surface transfers with RW)   Lives With Alone   Receives Help From Personal care attendant  (daily HHC)   IADLs Family/Friend/Other provides transportation;Family/Friend/Other provides meals;Family/Friend/Other provides medication management   Falls in the last 6 months 1 to 4   Comments wears 2L of O2 at baseline   General   Family/Caregiver Present No   Cognition   Overall Cognitive Status WFL   Arousal/Participation Alert   Orientation Level Oriented X4   Memory Within functional limits   Following Commands Follows all commands and directions without difficulty   RLE Assessment   RLE Assessment X   Strength RLE   RLE Overall Strength 3+/5   LLE Assessment   LLE Assessment X   Strength LLE   LLE Overall Strength 3+/5   Vision-Basic Assessment   Current Vision Wears glasses all the time   Light Touch   RLE Light Touch Impaired   LLE Light Touch Impaired   Bed Mobility   Supine to Sit 3  Moderate assistance   Additional items Assist x 1;HOB elevated;Increased time required;Verbal cues;LE management   Additional Comments pt reported dizziness with transitional movement; /72   Transfers   Sit to Stand 4  Minimal assistance   Additional items Assist x 1;Verbal cues;Increased time required  (increased bed height)   Stand to Sit 4  Minimal assistance   Additional items Assist x 1;Increased time required;Verbal cues   Stand pivot 4  Minimal assistance   Additional items Assist x 1;Verbal cues;Increased time required  (RW)   Additional Comments pt frequent rest breaks due to fatigue and dizziness   Ambulation/Elevation   Ambulation/Elevation Additional Comments good safety awareness noted   Balance   Static Sitting Fair +   Dynamic Sitting Fair   Static Standing Poor +   Dynamic Standing Poor +    Endurance Deficit   Endurance Deficit Yes   Endurance Deficit Description pt reported fatigue and dizziness with activity  (SaO2 decreased to 80 %)   Activity Tolerance   Activity Tolerance Patient limited by fatigue;Patient limited by pain;Treatment limited secondary to medical complications (Comment)  (dizziness)   Assessment   Prognosis Good   Assessment Pt is 74 y.o. female seen for PT evaluation s/p admit to [facility] on 8/30/2024 w/ Infected wound. PT consulted to assess pt's functional mobility and d/c needs. Order placed for PT eval and tx, w/ up and OOB as tolerated order. Pt agreeable to PT  session upon arrival, pt found supine in bed.  PTA, pt was lives alone in 1 level apartment and performs Indep transfers .  Pt to benefit from continued PT tx to address deficits and maximize level of functional independent mobility and consistency. Upon conclusion pt  seated in recliner. Complexity: Comorbidities affecting pt's physical performance at time of assessment include: depression and pericardial effusion, muscular dystrophy and pleural effusion . Personal factors affecting pt at time of IE include: living alone, limited mobility, history of falls, and depression. Please find objective findings from PT assessment regarding body systems outlined above with impairments and limitations including weakness, impaired balance, decreased endurance, pain, decreased activity tolerance, decreased functional mobility tolerance, altered sensation, fall risk, and decreased skin integrity.  Pt's clinical presentation is currently unstable/unpredictable seen in pt's presentation of abnormal WBC count, abnormal potassium levels, abnormal Co2 levels, low SaO2 levels, pain, reports of dizziness with activity, and wounds. The patient's AM-PAC Basic Mobility Inpatient Short Form Raw Score is 13.  Based on patient presentations and impairments, pt would most appropriately benefit from Level 2 resource intensity upon  discharge. Please also refer to the recommendation of the Physical Therapist for safe discharge planning. RN verbalized pt appropriate for PT session.   Barriers to Discharge Decreased caregiver support   Goals   Patient Goals to get OOB   LTG Expiration Date 09/11/24   Long Term Goal #1 Pt will: Perform bed mobility tasks to modified I to improve ease of bed mobility. Perform transfers to modified I to improve ease of transfers.  Increase dynamic standing balance to F+ to decrease fall risk. Increase OOB activity tolerance to 10 minutes without s/s of exertion to decrease fall risk. .   Plan   Treatment/Interventions Functional transfer training;Therapeutic exercise;Endurance training;Bed mobility;Equipment eval/education   PT Frequency 3-5x/wk   Discharge Recommendation   Rehab Resource Intensity Level, PT II (Moderate Resource Intensity)   Equipment Recommended Walker;Wheelchair   AM-PAC Basic Mobility Inpatient   Turning in Flat Bed Without Bedrails 3   Lying on Back to Sitting on Edge of Flat Bed Without Bedrails 2   Moving Bed to Chair 3   Standing Up From Chair Using Arms 3   Walk in Room 1   Climb 3-5 Stairs With Railing 1   Basic Mobility Inpatient Raw Score 13   Basic Mobility Standardized Score 33.99   [facility] Highest Level Of Mobility   -HLM Goal 4: Move to chair/commode   -HLM Achieved 4: Move to chair/commode     Time In: 1358  Time Out: 1431  Total Evaluation Minutes: 33    Amanda Chamberlain, PT

## 2024-09-01 NOTE — PROGRESS NOTES
Claire Doherty is a 74 y.o. female who is currently ordered Vancomycin IV with management by the Pharmacy Consult service.  Relevant clinical data and objective / subjective history reviewed.  Vancomycin Assessment:  Indication and Goal AUC/Trough: Soft tissue (goal -600, trough >10)  Clinical Status: stable  Micro:     Renal Function:  SCr: 0.61 mg/dL  CrCl: 73 mL/min  Renal replacement: Not on dialysis  Days of Therapy: 1  Current Dose: 1250mg iv q12h  Vancomycin Plan:  New Dosing: no loading dose warranted, 750mg iv q12h  Estimated AUC: 415 mcg*hr/mL  Estimated Trough: 12 mcg/mL  Next Level: 9/3 am labs  Renal Function Monitoring: Daily BMP and UOP  Pharmacy will continue to follow closely for s/sx of nephrotoxicity, infusion reactions and appropriateness of therapy.  BMP and CBC will be ordered per protocol. We will continue to follow the patient’s culture results and clinical progress daily.    Renae Olsen, Pharmacist

## 2024-09-01 NOTE — UTILIZATION REVIEW
Continued Stay Review    Date: 09/01/2024                        Date: 09/01/2024  Day 3: Has surpassed a 2nd midnight with active treatments and services.     Current Patient Class: Inpatient  Current Level of Care: Med/Surg    HPI:74 y.o. female initially admitted on 08/30/2024     Assessment/Plan: infected wound / pericardial Effusion.  Telemetry.  Continue IV lasix 40mg BID.  Daily weight.  I&O.  Continue colchicine & motrin.  Continue IV Abx Cefepime & vanco.  Add PO pantoprazole.  Follow up wound culture results.        Vital Signs (last 3 days)       Date/Time Temp Pulse Resp BP MAP (mmHg) SpO2 Calculated FIO2 (%) - Nasal Cannula Nasal Cannula O2 Flow Rate (L/min) O2 Device O2 Interface Device Patient Position - Orthostatic VS José Miguel Coma Scale Score Pain    09/01/24 0817 -- -- -- -- -- -- -- -- -- -- -- -- Med Not Given for Pain - for MAR use only    09/01/24 0725 -- -- -- -- -- -- -- -- -- -- -- 15 2    09/01/24 06:59:31 97.8 °F (36.6 °C) -- 16 113/70 84 -- -- -- -- -- -- -- --    09/01/24 0519 -- -- -- -- -- -- -- -- -- -- -- -- 1    09/01/24 03:12:54 97.6 °F (36.4 °C) 86 15 121/74 90 97 % -- -- -- -- -- -- --    09/01/24 0100 -- -- -- -- -- -- -- -- -- -- -- 15 No Pain    08/31/24 22:16:59 97.8 °F (36.6 °C) -- 16 135/69 91 -- -- -- -- -- -- -- --    08/31/24 2205 -- -- -- -- -- -- -- -- -- -- -- -- 3    08/31/24 2200 -- -- -- -- -- -- 28 2 L/min Nasal cannula -- -- -- --    08/31/24 19:27:04 98 °F (36.7 °C) 92 16 117/74 88 91 % -- -- -- -- Lying -- --    08/31/24 1556 -- -- -- -- -- -- -- -- -- -- -- -- 4    08/31/24 15:03:54 97.6 °F (36.4 °C) 97 17 143/82 102 94 % 28 2 L/min Nasal cannula -- Lying -- --    08/31/24 11:23:57 98.2 °F (36.8 °C) 92 20 122/72 89 95 % -- -- -- -- -- -- --    08/31/24 0835 -- -- -- -- -- -- -- -- -- -- -- 15 No Pain    08/31/24 07:12:53 98 °F (36.7 °C) 84 18 113/58 76 96 % 28 2 L/min Nasal cannula -- -- -- --    08/31/24 0704 -- -- -- -- -- 96 % 28 2 L/min Nasal cannula -- --  -- --    08/31/24 0540 -- -- -- -- -- -- -- -- -- -- -- -- 8    08/31/24 03:02:35 98.1 °F (36.7 °C) 84 16 109/57 74 96 % -- -- -- -- -- -- --    08/31/24 00:58:39 -- 86 -- 119/60 80 97 % -- -- -- -- -- -- --    08/30/24 2355 -- -- -- -- -- 90 % -- -- -- -- -- -- --    08/30/24 23:09:21 98 °F (36.7 °C) 89 17 130/102 111 93 % -- -- -- -- -- -- --    08/30/24 2305 -- -- -- -- -- -- -- -- -- Full face mask -- -- --    08/30/24 2110 -- -- -- -- -- -- -- -- -- -- -- -- No Pain    08/30/24 2039 -- -- -- -- -- -- -- -- -- -- -- 15 No Pain    08/30/24 19:11:36 98.1 °F (36.7 °C) 104 18 103/68 80 95 % -- -- -- -- -- -- --    08/30/24 1832 -- 95 -- -- -- 97 % 28 2 L/min Nasal cannula -- -- -- --    08/30/24 17:56:30 97.8 °F (36.6 °C) 96 17 154/77 103 94 % -- -- -- -- -- -- No Pain    08/30/24 1730 -- -- -- -- -- -- 28 2 L/min Nasal cannula -- -- -- No Pain    08/30/24 1700 -- 91 -- 116/60 82 93 % 28 2 L/min Nasal cannula -- Lying -- --    08/30/24 1600 -- 86 -- 111/57 79 95 % -- -- -- -- -- -- --    08/30/24 1515 -- 84 18 112/58 77 97 % -- -- None (Room air) -- Lying -- --    08/30/24 1512 -- 85 18 112/58 77 -- -- -- -- -- Lying -- --    08/30/24 1401 -- 83 18 99/53 69 -- -- -- -- -- Lying -- --    08/30/24 1342 -- -- -- -- -- -- -- -- -- -- -- 15 --    08/30/24 1300 97.8 °F (36.6 °C) 88 20 116/62 -- 96 % -- -- None (Room air) -- Lying -- 6          Weight (last 2 days)       Date/Time Weight    09/01/24 0600 76 (167.55)    08/31/24 0551 75.4 (166.23)    08/31/24 0550 75.4 (166.23)    08/31/24 0535 75.4 (166.23)    08/30/24 17:56:30 83.9 (184.97)    08/30/24 1300 77.1 (170)              Pertinent Labs/Diagnostic Results:   Radiology:  VAS DUPLEX EVALUATION FOR PSEUDOANEURYSM   Final Interpretation by Chino Goldberg MD (08/31 1355)      CT abdomen pelvis with contrast   Final Interpretation by Anthony Ross MD (08/30 1510)   1.  Inflammation of the right inguinal superficial and deep subcutaneous tissues without  an abscess.   2.  Pericardial effusion is new since July 23, 2024.   3.  Suspected interstitial edema with increased size of the small left pleural effusion.        Cardiology:  No orders to display     GI:  No orders to display     Results from last 7 days   Lab Units 09/01/24 0442 08/31/24  0523 08/30/24  1345   WBC Thousand/uL 13.09* 14.43* 16.77*   HEMOGLOBIN g/dL 11.2* 10.8* 12.1   HEMATOCRIT % 37.1 36.7 39.9   PLATELETS Thousands/uL 499* 507* 490*   TOTAL NEUT ABS Thousands/µL 8.87* 9.89* 13.03*     Results from last 7 days   Lab Units 09/01/24 0442 08/31/24  0523 08/30/24  1345   SODIUM mmol/L 140 140 139   POTASSIUM mmol/L 3.3* 3.4* 4.8   CHLORIDE mmol/L 88* 94* 94*   CO2 mmol/L 44* 39* 34*   ANION GAP mmol/L 8 7 11   BUN mg/dL 18 13 13   CREATININE mg/dL 0.61 0.37* 0.33*   EGFR ml/min/1.73sq m 89 105 109   CALCIUM mg/dL 8.9 8.6 9.3     Results from last 7 days   Lab Units 08/30/24  1345   AST U/L 25   ALT U/L 12   ALK PHOS U/L 63   TOTAL PROTEIN g/dL 7.6   ALBUMIN g/dL 3.7   TOTAL BILIRUBIN mg/dL 0.27     Results from last 7 days   Lab Units 09/01/24  0442 08/31/24  0523 08/30/24  1345   GLUCOSE RANDOM mg/dL 104 94 120       Results from last 7 days   Lab Units 08/30/24  1345   BNP pg/mL 52     Results from last 7 days   Lab Units 08/30/24  1707   GRAM STAIN RESULT  2+ Disintegrating polys*  Rare Gram positive cocci in clusters*   WOUND CULTURE  2+ Growth of Non lactose fermenting gram negative dena*     Medications:   Scheduled Medications:  buPROPion, 75 mg, Oral, Daily  cefepime, 2,000 mg, Intravenous, Q12H  citalopram, 10 mg, Oral, HS  colchicine, 0.6 mg, Oral, Daily  fluticasone, 2 spray, Nasal, Daily  furosemide, 40 mg, Intravenous, BID (diuretic)  guaiFENesin, 600 mg, Oral, Q12H NANCY  heparin (porcine), 5,000 Units, Subcutaneous, Q8H NANCY  ibuprofen, 800 mg, Oral, Q8H NANCY  pantoprazole, 40 mg, Oral, Early Morning  traZODone, 50 mg, Oral, HS  vancomycin, 750 mg, Intravenous, Q12H      Continuous IV  Infusions:     PRN Meds:  acetaminophen, 650 mg, Oral, Q8H PRN  albuterol, 2.5 mg, Nebulization, Q4H PRN  budesonide, 0.5 mg, Nebulization, Q12H PRN  diazepam, 5 mg, Oral, Q8H PRN  ondansetron, 4 mg, Intravenous, Q6H PRN   x 2 doses 9/1        Discharge Plan: TBD    Network Utilization Review Department  ATTENTION: Please call with any questions or concerns to [phone] and carefully listen to the prompts so that you are directed to the right person. All voicemails are confidential.   For Discharge needs, contact Care Management DC Support Team at [phone] opt. 2  Send all requests for admission clinical reviews, approved or denied determinations and any other requests to dedicated fax number below belonging to the [place] where the patient is receiving treatment. List of dedicated fax numbers for the Facilities:  FACILITY NAME UR FAX NUMBER   ADMISSION DENIALS (Administrative/Medical Necessity) [phone]   DISCHARGE SUPPORT TEAM (NETWORK) [phone]   PARENT CHILD HEALTH (Maternity/NICU/Pediatrics) [phone]   [facility] [phone]   [facility] [phone]   [facility] [phone]   [facility] [phone]   [facility] [phone]   [facility] [phone]   [facility] [phone]   [facility] [phone]   [facility] [phone]   [facility] [phone]   [facility] [phone]   [facility] [phone]

## 2024-09-01 NOTE — PROGRESS NOTES
[facility]  Progress Note  Name: Claire Doherty I  MRN: [id number]  Unit/Bed#: -01 I Date of Admission: 8/30/2024   Date of Service: 9/1/2024 I Hospital Day: 2    Assessment & Plan     Pericardial effusion  Assessment & Plan  Presented for suspected wound infection to right groin status post cardiac catheterization 8/21/2024  CT abdomen revealed pericardial effusion new since July 23, 2024  Per chart review, patient was treated for acute idiopathic pericarditis with colchicine and ibuprofen during hospitalization for above 8/22/2024  Echocardiogram 8/26/2024: The left ventricular ejection fraction is 65%. Systolic function is normal. Wall motion is normal. Diastolic function is moderately abnormal, consistent with grade II (pseudonormal) relaxation  ER provider discussed with cardiology, okay for patient to stay here  Will continue colchicine and ibuprofen for now  Cardiology recommendations appreciated  Monitor labs and vital signs, conduct serial physical assessments    Pleural effusion  Assessment & Plan  Presented for suspected wound infection to right groin status post cardiac catheterization 8/21/2024  CT abdomen: Suspected interstitial edema with increased size of the small left pleural effusion   Received furosemide 40 mg IV.  Continue twice daily for now  Monitor vital signs, daily weights and I&O's    * Infected wound  Assessment & Plan  Presented for suspected wound infection to right groin with swelling and drainage. Improving  Status post cardiac catheterization 8/21/2024 with insertion site right groin  CT abdomen and pelvis with inflammation of the right inguinal superficial and deep subcutaneous tissues without an abscess  Initiated on Ancef 2 g IV in the ER, continued on admission  Surgery recommendations appreciated: Possible hematoma vs developing infection.  Continue IV Ancef  Wound culture results reviewed: 2+ growth of nonlactose fermenting gram-negative dena.  2+  disintegrating polyps.  Rare gram-positive cocci in clusters  Discussed with infectious disease, recommendation is to broaden antibiotics to cefepime and vancomycin until speciation complete  Monitor labs and vitals    Nausea  Assessment & Plan  Reporting nausea today, suspected due to medications/antibiotics  Continue ondansetron IV  Add pantoprazole PO    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HCC)  Assessment & Plan  Currently controlled  Continue bupropion and citalopram    Insomnia  Assessment & Plan  Ramelteon is nonformulary, discussed with the patient will use trazodone 50 mg p.o. at bedtime.  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 Sequential Compression Devices Ordered.    Mobility:   Basic Mobility Inpatient Raw Score: 10  JH-HLM Goal: 4: Move to chair/commode  JH-HLM Achieved: 2: Bed activities/Dependent transfer  JH-HLM Goal NOT achieved. Continue with multidisciplinary rounding and encourage appropriate mobility to improve upon JH-HLM goals.    Patient Centered Rounds: I performed bedside rounds with nursing staff today.   Discussions with Specialists or Other Care Team Provider: Cardiology, infectious disease    Education and Discussions with Family / Patient: Patient declined call to .     Total Time Spent on Date of Encounter in care of patient: 40 mins. This time was  spent on one or more of the following: performing physical exam; counseling and coordination of care; obtaining or reviewing history; documenting in the medical record; reviewing/ordering tests, medications or procedures; communicating with other healthcare professionals and discussing with patient's family/caregivers.    Current Length of Stay: 2 day(s)  Current Patient Status: Inpatient   Certification Statement: The patient will continue to require additional inpatient hospital stay due to IV antibiotics, final wound culture results  Discharge Plan: Anticipate discharge in 24-48 hrs to home.    Code Status: Level 3 - DNAR and DNI    Subjective:   Patient evaluated at bedside.  She said she is nauseated.  Denies chest pain, shortness of breath, abdominal pain.     Objective:     Vitals:   Temp (24hrs), Av.8 °F (36.6 °C), Min:97.6 °F (36.4 °C), Max:98.2 °F (36.8 °C)    Temp:  [97.6 °F (36.4 °C)-98.2 °F (36.8 °C)] 97.8 °F (36.6 °C)  HR:  [86-97] 86  Resp:  [15-20] 16  BP: (113-143)/(69-82) 113/70  SpO2:  [91 %-97 %] 97 %  Body mass index is 32.72 kg/m².     Input and Output Summary (last 24 hours):     Intake/Output Summary (Last 24 hours) at 2024 0922  Last data filed at 2024 0900  Gross per 24 hour   Intake 510 ml   Output 1750 ml   Net -1240 ml       Physical Exam:   Physical Exam  Vitals and nursing note reviewed.   Constitutional:       Appearance: She is ill-appearing.      Comments: Currently dry heaving into emesis bag   HENT:      Head: Normocephalic and atraumatic.      Nose: Nose normal.      Mouth/Throat:      Mouth: Mucous membranes are moist.      Pharynx: Oropharynx is clear.   Eyes:      Pupils: Pupils are equal, round, and reactive to light.   Cardiovascular:      Rate and Rhythm: Normal rate and regular rhythm.      Pulses: Normal pulses.      Heart sounds: Normal heart sounds.   Pulmonary:      Effort: Pulmonary effort is normal. No respiratory distress.      Breath sounds: Normal  breath sounds.   Abdominal:      General: Bowel sounds are normal.      Palpations: Abdomen is soft.      Tenderness: There is no abdominal tenderness.   Musculoskeletal:      Cervical back: Neck supple.      Comments: Trace lower extremity edema   Skin:     General: Skin is warm and dry.      Capillary Refill: Capillary refill takes less than 2 seconds.      Comments: Right groin wound appears improved, there is no drainage and there is a soft red scab at the area.  No surrounding redness   Neurological:      General: No focal deficit present.      Mental Status: She is alert and oriented to person, place, and time.   Psychiatric:         Behavior: Behavior is cooperative.        Additional Data:     Labs:  Results from last 7 days   Lab Units 09/01/24  0442   WBC Thousand/uL 13.09*   HEMOGLOBIN g/dL 11.2*   HEMATOCRIT % 37.1   PLATELETS Thousands/uL 499*   SEGS PCT % 68   LYMPHO PCT % 16   MONO PCT % 12   EOS PCT % 3     Results from last 7 days   Lab Units 09/01/24  0442 08/31/24  0523 08/30/24  1345   SODIUM mmol/L 140   < > 139   POTASSIUM mmol/L 3.3*   < > 4.8   CHLORIDE mmol/L 88*   < > 94*   CO2 mmol/L 44*   < > 34*   BUN mg/dL 18   < > 13   CREATININE mg/dL 0.61   < > 0.33*   ANION GAP mmol/L 8   < > 11   CALCIUM mg/dL 8.9   < > 9.3   ALBUMIN g/dL  --   --  3.7   TOTAL BILIRUBIN mg/dL  --   --  0.27   ALK PHOS U/L  --   --  63   ALT U/L  --   --  12   AST U/L  --   --  25   GLUCOSE RANDOM mg/dL 104   < > 120    < > = values in this interval not displayed.                       Lines/Drains:  Invasive Devices       Peripheral Intravenous Line  Duration             Peripheral IV 08/31/24 Distal;Right;Ventral (anterior) Forearm <1 day              Drain  Duration             Colostomy Loop LUQ 1431 days    Ureteral Drain/Stent Left ureter 5 Fr. 1431 days    Ureteral Drain/Stent Right ureter 5 Fr. 1431 days    External Urinary Catheter 10 days                      Telemetry:  Telemetry Orders (From admission,  onward)               24 Hour Telemetry Monitoring  Continuous x 24 Hours (Telem)           Question:  Reason for 24 Hour Telemetry  Answer:  Patients with cherise/marti/endocarditis; cardiac contusion                              Recent Cultures (last 7 days):   Results from last 7 days   Lab Units 24  1707   GRAM STAIN RESULT  2+ Disintegrating polys*  Rare Gram positive cocci in clusters*   WOUND CULTURE  2+ Growth of Non lactose fermenting gram negative dena*       Last 24 Hours Medication List:   Current Facility-Administered Medications   Medication Dose Route Frequency Provider Last Rate    acetaminophen  650 mg Oral Q8H PRN Toshia Anand, CRNP      albuterol  2.5 mg Nebulization Q4H PRN Toshia Rosarioust, CRNP      budesonide  0.5 mg Nebulization Q12H PRN Toshia Anand, CRNP      buPROPion  75 mg Oral Daily Toshia Anand, CRNP      cefazolin  2,000 mg Intravenous Q8H Toshia Anand, CRNP 2,000 mg (24 0818)    citalopram  10 mg Oral HS Toshia Anand, FANINP      colchicine  0.6 mg Oral Daily Toshia Anand, CRNP      diazepam  5 mg Oral Q8H PRN Toshia Anand, CRNP      fluticasone  2 spray Nasal Daily Toshia Anand, FANINP      furosemide  40 mg Intravenous BID (diuretic) Toshia Anand, SHAKIRA      guaiFENesin  600 mg Oral Q12H [facility] Rashid Hassan PA-C      heparin (porcine)  5,000 Units Subcutaneous Q8H [facility] Toshia Anand, CRNP      ibuprofen  800 mg Oral Q8H [facility] Toshia Anand, SHAKIRA      ondansetron  4 mg Intravenous Q6H PRN Toshia Anand, CRNP      pantoprazole  40 mg Oral Early Morning Toshia Anand, CRNP      traZODone  50 mg Oral HS Toshia Anand, SHAKIRA          Today, Patient Was Seen By: SHAKIRA Juarez    **Please Note: This note may have been constructed using a voice recognition system.**

## 2024-09-01 NOTE — PLAN OF CARE
Problem: PHYSICAL THERAPY ADULT  Goal: Performs mobility at highest level of function for planned discharge setting.  See evaluation for individualized goals.  Description: Treatment/Interventions: Functional transfer training, Therapeutic exercise, Endurance training, Bed mobility, Equipment eval/education  Equipment Recommended: Walker, Wheelchair       See flowsheet documentation for full assessment, interventions and recommendations.  Outcome: Progressing  Note: Prognosis: Good     Assessment: Pt is 74 y.o. female seen for PT evaluation s/p admit to [facility] on 8/30/2024 w/ Infected wound. PT consulted to assess pt's functional mobility and d/c needs. Order placed for PT eval and tx, w/ up and OOB as tolerated order. Pt agreeable to PT  session upon arrival, pt found supine in bed.  PTA, pt was lives alone in 1 level apartment and performs Indep transfers .  Pt to benefit from continued PT tx to address deficits and maximize level of functional independent mobility and consistency. Upon conclusion pt  seated in recliner. Complexity: Comorbidities affecting pt's physical performance at time of assessment include: depression and pericardial effusion, muscular dystrophy and pleural effusion . Personal factors affecting pt at time of IE include: living alone, limited mobility, history of falls, and depression. Please find objective findings from PT assessment regarding body systems outlined above with impairments and limitations including weakness, impaired balance, decreased endurance, pain, decreased activity tolerance, decreased functional mobility tolerance, altered sensation, fall risk, and decreased skin integrity.  Pt's clinical presentation is currently unstable/unpredictable seen in pt's presentation of abnormal WBC count, abnormal potassium levels, abnormal Co2 levels, low SaO2 levels, pain, reports of dizziness with activity, and wounds. The patient's AM-PAC Basic Mobility Inpatient Short Form  Raw Score is 13.  Based on patient presentations and impairments, pt would most appropriately benefit from Level 2 resource intensity upon discharge. Please also refer to the recommendation of the Physical Therapist for safe discharge planning. RN verbalized pt appropriate for PT session.  Barriers to Discharge: Decreased caregiver support     Rehab Resource Intensity Level, PT: II (Moderate Resource Intensity)    See flowsheet documentation for full assessment.

## 2024-09-01 NOTE — ASSESSMENT & PLAN NOTE
Reporting nausea today, suspected due to medications/antibiotics  Continue ondansetron IV  Add pantoprazole PO

## 2024-09-01 NOTE — ASSESSMENT & PLAN NOTE
Presented for suspected wound infection to right groin status post cardiac catheterization 8/21/2024  CT abdomen revealed pericardial effusion new since July 23, 2024  Per chart review, patient was treated for acute idiopathic pericarditis with colchicine and ibuprofen during hospitalization for above 8/22/2024  Echocardiogram 8/26/2024: The left ventricular ejection fraction is 65%. Systolic function is normal. Wall motion is normal. Diastolic function is moderately abnormal, consistent with grade II (pseudonormal) relaxation  ER provider discussed with cardiology, okay for patient to stay here  Will continue colchicine and ibuprofen for now  Cardiology recommendations appreciated  Monitor labs and vital signs, conduct serial physical assessments

## 2024-09-02 LAB
ANION GAP SERPL CALCULATED.3IONS-SCNC: 12 MMOL/L (ref 4–13)
BACTERIA UR QL AUTO: ABNORMAL /HPF
BACTERIA WND AEROBE CULT: ABNORMAL
BACTERIA WND AEROBE CULT: ABNORMAL
BASOPHILS # BLD AUTO: 0.03 THOUSANDS/ΜL (ref 0–0.1)
BASOPHILS NFR BLD AUTO: 0 % (ref 0–1)
BILIRUB UR QL STRIP: NEGATIVE
BUN SERPL-MCNC: 29 MG/DL (ref 5–25)
CALCIUM SERPL-MCNC: 9 MG/DL (ref 8.4–10.2)
CHLORIDE SERPL-SCNC: 85 MMOL/L (ref 96–108)
CLARITY UR: CLEAR
CO2 SERPL-SCNC: 40 MMOL/L (ref 21–32)
COLOR UR: YELLOW
CREAT SERPL-MCNC: 0.79 MG/DL (ref 0.6–1.3)
EOSINOPHIL # BLD AUTO: 0.26 THOUSAND/ΜL (ref 0–0.61)
EOSINOPHIL NFR BLD AUTO: 1 % (ref 0–6)
ERYTHROCYTE [DISTWIDTH] IN BLOOD BY AUTOMATED COUNT: 16.5 % (ref 11.6–15.1)
GFR SERPL CREATININE-BSD FRML MDRD: 73 ML/MIN/1.73SQ M
GLUCOSE SERPL-MCNC: 98 MG/DL (ref 65–140)
GLUCOSE UR STRIP-MCNC: NEGATIVE MG/DL
GRAM STN SPEC: ABNORMAL
GRAM STN SPEC: ABNORMAL
HCT VFR BLD AUTO: 39.6 % (ref 34.8–46.1)
HGB BLD-MCNC: 11.9 G/DL (ref 11.5–15.4)
HGB UR QL STRIP.AUTO: ABNORMAL
IMM GRANULOCYTES # BLD AUTO: 0.1 THOUSAND/UL (ref 0–0.2)
IMM GRANULOCYTES NFR BLD AUTO: 1 % (ref 0–2)
KETONES UR STRIP-MCNC: NEGATIVE MG/DL
LEUKOCYTE ESTERASE UR QL STRIP: NEGATIVE
LYMPHOCYTES # BLD AUTO: 1.57 THOUSANDS/ΜL (ref 0.6–4.47)
LYMPHOCYTES NFR BLD AUTO: 9 % (ref 14–44)
MCH RBC QN AUTO: 25.9 PG (ref 26.8–34.3)
MCHC RBC AUTO-ENTMCNC: 30.1 G/DL (ref 31.4–37.4)
MCV RBC AUTO: 86 FL (ref 82–98)
MONOCYTES # BLD AUTO: 1.69 THOUSAND/ΜL (ref 0.17–1.22)
MONOCYTES NFR BLD AUTO: 9 % (ref 4–12)
MRSA NOSE QL CULT: NORMAL
NEUTROPHILS # BLD AUTO: 14.5 THOUSANDS/ΜL (ref 1.85–7.62)
NEUTS SEG NFR BLD AUTO: 80 % (ref 43–75)
NITRITE UR QL STRIP: NEGATIVE
NON-SQ EPI CELLS URNS QL MICRO: ABNORMAL /HPF
NRBC BLD AUTO-RTO: 0 /100 WBCS
PH UR STRIP.AUTO: 6 [PH]
PLATELET # BLD AUTO: 574 THOUSANDS/UL (ref 149–390)
PMV BLD AUTO: 9.9 FL (ref 8.9–12.7)
POTASSIUM SERPL-SCNC: 4 MMOL/L (ref 3.5–5.3)
PROT UR STRIP-MCNC: NEGATIVE MG/DL
RBC # BLD AUTO: 4.59 MILLION/UL (ref 3.81–5.12)
RBC #/AREA URNS AUTO: ABNORMAL /HPF
SODIUM SERPL-SCNC: 137 MMOL/L (ref 135–147)
SP GR UR STRIP.AUTO: 1.02
UROBILINOGEN UR QL STRIP.AUTO: 0.2 E.U./DL
WBC # BLD AUTO: 18.15 THOUSAND/UL (ref 4.31–10.16)
WBC #/AREA URNS AUTO: ABNORMAL /HPF

## 2024-09-02 PROCEDURE — 81001 URINALYSIS AUTO W/SCOPE: CPT | Performed by: INTERNAL MEDICINE

## 2024-09-02 PROCEDURE — 80048 BASIC METABOLIC PNL TOTAL CA: CPT | Performed by: NURSE PRACTITIONER

## 2024-09-02 PROCEDURE — 94664 DEMO&/EVAL PT USE INHALER: CPT

## 2024-09-02 PROCEDURE — 85025 COMPLETE CBC W/AUTO DIFF WBC: CPT | Performed by: NURSE PRACTITIONER

## 2024-09-02 PROCEDURE — 94760 N-INVAS EAR/PLS OXIMETRY 1: CPT

## 2024-09-02 PROCEDURE — 99232 SBSQ HOSP IP/OBS MODERATE 35: CPT | Performed by: INTERNAL MEDICINE

## 2024-09-02 RX ORDER — CEFAZOLIN SODIUM 2 G/50ML
2000 SOLUTION INTRAVENOUS EVERY 8 HOURS
Status: DISCONTINUED | OUTPATIENT
Start: 2024-09-02 | End: 2024-09-03

## 2024-09-02 RX ORDER — DIAZEPAM 10 MG/2ML
5 INJECTION, SOLUTION INTRAMUSCULAR; INTRAVENOUS ONCE
Status: COMPLETED | OUTPATIENT
Start: 2024-09-03 | End: 2024-09-03

## 2024-09-02 RX ORDER — METOCLOPRAMIDE HYDROCHLORIDE 5 MG/ML
5 INJECTION INTRAMUSCULAR; INTRAVENOUS EVERY 6 HOURS PRN
Status: DISCONTINUED | OUTPATIENT
Start: 2024-09-02 | End: 2024-09-07 | Stop reason: HOSPADM

## 2024-09-02 RX ADMIN — HEPARIN SODIUM 5000 UNITS: 5000 INJECTION, SOLUTION INTRAVENOUS; SUBCUTANEOUS at 14:19

## 2024-09-02 RX ADMIN — HEPARIN SODIUM 5000 UNITS: 5000 INJECTION, SOLUTION INTRAVENOUS; SUBCUTANEOUS at 05:16

## 2024-09-02 RX ADMIN — VANCOMYCIN HYDROCHLORIDE 750 MG: 750 INJECTION, SOLUTION INTRAVENOUS at 10:14

## 2024-09-02 RX ADMIN — FUROSEMIDE 40 MG: 10 INJECTION, SOLUTION INTRAMUSCULAR; INTRAVENOUS at 08:59

## 2024-09-02 RX ADMIN — GUAIFENESIN 600 MG: 600 TABLET ORAL at 21:14

## 2024-09-02 RX ADMIN — IBUPROFEN 800 MG: 800 TABLET, FILM COATED ORAL at 21:14

## 2024-09-02 RX ADMIN — HEPARIN SODIUM 5000 UNITS: 5000 INJECTION, SOLUTION INTRAVENOUS; SUBCUTANEOUS at 21:14

## 2024-09-02 RX ADMIN — ONDANSETRON 4 MG: 2 INJECTION INTRAMUSCULAR; INTRAVENOUS at 08:56

## 2024-09-02 RX ADMIN — TRIMETHOBENZAMIDE HYDROCHLORIDE 200 MG: 100 INJECTION INTRAMUSCULAR at 22:20

## 2024-09-02 RX ADMIN — IBUPROFEN 800 MG: 800 TABLET, FILM COATED ORAL at 05:16

## 2024-09-02 RX ADMIN — TRAZODONE HYDROCHLORIDE 50 MG: 50 TABLET ORAL at 21:14

## 2024-09-02 RX ADMIN — PANTOPRAZOLE SODIUM 40 MG: 40 TABLET, DELAYED RELEASE ORAL at 05:16

## 2024-09-02 RX ADMIN — COLCHICINE 0.6 MG: 0.6 TABLET ORAL at 08:59

## 2024-09-02 RX ADMIN — METOCLOPRAMIDE 5 MG: 5 INJECTION, SOLUTION INTRAMUSCULAR; INTRAVENOUS at 19:05

## 2024-09-02 RX ADMIN — CEFAZOLIN SODIUM 2000 MG: 2 SOLUTION INTRAVENOUS at 12:09

## 2024-09-02 RX ADMIN — BUPROPION HYDROCHLORIDE TABLETS 75 MG: 75 TABLET, FILM COATED ORAL at 09:00

## 2024-09-02 RX ADMIN — CEFEPIME HYDROCHLORIDE 2000 MG: 2 INJECTION, SOLUTION INTRAVENOUS at 08:59

## 2024-09-02 RX ADMIN — CEFAZOLIN SODIUM 2000 MG: 2 SOLUTION INTRAVENOUS at 21:00

## 2024-09-02 RX ADMIN — GUAIFENESIN 600 MG: 600 TABLET ORAL at 08:59

## 2024-09-02 RX ADMIN — IBUPROFEN 800 MG: 800 TABLET, FILM COATED ORAL at 14:19

## 2024-09-02 RX ADMIN — METOCLOPRAMIDE 5 MG: 5 INJECTION, SOLUTION INTRAMUSCULAR; INTRAVENOUS at 12:06

## 2024-09-02 RX ADMIN — ONDANSETRON 4 MG: 2 INJECTION INTRAMUSCULAR; INTRAVENOUS at 16:48

## 2024-09-02 RX ADMIN — SODIUM CHLORIDE 500 ML: 0.9 INJECTION, SOLUTION INTRAVENOUS at 22:22

## 2024-09-02 RX ADMIN — CITALOPRAM HYDROBROMIDE 10 MG: 10 TABLET ORAL at 21:14

## 2024-09-02 NOTE — PLAN OF CARE
Problem: PAIN - ADULT  Goal: Verbalizes/displays adequate comfort level or baseline comfort level  Description: Interventions:  - Encourage patient to monitor pain and request assistance  - Assess pain using appropriate pain scale  - Administer analgesics based on type and severity of pain and evaluate response  - Implement non-pharmacological measures as appropriate and evaluate response  - Consider cultural and social influences on pain and pain management  - Notify physician/advanced practitioner if interventions unsuccessful or patient reports new pain  Outcome: Progressing     Problem: INFECTION - ADULT  Goal: Absence or prevention of progression during hospitalization  Description: INTERVENTIONS:  - Assess and monitor for signs and symptoms of infection  - Monitor lab/diagnostic results  - Monitor all insertion sites, i.e. indwelling lines, tubes, and drains  - Monitor endotracheal if appropriate and nasal secretions for changes in amount and color  - [place] appropriate cooling/warming therapies per order  - Administer medications as ordered  - Instruct and encourage patient and family to use good hand hygiene technique  - Identify and instruct in appropriate isolation precautions for identified infection/condition  Outcome: Progressing  Goal: Absence of fever/infection during neutropenic period  Description: INTERVENTIONS:  - Monitor WBC    Outcome: Progressing     Problem: SAFETY ADULT  Goal: Patient will remain free of falls  Description: INTERVENTIONS:  - Educate patient/family on patient safety including physical limitations  - Instruct patient to call for assistance with activity   - Consult OT/PT to assist with strengthening/mobility   - Keep Call bell within reach  - Keep bed low and locked with side rails adjusted as appropriate  - Keep care items and personal belongings within reach  - Initiate and maintain comfort rounds  - Make Fall Risk Sign visible to staff  - Offer Toileting every 2 Hours,  in advance of need  - Initiate/Maintain bed/chair alarm  - Obtain necessary fall risk management equipment: nonskid socks  - Apply yellow socks and bracelet for high fall risk patients  - Consider moving patient to room near nurses station  Outcome: Progressing  Goal: Maintain or return to baseline ADL function  Description: INTERVENTIONS:  -  Assess patient's ability to carry out ADLs; assess patient's baseline for ADL function and identify physical deficits which impact ability to perform ADLs (bathing, care of mouth/teeth, toileting, grooming, dressing, etc.)  - Assess/evaluate cause of self-care deficits   - Assess range of motion  - Assess patient's mobility; develop plan if impaired  - Assess patient's need for assistive devices and provide as appropriate  - Encourage maximum independence but intervene and supervise when necessary  - Involve family in performance of ADLs  - Assess for home care needs following discharge   - Consider OT consult to assist with ADL evaluation and planning for discharge  - Provide patient education as appropriate  Outcome: Progressing  Goal: Maintains/Returns to pre admission functional level  Description: INTERVENTIONS:  - Perform AM-PAC 6 Click Basic Mobility/ Daily Activity assessment daily.  - Set and communicate daily mobility goal to care team and patient/family/caregiver.   - Collaborate with rehabilitation services on mobility goals if consulted  - Perform Range of Motion 3 times a day.  - Reposition patient every 2 hours.  - Dangle patient 3 times a day  - Stand patient 3 times a day  - Ambulate patient 3 times a day  - Out of bed to chair 3 times a day   - Out of bed for meals 3 times a day  - Out of bed for toileting  - Record patient progress and toleration of activity level   Outcome: Progressing     Problem: DISCHARGE PLANNING  Goal: Discharge to home or other facility with appropriate resources  Description: INTERVENTIONS:  - Identify barriers to discharge w/patient  and caregiver  - Arrange for needed discharge resources and transportation as appropriate  - Identify discharge learning needs (meds, wound care, etc.)  - Arrange for interpretive services to assist at discharge as needed  - Refer to Case Management Department for coordinating discharge planning if the patient needs post-hospital services based on physician/advanced practitioner order or complex needs related to functional status, cognitive ability, or social support system  Outcome: Progressing     Problem: Knowledge Deficit  Goal: Patient/family/caregiver demonstrates understanding of disease process, treatment plan, medications, and discharge instructions  Description: Complete learning assessment and assess knowledge base.  Interventions:  - Provide teaching at level of understanding  - Provide teaching via preferred learning methods  Outcome: Progressing     Problem: Prexisting or High Potential for Compromised Skin Integrity  Goal: Skin integrity is maintained or improved  Description: INTERVENTIONS:  - Identify patients at risk for skin breakdown  - Assess and monitor skin integrity  - Assess and monitor nutrition and hydration status  - Monitor labs   - Assess for incontinence   - Turn and reposition patient  - Assist with mobility/ambulation  - Relieve pressure over bony prominences  - Avoid friction and shearing  - Provide appropriate hygiene as needed including keeping skin clean and dry  - Evaluate need for skin moisturizer/barrier cream  - Collaborate with interdisciplinary team   - Patient/family teaching  - Consider wound care consult   Outcome: Progressing

## 2024-09-02 NOTE — ASSESSMENT & PLAN NOTE
Presented for suspected wound infection to right groin with swelling and drainage  Status post cardiac catheterization 8/21/2024 with insertion site right groin  CT abd/pelvis (8/30): Inflammation of the right inguinal superficial and deep subcutaneous tissues without an abscess. Pericardial effusion. Suspected interstitial edema with increased size of the small left pleural effusion.   Wound Clx: MSSA and Proteus  De-escalate Abx to Ancef  Surgery evaluation appreciated  Possible hematoma vs developing infection  ID consultation requested

## 2024-09-02 NOTE — ASSESSMENT & PLAN NOTE
Imaging as above  Patient treated for acute idiopathic pericarditis with colchicine and ibuprofen during hospitalization for above 8/22/2024  TTE 8/26/2024: LVEF 65%. Systolic function is normal. Wall motion is normal. Diastolic function is moderately abnormal, consistent with grade II relaxation  Continue colchicine and ibuprofen   Cardiology evaluation appreciated

## 2024-09-02 NOTE — ASSESSMENT & PLAN NOTE
Uses by BiPAP at night  At baseline can stand and pivot to wheelchair  Lives alone in an apartment with home health care  Continue supportive care

## 2024-09-02 NOTE — ASSESSMENT & PLAN NOTE
Imaging as above  Patient appears somewhat dehydrated with dry mucous membranes, increasing creatinine, and hemoconcentration  We will hold Lasix at this time

## 2024-09-02 NOTE — PROGRESS NOTES
Claire Doherty is a 74 y.o. female who is currently ordered Vancomycin IV with management by the Pharmacy Consult service.  Relevant clinical data and objective / subjective history reviewed.  Vancomycin Assessment:  Indication and Goal AUC/Trough: Soft tissue (goal -600, trough >10), -600, trough >10  Clinical Status: stable  Micro:     Renal Function:  SCr: 0.79 mg/dL  CrCl: 56.8 mL/min  Renal replacement: Not on dialysis  Days of Therapy: 2  Current Dose: 750mg IV q12h  Vancomycin Plan:  New Dosinmg IV q12h  Estimated AUC: 533 mcg*hr/mL  Estimated Trough: 16.9 mcg/mL  Next Level: 9/3 AM labs  Renal Function Monitoring: Daily BMP and UOP  Pharmacy will continue to follow closely for s/sx of nephrotoxicity, infusion reactions and appropriateness of therapy.  BMP and CBC will be ordered per protocol. We will continue to follow the patient’s culture results and clinical progress daily.    [place], Pharmacist

## 2024-09-02 NOTE — PROGRESS NOTES
[facility]  Progress Note  Name: Claire Doherty I  MRN: [id number]  Unit/Bed#: -01 I Date of Admission: 8/30/2024   Date of Service: 9/2/2024 I Hospital Day: 3    Assessment & Plan   * Infected wound  Assessment & Plan  Presented for suspected wound infection to right groin with swelling and drainage  Status post cardiac catheterization 8/21/2024 with insertion site right groin  CT abd/pelvis (8/30): Inflammation of the right inguinal superficial and deep subcutaneous tissues without an abscess. Pericardial effusion. Suspected interstitial edema with increased size of the small left pleural effusion.   Wound Clx: MSSA and Proteus  De-escalate Abx to Ancef  Surgery evaluation appreciated  Possible hematoma vs developing infection  ID consultation requested    Pericardial effusion  Assessment & Plan  Imaging as above  Patient treated for acute idiopathic pericarditis with colchicine and ibuprofen during hospitalization for above 8/22/2024  TTE 8/26/2024: LVEF 65%. Systolic function is normal. Wall motion is normal. 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 Sequential  Compression Devices Ordered.    Mobility:   Basic Mobility Inpatient Raw Score: 13  JH-HLM Goal: 4: Move to chair/commode  JH-HLM Achieved: 2: Bed activities/Dependent transfer  JH-HLM Goal NOT achieved. Continue with multidisciplinary rounding and encourage appropriate mobility to improve upon JH-HLM goals.    Patient Centered Rounds: I performed bedside rounds with nursing staff today.   Discussions with Specialists or Other Care Team Provider: Nursing    Education and Discussions with Family / Patient: Updated  (sister) at bedside.    Current Length of Stay: 3 day(s)  Current Patient Status: Inpatient   Certification Statement: The patient will continue to require additional inpatient hospital stay due to groin wound hematoma versus developing infection.  Awaiting ID evaluation.  Discharge Plan: Anticipate discharge in 48 hrs to home with home services.    Code Status: Level 3 - DNAR and DNI    Subjective:   Patient is resting in bed.  She notes significant nausea and dry heaving since yesterday morning.  No other acute complaints or overnight events reported by nursing.    Objective:     Vitals:   Temp (24hrs), Av °F (36.7 °C), Min:97.9 °F (36.6 °C), Max:98.1 °F (36.7 °C)    Temp:  [97.9 °F (36.6 °C)-98.1 °F (36.7 °C)] 98 °F (36.7 °C)  HR:  [] 95  Resp:  [17-18] 18  BP: (106-121)/(59-81) 121/66  SpO2:  [96 %-97 %] 97 %  Body mass index is 33.15 kg/m².     Input and Output Summary (last 24 hours):     Intake/Output Summary (Last 24 hours) at 2024 1257  Last data filed at 2024 2100  Gross per 24 hour   Intake 80 ml   Output 800 ml   Net -720 ml       Physical Exam:   Physical Exam  Vitals and nursing note reviewed.   Constitutional:       Appearance: She is ill-appearing.   HENT:      Mouth/Throat:      Mouth: Mucous membranes are dry.      Pharynx: Oropharynx is clear.   Cardiovascular:      Rate and Rhythm: Normal rate and regular rhythm.      Pulses: Normal pulses.      Heart  sounds: Normal heart sounds.   Pulmonary:      Effort: Pulmonary effort is normal. No respiratory distress.      Breath sounds: Normal breath sounds.      Comments: 2 L nasal cannula  Abdominal:      General: Bowel sounds are normal. There is no distension.      Palpations: Abdomen is soft.      Tenderness: There is no abdominal tenderness.      Comments: Ostomy present   Neurological:      General: No focal deficit present.      Mental Status: She is alert and oriented to person, place, and time. Mental status is at baseline.          Additional Data:     Labs:  Results from last 7 days   Lab Units 09/02/24  0524   WBC Thousand/uL 18.15*   HEMOGLOBIN g/dL 11.9   HEMATOCRIT % 39.6   PLATELETS Thousands/uL 574*   SEGS PCT % 80*   LYMPHO PCT % 9*   MONO PCT % 9   EOS PCT % 1     Results from last 7 days   Lab Units 09/02/24  0524 08/31/24  0523 08/30/24  1345   SODIUM mmol/L 137   < > 139   POTASSIUM mmol/L 4.0   < > 4.8   CHLORIDE mmol/L 85*   < > 94*   CO2 mmol/L 40*   < > 34*   BUN mg/dL 29*   < > 13   CREATININE mg/dL 0.79   < > 0.33*   ANION GAP mmol/L 12   < > 11   CALCIUM mg/dL 9.0   < > 9.3   ALBUMIN g/dL  --   --  3.7   TOTAL BILIRUBIN mg/dL  --   --  0.27   ALK PHOS U/L  --   --  63   ALT U/L  --   --  12   AST U/L  --   --  25   GLUCOSE RANDOM mg/dL 98   < > 120    < > = values in this interval not displayed.                       Lines/Drains:  Invasive Devices       Peripheral Intravenous Line  Duration             Peripheral IV 08/31/24 Distal;Right;Ventral (anterior) Forearm 1 day              Drain  Duration             Colostomy Loop LUQ 1432 days    Ureteral Drain/Stent Left ureter 5 Fr. 1432 days    Ureteral Drain/Stent Right ureter 5 Fr. 1432 days    External Urinary Catheter 11 days                    Recent Cultures (last 7 days):   Results from last 7 days   Lab Units 08/30/24  1707   GRAM STAIN RESULT  2+ Disintegrating polys*  Rare Gram positive cocci in clusters*   WOUND CULTURE  2+ Growth  of Proteus mirabilis*  2+ Growth of Staphylococcus aureus*       Last 24 Hours Medication List:   Current Facility-Administered Medications   Medication Dose Route Frequency Provider Last Rate    acetaminophen  650 mg Oral Q8H PRN Toshia BREANNE RosarioCheng, CRNP      albuterol  2.5 mg Nebulization Q4H PRN Toshia M Cheng, CRNP      budesonide  0.5 mg Nebulization Q12H PRN Toshia M Cheng, CRNP      buPROPion  75 mg Oral Daily Toshia Anand, CRNP      cefazolin  2,000 mg Intravenous Q8H Sosa Tang DO 2,000 mg (09/02/24 1209)    citalopram  10 mg Oral HS Toshia M Cheng, CRNP      colchicine  0.6 mg Oral Daily Toshia BREANNE RosarioCheng, CRNP      diazepam  5 mg Oral Q8H PRN Toshia Rosarioust, CRNP      fluticasone  2 spray Nasal Daily Toshia M Cheng, CRNP      guaiFENesin  600 mg Oral Q12H NANCY Rashid Hassan PA-C      heparin (porcine)  5,000 Units Subcutaneous Q8H NANCY Toshia M Cheng, CRNP      ibuprofen  800 mg Oral Q8H NANCY Toshia BREANNE RosarioCheng, CRNP      metoclopramide  5 mg Intravenous Q6H PRN Sosa Tang DO      ondansetron  4 mg Intravenous Q6H PRN Toshia M Cheng, CRNP      pantoprazole  40 mg Oral Early Morning Toshia BREANNE RosarioCehng, CRNP      traZODone  50 mg Oral HS Toshia Anand, FANINP          Today, Patient Was Seen By: Sosa Tang DO    **Please Note: This note may have been constructed using a voice recognition system.**

## 2024-09-02 NOTE — PLAN OF CARE
Problem: PAIN - ADULT  Goal: Verbalizes/displays adequate comfort level or baseline comfort level  Description: Interventions:  - Encourage patient to monitor pain and request assistance  - Assess pain using appropriate pain scale  - Administer analgesics based on type and severity of pain and evaluate response  - Implement non-pharmacological measures as appropriate and evaluate response  - Consider cultural and social influences on pain and pain management  - Notify physician/advanced practitioner if interventions unsuccessful or patient reports new pain  Outcome: Progressing     Problem: INFECTION - ADULT  Goal: Absence or prevention of progression during hospitalization  Description: INTERVENTIONS:  - Assess and monitor for signs and symptoms of infection  - Monitor lab/diagnostic results  - Monitor all insertion sites, i.e. indwelling lines, tubes, and drains  - Monitor endotracheal if appropriate and nasal secretions for changes in amount and color  - [place] appropriate cooling/warming therapies per order  - Administer medications as ordered  - Instruct and encourage patient and family to use good hand hygiene technique  - Identify and instruct in appropriate isolation precautions for identified infection/condition  Outcome: Progressing  Goal: Absence of fever/infection during neutropenic period  Description: INTERVENTIONS:  - Monitor WBC    Outcome: Progressing     Problem: SAFETY ADULT  Goal: Patient will remain free of falls  Description: INTERVENTIONS:  - Educate patient/family on patient safety including physical limitations  - Instruct patient to call for assistance with activity   - Consult OT/PT to assist with strengthening/mobility   - Keep Call bell within reach  - Keep bed low and locked with side rails adjusted as appropriate  - Keep care items and personal belongings within reach  - Initiate and maintain comfort rounds  - Make Fall Risk Sign visible to staff  - Offer Toileting every 2 Hours,  in advance of need  - Initiate/Maintain bed alarm  - Obtain necessary fall risk management equipment:  socks  - Apply yellow socks and bracelet for high fall risk patients  - Consider moving patient to room near nurses station  Outcome: Progressing  Goal: Maintain or return to baseline ADL function  Description: INTERVENTIONS:  -  Assess patient's ability to carry out ADLs; assess patient's baseline for ADL function and identify physical deficits which impact ability to perform ADLs (bathing, care of mouth/teeth, toileting, grooming, dressing, etc.)  - Assess/evaluate cause of self-care deficits   - Assess range of motion  - Assess patient's mobility; develop plan if impaired  - Assess patient's need for assistive devices and provide as appropriate  - Encourage maximum independence but intervene and supervise when necessary  - Involve family in performance of ADLs  - Assess for home care needs following discharge   - Consider OT consult to assist with ADL evaluation and planning for discharge  - Provide patient education as appropriate  Outcome: Progressing     Problem: DISCHARGE PLANNING  Goal: Discharge to home or other facility with appropriate resources  Description: INTERVENTIONS:  - Identify barriers to discharge w/patient and caregiver  - Arrange for needed discharge resources and transportation as appropriate  - Identify discharge learning needs (meds, wound care, etc.)  - Arrange for interpretive services to assist at discharge as needed  - Refer to Case Management Department for coordinating discharge planning if the patient needs post-hospital services based on physician/advanced practitioner order or complex needs related to functional status, cognitive ability, or social support system  Outcome: Progressing     Problem: Knowledge Deficit  Goal: Patient/family/caregiver demonstrates understanding of disease process, treatment plan, medications, and discharge instructions  Description: Complete  learning assessment and assess knowledge base.  Interventions:  - Provide teaching at level of understanding  - Provide teaching via preferred learning methods  Outcome: Progressing     Problem: Prexisting or High Potential for Compromised Skin Integrity  Goal: Skin integrity is maintained or improved  Description: INTERVENTIONS:  - Identify patients at risk for skin breakdown  - Assess and monitor skin integrity  - Assess and monitor nutrition and hydration status  - Monitor labs   - Assess for incontinence   - Turn and reposition patient  - Assist with mobility/ambulation  - Relieve pressure over bony prominences  - Avoid friction and shearing  - Provide appropriate hygiene as needed including keeping skin clean and dry  - Evaluate need for skin moisturizer/barrier cream  - Collaborate with interdisciplinary team   - Patient/family teaching  - Consider wound care consult   Outcome: Progressing

## 2024-09-02 NOTE — CONSULTS
Vancomycin IV Pharmacy-to-Dose Consultation     Vancomycin has been discontinued.  Pharmacy will sign off.  Please contact or re-consult with questions.    [place], Pharmacist

## 2024-09-03 ENCOUNTER — APPOINTMENT (INPATIENT)
Dept: ULTRASOUND IMAGING | Facility: HOSPITAL | Age: 74
DRG: 862 | End: 2024-09-03
Attending: INTERNAL MEDICINE
Payer: COMMERCIAL

## 2024-09-03 ENCOUNTER — TELEPHONE (OUTPATIENT)
Dept: FAMILY MEDICINE CLINIC | Facility: CLINIC | Age: 74
End: 2024-09-03

## 2024-09-03 PROBLEM — N17.9 AKI (ACUTE KIDNEY INJURY) (HCC): Status: ACTIVE | Noted: 2024-09-03

## 2024-09-03 LAB
ANION GAP SERPL CALCULATED.3IONS-SCNC: 18 MMOL/L (ref 4–13)
BUN SERPL-MCNC: 42 MG/DL (ref 5–25)
CALCIUM SERPL-MCNC: 8 MG/DL (ref 8.4–10.2)
CHLORIDE SERPL-SCNC: 88 MMOL/L (ref 96–108)
CO2 SERPL-SCNC: 31 MMOL/L (ref 21–32)
CREAT SERPL-MCNC: 1.33 MG/DL (ref 0.6–1.3)
ERYTHROCYTE [DISTWIDTH] IN BLOOD BY AUTOMATED COUNT: 16.5 % (ref 11.6–15.1)
GFR SERPL CREATININE-BSD FRML MDRD: 39 ML/MIN/1.73SQ M
GLUCOSE SERPL-MCNC: 118 MG/DL (ref 65–140)
HCT VFR BLD AUTO: 35.4 % (ref 34.8–46.1)
HGB BLD-MCNC: 10.7 G/DL (ref 11.5–15.4)
MAGNESIUM SERPL-MCNC: 1.7 MG/DL (ref 1.9–2.7)
MCH RBC QN AUTO: 26 PG (ref 26.8–34.3)
MCHC RBC AUTO-ENTMCNC: 30.2 G/DL (ref 31.4–37.4)
MCV RBC AUTO: 86 FL (ref 82–98)
PLATELET # BLD AUTO: 590 THOUSANDS/UL (ref 149–390)
PMV BLD AUTO: 10.3 FL (ref 8.9–12.7)
POTASSIUM SERPL-SCNC: 3.7 MMOL/L (ref 3.5–5.3)
RBC # BLD AUTO: 4.11 MILLION/UL (ref 3.81–5.12)
SODIUM SERPL-SCNC: 137 MMOL/L (ref 135–147)
WBC # BLD AUTO: 19.56 THOUSAND/UL (ref 4.31–10.16)

## 2024-09-03 PROCEDURE — 94760 N-INVAS EAR/PLS OXIMETRY 1: CPT

## 2024-09-03 PROCEDURE — 99223 1ST HOSP IP/OBS HIGH 75: CPT | Performed by: INTERNAL MEDICINE

## 2024-09-03 PROCEDURE — 94664 DEMO&/EVAL PT USE INHALER: CPT

## 2024-09-03 PROCEDURE — 83735 ASSAY OF MAGNESIUM: CPT | Performed by: INTERNAL MEDICINE

## 2024-09-03 PROCEDURE — 80048 BASIC METABOLIC PNL TOTAL CA: CPT | Performed by: INTERNAL MEDICINE

## 2024-09-03 PROCEDURE — 76775 US EXAM ABDO BACK WALL LIM: CPT

## 2024-09-03 PROCEDURE — 99232 SBSQ HOSP IP/OBS MODERATE 35: CPT | Performed by: INTERNAL MEDICINE

## 2024-09-03 PROCEDURE — 85027 COMPLETE CBC AUTOMATED: CPT | Performed by: INTERNAL MEDICINE

## 2024-09-03 RX ORDER — MAGNESIUM SULFATE HEPTAHYDRATE 40 MG/ML
2 INJECTION, SOLUTION INTRAVENOUS ONCE
Status: COMPLETED | OUTPATIENT
Start: 2024-09-03 | End: 2024-09-03

## 2024-09-03 RX ORDER — CEFAZOLIN SODIUM 2 G/50ML
2000 SOLUTION INTRAVENOUS EVERY 8 HOURS
Status: COMPLETED | OUTPATIENT
Start: 2024-09-03 | End: 2024-09-03

## 2024-09-03 RX ORDER — SODIUM CHLORIDE 9 MG/ML
50 INJECTION, SOLUTION INTRAVENOUS CONTINUOUS
Status: DISCONTINUED | OUTPATIENT
Start: 2024-09-03 | End: 2024-09-05

## 2024-09-03 RX ADMIN — CEFAZOLIN SODIUM 2000 MG: 2 SOLUTION INTRAVENOUS at 22:00

## 2024-09-03 RX ADMIN — DIAZEPAM 5 MG: 5 INJECTION INTRAMUSCULAR; INTRAVENOUS at 00:30

## 2024-09-03 RX ADMIN — METOCLOPRAMIDE 5 MG: 5 INJECTION, SOLUTION INTRAMUSCULAR; INTRAVENOUS at 05:19

## 2024-09-03 RX ADMIN — BUPROPION HYDROCHLORIDE TABLETS 75 MG: 75 TABLET, FILM COATED ORAL at 09:24

## 2024-09-03 RX ADMIN — SODIUM CHLORIDE 500 ML: 0.9 INJECTION, SOLUTION INTRAVENOUS at 07:45

## 2024-09-03 RX ADMIN — COLCHICINE 0.6 MG: 0.6 TABLET ORAL at 09:21

## 2024-09-03 RX ADMIN — FLUTICASONE PROPIONATE 2 SPRAY: 50 SPRAY, METERED NASAL at 09:21

## 2024-09-03 RX ADMIN — GUAIFENESIN 600 MG: 600 TABLET ORAL at 22:14

## 2024-09-03 RX ADMIN — SODIUM CHLORIDE 50 ML/HR: 0.9 INJECTION, SOLUTION INTRAVENOUS at 13:40

## 2024-09-03 RX ADMIN — METOCLOPRAMIDE 5 MG: 5 INJECTION, SOLUTION INTRAMUSCULAR; INTRAVENOUS at 12:06

## 2024-09-03 RX ADMIN — ONDANSETRON 4 MG: 2 INJECTION INTRAMUSCULAR; INTRAVENOUS at 13:38

## 2024-09-03 RX ADMIN — MAGNESIUM SULFATE HEPTAHYDRATE 2 G: 40 INJECTION, SOLUTION INTRAVENOUS at 07:49

## 2024-09-03 RX ADMIN — CEFAZOLIN SODIUM 2000 MG: 2 SOLUTION INTRAVENOUS at 05:21

## 2024-09-03 RX ADMIN — HEPARIN SODIUM 5000 UNITS: 5000 INJECTION, SOLUTION INTRAVENOUS; SUBCUTANEOUS at 13:22

## 2024-09-03 RX ADMIN — HEPARIN SODIUM 5000 UNITS: 5000 INJECTION, SOLUTION INTRAVENOUS; SUBCUTANEOUS at 22:14

## 2024-09-03 RX ADMIN — ONDANSETRON 4 MG: 2 INJECTION INTRAMUSCULAR; INTRAVENOUS at 07:40

## 2024-09-03 RX ADMIN — CEFAZOLIN SODIUM 2000 MG: 2 SOLUTION INTRAVENOUS at 11:50

## 2024-09-03 RX ADMIN — HEPARIN SODIUM 5000 UNITS: 5000 INJECTION, SOLUTION INTRAVENOUS; SUBCUTANEOUS at 05:22

## 2024-09-03 RX ADMIN — ONDANSETRON 4 MG: 2 INJECTION INTRAMUSCULAR; INTRAVENOUS at 00:29

## 2024-09-03 NOTE — ASSESSMENT & PLAN NOTE
Imaging as above  Patient treated for acute idiopathic pericarditis with colchicine and ibuprofen during hospitalization for above 8/22/2024  TTE 8/26/2024: LVEF 65%. Systolic function is normal. Wall motion is normal. Diastolic function is moderately abnormal, consistent with grade II relaxation  Continue colchicine  Cardiology evaluation appreciated

## 2024-09-03 NOTE — CONSULTS
Consultation - [facility] Infectious Disease   Claire Doherty 74 y.o. female MRN: [id number]  Unit/Bed#: -01 Encounter: [id number]    IMPRESSION & RECOMMENDATIONS:   1.  Right groin wound infection.  Postcardiac cath on 8/21/2024.  Presented with increased drainage from right groin incision site.  Wound culture isolated Proteus and MSSA.  Today's day 5 of IV antibiotics and wound appears stable without surrounding cellulitis or drainage.  CT negative for groin abscess.  Venous duplex negative for pseudoaneurysm.  No intervention from a surgical standpoint.  No blood cultures obtained.  She is afebrile however leukocytosis uptrending.  She is otherwise hemodynamically stable.  -Continue IV cefazolin through today to complete 5-day course of antibiotics  -Monitor CBCd for response to treatment  -Trend temps and hemodynamics  -Serial skin exams and local wound care     2.  Leukocytosis.  With left shift, worsening since admission.  Initially thought due to #1 however may be confounded by #3.  No acute process in the abdomen or pelvis on imaging from admission.  -Continue antibiotics as above  -Recommend obtaining blood cultures x 2 should patient spike fever  -Workup for #3 per primary team     3.  Intractable nausea with vomiting.  Worsening over the last 24 hours.  Unknown etiology.  Possibly adverse drug effect however patient has tolerated cephalosporins in the past.  Has been on Protonix in the setting of daily NSAID use for #4.  Consider possibility of gastritis.  -Workup per primary team  -Supportive cares     4.  Acute idiopathic pericarditis.  Recently hospitalized for chest pain with EKG changes.  Status post cardiac cath with right groin access 8/21 that was negative for obstructive CAD.  Trivial pericardial effusion noted on imaging.  Started on colchicine, ibuprofen and prophylactic Protonix by cardiology last admission.  Chest pain resolved.  -Close cardiology follow-up ongoing    5. History of  muscular dystrophy with restrictive lung disease.  -Continue supportive cares    6. Antibiotic Allergy.  History of amoxicillin allergy.  With report of vague skin change/rash with amoxicillin sometime in the 1990s.  Has tolerated Zosyn in the past as well as multiple cephalosporins.  -Monitor for adverse drug reactions      Antibiotics:  Day 5 IV antibiotics    I have discussed the above management plan in detail with patient.     Above plan discussed in detail with Dr. Tang who is in agreement with plan to continue IV antibiotics through today.     ID consult service will continue to follow.    HISTORY OF PRESENT ILLNESS:  Reason for Consult: Wound infection    HPI: Claire Doherty is a 74 y.o. year old female with muscular dystrophy, restrictive lung disease on BiPAP at bedtime, who presented on 8/30 with concern for wound infection.  Patient underwent a cardiac cath for workup of chest pain with ST elevations on EKG on 8/21.  Cardiac cath was normal, troponins were negative and she was diagnosed with suspected pericarditis versus Takotsubo cardiomyopathy based on EKG appearance and echocardiogram.  She was started on ibuprofen and colchicine and discharged home on 8/22.  She presented back to the hospital on 8/30 with complaints of drainage from her catheterization site in the right groin.  Patient was seen by visiting nurse on the day of admission who felt that she had purulent drainage from this area and sent her in for evaluation.  Patient denies pain in that area.  No fevers or chills.  No recent antibiotic use.  CT abdomen pelvis negative for groin abscess but showed pericardial effusion that was new since recent imaging in July.  Wound culture obtained and isolated Proteus and MSSA.  On exam today this appears resolved and only mild induration noted.  She was seen by cardiology, pericardial effusion was trivial with no evidence of tamponade.  Duplex of right groin negative for pseudoaneurysm.  She was  maintained on cefazolin however changed to vancomycin and cefepime on 9/1.  She developed intractable nausea with dry heaving and antibiotics changed back to IV cefazolin.  Patient's leukocytosis is worsening but she remains afebrile.  She denies dysuria, urgency or abdominal pain since, diarrhea.  Denies epigastric discomfort.  Skin integrity intact.  Has been taking Protonix with ibuprofen.    REVIEW OF SYSTEMS:  A complete review of systems is negative other than that noted in the HPI.    PAST MEDICAL HISTORY:  Past Medical History:   Diagnosis Date    Anxiety     Breast cancer ([facility]) 2007    Colitis     Depression     Difficult intubation     Excessive daytime sleepiness     GERD (gastroesophageal reflux disease)     Hyperlipidemia     Ischemic colitis ([facility]) 01/21/2019    Leukocytosis 03/28/2020    Muscular dystrophy ([facility])     Limb-girdle    Osteoporosis     Overactive bladder     Perforated diverticulum 03/28/2020    Pneumonitis     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[facility])     Skin cancer     Skin disorder     Tachycardia 06/02/2021    Vitamin D deficiency      Past Surgical History:   Procedure Laterality Date    CARDIAC CATHETERIZATION N/A 8/21/2024    Procedure: Cardiac pci;  Surgeon: Juan Fuller MD;  Location: [facility];  Service: Cardiology    CARDIAC CATHETERIZATION N/A 8/21/2024    Procedure: Cardiac PCI Stent;  Surgeon: Juan Fuller MD;  [facility]: [facility];  Service: Cardiology    CARDIAC CATHETERIZATION N/A 8/21/2024    Procedure: Cardiac Coronary Angiogram;  Surgeon: Juan Fuller MD;  [facility]: [facility];  Service: Cardiology    COLONOSCOPY N/A 1/22/2019    Procedure: COLONOSCOPY;  Surgeon: Anthony Mejía MD;  Location: [facility];  Service: Colorectal    CYSTOSCOPY N/A 9/30/2020    Procedure: CYSTOSCOPY; BILATERAL URETERAL CATHETER PLACEMENT, CYSTOTOMY;  Surgeon: Zach Tyler MD;  Location: [facility];  Service: Urology    FL  CYSTOGRAM  10/15/2020    HARTMANS PROCEDURE N/A 2020    Procedure: EXPLORATORY LAPAROTOMY; LYSIS OF ADHESIONS; WASHOUT PELVIC ABSCESS; COMPLEX SIGMOID COLON RESECTION; LOOP TRANSVERSE COLOSTOMY;  Surgeon: Thania Jaffe MD;  Location: [facility];  Service: General    IR DRAINAGE TUBE PLACEMENT  2020    MASTECTOMY Left 2007    left breast mastectomy     TONSILLECTOMY      TOOTH EXTRACTION      TUBAL LIGATION         FAMILY HISTORY:  Non-contributory    SOCIAL HISTORY:  Social History   Social History     Substance and Sexual Activity   Alcohol Use Not Currently    Comment: social drinker per [employer]      Social History     Substance and Sexual Activity   Drug Use Yes    Types: Marijuana    Comment: medical marijuana     Social History     Tobacco Use   Smoking Status Former    Current packs/day: 0.00    Average packs/day: 1.5 packs/day for 37.0 years (55.5 ttl pk-yrs)    Types: Cigarettes    Start date:     Quit date:     Years since quittin.6   Smokeless Tobacco Never       ALLERGIES:  Allergies   Allergen Reactions    Amoxicillin      Vague rash in the 90s, tolerated zosyn on 2020 admission     Codeine      Other reaction(s): Other (See Comments)  n/v    Medical Tape Itching     bandaids       MEDICATIONS:  All current active medications have been reviewed.    PHYSICAL EXAM:  Temp:  [97.3 °F (36.3 °C)-98.2 °F (36.8 °C)] 98 °F (36.7 °C)  HR:  [88-98] 97  Resp:  [18-20] 18  BP: (105-148)/(67-80) 148/74  SpO2:  [93 %-98 %] 95 %  Temp (24hrs), Av.8 °F (36.6 °C), Min:97.3 °F (36.3 °C), Max:98.2 °F (36.8 °C)  Current: Temperature: 98 °F (36.7 °C)    Intake/Output Summary (Last 24 hours) at 9/3/2024 0914  Last data filed at 9/3/2024 0812  Gross per 24 hour   Intake 0 ml   Output 1000 ml   Net -1000 ml       General Appearance:  Appearing chronically ill, nontoxic, and in mild distress due to dry heaves   Head:  Normocephalic, without obvious abnormality, atraumatic   Eyes:  Conjunctiva  pink and sclera anicteric, both eyes   Nose: Nares normal, mucosa normal, no drainage   Throat: Oropharynx dry without lesions   Neck: Supple, symmetrical, no adenopathy, no tenderness/mass/nodules   Back:   Symmetric, no curvature, ROM normal.   Lungs:   Clear to auscultation bilaterally, respirations unlabored   Heart:  RRR; no murmur, rub or gallop   Abdomen:   Soft, non-tender, non-distended, positive bowel sounds, left abdominal ostomy with semiformed brown stool    Extremities: No cyanosis, clubbing or edema   Skin: No rashes or lesions.  Right groin incision site without drainage, surrounding erythema or tenderness.  Mild induration noted without fluctuance.   : No CVA or suprapubic tenderness.   Neurologic: Alert and oriented times 3, extremity strength 5/5 and symmetric       LABS, IMAGING, & OTHER STUDIES:  Extensive review of the medical records in epic including review of the notes, radiographs, and laboratory results were reviewed personally as below.     Lab Results:  I have personally reviewed pertinent labs.  Results from last 7 days   Lab Units 09/03/24  0512 09/02/24  0524 09/01/24 0442   WBC Thousand/uL 19.56* 18.15* 13.09*   HEMOGLOBIN g/dL 10.7* 11.9 11.2*   PLATELETS Thousands/uL 590* 574* 499*     Results from last 7 days   Lab Units 09/03/24  0628 09/02/24  0524 09/01/24  0442 08/31/24  0523 08/30/24  1345   SODIUM mmol/L 137 137 140   < > 139   POTASSIUM mmol/L 3.7 4.0 3.3*   < > 4.8   CHLORIDE mmol/L 88* 85* 88*   < > 94*   CO2 mmol/L 31 40* 44*   < > 34*   BUN mg/dL 42* 29* 18   < > 13   CREATININE mg/dL 1.33* 0.79 0.61   < > 0.33*   EGFR ml/min/1.73sq m 39 73 89   < > 109   CALCIUM mg/dL 8.0* 9.0 8.9   < > 9.3   AST U/L  --   --   --   --  25   ALT U/L  --   --   --   --  12   ALK PHOS U/L  --   --   --   --  63    < > = values in this interval not displayed.     Results from last 7 days   Lab Units 09/01/24  0954 08/30/24  6478   GRAM STAIN RESULT   --  2+ Disintegrating polys*  Rare  Gram positive cocci in clusters*   WOUND CULTURE   --  2+ Growth of Proteus mirabilis*  2+ Growth of Staphylococcus aureus*   MRSA CULTURE ONLY  No Methicillin Resistant Staphlyococcus aureus (MRSA) isolated  --                        Lab interpretation/comments: WBC up to 19 with thrombocytosis likely reactive    Culture data: UA benign, wound culture with Proteus and MSSA    Imaging Studies:   Imaging interpretation/comments: CT abdomen pelvis with contrast with right groin stranding and inflammation without abscess, duplex without pseudoaneurysm

## 2024-09-03 NOTE — OCCUPATIONAL THERAPY NOTE
Occupational Therapy     Patient Name: Claire Doherty  Today's Date: 9/3/2024           09/03/24 1358   OT Last Visit   OT Visit Date 09/03/24   Note Type   Note type Cancelled Session   Cancel Reasons Medical status   Additional Comments OT order received and chart review performed. @ this time, OT evaluation cancelled d/t  severe nausea (attempted 2x) . OT will follow and evaluate in efforts to provide skilled interventions as appropriate. Nsg aware.    Romelia Valenzuela OTR/L

## 2024-09-03 NOTE — ASSESSMENT & PLAN NOTE
Presented for suspected wound infection to right groin with swelling and drainage  Status post cardiac catheterization 8/21/2024 with insertion site right groin  CT abd/pelvis (8/30): Inflammation of the right inguinal superficial and deep subcutaneous tissues without an abscess. Pericardial effusion. Suspected interstitial edema with increased size of the small left pleural effusion.   Wound Clx: MSSA and Proteus  Continue Ancef  Surgery evaluation appreciated  Possible hematoma vs developing infection  ID evaluation appreciated  Stop abx after dosing today

## 2024-09-03 NOTE — PROGRESS NOTES
Formerly [facility]  Progress Note  Name: Claire Doherty I  MRN: [id number]  Unit/Bed#: -01 I Date of Admission: 8/30/2024   Date of Service: 9/3/2024 I Hospital Day: 4    Assessment & Plan   * Infected wound  Assessment & Plan  Presented for suspected wound infection to right groin with swelling and drainage  Status post cardiac catheterization 8/21/2024 with insertion site right groin  CT abd/pelvis (8/30): Inflammation of the right inguinal superficial and deep subcutaneous tissues without an abscess. Pericardial effusion. Suspected interstitial edema with increased size of the small left pleural effusion.   Wound Clx: MSSA and Proteus  Continue Ancef  Surgery evaluation appreciated  Possible hematoma vs developing infection  ID evaluation appreciated  Stop abx after dosing today    Pericardial effusion  Assessment & Plan  Imaging as above  Patient treated for acute idiopathic pericarditis with colchicine and ibuprofen during hospitalization for above 8/22/2024  TTE 8/26/2024: LVEF 65%. Systolic function is normal. Wall motion is normal. Diastolic function is moderately abnormal, consistent with grade II relaxation  Continue colchicine  Cardiology evaluation appreciated    Pleural effusion  Assessment & Plan  Imaging as above  Patient appears somewhat dehydrated with dry mucous membranes, increasing creatinine, and hemoconcentration  Continue to hold Lasix     DINA (acute kidney injury) (HCC)  Assessment & Plan  Patient with continued nausea/dr heaving  Initially thought to be due to abx but symptoms persist despite abx change  Today with rising creatinine  Continue to hold Lasix  Check Renal US  Begin IVF and follow-up on AM labs    Dystrophy, muscular, 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HCC)  Assessment & Plan  Uses by BiPAP at night  At baseline can stand and pivot to wheelchair  Lives alone in an apartment with home health care  Continue supportive care    Restrictive lung disease due to muscular  dystrophy (HCC)  Assessment & Plan  On supplemental oxygen 2L nasal cannula chronically, uses BiPAP at night  Respiratory protocol             VTE Pharmacologic Prophylaxis: VTE Score: 5 High Risk (Score >/= 5) - Pharmacological DVT Prophylaxis Ordered: heparin. Sequential Compression Devices Ordered.    Mobility:   Basic Mobility Inpatient Raw Score: 13  JH-HLM Goal: 4: Move to chair/commode  JH-HLM Achieved: 2: Bed activities/Dependent transfer  JH-HLM Goal NOT achieved. Continue with multidisciplinary rounding and encourage appropriate mobility to improve upon JH-HLM goals.    Patient Centered Rounds: I performed bedside rounds with nursing staff today.   Discussions with Specialists or Other Care Team Provider: Nursing and CM    Education and Discussions with Family / Patient: Patient updated on plan of care.    Current Length of Stay: 4 day(s)  Current Patient Status: Inpatient   Certification Statement: The patient will continue to require additional inpatient hospital stay due to DINA  Discharge Plan: Anticipate discharge in 24-48 hrs to home.    Code Status: Level 3 - DNAR and DNI    Subjective:   Patient resting in bed. She continues to report nausea and dry heaving. No significant over night events reported by nursing.     Objective:     Vitals:   Temp (24hrs), Av.7 °F (36.5 °C), Min:97.3 °F (36.3 °C), Max:98 °F (36.7 °C)    Temp:  [97.3 °F (36.3 °C)-98 °F (36.7 °C)] 98 °F (36.7 °C)  HR:  [88-98] 93  Resp:  [16-20] 16  BP: (110-155)/(67-80) 155/72  SpO2:  [93 %-95 %] 94 %  Body mass index is 33.88 kg/m².     Input and Output Summary (last 24 hours):     Intake/Output Summary (Last 24 hours) at 9/3/2024 1614  Last data filed at 9/3/2024 1355  Gross per 24 hour   Intake 50 ml   Output 750 ml   Net -700 ml       Physical Exam:   Physical Exam  Vitals and nursing note reviewed.   Constitutional:       Appearance: She is ill-appearing.   HENT:      Mouth/Throat:      Mouth: Mucous membranes are moist.       Pharynx: Oropharynx is clear.   Cardiovascular:      Rate and Rhythm: Normal rate and regular rhythm.      Pulses: Normal pulses.      Heart sounds: Normal heart sounds.   Pulmonary:      Effort: Pulmonary effort is normal.      Breath sounds: Normal breath sounds.      Comments: 2L NC  Abdominal:      Comments: Ostomy present   Musculoskeletal:         General: Normal range of motion.   Neurological:      General: No focal deficit present.      Mental Status: She is alert. Mental status is at baseline.          Additional Data:     Labs:  Results from last 7 days   Lab Units 09/03/24  0512 09/02/24  0524   WBC Thousand/uL 19.56* 18.15*   HEMOGLOBIN g/dL 10.7* 11.9   HEMATOCRIT % 35.4 39.6   PLATELETS Thousands/uL 590* 574*   SEGS PCT %  --  80*   LYMPHO PCT %  --  9*   MONO PCT %  --  9   EOS PCT %  --  1     Results from last 7 days   Lab Units 09/03/24  0628 08/31/24  0523 08/30/24  1345   SODIUM mmol/L 137   < > 139   POTASSIUM mmol/L 3.7   < > 4.8   CHLORIDE mmol/L 88*   < > 94*   CO2 mmol/L 31   < > 34*   BUN mg/dL 42*   < > 13   CREATININE mg/dL 1.33*   < > 0.33*   ANION GAP mmol/L 18*   < > 11   CALCIUM mg/dL 8.0*   < > 9.3   ALBUMIN g/dL  --   --  3.7   TOTAL BILIRUBIN mg/dL  --   --  0.27   ALK PHOS U/L  --   --  63   ALT U/L  --   --  12   AST U/L  --   --  25   GLUCOSE RANDOM mg/dL 118   < > 120    < > = values in this interval not displayed.                       Lines/Drains:  Invasive Devices       Peripheral Intravenous Line  Duration             Peripheral IV 09/03/24 Dorsal (posterior);Right Hand <1 day    Peripheral IV 09/03/24 Proximal;Right;Ventral (anterior) Forearm <1 day              Drain  Duration             Colostomy Loop LUQ 1433 days    Ureteral Drain/Stent Left ureter 5 Fr. 1433 days    Ureteral Drain/Stent Right ureter 5 Fr. 1433 days    External Urinary Catheter <1 day                      Recent Cultures (last 7 days):   Results from last 7 days   Lab Units 08/30/24  1707   GRAM  STAIN RESULT  2+ Disintegrating polys*  Rare Gram positive cocci in clusters*   WOUND CULTURE  2+ Growth of Proteus mirabilis*  2+ Growth of Staphylococcus aureus*       Last 24 Hours Medication List:   Current Facility-Administered Medications   Medication Dose Route Frequency Provider Last Rate    acetaminophen  650 mg Oral Q8H PRN Toshia Anand, CRNP      albuterol  2.5 mg Nebulization Q4H PRN Toshia Rosarioust, CRNP      budesonide  0.5 mg Nebulization Q12H PRN Toshia Rosarioust, CRNP      buPROPion  75 mg Oral Daily Toshia Rosarioust, CRNP      cefazolin  2,000 mg Intravenous Q8H Son Mckeon PA-C 2,000 mg (09/03/24 1150)    citalopram  10 mg Oral HS Toshai Anand, SHAKIRA      colchicine  0.6 mg Oral Daily Toshia Anand, CRNP      diazepam  5 mg Oral Q8H PRN Toshia Anand, CRKARINA      fluticasone  2 spray Nasal Daily Toshia Anand, SHAKIRA      guaiFENesin  600 mg Oral Q12H NANCY Hassan PA-C      heparin (porcine)  5,000 Units Subcutaneous Q8H [facility] Toshia Anand, CRKARINA      metoclopramide  5 mg Intravenous Q6H PRN Sosa Tang DO      ondansetron  4 mg Intravenous Q6H PRN Toshia Anand, CRKARINA      pantoprazole  40 mg Oral Early Morning Toshia Anand, SHAKIRA      sodium chloride  50 mL/hr Intravenous Continuous Sosa Tang DO 50 mL/hr (09/03/24 1340)    traZODone  50 mg Oral HS Toshia Anand, SHAKIRA          Today, Patient Was Seen By: Sosa Tang DO    **Please Note: This note may have been constructed using a voice recognition system.**

## 2024-09-03 NOTE — PROGRESS NOTES
Patient is having dizziness and severe nausea tonight. Notified SLIM that patient received Zofran and Reglan but has had no relief. Order for Tigan administered and NSS 500ml bolus infusing.

## 2024-09-03 NOTE — UTILIZATION REVIEW
Dipika Haley RN   Registered Nurse  Specialty: Utilization Review     Utilization Review     Signed     Date of Service: 8/31/2024  2:40 PM     Signed       Expand All Collapse All    Initial Clinical Review     Admission: Date/Time/Statement:   Admission Orders (From admission, onward)          Ordered         08/30/24 1654   INPATIENT ADMISSION  Once                                     Orders Placed This Encounter   Procedures    INPATIENT ADMISSION       Standing Status:   Standing       Number of Occurrences:   1       Order Specific Question:   Level of Care       Answer:   Med Surg [16]       Order Specific Question:   Estimated length of stay       Answer:   More than 2 Midnights       Order Specific Question:   Certification       Answer:   I certify that inpatient services are medically necessary for this patient for a duration of greater than two midnights. See H&P and MD Progress Notes for additional information about the patient's course of treatment.      ED Arrival Information         Expected   8/30/2024 12:56    Arrival   8/30/2024 12:56    Acuity   Urgent                 Means of arrival   Ambulance    Escorted by   [facility]    Admission type   Emergency                 Arrival complaint   Incision bleeding                     Chief Complaint   Patient presents with    Wound Check       Cardiac cath incision check. Possible infection         Initial Presentation: 74 y.o. female to ED via EMS from home.    Admitted to inpatient with Dx: Infected wound.  Presented to ED with suspected wound infection.  She had a cardiac catheterization about a week ago and noted that her right groin insertion site was swollen and had some drainage.  She came to the ER for evaluation where imaging revealed a pericardial effusion and pleural effusion.  .   PMHx: muscular dystrophy, restrictive lung disease, chronic respiratory failure, BiPAP, and GERD .   Date:  "08/30/2024   On exam: + for weakness.  Obese.  Breath sounds - rales.  + for wound.  + for gait problem. With generalized weakness, required assist to sit up, legs are weak     Imaging shows CT AP: Inflammation of the right inguinal superficial and deep subcutaneous tissues without an abscess.  Pericardial effusion is new since July 23, 2024. Suspected interstitial edema with increased size of the small left pleural effusion.  ED treatment PO Motrin.  IV ancef.  Sq heparin.  IV lasix 40mg.    Plan includes Telemetry. Will continue colchicine and ibuprofen for now.  Consult Cardio.  Continue IV lasix.  Daily weight.  I&O.  Continue IV ancef.  Consult general Surgery.         Anticipated Length of Stay/Certification Statement: Patient will be admitted on an inpatient basis with an anticipated length of stay of greater than 2 midnights secondary to suspected wound infection, pericardial effusion, pleural effusion.      Day 2: 08/31/2024  Telemetry.  Colchicine / Ibuprofen.  Continue IV lasix BID.  Daily weight .  I&O.  IV Ancef.       08/31/2024  Consult General Surgery:  74 year old female with PMH significant for HLD, pericarditis, chronic respiratory failure on home oxygen, muscular dystrophy, recent cardiac cath with R groin access 8/21/24 presents for the evaluation of wound at R groin site  Afebrile, vitals stable  WBC 14.43 (16.77)  Hgb 10.8, stable   K 3.4   Cr 0.37  CTAP: \"Inflammation of the right inguinal superficial and deep subcutaneous tissues without an abscess. Pericardial effusion is new since July 23, 2024. Suspected interstitial edema with increased size of the small left pleural effusion.\"  Right groin access site with surrounding induration.  Incision with bloody drainage.  No purulent drainage noted   Plan:  No surgical intervention indicated at this time, likely hematoma vs developing infection  Monitor labs and vitals  Continue IV Ancef  Appreciate cardiology consult  Diet as tolerated  Continue " local wound care     08/31/2024  Consult Cardio:    Pericardial effusion  Trivial pericardial effusion noted on recent echo  No evidence of tamponade  No further workup needed   Acute idiopathic pericarditis  Recently hospitalized with chest discomfort and EKG changes  Cardiac catheterization without evidence of coronary disease  Symptoms are resolved-continue colchicine ibuprofen and Protonix   * Infected wound  Concern for infected cath site  White count is elevated, though this appears chronic  Patient is afebrile and denies fever or chills prior to admission  Preliminary wound culture results noted             ED Triage Vitals [08/30/24 1300]   Temperature Pulse Respirations Blood Pressure SpO2 Pain Score   97.8 °F (36.6 °C) 88 20 116/62 96 % 6      Weight (last 2 days)         Date/Time Weight     08/31/24 0551 75.4 (166.23)     08/31/24 0550 75.4 (166.23)     08/31/24 0535 75.4 (166.23)     08/30/24 17:56:30 83.9 (184.97)     08/30/24 1300 77.1 (170)                Vital Signs (last 3 days)         Date/Time Temp Pulse Resp BP MAP (mmHg) SpO2 Calculated FIO2 (%) - Nasal Cannula Nasal Cannula O2 Flow Rate (L/min) O2 Device O2 Interface Device Patient Position - Orthostatic VS [place] Coma Scale Score Pain     08/31/24 11:23:57 98.2 °F (36.8 °C) 92 20 122/72 89 95 % -- -- -- -- -- -- --     08/31/24 0835 -- -- -- -- -- -- -- -- -- -- -- 15 No Pain     08/31/24 07:12:53 98 °F (36.7 °C) 84 18 113/58 76 96 % 28 2 L/min Nasal cannula -- -- -- --     08/31/24 0704 -- -- -- -- -- 96 % 28 2 L/min Nasal cannula -- -- -- --     08/31/24 0540 -- -- -- -- -- -- -- -- -- -- -- -- 8     08/31/24 03:02:35 98.1 °F (36.7 °C) 84 16 109/57 74 96 % -- -- -- -- -- -- --     08/31/24 00:58:39 -- 86 -- 119/60 80 97 % -- -- -- -- -- -- --     08/30/24 2355 -- -- -- -- -- 90 % -- -- -- -- -- -- --     08/30/24 23:09:21 98 °F (36.7 °C) 89 17 130/102 111 93 % -- -- -- -- -- -- --     08/30/24 2305 -- -- -- -- -- -- -- -- -- Full face  mask -- -- --     08/30/24 2110 -- -- -- -- -- -- -- -- -- -- -- -- No Pain     08/30/24 2039 -- -- -- -- -- -- -- -- -- -- -- 15 No Pain     08/30/24 19:11:36 98.1 °F (36.7 °C) 104 18 103/68 80 95 % -- -- -- -- -- -- --     08/30/24 1832 -- 95 -- -- -- 97 % 28 2 L/min Nasal cannula -- -- -- --     08/30/24 17:56:30 97.8 °F (36.6 °C) 96 17 154/77 103 94 % -- -- -- -- -- -- No Pain     08/30/24 1730 -- -- -- -- -- -- 28 2 L/min Nasal cannula -- -- -- No Pain     08/30/24 1700 -- 91 -- 116/60 82 93 % 28 2 L/min Nasal cannula -- Lying -- --     08/30/24 1600 -- 86 -- 111/57 79 95 % -- -- -- -- -- -- --     08/30/24 1515 -- 84 18 112/58 77 97 % -- -- None (Room air) -- Lying -- --     08/30/24 1512 -- 85 18 112/58 77 -- -- -- -- -- Lying -- --     08/30/24 1401 -- 83 18 99/53 69 -- -- -- -- -- Lying -- --     08/30/24 1342 -- -- -- -- -- -- -- -- -- -- -- 15 --     08/30/24 1300 97.8 °F (36.6 °C) 88 20 116/62 -- 96 % -- -- None (Room air) -- Lying -- 6                   Pertinent Labs/Diagnostic Test Results:   Radiology:  VAS DUPLEX EVALUATION FOR PSEUDOANEURYSM   Final Interpretation by Chino Goldberg MD (08/31 2358)       CT abdomen pelvis with contrast   Final Interpretation by Anthony Ross MD (08/30 1510)   1.  Inflammation of the right inguinal superficial and deep subcutaneous tissues without an abscess.   2.  Pericardial effusion is new since July 23, 2024.   3.  Suspected interstitial edema with increased size of the small left pleural effusion.          08/21/2024  Chest X:  Unchanged discoid densities at the left lung base suggestive of subsegmental atelectasis or scarring. Unchanged mild interstitial marking prominence.      08/27/2024  Diagnostic Mammo right:  Suspicious right breast masses at the 5-6 o'clock axis with a dominant   mass at the 6:00 axis, 3 cm from the nipple.  Recommend representative   ultrasound-guided biopsy.   Possibly reactive right axillary lymph nodes given patient's  recent   diagnosis of pericarditis.  Recommend short interval follow-up ultrasound   of the right axillary tissue in 3 months pending results of right breast   biopsy.  If right breast biopsy yields carcinoma, ultrasound-guided biopsy   or fine-needle aspiration of 1 of these nodes should be considered as   clinically warranted.     Findings and recommendations were discussed directly with the patient   prior to leaving the breast center who is in agreement with this plan.      08/27/2024  UR right breast:  Suspicious right breast masses at the 5-6 o'clock axis with a dominant   mass at the 6:00 axis, 3 cm from the nipple.  Recommend representative   ultrasound-guided biopsy.   Possibly reactive right axillary lymph nodes given patient's recent   diagnosis of pericarditis.  Recommend short interval follow-up ultrasound   of the right axillary tissue in 3 months pending results of right breast   biopsy.  If right breast biopsy yields carcinoma, ultrasound-guided biopsy   or fine-needle aspiration of 1 of these nodes should be considered as   clinically warranted.     Findings and recommendations were discussed directly with the patient   prior to leaving the [facility] center who is in agreement with this plan.      Cardiology:  No orders to display      GI:  No orders to display            Results from last 7 days   Lab Units 08/31/24  0523 08/30/24  1345   WBC Thousand/uL 14.43* 16.77*   HEMOGLOBIN g/dL 10.8* 12.1   HEMATOCRIT % 36.7 39.9   PLATELETS Thousands/uL 507* 490*   TOTAL NEUT ABS Thousands/µL 9.89* 13.03*            Results from last 7 days   Lab Units 08/31/24  0523 08/30/24  1345   SODIUM mmol/L 140 139   POTASSIUM mmol/L 3.4* 4.8   CHLORIDE mmol/L 94* 94*   CO2 mmol/L 39* 34*   ANION GAP mmol/L 7 11   BUN mg/dL 13 13   CREATININE mg/dL 0.37* 0.33*   EGFR ml/min/1.73sq m 105 109   CALCIUM mg/dL 8.6 9.3           Results from last 7 days   Lab Units 08/30/24  1345   AST U/L 25   ALT U/L 12   ALK PHOS U/L 63    TOTAL PROTEIN g/dL 7.6   ALBUMIN g/dL 3.7   TOTAL BILIRUBIN mg/dL 0.27            Results from last 7 days   Lab Units 08/31/24  0523 08/30/24  1345   GLUCOSE RANDOM mg/dL 94 120           Results from last 7 days   Lab Units 08/30/24  1345   BNP pg/mL 52           Results from last 7 days   Lab Units 08/30/24  1707   GRAM STAIN RESULT   2+ Disintegrating polys*  Rare Gram positive cocci in clusters*   WOUND CULTURE   Culture too young- will reincubate      ED Treatment-Medication Administration from 08/30/2024 1156 to 08/30/2024 1749           Date/Time Order Dose Route Action       08/30/2024 1444 iohexol (OMNIPAQUE) 350 MG/ML injection (MULTI-DOSE) 100 mL 100 mL Intravenous Given       08/30/2024 1710 ceFAZolin (ANCEF) IVPB (premix in dextrose) 2,000 mg 50 mL 2,000 mg Intravenous New Bag       08/30/2024 1712 furosemide (LASIX) injection 40 mg 40 mg Intravenous Given                Medical History        Past Medical History:   Diagnosis Date    Anxiety      Breast cancer ([facility]) 2007    Colitis      Depression      Difficult intubation      Excessive daytime sleepiness      GERD (gastroesophageal reflux disease)      Hyperlipidemia      Ischemic colitis ([facility]) 01/21/2019    Leukocytosis 03/28/2020    Muscular dystrophy ([facility])       Limb-girdle    Osteoporosis      Overactive bladder      Perforated diverticulum 03/28/2020    Pneumonitis      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[facility])      Skin cancer      Skin disorder      Tachycardia 06/02/2021    Vitamin D deficiency           Present on Admission:   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[facility])   Insomnia   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[facility])   Dystrophy, muscular, 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[facility])   Acute idiopathic pericarditis        Admitting Diagnosis: Pericardial effusion [I31.39]  Pleural effusion [J90]  Interstitial edema [R60.9]  Wound infection [T14.8XXA, L08.9]  Visit for wound check [Z51.89]  Age/Sex: 74 y.o.  female  Admission Orders:  Telemetry  Regular diet  Up & OOB as tolerated  Daily weight  I&O q8h  BiPAP HS  PT/OT  Jono SCDs     Scheduled Medications:    Scheduled Medications ONLY (does not pull in infusions nor PRN medications order   buPROPion, 75 mg, Oral, Daily  cefazolin, 2,000 mg, Intravenous, Q8H  citalopram, 10 mg, Oral, HS  colchicine, 0.6 mg, Oral, Daily  fluticasone, 2 spray, Nasal, Daily  furosemide, 40 mg, Intravenous, BID (diuretic)  guaiFENesin, 600 mg, Oral, Q12H NANCY  heparin (porcine), 5,000 Units, Subcutaneous, Q8H NANCY  ibuprofen, 800 mg, Oral, Q8H NANCY  traZODone, 50 mg, Oral, HS         Continuous IV Infusions:  Infusions Meds - Displays dose, route, & frequency only         PRN Meds:    PRN Medications - displays dose, route, and frequency   acetaminophen, 650 mg, Oral, Q8H PRN  albuterol, 2.5 mg, Nebulization, Q4H PRN  budesonide, 0.5 mg, Nebulization, Q12H PRN  diazepam, 5 mg, Oral, Q8H PRN  ondansetron, 4 mg, Intravenous, Q6H PRN            IP CONSULT TO ACUTE CARE SURGERY  IP CONSULT TO CARDIOLOGY     Network Utilization Review Department  ATTENTION: Please call with any questions or concerns to [phone] and carefully listen to the prompts so that you are directed to the right person. All voicemails are confidential.   For Discharge needs, contact Care Management DC Support Team at [phone] opt. 2  Send all requests for admission clinical reviews, approved or denied determinations and any other requests to dedicated fax number below belonging to the campus where the patient is receiving treatment. List of dedicated fax numbers for the Facilities:  FACILITY NAME UR FAX NUMBER   ADMISSION DENIALS (Administrative/Medical Necessity) [phone]   DISCHARGE SUPPORT TEAM (NETWORK) [phone]   PARENT CHILD HEALTH (Maternity/NICU/Pediatrics) [phone]   General acute hospital [phone]   [facility] [phone]   [facility]  [facility] [phone]   [facility] [phone]   [facility] [phone]   [facility] [phone]   [facility] [phone]   [facility] [phone]   [facility] [phone]   [facility] [phone]   [facility] [phone]   [facility] [phone]                            Dipika Haley RN   Registered Nurse  Specialty: Utilization Review     Utilization Review     Addendum     Date of Service: 9/1/2024 11:30 AM     Expand All Collapse All    Continued Stay Review     Date: 09/01/2024                        Date: 09/01/2024  Day 3: Has surpassed a 2nd midnight with active treatments and services.     Current Patient Class: Inpatient                   Current Level of Care: Med/Surg     HPI:74 y.o. female initially admitted on 08/30/2024      Assessment/Plan: infected wound / pericardial Effusion.  Telemetry.  Continue IV lasix 40mg BID.  Daily weight.  I&O.  Continue colchicine & motrin.  Continue IV Abx Cefepime & vanco.  Add PO pantoprazole.  Follow up wound culture results.         Vital Signs (last 3 days)         Date/Time Temp Pulse Resp BP MAP (mmHg) SpO2 Calculated FIO2 (%) - Nasal Cannula Nasal Cannula O2 Flow Rate (L/min) O2 Device O2 Interface Device Patient Position - Orthostatic VS [place] Coma Scale Score Pain     09/01/24 0817 -- -- -- -- -- -- -- -- -- -- -- -- Med Not Given for Pain - for MAR use only     09/01/24 0725 -- -- -- -- -- -- -- -- -- -- -- 15 2     09/01/24 06:59:31 97.8 °F (36.6 °C) -- 16 113/70 84 -- -- -- -- -- -- -- --     09/01/24 0519 -- -- -- -- -- -- -- -- -- -- -- -- 1     09/01/24 03:12:54 97.6 °F (36.4 °C) 86 15 121/74 90 97 % -- -- -- -- -- -- --     09/01/24 0100 -- -- -- -- -- -- -- --  -- -- -- 15 No Pain     08/31/24 22:16:59 97.8 °F (36.6 °C) -- 16 135/69 91 -- -- -- -- -- -- -- --     08/31/24 2205 -- -- -- -- -- -- -- -- -- -- -- -- 3     08/31/24 2200 -- -- -- -- -- -- 28 2 L/min Nasal cannula -- -- -- --     08/31/24 19:27:04 98 °F (36.7 °C) 92 16 117/74 88 91 % -- -- -- -- Lying -- --     08/31/24 1556 -- -- -- -- -- -- -- -- -- -- -- -- 4     08/31/24 15:03:54 97.6 °F (36.4 °C) 97 17 143/82 102 94 % 28 2 L/min Nasal cannula -- Lying -- --     08/31/24 11:23:57 98.2 °F (36.8 °C) 92 20 122/72 89 95 % -- -- -- -- -- -- --     08/31/24 0835 -- -- -- -- -- -- -- -- -- -- -- 15 No Pain     08/31/24 07:12:53 98 °F (36.7 °C) 84 18 113/58 76 96 % 28 2 L/min Nasal cannula -- -- -- --     08/31/24 0704 -- -- -- -- -- 96 % 28 2 L/min Nasal cannula -- -- -- --     08/31/24 0540 -- -- -- -- -- -- -- -- -- -- -- -- 8 08/31/24 03:02:35 98.1 °F (36.7 °C) 84 16 109/57 74 96 % -- -- -- -- -- -- --     08/31/24 00:58:39 -- 86 -- 119/60 80 97 % -- -- -- -- -- -- --     08/30/24 2355 -- -- -- -- -- 90 % -- -- -- -- -- -- --     08/30/24 23:09:21 98 °F (36.7 °C) 89 17 130/102 111 93 % -- -- -- -- -- -- --     08/30/24 2305 -- -- -- -- -- -- -- -- -- Full face mask -- -- --     08/30/24 2110 -- -- -- -- -- -- -- -- -- -- -- -- No Pain     08/30/24 2039 -- -- -- -- -- -- -- -- -- -- -- 15 No Pain     08/30/24 19:11:36 98.1 °F (36.7 °C) 104 18 103/68 80 95 % -- -- -- -- -- -- --     08/30/24 1832 -- 95 -- -- -- 97 % 28 2 L/min Nasal cannula -- -- -- --     08/30/24 17:56:30 97.8 °F (36.6 °C) 96 17 154/77 103 94 % -- -- -- -- -- -- No Pain     08/30/24 1730 -- -- -- -- -- -- 28 2 L/min Nasal cannula -- -- -- No Pain     08/30/24 1700 -- 91 -- 116/60 82 93 % 28 2 L/min Nasal cannula -- Lying -- --     08/30/24 1600 -- 86 -- 111/57 79 95 % -- -- -- -- -- -- --     08/30/24 1515 -- 84 18 112/58 77 97 % -- -- None (Room air) -- Lying -- --     08/30/24 1512 -- 85 18 112/58 77 -- -- -- -- -- Lying -- --     08/30/24  1401 -- 83 18 99/53 69 -- -- -- -- -- Lying -- --     08/30/24 1342 -- -- -- -- -- -- -- -- -- -- -- 15 --     08/30/24 1300 97.8 °F (36.6 °C) 88 20 116/62 -- 96 % -- -- None (Room air) -- Lying -- 6             Weight (last 2 days)         Date/Time Weight     09/01/24 0600 76 (167.55)     08/31/24 0551 75.4 (166.23)     08/31/24 0550 75.4 (166.23)     08/31/24 0535 75.4 (166.23)     08/30/24 17:56:30 83.9 (184.97)     08/30/24 1300 77.1 (170)                   Pertinent Labs/Diagnostic Results:   Radiology:  VAS DUPLEX EVALUATION FOR PSEUDOANEURYSM   Final Interpretation by Chino Goldberg MD (08/31 1355)       CT abdomen pelvis with contrast   Final Interpretation by Anthony Ross MD (08/30 1510)   1.  Inflammation of the right inguinal superficial and deep subcutaneous tissues without an abscess.   2.  Pericardial effusion is new since July 23, 2024.   3.  Suspected interstitial edema with increased size of the small left pleural effusion.          Cardiology:  No orders to display      GI:  No orders to display             Results from last 7 days   Lab Units 09/01/24 0442 08/31/24 0523 08/30/24  1345   WBC Thousand/uL 13.09* 14.43* 16.77*   HEMOGLOBIN g/dL 11.2* 10.8* 12.1   HEMATOCRIT % 37.1 36.7 39.9   PLATELETS Thousands/uL 499* 507* 490*   TOTAL NEUT ABS Thousands/µL 8.87* 9.89* 13.03*             Results from last 7 days   Lab Units 09/01/24  0442 08/31/24  0523 08/30/24  1345   SODIUM mmol/L 140 140 139   POTASSIUM mmol/L 3.3* 3.4* 4.8   CHLORIDE mmol/L 88* 94* 94*   CO2 mmol/L 44* 39* 34*   ANION GAP mmol/L 8 7 11   BUN mg/dL 18 13 13   CREATININE mg/dL 0.61 0.37* 0.33*   EGFR ml/min/1.73sq m 89 105 109   CALCIUM mg/dL 8.9 8.6 9.3           Results from last 7 days   Lab Units 08/30/24  1345   AST U/L 25   ALT U/L 12   ALK PHOS U/L 63   TOTAL PROTEIN g/dL 7.6   ALBUMIN g/dL 3.7   TOTAL BILIRUBIN mg/dL 0.27             Results from last 7 days   Lab Units 09/01/24  0442 08/31/24  0523  08/30/24  1345   GLUCOSE RANDOM mg/dL 104 94 120            Results from last 7 days   Lab Units 08/30/24  1345   BNP pg/mL 52           Results from last 7 days   Lab Units 08/30/24  1707   GRAM STAIN RESULT   2+ Disintegrating polys*  Rare Gram positive cocci in clusters*   WOUND CULTURE   2+ Growth of Non lactose fermenting gram negative dena*      Medications:   Scheduled Medications:    Scheduled Medications ONLY (does not pull in infusions nor PRN medications order   buPROPion, 75 mg, Oral, Daily  cefepime, 2,000 mg, Intravenous, Q12H  citalopram, 10 mg, Oral, HS  colchicine, 0.6 mg, Oral, Daily  fluticasone, 2 spray, Nasal, Daily  furosemide, 40 mg, Intravenous, BID (diuretic)  guaiFENesin, 600 mg, Oral, Q12H NANCY  heparin (porcine), 5,000 Units, Subcutaneous, Q8H NANCY  ibuprofen, 800 mg, Oral, Q8H NANCY  pantoprazole, 40 mg, Oral, Early Morning  traZODone, 50 mg, Oral, HS  vancomycin, 750 mg, Intravenous, Q12H         Continuous IV Infusions:  Infusions Meds - Displays dose, route, & frequency only         PRN Meds:  acetaminophen, 650 mg, Oral, Q8H PRN  albuterol, 2.5 mg, Nebulization, Q4H PRN  budesonide, 0.5 mg, Nebulization, Q12H PRN  diazepam, 5 mg, Oral, Q8H PRN  ondansetron, 4 mg, Intravenous, Q6H PRN   x 2 doses 9/1           Discharge Plan: D     Network Utilization Review Department  ATTENTION: Please call with any questions or concerns to [phone] and carefully listen to the prompts so that you are directed to the right person. All voicemails are confidential.   For Discharge needs, contact Care Management DC Support Team at [phone] opt. 2  Send all requests for admission clinical reviews, approved or denied determinations and any other requests to dedicated fax number below belonging to the campus where the patient is receiving treatment. List of dedicated fax numbers for the Facilities:  FACILITY NAME UR FAX NUMBER   ADMISSION DENIALS (Administrative/Medical Necessity) [phone]    DISCHARGE SUPPORT TEAM (NETWORK) [phone]   PARENT CHILD HEALTH (Maternity/NICU/Pediatrics) [phone]   [facility] [phone]   [facility] [phone]   [facility] [phone]   [facility] [phone]   [facility] [phone]   [facility] [phone]   [facility] [phone]   [facility] [phone]   [facility] [phone]   [facility] [phone]   [facility] [phone]   [facility] [phone]            Revision History

## 2024-09-03 NOTE — ASSESSMENT & PLAN NOTE
Patient with continued nausea/dr heaving  Initially thought to be due to abx but symptoms persist despite abx change  Today with rising creatinine  Continue to hold Lasix  Check Renal US  Begin IVF and follow-up on AM labs

## 2024-09-03 NOTE — PLAN OF CARE
Problem: PAIN - ADULT  Goal: Verbalizes/displays adequate comfort level or baseline comfort level  Description: Interventions:  - Encourage patient to monitor pain and request assistance  - Assess pain using appropriate pain scale  - Administer analgesics based on type and severity of pain and evaluate response  - Implement non-pharmacological measures as appropriate and evaluate response  - Consider cultural and social influences on pain and pain management  - Notify physician/advanced practitioner if interventions unsuccessful or patient reports new pain  Outcome: Progressing     Problem: INFECTION - ADULT  Goal: Absence or prevention of progression during hospitalization  Description: INTERVENTIONS:  - Assess and monitor for signs and symptoms of infection  - Monitor lab/diagnostic results  - Monitor all insertion sites, i.e. indwelling lines, tubes, and drains  - Monitor endotracheal if appropriate and nasal secretions for changes in amount and color  - Coin appropriate cooling/warming therapies per order  - Administer medications as ordered  - Instruct and encourage patient and family to use good hand hygiene technique  - Identify and instruct in appropriate isolation precautions for identified infection/condition  Outcome: Progressing  Goal: Absence of fever/infection during neutropenic period  Description: INTERVENTIONS:  - Monitor WBC    Outcome: Progressing     Problem: SAFETY ADULT  Goal: Patient will remain free of falls  Description: INTERVENTIONS:  - Educate patient/family on patient safety including physical limitations  - Instruct patient to call for assistance with activity   - Consult OT/PT to assist with strengthening/mobility   - Keep Call bell within reach  - Keep bed low and locked with side rails adjusted as appropriate  - Keep care items and personal belongings within reach  - Initiate and maintain comfort rounds  - Make Fall Risk Sign visible to staff  - Offer Toileting every 2 Hours,  in advance of need  - Initiate/Maintain bed alarm  - Obtain necessary fall risk management equipment: walker   - Apply yellow socks and bracelet for high fall risk patients  - Consider moving patient to room near nurses station  Outcome: Progressing  Goal: Maintain or return to baseline ADL function  Description: INTERVENTIONS:  -  Assess patient's ability to carry out ADLs; assess patient's baseline for ADL function and identify physical deficits which impact ability to perform ADLs (bathing, care of mouth/teeth, toileting, grooming, dressing, etc.)  - Assess/evaluate cause of self-care deficits   - Assess range of motion  - Assess patient's mobility; develop plan if impaired  - Assess patient's need for assistive devices and provide as appropriate  - Encourage maximum independence but intervene and supervise when necessary  - Involve family in performance of ADLs  - Assess for home care needs following discharge   - Consider OT consult to assist with ADL evaluation and planning for discharge  - Provide patient education as appropriate  Outcome: Progressing  Goal: Maintains/Returns to pre admission functional level  Description: INTERVENTIONS:  - Perform AM-PAC 6 Click Basic Mobility/ Daily Activity assessment daily.  - Set and communicate daily mobility goal to care team and patient/family/caregiver.   - Collaborate with rehabilitation services on mobility goals if consulted  - Perform Range of Motion 3 times a day.  - Reposition patient every 2 hours.  - Dangle patient 3 times a day  - Stand patient 3 times a day  - Ambulate patient 3 times a day  - Out of bed to chair 3 times a day   - Out of bed for meals 3 times a day  - Out of bed for toileting  - Record patient progress and toleration of activity level   Outcome: Progressing     Problem: DISCHARGE PLANNING  Goal: Discharge to home or other facility with appropriate resources  Description: INTERVENTIONS:  - Identify barriers to discharge w/patient and  caregiver  - Arrange for needed discharge resources and transportation as appropriate  - Identify discharge learning needs (meds, wound care, etc.)  - Arrange for interpretive services to assist at discharge as needed  - Refer to Case Management Department for coordinating discharge planning if the patient needs post-hospital services based on physician/advanced practitioner order or complex needs related to functional status, cognitive ability, or social support system  Outcome: Progressing     Problem: Knowledge Deficit  Goal: Patient/family/caregiver demonstrates understanding of disease process, treatment plan, medications, and discharge instructions  Description: Complete learning assessment and assess knowledge base.  Interventions:  - Provide teaching at level of understanding  - Provide teaching via preferred learning methods  Outcome: Progressing     Problem: Prexisting or High Potential for Compromised Skin Integrity  Goal: Skin integrity is maintained or improved  Description: INTERVENTIONS:  - Identify patients at risk for skin breakdown  - Assess and monitor skin integrity  - Assess and monitor nutrition and hydration status  - Monitor labs   - Assess for incontinence   - Turn and reposition patient  - Assist with mobility/ambulation  - Relieve pressure over bony prominences  - Avoid friction and shearing  - Provide appropriate hygiene as needed including keeping skin clean and dry  - Evaluate need for skin moisturizer/barrier cream  - Collaborate with interdisciplinary team   - Patient/family teaching  - Consider wound care consult   Outcome: Progressing

## 2024-09-03 NOTE — PHYSICAL THERAPY NOTE
PT Cancellation    09/03/24 5689   PT Last Visit   PT Visit Date 09/03/24   Note Type   Note type Cancelled Session   Cancel Reasons Medical status   Additional Comments PT order recieved and chart review performed. Pt at this time declined PT evaluation due to severe nausea. PT will follow and evaluate to provide skilled interventions when appropriate. Nursing made aware.

## 2024-09-03 NOTE — ASSESSMENT & PLAN NOTE
Imaging as above  Patient appears somewhat dehydrated with dry mucous membranes, increasing creatinine, and hemoconcentration  Continue to hold Lasix

## 2024-09-04 ENCOUNTER — APPOINTMENT (INPATIENT)
Dept: CT IMAGING | Facility: HOSPITAL | Age: 74
DRG: 862 | End: 2024-09-04
Payer: COMMERCIAL

## 2024-09-04 PROBLEM — E43 SEVERE PROTEIN-CALORIE MALNUTRITION (HCC): Status: ACTIVE | Noted: 2024-09-04

## 2024-09-04 LAB
ANION GAP SERPL CALCULATED.3IONS-SCNC: 15 MMOL/L (ref 4–13)
BUN SERPL-MCNC: 49 MG/DL (ref 5–25)
CALCIUM SERPL-MCNC: 8.4 MG/DL (ref 8.4–10.2)
CHLORIDE SERPL-SCNC: 92 MMOL/L (ref 96–108)
CO2 SERPL-SCNC: 32 MMOL/L (ref 21–32)
CREAT SERPL-MCNC: 1.24 MG/DL (ref 0.6–1.3)
ERYTHROCYTE [DISTWIDTH] IN BLOOD BY AUTOMATED COUNT: 16.9 % (ref 11.6–15.1)
GFR SERPL CREATININE-BSD FRML MDRD: 42 ML/MIN/1.73SQ M
GLUCOSE SERPL-MCNC: 117 MG/DL (ref 65–140)
HCT VFR BLD AUTO: 34.7 % (ref 34.8–46.1)
HGB BLD-MCNC: 10.9 G/DL (ref 11.5–15.4)
MCH RBC QN AUTO: 26.6 PG (ref 26.8–34.3)
MCHC RBC AUTO-ENTMCNC: 31.4 G/DL (ref 31.4–37.4)
MCV RBC AUTO: 85 FL (ref 82–98)
PLATELET # BLD AUTO: 625 THOUSANDS/UL (ref 149–390)
PMV BLD AUTO: 10.2 FL (ref 8.9–12.7)
POTASSIUM SERPL-SCNC: 2.9 MMOL/L (ref 3.5–5.3)
RBC # BLD AUTO: 4.1 MILLION/UL (ref 3.81–5.12)
SODIUM SERPL-SCNC: 139 MMOL/L (ref 135–147)
WBC # BLD AUTO: 20.32 THOUSAND/UL (ref 4.31–10.16)

## 2024-09-04 PROCEDURE — 99233 SBSQ HOSP IP/OBS HIGH 50: CPT | Performed by: INTERNAL MEDICINE

## 2024-09-04 PROCEDURE — 94664 DEMO&/EVAL PT USE INHALER: CPT

## 2024-09-04 PROCEDURE — 80048 BASIC METABOLIC PNL TOTAL CA: CPT | Performed by: INTERNAL MEDICINE

## 2024-09-04 PROCEDURE — 74177 CT ABD & PELVIS W/CONTRAST: CPT

## 2024-09-04 PROCEDURE — 99232 SBSQ HOSP IP/OBS MODERATE 35: CPT | Performed by: INTERNAL MEDICINE

## 2024-09-04 PROCEDURE — 85027 COMPLETE CBC AUTOMATED: CPT | Performed by: INTERNAL MEDICINE

## 2024-09-04 PROCEDURE — 97530 THERAPEUTIC ACTIVITIES: CPT

## 2024-09-04 PROCEDURE — 94760 N-INVAS EAR/PLS OXIMETRY 1: CPT

## 2024-09-04 RX ORDER — POTASSIUM CHLORIDE 14.9 MG/ML
20 INJECTION INTRAVENOUS
Status: COMPLETED | OUTPATIENT
Start: 2024-09-04 | End: 2024-09-04

## 2024-09-04 RX ORDER — POTASSIUM CHLORIDE 1500 MG/1
40 TABLET, EXTENDED RELEASE ORAL ONCE
Status: COMPLETED | OUTPATIENT
Start: 2024-09-04 | End: 2024-09-04

## 2024-09-04 RX ADMIN — POTASSIUM CHLORIDE 20 MEQ: 14.9 INJECTION, SOLUTION INTRAVENOUS at 08:35

## 2024-09-04 RX ADMIN — FLUTICASONE PROPIONATE 2 SPRAY: 50 SPRAY, METERED NASAL at 08:39

## 2024-09-04 RX ADMIN — CITALOPRAM HYDROBROMIDE 10 MG: 10 TABLET ORAL at 22:33

## 2024-09-04 RX ADMIN — HEPARIN SODIUM 5000 UNITS: 5000 INJECTION, SOLUTION INTRAVENOUS; SUBCUTANEOUS at 21:51

## 2024-09-04 RX ADMIN — GUAIFENESIN 600 MG: 600 TABLET ORAL at 21:51

## 2024-09-04 RX ADMIN — SODIUM CHLORIDE 50 ML/HR: 0.9 INJECTION, SOLUTION INTRAVENOUS at 08:38

## 2024-09-04 RX ADMIN — IOHEXOL 100 ML: 350 INJECTION, SOLUTION INTRAVENOUS at 10:36

## 2024-09-04 RX ADMIN — PANTOPRAZOLE SODIUM 40 MG: 40 TABLET, DELAYED RELEASE ORAL at 06:47

## 2024-09-04 RX ADMIN — HEPARIN SODIUM 5000 UNITS: 5000 INJECTION, SOLUTION INTRAVENOUS; SUBCUTANEOUS at 06:47

## 2024-09-04 RX ADMIN — POTASSIUM CHLORIDE 40 MEQ: 1500 TABLET, EXTENDED RELEASE ORAL at 08:34

## 2024-09-04 RX ADMIN — BUPROPION HYDROCHLORIDE TABLETS 75 MG: 75 TABLET, FILM COATED ORAL at 08:41

## 2024-09-04 RX ADMIN — COLCHICINE 0.6 MG: 0.6 TABLET ORAL at 08:39

## 2024-09-04 RX ADMIN — HEPARIN SODIUM 5000 UNITS: 5000 INJECTION, SOLUTION INTRAVENOUS; SUBCUTANEOUS at 15:09

## 2024-09-04 NOTE — MALNUTRITION/BMI
This medical record reflects one or more clinical indicators suggestive of malnutrition and/or morbid obesity.    Malnutrition Findings:   Adult Malnutrition type: Acute illness  Adult Degree of Malnutrition: Other severe protein calorie malnutrition  Malnutrition Characteristics: Inadequate energy, Weight loss    360 Statement: Malnutrition related to acute illness as evidenced by >5% body weight loss in <1-month, <50% energy intake compared to estimated energy needs for >5 days.  To treat with oral diet and nutrition supplements as tolerated.    BMI Findings:           Body mass index is 32.64 kg/m².     See Nutrition note dated 9/4/2024 in flow sheets for additional details.  Completed nutrition assessment is viewable in the nutrition documentation.

## 2024-09-04 NOTE — PLAN OF CARE
Problem: PHYSICAL THERAPY ADULT  Goal: Performs mobility at highest level of function for planned discharge setting.  See evaluation for individualized goals.  Description: Treatment/Interventions: Functional transfer training, Therapeutic exercise, Endurance training, Bed mobility, Equipment eval/education  Equipment Recommended: Walker, Wheelchair       See flowsheet documentation for full assessment, interventions and recommendations.  Outcome: Progressing  Note: Prognosis: Fair  Problem List: Decreased strength, Decreased range of motion, Decreased endurance, Impaired balance, Decreased mobility, Decreased cognition  Assessment: Pt seen for PT treatment session this date with interventions consisting of therapeutic exercise to improve strength to improve functional mobility. Pt agreeable to PT treatment session upon arrival, pt found supine in bed, in no apparent distress and A&O x 2 . Since previous session, pt has made fair progress as evidenced by increased activity tolerance  Barriers during this session include confusion, anxiety, and fatigue.  Pt continues to be functioning below baseline level, and remains limited 2* factors listed above and including decreased strength, impaired activity tolerance, impaired  balance, impaired cognition, decreased ROM, and decreased endurance.  Pt declined wanting to sit on EOB at this time, and was limited to activity secondary to fatigue. Pt prognosis for achieving goals is fair, pending pt progress with hospitalization/medical status improvements, and indicated by motivated to participate in therapy, ability to follow cues, and supportive family. PT will continue to see pt during current hospitalization in order to address the deficits listed above and provide interventions consistent w/ POC in effort to achieve goals. Current goals and POC remain appropriate, pt continues to have rehab potential  Upon conclusion pt supine in bed. The patient's AM-PAC Basic Mobility  Inpatient Short Form Raw Score is 8.  Based on patient presentations and impairments, pt would most appropriately benefit from Level 2 resource intensity upon discharge.  Please also refer to the recommendation of the Physical Therapist for safe discharge planning. RN verbalized pt appropriate for PT session.  Barriers to Discharge: Decreased caregiver support     Rehab Resource Intensity Level, PT: II (Moderate Resource Intensity)    See flowsheet documentation for full assessment.

## 2024-09-04 NOTE — ASSESSMENT & PLAN NOTE
Presented for suspected wound infection to right groin with swelling and drainage  Status post cardiac catheterization 8/21/2024 with insertion site right groin  CT abd/pelvis (8/30): Inflammation of the right inguinal superficial and deep subcutaneous tissues without an abscess. Pericardial effusion. Suspected interstitial edema with increased size of the small left pleural effusion.   Wound Clx: MSSA and Proteus  Completed course of Ancef (5 days total of abx)  Surgery evaluation appreciated  ID following

## 2024-09-04 NOTE — PLAN OF CARE
Problem: PAIN - ADULT  Goal: Verbalizes/displays adequate comfort level or baseline comfort level  Description: Interventions:  - Encourage patient to monitor pain and request assistance  - Assess pain using appropriate pain scale  - Administer analgesics based on type and severity of pain and evaluate response  - Implement non-pharmacological measures as appropriate and evaluate response  - Consider cultural and social influences on pain and pain management  - Notify physician/advanced practitioner if interventions unsuccessful or patient reports new pain  Outcome: Progressing     Problem: INFECTION - ADULT  Goal: Absence or prevention of progression during hospitalization  Description: INTERVENTIONS:  - Assess and monitor for signs and symptoms of infection  - Monitor lab/diagnostic results  - Monitor all insertion sites, i.e. indwelling lines, tubes, and drains  - Monitor endotracheal if appropriate and nasal secretions for changes in amount and color  - [place] appropriate cooling/warming therapies per order  - Administer medications as ordered  - Instruct and encourage patient and family to use good hand hygiene technique  - Identify and instruct in appropriate isolation precautions for identified infection/condition  Outcome: Progressing     Problem: INFECTION - ADULT  Goal: Absence of fever/infection during neutropenic period  Description: INTERVENTIONS:  - Monitor WBC    Outcome: Progressing

## 2024-09-04 NOTE — PROGRESS NOTES
[facility]  Progress Note  Name: Claire Doherty I  MRN: [id number]  Unit/Bed#: -01 I Date of Admission: 8/30/2024   Date of Service: 9/4/2024 I Hospital Day: 5    Assessment & Plan   * Infected wound  Assessment & Plan  Presented for suspected wound infection to right groin with swelling and drainage  Status post cardiac catheterization 8/21/2024 with insertion site right groin  CT abd/pelvis (8/30): Inflammation of the right inguinal superficial and deep subcutaneous tissues without an abscess. Pericardial effusion. Suspected interstitial edema with increased size of the small left pleural effusion.   Wound Clx: MSSA and Proteus  Completed course of Ancef (5 days total of abx)  Surgery evaluation appreciated  ID following    DINA (acute kidney injury) ([facility])  Assessment & Plan  Patient with improved nausea/vomiting  Renal US (9/3): No acute findings.   Continue IVF resuscitation  Avoid hypotension and nephrotoxic agents  Follow-up AM labs    Leukocytosis  Assessment & Plan  WBC count continuing to rise; no acute infectious symptoms noted  Will obtain CT abd/pelvis with contrast for further review  Will obtain blood cultures should the patient develop any fevers  Continue to hold off on further abx at this time  ID following    Pericardial effusion  Assessment & Plan  Patient treated for acute idiopathic pericarditis with colchicine and ibuprofen during hospitalization for above 8/22/2024  TTE 8/26/2024: LVEF 65%. Systolic function is normal. Wall motion is normal. Grade II diastolic dysfunction   Continue colchicine  Cardiology evaluation appreciated    Pleural effusion  Assessment & Plan  Imaging as above  Patient appears somewhat dehydrated with dry mucous membranes, increasing creatinine, and hemoconcentration  Continue to hold Lasix     Dystrophy, muscular, 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HCC)  Assessment & Plan  Uses by BiPAP at night  At baseline can stand and pivot to wheelchair  Lives  alone in an apartment with home health care  Continue supportive care    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)  Assessment & Plan  On supplemental oxygen 2L nasal cannula chronically, uses BiPAP at night  Respiratory protocol           VTE Pharmacologic Prophylaxis: VTE Score: 5 High Risk (Score >/= 5) - Pharmacological DVT Prophylaxis Ordered: heparin. Sequential Compression Devices Ordered.    Mobility:   Basic Mobility Inpatient Raw Score: 13  JH-HLM Goal: 4: Move to chair/commode  JH-HLM Achieved: 3: Sit at edge of bed  JH-HLM Goal NOT achieved. Continue with multidisciplinary rounding and encourage appropriate mobility to improve upon JH-HLM goals.    Patient Centered Rounds: I performed bedside rounds with nursing staff today.   Discussions with Specialists or Other Care Team Provider: ID, Nursing, and CM    Education and Discussions with Family / Patient: Updated  (sister) at bedside.    Current Length of Stay: 5 day(s)  Current Patient Status: Inpatient   Certification Statement: The patient will continue to require additional inpatient hospital stay due to DINA, leukocytosis with possible infectious etiology.  Discharge Plan:  TBD based on clinical improvement.    Code Status: Level 3 - DNAR and DNI    Subjective:   Patient sitting up in bed without any acute complaints. She reports improvement in her nausea and no further dry heaving. She does note that she has poor appetite at this time. No significant overnight events reported by nursing.     Objective:     Vitals:   Temp (24hrs), Av.9 °F (36.6 °C), Min:97.4 °F (36.3 °C), Max:98.4 °F (36.9 °C)    Temp:  [97.4 °F (36.3 °C)-98.4 °F (36.9 °C)] 98.4 °F (36.9 °C)  HR:  [89-93] 89  Resp:  [16-18] 18  BP: (147-155)/(71-72) 147/71  SpO2:  [94 %-97 %] 97 %  Body mass index is 32.64 kg/m².     Input and Output Summary (last 24 hours):     Intake/Output Summary (Last 24 hours) at 2024 1029  Last data filed at 2024  0800  Gross per 24 hour   Intake 60 ml   Output 750 ml   Net -690 ml       Physical Exam:   Physical Exam  Vitals and nursing note reviewed.   Constitutional:       General: She is not in acute distress.     Comments: Chronically ill-appearing   Cardiovascular:      Rate and Rhythm: Normal rate and regular rhythm.      Pulses: Normal pulses.      Heart sounds: Normal heart sounds.   Pulmonary:      Effort: Pulmonary effort is normal. No respiratory distress.      Breath sounds: Normal breath sounds.      Comments: 2L NC  Abdominal:      General: Bowel sounds are normal. There is no distension.      Palpations: Abdomen is soft.      Tenderness: There is no abdominal tenderness.      Comments: Ostomy present   Neurological:      General: No focal deficit present.      Mental Status: She is alert. Mental status is at baseline.   Psychiatric:         Mood and Affect: Mood normal.         Behavior: Behavior normal.          Additional Data:     Labs:  Results from last 7 days   Lab Units 09/04/24  0525 09/03/24  0512 09/02/24  0524   WBC Thousand/uL 20.32*   < > 18.15*   HEMOGLOBIN g/dL 10.9*   < > 11.9   HEMATOCRIT % 34.7*   < > 39.6   PLATELETS Thousands/uL 625*   < > 574*   SEGS PCT %  --   --  80*   LYMPHO PCT %  --   --  9*   MONO PCT %  --   --  9   EOS PCT %  --   --  1    < > = values in this interval not displayed.     Results from last 7 days   Lab Units 09/04/24  0525 08/31/24  0523 08/30/24  1345   SODIUM mmol/L 139   < > 139   POTASSIUM mmol/L 2.9*   < > 4.8   CHLORIDE mmol/L 92*   < > 94*   CO2 mmol/L 32   < > 34*   BUN mg/dL 49*   < > 13   CREATININE mg/dL 1.24   < > 0.33*   ANION GAP mmol/L 15*   < > 11   CALCIUM mg/dL 8.4   < > 9.3   ALBUMIN g/dL  --   --  3.7   TOTAL BILIRUBIN mg/dL  --   --  0.27   ALK PHOS U/L  --   --  63   ALT U/L  --   --  12   AST U/L  --   --  25   GLUCOSE RANDOM mg/dL 117   < > 120    < > = values in this interval not displayed.                       Lines/Drains:  Invasive  Devices       Peripheral Intravenous Line  Duration             Peripheral IV 09/03/24 Dorsal (posterior);Right Hand 1 day    Peripheral IV 09/03/24 Proximal;Right;Ventral (anterior) Forearm 1 day              Drain  Duration             Colostomy Loop LUQ 1434 days    Ureteral Drain/Stent Left ureter 5 Fr. 1434 days    Ureteral Drain/Stent Right ureter 5 Fr. 1434 days    External Urinary Catheter 1 day                          Imaging: Reviewed radiology reports from this admission including: ultrasound(s)    Recent Cultures (last 7 days):   Results from last 7 days   Lab Units 08/30/24  1707   GRAM STAIN RESULT  2+ Disintegrating polys*  Rare Gram positive cocci in clusters*   WOUND CULTURE  2+ Growth of Proteus mirabilis*  2+ Growth of Staphylococcus aureus*       Last 24 Hours Medication List:   Current Facility-Administered Medications   Medication Dose Route Frequency Provider Last Rate    acetaminophen  650 mg Oral Q8H PRN Toshia M Cheng, CRNP      albuterol  2.5 mg Nebulization Q4H PRN Toshia M Cheng, CRNP      budesonide  0.5 mg Nebulization Q12H PRN Toshia M Cheng, CRNP      buPROPion  75 mg Oral Daily Toshia M Cheng, CRNP      citalopram  10 mg Oral HS Toshia M Cheng, CRNP      colchicine  0.6 mg Oral Daily Toshia M Cheng, CRNP      diazepam  5 mg Oral Q8H PRN Toshia M Cheng, CRNP      fluticasone  2 spray Nasal Daily Toshia M Cheng, CRNP      guaiFENesin  600 mg Oral Q12H NANCY Hsasan PA-C      heparin (porcine)  5,000 Units Subcutaneous Q8H [facility] Toshia M Cheng, CRNP      metoclopramide  5 mg Intravenous Q6H PRN Sosa Tang, DO      ondansetron  4 mg Intravenous Q6H PRN Toshia M Cheng, CRNP      pantoprazole  40 mg Oral Early Morning Toshia M Cheng, CRNP      potassium chloride  20 mEq Intravenous Q1H Sosa Tang, DO 20 mEq (09/04/24 0835)    sodium chloride  50 mL/hr Intravenous Continuous Soas Tang, DO 50 mL/hr (09/04/24 0838)    traZODone  50 mg Oral HS  SHAKIRA Juarez          Today, Patient Was Seen By: Sosa Tang DO    **Please Note: This note may have been constructed using a voice recognition system.**

## 2024-09-04 NOTE — ASSESSMENT & PLAN NOTE
WBC count continuing to rise; no acute infectious symptoms noted  Will obtain CT abd/pelvis with contrast for further review  Will obtain blood cultures should the patient develop any fevers  Continue to hold off on further abx at this time  ID following

## 2024-09-04 NOTE — ASSESSMENT & PLAN NOTE
Patient with improved nausea/vomiting  Renal US (9/3): No acute findings.   Continue IVF resuscitation  Avoid hypotension and nephrotoxic agents  Follow-up AM labs

## 2024-09-04 NOTE — PROGRESS NOTES
Progress Note - [facility] Infectious Disease   Claire Doherty 74 y.o. female MRN: [id number]  Unit/Bed#: -01 Encounter: [id number]      IMPRESSION & RECOMMENDATIONS:   1.  Right groin wound infection.  Postcardiac cath on 8/21/2024.  Presented with increased drainage from right groin incision site.  Wound culture isolated Proteus and MSSA. She received 5 of IV antibiotics and wound appears stable without surrounding cellulitis or drainage.  CT negative for groin abscess.  Venous duplex negative for pseudoaneurysm.  No intervention from a surgical standpoint.  No blood cultures obtained.  She is afebrile however leukocytosis uptrending.  She is otherwise hemodynamically stable.  -Continue IV cefazolin through today to complete 5-day course of antibiotics  -Recommend repeat CT A/P with contrast as below   -Monitor CBCd for response to treatment  -Trend temps and hemodynamics  -Serial skin exams and local wound care     2.  Leukocytosis.  With left shift, worsening since admission.  Initially thought due to #1 however may be confounded by #3.  No acute process in the abdomen or pelvis on imaging from admission. Consider interval development of occult infectious process in the A/P given #3. Consider possibility of underlying hematologic process given somewhat chronicity of leukocytosis.   -Monitor off abx   -Recommend CT imaging of A/P to further evaluate groin and r/o occult infectious process   -Recommend obtaining blood cultures x 2 should patient spike fever     3.  Intractable nausea with vomiting.  Developed over admission.  Unknown etiology.  Possibly adverse drug effect however patient has tolerated cephalosporins in the past.  Has been on Protonix in the setting of daily NSAID use for #4.   -Workup per primary team  -Supportive cares     4.  Acute idiopathic pericarditis.  Recently hospitalized for chest pain with EKG changes.  Status post cardiac cath with right groin access 8/21 that was negative for  obstructive CAD.  Trivial pericardial effusion noted on imaging.  Started on colchicine, ibuprofen and prophylactic Protonix by cardiology last admission.  Chest pain resolved.  -Close cardiology follow-up ongoing     5. History of muscular dystrophy with restrictive lung disease.  -Continue supportive cares     6. Antibiotic Allergy.  History of amoxicillin allergy.  With report of vague skin change/rash with amoxicillin sometime in the .  Has tolerated Zosyn in the past as well as multiple cephalosporins.  -Monitor for adverse drug reactions    Antibiotics:  D1 off abx     I have discussed the above management plan in detail with patient.     Above plan discussed in detail with Dr. Tang who is in agreement with plan to observe off abx.     24 Hour events:  Yesterday and overnight notes reviewed. Unable to work with PT OT due to n/v.     Subjective:  Patient has no fever, chills, sweats; states nausea and vomiting improved today, still feels sick though. No groin pain. No abdominal pain.     Objective:  Vitals:  Temp:  [97.4 °F (36.3 °C)-98.4 °F (36.9 °C)] 98.4 °F (36.9 °C)  HR:  [89-93] 89  Resp:  [16-18] 18  BP: (147-155)/(71-72) 147/71  SpO2:  [94 %-97 %] 97 %  Temp (24hrs), Av.9 °F (36.6 °C), Min:97.4 °F (36.3 °C), Max:98.4 °F (36.9 °C)  Current: Temperature: 98.4 °F (36.9 °C)    PHYSICAL EXAM:  General Appearance:  Appearing chronically ill and debilitated, nontoxic, and in no distress sitting upright in bed watching TV   HEENT: Normocephalic, without obvious abnormality, atraumatic. Conjunctiva pink and sclera anicteric. Oropharynx moist without lesions.     Lungs:   Clear to auscultation bilaterally except for few intermittent wheezes, respirations unlabored   Heart:  RRR; no murmur, rub or gallop   Abdomen:   Soft, non-tender, non-distended, positive bowel sounds    Extremities: No cyanosis, clubbing or edema   Musculoskeletal: Back symmetric without curvature, ROM normal.    : No CVA or  suprapubic tenderness.     Skin: No rashes or lesions. Right groin site without drainage from incision site, small bandaid intact. No surrounding erythema or warmth. Nontender. Peripheral IV intact without evidence of erythema, warmth, or exudate.      LABS, IMAGING, & OTHER STUDIES:  Extensive review of the medical records in epic including review of the notes, radiographs, and laboratory results were reviewed personally as below.     Lab Results:  Results from last 7 days   Lab Units 09/04/24 0525 09/03/24 0512 09/02/24 0524   WBC Thousand/uL 20.32* 19.56* 18.15*   HEMOGLOBIN g/dL 10.9* 10.7* 11.9   PLATELETS Thousands/uL 625* 590* 574*     Results from last 7 days   Lab Units 09/04/24 0525 09/03/24  0628 09/02/24 0524 08/31/24  0523 08/30/24  1345   SODIUM mmol/L 139 137 137   < > 139   POTASSIUM mmol/L 2.9* 3.7 4.0   < > 4.8   CHLORIDE mmol/L 92* 88* 85*   < > 94*   CO2 mmol/L 32 31 40*   < > 34*   BUN mg/dL 49* 42* 29*   < > 13   CREATININE mg/dL 1.24 1.33* 0.79   < > 0.33*   EGFR ml/min/1.73sq m 42 39 73   < > 109   CALCIUM mg/dL 8.4 8.0* 9.0   < > 9.3   AST U/L  --   --   --   --  25   ALT U/L  --   --   --   --  12   ALK PHOS U/L  --   --   --   --  63    < > = values in this interval not displayed.     Results from last 7 days   Lab Units 09/01/24  0954 08/30/24  1707   GRAM STAIN RESULT   --  2+ Disintegrating polys*  Rare Gram positive cocci in clusters*   WOUND CULTURE   --  2+ Growth of Proteus mirabilis*  2+ Growth of Staphylococcus aureus*   MRSA CULTURE ONLY  No Methicillin Resistant Staphlyococcus aureus (MRSA) isolated  --                        Lab interpretation/comments: elevated WBC      Culture data: no new growth on wound cx     Imaging Studies:   Imaging interpretation/comments: US kidney bladder benign

## 2024-09-04 NOTE — PHYSICAL THERAPY NOTE
PT Treatment Note    NAME:  Claire Doherty  DATE: 09/04/24    AGE:   74 y.o.  Mrn:   [id number]  ADMIT DX:  Pericardial effusion [I31.39]  Pleural effusion [J90]  Interstitial edema [R60.9]  Wound infection [T14.8XXA, L08.9]  Visit for wound check [Z51.89]  Performed at least 2 patient identifiers during session: Name and Epic photo     09/04/24 0940   PT Last Visit   PT Visit Date 09/04/24   Note Type   Note Type Treatment   Pain Assessment   Pain Assessment Tool 0-10   Pain Score No Pain   Restrictions/Precautions   Weight Bearing Precautions Per Order No   General   Chart Reviewed Yes   Cognition   Overall Cognitive Status Impaired   Arousal/Participation Responsive;Persistent stimuli required   Attention Attends with cues to redirect   Orientation Level Oriented to person;Oriented to place   Memory Decreased recall of biographical information;Decreased short term memory;Decreased long term memory   Following Commands Follows one step commands with increased time or repetition   Subjective   Subjective I dont feel as nauseous today   Ambulation/Elevation   Gait pattern   (Non ambulatory at baseline)   Balance   Static Sitting Fair   Endurance Deficit   Endurance Deficit Yes   Endurance Deficit Description pt became very fatigued with minimal physical exertion   Activity Tolerance   Activity Tolerance Patient limited by fatigue   Exercises   Heelslides Sitting;20 reps;Bilateral  (Pt required PT assistance to perform activity)   Hip Flexion Sitting;20 reps;Bilateral  (Pt required PT assistance to be able to perform activity)   Knee AROM 10 reps;Sitting   Ankle Pumps 20 reps;Supine;Bilateral   Heel Cord Stretch 20 reps;Sitting;Bilateral   Assessment   Prognosis Fair   Problem List Decreased strength;Decreased range of motion;Decreased endurance;Impaired balance;Decreased mobility;Decreased cognition   Assessment Pt seen for PT treatment session this date with interventions consisting of therapeutic exercise to  improve strength to improve functional mobility. Pt agreeable to PT treatment session upon arrival, pt found supine in bed, in no apparent distress and A&O x 2 . Since previous session, pt has made fair progress as evidenced by increased activity tolerance  Barriers during this session include confusion, anxiety, and fatigue.  Pt continues to be functioning below baseline level, and remains limited 2* factors listed above and including decreased strength, impaired activity tolerance, impaired  balance, impaired cognition, decreased ROM, and decreased endurance.  Pt declined wanting to sit on EOB at this time, and was limited to activity secondary to fatigue. Pt prognosis for achieving goals is fair, pending pt progress with hospitalization/medical status improvements, and indicated by motivated to participate in therapy, ability to follow cues, and supportive family. PT will continue to see pt during current hospitalization in order to address the deficits listed above and provide interventions consistent w/ POC in effort to achieve goals. Current goals and POC remain appropriate, pt continues to have rehab potential  Upon conclusion pt supine in bed. The patient's AM-PAC Basic Mobility Inpatient Short Form Raw Score is 8.  Based on patient presentations and impairments, pt would most appropriately benefit from Level 2 resource intensity upon discharge.  Please also refer to the recommendation of the Physical Therapist for safe discharge planning. RN verbalized pt appropriate for PT session.   Goals   Patient Goals To move more   LTG Expiration Date 09/11/24   PT Treatment Day 1   Plan   Treatment/Interventions Functional transfer training;LE strengthening/ROM;Elevations;Therapeutic exercise;Endurance training;Cognitive reorientation;Bed mobility   Progress Progressing toward goals   PT Frequency 3-5x/wk   Discharge Recommendation   Rehab Resource Intensity Level, PT II (Moderate Resource Intensity)   Equipment  Recommended Walker;Wheelchair   AM-PAC Basic Mobility Inpatient   Turning in Flat Bed Without Bedrails 3   Lying on Back to Sitting on Edge of Flat Bed Without Bedrails 1   Moving Bed to Chair 1   Standing Up From Chair Using Arms 1   Walk in Room 1   Climb 3-5 Stairs With Railing 1   Basic Mobility Inpatient Raw Score 8   [facility] Highest Level Of Mobility   -HL Goal 3: Sit at edge of bed   JH-HLM Achieved 2: Bed activities/Dependent transfer           Time In: 0940  Time Out: 1004  Total Treatment Minutes: 24    Paco Nails, PT

## 2024-09-04 NOTE — ASSESSMENT & PLAN NOTE
Patient treated for acute idiopathic pericarditis with colchicine and ibuprofen during hospitalization for above 8/22/2024  TTE 8/26/2024: LVEF 65%. Systolic function is normal. Wall motion is normal. Grade II diastolic dysfunction   Continue colchicine  Cardiology evaluation appreciated

## 2024-09-05 LAB
ANION GAP SERPL CALCULATED.3IONS-SCNC: 10 MMOL/L (ref 4–13)
BUN SERPL-MCNC: 48 MG/DL (ref 5–25)
CALCIUM SERPL-MCNC: 8.6 MG/DL (ref 8.4–10.2)
CHLORIDE SERPL-SCNC: 97 MMOL/L (ref 96–108)
CO2 SERPL-SCNC: 31 MMOL/L (ref 21–32)
CREAT SERPL-MCNC: 0.85 MG/DL (ref 0.6–1.3)
ERYTHROCYTE [DISTWIDTH] IN BLOOD BY AUTOMATED COUNT: 17.6 % (ref 11.6–15.1)
GFR SERPL CREATININE-BSD FRML MDRD: 67 ML/MIN/1.73SQ M
GLUCOSE SERPL-MCNC: 84 MG/DL (ref 65–140)
HCT VFR BLD AUTO: 35.6 % (ref 34.8–46.1)
HGB BLD-MCNC: 10.7 G/DL (ref 11.5–15.4)
MAGNESIUM SERPL-MCNC: 2.6 MG/DL (ref 1.9–2.7)
MCH RBC QN AUTO: 25.9 PG (ref 26.8–34.3)
MCHC RBC AUTO-ENTMCNC: 30.1 G/DL (ref 31.4–37.4)
MCV RBC AUTO: 86 FL (ref 82–98)
PLATELET # BLD AUTO: 560 THOUSANDS/UL (ref 149–390)
PMV BLD AUTO: 9.8 FL (ref 8.9–12.7)
POTASSIUM SERPL-SCNC: 3.8 MMOL/L (ref 3.5–5.3)
RBC # BLD AUTO: 4.13 MILLION/UL (ref 3.81–5.12)
SODIUM SERPL-SCNC: 138 MMOL/L (ref 135–147)
WBC # BLD AUTO: 18.03 THOUSAND/UL (ref 4.31–10.16)

## 2024-09-05 PROCEDURE — 97167 OT EVAL HIGH COMPLEX 60 MIN: CPT

## 2024-09-05 PROCEDURE — 97530 THERAPEUTIC ACTIVITIES: CPT

## 2024-09-05 PROCEDURE — 85027 COMPLETE CBC AUTOMATED: CPT | Performed by: INTERNAL MEDICINE

## 2024-09-05 PROCEDURE — 83735 ASSAY OF MAGNESIUM: CPT | Performed by: INTERNAL MEDICINE

## 2024-09-05 PROCEDURE — 99232 SBSQ HOSP IP/OBS MODERATE 35: CPT | Performed by: INTERNAL MEDICINE

## 2024-09-05 PROCEDURE — 94664 DEMO&/EVAL PT USE INHALER: CPT

## 2024-09-05 PROCEDURE — 99232 SBSQ HOSP IP/OBS MODERATE 35: CPT | Performed by: PHYSICIAN ASSISTANT

## 2024-09-05 PROCEDURE — 94760 N-INVAS EAR/PLS OXIMETRY 1: CPT

## 2024-09-05 PROCEDURE — 80048 BASIC METABOLIC PNL TOTAL CA: CPT | Performed by: INTERNAL MEDICINE

## 2024-09-05 RX ADMIN — CITALOPRAM HYDROBROMIDE 10 MG: 10 TABLET ORAL at 23:27

## 2024-09-05 RX ADMIN — FLUTICASONE PROPIONATE 2 SPRAY: 50 SPRAY, METERED NASAL at 09:40

## 2024-09-05 RX ADMIN — HEPARIN SODIUM 5000 UNITS: 5000 INJECTION, SOLUTION INTRAVENOUS; SUBCUTANEOUS at 23:27

## 2024-09-05 RX ADMIN — HEPARIN SODIUM 5000 UNITS: 5000 INJECTION, SOLUTION INTRAVENOUS; SUBCUTANEOUS at 05:33

## 2024-09-05 RX ADMIN — GUAIFENESIN 600 MG: 600 TABLET ORAL at 09:39

## 2024-09-05 RX ADMIN — SODIUM CHLORIDE 50 ML/HR: 0.9 INJECTION, SOLUTION INTRAVENOUS at 09:46

## 2024-09-05 RX ADMIN — BUPROPION HYDROCHLORIDE TABLETS 75 MG: 75 TABLET, FILM COATED ORAL at 09:39

## 2024-09-05 RX ADMIN — GUAIFENESIN 600 MG: 600 TABLET ORAL at 23:26

## 2024-09-05 RX ADMIN — PANTOPRAZOLE SODIUM 40 MG: 40 TABLET, DELAYED RELEASE ORAL at 05:33

## 2024-09-05 RX ADMIN — HEPARIN SODIUM 5000 UNITS: 5000 INJECTION, SOLUTION INTRAVENOUS; SUBCUTANEOUS at 14:58

## 2024-09-05 RX ADMIN — COLCHICINE 0.6 MG: 0.6 TABLET ORAL at 09:39

## 2024-09-05 NOTE — WOUND OSTOMY CARE
"Progress Note - Wound   Claire Doherty 74 y.o. female MRN: [id number]  Unit/Bed#: -01 Encounter: [id number]      Assessment:   Patient admitted to [facility] due to infected wound. History of breast cancer, GERD, colitis, muscular dystrophy, skin cancer. Wound care consulted for left axilla and right foot wounds. Patient is agreeable to assessment, alert and oriented x 4, incontinent of bladder, pure-wick in place for urine management, colostomy pouch is intact with no signs of leaking, small amount of soft brown stool present in pouch, patient is a moderate assist with care, wedges in place for repositioning, heels elevated off of mattress.     1. Bilateral sacrum, buttock, hips, elbows, and heels- skin is dry, intact, blanchable.    2. Left axilla and chest- Skin is dry, intact, with thickened brown scaly skin, no open wounds.    3. Right anterior foot- Patient has history of wound to this location that has healed. Suspect the purple discoloration to be hyperpigmented scar tissue. Cannot completely rule out pressure component due to shoe or heel lift boot; however patient does report that the skin has been \"dark\" in color since wound healed; if this is a pressure injury then would be a DTPI. Please continue to monitor site closely for any skin changes or skin breakdown.    Educated patient on importance of frequent offloading of pressure via turning, repositioning, and weight-shifting. Verbalized understanding of plan of care.    Primary nurse aware of plan of care. See flow sheets for more detailed assessment findings. Will follow along.      Skin care plans:  1-Hydraguard to bilateral sacrum, buttock and heels BID and PRN  2-Elevate heels to offload pressure.  3-Ehob cushion in chair when out of bed.  4-Moisturize skin daily with skin nourishing cream.  5-Turn/reposition q2h for pressure re-distribution on skin.   6-Left anterior foot- Cleanse skin with soap and water, pat dry. Apply Silicone foam dressing over " purple discolored skin. Peel back and check skin integrity daily. Change every 3 days and as needed for soilage/displacement.          Wound 07/23/24 Foot Anterior;Right (Active)   Wound Image   09/05/24 0904   Wound Description Dry;Intact;Light purple 09/05/24 0904   Radha-wound Assessment Dry;Intact 09/05/24 0904   Wound Length (cm) 2.8 cm 09/05/24 0904   Wound Width (cm) 1.8 cm 09/05/24 0904   Wound Depth (cm) 0 cm 09/05/24 0904   Wound Surface Area (cm^2) 5.04 cm^2 09/05/24 0904   Wound Volume (cm^3) 0 cm^3 09/05/24 0904   Calculated Wound Volume (cm^3) 0 cm^3 09/05/24 0904   Drainage Amount None 09/05/24 0904   Non-staged Wound Description Not applicable 09/05/24 0904   Treatments Cleansed;Site care 09/05/24 0904   Dressing Moisture barrier 09/05/24 0904   Wound packed? No 09/05/24 0904   Dressing Changed New 09/05/24 0904   Patient Tolerance Tolerated well 09/05/24 0904       Contact through [employer] Secure Chat with any questions  Wound Care will continue to follow    Sara CORRALESN RN CWON  Wound and Ostomy care

## 2024-09-05 NOTE — ASSESSMENT & PLAN NOTE
Presented for suspected wound infection to right groin with swelling and drainage  Status post cardiac catheterization 8/21/2024 with insertion site right groin  CT abd/pelvis (8/30): Inflammation of the right inguinal superficial and deep subcutaneous tissues without an abscess. Pericardial effusion. Suspected interstitial edema with increased size of the small left pleural effusion.   Wound Clx: MSSA and Proteus  Completed course of Ancef (5 days total of abx)  Surgery and ID evaluation appreciated

## 2024-09-05 NOTE — ASSESSMENT & PLAN NOTE
WBC down-trending at this time without any new source of infectious etiology  Continue to monitor off antibiotics  ID following

## 2024-09-05 NOTE — ASSESSMENT & PLAN NOTE
DINA is resolved at this time  Renal US (9/3): No acute findings.   Will discontinue IVF at this time and encourage PO intake

## 2024-09-05 NOTE — PLAN OF CARE
Problem: PAIN - ADULT  Goal: Verbalizes/displays adequate comfort level or baseline comfort level  Description: Interventions:  - Encourage patient to monitor pain and request assistance  - Assess pain using appropriate pain scale  - Administer analgesics based on type and severity of pain and evaluate response  - Implement non-pharmacological measures as appropriate and evaluate response  - Consider cultural and social influences on pain and pain management  - Notify physician/advanced practitioner if interventions unsuccessful or patient reports new pain  Outcome: Progressing     Problem: INFECTION - ADULT  Goal: Absence or prevention of progression during hospitalization  Description: INTERVENTIONS:  - Assess and monitor for signs and symptoms of infection  - Monitor lab/diagnostic results  - Monitor all insertion sites, i.e. indwelling lines, tubes, and drains  - Monitor endotracheal if appropriate and nasal secretions for changes in amount and color  - Dixie appropriate cooling/warming therapies per order  - Administer medications as ordered  - Instruct and encourage patient and family to use good hand hygiene technique  - Identify and instruct in appropriate isolation precautions for identified infection/condition  Outcome: Progressing     Problem: INFECTION - ADULT  Goal: Absence of fever/infection during neutropenic period  Description: INTERVENTIONS:  - Monitor WBC    Outcome: Progressing

## 2024-09-05 NOTE — CASE MANAGEMENT
Case Management Discharge Planning Note    Patient name Claire Doherty  Location /-01 MRN [id number]  : 1950 Date 2024       Current Admission Date: 2024  Current Admission Diagnosis:Infected wound   Patient Active Problem List    Diagnosis Date Noted Date Diagnosed    Severe protein-calorie malnutrition (HCC) 2024     DINA (acute kidney injury) (HCC) 2024     Nausea 2024     Pleural effusion 2024     Pericardial effusion 2024     Infected wound 2024     Chest pain 2024     Acute idiopathic pericarditis 2024     Cellulitis of right foot 2024     Chronic left shoulder pain 2024     Shoulder joint dysfunction 2024     Bilateral hand pain 2023     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     Gastroparesis 2021     Aortic valve sclerosis 2021     Tricuspid valve insufficiency 2021     Mitral annular calcification 2021     Former smoker 2021     On home oxygen therapy 2021     Colostomy status ([facility])      Ductal carcinoma in situ (DCIS) of left breast 03/15/2021     Insomnia 10/21/2020     Depression 10/06/2020     Anemia 10/02/2020     Chronic hypoxic respiratory failure ([facility]) 2020     Bladder injury 2020     Thrombocytosis, unspecified 2020     Leukocytosis 2020     Difficult intubation      Dystrophy, muscular, 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[facility]) 10/12/2017     Ambulatory dysfunction 10/12/2017     Urinary incontinence 10/03/2017     Osteoporosis 2016     Allergic rhinitis 10/09/2013     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[facility]) 10/04/2012     GERD without esophagitis 10/04/2012       LOS (days): 7  Geometric Mean LOS (GMLOS) (days): 5.1  Days to GMLOS:-1.7     OBJECTIVE:  Risk of Unplanned Readmission Score: 14.87      Current admission status: Inpatient   Preferred Pharmacy:   [id number] - [place] [address]  [address]  [address] [place] [id number]  Phone: [phone] Fax: [phone]    Primary Care Provider: Julienne Tucker DO    Primary Insurance: [facility]  Secondary Insurance: [facility]    DISCHARGE DETAILS:  Provided the pt choice list from [facility] to the pt.  TC to pt sister the TOMER Thornton.  Emailed the choice list for STR to the sister.

## 2024-09-05 NOTE — OCCUPATIONAL THERAPY NOTE
Occupational Therapy Evaluation     Patient Name: Claire Doherty  Today's Date: 9/5/2024  Problem List  Principal Problem:    Infected wound  Active Problems:    Dystrophy, muscular, 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HCC)    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[facility])    Leukocytosis    Pleural effusion    Pericardial effusion    DINA (acute kidney injury) ([facility])    Severe protein-calorie malnutrition ([facility])    Past Medical History  Past Medical History:   Diagnosis Date    Anxiety     Breast cancer ([facility]) 2007    Colitis     Depression     Difficult intubation     Excessive daytime sleepiness     GERD (gastroesophageal reflux disease)     Hyperlipidemia     Ischemic colitis ([facility]) 01/21/2019    Leukocytosis 03/28/2020    Muscular dystrophy ([facility])     Limb-girdle    Osteoporosis     Overactive bladder     Perforated diverticulum 03/28/2020    Pneumonitis     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[facility])     Skin cancer     Skin disorder     Tachycardia 06/02/2021    Vitamin D deficiency      Past Surgical History  Past Surgical History:   Procedure Laterality Date    CARDIAC CATHETERIZATION N/A 8/21/2024    Procedure: Cardiac pci;  Surgeon: Juan Fuller MD;  [facility]: [facility];  Service: Cardiology    CARDIAC CATHETERIZATION N/A 8/21/2024    Procedure: Cardiac PCI Stent;  Surgeon: Juan Fuller MD;  [facility]: [facility];  Service: Cardiology    CARDIAC CATHETERIZATION N/A 8/21/2024    Procedure: Cardiac Coronary Angiogram;  Surgeon: Juan Fuller MD;  [facility]: [facility];  Service: Cardiology    COLONOSCOPY N/A 1/22/2019    Procedure: COLONOSCOPY;  Surgeon: Anthony Mejía MD;  Location: [facility];  Service: Colorectal    CYSTOSCOPY N/A 9/30/2020    Procedure: CYSTOSCOPY; BILATERAL URETERAL CATHETER PLACEMENT, CYSTOTOMY;  Surgeon: Zach Tyler MD;  Location: [facility];  Service: Urology    FL CYSTOGRAM  10/15/2020    HARTMANS PROCEDURE N/A 9/30/2020     Procedure: EXPLORATORY LAPAROTOMY; LYSIS OF ADHESIONS; WASHOUT PELVIC ABSCESS; COMPLEX SIGMOID COLON RESECTION; LOOP TRANSVERSE COLOSTOMY;  Surgeon: Thania Jaffe MD;  Location: [facility];  Service: General    IR DRAINAGE TUBE PLACEMENT  9/24/2020    MASTECTOMY Left 2007    left breast mastectomy     TONSILLECTOMY      TOOTH EXTRACTION      TUBAL LIGATION           09/03/24 0852   OT Last Visit   OT Visit Date 09/03/24   Note Type   Note type Evaluation   Additional Comments Nsg staff verbally cleared pt for OT evaluation.  Pt received  supine in bed c HOB semi-elevated on this date + is agreeable to OT session @ this time. @ exit, pt remains in  seated in bedside recliner c all lines intact, all needs met, call bell in hand + nsg aware of location/disposition.   Pain Assessment   Pain Assessment Tool 0-10   Pain Score No Pain   Restrictions/Precautions   Weight Bearing Precautions Per Order No   Other Precautions O2;Telemetry;Fall Risk;Bed Alarm;Chair Alarm;Limb alert;WBS  (FWB RLE)   Home Living   Type of Home Apartment   Home Layout One level  (0 ANGELES)   Bathroom Shower/Tub Walk-in shower   Bathroom Toilet Raised  (electric raiser)   Bathroom Equipment Grab bars in shower;Shower chair;Grab bars around toilet   Home Equipment Walker;Wheelchair-electric  (Lift chair, rollator in shower, RW use)   Additional Comments Lift recliner>RW use>Electric w/c. Rollator in shower.   Prior Function   Level of [place] Needs assistance with IADLS;Modified independent with wheelchair;Needs assistance with ADLs  (performs Indep surface to surface transfers with RW, I c colostomy bag management, A for urine management (incontinent))   Lives With Alone   Receives Help From Personal care attendant  (Daily HHC)   IADLs Family/Friend/Other provides transportation;Family/Friend/Other provides meals;Independent with medication management  (MOW 5x/week, A for cleaning + laundry, I c med management)   Falls in the last 6 months  1 to 4  (1x)   Comments Wears 2L of O2 at baseline   Lifestyle   Autonomy Pt lives in  apartment alone + @ baseline, performs ADLs  with A, IADLs with A + fxl mobility with A with RW, with electric w/c. (-) .   Reciprocal Relationships Family   ADL   Eating Assistance 5  Supervision/Setup   Grooming Assistance 5  Supervision/Setup   UB Bathing Assistance 3  Moderate Assistance   LB Bathing Assistance 2  Maximal Assistance   UB Dressing Assistance 3  Moderate Assistance   LB Dressing Assistance 2  Maximal Assistance   Toileting Assistance  1  Total Assistance   Bed Mobility   Supine to Sit 2  Maximal assistance   Additional items Assist x 1;HOB elevated;LE management;Verbal cues;Increased time required   Sit to Supine 2  Maximal assistance   Additional items Assist x 1;HOB elevated;Increased time required;LE management;Verbal cues   Transfers   Additional Comments Declines STS on this date although offered.   Balance   Static Sitting Fair -   Dynamic Sitting Poor +   Activity Tolerance   Activity Tolerance Patient limited by fatigue   Sensation   Additional Comments BLE moderate neuropathy, min @ UE-digits   Vision-Basic Assessment   Current Vision Wears glasses all the time   Cognition   Overall Cognitive Status Impaired   Arousal/Participation Alert;Responsive   Attention Attends with cues to redirect   Orientation Level Oriented X4   Memory Decreased short term memory;Decreased recall of recent events   Following Commands Follows all commands and directions without difficulty   Assessment   Limitation Decreased ADL status;Decreased UE strength;Decreased endurance;Decreased self-care trans;Decreased high-level ADLs   Prognosis Fair   Assessment Patient is a 74 y.o. female seen for OT evaluation s/p admit to  [facility] on 8/30/2024 w/Infected wound + commorbidities/PMHx (as listed in medical record) affecting patient's functional performance c ADL tasks at time of assessment. OT orders placed for  evaluation and treatment to assess pt's ADLs, cognitive status + performance during functional tasks in order to formulate appropriate d/c recommendations.     Therapist performed at least two patient identifiers during session including name and wristband. Personal factors affecting patient at time of initial evaluation include: limited home support, inability to perform ADLs, inability to ambulate household distances, and decreased initiation and engagement.   Pt's clinical presentation is currently unstable/unpredictable given new onset deficits that effect pt's occupational performance and ability to safely return to [facility] including decrease activity tolerance, decrease standing balance, decrease sitting balance, decrease performance during ADL tasks, decrease cognition, decrease generalized strength, decrease activity engagement, and decrease performance during functional transfers combined with medical complications of abnormal renal lab values, abnormal CBC, abnormal potassium values, decreased skin integrity, and need for input for mobility technique/safety. This evaluation required an extensive review of medical and/or therapy records and additional review of physical, cognitive and psychosocial history related to functional performance. Based upon functional performance deficits and assessments, this evaluation has been identified as a  high complexity evaluation.      Patient to benefit from continued Occupational Therapy treatment while in the hospital to address aforementioned deficits and maximize level of functional independence with ADLs and functional mobility.   Plan   Treatment Interventions ADL retraining;Functional transfer training;UE strengthening/ROM;Cognitive reorientation;Endurance training;Patient/family training;Equipment evaluation/education;Compensatory technique education;Energy conservation;Activityengagement;Neuromuscular reeducation   Goal Expiration Date 09/15/24   OT Treatment Day  0   OT Frequency 3-5x/wk   Discharge Recommendation   Rehab Resource Intensity Level, OT II (Moderate Resource Intensity)   AM-PAC Daily Activity Inpatient   Lower Body Dressing 2   Bathing 2   Toileting 1   Upper Body Dressing 3   Grooming 3   Eating 3   Daily Activity Raw Score 14   Daily Activity Standardized Score (Calc for Raw Score >=11) 33.39   -PAC Applied Cognition Inpatient   Following a Speech/Presentation 3   Understanding Ordinary Conversation 3   Taking Medications 1   Remembering Where Things Are Placed or Put Away 1   Remembering List of 4-5 Errands 1   Taking Care of Complicated Tasks 1   Applied Cognition Raw Score 10   Applied Cognition Standardized Score 24.98   End of Consult   Patient Position at End of Consult Supine;Bed/Chair alarm activated;All needs within reach   Nurse Communication Nurse aware of consult     Engaged pt in EOB sit in efforts to maximize core strength; facilitating partial ADL during sustained sit including UB bathing (mod A). PCA assisting to facilitate as pt intermittently requires tactile A for posture maintenance. OT educating pt on use of UE support (WB onto bed + bed rail PRN). Agreeable + G carryover observed.      The patient's raw score on the AM-PAC Daily Activity inpatient short form is 14, standardized score is 33.39, less than 39.4. Please refer to the recommendation of the Occupational Therapist for safe DC planning.    Resource Intensity Recommendation: Level II (Moderate Resource Intensity)        GOALS:    *ADL transfers with (S) for inc'd independence with ADLs/purposeful tasks    *UB ADL with (S) for inc'd independence with self cares    *LB ADL with Min (A) using AE prn for inc'd independence with self cares    *Toileting with Mod (A) for clothing management and hygiene for return to PLOF with personal care    *Increase stand tolerance x 1  m for inc'd tolerance with standing purposeful tasks    *Participate in 10m UE therex to increase overall  stamina/activity tolerance for purposeful tasks    *Bed mobility- (S) for inc'd independence to manage own comfort and initiate EOB & OOB purposeful tasks    *Patient will verbalize 3 safety awareness/ principles to prevent falls in the home setting.     *Patient will verbalize and demonstrate use of energy conservation/deep breathing techniques and work simplification skills during functional activities with no verbal cues.     *Patient will increase OOB/sitting tolerance to 2-4 hours per day to increase participation in self-care and leisure tasks with no s/s of exertion.     *Patient will engage in ongoing cognitive assessment to assist with safe discharge planning/recommendations.     *Pt will verbalize and demonstrate understanding of post-op movement precautions 100% (if applicable) in tx sessions for increased safety and functional mobility.      *Pt will demonstrate use of long handled AE (if appropriate) during 100% of tx sessions for increased ADL safety and independence following D/C     Romelia Valenzuela OTR/L

## 2024-09-05 NOTE — ASSESSMENT & PLAN NOTE
Malnutrition Findings:   Adult Malnutrition type: Acute illness  Adult Degree of Malnutrition: Other severe protein calorie malnutrition  Malnutrition Characteristics: Inadequate energy, Weight loss                  360 Statement: Malnutrition related to acute illness as evidenced by >5% body weight loss in <1-month, <50% energy intake compared to estimated energy needs for >5 days.  To treat with oral diet and nutrition supplements as tolerated.    BMI Findings:           Body mass index is 33.15 kg/m².     Nutrition following  Continue dietary supplements

## 2024-09-05 NOTE — PLAN OF CARE
Problem: OCCUPATIONAL THERAPY ADULT  Goal: Performs self-care activities at highest level of function for planned discharge setting.  See evaluation for individualized goals.  Description: Treatment Interventions: ADL retraining, Functional transfer training, UE strengthening/ROM, Cognitive reorientation, Endurance training, Patient/family training, Equipment evaluation/education, Compensatory technique education, Energy conservation, Activityengagement, Neuromuscular reeducation          See flowsheet documentation for full assessment, interventions and recommendations.   Note: Limitation: Decreased ADL status, Decreased UE strength, Decreased endurance, Decreased self-care trans, Decreased high-level ADLs  Prognosis: Fair  Assessment: Patient is a 74 y.o. female seen for OT evaluation s/p admit to  [facility] on 8/30/2024 w/Infected wound + commorbidities/PMHx (as listed in medical record) affecting patient's functional performance c ADL tasks at time of assessment. OT orders placed for evaluation and treatment to assess pt's ADLs, cognitive status + performance during functional tasks in order to formulate appropriate d/c recommendations.     Therapist performed at least two patient identifiers during session including name and wristband. Personal factors affecting patient at time of initial evaluation include: limited home support, inability to perform ADLs, inability to ambulate household distances, and decreased initiation and engagement.   Pt's clinical presentation is currently unstable/unpredictable given new onset deficits that effect pt's occupational performance and ability to safely return to PLOF including decrease activity tolerance, decrease standing balance, decrease sitting balance, decrease performance during ADL tasks, decrease cognition, decrease generalized strength, decrease activity engagement, and decrease performance during functional transfers combined with medical complications  of abnormal renal lab values, abnormal CBC, abnormal potassium values, decreased skin integrity, and need for input for mobility technique/safety. This evaluation required an extensive review of medical and/or therapy records and additional review of physical, cognitive and psychosocial history related to functional performance. Based upon functional performance deficits and assessments, this evaluation has been identified as a  high complexity evaluation.      Patient to benefit from continued Occupational Therapy treatment while in the hospital to address aforementioned deficits and maximize level of functional independence with ADLs and functional mobility.     Rehab Resource Intensity Level, OT: II (Moderate Resource Intensity)

## 2024-09-05 NOTE — PROGRESS NOTES
Progress Note - [facility] Infectious Disease   Claire Doherty 74 y.o. female MRN: [id number]  Unit/Bed#: -01 Encounter: [id number]    IMPRESSION & RECOMMENDATIONS:   1.  Right groin wound infection.  Postcardiac cath on 8/21/2024.  Presented with increased drainage from right groin incision site.  Wound culture isolated Proteus and MSSA. She received 5 of IV antibiotics and wound appears stable without surrounding cellulitis or drainage.  CT x2 negative for groin abscess or other intra abdominal process.  Venous duplex negative for pseudoaneurysm.  No intervention from a surgical standpoint.  No blood cultures obtained.  She is afebrile however leukocytosis persists as below.  She is otherwise hemodynamically stable. She completed 5 day course of IV abx.   -No additional abx required   -Monitor CBCd for response to treatment  -Trend temps and hemodynamics  -Serial skin exams and local wound care     2.  Leukocytosis.  With left shift, worsening since admission.  Initially thought due to #1 however may be confounded by #3.  No acute process in the abdomen or pelvis on CT. Fortunately, repeat CT A/P negative for intra-abdominal abscess/infectious process. Consider possibility of underlying hematologic process given somewhat chronicity of leukocytosis.   -Continue to monitor off abx   -Recommend obtaining blood cultures x 2 should patient spike fever  -Recommend repeat CBCd in 1 week as outpatient      3.  Intractable nausea with vomiting.  Developed over admission.  Unknown etiology.  Possibly adverse drug effect however patient has tolerated cephalosporins in the past.  Has been on Protonix in the setting of daily NSAID use for #4. This has now resolved and she is tolerating some PO.  -Workup per primary team  -Supportive cares     4.  Acute idiopathic pericarditis.  Recently hospitalized for chest pain with EKG changes.  Status post cardiac cath with right groin access 8/21 that was negative for obstructive  CAD.  Trivial pericardial effusion noted on imaging.  Started on colchicine, ibuprofen and prophylactic Protonix by cardiology last admission.  Chest pain resolved.  -Close cardiology follow-up ongoing     5. History of muscular dystrophy with restrictive lung disease.  -Continue supportive cares     6. Antibiotic Allergy.  History of amoxicillin allergy.  With report of vague skin change/rash with amoxicillin sometime in the .  Has tolerated Zosyn in the past as well as multiple cephalosporins.  -Monitor for adverse drug reactions    Antibiotics:  D2 off IV abx    I have discussed the above management plan in detail with patient.     Above plan discussed in detail with Dr. Tang who is in agreement with plan to observe off abx.     24 Hour events:  Yesterday and overnight notes reviewed. No acute findings on CT A/P. Pending dispo.     Subjective:  Patient has no fever, chills, sweats; no nausea, vomiting, diarrhea; no cough, shortness of breath; no pain. No new symptoms. Tolerating some PO.     Objective:  Vitals:  Temp:  [97.7 °F (36.5 °C)-98.4 °F (36.9 °C)] 97.9 °F (36.6 °C)  HR:  [] 90  Resp:  [16] 16  BP: (131-147)/(66-75) 131/66  SpO2:  [95 %-99 %] 95 %  Temp (24hrs), Av °F (36.7 °C), Min:97.7 °F (36.5 °C), Max:98.4 °F (36.9 °C)  Current: Temperature: 97.9 °F (36.6 °C)    PHYSICAL EXAM:  General Appearance:  Appearing chronically ill and debilitated, nontoxic, and in no distress   HEENT: Normocephalic, without obvious abnormality, atraumatic. Conjunctiva pink and sclera anicteric. Oropharynx moist without lesions.     Lungs:   Clear to auscultation bilaterally, respirations unlabored   Heart:  RRR; no murmur, rub or gallop   Abdomen:   Soft, non-tender, non-distended, positive bowel sounds    Extremities: No cyanosis, clubbing or edema   Musculoskeletal: Back symmetric without curvature, ROM normal.    : No CVA or suprapubic tenderness.    Skin: No rashes or lesions. Right groin site without  drainage from incision site, small bandaid intact. No surrounding erythema or warmth. Nontender. Peripheral IV intact without evidence of erythema, warmth, or exudate.        LABS, IMAGING, & OTHER STUDIES:  Extensive review of the medical records in epic including review of the notes, radiographs, and laboratory results were reviewed personally as below.     Lab Results:  Results from last 7 days   Lab Units 09/05/24  0717 09/04/24  0525 09/03/24  0512   WBC Thousand/uL 18.03* 20.32* 19.56*   HEMOGLOBIN g/dL 10.7* 10.9* 10.7*   PLATELETS Thousands/uL 560* 625* 590*     Results from last 7 days   Lab Units 09/05/24  0717 09/04/24  0525 09/03/24  0628 08/31/24  0523 08/30/24  1345   SODIUM mmol/L 138 139 137   < > 139   POTASSIUM mmol/L 3.8 2.9* 3.7   < > 4.8   CHLORIDE mmol/L 97 92* 88*   < > 94*   CO2 mmol/L 31 32 31   < > 34*   BUN mg/dL 48* 49* 42*   < > 13   CREATININE mg/dL 0.85 1.24 1.33*   < > 0.33*   EGFR ml/min/1.73sq m 67 42 39   < > 109   CALCIUM mg/dL 8.6 8.4 8.0*   < > 9.3   AST U/L  --   --   --   --  25   ALT U/L  --   --   --   --  12   ALK PHOS U/L  --   --   --   --  63    < > = values in this interval not displayed.     Results from last 7 days   Lab Units 09/01/24  0954 08/30/24  1707   GRAM STAIN RESULT   --  2+ Disintegrating polys*  Rare Gram positive cocci in clusters*   WOUND CULTURE   --  2+ Growth of Proteus mirabilis*  2+ Growth of Staphylococcus aureus*   MRSA CULTURE ONLY  No Methicillin Resistant Staphlyococcus aureus (MRSA) isolated  --                        Lab interpretation/comments: persistent leukocytosis, down trending      Culture data: no new growth     Imaging Studies:   Imaging interpretation/comments: repeat CT A/P no acute findings to account for leukocytosis

## 2024-09-05 NOTE — PROGRESS NOTES
[facility]  Progress Note  Name: Claire Doherty I  MRN: [id number]  Unit/Bed#: -01 I Date of Admission: 8/30/2024   Date of Service: 9/5/2024 I Hospital Day: 6    Assessment & Plan   * Infected wound  Assessment & Plan  Presented for suspected wound infection to right groin with swelling and drainage  Status post cardiac catheterization 8/21/2024 with insertion site right groin  CT abd/pelvis (8/30): Inflammation of the right inguinal superficial and deep subcutaneous tissues without an abscess. Pericardial effusion. Suspected interstitial edema with increased size of the small left pleural effusion.   Wound Clx: MSSA and Proteus  Completed course of Ancef (5 days total of abx)  Surgery and ID evaluation appreciated    DINA (acute kidney injury) ([facility])  Assessment & Plan  DINA is resolved at this time  Renal US (9/3): No acute findings.   Will discontinue IVF at this time and encourage PO intake    Leukocytosis  Assessment & Plan  WBC down-trending at this time without any new source of infectious etiology  Continue to monitor off antibiotics  ID following    Pericardial effusion  Assessment & Plan  Patient treated for acute idiopathic pericarditis with colchicine and ibuprofen during hospitalization for above 8/22/2024  TTE 8/26/2024: LVEF 65%. Systolic function is normal. Wall motion is normal. Grade II diastolic dysfunction   Continue colchicine  Cardiology evaluation appreciated    Pleural effusion  Assessment & Plan  Imaging as above  Patient appears somewhat dehydrated with dry mucous membranes, increasing creatinine, and hemoconcentration  Continue to hold Lasix     Dystrophy, muscular, 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HCC)  Assessment & Plan  Uses by BiPAP at night  At baseline can stand and pivot to wheelchair  Lives alone in an apartment with home health care  Continue supportive care    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)  Assessment & Plan  On supplemental oxygen  2L nasal cannula chronically, uses BiPAP at night  Respiratory protocol    Severe protein-calorie malnutrition (HCC)  Assessment & Plan  Malnutrition Findings:   Adult Malnutrition type: Acute illness  Adult Degree of Malnutrition: Other severe protein calorie malnutrition  Malnutrition Characteristics: Inadequate energy, Weight loss    360 Statement: Malnutrition related to acute illness as evidenced by >5% body weight loss in <1-month, <50% energy intake compared to estimated energy needs for >5 days.  To treat with oral diet and nutrition supplements as tolerated.    BMI Findings:    Body mass index is 33.15 kg/m².     Nutrition following  Continue dietary supplements             VTE Pharmacologic Prophylaxis: VTE Score: 5 High Risk (Score >/= 5) - Pharmacological DVT Prophylaxis Ordered: heparin. Sequential Compression Devices Ordered.    Mobility:   Basic Mobility Inpatient Raw Score: 8  JH-HLM Goal: 3: Sit at edge of bed  JH-HLM Achieved: 2: Bed activities/Dependent transfer  JH-HLM Goal NOT achieved. Continue with multidisciplinary rounding and encourage appropriate mobility to improve upon JH-HLM goals.    Patient Centered Rounds: I performed bedside rounds with nursing staff today.   Discussions with Specialists or Other Care Team Provider: ID, Nursing, and CM    Education and Discussions with Family / Patient:  Patient updated on plan of care.       Current Length of Stay: 6 day(s)  Current Patient Status: Inpatient   Certification Statement: The patient will continue to require additional inpatient hospital stay due to DINA and leukocytosis  Discharge Plan: Anticipate discharge in 24-48 hrs to home with home services.    Code Status: Level 3 - DNAR and DNI    Subjective:   Patient resting in bed without any acute complaints. She notes no further nausea or vomiting but still endorses lack of appetite and poor oral intake.     Objective:     Vitals:   Temp (24hrs), Av °F (36.7 °C), Min:97.7 °F (36.5 °C),  Max:98.4 °F (36.9 °C)    Temp:  [97.7 °F (36.5 °C)-98.4 °F (36.9 °C)] 97.9 °F (36.6 °C)  HR:  [] 90  Resp:  [16] 16  BP: (131-147)/(66-75) 131/66  SpO2:  [95 %-99 %] 95 %  Body mass index is 33.15 kg/m².     Input and Output Summary (last 24 hours):     Intake/Output Summary (Last 24 hours) at 9/5/2024 1024  Last data filed at 9/5/2024 0947  Gross per 24 hour   Intake 2301.67 ml   Output 700 ml   Net 1601.67 ml       Physical Exam:   Physical Exam  Vitals and nursing note reviewed.   Constitutional:       Comments: Chronically ill-appearing   Cardiovascular:      Rate and Rhythm: Normal rate and regular rhythm.      Pulses: Normal pulses.      Heart sounds: Normal heart sounds.   Pulmonary:      Effort: Pulmonary effort is normal. No respiratory distress.      Comments: 2L NC  Abdominal:      General: Bowel sounds are normal.      Palpations: Abdomen is soft.      Comments: Ostomy present   Neurological:      Mental Status: She is alert and oriented to person, place, and time. Mental status is at baseline.   Psychiatric:         Mood and Affect: Mood normal.         Behavior: Behavior normal.          Additional Data:     Labs:  Results from last 7 days   Lab Units 09/05/24  0717 09/03/24  0512 09/02/24  0524   WBC Thousand/uL 18.03*   < > 18.15*   HEMOGLOBIN g/dL 10.7*   < > 11.9   HEMATOCRIT % 35.6   < > 39.6   PLATELETS Thousands/uL 560*   < > 574*   SEGS PCT %  --   --  80*   LYMPHO PCT %  --   --  9*   MONO PCT %  --   --  9   EOS PCT %  --   --  1    < > = values in this interval not displayed.     Results from last 7 days   Lab Units 09/05/24  0717 08/31/24  0523 08/30/24  1345   SODIUM mmol/L 138   < > 139   POTASSIUM mmol/L 3.8   < > 4.8   CHLORIDE mmol/L 97   < > 94*   CO2 mmol/L 31   < > 34*   BUN mg/dL 48*   < > 13   CREATININE mg/dL 0.85   < > 0.33*   ANION GAP mmol/L 10   < > 11   CALCIUM mg/dL 8.6   < > 9.3   ALBUMIN g/dL  --   --  3.7   TOTAL BILIRUBIN mg/dL  --   --  0.27   ALK PHOS U/L  --    --  63   ALT U/L  --   --  12   AST U/L  --   --  25   GLUCOSE RANDOM mg/dL 84   < > 120    < > = values in this interval not displayed.                       Lines/Drains:  Invasive Devices       Peripheral Intravenous Line  Duration             Peripheral IV 09/03/24 Dorsal (posterior);Right Hand 2 days    Peripheral IV 09/03/24 Proximal;Right;Ventral (anterior) Forearm 2 days              Drain  Duration             Colostomy Loop LUQ 1435 days    Ureteral Drain/Stent Left ureter 5 Fr. 1435 days    Ureteral Drain/Stent Right ureter 5 Fr. 1435 days    External Urinary Catheter 2 days                          Imaging: Reviewed radiology reports from this admission including: abdominal/pelvic CT    Recent Cultures (last 7 days):   Results from last 7 days   Lab Units 08/30/24  1707   GRAM STAIN RESULT  2+ Disintegrating polys*  Rare Gram positive cocci in clusters*   WOUND CULTURE  2+ Growth of Proteus mirabilis*  2+ Growth of Staphylococcus aureus*       Last 24 Hours Medication List:   Current Facility-Administered Medications   Medication Dose Route Frequency Provider Last Rate    acetaminophen  650 mg Oral Q8H PRN Toshia M Cheng, CRNP      albuterol  2.5 mg Nebulization Q4H PRN Toshia M Cheng, CRNP      budesonide  0.5 mg Nebulization Q12H PRN Toshia M Cheng, CRNP      buPROPion  75 mg Oral Daily Toshia M Cheng, CRNP      citalopram  10 mg Oral HS Toshia M Cheng, CRNP      colchicine  0.6 mg Oral Daily Toshia M Cheng, CRNP      diazepam  5 mg Oral Q8H PRN Toshia M Cheng, CRNP      fluticasone  2 spray Nasal Daily Toshia M Cheng, CRNP      guaiFENesin  600 mg Oral Q12H NANCY Hassan PA-C      heparin (porcine)  5,000 Units Subcutaneous Q8H [facility] Toshia M Cheng, CRNP      metoclopramide  5 mg Intravenous Q6H PRN Sosa Tang DO      ondansetron  4 mg Intravenous Q6H PRN Toshia M Cheng, CRNP      pantoprazole  40 mg Oral Early Morning Toshia M Cheng, CRNP      traZODone  50 mg Oral HS  SHAKIRA Juarez          Today, Patient Was Seen By: Sosa Tang DO    **Please Note: This note may have been constructed using a voice recognition system.**

## 2024-09-06 LAB
ANION GAP SERPL CALCULATED.3IONS-SCNC: 6 MMOL/L (ref 4–13)
BUN SERPL-MCNC: 45 MG/DL (ref 5–25)
CALCIUM SERPL-MCNC: 8.8 MG/DL (ref 8.4–10.2)
CHLORIDE SERPL-SCNC: 96 MMOL/L (ref 96–108)
CO2 SERPL-SCNC: 34 MMOL/L (ref 21–32)
CREAT SERPL-MCNC: 0.69 MG/DL (ref 0.6–1.3)
ERYTHROCYTE [DISTWIDTH] IN BLOOD BY AUTOMATED COUNT: 17.5 % (ref 11.6–15.1)
GFR SERPL CREATININE-BSD FRML MDRD: 86 ML/MIN/1.73SQ M
GLUCOSE SERPL-MCNC: 132 MG/DL (ref 65–140)
HCT VFR BLD AUTO: 35.2 % (ref 34.8–46.1)
HGB BLD-MCNC: 10.7 G/DL (ref 11.5–15.4)
MAGNESIUM SERPL-MCNC: 2.4 MG/DL (ref 1.9–2.7)
MCH RBC QN AUTO: 26 PG (ref 26.8–34.3)
MCHC RBC AUTO-ENTMCNC: 30.4 G/DL (ref 31.4–37.4)
MCV RBC AUTO: 86 FL (ref 82–98)
PLATELET # BLD AUTO: 591 THOUSANDS/UL (ref 149–390)
PMV BLD AUTO: 10.5 FL (ref 8.9–12.7)
POTASSIUM SERPL-SCNC: 3.7 MMOL/L (ref 3.5–5.3)
RBC # BLD AUTO: 4.11 MILLION/UL (ref 3.81–5.12)
SODIUM SERPL-SCNC: 136 MMOL/L (ref 135–147)
WBC # BLD AUTO: 17.52 THOUSAND/UL (ref 4.31–10.16)

## 2024-09-06 PROCEDURE — 80048 BASIC METABOLIC PNL TOTAL CA: CPT | Performed by: INTERNAL MEDICINE

## 2024-09-06 PROCEDURE — 94664 DEMO&/EVAL PT USE INHALER: CPT

## 2024-09-06 PROCEDURE — 99232 SBSQ HOSP IP/OBS MODERATE 35: CPT | Performed by: PHYSICIAN ASSISTANT

## 2024-09-06 PROCEDURE — 99232 SBSQ HOSP IP/OBS MODERATE 35: CPT | Performed by: INTERNAL MEDICINE

## 2024-09-06 PROCEDURE — 94760 N-INVAS EAR/PLS OXIMETRY 1: CPT

## 2024-09-06 PROCEDURE — 83735 ASSAY OF MAGNESIUM: CPT | Performed by: INTERNAL MEDICINE

## 2024-09-06 PROCEDURE — 85027 COMPLETE CBC AUTOMATED: CPT | Performed by: INTERNAL MEDICINE

## 2024-09-06 RX ADMIN — FLUTICASONE PROPIONATE 2 SPRAY: 50 SPRAY, METERED NASAL at 09:11

## 2024-09-06 RX ADMIN — COLCHICINE 0.6 MG: 0.6 TABLET ORAL at 09:11

## 2024-09-06 RX ADMIN — GUAIFENESIN 600 MG: 600 TABLET ORAL at 21:48

## 2024-09-06 RX ADMIN — HEPARIN SODIUM 5000 UNITS: 5000 INJECTION, SOLUTION INTRAVENOUS; SUBCUTANEOUS at 21:48

## 2024-09-06 RX ADMIN — HEPARIN SODIUM 5000 UNITS: 5000 INJECTION, SOLUTION INTRAVENOUS; SUBCUTANEOUS at 13:40

## 2024-09-06 RX ADMIN — BUPROPION HYDROCHLORIDE TABLETS 75 MG: 75 TABLET, FILM COATED ORAL at 09:11

## 2024-09-06 RX ADMIN — ONDANSETRON 4 MG: 2 INJECTION INTRAMUSCULAR; INTRAVENOUS at 20:05

## 2024-09-06 RX ADMIN — CITALOPRAM HYDROBROMIDE 10 MG: 10 TABLET ORAL at 21:48

## 2024-09-06 RX ADMIN — HEPARIN SODIUM 5000 UNITS: 5000 INJECTION, SOLUTION INTRAVENOUS; SUBCUTANEOUS at 05:51

## 2024-09-06 RX ADMIN — TRAZODONE HYDROCHLORIDE 50 MG: 50 TABLET ORAL at 21:52

## 2024-09-06 RX ADMIN — GUAIFENESIN 600 MG: 600 TABLET ORAL at 09:11

## 2024-09-06 RX ADMIN — PANTOPRAZOLE SODIUM 40 MG: 40 TABLET, DELAYED RELEASE ORAL at 05:51

## 2024-09-06 NOTE — PLAN OF CARE
Problem: PAIN - ADULT  Goal: Verbalizes/displays adequate comfort level or baseline comfort level  Description: Interventions:  - Encourage patient to monitor pain and request assistance  - Assess pain using appropriate pain scale  - Administer analgesics based on type and severity of pain and evaluate response  - Implement non-pharmacological measures as appropriate and evaluate response  - Consider cultural and social influences on pain and pain management  - Notify physician/advanced practitioner if interventions unsuccessful or patient reports new pain  Outcome: Progressing     Problem: INFECTION - ADULT  Goal: Absence or prevention of progression during hospitalization  Description: INTERVENTIONS:  - Assess and monitor for signs and symptoms of infection  - Monitor lab/diagnostic results  - Monitor all insertion sites, i.e. indwelling lines, tubes, and drains  - Monitor endotracheal if appropriate and nasal secretions for changes in amount and color  - [place] appropriate cooling/warming therapies per order  - Administer medications as ordered  - Instruct and encourage patient and family to use good hand hygiene technique  - Identify and instruct in appropriate isolation precautions for identified infection/condition  Outcome: Progressing     Problem: INFECTION - ADULT  Goal: Absence of fever/infection during neutropenic period  Description: INTERVENTIONS:  - Monitor WBC    Outcome: Progressing     Problem: SAFETY ADULT  Goal: Patient will remain free of falls  Description: INTERVENTIONS:  - Educate patient/family on patient safety including physical limitations  - Instruct patient to call for assistance with activity   - Consult OT/PT to assist with strengthening/mobility   - Keep Call bell within reach  - Keep bed low and locked with side rails adjusted as appropriate  - Keep care items and personal belongings within reach  - Initiate and maintain comfort rounds  - Make Fall Risk Sign visible to staff  -  Offer Toileting every 2 Hours, in advance of need  - Initiate/Maintain bed alarm  - Obtain necessary fall risk management equipment: non-skid socks  - Apply yellow socks and bracelet for high fall risk patients  - Consider moving patient to room near nurses station  Outcome: Progressing

## 2024-09-06 NOTE — ASSESSMENT & PLAN NOTE
Presented for suspected wound infection to right groin (from cardiac cath) with swelling and drainage  CT abd/pelvis (8/30): Inflammation of the right inguinal superficial and deep subcutaneous tissues without an abscess. Pericardial effusion. Suspected interstitial edema with increased size of the small left pleural effusion.   Wound Clx: MSSA and Proteus  Completed course of Ancef (5 days total of abx)

## 2024-09-06 NOTE — PLAN OF CARE
Problem: PAIN - ADULT  Goal: Verbalizes/displays adequate comfort level or baseline comfort level  Description: Interventions:  - Encourage patient to monitor pain and request assistance  - Assess pain using appropriate pain scale  - Administer analgesics based on type and severity of pain and evaluate response  - Implement non-pharmacological measures as appropriate and evaluate response  - Consider cultural and social influences on pain and pain management  - Notify physician/advanced practitioner if interventions unsuccessful or patient reports new pain  Outcome: Progressing     Problem: INFECTION - ADULT  Goal: Absence or prevention of progression during hospitalization  Description: INTERVENTIONS:  - Assess and monitor for signs and symptoms of infection  - Monitor lab/diagnostic results  - Monitor all insertion sites, i.e. indwelling lines, tubes, and drains  - Monitor endotracheal if appropriate and nasal secretions for changes in amount and color  - [place] appropriate cooling/warming therapies per order  - Administer medications as ordered  - Instruct and encourage patient and family to use good hand hygiene technique  - Identify and instruct in appropriate isolation precautions for identified infection/condition  Outcome: Progressing  Goal: Absence of fever/infection during neutropenic period  Description: INTERVENTIONS:  - Monitor WBC    Outcome: Progressing     Problem: SAFETY ADULT  Goal: Patient will remain free of falls  Description: INTERVENTIONS:  - Educate patient/family on patient safety including physical limitations  - Instruct patient to call for assistance with activity   - Consult OT/PT to assist with strengthening/mobility   - Keep Call bell within reach  - Keep bed low and locked with side rails adjusted as appropriate  - Keep care items and personal belongings within reach  - Initiate and maintain comfort rounds  - Make Fall Risk Sign visible to staff  - Offer Toileting every 2 Bed   Hours, in advance of need  - Initiate/Maintain bed alarm  - Obtain necessary fall risk management equipment: socks, alarms  - Apply yellow socks and bracelet for high fall risk patients  - Consider moving patient to room near nurses station  Outcome: Progressing  Goal: Maintain or return to baseline ADL function  Description: INTERVENTIONS:  -  Assess patient's ability to carry out ADLs; assess patient's baseline for ADL function and identify physical deficits which impact ability to perform ADLs (bathing, care of mouth/teeth, toileting, grooming, dressing, etc.)  - Assess/evaluate cause of self-care deficits   - Assess range of motion  - Assess patient's mobility; develop plan if impaired  - Assess patient's need for assistive devices and provide as appropriate  - Encourage maximum independence but intervene and supervise when necessary  - Involve family in performance of ADLs  - Assess for home care needs following discharge   - Consider OT consult to assist with ADL evaluation and planning for discharge  - Provide patient education as appropriate  Outcome: Progressing  Goal: Maintains/Returns to pre admission functional level  Description: INTERVENTIONS:  - Perform AM-PAC 6 Click Basic Mobility/ Daily Activity assessment daily.  - Set and communicate daily mobility goal to care team and patient/family/caregiver.   - Collaborate with rehabilitation services on mobility goals if consulted  - Perform Range of Motion 3 times a day.  - Reposition patient every 2 hours.  - Dangle patient 3 times a day  - Stand patient 3 times a day  - Ambulate patient 3 times a day  - Out of bed to chair 3 times a day   - Out of bed for meals 3 times a day  - Out of bed for toileting  - Record patient progress and toleration of activity level   Outcome: Progressing     Problem: DISCHARGE PLANNING  Goal: Discharge to home or other facility with appropriate resources  Description: INTERVENTIONS:  - Identify barriers to discharge  w/patient and caregiver  - Arrange for needed discharge resources and transportation as appropriate  - Identify discharge learning needs (meds, wound care, etc.)  - Arrange for interpretive services to assist at discharge as needed  - Refer to Case Management Department for coordinating discharge planning if the patient needs post-hospital services based on physician/advanced practitioner order or complex needs related to functional status, cognitive ability, or social support system  Outcome: Progressing     Problem: Knowledge Deficit  Goal: Patient/family/caregiver demonstrates understanding of disease process, treatment plan, medications, and discharge instructions  Description: Complete learning assessment and assess knowledge base.  Interventions:  - Provide teaching at level of understanding  - Provide teaching via preferred learning methods  Outcome: Progressing     Problem: Prexisting or High Potential for Compromised Skin Integrity  Goal: Skin integrity is maintained or improved  Description: INTERVENTIONS:  - Identify patients at risk for skin breakdown  - Assess and monitor skin integrity  - Assess and monitor nutrition and hydration status  - Monitor labs   - Assess for incontinence   - Turn and reposition patient  - Assist with mobility/ambulation  - Relieve pressure over bony prominences  - Avoid friction and shearing  - Provide appropriate hygiene as needed including keeping skin clean and dry  - Evaluate need for skin moisturizer/barrier cream  - Collaborate with interdisciplinary team   - Patient/family teaching  - Consider wound care consult   Outcome: Progressing     Problem: Nutrition/Hydration-ADULT  Goal: Nutrient/Hydration intake appropriate for improving, restoring or maintaining nutritional needs  Description: Monitor and assess patient's nutrition/hydration status for malnutrition. Collaborate with interdisciplinary team and initiate plan and interventions as ordered.  Monitor patient's  weight and dietary intake as ordered or per policy. Utilize nutrition screening tool and intervene as necessary. Determine patient's food preferences and provide high-protein, high-caloric foods as appropriate.     INTERVENTIONS:  - Monitor oral intake, urinary output, labs, and treatment plans  - Assess nutrition and hydration status and recommend course of action  - Evaluate amount of meals eaten  - Assist patient with eating if necessary   - Allow adequate time for meals  - Recommend/ encourage appropriate diets, oral nutritional supplements, and vitamin/mineral supplements  - Order, calculate, and assess calorie counts as needed  - Recommend, monitor, and adjust tube feedings and TPN/PPN based on assessed needs  - Assess need for intravenous fluids  - Provide specific nutrition/hydration education as appropriate  - Include patient/family/caregiver in decisions related to nutrition  Outcome: Progressing

## 2024-09-06 NOTE — CASE MANAGEMENT
[facility] received request for authorization from Care Manager.  Authorization request submitted for: [facility]  Facility Name: [facility]  NPI:[id number]  Facility MD: Dr Pau Vega    NPI: [id number]  Authorization initiated by contacting insurance: [employer] [facility]   Via: Fax  Clinicals submitted via fax # [phone]      Care Manager notified: Nancy Calle      Updates to authorization status will be noted in chart. Please reach out to CM for updates on any clinical information.

## 2024-09-06 NOTE — PROGRESS NOTES
Patient:    MRN:  [id number]    [place] Request ID:  [id number]    Level of care reserved:  Skilled Nursing Facility    Partner Reserved:  [facility], [place] [address] [phone]    Clinical needs requested:    Geography searched:  20 miles around [phone]    Start of Service:    Request sent:  3:08pm EDT on 9/5/2024 by Elva Calle    Partner reserved:  1:32pm EDT on 9/6/2024 by Elva Calle    Choice list shared:  12:52pm EDT on 9/6/2024 by Elva Calle

## 2024-09-06 NOTE — PROGRESS NOTES
[facility]  Progress Note  Name: Claire Doherty I  MRN: [id number]  Unit/Bed#: -01 I Date of Admission: 8/30/2024   Date of Service: 9/6/2024 I Hospital Day: 7    Assessment & Plan   * Infected wound  Assessment & Plan  Presented for suspected wound infection to right groin (from cardiac cath) with swelling and drainage  CT abd/pelvis (8/30): Inflammation of the right inguinal superficial and deep subcutaneous tissues without an abscess. Pericardial effusion. Suspected interstitial edema with increased size of the small left pleural effusion.   Wound Clx: MSSA and Proteus  Completed course of Ancef (5 days total of abx)    DINA (acute kidney injury) ([facility])  Assessment & Plan  DINA is resolved at this time  Renal US (9/3): No acute findings.   Continue to encourage PO intake    Leukocytosis  Assessment & Plan  WBC continues to down-trend without any new source of infectious etiology  Remains stable off abx  ID evaluation appreciated  Recommending outpatient hematology evaluation and repeat CBC in 1 week    Pericardial effusion  Assessment & Plan  Patient treated for acute idiopathic pericarditis with colchicine and ibuprofen during hospitalization for above 8/22/2024  TTE 8/26/2024: LVEF 65%. Systolic function is normal. Wall motion is normal. 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  To treat with oral diet and nutrition supplements as tolerated.    BMI Findings:  Body mass index is 33.88 kg/m².     Nutrition following  Continue dietary supplements       VTE Pharmacologic Prophylaxis: VTE Score: 5 High Risk (Score >/= 5) - Pharmacological DVT Prophylaxis Ordered: heparin. Sequential Compression Devices Ordered.    Mobility:   Basic Mobility Inpatient Raw Score: 7  JH-HLM Goal: 2: Bed activities/Dependent transfer  JH-HLM Achieved: 4: Move to chair/commode  JH-HLM Goal achieved. Continue to encourage appropriate mobility.    Patient Centered Rounds: I performed bedside rounds with nursing staff today.   Discussions with Specialists or Other Care Team Provider: Nursing and CM    Education and Discussions with Family / Patient:  Patient updated on plan of care.     Current Length of Stay: 7 day(s)  Current Patient Status: Inpatient   Certification Statement: The patient will continue to require additional inpatient hospital stay due to awaiting placement  Discharge Plan:  Patient is medically cleared for discharge pending placement.    Code Status: Level 3 - DNAR and DNI    Subjective:   Patient is resting in bed without any acute complaints.  She continues to report mild nausea although this is improved at this time and has increased her oral intake in the last 24 hours.    Objective:     Vitals:   Temp (24hrs), Av.1 °F (36.7 °C), Min:98.1 °F (36.7 °C), Max:98.1 °F (36.7 °C)    Temp:  [98.1 °F (36.7 °C)] 98.1 °F (36.7 °C)  HR:  [94-96]  96  Resp:  [18] 18  BP: (135-139)/(69-86) 135/86  SpO2:  [95 %] 95 %  Body mass index is 33.88 kg/m².     Input and Output Summary (last 24 hours):     Intake/Output Summary (Last 24 hours) at 9/6/2024 1603  Last data filed at 9/6/2024 1100  Gross per 24 hour   Intake 660 ml   Output 725 ml   Net -65 ml       Physical Exam:   Physical Exam  Vitals and nursing note reviewed.   Constitutional:       Appearance: She is obese.      Comments: Chronically ill-appearing   HENT:      Mouth/Throat:      Mouth: Mucous membranes are moist.      Pharynx: Oropharynx is clear.   Cardiovascular:      Rate and Rhythm: Normal rate and regular rhythm.      Pulses: Normal pulses.      Heart sounds: Normal heart sounds.   Pulmonary:      Effort: Pulmonary effort is normal. No respiratory distress.      Breath sounds: Normal breath sounds.      Comments: 2L NC  Abdominal:      General: Bowel sounds are normal.      Palpations: Abdomen is soft.      Comments: Ostomy present   Neurological:      Mental Status: She is alert. Mental status is at baseline.   Psychiatric:         Mood and Affect: Mood normal.         Behavior: Behavior normal.          Additional Data:     Labs:  Results from last 7 days   Lab Units 09/06/24  0425 09/03/24  0512 09/02/24  0524   WBC Thousand/uL 17.52*   < > 18.15*   HEMOGLOBIN g/dL 10.7*   < > 11.9   HEMATOCRIT % 35.2   < > 39.6   PLATELETS Thousands/uL 591*   < > 574*   SEGS PCT %  --   --  80*   LYMPHO PCT %  --   --  9*   MONO PCT %  --   --  9   EOS PCT %  --   --  1    < > = values in this interval not displayed.     Results from last 7 days   Lab Units 09/06/24  0425   SODIUM mmol/L 136   POTASSIUM mmol/L 3.7   CHLORIDE mmol/L 96   CO2 mmol/L 34*   BUN mg/dL 45*   CREATININE mg/dL 0.69   ANION GAP mmol/L 6   CALCIUM mg/dL 8.8   GLUCOSE RANDOM mg/dL 132                       Lines/Drains:  Invasive Devices       Peripheral Intravenous Line  Duration             Peripheral IV 09/03/24 Dorsal  (posterior);Right Hand 3 days    Peripheral IV 09/03/24 Proximal;Right;Ventral (anterior) Forearm 3 days              Drain  Duration             Colostomy Loop LUQ 1436 days    Ureteral Drain/Stent Left ureter 5 Fr. 1436 days    Ureteral Drain/Stent Right ureter 5 Fr. 1436 days                      Recent Cultures (last 7 days):   Results from last 7 days   Lab Units 08/30/24  1707   GRAM STAIN RESULT  2+ Disintegrating polys*  Rare Gram positive cocci in clusters*   WOUND CULTURE  2+ Growth of Proteus mirabilis*  2+ Growth of Staphylococcus aureus*       Last 24 Hours Medication List:   Current Facility-Administered Medications   Medication Dose Route Frequency Provider Last Rate    acetaminophen  650 mg Oral Q8H PRN Toshia M Cheng, CRNP      albuterol  2.5 mg Nebulization Q4H PRN Toshia M Cheng, CRNP      budesonide  0.5 mg Nebulization Q12H PRN Toshia M Cheng, CRNP      buPROPion  75 mg Oral Daily Toshia M Cheng, CRNP      citalopram  10 mg Oral HS Toshia M Cheng, CRNP      colchicine  0.6 mg Oral Daily Toshia M Cheng, CRNP      diazepam  5 mg Oral Q8H PRN Toshia M Cheng, CRNP      fluticasone  2 spray Nasal Daily Toshia M Cheng, CRNP      guaiFENesin  600 mg Oral Q12H NANCY Hassan PA-C      heparin (porcine)  5,000 Units Subcutaneous Q8H [facility] Toshia M Cheng, CRNP      metoclopramide  5 mg Intravenous Q6H PRN Sosa Tang DO      ondansetron  4 mg Intravenous Q6H PRN Toshia M Cheng, CRNP      pantoprazole  40 mg Oral Early Morning Toshia M Cheng, CRNP      traZODone  50 mg Oral HS Toshia M Cheng, CRNP          Today, Patient Was Seen By: Sosa Tang DO    **Please Note: This note may have been constructed using a voice recognition system.**

## 2024-09-06 NOTE — NUTRITION
"   09/06/24 1215   Biochemical Data,Medical Tests, and Procedures   Biochemical Data/Medical Tests/Procedures Lab values reviewed;Meds reviewed   Labs (Comment) 9/6/2024 BUN 45, hemoglobin 10.7   Meds (Comment) Wellbutrin, Celexa, Mucinex, heparin, Protonix, trazodone, Reglan, Zofran   Nutrition-Focused Physical Exam   Nutrition-Focused Physical Exam Findings RN skin assessment reviewed;Edema;Wound  (Trace bilateral lower extremity edema, catheter entry/exit site at right thigh, wound at right foot)   Medical-Related Concerns PMH reviewed   Adequacy of Intake   Nutrition Modality PO   Feeding Route   PO Independent   Current PO Intake   Current Diet Order Regular diet, thin liquids   Nutrition Supplements Ensure Plus High Protein  (3 times daily)   Current Meal Intake 25-50%   Estimated calorie intake compared to estimated need Nutrient needs are not met   PES Statement   Problem Continue previous diagnosis   Recommendations/Interventions   Malnutrition/BMI Present Yes  (As previously documented)   Summary Nutrition follow-up assessment.  Prescribed a regular diet, thin liquids.  Ensure vanilla 3 times daily.  Meal completion 0-25%.  Nutrient needs are not met.  Patient reports her appetite is \"better than yesterday.\"  Denies dysphagia.  States her sister is a registered dietitian and encourages her to eat adequately.  Discussed diet and nutrition supplements as prescribed.  Patient agreeable to Ensure vanilla and Gelatein lime each once daily.  RD to adjust supplement order per patient preference.  Encouraged intake of protein containing foods for wound healing, patient verbalizes understanding.  RD continues following.   Interventions/Recommendations Continue current diet order;Supplement adjust;Monitor I & O's   Education Assessment   Education Patient declined nutrition education   Patient Nutrition Goals   Goal Increase kcal/PRO intake   Goal Status Not met;Extended   Timeframe to complete goal by next f/u "   Nutrition Complexity Risk   Nutrition complexity level High risk   Follow up date 09/10/24

## 2024-09-06 NOTE — ASSESSMENT & PLAN NOTE
Malnutrition Findings:   Adult Malnutrition type: Acute illness  Adult Degree of Malnutrition: Other severe protein calorie malnutrition  Malnutrition Characteristics: Inadequate energy, Weight loss                  360 Statement: Malnutrition related to acute illness as evidenced by >5% body weight loss in <1-month, <50% energy intake compared to estimated energy needs for >5 days.  To treat with oral diet and nutrition supplements as tolerated.    BMI Findings:           Body mass index is 33.88 kg/m².     Nutrition following  Continue dietary supplements

## 2024-09-06 NOTE — UTILIZATION REVIEW
Continued Stay Review    Date: 9/5                          Current Patient Class: Inpatient  Current Level of Care: MEd/surg    HPI:74 y.o. female initially admitted on 8/30     Assessment/Plan: Wound Clx: MSSA and Proteus . Has completed 5 days of IV ancef. Wbcs remain elevated.  Creat improved.  No further IV fluids. Case management working on safe dc planning to STR as per treatment team. As per ID, persistent leukocytosis may be due to persistent nausea with unknown etiology.      Vital Signs (last 3 days)       Date/Time Temp Pulse Resp BP MAP (mmHg) SpO2 Calculated FIO2 (%) - Nasal Cannula Nasal Cannula O2 Flow Rate (L/min) O2 Device Patient Position - Orthostatic VS [place] Coma Scale Score Pain    09/05/24 15:10:14 97.9 °F (36.6 °C) 98 18 131/76 94 95 % -- -- None (Room air) Lying -- --    09/05/24 0747 -- -- -- -- -- 95 % 24 1 L/min Nasal cannula -- -- --    09/05/24 0719 -- -- -- -- -- -- -- -- -- -- 15 No Pain    09/05/24 07:12:48 97.9 °F (36.6 °C) 90 16 131/66 88 -- 24 1 L/min Nasal cannula Lying -- --    09/04/24 2235 -- -- -- -- -- -- 24 1 L/min Nasal cannula -- -- --    09/04/24 22:10:12 97.7 °F (36.5 °C) 96 16 141/75 97 96 % -- -- -- -- -- --    09/04/24 2100 -- -- -- -- -- -- 24 1 L/min Nasal cannula -- 14 No Pain    09/04/24 14:52:47 98.4 °F (36.9 °C) 100 16 147/75 99 99 % -- -- -- -- -- --    09/04/24 0940 -- -- -- -- -- -- -- -- -- -- -- No Pain    09/04/24 0749 -- -- -- -- -- -- -- -- -- -- 14 No Pain    09/04/24 07:22:30 98.4 °F (36.9 °C) 89 18 147/71 96 97 % -- -- -- -- -- --    09/04/24 0700 -- -- -- -- -- -- 24 1 L/min Nasal cannula -- -- --    09/03/24 22:01:21 97.4 °F (36.3 °C) -- 18 147/72 97 -- -- -- -- -- -- --    09/03/24 1938 -- -- -- -- -- -- 24 1 L/min Nasal cannula -- 14 No Pain    09/03/24 15:36:21 98 °F (36.7 °C) 93 16 155/72 100 94 % -- -- -- -- -- --    09/03/24 0852 -- -- -- -- -- -- -- -- -- -- -- No Pain    09/03/24 0731 -- -- -- -- -- -- -- -- -- -- 14 No Pain    09/03/24  0716 -- -- -- -- -- -- 24 1 L/min Nasal cannula -- -- --    09/03/24 07:15:07 98 °F (36.7 °C) 97 18 148/74 99 95 % -- -- -- -- -- --    09/03/24 0047 -- -- -- -- -- -- -- -- -- -- 15 No Pain          Weight (last 2 days)       Date/Time Weight    09/06/24 0600 78.7 (173.5)    09/06/24 0504 78.7 (173.5)    09/05/24 0556 77 (169.75)    09/04/24 0600 75.8 (167.11)     Weight: pt had no tanks or pumps and only one pillow and sheeet at 09/04/24 0600              Pertinent Labs/Diagnostic Results:   Radiology:  CT abdomen pelvis w contrast   Final Interpretation by Suhas Fernando MD (09/04 1153)      1.  Postsurgical changes in the right groin with improving mild subcutaneous edema. No abscess or soft tissue gas.   2.  Moderate pericardial effusion and small left pleural effusion.   3.  Indeterminate 1.4 mm right hepatic lesion. Equivocal heterogeneity in this region on remote prior exams. This could reflect a hemangioma but is incompletely characterized. Recommend further characterization with nonemergent contrast-enhanced hepatic    MRI.         The study was marked in EPIC for immediate notification.               Resident: Brooks Blood I, the attending radiologist, have reviewed the images and agree with the final report above.      Workstation performed: [id number]         US kidney and bladder   Final Interpretation by Keely Fall MD (09/04 0819)      No acute findings.            Workstation performed: [id number]         VAS DUPLEX EVALUATION FOR PSEUDOANEURYSM   Final Interpretation by Chino Goldberg MD (08/31 1117)      CT abdomen pelvis with contrast   Final Interpretation by Anthony Ross MD (08/30 8820)      1.  Inflammation of the right inguinal superficial and deep subcutaneous tissues without an abscess.   2.  Pericardial effusion is new since July 23, 2024.   3.  Suspected interstitial edema with increased size of the small left pleural effusion.         Workstation  performed: [id number]           Cardiology:  No orders to display     GI:  No orders to display           Results from last 7 days   Lab Units 09/05/24 0717 09/04/24 0525 09/03/24 0512 09/02/24 0524 09/01/24 0442 08/31/24  0523   WBC Thousand/uL 18.03* 20.32* 19.56* 18.15* 13.09* 14.43*   HEMOGLOBIN g/dL 10.7* 10.9* 10.7* 11.9 11.2* 10.8*   HEMATOCRIT % 35.6 34.7* 35.4 39.6 37.1 36.7   PLATELETS Thousands/uL 560* 625* 590* 574* 499* 507*   TOTAL NEUT ABS Thousands/µL  --   --   --  14.50* 8.87* 9.89*         Results from last 7 days   Lab Units 09/05/24 0717 09/04/24 0525 09/03/24 0628 09/02/24  0524   SODIUM mmol/L 138 139 137 137   POTASSIUM mmol/L 3.8 2.9* 3.7 4.0   CHLORIDE mmol/L 97 92* 88* 85*   CO2 mmol/L 31 32 31 40*   ANION GAP mmol/L 10 15* 18* 12   BUN mg/dL 48* 49* 42* 29*   CREATININE mg/dL 0.85 1.24 1.33* 0.79   EGFR ml/min/1.73sq m 67 42 39 73   CALCIUM mg/dL 8.6 8.4 8.0* 9.0   MAGNESIUM mg/dL 2.6  --  1.7*  --      Results from last 7 days   Lab Units 08/30/24  1345   AST U/L 25   ALT U/L 12   ALK PHOS U/L 63   TOTAL PROTEIN g/dL 7.6   ALBUMIN g/dL 3.7   TOTAL BILIRUBIN mg/dL 0.27         Results from last 7 days   Lab Units 09/05/24 0717 09/04/24 0525 09/03/24 0628 09/02/24 0524 09/01/24 0442 08/31/24  0523 08/30/24  1345   GLUCOSE RANDOM mg/dL 84 117 118 98 104 94 120             Results from last 7 days   Lab Units 08/30/24  1345   BNP pg/mL 52         Results from last 7 days   Lab Units 09/02/24  1652   CLARITY UA  Clear   COLOR UA  Yellow   SPEC GRAV UA  1.025   PH UA  6.0   GLUCOSE UA mg/dl Negative   KETONES UA mg/dl Negative   BLOOD UA  2+*   PROTEIN UA mg/dl Negative   NITRITE UA  Negative   BILIRUBIN UA  Negative   UROBILINOGEN UA E.U./dl 0.2   LEUKOCYTES UA  Negative   WBC UA /hpf None Seen   RBC UA /hpf 4-10*   BACTERIA UA /hpf Occasional   EPITHELIAL CELLS WET PREP /hpf Occasional       Results from last 7 days   Lab Units 08/30/24  1707   GRAM STAIN RESULT  2+  Disintegrating polys*  Rare Gram positive cocci in clusters*   WOUND CULTURE  2+ Growth of Proteus mirabilis*  2+ Growth of Staphylococcus aureus*                   Medications:   Scheduled Medications:  buPROPion, 75 mg, Oral, Daily  citalopram, 10 mg, Oral, HS  colchicine, 0.6 mg, Oral, Daily  fluticasone, 2 spray, Nasal, Daily  guaiFENesin, 600 mg, Oral, Q12H NANCY  heparin (porcine), 5,000 Units, Subcutaneous, Q8H NANCY  pantoprazole, 40 mg, Oral, Early Morning  traZODone, 50 mg, Oral, HS      Continuous IV Infusions:     PRN Meds:  acetaminophen, 650 mg, Oral, Q8H PRN  albuterol, 2.5 mg, Nebulization, Q4H PRN  budesonide, 0.5 mg, Nebulization, Q12H PRN  diazepam, 5 mg, Oral, Q8H PRN  metoclopramide, 5 mg, Intravenous, Q6H PRN  ondansetron, 4 mg, Intravenous, Q6H PRN        Discharge Plan: D    Network Utilization Review Department  ATTENTION: Please call with any questions or concerns to [phone] and carefully listen to the prompts so that you are directed to the right person. All voicemails are confidential.   For Discharge needs, contact Care Management DC Support Team at [phone] opt. 2  Send all requests for admission clinical reviews, approved or denied determinations and any other requests to dedicated fax number below belonging to the campus where the patient is receiving treatment. List of dedicated fax numbers for the Facilities:  FACILITY NAME UR FAX NUMBER   ADMISSION DENIALS (Administrative/Medical Necessity) [phone]   DISCHARGE SUPPORT TEAM (NETWORK) [phone]   PARENT CHILD HEALTH (Maternity/NICU/Pediatrics) [phone]   [facility] [phone]   [facility] [phone]   [facility] [phone]   [facility] [phone]   [facility] [phone]   [facility] [phone]   [facility] [phone]    [facility] [phone]   [facility] [phone]   [facility] [phone]   [facility] [phone]   [facility] [phone]

## 2024-09-06 NOTE — ASSESSMENT & PLAN NOTE
DINA is resolved at this time  Renal US (9/3): No acute findings.   Continue to encourage PO intake

## 2024-09-06 NOTE — PROGRESS NOTES
Progress Note - [facility] Infectious Disease   Claire Doherty 74 y.o. female MRN: [id number]  Unit/Bed#: -01 Encounter: [id number]      IMPRESSION & RECOMMENDATIONS:   1.  Right groin wound infection.  Postcardiac cath on 8/21/2024.  Presented with increased drainage from right groin incision site.  Wound culture isolated Proteus and MSSA. She received 5 of IV antibiotics and wound appears stable without surrounding cellulitis or drainage.  CT x2 negative for groin abscess or other intra abdominal process.  Venous duplex negative for pseudoaneurysm.  No intervention from a surgical standpoint.  No blood cultures obtained.  She is afebrile however leukocytosis persists as below.  She is otherwise hemodynamically stable. She completed 5 day course of IV abx.   -No additional abx required   -Monitor CBCd for response to treatment  -Trend temps and hemodynamics  -Serial skin exams and local wound care     2.  Leukocytosis.  With left shift, worsening since admission.  Initially thought due to #1 however may be confounded by #3.  No acute process in the abdomen or pelvis on CT. Fortunately, repeat CT A/P negative for intra-abdominal abscess/infectious process. Consider possibility of underlying hematologic process given somewhat chronicity of leukocytosis.   -Continue to monitor off abx   -Consider hematologic work up   -Recommend obtaining blood cultures x 2 should patient spike fever  -Recommend repeat CBCd in 1 week as outpatient      3.  Intractable nausea with vomiting.  Developed over admission.  Unknown etiology.  Possibly adverse drug effect however patient has tolerated cephalosporins in the past.  Has been on Protonix in the setting of daily NSAID use for #4. This has now improved and she is tolerating some PO.  -Workup per primary team  -Supportive cares     4.  Acute idiopathic pericarditis.  Recently hospitalized for chest pain with EKG changes.  Status post cardiac cath with right groin access 8/21  that was negative for obstructive CAD.  Trivial pericardial effusion noted on imaging.  Started on colchicine, ibuprofen and prophylactic Protonix by cardiology last admission.  Chest pain resolved.  -Close cardiology follow-up ongoing     5. History of muscular dystrophy with restrictive lung disease.  -Continue supportive cares     6. Antibiotic Allergy.  History of amoxicillin allergy.  With report of vague skin change/rash with amoxicillin sometime in the .  Has tolerated Zosyn in the past as well as multiple cephalosporins.  -Monitor for adverse drug reactions    Antibiotics:  D3 off abx     I have discussed the above management plan in detail with patient.     Infectious disease consultation service will sign off for now.  Please call if any new signs or symptoms of infection develop.  Thank you!    Above plan discussed in detail with Dr. Tang who is in agreement with plan to observe off abx.     24 Hour events:  Yesterday and overnight notes reviewed. Pending d/c to SNF.     Subjective:  Patient has no fever, chills, sweats; some nausea today but no vomiting, did eat some breakfast today; no diarrhea; no cough, shortness of breath; no pain. No new symptoms.    Objective:  Vitals:  Temp:  [97.9 °F (36.6 °C)-98.1 °F (36.7 °C)] 98.1 °F (36.7 °C)  HR:  [94-98] 94  Resp:  [18] 18  BP: (131-139)/(69-76) 139/69  SpO2:  [95 %] 95 %  Temp (24hrs), Av °F (36.7 °C), Min:97.9 °F (36.6 °C), Max:98.1 °F (36.7 °C)  Current: Temperature: 98.1 °F (36.7 °C)    PHYSICAL EXAM:  General Appearance:  Appearing chronically ill and debilitated, otherwise nontoxic, and in no distress sitting in bedside recliner    HEENT: Normocephalic, without obvious abnormality, atraumatic. Conjunctiva pink and sclera anicteric. Oropharynx moist without lesions.     Lungs:   Clear to auscultation bilaterally, respirations unlabored   Heart:  RRR; no murmur, rub or gallop   Abdomen:   Soft, non-tender, non-distended, positive bowel  sounds    Extremities: No cyanosis, clubbing or edema   Musculoskeletal: Back symmetric without curvature, ROM normal.    : No CVA or suprapubic tenderness.    Skin: No rashes or lesions. No draining wounds noted. Right groin site without drainage from incision site, small bandaid intact. No surrounding erythema or warmth. Nontender. Peripheral IV intact without evidence of erythema, warmth, or exudate.        LABS, IMAGING, & OTHER STUDIES:  Extensive review of the medical records in epic including review of the notes, radiographs, and laboratory results were reviewed personally as below.     Lab Results:  Results from last 7 days   Lab Units 09/06/24 0425 09/05/24 0717 09/04/24  0525   WBC Thousand/uL 17.52* 18.03* 20.32*   HEMOGLOBIN g/dL 10.7* 10.7* 10.9*   PLATELETS Thousands/uL 591* 560* 625*     Results from last 7 days   Lab Units 09/06/24 0425 09/05/24 0717 09/04/24  0525 08/31/24  0523 08/30/24  1345   SODIUM mmol/L 136 138 139   < > 139   POTASSIUM mmol/L 3.7 3.8 2.9*   < > 4.8   CHLORIDE mmol/L 96 97 92*   < > 94*   CO2 mmol/L 34* 31 32   < > 34*   BUN mg/dL 45* 48* 49*   < > 13   CREATININE mg/dL 0.69 0.85 1.24   < > 0.33*   EGFR ml/min/1.73sq m 86 67 42   < > 109   CALCIUM mg/dL 8.8 8.6 8.4   < > 9.3   AST U/L  --   --   --   --  25   ALT U/L  --   --   --   --  12   ALK PHOS U/L  --   --   --   --  63    < > = values in this interval not displayed.     Results from last 7 days   Lab Units 09/01/24  0954 08/30/24  1707   GRAM STAIN RESULT   --  2+ Disintegrating polys*  Rare Gram positive cocci in clusters*   WOUND CULTURE   --  2+ Growth of Proteus mirabilis*  2+ Growth of Staphylococcus aureus*   MRSA CULTURE ONLY  No Methicillin Resistant Staphlyococcus aureus (MRSA) isolated  --                        Lab interpretation/comments: persistent leukocytosis      Culture data: no new growth    Imaging Studies:   Imaging interpretation/comments: none to review

## 2024-09-06 NOTE — ASSESSMENT & PLAN NOTE
WBC continues to down-trend without any new source of infectious etiology  Remains stable off abx  ID evaluation appreciated  Recommending outpatient hematology evaluation and repeat CBC in 1 week

## 2024-09-07 VITALS
WEIGHT: 171.52 LBS | SYSTOLIC BLOOD PRESSURE: 124 MMHG | BODY MASS INDEX: 33.67 KG/M2 | OXYGEN SATURATION: 95 % | TEMPERATURE: 97.8 F | HEART RATE: 88 BPM | DIASTOLIC BLOOD PRESSURE: 76 MMHG | RESPIRATION RATE: 20 BRPM | HEIGHT: 60 IN

## 2024-09-07 LAB
ANION GAP SERPL CALCULATED.3IONS-SCNC: 6 MMOL/L (ref 4–13)
BUN SERPL-MCNC: 38 MG/DL (ref 5–25)
CALCIUM SERPL-MCNC: 8.8 MG/DL (ref 8.4–10.2)
CHLORIDE SERPL-SCNC: 97 MMOL/L (ref 96–108)
CO2 SERPL-SCNC: 35 MMOL/L (ref 21–32)
CREAT SERPL-MCNC: 0.61 MG/DL (ref 0.6–1.3)
ERYTHROCYTE [DISTWIDTH] IN BLOOD BY AUTOMATED COUNT: 17.6 % (ref 11.6–15.1)
GFR SERPL CREATININE-BSD FRML MDRD: 89 ML/MIN/1.73SQ M
GLUCOSE SERPL-MCNC: 121 MG/DL (ref 65–140)
HCT VFR BLD AUTO: 34.6 % (ref 34.8–46.1)
HGB BLD-MCNC: 10.3 G/DL (ref 11.5–15.4)
MAGNESIUM SERPL-MCNC: 2.2 MG/DL (ref 1.9–2.7)
MCH RBC QN AUTO: 25.8 PG (ref 26.8–34.3)
MCHC RBC AUTO-ENTMCNC: 29.8 G/DL (ref 31.4–37.4)
MCV RBC AUTO: 87 FL (ref 82–98)
PLATELET # BLD AUTO: 553 THOUSANDS/UL (ref 149–390)
PMV BLD AUTO: 11.1 FL (ref 8.9–12.7)
POTASSIUM SERPL-SCNC: 3.7 MMOL/L (ref 3.5–5.3)
RBC # BLD AUTO: 3.99 MILLION/UL (ref 3.81–5.12)
SODIUM SERPL-SCNC: 138 MMOL/L (ref 135–147)
WBC # BLD AUTO: 16.61 THOUSAND/UL (ref 4.31–10.16)

## 2024-09-07 PROCEDURE — 83735 ASSAY OF MAGNESIUM: CPT | Performed by: INTERNAL MEDICINE

## 2024-09-07 PROCEDURE — 85027 COMPLETE CBC AUTOMATED: CPT | Performed by: INTERNAL MEDICINE

## 2024-09-07 PROCEDURE — 94664 DEMO&/EVAL PT USE INHALER: CPT

## 2024-09-07 PROCEDURE — 80048 BASIC METABOLIC PNL TOTAL CA: CPT | Performed by: INTERNAL MEDICINE

## 2024-09-07 PROCEDURE — 99239 HOSP IP/OBS DSCHRG MGMT >30: CPT | Performed by: INTERNAL MEDICINE

## 2024-09-07 RX ORDER — DIAZEPAM 5 MG
5 TABLET ORAL EVERY 8 HOURS PRN
Qty: 10 TABLET | Refills: 0 | Status: SHIPPED | OUTPATIENT
Start: 2024-09-07 | End: 2024-09-17

## 2024-09-07 RX ADMIN — PANTOPRAZOLE SODIUM 40 MG: 40 TABLET, DELAYED RELEASE ORAL at 05:16

## 2024-09-07 RX ADMIN — BUPROPION HYDROCHLORIDE TABLETS 75 MG: 75 TABLET, FILM COATED ORAL at 08:42

## 2024-09-07 RX ADMIN — HEPARIN SODIUM 5000 UNITS: 5000 INJECTION, SOLUTION INTRAVENOUS; SUBCUTANEOUS at 05:16

## 2024-09-07 RX ADMIN — GUAIFENESIN 600 MG: 600 TABLET ORAL at 08:42

## 2024-09-07 RX ADMIN — COLCHICINE 0.6 MG: 0.6 TABLET ORAL at 08:42

## 2024-09-07 RX ADMIN — FLUTICASONE PROPIONATE 2 SPRAY: 50 SPRAY, METERED NASAL at 08:42

## 2024-09-07 NOTE — ASSESSMENT & PLAN NOTE
Uses by BiPAP at night- Has not used this in the hospital since 8/30  At baseline can stand and pivot to wheelchair  Lives alone in an apartment with home health care  Continue supportive care

## 2024-09-07 NOTE — PLAN OF CARE
Problem: PAIN - ADULT  Goal: Verbalizes/displays adequate comfort level or baseline comfort level  Description: Interventions:  - Encourage patient to monitor pain and request assistance  - Assess pain using appropriate pain scale  - Administer analgesics based on type and severity of pain and evaluate response  - Implement non-pharmacological measures as appropriate and evaluate response  - Consider cultural and social influences on pain and pain management  - Notify physician/advanced practitioner if interventions unsuccessful or patient reports new pain  Outcome: Progressing     Problem: INFECTION - ADULT  Goal: Absence or prevention of progression during hospitalization  Description: INTERVENTIONS:  - Assess and monitor for signs and symptoms of infection  - Monitor lab/diagnostic results  - Monitor all insertion sites, i.e. indwelling lines, tubes, and drains  - Monitor endotracheal if appropriate and nasal secretions for changes in amount and color  - [place] appropriate cooling/warming therapies per order  - Administer medications as ordered  - Instruct and encourage patient and family to use good hand hygiene technique  - Identify and instruct in appropriate isolation precautions for identified infection/condition  Outcome: Progressing  Goal: Absence of fever/infection during neutropenic period  Description: INTERVENTIONS:  - Monitor WBC    Outcome: Progressing     Problem: SAFETY ADULT  Goal: Patient will remain free of falls  Description: INTERVENTIONS:  - Educate patient/family on patient safety including physical limitations  - Instruct patient to call for assistance with activity   - Consult OT/PT to assist with strengthening/mobility   - Keep Call bell within reach  - Keep bed low and locked with side rails adjusted as appropriate  - Keep care items and personal belongings within reach  - Initiate and maintain comfort rounds  - Make Fall Risk Sign visible to staff  - Offer Toileting every 2 Hours, in  advance of need  - Initiate/Maintain bed alarm  - Obtain necessary fall risk management equipment  - Apply yellow socks and bracelet for high fall risk patients  - Consider moving patient to room near nurses station  Outcome: Progressing  Goal: Maintain or return to baseline ADL function  Description: INTERVENTIONS:  -  Assess patient's ability to carry out ADLs; assess patient's baseline for ADL function and identify physical deficits which impact ability to perform ADLs (bathing, care of mouth/teeth, toileting, grooming, dressing, etc.)  - Assess/evaluate cause of self-care deficits   - Assess range of motion  - Assess patient's mobility; develop plan if impaired  - Assess patient's need for assistive devices and provide as appropriate  - Encourage maximum independence but intervene and supervise when necessary  - Involve family in performance of ADLs  - Assess for home care needs following discharge   - Consider OT consult to assist with ADL evaluation and planning for discharge  - Provide patient education as appropriate  Outcome: Progressing  Goal: Maintains/Returns to pre admission functional level  Description: INTERVENTIONS:  - Perform AM-PAC 6 Click Basic Mobility/ Daily Activity assessment daily.  - Set and communicate daily mobility goal to care team and patient/family/caregiver.   - Collaborate with rehabilitation services on mobility goals if consulted  - Perform Range of Motion 3 times a day.  - Reposition patient every 2 hours.  - Dangle patient 3 times a day  - Stand patient 3 times a day  - Ambulate patient 3 times a day  - Out of bed to chair 3 times a day   - Out of bed for meals 3 times a day  - Out of bed for toileting  - Record patient progress and toleration of activity level   Outcome: Progressing     Problem: DISCHARGE PLANNING  Goal: Discharge to home or other facility with appropriate resources  Description: INTERVENTIONS:  - Identify barriers to discharge w/patient and caregiver  -  Arrange for needed discharge resources and transportation as appropriate  - Identify discharge learning needs (meds, wound care, etc.)  - Arrange for interpretive services to assist at discharge as needed  - Refer to Case Management Department for coordinating discharge planning if the patient needs post-hospital services based on physician/advanced practitioner order or complex needs related to functional status, cognitive ability, or social support system  Outcome: Progressing     Problem: Knowledge Deficit  Goal: Patient/family/caregiver demonstrates understanding of disease process, treatment plan, medications, and discharge instructions  Description: Complete learning assessment and assess knowledge base.  Interventions:  - Provide teaching at level of understanding  - Provide teaching via preferred learning methods  Outcome: Progressing     Problem: Prexisting or High Potential for Compromised Skin Integrity  Goal: Skin integrity is maintained or improved  Description: INTERVENTIONS:  - Identify patients at risk for skin breakdown  - Assess and monitor skin integrity  - Assess and monitor nutrition and hydration status  - Monitor labs   - Assess for incontinence   - Turn and reposition patient  - Assist with mobility/ambulation  - Relieve pressure over bony prominences  - Avoid friction and shearing  - Provide appropriate hygiene as needed including keeping skin clean and dry  - Evaluate need for skin moisturizer/barrier cream  - Collaborate with interdisciplinary team   - Patient/family teaching  - Consider wound care consult   Outcome: Progressing     Problem: Nutrition/Hydration-ADULT  Goal: Nutrient/Hydration intake appropriate for improving, restoring or maintaining nutritional needs  Description: Monitor and assess patient's nutrition/hydration status for malnutrition. Collaborate with interdisciplinary team and initiate plan and interventions as ordered.  Monitor patient's weight and dietary intake as  ordered or per policy. Utilize nutrition screening tool and intervene as necessary. Determine patient's food preferences and provide high-protein, high-caloric foods as appropriate.     INTERVENTIONS:  - Monitor oral intake, urinary output, labs, and treatment plans  - Assess nutrition and hydration status and recommend course of action  - Evaluate amount of meals eaten  - Assist patient with eating if necessary   - Allow adequate time for meals  - Recommend/ encourage appropriate diets, oral nutritional supplements, and vitamin/mineral supplements  - Order, calculate, and assess calorie counts as needed  - Recommend, monitor, and adjust tube feedings and TPN/PPN based on assessed needs  - Assess need for intravenous fluids  - Provide specific nutrition/hydration education as appropriate  - Include patient/family/caregiver in decisions related to nutrition  Outcome: Progressing

## 2024-09-07 NOTE — ASSESSMENT & PLAN NOTE
[facility] Cardiovascular [address]    [address]    [address] [address]    Phone:  [phone]    Fax:  [phone]       Thank You for choosing us for your health care visit. We are glad to serve you and happy to provide you with this summary of your visit. Please help us to ensure we have accurate records. If you find anything that needs to be changed, please let our staff know as soon as possible.          Your Demographic Information     Patient Name Sex Dina Rothman Female 1955       Ethnic Group Patient Race    Not of  or  Origin White      Your Visit Details     Date & Time Provider Department    2017 9:00 AM Ann Hemphill MD; [place] [address] [address] [facility] Cardiovascular Suite [facility]      Your Upcoming Appointment*(Max 10)     Monday April 10, 2017  8:30 AM CDT   Electrocardiogram with [facility] LAB SERVICES [address] [place] [address] Laboratory [address] ([facility])    [address]  [address] [address]   [phone]            2017  8:00 AM CDT   Pacer check office with Ann Hemphill MD, [place] [id number] DEVICE CHECK   [facility] Cardiovascular [address] ([facility])    [address]  [address] [address]   [phone]              We Ordered or Performed the Following     IN PERSON DEVICE CHECK       Conditions Discussed Today or Order-Related Diagnoses        Comments    Paroxysmal atrial fibrillation           Your Vitals Were     BP Pulse Height Weight LMP BMI    130/78 60 5' 6\" (1.676 m) 280 lb (127 kg) 1995 45.19 kg/m2    Smoking Status                   Never Smoker           Medications Prescribed or Re-Ordered Today     Sotalol HCl, AF, 120 MG Tab    Sig - Route: Take 120 mg by mouth 2 times daily. - Oral    Class: Eprescribe    Pharmacy: [facility]  Imaging as above  Patient appears somewhat dehydrated with dry mucous membranes, increasing creatinine, and hemoconcentration  Continue to hold Lasix    [facility] [address] [place] [address] [address] AT [facility] [address] Ph #: [phone]    Notes to Pharmacy: TIKOSYN HAS BEEN STOPPED TODAY.  WILL BE STOPPING METOPROLOL ONCE SOTALOL IS STARTED.      Your Current Medications Are        Disp Refills Start End    B Complex-C (SUPER B COMPLEX PO)        Sig - Route: Take by mouth daily. - Oral    Class: Historical Med    metoPROLOL (LOPRESSOR) 25 MG tablet 270 tablet 3 1/30/2017     Sig - Route: Take 1.5 tablets by mouth 2 times daily. - Oral    Class: Eprescribe    Notes to Pharmacy: DOSE IS NOW 37.5 MG BID.    warfarin (COUMADIN) 7.5 MG tablet 90 tablet 2 12/30/2013     Sig - Route: Take 1 tablet by mouth daily. - Oral    levothyroxine (SYNTHROID, LEVOTHROID) 150 MCG tablet 30 tablet 1 3/12/2012     Sig - Route: Take 1 tablet by mouth daily (before breakfast). Indications: Underactive Thyroid - Oral    vitamin - therapeutic multivitamins w/minerals (CENTRUM SILVER,THERA-M) TABS        Sig - Route: Take 1 tablet by mouth daily.   - Oral    Class: Historical Med    aspirin 81 MG tablet        Sig - Route: Take 81 mg by mouth Every evening.   - Oral    Class: Historical Med    Misc Natural Products (GLUCOSAMINE-CHONDROITIN SULF) TABS        Sig - Route: Take 1 tablet by mouth Every evening.   - Oral    Class: Historical Med    Omega-3 Fatty Acids (FISH OIL PO)        Sig - Route: Take 1 capsule by mouth Every evening.   - Oral    Class: Historical Med    Sotalol HCl, AF, 120 MG Tab 180 tablet 0 4/5/2017     Sig - Route: Take 120 mg by mouth 2 times daily. - Oral    Class: Eprescribe    Notes to Pharmacy: TIKOSYN HAS BEEN STOPPED TODAY.  WILL BE STOPPING METOPROLOL ONCE SOTALOL IS STARTED.      Discontinued Medications        Reason for Discontinue    dofetilide (TIKOSYN) 500 MCG capsule Alternate Therapy      Allergies     No Known Allergies      Immunizations History as of 4/5/2017     Name Date    Influenza 3/4/2012  6:27 PM    Pneumococcal  Polysaccharide Adult 3/4/2012  6:27 PM      Problem List as of 4/5/2017     Atrial fibrillation    Pacemaker Dual chamber [employer]    Sinoatrial node dysfunction    Atrial flutter    Long term (current) use of anticoagulants - Warfarin    High-risk medication - Tikosyn.    Orthostasis              Patient Instructions    1.  Stop Tikosyn today.  2.  Continue with Metoprolol.  3.  On Saturday, stop Metoprolol and Start Sotalol 120 mg twice daily.  4.  Come in to 777 on Monday for an EKG.

## 2024-09-07 NOTE — CASE MANAGEMENT
Case Management Discharge Planning Note    Patient name Claire Doherty  Location /-01 MRN [id number]  : 1950 Date 2024       Current Admission Date: 2024  Current Admission Diagnosis:Infected wound   Patient Active Problem List    Diagnosis Date Noted Date Diagnosed    Severe protein-calorie malnutrition (HCC) 2024     DINA (acute kidney injury) (HCC) 2024     Nausea 2024     Pleural effusion 2024     Pericardial effusion 2024     Infected wound 2024     Chest pain 2024     Acute idiopathic pericarditis 2024     Cellulitis of right foot 2024     Chronic left shoulder pain 2024     Shoulder joint dysfunction 2024     Bilateral hand pain 2023     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     Gastroparesis 2021     Aortic valve sclerosis 2021     Tricuspid valve insufficiency 2021     Mitral annular calcification 2021     Former smoker 2021     On home oxygen therapy 2021     Colostomy status ([facility])      Ductal carcinoma in situ (DCIS) of left breast 03/15/2021     Insomnia 10/21/2020     Depression 10/06/2020     Anemia 10/02/2020     Chronic hypoxic respiratory failure ([facility]) 2020     Bladder injury 2020     Thrombocytosis, unspecified 2020     Leukocytosis 2020     Difficult intubation      Dystrophy, muscular, 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[facility]) 10/12/2017     Ambulatory dysfunction 10/12/2017     Urinary incontinence 10/03/2017     Osteoporosis 2016     Allergic rhinitis 10/09/2013     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[facility]) 10/04/2012     GERD without esophagitis 10/04/2012       LOS (days): 8  Geometric Mean LOS (GMLOS) (days): 5.1  Days to GMLOS:-2.8     OBJECTIVE:  Risk of Unplanned Readmission Score: 15.11         Current admission status: Inpatient   Preferred Pharmacy:   [id number] - [place] [facility] [address]  [address]  [address] [place] [id number]  Phone: [phone] Fax: [phone]    Primary Care Provider: Julienne Tucker DO    Primary Insurance: [facility]  [facility]: [facility]    DISCHARGE DETAILS:    Discharge planning discussed with:: patient & Hillary was called at 9:59 am &13:07pm & 14:28 pm & 16:53pm  Freedom of Choice: Yes  Comments - Freedom of Choice: pt was accepted to [facility] - family & pt's choice- auth was received- family & pt are in agreement with the d/c & d/c plan  CM contacted family/caregiver?: Yes  Were Treatment Team discharge recommendations reviewed with patient/caregiver?: Yes  Did patient/caregiver verbalize understanding of patient care needs?: Yes  Were patient/caregiver advised of the risks associated with not following Treatment Team discharge recommendations?: Yes    Contacts  Patient Contacts: Hillary Doherty sister  Relationship to Patient:: Family  Contact Method: Phone  Phone Number: [phone]  Reason/Outcome: Discharge Planning    Requested Home Health Care         Is the patient interested in HHC at discharge?: No    DME Referral Provided  Referral made for DME?: No    Other Referral/Resources/Interventions Provided:  Interventions: Short Term Rehab  Referral Comments: pt accepted to [facility]- auth received - rn ,snf,provider, pt & family are aware of the bls 14:30pm transport- no covid is needed- rn was given the report & fax#- family did bring the pt clothing and her bipap from home to take with her to the snf- family requested that they need to get the pt's speclaity w/c to the snf- cm called [facility] & they are able to help ,but not today - they stated the cost is $189.00- cm called her sister with this inforamtion & gavve her the phone # and theycan set up at time with [facility] - fmaily will need to meet them at the pt's home- auth was received-  did send for auth for bls  transport via aidin to  support team    Would you like to participate in our [facility] service program?  : No - Declined    Treatment Team Recommendation: Short Term Rehab (Kristi auth received- 14:30pm bls oxygen   -sent for auth)  Discharge Destination Plan:: Short Term Rehab (Gilbert auth received- 14;30pm BLS [facility] - sent for auth)  Transport at Discharge : [facility]  Dispatcher Contacted: Yes  Number/Name of Dispatcher: [phone]  Transported by (Company and Unit #): [facility]  ETA of Transport (Date): 09/07/24  ETA of Transport (Time): 1430      IMM reviewed with patient, patient agrees with discharge determination.    Pt & family are in agreement with the d/c & d/c plan     IMM Given (Date):: 09/07/24  IMM Given to:: Patient  Family notified:: family was called       Accepting Facility Name, City & State : [facility]   [address] [place] [address]  Receiving Facility/Agency Phone Number: [phone]  Facility/Agency Fax Number: [phone]

## 2024-09-07 NOTE — NURSING NOTE
Hospitalist was in to see and eval the pt and she is ok for dc to snf, iv sites removed with the tips intact, report called to naheed, all questions answered to her satisfaction, report to the transport team, pt assisted onto the stretcher, all valuables and bipap with the pt, [facility]

## 2024-09-07 NOTE — PLAN OF CARE
Problem: PAIN - ADULT  Goal: Verbalizes/displays adequate comfort level or baseline comfort level  Description: Interventions:  - Encourage patient to monitor pain and request assistance  - Assess pain using appropriate pain scale  - Administer analgesics based on type and severity of pain and evaluate response  - Implement non-pharmacological measures as appropriate and evaluate response  - Consider cultural and social influences on pain and pain management  - Notify physician/advanced practitioner if interventions unsuccessful or patient reports new pain  Outcome: Progressing     Problem: INFECTION - ADULT  Goal: Absence or prevention of progression during hospitalization  Description: INTERVENTIONS:  - Assess and monitor for signs and symptoms of infection  - Monitor lab/diagnostic results  - Monitor all insertion sites, i.e. indwelling lines, tubes, and drains  - Monitor endotracheal if appropriate and nasal secretions for changes in amount and color  - [place] appropriate cooling/warming therapies per order  - Administer medications as ordered  - Instruct and encourage patient and family to use good hand hygiene technique  - Identify and instruct in appropriate isolation precautions for identified infection/condition  Outcome: Progressing  Goal: Absence of fever/infection during neutropenic period  Description: INTERVENTIONS:  - Monitor WBC    Outcome: Progressing     Problem: SAFETY ADULT  Goal: Patient will remain free of falls  Description: INTERVENTIONS:  - Educate patient/family on patient safety including physical limitations  - Instruct patient to call for assistance with activity   - Consult OT/PT to assist with strengthening/mobility   - Keep Call bell within reach  - Keep bed low and locked with side rails adjusted as appropriate  - Keep care items and personal belongings within reach  - Initiate and maintain comfort rounds  - Make Fall Risk Sign visible to staff  - Offer Toileting every 1 Hours,  in advance of need  - Initiate/Maintain bed chairalarm  - Obtain necessary fall risk management equipment: bed chair alarm  - Apply yellow socks and bracelet for high fall risk patients  - Consider moving patient to room near nurses station  Outcome: Progressing  Goal: Maintain or return to baseline ADL function  Description: INTERVENTIONS:  -  Assess patient's ability to carry out ADLs; assess patient's baseline for ADL function and identify physical deficits which impact ability to perform ADLs (bathing, care of mouth/teeth, toileting, grooming, dressing, etc.)  - Assess/evaluate cause of self-care deficits   - Assess range of motion  - Assess patient's mobility; develop plan if impaired  - Assess patient's need for assistive devices and provide as appropriate  - Encourage maximum independence but intervene and supervise when necessary  - Involve family in performance of ADLs  - Assess for home care needs following discharge   - Consider OT consult to assist with ADL evaluation and planning for discharge  - Provide patient education as appropriate  Outcome: Progressing  Goal: Maintains/Returns to pre admission functional level  Description: INTERVENTIONS:  - Perform AM-PAC 6 Click Basic Mobility/ Daily Activity assessment daily.  - Set and communicate daily mobility goal to care team and patient/family/caregiver.   - Collaborate with rehabilitation services on mobility goals if consulted  - Perform Range of Motion 3 times a day.  - Reposition patient every 2 hours.  - Dangle patient 3 times a day  - Stand patient 3 times a day  - Ambulate patient 3 times a day  - Out of bed to chair 3 times a day   - Out of bed for meals 3 times a day  - Out of bed for toileting  - Record patient progress and toleration of activity level   Outcome: Progressing     Problem: DISCHARGE PLANNING  Goal: Discharge to home or other facility with appropriate resources  Description: INTERVENTIONS:  - Identify barriers to discharge  w/patient and caregiver  - Arrange for needed discharge resources and transportation as appropriate  - Identify discharge learning needs (meds, wound care, etc.)  - Arrange for interpretive services to assist at discharge as needed  - Refer to Case Management Department for coordinating discharge planning if the patient needs post-hospital services based on physician/advanced practitioner order or complex needs related to functional status, cognitive ability, or social support system  Outcome: Progressing

## 2024-09-07 NOTE — ASSESSMENT & PLAN NOTE
Malnutrition Findings:   Adult Malnutrition type: Acute illness  Adult Degree of Malnutrition: Other severe protein calorie malnutrition  Malnutrition Characteristics: Inadequate energy, Weight loss                  360 Statement: Malnutrition related to acute illness as evidenced by >5% body weight loss in <1-month, <50% energy intake compared to estimated energy needs for >5 days.  To treat with oral diet and nutrition supplements as tolerated.    BMI Findings:           Body mass index is 33.5 kg/m².     Nutrition following  Continue dietary supplements

## 2024-09-07 NOTE — CASE MANAGEMENT
[facility] has received APPROVED authorization.  Insurance:   [email or web address] [facility]   Authorization received for: SNF  Facility:  [facility]   Authorization #:[id number]  Start of Care:9/7  Next Review Date:9/15   Continued Stay Care Coordinator:  n/a P#:   Submit next review to: fax [phone]     Care Manager notified:obed welch     Please reach out to CM for updates on any clinical information.

## 2024-09-07 NOTE — CASE MANAGEMENT
Case Management Discharge Planning Note    Patient name Claire Doherty  Location /-01 MRN [id number]  : 1950 Date 2024       Current Admission Date: 2024  Current Admission Diagnosis:Infected wound   Patient Active Problem List    Diagnosis Date Noted Date Diagnosed    Severe protein-calorie malnutrition (HCC) 2024     DINA (acute kidney injury) ([facility]) 2024     Nausea 2024     Pleural effusion 2024     Pericardial effusion 2024     Infected wound 2024     Chest pain 2024     Acute idiopathic pericarditis 2024     Cellulitis of right foot 2024     Chronic left shoulder pain 2024     Shoulder joint dysfunction 2024     Bilateral hand pain 2023     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[facility]) 2022     Gastroparesis 2021     Aortic valve sclerosis 2021     Tricuspid valve insufficiency 2021     Mitral annular calcification 2021     Former smoker 2021     On home oxygen therapy 2021     Colostomy status ([facility])      Ductal carcinoma in situ (DCIS) of left breast 03/15/2021     Insomnia 10/21/2020     Depression 10/06/2020     Anemia 10/02/2020     Chronic hypoxic respiratory failure ([facility]) 2020     Bladder injury 2020     Thrombocytosis, unspecified 2020     Leukocytosis 2020     Difficult intubation      Dystrophy, muscular, 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[facility]) 10/12/2017     Ambulatory dysfunction 10/12/2017     Urinary incontinence 10/03/2017     Osteoporosis 2016     Allergic rhinitis 10/09/2013     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[facility]) 10/04/2012     GERD without esophagitis 10/04/2012       LOS (days): 8  Geometric Mean LOS (GMLOS) (days): 5.1  Days to GMLOS:-2.6     OBJECTIVE:  Risk of Unplanned Readmission Score: 15.05         Current admission status: Inpatient   Preferred Pharmacy:   [id number] - [place] [facility] [address]  [address]  [address] [place] [id number]  Phone: [phone] Fax: [phone]    Primary Care Provider: Julienne Tucker DO    Primary Insurance: [facility]  Secondary Insurance: [facility]    DISCHARGE DETAILS:                                                                                                               Facility Insurance Auth Number: [id number]

## 2024-09-07 NOTE — DISCHARGE SUMMARY
[facility]  Discharge- Claire Doherty 1950, 74 y.o. female MRN: [id number]  Unit/Bed#: [facility] Encounter: [id number]  Primary Care Provider: Julienne Tucker DO   Date and time admitted to hospital: 8/30/2024 12:56 PM    * Infected wound  Assessment & Plan  Presented for suspected wound infection to right groin (from cardiac cath) with swelling and drainage  CT abd/pelvis (8/30): Inflammation of the right inguinal superficial and deep subcutaneous tissues without an abscess. Pericardial effusion. Suspected interstitial edema with increased size of the small left pleural effusion.   Wound Clx: MSSA and Proteus  Completed course of Ancef (5 days total of abx)    DINA (acute kidney injury) (HCC)  Assessment & Plan  DINA is resolved at this time  Renal US (9/3): No acute findings.   Continue to encourage PO intake    Leukocytosis  Assessment & Plan  WBC continues to down-trend without any new source of infectious etiology  Remains stable off abx  ID evaluation appreciated  Recommending outpatient hematology evaluation and repeat CBC in 1 week    Pericardial effusion  Assessment & Plan  Patient treated for acute idiopathic pericarditis with colchicine and ibuprofen during hospitalization for above 8/22/2024  TTE 8/26/2024: LVEF 65%. Systolic function is normal. Wall motion is normal. Grade II diastolic dysfunction   Continue colchicine  Cardiology evaluation appreciated    Pleural effusion  Assessment & Plan  Imaging as above  Patient appears somewhat dehydrated with dry mucous membranes, increasing creatinine, and hemoconcentration  Continue to hold Lasix     Dystrophy, muscular, 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HCC)  Assessment & Plan  Uses by BiPAP at night- Has not used this in the hospital since 8/30  At baseline can stand and pivot to wheelchair  Lives alone in an apartment with home health care  Continue supportive care    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)  Assessment &  Plan  On supplemental oxygen 2L nasal cannula chronically with BiPAP at night  Respiratory protocol    Severe protein-calorie malnutrition (HCC)  Assessment & Plan  Malnutrition Findings:   Adult Malnutrition type: Acute illness  Adult Degree of Malnutrition: Other severe protein calorie malnutrition  Malnutrition Characteristics: Inadequate energy, Weight loss    360 Statement: Malnutrition related to acute illness as evidenced by >5% body weight loss in <1-month, <50% energy intake compared to estimated energy needs for >5 days.  To treat with oral diet and nutrition supplements as tolerated.    Body mass index is 33.5 kg/m².     Continue dietary supplements    Medical Problems       Resolved Problems  Date Reviewed: 9/1/2024   None       Discharging Physician / Practitioner: Sosa Tang DO  PCP: Julienne Tucker DO  Admission Date:   Admission Orders (From admission, onward)       Ordered        08/30/24 1654  INPATIENT ADMISSION  Once                          Discharge Date: 09/07/24    Consultations During Hospital Stay:  General Surgery, Cardiology, ID    Procedures Performed:   None    Significant Findings / Test Results:   CT abd/pelvis (8/30): Inflammation of the right inguinal superficial and deep subcutaneous tissues without an abscess. Pericardial effusion. Suspected interstitial edema with increased size of the small left pleural effusion.   Renal US (9/3): No acute findings.   TTE 8/26/2024: LVEF 65%. Systolic function is normal. Wall motion is normal. Grade II diastolic dysfunction     Incidental Findings:   None     Test Results Pending at Discharge (will require follow up):   None     Outpatient Tests Requested:  CBC in 1 week    Complications:  None    Reason for Admission: Wound Infection     Hospital Course:   Claire Doherty is a 74 y.o. female patient who originally presented to the hospital on 8/30/2024 due to wound infection. Please see H&P as documented by Toshia Anand for  complete details regarding history of presenting illness. In brief, the patient has a past medical history of muscular dystrophy, restrictive lung disease, chronic respiratory failure, and GERD who presents for suspected wound infection.  She had a cardiac cath approximately 1 week prior to arrival with noted right groin site swollen with drainage.  Upon arrival she had a CT of the abdomen and pelvis which demonstrated inflammation of the right inguinal site without any evidence of abscess.  Additionally incidental findings of pericardial effusion and small left pleural effusion were found.  She did undergo echocardiogram which did not demonstrate any tamponade physiology and was recommended for colchicine by cardiology.  Regarding wound infection the patient was greeted by ID and completed course of antibiotics during hospitalization.  Throughout her hospitalization she continued to have elevated leukocytosis which began to downtrend but was recommended for outpatient hematology follow-up and repeat CBC in 1 week.  The patient was ultimately discharged to return SNF and stable condition and all of her questions were answered prior to departure.  This is a brief discharge summary please see full medical record for more details.    Please see above list of diagnoses and related plan for additional information.     Condition at Discharge: stable    Discharge Day Visit / Exam:   Subjective: Patient sitting up in bedside chair without any acute complaints.  No significant overnight events reported by nursing.    Vitals: Blood Pressure: 124/76 (09/07/24 0721)  Pulse: 88 (09/07/24 0955)  Temperature: 97.8 °F (36.6 °C) (09/07/24 0721)  Temp Source: Oral (09/05/24 1510)  Respirations: 20 (09/07/24 0721)  Height: 5' (152.4 cm) (08/30/24 1756)  Weight - Scale: 77.8 kg (171 lb 8.3 oz) (09/07/24 0600)  SpO2: 95 % (09/07/24 0900)    Exam:   Physical Exam  Vitals and nursing note reviewed.   Constitutional:       Appearance: She  is obese.      Comments: Chronically ill-appearing   Cardiovascular:      Rate and Rhythm: Normal rate and regular rhythm.      Pulses: Normal pulses.      Heart sounds: Normal heart sounds.   Pulmonary:      Effort: Pulmonary effort is normal. No respiratory distress.      Comments: 2L NC  Abdominal:      General: Bowel sounds are normal.      Palpations: Abdomen is soft.      Comments: Ostomy present   Neurological:      General: No focal deficit present.      Mental Status: She is alert and oriented to person, place, and time. Mental status is at baseline.   Psychiatric:         Mood and Affect: Mood normal.         Behavior: Behavior normal.         Judgment: Judgment normal.          Discussion with Family:  Patient updated on plan of care. CM updated patient's family regarding discharge plan to SNF today.     Discharge instructions/Information to patient and family:   See after visit summary for information provided to patient and family.      Provisions for Follow-Up Care:  See after visit summary for information related to follow-up care and any pertinent home health orders.      Mobility at time of Discharge:   Basic Mobility Inpatient Raw Score: 7  JH-HLM Goal: 2: Bed activities/Dependent transfer  JH-HLM Achieved: 2: Bed activities/Dependent transfer  HLM Goal achieved. Continue to encourage appropriate mobility.     Disposition:   Other Skilled Nursing Facility at [facility]    Planned Readmission: None     Discharge Statement:  I spent > 35 minutes discharging the patient. This time was spent on the day of discharge. I had direct contact with the patient on the day of discharge. Greater than 50% of the total time was spent examining patient, answering all patient questions, arranging and discussing plan of care with patient as well as directly providing post-discharge instructions.  Additional time then spent on discharge activities.    Discharge Medications:  See after visit summary for reconciled  discharge medications provided to patient and/or family.      **Please Note: This note may have been constructed using a voice recognition system**

## 2024-09-07 NOTE — CASE MANAGEMENT
[facility] received request for transport authorization from Care Manager.   Date of transport:9/7   Type of transport: [place]  Transport company:  [facility]    NPI: [id number]  Start Location:[facility]  End Location:[facility] Necessity: unable to tolerate w/c van- needs oxygen therapy - dx; muscular dystrophy, pericardial effusion, pleural effusion, needs oxygen therapy   -  Authorization initiated by contacting insurance:[employer]  Via: [phone]    Care Manager notified: obed welch    Please reach out to  for updates on any clinical information.

## 2024-09-07 NOTE — RESPIRATORY THERAPY NOTE
09/06/24 2300   Respiratory Assessment   Resp Comments pt refusing bipap has not worn since the 15 min on 8/30

## 2024-09-07 NOTE — PLAN OF CARE
Problem: PAIN - ADULT  Goal: Verbalizes/displays adequate comfort level or baseline comfort level  Description: Interventions:  - Encourage patient to monitor pain and request assistance  - Assess pain using appropriate pain scale  - Administer analgesics based on type and severity of pain and evaluate response  - Implement non-pharmacological measures as appropriate and evaluate response  - Consider cultural and social influences on pain and pain management  - Notify physician/advanced practitioner if interventions unsuccessful or patient reports new pain  9/7/2024 1332 by Umesh Chamberlain RN  Outcome: Adequate for Discharge  9/7/2024 0955 by Umesh Chamberlain RN  Outcome: Progressing     Problem: INFECTION - ADULT  Goal: Absence or prevention of progression during hospitalization  Description: INTERVENTIONS:  - Assess and monitor for signs and symptoms of infection  - Monitor lab/diagnostic results  - Monitor all insertion sites, i.e. indwelling lines, tubes, and drains  - Monitor endotracheal if appropriate and nasal secretions for changes in amount and color  - [place] appropriate cooling/warming therapies per order  - Administer medications as ordered  - Instruct and encourage patient and family to use good hand hygiene technique  - Identify and instruct in appropriate isolation precautions for identified infection/condition  9/7/2024 1332 by mUesh Chamberlain RN  Outcome: Adequate for Discharge  9/7/2024 0955 by Umesh Chamberlain RN  Outcome: Progressing  Goal: Absence of fever/infection during neutropenic period  Description: INTERVENTIONS:  - Monitor WBC    9/7/2024 1332 by Umesh Chamberlain RN  Outcome: Adequate for Discharge  9/7/2024 0955 by Umesh Chamberlain RN  Outcome: Progressing     Problem: SAFETY ADULT  Goal: Patient will remain free of falls  Description: INTERVENTIONS:  - Educate patient/family on patient safety including physical limitations  - Instruct patient to call for assistance with  activity   - Consult OT/PT to assist with strengthening/mobility   - Keep Call bell within reach  - Keep bed low and locked with side rails adjusted as appropriate  - Keep care items and personal belongings within reach  - Initiate and maintain comfort rounds  - Make Fall Risk Sign visible to staff  - Offer Toileting every  Hours, in advance of need  - Initiate/Maintain alarm  - Obtain necessary fall risk management equipment:   - Apply yellow socks and bracelet for high fall risk patients  - Consider moving patient to room near nurses station  9/7/2024 1332 by Umesh Chamberlain RN  Outcome: Adequate for Discharge  9/7/2024 0955 by Umesh Chamberlain RN  Outcome: Progressing  Goal: Maintain or return to baseline ADL function  Description: INTERVENTIONS:  -  Assess patient's ability to carry out ADLs; assess patient's baseline for ADL function and identify physical deficits which impact ability to perform ADLs (bathing, care of mouth/teeth, toileting, grooming, dressing, etc.)  - Assess/evaluate cause of self-care deficits   - Assess range of motion  - Assess patient's mobility; develop plan if impaired  - Assess patient's need for assistive devices and provide as appropriate  - Encourage maximum independence but intervene and supervise when necessary  - Involve family in performance of ADLs  - Assess for home care needs following discharge   - Consider OT consult to assist with ADL evaluation and planning for discharge  - Provide patient education as appropriate  9/7/2024 1332 by Umesh Chamberlain RN  Outcome: Adequate for Discharge  9/7/2024 0955 by Umesh Chabmerlain RN  Outcome: Progressing  Goal: Maintains/Returns to pre admission functional level  Description: INTERVENTIONS:  - Perform AM-PAC 6 Click Basic Mobility/ Daily Activity assessment daily.  - Set and communicate daily mobility goal to care team and patient/family/caregiver.   - Collaborate with rehabilitation services on mobility goals if consulted  -  Perform Range of Motion  times a day.  - Reposition patient every  hours.  - Dangle patient times a day  - Stand patient  times a day  - Ambulate patient  times a day  - Out of bed to chair  times a day   - Out of bed for meals  times a day  - Out of bed for toileting  - Record patient progress and toleration of activity level   9/7/2024 1332 by Umesh Chamberlain RN  Outcome: Adequate for Discharge  9/7/2024 0955 by Umesh Chamberlain RN  Outcome: Progressing     Problem: DISCHARGE PLANNING  Goal: Discharge to home or other facility with appropriate resources  Description: INTERVENTIONS:  - Identify barriers to discharge w/patient and caregiver  - Arrange for needed discharge resources and transportation as appropriate  - Identify discharge learning needs (meds, wound care, etc.)  - Arrange for interpretive services to assist at discharge as needed  - Refer to Case Management Department for coordinating discharge planning if the patient needs post-hospital services based on physician/advanced practitioner order or complex needs related to functional status, cognitive ability, or social support system  9/7/2024 1332 by Umesh Chamberlain RN  Outcome: Adequate for Discharge  9/7/2024 0955 by Umesh Chamberlain RN  Outcome: Progressing     Problem: Knowledge Deficit  Goal: Patient/family/caregiver demonstrates understanding of disease process, treatment plan, medications, and discharge instructions  Description: Complete learning assessment and assess knowledge base.  Interventions:  - Provide teaching at level of understanding  - Provide teaching via preferred learning methods  9/7/2024 1332 by Umesh Chamberlain RN  Outcome: Adequate for Discharge  9/7/2024 0955 by Umesh Chamberlain RN  Outcome: Progressing     Problem: Prexisting or High Potential for Compromised Skin Integrity  Goal: Skin integrity is maintained or improved  Description: INTERVENTIONS:  - Identify patients at risk for skin breakdown  - Assess and  monitor skin integrity  - Assess and monitor nutrition and hydration status  - Monitor labs   - Assess for incontinence   - Turn and reposition patient  - Assist with mobility/ambulation  - Relieve pressure over bony prominences  - Avoid friction and shearing  - Provide appropriate hygiene as needed including keeping skin clean and dry  - Evaluate need for skin moisturizer/barrier cream  - Collaborate with interdisciplinary team   - Patient/family teaching  - Consider wound care consult   9/7/2024 1332 by Umesh Cahmberlain RN  Outcome: Adequate for Discharge  9/7/2024 0955 by Umesh Chamberlain RN  Outcome: Progressing     Problem: Nutrition/Hydration-ADULT  Goal: Nutrient/Hydration intake appropriate for improving, restoring or maintaining nutritional needs  Description: Monitor and assess patient's nutrition/hydration status for malnutrition. Collaborate with interdisciplinary team and initiate plan and interventions as ordered.  Monitor patient's weight and dietary intake as ordered or per policy. Utilize nutrition screening tool and intervene as necessary. Determine patient's food preferences and provide high-protein, high-caloric foods as appropriate.     INTERVENTIONS:  - Monitor oral intake, urinary output, labs, and treatment plans  - Assess nutrition and hydration status and recommend course of action  - Evaluate amount of meals eaten  - Assist patient with eating if necessary   - Allow adequate time for meals  - Recommend/ encourage appropriate diets, oral nutritional supplements, and vitamin/mineral supplements  - Order, calculate, and assess calorie counts as needed  - Recommend, monitor, and adjust tube feedings and TPN/PPN based on assessed needs  - Assess need for intravenous fluids  - Provide specific nutrition/hydration education as appropriate  - Include patient/family/caregiver in decisions related to nutrition  9/7/2024 1332 by Umesh Chamberlain RN  Outcome: Adequate for Discharge  9/7/2024 0955  by Umesh Chamberlain RN  Outcome: Progressing

## 2024-09-09 ENCOUNTER — TRANSITIONAL CARE MANAGEMENT (OUTPATIENT)
Dept: FAMILY MEDICINE CLINIC | Facility: CLINIC | Age: 74
End: 2024-09-09

## 2024-09-09 NOTE — CASE MANAGEMENT
Called [facility] ([phone]) to check status of bls transport auth , spoke to maame who stated that the auth was received and pending and determination is due sept 20   Pending auth #[id number].

## 2024-09-10 ENCOUNTER — HOSPITAL ENCOUNTER (INPATIENT)
Facility: HOSPITAL | Age: 74
LOS: 10 days | Discharge: NURSING, REHAB OR LONG-TERM CARE | DRG: 291 | End: 2024-09-20
Attending: EMERGENCY MEDICINE | Admitting: INTERNAL MEDICINE
Payer: COMMERCIAL

## 2024-09-10 ENCOUNTER — APPOINTMENT (EMERGENCY)
Dept: RADIOLOGY | Facility: HOSPITAL | Age: 74
DRG: 291 | End: 2024-09-10
Payer: COMMERCIAL

## 2024-09-10 ENCOUNTER — APPOINTMENT (INPATIENT)
Dept: CT IMAGING | Facility: HOSPITAL | Age: 74
DRG: 291 | End: 2024-09-10
Payer: COMMERCIAL

## 2024-09-10 DIAGNOSIS — J96.01 ACUTE RESPIRATORY FAILURE WITH HYPOXIA AND HYPERCARBIA (HCC): ICD-10-CM

## 2024-09-10 DIAGNOSIS — D75.839 THROMBOCYTOSIS, UNSPECIFIED: ICD-10-CM

## 2024-09-10 DIAGNOSIS — J96.22 ACUTE ON CHRONIC RESPIRATORY FAILURE WITH HYPOXIA AND HYPERCAPNIA (HCC): ICD-10-CM

## 2024-09-10 DIAGNOSIS — J96.02 ACUTE RESPIRATORY FAILURE WITH HYPOXIA AND HYPERCARBIA (HCC): ICD-10-CM

## 2024-09-10 DIAGNOSIS — I48.0 PAROXYSMAL A-FIB (HCC): ICD-10-CM

## 2024-09-10 DIAGNOSIS — J18.9 PNEUMONIA: Primary | ICD-10-CM

## 2024-09-10 DIAGNOSIS — I50.33 ACUTE ON CHRONIC DIASTOLIC CHF (CONGESTIVE HEART FAILURE) (HCC): ICD-10-CM

## 2024-09-10 DIAGNOSIS — J96.21 ACUTE ON CHRONIC RESPIRATORY FAILURE WITH HYPOXIA AND HYPERCAPNIA (HCC): ICD-10-CM

## 2024-09-10 PROBLEM — Z86.79 HISTORY OF PERICARDITIS: Status: ACTIVE | Noted: 2024-07-24

## 2024-09-10 LAB
2HR DELTA HS TROPONIN: -3 NG/L
4HR DELTA HS TROPONIN: -5 NG/L
ALBUMIN SERPL BCG-MCNC: 3.2 G/DL (ref 3.5–5)
ALP SERPL-CCNC: 98 U/L (ref 34–104)
ALT SERPL W P-5'-P-CCNC: 15 U/L (ref 7–52)
ANION GAP SERPL CALCULATED.3IONS-SCNC: 5 MMOL/L (ref 4–13)
APTT PPP: 44 SECONDS (ref 23–34)
AST SERPL W P-5'-P-CCNC: 20 U/L (ref 13–39)
ATRIAL RATE: 108 BPM
BASE EX.OXY STD BLDV CALC-SCNC: 90.7 % (ref 60–80)
BASE EXCESS BLDV CALC-SCNC: 8.5 MMOL/L
BASOPHILS # BLD AUTO: 0.11 THOUSANDS/ΜL (ref 0–0.1)
BASOPHILS NFR BLD AUTO: 0 % (ref 0–1)
BILIRUB SERPL-MCNC: 0.42 MG/DL (ref 0.2–1)
BNP SERPL-MCNC: 234 PG/ML (ref 0–100)
BUN SERPL-MCNC: 28 MG/DL (ref 5–25)
CALCIUM ALBUM COR SERPL-MCNC: 10 MG/DL (ref 8.3–10.1)
CALCIUM SERPL-MCNC: 9.4 MG/DL (ref 8.4–10.2)
CARDIAC TROPONIN I PNL SERPL HS: 4 NG/L
CARDIAC TROPONIN I PNL SERPL HS: 6 NG/L
CARDIAC TROPONIN I PNL SERPL HS: 9 NG/L
CHLORIDE SERPL-SCNC: 93 MMOL/L (ref 96–108)
CO2 SERPL-SCNC: 41 MMOL/L (ref 21–32)
CREAT SERPL-MCNC: 0.36 MG/DL (ref 0.6–1.3)
EOSINOPHIL # BLD AUTO: 0.06 THOUSAND/ΜL (ref 0–0.61)
EOSINOPHIL NFR BLD AUTO: 0 % (ref 0–6)
ERYTHROCYTE [DISTWIDTH] IN BLOOD BY AUTOMATED COUNT: 17.9 % (ref 11.6–15.1)
FLUAV AG UPPER RESP QL IA.RAPID: NEGATIVE
FLUBV AG UPPER RESP QL IA.RAPID: NEGATIVE
GFR SERPL CREATININE-BSD FRML MDRD: 106 ML/MIN/1.73SQ M
GLUCOSE SERPL-MCNC: 99 MG/DL (ref 65–140)
HCO3 BLDV-SCNC: 36.8 MMOL/L (ref 24–30)
HCT VFR BLD AUTO: 38.5 % (ref 34.8–46.1)
HGB BLD-MCNC: 11.6 G/DL (ref 11.5–15.4)
IMM GRANULOCYTES # BLD AUTO: 0.31 THOUSAND/UL (ref 0–0.2)
IMM GRANULOCYTES NFR BLD AUTO: 1 % (ref 0–2)
INR PPP: 1.44 (ref 0.85–1.19)
L PNEUMO1 AG UR QL IA.RAPID: NEGATIVE
LACTATE SERPL-SCNC: 0.8 MMOL/L (ref 0.5–2)
LYMPHOCYTES # BLD AUTO: 1.24 THOUSANDS/ΜL (ref 0.6–4.47)
LYMPHOCYTES NFR BLD AUTO: 4 % (ref 14–44)
MAGNESIUM SERPL-MCNC: 1.7 MG/DL (ref 1.9–2.7)
MCH RBC QN AUTO: 26 PG (ref 26.8–34.3)
MCHC RBC AUTO-ENTMCNC: 30.1 G/DL (ref 31.4–37.4)
MCV RBC AUTO: 86 FL (ref 82–98)
MONOCYTES # BLD AUTO: 2.68 THOUSAND/ΜL (ref 0.17–1.22)
MONOCYTES NFR BLD AUTO: 9 % (ref 4–12)
NEUTROPHILS # BLD AUTO: 27.14 THOUSANDS/ΜL (ref 1.85–7.62)
NEUTS SEG NFR BLD AUTO: 86 % (ref 43–75)
NRBC BLD AUTO-RTO: 0 /100 WBCS
O2 CT BLDV-SCNC: 16.3 ML/DL
P AXIS: 42 DEGREES
PCO2 BLDV: 70.5 MM HG (ref 42–50)
PH BLDV: 7.34 [PH] (ref 7.3–7.4)
PLATELET # BLD AUTO: 739 THOUSANDS/UL (ref 149–390)
PMV BLD AUTO: 10.9 FL (ref 8.9–12.7)
PO2 BLDV: 66.3 MM HG (ref 35–45)
POTASSIUM SERPL-SCNC: 4.2 MMOL/L (ref 3.5–5.3)
PR INTERVAL: 180 MS
PROCALCITONIN SERPL-MCNC: 2.69 NG/ML
PROT SERPL-MCNC: 7.2 G/DL (ref 6.4–8.4)
PROTHROMBIN TIME: 17.6 SECONDS (ref 12.3–15)
QRS AXIS: 30 DEGREES
QRSD INTERVAL: 78 MS
QT INTERVAL: 324 MS
QTC INTERVAL: 434 MS
RBC # BLD AUTO: 4.46 MILLION/UL (ref 3.81–5.12)
S PNEUM AG UR QL: NEGATIVE
SARS-COV+SARS-COV-2 AG RESP QL IA.RAPID: NEGATIVE
SODIUM SERPL-SCNC: 139 MMOL/L (ref 135–147)
T WAVE AXIS: 75 DEGREES
VENTRICULAR RATE: 108 BPM
WBC # BLD AUTO: 31.54 THOUSAND/UL (ref 4.31–10.16)

## 2024-09-10 PROCEDURE — 85025 COMPLETE CBC W/AUTO DIFF WBC: CPT | Performed by: EMERGENCY MEDICINE

## 2024-09-10 PROCEDURE — 93005 ELECTROCARDIOGRAM TRACING: CPT

## 2024-09-10 PROCEDURE — 94640 AIRWAY INHALATION TREATMENT: CPT

## 2024-09-10 PROCEDURE — 96374 THER/PROPH/DIAG INJ IV PUSH: CPT

## 2024-09-10 PROCEDURE — 36415 COLL VENOUS BLD VENIPUNCTURE: CPT

## 2024-09-10 PROCEDURE — 83605 ASSAY OF LACTIC ACID: CPT | Performed by: EMERGENCY MEDICINE

## 2024-09-10 PROCEDURE — 80053 COMPREHEN METABOLIC PANEL: CPT | Performed by: EMERGENCY MEDICINE

## 2024-09-10 PROCEDURE — 83880 ASSAY OF NATRIURETIC PEPTIDE: CPT | Performed by: INTERNAL MEDICINE

## 2024-09-10 PROCEDURE — 71045 X-RAY EXAM CHEST 1 VIEW: CPT

## 2024-09-10 PROCEDURE — 85730 THROMBOPLASTIN TIME PARTIAL: CPT | Performed by: EMERGENCY MEDICINE

## 2024-09-10 PROCEDURE — 87449 NOS EACH ORGANISM AG IA: CPT | Performed by: PHYSICIAN ASSISTANT

## 2024-09-10 PROCEDURE — 87811 SARS-COV-2 COVID19 W/OPTIC: CPT | Performed by: EMERGENCY MEDICINE

## 2024-09-10 PROCEDURE — 99291 CRITICAL CARE FIRST HOUR: CPT | Performed by: INTERNAL MEDICINE

## 2024-09-10 PROCEDURE — 93010 ELECTROCARDIOGRAM REPORT: CPT | Performed by: INTERNAL MEDICINE

## 2024-09-10 PROCEDURE — 87040 BLOOD CULTURE FOR BACTERIA: CPT | Performed by: EMERGENCY MEDICINE

## 2024-09-10 PROCEDURE — 84484 ASSAY OF TROPONIN QUANT: CPT | Performed by: EMERGENCY MEDICINE

## 2024-09-10 PROCEDURE — 82805 BLOOD GASES W/O2 SATURATION: CPT | Performed by: EMERGENCY MEDICINE

## 2024-09-10 PROCEDURE — 85610 PROTHROMBIN TIME: CPT | Performed by: EMERGENCY MEDICINE

## 2024-09-10 PROCEDURE — 99285 EMERGENCY DEPT VISIT HI MDM: CPT | Performed by: EMERGENCY MEDICINE

## 2024-09-10 PROCEDURE — 94002 VENT MGMT INPAT INIT DAY: CPT

## 2024-09-10 PROCEDURE — 99223 1ST HOSP IP/OBS HIGH 75: CPT | Performed by: INTERNAL MEDICINE

## 2024-09-10 PROCEDURE — 83735 ASSAY OF MAGNESIUM: CPT | Performed by: NURSE PRACTITIONER

## 2024-09-10 PROCEDURE — 99285 EMERGENCY DEPT VISIT HI MDM: CPT

## 2024-09-10 PROCEDURE — 84145 PROCALCITONIN (PCT): CPT | Performed by: EMERGENCY MEDICINE

## 2024-09-10 PROCEDURE — 71250 CT THORAX DX C-: CPT

## 2024-09-10 PROCEDURE — 94760 N-INVAS EAR/PLS OXIMETRY 1: CPT

## 2024-09-10 PROCEDURE — 87804 INFLUENZA ASSAY W/OPTIC: CPT | Performed by: EMERGENCY MEDICINE

## 2024-09-10 RX ORDER — CALCIUM CARBONATE 500 MG/1
1000 TABLET, CHEWABLE ORAL DAILY PRN
Status: DISCONTINUED | OUTPATIENT
Start: 2024-09-10 | End: 2024-09-20 | Stop reason: HOSPADM

## 2024-09-10 RX ORDER — BUDESONIDE 0.5 MG/2ML
0.5 INHALANT ORAL
Status: DISCONTINUED | OUTPATIENT
Start: 2024-09-10 | End: 2024-09-20 | Stop reason: HOSPADM

## 2024-09-10 RX ORDER — LEVALBUTEROL INHALATION SOLUTION 1.25 MG/3ML
1.25 SOLUTION RESPIRATORY (INHALATION)
Status: DISCONTINUED | OUTPATIENT
Start: 2024-09-10 | End: 2024-09-10

## 2024-09-10 RX ORDER — FUROSEMIDE 10 MG/ML
40 INJECTION INTRAMUSCULAR; INTRAVENOUS ONCE
Status: COMPLETED | OUTPATIENT
Start: 2024-09-10 | End: 2024-09-10

## 2024-09-10 RX ORDER — ACETAMINOPHEN 325 MG/1
650 TABLET ORAL EVERY 6 HOURS PRN
Status: DISCONTINUED | OUTPATIENT
Start: 2024-09-10 | End: 2024-09-20 | Stop reason: HOSPADM

## 2024-09-10 RX ORDER — RAMELTEON 8 MG/1
8 TABLET ORAL
Status: DISCONTINUED | OUTPATIENT
Start: 2024-09-10 | End: 2024-09-17

## 2024-09-10 RX ORDER — CITALOPRAM HYDROBROMIDE 20 MG/1
10 TABLET ORAL DAILY
Status: DISCONTINUED | OUTPATIENT
Start: 2024-09-11 | End: 2024-09-20 | Stop reason: HOSPADM

## 2024-09-10 RX ORDER — HEPARIN SODIUM 5000 [USP'U]/ML
5000 INJECTION, SOLUTION INTRAVENOUS; SUBCUTANEOUS EVERY 8 HOURS SCHEDULED
Status: DISCONTINUED | OUTPATIENT
Start: 2024-09-10 | End: 2024-09-11

## 2024-09-10 RX ORDER — OXYBUTYNIN CHLORIDE 10 MG/1
10 TABLET, EXTENDED RELEASE ORAL
Status: DISCONTINUED | OUTPATIENT
Start: 2024-09-10 | End: 2024-09-20 | Stop reason: HOSPADM

## 2024-09-10 RX ORDER — CHLORHEXIDINE GLUCONATE ORAL RINSE 1.2 MG/ML
15 SOLUTION DENTAL EVERY 12 HOURS SCHEDULED
Status: DISCONTINUED | OUTPATIENT
Start: 2024-09-10 | End: 2024-09-20

## 2024-09-10 RX ORDER — SACCHAROMYCES BOULARDII 250 MG
250 CAPSULE ORAL DAILY
Status: DISCONTINUED | OUTPATIENT
Start: 2024-09-10 | End: 2024-09-20 | Stop reason: HOSPADM

## 2024-09-10 RX ORDER — BUPROPION HYDROCHLORIDE 75 MG/1
75 TABLET ORAL EVERY MORNING
Status: DISCONTINUED | OUTPATIENT
Start: 2024-09-11 | End: 2024-09-20 | Stop reason: HOSPADM

## 2024-09-10 RX ORDER — ALBUTEROL SULFATE 0.83 MG/ML
2.5 SOLUTION RESPIRATORY (INHALATION) EVERY 4 HOURS PRN
Status: DISCONTINUED | OUTPATIENT
Start: 2024-09-10 | End: 2024-09-20 | Stop reason: HOSPADM

## 2024-09-10 RX ORDER — ONDANSETRON 2 MG/ML
4 INJECTION INTRAMUSCULAR; INTRAVENOUS EVERY 6 HOURS PRN
Status: DISCONTINUED | OUTPATIENT
Start: 2024-09-10 | End: 2024-09-20 | Stop reason: HOSPADM

## 2024-09-10 RX ORDER — OXYBUTYNIN CHLORIDE 10 MG/1
10 TABLET, EXTENDED RELEASE ORAL
COMMUNITY

## 2024-09-10 RX ORDER — METOPROLOL TARTRATE 1 MG/ML
5 INJECTION, SOLUTION INTRAVENOUS ONCE
Status: COMPLETED | OUTPATIENT
Start: 2024-09-10 | End: 2024-09-10

## 2024-09-10 RX ORDER — COLCHICINE 0.6 MG/1
0.6 TABLET ORAL DAILY
Status: DISCONTINUED | OUTPATIENT
Start: 2024-09-11 | End: 2024-09-20 | Stop reason: HOSPADM

## 2024-09-10 RX ORDER — BISACODYL 10 MG
10 SUPPOSITORY, RECTAL RECTAL DAILY PRN
Status: DISCONTINUED | OUTPATIENT
Start: 2024-09-10 | End: 2024-09-20 | Stop reason: HOSPADM

## 2024-09-10 RX ORDER — FLUTICASONE PROPIONATE 50 MCG
2 SPRAY, SUSPENSION (ML) NASAL DAILY
Status: DISCONTINUED | OUTPATIENT
Start: 2024-09-10 | End: 2024-09-20 | Stop reason: HOSPADM

## 2024-09-10 RX ORDER — DIAZEPAM 2 MG
5 TABLET ORAL EVERY 8 HOURS PRN
Status: DISCONTINUED | OUTPATIENT
Start: 2024-09-10 | End: 2024-09-20 | Stop reason: HOSPADM

## 2024-09-10 RX ORDER — MAGNESIUM SULFATE HEPTAHYDRATE 40 MG/ML
2 INJECTION, SOLUTION INTRAVENOUS ONCE
Status: COMPLETED | OUTPATIENT
Start: 2024-09-10 | End: 2024-09-10

## 2024-09-10 RX ORDER — PANTOPRAZOLE SODIUM 40 MG/1
40 TABLET, DELAYED RELEASE ORAL
Status: DISCONTINUED | OUTPATIENT
Start: 2024-09-11 | End: 2024-09-20 | Stop reason: HOSPADM

## 2024-09-10 RX ORDER — SACCHAROMYCES BOULARDII 250 MG
250 CAPSULE ORAL DAILY
COMMUNITY

## 2024-09-10 RX ORDER — FORMOTEROL FUMARATE DIHYDRATE 20 UG/2ML
20 SOLUTION RESPIRATORY (INHALATION)
Status: DISCONTINUED | OUTPATIENT
Start: 2024-09-10 | End: 2024-09-20 | Stop reason: HOSPADM

## 2024-09-10 RX ADMIN — OXYBUTYNIN CHLORIDE 10 MG: 10 TABLET, EXTENDED RELEASE ORAL at 21:13

## 2024-09-10 RX ADMIN — FORMOTEROL FUMARATE 20 MCG: 20 SOLUTION RESPIRATORY (INHALATION) at 19:22

## 2024-09-10 RX ADMIN — HEPARIN SODIUM 5000 UNITS: 5000 INJECTION INTRAVENOUS; SUBCUTANEOUS at 16:04

## 2024-09-10 RX ADMIN — METOROPROLOL TARTRATE 5 MG: 5 INJECTION, SOLUTION INTRAVENOUS at 17:27

## 2024-09-10 RX ADMIN — FUROSEMIDE 40 MG: 10 INJECTION, SOLUTION INTRAVENOUS at 16:03

## 2024-09-10 RX ADMIN — Medication 2000 MG: at 12:58

## 2024-09-10 RX ADMIN — HEPARIN SODIUM 5000 UNITS: 5000 INJECTION INTRAVENOUS; SUBCUTANEOUS at 21:13

## 2024-09-10 RX ADMIN — IPRATROPIUM BROMIDE 0.5 MG: 0.5 SOLUTION RESPIRATORY (INHALATION) at 19:22

## 2024-09-10 RX ADMIN — CHLORHEXIDINE GLUCONATE 15 ML: 1.2 RINSE ORAL at 21:13

## 2024-09-10 RX ADMIN — BUDESONIDE INHALATION 0.5 MG: 0.5 SUSPENSION RESPIRATORY (INHALATION) at 19:22

## 2024-09-10 RX ADMIN — MAGNESIUM SULFATE HEPTAHYDRATE 2 G: 40 INJECTION, SOLUTION INTRAVENOUS at 17:30

## 2024-09-10 NOTE — ASSESSMENT & PLAN NOTE
Wt Readings from Last 3 Encounters:   09/10/24 80 kg (176 lb 5.9 oz)   09/07/24 77.8 kg (171 lb 8.3 oz)   08/21/24 80.7 kg (178 lb)     Patient with known chronic diastolic CHF with grade 2 diastolic dysfunction  Presenting with worsening shortness of breath  Chest x-ray/CT concerning for pleural effusions  Not maintained on outpatient diuretic  Will give 1 dose of IV Lasix 40 mg and monitor response  Wean oxygen as tolerated

## 2024-09-10 NOTE — ASSESSMENT & PLAN NOTE
Patient met sepsis criteria at time of admission with tachycardia and leukocytosis  Source likely pulmonary given complaint of shortness of breath, increased oxygen demand and abnormal chest x-ray  Follow-up CT chest, chest x-ray concerning for multifocal pneumonia  Started on IV cefepime in the ER  Procalcitonin elevated, continue antibiotics  Follow-up blood culture

## 2024-09-10 NOTE — Clinical Note
Case was discussed with  and the patient's admission status was agreed to be  to the service of Dr. Chatman

## 2024-09-10 NOTE — ED PROVIDER NOTES
1. Pneumonia    2. Acute respiratory failure with hypoxia and hypercarbia (HCC)      ED Disposition       ED Disposition   Admit    Condition   Stable    Date/Time   Tue Sep 10, 2024  1:06 PM    Comment   Case was discussed with Dr. Gong and the patient's admission status was agreed to be inpatient to the service of Dr. Gong               Assessment & Plan       Medical Decision Making  Hx of chronic hypoxic resp failure, muscular dystrophy w/ worsening dyspnea over the last week.  Will get EKG to r/o arrhythmia, ischemic changes.  Will get labs to r/o acute life threatening metabolic abnl, cardiac ischemia, significant anemia, labs for evaluation of sepsis.  Will get CXR to r/o occult pna,     Problems Addressed:  Acute respiratory failure with hypoxia and hypercarbia (HCC): chronic illness or injury with severe exacerbation, progression, or side effects of treatment  Pneumonia: acute illness or injury that poses a threat to life or bodily functions    Amount and/or Complexity of Data Reviewed  External Data Reviewed: notes.     Details: Prior hospitalization reviewed  Labs: ordered. Decision-making details documented in ED Course.  Radiology: ordered and independent interpretation performed. Decision-making details documented in ED Course.  ECG/medicine tests: ordered and independent interpretation performed.    Risk  Prescription drug management.  Decision regarding hospitalization.                  ED Course as of 09/10/24 1341   Tue Sep 10, 2024   1248 Echo 8/21/24    ·  Left Ventricle: Left ventricular cavity size is normal. Wall thickness is normal. The left ventricular ejection fraction is 65%. Systolic function is normal. Wall motion is normal. Diastolic function is moderately abnormal, consistent with grade II (pseudonormal) relaxation.  ·  Right Ventricle: Systolic function is normal.  ·  Left Atrium: The atrium is mildly dilated.  ·  Tricuspid Valve: There is mild regurgitation.  ·   Pericardium: There is a trivial pericardial effusion.     1252 XR chest 1 view portable(!)  Given 31K WBC, will treat for b/l pneumonia and admit.   1328 LACTIC ACID: 0.8       Medications   cefepime (MAXIPIME) 2 g/50 mL dextrose IVPB (0 mg Intravenous Stopped 9/10/24 1328)       History of Present Illness       Patient presents with:  Shortness of Breath: Pt reports SOB and related increased anxiety for past 3 days. EMS placed on 15L non-rebreather, pt reported feeling better SpO2 97%, EMS transitioned back to 5L NC pta. Pt wears 5L NC at baseline, 92% on 5L. Hx of muscular dystrophy, contractures at baseline, Pt AxO x4.           Shortness of Breath      Claire Doherty is a 74 y.o. female who identifies as a female presenting to the Emergency Department for worsening sob/gentile since her recent hospitalization (8/30-9/7) for a wound infection. Pt sent to SNF and sent in today for evaluation of worsening dyspnea.  Pt denies f/c/s, no sig cough. Pt reports having trouble eating b/c she feels like she cannot breathe.  Pt uses NC oxygen when in her recliner or when in bed and needs BIPAP when she sleeps, but now needing oxygen all day. Pt reports increased fatigue. Denies cp, no abd pain, no n/v, no le pain or swelling.      Past Medical History:   Diagnosis Date    Acute kidney failure (HCC)     Acute nonspecific idiopathic pericarditis     Anxiety     Breast cancer ([facility]) 2007    Cellulitis of right lower extremity     Chronic pain     Chronic respiratory failure with hypoxia ([facility])     Colitis     Colostomy status ([facility])     Dependence on supplemental oxygen     Depression     Difficult intubation     Excessive daytime sleepiness     Gastroesophageal reflux disease without esophagitis     Gastroparesis     GERD (gastroesophageal reflux disease)     Hyperlipidemia     Ischemic colitis ([facility]) 01/21/2019    Leukocytosis 03/28/2020    Muscular dystrophy ([facility])     Limb-girdle    Osteoporosis     Other nonrheumatic aortic  valve disorders     Overactive bladder     Perforated diverticulum 03/28/2020    Pericardial effusion (noninflammatory)     Pneumonitis     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)     Rheumatic tricuspid insufficiency     Skin cancer     Skin disorder     Tachycardia 06/02/2021    Vitamin D deficiency      Prior to Admission medications    Medication Sig Start Date End Date Taking? Authorizing Provider   acetaminophen (TYLENOL) 325 mg tablet Take 2 tablets (650 mg total) by mouth every 4 (four) hours as needed for mild pain  Patient taking differently: Take 650 mg by mouth every 6 (six) hours as needed for mild pain or fever 8/22/24  Yes Charly Carvajal   albuterol (2.5 mg/3 mL) 0.083 % nebulizer solution Take 3 mL (2.5 mg total) by nebulization every 4 (four) hours as needed for wheezing or shortness of breath 6/15/23  Yes Julienne Tucker DO   bisacodyl (FLEET) 10 MG/30ML ENEM Insert 10 mg into the rectum daily as needed for constipation   Yes Historical Provider, MD   budesonide (Pulmicort) 0.5 mg/2 mL nebulizer solution Take 2 mL (0.5 mg total) by nebulization as needed (SOB) Rinse mouth after use.  Patient taking differently: Take 0.5 mg by nebulization every 6 (six) hours as needed (SOB) Rinse mouth after use. 8/22/24  Yes Charly Carvajal   buPROPion (WELLBUTRIN) 75 mg tablet take 1 tablet by mouth every morning 7/22/24  Yes Julienne Tucker DO   citalopram (CeleXA) 10 mg tablet Take 1 tablet (10 mg total) by mouth daily 4/6/23  Yes Julienne Tucker DO   colchicine (COLCRYS) 0.6 mg tablet Take 1 tablet (0.6 mg total) by mouth daily 8/22/24 9/21/24 Yes Charly Carvajal   diazepam (VALIUM) 5 mg tablet Take 1 tablet (5 mg total) by mouth every 8 (eight) hours as needed for anxiety or muscle spasms for up to 10 days 9/7/24 9/17/24 Yes Sosa Tang,    fluticasone (FLONASE) 50 mcg/act nasal spray 2 sprays into each nostril daily 6/15/23  Yes Julienne Tucker DO   magnesium  hydroxide (MILK OF MAGNESIA) 400 mg/5 mL oral suspension Take 30 mL by mouth daily as needed for constipation   Yes Historical Provider, MD   oxybutynin (DITROPAN-XL) 10 MG 24 hr tablet Take 10 mg by mouth daily at bedtime   Yes Historical Provider, MD   pantoprazole (PROTONIX) 40 mg tablet Take 1 tablet (40 mg total) by mouth daily in the early morning 8/23/24  Yes Charly Carvajal   ramelteon (ROZEREM) 8 mg tablet Take 8 mg by mouth daily at bedtime 8/17/23  Yes Historical Provider, MD   saccharomyces boulardii (FLORASTOR) 250 mg capsule Take 250 mg by mouth daily   Yes Historical Provider, MD   triamcinolone (KENALOG) 0.1 % cream Apply topically 2 (two) times a day as needed for irritation or rash 7/8/24  Yes Julienne Tucker DO   Probiotic Product (Florastor Plus) CAPS     Historical Provider, MD   solifenacin (VESICARE) 10 MG tablet take 1 tablet by mouth once daily  Patient not taking: Reported on 9/10/2024 5/1/24   Julienne Tucker DO       Review of Systems   Respiratory:  Positive for shortness of breath.    All other systems reviewed and are negative.          Objective     ED Triage Vitals   Temperature Pulse Blood Pressure Respirations SpO2 Patient Position - Orthostatic VS   09/10/24 1233 09/10/24 1132 09/10/24 1132 09/10/24 1132 09/10/24 1132 09/10/24 1132   97.8 °F (36.6 °C) (!) 109 159/95 18 95 % Sitting      Temp Source Heart Rate Source BP Location FiO2 (%) Pain Score    09/10/24 1233 09/10/24 1132 09/10/24 1132 -- 09/10/24 1138    Oral Monitor Right arm  3        Physical Exam  Vitals and nursing note reviewed.   Constitutional:       General: She is sleeping.      Appearance: She is well-developed and well-groomed. She is ill-appearing. She is not toxic-appearing or diaphoretic.      Comments: Arouses easily to voice   HENT:      Mouth/Throat:      Mouth: Mucous membranes are moist.   Eyes:      Conjunctiva/sclera: Conjunctivae normal.   Cardiovascular:      Rate and Rhythm: Regular rhythm.  Tachycardia present.      Heart sounds: No murmur heard.  Pulmonary:      Effort: Tachypnea present. No accessory muscle usage.      Breath sounds: Decreased air movement present. Decreased breath sounds present.      Comments: Diminished effort  Abdominal:      Palpations: Abdomen is soft.   Musculoskeletal:         General: No swelling or tenderness.      Cervical back: Normal range of motion.   Skin:     Coloration: Skin is pale.   Neurological:      General: No focal deficit present.      Mental Status: She is oriented to person, place, and time.   Psychiatric:         Mood and Affect: Mood normal.         Labs Reviewed   CBC AND DIFFERENTIAL - Abnormal; Notable for the following components:       Result Value    WBC 31.54 (*)     MCH 26.0 (*)     MCHC 30.1 (*)     RDW 17.9 (*)     Platelets 739 (*)     Segmented % 86 (*)     Lymphocytes % 4 (*)     Absolute Neutrophils 27.14 (*)     Absolute Immature Grans 0.31 (*)     Absolute Monocytes 2.68 (*)     Basophils Absolute 0.11 (*)     All other components within normal limits   COMPREHENSIVE METABOLIC PANEL - Abnormal; Notable for the following components:    Chloride 93 (*)     CO2 41 (*)     BUN 28 (*)     Creatinine 0.36 (*)     Albumin 3.2 (*)     All other components within normal limits    Narrative:     National Kidney Disease Foundation guidelines for Chronic Kidney Disease (CKD):     Stage 1 with normal or high GFR (GFR > 90 mL/min/1.73 square meters)    Stage 2 Mild CKD (GFR = 60-89 mL/min/1.73 square meters)    Stage 3A Moderate CKD (GFR = 45-59 mL/min/1.73 square meters)    Stage 3B Moderate CKD (GFR = 30-44 mL/min/1.73 square meters)    Stage 4 Severe CKD (GFR = 15-29 mL/min/1.73 square meters)    Stage 5 End Stage CKD (GFR <15 mL/min/1.73 square meters)  Note: GFR calculation is accurate only with a steady state creatinine   PROCALCITONIN TEST - Abnormal; Notable for the following components:    Procalcitonin 2.69 (*)     All other components within  normal limits   PROTIME-INR - Abnormal; Notable for the following components:    Protime 17.6 (*)     INR 1.44 (*)     All other components within normal limits    Narrative:     INR Therapeutic Range    Indication                                             INR Range      Atrial Fibrillation                                               2.0-3.0  Hypercoagulable State                                    2.0.2.3  Left Ventricular Asist Device                            2.0-3.0  Mechanical Heart Valve                                  -    Aortic(with afib, MI, embolism, HF, LA enlargement,    and/or coagulopathy)                                     2.0-3.0 (2.5-3.5)     Mitral                                                             2.5-3.5  Prosthetic/Bioprosthetic Heart Valve               2.0-3.0  Venous thromboembolism (VTE: VT, PE        2.0-3.0   APTT - Abnormal; Notable for the following components:    PTT 44 (*)     All other components within normal limits   BLOOD GAS, VENOUS - Abnormal; Notable for the following components:    pCO2, Rodo 70.5 (*)     pO2, Rodo 66.3 (*)     HCO3, Rodo 36.8 (*)     O2 HGB, VENOUS 90.7 (*)     All other components within normal limits   COVID-19/INFLUENZA A/B RAPID ANTIGEN (30 MIN.TAT) - Normal    Narrative:     This test has been performed using the Quidel Altagracia 2 FLU+SARS Antigen test under the Emergency Use Authorization (EUA). This test has been validated by the  and verified by the performing laboratory. The Altagracia uses lateral flow immunofluorescent sandwich assay to detect SARS-COV, Influenza A and Influenza B Antigen.     The Quidel Altagracia 2 SARS Antigen test does not differentiate between SARS-CoV and SARS-CoV-2.     Negative results are presumptive and may be confirmed with a molecular assay, if necessary, for patient management. Negative results do not rule out SARS-CoV-2 or influenza infection and should not be used as the sole basis for treatment or  patient management decisions. A negative test result may occur if the level of antigen in a sample is below the limit of detection of this test.     Positive results are indicative of the presence of viral antigens, but do not rule out bacterial infection or co-infection with other viruses.     All test results should be used as an adjunct to clinical observations and other information available to the provider.    FOR PEDIATRIC PATIENTS - copy/paste COVID Guidelines URL to browser: [email or web address]   HS TROPONIN I 0HR - Normal   LACTIC ACID, PLASMA (W/REFLEX IF RESULT > 2.0) - Normal    Narrative:     Result may be elevated if tourniquet was used during collection.   BLOOD CULTURE   BLOOD CULTURE   HS TROPONIN I 2HR     XR chest 1 view portable   ED Interpretation by Maria Eugenia Donohue DO (09/10 1252)   Abnormal   Xray reviewed and independently interpreted by me: extensive b/l infiltrates.  Formal reading per radiology            ECG 12 Lead Documentation Only    Date/Time: 9/10/2024 11:40 AM    Performed by: Maria Eugenia Donohue DO  Authorized by: Maria Eugenia Donohue DO    Indications / Diagnosis:  Worsening dyspnea  ECG reviewed by me, the ED Provider: yes    Patient location:  ED  Previous ECG:     Previous ECG:  Unavailable  Interpretation:     Interpretation: non-specific    Rate:     ECG rate:  108    ECG rate assessment: tachycardic    Rhythm:     Rhythm: sinus tachycardia    ST segments:     ST segments:  Non-specific  T waves:     T waves: non-specific           Maria Eugenia Donohue DO  09/10/24 1340       Maria Eugenia Donohue DO  09/10/24 1341

## 2024-09-10 NOTE — PLAN OF CARE
Problem: Potential for Falls  Goal: Patient will remain free of falls  Description: INTERVENTIONS:  - Educate patient/family on patient safety including physical limitations  - Instruct patient to call for assistance with activity   - Consult OT/PT to assist with strengthening/mobility   - Keep Call bell within reach  - Keep bed low and locked with side rails adjusted as appropriate  - Keep care items and personal belongings within reach  - Initiate and maintain comfort rounds  - Make Fall Risk Sign visible to staff  - Offer Toileting in advance of need  - Initiate/Maintain bed alarm  - Obtain necessary fall risk management equipment  - Apply yellow socks and bracelet for high fall risk patients  - Consider moving patient to room near nurses station  Outcome: Progressing     Problem: PAIN - ADULT  Goal: Verbalizes/displays adequate comfort level or baseline comfort level  Description: Interventions:  - Encourage patient to monitor pain and request assistance  - Assess pain using appropriate pain scale  - Administer analgesics based on type and severity of pain and evaluate response  - Implement non-pharmacological measures as appropriate and evaluate response  - Consider cultural and social influences on pain and pain management  - Notify physician/advanced practitioner if interventions unsuccessful or patient reports new pain  Outcome: Progressing     Problem: INFECTION - ADULT  Goal: Absence or prevention of progression during hospitalization  Description: INTERVENTIONS:  - Assess and monitor for signs and symptoms of infection  - Monitor lab/diagnostic results  - Monitor all insertion sites, i.e. indwelling lines, tubes, and drains  - Monitor endotracheal if appropriate and nasal secretions for changes in amount and color  - [place] appropriate cooling/warming therapies per order  - Administer medications as ordered  - Instruct and encourage patient and family to use good hand hygiene technique  - Identify  and instruct in appropriate isolation precautions for identified infection/condition  Outcome: Progressing  Goal: Absence of fever/infection during neutropenic period  Description: INTERVENTIONS:  - Monitor WBC    Outcome: Progressing     Problem: SAFETY ADULT  Goal: Patient will remain free of falls  Description: INTERVENTIONS:  - Educate patient/family on patient safety including physical limitations  - Instruct patient to call for assistance with activity   - Consult OT/PT to assist with strengthening/mobility   - Keep Call bell within reach  - Keep bed low and locked with side rails adjusted as appropriate  - Keep care items and personal belongings within reach  - Initiate and maintain comfort rounds  - Make Fall Risk Sign visible to staff  - Offer Toileting in advance of need  - Initiate/Maintain bed alarm  - Obtain necessary fall risk management equipment  - Apply yellow socks and bracelet for high fall risk patients  - Consider moving patient to room near nurses station  Outcome: Progressing     Problem: DISCHARGE PLANNING  Goal: Discharge to home or other facility with appropriate resources  Description: INTERVENTIONS:  - Identify barriers to discharge w/patient and caregiver  - Arrange for needed discharge resources and transportation as appropriate  - Identify discharge learning needs (meds, wound care, etc.)  - Arrange for interpretive services to assist at discharge as needed  - Refer to Case Management Department for coordinating discharge planning if the patient needs post-hospital services based on physician/advanced practitioner order or complex needs related to functional status, cognitive ability, or social support system  Outcome: Not Progressing     Problem: Knowledge Deficit  Goal: Patient/family/caregiver demonstrates understanding of disease process, treatment plan, medications, and discharge instructions  Description: Complete learning assessment and assess knowledge base.  Interventions:  -  Provide teaching at level of understanding  - Provide teaching via preferred learning methods  Outcome: Not Progressing     Problem: RESPIRATORY - ADULT  Goal: Achieves optimal ventilation and oxygenation  Description: INTERVENTIONS:  - Assess for changes in respiratory status  - Assess for changes in mentation and behavior  - Position to facilitate oxygenation and minimize respiratory effort  - Oxygen administered by appropriate delivery if ordered  - Initiate smoking cessation education as indicated  - Encourage broncho-pulmonary hygiene including cough, deep breathe, Incentive Spirometry  - Assess the need for suctioning and aspirate as needed  - Assess and instruct to report SOB or any respiratory difficulty  - Respiratory Therapy support as indicated  Outcome: Not Progressing

## 2024-09-10 NOTE — SEPSIS NOTE
Sepsis Note   Claire Doherty 74 y.o. female MRN: [id number]  Unit/Bed#: ED-29 Encounter: [id number]       Initial Sepsis Screening       Row Name 09/10/24 1333 09/10/24 1241             Is the patient's history suggestive of a new or worsening infection? -- Yes (Proceed)  -VL       Suspected source of infection pneumonia  -VL pneumonia  -VL       Indicate SIRS criteria -- Tachycardia > 90 bpm;Leukocytosis (WBC > 35249 IJL) OR Leukopenia (WBC <4000 IJL) OR Bandemia (WBC >10% bands)  -VL       Are two or more of the above signs & symptoms of infection both present and new to the patient? No  -VL --                 User Key  (r) = Recorded By, (t) = Taken By, (c) = Cosigned By      Initials Name Provider Type    VL Maria Eugenia Donohue DO Physician                        Body mass index is 34.44 kg/m².  Wt Readings from Last 1 Encounters:   09/10/24 80 kg (176 lb 5.9 oz)     IBW (Ideal Body Weight): 45.5 kg    Ideal body weight: 45.5 kg (100 lb 4.9 oz)  Adjusted ideal body weight: 59.3 kg (130 lb 11.7 oz)

## 2024-09-10 NOTE — CONSULTS
"Consult - Critical Care   Claire Doherty 74 y.o. female MRN: [id number]  Unit/Bed#: ICU 13 Encounter: [id number]    Physician Requesting Consult: Dr. Gong  Reason for Consult: respiratory failure    Assessment/Plan:  Acute on chronic hypoxic and chronic hypercapnic respiratory failure  Bilateral pleural effusions- likely transudative consider due to CHF, low albumin, or atelectasis  Acute on chronic CHFpEF  Severe restrictive lung disease - TLC 48% with DLCO 44% in 2021  Acute idiopathic pericarditis  Sepsis, POA - doubt acute infection  Hereditary progressive muscular dystrophy  GERD  Thrombocytosis  Morbid obesity BMI 34.44    74 y.o. female PMH hereditary progressive muscular dystrophy, breast CA, chronic respiratory failure on 2L oxygen and BIPAP with sleep; GERD, HLD, who presents from skilled nursing facility with weakness, SOB.     The patient is ill with impending respiratory failure due to muscular dystrophy with new b/l pleural effusions with possible CHF vs due to atelectasis or poor nutrition.     Neuro: cont. O/P Buproprion, Citalopram, Ramelteon  CV: Cont. O/P Colchicine; check BNP, give IV Lasix x 1 dose  Lungs: start continuous BIPAP to help with air hunger; RT to check NIF Q12H; cont. nebulizers; evaluate with bedside U/S for possible b/l thoracenteses  GI: cont. O/P PPI  : cont. O/P Oxybutynin  FEN:  Endo:  Heme: Heparin SQ  ID: monitor off of antibiotics for now  MSK: reposition, turn  Lines:  Peripherals  No floresita  No gomez  DNR2    Discussed with patient. Concerns addressed. I addressed code status with patient.    At risk of respiratory failure, intubation, decompensation. Guarded prognosis.    I updated sister at bedside. Questions answered.    Critical Care Time: 44 minutes    Documented critical care time excludes any procedures documented elsewhere. It also excludes any family updates    _____________________________________________________________________    C/C:  \"I can't breathe, " "help me\"    HPI:    Claire Doherty is a 74 y.o. female PMH hereditary progressive muscular dystrophy, breast CA, chronic respiratory failure on 2L oxygen and BIPAP with sleep; GERD, HLD, who presents from skilled nursing facility with weakness. Per my review of records, pt admitted 8/30-9/7 at [facility] for wound infection of right groin at cardiac cath site. Culture grew Proteus and MSSA, treated with Ancef x 5 days. Complicated by DINA which resolved with IVF hydration, pericardial effusion that was trivial on ECHO. Discharged to SNF.      Presented to ER today with worsening SOB and weakness. States she presented to the SNF with profound weakness and she feels weak still. Feels like she is suffocating and gasping for air. States she is trying not to have a panic attack because it makes her more short of breath. Imaging showed b/l effusions. Admitted to med-surg and pulmonary was consulted.    On my arrival to the ER, pt is tearful. States she is SOB and gasping. Yelling for help.     Remote smoking history.  Lives alone in an apartment.   Uses BIPAP with sleep and 2 L supplemental oxygen otherwise.    Records reviewed. Pt also admitted 8/21-8/22 at [facility] with acute idiopathic pericarditis. Underwent LHC that was unremarkable. Treated with Colchicine and Ibuprofen.     Review of Systems:  Full 12 point review of systems was performed. Aside from what was mentioned in the HPI, it is otherwise negative.      Historical Information   Past Medical History:   Diagnosis Date    Acute kidney failure (HCC)     Acute nonspecific idiopathic pericarditis     Anxiety     Breast cancer (HCC) 2007    Cellulitis of right lower extremity     Chronic pain     Chronic respiratory failure with hypoxia (HCC)     Colitis     Colostomy status ([facility])     Dependence on supplemental oxygen     Depression     Difficult intubation     Excessive daytime sleepiness     Gastroesophageal reflux disease without esophagitis     " Gastroparesis     GERD (gastroesophageal reflux disease)     Hyperlipidemia     Ischemic colitis (HCC) 01/21/2019    Leukocytosis 03/28/2020    Muscular dystrophy ([facility])     Limb-girdle    Osteoporosis     Other nonrheumatic aortic valve disorders     Overactive bladder     Perforated diverticulum 03/28/2020    Pericardial effusion (noninflammatory)     Pneumonitis     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[facility])     Rheumatic tricuspid insufficiency     Skin cancer     Skin disorder     Tachycardia 06/02/2021    Vitamin D deficiency      Past Surgical History:   Procedure Laterality Date    CARDIAC CATHETERIZATION N/A 8/21/2024    Procedure: Cardiac pci;  Surgeon: Juan Fuller MD;  Location: [facility];  Service: Cardiology    CARDIAC CATHETERIZATION N/A 8/21/2024    Procedure: Cardiac PCI Stent;  Surgeon: Juan Fuller MD;  [facility];  Service: Cardiology    CARDIAC CATHETERIZATION N/A 8/21/2024    Procedure: Cardiac Coronary Angiogram;  Surgeon: Juan Fuller MD;  Location: [facility];  Service: Cardiology    COLONOSCOPY N/A 1/22/2019    Procedure: COLONOSCOPY;  Surgeon: Anthony Mejía MD;  Location: [facility] GI LAB;  Service: Colorectal    CYSTOSCOPY N/A 9/30/2020    Procedure: CYSTOSCOPY; BILATERAL URETERAL CATHETER PLACEMENT, CYSTOTOMY;  Surgeon: Zach Tyler MD;  Location: [facility];  Service: Urology    FL CYSTOGRAM  10/15/2020    HARTMANS PROCEDURE N/A 9/30/2020    Procedure: EXPLORATORY LAPAROTOMY; LYSIS OF ADHESIONS; WASHOUT PELVIC ABSCESS; COMPLEX SIGMOID COLON RESECTION; LOOP TRANSVERSE COLOSTOMY;  Surgeon: Thania Jaffe MD;  Location: [facility];  Service: General    IR DRAINAGE TUBE PLACEMENT  9/24/2020    MASTECTOMY Left 2007    left breast mastectomy     TONSILLECTOMY      TOOTH EXTRACTION      TUBAL LIGATION       Social History   Social History     Substance and Sexual Activity   Alcohol Use Not Currently    Comment: social  drinker per [employer]scripts      Social History     Substance and Sexual Activity   Drug Use Yes    Types: Marijuana    Comment: medical marijuana     Social History     Tobacco Use   Smoking Status Former    Current packs/day: 0.00    Average packs/day: 1.5 packs/day for 37.0 years (55.5 ttl pk-yrs)    Types: Cigarettes    Start date:     Quit date:     Years since quittin.7   Smokeless Tobacco Never     Lives alone in an apartment; wheelchair bound; can utilize LE to stand and pivot for transfer    Family History:   Family History   Problem Relation Age of Onset    Other Mother         bone loss    Arthritis Mother     Skin cancer Father     Hypertension Father         benign     Other Father         bone loss    Muscular dystrophy Father     Hypertension Sister     No Known Problems Sister     Muscular dystrophy Brother         of the limb gridle     Parkinsonism Brother     No Known Problems Brother     No Known Problems Maternal Grandmother     No Known Problems Paternal Grandmother     No Known Problems Maternal Aunt     Muscular dystrophy Family         of the limb girdle       Medications:  Pertinent medications were reviewed  Current Facility-Administered Medications   Medication Dose Route Frequency Provider Last Rate    acetaminophen  650 mg Oral Q6H PRN Maylin Rodriguez PA-C      albuterol  2.5 mg Nebulization Q4H PRN Maylin Rodriguez PA-C      bisacodyl  10 mg Rectal Daily PRN Maylin Rodriguez PA-C      budesonide  0.5 mg Nebulization Q12H Maylin Rodriguez PA-C      [START ON 2024] buPROPion  75 mg Oral QAM Maylin Rodriguez PA-C      calcium carbonate  1,000 mg Oral Daily PRN Maylin Rodriguez PA-C      cefepime  2,000 mg Intravenous Q12H Maylin Rodriguez PA-C      chlorhexidine  15 mL Mouth/Throat Q12H NANCY Rod Singleton MD      [START ON 2024] citalopram  10 mg Oral Daily Maylin Rodriguez PA-C      [START ON 2024] colchicine  0.6 mg Oral Daily Maylin Rodriguez PA-C       diazepam  5 mg Oral Q8H PRN STEPHEN Small-C      fluticasone  2 spray Nasal Daily STEPHEN Small-FELECIA      furosemide  40 mg Intravenous Once STEPHEN Small-FELECIA      heparin (porcine)  5,000 Units Subcutaneous Q8H [facility] Maylin Rodriguez PA-C      ipratropium  0.5 mg Nebulization BID Rod Singleton MD      levalbuterol  1.25 mg Nebulization BID Rod Singleton MD      ondansetron  4 mg Intravenous Q6H PRN Maylin Rodriguez, PA-FELECIA      oxybutynin  10 mg Oral HS Maylin Rodriguez PA-C      [START ON 9/11/2024] pantoprazole  40 mg Oral Early Morning STEPHEN Small-FELECIA      ramelteon  8 mg Oral HS STEPHEN Small-FELECIA      saccharomyces boulardii  250 mg Oral Daily Maylin Rodriguez PA-C           Allergies   Allergen Reactions    Amoxicillin      Vague rash in the 90s, tolerated zosyn on 9/2020 admission     Codeine      Other reaction(s): Other (See Comments)  n/v    Medical Tape Itching     bandaids    Metoclopramide Hcl Other (See Comments)     Reaction info not provided from facility    Ropinirole Other (See Comments)     Reaction info not provided from facility         Vitals:   /87 (BP Location: Right arm)   Pulse 88   Temp 97.8 °F (36.6 °C) (Oral)   Resp 18   Ht 5' (1.524 m)   Wt 80 kg (176 lb 5.9 oz)   LMP  (LMP Unknown)   SpO2 94%   BMI 34.44 kg/m²   Body mass index is 34.44 kg/m².  SpO2: 94 %,   SpO2 Activity: At Rest,   O2 Device: Nasal cannula      Intake/Output Summary (Last 24 hours) at 9/10/2024 1535  Last data filed at 9/10/2024 1328  Gross per 24 hour   Intake 50 ml   Output --   Net 50 ml     Invasive Devices       Peripheral Intravenous Line  Duration             Peripheral IV 09/10/24 Right;Ventral (anterior) Forearm <1 day              Drain  Duration             Colostomy Loop LUQ 1440 days    Ureteral Drain/Stent Left ureter 5 Fr. 1440 days    Ureteral Drain/Stent Right ureter 5 Fr. 1440 days                  Physical Exam:  GEN: appears ill, weak  HEENT: NCAT,  "EOMI  CVS: Regular  LUNGS: decreased at b/l bs  ABD: soft, nd  EXT: No c/c/e  NEURO: No focal deficits  MS: Moving B/L UE  PSYCH: calm, cooperative, anxious    Diagnostic Data:  Lab: I have personally reviewed pertinent lab results.,   CBC:  Results from last 7 days   Lab Units 09/10/24  1217   WBC Thousand/uL 31.54*   HEMOGLOBIN g/dL 11.6   HEMATOCRIT % 38.5   PLATELETS Thousands/uL 739*      CMP:   Lab Results   Component Value Date    SODIUM 139 09/10/2024    K 4.2 09/10/2024    CL 93 (L) 09/10/2024    CO2 41 (H) 09/10/2024    BUN 28 (H) 09/10/2024    CREATININE 0.36 (L) 09/10/2024    CALCIUM 9.4 09/10/2024    AST 20 09/10/2024    ALT 15 09/10/2024    ALKPHOS 98 09/10/2024    EGFR 106 09/10/2024   ,   PT/INR:   Lab Results   Component Value Date    INR 1.44 (H) 09/10/2024     Labs per my review reveal leukocytosis; thrombocytosis; metabolic alkalosis, normal renal function    Microbiology: Flu/COVID/RSV negative        Imaging: I have personally reviewed the pertinent imaging studies on the PACS system  CT chest per my review shows bibasilar atelectasis with moderate to large b/l effusions    Cardiac/EKG/telemetry/Echo:       ECHO 8/21/24: EF normal, grade II diastolic dysfunction    VTE Prophylaxis: Heparin    Code Status: Level 3 - DNAR and DNI    Lenora Beard DO    Portions of the record may have been created with voice recognition software. Occasional wrong word or \"sound a like\" substitutions may have occurred due to the inherent limitations of voice recognition software. Read the chart carefully and recognize, using context, where substitutions have occurred.   "

## 2024-09-10 NOTE — ASSESSMENT & PLAN NOTE
EMS was called for patient at nursing facility for shortness of breath  Found to have CO2 of 70 on VBG with pH 7.336  Patient does utilize BiPAP at night, does have history of muscular dystrophy with restrictive lung disease    Plan:   Continuous Bipap(18/6), weaned off as tolerated  Does have evidence of pleural effusions on CT scan, trial dose of IV Lasix 40 mg, not a good window for thoro will do US today to look for progression   Continue Atrovent/Xopenex/Pulmicort BID  Considering her muscular dystropy ordered NIF

## 2024-09-10 NOTE — ASSESSMENT & PLAN NOTE
Patient met sepsis criteria at time of admission with tachycardia and leukocytosis  CT chest showed  Large bilateral nonspecific pleural effusions and compressive atelectasis. Mild manifestations of underlying centrilobular emphysema.  Started on IV cefepime in the ER, continue since repeat procal is elevated will monitor signs of infection  Procalcitonin elevated  Follow-up blood culture

## 2024-09-10 NOTE — ASSESSMENT & PLAN NOTE
Patient follows outpatient with pulmonology for restrictive lung disease due to muscular dystrophy  Uses BiPAP at night and 2-3 L nasal cannula during the day  Not on any daily maintenance inhaler, uses Pulmicort/albuterol as needed

## 2024-09-10 NOTE — ASSESSMENT & PLAN NOTE
He was doing well history of muscular dystrophy  Was previously living alone in an apartment with home health care, able to stand and pivot to wheelchair prior to hospital stay last week  Was discharged to rehab on 9/7/2024  Utilizes BiPAP at night, continue  PT/OT while inpatient

## 2024-09-10 NOTE — H&P
H&P - Hospitalist   Name: Claire Doherty 74 y.o. female I MRN: [id number]  Unit/Bed#: ED-29 I Date of Admission: 9/10/2024   Date of Service: 9/10/2024 I Hospital Day: 0     Assessment & Plan  Acute respiratory failure with hypercapnia ([facility])  EMS was called for patient at nursing facility for shortness of breath  Found to have CO2 of 70 on VBG with pH 7.336  Patient does utilize BiPAP at night, does have history of muscular dystrophy with restrictive lung disease  Will place on BiPAP now and monitor  Does have evidence of pleural effusions on CT scan, trial dose of IV Lasix  Continue nebulizers  Pulmonology following-will admit to stepdown level 1 given the need for continuous BiPAP use   Sepsis ([facility])  Patient met sepsis criteria at time of admission with tachycardia and leukocytosis  Source likely pulmonary given complaint of shortness of breath, increased oxygen demand and abnormal chest x-ray  Follow-up CT chest, chest x-ray concerning for multifocal pneumonia  Started on IV cefepime in the ER  Procalcitonin elevated, continue antibiotics  Follow-up blood culture   Acute on chronic diastolic CHF (congestive heart failure) ([facility])  Wt Readings from Last 3 Encounters:   09/10/24 80 kg (176 lb 5.9 oz)   09/07/24 77.8 kg (171 lb 8.3 oz)   08/21/24 80.7 kg (178 lb)     Patient with known chronic diastolic CHF with grade 2 diastolic dysfunction  Presenting with worsening shortness of breath  Chest x-ray/CT concerning for pleural effusions  Not maintained on outpatient diuretic  Will give 1 dose of IV Lasix 40 mg and monitor response  Wean oxygen as tolerated  Dystrophy, muscular, 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HCC)  He was doing well history of muscular dystrophy  Was previously living alone in an apartment with home health care, able to stand and pivot to wheelchair prior to hospital stay last week  Was discharged to rehab on 9/7/2024  Utilizes BiPAP at night, continue  PT/OT while inpatient  Restrictive lung disease due to  muscular dystrophy (HCC)  Patient follows outpatient with pulmonology for restrictive lung disease due to muscular dystrophy  Uses BiPAP at night and 2-3 L nasal cannula during the day  Continue nebulizers  Pulmonology consult while inpatient  GERD without esophagitis  Continue PPI  Thrombocytosis, unspecified  Significant thrombocytosis with platelet count 739  Likely reactive although does have history of similar  recommend outpatient with hematology  Acute idiopathic pericarditis  Patient previously hospitalized for chest discomfort, diagnosed with idiopathic pericarditis  Continue colchicine  Outpatient follow-up with cardiology on 9/20/2024    VTE Pharmacologic Prophylaxis: VTE Score: 8 High Risk (Score >/= 5) - Pharmacological DVT Prophylaxis Ordered: heparin. Sequential Compression Devices Ordered.  Code Status: Level 3 - DNAR and DNI DNR/DNI  Discussion with family:  None.     Anticipated Length of Stay: Patient will be admitted on an inpatient basis with an anticipated length of stay of greater than 2 midnights secondary to sepsis, acute respiratory failure, acute CHF.    History of Present Illness   Chief Complaint: Shortness of breath    Claire Doherty is a 74 y.o. female with a PMH of muscular dystrophy, chronic respiratory failure with hypoxia, GERD, hyperlipidemia, restrictive lung disease, depression who presents with shortness of breath.  Patient ports he was recently hospitalized for a groin infection and was discharged from the hospital to rehab.  She notes since arriving at rehab she has developed progressively worsening shortness of breath.  Notes difficulty taking deep breaths.  Does report some pain in her chest with deep breathing.  She reports she typically utilizes 2 L supplemental oxygen during the day and BiPAP with supplemental oxygen overnight but has been requiring increased supplemental oxygen due to hypoxia and shortness of breath.  She notes she has been feeling generally unwell  with anorexia and generalized weakness and fatigue.  She also notes inability to tolerate oral intake due to her severe shortness of breath causing her difficulty eating.  Denies any abdominal pain.  She does report recent antibiotic use for groin infection.     Review of Systems   Constitutional:  Positive for appetite change.   HENT:  Negative for trouble swallowing.    Eyes:  Negative for visual disturbance.   Respiratory:  Positive for shortness of breath. Negative for cough.    Cardiovascular:  Positive for chest pain.   Gastrointestinal:  Negative for abdominal pain and vomiting.   Genitourinary:  Negative for difficulty urinating.   Musculoskeletal:  Positive for gait problem.   Skin:  Negative for rash.   Neurological:  Positive for weakness. Negative for dizziness.   Psychiatric/Behavioral:  Negative for confusion.        I have reviewed the patient's PMH, PSH, Social History, Family History, Meds, and Allergies  Social History:  Marital Status:    Occupation: Unknown  Patient Pre-hospital Living Situation: Previously resided at apartment with home health care, was discharged to rehab on 9/7/2024  Patient Pre-hospital Level of Mobility:  Able to transfer, wheelchair-bound  Patient Pre-hospital Diet Restrictions: None    Objective     Vitals:   Blood Pressure: 149/87 (09/10/24 1330)  Pulse: 104 (09/10/24 1330)  Temperature: 97.8 °F (36.6 °C) (09/10/24 1233)  Temp Source: Oral (09/10/24 1233)  Respirations: 18 (09/10/24 1330)  Height: 5' (152.4 cm) (09/10/24 1132)  Weight - Scale: 80 kg (176 lb 5.9 oz) (09/10/24 1132)  SpO2: 98 % (09/10/24 1330)    Physical Exam  Constitutional:       General: She is not in acute distress.     Appearance: She is ill-appearing.   HENT:      Head: Normocephalic and atraumatic.      Mouth/Throat:      Mouth: Oropharynx is clear and moist.   Eyes:      General: No scleral icterus.     Extraocular Movements: EOM normal.      Conjunctiva/sclera: Conjunctivae normal.    Cardiovascular:      Rate and Rhythm: Regular rhythm. Tachycardia present.      Heart sounds: Normal heart sounds. No murmur heard.  Pulmonary:      Effort: Pulmonary effort is normal. No respiratory distress.      Breath sounds: Normal breath sounds. No wheezing or rales.      Comments: Shallow breathing, Decreased at bases  Abdominal:      General: Abdomen is flat. Bowel sounds are normal. There is no distension.      Palpations: Abdomen is soft.      Tenderness: There is no abdominal tenderness.      Comments: Ostomy present   Musculoskeletal:         General: No edema.      Cervical back: Normal range of motion.   Skin:     General: Skin is warm and dry.   Neurological:      Mental Status: She is alert and oriented to person, place, and time.      Comments: Chronic LE weakness, able to move arms off bed   Psychiatric:         Mood and Affect: Mood and affect normal.         Behavior: Behavior normal.         Thought Content: Thought content normal.         Lines/Drains:  Lines/Drains/Airways       Active Status       Name Placement date Placement time Site Days    Colostomy Loop LUQ 09/30/20  1825  LUQ  1440    Ureteral Drain/Stent Left ureter 5 Fr. 09/30/20  1730  Left ureter  1440    Ureteral Drain/Stent Right ureter 5 Fr. 09/30/20  1730  Right ureter  1440                        Additional Data:   Lab Results: I have reviewed the following results: CBC/BMP:   .     09/10/24  1217   WBC 31.54*   HGB 11.6   HCT 38.5   *   SODIUM 139   K 4.2   CL 93*   CO2 41*   BUN 28*   CREATININE 0.36*   GLUC 99    , PTT/INR:  .     09/10/24  1224   PTT 44*   INR 1.44*    , Lactic Acid:   .     09/10/24  1224   LACTICACID 0.8    , Procalcitonin:   Lab Results   Component Value Date    PROCALCITONI 2.69 (H) 09/10/2024     Results from last 7 days   Lab Units 09/10/24  1217   WBC Thousand/uL 31.54*   HEMOGLOBIN g/dL 11.6   HEMATOCRIT % 38.5   PLATELETS Thousands/uL 739*   SEGS PCT % 86*   LYMPHO PCT % 4*   MONO PCT % 9  "  EOS PCT % 0     Results from last 7 days   Lab Units 09/10/24  1217   SODIUM mmol/L 139   POTASSIUM mmol/L 4.2   CHLORIDE mmol/L 93*   CO2 mmol/L 41*   BUN mg/dL 28*   CREATININE mg/dL 0.36*   ANION GAP mmol/L 5   CALCIUM mg/dL 9.4   ALBUMIN g/dL 3.2*   TOTAL BILIRUBIN mg/dL 0.42   ALK PHOS U/L 98   ALT U/L 15   AST U/L 20   GLUCOSE RANDOM mg/dL 99     Results from last 7 days   Lab Units 09/10/24  1224   INR  1.44*         No results found for: \"HGBA1C\"  Results from last 7 days   Lab Units 09/10/24  1224   LACTIC ACID mmol/L 0.8   PROCALCITONIN ng/ml 2.69*       Imaging Review: Personally reviewed the following image studies in PACS and associated radiology reports: CT chest. My interpretation of the radiology images/reports is: pleural effusions .  Other Studies: EKG was reviewed.     Administrative Statements       ** Please Note: This note has been constructed using a voice recognition system. **    "

## 2024-09-10 NOTE — ASSESSMENT & PLAN NOTE
Patient previously hospitalized for chest discomfort, diagnosed with idiopathic pericarditis  Continue colchicine  Outpatient follow-up with cardiology on 9/20/2024

## 2024-09-10 NOTE — ASSESSMENT & PLAN NOTE
Patient follows outpatient with pulmonology for restrictive lung disease due to muscular dystrophy  Uses BiPAP at night and 2-3 L nasal cannula during the day  Continue nebulizers  Pulmonology consult while inpatient

## 2024-09-10 NOTE — ASSESSMENT & PLAN NOTE
EMS was called for patient at nursing facility for shortness of breath  Found to have CO2 of 70 on VBG with pH 7.336  Patient does utilize BiPAP at night, does have history of muscular dystrophy with restrictive lung disease  Will place on BiPAP now and monitor  Does have evidence of pleural effusions on CT scan, trial dose of IV Lasix  Continue nebulizers  Pulmonology following-will admit to stepdown level 1 given the need for continuous BiPAP use

## 2024-09-10 NOTE — ASSESSMENT & PLAN NOTE
Significant thrombocytosis with platelet count 739  Likely reactive although does have history of similar  recommend outpatient with hematology

## 2024-09-10 NOTE — ASSESSMENT & PLAN NOTE
Wt Readings from Last 3 Encounters:   09/10/24 75.1 kg (165 lb 9.1 oz)   09/07/24 77.8 kg (171 lb 8.3 oz)   08/21/24 80.7 kg (178 lb)     Patient with known chronic diastolic CHF with grade 2 diastolic dysfunction  Presenting with worsening shortness of breath  Chest x-ray/CT concerning for pleural effusions  Not maintained on outpatient diuretic  Will give 1 dose of IV Lasix 40 mg and monitor response  Wean off Bipap as tolerated

## 2024-09-10 NOTE — PLAN OF CARE
Problem: Potential for Falls  Goal: Patient will remain free of falls  Description: INTERVENTIONS:  - Educate patient/family on patient safety including physical limitations  - Instruct patient to call for assistance with activity   - Consult OT/PT to assist with strengthening/mobility   - Keep Call bell within reach  - Keep bed low and locked with side rails adjusted as appropriate  - Keep care items and personal belongings within reach  - Initiate and maintain comfort rounds  - Make Fall Risk Sign visible to staff  - Offer Toileting every 2 Hours, in advance of need  - Initiate/Maintain bed alarm  - Obtain necessary fall risk management equipment:   - Apply yellow socks and bracelet for high fall risk patients  - Consider moving patient to room near nurses station  Outcome: Progressing     Problem: PAIN - ADULT  Goal: Verbalizes/displays adequate comfort level or baseline comfort level  Description: Interventions:  - Encourage patient to monitor pain and request assistance  - Assess pain using appropriate pain scale  - Administer analgesics based on type and severity of pain and evaluate response  - Implement non-pharmacological measures as appropriate and evaluate response  - Consider cultural and social influences on pain and pain management  - Notify physician/advanced practitioner if interventions unsuccessful or patient reports new pain  Outcome: Progressing     Problem: SAFETY ADULT  Goal: Patient will remain free of falls  Description: INTERVENTIONS:  - Educate patient/family on patient safety including physical limitations  - Instruct patient to call for assistance with activity   - Consult OT/PT to assist with strengthening/mobility   - Keep Call bell within reach  - Keep bed low and locked with side rails adjusted as appropriate  - Keep care items and personal belongings within reach  - Initiate and maintain comfort rounds  - Make Fall Risk Sign visible to staff  - Offer Toileting every 2 Hours, in  advance of need  - Initiate/Maintain bed alarm  - Obtain necessary fall risk management equipment:   - Apply yellow socks and bracelet for high fall risk patients  - Consider moving patient to room near nurses station  Outcome: Progressing  Goal: Maintain or return to baseline ADL function  Description: INTERVENTIONS:  -  Assess patient's ability to carry out ADLs; assess patient's baseline for ADL function and identify physical deficits which impact ability to perform ADLs (bathing, care of mouth/teeth, toileting, grooming, dressing, etc.)  - Assess/evaluate cause of self-care deficits   - Assess range of motion  - Assess patient's mobility; develop plan if impaired  - Assess patient's need for assistive devices and provide as appropriate  - Encourage maximum independence but intervene and supervise when necessary  - Involve family in performance of ADLs  - Assess for home care needs following discharge   - Consider OT consult to assist with ADL evaluation and planning for discharge  - Provide patient education as appropriate  Outcome: Progressing  Goal: Maintains/Returns to pre admission functional level  Description: INTERVENTIONS:  - Perform AM-PAC 6 Click Basic Mobility/ Daily Activity assessment daily.  - Set and communicate daily mobility goal to care team and patient/family/caregiver.   - Collaborate with rehabilitation services on mobility goals if consulted  - Perform Range of Motion 4 times a day.  - Reposition patient every 2 hours.  - Dangle patient 3 times a day    - Out of bed for toileting  - Record patient progress and toleration of activity level   Outcome: Progressing     Problem: DISCHARGE PLANNING  Goal: Discharge to home or other facility with appropriate resources  Description: INTERVENTIONS:  - Identify barriers to discharge w/patient and caregiver  - Arrange for needed discharge resources and transportation as appropriate  - Identify discharge learning needs (meds, wound care, etc.)  -  Arrange for interpretive services to assist at discharge as needed  - Refer to Case Management Department for coordinating discharge planning if the patient needs post-hospital services based on physician/advanced practitioner order or complex needs related to functional status, cognitive ability, or social support system  Outcome: Progressing     Problem: Knowledge Deficit  Goal: Patient/family/caregiver demonstrates understanding of disease process, treatment plan, medications, and discharge instructions  Description: Complete learning assessment and assess knowledge base.  Interventions:  - Provide teaching at level of understanding  - Provide teaching via preferred learning methods  Outcome: Progressing     Problem: Nutrition/Hydration-ADULT  Goal: Nutrient/Hydration intake appropriate for improving, restoring or maintaining nutritional needs  Description: Monitor and assess patient's nutrition/hydration status for malnutrition. Collaborate with interdisciplinary team and initiate plan and interventions as ordered.  Monitor patient's weight and dietary intake as ordered or per policy. Utilize nutrition screening tool and intervene as necessary. Determine patient's food preferences and provide high-protein, high-caloric foods as appropriate.     INTERVENTIONS:  - Monitor oral intake, urinary output, labs, and treatment plans  - Assess nutrition and hydration status and recommend course of action  - Evaluate amount of meals eaten  - Assist patient with eating if necessary   - Allow adequate time for meals  - Recommend/ encourage appropriate diets, oral nutritional supplements, and vitamin/mineral supplements  - Order, calculate, and assess calorie counts as needed  - Recommend, monitor, and adjust tube feedings and TPN/PPN based on assessed needs  - Assess need for intravenous fluids  - Provide specific nutrition/hydration education as appropriate  - Include patient/family/caregiver in decisions related to  nutrition  Outcome: Progressing     Problem: Prexisting or High Potential for Compromised Skin Integrity  Goal: Skin integrity is maintained or improved  Description: INTERVENTIONS:  - Identify patients at risk for skin breakdown  - Assess and monitor skin integrity  - Assess and monitor nutrition and hydration status  - Monitor labs   - Assess for incontinence   - Turn and reposition patient  - Assist with mobility/ambulation  - Relieve pressure over bony prominences  - Avoid friction and shearing  - Provide appropriate hygiene as needed including keeping skin clean and dry  - Evaluate need for skin moisturizer/barrier cream  - Collaborate with interdisciplinary team   - Patient/family teaching  - Consider wound care consult   Outcome: Progressing

## 2024-09-11 ENCOUNTER — APPOINTMENT (INPATIENT)
Dept: NON INVASIVE DIAGNOSTICS | Facility: HOSPITAL | Age: 74
DRG: 291 | End: 2024-09-11
Payer: COMMERCIAL

## 2024-09-11 PROBLEM — J44.9 COPD (CHRONIC OBSTRUCTIVE PULMONARY DISEASE) (HCC): Status: ACTIVE | Noted: 2023-06-15

## 2024-09-11 PROBLEM — I48.0 PAROXYSMAL A-FIB (HCC): Status: ACTIVE | Noted: 2024-09-11

## 2024-09-11 LAB
ALBUMIN SERPL BCG-MCNC: 2.9 G/DL (ref 3.5–5)
ALP SERPL-CCNC: 80 U/L (ref 34–104)
ALT SERPL W P-5'-P-CCNC: 9 U/L (ref 7–52)
ANION GAP SERPL CALCULATED.3IONS-SCNC: 9 MMOL/L (ref 4–13)
AORTIC VALVE MEAN VELOCITY: 9.2 M/S
APICAL FOUR CHAMBER EJECTION FRACTION: 63 %
AST SERPL W P-5'-P-CCNC: 16 U/L (ref 13–39)
ATRIAL RATE: 107 BPM
ATRIAL RATE: 150 BPM
ATRIAL RATE: 177 BPM
ATRIAL RATE: 56 BPM
ATRIAL RATE: 89 BPM
AV LVOT MEAN GRADIENT: 4 MMHG
AV LVOT PEAK GRADIENT: 8 MMHG
AV MEAN GRADIENT: 4 MMHG
AV PEAK GRADIENT: 8 MMHG
AV VELOCITY RATIO: 0.97
BILIRUB SERPL-MCNC: 0.34 MG/DL (ref 0.2–1)
BSA FOR ECHO PROCEDURE: 1.72 M2
BUN SERPL-MCNC: 30 MG/DL (ref 5–25)
CALCIUM ALBUM COR SERPL-MCNC: 9.6 MG/DL (ref 8.3–10.1)
CALCIUM SERPL-MCNC: 8.7 MG/DL (ref 8.4–10.2)
CHLORIDE SERPL-SCNC: 93 MMOL/L (ref 96–108)
CO2 SERPL-SCNC: 38 MMOL/L (ref 21–32)
CREAT SERPL-MCNC: 0.4 MG/DL (ref 0.6–1.3)
DOP CALC AO PEAK VEL: 1.43 M/S
DOP CALC AO VTI: 26.92 CM
DOP CALC LVOT PEAK VEL VTI: 25.21 CM
DOP CALC LVOT PEAK VEL: 1.38 M/S
E WAVE DECELERATION TIME: 169 MS
E/A RATIO: 1.17
ERYTHROCYTE [DISTWIDTH] IN BLOOD BY AUTOMATED COUNT: 17.7 % (ref 11.6–15.1)
GFR SERPL CREATININE-BSD FRML MDRD: 102 ML/MIN/1.73SQ M
GLUCOSE SERPL-MCNC: 52 MG/DL (ref 65–140)
GLUCOSE SERPL-MCNC: 67 MG/DL (ref 65–140)
GLUCOSE SERPL-MCNC: 82 MG/DL (ref 65–140)
HCT VFR BLD AUTO: 33.3 % (ref 34.8–46.1)
HGB BLD-MCNC: 10.3 G/DL (ref 11.5–15.4)
MCH RBC QN AUTO: 26 PG (ref 26.8–34.3)
MCHC RBC AUTO-ENTMCNC: 30.9 G/DL (ref 31.4–37.4)
MCV RBC AUTO: 84 FL (ref 82–98)
MV E'TISSUE VEL-SEP: 8 CM/S
MV PEAK A VEL: 0.78 M/S
MV PEAK E VEL: 91 CM/S
MV STENOSIS PRESSURE HALF TIME: 49 MS
MV VALVE AREA P 1/2 METHOD: 4.49
P AXIS: 14 DEGREES
P AXIS: 46 DEGREES
P AXIS: 65 DEGREES
PLATELET # BLD AUTO: 646 THOUSANDS/UL (ref 149–390)
PMV BLD AUTO: 10.9 FL (ref 8.9–12.7)
POTASSIUM SERPL-SCNC: 4.1 MMOL/L (ref 3.5–5.3)
PR INTERVAL: 0 MS
PR INTERVAL: 0 MS
PR INTERVAL: 129 MS
PR INTERVAL: 129 MS
PR INTERVAL: 163 MS
PROCALCITONIN SERPL-MCNC: 5.09 NG/ML
PROT SERPL-MCNC: 6.2 G/DL (ref 6.4–8.4)
QRS AXIS: 51 DEGREES
QRS AXIS: 54 DEGREES
QRS AXIS: 56 DEGREES
QRS AXIS: 57 DEGREES
QRS AXIS: 67 DEGREES
QRSD INTERVAL: 83 MS
QRSD INTERVAL: 88 MS
QRSD INTERVAL: 92 MS
QT INTERVAL: 254 MS
QT INTERVAL: 263 MS
QT INTERVAL: 292 MS
QT INTERVAL: 342 MS
QT INTERVAL: 367 MS
QTC INTERVAL: 339 MS
QTC INTERVAL: 355 MS
QTC INTERVAL: 417 MS
QTC INTERVAL: 452 MS
QTC INTERVAL: 462 MS
RA PRESSURE ESTIMATED: 8 MMHG
RBC # BLD AUTO: 3.96 MILLION/UL (ref 3.81–5.12)
RV PSP: 45 MMHG
SL CV LV EF: 65
SODIUM SERPL-SCNC: 140 MMOL/L (ref 135–147)
T WAVE AXIS: -62 DEGREES
T WAVE AXIS: 251 DEGREES
T WAVE AXIS: 28 DEGREES
T WAVE AXIS: 35 DEGREES
T WAVE AXIS: 73 DEGREES
TR MAX PG: 37 MMHG
TR PEAK VELOCITY: 3.1 M/S
TRICUSPID ANNULAR PLANE SYSTOLIC EXCURSION: 1.7 CM
TRICUSPID VALVE PEAK REGURGITATION VELOCITY: 3.05 M/S
VENTRICULAR RATE: 107 BPM
VENTRICULAR RATE: 150 BPM
VENTRICULAR RATE: 177 BPM
VENTRICULAR RATE: 56 BPM
VENTRICULAR RATE: 89 BPM
WBC # BLD AUTO: 17.45 THOUSAND/UL (ref 4.31–10.16)

## 2024-09-11 PROCEDURE — 85027 COMPLETE CBC AUTOMATED: CPT | Performed by: PHYSICIAN ASSISTANT

## 2024-09-11 PROCEDURE — 92610 EVALUATE SWALLOWING FUNCTION: CPT

## 2024-09-11 PROCEDURE — 93308 TTE F-UP OR LMTD: CPT

## 2024-09-11 PROCEDURE — 93010 ELECTROCARDIOGRAM REPORT: CPT | Performed by: STUDENT IN AN ORGANIZED HEALTH CARE EDUCATION/TRAINING PROGRAM

## 2024-09-11 PROCEDURE — 80053 COMPREHEN METABOLIC PANEL: CPT

## 2024-09-11 PROCEDURE — 99232 SBSQ HOSP IP/OBS MODERATE 35: CPT | Performed by: INTERNAL MEDICINE

## 2024-09-11 PROCEDURE — 93005 ELECTROCARDIOGRAM TRACING: CPT

## 2024-09-11 PROCEDURE — 94760 N-INVAS EAR/PLS OXIMETRY 1: CPT

## 2024-09-11 PROCEDURE — 93321 DOPPLER ECHO F-UP/LMTD STD: CPT

## 2024-09-11 PROCEDURE — 97167 OT EVAL HIGH COMPLEX 60 MIN: CPT

## 2024-09-11 PROCEDURE — 5A09357 ASSISTANCE WITH RESPIRATORY VENTILATION, LESS THAN 24 CONSECUTIVE HOURS, CONTINUOUS POSITIVE AIRWAY PRESSURE: ICD-10-PCS | Performed by: INTERNAL MEDICINE

## 2024-09-11 PROCEDURE — 93308 TTE F-UP OR LMTD: CPT | Performed by: INTERNAL MEDICINE

## 2024-09-11 PROCEDURE — 97163 PT EVAL HIGH COMPLEX 45 MIN: CPT

## 2024-09-11 PROCEDURE — 93325 DOPPLER ECHO COLOR FLOW MAPG: CPT

## 2024-09-11 PROCEDURE — 94640 AIRWAY INHALATION TREATMENT: CPT

## 2024-09-11 PROCEDURE — 94003 VENT MGMT INPAT SUBQ DAY: CPT

## 2024-09-11 PROCEDURE — 82948 REAGENT STRIP/BLOOD GLUCOSE: CPT

## 2024-09-11 PROCEDURE — 99222 1ST HOSP IP/OBS MODERATE 55: CPT

## 2024-09-11 PROCEDURE — 84145 PROCALCITONIN (PCT): CPT | Performed by: PHYSICIAN ASSISTANT

## 2024-09-11 PROCEDURE — 94150 VITAL CAPACITY TEST: CPT

## 2024-09-11 RX ORDER — FUROSEMIDE 10 MG/ML
40 INJECTION INTRAMUSCULAR; INTRAVENOUS
Status: DISCONTINUED | OUTPATIENT
Start: 2024-09-11 | End: 2024-09-15

## 2024-09-11 RX ORDER — METOPROLOL TARTRATE 1 MG/ML
5 INJECTION, SOLUTION INTRAVENOUS ONCE
Status: COMPLETED | OUTPATIENT
Start: 2024-09-11 | End: 2024-09-11

## 2024-09-11 RX ORDER — DIAZEPAM 2 MG
2 TABLET ORAL ONCE
Status: COMPLETED | OUTPATIENT
Start: 2024-09-11 | End: 2024-09-11

## 2024-09-11 RX ORDER — FUROSEMIDE 10 MG/ML
40 INJECTION INTRAMUSCULAR; INTRAVENOUS ONCE
Status: COMPLETED | OUTPATIENT
Start: 2024-09-11 | End: 2024-09-11

## 2024-09-11 RX ADMIN — OXYBUTYNIN CHLORIDE 10 MG: 10 TABLET, EXTENDED RELEASE ORAL at 21:36

## 2024-09-11 RX ADMIN — ACETAMINOPHEN 650 MG: 325 TABLET ORAL at 21:45

## 2024-09-11 RX ADMIN — COLCHICINE 0.6 MG: 0.6 TABLET ORAL at 08:38

## 2024-09-11 RX ADMIN — CHLORHEXIDINE GLUCONATE 15 ML: 1.2 RINSE ORAL at 21:36

## 2024-09-11 RX ADMIN — CHLORHEXIDINE GLUCONATE 15 ML: 1.2 RINSE ORAL at 08:39

## 2024-09-11 RX ADMIN — ACETAMINOPHEN 650 MG: 325 TABLET ORAL at 13:47

## 2024-09-11 RX ADMIN — DIAZEPAM 5 MG: 5 TABLET ORAL at 17:22

## 2024-09-11 RX ADMIN — ACETAMINOPHEN 650 MG: 325 TABLET ORAL at 05:29

## 2024-09-11 RX ADMIN — FUROSEMIDE 40 MG: 10 INJECTION, SOLUTION INTRAVENOUS at 13:47

## 2024-09-11 RX ADMIN — CITALOPRAM HYDROBROMIDE 10 MG: 20 TABLET ORAL at 08:38

## 2024-09-11 RX ADMIN — BUDESONIDE INHALATION 0.5 MG: 0.5 SUSPENSION RESPIRATORY (INHALATION) at 19:49

## 2024-09-11 RX ADMIN — DIAZEPAM 2 MG: 2 TABLET ORAL at 05:29

## 2024-09-11 RX ADMIN — METOROPROLOL TARTRATE 5 MG: 5 INJECTION, SOLUTION INTRAVENOUS at 04:40

## 2024-09-11 RX ADMIN — FORMOTEROL FUMARATE 20 MCG: 20 SOLUTION RESPIRATORY (INHALATION) at 19:52

## 2024-09-11 RX ADMIN — HEPARIN SODIUM 5000 UNITS: 5000 INJECTION INTRAVENOUS; SUBCUTANEOUS at 13:05

## 2024-09-11 RX ADMIN — HEPARIN SODIUM 5000 UNITS: 5000 INJECTION INTRAVENOUS; SUBCUTANEOUS at 05:08

## 2024-09-11 RX ADMIN — METOROPROLOL TARTRATE 5 MG: 5 INJECTION, SOLUTION INTRAVENOUS at 05:19

## 2024-09-11 RX ADMIN — DIAZEPAM 5 MG: 5 TABLET ORAL at 08:38

## 2024-09-11 RX ADMIN — FORMOTEROL FUMARATE 20 MCG: 20 SOLUTION RESPIRATORY (INHALATION) at 07:02

## 2024-09-11 RX ADMIN — Medication 250 MG: at 08:38

## 2024-09-11 RX ADMIN — BUDESONIDE INHALATION 0.5 MG: 0.5 SUSPENSION RESPIRATORY (INHALATION) at 07:02

## 2024-09-11 RX ADMIN — IPRATROPIUM BROMIDE 0.5 MG: 0.5 SOLUTION RESPIRATORY (INHALATION) at 07:02

## 2024-09-11 RX ADMIN — APIXABAN 5 MG: 5 TABLET, FILM COATED ORAL at 17:22

## 2024-09-11 RX ADMIN — BUPROPION HYDROCHLORIDE 75 MG: 75 TABLET, FILM COATED ORAL at 08:38

## 2024-09-11 RX ADMIN — PANTOPRAZOLE SODIUM 40 MG: 40 TABLET, DELAYED RELEASE ORAL at 05:08

## 2024-09-11 RX ADMIN — FLUTICASONE PROPIONATE 2 SPRAY: 50 SPRAY, METERED NASAL at 08:39

## 2024-09-11 NOTE — PROCEDURES
POC Cardiac US    Date/Time: 9/11/2024 12:13 PM    Performed by: Rod Singleton MD  Authorized by: Rod Singleton MD    Patient location:  ICU  Other Assisting Provider: Yes (comment) (Dr Cyrus Toussaint)    Procedure details:     Exam Type:  Diagnostic    Indications comment:  Bradycardia    Assessment / Evaluation for: cardiac function, pericardial effusion and inferior vena cava for fluid responsiveness      Exam Type: initial exam      Image quality: limited diagnostic      Image availability:  Not saved  Patient Details:     Cardiac Rhythm:  Regular (with PACs per correlation with tele)    Mechanical ventilation: No    Cardiac findings:     Echo technique: limited 2D      Views obtained: parasternal long axis, parasternal short axis and apical      Pericardial effusion: absent      Tamponade physiology: absent      Wall motion: normal      LV systolic function: normal      RV dilation: none    IVC findings:     IVC Size: dilated      IVC Inspiratory Collapse: absent      IVC Inspiratory Collapse comment:  Non diagnostic since patient was not taking good sniff  Interpretation:     Fluid Status:  Hypervolemic (Non diagnostic due to inadequate sniff)

## 2024-09-11 NOTE — UTILIZATION REVIEW
NOTIFICATION OF INPATIENT ADMISSION   AUTHORIZATION REQUEST   SERVICING FACILITY:   [place] [address]  Tax ID: [id number]  NPI: [id number] ATTENDING PROVIDER:  Attending Name and NPI#: Lenora Beard Do [[id number]]  Address: [address] [place] [address]  Phone: [phone]     RN is working on review will send once completed   ADMISSION INFORMATION:  Place of Service: Inpatient [facility]  Place of Service Code: 21  Inpatient Admission Date/Time: 9/10/24  1:07 PM  Discharge Date/Time: No discharge date for patient encounter.  Admitting Diagnosis Code/Description:  Shortness of breath [R06.02]  Pneumonia [J18.9]  Acute respiratory failure with hypoxia and hypercarbia (HCC) [J96.01, J96.02]     UTILIZATION REVIEW CONTACT:  Asya Edward, Utilization   Network Utilization Review Department  Phone: [phone]  Fax [phone]  Email: [email or web address]  Contact for approvals/pending authorizations, clinical reviews, and discharge.     PHYSICIAN ADVISORY SERVICES:  Medical Necessity Denial & [employer]-[employer]-Pdpq Review  Phone: [phone]  Fax: [phone]  Email: [email or web address]     DISCHARGE SUPPORT TEAM:  For Patients Discharge Needs & Updates  Phone: [phone] opt. 2 Fax: [phone]  Email: [email or web address]

## 2024-09-11 NOTE — UTILIZATION REVIEW
NOTIFICATION OF ADMISSION DISCHARGE   This is a Notification of Discharge from [facility]. Please be advised that this patient has been discharge from our facility. Below you will find the admission and discharge date and time including the patient’s disposition.   UTILIZATION REVIEW CONTACT:  Tara Wallis  Utilization   Network Utilization Review Department  Phone: [phone] x carefully listen to the prompts. All voicemails are confidential.  Email: [email or web address]     ADMISSION INFORMATION  PRESENTATION DATE: 8/30/2024 12:56 PM  OBERVATION ADMISSION DATE: N/A  INPATIENT ADMISSION DATE: 8/30/24  4:54 PM   DISCHARGE DATE: 9/7/2024  2:42 PM   DISPOSITION:Non [facility] SNF/TCU/SNU    Network Utilization Review Department  ATTENTION: Please call with any questions or concerns to [phone] and carefully listen to the prompts so that you are directed to the right person. All voicemails are confidential.   For Discharge needs, contact Care Management DC Support Team at [phone] opt. 2  Send all requests for admission clinical reviews, approved or denied determinations and any other requests to dedicated fax number below belonging to the campus where the patient is receiving treatment. List of dedicated fax numbers for the Facilities:  FACILITY NAME UR FAX NUMBER   ADMISSION DENIALS (Administrative/Medical Necessity) [phone]   DISCHARGE SUPPORT TEAM ([facility]) [phone]   PARENT CHILD HEALTH (Maternity/NICU/Pediatrics) [phone]   [facility] [phone]   [facility] [phone]   [facility] [phone]   [facility] [phone]   [facility] [phone]   [facility] [phone]   [facility] [phone]   [facility]  [phone]   [facility] [phone]   [facility] [phone]   [facility] [phone]   [facility] [phone]        [facility]  Discharge- Claire IZQUIERDO Bessy 1950, 74 y.o. female MRN: [id number]  Unit/Bed#: MS Basilio-01 Encounter: [id number]  Primary Care Provider: Julienne Tucker DO   Date and time admitted to hospital: 8/30/2024 12:56 PM     * Infected wound  Assessment & Plan  Presented for suspected wound infection to right groin (from cardiac cath) with swelling and drainage  CT abd/pelvis (8/30): Inflammation of the right inguinal superficial and deep subcutaneous tissues without an abscess. Pericardial effusion. Suspected interstitial edema with increased size of the small left pleural effusion.   Wound Clx: MSSA and Proteus  Completed course of Ancef (5 days total of abx)     DINA (acute kidney injury) (HCC)  Assessment & Plan  DINA is resolved at this time  Renal US (9/3): No acute findings.   Continue to encourage PO intake     Leukocytosis  Assessment & Plan  WBC continues to down-trend without any new source of infectious etiology  Remains stable off abx  ID evaluation appreciated  Recommending outpatient hematology evaluation and repeat CBC in 1 week     Pericardial effusion  Assessment & Plan  Patient treated for acute idiopathic pericarditis with colchicine and ibuprofen during hospitalization for above 8/22/2024  TTE 8/26/2024: LVEF 65%. Systolic function is normal. Wall motion is normal. 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  To treat with oral diet and nutrition supplements as tolerated.     Body mass index is 33.5 kg/m².      Continue dietary supplements     Medical Problems         Resolved Problems  Date Reviewed: 9/1/2024   None         Discharging Physician / Practitioner: Sosa Tang DO  PCP: Julienne Tucker DO  Admission Date:   Admission Orders (From admission, onward)          Ordered         08/30/24 1654   INPATIENT ADMISSION  Once                               Discharge Date: 09/07/24     Consultations During Hospital Stay:  General Surgery, Cardiology, ID     Procedures Performed:   None     Significant Findings / Test Results:   CT abd/pelvis (8/30): Inflammation of the right inguinal superficial and deep subcutaneous tissues without an abscess. Pericardial effusion. Suspected interstitial edema with increased size of the small left pleural effusion.   Renal US (9/3): No acute findings.   TTE 8/26/2024: LVEF 65%. Systolic function is normal. Wall motion is normal. Grade II diastolic dysfunction      Incidental Findings:   None      Test Results Pending at Discharge (will require follow up):   None     Outpatient Tests  Requested:  CBC in 1 week     Complications:  None     Reason for Admission: Wound Infection      Hospital Course:   Claire Doherty is a 74 y.o. female patient who originally presented to the hospital on 8/30/2024 due to wound infection. Please see H&P as documented by Toshia Anand for complete details regarding history of presenting illness. In brief, the patient has a past medical history of muscular dystrophy, restrictive lung disease, chronic respiratory failure, and GERD who presents for suspected wound infection.  She had a cardiac cath approximately 1 week prior to arrival with noted right groin site swollen with drainage.  Upon arrival she had a CT of the abdomen and pelvis which demonstrated inflammation of the right inguinal site without any evidence of abscess.  Additionally incidental findings of pericardial effusion and small left pleural effusion were found.  She did undergo echocardiogram which did not demonstrate any tamponade physiology and was recommended for colchicine by cardiology.  Regarding wound infection the patient was greeted by ID and completed course of antibiotics during hospitalization.  Throughout her hospitalization she continued to have elevated leukocytosis which began to downtrend but was recommended for outpatient hematology follow-up and repeat CBC in 1 week.  The patient was ultimately discharged to return SNF and stable condition and all of her questions were answered prior to departure.  This is a brief discharge summary please see full medical record for more details.     Please see above list of diagnoses and related plan for additional information.      Condition at Discharge: stable     Discharge Day Visit / Exam:   Subjective: Patient sitting up in bedside chair without any acute complaints.  No significant overnight events reported by nursing.     Vitals: Blood Pressure: 124/76 (09/07/24 0721)  Pulse: 88 (09/07/24 0955)  Temperature: 97.8 °F (36.6 °C) (09/07/24  0721)  Temp Source: Oral (09/05/24 1510)  Respirations: 20 (09/07/24 0721)  Height: 5' (152.4 cm) (08/30/24 1756)  Weight - Scale: 77.8 kg (171 lb 8.3 oz) (09/07/24 0600)  SpO2: 95 % (09/07/24 0900)     Exam:   Physical Exam  Vitals and nursing note reviewed.   Constitutional:       Appearance: She is obese.      Comments: Chronically ill-appearing   Cardiovascular:      Rate and Rhythm: Normal rate and regular rhythm.      Pulses: Normal pulses.      Heart sounds: Normal heart sounds.   Pulmonary:      Effort: Pulmonary effort is normal. No respiratory distress.      Comments: 2L NC  Abdominal:      General: Bowel sounds are normal.      Palpations: Abdomen is soft.      Comments: Ostomy present   Neurological:      General: No focal deficit present.      Mental Status: She is alert and oriented to person, place, and time. Mental status is at baseline.   Psychiatric:         Mood and Affect: Mood normal.         Behavior: Behavior normal.         Judgment: Judgment normal.            Discussion with Family:  Patient updated on plan of care. CM updated patient's family regarding discharge plan to SNF today.      Discharge instructions/Information to patient and family:   See after visit summary for information provided to patient and family.       Provisions for Follow-Up Care:  See after visit summary for information related to follow-up care and any pertinent home health orders.       Mobility at time of Discharge:   Basic Mobility Inpatient Raw Score: 7  JH-HLM Goal: 2: Bed activities/Dependent transfer  JH-HLM Achieved: 2: Bed activities/Dependent transfer  HLM Goal achieved. Continue to encourage appropriate mobility.     Disposition:   Other Skilled Nursing Facility at [place]     Planned Readmission: None     Discharge Statement:  I spent > 35 minutes discharging the patient. This time was spent on the day of discharge. I had direct contact with the patient on the day of discharge. Greater than 50% of the  total time was spent examining patient, answering all patient questions, arranging and discussing plan of care with patient as well as directly providing post-discharge instructions.  Additional time then spent on discharge activities.     Discharge Medications:  See after visit summary for reconciled discharge medications provided to patient and/or family.       **Please Note: This note may have been constructed using a voice recognition system**

## 2024-09-11 NOTE — ASSESSMENT & PLAN NOTE
- met SIRS criteria with tachycardia and leukocytosis   - prolactin 09/11/24: 5.09 increased from 2.69   - so far legionella and s. pneumoniae negative, blood cultures pending

## 2024-09-11 NOTE — PROGRESS NOTES
Progress Note - Critical Care/ICU   Name: Claire Doherty 74 y.o. female I MRN: [id number]  Unit/Bed#: ICU 13 I Date of Admission: 9/10/2024   Date of Service: 9/11/2024 I Hospital Day: 1      Assessment & Plan  Acute on chronic respiratory failure with hypoxia and hypercapnia ([facility])  EMS was called for patient at nursing facility for shortness of breath  Found to have CO2 of 70 on VBG with pH 7.336  Patient does utilize BiPAP at night, does have history of muscular dystrophy with restrictive lung disease    Plan:   Continuous Bipap(18/6), weaned off as tolerated  Does have evidence of pleural effusions on CT scan, trial dose of IV Lasix 40 mg, not a good window for thoro will do US today to look for progression   Continue Atrovent/Xopenex/Pulmicort BID  Considering her muscular dystropy ordered NIF  Sepsis ([facility])  Patient met sepsis criteria at time of admission with tachycardia and leukocytosis  CT chest showed  Large bilateral nonspecific pleural effusions and compressive atelectasis. Mild manifestations of underlying centrilobular emphysema.  Started on IV cefepime in the ER, continue since repeat procal is elevated will monitor signs of infection  Procalcitonin elevated  Follow-up blood culture   Acute on chronic diastolic CHF (congestive heart failure) ([facility])  Wt Readings from Last 3 Encounters:   09/10/24 75.1 kg (165 lb 9.1 oz)   09/07/24 77.8 kg (171 lb 8.3 oz)   08/21/24 80.7 kg (178 lb)     Patient with known chronic diastolic CHF with grade 2 diastolic dysfunction  Presenting with worsening shortness of breath  Chest x-ray/CT concerning for pleural effusions  Not maintained on outpatient diuretic  Will give 1 dose of IV Lasix 40 mg and monitor response  Wean off Bipap as tolerated  COPD (chronic obstructive pulmonary disease) ([facility])  Patient follows up with pulmonology as outpatient  Not on any daily maintenance inhaler, uses budesonide/albuterol when she is sick  Uses BiPAP at bedtime after her recent  admission otherwise uses 2 to 3 L NC during the  Doing well without any maintenance therapy, may need maintenance inhaler upon discharge  Dystrophy, muscular, 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HCC)  He was doing well history of muscular dystrophy  Was previously living alone in an apartment with home health care, able to stand and pivot to wheelchair prior to hospital stay last week  Was discharged to rehab on 9/7/2024  Utilizes BiPAP at night, continue  PT/OT while inpatient  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)  Patient follows outpatient with pulmonology for restrictive lung disease due to muscular dystrophy  Uses BiPAP at night and 2-3 L nasal cannula during the day  Not on any daily maintenance inhaler, uses Pulmicort/albuterol as needed  GERD without esophagitis  Continue PPI  Thrombocytosis, unspecified  Significant thrombocytosis with platelet count 739  Likely reactive although does have history of similar  recommend outpatient with hematology  History of Idiopathic pericarditis  Patient previously hospitalized for chest discomfort, diagnosed with idiopathic pericarditis  Continue colchicine  Outpatient follow-up with cardiology on 9/20/2024    Disposition: Can be transferred to [facility] if not requiring continuous BiPAP and NIF in normal range    ICU Core Measures     A: Assess, Prevent, and Manage Pain Has pain been assessed? Yes  Need for changes to pain regimen? No   B: Both SAT/SAT  N/A   C: Choice of Sedation RASS Goal: 0 Alert and Calm  Need for changes to sedation or analgesia regimen? NA   D: Delirium CAM-ICU: Negative   E: Early Mobility  Plan for early mobility? Yes   F: Family Engagement Plan for family engagement today? Yes         Prophylaxis:  VTE VTE covered by:  heparin (porcine), Subcutaneous, 5,000 Units at 09/11/24 0508       Stress Ulcer  covered bypantoprazole (PROTONIX) 40 mg tablet [[id number]] (Long-Term Med), pantoprazole (PROTONIX) EC tablet 40 mg [[id number]]         24  Hour Events   24hr events: Having episodes of PAF with RVR of 106 days, received 2X IV Lopressor with conversion to normal sinus rhythm  Subjective   Review of Systems: Review of Systems   Constitutional:  Negative for chills and fever.   HENT:  Negative for ear pain and sore throat.    Eyes:  Negative for pain and visual disturbance.   Respiratory:  Positive for shortness of breath. Negative for cough.         Rib pain   Cardiovascular:  Positive for palpitations. Negative for chest pain.   Gastrointestinal:  Positive for abdominal pain. Negative for nausea and vomiting.   Genitourinary:  Negative for dysuria and hematuria.   Musculoskeletal:  Negative for arthralgias and back pain.   Skin:  Negative for color change and rash.   Neurological:  Negative for seizures and syncope.   All other systems reviewed and are negative.      Objective                          Vitals I/O      Most Recent Min/Max in 24hrs   Temp 98.5 °F (36.9 °C) Temp  Min: 97.7 °F (36.5 °C)  Max: 98.5 °F (36.9 °C)   Pulse 92 Pulse  Min: 76  Max: 168   Resp (!) 31 Resp  Min: 15  Max: 51   /62 BP  Min: 119/70  Max: 168/75   O2 Sat 98 % SpO2  Min: 94 %  Max: 100 %      Intake/Output Summary (Last 24 hours) at 2024 0831  Last data filed at 2024 0600  Gross per 24 hour   Intake 130 ml   Output 1975 ml   Net -1845 ml       Diet Cardiovascular; Cardiac; Fluid Restriction 1800 ML    Invasive Monitoring           Physical Exam   Physical Exam  Eyes:      General: No allergic shiner.  Skin:     General: Skin is warm.   HENT:      Head: Normocephalic.      Mouth/Throat:      Mouth: Mucous membranes are moist.   Cardiovascular:      Rate and Rhythm: Normal rate and regular rhythm.      Heart sounds: Normal heart sounds.   Musculoskeletal:      Right lower le+ Edema present.      Left lower le+ Edema present.   Abdominal: General: An ostomy site is present. There is ostomy site.There is no distension.      Palpations: Abdomen is soft.       Tenderness: There is no abdominal tenderness. There is no guarding.   Constitutional:       General: She is not in acute distress.     Appearance: She is underdeveloped and malnourished.   Pulmonary:      Effort: No respiratory distress.      Breath sounds: Examination of the right-lower field reveals decreased breath sounds. Examination of the left-lower field reveals decreased breath sounds. Decreased breath sounds present. No wheezing, rhonchi or rales.   Neurological:      Mental Status: She is alert and oriented to person, place and time.   Genitourinary/Anorectal:  external catheter present.        Diagnostic Studies        Lab Results: I have reviewed the following results: CBC/BMP:   .     09/10/24  1224 09/11/24  0500   WBC  --  17.45*   HGB  --  10.3*   HCT  --  33.3*   PLT  --  646*   SODIUM  --  140   K  --  4.1   CL  --  93*   CO2  --  38*   BUN  --  30*   CREATININE  --  0.40*   GLUC  --  52*   MG 1.7*  --     , LFTs:   .     09/11/24  0500   AST 16   ALT 9   ALB 2.9*   TBILI 0.34   ALKPHOS 80    , Procalcitonin:   Lab Results   Component Value Date    PROCALCITONI 5.09 (H) 09/11/2024        Medications:  Scheduled PRN   budesonide, 0.5 mg, Q12H  buPROPion, 75 mg, QAM  chlorhexidine, 15 mL, Q12H NANCY  citalopram, 10 mg, Daily  colchicine, 0.6 mg, Daily  fluticasone, 2 spray, Daily  formoterol, 20 mcg, Q12H  heparin (porcine), 5,000 Units, Q8H NANCY  ipratropium, 0.5 mg, BID  oxybutynin, 10 mg, HS  pantoprazole, 40 mg, Early Morning  ramelteon, 8 mg, HS  saccharomyces boulardii, 250 mg, Daily      acetaminophen, 650 mg, Q6H PRN  albuterol, 2.5 mg, Q4H PRN  bisacodyl, 10 mg, Daily PRN  calcium carbonate, 1,000 mg, Daily PRN  diazepam, 5 mg, Q8H PRN  ondansetron, 4 mg, Q6H PRN       Continuous          Labs:   CBC    Recent Labs     09/10/24  1217 09/11/24  0500   WBC 31.54* 17.45*   HGB 11.6 10.3*   HCT 38.5 33.3*   * 646*     BMP    Recent Labs     09/10/24  1217 09/11/24  0500   SODIUM 139 140    K 4.2 4.1   CL 93* 93*   CO2 41* 38*   AGAP 5 9   BUN 28* 30*   CREATININE 0.36* 0.40*   CALCIUM 9.4 8.7       Coags    Recent Labs     09/10/24  1224   INR 1.44*   PTT 44*        Additional Electrolytes  Recent Labs     09/10/24  1224   MG 1.7*          Blood Gas    No recent results  Recent Labs     09/10/24  1224   PHVEN 7.336   FTD2WVG 70.5*   PO2VEN 66.3*   YRF5DTS 36.8*   BEVEN 8.5   T7XUBUE 90.7*    LFTs  Recent Labs     09/10/24  1217 09/11/24  0500   ALT 15 9   AST 20 16   ALKPHOS 98 80   ALB 3.2* 2.9*   TBILI 0.42 0.34       Infectious  Recent Labs     09/10/24  1224 09/11/24  0500   PROCALCITONI 2.69* 5.09*     Glucose  Recent Labs     09/10/24  1217 09/11/24  0500   GLUC 99 52*

## 2024-09-11 NOTE — UTILIZATION REVIEW
Initial Clinical Review    Admission: Date/Time/Statement:   Admission Orders (From admission, onward)       Ordered        09/10/24 1437  INPATIENT ADMISSION  Once                          Orders Placed This Encounter   Procedures    INPATIENT ADMISSION     Standing Status:   Standing     Number of Occurrences:   1     Order Specific Question:   Level of Care     Answer:   Level 1 Stepdown [13]     Order Specific Question:   Estimated length of stay     Answer:   More than 2 Midnights     Order Specific Question:   Certification     Answer:   I certify that inpatient services are medically necessary for this patient for a duration of greater than two midnights. See H&P and MD Progress Notes for additional information about the patient's course of treatment.     ED Arrival Information       Expected   -    Arrival   9/10/2024 11:26    Acuity   Urgent              Means of arrival   Ambulance    Escorted by   [facility] ([facility])    Service   Critical Care/ICU    Admission type   Emergency              Arrival complaint   SOB             Chief Complaint   Patient presents with    Shortness of Breath     Pt reports SOB and related increased anxiety for past 3 days. EMS placed on 15L non-rebreather, pt reported feeling better SpO2 97%, EMS transitioned back to 5L NC pta. Pt wears 5L NC at baseline, 92% on 5L. Hx of muscular dystrophy, contractures at baseline, Pt AxO x4.        Initial Presentation: 74 y.o. female to the ED from home via EMS with complaints of shortness of breath, worsening anxiety for the past 3 days. Admitted to CRITICAL Care step down for acute respiratory failure with hypercapnia, sepsis. REcently 8/21-8/22 at [facility] with acute idiopathic pericarditis. Underwent LHC that was unremarkable. Treated with Colchicine and Ibuprofen. H/O muscular dystrophy, chronic respiratory failure with hypoxia, GERD, hyperlipidemia, restrictive lung disease, depression.Uses 5 LNC during the day,  BIPAP at night. GCS 15. Arrives tachypneic, pale, appearing ill, tachycardic. VBG showed normal pH 7.336, CO2 70, elevated procalcitonin 2.69, WBC 31. CXR shows bilateral large pleural effusion, pulmonary consult.  Suspect acute on chronic CHF.  Given LAsix IV.      PUlmonary consult:  Chronic respiratory failure on 2 LNC and BIPAP with sleep.  Monitor off abx for now.   Anticipated Length of Stay/Certification Statement:  Patient will be admitted on an inpatient basis with an anticipated length of stay of greater than 2 midnights secondary to sepsis, acute respiratory failure, acute CHF.     Date: 9/11   Day 2: Trial dose of IV lasix. Not a good window for thoracentesis.  Continue nebulizers. Started on IV abx.  +procal.  Blood cultues pending. Wan off bipap.  PT/OT eval.  Uses bipap at night. Continue PPI. Having episodes of afib with rvr. Given 2 doses IV lopressor with conversion to NSR. Today with decrease breath sounds, 1+bilateral lower extremity edema.  WBCs down to 17.45 from 31.    Cardiology consult: Acute on chronic CHF, Afib.  SIrs with wbcs, tachycardia, uptrending procal. Grade 2 diastolic dysfunction and diastolic heart failure/HFpEF with bilateral pleural effusions and compressive atelectasis of the underlying lungs would diurese with IV Lasix and recommend 40 IV twice daily as long as blood pressure tolerates. Continue colchicine for idiopathic pericarditis.     Date: 9/12  Day 3: Has surpassed a 2nd midnight with active treatments and services. INitially admitted  to inpatient for acute respiratory failure with hypercapnia.  Being treated with IV lasix. Currently on 2 LNC oxygen, BIPAP at night.  Still with leg swelling, dizzy upon standing.  Tachypneic. Cardiology following.       ED Triage Vitals   Temperature Pulse Respirations Blood Pressure SpO2 Pain Score   09/10/24 1233 09/10/24 1132 09/10/24 1132 09/10/24 1132 09/10/24 1132 09/10/24 1138   97.8 °F (36.6 °C) (!) 109 18 159/95 95 % 3      Weight (last 2 days)       Date/Time Weight    09/11/24 1500 74.8 (165)    09/10/24 1600 75.1 (165.57)    09/10/24 1517 75.1 (165.57)    09/10/24 1132 80 (176.37)            Vital Signs (last 3 days)       Date/Time Temp Pulse Resp BP MAP (mmHg) SpO2 FiO2 (%) Calculated FIO2 (%) - Nasal Cannula Nasal Cannula O2 Flow Rate (L/min) O2 Device O2 Interface Device Patient Position - Orthostatic VS José Miguel Coma Scale Score Pain    09/12/24 1327 99 °F (37.2 °C) 98 32 133/62 92 100 % -- -- -- -- -- -- -- --    09/12/24 1244 -- -- -- -- -- 100 % -- -- -- -- -- -- -- --    09/12/24 1230 -- 88 23 106/46 60 100 % -- -- -- -- -- -- -- --    09/12/24 1200 -- -- -- -- -- -- -- -- -- -- -- -- 15 No Pain    09/12/24 1130 -- 86 24 117/54 72 99 % -- -- -- -- -- Lying -- --    09/12/24 0930 -- 90 39 143/69 91 99 % -- -- -- -- -- -- -- --    09/12/24 0900 -- -- -- -- -- -- -- -- -- -- Full face mask -- -- --    09/12/24 0816 -- 92 30 137/62 90 100 % -- 36 4 L/min Nasal cannula -- -- -- No Pain    09/12/24 0812 -- -- -- -- -- 99 % -- -- -- -- -- -- -- --    09/12/24 0800 -- -- -- -- -- -- -- 36 4 L/min Nasal cannula -- -- 15 No Pain    09/12/24 0530 -- 96 32 146/64 91 99 % -- -- -- Nasal cannula -- Lying -- --    09/12/24 0430 -- 94 37 135/56 81 98 % -- 40 5 L/min Nasal cannula -- -- -- --    09/12/24 0400 -- -- -- -- -- -- -- -- -- -- -- -- 15 --    09/12/24 0243 97.7 °F (36.5 °C) -- -- -- -- -- -- -- -- -- -- -- -- --    09/12/24 0230 -- 88 17 129/54 70 99 % -- -- -- Nasal cannula -- Lying -- --    09/12/24 0130 -- 86 17 117/51 73 99 % -- -- -- Nasal cannula -- -- -- --    09/12/24 0030 -- 88 27 125/50 70 99 % -- -- -- Nasal cannula -- Lying -- --    09/12/24 0000 -- -- -- -- -- -- -- -- -- -- -- -- 15 --    09/11/24 2330 -- 92 26 123/59 85 99 % -- -- -- -- -- -- -- --    09/11/24 2245 98.5 °F (36.9 °C) -- -- -- -- -- -- -- -- -- -- -- -- --    09/11/24 2230 -- 92 31 125/59 85 100 % -- -- -- -- -- -- -- --    09/11/24 2145 -- -- --  -- -- -- -- -- -- -- -- -- -- 4    09/11/24 2130 -- 88 18 135/55 83 98 % -- -- -- -- -- -- -- --    09/11/24 2100 -- 92 -- -- -- -- -- -- -- -- -- -- -- --    09/11/24 2030 -- 92 32 147/62 85 100 % -- -- -- -- -- -- -- --    09/11/24 2000 -- -- -- -- -- 99 % -- -- -- Nasal cannula -- -- 15 No Pain    09/11/24 1953 -- -- -- -- -- -- -- 36 4 L/min Nasal cannula -- -- -- --    09/11/24 1952 -- -- -- -- -- 100 % -- -- -- -- -- -- -- --    09/11/24 1927 98.6 °F (37 °C) -- -- -- -- -- -- -- -- -- -- -- -- --    09/11/24 1830 -- 84 18 126/49 74 98 % -- -- -- -- -- -- -- --    09/11/24 1800 -- 88 25 -- -- 100 % -- -- -- -- -- -- -- --    09/11/24 1730 -- 92 34 142/56 85 99 % -- -- -- -- -- -- -- --    09/11/24 1700 -- 88 31 -- -- 99 % -- -- -- -- -- -- -- --    09/11/24 1630 -- 76 20 117/50 67 98 % -- -- -- -- -- -- -- --    09/11/24 1600 98.6 °F (37 °C) 82 27 -- -- 98 % -- 40 5 L/min Nasal cannula -- -- 15 No Pain    09/11/24 1530 -- 86 33 141/65 97 99 % -- -- -- -- -- -- -- --    09/11/24 1500 -- 82 27 149/61 -- 100 % -- -- -- -- -- -- -- --    09/11/24 1430 -- 84 29 149/61 90 99 % -- -- -- -- -- -- -- --    09/11/24 1400 -- 88 36 -- -- 98 % -- -- -- -- -- -- -- --    09/11/24 1347 -- -- -- -- -- -- -- -- -- -- -- -- -- 6    09/11/24 1330 -- 50 37 139/57 81 98 % -- -- -- -- -- -- -- --    09/11/24 1300 -- 50 35 -- -- 98 % -- -- -- -- -- -- -- --    09/11/24 1230 -- 48 34 155/61 86 94 % -- -- -- -- -- -- -- --    09/11/24 1200 98.7 °F (37.1 °C) 48 37 -- -- 98 % -- 40 5 L/min Nasal cannula -- -- 15 No Pain    09/11/24 1145 -- 48 39 135/62 83 97 % -- -- -- -- -- -- -- --    09/11/24 1135 -- 50 34 127/30 56 98 % -- -- -- -- -- -- -- --    09/11/24 1130 -- 46 36 105/40 55 98 % -- -- -- -- -- -- -- --    09/11/24 1123 -- -- -- -- -- -- -- -- -- -- -- -- -- No Pain    09/11/24 1122 -- -- -- -- -- -- -- -- -- -- -- -- -- 7    09/11/24 1120 -- 50 37 64/40 54 97 % -- -- -- -- -- -- -- --    09/11/24 1115 -- 60 37 -- -- 98 % -- --  -- -- -- -- -- --    09/11/24 1100 -- 82 34 -- -- 99 % -- -- -- -- -- -- -- --    09/11/24 1030 -- 76 17 101/49 67 99 % -- -- -- -- -- -- -- --    09/11/24 1000 -- 76 20 -- -- 99 % -- -- -- -- -- -- -- --    09/11/24 0930 -- 82 23 116/50 69 98 % -- -- -- -- -- -- -- --    09/11/24 0900 -- 90 32 -- -- 99 % -- -- -- -- -- -- -- --    09/11/24 0830 -- 92 33 162/64 101 98 % -- -- -- -- -- -- -- --    09/11/24 0800 98.5 °F (36.9 °C) 92 31 -- -- 98 % -- 40 5 L/min Nasal cannula -- Lying 15 No Pain    09/11/24 0730 -- 88 33 159/62 92 98 % -- -- -- -- -- Lying -- --    09/11/24 0702 -- -- -- -- -- 98 % -- -- -- -- -- -- -- --    09/11/24 0700 -- 94 23 -- -- 98 % -- -- -- -- -- -- -- --    09/11/24 0530 -- 78 37 168/75 104 99 % -- 40 5 L/min Nasal cannula -- -- -- --    09/11/24 0529 -- -- -- -- -- -- -- -- -- -- -- -- -- 8    09/11/24 0430 -- 90 15 138/53 82 98 % -- 40 5 L/min Nasal cannula -- Lying -- --    09/11/24 0410 -- -- -- -- -- 100 % -- -- -- -- Full face mask -- -- --    09/11/24 0400 -- -- -- -- -- -- -- -- -- -- -- -- 15 No Pain    09/11/24 0330 -- 80 19 151/58 87 100 % -- -- -- BiPAP -- -- -- --    09/11/24 0230 -- 78 17 130/57 78 99 % -- -- -- BiPAP -- -- -- --    09/11/24 0130 -- 80 21 122/58 83 100 % -- -- -- BiPAP -- Lying -- --    09/11/24 0030 -- 78 22 123/59 81 100 % -- -- -- BiPAP -- Lying -- --    09/11/24 0000 98.2 °F (36.8 °C) -- -- -- -- -- -- -- -- BiPAP -- -- 15 No Pain    09/10/24 2330 -- 80 24 124/59 80 99 % -- -- -- BiPAP -- Lying -- --    09/10/24 2300 -- 80 -- -- -- -- -- -- -- -- -- -- -- --    09/10/24 2230 -- 82 25 157/73 102 99 % -- -- -- BiPAP -- -- -- --    09/10/24 2020 -- 86 26 135/63 88 99 % -- -- -- BiPAP -- Lying -- --    09/10/24 2000 -- -- -- -- -- -- -- -- -- -- -- -- 15 No Pain    09/10/24 1925 -- 84 40 135/67 94 100 % -- -- -- BiPAP Full face mask Lying -- --    09/10/24 1912 98 °F (36.7 °C) -- -- -- -- -- -- -- -- -- -- -- -- --    09/10/24 1820 -- 78 21 128/59 76 100 % -- --  -- -- -- -- -- --    09/10/24 1800 -- 76 19 131/66 92 100 % -- -- -- -- -- -- -- --    09/10/24 1740 -- 76 41 119/70 81 100 % -- -- -- -- -- -- -- --    09/10/24 1730 -- 168 51 -- -- 99 % -- -- -- -- -- -- -- --    09/10/24 1715 -- 160 29 -- 84 98 % -- -- -- -- -- -- -- --    09/10/24 1700 -- 92 48 -- -- 96 % -- -- -- -- -- -- -- --    09/10/24 1600 97.7 °F (36.5 °C) 96 37 140/69 96 96 % 40 -- -- BiPAP -- Lying 15 3    09/10/24 1500 -- 88 -- -- -- 94 % -- -- -- -- -- -- -- --    09/10/24 1455 -- -- -- -- -- 94 % -- -- -- -- Full face mask -- -- --    09/10/24 1330 -- 104 18 149/87 111 98 % -- 44 6 L/min Nasal cannula -- Lying -- --    09/10/24 1302 -- 104 18 154/70 100 98 % -- 44 6 L/min Nasal cannula -- Lying -- --    09/10/24 1233 97.8 °F (36.6 °C) -- -- -- -- -- -- -- -- -- -- -- -- --    09/10/24 1138 -- -- -- -- -- -- -- -- -- -- -- -- 15 3    09/10/24 1135 -- -- -- -- -- -- -- -- -- Nasal cannula -- -- -- --    09/10/24 1132 -- 109 18 159/95 120 95 % -- 44 6 L/min Nasal cannula -- Sitting -- --              Pertinent Labs/Diagnostic Test Results:   Radiology:  CT chest wo contrast   Final Interpretation by Srikanth Rowan MD (09/10 0099)      1.  Large bilateral nonspecific pleural effusions and compressive atelectasis.   2.  Mild manifestations of underlying centrilobular emphysema.               Workstation performed: [id number]         XR chest 1 view portable   ED Interpretation by Maria Eugenia Donohue DO (09/10 1252)   Abnormal   Xray reviewed and independently interpreted by me: extensive b/l infiltrates.  Formal reading per radiology        Final Interpretation by Torsten Cabral MD (09/10 4062)      At least moderate bilateral pleural effusions with bibasilar atelectasis. Superimposed bilateral airspace opacities, possibly on the basis of pulmonary edema versus infection.            Workstation performed: [id number]           Cardiology:  Echo follow up/limited w/ contrast if indicated   Final Result by  Cj Rai DO (09/11 1606)        Left Ventricle: Left ventricular cavity size is normal. Wall thickness    is normal. The left ventricular ejection fraction is 65%. Systolic    function is normal. Wall motion is normal.     Mitral Valve: There is moderate annular calcification.     Tricuspid Valve: There is mild regurgitation. The right ventricular    systolic pressure is mildly elevated. The estimated right ventricular    systolic pressure is 45.00 mmHg.     IVC/SVC: The right atrial pressure is estimated at 8.0 mmHg. The    inferior vena cava is normal in size. Respirophasic changes were blunted    (less than 50% variation).         ECG 12 lead   Final Result by Girish Conn MD (09/11 0801)   Sinus tachycardia with sinus pause   Nonspecific T wave abnormality   Abnormal ECG   When compared with ECG of 11-SEP-2024 05:33, (unconfirmed)   Vent. rate has increased BY  51 BPM   Nonspecific T wave abnormality now evident in Anterior leads   Confirmed by Girish Conn ([id number]) on 9/11/2024 8:00:58 AM      ECG 12 lead   Final Result by Girish Conn MD (09/11 0801)   Sinus bradycardia with 2nd degree SA block (Mobitz I)   T wave abnormality, consider inferior ischemia   Abnormal ECG   When compared with ECG of 11-SEP-2024 04:37, (unconfirmed)   Sinus rhythm is no longer with 2nd degree A-V block (Mobitz I)   Sinus rhythm is now with 2nd degree SA block (Mobitz I)   Vent. rate has decreased BY  94 BPM   ST no longer depressed in Inferior leads   Non-specific change in ST segment in Lateral leads   Nonspecific T wave abnormality has replaced inverted T waves in Lateral    leads   Confirmed by Girish Conn ([id number]) on 9/11/2024 8:01:14 AM      ECG 12 lead   Final Result by Girish Conn MD (09/11 0803)   Atrial fibrillation with rapid ventricular response   Cannot rule out Inferior infarct , age undetermined   Abnormal ECG   When compared with ECG of 10-SEP-2024 11:32,   Sinus  rhythm is now with 2nd degree A-V block (Mobitz I)   Minimal criteria for Anterior infarct are no longer Present   ST now depressed in Inferior leads   Nonspecific T wave abnormality now evident in Inferior leads   Confirmed by Girish Conn ([id number]) on 9/11/2024 8:03:24 AM      ECG 12 lead   Final Result by Girish Conn MD (09/11 0804)   Atrial fibrillation with rapid ventricular response   ST & T wave abnormality, consider inferolateral ischemia or digitalis    effect   Abnormal ECG   When compared with ECG of 10-SEP-2024 16:50, (unconfirmed)   Atrial fibrillation has replaced Sinus rhythm   Vent. rate has increased BY  88 BPM   T wave inversion now evident in Lateral leads   Confirmed by Girish Conn ([id number]) on 9/11/2024 8:03:59 AM      ECG 12 lead   Final Result by Girish Conn MD (09/11 0804)   Normal sinus rhythm   Normal ECG   When compared with ECG of 10-SEP-2024 11:32,   No significant change was found   Confirmed by Girish Conn ([id number]) on 9/11/2024 8:04:01 AM          Results from last 7 days   Lab Units 09/12/24  0431 09/11/24  0500 09/10/24  1217 09/07/24  0516 09/06/24  0425   WBC Thousand/uL 15.30* 17.45* 31.54* 16.61* 17.52*   HEMOGLOBIN g/dL 10.6* 10.3* 11.6 10.3* 10.7*   HEMATOCRIT % 34.5* 33.3* 38.5 34.6* 35.2   PLATELETS Thousands/uL 696* 646* 739* 553* 591*   TOTAL NEUT ABS Thousands/µL  --   --  27.14*  --   --          Results from last 7 days   Lab Units 09/12/24  0431 09/11/24  0500 09/10/24  1224 09/10/24  1217 09/07/24  0516 09/06/24  0425   SODIUM mmol/L 143 140  --  139 138 136   POTASSIUM mmol/L 3.6 4.1  --  4.2 3.7 3.7   CHLORIDE mmol/L 92* 93*  --  93* 97 96   CO2 mmol/L >45* 38*  --  41* 35* 34*   ANION GAP mmol/L  --  9  --  5 6 6   BUN mg/dL 37* 30*  --  28* 38* 45*   CREATININE mg/dL 0.47* 0.40*  --  0.36* 0.61 0.69   EGFR ml/min/1.73sq m 97 102  --  106 89 86   CALCIUM mg/dL 8.8 8.7  --  9.4 8.8 8.8   MAGNESIUM mg/dL 1.8*  --  1.7*  --  2.2 2.4      Results from last 7 days   Lab Units 09/11/24  0500 09/10/24  1217   AST U/L 16 20   ALT U/L 9 15   ALK PHOS U/L 80 98   TOTAL PROTEIN g/dL 6.2* 7.2   ALBUMIN g/dL 2.9* 3.2*   TOTAL BILIRUBIN mg/dL 0.34 0.42     Results from last 7 days   Lab Units 09/11/24  0750 09/11/24  0717   POC GLUCOSE mg/dl 82 67     Results from last 7 days   Lab Units 09/12/24  0431 09/11/24  0500 09/10/24  1217 09/07/24  0516 09/06/24  0425   GLUCOSE RANDOM mg/dL 111 52* 99 121 132     Results from last 7 days   Lab Units 09/12/24  1134 09/12/24  0521 09/10/24  1224   PH SIDNEY  7.393 7.278* 7.336   PCO2 SIDNEY mm Hg 78.3* 101.6* 70.5*   PO2 SIDNEY mm Hg 38.8 83.9* 66.3*   HCO3 SIDNEY mmol/L 46.7* 46.5* 36.8*   BASE EXC SIDNEY mmol/L 18.2 13.5 8.5   O2 CONTENT SIDNEY ml/dL 12.0 22.8 16.3   O2 HGB, VENOUS % 74.9 95.0* 90.7*     Results from last 7 days   Lab Units 09/10/24  1700 09/10/24  1423 09/10/24  1217   HS TNI 0HR ng/L  --   --  9   HS TNI 2HR ng/L  --  6  --    HSTNI D2 ng/L  --  -3  --    HS TNI 4HR ng/L 4  --   --    HSTNI D4 ng/L -5  --   --          Results from last 7 days   Lab Units 09/10/24  1224   PROTIME seconds 17.6*   INR  1.44*   PTT seconds 44*         Results from last 7 days   Lab Units 09/11/24  0500 09/10/24  1224   PROCALCITONIN ng/ml 5.09* 2.69*     Results from last 7 days   Lab Units 09/10/24  1224   LACTIC ACID mmol/L 0.8       Results from last 7 days   Lab Units 09/10/24  1217   BNP pg/mL 234*         Results from last 7 days   Lab Units 09/10/24  1739   STREP PNEUMONIAE ANTIGEN, URINE  Negative   LEGIONELLA URINARY ANTIGEN  Negative     Results from last 7 days   Lab Units 09/10/24  1224   BLOOD CULTURE  No Growth at 24 hrs.  No Growth at 24 hrs.     ED Treatment-Medication Administration from 09/10/2024 1126 to 09/10/2024 1528         Date/Time Order Dose Route Action     09/10/2024 1258 cefepime (MAXIPIME) 2 g/50 mL dextrose IVPB 2,000 mg Intravenous New Bag            Past Medical History:   Diagnosis Date    Acute  kidney failure (HCC)     Acute nonspecific idiopathic pericarditis     Anxiety     Breast cancer ([facility]) 2007    Cellulitis of right lower extremity     Chronic pain     Chronic respiratory failure with hypoxia ([facility])     Colitis     Colostomy status ([facility])     Dependence on supplemental oxygen     Depression     Difficult intubation     Excessive daytime sleepiness     Gastroesophageal reflux disease without esophagitis     Gastroparesis     GERD (gastroesophageal reflux disease)     Hyperlipidemia     Ischemic colitis ([facility]) 01/21/2019    Leukocytosis 03/28/2020    Muscular dystrophy ([facility])     Limb-girdle    Osteoporosis     Other nonrheumatic aortic valve disorders     Overactive bladder     Perforated diverticulum 03/28/2020    Pericardial effusion (noninflammatory)     Pneumonitis     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[facility])     Rheumatic tricuspid insufficiency     Skin cancer     Skin disorder     Tachycardia 06/02/2021    Vitamin D deficiency        Admitting Diagnosis: Shortness of breath [R06.02]  Pneumonia [J18.9]  Acute respiratory failure with hypoxia and hypercarbia ([facility]) [J96.01, J96.02]  Age/Sex: 74 y.o. female  Admission Orders:  Scheduled Medications:  budesonide, 0.5 mg, Nebulization, Q12H  buPROPion, 75 mg, Oral, QAM  chlorhexidine, 15 mL, Mouth/Throat, Q12H NANCY  citalopram, 10 mg, Oral, Daily  colchicine, 0.6 mg, Oral, Daily  fluticasone, 2 spray, Nasal, Daily  formoterol, 20 mcg, Nebulization, Q12H  heparin (porcine), 5,000 Units, Subcutaneous, Q8H NANCY  oxybutynin, 10 mg, Oral, HS  pantoprazole, 40 mg, Oral, Early Morning  ramelteon, 8 mg, Oral, HS  saccharomyces boulardii, 250 mg, Oral, Daily      Continuous IV Infusions:     PRN Meds:  acetaminophen, 650 mg, Oral, Q6H PRN  albuterol, 2.5 mg, Nebulization, Q4H PRN  bisacodyl, 10 mg, Rectal, Daily PRN  calcium carbonate, 1,000 mg, Oral, Daily PRN  diazepam, 5 mg, Oral, Q8H PRN  ondansetron, 4 mg, Intravenous, Q6H PRN        IP  CONSULT TO PULMONOLOGY  IP CONSULT TO MEDICAL CRITICAL CARE  IP CONSULT TO CASE MANAGEMENT  IP CONSULT TO CARDIOLOGY    Network Utilization Review Department  ATTENTION: Please call with any questions or concerns to [phone] and carefully listen to the prompts so that you are directed to the right person. All voicemails are confidential.   For Discharge needs, contact Care Management DC Support Team at [phone] opt. [phone]  Send all requests for admission clinical reviews, approved or denied determinations and any other requests to dedicated fax number below belonging to the campus where the patient is receiving treatment. List of dedicated fax numbers for the Facilities:  FACILITY NAME UR FAX NUMBER   ADMISSION DENIALS (Administrative/Medical Necessity) [phone]   DISCHARGE SUPPORT TEAM (NETWORK) [phone]   PARENT CHILD HEALTH (Maternity/NICU/Pediatrics) [phone]   [facility] [phone]   [facility] [phone]   [facility] [phone]   [facility] [phone]   Critical access hospital [phone]   [facility] [phone]   [facility] [phone]   [facility] [phone]   [facility] [phone]   Counts include 234 beds at the [facility] [phone]   [facility] [phone]   [facility] [phone]

## 2024-09-11 NOTE — ASSESSMENT & PLAN NOTE
Has new onset Afib, paroxysmal in nature, converting on her own  Start Eliquis 5 mg BID considering her [id number]  No need for rate/rhythm control now since patient is in NSR with normal rate per cards  Cards consulted appreciate recommendation

## 2024-09-11 NOTE — ASSESSMENT & PLAN NOTE
- diagnosed 08/21/24 at [facility] and transferred to [facility] for cardiac cath (flat troponins but chest pain and ST elevations in inferior leads)  - C 08/21/24: nonobstructive coronary arteries   - CRP 08/21/24: 174  - discharged on colchicine      Plan:  - would continue with colchcine

## 2024-09-11 NOTE — CASE MANAGEMENT
Case Management Assessment & Discharge Planning Note    Patient name Claire Doherty  Location ICU 13/ICU 13 MRN [id number]  : 1950 Date 2024       Current Admission Date: 9/10/2024  Current Admission Diagnosis:Acute on chronic respiratory failure with hypoxia and hypercapnia ([facility])   Patient Active Problem List    Diagnosis Date Noted Date Diagnosed    Paroxysmal A-fib ([facility]) 2024     Acute on chronic diastolic CHF (congestive heart failure) ([facility]) 09/10/2024     Acute on chronic respiratory failure with hypoxia and hypercapnia ([facility]) 09/10/2024     Severe protein-calorie malnutrition ([facility]) 2024     DINA (acute kidney injury) ([facility]) 2024     Nausea 2024     Pleural effusion 2024     Pericardial effusion 2024     Infected wound 2024     Chest pain 2024     History of Idiopathic pericarditis 2024     Cellulitis of right foot 2024     Chronic left shoulder pain 2024     Shoulder joint dysfunction 2024     COPD (chronic obstructive pulmonary disease) ([facility]) 06/15/2023     Bilateral hand pain 2023     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[facility]) 2022     Gastroparesis 2021     Aortic valve sclerosis 2021     Tricuspid valve insufficiency 2021     Mitral annular calcification 2021     Former smoker 2021     On home oxygen therapy 2021     Colostomy status ([facility])      Ductal carcinoma in situ (DCIS) of left breast 03/15/2021     Insomnia 10/21/2020     Depression 10/06/2020     Anemia 10/02/2020     Chronic hypoxic respiratory failure ([facility]) 2020     Bladder injury 2020     Sepsis ([facility]) 2020     Thrombocytosis, unspecified 2020     Leukocytosis 2020     Difficult intubation      Dystrophy, muscular, 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[facility]) 10/12/2017     Ambulatory dysfunction 10/12/2017     Urinary incontinence 10/03/2017     Osteoporosis 2016     Allergic rhinitis 10/09/2013      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) 10/04/2012     GERD without esophagitis 10/04/2012       LOS (days): 1  Geometric Mean LOS (GMLOS) (days): 3.9  Days to GMLOS:2.8     OBJECTIVE:  PATIENT READMITTED TO HOSPITAL  Risk of Unplanned Readmission Score: 28.68         Current admission status: Inpatient       Preferred Pharmacy:   [id number] - [address] [address]  Phone: [phone] Fax: [phone]    Primary Care Provider: Julienne Tucker DO    Primary Insurance: [facility]  Secondary Insurance:     ASSESSMENT:  Active Health Care Proxies       Hillary Doherty [facility] Representative - Sister   Primary Phone: [phone] (Mobile)  Home Phone: [phone]                 Advance Directives  Does patient have a Health Care POA?: No  Was patient offered paperwork?: No  Does patient currently have a Health Care decision maker?: Yes, please see Health Care Proxy section  Does patient have Advance Directives?: No  Was patient offered paperwork?: No  Primary Contact: Hillary Doherty (sister) [phone]      Patient Information  Admitted from:: Facility ([facility])  Mental Status: Alert  During Assessment patient was accompanied by: Sister  Assessment information provided by:: Patient  Support Systems: Family members  What city do you live in?: [facility] entry access options. Select all that apply.: Elevator  Type of Current Residence:  (SNF)    Activities of Daily Living Prior to Admission  Functional Status: Assistance  Completes ADLs independently?: No  Level of ADL dependence: Assistance  Ambulates independently?: No  Level of ambulatory dependence: Assistance  Does patient use assisted devices?: Yes  Assisted Devices (DME) used: Electric wheelchair, Home Oxygen concentrator, Wheelchair, Shower Chair, Portable Oxygen concentrator, Walker, BiPAP  O2 Rate(s): 2  Does patient currently  own DME?: Yes  What DME does the patient currently own?: Home Oxygen concentrator, Electric Wheelchair, Shower Chair, Walker, Wheelchair, Portable Oxygen concentrator, BiPAP  Does patient have a history of Outpatient Therapy (PT/OT)?: No  Does the patient have a history of Short-Term Rehab?: Yes  Does patient have a history of HHC?: No    Current Home Health Care  Type of Current Home Care Services: Attendant    Patient Information Continued  Income Source: SSI/SSD  Does patient have prescription coverage?: Yes  Does patient receive dialysis treatments?: No  Does patient have a history of substance abuse?: No  Does patient have a history of Mental Health Diagnosis?: No    PHQ 2/9 Screening   Reviewed PHQ 2/9 Depression Screening Score?: No    Means of Transportation  Means of Transport to Appts:: Family transport      Social Determinants of Health (SDOH)      Flowsheet Row Most Recent Value   Housing Stability    In the last 12 months, was there a time when you were not able to pay the mortgage or rent on time? N   In the past 12 months, how many times have you moved where you were living? 1   At any time in the past 12 months, were you homeless or living in a shelter (including now)? N   Transportation Needs    In the past 12 months, has lack of transportation kept you from medical appointments or from getting medications? no   In the past 12 months, has lack of transportation kept you from meetings, work, or from getting things needed for daily living? No   Food Insecurity    Within the past 12 months, you worried that your food would run out before you got the money to buy more. Never true   Within the past 12 months, the food you bought just didn't last and you didn't have money to get more. Never true   Utilities    In the past 12 months has the electric, gas, oil, or water company threatened to shut off services in your home? No            DISCHARGE DETAILS:    Discharge planning discussed with::  Patient  Freedom of Choice: Yes     CM contacted family/caregiver?: Yes  Were Treatment Team discharge recommendations reviewed with patient/caregiver?: Yes  Did patient/caregiver verbalize understanding of patient care needs?: N/A- going to facility  Were patient/caregiver advised of the risks associated with not following Treatment Team discharge recommendations?: Yes    Contacts  Patient Contacts: Harper Doherty sister  Relationship to Patient:: Family  Contact Method: Phone  Phone Number: [phone]  Reason/Outcome: Discharge Planning    Requested Home Health Care         Is the patient interested in HHC at discharge?: No    DME Referral Provided  Referral made for DME?: No    Other Referral/Resources/Interventions Provided:  Interventions: Acute Rehab, Short Term Rehab     Discharge Destination Plan:: Short Term Rehab, Acute Rehab        Additional Comments: CM met with the patient and her sister at the bedside for discharge planning and medication price check. CM attempted to get price check for Eliquis at the [facility] but the primary insurance information was not in Epic. CM had to call the patient's pharmacy for the information and was able to get the cost for the medication. PT recommended the patient be sent to [facility]. CM will do a blanket search and will provide patient and her family facility choices. CM department will continue to follow patient through hospital discharge.

## 2024-09-11 NOTE — ASSESSMENT & PLAN NOTE
- TTE 08/21/24: LVEF 65%, grade II diastolic dysfunction, mild LAE, mild TR, trivial pericardial effusion  - BNP 09/10/24: 234  - CT 09/10/24: large bilateral nonspecific pleural effusions and compressive atelectasis  - was not on any GDTM prior to admission       Pt has significant fluid in her lungs on CT. She diuresed well with the lasix given to her so far.   - will start her on IV lasix 40 mg BID  - monitor daily weights and I/O's   - monitor daily BMP

## 2024-09-11 NOTE — PLAN OF CARE
Problem: OCCUPATIONAL THERAPY ADULT  Goal: Performs self-care activities at highest level of function for planned discharge setting.  See evaluation for individualized goals.  Description: Treatment Interventions: ADL retraining, UE strengthening/ROM, Functional transfer training, Endurance training, Cognitive reorientation, Patient/family training, Equipment evaluation/education, Compensatory technique education, Energy conservation, Activityengagement          See flowsheet documentation for full assessment, interventions and recommendations.   Note: Limitation: Decreased UE ROM, Decreased ADL status, Decreased UE strength, Decreased cognition, Decreased Safe judgement during ADL, Decreased endurance, Decreased self-care trans, Decreased high-level ADLs  Prognosis: Fair  Assessment: Pt is a 74 y.o. female who presented to [facility] on 9/10/2024 with worsening dyspnea. Pt with diagnosis of acute on chronic respiratory failure with hypoxia and hypercapnia, acute on chronic diastolic CHF, sepsis, and muscular dystrophy. Pt  has a past medical history of Acute kidney failure ([facility]), Acute nonspecific idiopathic pericarditis, Anxiety, Breast cancer ([facility]) (2007), Cellulitis of right lower extremity, Chronic pain, Chronic respiratory failure with hypoxia ([facility]), Colitis, Colostomy status ([facility]), Dependence on supplemental oxygen, Depression, Difficult intubation, Excessive daytime sleepiness, Gastroesophageal reflux disease without esophagitis, Gastroparesis, GERD (gastroesophageal reflux disease), Hyperlipidemia, Ischemic colitis ([facility]) (01/21/2019), Leukocytosis (03/28/2020), Muscular dystrophy ([facility]), Osteoporosis, Other nonrheumatic aortic valve disorders, Overactive bladder, Perforated diverticulum (03/28/2020), Pericardial effusion (noninflammatory), Pneumonitis, 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[facility]), Rheumatic tricuspid insufficiency, Skin cancer, Skin disorder, Tachycardia (06/02/2021), and Vitamin D  deficiency. Pt greeted bedside for OT evaluation on 09/11/24. Pt resides alone in a one level apartment. PTA, Pt reports requiring A with ADLs from [facility]HA. Pt reports they assist with bed mobility, transfers, and ADLs. Pt performs functional SPT from surfaces with RW. Pt resides in [facility] throughout the day and able to complete light meal prep at W/C level. Pt demonstrating the following occupational performance levels: mod A with UB ADLs, max A with LB ADLs, max Ax2 with bed mobility, and max Ax2 with RW. Limitations that impact functional performance include decreased ADL status, UE ROM, UE strength, safe judgement during ADLs, cognition, endurance, self care transfers, and high level ADLs. Occupational performance areas to address ADL retraining, functional transfer training, UE strengthening/ROM, endurance training, cognitive reorientation, Pt/caregiver education, equipment evaluation/education, compensatory technique education, energy conservation, and activity engagement . Pt would benefit from continued skilled OT services while in hospital to maximize independence with ADLs. Will continue to follow Pt's progress. Pt would benefit from level II resources (moderate intensity) upon DC to maximize safety and independence with ADLs and functional tasks of choice.     Rehab Resource Intensity Level, OT: II (Moderate Resource Intensity)

## 2024-09-11 NOTE — ASSESSMENT & PLAN NOTE
Patient follows up with pulmonology as outpatient  Not on any daily maintenance inhaler, uses budesonide/albuterol when she is sick  Uses BiPAP at bedtime after her recent admission otherwise uses 2 to 3 L NC during the  Doing well without any maintenance therapy  Continue BiPAP at HS

## 2024-09-11 NOTE — ASSESSMENT & PLAN NOTE
- VBG 09/10/24: CO2 70.5, pH 7.3  - chronic use of biPAP at night with history of restrictive lung diease and muscular dystrophy

## 2024-09-11 NOTE — ASSESSMENT & PLAN NOTE
Wt Readings from Last 3 Encounters:   09/11/24 74.8 kg (165 lb)   09/07/24 77.8 kg (171 lb 8.3 oz)   08/21/24 80.7 kg (178 lb)     Patient with known chronic diastolic CHF with grade 2 diastolic dysfunction  Presenting with worsening shortness of breath  Chest x-ray/CT concerning for pleural effusions  Not maintained on outpatient diuretic  Cards consulted  IV Lasix 40 mg BID  Daily Is/Os, weights  BiPAP at HS

## 2024-09-11 NOTE — OCCUPATIONAL THERAPY NOTE
Occupational Therapy Evaluation     Patient Name: Claire Doherty  Today's Date: 9/11/2024  Problem List  Principal Problem:    Acute on chronic respiratory failure with hypoxia and hypercapnia ([facility])  Active Problems:    Dystrophy, muscular, 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[facility])    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[facility])    GERD without esophagitis    Sepsis ([facility])    Thrombocytosis, unspecified    COPD (chronic obstructive pulmonary disease) ([facility])    History of Idiopathic pericarditis    Acute on chronic diastolic CHF (congestive heart failure) ([facility])    Paroxysmal A-fib ([facility])    Past Medical History  Past Medical History:   Diagnosis Date    Acute kidney failure ([facility])     Acute nonspecific idiopathic pericarditis     Anxiety     Breast cancer ([facility]) 2007    Cellulitis of right lower extremity     Chronic pain     Chronic respiratory failure with hypoxia ([facility])     Colitis     Colostomy status ([facility])     Dependence on supplemental oxygen     Depression     Difficult intubation     Excessive daytime sleepiness     Gastroesophageal reflux disease without esophagitis     Gastroparesis     GERD (gastroesophageal reflux disease)     Hyperlipidemia     Ischemic colitis ([facility]) 01/21/2019    Leukocytosis 03/28/2020    Muscular dystrophy ([facility])     Limb-girdle    Osteoporosis     Other nonrheumatic aortic valve disorders     Overactive bladder     Perforated diverticulum 03/28/2020    Pericardial effusion (noninflammatory)     Pneumonitis     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[facility])     Rheumatic tricuspid insufficiency     Skin cancer     Skin disorder     Tachycardia 06/02/2021    Vitamin D deficiency      Past Surgical History  Past Surgical History:   Procedure Laterality Date    CARDIAC CATHETERIZATION N/A 8/21/2024    Procedure: Cardiac pci;  Surgeon: Juan Fuller MD;  [facility];  Service: Cardiology    CARDIAC CATHETERIZATION N/A 8/21/2024    Procedure: Cardiac PCI Stent;   Surgeon: Juan Fuller MD;  [facility];  Service: Cardiology    CARDIAC CATHETERIZATION N/A 8/21/2024    Procedure: Cardiac Coronary Angiogram;  Surgeon: Juan Fuller MD;  [facility];  Service: Cardiology    COLONOSCOPY N/A 1/22/2019    Procedure: COLONOSCOPY;  Surgeon: Anthony Mejía MD;  Location: [facility];  Service: Colorectal    CYSTOSCOPY N/A 9/30/2020    Procedure: CYSTOSCOPY; BILATERAL URETERAL CATHETER PLACEMENT, CYSTOTOMY;  Surgeon: Zach Tyler MD;  [facility]: [facility];  Service: Urology    FL CYSTOGRAM  10/15/2020    HARTMANS PROCEDURE N/A 9/30/2020    Procedure: EXPLORATORY LAPAROTOMY; LYSIS OF ADHESIONS; WASHOUT PELVIC ABSCESS; COMPLEX SIGMOID COLON RESECTION; LOOP TRANSVERSE COLOSTOMY;  Surgeon: Thania Jaffe MD;  Location: [facility];  Service: General    IR DRAINAGE TUBE PLACEMENT  9/24/2020    MASTECTOMY Left 2007    left breast mastectomy     TONSILLECTOMY      TOOTH EXTRACTION      TUBAL LIGATION           09/11/24 1123   OT Last Visit   OT Visit Date 09/11/24   Note Type   Note type Evaluation   Pain Assessment   Pain Assessment Tool 0-10   Pain Score No Pain   Restrictions/Precautions   Weight Bearing Precautions Per Order No   Other Precautions Fall Risk;Pain;Chair Alarm;Bed Alarm;Multiple lines;O2  (muscular dystrophy, 4 L O2, ostomy)   Home Living   Type of Home Apartment   Home Layout One level   Bathroom Shower/Tub Walk-in shower   Bathroom Toilet Raised  (electric)   Bathroom Equipment Grab bars in shower;Grab bars around toilet;Shower chair   Home Equipment Wheelchair-electric;Wheelchair-manual   Additional Comments Pt resides alone in a one level apartment.   Prior Function   Level of [place] Needs assistance with ADLs;Needs assistance with functional mobility;Needs assistance with IADLS   Lives With Alone   Receives Help From Family;Home health;Other (Comment)  (HHAs 7:30am - 2:30pm and 7:30-10:30 daily, Pt receieves  "MOW)   IADLs Family/Friend/Other provides transportation;Family/Friend/Other provides meals;Family/Friend/Other provides medication management   Falls in the last 6 months 0   Vocational Retired   Comments PTA, Pt reports requiring A with ADLs from [facility]. Pt reports they assist with bed mobility, transfers, and ADLs. Pt performs functional SPT from surfaces with RW. Pt resides in [facility] throughout the day and able to complete light meal prep at W/C level.   Lifestyle   Autonomy A with ADLs from [facility]/use of [facility] for mobility   Reciprocal Relationships Supportive [facility] who assist daily\   Service to Others Retired   Intrinsic Gratification Will cont to assess   Subjective   Subjective \"I can't stand up!\" \"I am going to rag doll.\"   ADL   Where Assessed Edge of bed   Eating Assistance 5  Supervision/Setup   Eating Deficit Setup   Grooming Assistance 5  Supervision/Setup   UB Bathing Assistance 3  Moderate Assistance   LB Bathing Assistance 2  Maximal Assistance   UB Dressing Assistance 3  Moderate Assistance   LB Dressing Assistance 2  Maximal Assistance   LB Dressing Deficit Don/doff R sock;Don/doff L sock   Toileting Assistance  2  Maximal Assistance   Functional Assistance 2  Maximal Assistance   Additional Comments Pt limited in ADLs 2* to weakness, B/L shoulder limitations, and F- sitting balance.   Bed Mobility   Supine to Sit 2  Maximal assistance   Additional items Assist x 2;Increased time required;Verbal cues;LE management   Sit to Supine 2  Maximal assistance   Additional items Assist x 2;Increased time required;Verbal cues;LE management   Additional Comments Pt greeted supine in bed. Pt able to sit on EOB at F- level with occasional posterior lean. Pt becomes anxious with bed mobility and requires reassurance.   Transfers   Sit to Stand 2  Maximal assistance   Additional items Assist x 2;Increased time required;Verbal cues   Stand to Sit 2  Maximal assistance   Additional items Assist x 2;Increased time " required;Verbal cues   Additional Comments with RW- Pt performed from elevated bed, however, able to achieve 75% buttocks clearance. Pt with RLE extended forward and difficulty maintaining balance despite cues. Attempted x2 throughout session.   Functional Mobility   Additional Comments Not appropriate at this time/   Balance   Static Sitting Fair   Dynamic Sitting Fair -   Static Standing Poor +   Dynamic Standing Poor   Ambulatory Poor   Activity Tolerance   Activity Tolerance Patient tolerated treatment well   Medical Staff Made Aware Co-evaluation with SELENA Mann 2* to Pt's medical complexity and decreased endurance.   Nurse Made Aware RN cleared/updated.   RUE Assessment   RUE Assessment X  (Pt with dx of muscular dystrophy and baseline can complete AROM to 50*, however, upon assessment Pt able to achieve 35* shoulder flexion, distally WFL)   LUE Assessment   LUE Assessment X  (Pt with dx of muscular dystrophy and baseline can complete AROM to 50*, however, upon assessment Pt able to achieve 35* shoulder flexion, distally WFL)   Hand Function   Gross Motor Coordination Impaired   Fine Motor Coordination Functional   Sensation   Light Touch No apparent deficits   Vision-Basic Assessment   Current Vision Wears glasses for distance only   Psychosocial   Psychosocial (WDL) X   Patient Behaviors/Mood Anxious   Cognition   Overall Cognitive Status WFL   Arousal/Participation Responsive;Cooperative   Attention Difficulty attending to directions   Orientation Level Oriented X4   Memory Decreased recall of precautions;Decreased recall of recent events   Following Commands Follows one step commands with increased time or repetition   Comments Pt pleasant and cooperative during OT session. Upon mobility, Pt becomes anxious requiring reassurance. Pt benefits from education on technique and safety. Pt may be self-limiting at times.   Assessment   Limitation Decreased UE ROM;Decreased ADL status;Decreased UE  strength;Decreased cognition;Decreased Safe judgement during ADL;Decreased endurance;Decreased self-care trans;Decreased high-level ADLs   Prognosis Fair   Assessment Pt is a 74 y.o. female who presented to [facility] on 9/10/2024 with worsening dyspnea. Pt with diagnosis of acute on chronic respiratory failure with hypoxia and hypercapnia, acute on chronic diastolic CHF, sepsis, and muscular dystrophy. Pt  has a past medical history of Acute kidney failure ([facility]), Acute nonspecific idiopathic pericarditis, Anxiety, Breast cancer ([facility]) (2007), Cellulitis of right lower extremity, Chronic pain, Chronic respiratory failure with hypoxia ([facility]), Colitis, Colostomy status ([facility]), Dependence on supplemental oxygen, Depression, Difficult intubation, Excessive daytime sleepiness, Gastroesophageal reflux disease without esophagitis, Gastroparesis, GERD (gastroesophageal reflux disease), Hyperlipidemia, Ischemic colitis ([facility]) (01/21/2019), Leukocytosis (03/28/2020), Muscular dystrophy ([facility]), Osteoporosis, Other nonrheumatic aortic valve disorders, Overactive bladder, Perforated diverticulum (03/28/2020), Pericardial effusion (noninflammatory), Pneumonitis, 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[facility]), Rheumatic tricuspid insufficiency, Skin cancer, Skin disorder, Tachycardia (06/02/2021), and Vitamin D deficiency. Pt greeted bedside for OT evaluation on 09/11/24. Pt resides alone in a one level apartment. PTA, Pt reports requiring A with ADLs from [facility]. Pt reports they assist with bed mobility, transfers, and ADLs. Pt performs functional SPT from surfaces with RW. Pt resides in [facility] throughout the day and able to complete light meal prep at W/C level. Pt demonstrating the following occupational performance levels: mod A with UB ADLs, max A with LB ADLs, max Ax2 with bed mobility, and max Ax2 with RW. Limitations that impact functional performance include decreased ADL status, UE ROM, UE strength, safe judgement during ADLs,  cognition, endurance, self care transfers, and high level ADLs. Occupational performance areas to address ADL retraining, functional transfer training, UE strengthening/ROM, endurance training, cognitive reorientation, Pt/caregiver education, equipment evaluation/education, compensatory technique education, energy conservation, and activity engagement . Pt would benefit from continued skilled OT services while in hospital to maximize independence with ADLs. Will continue to follow Pt's progress. Pt would benefit from level II resources (moderate intensity) upon DC to maximize safety and independence with ADLs and functional tasks of choice.   Goals   Patient Goals To sit up in bed   LTG Time Frame 10-14   Long Term Goal #1 See goals listed below   Plan   Treatment Interventions ADL retraining;UE strengthening/ROM;Functional transfer training;Endurance training;Cognitive reorientation;Patient/family training;Equipment evaluation/education;Compensatory technique education;Energy conservation;Activityengagement   Goal Expiration Date 09/25/24   OT Frequency 2-3x/wk   Discharge Recommendation   Rehab Resource Intensity Level, OT II (Moderate Resource Intensity)   Additional Comments  The patient's raw score on the AM-PAC Daily Activity Inpatient Short Form is 14. A raw score of less than 19 suggests the patient may benefit from discharge to post-acute rehabilitation services. Please refer to the recommendation of the Occupational Therapist for safe discharge planning.   AM-PAC Daily Activity Inpatient   Lower Body Dressing 2   Bathing 2   Toileting 2   Upper Body Dressing 2   Grooming 3   Eating 3   Daily Activity Raw Score 14   Daily Activity Standardized Score (Calc for Raw Score >=11) 33.39   AM-PAC Applied Cognition Inpatient   Following a Speech/Presentation 3   Understanding Ordinary Conversation 4   Taking Medications 3   Remembering Where Things Are Placed or Put Away 4   Remembering List of 4-5 Errands 3    Taking Care of Complicated Tasks 3   Applied Cognition Raw Score 20   Applied Cognition Standardized Score 41.76   End of Consult   Education Provided Yes   Patient Position at End of Consult Bedside chair;Bed/Chair alarm activated;All needs within reach   Nurse Communication Nurse aware of consult         Goals:  Pt will complete UB ADLs with min A in order to maximize participation with ADLs.   Pt will complete LB ADLs with min A in order to maximize safety with ADLs.   Pt will complete hygiene and grooming with I in order to increase participation with ADLs.   Pt will complete toileting routine (transfer, hygiene, and clothing management) with min A in order to return to prior level of function.  Pt will complete bed mobility with min A in order to maximize participation with ADLs.   Pt will complete functional transfers at min A level in order to increase participation with ADLs.  Pt will increase dynamic standing balance to F in order to increase safety with ADLs.  Pt will increase dynamic sitting balance to F in order to increased participation with seated ADLs.   Pt will be attentive 100% of the time for ongoing functional/formal cognitive assessment to assist with safe dc planning prn.      Sully Toth MS, OTR/L

## 2024-09-11 NOTE — SPEECH THERAPY NOTE
Speech Language/Pathology  Speech/Language Pathology  Assessment    Patient Name: Claire Doherty  Today's Date: 9/11/2024     Problem List  Principal Problem:    Acute on chronic respiratory failure with hypoxia and hypercapnia (Formerly [facility])  Active Problems:    Dystrophy, muscular, 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Formerly [facility])    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Formerly [facility])    GERD without esophagitis    Sepsis (Formerly [facility])    Thrombocytosis, unspecified    COPD (chronic obstructive pulmonary disease) (Formerly [facility])    History of Idiopathic pericarditis    Acute on chronic diastolic CHF (congestive heart failure) (Formerly [facility])    Paroxysmal A-fib (Formerly [facility])    Past Medical History  Past Medical History:   Diagnosis Date    Acute kidney failure (Formerly [facility])     Acute nonspecific idiopathic pericarditis     Anxiety     Breast cancer (Formerly [facility]) 2007    Cellulitis of right lower extremity     Chronic pain     Chronic respiratory failure with hypoxia (Formerly [facility])     Colitis     Colostomy status (Formerly [facility])     Dependence on supplemental oxygen     Depression     Difficult intubation     Excessive daytime sleepiness     Gastroesophageal reflux disease without esophagitis     Gastroparesis     GERD (gastroesophageal reflux disease)     Hyperlipidemia     Ischemic colitis ([facility]ly [facility]) 01/21/2019    Leukocytosis 03/28/2020    Muscular dystrophy (Formerly [facility])     Limb-girdle    Osteoporosis     Other nonrheumatic aortic valve disorders     Overactive bladder     Perforated diverticulum 03/28/2020    Pericardial effusion (noninflammatory)     Pneumonitis     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Formerly [facility])     Rheumatic tricuspid insufficiency     Skin cancer     Skin disorder     Tachycardia 06/02/2021    Vitamin D deficiency      Past Surgical History  Past Surgical History:   Procedure Laterality Date    CARDIAC CATHETERIZATION N/A 8/21/2024    Procedure: Cardiac pci;  Surgeon: Juan Fuller MD;  [facility];  Service: Cardiology    CARDIAC CATHETERIZATION N/A 8/21/2024     Procedure: Cardiac PCI Stent;  Surgeon: Juan Fuller MD;  [facility];  Service: Cardiology    CARDIAC CATHETERIZATION N/A 8/21/2024    Procedure: Cardiac Coronary Angiogram;  Surgeon: Juan Fuller MD;  [facility];  Service: Cardiology    COLONOSCOPY N/A 1/22/2019    Procedure: COLONOSCOPY;  Surgeon: Anthony Mejía MD;  Location: [facility];  Service: Colorectal    CYSTOSCOPY N/A 9/30/2020    Procedure: CYSTOSCOPY; BILATERAL URETERAL CATHETER PLACEMENT, CYSTOTOMY;  Surgeon: Zach Tyler MD;  Location: [facility];  Service: Urology    FL CYSTOGRAM  10/15/2020    HARTMANS PROCEDURE N/A 9/30/2020    Procedure: EXPLORATORY LAPAROTOMY; LYSIS OF ADHESIONS; WASHOUT PELVIC ABSCESS; COMPLEX SIGMOID COLON RESECTION; LOOP TRANSVERSE COLOSTOMY;  Surgeon: Thania Jaffe MD;  Location: [facility];  Service: General    IR DRAINAGE TUBE PLACEMENT  9/24/2020    MASTECTOMY Left 2007    left breast mastectomy     TONSILLECTOMY      TOOTH EXTRACTION      TUBAL LIGATION          Bedside Swallow Evaluation:    Summary:  Pt presented w/ report of fatigue, particularly w/ eating.  As well as early satiety. takes frequent breaks. Gets meals on wheels at home, which are generally a bit softer. Denied any coughing or choking but reported h/o slow esophageal motility. Stated intake/appetite have been reduced recently.  Patient had only a small amount of toast and an omelette for breakfast stated she was full.  Takes small bites and chews food for a very long time until she swallows.  States she would like her meds crushed for now.  Nurse reported taken well.  Patient was drinking thin liquids by straw with prompt transfer and swallow.  No cough or wet vocal quality or decline in O2 sats.  Patient denies any coughing or choking.  Stated she has a hard time if she is reclined in her chair at home and needs to sit up straighter during and after eating.  She has seen GI in the past.   Noted an EGD was recommended in 2020 but I cannot locate a record of it or if it was completed.  Patient is currently on a fluid restriction.  I asked if she would prefer meats that are chopped with extra gravy. she stated yes however when I explained the level 3 dysphagia diet to her and what she would not be able to order she stated she wanted to stay on the regular diet.  Educated she can ask the  to have anything chopped or gravy added to it but the gravy will likely count towards fluid. Oral/pharyngeal stages appears wnl/functional.  Pt denied any difficulties. H/o gerd, gastroparesis. She reports slow esophageal motility and early satiety.     Recommendations:  Diet:liberalize to regular to allow more choices. Modify as needed per pt request. Avoid dry/dense/hard to chew foods.   Liquid:thin  Meds: she would like them crushed for now.  Supervision: prn/distant  Positioning:Upright  Strategies: prn: modify foods, sips in between, assist  Pt to take PO/Meds only when fully alert and upright.   Oral care  Aspiration precautions  Reflux precautions  Eval only, No f/u tx indicated.     Consider consult w/:  Rehab  GI ??  Pulmonary??  Nutrition. She reported dislike of ensure and boost. Likes ice cream. (Not chocolate)    H&P/Admit info/ pertinent provider notes: (PMH noted above)  Attestation signed by Rosalind Gong MD at 9/10/2024  3:07 PM  Attending Attestation::  I supervised the Advanced Practitioner on 9/10/2024.? I performed, in its entirety, the assessment and plan of the visit.  I agree with the Advanced Practitioner's note with the following additions/exceptions:  Patient is a 74-year-old female with a past medical history of chronic hypoxic and hypercapnic respiratory failure, restrictive lung disease in the setting of muscular dystrophy, grade 2 diastolic dysfunction, GERD, thrombocytosis who presented from a rehab facility due to shortness of breath.  Patient was just admitted for right groin  infection in the setting of recent cardiac cath, she finished 5 days of antibiotics, the wound healed.  She had acute kidney injury which was treated with about 1 L of IV fluids, CT showed moderate pericardial effusion, echo showed trivial pericardial effusion-- she was discharged to rehab, her oxygen requirement went up and she was transferred to the hospital.  Constitutional: awake alert and oriented, ill-appearing  Head: normocephalic atraumatic  Neck: supple  Cardiovascular: regular rate and rhythm, no murmur rub or gallop  Lungs: Bibasilar crackles, shallow breathing  Abdomen: positive bowel sounds, soft, nondistended, nontender  Extremities: no edema  Neurologic exam nonfocal  Assessment and plan:  Acute on chronic hypoxic and hypercapnic respiratory failure suspect due to bilateral large pleural effusions, possible heart failure, cannot rule out pneumonia, currently on 6 L of oxygen.  At baseline she is on 3 L of oxygen at rest and BiPAP at night.  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 female with a PMH of muscular dystrophy, chronic respiratory failure with hypoxia, GERD, hyperlipidemia, restrictive lung disease, depression who presents with shortness of breath.  Patient ports he was recently hospitalized for a groin infection and was discharged from the hospital to rehab.  She notes since arriving at rehab she has developed progressively worsening shortness of breath.  Notes difficulty taking deep breaths.  Does report some pain in her chest with deep  breathing.  She reports she typically utilizes 2 L supplemental oxygen during the day and BiPAP with supplemental oxygen overnight but has been requiring increased supplemental oxygen due to hypoxia and shortness of breath.  She notes she has been feeling generally unwell with anorexia and generalized weakness and fatigue.  She also notes inability to tolerate oral intake due to her severe shortness of breath causing her difficulty eating.  Denies any abdominal pain.  She does report recent antibiotic use for groin infection.     Special Studies:  CT chest 9/10:  1.  Large bilateral nonspecific pleural effusions and compressive atelectasis.  2.  Mild manifestations of underlying centrilobular emphysema.  CXR 9/10/24:  At least moderate bilateral pleural effusions with bibasilar atelectasis. Superimposed bilateral airspace opacities, possibly on the basis of pulmonary edema versus infection.       Per GI note 9/24/2020:  Claire Doherty is a 70 y.o. female who was admitted with  Diverticulitis. She presented to the hospital chief complaint of  abdominal pain most specific in the left lower quadrant with  increasing severity over the past week. Patient states that she  has had several diverticulitis flares in the past few years the  last being in March of 2020 . Since being treated at that time  she states she has had mild intermittent left lower quadrant  discomfort which never completely resolved. She had a fever of  101 at home and upon admission was noted to have elevated white  count of 18.36. CT the abdomen showed acute diverticulitis with  2 abscesses 13.5 cm and the other 8 cm. Surgery is following.  She also relates a burning epigastric pain which she has had for  the last 6-8 weeks. She has also noted during that time that her  appetite has changed. She feels that she can only eat a small  amount of food and she feels full. She was seen by [facility] and was scheduled for a gastric emptying study but  that has  not been completed. She was also scheduled for an outpatient  EGD. She has had no nausea or vomiting and no dysphagia. She  tells me that she has had EGDs in the past where she was  diagnosed with gastritis but does not remember exactly when the  last EGD was performed. She tells me she is burping consistently  about 10-15 times an hour.. She had previously been on Prilosec  which had helped with the burning in the past but now she is  having breakthrough symptoms. She states she has had a weight  loss of 12 lb in the last 6 weeks.  Last colonoscopy was in 2019 at which time she was diagnosed with  ischemic colitis.   (ST unable to locate that this was completed. ? 10/8/20 vs cancelled)     Sodium 135-147mmol/L   140  9/11   Carbon Dioxide 21-32 mmol/L  38  9/11     Procalcitonin<=0.25ng/ml   5.09  9/11   WBC  4.31-10.16 Thousand/uL   17.45  9/11       Previous MBS:  None known    Patient's goal: wants to feel better    Did the pt report pain?no  If yes, was nursing notified/was it addressed? N/a    Reason for consult:  R/o aspiration  Determine safest and least restrictive diet  respiratory compromise  poor intake  Denied weight loss     Precautions:  Fall    Food Allergies:  none   Current Diet:  regular   Premorbid diet:  Regular/meals on wheels   O2 requirement:  5 nc. On bipap at night.   Social/Prior living  Lives alone, family support and comfort keepers HHA   Voice/Speech: Reduced volume but intelligible.    Follows commands:  yes   Cognitive status:  Alert and appropriate     Oral [facility] exam:  Dentition: wnl  Lips (VII):+  Tongue (XII):+  Secretion management:wnl    Esophageal stage:  Reports early satiety    Results d/w:  Pt, nursing

## 2024-09-11 NOTE — ASSESSMENT & PLAN NOTE
Patient met sepsis criteria at time of admission with tachycardia and leukocytosis  CT chest showed  Large bilateral nonspecific pleural effusions and compressive atelectasis. Mild manifestations of underlying centrilobular emphysema.  Procalcitonin elevated  DC Abx no signs of sepsis for now, procal could be a acute phase reactant   Follow-up blood culture

## 2024-09-11 NOTE — ASSESSMENT & PLAN NOTE
EMS was called for patient at nursing facility for shortness of breath  Found to have CO2 of 70 on VBG with pH 7.336  Patient does utilize BiPAP at night, does have history of muscular dystrophy with restrictive lung disease  9/10 CT Chest: Large bilateral nonspecific pleural effusions and compressive atelectasis. Mild manifestations of underlying centrilobular emphysema.    Plan:   Continue BiPAP at HS  Continue Perforomist/Pulmicort BID  Considering her muscular dystropy ordered NIF

## 2024-09-11 NOTE — ASSESSMENT & PLAN NOTE
- follows with pulmonology as an outpatient  - chronic use of BiPAP at night with with 2-3L of NC during the day

## 2024-09-11 NOTE — PHYSICAL THERAPY NOTE
PHYSICAL THERAPY EVALUATION          Patient Name: Claire Doherty  Today's Date: 9/11/2024 09/11/24 1122   PT Last Visit   PT Visit Date 09/11/24   Note Type   Note type Evaluation   Pain Assessment   Pain Assessment Tool 0-10   Pain Score 7   Pain Location/Orientation Orientation: Right;Orientation: Lower;Location: Rib Cage   Hospital Pain Intervention(s) Emotional support   Restrictions/Precautions   Other Precautions Chair Alarm;Bed Alarm;Multiple lines;Telemetry;O2;Fall Risk;Pain;Limb alert  (4L, maspatitoo, HIROE limb alert, tele)   Home Living   Type of Home Apartment   Home Layout One level   Bathroom Shower/Tub Walk-in shower   Bathroom Toilet Raised   Bathroom Equipment Grab bars in shower;Shower chair;Grab bars around toilet   Bathroom Accessibility Accessible via wheelchair   Home Equipment Walker;Wheelchair-electric;Other (Comment)  (2-3L O2)   Additional Comments reports home is wc accessible. sleeps in lift chair   Prior Function   Level of [place] Needs assistance with ADLs;Needs assistance with IADLS;Independent with functional mobility   Lives With Alone   Receives Help From Personal care attendant  (daily from 9-2 and [id number])   IADLs Family/Friend/Other provides transportation;Family/Friend/Other provides medication management;Family/Friend/Other provides meals   Comments reports stand piv transf w RW to wc-e. relies on lift chair to stand and electric wc to mobilize   General   Additional Pertinent History pt admitted 9/10/24 for acute on chronic respiratory failure. up and oob orders. PMHx significant for muscular dystrophy, RLD, COPD, afib, osteoporosis, chronic respiratory failure, depression   Cognition   Overall Cognitive Status WFL   Arousal/Participation Cooperative   Attention Attends with cues to redirect   Following Commands Follows one step commands with increased time or repetition   Comments pt requires much encouragement and education during  mobility dt increased anxiety about falling. questionable effort at times   Bed Mobility   Supine to Sit 2  Maximal assistance   Additional items Assist x 2;HOB elevated;Bedrails;Increased time required;LE management;Verbal cues;Other  (trunk support)   Sit to Supine 2  Maximal assistance   Additional items Assist x 2;Increased time required;Verbal cues;LE management;Other  (trunk support)   Transfers   Sit to Stand 2  Maximal assistance   Additional items Assist x 2;Increased time required;Verbal cues;Other  (RW)   Stand to Sit 2  Maximal assistance   Additional items Assist x 2;Increased time required;Other  (RW)   Additional Comments attempted x2 from EOB. able to clear 25% of full stand   Balance   Static Sitting Fair -   Static Standing Zero  (Ax2)   Endurance Deficit   Endurance Deficit No  (vitals during session 101/49, 83, 98% on 4L)   Activity Tolerance   Activity Tolerance Treatment limited secondary to medical complications (Comment)  (weakness)   Medical Staff Made Aware Sully OT   Nurse Made Aware Pily TOVAR   Assessment   Prognosis Fair   Problem List Decreased strength;Impaired balance;Decreased mobility;Impaired judgement;Decreased safety awareness;Obesity;Pain   Assessment Claire Doherty is a 74 y.o. female admitted to [facility] on 9/10/2024 for Acute on chronic respiratory failure with hypoxia and hypercapnia (HCC). PT was consulted and pt was seen on 9/11/2024 for mobility assessment and d/c planning. Pt presents w readmission, medium fall risk, LUE limb alert, multiple lines, acute pain R ribcage. Per patient, at baseline performs stand piv transf w RW to electric wc. Per chart pt comes to [facility] this admission from SNF rehab- pt did not report LOF at rehab. Pt is currently functioning at a max Ax2 for bed mobility and attempted sit<>stand transfers. Pt demonstrated deficits related to safety awareness/effort, strength, balance. Require frequent redirection, education and encouragement to participate  during session dt fearfulness of falling. She is requiring increased assist compared to baseline and currently unable to achieve a full stand at this time w limited support/ stability of BLE (R>L) noted. May have improved ability to stand w use of quick move given the LE support it provides. She require occ cuing while sitting unsupported at EOB to regain balance dt posterior lean. Pt will benefit from continued skilled IP PT to address the above mentioned impairments  in order to maximize recovery and increase functional independence when completing mobility and ADLs. The patient's AM-PAC Basic Mobility Inpatient Short Form Raw Score is 7. A Raw score of less than or equal to 16 suggests the patient may benefit from discharge to post-acute rehabilitation services.   Barriers to Discharge Decreased caregiver support   Barriers to Discharge Comments occ home alone- requires A for mobility   Goals   Patient Goals sit up more, regain strength   STG Expiration Date 09/25/24   Short Term Goal #1 1).  Pt will perform bed mobility with mod Ax1 demonstrating appropriate technique 100% of the time in order to improve function. 2)  Perform all transfers with mod Ax1 demonstrating safe and appropriate technique 100% of the time in order to improve ability to negotiate safely in home environment.3) Improve am-pac by 3. 4)  Improve overall strength and balance 1/2 grade in order to optimize ability to perform functional tasks and reduce fall risk.5) Increase activity tolerance to 60 minutes in order to improve endurance to functional tasks.   Plan   Treatment/Interventions Functional transfer training;LE strengthening/ROM;Therapeutic exercise;Patient/family training;Equipment eval/education;Bed mobility;Compensatory technique education;Continued evaluation;Spoke to nursing;OT   PT Frequency 3-5x/wk   Discharge Recommendation   Rehab Resource Intensity Level, PT II (Moderate Resource Intensity)   AM-PAC Basic Mobility Inpatient    Turning in Flat Bed Without Bedrails 2   Lying on Back to Sitting on Edge of Flat Bed Without Bedrails 1   Moving Bed to Chair 1   Standing Up From Chair Using Arms 1   Walk in Room 1   Climb 3-5 Stairs With Railing 1   Basic Mobility Inpatient Raw Score 7   Turning Head Towards Sound 3   Follow Simple Instructions 3   Low Function Basic Mobility Raw Score  13   Low Function Basic Mobility Standardized Score  20.14   Robin Everton Highest Level Of Mobility   -HL Goal 2: Bed activities/Dependent transfer   -HL Achieved 3: Sit at edge of bed   End of Consult   Patient Position at End of Consult Supine;All needs within reach   End of Consult Comments RN present   History: co - morbidities including age, behavior pattern, social background, past experience, use of assistive device, assist for iadl's, current experience including fall risk, multiple lines  Exam: impairments in systems including multiple body structures involved; neuromuscular (balance, transfers), cardiopulmonary (vitals), cognition; activity limitations  (difficulties executing an action); participation restrictions (problems associated w involvement in life situations), am-pac  Clinical: unstable/unpredictable  Complexity:high    Mackenzie Ann, PT

## 2024-09-11 NOTE — ASSESSMENT & PLAN NOTE
Patient follows up with pulmonology as outpatient  Not on any daily maintenance inhaler, uses budesonide/albuterol when she is sick  Uses BiPAP at bedtime after her recent admission otherwise uses 2 to 3 L NC during the  Doing well without any maintenance therapy, may need maintenance inhaler upon discharge

## 2024-09-11 NOTE — PLAN OF CARE
Problem: Potential for Falls  Goal: Patient will remain free of falls  Description: INTERVENTIONS:  - Educate patient/family on patient safety including physical limitations  - Instruct patient to call for assistance with activity   - Consult OT/PT to assist with strengthening/mobility   - Keep Call bell within reach  - Keep bed low and locked with side rails adjusted as appropriate  - Keep care items and personal belongings within reach  - Initiate and maintain comfort rounds  - Make Fall Risk Sign visible to staff  - Offer Toileting every 2 Hours, in advance of need  - Initiate/Maintain bed alarm  - Obtain necessary fall risk management equipment:   - Apply yellow socks and bracelet for high fall risk patients  - Consider moving patient to room near nurses station  Outcome: Progressing     Problem: PAIN - ADULT  Goal: Verbalizes/displays adequate comfort level or baseline comfort level  Description: Interventions:  - Encourage patient to monitor pain and request assistance  - Assess pain using appropriate pain scale  - Administer analgesics based on type and severity of pain and evaluate response  - Implement non-pharmacological measures as appropriate and evaluate response  - Consider cultural and social influences on pain and pain management  - Notify physician/advanced practitioner if interventions unsuccessful or patient reports new pain  Outcome: Progressing     Problem: SAFETY ADULT  Goal: Patient will remain free of falls  Description: INTERVENTIONS:  - Educate patient/family on patient safety including physical limitations  - Instruct patient to call for assistance with activity   - Consult OT/PT to assist with strengthening/mobility   - Keep Call bell within reach  - Keep bed low and locked with side rails adjusted as appropriate  - Keep care items and personal belongings within reach  - Initiate and maintain comfort rounds  - Make Fall Risk Sign visible to staff  - Offer Toileting every 2 Hours, in  advance of need  - Initiate/Maintain bed alarm  - Obtain necessary fall risk management equipment:   - Apply yellow socks and bracelet for high fall risk patients  - Consider moving patient to room near nurses station  Outcome: Progressing     Problem: Knowledge Deficit  Goal: Patient/family/caregiver demonstrates understanding of disease process, treatment plan, medications, and discharge instructions  Description: Complete learning assessment and assess knowledge base.  Interventions:  - Provide teaching at level of understanding  - Provide teaching via preferred learning methods  Outcome: Progressing     Problem: RESPIRATORY - ADULT  Goal: Achieves optimal ventilation and oxygenation  Description: INTERVENTIONS:  - Assess for changes in respiratory status  - Assess for changes in mentation and behavior  - Position to facilitate oxygenation and minimize respiratory effort  - Oxygen administered by appropriate delivery if ordered  - Initiate smoking cessation education as indicated  - Encourage broncho-pulmonary hygiene including cough, deep breathe, Incentive Spirometry  - Assess the need for suctioning and aspirate as needed  - Assess and instruct to report SOB or any respiratory difficulty  - Respiratory Therapy support as indicated  Outcome: Progressing     Problem: Nutrition/Hydration-ADULT  Goal: Nutrient/Hydration intake appropriate for improving, restoring or maintaining nutritional needs  Description: Monitor and assess patient's nutrition/hydration status for malnutrition. Collaborate with interdisciplinary team and initiate plan and interventions as ordered.  Monitor patient's weight and dietary intake as ordered or per policy. Utilize nutrition screening tool and intervene as necessary. Determine patient's food preferences and provide high-protein, high-caloric foods as appropriate.     INTERVENTIONS:  - Monitor oral intake, urinary output, labs, and treatment plans  - Assess nutrition and hydration  status and recommend course of action  - Evaluate amount of meals eaten  - Assist patient with eating if necessary   - Allow adequate time for meals  - Recommend/ encourage appropriate diets, oral nutritional supplements, and vitamin/mineral supplements  - Order, calculate, and assess calorie counts as needed  - Recommend, monitor, and adjust tube feedings and TPN/PPN based on assessed needs  - Assess need for intravenous fluids  - Provide specific nutrition/hydration education as appropriate  - Include patient/family/caregiver in decisions related to nutrition  Outcome: Progressing     Problem: Prexisting or High Potential for Compromised Skin Integrity  Goal: Skin integrity is maintained or improved  Description: INTERVENTIONS:  - Identify patients at risk for skin breakdown  - Assess and monitor skin integrity  - Assess and monitor nutrition and hydration status  - Monitor labs   - Assess for incontinence   - Turn and reposition patient  - Assist with mobility/ambulation  - Relieve pressure over bony prominences  - Avoid friction and shearing  - Provide appropriate hygiene as needed including keeping skin clean and dry  - Evaluate need for skin moisturizer/barrier cream  - Collaborate with interdisciplinary team   - Patient/family teaching  - Consider wound care consult   Outcome: Progressing

## 2024-09-11 NOTE — CONSULTS
Consultation - Cardiology   Name: Claire Doherty 74 y.o. female I MRN: [id number]  Unit/Bed#: ICU 13 I Date of Admission: 9/10/2024   Date of Service: 9/11/2024 I Hospital Day: 1   Inpatient consult to Cardiology  Consult performed by: Monica Mann PA-C  Consult ordered by: Rod Singleton MD        Physician Requesting Evaluation: Lenora Beard,    Reason for Evaluation / Principal Problem: bradycardia     Assessment & Plan  Acute on chronic respiratory failure with hypoxia and hypercapnia (HCC)  - VBG 09/10/24: CO2 70.5, pH 7.3  - chronic use of biPAP at night with history of restrictive lung diease and muscular dystrophy   Paroxysmal A-fib (HCC)  - diagnosed October 10, 2024 with RVR rates 177 bpm  - given Lopressor 5 mg x 3 to help control rates     Plan:  Telemetry now shows NSR with rates with the 80's. She was asymptomatic with lower rates would continue to monitor for now. Continue Eliquis for stroke prevention.   Acute on chronic diastolic CHF (congestive heart failure) (HCC)  - TTE 08/21/24: LVEF 65%, grade II diastolic dysfunction, mild LAE, mild TR, trivial pericardial effusion  - BNP 09/10/24: 234  - CT 09/10/24: large bilateral nonspecific pleural effusions and compressive atelectasis  - was not on any GDTM prior to admission       Pt has significant fluid in her lungs on CT. She diuresed well with the lasix given to her so far.   - will start her on IV lasix 40 mg BID  - monitor daily weights and I/O's   - monitor daily BMP  Sepsis ([facility])  - met SIRS criteria with tachycardia and leukocytosis   - prolactin 09/11/24: 5.09 increased from 2.69   - so far legionella and s. pneumoniae negative, blood cultures pending  History of Idiopathic pericarditis  - diagnosed 08/21/24 at [facility] and transferred to [facility] for cardiac cath (flat troponins but chest pain and ST elevations in inferior leads)  - [facility] 08/21/24: nonobstructive coronary arteries   - CRP 08/21/24: 174  - discharged on  colchicine      Plan:  - would continue with colchcine    COPD (chronic obstructive pulmonary disease) (HCC)  - follows with pulmonology as an outpatient  - chronic use of BiPAP at night with with 2-3L of NC during the day   Dystrophy, muscular, 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HCC)  - follows with neurology  - uses wheelchair/walker and BiPAP at night   Thrombocytosis, unspecified  - chronic thrombocytosis   History Of Present Illness:  Claire Doherty is a 74 year old with PMH of idiopathic pericarditis August 2024, hereditary muscular dystrophy with restrictive lung diease, and COPD who presented to the ED complaining of yesterday with worsening SOB over the last week. Per chart review she was hospitalized August 21-August 22 at [facility] with chest pain. She underwent cardiac catheterization when showed nonobstructive coronary arteries and echo showed trivial pericardial effusion. EKG was concerning for ST elevations in the inferior leads. Pt had subsequent hospitalization 08/30/24-09/07/24 with concern of wound infection from her cardiac catheterization. Her wound was MSSA and proteus positive and she completed a 5 day course of Ancef. She was discharged to [facility].    Pt brought in by EMS last night with concern of worsening SOB over the last night. Workup included CT which showed large bilateral pleural effusions, EKG with rapid atrial fibrillation with RVR, and . She was given Lopressor and converted back to sinus rhythm initially with bradycardia and atrial bigeminy and now NSR with rates 86 bpm. She was started on Eliquis 5 mg BID.     Pt states she was asymptomatic when she had bradycardia. She admits to feeling SOB. She denies any further episodes of chest pain like she had when she was hospitalized with pericarditis. Admits to a few palpitations here and there for seconds but nothing longer.     Rhythm History: atrial fibrillation     Primary Cardiologist: saw Dr. Amanda once June  2021    ROS:  Review of Systems   Constitutional:  Positive for fatigue.   Respiratory:  Positive for shortness of breath. Negative for chest tightness.    Cardiovascular:  Positive for palpitations. Negative for chest pain and leg swelling.   Neurological:  Negative for dizziness, syncope and light-headedness.        Past Medical History:        Past Medical History:   Diagnosis Date    Acute kidney failure ([facility])     Acute nonspecific idiopathic pericarditis     Anxiety     Breast cancer ([facility]) 2007    Cellulitis of right lower extremity     Chronic pain     Chronic respiratory failure with hypoxia ([facility])     Colitis     Colostomy status ([facility])     Dependence on supplemental oxygen     Depression     Difficult intubation     Excessive daytime sleepiness     Gastroesophageal reflux disease without esophagitis     Gastroparesis     GERD (gastroesophageal reflux disease)     Hyperlipidemia     Ischemic colitis ([facility]) 01/21/2019    Leukocytosis 03/28/2020    Muscular dystrophy ([facility])     Limb-girdle    Osteoporosis     Other nonrheumatic aortic valve disorders     Overactive bladder     Perforated diverticulum 03/28/2020    Pericardial effusion (noninflammatory)     Pneumonitis     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[facility])     Rheumatic tricuspid insufficiency     Skin cancer     Skin disorder     Tachycardia 06/02/2021    Vitamin D deficiency       Past Surgical History:   Procedure Laterality Date    CARDIAC CATHETERIZATION N/A 8/21/2024    Procedure: Cardiac pci;  Surgeon: Juan Fuller MD;  Location: [facility];  Service: Cardiology    CARDIAC CATHETERIZATION N/A 8/21/2024    Procedure: Cardiac PCI Stent;  Surgeon: Juan Fuller MD;  Location: [facility];  Service: Cardiology    CARDIAC CATHETERIZATION N/A 8/21/2024    Procedure: Cardiac Coronary Angiogram;  Surgeon: Juan Fuller MD;  Location: [facility];  Service: Cardiology    COLONOSCOPY N/A 1/22/2019    Procedure:  COLONOSCOPY;  Surgeon: Anthony Mejía MD;  Location:  GI LAB;  Service: Colorectal    CYSTOSCOPY N/A 2020    Procedure: CYSTOSCOPY; BILATERAL URETERAL CATHETER PLACEMENT, CYSTOTOMY;  Surgeon: Zach Tyler MD;  Location: [facility];  Service: Urology    FL CYSTOGRAM  10/15/2020    HARTMANS PROCEDURE N/A 2020    Procedure: EXPLORATORY LAPAROTOMY; LYSIS OF ADHESIONS; WASHOUT PELVIC ABSCESS; COMPLEX SIGMOID COLON RESECTION; LOOP TRANSVERSE COLOSTOMY;  Surgeon: Thania Jaffe MD;  Location: [facility];  Service: General    IR DRAINAGE TUBE PLACEMENT  2020    MASTECTOMY Left 2007    left breast mastectomy     TONSILLECTOMY      TOOTH EXTRACTION      TUBAL LIGATION         Family History:     Family History   Problem Relation Age of Onset    Other Mother         bone loss    Arthritis Mother     Skin cancer Father     Hypertension Father         benign     Other Father         bone loss    Muscular dystrophy Father     Hypertension Sister     No Known Problems Sister     Muscular dystrophy Brother         of the limb gridle     Parkinsonism Brother     No Known Problems Brother     No Known Problems Maternal Grandmother     No Known Problems Paternal Grandmother     No Known Problems Maternal Aunt     Muscular dystrophy Family         of the limb girdle        Social History:       Social History     Socioeconomic History    Marital status:      Spouse name: None    Number of children: None    Years of education: None    Highest education level: None   Occupational History    None   Tobacco Use    Smoking status: Former     Current packs/day: 0.00     Average packs/day: 1.5 packs/day for 37.0 years (55.5 ttl pk-yrs)     Types: Cigarettes     Start date:      Quit date:      Years since quittin.7    Smokeless tobacco: Never   Vaping Use    Vaping status: Never Used   Substance and Sexual Activity    Alcohol use: Not Currently     Comment: social drinker per allscripts      Drug use: Yes     Types: Marijuana     Comment: medical marijuana    Sexual activity: Not Currently   Other Topics Concern    None   Social History Narrative    Currently on permanent disability due to musc dystrophy    Denied daily coffee consumption     Wheelchair dependent since about 2010    No children     Social Determinants of Health     Financial Resource Strain: Low Risk  (7/5/2023)    Overall Financial Resource Strain (CARDIA)     Difficulty of Paying Living Expenses: Not hard at all   Food Insecurity: No Food Insecurity (8/31/2024)    Hunger Vital Sign     Worried About Running Out of Food in the Last Year: Never true     Ran Out of Food in the Last Year: Never true   Transportation Needs: No Transportation Needs (8/31/2024)    PRAPARE - Transportation     Lack of Transportation (Medical): No     Lack of Transportation (Non-Medical): No   Physical Activity: Not on file   Stress: Not on file   Social Connections: Unknown (8/21/2024)    Received from [employer]     How often do you feel lonely or isolated from those around you? (Adult - for ages 18 years and over): Not on file   Intimate Partner Violence: Not on file   Housing Stability: Low Risk  (8/31/2024)    Housing Stability Vital Sign     Unable to Pay for Housing in the Last Year: No     Number of Times Moved in the Last Year: 0     Homeless in the Last Year: No        Allergy:        Allergies   Allergen Reactions    Amoxicillin      Vague rash in the 90s, tolerated zosyn on 9/2020 admission     Codeine      Other reaction(s): Other (See Comments)  n/v    Medical Tape Itching     bandaids    Metoclopramide Hcl Other (See Comments)     Reaction info not provided from facility    Ropinirole Other (See Comments)     Reaction info not provided from facility       Medications:       Prior to Admission medications    Medication Sig Start Date End Date Taking? Authorizing Provider   acetaminophen (TYLENOL) 325 mg tablet Take 2  tablets (650 mg total) by mouth every 4 (four) hours as needed for mild pain  Patient taking differently: Take 650 mg by mouth every 6 (six) hours as needed for mild pain or fever 8/22/24  Yes Charly Carvajal   albuterol (2.5 mg/3 mL) 0.083 % nebulizer solution Take 3 mL (2.5 mg total) by nebulization every 4 (four) hours as needed for wheezing or shortness of breath 6/15/23  Yes Julienne Tucker DO   bisacodyl (FLEET) 10 MG/30ML ENEM Insert 10 mg into the rectum daily as needed for constipation   Yes Historical Provider, MD   budesonide (Pulmicort) 0.5 mg/2 mL nebulizer solution Take 2 mL (0.5 mg total) by nebulization as needed (SOB) Rinse mouth after use.  Patient taking differently: Take 0.5 mg by nebulization every 6 (six) hours as needed (SOB) Rinse mouth after use. 8/22/24  Yes Charly Carvajal   buPROPion (WELLBUTRIN) 75 mg tablet take 1 tablet by mouth every morning 7/22/24  Yes Julienne Tucker DO   citalopram (CeleXA) 10 mg tablet Take 1 tablet (10 mg total) by mouth daily 4/6/23  Yes Julienne Tucker DO   colchicine (COLCRYS) 0.6 mg tablet Take 1 tablet (0.6 mg total) by mouth daily 8/22/24 9/21/24 Yes Charly Carvajal   diazepam (VALIUM) 5 mg tablet Take 1 tablet (5 mg total) by mouth every 8 (eight) hours as needed for anxiety or muscle spasms for up to 10 days 9/7/24 9/17/24 Yes Sosa Tang,    fluticasone (FLONASE) 50 mcg/act nasal spray 2 sprays into each nostril daily 6/15/23  Yes Julienne Tucker DO   magnesium hydroxide (MILK OF MAGNESIA) 400 mg/5 mL oral suspension Take 30 mL by mouth daily as needed for constipation   Yes Historical Provider, MD   oxybutynin (DITROPAN-XL) 10 MG 24 hr tablet Take 10 mg by mouth daily at bedtime   Yes Historical Provider, MD   pantoprazole (PROTONIX) 40 mg tablet Take 1 tablet (40 mg total) by mouth daily in the early morning 8/23/24  Yes Charly Carvajal   ramelteon (ROZEREM) 8 mg tablet Take 8 mg by mouth daily at bedtime 8/17/23  Yes  Historical Provider, MD   saccharomyces boulardii (FLORASTOR) 250 mg capsule Take 250 mg by mouth daily   Yes Historical Provider, MD   triamcinolone (KENALOG) 0.1 % cream Apply topically 2 (two) times a day as needed for irritation or rash 7/8/24  Yes Julienne Tucker DO   solifenacin (VESICARE) 10 MG tablet take 1 tablet by mouth once daily  Patient not taking: Reported on 9/10/2024 5/1/24   Julienne Tucker DO       Vitals:    09/11/24 1135 09/11/24 1145 09/11/24 1200 09/11/24 1300   BP: (!) 127/30 135/62     BP Location:       Pulse: (!) 50 (!) 48 (!) 48 (!) 50   Resp: (!) 34 (!) 39 (!) 37 (!) 35   Temp:   98.7 °F (37.1 °C)    TempSrc:   Oral    SpO2: 98% 97% 98% 98%   Weight:       Height:           Exam:  Physical Exam  Vitals and nursing note reviewed.   Constitutional:       General: She is not in acute distress.     Appearance: Normal appearance. She is ill-appearing.      Comments: Wearing nasal cannula    HENT:      Head: Normocephalic and atraumatic.      Nose: Nose normal.      Mouth/Throat:      Mouth: Mucous membranes are moist.   Eyes:      General: No scleral icterus.  Neck:      Vascular: No JVD.   Cardiovascular:      Rate and Rhythm: Normal rate and regular rhythm.      Pulses:           Radial pulses are 2+ on the right side and 2+ on the left side.      Heart sounds: No murmur heard.  Pulmonary:      Effort: Pulmonary effort is normal. No respiratory distress.      Breath sounds: Decreased air movement present. No stridor. Decreased breath sounds and rales present. No wheezing or rhonchi.   Abdominal:      General: Abdomen is flat.   Musculoskeletal:         General: No swelling. Normal range of motion.      Cervical back: Normal range of motion.      Right lower leg: No edema.      Left lower leg: No edema.   Skin:     General: Skin is warm and dry.      Capillary Refill: Capillary refill takes less than 2 seconds.   Neurological:      Mental Status: She is alert. Mental status is at  baseline.   Psychiatric:         Thought Content: Thought content normal.          DATA:        ECG:      Holter:  01/26/24  IMPRESSION:  Predominantly sinus rhythm, with an average heart rate of 81 beats per minute  There was rare supraventricular ectopic activity with no evidence of atrial fibrillation.  There was rare ventricular ectopic activity with no sustained or nonsustained ventricular tachycardia.  No significant pauses or advanced degree heart block  The accompanying patient diary did not list any symptoms for correlation.  Telemetry:   Normal sinus rhythm, . HR 87    Echocardiogram:  08/21/24    Left Ventricle: Left ventricular cavity size is normal. Wall thickness is normal. The left ventricular ejection fraction is 65%. Systolic function is normal. Wall motion is normal. Diastolic function is moderately abnormal, consistent with grade II (pseudonormal) relaxation.    Right Ventricle: Systolic function is normal.    Left Atrium: The atrium is mildly dilated.    Tricuspid Valve: There is mild regurgitation.    Pericardium: There is a trivial pericardial effusion.    Weights:    Wt Readings from Last 10 Encounters:   09/10/24 75.1 kg (165 lb 9.1 oz)   09/07/24 77.8 kg (171 lb 8.3 oz)   08/21/24 80.7 kg (178 lb)   07/23/24 81.1 kg (178 lb 12.7 oz)   07/08/24 74.4 kg (164 lb)   04/09/24 73.5 kg (162 lb)   01/26/24 73.5 kg (162 lb)   07/05/23 73.5 kg (162 lb)   06/20/23 73.5 kg (162 lb)   06/15/23 73.7 kg (162 lb 6.4 oz)            Lab Studies:               Results from last 7 days   Lab Units 09/11/24  0500 09/10/24  1217 09/07/24  0516   WBC Thousand/uL 17.45* 31.54* 16.61*   HEMOGLOBIN g/dL 10.3* 11.6 10.3*   HEMATOCRIT % 33.3* 38.5 34.6*   PLATELETS Thousands/uL 646* 739* 553*     Results from last 7 days   Lab Units 09/11/24  0500 09/10/24  1217 09/07/24  0516   POTASSIUM mmol/L 4.1 4.2 3.7   CHLORIDE mmol/L 93* 93* 97   CO2 mmol/L 38* 41* 35*   BUN mg/dL 30* 28* 38*   CREATININE mg/dL 0.40* 0.36* 0.61    CALCIUM mg/dL 8.7 9.4 8.8   ALK PHOS U/L 80 98  --    ALT U/L 9 15  --    AST U/L 16 20  --        Monica Mann PA-C

## 2024-09-11 NOTE — ASSESSMENT & PLAN NOTE
- diagnosed October 10, 2024 with RVR rates 177 bpm  - given Lopressor 5 mg x 3 to help control rates     Plan:  Telemetry now shows NSR with rates with the 80's. She was asymptomatic with lower rates would continue to monitor for now. Continue Eliquis for stroke prevention.

## 2024-09-11 NOTE — PLAN OF CARE
Problem: PHYSICAL THERAPY ADULT  Goal: Performs mobility at highest level of function for planned discharge setting.  See evaluation for individualized goals.  Description: Treatment/Interventions: Functional transfer training, LE strengthening/ROM, Therapeutic exercise, Patient/family training, Equipment eval/education, Bed mobility, Compensatory technique education, Continued evaluation, Spoke to nursing, OT          See flowsheet documentation for full assessment, interventions and recommendations.  Note: Prognosis: Fair  Problem List: Decreased strength, Impaired balance, Decreased mobility, Impaired judgement, Decreased safety awareness, Obesity, Pain  Assessment: Claire Doherty is a 74 y.o. female admitted to [facility] on 9/10/2024 for Acute on chronic respiratory failure with hypoxia and hypercapnia (HCC). PT was consulted and pt was seen on 9/11/2024 for mobility assessment and d/c planning. Pt presents w readmission, medium fall risk, LUE limb alert, multiple lines, acute pain R ribcage. Per patient, at baseline performs stand piv transf w RW to electric wc. Per chart pt comes to [facility] this admission from SNF rehab- pt did not report LOF at rehab. Pt is currently functioning at a max Ax2 for bed mobility and attempted sit<>stand transfers. Pt demonstrated deficits related to safety awareness/effort, strength, balance. Require frequent redirection, education and encouragement to participate during session dt fearfulness of falling. She is requiring increased assist compared to baseline and currently unable to achieve a full stand at this time w limited support/ stability of BLE (R>L) noted. She require occ cuing while sitting unsupported at EOB to regain balance dt posterior lean. Pt will benefit from continued skilled IP PT to address the above mentioned impairments  in order to maximize recovery and increase functional independence when completing mobility and ADLs. The patient's AM-PAC Basic Mobility Inpatient  Short Form Raw Score is 7. A Raw score of less than or equal to 16 suggests the patient may benefit from discharge to post-acute rehabilitation services.  Barriers to Discharge: Decreased caregiver support  Barriers to Discharge Comments: occ home alone- requires A for mobility  Rehab Resource Intensity Level, PT: II (Moderate Resource Intensity)    See flowsheet documentation for full assessment.

## 2024-09-12 LAB
ARTERIAL PATENCY WRIST A: NO
BASE EX.OXY STD BLDV CALC-SCNC: 74.9 % (ref 60–80)
BASE EX.OXY STD BLDV CALC-SCNC: 95 % (ref 60–80)
BASE EXCESS BLDV CALC-SCNC: 13.5 MMOL/L
BASE EXCESS BLDV CALC-SCNC: 18.2 MMOL/L
BUN SERPL-MCNC: 37 MG/DL (ref 5–25)
CALCIUM SERPL-MCNC: 8.8 MG/DL (ref 8.4–10.2)
CHLORIDE SERPL-SCNC: 92 MMOL/L (ref 96–108)
CO2 SERPL-SCNC: >45 MMOL/L (ref 21–32)
CREAT SERPL-MCNC: 0.47 MG/DL (ref 0.6–1.3)
ERYTHROCYTE [DISTWIDTH] IN BLOOD BY AUTOMATED COUNT: 18 % (ref 11.6–15.1)
GFR SERPL CREATININE-BSD FRML MDRD: 97 ML/MIN/1.73SQ M
GLUCOSE SERPL-MCNC: 111 MG/DL (ref 65–140)
HCO3 BLDV-SCNC: 46.5 MMOL/L (ref 24–30)
HCO3 BLDV-SCNC: 46.7 MMOL/L (ref 24–30)
HCT VFR BLD AUTO: 34.5 % (ref 34.8–46.1)
HGB BLD-MCNC: 10.6 G/DL (ref 11.5–15.4)
IPAP: 18
MAGNESIUM SERPL-MCNC: 1.8 MG/DL (ref 1.9–2.7)
MCH RBC QN AUTO: 26.7 PG (ref 26.8–34.3)
MCHC RBC AUTO-ENTMCNC: 30.7 G/DL (ref 31.4–37.4)
MCV RBC AUTO: 87 FL (ref 82–98)
NASAL CANNULA: 5
NON VENT- BIPAP: ABNORMAL
O2 CT BLDV-SCNC: 12 ML/DL
O2 CT BLDV-SCNC: 22.8 ML/DL
PCO2 BLDV: 101.6 MM HG (ref 42–50)
PCO2 BLDV: 78.3 MM HG (ref 42–50)
PEEP MAX SETTING VENT: 6 CM[H2O]
PH BLDV: 7.28 [PH] (ref 7.3–7.4)
PH BLDV: 7.39 [PH] (ref 7.3–7.4)
PLATELET # BLD AUTO: 696 THOUSANDS/UL (ref 149–390)
PMV BLD AUTO: 10.2 FL (ref 8.9–12.7)
PO2 BLDV: 38.8 MM HG (ref 35–45)
PO2 BLDV: 83.9 MM HG (ref 35–45)
POTASSIUM SERPL-SCNC: 3.6 MMOL/L (ref 3.5–5.3)
RBC # BLD AUTO: 3.97 MILLION/UL (ref 3.81–5.12)
SODIUM SERPL-SCNC: 143 MMOL/L (ref 135–147)
VENT BIPAP FIO2: 40 %
WBC # BLD AUTO: 15.3 THOUSAND/UL (ref 4.31–10.16)

## 2024-09-12 PROCEDURE — 94760 N-INVAS EAR/PLS OXIMETRY 1: CPT

## 2024-09-12 PROCEDURE — NC001 PR NO CHARGE: Performed by: INTERNAL MEDICINE

## 2024-09-12 PROCEDURE — 94003 VENT MGMT INPAT SUBQ DAY: CPT

## 2024-09-12 PROCEDURE — 99232 SBSQ HOSP IP/OBS MODERATE 35: CPT

## 2024-09-12 PROCEDURE — 82805 BLOOD GASES W/O2 SATURATION: CPT | Performed by: NURSE PRACTITIONER

## 2024-09-12 PROCEDURE — 94150 VITAL CAPACITY TEST: CPT

## 2024-09-12 PROCEDURE — 94640 AIRWAY INHALATION TREATMENT: CPT

## 2024-09-12 PROCEDURE — 94660 CPAP INITIATION&MGMT: CPT

## 2024-09-12 PROCEDURE — 99232 SBSQ HOSP IP/OBS MODERATE 35: CPT | Performed by: INTERNAL MEDICINE

## 2024-09-12 PROCEDURE — 82805 BLOOD GASES W/O2 SATURATION: CPT

## 2024-09-12 PROCEDURE — 80048 BASIC METABOLIC PNL TOTAL CA: CPT

## 2024-09-12 PROCEDURE — 83735 ASSAY OF MAGNESIUM: CPT

## 2024-09-12 PROCEDURE — 85027 COMPLETE CBC AUTOMATED: CPT

## 2024-09-12 RX ORDER — POTASSIUM CHLORIDE 1500 MG/1
40 TABLET, EXTENDED RELEASE ORAL ONCE
Status: COMPLETED | OUTPATIENT
Start: 2024-09-12 | End: 2024-09-12

## 2024-09-12 RX ORDER — GUAIFENESIN/DEXTROMETHORPHAN 100-10MG/5
10 SYRUP ORAL EVERY 4 HOURS PRN
Status: DISCONTINUED | OUTPATIENT
Start: 2024-09-12 | End: 2024-09-20 | Stop reason: HOSPADM

## 2024-09-12 RX ORDER — GUAIFENESIN 600 MG/1
600 TABLET, EXTENDED RELEASE ORAL EVERY 12 HOURS SCHEDULED
Status: DISCONTINUED | OUTPATIENT
Start: 2024-09-12 | End: 2024-09-20 | Stop reason: HOSPADM

## 2024-09-12 RX ORDER — MAGNESIUM SULFATE HEPTAHYDRATE 40 MG/ML
2 INJECTION, SOLUTION INTRAVENOUS ONCE
Status: COMPLETED | OUTPATIENT
Start: 2024-09-12 | End: 2024-09-12

## 2024-09-12 RX ADMIN — COLCHICINE 0.6 MG: 0.6 TABLET ORAL at 08:59

## 2024-09-12 RX ADMIN — BUDESONIDE INHALATION 0.5 MG: 0.5 SUSPENSION RESPIRATORY (INHALATION) at 08:09

## 2024-09-12 RX ADMIN — PANTOPRAZOLE SODIUM 40 MG: 40 TABLET, DELAYED RELEASE ORAL at 05:02

## 2024-09-12 RX ADMIN — FUROSEMIDE 40 MG: 10 INJECTION, SOLUTION INTRAVENOUS at 16:26

## 2024-09-12 RX ADMIN — MAGNESIUM SULFATE HEPTAHYDRATE 2 G: 40 INJECTION, SOLUTION INTRAVENOUS at 07:57

## 2024-09-12 RX ADMIN — OXYBUTYNIN CHLORIDE 10 MG: 10 TABLET, EXTENDED RELEASE ORAL at 21:09

## 2024-09-12 RX ADMIN — Medication 250 MG: at 08:45

## 2024-09-12 RX ADMIN — CHLORHEXIDINE GLUCONATE 15 ML: 1.2 RINSE ORAL at 08:46

## 2024-09-12 RX ADMIN — DIAZEPAM 5 MG: 5 TABLET ORAL at 21:10

## 2024-09-12 RX ADMIN — BUPROPION HYDROCHLORIDE 75 MG: 75 TABLET, FILM COATED ORAL at 08:45

## 2024-09-12 RX ADMIN — FUROSEMIDE 40 MG: 10 INJECTION, SOLUTION INTRAVENOUS at 08:46

## 2024-09-12 RX ADMIN — GUAIFENESIN AND DEXTROMETHORPHAN 10 ML: 20; 200 SYRUP ORAL at 22:09

## 2024-09-12 RX ADMIN — FLUTICASONE PROPIONATE 2 SPRAY: 50 SPRAY, METERED NASAL at 09:03

## 2024-09-12 RX ADMIN — FORMOTEROL FUMARATE 20 MCG: 20 SOLUTION RESPIRATORY (INHALATION) at 08:09

## 2024-09-12 RX ADMIN — APIXABAN 5 MG: 5 TABLET, FILM COATED ORAL at 08:45

## 2024-09-12 RX ADMIN — FORMOTEROL FUMARATE 20 MCG: 20 SOLUTION RESPIRATORY (INHALATION) at 20:37

## 2024-09-12 RX ADMIN — CHLORHEXIDINE GLUCONATE 15 ML: 1.2 RINSE ORAL at 21:21

## 2024-09-12 RX ADMIN — DIAZEPAM 5 MG: 5 TABLET ORAL at 10:35

## 2024-09-12 RX ADMIN — POTASSIUM CHLORIDE 40 MEQ: 1500 TABLET, EXTENDED RELEASE ORAL at 08:45

## 2024-09-12 RX ADMIN — BUDESONIDE INHALATION 0.5 MG: 0.5 SUSPENSION RESPIRATORY (INHALATION) at 20:37

## 2024-09-12 RX ADMIN — ACETAMINOPHEN 650 MG: 325 TABLET ORAL at 21:10

## 2024-09-12 RX ADMIN — CITALOPRAM HYDROBROMIDE 10 MG: 20 TABLET ORAL at 08:59

## 2024-09-12 NOTE — PROGRESS NOTES
Progress Note - Cardiology   Name: Claire Doherty 74 y.o. female I MRN: [id number]  Unit/Bed#: ICU 13 I Date of Admission: 9/10/2024   Date of Service: 9/12/2024 I Hospital Day: 2    Assessment & Plan  Acute on chronic respiratory failure with hypoxia and hypercapnia ([facility])  Personally reviewed her chest CT images.  She has significant bilateral pleural effusions with compressive atelectasis of the underlying lungs  Would continue with use of BiPAP as needed and at night given her restrictive lung disease, compressive atelectasis and muscular dystrophy  CO2 elevated today on , pH 7.27-has been placed back on BiPAP.  Agree with this.  Follow ABGs  Plan for diuresis for diastolic CHF as described below  Acute on chronic diastolic CHF (congestive heart failure) ([facility])  - TTE 08/21/24: LVEF 65%, grade II diastolic dysfunction, mild LAE, mild TR, trivial pericardial effusion  - BNP 09/10/24: 234    -Recommended IV lasix 40 mg BID  - monitor daily weights and I/O's   - monitor daily BMP  Paroxysmal A-fib ([facility])  - diagnosed October 10, 2024 with RVR rates 177 bpm  Now back in sinus rhythm    Plan:  Continue Eliquis for stroke prevention.   Telemetry reviewed.  Rate is controlled, sinus rhythm now in 80 bpm range  Sepsis ([facility])  - met SIRS criteria with tachycardia and leukocytosis   - prolactin 09/11/24: 5.09 increased from 2.69   - so far legionella and s. pneumoniae negative, blood cultures no growth x 24 hours  History of Idiopathic pericarditis  - diagnosed 08/21/24 at [facility] and transferred to [facility] for cardiac cath (flat troponins but chest pain and ST elevations in inferior leads)  - [facility] 08/21/24: nonobstructive coronary arteries   - CRP 08/21/24: 174  - discharged on colchicine      Plan:  - would continue with colchcine    COPD (chronic obstructive pulmonary disease) ([facility])  - follows with pulmonology as an outpatient  - chronic use of BiPAP at night with with 2-3L of NC during the day   Dystrophy,  muscular, 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HCC)  - follows with neurology  - uses wheelchair/walker and BiPAP at night   Thrombocytosis, unspecified  - chronic thrombocytosis   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)    GERD without esophagitis      PLAN summary  Continue with BiPAP, follow ABGs  Continue with diuresis for bilateral pleural effusions and compressive atelectasis        Subjective:   HPI  She states that her breathing feels stable but her pCO2 is elevated at 101, pH 7.27, has been placed back on BiPAP      Review of Systems: As noted in HPI. Rest of ROS is negative.    Vitals:   /69   Pulse 90   Temp 97.7 °F (36.5 °C) (Oral)   Resp (!) 39   Ht 5' (1.524 m)   Wt 74.8 kg (165 lb)   LMP  (LMP Unknown)   SpO2 99%   BMI 32.22 kg/m²   I/O         09/10 0701 09/11 0700 09/11 0701 09/12 0700 09/12 0701 09/13 0700    P.O. 0 960     I.V. (mL/kg) 30 (0.4)      IV Piggyback 100  50    Total Intake(mL/kg) 130 (1.7) 960 (12.8) 50 (0.7)    Urine (mL/kg/hr) 1675 1250 (0.7) 256 (1.1)    Stool 300 400 0    Total Output 1975 1650 256    Net -1845 -690 -206           Unmeasured Urine Occurrence 2 x            Weight (last 2 days)       Date/Time Weight    09/11/24 1500 74.8 (165)    09/10/24 1600 75.1 (165.57)    09/10/24 1517 75.1 (165.57)    09/10/24 1132 80 (176.37)            Physical Exam   General appearance: On BiPAP  Head: BiPAP is in place  Eyes: conjunctivae/corneas clear. Anicteric.  Neck: no adenopathy, no carotid bruit, no JVD  Lungs: BiPAP associated breath sounds bilaterally with decreased breath sounds in the bases bilaterally  Heart: regular rate and rhythm, S1, S2 normal, no murmur, no click, rub or gallop  Abdomen: soft, bowel sounds normal; no masses, no organomegaly  Extremities: extremities normal, warm and well-perfused; no cyanosis, clubbing, or edema  Skin: Skin color, texture, turgor normal. No rashes or lesions    TELEMETRY: Sinus in the 80s  Lab Results:  Results from  last 7 days   Lab Units 09/12/24  0431   WBC Thousand/uL 15.30*   HEMOGLOBIN g/dL 10.6*   HEMATOCRIT % 34.5*   PLATELETS Thousands/uL 696*     Results from last 7 days   Lab Units 09/12/24  0431 09/11/24  0500   POTASSIUM mmol/L 3.6 4.1   CHLORIDE mmol/L 92* 93*   CO2 mmol/L >45* 38*   BUN mg/dL 37* 30*   CREATININE mg/dL 0.47* 0.40*   CALCIUM mg/dL 8.8 8.7   ALK PHOS U/L  --  80   ALT U/L  --  9   AST U/L  --  16     Results from last 7 days   Lab Units 09/12/24  0431   POTASSIUM mmol/L 3.6   CHLORIDE mmol/L 92*   CO2 mmol/L >45*   BUN mg/dL 37*   CREATININE mg/dL 0.47*   CALCIUM mg/dL 8.8           Medications:    Current Facility-Administered Medications:     acetaminophen (TYLENOL) tablet 650 mg, 650 mg, Oral, Q6H PRN, Maylin Rodriguez PA-C, 650 mg at 09/11/24 2145    albuterol inhalation solution 2.5 mg, 2.5 mg, Nebulization, Q4H PRN, Maylin Rodriguez PA-C    apixaban (ELIQUIS) tablet 5 mg, 5 mg, Oral, BID, Rod Singleton MD, 5 mg at 09/12/24 0845    bisacodyl (DULCOLAX) rectal suppository 10 mg, 10 mg, Rectal, Daily PRN, Maylin Rodriguez PA-C    budesonide (PULMICORT) inhalation solution 0.5 mg, 0.5 mg, Nebulization, Q12H, Maylin Rodriguez PA-C, 0.5 mg at 09/12/24 0809    buPROPion (WELLBUTRIN) tablet 75 mg, 75 mg, Oral, QAM, Maylin Rodriguez PA-C, 75 mg at 09/12/24 0845    calcium carbonate (TUMS) chewable tablet 1,000 mg, 1,000 mg, Oral, Daily PRN, Maylin Rodriguez PA-C    chlorhexidine (PERIDEX) 0.12 % oral rinse 15 mL, 15 mL, Mouth/Throat, Q12H [facility], Rod Singleton MD, 15 mL at 09/12/24 0846    citalopram (CeleXA) tablet 10 mg, 10 mg, Oral, Daily, Maylin Rodriguez PA-C, 10 mg at 09/12/24 0859    colchicine (COLCRYS) tablet 0.6 mg, 0.6 mg, Oral, Daily, Maylin Rodriguez PA-C, 0.6 mg at 09/12/24 0859    diazepam (VALIUM) tablet 5 mg, 5 mg, Oral, Q8H PRN, Maylin Rodriguez PA-C, 5 mg at 09/11/24 1722    fluticasone (FLONASE) 50 mcg/act nasal spray 2 spray, 2 spray, Nasal, Daily, Maylin Rodriguez PA-C, 2  "spray at 09/12/24 0903    formoterol (PERFOROMIST) nebulizer solution 20 mcg, 20 mcg, Nebulization, Q12H, Cyrus Toussaint MD, 20 mcg at 09/12/24 0809    furosemide (LASIX) injection 40 mg, 40 mg, Intravenous, BID (diuretic), Monica Mann, PA-C, 40 mg at 09/12/24 0846    ondansetron (ZOFRAN) injection 4 mg, 4 mg, Intravenous, Q6H PRN, Maylin Rodriguez, PA-C    oxybutynin (DITROPAN-XL) 24 hr tablet 10 mg, 10 mg, Oral, HS, Myalin Rodriguez, PA-C, 10 mg at 09/11/24 2136    pantoprazole (PROTONIX) EC tablet 40 mg, 40 mg, Oral, Early Morning, Maylin Rodriguez, PA-C, 40 mg at 09/12/24 0502    ramelteon (ROZEREM) tablet 8 mg, 8 mg, Oral, HS, Maylin Simsher, PA-C    saccharomyces boulardii (FLORASTOR) capsule 250 mg, 250 mg, Oral, Daily, Maylin Rodriguez, PA-C, 250 mg at 09/12/24 0845    Portions of the record may have been created with voice recognition software. Occasional wrong word or \"sound a like\" substitutions may have occurred due to the inherent limitations of voice recognition software. Read the chart carefully and recognize, using context, where substitutions have occurred.    Cj Rai DO, [facility], FASNC  9/12/2024 10:14 AM      "

## 2024-09-12 NOTE — PROGRESS NOTES
Progress Note - Critical Care/ICU   Name: Claire Doherty 74 y.o. female I MRN: [id number]  Unit/Bed#: ICU 13 I Date of Admission: 9/10/2024   Date of Service: 9/12/2024 I Hospital Day: 2      Assessment & Plan  Acute on chronic respiratory failure with hypoxia and hypercapnia ([facility])  EMS was called for patient at nursing facility for shortness of breath  Found to have CO2 of 70 on VBG with pH 7.336  Patient does utilize BiPAP at night, does have history of muscular dystrophy with restrictive lung disease  9/10 CT Chest: Large bilateral nonspecific pleural effusions and compressive atelectasis. Mild manifestations of underlying centrilobular emphysema.    Plan:   Continue BiPAP at HS  Continue Perforomist/Pulmicort BID  Considering her muscular dystropy ordered NIF  Sepsis ([facility])  Patient met sepsis criteria at time of admission with tachycardia and leukocytosis  CT chest showed  Large bilateral nonspecific pleural effusions and compressive atelectasis. Mild manifestations of underlying centrilobular emphysema.  Procalcitonin elevated  DC Abx no signs of sepsis for now, procal could be a acute phase reactant   Follow-up blood culture   Acute on chronic diastolic CHF (congestive heart failure) ([facility])  Wt Readings from Last 3 Encounters:   09/11/24 74.8 kg (165 lb)   09/07/24 77.8 kg (171 lb 8.3 oz)   08/21/24 80.7 kg (178 lb)     Patient with known chronic diastolic CHF with grade 2 diastolic dysfunction  Presenting with worsening shortness of breath  Chest x-ray/CT concerning for pleural effusions  Not maintained on outpatient diuretic  Cards consulted  IV Lasix 40 mg BID  Daily Is/Os, weights  BiPAP at HS  Paroxysmal A-fib ([facility])  Has new onset Afib, paroxysmal in nature, converting on her own  Start Eliquis 5 mg BID considering her CHA2DS2VASc-3  No need for rate/rhythm control now since patient is in NSR with normal rate per cards  Cards consulted appreciate recommendation  COPD (chronic obstructive pulmonary disease)  ([facility])  Patient follows up with pulmonology as outpatient  Not on any daily maintenance inhaler, uses budesonide/albuterol when she is sick  Uses BiPAP at bedtime after her recent admission otherwise uses 2 to 3 L NC during the  Doing well without any maintenance therapy  Continue BiPAP at HS  Dystrophy, muscular, 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[facility])  He was doing well history of muscular dystrophy  Was previously living alone in an apartment with home health care, able to stand and pivot to wheelchair prior to hospital stay last week  Was discharged to rehab on 9/7/2024  Utilizes BiPAP at night, continue  PT/OT while inpatient  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[facility])  Patient follows outpatient with pulmonology for restrictive lung disease due to muscular dystrophy  Uses BiPAP at night and 2-3 L nasal cannula during the day  Not on any daily maintenance inhaler, uses Pulmicort/albuterol as needed  GERD without esophagitis  Continue PPI  Thrombocytosis, unspecified  Significant thrombocytosis with platelet count 739  Likely reactive although does have history of similar  recommend outpatient with hematology  History of Idiopathic pericarditis  Patient previously hospitalized for chest discomfort, diagnosed with idiopathic pericarditis  Continue colchicine  Outpatient follow-up with cardiology on 9/20/2024  Disposition: Med Surg with Telemetry    ICU Core Measures     A: Assess, Prevent, and Manage Pain Has pain been assessed? Yes  Need for changes to pain regimen? No   B: Both SAT/SAT  N/A   C: Choice of Sedation RASS Goal: 0 Alert and Calm  Need for changes to sedation or analgesia regimen? NA   D: Delirium CAM-ICU: Negative   E: Early Mobility  Plan for early mobility? Yes   F: Family Engagement Plan for family engagement today? Yes         Prophylaxis:  VTE VTE covered by:  apixaban, Oral, 5 mg at 09/11/24 1722       Stress Ulcer  covered bypantoprazole (PROTONIX) 40 mg tablet [[id number]] (Long-Term Med),  pantoprazole (PROTONIX) EC tablet 40 mg [[id number]]         24 Hour Events   24hr events: No significant overnight event, patient didn't use BiPAP at bedtime cause her breathing was better and told no one forced her to use it  Subjective   Review of Systems: Review of Systems   Constitutional:  Negative for chills and fever.   HENT:  Negative for ear pain and sore throat.    Eyes:  Negative for pain and visual disturbance.   Respiratory:  Negative for cough and shortness of breath.    Cardiovascular:  Positive for leg swelling. Negative for chest pain and palpitations.        Rib pain   Gastrointestinal:  Negative for abdominal pain and vomiting.   Genitourinary:  Negative for dysuria and hematuria.   Musculoskeletal:  Negative for arthralgias and back pain.   Skin:  Negative for color change and rash.   Neurological:  Positive for dizziness (when stands up). Negative for seizures and syncope.   All other systems reviewed and are negative.      Objective                          Vitals I/O      Most Recent Min/Max in 24hrs   Temp 97.7 °F (36.5 °C) Temp  Min: 97.7 °F (36.5 °C)  Max: 98.7 °F (37.1 °C)   Pulse 96 Pulse  Min: 46  Max: 96   Resp (!) 32 Resp  Min: 17  Max: 39   /64 BP  Min: 64/40  Max: 162/64   O2 Sat 99 % SpO2  Min: 94 %  Max: 100 %      Intake/Output Summary (Last 24 hours) at 9/12/2024 0826  Last data filed at 9/12/2024 0400  Gross per 24 hour   Intake 720 ml   Output 1575 ml   Net -855 ml       Diet Cardiovascular; Cardiac; Fluid Restriction 1800 ML    Invasive Monitoring           Physical Exam   Physical Exam  Eyes:      General: No allergic shiner.  Skin:     General: Skin is warm.   HENT:      Head: Normocephalic.      Mouth/Throat:      Mouth: Mucous membranes are moist.   Cardiovascular:      Rate and Rhythm: Normal rate and regular rhythm.      Heart sounds: Murmur heard.   Musculoskeletal:      Right lower leg: Trace Edema present.      Left lower leg: Trace Edema present.   Abdominal:  General: An ostomy site is present. There is ostomy site.There is no distension.      Palpations: Abdomen is soft.      Tenderness: There is no abdominal tenderness. There is no guarding.   Constitutional:       General: She is not in acute distress.     Appearance: She is underdeveloped and malnourished.   Pulmonary:      Effort: No respiratory distress.      Breath sounds: Examination of the right-lower field reveals decreased breath sounds. Examination of the left-lower field reveals decreased breath sounds. Decreased breath sounds present. No wheezing, rhonchi or rales.   Neurological:      Mental Status: She is alert and oriented to person, place and time.   Genitourinary/Anorectal:  external catheter present.        Diagnostic Studies        Lab Results: I have reviewed the following results:      Medications:  Scheduled PRN   apixaban, 5 mg, BID  budesonide, 0.5 mg, Q12H  buPROPion, 75 mg, QAM  chlorhexidine, 15 mL, Q12H NANCY  citalopram, 10 mg, Daily  colchicine, 0.6 mg, Daily  fluticasone, 2 spray, Daily  formoterol, 20 mcg, Q12H  furosemide, 40 mg, BID (diuretic)  magnesium sulfate, 2 g, Once  oxybutynin, 10 mg, HS  pantoprazole, 40 mg, Early Morning  potassium chloride, 40 mEq, Once  ramelteon, 8 mg, HS  saccharomyces boulardii, 250 mg, Daily      acetaminophen, 650 mg, Q6H PRN  albuterol, 2.5 mg, Q4H PRN  bisacodyl, 10 mg, Daily PRN  calcium carbonate, 1,000 mg, Daily PRN  diazepam, 5 mg, Q8H PRN  ondansetron, 4 mg, Q6H PRN       Continuous          Labs:   CBC    Recent Labs     09/11/24  0500 09/12/24  0431   WBC 17.45* 15.30*   HGB 10.3* 10.6*   HCT 33.3* 34.5*   * 696*     BMP    Recent Labs     09/10/24  1217 09/11/24  0500 09/12/24  0431   SODIUM 139 140 143   K 4.2 4.1 3.6   CL 93* 93* 92*   CO2 41* 38* >45*   AGAP 5 9  --    BUN 28* 30* 37*   CREATININE 0.36* 0.40* 0.47*   CALCIUM 9.4 8.7 8.8       Coags    Recent Labs     09/10/24  1224   INR 1.44*   PTT 44*        Additional  Electrolytes  Recent Labs     09/10/24  1224 09/12/24  0431   MG 1.7* 1.8*          Blood Gas    No recent results  Recent Labs     09/10/24  1224   PHVEN 7.336   [id number] 70.5*   PO2VEN 66.3*   [id number] 36.8*   BEVEN 8.5   [id number] 90.7*    LFTs  Recent Labs     09/10/24  1217 09/11/24  0500   ALT 15 9   AST 20 16   ALKPHOS 98 80   ALB 3.2* 2.9*   TBILI 0.42 0.34       Infectious  Recent Labs     09/10/24  1224 09/11/24  0500   PROCALCITONI 2.69* 5.09*     Glucose  Recent Labs     09/10/24  1217 09/11/24  0500 09/12/24  0431   GLUC 99 52* 111

## 2024-09-12 NOTE — ASSESSMENT & PLAN NOTE
Personally reviewed her chest CT images.  She has significant bilateral pleural effusions with compressive atelectasis of the underlying lungs  Would continue with use of BiPAP as needed and at night given her restrictive lung disease, compressive atelectasis and muscular dystrophy  CO2 elevated today on , pH 7.27-has been placed back on BiPAP.  Agree with this.  Follow ABGs  Plan for diuresis for diastolic CHF as described below

## 2024-09-12 NOTE — ASSESSMENT & PLAN NOTE
- met SIRS criteria with tachycardia and leukocytosis   - prolactin 09/11/24: 5.09 increased from 2.69   - so far legionella and s. pneumoniae negative, blood cultures no growth x 24 hours

## 2024-09-12 NOTE — RESPIRATORY THERAPY NOTE
kalen    Respiratory Progress Note    Patient Name: Claire Doherty  : 1950 Age: 74 y.o., female   Unit/Bed#: ICU 13   MRN: [id number]  Encounter: [id number]  Date: 2024    Vitals: Blood pressure 146/64, pulse 96, temperature 97.7 °F (36.5 °C), temperature source Oral, resp. rate (!) 32, height 5' (1.524 m), weight 74.8 kg (165 lb), SpO2 99%, not currently breastfeeding.,Body mass index is 32.22 kg/m².    SPO2 RA Rest      Flowsheet Row ED to Hosp-Admission (Current) from 9/10/2024 in [facility] Intensive [facility] Unit   SpO2 99 %   SpO2 Activity At Rest   O2 Device Nasal cannula   O2 Flow Rate --            Breath Sounds:  Clear to auscultation bilaterally, respirations unlabored  Respiratory: Negative for - cough, hemoptysis, orthopnea, shortness of breath, sputum changes, tachypnea or wheezing    Additional Notes- NIF: -55, VC 400ml    Cj Donis, RT

## 2024-09-12 NOTE — PLAN OF CARE
Problem: Potential for Falls  Goal: Patient will remain free of falls  Description: INTERVENTIONS:  - Educate patient/family on patient safety including physical limitations  - Instruct patient to call for assistance with activity   - Consult OT/PT to assist with strengthening/mobility   - Keep Call bell within reach  - Keep bed low and locked with side rails adjusted as appropriate  - Keep care items and personal belongings within reach  - Initiate and maintain comfort rounds  - Make Fall Risk Sign visible to staff  - Offer Toileting every 2 Hours, in advance of need  - Initiate/Maintain bed alarm  - Obtain necessary fall risk management equipment:   - Apply yellow socks and bracelet for high fall risk patients  - Consider moving patient to room near nurses station  Outcome: Progressing     Problem: PAIN - ADULT  Goal: Verbalizes/displays adequate comfort level or baseline comfort level  Description: Interventions:  - Encourage patient to monitor pain and request assistance  - Assess pain using appropriate pain scale  - Administer analgesics based on type and severity of pain and evaluate response  - Implement non-pharmacological measures as appropriate and evaluate response  - Consider cultural and social influences on pain and pain management  - Notify physician/advanced practitioner if interventions unsuccessful or patient reports new pain  Outcome: Progressing     Problem: INFECTION - ADULT  Goal: Absence or prevention of progression during hospitalization  Description: INTERVENTIONS:  - Assess and monitor for signs and symptoms of infection  - Monitor lab/diagnostic results  - Monitor all insertion sites, i.e. indwelling lines, tubes, and drains  - Monitor endotracheal if appropriate and nasal secretions for changes in amount and color  - [place] appropriate cooling/warming therapies per order  - Administer medications as ordered  - Instruct and encourage patient and family to use good hand hygiene  technique  - Identify and instruct in appropriate isolation precautions for identified infection/condition  Outcome: Progressing  Goal: Absence of fever/infection during neutropenic period  Description: INTERVENTIONS:  - Monitor WBC    Outcome: Progressing     Problem: SAFETY ADULT  Goal: Patient will remain free of falls  Description: INTERVENTIONS:  - Educate patient/family on patient safety including physical limitations  - Instruct patient to call for assistance with activity   - Consult OT/PT to assist with strengthening/mobility   - Keep Call bell within reach  - Keep bed low and locked with side rails adjusted as appropriate  - Keep care items and personal belongings within reach  - Initiate and maintain comfort rounds  - Make Fall Risk Sign visible to staff  - Offer Toileting every 2 Hours, in advance of need  - Initiate/Maintain bed alarm  - Obtain necessary fall risk management equipment:   - Apply yellow socks and bracelet for high fall risk patients  - Consider moving patient to room near nurses station  Outcome: Progressing  Goal: Maintain or return to baseline ADL function  Description: INTERVENTIONS:  -  Assess patient's ability to carry out ADLs; assess patient's baseline for ADL function and identify physical deficits which impact ability to perform ADLs (bathing, care of mouth/teeth, toileting, grooming, dressing, etc.)  - Assess/evaluate cause of self-care deficits   - Assess range of motion  - Assess patient's mobility; develop plan if impaired  - Assess patient's need for assistive devices and provide as appropriate  - Encourage maximum independence but intervene and supervise when necessary  - Involve family in performance of ADLs  - Assess for home care needs following discharge   - Consider OT consult to assist with ADL evaluation and planning for discharge  - Provide patient education as appropriate  Outcome: Progressing  Goal: Maintains/Returns to pre admission functional  level  Description: INTERVENTIONS:  - Perform AM-PAC 6 Click Basic Mobility/ Daily Activity assessment daily.  - Set and communicate daily mobility goal to care team and patient/family/caregiver.   - Collaborate with rehabilitation services on mobility goals if consulted  - Perform Range of Motion 2 times a day.  - Reposition patient every 2 hours.  - Out of bed to chair 3 times a day   - Out of bed for meals 2 times a day  - Out of bed for toileting  - Record patient progress and toleration of activity level   Outcome: Progressing     Problem: DISCHARGE PLANNING  Goal: Discharge to home or other facility with appropriate resources  Description: INTERVENTIONS:  - Identify barriers to discharge w/patient and caregiver  - Arrange for needed discharge resources and transportation as appropriate  - Identify discharge learning needs (meds, wound care, etc.)  - Arrange for interpretive services to assist at discharge as needed  - Refer to Case Management Department for coordinating discharge planning if the patient needs post-hospital services based on physician/advanced practitioner order or complex needs related to functional status, cognitive ability, or social support system  Outcome: Progressing     Problem: Knowledge Deficit  Goal: Patient/family/caregiver demonstrates understanding of disease process, treatment plan, medications, and discharge instructions  Description: Complete learning assessment and assess knowledge base.  Interventions:  - Provide teaching at level of understanding  - Provide teaching via preferred learning methods  Outcome: Progressing     Problem: RESPIRATORY - ADULT  Goal: Achieves optimal ventilation and oxygenation  Description: INTERVENTIONS:  - Assess for changes in respiratory status  - Assess for changes in mentation and behavior  - Position to facilitate oxygenation and minimize respiratory effort  - Oxygen administered by appropriate delivery if ordered  - Initiate smoking cessation  education as indicated  - Encourage broncho-pulmonary hygiene including cough, deep breathe, Incentive Spirometry  - Assess the need for suctioning and aspirate as needed  - Assess and instruct to report SOB or any respiratory difficulty  - Respiratory Therapy support as indicated  Outcome: Progressing     Problem: Nutrition/Hydration-ADULT  Goal: Nutrient/Hydration intake appropriate for improving, restoring or maintaining nutritional needs  Description: Monitor and assess patient's nutrition/hydration status for malnutrition. Collaborate with interdisciplinary team and initiate plan and interventions as ordered.  Monitor patient's weight and dietary intake as ordered or per policy. Utilize nutrition screening tool and intervene as necessary. Determine patient's food preferences and provide high-protein, high-caloric foods as appropriate.     INTERVENTIONS:  - Monitor oral intake, urinary output, labs, and treatment plans  - Assess nutrition and hydration status and recommend course of action  - Evaluate amount of meals eaten  - Assist patient with eating if necessary   - Allow adequate time for meals  - Recommend/ encourage appropriate diets, oral nutritional supplements, and vitamin/mineral supplements  - Order, calculate, and assess calorie counts as needed  - Recommend, monitor, and adjust tube feedings and TPN/PPN based on assessed needs  - Assess need for intravenous fluids  - Provide specific nutrition/hydration education as appropriate  - Include patient/family/caregiver in decisions related to nutrition  Outcome: Progressing     Problem: Prexisting or High Potential for Compromised Skin Integrity  Goal: Skin integrity is maintained or improved  Description: INTERVENTIONS:  - Identify patients at risk for skin breakdown  - Assess and monitor skin integrity  - Assess and monitor nutrition and hydration status  - Monitor labs   - Assess for incontinence   - Turn and reposition patient  - Assist with  mobility/ambulation  - Relieve pressure over bony prominences  - Avoid friction and shearing  - Provide appropriate hygiene as needed including keeping skin clean and dry  - Evaluate need for skin moisturizer/barrier cream  - Collaborate with interdisciplinary team   - Patient/family teaching  - Consider wound care consult   Outcome: Progressing

## 2024-09-12 NOTE — PLAN OF CARE
Problem: Potential for Falls  Goal: Patient will remain free of falls  Description: INTERVENTIONS:  - Educate patient/family on patient safety including physical limitations  - Instruct patient to call for assistance with activity   - Consult OT/PT to assist with strengthening/mobility   - Keep Call bell within reach  - Keep bed low and locked with side rails adjusted as appropriate  - Keep care items and personal belongings within reach  - Initiate and maintain comfort rounds  - Make Fall Risk Sign visible to staff  - Offer Toileting every 2 Hours, in advance of need  - Initiate/Maintain bed alarm  - Obtain necessary fall risk management equipment:   - Apply yellow socks and bracelet for high fall risk patients  - Consider moving patient to room near nurses station  Outcome: Progressing     Problem: PAIN - ADULT  Goal: Verbalizes/displays adequate comfort level or baseline comfort level  Description: Interventions:  - Encourage patient to monitor pain and request assistance  - Assess pain using appropriate pain scale  - Administer analgesics based on type and severity of pain and evaluate response  - Implement non-pharmacological measures as appropriate and evaluate response  - Consider cultural and social influences on pain and pain management  - Notify physician/advanced practitioner if interventions unsuccessful or patient reports new pain  Outcome: Progressing     Problem: INFECTION - ADULT  Goal: Absence or prevention of progression during hospitalization  Description: INTERVENTIONS:  - Assess and monitor for signs and symptoms of infection  - Monitor lab/diagnostic results  - Monitor all insertion sites, i.e. indwelling lines, tubes, and drains  - Monitor endotracheal if appropriate and nasal secretions for changes in amount and color  - [place] appropriate cooling/warming therapies per order  - Administer medications as ordered  - Instruct and encourage patient and family to use good hand hygiene  technique  - Identify and instruct in appropriate isolation precautions for identified infection/condition  Outcome: Progressing  Goal: Absence of fever/infection during neutropenic period  Description: INTERVENTIONS:  - Monitor WBC    Outcome: Progressing     Problem: SAFETY ADULT  Goal: Patient will remain free of falls  Description: INTERVENTIONS:  - Educate patient/family on patient safety including physical limitations  - Instruct patient to call for assistance with activity   - Consult OT/PT to assist with strengthening/mobility   - Keep Call bell within reach  - Keep bed low and locked with side rails adjusted as appropriate  - Keep care items and personal belongings within reach  - Initiate and maintain comfort rounds  - Make Fall Risk Sign visible to staff  - Offer Toileting every 2 Hours, in advance of need  - Initiate/Maintain bed alarm  - Obtain necessary fall risk management equipment:   - Apply yellow socks and bracelet for high fall risk patients  - Consider moving patient to room near nurses station  Outcome: Progressing  Goal: Maintain or return to baseline ADL function  Description: INTERVENTIONS:  -  Assess patient's ability to carry out ADLs; assess patient's baseline for ADL function and identify physical deficits which impact ability to perform ADLs (bathing, care of mouth/teeth, toileting, grooming, dressing, etc.)  - Assess/evaluate cause of self-care deficits   - Assess range of motion  - Assess patient's mobility; develop plan if impaired  - Assess patient's need for assistive devices and provide as appropriate  - Encourage maximum independence but intervene and supervise when necessary  - Involve family in performance of ADLs  - Assess for home care needs following discharge   - Consider OT consult to assist with ADL evaluation and planning for discharge  - Provide patient education as appropriate  Outcome: Progressing  Goal: Maintains/Returns to pre admission functional  level  Description: INTERVENTIONS:  - Perform AM-PAC 6 Click Basic Mobility/ Daily Activity assessment daily.  - Set and communicate daily mobility goal to care team and patient/family/caregiver.   - Collaborate with rehabilitation services on mobility goals if consulted  - Perform Range of Motion 2 times a day.  - Reposition patient every 3 hours.  - Dangle patient 3 times a day  - Stand patient 3 times a day  - Ambulate patient 3 times a day  - Out of bed to chair 3 times a day   - Out of bed for meals 3 times a day  - Out of bed for toileting  - Record patient progress and toleration of activity level   Outcome: Progressing     Problem: DISCHARGE PLANNING  Goal: Discharge to home or other facility with appropriate resources  Description: INTERVENTIONS:  - Identify barriers to discharge w/patient and caregiver  - Arrange for needed discharge resources and transportation as appropriate  - Identify discharge learning needs (meds, wound care, etc.)  - Arrange for interpretive services to assist at discharge as needed  - Refer to Case Management Department for coordinating discharge planning if the patient needs post-hospital services based on physician/advanced practitioner order or complex needs related to functional status, cognitive ability, or social support system  Outcome: Progressing     Problem: Knowledge Deficit  Goal: Patient/family/caregiver demonstrates understanding of disease process, treatment plan, medications, and discharge instructions  Description: Complete learning assessment and assess knowledge base.  Interventions:  - Provide teaching at level of understanding  - Provide teaching via preferred learning methods  Outcome: Progressing     Problem: RESPIRATORY - ADULT  Goal: Achieves optimal ventilation and oxygenation  Description: INTERVENTIONS:  - Assess for changes in respiratory status  - Assess for changes in mentation and behavior  - Position to facilitate oxygenation and minimize  respiratory effort  - Oxygen administered by appropriate delivery if ordered  - Initiate smoking cessation education as indicated  - Encourage broncho-pulmonary hygiene including cough, deep breathe, Incentive Spirometry  - Assess the need for suctioning and aspirate as needed  - Assess and instruct to report SOB or any respiratory difficulty  - Respiratory Therapy support as indicated  Outcome: Progressing     Problem: Nutrition/Hydration-ADULT  Goal: Nutrient/Hydration intake appropriate for improving, restoring or maintaining nutritional needs  Description: Monitor and assess patient's nutrition/hydration status for malnutrition. Collaborate with interdisciplinary team and initiate plan and interventions as ordered.  Monitor patient's weight and dietary intake as ordered or per policy. Utilize nutrition screening tool and intervene as necessary. Determine patient's food preferences and provide high-protein, high-caloric foods as appropriate.     INTERVENTIONS:  - Monitor oral intake, urinary output, labs, and treatment plans  - Assess nutrition and hydration status and recommend course of action  - Evaluate amount of meals eaten  - Assist patient with eating if necessary   - Allow adequate time for meals  - Recommend/ encourage appropriate diets, oral nutritional supplements, and vitamin/mineral supplements  - Order, calculate, and assess calorie counts as needed  - Recommend, monitor, and adjust tube feedings and TPN/PPN based on assessed needs  - Assess need for intravenous fluids  - Provide specific nutrition/hydration education as appropriate  - Include patient/family/caregiver in decisions related to nutrition  Outcome: Progressing     Problem: Prexisting or High Potential for Compromised Skin Integrity  Goal: Skin integrity is maintained or improved  Description: INTERVENTIONS:  - Identify patients at risk for skin breakdown  - Assess and monitor skin integrity  - Assess and monitor nutrition and hydration  status  - Monitor labs   - Assess for incontinence   - Turn and reposition patient  - Assist with mobility/ambulation  - Relieve pressure over bony prominences  - Avoid friction and shearing  - Provide appropriate hygiene as needed including keeping skin clean and dry  - Evaluate need for skin moisturizer/barrier cream  - Collaborate with interdisciplinary team   - Patient/family teaching  - Consider wound care consult   Outcome: Progressing

## 2024-09-12 NOTE — ASSESSMENT & PLAN NOTE
- diagnosed 08/21/24 at [facility] and transferred to [facility] for cardiac cath (flat troponins but chest pain and ST elevations in inferior leads)  - C 08/21/24: nonobstructive coronary arteries   - CRP 08/21/24: 174  - discharged on colchicine      Plan:  - would continue with colchcine

## 2024-09-12 NOTE — PROGRESS NOTES
Progress Note - Critical Care/ICU   Name: Claire Doherty 74 y.o. female I MRN: [id number]  Unit/Bed#: ICU 13 I Date of Admission: 9/10/2024   Date of Service: 9/12/2024 I Hospital Day: 2       Critical Care Interval Transfer Note:    Brief Hospital Summary:    A 74 year old female presented with worsening SOB, she has a h/o muscular dystrophy follows up with pulmonology as outpatient, she was recently admitted in the hospital for groin infection at the site of cardiac cath and surgeon and was treated with antibiotics, and discharge with BiPAP at bedtime.  On arrival patient's pH was 7.2 with CO2 of 74 started on continuous BiPAP with improvement in respiratory acidosis, transition to BiPAP at bedtime/as needed.  On chest x-ray/CT chest there was evidence of bilateral pleural effusion, trial of Lasix was given in ED, continued diuresis as per volume status, patient also developed new onset paroxysmal A-fib with conversion to normal sinus rhythm by itself, cardiology is consulted, started patient on Lasix and DOAC.  Considering her muscular dystrophy history and NIF was ordered which is 20 with VC of 200.    Recommendation:  Continue IV Lasix 40 mg twice daily as per cardiology  Continue DOAC, price check done by case management no copayment needed  Monitor telemetry  Must use BiPAP at bedtime  Patient is currently level 2, agreeable for intubation/tracheostomy if required, currently not in respiratory distress and improvement in breathing would hold off on intubation chest tube placed on NIF, but would be a low threshold for intubation in respiratory distress    Barriers to discharge:   Clearance from cardiology  Further monitoring/stabilization of her respiratory failure     Consults: IP CONSULT TO PULMONOLOGY  IP CONSULT TO MEDICAL CRITICAL CARE  IP CONSULT TO CASE MANAGEMENT  IP CONSULT TO CARDIOLOGY    Recommended to review admission imaging for incidental findings and document in discharge navigator: Chart  reviewed, no known incidental findings noted at this time.           Patient seen and evaluated by Critical Care today and deemed to be appropriate for transfer to [facility] with Telemetry. Spoke to Dr Bustos from [facility] to accept transfer. Critical care can be contacted via SecureChat with any questions or concerns.

## 2024-09-12 NOTE — ASSESSMENT & PLAN NOTE
- TTE 08/21/24: LVEF 65%, grade II diastolic dysfunction, mild LAE, mild TR, trivial pericardial effusion  - BNP 09/10/24: 234    -Recommended IV lasix 40 mg BID  - monitor daily weights and I/O's   - monitor daily BMP

## 2024-09-12 NOTE — ASSESSMENT & PLAN NOTE
- diagnosed October 10, 2024 with RVR rates 177 bpm  Now back in sinus rhythm    Plan:  Continue Eliquis for stroke prevention.   Telemetry reviewed.  Rate is controlled, sinus rhythm now in 80 bpm range

## 2024-09-12 NOTE — CASE MANAGEMENT
Case Management Discharge Planning Note    Patient name Claire Doherty  Location ICU 13/ICU 13 MRN [id number]  : 1950 Date 2024       Current Admission Date: 9/10/2024  Current Admission Diagnosis:Acute on chronic respiratory failure with hypoxia and hypercapnia ([facility])   Patient Active Problem List    Diagnosis Date Noted Date Diagnosed    Paroxysmal A-fib ([facility]) 2024     Acute on chronic diastolic CHF (congestive heart failure) ([facility]) 09/10/2024     Acute on chronic respiratory failure with hypoxia and hypercapnia ([facility]) 09/10/2024     Severe protein-calorie malnutrition ([facility]) 2024     DINA (acute kidney injury) ([facility]) 2024     Nausea 2024     Pleural effusion 2024     Pericardial effusion 2024     Infected wound 2024     Chest pain 2024     History of Idiopathic pericarditis 2024     Cellulitis of right foot 2024     Chronic left shoulder pain 2024     Shoulder joint dysfunction 2024     COPD (chronic obstructive pulmonary disease) ([facility]) 06/15/2023     Bilateral hand pain 2023     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[facility]) 2022     Gastroparesis 2021     Aortic valve sclerosis 2021     Tricuspid valve insufficiency 2021     Mitral annular calcification 2021     Former smoker 2021     On home oxygen therapy 2021     Colostomy status ([facility])      Ductal carcinoma in situ (DCIS) of left breast 03/15/2021     Insomnia 10/21/2020     Depression 10/06/2020     Anemia 10/02/2020     Chronic hypoxic respiratory failure ([facility]) 2020     Bladder injury 2020     Sepsis ([facility]) 2020     Thrombocytosis, unspecified 2020     Leukocytosis 2020     Difficult intubation      Dystrophy, muscular, 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[facility]) 10/12/2017     Ambulatory dysfunction 10/12/2017     Urinary incontinence 10/03/2017     Osteoporosis 2016     Allergic rhinitis 10/09/2013     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 ([facility]) 10/04/2012     GERD without esophagitis 10/04/2012       LOS (days): 2  Geometric Mean LOS (GMLOS) (days): 3.9  Days to GMLOS:1.8     OBJECTIVE:  Risk of Unplanned Readmission Score: 27.76         Current admission status: Inpatient   Preferred Pharmacy:   [id number] [address] [address]  Phone: [phone] Fax: [phone]    Primary Care Provider: Julienne Tucker DO    Primary Insurance: Count includes the [facility]  Secondary Insurance:     DISCHARGE DETAILS:       CM contacted family/caregiver?: Yes  Were Treatment Team discharge recommendations reviewed with patient/caregiver?: Yes  Did patient/caregiver verbalize understanding of patient care needs?: N/A- going to facility  Were patient/caregiver advised of the risks associated with not following Treatment Team discharge recommendations?: Yes    Contacts  Patient Contacts: Harper Doherty sister  Relationship to Patient:: Family  Contact Method: Phone  Phone Number: [phone]  Reason/Outcome: Discharge Planning          Additional Comments: Patient remains in the ICU with continuing critical care measures. CM department will continue to follow the patient for disposition planning and assistance where needed.    Accepting Facility Name, City & State : [place] [address]  Receiving Facility/Agency Phone Number: Phone: [phone]  Facility/Agency Fax Number: Fax: [phone]

## 2024-09-13 ENCOUNTER — APPOINTMENT (INPATIENT)
Dept: RADIOLOGY | Facility: HOSPITAL | Age: 74
DRG: 291 | End: 2024-09-13
Payer: COMMERCIAL

## 2024-09-13 PROBLEM — A41.9 SEPSIS (HCC): Status: RESOLVED | Noted: 2020-09-28 | Resolved: 2024-09-13

## 2024-09-13 LAB
ANISOCYTOSIS BLD QL SMEAR: PRESENT
BASO STIPL BLD QL SMEAR: PRESENT
BASOPHILS # BLD MANUAL: 0.13 THOUSAND/UL (ref 0–0.1)
BASOPHILS NFR MAR MANUAL: 1 % (ref 0–1)
BUN SERPL-MCNC: 38 MG/DL (ref 5–25)
CALCIUM SERPL-MCNC: 8.7 MG/DL (ref 8.4–10.2)
CHLORIDE SERPL-SCNC: 86 MMOL/L (ref 96–108)
CO2 SERPL-SCNC: >45 MMOL/L (ref 21–32)
CREAT SERPL-MCNC: 0.48 MG/DL (ref 0.6–1.3)
EOSINOPHIL # BLD MANUAL: 0 THOUSAND/UL (ref 0–0.4)
EOSINOPHIL NFR BLD MANUAL: 0 % (ref 0–6)
ERYTHROCYTE [DISTWIDTH] IN BLOOD BY AUTOMATED COUNT: 17.3 % (ref 11.6–15.1)
GFR SERPL CREATININE-BSD FRML MDRD: 96 ML/MIN/1.73SQ M
GLUCOSE SERPL-MCNC: 105 MG/DL (ref 65–140)
HCT VFR BLD AUTO: 35 % (ref 34.8–46.1)
HGB BLD-MCNC: 10.7 G/DL (ref 11.5–15.4)
LDH SERPL-CCNC: 233 U/L (ref 140–271)
LYMPHOCYTES # BLD AUTO: 2.51 THOUSAND/UL (ref 0.6–4.47)
LYMPHOCYTES # BLD AUTO: 20 % (ref 14–44)
MAGNESIUM SERPL-MCNC: 1.9 MG/DL (ref 1.9–2.7)
MCH RBC QN AUTO: 25.8 PG (ref 26.8–34.3)
MCHC RBC AUTO-ENTMCNC: 30.6 G/DL (ref 31.4–37.4)
MCV RBC AUTO: 84 FL (ref 82–98)
MONOCYTES # BLD AUTO: 1.51 THOUSAND/UL (ref 0–1.22)
MONOCYTES NFR BLD: 12 % (ref 4–12)
NEUTROPHILS # BLD MANUAL: 8.42 THOUSAND/UL (ref 1.85–7.62)
NEUTS SEG NFR BLD AUTO: 67 % (ref 43–75)
PHOSPHATE SERPL-MCNC: 2 MG/DL (ref 2.3–4.1)
PLATELET # BLD AUTO: 669 THOUSANDS/UL (ref 149–390)
PLATELET BLD QL SMEAR: ABNORMAL
PMV BLD AUTO: 10.1 FL (ref 8.9–12.7)
POLYCHROMASIA BLD QL SMEAR: PRESENT
POTASSIUM SERPL-SCNC: 4.3 MMOL/L (ref 3.5–5.3)
RBC # BLD AUTO: 4.15 MILLION/UL (ref 3.81–5.12)
RBC MORPH BLD: PRESENT
SODIUM SERPL-SCNC: 141 MMOL/L (ref 135–147)
SPHEROCYTES BLD QL SMEAR: PRESENT
WBC # BLD AUTO: 12.56 THOUSAND/UL (ref 4.31–10.16)

## 2024-09-13 PROCEDURE — 85027 COMPLETE CBC AUTOMATED: CPT

## 2024-09-13 PROCEDURE — 76937 US GUIDE VASCULAR ACCESS: CPT

## 2024-09-13 PROCEDURE — 99232 SBSQ HOSP IP/OBS MODERATE 35: CPT

## 2024-09-13 PROCEDURE — 94760 N-INVAS EAR/PLS OXIMETRY 1: CPT

## 2024-09-13 PROCEDURE — 83615 LACTATE (LD) (LDH) ENZYME: CPT | Performed by: INTERNAL MEDICINE

## 2024-09-13 PROCEDURE — 76604 US EXAM CHEST: CPT | Performed by: PHYSICIAN ASSISTANT

## 2024-09-13 PROCEDURE — 99232 SBSQ HOSP IP/OBS MODERATE 35: CPT | Performed by: INTERNAL MEDICINE

## 2024-09-13 PROCEDURE — 94640 AIRWAY INHALATION TREATMENT: CPT

## 2024-09-13 PROCEDURE — 94150 VITAL CAPACITY TEST: CPT

## 2024-09-13 PROCEDURE — 84100 ASSAY OF PHOSPHORUS: CPT

## 2024-09-13 PROCEDURE — 83735 ASSAY OF MAGNESIUM: CPT

## 2024-09-13 PROCEDURE — 99233 SBSQ HOSP IP/OBS HIGH 50: CPT | Performed by: INTERNAL MEDICINE

## 2024-09-13 PROCEDURE — 80048 BASIC METABOLIC PNL TOTAL CA: CPT

## 2024-09-13 PROCEDURE — 85007 BL SMEAR W/DIFF WBC COUNT: CPT

## 2024-09-13 RX ADMIN — GUAIFENESIN 600 MG: 600 TABLET, EXTENDED RELEASE ORAL at 08:53

## 2024-09-13 RX ADMIN — DIAZEPAM 5 MG: 5 TABLET ORAL at 21:47

## 2024-09-13 RX ADMIN — CHLORHEXIDINE GLUCONATE 15 ML: 1.2 RINSE ORAL at 21:47

## 2024-09-13 RX ADMIN — COLCHICINE 0.6 MG: 0.6 TABLET ORAL at 08:53

## 2024-09-13 RX ADMIN — APIXABAN 5 MG: 5 TABLET, FILM COATED ORAL at 17:01

## 2024-09-13 RX ADMIN — FUROSEMIDE 40 MG: 10 INJECTION, SOLUTION INTRAVENOUS at 17:01

## 2024-09-13 RX ADMIN — PANTOPRAZOLE SODIUM 40 MG: 40 TABLET, DELAYED RELEASE ORAL at 06:22

## 2024-09-13 RX ADMIN — FUROSEMIDE 40 MG: 10 INJECTION, SOLUTION INTRAVENOUS at 08:54

## 2024-09-13 RX ADMIN — CHLORHEXIDINE GLUCONATE 15 ML: 1.2 RINSE ORAL at 08:55

## 2024-09-13 RX ADMIN — FORMOTEROL FUMARATE 20 MCG: 20 SOLUTION RESPIRATORY (INHALATION) at 19:54

## 2024-09-13 RX ADMIN — GUAIFENESIN 600 MG: 600 TABLET, EXTENDED RELEASE ORAL at 21:47

## 2024-09-13 RX ADMIN — CITALOPRAM HYDROBROMIDE 10 MG: 20 TABLET ORAL at 08:53

## 2024-09-13 RX ADMIN — BUDESONIDE INHALATION 0.5 MG: 0.5 SUSPENSION RESPIRATORY (INHALATION) at 19:54

## 2024-09-13 RX ADMIN — BUDESONIDE INHALATION 0.5 MG: 0.5 SUSPENSION RESPIRATORY (INHALATION) at 07:31

## 2024-09-13 RX ADMIN — OXYBUTYNIN CHLORIDE 10 MG: 10 TABLET, EXTENDED RELEASE ORAL at 21:47

## 2024-09-13 RX ADMIN — APIXABAN 5 MG: 5 TABLET, FILM COATED ORAL at 08:53

## 2024-09-13 RX ADMIN — FORMOTEROL FUMARATE 20 MCG: 20 SOLUTION RESPIRATORY (INHALATION) at 07:31

## 2024-09-13 RX ADMIN — BUPROPION HYDROCHLORIDE 75 MG: 75 TABLET, FILM COATED ORAL at 08:54

## 2024-09-13 RX ADMIN — Medication 250 MG: at 08:53

## 2024-09-13 NOTE — ASSESSMENT & PLAN NOTE
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

## 2024-09-13 NOTE — ASSESSMENT & PLAN NOTE
Wt Readings from Last 3 Encounters:   09/13/24 74 kg (163 lb 2.3 oz)   09/07/24 77.8 kg (171 lb 8.3 oz)   08/21/24 80.7 kg (178 lb)   - improved with diuresis  - Cont. Diuresis per cardiology  - Fluid/salt restrict.  - trend weight

## 2024-09-13 NOTE — PLAN OF CARE
Problem: Potential for Falls  Goal: Patient will remain free of falls  Description: INTERVENTIONS:  - Educate patient/family on patient safety including physical limitations  - Instruct patient to call for assistance with activity   - Consult OT/PT to assist with strengthening/mobility   - Keep Call bell within reach  - Keep bed low and locked with side rails adjusted as appropriate  - Keep care items and personal belongings within reach  - Initiate and maintain comfort rounds  - Make Fall Risk Sign visible to staff  - Offer Toileting every 2 Hours, in advance of need  - Initiate/Maintain bed alarm  - Obtain necessary fall risk management equipment:   - Apply yellow socks and bracelet for high fall risk patients  - Consider moving patient to room near nurses station  Outcome: Progressing     Problem: PAIN - ADULT  Goal: Verbalizes/displays adequate comfort level or baseline comfort level  Description: Interventions:  - Encourage patient to monitor pain and request assistance  - Assess pain using appropriate pain scale  - Administer analgesics based on type and severity of pain and evaluate response  - Implement non-pharmacological measures as appropriate and evaluate response  - Consider cultural and social influences on pain and pain management  - Notify physician/advanced practitioner if interventions unsuccessful or patient reports new pain  Outcome: Progressing     Problem: INFECTION - ADULT  Goal: Absence or prevention of progression during hospitalization  Description: INTERVENTIONS:  - Assess and monitor for signs and symptoms of infection  - Monitor lab/diagnostic results  - Monitor all insertion sites, i.e. indwelling lines, tubes, and drains  - Monitor endotracheal if appropriate and nasal secretions for changes in amount and color  - [place] appropriate cooling/warming therapies per order  - Administer medications as ordered  - Instruct and encourage patient and family to use good hand hygiene  technique  - Identify and instruct in appropriate isolation precautions for identified infection/condition  Outcome: Progressing  Goal: Absence of fever/infection during neutropenic period  Description: INTERVENTIONS:  - Monitor WBC    Outcome: Progressing     Problem: SAFETY ADULT  Goal: Patient will remain free of falls  Description: INTERVENTIONS:  - Educate patient/family on patient safety including physical limitations  - Instruct patient to call for assistance with activity   - Consult OT/PT to assist with strengthening/mobility   - Keep Call bell within reach  - Keep bed low and locked with side rails adjusted as appropriate  - Keep care items and personal belongings within reach  - Initiate and maintain comfort rounds  - Make Fall Risk Sign visible to staff  - Offer Toileting every 2 Hours, in advance of need  - Initiate/Maintain bed alarm  - Obtain necessary fall risk management equipment:   - Apply yellow socks and bracelet for high fall risk patients  - Consider moving patient to room near nurses station  Outcome: Progressing  Goal: Maintain or return to baseline ADL function  Description: INTERVENTIONS:  -  Assess patient's ability to carry out ADLs; assess patient's baseline for ADL function and identify physical deficits which impact ability to perform ADLs (bathing, care of mouth/teeth, toileting, grooming, dressing, etc.)  - Assess/evaluate cause of self-care deficits   - Assess range of motion  - Assess patient's mobility; develop plan if impaired  - Assess patient's need for assistive devices and provide as appropriate  - Encourage maximum independence but intervene and supervise when necessary  - Involve family in performance of ADLs  - Assess for home care needs following discharge   - Consider OT consult to assist with ADL evaluation and planning for discharge  - Provide patient education as appropriate  Outcome: Progressing  Goal: Maintains/Returns to pre admission functional  level  Description: INTERVENTIONS:  - Perform AM-PAC 6 Click Basic Mobility/ Daily Activity assessment daily.  - Set and communicate daily mobility goal to care team and patient/family/caregiver.   - Out of bed for toileting  - Record patient progress and toleration of activity level   Outcome: Progressing     Problem: DISCHARGE PLANNING  Goal: Discharge to home or other facility with appropriate resources  Description: INTERVENTIONS:  - Identify barriers to discharge w/patient and caregiver  - Arrange for needed discharge resources and transportation as appropriate  - Identify discharge learning needs (meds, wound care, etc.)  - Arrange for interpretive services to assist at discharge as needed  - Refer to Case Management Department for coordinating discharge planning if the patient needs post-hospital services based on physician/advanced practitioner order or complex needs related to functional status, cognitive ability, or social support system  Outcome: Progressing     Problem: Knowledge Deficit  Goal: Patient/family/caregiver demonstrates understanding of disease process, treatment plan, medications, and discharge instructions  Description: Complete learning assessment and assess knowledge base.  Interventions:  - Provide teaching at level of understanding  - Provide teaching via preferred learning methods  Outcome: Progressing     Problem: RESPIRATORY - ADULT  Goal: Achieves optimal ventilation and oxygenation  Description: INTERVENTIONS:  - Assess for changes in respiratory status  - Assess for changes in mentation and behavior  - Position to facilitate oxygenation and minimize respiratory effort  - Oxygen administered by appropriate delivery if ordered  - Initiate smoking cessation education as indicated  - Encourage broncho-pulmonary hygiene including cough, deep breathe, Incentive Spirometry  - Assess the need for suctioning and aspirate as needed  - Assess and instruct to report SOB or any respiratory  difficulty  - Respiratory Therapy support as indicated  Outcome: Progressing     Problem: Nutrition/Hydration-ADULT  Goal: Nutrient/Hydration intake appropriate for improving, restoring or maintaining nutritional needs  Description: Monitor and assess patient's nutrition/hydration status for malnutrition. Collaborate with interdisciplinary team and initiate plan and interventions as ordered.  Monitor patient's weight and dietary intake as ordered or per policy. Utilize nutrition screening tool and intervene as necessary. Determine patient's food preferences and provide high-protein, high-caloric foods as appropriate.     INTERVENTIONS:  - Monitor oral intake, urinary output, labs, and treatment plans  - Assess nutrition and hydration status and recommend course of action  - Evaluate amount of meals eaten  - Assist patient with eating if necessary   - Allow adequate time for meals  - Recommend/ encourage appropriate diets, oral nutritional supplements, and vitamin/mineral supplements  - Order, calculate, and assess calorie counts as needed  - Recommend, monitor, and adjust tube feedings and TPN/PPN based on assessed needs  - Assess need for intravenous fluids  - Provide specific nutrition/hydration education as appropriate  - Include patient/family/caregiver in decisions related to nutrition  Outcome: Progressing     Problem: Prexisting or High Potential for Compromised Skin Integrity  Goal: Skin integrity is maintained or improved  Description: INTERVENTIONS:  - Identify patients at risk for skin breakdown  - Assess and monitor skin integrity  - Assess and monitor nutrition and hydration status  - Monitor labs   - Assess for incontinence   - Turn and reposition patient  - Assist with mobility/ambulation  - Relieve pressure over bony prominences  - Avoid friction and shearing  - Provide appropriate hygiene as needed including keeping skin clean and dry  - Evaluate need for skin moisturizer/barrier cream  -  Collaborate with interdisciplinary team   - Patient/family teaching  - Consider wound care consult   Outcome: Progressing

## 2024-09-13 NOTE — ASSESSMENT & PLAN NOTE
- TTE 08/21/24: LVEF 65%, grade II diastolic dysfunction, mild LAE, mild TR, trivial pericardial effusion  - BNP 09/10/24: 234    Plan:   - diuresing well on current regimen would get follow up imaging for the effusions over the weekend  - would continue with IV lasix 40 mg BID. Electrolytes and kidney function remain stable.  - continue I/O's and daily weights

## 2024-09-13 NOTE — RESPIRATORY THERAPY NOTE
09/13/24 1552   Respiratory Assessment   Resp Comments Patient has home BIPAP in room. Pt uses 2.5 LPM bled in for HS. Personal Medical equipment waiver signed and placed in patient's paper chart. NIF/VC completed at this time.   Additional Assessments   $ Vital Capacity Mech/Peak Flow Yes   Position [facility]   Vital Capacity 300 L   NIF -30 cm H2O

## 2024-09-13 NOTE — RESPIRATORY THERAPY NOTE
RT Ventilator Management Note  Claire Doherty 74 y.o. female MRN: [id number]  Unit/Bed#: E4 -01 Encounter: [id number]      Daily Screen         9/11/2024  1330 9/12/2024  0837          NIF: -20 cm H2O -55 cm H2O                 09/12/24 2250   Respiratory Assessment   Assessment Type Assess only   General Appearance Sleeping   Respiratory Pattern Assisted;Spontaneous   Chest Assessment Chest expansion symmetrical   Resp Comments on bipap for hours of sleep   Non-Invasive Information   O2 Interface Device Full face mask   Non-Invasive Ventilation Mode BiPAP   $ Continous NIV Subsequent   $ Intermittent NIV Yes   SpO2 97 %   $ Pulse Oximetry Spot Check Charge Completed   Non-Invasive Settings   IPAP (cm) 18 cm   EPAP (cm) 6 cm   Rate (Set) 14   FiO2 (%) 40   Rise Time 2   Inspiratory Time (Set) 0.75   Non-Invasive Readings   Skin Intervention Skin barrier applied;Skin intact   Total Rate 17   MV (Mech) 7.2   Peak Pressure (Obs) 19   Spontaneous Vt (mL) 414   Leak (lpm) 20   Non-Invasive Alarms   Insp Pressure High (cm H20) 50   Insp Pressure Low (cm H20) 5   Low Insp Pressure Time (sec) 20 sec   MV Low (L/min) 2   Vt High (mL) 1500   Vt Low (mL) 150   High Resp Rate (BPM) 50 BPM   Low Resp Rate (BPM) 5 BPM       Physical Exam:   Assessment Type: Assess only  General Appearance: Sleeping  Respiratory Pattern: Assisted, Spontaneous  Chest Assessment: Chest expansion symmetrical  Bilateral Breath Sounds: Clear, Diminished  Cough: None      Resp Comments: on bipap for hours of sleep

## 2024-09-13 NOTE — PROGRESS NOTES
Progress Note - Hospitalist   Name: Claire Doherty 74 y.o. female I MRN: [id number]  Unit/Bed#: E4 -01 I Date of Admission: 9/10/2024   Date of Service: 9/13/2024 I Hospital Day: 3    Assessment & Plan  Acute on chronic respiratory failure with hypoxia and hypercapnia (HCC)  EMS was called for patient at nursing facility for shortness of breath  Found to have CO2 of 70 on VBG with pH 7.336  Patient does utilize BiPAP at night, does have history of muscular dystrophy with restrictive lung disease  Improved  Continue nebulizers  Sepsis (HCC) (Resolved: 9/13/2024)  Patient met sepsis criteria at time of admission with tachycardia and leukocytosis  Source likely pulmonary given complaint of shortness of breath, increased oxygen demand and abnormal chest x-ray  Follow-up CT chest, chest x-ray concerning for multifocal pneumonia  Started on IV cefepime in the ER, antibiotics discontinued in the ICU his symptoms were felt to be secondary to heart failure rather than infection  Acute on chronic diastolic CHF (congestive heart failure) ([facility])  Wt Readings from Last 3 Encounters:   09/13/24 74 kg (163 lb 2.3 oz)   09/07/24 77.8 kg (171 lb 8.3 oz)   08/21/24 80.7 kg (178 lb)     Patient with known chronic diastolic CHF with grade 2 diastolic dysfunction  Presenting with worsening shortness of breath  Chest x-ray/CT concerning for pleural effusions  Not maintained on outpatient diuretic  Continue IV diuresis throughout the weekend  Thoracentesis attempted to be performed but not enough fluid  Dystrophy, muscular, 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HCC)  He was doing well history of muscular dystrophy  Was previously living alone in an apartment with home health care, able to stand and pivot to wheelchair prior to hospital stay last week  Was discharged to rehab on 9/7/2024  Utilizes BiPAP at night, continue  PT/OT while inpatient  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)  Patient follows outpatient with pulmonology  for restrictive lung disease due to muscular dystrophy  Uses BiPAP at night and 2-3 L nasal cannula during the day  Continue nebulizers  Pulmonology consultation reviewed  GERD without esophagitis  Continue PPI  Thrombocytosis, unspecified  Significant thrombocytosis with platelet count 739  Likely reactive although does have history of similar  recommend outpatient with hematology  History of Idiopathic pericarditis  Patient previously hospitalized for chest discomfort, diagnosed with idiopathic pericarditis  Continue colchicine  Outpatient follow-up with cardiology on 9/20/2024  COPD (chronic obstructive pulmonary disease) ([facility])  Continue respiratory protocol  BiPAP with all naps  Paroxysmal A-fib ([facility])  Ventricular rate controlled  Continue [facility] Course:     74-year-old female patient presenting with heart failure exacerbation.  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[facility])    GERD without esophagitis    Thrombocytosis, unspecified    COPD (chronic obstructive pulmonary disease) ([facility])    History of Idiopathic pericarditis    Acute on chronic respiratory failure with hypoxia and hypercapnia ([facility])    Paroxysmal A-fib ([facility])      Plan:    Continue IV diuresis with goal of net -1 L    Intake/Output Summary (Last 24 hours) at 9/13/2024 1725  Last data filed at 9/13/2024 1300  Gross per 24 hour   Intake 820 ml   Output 1650 ml   Net -830 ml            VTE Pharmacologic Prophylaxis:   Pharmacologic: Apixaban (Eliquis)  Mechanical VTE Prophylaxis in Place: Yes    AM-PAC Basic Mobility:  Basic Mobility Inpatient Raw Score: 6    -HL Achieved: 2: Bed activities/Dependent transfer  -HL Goal: 2: Bed activities/Dependent transfer    [facility]  Goal listed above. Continue with multidisciplinary rounding and encourage appropriate mobility to improve upon HLM goals.         Patient Centered Rounds: Case discussed and reviewed with nursing    Discussions with Specialists or Other Care Team Provider: Case management, cardiology and pulmonary    Education and Discussions with Family / Patient: Will contact family to update    Time Spent for Care: 80 minutes.  More than 50% of total time spent on counseling and coordination of care as described above.    Current Length of Stay: 3 day(s)    Current Patient Status: Inpatient   Certification Statement: The patient will continue to require additional inpatient hospital stay due to heart failure exacerbation    Discharge Plan / Estimated Discharge Date: 48 hours, discharge possible on Monday    Code Status: Level 2 - DNAR: but accepts endotracheal intubation      Subjective:   Seen and examined, breathing improved.  No nausea no vomiting, no abdominal pain.    A complete and comprehensive 14 point organ system review has been performed and all other systems are negative other than stated above.    Objective:     Vitals:   Temp (24hrs), Av.4 °F (36.9 °C), Min:97.6 °F (36.4 °C), Max:98.8 °F (37.1 °C)    Temp:  [97.6 °F (36.4 °C)-98.8 °F (37.1 °C)] 97.6 °F (36.4 °C)  HR:  [] 57  Resp:  [17-28] 26  BP: (106-141)/(53-74) 106/54  SpO2:  [97 %-99 %] 99 %  Body mass index is 31.86 kg/m².     Input and Output Summary (last 24 hours):       Intake/Output Summary (Last 24 hours) at 2024 1724  Last data filed at 2024 1300  Gross per 24 hour   Intake 820 ml   Output 1650 ml   Net -830 ml       Physical Exam:     General: well appearing, no acute distress  HEENT: atraumatic, PERRLA, moist mucosa, normal pharynx, normal tonsils and adenoids, normal tongue, no fluid in sinuses  Neck: Trachea midline, no carotid bruit, no masses  Respiratory: normal chest wall expansion, fine rales at the bases no r/r/w, no  rubs  Cardiovascular: RRR, no m/r/g, Normal S1 and S2, assessed 3, 9 cm of JVD  Abdomen: Soft, non-tender, non-distended, normal bowel sounds in all quadrants, no hepatosplenomegaly, no tympany  Rectal: deferred  Musculoskeletal: Referred due to muscular dystrophy integumentary: warm, dry, and pink, with no rash, purpura, or petechia  Heme/Lymph: no lymphadenopathy, no bruises  Neurological: Cranial Nerves II-XII grossly intact  Psychiatric: cooperative with normal mood, affect, and cognition      Additional Data:     Labs:    Results from last 7 days   Lab Units 09/13/24  0625 09/11/24  0500 09/10/24  1217   WBC Thousand/uL 12.56*   < > 31.54*   HEMOGLOBIN g/dL 10.7*   < > 11.6   HEMATOCRIT % 35.0   < > 38.5   PLATELETS Thousands/uL 669*   < > 739*   SEGS PCT %  --   --  86*   LYMPHO PCT % 20  --  4*   MONO PCT % 12  --  9   EOS PCT % 0  --  0    < > = values in this interval not displayed.     Results from last 7 days   Lab Units 09/13/24  0625 09/12/24  0431 09/11/24  0500   POTASSIUM mmol/L 4.3   < > 4.1   CHLORIDE mmol/L 86*   < > 93*   CO2 mmol/L >45*   < > 38*   BUN mg/dL 38*   < > 30*   CREATININE mg/dL 0.48*   < > 0.40*   CALCIUM mg/dL 8.7   < > 8.7   ALK PHOS U/L  --   --  80   ALT U/L  --   --  9   AST U/L  --   --  16    < > = values in this interval not displayed.     Results from last 7 days   Lab Units 09/10/24  1224   INR  1.44*       * I Have Reviewed All Lab Data Listed Above.  * Additional Pertinent Lab Tests Reviewed: All Labs For Current Hospital Admission Reviewed    Imaging:    Imaging Reports Reviewed Today Include: No new imaging  Imaging Personally Reviewed by Myself Includes: No new imaging    Recent Cultures (last 7 days):     Results from last 7 days   Lab Units 09/10/24  1739 09/10/24  1224   BLOOD CULTURE   --  No Growth at 48 hrs.  No Growth at 48 hrs.   LEGIONELLA URINARY ANTIGEN  Negative  --        Last 24 Hours Medication List:   Current Facility-Administered Medications    Medication Dose Route Frequency Provider Last Rate    acetaminophen  650 mg Oral Q6H PRN Rod Singleton MD      albuterol  2.5 mg Nebulization Q4H PRN Rod Singleton MD      apixaban  5 mg Oral BID Rod Singleton MD      bisacodyl  10 mg Rectal Daily PRN Rod Singleton MD      budesonide  0.5 mg Nebulization Q12H Rod Singleton MD      buPROPion  75 mg Oral QAM Rod Singleton MD      calcium carbonate  1,000 mg Oral Daily PRN Rod Singleton MD      chlorhexidine  15 mL Mouth/Throat Q12H NANCY Rod Singleton MD      citalopram  10 mg Oral Daily Rod Singleton MD      colchicine  0.6 mg Oral Daily Rod Singleton MD      dextromethorphan-guaiFENesin  10 mL Oral Q4H PRN Isaac Arrieta PA-C      diazepam  5 mg Oral Q8H PRN Rod Singleton MD      fluticasone  2 spray Nasal Daily Rod Singleton MD      formoterol  20 mcg Nebulization Q12H Rod Singleton MD      furosemide  40 mg Intravenous BID (diuretic) Rod Singleton MD      guaiFENesin  600 mg Oral Q12H Counts include 234 beds at the [facility] Isaac Arrieta PA-C      ondansetron  4 mg Intravenous Q6H PRN Rod Singleton MD      oxybutynin  10 mg Oral HS Rod Singleton MD      pantoprazole  40 mg Oral Early Morning Rod Singleton MD      ramelteon  8 mg Oral HS Rod Singleton MD      saccharomyces boulardii  250 mg Oral Daily Rod Singleton MD         AM-PAC Basic Mobility:  Basic Mobility Inpatient Raw Score: 6    JH-HLM Achieved: 2: Bed activities/Dependent transfer  JH-HLM Goal: 2: Bed activities/Dependent transfer    HLM Goal listed above. Continue with multidisciplinary rounding and encourage appropriate mobility to improve upon HLM goals.     Today, Patient Was Seen By: Ander Hitchcock DO    ** Please Note: This note was completed in part utilizing [employer] Dragon One [employer] software dictation.  Grammatical errors, random word insertions, spelling mistakes, and incomplete sentences may be an occasional consequence of this system secondary to software limitations, ambient noise, and hardware issues.  If you have  any questions or concerns about the content, text, or information contained within the body of this dictation, please contact the provider for clarification. **

## 2024-09-13 NOTE — CASE MANAGEMENT
Called [facility] P# [phone] to check status of pending transport auth, ref# [id number]. Spoke to Mackenzie who stated auth is still pending review at this time.

## 2024-09-13 NOTE — ASSESSMENT & PLAN NOTE
Wt Readings from Last 3 Encounters:   09/13/24 74 kg (163 lb 2.3 oz)   09/07/24 77.8 kg (171 lb 8.3 oz)   08/21/24 80.7 kg (178 lb)     Patient with known chronic diastolic CHF with grade 2 diastolic dysfunction  Presenting with worsening shortness of breath  Chest x-ray/CT concerning for pleural effusions  Not maintained on outpatient diuretic  Continue IV diuresis throughout the weekend  Thoracentesis attempted to be performed but not enough fluid

## 2024-09-13 NOTE — PROGRESS NOTES
Progress Note - Cardiology   Name: Claire Doherty 74 y.o. female I MRN: [id number]  Unit/Bed#: E4 -01 I Date of Admission: 9/10/2024   Date of Service: 9/13/2024 I Hospital Day: 3    Assessment & Plan  Acute on chronic respiratory failure with hypoxia and hypercapnia (HCC)  Personally reviewed her chest CT images.  She has significant bilateral pleural effusions with compressive atelectasis of the underlying lungs  Would continue with use of BiPAP as needed and at night given her restrictive lung disease, compressive atelectasis and muscular dystrophy  Last ABG drawn at 11.34 AM 09/12/24 showed CO2 elevated at ABG 78.3, pH 7.38-on 3L NC. Follow ABGs  Plan for diuresis for diastolic CHF as described below  Acute on chronic diastolic CHF (congestive heart failure) (HCC)  - TTE 08/21/24: LVEF 65%, grade II diastolic dysfunction, mild LAE, mild TR, trivial pericardial effusion  - BNP 09/10/24: 234    Plan:   - diuresing well on current regimen would get follow up imaging for the effusions over the weekend  - would continue with IV lasix 40 mg BID. Electrolytes and kidney function remain stable.  - continue I/O's and daily weights   Paroxysmal A-fib (HCC)  - diagnosed October 10, 2024 with RVR rates 177 bpm  Now back in sinus rhythm    Plan:  Continue Eliquis for stroke prevention.   Telemetry reviewed.  Rate is controlled, sinus rhythm now in 80 bpm range  Sepsis (HCC)  - met SIRS criteria with tachycardia and leukocytosis   - prolactin 09/11/24: 5.09 increased from 2.69   - so far legionella and s. pneumoniae negative, blood cultures no growth x 48 hours  History of Idiopathic pericarditis  - diagnosed 08/21/24 at [facility] and transferred to [facility] for cardiac cath (flat troponins but chest pain and ST elevations in inferior leads)  - C 08/21/24: nonobstructive coronary arteries   - CRP 08/21/24: 174  - discharged on colchicine      Plan:  - would continue with colchcine    COPD (chronic obstructive  pulmonary disease) (HCC)  - follows with pulmonology as an outpatient  - chronic use of BiPAP at night with with 2-3L of NC during the day   Dystrophy, muscular, 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HCC)  - follows with neurology  - uses wheelchair/walker and BiPAP at night   Thrombocytosis, unspecified  - chronic thrombocytosis   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)    GERD without esophagitis      Subjective:  Pt seen and examined at beside. States she feels overall better than yesterday.     Vitals:  Vitals:    09/11/24 1500 09/13/24 0600   Weight: 74.8 kg (165 lb) 74 kg (163 lb 2.3 oz)   ,  Vitals:    09/12/24 2250 09/13/24 0600 09/13/24 0731 09/13/24 0805   BP:    119/53   BP Location:    Right arm   Pulse:    102   Resp:    (!) 26   Temp:    98.8 °F (37.1 °C)   TempSrc:    Temporal   SpO2: 97%  98% 99%   Weight:  74 kg (163 lb 2.3 oz)     Height:           Exam:  Physical Exam  Vitals and nursing note reviewed.   Constitutional:       General: She is not in acute distress.     Appearance: Normal appearance. She is ill-appearing.      Comments: Wearing nasal cannula    HENT:      Head: Normocephalic and atraumatic.      Nose: Nose normal.      Mouth/Throat:      Mouth: Mucous membranes are moist.   Eyes:      General: No scleral icterus.  Neck:      Vascular: No JVD.   Cardiovascular:      Rate and Rhythm: Normal rate and regular rhythm.      Pulses:           Radial pulses are 2+ on the right side and 2+ on the left side.      Heart sounds: No murmur heard.  Pulmonary:      Effort: Pulmonary effort is normal. No respiratory distress.      Breath sounds: No stridor. Rales present. No wheezing or rhonchi.   Abdominal:      General: Abdomen is flat.   Musculoskeletal:         General: No swelling. Normal range of motion.      Cervical back: Normal range of motion.      Right lower leg: No edema.      Left lower leg: No edema.   Skin:     General: Skin is warm and dry.      Capillary Refill: Capillary  refill takes less than 2 seconds.   Neurological:      Mental Status: She is alert. Mental status is at baseline.   Psychiatric:         Thought Content: Thought content normal.           Telemetry:       Not on telemetry    Medications:    Current Facility-Administered Medications:     acetaminophen (TYLENOL) tablet 650 mg, 650 mg, Oral, Q6H PRN, Rod Singleton MD, 650 mg at 09/12/24 2110    albuterol inhalation solution 2.5 mg, 2.5 mg, Nebulization, Q4H PRN, Rod Singleton MD    apixaban (ELIQUIS) tablet 5 mg, 5 mg, Oral, BID, Rod Singleton MD, 5 mg at 09/13/24 0853    bisacodyl (DULCOLAX) rectal suppository 10 mg, 10 mg, Rectal, Daily PRN, Rod Singleton MD    budesonide (PULMICORT) inhalation solution 0.5 mg, 0.5 mg, Nebulization, Q12H, Rod Singleton MD, 0.5 mg at 09/13/24 0731    buPROPion (WELLBUTRIN) tablet 75 mg, 75 mg, Oral, QAM, Rod Singleton MD, 75 mg at 09/13/24 0854    calcium carbonate (TUMS) chewable tablet 1,000 mg, 1,000 mg, Oral, Daily PRN, Rod Singleton MD    chlorhexidine (PERIDEX) 0.12 % oral rinse 15 mL, 15 mL, Mouth/Throat, Q12H NANCY, Rod Singleton MD, 15 mL at 09/13/24 0855    citalopram (CeleXA) tablet 10 mg, 10 mg, Oral, Daily, Rod Singleton MD, 10 mg at 09/13/24 0853    colchicine (COLCRYS) tablet 0.6 mg, 0.6 mg, Oral, Daily, Rod Singleton MD, 0.6 mg at 09/13/24 0853    dextromethorphan-guaiFENesin (ROBITUSSIN DM) oral syrup 10 mL, 10 mL, Oral, Q4H PRN, Isaac Arrieta PA-C, 10 mL at 09/12/24 2209    diazepam (VALIUM) tablet 5 mg, 5 mg, Oral, Q8H PRN, Rod Singleton MD, 5 mg at 09/12/24 2110    fluticasone (FLONASE) 50 mcg/act nasal spray 2 spray, 2 spray, Nasal, Daily, Rod Singleton MD, 2 spray at 09/12/24 0903    formoterol (PERFOROMIST) nebulizer solution 20 mcg, 20 mcg, Nebulization, Q12H, Rod Singleton MD, 20 mcg at 09/13/24 0731    furosemide (LASIX) injection 40 mg, 40 mg, Intravenous, BID (diuretic), Rod Singleton MD, 40 mg at 09/13/24 0854    guaiFENesin (MUCINEX) 12 hr tablet 600  mg, 600 mg, Oral, Q12H [facility], Isaac Arrieta PA-C, 600 mg at 09/13/24 0853    ondansetron (ZOFRAN) injection 4 mg, 4 mg, Intravenous, Q6H PRN, Rod Singleton MD    oxybutynin (DITROPAN-XL) 24 hr tablet 10 mg, 10 mg, Oral, HS, Rod Singleton MD, 10 mg at 09/12/24 2109    pantoprazole (PROTONIX) EC tablet 40 mg, 40 mg, Oral, Early Morning, Rod Singleton MD, 40 mg at 09/13/24 0622    ramelteon (ROZEREM) tablet 8 mg, 8 mg, Oral, HS, Rod Singleton MD    saccharomyces boulardii (FLORASTOR) capsule 250 mg, 250 mg, Oral, Daily, Rod Singleton MD, 250 mg at 09/13/24 0853    Labs/Data:        Results from last 7 days   Lab Units 09/13/24 0625 09/12/24  0431 09/11/24  0500   WBC Thousand/uL 12.56* 15.30* 17.45*   HEMOGLOBIN g/dL 10.7* 10.6* 10.3*   HEMATOCRIT % 35.0 34.5* 33.3*   PLATELETS Thousands/uL 669* 696* 646*     Results from last 7 days   Lab Units 09/13/24 0625 09/12/24  0431 09/11/24  0500   POTASSIUM mmol/L 4.3 3.6 4.1   CHLORIDE mmol/L 86* 92* 93*   CO2 mmol/L >45* >45* 38*   BUN mg/dL 38* 37* 30*   CREATININE mg/dL 0.48* 0.47* 0.40*     Monica Mann PA-C

## 2024-09-13 NOTE — BRIEF OP NOTE (RAD/CATH)
IR PROCEDURE NOT PERFORMED Procedure Note    PATIENT NAME: Claire Doherty  : 1950  MRN: [id number]    Pre-op Diagnosis:   1. Pneumonia    2. Acute respiratory failure with hypoxia and hypercarbia (HCC)    3. Paroxysmal A-fib (HCC)      Post-op Diagnosis:   1. Pneumonia    2. Acute respiratory failure with hypoxia and hypercarbia (HCC)    3. Paroxysmal A-fib (HCC)        Provider:   Viviana Abreu PA-C  Assistants:     No qualified resident was available, Resident is only observing    Estimated Blood Loss: None    Findings: Limited ultrasound of the bilateral thoracic space to assess pleural effusions. Bilateral thoracic space assessed with insufficient fluid and no safe window for thoracentesis. No thoracentesis performed.    Specimens: None    Complications:  None immediately    Anesthesia: none    Viviana Abreu PA-C     Date: 2024  Time: 2:02 PM

## 2024-09-13 NOTE — ASSESSMENT & PLAN NOTE
- met SIRS criteria with tachycardia and leukocytosis   - prolactin 09/11/24: 5.09 increased from 2.69   - so far legionella and s. pneumoniae negative, blood cultures no growth x 48 hours

## 2024-09-13 NOTE — ASSESSMENT & PLAN NOTE
- a/c with DOAC  - no need for rate control  - cardiology following    Discussed with patient. Concerns addressed.   Discussed with Dr. Hitchcock.   I am signing off. Please recall as needed.

## 2024-09-13 NOTE — ASSESSMENT & PLAN NOTE
Detail Level: Simple Patient previously hospitalized for chest discomfort, diagnosed with idiopathic pericarditis  Continue colchicine  Outpatient follow-up with cardiology on 9/20/2024   Detail Level: Zone

## 2024-09-13 NOTE — ASSESSMENT & PLAN NOTE
- diagnosed 08/21/24 at [facility] and transferred to [facility] for cardiac cath (flat troponins but chest pain and ST elevations in inferior leads)  - C 08/21/24: nonobstructive coronary arteries   - CRP 08/21/24: 174  - discharged on colchicine      Plan:  - would continue with colchcine

## 2024-09-13 NOTE — ASSESSMENT & PLAN NOTE
EMS was called for patient at nursing facility for shortness of breath  Found to have CO2 of 70 on VBG with pH 7.336  Patient does utilize BiPAP at night, does have history of muscular dystrophy with restrictive lung disease  Improved  Continue nebulizers

## 2024-09-13 NOTE — ASSESSMENT & PLAN NOTE
Patient met sepsis criteria at time of admission with tachycardia and leukocytosis  Source likely pulmonary given complaint of shortness of breath, increased oxygen demand and abnormal chest x-ray  Follow-up CT chest, chest x-ray concerning for multifocal pneumonia  Started on IV cefepime in the ER, antibiotics discontinued in the ICU his symptoms were felt to be secondary to heart failure rather than infection

## 2024-09-13 NOTE — ASSESSMENT & PLAN NOTE
- baseline 3L oxygen  - due to heart failure and underlying restrictive lung disease  - she is on baseline oxygen.  - cont. BIPAP with all sleep. Avoid sedating and mind altering medications.

## 2024-09-13 NOTE — PLAN OF CARE
Problem: Potential for Falls  Goal: Patient will remain free of falls  Description: INTERVENTIONS:  - Educate patient/family on patient safety including physical limitations  - Instruct patient to call for assistance with activity   - Consult OT/PT to assist with strengthening/mobility   - Keep Call bell within reach  - Keep bed low and locked with side rails adjusted as appropriate  - Keep care items and personal belongings within reach  - Initiate and maintain comfort rounds  - Make Fall Risk Sign visible to staff  - Offer Toileting every 2 Hours, in advance of need  - Initiate/Maintain bed alarm  - Obtain necessary fall risk management equipment:   - Apply yellow socks and bracelet for high fall risk patients  - Consider moving patient to room near nurses station  Outcome: Progressing     Problem: PAIN - ADULT  Goal: Verbalizes/displays adequate comfort level or baseline comfort level  Description: Interventions:  - Encourage patient to monitor pain and request assistance  - Assess pain using appropriate pain scale  - Administer analgesics based on type and severity of pain and evaluate response  - Implement non-pharmacological measures as appropriate and evaluate response  - Consider cultural and social influences on pain and pain management  - Notify physician/advanced practitioner if interventions unsuccessful or patient reports new pain  Outcome: Progressing     Problem: INFECTION - ADULT  Goal: Absence or prevention of progression during hospitalization  Description: INTERVENTIONS:  - Assess and monitor for signs and symptoms of infection  - Monitor lab/diagnostic results  - Monitor all insertion sites, i.e. indwelling lines, tubes, and drains  - Monitor endotracheal if appropriate and nasal secretions for changes in amount and color  - Sprakers appropriate cooling/warming therapies per order  - Administer medications as ordered  - Instruct and encourage patient and family to use good hand hygiene  technique  - Identify and instruct in appropriate isolation precautions for identified infection/condition  Outcome: Progressing     Problem: SAFETY ADULT  Goal: Patient will remain free of falls  Description: INTERVENTIONS:  - Educate patient/family on patient safety including physical limitations  - Instruct patient to call for assistance with activity   - Consult OT/PT to assist with strengthening/mobility   - Keep Call bell within reach  - Keep bed low and locked with side rails adjusted as appropriate  - Keep care items and personal belongings within reach  - Initiate and maintain comfort rounds  - Make Fall Risk Sign visible to staff  - Offer Toileting every 2 Hours, in advance of need  - Initiate/Maintain bed alarm  - Obtain necessary fall risk management equipment:   - Apply yellow socks and bracelet for high fall risk patients  - Consider moving patient to room near nurses station  Outcome: Progressing     Problem: DISCHARGE PLANNING  Goal: Discharge to home or other facility with appropriate resources  Description: INTERVENTIONS:  - Identify barriers to discharge w/patient and caregiver  - Arrange for needed discharge resources and transportation as appropriate  - Identify discharge learning needs (meds, wound care, etc.)  - Arrange for interpretive services to assist at discharge as needed  - Refer to Case Management Department for coordinating discharge planning if the patient needs post-hospital services based on physician/advanced practitioner order or complex needs related to functional status, cognitive ability, or social support system  Outcome: Progressing     Problem: Knowledge Deficit  Goal: Patient/family/caregiver demonstrates understanding of disease process, treatment plan, medications, and discharge instructions  Description: Complete learning assessment and assess knowledge base.  Interventions:  - Provide teaching at level of understanding  - Provide teaching via preferred learning  methods  Outcome: Progressing     Problem: RESPIRATORY - ADULT  Goal: Achieves optimal ventilation and oxygenation  Description: INTERVENTIONS:  - Assess for changes in respiratory status  - Assess for changes in mentation and behavior  - Position to facilitate oxygenation and minimize respiratory effort  - Oxygen administered by appropriate delivery if ordered  - Initiate smoking cessation education as indicated  - Encourage broncho-pulmonary hygiene including cough, deep breathe, Incentive Spirometry  - Assess the need for suctioning and aspirate as needed  - Assess and instruct to report SOB or any respiratory difficulty  - Respiratory Therapy support as indicated  Outcome: Progressing     Problem: Nutrition/Hydration-ADULT  Goal: Nutrient/Hydration intake appropriate for improving, restoring or maintaining nutritional needs  Description: Monitor and assess patient's nutrition/hydration status for malnutrition. Collaborate with interdisciplinary team and initiate plan and interventions as ordered.  Monitor patient's weight and dietary intake as ordered or per policy. Utilize nutrition screening tool and intervene as necessary. Determine patient's food preferences and provide high-protein, high-caloric foods as appropriate.     INTERVENTIONS:  - Monitor oral intake, urinary output, labs, and treatment plans  - Assess nutrition and hydration status and recommend course of action  - Evaluate amount of meals eaten  - Assist patient with eating if necessary   - Allow adequate time for meals  - Recommend/ encourage appropriate diets, oral nutritional supplements, and vitamin/mineral supplements  - Order, calculate, and assess calorie counts as needed  - Recommend, monitor, and adjust tube feedings and TPN/PPN based on assessed needs  - Assess need for intravenous fluids  - Provide specific nutrition/hydration education as appropriate  - Include patient/family/caregiver in decisions related to nutrition  Outcome:  Progressing     Problem: Prexisting or High Potential for Compromised Skin Integrity  Goal: Skin integrity is maintained or improved  Description: INTERVENTIONS:  - Identify patients at risk for skin breakdown  - Assess and monitor skin integrity  - Assess and monitor nutrition and hydration status  - Monitor labs   - Assess for incontinence   - Turn and reposition patient  - Assist with mobility/ambulation  - Relieve pressure over bony prominences  - Avoid friction and shearing  - Provide appropriate hygiene as needed including keeping skin clean and dry  - Evaluate need for skin moisturizer/barrier cream  - Collaborate with interdisciplinary team   - Patient/family teaching  - Consider wound care consult   Outcome: Progressing

## 2024-09-13 NOTE — PROGRESS NOTES
Progress Note - Pulmonology   Name: Claire Doherty 74 y.o. female I MRN: [id number]  Unit/Bed#: E4 -01 I Date of Admission: 9/10/2024   Date of Service: 9/13/2024 I Hospital Day: 3     Assessment & Plan  Acute on chronic respiratory failure with hypoxia and hypercapnia ([facility])  - baseline 3L oxygen  - due to heart failure and underlying restrictive lung disease  - she is on baseline oxygen.  - cont. BIPAP with all sleep. Avoid sedating and mind altering medications.    Dystrophy, muscular, 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[facility])  - PT/OT  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[facility])  - optimize mobility as able  GERD without esophagitis  - cont. PPI  Sepsis ([facility])  - resolved.  Thrombocytosis, unspecified    COPD (chronic obstructive pulmonary disease) ([facility])  - cont. Formotorol, Budesonide. She is not on LAMA at baseline.  History of Idiopathic pericarditis  - Remains on Colchicine  Acute on chronic diastolic CHF (congestive heart failure) ([facility])  Wt Readings from Last 3 Encounters:   09/13/24 74 kg (163 lb 2.3 oz)   09/07/24 77.8 kg (171 lb 8.3 oz)   08/21/24 80.7 kg (178 lb)   - improved with diuresis  - Cont. Diuresis per cardiology  - Fluid/salt restrict.  - trend weight    Paroxysmal A-fib ([facility])  - a/c with DOAC  - no need for rate control  - cardiology following    Discussed with patient. Concerns addressed.   Discussed with Dr. Hitchcock.   I am signing off. Please recall as needed.    History of Present Illness   24 Hour Events  : none  Subjective  : Found lying in bed. Feels better overall. Less winded. Plans to go back to rehab. Her sister brought her home BIPAP and she is hopeful it will be more comfortable than the machine here.     Objective      Temp:  [98.5 °F (36.9 °C)-98.8 °F (37.1 °C)] 98.8 °F (37.1 °C)  HR:  [] 102  Resp:  [17-36] 26  BP: (118-141)/(53-74) 119/53  O2 Device: Mid flow nasal cannula          I/O last 24 hours:  In: 870 [P.O.:820; IV Piggyback:50]  Out: 2706 [Urine:2456;  Stool:250]  Lines/Drains/Airways       Active Status       Name Placement date Placement time Site Days    Colostomy Loop LUQ 09/30/20  1825  LUQ  1443    Colostomy LUQ 09/10/20  1200  LUQ  1464    Ureteral Drain/Stent Left ureter 5 Fr. 09/30/20  1730  Left ureter  1443    Ureteral Drain/Stent Right ureter 5 Fr. 09/30/20  1730  Right ureter  1443    External Urinary Catheter 09/10/24  1600  -- 2                  Physical Exam   Gen: no distress  Lungs: diminished b/l bs  Abd: soft, nd      Lab Results: I have reviewed the following results:    .     09/13/24  0625   WBC 12.56*   HGB 10.7*   HCT 35.0   *   SODIUM 141   K 4.3   CL 86*   CO2 >45*   BUN 38*   CREATININE 0.48*   GLUC 105   MG 1.9   PHOS 2.0*     Labs per my review reveal low chloride, metabolic alkalosis, leukocytosis improved; anemia stable

## 2024-09-13 NOTE — ASSESSMENT & PLAN NOTE
Personally reviewed her chest CT images.  She has significant bilateral pleural effusions with compressive atelectasis of the underlying lungs  Would continue with use of BiPAP as needed and at night given her restrictive lung disease, compressive atelectasis and muscular dystrophy  Last ABG drawn at 11.34 AM 09/12/24 showed CO2 elevated at ABG 78.3, pH 7.38-on 3L NC. Follow ABGs  Plan for diuresis for diastolic CHF as described below

## 2024-09-14 LAB
BUN SERPL-MCNC: 39 MG/DL (ref 5–25)
CALCIUM SERPL-MCNC: 9 MG/DL (ref 8.4–10.2)
CHLORIDE SERPL-SCNC: 82 MMOL/L (ref 96–108)
CO2 SERPL-SCNC: >45 MMOL/L (ref 21–32)
CREAT SERPL-MCNC: 0.42 MG/DL (ref 0.6–1.3)
ERYTHROCYTE [DISTWIDTH] IN BLOOD BY AUTOMATED COUNT: 17.2 % (ref 11.6–15.1)
GFR SERPL CREATININE-BSD FRML MDRD: 101 ML/MIN/1.73SQ M
GLUCOSE SERPL-MCNC: 127 MG/DL (ref 65–140)
HCT VFR BLD AUTO: 35.8 % (ref 34.8–46.1)
HGB BLD-MCNC: 10.9 G/DL (ref 11.5–15.4)
MCH RBC QN AUTO: 26.3 PG (ref 26.8–34.3)
MCHC RBC AUTO-ENTMCNC: 30.4 G/DL (ref 31.4–37.4)
MCV RBC AUTO: 87 FL (ref 82–98)
PLATELET # BLD AUTO: 633 THOUSANDS/UL (ref 149–390)
PMV BLD AUTO: 10.3 FL (ref 8.9–12.7)
POTASSIUM SERPL-SCNC: 3.5 MMOL/L (ref 3.5–5.3)
RBC # BLD AUTO: 4.14 MILLION/UL (ref 3.81–5.12)
SODIUM SERPL-SCNC: 142 MMOL/L (ref 135–147)
WBC # BLD AUTO: 12.57 THOUSAND/UL (ref 4.31–10.16)

## 2024-09-14 PROCEDURE — 99232 SBSQ HOSP IP/OBS MODERATE 35: CPT | Performed by: INTERNAL MEDICINE

## 2024-09-14 PROCEDURE — 94760 N-INVAS EAR/PLS OXIMETRY 1: CPT

## 2024-09-14 PROCEDURE — 85027 COMPLETE CBC AUTOMATED: CPT | Performed by: INTERNAL MEDICINE

## 2024-09-14 PROCEDURE — 99233 SBSQ HOSP IP/OBS HIGH 50: CPT | Performed by: INTERNAL MEDICINE

## 2024-09-14 PROCEDURE — 94640 AIRWAY INHALATION TREATMENT: CPT

## 2024-09-14 PROCEDURE — 94150 VITAL CAPACITY TEST: CPT

## 2024-09-14 PROCEDURE — 80048 BASIC METABOLIC PNL TOTAL CA: CPT | Performed by: INTERNAL MEDICINE

## 2024-09-14 RX ORDER — POTASSIUM CHLORIDE 1500 MG/1
40 TABLET, EXTENDED RELEASE ORAL ONCE
Status: COMPLETED | OUTPATIENT
Start: 2024-09-14 | End: 2024-09-14

## 2024-09-14 RX ORDER — AMILORIDE HYDROCHLORIDE 5 MG/1
5 TABLET ORAL DAILY
Status: DISCONTINUED | OUTPATIENT
Start: 2024-09-14 | End: 2024-09-20

## 2024-09-14 RX ADMIN — GUAIFENESIN 600 MG: 600 TABLET, EXTENDED RELEASE ORAL at 08:45

## 2024-09-14 RX ADMIN — FORMOTEROL FUMARATE 20 MCG: 20 SOLUTION RESPIRATORY (INHALATION) at 07:40

## 2024-09-14 RX ADMIN — CHLORHEXIDINE GLUCONATE 15 ML: 1.2 RINSE ORAL at 08:46

## 2024-09-14 RX ADMIN — BUDESONIDE INHALATION 0.5 MG: 0.5 SUSPENSION RESPIRATORY (INHALATION) at 07:40

## 2024-09-14 RX ADMIN — APIXABAN 5 MG: 5 TABLET, FILM COATED ORAL at 08:45

## 2024-09-14 RX ADMIN — CITALOPRAM HYDROBROMIDE 10 MG: 20 TABLET ORAL at 08:45

## 2024-09-14 RX ADMIN — BUDESONIDE INHALATION 0.5 MG: 0.5 SUSPENSION RESPIRATORY (INHALATION) at 19:22

## 2024-09-14 RX ADMIN — FUROSEMIDE 40 MG: 10 INJECTION, SOLUTION INTRAVENOUS at 17:17

## 2024-09-14 RX ADMIN — FORMOTEROL FUMARATE 20 MCG: 20 SOLUTION RESPIRATORY (INHALATION) at 19:22

## 2024-09-14 RX ADMIN — COLCHICINE 0.6 MG: 0.6 TABLET ORAL at 08:45

## 2024-09-14 RX ADMIN — BUPROPION HYDROCHLORIDE 75 MG: 75 TABLET, FILM COATED ORAL at 08:45

## 2024-09-14 RX ADMIN — POTASSIUM CHLORIDE 40 MEQ: 1500 TABLET, EXTENDED RELEASE ORAL at 17:17

## 2024-09-14 RX ADMIN — CHLORHEXIDINE GLUCONATE 15 ML: 1.2 RINSE ORAL at 21:21

## 2024-09-14 RX ADMIN — FUROSEMIDE 40 MG: 10 INJECTION, SOLUTION INTRAVENOUS at 08:45

## 2024-09-14 RX ADMIN — GUAIFENESIN 600 MG: 600 TABLET, EXTENDED RELEASE ORAL at 21:21

## 2024-09-14 RX ADMIN — PANTOPRAZOLE SODIUM 40 MG: 40 TABLET, DELAYED RELEASE ORAL at 05:29

## 2024-09-14 RX ADMIN — DIAZEPAM 5 MG: 5 TABLET ORAL at 21:29

## 2024-09-14 RX ADMIN — OXYBUTYNIN CHLORIDE 10 MG: 10 TABLET, EXTENDED RELEASE ORAL at 21:21

## 2024-09-14 RX ADMIN — Medication 250 MG: at 08:45

## 2024-09-14 RX ADMIN — APIXABAN 5 MG: 5 TABLET, FILM COATED ORAL at 17:17

## 2024-09-14 NOTE — ASSESSMENT & PLAN NOTE
- TTE 08/21/24: LVEF 65%, grade II diastolic dysfunction, mild LAE, mild TR, trivial pericardial effusion  - BNP 09/10/24: 234  - 9/13/24 lung ultrasound showed absence of significant pleural effusions suitable for aspiration     Plan:   - Continue IV lasix 40 BID. Renal fx/ lytes stable.

## 2024-09-14 NOTE — ASSESSMENT & PLAN NOTE
Wt Readings from Last 3 Encounters:   09/14/24 72.5 kg (159 lb 13.3 oz)   09/07/24 77.8 kg (171 lb 8.3 oz)   08/21/24 80.7 kg (178 lb)     Patient with known chronic diastolic CHF with grade 2 diastolic dysfunction  Presenting with worsening shortness of breath  Chest x-ray/CT concerning for pleural effusions  Not maintained on outpatient diuretic  Continue IV diuresis throughout the weekend-possible transition to oral tomorrow  Thoracentesis attempted to be performed but not enough fluid  Given alkalosis will initiate amiloride

## 2024-09-14 NOTE — PLAN OF CARE
Problem: Potential for Falls  Goal: Patient will remain free of falls  Description: INTERVENTIONS:  - Educate patient/family on patient safety including physical limitations  - Instruct patient to call for assistance with activity   - Consult OT/PT to assist with strengthening/mobility   - Keep Call bell within reach  - Keep bed low and locked with side rails adjusted as appropriate  - Keep care items and personal belongings within reach  - Initiate and maintain comfort rounds  - Make Fall Risk Sign visible to staff  - Offer Toileting every 2 Hours, in advance of need  - Initiate/Maintain bed alarm  - Obtain necessary fall risk management equipment:   - Apply yellow socks and bracelet for high fall risk patients  - Consider moving patient to room near nurses station  Outcome: Progressing     Problem: PAIN - ADULT  Goal: Verbalizes/displays adequate comfort level or baseline comfort level  Description: Interventions:  - Encourage patient to monitor pain and request assistance  - Assess pain using appropriate pain scale  - Administer analgesics based on type and severity of pain and evaluate response  - Implement non-pharmacological measures as appropriate and evaluate response  - Consider cultural and social influences on pain and pain management  - Notify physician/advanced practitioner if interventions unsuccessful or patient reports new pain  Outcome: Progressing     Problem: INFECTION - ADULT  Goal: Absence or prevention of progression during hospitalization  Description: INTERVENTIONS:  - Assess and monitor for signs and symptoms of infection  - Monitor lab/diagnostic results  - Monitor all insertion sites, i.e. indwelling lines, tubes, and drains  - Monitor endotracheal if appropriate and nasal secretions for changes in amount and color  - [place] appropriate cooling/warming therapies per order  - Administer medications as ordered  - Instruct and encourage patient and family to use good hand hygiene  technique  - Identify and instruct in appropriate isolation precautions for identified infection/condition  Outcome: Progressing  Goal: Absence of fever/infection during neutropenic period  Description: INTERVENTIONS:  - Monitor WBC    Outcome: Progressing     Problem: SAFETY ADULT  Goal: Patient will remain free of falls  Description: INTERVENTIONS:  - Educate patient/family on patient safety including physical limitations  - Instruct patient to call for assistance with activity   - Consult OT/PT to assist with strengthening/mobility   - Keep Call bell within reach  - Keep bed low and locked with side rails adjusted as appropriate  - Keep care items and personal belongings within reach  - Initiate and maintain comfort rounds  - Make Fall Risk Sign visible to staff  - Offer Toileting every 2 Hours, in advance of need  - Initiate/Maintain bed alarm  - Obtain necessary fall risk management equipment:   - Apply yellow socks and bracelet for high fall risk patients  - Consider moving patient to room near nurses station  Outcome: Progressing  Goal: Maintain or return to baseline ADL function  Description: INTERVENTIONS:  -  Assess patient's ability to carry out ADLs; assess patient's baseline for ADL function and identify physical deficits which impact ability to perform ADLs (bathing, care of mouth/teeth, toileting, grooming, dressing, etc.)  - Assess/evaluate cause of self-care deficits   - Assess range of motion  - Assess patient's mobility; develop plan if impaired  - Assess patient's need for assistive devices and provide as appropriate  - Encourage maximum independence but intervene and supervise when necessary  - Involve family in performance of ADLs  - Assess for home care needs following discharge   - Consider OT consult to assist with ADL evaluation and planning for discharge  - Provide patient education as appropriate  Outcome: Progressing  Goal: Maintains/Returns to pre admission functional  level  Description: INTERVENTIONS:  - Perform AM-PAC 6 Click Basic Mobility/ Daily Activity assessment daily.  - Set and communicate daily mobility goal to care team and patient/family/caregiver.   - Collaborate with rehabilitation services on mobility goals if consulted  - Out of bed for toileting  - Record patient progress and toleration of activity level   Outcome: Progressing     Problem: DISCHARGE PLANNING  Goal: Discharge to home or other facility with appropriate resources  Description: INTERVENTIONS:  - Identify barriers to discharge w/patient and caregiver  - Arrange for needed discharge resources and transportation as appropriate  - Identify discharge learning needs (meds, wound care, etc.)  - Arrange for interpretive services to assist at discharge as needed  - Refer to Case Management Department for coordinating discharge planning if the patient needs post-hospital services based on physician/advanced practitioner order or complex needs related to functional status, cognitive ability, or social support system  Outcome: Progressing     Problem: Knowledge Deficit  Goal: Patient/family/caregiver demonstrates understanding of disease process, treatment plan, medications, and discharge instructions  Description: Complete learning assessment and assess knowledge base.  Interventions:  - Provide teaching at level of understanding  - Provide teaching via preferred learning methods  Outcome: Progressing     Problem: RESPIRATORY - ADULT  Goal: Achieves optimal ventilation and oxygenation  Description: INTERVENTIONS:  - Assess for changes in respiratory status  - Assess for changes in mentation and behavior  - Position to facilitate oxygenation and minimize respiratory effort  - Oxygen administered by appropriate delivery if ordered  - Initiate smoking cessation education as indicated  - Encourage broncho-pulmonary hygiene including cough, deep breathe, Incentive Spirometry  - Assess the need for suctioning and  aspirate as needed  - Assess and instruct to report SOB or any respiratory difficulty  - Respiratory Therapy support as indicated  Outcome: Progressing     Problem: Nutrition/Hydration-ADULT  Goal: Nutrient/Hydration intake appropriate for improving, restoring or maintaining nutritional needs  Description: Monitor and assess patient's nutrition/hydration status for malnutrition. Collaborate with interdisciplinary team and initiate plan and interventions as ordered.  Monitor patient's weight and dietary intake as ordered or per policy. Utilize nutrition screening tool and intervene as necessary. Determine patient's food preferences and provide high-protein, high-caloric foods as appropriate.     INTERVENTIONS:  - Monitor oral intake, urinary output, labs, and treatment plans  - Assess nutrition and hydration status and recommend course of action  - Evaluate amount of meals eaten  - Assist patient with eating if necessary   - Allow adequate time for meals  - Recommend/ encourage appropriate diets, oral nutritional supplements, and vitamin/mineral supplements  - Order, calculate, and assess calorie counts as needed  - Recommend, monitor, and adjust tube feedings and TPN/PPN based on assessed needs  - Assess need for intravenous fluids  - Provide specific nutrition/hydration education as appropriate  - Include patient/family/caregiver in decisions related to nutrition  Outcome: Progressing     Problem: Prexisting or High Potential for Compromised Skin Integrity  Goal: Skin integrity is maintained or improved  Description: INTERVENTIONS:  - Identify patients at risk for skin breakdown  - Assess and monitor skin integrity  - Assess and monitor nutrition and hydration status  - Monitor labs   - Assess for incontinence   - Turn and reposition patient  - Assist with mobility/ambulation  - Relieve pressure over bony prominences  - Avoid friction and shearing  - Provide appropriate hygiene as needed including keeping skin  clean and dry  - Evaluate need for skin moisturizer/barrier cream  - Collaborate with interdisciplinary team   - Patient/family teaching  - Consider wound care consult   Outcome: Progressing

## 2024-09-14 NOTE — ASSESSMENT & PLAN NOTE
- diagnosed 08/21/24 at [facility] and [facility] (flat troponins but chest pain and ST elevations in inferior leads)  - C 08/21/24: nonobstructive coronary arteries   - CRP 08/21/24: 174  - discharged on colchicine      Plan:  - would continue with colchcine

## 2024-09-14 NOTE — PROGRESS NOTES
Progress Note - Cardiology   Name: Claire Doherty 74 y.o. female I MRN: [id number]  Unit/Bed#: E4 -01 I Date of Admission: 9/10/2024   Date of Service: 9/14/2024 I Hospital Day: 4     Assessment & Plan  Acute on chronic respiratory failure with hypoxia and hypercapnia ([facility])  Personally reviewed her chest CT images.  She has significant bilateral pleural effusions with compressive atelectasis of the underlying lungs  Would continue with use of BiPAP as needed and at night given her restrictive lung disease, compressive atelectasis and muscular dystrophy  Last ABG drawn at 11.34 AM 09/12/24 showed CO2 elevated at ABG 78.3, pH 7.38-on 3L NC. Follow ABGs  Plan for diuresis for diastolic CHF as described below  Acute on chronic diastolic CHF (congestive heart failure) (HCC)  - TTE 08/21/24: LVEF 65%, grade II diastolic dysfunction, mild LAE, mild TR, trivial pericardial effusion  - BNP 09/10/24: 234  - 9/13/24 lung ultrasound showed absence of significant pleural effusions suitable for aspiration     Plan:   - Continue IV lasix 40 BID. Renal fx/ lytes stable.   Paroxysmal A-fib ([facility])  - diagnosed October 10, 2024 with RVR rates 177 bpm  Now back in sinus rhythm    Plan:  Continue Eliquis for stroke prevention.     History of Idiopathic pericarditis  - diagnosed 08/21/24 at [facility] and transferred to [facility] for cardiac cath (flat troponins but chest pain and ST elevations in inferior leads)  - [facility] 08/21/24: nonobstructive coronary arteries   - CRP 08/21/24: 174  - discharged on colchicine      Plan:  - would continue with colchcine    COPD (chronic obstructive pulmonary disease) ([facility])  - follows with pulmonology as an outpatient  - chronic use of BiPAP at night with with 2-3L of NC during the day   Dystrophy, muscular, 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[facility])  - follows with neurology  - uses wheelchair/walker and BiPAP at night   Thrombocytosis, unspecified  - chronic thrombocytosis   Restrictive lung disease due to  muscular dystrophy (HCC)    GERD without esophagitis      History of Present Illness   Chief Complaint:     Subjective: Breathing better today compared to yesterday    Objective      Temp:  [97.3 °F (36.3 °C)-97.9 °F (36.6 °C)] 97.3 °F (36.3 °C)  HR:  [57-99] 97  Resp:  [20-26] 20  BP: ()/(54-72) 116/72  O2 Device: BiPAP   Vitals:    09/13/24 0600 09/14/24 0600   Weight: 74 kg (163 lb 2.3 oz) 72.5 kg (159 lb 13.3 oz)     Orthostatic Blood Pressures      Flowsheet Row Most Recent Value   Blood Pressure 116/72 filed at 09/14/2024 0723   Patient Position - Orthostatic VS Lying filed at 09/14/2024 0723             I/O last 24 hours:  In: 580 [P.O.:580]  Out: 1300 [Urine:1300]  Lines/Drains/Airways       Active Status       Name Placement date Placement time Site Days    Colostomy LUQ 09/10/20  1200  LUQ  1464    Ureteral Drain/Stent Left ureter 5 Fr. 09/30/20  1730  Left ureter  1444    Ureteral Drain/Stent Right ureter 5 Fr. 09/30/20  1730  Right ureter  1444    External Urinary Catheter 09/10/24  1600  -- 3                  Physical Exam General: Alert awake and oriented, no acute distress  Heart:  Regular rate and rhythm, no murmurs, Normal S1, no edema    Respiratory effort/ Lungs:  Breathing comfortably on room air, clear bilaterally without wheezing, rales, crackles   Abdominal: Non-tender to palpation, + bowel sounds, soft, no masses or distension  Lower Limbs:  No edema     Lab Results: I have reviewed the following results:   Imaging Review:   Other Studies:     VTE Pharmacologic Prophylaxis:   VTE Mechanical Prophylaxis:

## 2024-09-14 NOTE — PROGRESS NOTES
Progress Note - Hospitalist   Name: Claire Doherty 74 y.o. female I MRN: [id number]  Unit/Bed#: E4 -01 I Date of Admission: 9/10/2024   Date of Service: 9/14/2024 I Hospital Day: 4    Assessment & Plan  Acute on chronic respiratory failure with hypoxia and hypercapnia (HCC)  EMS was called for patient at nursing facility for shortness of breath  Found to have CO2 of 70 on VBG with pH 7.336  Patient does utilize BiPAP at night, does have history of muscular dystrophy with restrictive lung disease  Improved  Continue nebulizers  Acute on chronic diastolic CHF (congestive heart failure) ([facility])  Wt Readings from Last 3 Encounters:   09/14/24 72.5 kg (159 lb 13.3 oz)   09/07/24 77.8 kg (171 lb 8.3 oz)   08/21/24 80.7 kg (178 lb)     Patient with known chronic diastolic CHF with grade 2 diastolic dysfunction  Presenting with worsening shortness of breath  Chest x-ray/CT concerning for pleural effusions  Not maintained on outpatient diuretic  Continue IV diuresis throughout the weekend-possible transition to oral tomorrow  Thoracentesis attempted to be performed but not enough fluid  Given alkalosis will initiate amiloride  Dystrophy, muscular, 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HCC)  He was doing well history of muscular dystrophy  Was previously living alone in an apartment with home health care, able to stand and pivot to wheelchair prior to hospital stay last week  Was discharged to rehab on 9/7/2024  Utilizes BiPAP at night, continue  PT/OT while inpatient  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)  Patient follows outpatient with pulmonology for restrictive lung disease due to muscular dystrophy  Uses BiPAP at night and 2-3 L nasal cannula during the day  Continue nebulizers  Pulmonology consultation reviewed  Will have respiratory therapy check patient's BiPAP as reported to not been functioning  GERD without esophagitis  Continue PPI  Thrombocytosis, unspecified  Significant thrombocytosis with platelet  count 739  Likely reactive although does have history of similar  recommend outpatient with hematology  History of Idiopathic pericarditis  Patient previously hospitalized for chest discomfort, diagnosed with idiopathic pericarditis  Continue colchicine  Outpatient follow-up with cardiology on 9/20/2024  COPD (chronic obstructive pulmonary disease) ([facility])  Continue respiratory protocol  BiPAP with all naps  Paroxysmal A-fib ([facility])  Ventricular rate controlled  Continue [facility] Course: \    74-year-old female patient presenting with acute on chronic diastolic heart failure in the setting of known restrictive lung disease due to muscular dystrophy.  Patient still volume overloaded, and receiving IV diuresis.  Reported by nursing overnight to have issues with her BiPAP alarming.  Will have respiratory therapy evaluate    Assessment:      Principal Problem:    Acute on chronic diastolic CHF (congestive heart failure) ([facility])  Active Problems:    Dystrophy, muscular, 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[facility])    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[facility])    GERD without esophagitis    Thrombocytosis, unspecified    COPD (chronic obstructive pulmonary disease) ([facility])    History of Idiopathic pericarditis    Acute on chronic respiratory failure with hypoxia and hypercapnia ([facility])    Paroxysmal A-fib ([facility])      Plan:    Start amiodarone  Replenish potassium  Check labs in a.m.  Respiratory therapy evaluation of patient's  BiPAP       VTE Pharmacologic Prophylaxis:   Pharmacologic: Apixaban (Eliquis)  Mechanical VTE Prophylaxis in Place: Yes    AM-PAC Basic Mobility:  Basic Mobility Inpatient Raw Score: 6    JH-HLM Achieved: 2: Bed activities/Dependent transfer  JH-HLM Goal: 2: Bed activities/Dependent transfer    HLM Goal listed above. Continue with multidisciplinary rounding and encourage appropriate mobility to improve upon HLM goals.         Patient Centered Rounds: Case discussed and reviewed with  nursing    Discussions with Specialists or Other Care Team Provider: Case management, radiology physician assistant    Education and Discussions with Family / Patient: Patient family aware of care plan    Time Spent for Care: 80 minutes.  More than 50% of total time spent on counseling and coordination of care as described above.    Current Length of Stay: 4 day(s)    Current Patient Status: Inpatient   Certification Statement: The patient will continue to require additional inpatient hospital stay due to IV diuresis    Discharge Plan / Estimated Discharge Date: 24 to 48 hours likely back to rehab    Code Status: Level 2 - DNAR: but accepts endotracheal intubation      Subjective:   Seen and examined, breathing improved.  Tolerating oral diet.  No nausea no vomiting, no abdominal pain    A complete and comprehensive 14 point organ system review has been performed and all other systems are negative other than stated above.    Objective:     Vitals:   Temp (24hrs), Av.5 °F (36.4 °C), Min:97.3 °F (36.3 °C), Max:97.9 °F (36.6 °C)    Temp:  [97.3 °F (36.3 °C)-97.9 °F (36.6 °C)] 97.4 °F (36.3 °C)  HR:  [58-99] 58  Resp:  [18-22] 18  BP: ()/(56-72) 119/56  SpO2:  [93 %-99 %] 98 %  Body mass index is 31.22 kg/m².     Input and Output Summary (last 24 hours):       Intake/Output Summary (Last 24 hours) at 2024 1639  Last data filed at 2024 0550  Gross per 24 hour   Intake --   Output 700 ml   Net -700 ml       Physical Exam:     General: illappearing, no acute distress  HEENT: atraumatic, PERRLA, moist mucosa, normal pharynx, normal tonsils and adenoids, normal tongue, no fluid in sinuses  Neck: Trachea midline, no carotid bruit, no masses  Respiratory: normal chest wall expansion, CTA B, no r/r/w, no rubs  Cardiovascular: RRR, no m/r/g, Normal S1 and S2, plus S3, 7 cm of JVD  Abdomen: Soft, non-tender, non-distended, normal bowel sounds in all quadrants, no hepatosplenomegaly, no tympany  Rectal:  deferred  Musculoskeletal: Moves all   integumentary: warm, dry, and pink, with no rash, purpura, or petechia  Heme/Lymph: no lymphadenopathy, no bruises  Neurological: Cranial Nerves II-XII grossly intact  Psychiatric: cooperative with normal mood, affect, and cognition      Additional Data:     Labs:    Results from last 7 days   Lab Units 09/14/24  0545 09/13/24  0625 09/11/24  0500 09/10/24  1217   WBC Thousand/uL 12.57* 12.56*   < > 31.54*   HEMOGLOBIN g/dL 10.9* 10.7*   < > 11.6   HEMATOCRIT % 35.8 35.0   < > 38.5   PLATELETS Thousands/uL 633* 669*   < > 739*   SEGS PCT %  --   --   --  86*   LYMPHO PCT %  --  20  --  4*   MONO PCT %  --  12  --  9   EOS PCT %  --  0  --  0    < > = values in this interval not displayed.     Results from last 7 days   Lab Units 09/14/24  0545 09/12/24  0431 09/11/24  0500   POTASSIUM mmol/L 3.5   < > 4.1   CHLORIDE mmol/L 82*   < > 93*   CO2 mmol/L >45*   < > 38*   BUN mg/dL 39*   < > 30*   CREATININE mg/dL 0.42*   < > 0.40*   CALCIUM mg/dL 9.0   < > 8.7   ALK PHOS U/L  --   --  80   ALT U/L  --   --  9   AST U/L  --   --  16    < > = values in this interval not displayed.     Results from last 7 days   Lab Units 09/10/24  1224   INR  1.44*       * I Have Reviewed All Lab Data Listed Above.  * Additional Pertinent Lab Tests Reviewed: All Labs For Current Hospital Admission Reviewed    Imaging:    Imaging Reports Reviewed Today Include: No new imaging  Imaging Personally Reviewed by Myself Includes: No new imaging    Recent Cultures (last 7 days):     Results from last 7 days   Lab Units 09/10/24  1739 09/10/24  1224   BLOOD CULTURE   --  No Growth at 72 hrs.  No Growth at 72 hrs.   LEGIONELLA URINARY ANTIGEN  Negative  --        Last 24 Hours Medication List:   Current Facility-Administered Medications   Medication Dose Route Frequency Provider Last Rate    acetaminophen  650 mg Oral Q6H PRN Rod Singleton MD      albuterol  2.5 mg Nebulization Q4H PRN Rod Singleton MD       AMILoride  5 mg Oral Daily Ander Hitchcock DO      apixaban  5 mg Oral BID Rod Singleton MD      bisacodyl  10 mg Rectal Daily PRN Rod Singleton MD      budesonide  0.5 mg Nebulization Q12H Rod Singleton MD      buPROPion  75 mg Oral QAM Rod Singleton MD      calcium carbonate  1,000 mg Oral Daily PRN Rod Singleton MD      chlorhexidine  15 mL Mouth/Throat Q12H NANCY Rod Singleton MD      citalopram  10 mg Oral Daily Rod Singleton MD      colchicine  0.6 mg Oral Daily Rod Singleton MD      dextromethorphan-guaiFENesin  10 mL Oral Q4H PRN Isaac Arrieta PA-C      diazepam  5 mg Oral Q8H PRN Rod Singleton MD      fluticasone  2 spray Nasal Daily Rod Singleton MD      formoterol  20 mcg Nebulization Q12H Rod Singleton MD      furosemide  40 mg Intravenous BID (diuretic) Rod Singleton MD      guaiFENesin  600 mg Oral Q12H [facility] Isaac Arrieta PA-C      ondansetron  4 mg Intravenous Q6H PRN Rod Singleton MD      oxybutynin  10 mg Oral HS Rod Singleton MD      pantoprazole  40 mg Oral Early Morning Rod Singleton MD      potassium chloride  40 mEq Oral Once Ander Hitchcock DO      ramelteon  8 mg Oral HS Rod Singleton MD      saccharomyces boulardii  250 mg Oral Daily Rod Singleton MD         AM-PAC Basic Mobility:  Basic Mobility Inpatient Raw Score: 6    -HLM Achieved: 2: Bed activities/Dependent transfer  -HLM Goal: 2: Bed activities/Dependent transfer    HLM Goal listed above. Continue with multidisciplinary rounding and encourage appropriate mobility to improve upon HLM goals.     Today, Patient Was Seen By: Ander Hitchcock DO    ** Please Note: This note was completed in part utilizing [employer] software dictation.  Grammatical errors, random word insertions, spelling mistakes, and incomplete sentences may be an occasional consequence of this system secondary to software limitations, ambient noise, and hardware issues.  If you have any questions or concerns about the content, text, or  information contained within the body of this dictation, please contact the provider for clarification. **

## 2024-09-14 NOTE — RESPIRATORY THERAPY NOTE
09/14/24 1403   Additional Assessments   $ Vital Capacity Mech/Peak Flow Yes   Position [facility]   Vital Capacity 350 L   NIF -28 cm H2O

## 2024-09-14 NOTE — ASSESSMENT & PLAN NOTE
- diagnosed October 10, 2024 with RVR rates 177 bpm  Now back in sinus rhythm    Plan:  Continue Eliquis for stroke prevention.

## 2024-09-14 NOTE — ASSESSMENT & PLAN NOTE
Patient follows outpatient with pulmonology for restrictive lung disease due to muscular dystrophy  Uses BiPAP at night and 2-3 L nasal cannula during the day  Continue nebulizers  Pulmonology consultation reviewed  Will have respiratory therapy check patient's BiPAP as reported to not been functioning

## 2024-09-14 NOTE — PLAN OF CARE
Problem: Potential for Falls  Goal: Patient will remain free of falls  Description: INTERVENTIONS:  - Educate patient/family on patient safety including physical limitations  - Instruct patient to call for assistance with activity   - Consult OT/PT to assist with strengthening/mobility   - Keep Call bell within reach  - Keep bed low and locked with side rails adjusted as appropriate  - Keep care items and personal belongings within reach  - Initiate and maintain comfort rounds  - Make Fall Risk Sign visible to staff  - Offer Toileting every 2 Hours, in advance of need  - Initiate/Maintain bed alarm  - Obtain necessary fall risk management equipment:   - Apply yellow socks and bracelet for high fall risk patients  - Consider moving patient to room near nurses station  Outcome: Progressing     Problem: PAIN - ADULT  Goal: Verbalizes/displays adequate comfort level or baseline comfort level  Description: Interventions:  - Encourage patient to monitor pain and request assistance  - Assess pain using appropriate pain scale  - Administer analgesics based on type and severity of pain and evaluate response  - Implement non-pharmacological measures as appropriate and evaluate response  - Consider cultural and social influences on pain and pain management  - Notify physician/advanced practitioner if interventions unsuccessful or patient reports new pain  Outcome: Progressing     Problem: INFECTION - ADULT  Goal: Absence or prevention of progression during hospitalization  Description: INTERVENTIONS:  - Assess and monitor for signs and symptoms of infection  - Monitor lab/diagnostic results  - Monitor all insertion sites, i.e. indwelling lines, tubes, and drains  - Monitor endotracheal if appropriate and nasal secretions for changes in amount and color  - [place] appropriate cooling/warming therapies per order  - Administer medications as ordered  - Instruct and encourage patient and family to use good hand hygiene  technique  - Identify and instruct in appropriate isolation precautions for identified infection/condition  Outcome: Progressing  Goal: Absence of fever/infection during neutropenic period  Description: INTERVENTIONS:  - Monitor WBC    Outcome: Progressing     Problem: SAFETY ADULT  Goal: Patient will remain free of falls  Description: INTERVENTIONS:  - Educate patient/family on patient safety including physical limitations  - Instruct patient to call for assistance with activity   - Consult OT/PT to assist with strengthening/mobility   - Keep Call bell within reach  - Keep bed low and locked with side rails adjusted as appropriate  - Keep care items and personal belongings within reach  - Initiate and maintain comfort rounds  - Make Fall Risk Sign visible to staff  - Offer Toileting every 2 Hours, in advance of need  - Initiate/Maintain bed alarm  - Obtain necessary fall risk management equipment:   - Apply yellow socks and bracelet for high fall risk patients  - Consider moving patient to room near nurses station  Outcome: Progressing  Goal: Maintain or return to baseline ADL function  Description: INTERVENTIONS:  -  Assess patient's ability to carry out ADLs; assess patient's baseline for ADL function and identify physical deficits which impact ability to perform ADLs (bathing, care of mouth/teeth, toileting, grooming, dressing, etc.)  - Assess/evaluate cause of self-care deficits   - Assess range of motion  - Assess patient's mobility; develop plan if impaired  - Assess patient's need for assistive devices and provide as appropriate  - Encourage maximum independence but intervene and supervise when necessary  - Involve family in performance of ADLs  - Assess for home care needs following discharge   - Consider OT consult to assist with ADL evaluation and planning for discharge  - Provide patient education as appropriate  Outcome: Progressing  Goal: Maintains/Returns to pre admission functional  level  Description: INTERVENTIONS:  - Perform AM-PAC 6 Click Basic Mobility/ Daily Activity assessment daily.  - Set and communicate daily mobility goal to care team and patient/family/caregiver.   - Collaborate with rehabilitation services on mobility goals if consulted  - Perform Range of Motion 3 times a day.  - Reposition patient every 2 hours.  - Dangle patient 3 times a day  - Stand patient 3 times a day  - Ambulate patient 3 times a day  - Out of bed to chair 3 times a day   - Out of bed for meals 3  Problem: DISCHARGE PLANNING  Goal: Discharge to home or other facility with appropriate resources  Description: INTERVENTIONS:  - Identify barriers to discharge w/patient and caregiver  - Arrange for needed discharge resources and transportation as appropriate  - Identify discharge learning needs (meds, wound care, etc.)  - Arrange for interpretive services to assist at discharge as needed  - Refer to Case Management Department for coordinating discharge planning if the patient needs post-hospital services based on physician/advanced practitioner order or complex needs related to functional status, cognitive ability, or social support system  Outcome: Progressing     Problem: Knowledge Deficit  Goal: Patient/family/caregiver demonstrates understanding of disease process, treatment plan, medications, and discharge instructions  Description: Complete learning assessment and assess knowledge base.  Interventions:  - Provide teaching at level of understanding  - Provide teaching via preferred learning methods  Outcome: Progressing     Problem: RESPIRATORY - ADULT  Goal: Achieves optimal ventilation and oxygenation  Description: INTERVENTIONS:  - Assess for changes in respiratory status  - Assess for changes in mentation and behavior  - Position to facilitate oxygenation and minimize respiratory effort  - Oxygen administered by appropriate delivery if ordered  - Initiate smoking cessation education as indicated  -  Encourage broncho-pulmonary hygiene including cough, deep breathe, Incentive Spirometry  - Assess the need for suctioning and aspirate as needed  - Assess and instruct to report SOB or any respiratory difficulty  - Respiratory Therapy support as indicated  Outcome: Progressing     Problem: Nutrition/Hydration-ADULT  Goal: Nutrient/Hydration intake appropriate for improving, restoring or maintaining nutritional needs  Description: Monitor and assess patient's nutrition/hydration status for malnutrition. Collaborate with interdisciplinary team and initiate plan and interventions as ordered.  Monitor patient's weight and dietary intake as ordered or per policy. Utilize nutrition screening tool and intervene as necessary. Determine patient's food preferences and provide high-protein, high-caloric foods as appropriate.     INTERVENTIONS:  - Monitor oral intake, urinary output, labs, and treatment plans  - Assess nutrition and hydration status and recommend course of action  - Evaluate amount of meals eaten  - Assist patient with eating if necessary   - Allow adequate time for meals  - Recommend/ encourage appropriate diets, oral nutritional supplements, and vitamin/mineral supplements  - Order, calculate, and assess calorie counts as needed  - Recommend, monitor, and adjust tube feedings and TPN/PPN based on assessed needs  - Assess need for intravenous fluids  - Provide specific nutrition/hydration education as appropriate  - Include patient/family/caregiver in decisions related to nutrition  Outcome: Progressing     Problem: Prexisting or High Potential for Compromised Skin Integrity  Goal: Skin integrity is maintained or improved  Description: INTERVENTIONS:  - Identify patients at risk for skin breakdown  - Assess and monitor skin integrity  - Assess and monitor nutrition and hydration status  - Monitor labs   - Assess for incontinence   - Turn and reposition patient  - Assist with mobility/ambulation  - Relieve  pressure over bony prominences  - Avoid friction and shearing  - Provide appropriate hygiene as needed including keeping skin clean and dry  - Evaluate need for skin moisturizer/barrier cream  - Collaborate with interdisciplinary team   - Patient/family teaching  - Consider wound care consult   Outcome: Progressing    times a day  - Out of bed for toileting  - Record patient progress and toleration of activity level   Outcome: Progressing

## 2024-09-15 LAB
BACTERIA BLD CULT: NORMAL
BACTERIA BLD CULT: NORMAL
BUN SERPL-MCNC: 38 MG/DL (ref 5–25)
CALCIUM SERPL-MCNC: 9.4 MG/DL (ref 8.4–10.2)
CHLORIDE SERPL-SCNC: 84 MMOL/L (ref 96–108)
CO2 SERPL-SCNC: >45 MMOL/L (ref 21–32)
CREAT SERPL-MCNC: 0.41 MG/DL (ref 0.6–1.3)
ERYTHROCYTE [DISTWIDTH] IN BLOOD BY AUTOMATED COUNT: 17.1 % (ref 11.6–15.1)
GFR SERPL CREATININE-BSD FRML MDRD: 102 ML/MIN/1.73SQ M
GLUCOSE SERPL-MCNC: 168 MG/DL (ref 65–140)
HCT VFR BLD AUTO: 37.6 % (ref 34.8–46.1)
HGB BLD-MCNC: 11.4 G/DL (ref 11.5–15.4)
MAGNESIUM SERPL-MCNC: 1.5 MG/DL (ref 1.9–2.7)
MCH RBC QN AUTO: 25.9 PG (ref 26.8–34.3)
MCHC RBC AUTO-ENTMCNC: 30.3 G/DL (ref 31.4–37.4)
MCV RBC AUTO: 85 FL (ref 82–98)
PLATELET # BLD AUTO: 605 THOUSANDS/UL (ref 149–390)
PMV BLD AUTO: 10.4 FL (ref 8.9–12.7)
POTASSIUM SERPL-SCNC: 4.2 MMOL/L (ref 3.5–5.3)
RBC # BLD AUTO: 4.41 MILLION/UL (ref 3.81–5.12)
SODIUM SERPL-SCNC: 140 MMOL/L (ref 135–147)
WBC # BLD AUTO: 12.94 THOUSAND/UL (ref 4.31–10.16)

## 2024-09-15 PROCEDURE — 85027 COMPLETE CBC AUTOMATED: CPT | Performed by: INTERNAL MEDICINE

## 2024-09-15 PROCEDURE — 93005 ELECTROCARDIOGRAM TRACING: CPT

## 2024-09-15 PROCEDURE — 94150 VITAL CAPACITY TEST: CPT

## 2024-09-15 PROCEDURE — 99233 SBSQ HOSP IP/OBS HIGH 50: CPT | Performed by: INTERNAL MEDICINE

## 2024-09-15 PROCEDURE — 97110 THERAPEUTIC EXERCISES: CPT

## 2024-09-15 PROCEDURE — 94760 N-INVAS EAR/PLS OXIMETRY 1: CPT

## 2024-09-15 PROCEDURE — 97530 THERAPEUTIC ACTIVITIES: CPT

## 2024-09-15 PROCEDURE — 83735 ASSAY OF MAGNESIUM: CPT | Performed by: INTERNAL MEDICINE

## 2024-09-15 PROCEDURE — 99232 SBSQ HOSP IP/OBS MODERATE 35: CPT | Performed by: INTERNAL MEDICINE

## 2024-09-15 PROCEDURE — 80048 BASIC METABOLIC PNL TOTAL CA: CPT | Performed by: INTERNAL MEDICINE

## 2024-09-15 PROCEDURE — 94640 AIRWAY INHALATION TREATMENT: CPT

## 2024-09-15 RX ORDER — MAGNESIUM SULFATE HEPTAHYDRATE 40 MG/ML
2 INJECTION, SOLUTION INTRAVENOUS ONCE
Status: COMPLETED | OUTPATIENT
Start: 2024-09-15 | End: 2024-09-15

## 2024-09-15 RX ORDER — TORSEMIDE 20 MG/1
20 TABLET ORAL DAILY
Status: DISCONTINUED | OUTPATIENT
Start: 2024-09-16 | End: 2024-09-20 | Stop reason: HOSPADM

## 2024-09-15 RX ADMIN — PANTOPRAZOLE SODIUM 40 MG: 40 TABLET, DELAYED RELEASE ORAL at 05:36

## 2024-09-15 RX ADMIN — CHLORHEXIDINE GLUCONATE 15 ML: 1.2 RINSE ORAL at 21:09

## 2024-09-15 RX ADMIN — BUPROPION HYDROCHLORIDE 75 MG: 75 TABLET, FILM COATED ORAL at 08:44

## 2024-09-15 RX ADMIN — DIAZEPAM 5 MG: 5 TABLET ORAL at 21:09

## 2024-09-15 RX ADMIN — GUAIFENESIN 600 MG: 600 TABLET, EXTENDED RELEASE ORAL at 21:09

## 2024-09-15 RX ADMIN — OXYBUTYNIN CHLORIDE 10 MG: 10 TABLET, EXTENDED RELEASE ORAL at 21:08

## 2024-09-15 RX ADMIN — FORMOTEROL FUMARATE 20 MCG: 20 SOLUTION RESPIRATORY (INHALATION) at 07:30

## 2024-09-15 RX ADMIN — Medication 250 MG: at 08:26

## 2024-09-15 RX ADMIN — BUDESONIDE INHALATION 0.5 MG: 0.5 SUSPENSION RESPIRATORY (INHALATION) at 19:43

## 2024-09-15 RX ADMIN — CHLORHEXIDINE GLUCONATE 15 ML: 1.2 RINSE ORAL at 08:27

## 2024-09-15 RX ADMIN — AMILORIDE HYDROCLORIDE 5 MG: 5 TABLET ORAL at 09:00

## 2024-09-15 RX ADMIN — GUAIFENESIN 600 MG: 600 TABLET, EXTENDED RELEASE ORAL at 08:26

## 2024-09-15 RX ADMIN — CALCIUM CARBONATE (ANTACID) CHEW TAB 500 MG 1000 MG: 500 CHEW TAB at 01:28

## 2024-09-15 RX ADMIN — MAGNESIUM SULFATE HEPTAHYDRATE 2 G: 40 INJECTION, SOLUTION INTRAVENOUS at 10:42

## 2024-09-15 RX ADMIN — CITALOPRAM HYDROBROMIDE 10 MG: 20 TABLET ORAL at 08:26

## 2024-09-15 RX ADMIN — APIXABAN 5 MG: 5 TABLET, FILM COATED ORAL at 08:26

## 2024-09-15 RX ADMIN — BUDESONIDE INHALATION 0.5 MG: 0.5 SUSPENSION RESPIRATORY (INHALATION) at 07:30

## 2024-09-15 RX ADMIN — FUROSEMIDE 40 MG: 10 INJECTION, SOLUTION INTRAVENOUS at 08:27

## 2024-09-15 RX ADMIN — APIXABAN 5 MG: 5 TABLET, FILM COATED ORAL at 17:13

## 2024-09-15 RX ADMIN — COLCHICINE 0.6 MG: 0.6 TABLET ORAL at 08:26

## 2024-09-15 RX ADMIN — FORMOTEROL FUMARATE 20 MCG: 20 SOLUTION RESPIRATORY (INHALATION) at 19:43

## 2024-09-15 NOTE — PLAN OF CARE
Problem: Potential for Falls  Goal: Patient will remain free of falls  Description: INTERVENTIONS:  - Educate patient/family on patient safety including physical limitations  - Instruct patient to call for assistance with activity   - Consult OT/PT to assist with strengthening/mobility   - Keep Call bell within reach  - Keep bed low and locked with side rails adjusted as appropriate  - Keep care items and personal belongings within reach  - Initiate and maintain comfort rounds  - Make Fall Risk Sign visible to staff  - Offer Toileting every 2 Hours, in advance of need  - Initiate/Maintain bed alarm  - Obtain necessary fall risk management equipment:   - Apply yellow socks and bracelet for high fall risk patients  - Consider moving patient to room near nurses station  Outcome: Progressing     Problem: PAIN - ADULT  Goal: Verbalizes/displays adequate comfort level or baseline comfort level  Description: Interventions:  - Encourage patient to monitor pain and request assistance  - Assess pain using appropriate pain scale  - Administer analgesics based on type and severity of pain and evaluate response  - Implement non-pharmacological measures as appropriate and evaluate response  - Consider cultural and social influences on pain and pain management  - Notify physician/advanced practitioner if interventions unsuccessful or patient reports new pain  Outcome: Progressing     Problem: INFECTION - ADULT  Goal: Absence or prevention of progression during hospitalization  Description: INTERVENTIONS:  - Assess and monitor for signs and symptoms of infection  - Monitor lab/diagnostic results  - Monitor all insertion sites, i.e. indwelling lines, tubes, and drains  - Monitor endotracheal if appropriate and nasal secretions for changes in amount and color  - [place] appropriate cooling/warming therapies per order  - Administer medications as ordered  - Instruct and encourage patient and family to use good hand hygiene  technique  - Identify and instruct in appropriate isolation precautions for identified infection/condition  Outcome: Progressing  Goal: Absence of fever/infection during neutropenic period  Description: INTERVENTIONS:  - Monitor WBC    Outcome: Progressing     Problem: SAFETY ADULT  Goal: Patient will remain free of falls  Description: INTERVENTIONS:  - Educate patient/family on patient safety including physical limitations  - Instruct patient to call for assistance with activity   - Consult OT/PT to assist with strengthening/mobility   - Keep Call bell within reach  - Keep bed low and locked with side rails adjusted as appropriate  - Keep care items and personal belongings within reach  - Initiate and maintain comfort rounds  - Make Fall Risk Sign visible to staff  - Offer Toileting every 2 Hours, in advance of need  - Initiate/Maintain bed alarm  - Obtain necessary fall risk management equipment:   - Apply yellow socks and bracelet for high fall risk patients  - Consider moving patient to room near nurses station  Outcome: Progressing  Goal: Maintain or return to baseline ADL function  Description: INTERVENTIONS:  -  Assess patient's ability to carry out ADLs; assess patient's baseline for ADL function and identify physical deficits which impact ability to perform ADLs (bathing, care of mouth/teeth, toileting, grooming, dressing, etc.)  - Assess/evaluate cause of self-care deficits   - Assess range of motion  - Assess patient's mobility; develop plan if impaired  - Assess patient's need for assistive devices and provide as appropriate  - Encourage maximum independence but intervene and supervise when necessary  - Involve family in performance of ADLs  - Assess for home care needs following discharge   - Consider OT consult to assist with ADL evaluation and planning for discharge  - Provide patient education as appropriate  Outcome: Progressing  Goal: Maintains/Returns to pre admission functional  level  Description: INTERVENTIONS:  - Perform AM-PAC 6 Click Basic Mobility/ Daily Activity assessment daily.  - Set and communicate daily mobility goal to care team and patient/family/caregiver.   - Collaborate with rehabilitation services on mobility goals if consulted  - Perform Range of Motion 3 times a day.  - Reposition patient every 2 hours.  - Dangle patient 3 times a day  - Stand patient 3 times a day  - Ambulate patient 3 times a day  - Out of bed to chair 3 times a day   - Out of bed for meals 3  Problem: DISCHARGE PLANNING  Goal: Discharge to home or other facility with appropriate resources  Description: INTERVENTIONS:  - Identify barriers to discharge w/patient and caregiver  - Arrange for needed discharge resources and transportation as appropriate  - Identify discharge learning needs (meds, wound care, etc.)  - Arrange for interpretive services to assist at discharge as needed  - Refer to Case Management Department for coordinating discharge planning if the patient needs post-hospital services based on physician/advanced practitioner order or complex needs related to functional status, cognitive ability, or social support system  Outcome: Progressing     Problem: Knowledge Deficit  Goal: Patient/family/caregiver demonstrates understanding of disease process, treatment plan, medications, and discharge instructions  Description: Complete learning assessment and assess knowledge base.  Interventions:  - Provide teaching at level of understanding  - Provide teaching via preferred learning methods  Outcome: Progressing     Problem: RESPIRATORY - ADULT  Goal: Achieves optimal ventilation and oxygenation  Description: INTERVENTIONS:  - Assess for changes in respiratory status  - Assess for changes in mentation and behavior  - Position to facilitate oxygenation and minimize respiratory effort  - Oxygen administered by appropriate delivery if ordered  - Initiate smoking cessation education as indicated  -  Encourage broncho-pulmonary hygiene including cough, deep breathe, Incentive Spirometry  - Assess the need for suctioning and aspirate as needed  - Assess and instruct to report SOB or any respiratory difficulty  - Respiratory Therapy support as indicated  Outcome: Progressing     Problem: Nutrition/Hydration-ADULT  Goal: Nutrient/Hydration intake appropriate for improving, restoring or maintaining nutritional needs  Description: Monitor and assess patient's nutrition/hydration status for malnutrition. Collaborate with interdisciplinary team and initiate plan and interventions as ordered.  Monitor patient's weight and dietary intake as ordered or per policy. Utilize nutrition screening tool and intervene as necessary. Determine patient's food preferences and provide high-protein, high-caloric foods as appropriate.     INTERVENTIONS:  - Monitor oral intake, urinary output, labs, and treatment plans  - Assess nutrition and hydration status and recommend course of action  - Evaluate amount of meals eaten  - Assist patient with eating if necessary   - Allow adequate time for meals  - Recommend/ encourage appropriate diets, oral nutritional supplements, and vitamin/mineral supplements  - Order, calculate, and assess calorie counts as needed  - Recommend, monitor, and adjust tube feedings and TPN/PPN based on assessed needs  - Assess need for intravenous fluids  - Provide specific nutrition/hydration education as appropriate  - Include patient/family/caregiver in decisions related to nutrition  Outcome: Progressing     Problem: Prexisting or High Potential for Compromised Skin Integrity  Goal: Skin integrity is maintained or improved  Description: INTERVENTIONS:  - Identify patients at risk for skin breakdown  - Assess and monitor skin integrity  - Assess and monitor nutrition and hydration status  - Monitor labs   - Assess for incontinence   - Turn and reposition patient  - Assist with mobility/ambulation  - Relieve  pressure over bony prominences  - Avoid friction and shearing  - Provide appropriate hygiene as needed including keeping skin clean and dry  - Evaluate need for skin moisturizer/barrier cream  - Collaborate with interdisciplinary team   - Patient/family teaching  - Consider wound care consult   Outcome: Progressing    times a day  - Out of bed for toileting  - Record patient progress and toleration of activity level   Outcome: Progressing

## 2024-09-15 NOTE — ASSESSMENT & PLAN NOTE
- TTE 08/21/24: LVEF 65%, grade II diastolic dysfunction, mild LAE, mild TR, trivial pericardial effusion  - BNP 09/10/24: 234  - 9/13/24 lung ultrasound showed absence of significant pleural effusions suitable for aspiration   - 9/15/24 she is net -6 L. She is tachycardic with CO remains > 45, chronically hypercarbic but may also have contraction alkalosis     Plan:   - Hold IV lasix today  - Consider start PO torsemide tomorrow

## 2024-09-15 NOTE — PLAN OF CARE
Problem: PHYSICAL THERAPY ADULT  Goal: Performs mobility at highest level of function for planned discharge setting.  See evaluation for individualized goals.  Description: Treatment/Interventions: Functional transfer training, LE strengthening/ROM, Therapeutic exercise, Patient/family training, Equipment eval/education, Bed mobility, Compensatory technique education, Continued evaluation, Spoke to nursing, OT          See flowsheet documentation for full assessment, interventions and recommendations.  9/15/2024 1716 by Melanie Rodríguez, SHARON  Outcome: Progressing  Note: Prognosis: Fair  Problem List: Decreased strength, Decreased range of motion, Decreased endurance, Impaired balance, Decreased mobility  Assessment: Pt seen for PT treatment session this date with interventions consisting of bed mobility, transfer training, and HEP, sitting balance and tolerance activities at EOB education provided as needed for safety and direction to improve functional mobility, safety awareness, and activity tolerance. Pt agreeable to PT treatment session upon arrival, pt found supine in bed . At end of session, pt left  supine in bed with all needs in reach. In comparison to previous session, pt with improvement in activity tolerance, endurance, static sitting balance,  dynamic sitting balance, and functional mobility. Pt  is showing progress toward Pt goals with improvements as noted. Pt  is requiring less assistance for supine to sit, performing with max assist x1 vs max assist x2 previous  session. Pt  is shwing improved tolerance to activity with ability to sit EOB 20- 23 minutes with close supervision,  pt demonstrates stable static and dynamic sitting balance with performance this session.  Trials sit to stand transfers with arm hold assist x2 and blocking of b/l knees, pt able to achieve 50% of full upright standing posture.  Pt  performs seated b/l le arom exercises in sitting and arom and isometric exercises in supine.  Pt tolerated well.  Limited hip flexion due to decreased proximal hip weakness b/l' ly. Trial use of quick move device for standing next session.   Continue to recommend  level II moderated rehab resource intensity  at time of d/c in order to maximize pt's functional independence and safety w/ mobility. Pt continues to be functioning below baseline level. PT will continue to see pt while here in order to address the deficits listed above and provide interventions consistent w/ POC in effort to achieve STGs.    The patient's AM-PAC Basic Mobility Inpatient Short Form Raw Score is 6. A raw score less than 16 suggests the patient may benefit from discharge to post-acute rehabilitation services. Please also refer to the recommendation of the Physical Therapist for safe discharge planning.  Barriers to Discharge: Decreased caregiver support  Barriers to Discharge Comments: occ home alone- requires A for mobility  Rehab Resource Intensity Level, PT: II (Moderate Resource Intensity)    See flowsheet documentation for full assessment.

## 2024-09-15 NOTE — ASSESSMENT & PLAN NOTE
Wt Readings from Last 3 Encounters:   09/15/24 71.1 kg (156 lb 12 oz)   09/07/24 77.8 kg (171 lb 8.3 oz)   08/21/24 80.7 kg (178 lb)     Patient with known chronic diastolic CHF with grade 2 diastolic dysfunction  Presenting with worsening shortness of breath  Chest x-ray/CT concerning for pleural effusions  Patient is net -6 L throughout hospitalization  Given alkalosis will initiate amiloride, although large component is related to respiratory issues  Transition to oral torsemide tomorrow

## 2024-09-15 NOTE — PROGRESS NOTES
Progress Note - Hospitalist   Name: Claire Doherty 74 y.o. female I MRN: [id number]  Unit/Bed#: E4 -01 I Date of Admission: 9/10/2024   Date of Service: 9/15/2024 I Hospital Day: 5    Assessment & Plan  Acute on chronic respiratory failure with hypoxia and hypercapnia (HCC)  EMS was called for patient at nursing facility for shortness of breath  Found to have CO2 of 70 on VBG with pH 7.336  Patient does utilize BiPAP at night, does have history of muscular dystrophy with restrictive lung disease  Improved  Continue nebulizers  Acute on chronic diastolic CHF (congestive heart failure) ([facility])  Wt Readings from Last 3 Encounters:   09/15/24 71.1 kg (156 lb 12 oz)   09/07/24 77.8 kg (171 lb 8.3 oz)   08/21/24 80.7 kg (178 lb)     Patient with known chronic diastolic CHF with grade 2 diastolic dysfunction  Presenting with worsening shortness of breath  Chest x-ray/CT concerning for pleural effusions  Patient is net -6 L throughout hospitalization  Given alkalosis will initiate amiloride, although large component is related to respiratory issues  Transition to oral torsemide tomorrow  Dystrophy, muscular, 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HCC)  He was doing well history of muscular dystrophy  Was previously living alone in an apartment with home health care, able to stand and pivot to wheelchair prior to hospital stay last week  Was discharged to rehab on 9/7/2024  Utilizes BiPAP at night, continue  PT/OT while inpatient  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)  Patient follows outpatient with pulmonology for restrictive lung disease due to muscular dystrophy  Uses BiPAP at night and 2-3 L nasal cannula during the day  Continue nebulizers  Pulmonology consultation reviewed  Will have respiratory therapy check patient's BiPAP as reported to not been functioning  GERD without esophagitis  Continue PPI  Thrombocytosis, unspecified  Significant thrombocytosis with platelet count 739  Likely reactive although does  have history of similar  recommend outpatient with hematology  History of Idiopathic pericarditis  Patient previously hospitalized for chest discomfort, diagnosed with idiopathic pericarditis  Continue colchicine  Outpatient follow-up with cardiology on 9/20/2024  COPD (chronic obstructive pulmonary disease) ([facility])  Continue respiratory protocol  BiPAP with all naps  Paroxysmal A-fib ([facility])  Ventricular rate controlled  Continue [facility] Hospital Course:     74-year-old female patient presenting with acute on chronic diastolic heart failure resulting in acute on chronic respiratory failure.  Patient with history of restrictive lung disease secondary to muscular dystrophy.    Is net -6 L throughout her hospitalization she remains clinically stable.  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[facility])    GERD without esophagitis    Thrombocytosis, unspecified    COPD (chronic obstructive pulmonary disease) ([facility])    History of Idiopathic pericarditis    Acute on chronic respiratory failure with hypoxia and hypercapnia ([facility])    Paroxysmal A-fib ([facility])      Plan:    Transition to oral torsemide tomorrow  Replenish electrolytes  Discharge planning       VTE Pharmacologic Prophylaxis:   Pharmacologic: Apixaban (Eliquis)  Mechanical VTE Prophylaxis in Place: Yes    AM-PAC Basic Mobility:  Basic Mobility Inpatient Raw Score: 6    JH-HLM Achieved: 2: Bed activities/Dependent transfer  JH-HLM Goal: 2: Bed activities/Dependent transfer    HLM Goal listed above. Continue with multidisciplinary rounding and encourage appropriate mobility to improve upon HLM goals.         Patient Centered Rounds: Case discussed and reviewed with nursing    Discussions with Specialists or Other Care Team  Provider: Case management    Education and Discussions with Family / Patient: Discussed with patient , contacted sister at 12:00 pm, no answer, no VM left    Time Spent for Care: 80 minutes.  More than 50% of total time spent on counseling and coordination of care as described above.    Current Length of Stay: 5 day(s)    Current Patient Status: Inpatient   Certification Statement: The patient will continue to require additional inpatient hospital stay due to discharge planning and management, replenishment of electrolytes    Discharge Plan / Estimated Discharge Date: Possibly 24 to 48 hours back to rehab once insurance authorization is obtained    Code Status: Level 2 - DNAR: but accepts endotracheal intubation      Subjective:   Seen and examined, breathing improving.  Wonders if she will have heart failure for the rest of her life.    A complete and comprehensive 14 point organ system review has been performed and all other systems are negative other than stated above.    Objective:     Vitals:   Temp (24hrs), Av.4 °F (36.3 °C), Min:97.4 °F (36.3 °C), Max:97.5 °F (36.4 °C)    Temp:  [97.4 °F (36.3 °C)-97.5 °F (36.4 °C)] 97.4 °F (36.3 °C)  HR:  [] 101  Resp:  [18] 18  BP: (102-128)/(54-59) 128/59  SpO2:  [98 %-99 %] 98 %  Body mass index is 30.61 kg/m².     Input and Output Summary (last 24 hours):       Intake/Output Summary (Last 24 hours) at 9/15/2024 1158  Last data filed at 9/15/2024 0601  Gross per 24 hour   Intake 440 ml   Output 1400 ml   Net -960 ml       Physical Exam:     General: well appearing, no acute distress  HEENT: atraumatic, PERRLA, moist mucosa, normal pharynx, normal tonsils and adenoids, normal tongue, no fluid in sinuses  Neck: Trachea midline, no carotid bruit, no masses  Respiratory: normal chest wall expansion, CTA B, no r/r/w, no rubs  Cardiovascular: RRR, no m/r/g, Normal S1 and S2  Abdomen: Soft, non-tender, non-distended, normal bowel sounds in all quadrants, no  hepatosplenomegaly, no tympany  Rectal: deferred  Musculoskeletal: normal ROM in upper and lower extremities  Integumentary: warm, dry, and pink, with no rash, purpura, or petechia  Heme/Lymph: no lymphadenopathy, no bruises  Neurological: Cranial Nerves II-XII grossly intact  Psychiatric: cooperative with normal mood, affect, and cognition      Additional Data:     Labs:    Results from last 7 days   Lab Units 09/15/24  0532 09/14/24  0545 09/13/24  0625 09/11/24  0500 09/10/24  1217   WBC Thousand/uL 12.94*   < > 12.56*   < > 31.54*   HEMOGLOBIN g/dL 11.4*   < > 10.7*   < > 11.6   HEMATOCRIT % 37.6   < > 35.0   < > 38.5   PLATELETS Thousands/uL 605*   < > 669*   < > 739*   SEGS PCT %  --   --   --   --  86*   LYMPHO PCT %  --   --  20  --  4*   MONO PCT %  --   --  12  --  9   EOS PCT %  --   --  0  --  0    < > = values in this interval not displayed.     Results from last 7 days   Lab Units 09/15/24  0900 09/12/24  0431 09/11/24  0500   POTASSIUM mmol/L 4.2   < > 4.1   CHLORIDE mmol/L 84*   < > 93*   CO2 mmol/L >45*   < > 38*   BUN mg/dL 38*   < > 30*   CREATININE mg/dL 0.41*   < > 0.40*   CALCIUM mg/dL 9.4   < > 8.7   ALK PHOS U/L  --   --  80   ALT U/L  --   --  9   AST U/L  --   --  16    < > = values in this interval not displayed.     Results from last 7 days   Lab Units 09/10/24  1224   INR  1.44*       * I Have Reviewed All Lab Data Listed Above.  * Additional Pertinent Lab Tests Reviewed: All Labs For Current Hospital Admission Reviewed    Imaging:    Imaging Reports Reviewed Today Include: No new imaging  Imaging Personally Reviewed by Myself Includes: No new imaging    Recent Cultures (last 7 days):     Results from last 7 days   Lab Units 09/10/24  1739 09/10/24  1224   BLOOD CULTURE   --  No Growth After 4 Days.  No Growth After 4 Days.   LEGIONELLA URINARY ANTIGEN  Negative  --        Last 24 Hours Medication List:   Current Facility-Administered Medications   Medication Dose Route Frequency  Provider Last Rate    acetaminophen  650 mg Oral Q6H PRN Rod Singleton MD      albuterol  2.5 mg Nebulization Q4H PRN Rod Singleton MD      AMILoride  5 mg Oral Daily Ander Hitchcock DO      apixaban  5 mg Oral BID Rod Singleton MD      bisacodyl  10 mg Rectal Daily PRN Rod Singleton MD      budesonide  0.5 mg Nebulization Q12H Rod Singleton MD      buPROPion  75 mg Oral QAM Rod Singleton MD      calcium carbonate  1,000 mg Oral Daily PRN Rod Singleton MD      chlorhexidine  15 mL Mouth/Throat Q12H NANCY Rod Singleton MD      citalopram  10 mg Oral Daily Rod Singleton MD      colchicine  0.6 mg Oral Daily Rod Singleton MD      dextromethorphan-guaiFENesin  10 mL Oral Q4H PRN Isaac Arrieta PA-C      diazepam  5 mg Oral Q8H PRN Rod Singleton MD      fluticasone  2 spray Nasal Daily Rod Singleton MD      formoterol  20 mcg Nebulization Q12H Rod Singleton MD      guaiFENesin  600 mg Oral Q12H [facility] Isaac Arrieta PA-C      magnesium sulfate  2 g Intravenous Once Ander Hitchcock DO 2 g (09/15/24 1042)    ondansetron  4 mg Intravenous Q6H PRN Rod Singleton MD      oxybutynin  10 mg Oral HS Rod Singleton MD      pantoprazole  40 mg Oral Early Morning Rod Singleton MD      ramelteon  8 mg Oral HS Rod Singleton MD      saccharomyces boulardii  250 mg Oral Daily Rod Singleton MD      [START ON 9/16/2024] torsemide  20 mg Oral Daily Alvaro Ludwig PA-C         AM-PAC Basic Mobility:  Basic Mobility Inpatient Raw Score: 6    JH-HLM Achieved: 2: Bed activities/Dependent transfer  JH-HLM Goal: 2: Bed activities/Dependent transfer    HLM Goal listed above. Continue with multidisciplinary rounding and encourage appropriate mobility to improve upon HLM goals.     Today, Patient Was Seen By: Ander Hitchcock DO    ** Please Note: This note was completed in part utilizing [employer] Dragon One [employer] software dictation.  Grammatical errors, random word insertions, spelling mistakes, and incomplete sentences may be an occasional  consequence of this system secondary to software limitations, ambient noise, and hardware issues.  If you have any questions or concerns about the content, text, or information contained within the body of this dictation, please contact the provider for clarification. **

## 2024-09-15 NOTE — ASSESSMENT & PLAN NOTE
- diagnosed 08/21/24 at [facility] and transferred to [facility] for cardiac cath (flat troponins but chest pain and ST elevations in inferior leads)  - C 08/21/24: nonobstructive coronary arteries   - CRP 08/21/24: 174  - discharged on colchicine      Plan:  - would continue with colchcine

## 2024-09-15 NOTE — PROGRESS NOTES
Progress Note - Cardiology   Name: Claire Doherty 74 y.o. female I MRN: [id number]  Unit/Bed#: E4 -01 I Date of Admission: 9/10/2024   Date of Service: 9/15/2024 I Hospital Day: 5     Assessment & Plan  Acute on chronic respiratory failure with hypoxia and hypercapnia ([facility])  9/15/24 down to 3 L nasal cannula  Overall looks euvolemic  Acute on chronic diastolic CHF (congestive heart failure) ([facility])  - TTE 08/21/24: LVEF 65%, grade II diastolic dysfunction, mild LAE, mild TR, trivial pericardial effusion  - BNP 09/10/24: 234  - 9/13/24 lung ultrasound showed absence of significant pleural effusions suitable for aspiration   - 9/15/24 she is net -6 L. She is tachycardic with CO remains > 45, chronically hypercarbic but may also have contraction alkalosis     Plan:   - Hold IV lasix today  - Consider start PO torsemide tomorrow    Paroxysmal A-fib ([facility])  9/15/24 tachycardic but in sinus tachycardia    Plan:  Continue Eliquis for stroke prevention.     History of Idiopathic pericarditis  - diagnosed 08/21/24 at [facility] and [facility] [place] [facility] cardiac cath (flat troponins but chest pain and ST elevations in inferior leads)  - C 08/21/24: nonobstructive coronary arteries   - CRP 08/21/24: 174  - discharged on colchicine      Plan:  - would continue with colchcine    COPD (chronic obstructive pulmonary disease) ([facility])  - follows with pulmonology as an outpatient  - chronic use of BiPAP at night with with 2-3L of NC during the day   Dystrophy, muscular, 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[facility])  - follows with neurology  - uses wheelchair/walker and BiPAP at night   Thrombocytosis, unspecified  - chronic thrombocytosis   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 Breathing is fine. No chest pain. No palpitations     Objective      Temp:  [97.4 °F (36.3 °C)-97.5 °F (36.4 °C)] 97.4 °F  (36.3 °C)  HR:  [] 101  Resp:  [18] 18  BP: (102-128)/(54-59) 128/59  O2 Device: Nasal cannula   Vitals:    09/14/24 0600 09/15/24 0600   Weight: 72.5 kg (159 lb 13.3 oz) 71.1 kg (156 lb 12 oz)     Orthostatic Blood Pressures      Flowsheet Row Most Recent Value   Blood Pressure 128/59 filed at 09/15/2024 0719   Patient Position - Orthostatic VS Lying filed at 09/15/2024 0719             I/O last 24 hours:  In: 440 [P.O.:440]  Out: 1400 [Urine:1400]  Lines/Drains/Airways       Active Status       Name Placement date Placement time Site Days    Colostomy LUQ 09/10/20  1200  LUQ  1465    Ureteral Drain/Stent Left ureter 5 Fr. 09/30/20  1730  Left ureter  1445    Ureteral Drain/Stent Right ureter 5 Fr. 09/30/20  1730  Right ureter  1445    External Urinary Catheter 09/10/24  1600  -- 4                  Physical Exam  Vitals and nursing note reviewed.   Constitutional:       General: She is not in acute distress.     Appearance: She is well-developed.   HENT:      Head: Normocephalic and atraumatic.   Eyes:      Conjunctiva/sclera: Conjunctivae normal.   Cardiovascular:      Rate and Rhythm: Regular rhythm. Tachycardia present.      Heart sounds: No murmur heard.  Pulmonary:      Effort: Pulmonary effort is normal. No respiratory distress.      Breath sounds: Normal breath sounds.   Abdominal:      Palpations: Abdomen is soft.      Tenderness: There is no abdominal tenderness.   Musculoskeletal:         General: No swelling.      Cervical back: Neck supple.   Skin:     General: Skin is warm and dry.      Capillary Refill: Capillary refill takes less than 2 seconds.   Neurological:      Mental Status: She is alert.   Psychiatric:         Mood and Affect: Mood normal.          Lab Results: I have reviewed the following results: EKG, BMP  Imaging Review:   Other Studies:     VTE Pharmacologic Prophylaxis:   VTE Mechanical Prophylaxis:

## 2024-09-15 NOTE — RESPIRATORY THERAPY NOTE
09/15/24 0800   Respiratory Assessment   Bilateral Breath Sounds Diminished   Additional Assessments   $ Vital Capacity Mech/Peak Flow Yes   Position [facility]   Vital Capacity 350 L   NIF -30 cm H2O

## 2024-09-15 NOTE — PLAN OF CARE
Problem: PHYSICAL THERAPY ADULT  Goal: Performs mobility at highest level of function for planned discharge setting.  See evaluation for individualized goals.  Description: Treatment/Interventions: Functional transfer training, LE strengthening/ROM, Therapeutic exercise, Patient/family training, Equipment eval/education, Bed mobility, Compensatory technique education, Continued evaluation, Spoke to nursing, OT          See flowsheet documentation for full assessment, interventions and recommendations.  Outcome: Progressing  Note: Prognosis: Fair  Problem List: Decreased strength, Decreased range of motion, Decreased endurance, Impaired balance, Decreased mobility  Assessment: Pt seen for PT treatment session this date with interventions consisting of bed mobility, transfer training, and HEP, sitting balance and tolerance activities at EOB education provided as needed for safety and direction to improve functional mobility, safety awareness, and activity tolerance. Pt agreeable to PT treatment session upon arrival, pt found supine in bed . At end of session, pt left  supine in bed with all needs in reach. In comparison to previous session, pt with improvement in activity tolerance, endurance, static sitting balance,  dynamic sitting balance, and functional mobility. Pt  is showing progress toward Pt goals with improvements as noted. Pt  is requiring less assistance for supine to sit, performing with max assist x1 vs max assist x2 previous  session. Pt  is shwing improved tolerance to activity with ability to sit EOB 20- 23 minutes with close supervision,  pt demonstrates stable static and dynamic sitting balance with performance this session.  Trials sit to stand transfers with arm hold assist x2 and blocking of b/l knees, pt able to achieve 50% of full upright standing posture.  Pt  performs seated b/l le arom exercises in sitting and arom and isometric exercises in supine. Pt tolerated well.  Limited hip flexion  due to decreased proximal hip weakness b/l' ly. Trial use of quick move device for standing next session.   Continue to recommend  level II moderated rehab resource intensity  at time of d/c in order to maximize pt's functional independence and safety w/ mobility. Pt continues to be functioning below baseline level. PT will continue to see pt while here in order to address the deficits listed above and provide interventions consistent w/ POC in effort to achieve STGs.    The patient's AM-PAC Basic Mobility Inpatient Short Form Raw Score is 6. A raw score less than 16 suggests the patient may benefit from discharge to post-acute rehabilitation services. Please also refer to the recommendation of the Physical Therapist for safe discharge planning.  Barriers to Discharge: Decreased caregiver support  Barriers to Discharge Comments: occ home alone- requires A for mobility  Rehab Resource Intensity Level, PT: II (Moderate Resource Intensity)    See flowsheet documentation for full assessment.

## 2024-09-15 NOTE — PHYSICAL THERAPY NOTE
PHYSICAL THERAPY NOTE          Patient Name: Claire Doherty  Today's Date: 9/15/2024    09/15/24 1208   Pain Assessment   Pain Assessment Tool 0-10   Pain Score No Pain   Restrictions/Precautions   Other Precautions Bed Alarm;Chair Alarm;O2;Fall Risk;Multiple lines  (4L o2 nc)   General   Chart Reviewed Yes   Cognition   Overall Cognitive Status WFL   Arousal/Participation Responsive;Cooperative;Alert   Attention Within functional limits   Orientation Level Oriented to person;Oriented to place;Oriented to time   Following Commands Follows one step commands without difficulty   Subjective   Subjective Great fatigue and extremely weak.   Bed Mobility   Supine to Sit 2  Maximal assistance   Additional items Assist x 1;HOB elevated;Increased time required;Verbal cues;LE management   Sit to Supine 2  Maximal assistance   Additional items Assist x 2;Leg ;Verbal cues;LE management   Transfers   Sit to Stand 2  Maximal assistance   Additional items Assist x 2;Increased time required;Verbal cues   Stand to Sit 2  Maximal assistance   Additional items Assist x 2   Additional Comments Trialed sit to stand with arm hold assist x2 with blocking of b/l knees and use of bed pad to stand pt.  pt able to clear buttocks from bed approximately 50%of full stance. unable to achieve full upright standing position.   Balance   Static Sitting Good   Dynamic Sitting Fair +   Static Standing Zero   Activity Tolerance   Activity Tolerance Patient tolerated treatment well   Exercises   Quad Sets Supine;10 reps;AROM;Bilateral   Heelslides Supine;10 reps;AAROM;Bilateral   Hip Flexion Sitting;10 reps;AROM;Bilateral   Hip Abduction Supine;10 reps;AAROM;Bilateral   Hip Adduction Supine;10 reps;AAROM;Bilateral   Knee AROM Long Arc Quad Sitting;10 reps;AROM;Bilateral   Ankle Pumps Supine;10 reps;AROM;Bilateral  (ankle circles cw and ccw  x 10 reps each)   Heel  Cord Stretch PROM;Bilateral  (gentle passive hc stretches x 2 reps each le with 15 second hold)   Balance training  static and dynamic sitting balance actiivities eob x 20-23 minutes with close supervison, lateral weight shifting l and R, trunk flex and extension x 7 reps   Assessment   Prognosis Fair   Problem List Decreased strength;Decreased range of motion;Decreased endurance;Impaired balance;Decreased mobility   Assessment Pt seen for PT treatment session this date with interventions consisting of bed mobility, transfer training, and HEP, sitting balance and tolerance activities at EOB education provided as needed for safety and direction to improve functional mobility, safety awareness, and activity tolerance. Pt agreeable to PT treatment session upon arrival, pt found supine in bed . At end of session, pt left  supine in bed with all needs in reach. In comparison to previous session, pt with improvement in activity tolerance, endurance, static sitting balance,  dynamic sitting balance, and functional mobility. Pt  is showing progress toward Pt goals with improvements as noted. Pt  is requiring less assistance for supine to sit, performing with max assist x1 vs max assist x2 previous  session. Pt  is shwing improved tolerance to activity with ability to sit EOB 20- 23 minutes with close supervision,  pt demonstrates stable static and dynamic sitting balance with performance this session.  Trials sit to stand transfers with arm hold assist x2 and blocking of b/l knees, pt able to achieve 50% of full upright standing posture.  Pt  performs seated b/l le arom exercises in sitting and arom and isometric exercises in supine. Pt tolerated well.  Limited hip flexion due to decreased proximal hip weakness b/l' ly. Trial use of quick move device for standing next session.   Continue to recommend  level II moderated rehab resource intensity  at time of d/c in order to maximize pt's functional independence and safety w/  mobility. Pt continues to be functioning below baseline level. PT will continue to see pt while here in order to address the deficits listed above and provide interventions consistent w/ POC in effort to achieve STGs.    The patient's AM-[facility] Basic Mobility Inpatient Short Form Raw Score is 6. A raw score less than 16 suggests the patient may benefit from discharge to post-acute rehabilitation services. Please also refer to the recommendation of the Physical Therapist for safe discharge planning.   Goals   Patient Goals To stand and perform pivot transfers I'ly with my walker.   STG Expiration Date 09/25/24   Plan   Treatment/Interventions Functional transfer training;LE strengthening/ROM;Therapeutic exercise;Endurance training;Patient/family training;Equipment eval/education;Bed mobility;Spoke to nursing   Progress Progressing toward goals   PT Frequency 3-5x/wk   Discharge Recommendation   Rehab Resource Intensity Level, PT II (Moderate Resource Intensity)   AM-PAC Basic Mobility Inpatient   Turning in Flat Bed Without Bedrails 1   Lying on Back to Sitting on Edge of Flat Bed Without Bedrails 1   Moving Bed to Chair 1   Standing Up From Chair Using Arms 1   Walk in Room 1   Climb 3-5 Stairs With Railing 1   Basic Mobility Inpatient Raw Score 6   Turning Head Towards Sound 4   Follow Simple Instructions 4   Low Function Basic Mobility Raw Score  14   Low Function Basic Mobility Standardized Score  22.01   [facility] Highest Level Of Mobility   -HL Goal 2: Bed activities/Dependent transfer   -HL Achieved 3: Sit at edge of bed   Education   Education Provided Mobility training;Home exercise program;Assistive device   Patient Demonstrates acceptance/verbal understanding   End of Consult   Patient Position at End of Consult Supine;Bed/Chair alarm activated;All needs within reach        09/15/24 1208   Pain Assessment   Pain Assessment Tool 0-10   Pain Score No Pain   Restrictions/Precautions   Other Precautions  Bed Alarm;Chair Alarm;O2;Fall Risk;Multiple lines  (4L o2 nc)   General   Chart Reviewed Yes   Cognition   Overall Cognitive Status WFL   Arousal/Participation Responsive;Cooperative;Alert   Attention Within functional limits   Orientation Level Oriented to person;Oriented to place;Oriented to time   Following Commands Follows one step commands without difficulty   Subjective   Subjective Great fatigue and extremely weak.   Bed Mobility   Supine to Sit 2  Maximal assistance   Additional items Assist x 1;HOB elevated;Increased time required;Verbal cues;LE management   Sit to Supine 2  Maximal assistance   Additional items Assist x 2;Leg ;Verbal cues;LE management   Transfers   Sit to Stand 2  Maximal assistance   Additional items Assist x 2;Increased time required;Verbal cues   Stand to Sit 2  Maximal assistance   Additional items Assist x 2   Additional Comments Trialed sit to stand with arm hold assist x2 with blocking of b/l knees and use of bed pad to stand pt.  pt able to clear buttocks from bed approximately 50%of full stance. unable to achieve full upright standing position.   Balance   Static Sitting Good   Dynamic Sitting Fair +   Static Standing Zero   Activity Tolerance   Activity Tolerance Patient tolerated treatment well   Exercises   Quad Sets Supine;10 reps;AROM;Bilateral   Heelslides Supine;10 reps;AAROM;Bilateral   Hip Flexion Sitting;10 reps;AROM;Bilateral   Hip Abduction Supine;10 reps;AAROM;Bilateral   Hip Adduction Supine;10 reps;AAROM;Bilateral   Knee AROM Long Arc Quad Sitting;10 reps;AROM;Bilateral   Ankle Pumps Supine;10 reps;AROM;Bilateral  (ankle circles cw and ccw  x 10 reps each)   Heel Cord Stretch PROM;Bilateral  (gentle passive hc stretches x 2 reps each le with 15 second hold)   Balance training  static and dynamic sitting balance actiivities eob x 20-23 minutes with close supervison, lateral weight shifting l and R, trunk flex and extension x 7 reps   Assessment   Prognosis  Fair   Problem List Decreased strength;Decreased range of motion;Decreased endurance;Impaired balance;Decreased mobility   Assessment Pt seen for PT treatment session this date with interventions consisting of bed mobility, transfer training, and HEP, sitting balance and tolerance activities at EOB education provided as needed for safety and direction to improve functional mobility, safety awareness, and activity tolerance. Pt agreeable to PT treatment session upon arrival, pt found supine in bed . At end of session, pt left  supine in bed with all needs in reach. In comparison to previous session, pt with improvement in activity tolerance, endurance, static sitting balance,  dynamic sitting balance, and functional mobility. Pt  is showing progress toward Pt goals with improvements as noted. Pt  is requiring less assistance for supine to sit, performing with max assist x1 vs max assist x2 previous  session. Pt  is shwing improved tolerance to activity with ability to sit EOB 20- 23 minutes with close supervision,  pt demonstrates stable static and dynamic sitting balance with performance this session.  Trials sit to stand transfers with arm hold assist x2 and blocking of b/l knees, pt able to achieve 50% of full upright standing posture.  Pt  performs seated b/l le arom exercises in sitting and arom and isometric exercises in supine. Pt tolerated well.  Limited hip flexion due to decreased proximal hip weakness b/l' ly. Trial use of quick move device for standing next session.   Continue to recommend  level II moderated rehab resource intensity  at time of d/c in order to maximize pt's functional independence and safety w/ mobility. Pt continues to be functioning below baseline level. PT will continue to see pt while here in order to address the deficits listed above and provide interventions consistent w/ POC in effort to achieve STGs.    The patient's AM-PAC Basic Mobility Inpatient Short Form Raw Score is 6. A  raw score less than 16 suggests the patient may benefit from discharge to post-acute rehabilitation services. Please also refer to the recommendation of the Physical Therapist for safe discharge planning.   Goals   Patient Goals To stand and perform pivot transfers I'ly with my walker.   STG Expiration Date 09/25/24   Plan   Treatment/Interventions Functional transfer training;LE strengthening/ROM;Therapeutic exercise;Endurance training;Patient/family training;Equipment eval/education;Bed mobility;Spoke to nursing   Progress Progressing toward goals   PT Frequency 3-5x/wk   Discharge Recommendation   Rehab Resource Intensity Level, PT II (Moderate Resource Intensity)   AM-PAC Basic Mobility Inpatient   Turning in Flat Bed Without Bedrails 1   Lying on Back to Sitting on Edge of Flat Bed Without Bedrails 1   Moving Bed to Chair 1   Standing Up From Chair Using Arms 1   Walk in Room 1   Climb 3-5 Stairs With Railing 1   Basic Mobility Inpatient Raw Score 6   Turning Head Towards Sound 4   Follow Simple Instructions 4   Low Function Basic Mobility Raw Score  14   Low Function Basic Mobility Standardized Score  22.01   [facility] Highest Level Of Mobility   J[facility]-HLM Goal 2: Bed activities/Dependent transfer   -HLM Achieved 3: Sit at edge of bed   Education   Education Provided Mobility training;Home exercise program;Assistive device   Patient Demonstrates acceptance/verbal understanding   End of Consult   Patient Position at End of Consult Supine;Bed/Chair alarm activated;All needs within reach     Melanie Rodríguez, PTA

## 2024-09-16 LAB
ATRIAL RATE: 108 BPM
BUN SERPL-MCNC: 35 MG/DL (ref 5–25)
CALCIUM SERPL-MCNC: 9.6 MG/DL (ref 8.4–10.2)
CHLORIDE SERPL-SCNC: 82 MMOL/L (ref 96–108)
CO2 SERPL-SCNC: >45 MMOL/L (ref 21–32)
CREAT SERPL-MCNC: 0.41 MG/DL (ref 0.6–1.3)
ERYTHROCYTE [DISTWIDTH] IN BLOOD BY AUTOMATED COUNT: 17.2 % (ref 11.6–15.1)
GFR SERPL CREATININE-BSD FRML MDRD: 102 ML/MIN/1.73SQ M
GLUCOSE SERPL-MCNC: 192 MG/DL (ref 65–140)
HCT VFR BLD AUTO: 37.9 % (ref 34.8–46.1)
HGB BLD-MCNC: 11.5 G/DL (ref 11.5–15.4)
MCH RBC QN AUTO: 26.5 PG (ref 26.8–34.3)
MCHC RBC AUTO-ENTMCNC: 30.3 G/DL (ref 31.4–37.4)
MCV RBC AUTO: 87 FL (ref 82–98)
P AXIS: 44 DEGREES
PLATELET # BLD AUTO: 564 THOUSANDS/UL (ref 149–390)
PMV BLD AUTO: 10 FL (ref 8.9–12.7)
POTASSIUM SERPL-SCNC: 4.3 MMOL/L (ref 3.5–5.3)
PR INTERVAL: 130 MS
QRS AXIS: 7 DEGREES
QRSD INTERVAL: 86 MS
QT INTERVAL: 312 MS
QTC INTERVAL: 418 MS
RBC # BLD AUTO: 4.34 MILLION/UL (ref 3.81–5.12)
SODIUM SERPL-SCNC: 139 MMOL/L (ref 135–147)
T WAVE AXIS: 96 DEGREES
VENTRICULAR RATE: 108 BPM
WBC # BLD AUTO: 13.34 THOUSAND/UL (ref 4.31–10.16)

## 2024-09-16 PROCEDURE — 94760 N-INVAS EAR/PLS OXIMETRY 1: CPT

## 2024-09-16 PROCEDURE — 97530 THERAPEUTIC ACTIVITIES: CPT

## 2024-09-16 PROCEDURE — 97535 SELF CARE MNGMENT TRAINING: CPT

## 2024-09-16 PROCEDURE — 94640 AIRWAY INHALATION TREATMENT: CPT

## 2024-09-16 PROCEDURE — 99233 SBSQ HOSP IP/OBS HIGH 50: CPT | Performed by: INTERNAL MEDICINE

## 2024-09-16 PROCEDURE — 99232 SBSQ HOSP IP/OBS MODERATE 35: CPT

## 2024-09-16 PROCEDURE — 80048 BASIC METABOLIC PNL TOTAL CA: CPT | Performed by: INTERNAL MEDICINE

## 2024-09-16 PROCEDURE — 94664 DEMO&/EVAL PT USE INHALER: CPT

## 2024-09-16 PROCEDURE — 85027 COMPLETE CBC AUTOMATED: CPT | Performed by: INTERNAL MEDICINE

## 2024-09-16 PROCEDURE — 93010 ELECTROCARDIOGRAM REPORT: CPT | Performed by: INTERNAL MEDICINE

## 2024-09-16 RX ADMIN — CITALOPRAM HYDROBROMIDE 10 MG: 20 TABLET ORAL at 08:56

## 2024-09-16 RX ADMIN — APIXABAN 5 MG: 5 TABLET, FILM COATED ORAL at 08:56

## 2024-09-16 RX ADMIN — GUAIFENESIN 600 MG: 600 TABLET, EXTENDED RELEASE ORAL at 21:41

## 2024-09-16 RX ADMIN — COLCHICINE 0.6 MG: 0.6 TABLET ORAL at 08:56

## 2024-09-16 RX ADMIN — GUAIFENESIN 600 MG: 600 TABLET, EXTENDED RELEASE ORAL at 08:56

## 2024-09-16 RX ADMIN — CHLORHEXIDINE GLUCONATE 15 ML: 1.2 RINSE ORAL at 08:57

## 2024-09-16 RX ADMIN — PANTOPRAZOLE SODIUM 40 MG: 40 TABLET, DELAYED RELEASE ORAL at 06:06

## 2024-09-16 RX ADMIN — BUDESONIDE INHALATION 0.5 MG: 0.5 SUSPENSION RESPIRATORY (INHALATION) at 19:44

## 2024-09-16 RX ADMIN — Medication 250 MG: at 08:56

## 2024-09-16 RX ADMIN — BUPROPION HYDROCHLORIDE 75 MG: 75 TABLET, FILM COATED ORAL at 08:57

## 2024-09-16 RX ADMIN — BUDESONIDE INHALATION 0.5 MG: 0.5 SUSPENSION RESPIRATORY (INHALATION) at 07:33

## 2024-09-16 RX ADMIN — CHLORHEXIDINE GLUCONATE 15 ML: 1.2 RINSE ORAL at 21:41

## 2024-09-16 RX ADMIN — AMILORIDE HYDROCLORIDE 5 MG: 5 TABLET ORAL at 08:57

## 2024-09-16 RX ADMIN — OXYBUTYNIN CHLORIDE 10 MG: 10 TABLET, EXTENDED RELEASE ORAL at 21:41

## 2024-09-16 RX ADMIN — FORMOTEROL FUMARATE 20 MCG: 20 SOLUTION RESPIRATORY (INHALATION) at 19:44

## 2024-09-16 RX ADMIN — FLUTICASONE PROPIONATE 2 SPRAY: 50 SPRAY, METERED NASAL at 08:57

## 2024-09-16 RX ADMIN — TORSEMIDE 20 MG: 20 TABLET ORAL at 08:56

## 2024-09-16 RX ADMIN — APIXABAN 5 MG: 5 TABLET, FILM COATED ORAL at 17:23

## 2024-09-16 RX ADMIN — FORMOTEROL FUMARATE 20 MCG: 20 SOLUTION RESPIRATORY (INHALATION) at 07:33

## 2024-09-16 NOTE — PLAN OF CARE
Problem: Potential for Falls  Goal: Patient will remain free of falls  Description: INTERVENTIONS:  - Educate patient/family on patient safety including physical limitations  - Instruct patient to call for assistance with activity   - Consult OT/PT to assist with strengthening/mobility   - Keep Call bell within reach  - Keep bed low and locked with side rails adjusted as appropriate  - Keep care items and personal belongings within reach  - Initiate and maintain comfort rounds  - Make Fall Risk Sign visible to staff  - Offer Toileting every 2 Hours, in advance of need  - Initiate/Maintain bed alarm  - Obtain necessary fall risk management equipment:   - Apply yellow socks and bracelet for high fall risk patients  - Consider moving patient to room near nurses station  Outcome: Progressing     Problem: PAIN - ADULT  Goal: Verbalizes/displays adequate comfort level or baseline comfort level  Description: Interventions:  - Encourage patient to monitor pain and request assistance  - Assess pain using appropriate pain scale  - Administer analgesics based on type and severity of pain and evaluate response  - Implement non-pharmacological measures as appropriate and evaluate response  - Consider cultural and social influences on pain and pain management  - Notify physician/advanced practitioner if interventions unsuccessful or patient reports new pain  Outcome: Progressing     Problem: INFECTION - ADULT  Goal: Absence or prevention of progression during hospitalization  Description: INTERVENTIONS:  - Assess and monitor for signs and symptoms of infection  - Monitor lab/diagnostic results  - Monitor all insertion sites, i.e. indwelling lines, tubes, and drains  - Monitor endotracheal if appropriate and nasal secretions for changes in amount and color  - Crossnore appropriate cooling/warming therapies per order  - Administer medications as ordered  - Instruct and encourage patient and family to use good hand hygiene  technique  - Identify and instruct in appropriate isolation precautions for identified infection/condition  Outcome: Progressing  Goal: Absence of fever/infection during neutropenic period  Description: INTERVENTIONS:  - Monitor WBC    Outcome: Progressing     Problem: SAFETY ADULT  Goal: Patient will remain free of falls  Description: INTERVENTIONS:  - Educate patient/family on patient safety including physical limitations  - Instruct patient to call for assistance with activity   - Consult OT/PT to assist with strengthening/mobility   - Keep Call bell within reach  - Keep bed low and locked with side rails adjusted as appropriate  - Keep care items and personal belongings within reach  - Initiate and maintain comfort rounds  - Make Fall Risk Sign visible to staff  - Offer Toileting every 2 Hours, in advance of need  - Initiate/Maintain bed alarm  - Obtain necessary fall risk management equipment:   - Apply yellow socks and bracelet for high fall risk patients  - Consider moving patient to room near nurses station  Outcome: Progressing  Goal: Maintain or return to baseline ADL function  Description: INTERVENTIONS:  -  Assess patient's ability to carry out ADLs; assess patient's baseline for ADL function and identify physical deficits which impact ability to perform ADLs (bathing, care of mouth/teeth, toileting, grooming, dressing, etc.)  - Assess/evaluate cause of self-care deficits   - Assess range of motion  - Assess patient's mobility; develop plan if impaired  - Assess patient's need for assistive devices and provide as appropriate  - Encourage maximum independence but intervene and supervise when necessary  - Involve family in performance of ADLs  - Assess for home care needs following discharge   - Consider OT consult to assist with ADL evaluation and planning for discharge  - Provide patient education as appropriate  Outcome: Progressing  Goal: Maintains/Returns to pre admission functional  level  Description: INTERVENTIONS:  - Perform AM-PAC 6 Click Basic Mobility/ Daily Activity assessment daily.  - Set and communicate daily mobility goal to care team and patient/family/caregiver.   - Collaborate with rehabilitation services on mobility goals if consulted  - Out of bed for toileting  - Record patient progress and toleration of activity level   Outcome: Progressing     Problem: DISCHARGE PLANNING  Goal: Discharge to home or other facility with appropriate resources  Description: INTERVENTIONS:  - Identify barriers to discharge w/patient and caregiver  - Arrange for needed discharge resources and transportation as appropriate  - Identify discharge learning needs (meds, wound care, etc.)  - Arrange for interpretive services to assist at discharge as needed  - Refer to Case Management Department for coordinating discharge planning if the patient needs post-hospital services based on physician/advanced practitioner order or complex needs related to functional status, cognitive ability, or social support system  Outcome: Progressing     Problem: Knowledge Deficit  Goal: Patient/family/caregiver demonstrates understanding of disease process, treatment plan, medications, and discharge instructions  Description: Complete learning assessment and assess knowledge base.  Interventions:  - Provide teaching at level of understanding  - Provide teaching via preferred learning methods  Outcome: Progressing     Problem: RESPIRATORY - ADULT  Goal: Achieves optimal ventilation and oxygenation  Description: INTERVENTIONS:  - Assess for changes in respiratory status  - Assess for changes in mentation and behavior  - Position to facilitate oxygenation and minimize respiratory effort  - Oxygen administered by appropriate delivery if ordered  - Initiate smoking cessation education as indicated  - Encourage broncho-pulmonary hygiene including cough, deep breathe, Incentive Spirometry  - Assess the need for suctioning and  aspirate as needed  - Assess and instruct to report SOB or any respiratory difficulty  - Respiratory Therapy support as indicated  Outcome: Progressing     Problem: Nutrition/Hydration-ADULT  Goal: Nutrient/Hydration intake appropriate for improving, restoring or maintaining nutritional needs  Description: Monitor and assess patient's nutrition/hydration status for malnutrition. Collaborate with interdisciplinary team and initiate plan and interventions as ordered.  Monitor patient's weight and dietary intake as ordered or per policy. Utilize nutrition screening tool and intervene as necessary. Determine patient's food preferences and provide high-protein, high-caloric foods as appropriate.     INTERVENTIONS:  - Monitor oral intake, urinary output, labs, and treatment plans  - Assess nutrition and hydration status and recommend course of action  - Evaluate amount of meals eaten  - Assist patient with eating if necessary   - Allow adequate time for meals  - Recommend/ encourage appropriate diets, oral nutritional supplements, and vitamin/mineral supplements  - Order, calculate, and assess calorie counts as needed  - Recommend, monitor, and adjust tube feedings and TPN/PPN based on assessed needs  - Assess need for intravenous fluids  - Provide specific nutrition/hydration education as appropriate  - Include patient/family/caregiver in decisions related to nutrition  Outcome: Progressing     Problem: Prexisting or High Potential for Compromised Skin Integrity  Goal: Skin integrity is maintained or improved  Description: INTERVENTIONS:  - Identify patients at risk for skin breakdown  - Assess and monitor skin integrity  - Assess and monitor nutrition and hydration status  - Monitor labs   - Assess for incontinence   - Turn and reposition patient  - Assist with mobility/ambulation  - Relieve pressure over bony prominences  - Avoid friction and shearing  - Provide appropriate hygiene as needed including keeping skin  clean and dry  - Evaluate need for skin moisturizer/barrier cream  - Collaborate with interdisciplinary team   - Patient/family teaching  - Consider wound care consult   Outcome: Progressing

## 2024-09-16 NOTE — PLAN OF CARE
Problem: OCCUPATIONAL THERAPY ADULT  Goal: Performs self-care activities at highest level of function for planned discharge setting.  See evaluation for individualized goals.  Description: Treatment Interventions: ADL retraining, UE strengthening/ROM, Functional transfer training, Endurance training, Cognitive reorientation, Patient/family training, Equipment evaluation/education, Compensatory technique education, Energy conservation, Activityengagement          See flowsheet documentation for full assessment, interventions and recommendations.   Outcome: Progressing  Note: Limitation: Decreased UE ROM, Decreased ADL status, Decreased UE strength, Decreased cognition, Decreased Safe judgement during ADL, Decreased endurance, Decreased self-care trans, Decreased high-level ADLs  Prognosis: Fair  Assessment: Pt seen for 44min tx session with focus on functional balance, functional mobility, ADL status, transfer safety, b/l UE strengthening, and cognition. Pt able to tolerate EOB sitting with sitting balance=f/f-. Attempted standing, but pt unable to assist with b/l LE weight bearing; recommend hoyerlift for OOB with nsg. Attempted lateral transfers in bed, but pt dependent; would benefit from sliding board transfer education. Pt demonstrating need assist with her UE and LE ADLs. Pt able to assist with b/l UE AAROM exercises; limited shr AROM(i.e.flex=50-60*). Pt able to demonstrate good cognition(i.e.orientation, memory, direction-following). Pt continues to demonstrate appropriateness for inpt rehab to improve her overall level of level of independence. Tx tolerated well. Pt pleasant and cooperative with tx session. The patient's raw score on the AM-PAC Daily Activity Inpatient Short Form is 13. A raw score of less than 19 suggests the patient may benefit from discharge to post-acute rehabilitation services. Please refer to the recommendation of the Occupational Therapist for safe discharge planning     Rehab  Resource Intensity Level, OT: II (Moderate Resource Intensity)

## 2024-09-16 NOTE — PROGRESS NOTES
Progress Note - Hospitalist   Name: Claire Doherty 74 y.o. female I MRN: [id number]  Unit/Bed#: E4 -01 I Date of Admission: 9/10/2024   Date of Service: 9/16/2024 I Hospital Day: 6    Assessment & Plan  Acute on chronic respiratory failure with hypoxia and hypercapnia (HCC)  EMS was called for patient at nursing facility for shortness of breath  Found to have CO2 of 70 on VBG with pH 7.336  Patient does utilize BiPAP at night, does have history of muscular dystrophy with restrictive lung disease  Improved  Continue nebulizers  Acute on chronic diastolic CHF (congestive heart failure) ([facility])  Wt Readings from Last 3 Encounters:   09/16/24 71.4 kg (157 lb 6.5 oz)   09/07/24 77.8 kg (171 lb 8.3 oz)   08/21/24 80.7 kg (178 lb)     Patient with known chronic diastolic CHF with grade 2 diastolic dysfunction  Presenting with worsening shortness of breath  Chest x-ray/CT concerning for pleural effusions  Patient is net -6 L throughout hospitalization  Given alkalosis will initiate amiloride, although large component is related to respiratory issues  Transition to oral diuretics  Dystrophy, muscular, 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HCC)  He was doing well history of muscular dystrophy  Was previously living alone in an apartment with home health care, able to stand and pivot to wheelchair prior to hospital stay last week  Was discharged to rehab on 9/7/2024  Utilizes BiPAP at night, continue  PT/OT while inpatient  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)  Patient follows outpatient with pulmonology for restrictive lung disease due to muscular dystrophy  Uses BiPAP at night and 2-3 L nasal cannula during the day  Continue nebulizers  Pulmonology consultation reviewed  GERD without esophagitis  Continue PPI  Thrombocytosis, unspecified  Significant thrombocytosis with platelet count 739  Likely reactive although does have history of similar  recommend outpatient with hematology  History of Idiopathic  pericarditis  Patient previously hospitalized for chest discomfort, diagnosed with idiopathic pericarditis  Continue colchicine  Outpatient follow-up with cardiology on 9/20/2024  COPD (chronic obstructive pulmonary disease) ([facility])  Continue respiratory protocol  BiPAP with all naps  Paroxysmal A-fib ([facility])  Ventricular rate controlled  Continue Eliquis            Hospital Course:     74-year-old female patient with history of restrictive lung disease due to muscular dystrophy presenting with acute on chronic diastolic heart failure.  Initially required ICU level care, now transferred to the general medical floor.  She is net -6 L from hospitalization    Assessment:      Principal Problem:    Acute on chronic diastolic CHF (congestive heart failure) ([facility])  Active Problems:    Dystrophy, muscular, 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[facility])    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[facility])    GERD without esophagitis    Thrombocytosis, unspecified    COPD (chronic obstructive pulmonary disease) ([facility])    History of Idiopathic pericarditis    Acute on chronic respiratory failure with hypoxia and hypercapnia ([facility])    Paroxysmal A-fib ([facility])      Plan:    Continue supportive care  Ongoing disposition management,  Transition to oral diuretic  Family is requesting alternative short-term rehab, closer to [facility]       VTE Pharmacologic Prophylaxis:   Pharmacologic: Apixaban (Eliquis)  Mechanical VTE Prophylaxis in Place: Yes    AM-PAC Basic Mobility:  Basic Mobility Inpatient Raw Score: 6    JH-HLM Achieved: 2: Bed activities/Dependent transfer  JH-HLM Goal: 2: Bed activities/Dependent transfer    HLM Goal listed above. Continue with multidisciplinary rounding and encourage appropriate mobility to improve upon HLM goals.         Patient Centered Rounds: Case discussed and reviewed with nursing    Discussions with Specialists or Other Care Team Provider: Case management    Education and Discussions with Family / Patient:  Discussed with patient's sister    Time Spent for Care: 80 minutes.  More than 50% of total time spent on counseling and coordination of care as described above.    Current Length of Stay: 6 day(s)    Current Patient Status: Inpatient   Certification Statement: The patient will continue to require additional inpatient hospital stay due to need for discharge planning and management,    Discharge Plan / Estimated Discharge Date: Once facility can be found patient can be discharged    Code Status: Level 2 - DNAR: but accepts endotracheal intubation      Subjective:   And examined, breathing improved, no nausea no vomiting,    A complete and comprehensive 14 point organ system review has been performed and all other systems are negative other than stated above.    Objective:     Vitals:   Temp (24hrs), Av.7 °F (36.5 °C), Min:97.4 °F (36.3 °C), Max:97.9 °F (36.6 °C)    Temp:  [97.4 °F (36.3 °C)-97.9 °F (36.6 °C)] 97.9 °F (36.6 °C)  HR:  [] 99  Resp:  [18-20] 20  BP: (118-129)/(59-66) 118/66  SpO2:  [100 %] 100 %  Body mass index is 30.74 kg/m².     Input and Output Summary (last 24 hours):       Intake/Output Summary (Last 24 hours) at 2024 1455  Last data filed at 2024 0800  Gross per 24 hour   Intake 580 ml   Output 1300 ml   Net -720 ml       Physical Exam:     General: well appearing, no acute distress  HEENT: atraumatic, PERRLA, moist mucosa, normal pharynx, normal tonsils and adenoids, normal tongue, no fluid in sinuses  Neck: Trachea midline, no carotid bruit, no masses  Respiratory: normal chest wall expansion, coarse breath sounds, no r/r/w, no rubs  Cardiovascular: RRR, no m/r/g, Normal S1 and S2  Abdomen: Soft, non-tender, non-distended, normal bowel sounds in all quadrants, no hepatosplenomegaly, no tympany  Rectal: deferred  Musculoskeletal: Deferred  Integumentary: warm, dry, and pink, with no rash, purpura, or petechia  Heme/Lymph: no lymphadenopathy, no bruises  Neurological: Cranial  Nerves II-XII grossly intact  Psychiatric: cooperative with normal mood, affect, and cognition      Additional Data:     Labs:    Results from last 7 days   Lab Units 09/16/24  0617 09/14/24  0545 09/13/24  0625 09/11/24  0500 09/10/24  1217   WBC Thousand/uL 13.34*   < > 12.56*   < > 31.54*   HEMOGLOBIN g/dL 11.5   < > 10.7*   < > 11.6   HEMATOCRIT % 37.9   < > 35.0   < > 38.5   PLATELETS Thousands/uL 564*   < > 669*   < > 739*   SEGS PCT %  --   --   --   --  86*   LYMPHO PCT %  --   --  20  --  4*   MONO PCT %  --   --  12  --  9   EOS PCT %  --   --  0  --  0    < > = values in this interval not displayed.     Results from last 7 days   Lab Units 09/16/24  1326 09/12/24  0431 09/11/24  0500   POTASSIUM mmol/L 4.3   < > 4.1   CHLORIDE mmol/L 82*   < > 93*   CO2 mmol/L >45*   < > 38*   BUN mg/dL 35*   < > 30*   CREATININE mg/dL 0.41*   < > 0.40*   CALCIUM mg/dL 9.6   < > 8.7   ALK PHOS U/L  --   --  80   ALT U/L  --   --  9   AST U/L  --   --  16    < > = values in this interval not displayed.     Results from last 7 days   Lab Units 09/10/24  1224   INR  1.44*       * I Have Reviewed All Lab Data Listed Above.  * Additional Pertinent Lab Tests Reviewed: All Labs For Current Hospital Admission Reviewed    Imaging:    Imaging Reports Reviewed Today Include: No new imaging  Imaging Personally Reviewed by Myself Includes: No new imaging    Recent Cultures (last 7 days):     Results from last 7 days   Lab Units 09/10/24  1739 09/10/24  1224   BLOOD CULTURE   --  No Growth After 5 Days.  No Growth After 5 Days.   LEGIONELLA URINARY ANTIGEN  Negative  --        Last 24 Hours Medication List:   Current Facility-Administered Medications   Medication Dose Route Frequency Provider Last Rate    acetaminophen  650 mg Oral Q6H PRN Rod Singleton MD      albuterol  2.5 mg Nebulization Q4H PRN Rod Singleton MD      AMILoride  5 mg Oral Daily Ander Charly Hitchcock, DO      apixaban  5 mg Oral BID Rod Singleton MD      bisacodyl  10 mg  Rectal Daily PRN Rod Singleton MD      budesonide  0.5 mg Nebulization Q12H Rod Singleton MD      buPROPion  75 mg Oral QAM Rod Singleton MD      calcium carbonate  1,000 mg Oral Daily PRN Rod Singleton MD      chlorhexidine  15 mL Mouth/Throat Q12H [facility] Rod Singleton MD      citalopram  10 mg Oral Daily Rod Singleton MD      colchicine  0.6 mg Oral Daily Rod Singleton MD      dextromethorphan-guaiFENesin  10 mL Oral Q4H PRN Isaac Arrieta PA-C      diazepam  5 mg Oral Q8H PRN Rod Singleton MD      fluticasone  2 spray Nasal Daily Rod Singleton MD      formoterol  20 mcg Nebulization Q12H Rod Singleton MD      guaiFENesin  600 mg Oral Q12H [facility] Isaac Arrieta, PA-C      ondansetron  4 mg Intravenous Q6H PRN Rod Singleton MD      oxybutynin  10 mg Oral HS Rod Singleton MD      pantoprazole  40 mg Oral Early Morning Rod Singleton MD      ramelteon  8 mg Oral HS Rod Singleton MD      saccharomyces boulardii  250 mg Oral Daily Rod Singleton MD      torsemide  20 mg Oral Daily Alvaro Ludwig PA-C         AM-PAC Basic Mobility:  Basic Mobility Inpatient Raw Score: 6    JH-HLM Achieved: 2: Bed activities/Dependent transfer  JH-HLM Goal: 2: Bed activities/Dependent transfer    HLM Goal listed above. Continue with multidisciplinary rounding and encourage appropriate mobility to improve upon HLM goals.     Today, Patient Was Seen By: Ander Hitchcock DO    ** Please Note: This note was completed in part utilizing [employer] software dictation.  Grammatical errors, random word insertions, spelling mistakes, and incomplete sentences may be an occasional consequence of this system secondary to software limitations, ambient noise, and hardware issues.  If you have any questions or concerns about the content, text, or information contained within the body of this dictation, please contact the provider for clarification. **

## 2024-09-16 NOTE — PLAN OF CARE
Problem: Potential for Falls  Goal: Patient will remain free of falls  Description: INTERVENTIONS:  - Educate patient/family on patient safety including physical limitations  - Instruct patient to call for assistance with activity   - Consult OT/PT to assist with strengthening/mobility   - Keep Call bell within reach  - Keep bed low and locked with side rails adjusted as appropriate  - Keep care items and personal belongings within reach  - Initiate and maintain comfort rounds  - Make Fall Risk Sign visible to staff  - Offer Toileting every 2 Hours, in advance of need  - Initiate/Maintain bed alarm  - Obtain necessary fall risk management equipment:   - Apply yellow socks and bracelet for high fall risk patients  - Consider moving patient to room near nurses station  Outcome: Progressing     Problem: PAIN - ADULT  Goal: Verbalizes/displays adequate comfort level or baseline comfort level  Description: Interventions:  - Encourage patient to monitor pain and request assistance  - Assess pain using appropriate pain scale  - Administer analgesics based on type and severity of pain and evaluate response  - Implement non-pharmacological measures as appropriate and evaluate response  - Consider cultural and social influences on pain and pain management  - Notify physician/advanced practitioner if interventions unsuccessful or patient reports new pain  Outcome: Progressing     Problem: INFECTION - ADULT  Goal: Absence or prevention of progression during hospitalization  Description: INTERVENTIONS:  - Assess and monitor for signs and symptoms of infection  - Monitor lab/diagnostic results  - Monitor all insertion sites, i.e. indwelling lines, tubes, and drains  - Monitor endotracheal if appropriate and nasal secretions for changes in amount and color  - [place] appropriate cooling/warming therapies per order  - Administer medications as ordered  - Instruct and encourage patient and family to use good hand hygiene  technique  - Identify and instruct in appropriate isolation precautions for identified infection/condition  Outcome: Progressing  Goal: Absence of fever/infection during neutropenic period  Description: INTERVENTIONS:  - Monitor WBC    Outcome: Progressing     Problem: SAFETY ADULT  Goal: Patient will remain free of falls  Description: INTERVENTIONS:  - Educate patient/family on patient safety including physical limitations  - Instruct patient to call for assistance with activity   - Consult OT/PT to assist with strengthening/mobility   - Keep Call bell within reach  - Keep bed low and locked with side rails adjusted as appropriate  - Keep care items and personal belongings within reach  - Initiate and maintain comfort rounds  - Make Fall Risk Sign visible to staff  - Offer Toileting every 2 Hours, in advance of need  - Initiate/Maintain bed alarm  - Obtain necessary fall risk management equipment:   - Apply yellow socks and bracelet for high fall risk patients  - Consider moving patient to room near nurses station  Outcome: Progressing  Goal: Maintain or return to baseline ADL function  Description: INTERVENTIONS:  -  Assess patient's ability to carry out ADLs; assess patient's baseline for ADL function and identify physical deficits which impact ability to perform ADLs (bathing, care of mouth/teeth, toileting, grooming, dressing, etc.)  - Assess/evaluate cause of self-care deficits   - Assess range of motion  - Assess patient's mobility; develop plan if impaired  - Assess patient's need for assistive devices and provide as appropriate  - Encourage maximum independence but intervene and supervise when necessary  - Involve family in performance of ADLs  - Assess for home care needs following discharge   - Consider OT consult to assist with ADL evaluation and planning for discharge  - Provide patient education as appropriate  Outcome: Progressing  Goal: Maintains/Returns to pre admission functional  level  Description: INTERVENTIONS:  - Perform AM-PAC 6 Click Basic Mobility/ Daily Activity assessment daily.  - Set and communicate daily mobility goal to care team and patient/family/caregiver.   - Collaborate with rehabilitation services on mobility goals if consulted  - Perform Range of Motion 3 times a day.  - Reposition patient every 2 hours.  - Dangle patient 3 times a day  - Stand patient 3 times a day  - Ambulate patient 3 times a day  - Out of bed to chair 3 times a day   - Out of bed for meals 3  Problem: DISCHARGE PLANNING  Goal: Discharge to home or other facility with appropriate resources  Description: INTERVENTIONS:  - Identify barriers to discharge w/patient and caregiver  - Arrange for needed discharge resources and transportation as appropriate  - Identify discharge learning needs (meds, wound care, etc.)  - Arrange for interpretive services to assist at discharge as needed  - Refer to Case Management Department for coordinating discharge planning if the patient needs post-hospital services based on physician/advanced practitioner order or complex needs related to functional status, cognitive ability, or social support system  Outcome: Progressing     Problem: Knowledge Deficit  Goal: Patient/family/caregiver demonstrates understanding of disease process, treatment plan, medications, and discharge instructions  Description: Complete learning assessment and assess knowledge base.  Interventions:  - Provide teaching at level of understanding  - Provide teaching via preferred learning methods  Outcome: Progressing     Problem: RESPIRATORY - ADULT  Goal: Achieves optimal ventilation and oxygenation  Description: INTERVENTIONS:  - Assess for changes in respiratory status  - Assess for changes in mentation and behavior  - Position to facilitate oxygenation and minimize respiratory effort  - Oxygen administered by appropriate delivery if ordered  - Initiate smoking cessation education as indicated  -  Encourage broncho-pulmonary hygiene including cough, deep breathe, Incentive Spirometry  - Assess the need for suctioning and aspirate as needed  - Assess and instruct to report SOB or any respiratory difficulty  - Respiratory Therapy support as indicated  Outcome: Progressing     Problem: Nutrition/Hydration-ADULT  Goal: Nutrient/Hydration intake appropriate for improving, restoring or maintaining nutritional needs  Description: Monitor and assess patient's nutrition/hydration status for malnutrition. Collaborate with interdisciplinary team and initiate plan and interventions as ordered.  Monitor patient's weight and dietary intake as ordered or per policy. Utilize nutrition screening tool and intervene as necessary. Determine patient's food preferences and provide high-protein, high-caloric foods as appropriate.     INTERVENTIONS:  - Monitor oral intake, urinary output, labs, and treatment plans  - Assess nutrition and hydration status and recommend course of action  - Evaluate amount of meals eaten  - Assist patient with eating if necessary   - Allow adequate time for meals  - Recommend/ encourage appropriate diets, oral nutritional supplements, and vitamin/mineral supplements  - Order, calculate, and assess calorie counts as needed  - Recommend, monitor, and adjust tube feedings and TPN/PPN based on assessed needs  - Assess need for intravenous fluids  - Provide specific nutrition/hydration education as appropriate  - Include patient/family/caregiver in decisions related to nutrition  Outcome: Progressing     Problem: Prexisting or High Potential for Compromised Skin Integrity  Goal: Skin integrity is maintained or improved  Description: INTERVENTIONS:  - Identify patients at risk for skin breakdown  - Assess and monitor skin integrity  - Assess and monitor nutrition and hydration status  - Monitor labs   - Assess for incontinence   - Turn and reposition patient  - Assist with mobility/ambulation  - Relieve  pressure over bony prominences  - Avoid friction and shearing  - Provide appropriate hygiene as needed including keeping skin clean and dry  - Evaluate need for skin moisturizer/barrier cream  - Collaborate with interdisciplinary team   - Patient/family teaching  - Consider wound care consult   Outcome: Progressing    times a day  - Out of bed for toileting  - Record patient progress and toleration of activity level   Outcome: Progressing

## 2024-09-16 NOTE — PROGRESS NOTES
Progress Note - Cardiology   Name: Claire Doherty 74 y.o. female I MRN: [id number]  Unit/Bed#: E4 -01 I Date of Admission: 9/10/2024   Date of Service: 9/16/2024 I Hospital Day: 6     Assessment & Plan  Acute on chronic respiratory failure with hypoxia and hypercapnia (HCC)  9/16/24 down to 2.5 L nasal cannula  Overall looks euvolemic  Acute on chronic diastolic CHF (congestive heart failure) ([facility])  - TTE 08/21/24: LVEF 65%, grade II diastolic dysfunction, mild LAE, mild TR, trivial pericardial effusion  - BNP 09/10/24: 234  - 9/13/24 lung ultrasound showed absence of significant pleural effusions suitable for aspiration   - 9/15/24 she is net -6 L. She is tachycardic with CO remains > 45, chronically hypercarbic but may also have contraction alkalosis     Plan:   -  start PO torsemide 20mg today    Paroxysmal A-fib (HCC)  9/15/24 tachycardic but in sinus tachycardia    Plan:  Continue Eliquis for stroke prevention.     History of Idiopathic pericarditis  - diagnosed 08/21/24 at [facility] and transferred to [facility] for cardiac cath (flat troponins but chest pain and ST elevations in inferior leads)  - LHC 08/21/24: nonobstructive coronary arteries   - CRP 08/21/24: 174  - discharged on colchicine      Plan:  - would continue with colchcine    COPD (chronic obstructive pulmonary disease) ([facility])  - follows with pulmonology as an outpatient  - chronic use of BiPAP at night with with 2-3L of NC during the day   Dystrophy, muscular, 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HCC)  - follows with neurology  - uses wheelchair/walker and BiPAP at night   Thrombocytosis, unspecified  - chronic thrombocytosis   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[facility])    GERD without esophagitis    Progress Note - Cardiology   Claire Doherty 74 y.o. female MRN: [id number]  Unit/Bed#: E4 -01 Encounter: [id number]    PLAN Summary  start PO torsemide 20mg today         Subjective:   HPI  Doing well.  Denies any chest pain or  shortness of breath.  Her lower extremity swelling she states is improved dramatically      Review of Systems: As noted in HPI. Rest of ROS is negative.    Vitals:   /66 (BP Location: Right arm)   Pulse 99   Temp 97.9 °F (36.6 °C) (Temporal)   Resp 20   Ht 5' (1.524 m)   Wt 71.4 kg (157 lb 6.5 oz)   LMP  (LMP Unknown)   SpO2 100%   BMI 30.74 kg/m²   I/O         09/14 0701  09/15 0700 09/15 0701  09/16 0700 09/16 0701 09/17 0700    P.O. 440 340 240    Total Intake(mL/kg) 440 (6.2) 340 (4.8) 240 (3.4)    Urine (mL/kg/hr) 1400 (0.8) 900 (0.5)     Stool  400     Total Output 1400 1300     Net -960 -960 +240                 Weight (last 2 days)       Date/Time Weight    09/16/24 0600 71.4 (157.41)    09/15/24 0600 71.1 (156.75)    09/14/24 0600 72.5 (159.83)            Physical Exam   Constitutional: awake, alert and oriented, in no acute distress, no obvious deformities  Head: Normocephalic, without obvious abnormality, atraumatic  Eyes: conjunctivae clear and moist. Sclera anicteric. No xanthelasmas. Pupils equal bilaterally. Extraocular motions are full.  Ear nose mouth and throat: ears are symmetrical bilaterally, hearing appears to be equal bilaterally, no nasal discharge or epistaxis, oropharynx is clear with moist mucous membranes  Neck: Trachea is midline, neck is supple, no thyromegaly or significant lymphadenopathy, there is full range of motion.  Lungs: Decreased breath sounds but clear to auscultation bilaterally, no wheezes, no rales, no rhonchi, no accessory muscle use, breathing is nonlabored  Heart: regular rate and rhythm, S1, S2 normal, no murmur, no click, no rub and no gallop, no lower extremity edema  Abdomen: soft, non-tender; bowel sounds normal; no masses, no organomegaly  Psychiatric: Patient is oriented to time, place, person, mood/affect is negative for depression, anxiety, agitation, appears to have appropriate insight  Skin: Skin is warm, dry, intact. No obvious rashes or  lesions on exposed extremities. Nail beds are pink with no cyanosis or clubbing.    TELEMETRY:   Lab Results:  Results from last 7 days   Lab Units 09/16/24  0617   WBC Thousand/uL 13.34*   HEMOGLOBIN g/dL 11.5   HEMATOCRIT % 37.9   PLATELETS Thousands/uL 564*     Results from last 7 days   Lab Units 09/15/24  0900 09/12/24  0431 09/11/24  0500   POTASSIUM mmol/L 4.2   < > 4.1   CHLORIDE mmol/L 84*   < > 93*   CO2 mmol/L >45*   < > 38*   BUN mg/dL 38*   < > 30*   CREATININE mg/dL 0.41*   < > 0.40*   CALCIUM mg/dL 9.4   < > 8.7   ALK PHOS U/L  --   --  80   ALT U/L  --   --  9   AST U/L  --   --  16    < > = values in this interval not displayed.     Results from last 7 days   Lab Units 09/15/24  0900   POTASSIUM mmol/L 4.2   CHLORIDE mmol/L 84*   CO2 mmol/L >45*   BUN mg/dL 38*   CREATININE mg/dL 0.41*   CALCIUM mg/dL 9.4           Medications:    Current Facility-Administered Medications:     acetaminophen (TYLENOL) tablet 650 mg, 650 mg, Oral, Q6H PRN, Rod Singleton MD, 650 mg at 09/12/24 2110    albuterol inhalation solution 2.5 mg, 2.5 mg, Nebulization, Q4H PRN, Rod Singleton MD    AMILoride tablet 5 mg, 5 mg, Oral, Daily, Ander Hitchcock, , 5 mg at 09/16/24 0857    apixaban (ELIQUIS) tablet 5 mg, 5 mg, Oral, BID, Rod Singleton MD, 5 mg at 09/16/24 0856    bisacodyl (DULCOLAX) rectal suppository 10 mg, 10 mg, Rectal, Daily PRN, Rod Singleton MD    budesonide (PULMICORT) inhalation solution 0.5 mg, 0.5 mg, Nebulization, Q12H, Rod Singleton MD, 0.5 mg at 09/16/24 0733    buPROPion (WELLBUTRIN) tablet 75 mg, 75 mg, Oral, QAM, Rod Singleton MD, 75 mg at 09/16/24 0857    calcium carbonate (TUMS) chewable tablet 1,000 mg, 1,000 mg, Oral, Daily PRN, Rod Singleton MD, 1,000 mg at 09/15/24 0128    chlorhexidine (PERIDEX) 0.12 % oral rinse 15 mL, 15 mL, Mouth/Throat, Q12H NANCY, Rod Singleton MD, 15 mL at 09/16/24 0857    citalopram (CeleXA) tablet 10 mg, 10 mg, Oral, Daily, Rod Singleton MD, 10 mg at 09/16/24 0856     "colchicine (COLCRYS) tablet 0.6 mg, 0.6 mg, Oral, Daily, Rod Singleton MD, 0.6 mg at 09/16/24 0856    dextromethorphan-guaiFENesin (ROBITUSSIN DM) oral syrup 10 mL, 10 mL, Oral, Q4H PRN, Isaac Arrieta PA-C, 10 mL at 09/12/24 2209    diazepam (VALIUM) tablet 5 mg, 5 mg, Oral, Q8H PRN, Rod Singleton MD, 5 mg at 09/15/24 2109    fluticasone (FLONASE) 50 mcg/act nasal spray 2 spray, 2 spray, Nasal, Daily, Rod Singleton MD, 2 spray at 09/16/24 0857    formoterol (PERFOROMIST) nebulizer solution 20 mcg, 20 mcg, Nebulization, Q12H, Rod Singleton MD, 20 mcg at 09/16/24 0733    guaiFENesin (MUCINEX) 12 hr tablet 600 mg, 600 mg, Oral, Q12H NANCY, Isaac Arrieta PA-C, 600 mg at 09/16/24 0856    ondansetron (ZOFRAN) injection 4 mg, 4 mg, Intravenous, Q6H PRN, Rod Singletno MD    oxybutynin (DITROPAN-XL) 24 hr tablet 10 mg, 10 mg, Oral, HS, Rod Singleton MD, 10 mg at 09/15/24 2108    pantoprazole (PROTONIX) EC tablet 40 mg, 40 mg, Oral, Early Morning, Rod Singleton MD, 40 mg at 09/16/24 0606    ramelteon (ROZEREM) tablet 8 mg, 8 mg, Oral, HS, Rod Singleton MD    saccharomyces boulardii (FLORASTOR) capsule 250 mg, 250 mg, Oral, Daily, Rod Singleton MD, 250 mg at 09/16/24 0856    torsemide (DEMADEX) tablet 20 mg, 20 mg, Oral, Daily, Alvaro Ludwig PA-C, 20 mg at 09/16/24 0856    Portions of the record may have been created with voice recognition software. Occasional wrong word or \"sound a like\" substitutions may have occurred due to the inherent limitations of voice recognition software. Read the chart carefully and recognize, using context, where substitutions have occurred.    Cj Rai DO, [facility], [facility]  9/16/2024 11:01 AM      " negative...

## 2024-09-16 NOTE — ASSESSMENT & PLAN NOTE
- TTE 08/21/24: LVEF 65%, grade II diastolic dysfunction, mild LAE, mild TR, trivial pericardial effusion  - BNP 09/10/24: 234  - 9/13/24 lung ultrasound showed absence of significant pleural effusions suitable for aspiration   - 9/15/24 she is net -6 L. She is tachycardic with CO remains > 45, chronically hypercarbic but may also have contraction alkalosis     Plan:   -  start PO torsemide 20mg today

## 2024-09-16 NOTE — CASE MANAGEMENT
Case Management Discharge Planning Note    Patient name Claire Doherty  [facility]  /E[facility] -* MRN [id number]  : 1950 Date 2024       Current Admission Date: 9/10/2024  Current Admission Diagnosis:Acute on chronic diastolic CHF (congestive heart failure) ([facility])   Patient Active Problem List    Diagnosis Date Noted Date Diagnosed    Paroxysmal A-fib ([facility]) 2024     Acute on chronic diastolic CHF (congestive heart failure) ([facility]) 09/10/2024     Acute on chronic respiratory failure with hypoxia and hypercapnia ([facility]) 09/10/2024     Severe protein-calorie malnutrition ([facility]) 2024     DINA (acute kidney injury) ([facility]) 2024     Nausea 2024     Pleural effusion 2024     Pericardial effusion 2024     Infected wound 2024     Chest pain 2024     History of Idiopathic pericarditis 2024     Cellulitis of right foot 2024     Chronic left shoulder pain 2024     Shoulder joint dysfunction 2024     COPD (chronic obstructive pulmonary disease) ([facility]) 06/15/2023     Bilateral hand pain 2023     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     Gastroparesis 2021     Aortic valve sclerosis 2021     Tricuspid valve insufficiency 2021     Mitral annular calcification 2021     Former smoker 2021     On home oxygen therapy 2021     Colostomy status ([facility])      Ductal carcinoma in situ (DCIS) of left breast 03/15/2021     Insomnia 10/21/2020     Depression 10/06/2020     Anemia 10/02/2020     Chronic hypoxic respiratory failure ([facility]) 2020     Bladder injury 2020     Thrombocytosis, unspecified 2020     Leukocytosis 2020     Difficult intubation      Dystrophy, muscular, 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[facility]) 10/12/2017     Ambulatory dysfunction 10/12/2017     Urinary incontinence 10/03/2017     Osteoporosis 2016     Allergic rhinitis 10/09/2013     Restrictive lung disease due to  muscular dystrophy (HCC) 10/04/2012     GERD without esophagitis 10/04/2012       LOS (days): 6  Geometric Mean LOS (GMLOS) (days): 3.9  Days to GMLOS:-2.3     OBJECTIVE:  Risk of Unplanned Readmission Score: 24.71         Current admission status: Inpatient   Preferred Pharmacy:   [id number] [address] [address]  Phone: [phone] Fax: [phone]    Primary Care Provider: Julienne Tucker DO    Primary Insurance: [facility] REP  Secondary Insurance:     DISCHARGE DETAILS:       Additional Comments: CM m/w pt to discuss her discharge plan. Pt originally wanted to return to [facility] however pt now requested a SNF closer to her sister in [place].  CM made referrals via [facility].  CM will follow-up with the pt regarding choice list.

## 2024-09-16 NOTE — ASSESSMENT & PLAN NOTE
- diagnosed 08/21/24 at [facility] and transferred to [facility] for cardiac cath (flat troponins but chest pain and ST elevations in inferior leads)  - C 08/21/24: nonobstructive coronary arteries   - CRP 08/21/24: 174  - discharged on colchicine      Plan:  - would continue with colchcine

## 2024-09-16 NOTE — ASSESSMENT & PLAN NOTE
Wt Readings from Last 3 Encounters:   09/16/24 71.4 kg (157 lb 6.5 oz)   09/07/24 77.8 kg (171 lb 8.3 oz)   08/21/24 80.7 kg (178 lb)     Patient with known chronic diastolic CHF with grade 2 diastolic dysfunction  Presenting with worsening shortness of breath  Chest x-ray/CT concerning for pleural effusions  Patient is net -6 L throughout hospitalization  Given alkalosis will initiate amiloride, although large component is related to respiratory issues  Transition to oral diuretics

## 2024-09-16 NOTE — CASE MANAGEMENT
Called [facility] ([phone]) to check the status of pending transport auth. Ref #[id number]. Spoke to Mackenzie who stated the auth is pending at this time .

## 2024-09-17 LAB
BUN SERPL-MCNC: 34 MG/DL (ref 5–25)
CALCIUM SERPL-MCNC: 9.5 MG/DL (ref 8.4–10.2)
CHLORIDE SERPL-SCNC: 81 MMOL/L (ref 96–108)
CO2 SERPL-SCNC: >45 MMOL/L (ref 21–32)
CREAT SERPL-MCNC: 0.42 MG/DL (ref 0.6–1.3)
ERYTHROCYTE [DISTWIDTH] IN BLOOD BY AUTOMATED COUNT: 17.3 % (ref 11.6–15.1)
GFR SERPL CREATININE-BSD FRML MDRD: 101 ML/MIN/1.73SQ M
GLUCOSE SERPL-MCNC: 204 MG/DL (ref 65–140)
HCT VFR BLD AUTO: 36.8 % (ref 34.8–46.1)
HGB BLD-MCNC: 11.3 G/DL (ref 11.5–15.4)
MCH RBC QN AUTO: 26 PG (ref 26.8–34.3)
MCHC RBC AUTO-ENTMCNC: 30.7 G/DL (ref 31.4–37.4)
MCV RBC AUTO: 85 FL (ref 82–98)
PLATELET # BLD AUTO: 529 THOUSANDS/UL (ref 149–390)
PMV BLD AUTO: 10.3 FL (ref 8.9–12.7)
POTASSIUM SERPL-SCNC: 4.4 MMOL/L (ref 3.5–5.3)
RBC # BLD AUTO: 4.34 MILLION/UL (ref 3.81–5.12)
SODIUM SERPL-SCNC: 138 MMOL/L (ref 135–147)
WBC # BLD AUTO: 17.16 THOUSAND/UL (ref 4.31–10.16)

## 2024-09-17 PROCEDURE — 94760 N-INVAS EAR/PLS OXIMETRY 1: CPT

## 2024-09-17 PROCEDURE — 94150 VITAL CAPACITY TEST: CPT

## 2024-09-17 PROCEDURE — 99233 SBSQ HOSP IP/OBS HIGH 50: CPT | Performed by: INTERNAL MEDICINE

## 2024-09-17 PROCEDURE — 94640 AIRWAY INHALATION TREATMENT: CPT

## 2024-09-17 PROCEDURE — 80048 BASIC METABOLIC PNL TOTAL CA: CPT | Performed by: INTERNAL MEDICINE

## 2024-09-17 PROCEDURE — 99232 SBSQ HOSP IP/OBS MODERATE 35: CPT

## 2024-09-17 PROCEDURE — 85027 COMPLETE CBC AUTOMATED: CPT | Performed by: INTERNAL MEDICINE

## 2024-09-17 RX ADMIN — FORMOTEROL FUMARATE 20 MCG: 20 SOLUTION RESPIRATORY (INHALATION) at 20:00

## 2024-09-17 RX ADMIN — PANTOPRAZOLE SODIUM 40 MG: 40 TABLET, DELAYED RELEASE ORAL at 05:47

## 2024-09-17 RX ADMIN — APIXABAN 5 MG: 5 TABLET, FILM COATED ORAL at 09:09

## 2024-09-17 RX ADMIN — BUPROPION HYDROCHLORIDE 75 MG: 75 TABLET, FILM COATED ORAL at 09:52

## 2024-09-17 RX ADMIN — CALCIUM CARBONATE (ANTACID) CHEW TAB 500 MG 1000 MG: 500 CHEW TAB at 17:54

## 2024-09-17 RX ADMIN — GUAIFENESIN 600 MG: 600 TABLET, EXTENDED RELEASE ORAL at 21:42

## 2024-09-17 RX ADMIN — BUDESONIDE INHALATION 0.5 MG: 0.5 SUSPENSION RESPIRATORY (INHALATION) at 07:00

## 2024-09-17 RX ADMIN — FORMOTEROL FUMARATE 20 MCG: 20 SOLUTION RESPIRATORY (INHALATION) at 07:00

## 2024-09-17 RX ADMIN — AMILORIDE HYDROCLORIDE 5 MG: 5 TABLET ORAL at 09:52

## 2024-09-17 RX ADMIN — FLUTICASONE PROPIONATE 2 SPRAY: 50 SPRAY, METERED NASAL at 09:51

## 2024-09-17 RX ADMIN — APIXABAN 5 MG: 5 TABLET, FILM COATED ORAL at 17:54

## 2024-09-17 RX ADMIN — GUAIFENESIN 600 MG: 600 TABLET, EXTENDED RELEASE ORAL at 09:08

## 2024-09-17 RX ADMIN — TORSEMIDE 20 MG: 20 TABLET ORAL at 09:09

## 2024-09-17 RX ADMIN — Medication 250 MG: at 09:08

## 2024-09-17 RX ADMIN — CITALOPRAM HYDROBROMIDE 10 MG: 20 TABLET ORAL at 09:08

## 2024-09-17 RX ADMIN — CHLORHEXIDINE GLUCONATE 15 ML: 1.2 RINSE ORAL at 21:43

## 2024-09-17 RX ADMIN — COLCHICINE 0.6 MG: 0.6 TABLET ORAL at 09:08

## 2024-09-17 RX ADMIN — BUDESONIDE INHALATION 0.5 MG: 0.5 SUSPENSION RESPIRATORY (INHALATION) at 20:00

## 2024-09-17 RX ADMIN — OXYBUTYNIN CHLORIDE 10 MG: 10 TABLET, EXTENDED RELEASE ORAL at 21:42

## 2024-09-17 NOTE — PROGRESS NOTES
Progress Note - Hospitalist   Name: Claire Doherty 74 y.o. female I MRN: [id number]  Unit/Bed#: E4 -01 I Date of Admission: 9/10/2024   Date of Service: 9/17/2024 I Hospital Day: 7    Assessment & Plan  Acute on chronic respiratory failure with hypoxia and hypercapnia (HCC)  EMS was called for patient at nursing facility for shortness of breath  Found to have CO2 of 70 on VBG with pH 7.336  Patient does utilize BiPAP at night, does have history of muscular dystrophy with restrictive lung disease  Improved  Continue nebulizers  Acute on chronic diastolic CHF (congestive heart failure) (HCC)  Wt Readings from Last 3 Encounters:   09/17/24 71.3 kg (157 lb 3 oz)   09/07/24 77.8 kg (171 lb 8.3 oz)   08/21/24 80.7 kg (178 lb)     Patient with known chronic diastolic CHF with grade 2 diastolic dysfunction  Presenting with worsening shortness of breath  Chest x-ray/CT concerning for pleural effusions  Patient is net -6 L throughout hospitalization  Given alkalosis will initiate amiloride, although large component is related to respiratory issues  Transition to oral diuretics  Dystrophy, muscular, 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HCC)  He was doing well history of muscular dystrophy  Was previously living alone in an apartment with home health care, able to stand and pivot to wheelchair prior to hospital stay last week  Was discharged to rehab on 9/7/2024  Utilizes BiPAP at night, continue  PT/OT while inpatient  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)  Patient follows outpatient with pulmonology for restrictive lung disease due to muscular dystrophy  Uses BiPAP at night and 2-3 L nasal cannula during the day  Continue nebulizers  Pulmonology consultation reviewed  GERD without esophagitis  Continue PPI  Thrombocytosis, unspecified  Significant thrombocytosis with platelet count 739  Likely reactive although does have history of similar  recommend outpatient with hematology  History of Idiopathic  pericarditis  Patient previously hospitalized for chest discomfort, diagnosed with idiopathic pericarditis  Continue colchicine  Outpatient follow-up with cardiology on 9/20/2024  COPD (chronic obstructive pulmonary disease) ([facility])  Continue respiratory protocol  BiPAP with all naps  Paroxysmal A-fib ([facility])  Ventricular rate controlled  Continue [facility] Course:     74-year-old patient presenting with acute on chronic respiratory failure secondary to heart failure exacerbation.    Clinically improving transition to oral torsemide    Assessment:      Principal Problem:    Acute on chronic diastolic CHF (congestive heart failure) ([facility])  Active Problems:    Dystrophy, muscular, 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[facility])    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[facility])    GERD without esophagitis    Thrombocytosis, unspecified    COPD (chronic obstructive pulmonary disease) ([facility])    History of Idiopathic pericarditis    Acute on chronic respiratory failure with hypoxia and hypercapnia ([facility])    Paroxysmal A-fib ([facility])      Plan:    Continue current medical management  Discharge planning       VTE Pharmacologic Prophylaxis:   Pharmacologic: Apixaban (Eliquis)  Mechanical VTE Prophylaxis in Place: Yes    AM-PAC Basic Mobility:  Basic Mobility Inpatient Raw Score: 12    -HLM Achieved: 2: Bed activities/Dependent transfer  -HLM Goal: 4: Move to chair/commode    HLM Goal listed above. Continue with multidisciplinary rounding and encourage appropriate mobility to improve upon HLM goals.         Patient Centered Rounds: Case discussed and reviewed with nursing    Discussions with Specialists or Other Care Team Provider: Case management    Education and Discussions with Family / Patient: Discussed with patient family    Time Spent for Care: 80 minutes.  More than 50% of total time spent on counseling and coordination of care as described above.    Current Length of Stay: 7 day(s)    Current Patient Status:  Inpatient   Certification Statement: The patient will continue to require additional inpatient hospital stay due to rehab placement    Discharge Plan / Estimated Discharge Date: Possibly tomorrow    Code Status: Level 2 - DNAR: but accepts endotracheal intubation      Subjective:   Seen and examined, no acute complaints.  Feels well    A complete and comprehensive 14 point organ system review has been performed and all other systems are negative other than stated above.    Objective:     Vitals:   Temp (24hrs), Av.8 °F (36.6 °C), Min:97.6 °F (36.4 °C), Max:98.1 °F (36.7 °C)    Temp:  [97.6 °F (36.4 °C)-98.1 °F (36.7 °C)] 97.6 °F (36.4 °C)  HR:  [] 104  Resp:  [16-18] 18  BP: (135-143)/(63-67) 136/66  SpO2:  [98 %-100 %] 98 %  Body mass index is 30.7 kg/m².     Input and Output Summary (last 24 hours):       Intake/Output Summary (Last 24 hours) at 2024 1706  Last data filed at 2024 1300  Gross per 24 hour   Intake 840 ml   Output 1050 ml   Net -210 ml       Physical Exam:     General: well appearing, no acute distress  HEENT: atraumatic, PERRLA, moist mucosa, normal pharynx, normal tonsils and adenoids, normal tongue, no fluid in sinuses  Neck: Trachea midline, no carotid bruit, no masses  Respiratory: normal chest wall expansion, CTA B, no r/r/w, no rubs  Cardiovascular: RRR, no m/r/g, Normal S1 and S2  Abdomen: Soft, non-tender, non-distended, normal bowel sounds in all quadrants, no hepatosplenomegaly, no tympany  Rectal: deferred  Musculoskeletal:deferred  Integumentary: warm, dry, and pink, with no rash, purpura, or petechia  Heme/Lymph: no lymphadenopathy, no bruises  Neurological: Cranial Nerves II-XII grossly intact  Psychiatric: cooperative with normal mood, affect, and cognition      Additional Data:     Labs:    Results from last 7 days   Lab Units 24  1043 24  0545 24  0625   WBC Thousand/uL 17.16*   < > 12.56*   HEMOGLOBIN g/dL 11.3*   < > 10.7*   HEMATOCRIT % 36.8    < > 35.0   PLATELETS Thousands/uL 529*   < > 669*   LYMPHO PCT %  --   --  20   MONO PCT %  --   --  12   EOS PCT %  --   --  0    < > = values in this interval not displayed.     Results from last 7 days   Lab Units 09/17/24  1043 09/12/24  0431 09/11/24  0500   POTASSIUM mmol/L 4.4   < > 4.1   CHLORIDE mmol/L 81*   < > 93*   CO2 mmol/L >45*   < > 38*   BUN mg/dL 34*   < > 30*   CREATININE mg/dL 0.42*   < > 0.40*   CALCIUM mg/dL 9.5   < > 8.7   ALK PHOS U/L  --   --  80   ALT U/L  --   --  9   AST U/L  --   --  16    < > = values in this interval not displayed.           * I Have Reviewed All Lab Data Listed Above.  * Additional Pertinent Lab Tests Reviewed: All Labs For Current Hospital Admission Reviewed    Imaging:    Imaging Reports Reviewed Today Include: No new imaging  Imaging Personally Reviewed by Myself Includes: No new imaging data    Recent Cultures (last 7 days):     Results from last 7 days   Lab Units 09/10/24  1739   LEGIONELLA URINARY ANTIGEN  Negative       Last 24 Hours Medication List:   Current Facility-Administered Medications   Medication Dose Route Frequency Provider Last Rate    acetaminophen  650 mg Oral Q6H PRN Rod Singleton MD      albuterol  2.5 mg Nebulization Q4H PRN Rod Singleton MD      AMILoride  5 mg Oral Daily Ander Hitchcock DO      apixaban  5 mg Oral BID Rod Singleton MD      bisacodyl  10 mg Rectal Daily PRN Rod Singleton MD      budesonide  0.5 mg Nebulization Q12H Rod Singleton MD      buPROPion  75 mg Oral QAM Rod Singleton MD      calcium carbonate  1,000 mg Oral Daily PRN Rod Singleton MD      chlorhexidine  15 mL Mouth/Throat Q12H [facility] Rod Singleton MD      citalopram  10 mg Oral Daily Rod Singleton MD      colchicine  0.6 mg Oral Daily Rod Singleton MD      dextromethorphan-guaiFENesin  10 mL Oral Q4H PRN Isaac Arrieta PA-C      diazepam  5 mg Oral Q8H PRN Rod Singleton MD      fluticasone  2 spray Nasal Daily Rod Singleton MD      formoterol  20 mcg Nebulization  Q12H Rod Singleton MD      guaiFENesin  600 mg Oral Q12H [facility] Isaac Arrieta PA-C      ondansetron  4 mg Intravenous Q6H PRN Rod Singleton MD      oxybutynin  10 mg Oral HS Rod Singleton MD      pantoprazole  40 mg Oral Early Morning Rod Singleton MD      saccharomyces boulardii  250 mg Oral Daily Rod Singleton MD      torsemide  20 mg Oral Daily Alvaro Ludwig PA-C         AM-PAC Basic Mobility:  Basic Mobility Inpatient Raw Score: 12    -HLM Achieved: 2: Bed activities/Dependent transfer  -HLM Goal: 4: Move to chair/commode    HLM Goal listed above. Continue with multidisciplinary rounding and encourage appropriate mobility to improve upon HLM goals.     Today, Patient Was Seen By: Ander Hitchcock DO    ** Please Note: This note was completed in part utilizing [employer] software dictation.  Grammatical errors, random word insertions, spelling mistakes, and incomplete sentences may be an occasional consequence of this system secondary to software limitations, ambient noise, and hardware issues.  If you have any questions or concerns about the content, text, or information contained within the body of this dictation, please contact the provider for clarification. **

## 2024-09-17 NOTE — ASSESSMENT & PLAN NOTE
- TTE 08/21/24: LVEF 65%, grade II diastolic dysfunction, mild LAE, mild TR, trivial pericardial effusion  - BNP 09/10/24: 234    Plan:   -Continue with torsemide 20mg

## 2024-09-17 NOTE — PROGRESS NOTES
Progress Note - Cardiology   Name: Claire Doherty 74 y.o. female I MRN: [id number]  Unit/Bed#: E4 -01 I Date of Admission: 9/10/2024   Date of Service: 9/17/2024 I Hospital Day: 7     Assessment & Plan  Acute on chronic respiratory failure with hypoxia and hypercapnia (HCC)  9/16/24 down to 2.5 L nasal cannula  Overall looks euvolemic  Acute on chronic diastolic CHF (congestive heart failure) ([facility])  - TTE 08/21/24: LVEF 65%, grade II diastolic dysfunction, mild LAE, mild TR, trivial pericardial effusion  - BNP 09/10/24: 234    Plan:   -Continue with torsemide 20mg   Paroxysmal A-fib (HCC)  9/15/24 tachycardic but in sinus tachycardia    Plan:  Continue Eliquis for stroke prevention.     History of Idiopathic pericarditis  - diagnosed 08/21/24 at [facility] and [facility] (flat troponins but chest pain and ST elevations in inferior leads)  - C 08/21/24: nonobstructive coronary arteries   - CRP 08/21/24: 174  - discharged on colchicine      Plan:  - continue with colchcine    COPD (chronic obstructive pulmonary disease) ([facility])  - follows with pulmonology as an outpatient  - chronic use of BiPAP at night with with 2-3L of NC during the day   Dystrophy, muscular, 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[facility])  - follows with neurology  - uses wheelchair/walker and BiPAP at night   Thrombocytosis, unspecified  - chronic thrombocytosis   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[facility])    GERD without esophagitis      Subjective:   HPI  Continues to do well, no increasing shortness of breath.  Breathing continues to improve every day      Review of Systems: As noted in HPI. Rest of ROS is negative.    Vitals:   /67 (BP Location: Right arm)   Pulse 90   Temp 98.1 °F (36.7 °C) (Temporal)   Resp 16   Ht 5' (1.524 m)   Wt 71.3 kg (157 lb 3 oz)   LMP  (LMP Unknown)   SpO2 99%   BMI 30.70 kg/m²   I/O         09/15 0701  09/16 0700 09/16 0701  09/17 0700 09/17 0701  09/18 0700    P.O.  340 480 600    Total Intake(mL/kg) 340 (4.8) 480 (6.7) 600 (8.4)    Urine (mL/kg/hr) 900 (0.5) 1050 (0.6)     Stool 400 500     Total Output 1300 1550     Net -960 -1070 +600                 Weight (last 2 days)       Date/Time Weight    09/17/24 0552 71.3 (157.19)    09/16/24 0600 71.4 (157.41)    09/15/24 0600 71.1 (156.75)            Physical Exam   Constitutional: awake, alert and oriented, in no acute distress, no obvious deformities  Lungs: Decreased breath sounds bilaterally  Heart: regular rate and rhythm, S1, S2 normal, no murmur, no click, no rub and no gallop, no lower extremity edema  Abdomen: soft, non-tender; bowel sounds normal; no masses, no organomegaly    Lab Results:  Results from last 7 days   Lab Units 09/17/24  1043   WBC Thousand/uL 17.16*   HEMOGLOBIN g/dL 11.3*   HEMATOCRIT % 36.8   PLATELETS Thousands/uL 529*     Results from last 7 days   Lab Units 09/17/24  1043 09/12/24  0431 09/11/24  0500   POTASSIUM mmol/L 4.4   < > 4.1   CHLORIDE mmol/L 81*   < > 93*   CO2 mmol/L >45*   < > 38*   BUN mg/dL 34*   < > 30*   CREATININE mg/dL 0.42*   < > 0.40*   CALCIUM mg/dL 9.5   < > 8.7   ALK PHOS U/L  --   --  80   ALT U/L  --   --  9   AST U/L  --   --  16    < > = values in this interval not displayed.     Results from last 7 days   Lab Units 09/17/24  1043   POTASSIUM mmol/L 4.4   CHLORIDE mmol/L 81*   CO2 mmol/L >45*   BUN mg/dL 34*   CREATININE mg/dL 0.42*   CALCIUM mg/dL 9.5           Medications:    Current Facility-Administered Medications:     acetaminophen (TYLENOL) tablet 650 mg, 650 mg, Oral, Q6H PRN, Rod Singleton MD, 650 mg at 09/12/24 2110    albuterol inhalation solution 2.5 mg, 2.5 mg, Nebulization, Q4H PRN, Rod Singleton MD    AMILoride tablet 5 mg, 5 mg, Oral, Daily, Ander Hitchcock DO, 5 mg at 09/17/24 0952    apixaban (ELIQUIS) tablet 5 mg, 5 mg, Oral, BID, Rod Singleton MD, 5 mg at 09/17/24 0909    bisacodyl (DULCOLAX) rectal suppository 10 mg, 10 mg, Rectal, Daily PRN, Rod  "MD Mani    budesonide (PULMICORT) inhalation solution 0.5 mg, 0.5 mg, Nebulization, Q12H, Rod Singleton MD, 0.5 mg at 09/17/24 0700    buPROPion (WELLBUTRIN) tablet 75 mg, 75 mg, Oral, QAM, Rod Singleton MD, 75 mg at 09/17/24 0952    calcium carbonate (TUMS) chewable tablet 1,000 mg, 1,000 mg, Oral, Daily PRN, Rod Singleton MD, 1,000 mg at 09/15/24 0128    chlorhexidine (PERIDEX) 0.12 % oral rinse 15 mL, 15 mL, Mouth/Throat, Q12H NANCY, Rod Singleton MD, 15 mL at 09/16/24 2141    citalopram (CeleXA) tablet 10 mg, 10 mg, Oral, Daily, Rod Singleton MD, 10 mg at 09/17/24 0908    colchicine (COLCRYS) tablet 0.6 mg, 0.6 mg, Oral, Daily, Rod Singleton MD, 0.6 mg at 09/17/24 0908    dextromethorphan-guaiFENesin (ROBITUSSIN DM) oral syrup 10 mL, 10 mL, Oral, Q4H PRN, Isaac Arrieta PA-C, 10 mL at 09/12/24 2209    diazepam (VALIUM) tablet 5 mg, 5 mg, Oral, Q8H PRN, Rod Singleton MD, 5 mg at 09/15/24 2109    fluticasone (FLONASE) 50 mcg/act nasal spray 2 spray, 2 spray, Nasal, Daily, Rod Singleton MD, 2 spray at 09/17/24 0951    formoterol (PERFOROMIST) nebulizer solution 20 mcg, 20 mcg, Nebulization, Q12H, Rod Singleton MD, 20 mcg at 09/17/24 0700    guaiFENesin (MUCINEX) 12 hr tablet 600 mg, 600 mg, Oral, Q12H NANCY, Isaac Arrieta PA-C, 600 mg at 09/17/24 0908    ondansetron (ZOFRAN) injection 4 mg, 4 mg, Intravenous, Q6H PRN, Rod Singleton MD    oxybutynin (DITROPAN-XL) 24 hr tablet 10 mg, 10 mg, Oral, HS, Rod Singleton MD, 10 mg at 09/16/24 2141    pantoprazole (PROTONIX) EC tablet 40 mg, 40 mg, Oral, Early Morning, Rod Singleton MD, 40 mg at 09/17/24 0547    saccharomyces boulardii (FLORASTOR) capsule 250 mg, 250 mg, Oral, Daily, Rod Singleton MD, 250 mg at 09/17/24 0908    torsemide (DEMADEX) tablet 20 mg, 20 mg, Oral, Daily, Alvaro Ludwig PA-C, 20 mg at 09/17/24 0909    Portions of the record may have been created with voice recognition software. Occasional wrong word or \"sound a like\" substitutions may have " occurred due to the inherent limitations of voice recognition software. Read the chart carefully and recognize, using context, where substitutions have occurred.    Cj Rai DO, [facility], [facility]  9/17/2024 2:30 PM

## 2024-09-17 NOTE — PLAN OF CARE
Problem: Potential for Falls  Goal: Patient will remain free of falls  Description: INTERVENTIONS:  - Educate patient/family on patient safety including physical limitations  - Instruct patient to call for assistance with activity   - Consult OT/PT to assist with strengthening/mobility   - Keep Call bell within reach  - Keep bed low and locked with side rails adjusted as appropriate  - Keep care items and personal belongings within reach  - Initiate and maintain comfort rounds  - Make Fall Risk Sign visible to staff  - Offer Toileting every 2 Hours, in advance of need  - Initiate/Maintain bed alarm  - Obtain necessary fall risk management equipment:   - Apply yellow socks and bracelet for high fall risk patients  - Consider moving patient to room near nurses station  Outcome: Progressing     Problem: PAIN - ADULT  Goal: Verbalizes/displays adequate comfort level or baseline comfort level  Description: Interventions:  - Encourage patient to monitor pain and request assistance  - Assess pain using appropriate pain scale  - Administer analgesics based on type and severity of pain and evaluate response  - Implement non-pharmacological measures as appropriate and evaluate response  - Consider cultural and social influences on pain and pain management  - Notify physician/advanced practitioner if interventions unsuccessful or patient reports new pain  Outcome: Progressing     Problem: INFECTION - ADULT  Goal: Absence or prevention of progression during hospitalization  Description: INTERVENTIONS:  - Assess and monitor for signs and symptoms of infection  - Monitor lab/diagnostic results  - Monitor all insertion sites, i.e. indwelling lines, tubes, and drains  - Monitor endotracheal if appropriate and nasal secretions for changes in amount and color  - [place] appropriate cooling/warming therapies per order  - Administer medications as ordered  - Instruct and encourage patient and family to use good hand hygiene  technique  - Identify and instruct in appropriate isolation precautions for identified infection/condition  Outcome: Progressing  Goal: Absence of fever/infection during neutropenic period  Description: INTERVENTIONS:  - Monitor WBC    Outcome: Progressing     Problem: SAFETY ADULT  Goal: Patient will remain free of falls  Description: INTERVENTIONS:  - Educate patient/family on patient safety including physical limitations  - Instruct patient to call for assistance with activity   - Consult OT/PT to assist with strengthening/mobility   - Keep Call bell within reach  - Keep bed low and locked with side rails adjusted as appropriate  - Keep care items and personal belongings within reach  - Initiate and maintain comfort rounds  - Make Fall Risk Sign visible to staff  - Offer Toileting every 2 Hours, in advance of need  - Initiate/Maintain bed alarm  - Obtain necessary fall risk management equipment:   - Apply yellow socks and bracelet for high fall risk patients  - Consider moving patient to room near nurses station  Outcome: Progressing  Goal: Maintain or return to baseline ADL function  Description: INTERVENTIONS:  -  Assess patient's ability to carry out ADLs; assess patient's baseline for ADL function and identify physical deficits which impact ability to perform ADLs (bathing, care of mouth/teeth, toileting, grooming, dressing, etc.)  - Assess/evaluate cause of self-care deficits   - Assess range of motion  - Assess patient's mobility; develop plan if impaired  - Assess patient's need for assistive devices and provide as appropriate  - Encourage maximum independence but intervene and supervise when necessary  - Involve family in performance of ADLs  - Assess for home care needs following discharge   - Consider OT consult to assist with ADL evaluation and planning for discharge  - Provide patient education as appropriate  Outcome: Progressing  Goal: Maintains/Returns to pre admission functional  level  Description: INTERVENTIONS:  - Perform AM-PAC 6 Click Basic Mobility/ Daily Activity assessment daily.  - Set and communicate daily mobility goal to care team and patient/family/caregiver.   - Collaborate with rehabilitation services on mobility goals if consulted  - Perform Range of Motion 3 times a day.  - Reposition patient every 2 hours.  - Dangle patient 3 times a day  - Stand patient 3 times a day  - Ambulate patient 3 times a day  - Out of bed to chair 3 times a day   - Out of bed for meals 3 times a day  - Out of bed for toileting  - Record patient progress and toleration of activity level   Outcome: Progressing     Problem: DISCHARGE PLANNING  Goal: Discharge to home or other facility with appropriate resources  Description: INTERVENTIONS:  - Identify barriers to discharge w/patient and caregiver  - Arrange for needed discharge resources and transportation as appropriate  - Identify discharge learning needs (meds, wound care, etc.)  - Arrange for interpretive services to assist at discharge as needed  - Refer to Case Management Department for coordinating discharge planning if the patient needs post-hospital services based on physician/advanced practitioner order or complex needs related to functional status, cognitive ability, or social support system  Outcome: Progressing     Problem: Knowledge Deficit  Goal: Patient/family/caregiver demonstrates understanding of disease process, treatment plan, medications, and discharge instructions  Description: Complete learning assessment and assess knowledge base.  Interventions:  - Provide teaching at level of understanding  - Provide teaching via preferred learning methods  Outcome: Progressing     Problem: RESPIRATORY - ADULT  Goal: Achieves optimal ventilation and oxygenation  Description: INTERVENTIONS:  - Assess for changes in respiratory status  - Assess for changes in mentation and behavior  - Position to facilitate oxygenation and minimize  respiratory effort  - Oxygen administered by appropriate delivery if ordered  - Initiate smoking cessation education as indicated  - Encourage broncho-pulmonary hygiene including cough, deep breathe, Incentive Spirometry  - Assess the need for suctioning and aspirate as needed  - Assess and instruct to report SOB or any respiratory difficulty  - Respiratory Therapy support as indicated  Outcome: Progressing     Problem: Nutrition/Hydration-ADULT  Goal: Nutrient/Hydration intake appropriate for improving, restoring or maintaining nutritional needs  Description: Monitor and assess patient's nutrition/hydration status for malnutrition. Collaborate with interdisciplinary team and initiate plan and interventions as ordered.  Monitor patient's weight and dietary intake as ordered or per policy. Utilize nutrition screening tool and intervene as necessary. Determine patient's food preferences and provide high-protein, high-caloric foods as appropriate.     INTERVENTIONS:  - Monitor oral intake, urinary output, labs, and treatment plans  - Assess nutrition and hydration status and recommend course of action  - Evaluate amount of meals eaten  - Assist patient with eating if necessary   - Allow adequate time for meals  - Recommend/ encourage appropriate diets, oral nutritional supplements, and vitamin/mineral supplements  - Order, calculate, and assess calorie counts as needed  - Recommend, monitor, and adjust tube feedings and TPN/PPN based on assessed needs  - Assess need for intravenous fluids  - Provide specific nutrition/hydration education as appropriate  - Include patient/family/caregiver in decisions related to nutrition  Outcome: Progressing     Problem: Prexisting or High Potential for Compromised Skin Integrity  Goal: Skin integrity is maintained or improved  Description: INTERVENTIONS:  - Identify patients at risk for skin breakdown  - Assess and monitor skin integrity  - Assess and monitor nutrition and hydration  status  - Monitor labs   - Assess for incontinence   - Turn and reposition patient  - Assist with mobility/ambulation  - Relieve pressure over bony prominences  - Avoid friction and shearing  - Provide appropriate hygiene as needed including keeping skin clean and dry  - Evaluate need for skin moisturizer/barrier cream  - Collaborate with interdisciplinary team   - Patient/family teaching  - Consider wound care consult   Outcome: Progressing

## 2024-09-17 NOTE — ASSESSMENT & PLAN NOTE
Wt Readings from Last 3 Encounters:   09/17/24 71.3 kg (157 lb 3 oz)   09/07/24 77.8 kg (171 lb 8.3 oz)   08/21/24 80.7 kg (178 lb)     Patient with known chronic diastolic CHF with grade 2 diastolic dysfunction  Presenting with worsening shortness of breath  Chest x-ray/CT concerning for pleural effusions  Patient is net -6 L throughout hospitalization  Given alkalosis will initiate amiloride, although large component is related to respiratory issues  Transition to oral diuretics

## 2024-09-17 NOTE — CASE MANAGEMENT
Case Management Discharge Planning Note    Patient name Claire Doherty  [facility]  /E4 -* MRN [id number]  : 1950 Date 2024       Current Admission Date: 9/10/2024  Current Admission Diagnosis:Acute on chronic diastolic CHF (congestive heart failure) ([facility])   Patient Active Problem List    Diagnosis Date Noted Date Diagnosed    Paroxysmal A-fib ([facility]) 2024     Acute on chronic diastolic CHF (congestive heart failure) ([facility]) 09/10/2024     Acute on chronic respiratory failure with hypoxia and hypercapnia ([facility]) 09/10/2024     Severe protein-calorie malnutrition ([facility]) 2024     DINA (acute kidney injury) ([facility]) 2024     Nausea 2024     Pleural effusion 2024     Pericardial effusion 2024     Infected wound 2024     Chest pain 2024     History of Idiopathic pericarditis 2024     Cellulitis of right foot 2024     Chronic left shoulder pain 2024     Shoulder joint dysfunction 2024     COPD (chronic obstructive pulmonary disease) ([facility]) 06/15/2023     Bilateral hand pain 2023     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[facility]) 2022     Gastroparesis 2021     Aortic valve sclerosis 2021     Tricuspid valve insufficiency 2021     Mitral annular calcification 2021     Former smoker 2021     On home oxygen therapy 2021     Colostomy status ([facility])      Ductal carcinoma in situ (DCIS) of left breast 03/15/2021     Insomnia 10/21/2020     Depression 10/06/2020     Anemia 10/02/2020     Chronic hypoxic respiratory failure ([facility]) 2020     Bladder injury 2020     Thrombocytosis, unspecified 2020     Leukocytosis 2020     Difficult intubation      Dystrophy, muscular, 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[facility]) 10/12/2017     Ambulatory dysfunction 10/12/2017     Urinary incontinence 10/03/2017     Osteoporosis 2016     Allergic rhinitis 10/09/2013     Restrictive lung disease due to  "muscular dystrophy (HCC) 10/04/2012     GERD without esophagitis 10/04/2012       LOS (days): 7  Geometric Mean LOS (GMLOS) (days): 3.9  Days to GMLOS:-3.2     OBJECTIVE:  Risk of Unplanned Readmission Score: 26.82         Current admission status: Inpatient   Preferred Pharmacy:   [id number] [address] [address]  Phone: [phone] Fax: [phone]    Primary Care Provider: Julienne Tucker DO    Primary Insurance: [facility] RE[facility]  Secondary Insurance:     DISCHARGE DETAILS:                                                                                                 Additional Comments: TAD spoke to the pt in re: to her experience at [place] she said that there a C.N.A. that was not very pleasant and \"was hoping that she was having a bad day.\"  She also stated that it took a long time to answer her call, but she was not specific with time frame.  [place] was called and TAD spoke to KAYE Ashby.  she conformed that the Motorized [employer] was in her office.  She was also infomed of the pt's complaints.  She stated she would follow up.  Pt was updated and made aware that her WC was secure and that the SW was informed of her complaints.  she thanked this CM for the follow up.                      "

## 2024-09-17 NOTE — ASSESSMENT & PLAN NOTE
- diagnosed 08/21/24 at [facility] and transferred to [facility] for cardiac cath (flat troponins but chest pain and ST elevations in inferior leads)  - C 08/21/24: nonobstructive coronary arteries   - CRP 08/21/24: 174  - discharged on colchicine      Plan:  - continue with colchcine

## 2024-09-18 LAB
BUN SERPL-MCNC: 40 MG/DL (ref 5–25)
CALCIUM SERPL-MCNC: 9.8 MG/DL (ref 8.4–10.2)
CHLORIDE SERPL-SCNC: 83 MMOL/L (ref 96–108)
CO2 SERPL-SCNC: >45 MMOL/L (ref 21–32)
CREAT SERPL-MCNC: 0.42 MG/DL (ref 0.6–1.3)
GFR SERPL CREATININE-BSD FRML MDRD: 101 ML/MIN/1.73SQ M
GLUCOSE SERPL-MCNC: 126 MG/DL (ref 65–140)
POTASSIUM SERPL-SCNC: 4.7 MMOL/L (ref 3.5–5.3)
SODIUM SERPL-SCNC: 139 MMOL/L (ref 135–147)

## 2024-09-18 PROCEDURE — 80048 BASIC METABOLIC PNL TOTAL CA: CPT | Performed by: INTERNAL MEDICINE

## 2024-09-18 PROCEDURE — 94150 VITAL CAPACITY TEST: CPT

## 2024-09-18 PROCEDURE — 99233 SBSQ HOSP IP/OBS HIGH 50: CPT | Performed by: INTERNAL MEDICINE

## 2024-09-18 PROCEDURE — 94760 N-INVAS EAR/PLS OXIMETRY 1: CPT

## 2024-09-18 PROCEDURE — 97110 THERAPEUTIC EXERCISES: CPT

## 2024-09-18 PROCEDURE — 94640 AIRWAY INHALATION TREATMENT: CPT

## 2024-09-18 PROCEDURE — 97530 THERAPEUTIC ACTIVITIES: CPT

## 2024-09-18 RX ADMIN — AMILORIDE HYDROCLORIDE 5 MG: 5 TABLET ORAL at 08:31

## 2024-09-18 RX ADMIN — CITALOPRAM HYDROBROMIDE 10 MG: 20 TABLET ORAL at 08:29

## 2024-09-18 RX ADMIN — BUDESONIDE INHALATION 0.5 MG: 0.5 SUSPENSION RESPIRATORY (INHALATION) at 07:49

## 2024-09-18 RX ADMIN — CHLORHEXIDINE GLUCONATE 15 ML: 1.2 RINSE ORAL at 21:54

## 2024-09-18 RX ADMIN — COLCHICINE 0.6 MG: 0.6 TABLET ORAL at 08:29

## 2024-09-18 RX ADMIN — Medication 250 MG: at 08:29

## 2024-09-18 RX ADMIN — APIXABAN 5 MG: 5 TABLET, FILM COATED ORAL at 17:00

## 2024-09-18 RX ADMIN — BUPROPION HYDROCHLORIDE 75 MG: 75 TABLET, FILM COATED ORAL at 08:31

## 2024-09-18 RX ADMIN — APIXABAN 5 MG: 5 TABLET, FILM COATED ORAL at 08:29

## 2024-09-18 RX ADMIN — OXYBUTYNIN CHLORIDE 10 MG: 10 TABLET, EXTENDED RELEASE ORAL at 21:54

## 2024-09-18 RX ADMIN — FORMOTEROL FUMARATE 20 MCG: 20 SOLUTION RESPIRATORY (INHALATION) at 20:10

## 2024-09-18 RX ADMIN — BUDESONIDE INHALATION 0.5 MG: 0.5 SUSPENSION RESPIRATORY (INHALATION) at 20:10

## 2024-09-18 RX ADMIN — PANTOPRAZOLE SODIUM 40 MG: 40 TABLET, DELAYED RELEASE ORAL at 05:54

## 2024-09-18 RX ADMIN — FLUTICASONE PROPIONATE 2 SPRAY: 50 SPRAY, METERED NASAL at 08:31

## 2024-09-18 RX ADMIN — GUAIFENESIN 600 MG: 600 TABLET, EXTENDED RELEASE ORAL at 08:29

## 2024-09-18 RX ADMIN — TORSEMIDE 20 MG: 20 TABLET ORAL at 08:30

## 2024-09-18 RX ADMIN — FORMOTEROL FUMARATE 20 MCG: 20 SOLUTION RESPIRATORY (INHALATION) at 07:49

## 2024-09-18 RX ADMIN — GUAIFENESIN 600 MG: 600 TABLET, EXTENDED RELEASE ORAL at 21:54

## 2024-09-18 NOTE — PLAN OF CARE
Problem: Potential for Falls  Goal: Patient will remain free of falls  Description: INTERVENTIONS:  - Educate patient/family on patient safety including physical limitations  - Instruct patient to call for assistance with activity   - Consult OT/PT to assist with strengthening/mobility   - Keep Call bell within reach  - Keep bed low and locked with side rails adjusted as appropriate  - Keep care items and personal belongings within reach  - Initiate and maintain comfort rounds  - Make Fall Risk Sign visible to staff  - Offer Toileting every 2 Hours, in advance of need  - Initiate/Maintain bed alarm  - Obtain necessary fall risk management equipment:   - Apply yellow socks and bracelet for high fall risk patients  - Consider moving patient to room near nurses station  Outcome: Progressing     Problem: PAIN - ADULT  Goal: Verbalizes/displays adequate comfort level or baseline comfort level  Description: Interventions:  - Encourage patient to monitor pain and request assistance  - Assess pain using appropriate pain scale  - Administer analgesics based on type and severity of pain and evaluate response  - Implement non-pharmacological measures as appropriate and evaluate response  - Consider cultural and social influences on pain and pain management  - Notify physician/advanced practitioner if interventions unsuccessful or patient reports new pain  Outcome: Progressing     Problem: INFECTION - ADULT  Goal: Absence or prevention of progression during hospitalization  Description: INTERVENTIONS:  - Assess and monitor for signs and symptoms of infection  - Monitor lab/diagnostic results  - Monitor all insertion sites, i.e. indwelling lines, tubes, and drains  - Monitor endotracheal if appropriate and nasal secretions for changes in amount and color  - Plaza appropriate cooling/warming therapies per order  - Administer medications as ordered  - Instruct and encourage patient and family to use good hand hygiene  technique  - Identify and instruct in appropriate isolation precautions for identified infection/condition  Outcome: Progressing  Goal: Absence of fever/infection during neutropenic period  Description: INTERVENTIONS:  - Monitor WBC    Outcome: Progressing     Problem: SAFETY ADULT  Goal: Patient will remain free of falls  Description: INTERVENTIONS:  - Educate patient/family on patient safety including physical limitations  - Instruct patient to call for assistance with activity   - Consult OT/PT to assist with strengthening/mobility   - Keep Call bell within reach  - Keep bed low and locked with side rails adjusted as appropriate  - Keep care items and personal belongings within reach  - Initiate and maintain comfort rounds  - Make Fall Risk Sign visible to staff  - Offer Toileting every 2 Hours, in advance of need  - Initiate/Maintain bed alarm  - Obtain necessary fall risk management equipment:   - Apply yellow socks and bracelet for high fall risk patients  - Consider moving patient to room near nurses station  Outcome: Progressing  Goal: Maintain or return to baseline ADL function  Description: INTERVENTIONS:  -  Assess patient's ability to carry out ADLs; assess patient's baseline for ADL function and identify physical deficits which impact ability to perform ADLs (bathing, care of mouth/teeth, toileting, grooming, dressing, etc.)  - Assess/evaluate cause of self-care deficits   - Assess range of motion  - Assess patient's mobility; develop plan if impaired  - Assess patient's need for assistive devices and provide as appropriate  - Encourage maximum independence but intervene and supervise when necessary  - Involve family in performance of ADLs  - Assess for home care needs following discharge   - Consider OT consult to assist with ADL evaluation and planning for discharge  - Provide patient education as appropriate  Outcome: Progressing  Goal: Maintains/Returns to pre admission functional  level  Description: INTERVENTIONS:  - Perform AM-PAC 6 Click Basic Mobility/ Daily Activity assessment daily.  - Set and communicate daily mobility goal to care team and patient/family/caregiver.   - Collaborate with rehabilitation services on mobility goals if consulted  - Perform Range of Motion 3 times a day.  - Reposition patient every 2 hours.  - Dangle patient 3 times a day  - Stand patient 3 times a day  - Ambulate patient 3 times a day  - Out of bed to chair 3 times a day   - Out of bed for meals 3 times a day  - Out of bed for toileting  - Record patient progress and toleration of activity level   Outcome: Progressing     Problem: DISCHARGE PLANNING  Goal: Discharge to home or other facility with appropriate resources  Description: INTERVENTIONS:  - Identify barriers to discharge w/patient and caregiver  - Arrange for needed discharge resources and transportation as appropriate  - Identify discharge learning needs (meds, wound care, etc.)  - Arrange for interpretive services to assist at discharge as needed  - Refer to Case Management Department for coordinating discharge planning if the patient needs post-hospital services based on physician/advanced practitioner order or complex needs related to functional status, cognitive ability, or social support system  Outcome: Progressing     Problem: Knowledge Deficit  Goal: Patient/family/caregiver demonstrates understanding of disease process, treatment plan, medications, and discharge instructions  Description: Complete learning assessment and assess knowledge base.  Interventions:  - Provide teaching at level of understanding  - Provide teaching via preferred learning methods  Outcome: Progressing     Problem: RESPIRATORY - ADULT  Goal: Achieves optimal ventilation and oxygenation  Description: INTERVENTIONS:  - Assess for changes in respiratory status  - Assess for changes in mentation and behavior  - Position to facilitate oxygenation and minimize  respiratory effort  - Oxygen administered by appropriate delivery if ordered  - Initiate smoking cessation education as indicated  - Encourage broncho-pulmonary hygiene including cough, deep breathe, Incentive Spirometry  - Assess the need for suctioning and aspirate as needed  - Assess and instruct to report SOB or any respiratory difficulty  - Respiratory Therapy support as indicated  Outcome: Progressing     Problem: Nutrition/Hydration-ADULT  Goal: Nutrient/Hydration intake appropriate for improving, restoring or maintaining nutritional needs  Description: Monitor and assess patient's nutrition/hydration status for malnutrition. Collaborate with interdisciplinary team and initiate plan and interventions as ordered.  Monitor patient's weight and dietary intake as ordered or per policy. Utilize nutrition screening tool and intervene as necessary. Determine patient's food preferences and provide high-protein, high-caloric foods as appropriate.     INTERVENTIONS:  - Monitor oral intake, urinary output, labs, and treatment plans  - Assess nutrition and hydration status and recommend course of action  - Evaluate amount of meals eaten  - Assist patient with eating if necessary   - Allow adequate time for meals  - Recommend/ encourage appropriate diets, oral nutritional supplements, and vitamin/mineral supplements  - Order, calculate, and assess calorie counts as needed  - Recommend, monitor, and adjust tube feedings and TPN/PPN based on assessed needs  - Assess need for intravenous fluids  - Provide specific nutrition/hydration education as appropriate  - Include patient/family/caregiver in decisions related to nutrition  Outcome: Progressing     Problem: Prexisting or High Potential for Compromised Skin Integrity  Goal: Skin integrity is maintained or improved  Description: INTERVENTIONS:  - Identify patients at risk for skin breakdown  - Assess and monitor skin integrity  - Assess and monitor nutrition and hydration  status  - Monitor labs   - Assess for incontinence   - Turn and reposition patient  - Assist with mobility/ambulation  - Relieve pressure over bony prominences  - Avoid friction and shearing  - Provide appropriate hygiene as needed including keeping skin clean and dry  - Evaluate need for skin moisturizer/barrier cream  - Collaborate with interdisciplinary team   - Patient/family teaching  - Consider wound care consult   Outcome: Progressing

## 2024-09-18 NOTE — PROGRESS NOTES
Progress Note - Hospitalist   Name: Claire Doherty 74 y.o. female I MRN: [id number]  Unit/Bed#: E4 -01 I Date of Admission: 9/10/2024   Date of Service: 9/18/2024 I Hospital Day: 8    Assessment & Plan  Acute on chronic respiratory failure with hypoxia and hypercapnia (HCC)  EMS was called for patient at nursing facility for shortness of breath  Found to have CO2 of 70 on VBG with pH 7.336  Patient does utilize BiPAP at night, does have history of muscular dystrophy with restrictive lung disease  Improved  Continue nebulizers  Acute on chronic diastolic CHF (congestive heart failure) ([facility])  Wt Readings from Last 3 Encounters:   09/18/24 74.6 kg (164 lb 7.4 oz)   09/07/24 77.8 kg (171 lb 8.3 oz)   08/21/24 80.7 kg (178 lb)     Patient with known chronic diastolic CHF with grade 2 diastolic dysfunction  Presenting with worsening shortness of breath  Chest x-ray/CT concerning for pleural effusions  Patient is net -6 L throughout hospitalization  Given alkalosis will initiate amiloride, although large component is related to respiratory issues  Transition to oral diuretics  Dystrophy, muscular, 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HCC)  He was doing well history of muscular dystrophy  Was previously living alone in an apartment with home health care, able to stand and pivot to wheelchair prior to hospital stay last week  Was discharged to rehab on 9/7/2024  Utilizes BiPAP at night, continue  PT/OT while inpatient  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)  Patient follows outpatient with pulmonology for restrictive lung disease due to muscular dystrophy  Uses BiPAP at night and 2-3 L nasal cannula during the day  Continue nebulizers  Pulmonology consultation reviewed  GERD without esophagitis  Continue PPI  Thrombocytosis, unspecified  Significant thrombocytosis with platelet count 739  Likely reactive although does have history of similar  recommend outpatient with hematology  History of Idiopathic  pericarditis  Patient previously hospitalized for chest discomfort, diagnosed with idiopathic pericarditis  Continue colchicine  Outpatient follow-up with cardiology on 9/20/2024  COPD (chronic obstructive pulmonary disease) ([facility])  Continue respiratory protocol  BiPAP with all naps  Paroxysmal A-fib ([facility])  Ventricular rate controlled  Continue [facility]          Hospital Course:     74-year-old female patient mated with acute on chronic diastolic heart failure history of muscular dystrophy and restrictive lung disease.  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[facility])    GERD without esophagitis    Thrombocytosis, unspecified    COPD (chronic obstructive pulmonary disease) ([facility])    History of Idiopathic pericarditis    Acute on chronic respiratory failure with hypoxia and hypercapnia ([facility])    Paroxysmal A-fib ([facility])      Plan:    Discharge planning and management  Insurance authorization       VTE Pharmacologic Prophylaxis:   Pharmacologic: Apixaban (Eliquis)  Mechanical VTE Prophylaxis in Place: Yes    AM-PAC Basic Mobility:  Basic Mobility Inpatient Raw Score: 8    -HLM Achieved: 3: Sit at edge of bed  JH-HLM Goal: 3: Sit at edge of bed    HLM Goal listed above. Continue with multidisciplinary rounding and encourage appropriate mobility to improve upon HLM goals.         Patient Centered Rounds: Case discussed and reviewed with nursing    Discussions with Specialists or Other Care Team Provider: Case management    Education and Discussions with Family / Patient: Discussed with patient who is updated her found    Time Spent for Care: 80 minutes.  More than 50% of total time spent on counseling and coordination of care as described above.    Current Length of Stay: 8 day(s)    Current Patient  Status: Inpatient   Certification Statement: The patient will continue to require additional inpatient hospital stay due to need for rehab placed    Discharge Plan / Estimated Discharge Date: Pending insurance authorization   Patient has accepting facility,     Code Status: Level 2 - DNAR: but accepts endotracheal intubation      Subjective:   Seen and examined, no acute complaints.  No nausea no vomiting, feels well    A complete and comprehensive 14 point organ system review has been performed and all other systems are negative other than stated above.    Objective:     Vitals:   Temp (24hrs), Av.5 °F (36.4 °C), Min:97.4 °F (36.3 °C), Max:97.6 °F (36.4 °C)    Temp:  [97.4 °F (36.3 °C)-97.6 °F (36.4 °C)] 97.6 °F (36.4 °C)  HR:  [96-99] 99  Resp:  [18] 18  BP: (122-130)/(64-88) 122/64  SpO2:  [98 %-100 %] 99 %  Body mass index is 32.12 kg/m².     Input and Output Summary (last 24 hours):       Intake/Output Summary (Last 24 hours) at 2024 1753  Last data filed at 2024 0400  Gross per 24 hour   Intake 118 ml   Output 850 ml   Net -732 ml       Physical Exam:     General: well appearing, no acute distress  HEENT: atraumatic, PERRLA, moist mucosa, normal pharynx, normal tonsils and adenoids, normal tongue, no fluid in sinuses  Neck: Trachea midline, no carotid bruit, no masses  Respiratory: normal chest wall expansion, CTA B, no r/r/w, no rubs  Cardiovascular: RRR, no m/r/g, Normal S1 and S2  Abdomen: Soft, non-tender, non-distended, normal bowel sounds in all quadrants, no hepatosplenomegaly, no tympany  Rectal: deferred  Musculoskeletal: Deferred   integumentary: warm, dry, and pink, with no rash, purpura, or petechia  Heme/Lymph: no lymphadenopathy, no bruises  Neurological: Cranial Nerves II-XII grossly intact  Psychiatric: cooperative with normal mood, affect, and cognition      Additional Data:     Labs:    Results from last 7 days   Lab Units 24  1043 24  0545 24  0625   WBC  Thousand/uL 17.16*   < > 12.56*   HEMOGLOBIN g/dL 11.3*   < > 10.7*   HEMATOCRIT % 36.8   < > 35.0   PLATELETS Thousands/uL 529*   < > 669*   LYMPHO PCT %  --   --  20   MONO PCT %  --   --  12   EOS PCT %  --   --  0    < > = values in this interval not displayed.     Results from last 7 days   Lab Units 09/18/24  0516   POTASSIUM mmol/L 4.7   CHLORIDE mmol/L 83*   CO2 mmol/L >45*   BUN mg/dL 40*   CREATININE mg/dL 0.42*   CALCIUM mg/dL 9.8           * I Have Reviewed All Lab Data Listed Above.  * Additional Pertinent Lab Tests Reviewed: All Labs For Current Hospital Admission Reviewed    Imaging:    Imaging Reports Reviewed Today Include: No new imaging  Imaging Personally Reviewed by Myself Includes: No new imaging    Recent Cultures (last 7 days):           Last 24 Hours Medication List:   Current Facility-Administered Medications   Medication Dose Route Frequency Provider Last Rate    acetaminophen  650 mg Oral Q6H PRN Rod Singleton MD      albuterol  2.5 mg Nebulization Q4H PRN Rod Singleton MD      AMILoride  5 mg Oral Daily Ander Hitchcock DO      apixaban  5 mg Oral BID Rod Singleton MD      bisacodyl  10 mg Rectal Daily PRN Rod Singleton MD      budesonide  0.5 mg Nebulization Q12H Rod Singleton MD      buPROPion  75 mg Oral QAM Rod Singleton MD      calcium carbonate  1,000 mg Oral Daily PRN Rod Singleton MD      chlorhexidine  15 mL Mouth/Throat Q12H On license of [facility] Rod Singleton MD      citalopram  10 mg Oral Daily Rod Singleton MD      colchicine  0.6 mg Oral Daily Rod Singleton MD      dextromethorphan-guaiFENesin  10 mL Oral Q4H PRN Isaac Arrieta PA-C      diazepam  5 mg Oral Q8H PRN Rod Singleton MD      fluticasone  2 spray Nasal Daily Rod Singleton MD      formoterol  20 mcg Nebulization Q12H Rod Singleotn MD      guaiFENesin  600 mg Oral Q12H On license of [facility] Isaac Arrieta PA-C      ondansetron  4 mg Intravenous Q6H PRN Rod Singleton MD      oxybutynin  10 mg Oral HS Rod Singleton MD      pantoprazole  40 mg  Oral Early Morning Rod Singleton MD      saccharomyces boulardii  250 mg Oral Daily Rod Singleton MD      torsemide  20 mg Oral Daily Alvaro Ludwig PA-C         AM-PAC Basic Mobility:  Basic Mobility Inpatient Raw Score: 8    JH-HLM Achieved: 3: Sit at edge of bed  JH-HLM Goal: 3: Sit at edge of bed    HLM Goal listed above. Continue with multidisciplinary rounding and encourage appropriate mobility to improve upon HLM goals.     Today, Patient Was Seen By: Ander Hitchcock DO    ** Please Note: This note was completed in part utilizing [employer] Dragon One [employer] software dictation.  Grammatical errors, random word insertions, spelling mistakes, and incomplete sentences may be an occasional consequence of this system secondary to software limitations, ambient noise, and hardware issues.  If you have any questions or concerns about the content, text, or information contained within the body of this dictation, please contact the provider for clarification. **

## 2024-09-18 NOTE — CASE MANAGEMENT
[facility] received APPROVAL for transport authorization.  Insurance: [employer] vip  Type of transport: [facility]  Date of transport:    9/7/24  End Location:[facility] auth #: [id number]    Care Manager notified: obed welch email     Please reach out to  for updates on any clinical information.

## 2024-09-18 NOTE — ASSESSMENT & PLAN NOTE
Patient: Freeman Velazquez    Procedure: Procedure(s):  Colonoscopy       Diagnosis: Change in stool caliber [R19.5]  Diagnosis Additional Information: No value filed.    Anesthesia Type:   MAC     Note:    Oropharynx: oropharynx clear of all foreign objects and spontaneously breathing  Level of Consciousness: awake  Oxygen Supplementation: room air    Independent Airway: airway patency satisfactory and stable  Dentition: dentition unchanged  Vital Signs Stable: post-procedure vital signs reviewed and stable  Report to RN Given: handoff report given  Patient transferred to: Phase II  Comments: Report to Phase II RN. Resps easy and regular.   Handoff Report: Identifed the Patient, Identified the Reponsible Provider, Reviewed the pertinent medical history, Discussed the surgical course, Reviewed Intra-OP anesthesia mangement and issues during anesthesia, Set expectations for post-procedure period and Allowed opportunity for questions and acknowledgement of understanding      Vitals:  Vitals Value Taken Time   /70 04/12/24 1257   Temp 36.2  C (97.2  F) 04/12/24 1257   Pulse 63    Resp 16 04/12/24 1257   SpO2 96 % 04/12/24 1257       Electronically Signed By: MARK STONE CRNA  April 12, 2024  12:59 PM   EMS was called for patient at nursing facility for shortness of breath  Found to have CO2 of 70 on VBG with pH 7.336  Patient does utilize BiPAP at night, does have history of muscular dystrophy with restrictive lung disease  Improved  Continue nebulizers

## 2024-09-18 NOTE — CASE MANAGEMENT
Case Management Discharge Planning Note    Patient name Claire Doherty  [facility]  /E4 -* MRN [id number]  : 1950 Date 2024       Current Admission Date: 9/10/2024  Current Admission Diagnosis:Acute on chronic diastolic CHF (congestive heart failure) ([facility])   Patient Active Problem List    Diagnosis Date Noted Date Diagnosed    Paroxysmal A-fib ([facility]) 2024     Acute on chronic diastolic CHF (congestive heart failure) ([facility]) 09/10/2024     Acute on chronic respiratory failure with hypoxia and hypercapnia ([facility]) 09/10/2024     Severe protein-calorie malnutrition ([facility]) 2024     DINA (acute kidney injury) ([facility]) 2024     Nausea 2024     Pleural effusion 2024     Pericardial effusion 2024     Infected wound 2024     Chest pain 2024     History of Idiopathic pericarditis 2024     Cellulitis of right foot 2024     Chronic left shoulder pain 2024     Shoulder joint dysfunction 2024     COPD (chronic obstructive pulmonary disease) ([facility]) 06/15/2023     Bilateral hand pain 2023     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[facility]) 2022     Gastroparesis 2021     Aortic valve sclerosis 2021     Tricuspid valve insufficiency 2021     Mitral annular calcification 2021     Former smoker 2021     On home oxygen therapy 2021     Colostomy status ([facility])      Ductal carcinoma in situ (DCIS) of left breast 03/15/2021     Insomnia 10/21/2020     Depression 10/06/2020     Anemia 10/02/2020     Chronic hypoxic respiratory failure ([facility]) 2020     Bladder injury 2020     Thrombocytosis, unspecified 2020     Leukocytosis 2020     Difficult intubation      Dystrophy, muscular, 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[facility]) 10/12/2017     Ambulatory dysfunction 10/12/2017     Urinary incontinence 10/03/2017     Osteoporosis 2016     Allergic rhinitis 10/09/2013     Restrictive lung disease due to  muscular dystrophy (HCC) 10/04/2012     GERD without esophagitis 10/04/2012       LOS (days): 8  Geometric Mean LOS (GMLOS) (days): 3.9  Days to GMLOS:-4.2     OBJECTIVE:  Risk of Unplanned Readmission Score: 27.17         Current admission status: Inpatient   Preferred Pharmacy:   [id number] [address] [address]  Phone: [phone] Fax: [phone]    Primary Care Provider: Julienne Tucker DO    Primary Insurance: [facility] REP  Secondary Insurance:     DISCHARGE DETAILS:                                     Additional Comments: Received call from patient's sister, reviewed current rehab referrals/responses (currently [place] only accepting facility).  Sister stated she would discuss with patient and get back to CM.  Will need insurance auth.

## 2024-09-18 NOTE — PLAN OF CARE
Problem: PHYSICAL THERAPY ADULT  Goal: Performs mobility at highest level of function for planned discharge setting.  See evaluation for individualized goals.  Description: Treatment/Interventions: Functional transfer training, LE strengthening/ROM, Therapeutic exercise, Patient/family training, Equipment eval/education, Bed mobility, Compensatory technique education, Continued evaluation, Spoke to nursing, OT          See flowsheet documentation for full assessment, interventions and recommendations.  Note: Prognosis: Fair  Problem List: Decreased strength, Decreased range of motion, Decreased endurance, Impaired balance, Decreased mobility, Obesity, Impaired judgement, Decreased coordination, Decreased cognition  Assessment: Pt. was able to toelrate all activiites. Pt. able to perform AROM & AAROM of BLEs. Noted weakness is all extremities. Reported to RN for pt. to have prevalon boot to avoid foot drop. Attempted STS transfers 4x with Mod A x 2 however though pt. clear bed she was unable to extend b/l hips to come to full WB in her LEs. Pt. on 2L O2 and SpO2 stats remained at %. Pt. back in bed with all needs within reach and bed alarm engaged and SCDs on. Will continue to follow per PT POC.  Barriers to Discharge: None  Barriers to Discharge Comments: occ home alone- requires A for mobility  Rehab Resource Intensity Level, PT: II (Moderate Resource Intensity)    See flowsheet documentation for full assessment.

## 2024-09-18 NOTE — PHYSICAL THERAPY NOTE
Physical Therapy Treatment Note     09/18/24 1452   PT Last Visit   PT Visit Date 09/18/24   Note Type   Note Type Treatment   Pain Assessment   Pain Assessment Tool 0-10   Pain Score No Pain   Restrictions/Precautions   Weight Bearing Precautions Per Order No   Other Precautions Bed Alarm;Fall Risk;Telemetry;O2;Limb alert;Multiple lines   General   Chart Reviewed Yes   Family/Caregiver Present No   Subjective   Subjective Pt. agreeable to PT   Bed Mobility   Rolling R 3  Moderate assistance   Additional items Assist x 1;Increased time required;Bedrails;Verbal cues   Rolling L 3  Moderate assistance   Additional items Assist x 1;Bedrails;Increased time required   Supine to Sit 2  Maximal assistance   Additional items Assist x 1;Increased time required;Bedrails;HOB elevated;Verbal cues;LE management   Sit to Supine 3  Moderate assistance   Additional items Assist x 2;Increased time required;LE management;Verbal cues   Transfers   Sit to Stand 2  Maximal assistance   Additional items Assist x 2;Increased time required;Verbal cues   Stand to Sit 3  Moderate assistance   Additional items Assist x 2;Increased time required;Verbal cues   Balance   Static Sitting Good   Dynamic Sitting Fair +   Static Standing Zero   Dynamic Standing Poor -   Ambulatory Zero   Endurance Deficit   Endurance Deficit Yes   Endurance Deficit Description fatigue   Activity Tolerance   Activity Tolerance Patient tolerated treatment well   Nurse Made Aware Yes   Exercises   THR Supine;Bilateral;AAROM;20 reps   Assessment   Prognosis Fair   Problem List Decreased strength;Decreased range of motion;Decreased endurance;Impaired balance;Decreased mobility;Obesity;Impaired judgement;Decreased coordination;Decreased cognition   Assessment Pt. was able to toelrate all activiites. Pt. able to perform AROM & AAROM of BLEs. Noted weakness is all extremities. Reported to RN for pt. to have prevalon boot to avoid foot drop. Attempted STS transfers 4x with  Mod A x 2 however though pt. clear bed she was unable to extend b/l hips to come to full WB in her LEs. Pt. on 2L O2 and SpO2 stats remained at %. Pt. back in bed with all needs within reach and bed alarm engaged and SCDs on. Will continue to follow per PT POC.   Barriers to Discharge None   Goals   Patient Goals None reported   STG Expiration Date 09/25/24   PT Treatment Day 1   Plan   Treatment/Interventions Functional transfer training;LE strengthening/ROM;Therapeutic exercise;Spoke to nursing;Gait training;Patient/family training;Equipment eval/education;Bed mobility   Progress Progressing toward goals   PT Frequency 3-5x/wk   Discharge Recommendation   Rehab Resource Intensity Level, PT II (Moderate Resource Intensity)   AM-PAC Basic Mobility Inpatient   Turning in Flat Bed Without Bedrails 2   Lying on Back to Sitting on Edge of Flat Bed Without Bedrails 2   Moving Bed to Chair 1   Standing Up From Chair Using Arms 1   Walk in Room 1   Climb 3-5 Stairs With Railing 1   Basic Mobility Inpatient Raw Score 8   Turning Head Towards Sound 4   Follow Simple Instructions 4   Low Function Basic Mobility Raw Score  16   Low Function Basic Mobility Standardized Score  25.72   [facility] Highest Level Of Mobility   -HLM Goal 3: Sit at edge of bed   -HLM Achieved 3: Sit at edge of bed           Srikanth Ivy PTA    An AM-PAC basic mobility standardized score less than 42.9 suggest the patient may benefit from discharge to post-acute rehab services.      Unemployed

## 2024-09-18 NOTE — ASSESSMENT & PLAN NOTE
Wt Readings from Last 3 Encounters:   09/18/24 74.6 kg (164 lb 7.4 oz)   09/07/24 77.8 kg (171 lb 8.3 oz)   08/21/24 80.7 kg (178 lb)     Patient with known chronic diastolic CHF with grade 2 diastolic dysfunction  Presenting with worsening shortness of breath  Chest x-ray/CT concerning for pleural effusions  Patient is net -6 L throughout hospitalization  Given alkalosis will initiate amiloride, although large component is related to respiratory issues  Transition to oral diuretics

## 2024-09-18 NOTE — RESTORATIVE TECHNICIAN NOTE
Restorative Technician Note      Patient Name: Claire Doherty     Restorative Tech Visit Date: 09/18/24  Note Type: Mobility  Patient Position Upon Consult: Supine  Activity Performed: Ambulated  Patient Position at End of Consult: All needs within reach; Supine          ns

## 2024-09-19 LAB — GLUCOSE SERPL-MCNC: 154 MG/DL (ref 65–140)

## 2024-09-19 PROCEDURE — 82948 REAGENT STRIP/BLOOD GLUCOSE: CPT

## 2024-09-19 PROCEDURE — 99232 SBSQ HOSP IP/OBS MODERATE 35: CPT | Performed by: INTERNAL MEDICINE

## 2024-09-19 PROCEDURE — 97530 THERAPEUTIC ACTIVITIES: CPT

## 2024-09-19 PROCEDURE — 94760 N-INVAS EAR/PLS OXIMETRY 1: CPT

## 2024-09-19 PROCEDURE — 94640 AIRWAY INHALATION TREATMENT: CPT

## 2024-09-19 PROCEDURE — 97535 SELF CARE MNGMENT TRAINING: CPT

## 2024-09-19 RX ADMIN — BUDESONIDE INHALATION 0.5 MG: 0.5 SUSPENSION RESPIRATORY (INHALATION) at 19:34

## 2024-09-19 RX ADMIN — CALCIUM CARBONATE (ANTACID) CHEW TAB 500 MG 1000 MG: 500 CHEW TAB at 02:09

## 2024-09-19 RX ADMIN — APIXABAN 5 MG: 5 TABLET, FILM COATED ORAL at 08:49

## 2024-09-19 RX ADMIN — GUAIFENESIN 600 MG: 600 TABLET, EXTENDED RELEASE ORAL at 08:49

## 2024-09-19 RX ADMIN — PANTOPRAZOLE SODIUM 40 MG: 40 TABLET, DELAYED RELEASE ORAL at 05:55

## 2024-09-19 RX ADMIN — ONDANSETRON 4 MG: 2 INJECTION INTRAMUSCULAR; INTRAVENOUS at 16:10

## 2024-09-19 RX ADMIN — OXYBUTYNIN CHLORIDE 10 MG: 10 TABLET, EXTENDED RELEASE ORAL at 21:27

## 2024-09-19 RX ADMIN — TORSEMIDE 20 MG: 20 TABLET ORAL at 08:49

## 2024-09-19 RX ADMIN — Medication 250 MG: at 08:49

## 2024-09-19 RX ADMIN — BUDESONIDE INHALATION 0.5 MG: 0.5 SUSPENSION RESPIRATORY (INHALATION) at 07:54

## 2024-09-19 RX ADMIN — CHLORHEXIDINE GLUCONATE 15 ML: 1.2 RINSE ORAL at 21:27

## 2024-09-19 RX ADMIN — APIXABAN 5 MG: 5 TABLET, FILM COATED ORAL at 16:11

## 2024-09-19 RX ADMIN — CHLORHEXIDINE GLUCONATE 15 ML: 1.2 RINSE ORAL at 08:50

## 2024-09-19 RX ADMIN — AMILORIDE HYDROCLORIDE 5 MG: 5 TABLET ORAL at 08:50

## 2024-09-19 RX ADMIN — FLUTICASONE PROPIONATE 2 SPRAY: 50 SPRAY, METERED NASAL at 08:51

## 2024-09-19 RX ADMIN — FORMOTEROL FUMARATE 20 MCG: 20 SOLUTION RESPIRATORY (INHALATION) at 07:54

## 2024-09-19 RX ADMIN — COLCHICINE 0.6 MG: 0.6 TABLET ORAL at 08:48

## 2024-09-19 RX ADMIN — FORMOTEROL FUMARATE 20 MCG: 20 SOLUTION RESPIRATORY (INHALATION) at 19:34

## 2024-09-19 RX ADMIN — GUAIFENESIN 600 MG: 600 TABLET, EXTENDED RELEASE ORAL at 21:27

## 2024-09-19 RX ADMIN — BUPROPION HYDROCHLORIDE 75 MG: 75 TABLET, FILM COATED ORAL at 08:50

## 2024-09-19 RX ADMIN — CITALOPRAM HYDROBROMIDE 10 MG: 20 TABLET ORAL at 08:49

## 2024-09-19 NOTE — OCCUPATIONAL THERAPY NOTE
Occupational Therapy Progress Note     Patient Name: Claire Doherty  Today's Date: 9/19/2024  Problem List  Principal Problem:    Acute on chronic diastolic CHF (congestive heart failure) ([facility])  Active Problems:    Dystrophy, muscular, 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[facility])    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[facility])    GERD without esophagitis    Thrombocytosis, unspecified    COPD (chronic obstructive pulmonary disease) ([facility])    History of Idiopathic pericarditis    Acute on chronic respiratory failure with hypoxia and hypercapnia ([facility])    Paroxysmal A-fib ([facility])            09/19/24 1001   Note Type   Note Type Treatment   Pain Assessment   Pain Assessment Tool 0-10   Pain Score No Pain   Restrictions/Precautions   Weight Bearing Precautions Per Order No   Other Precautions Chair Alarm;Bed Alarm;O2;Fall Risk;Limb alert   ADL   Where Assessed Edge of bed   Eating Assistance 5  Supervision/Setup   Grooming Assistance 4  Minimal Assistance   UB Bathing Assistance 4  Minimal Assistance   LB Bathing Assistance 3  Moderate Assistance   UB Dressing Assistance 4  Minimal Assistance   LB Dressing Assistance 2  Maximal Assistance   Toileting Assistance  1  Total Assistance   Functional Standing Tolerance   Time 10-20secs x 2   Bed Mobility   Rolling R 3  Moderate assistance   Additional items Assist x 1;Increased time required;Verbal cues;LE management   Rolling L 3  Moderate assistance   Additional items Assist x 1;Increased time required;Verbal cues;LE management   Supine to Sit 2  Maximal assistance   Additional items Assist x 2;Increased time required;Verbal cues;LE management   Sit to Supine 3  Moderate assistance   Additional items Assist x 2;Increased time required;Verbal cues;LE management   Transfers   Sit to Stand 2  Maximal assistance  (with assist for knee block)   Additional items Assist x 2;Increased time required;Verbal cues   Stand to Sit 2  Maximal assistance   Additional items Increased time  "required;Verbal cues;Assist x 2   Additional Comments bp's=123/80(EOB), 112/66(sitting EOB for 10mins), 113/68(sitting on EOB after standing)--SPO2-93% on RA--nsg aware   Functional Mobility   Functional Mobility   (continue to recommend hoyerlift for OOB with nsg)   Therapeutic Exercise - ROM   UE-ROM Yes   Therapeutic Excerise-Strength   UE Strength Yes  (b/l UE AAROM exercises(i.e.shr flex/ext, chest press)1 set, 10-15reps)   Subjective   Subjective \"It's nice to sit in a chair.\"   Cognition   Overall Cognitive Status WFL   Arousal/Participation Alert   Attention Within functional limits   Orientation Level Oriented X4   Memory Within functional limits   Following Commands Follows one step commands without difficulty   Activity Tolerance   Activity Tolerance Patient limited by fatigue   Medical Staff Made Aware nsg, PT, CM   Assessment   Assessment Pt seen for 44min tx session with focus on functional balance, functional mobility, ADL status, transfer safety, b/l UE strengthening, and education. Pt able to tolerate EOB sitting, sitting balance=f+/f, for 10-15mins. Pt performed standing x 2, requiring heavy assistance. Pt demonstrating need for assistance with her UE and LE ADLs. Pt tolerated b/l UE AAROM exercises well; limited L shr AROM. Pt able to demonstrate good cognition(i.e.orientation, problem-solving). Pt continues to demonstrate appropriateness for inpt rehab to improve her overalll level of independence. Pt pleasant and cooperative with tx session. The patient's raw score on the -PAC Daily Activity Inpatient Short Form is 15. A raw score of less than 19 suggests the patient may benefit from discharge to post-acute rehabilitation services. Please refer to the recommendation of the Occupational Therapist for safe discharge planning.   Plan   Treatment Interventions ADL retraining;Functional transfer training;UE strengthening/ROM;Endurance training;Cognitive reorientation;Patient/family " training;Equipment evaluation/education;Compensatory technique education;Continued evaluation   Goal Expiration Date 09/25/24   OT Treatment Day 2   OT Frequency 2-3x/wk   Discharge Recommendation   Rehab Resource Intensity Level, OT II (Moderate Resource Intensity)   AM-PAC Daily Activity Inpatient   Lower Body Dressing 2   Bathing 2   Toileting 1   Upper Body Dressing 3   Grooming 3   Eating 4   Daily Activity Raw Score 15   Daily Activity Standardized Score (Calc for Raw Score >=11) 34.69   AM-PAC Applied Cognition Inpatient   Following a Speech/Presentation 4   Understanding Ordinary Conversation 4   Taking Medications 3   Remembering Where Things Are Placed or Put Away 3   Remembering List of 4-5 Errands 3   Taking Care of Complicated Tasks 3   Applied Cognition Raw Score 20   Applied Cognition Standardized Score 41.76

## 2024-09-19 NOTE — PLAN OF CARE
Problem: Potential for Falls  Goal: Patient will remain free of falls  Description: INTERVENTIONS:  - Educate patient/family on patient safety including physical limitations  - Instruct patient to call for assistance with activity   - Consult OT/PT to assist with strengthening/mobility   - Keep Call bell within reach  - Keep bed low and locked with side rails adjusted as appropriate  - Keep care items and personal belongings within reach  - Initiate and maintain comfort rounds  - Make Fall Risk Sign visible to staff  - Offer Toileting every 2 Hours, in advance of need  - Initiate/Maintain bed alarm  - Obtain necessary fall risk management equipment:   - Apply yellow socks and bracelet for high fall risk patients  - Consider moving patient to room near nurses station  Outcome: Progressing     Problem: PAIN - ADULT  Goal: Verbalizes/displays adequate comfort level or baseline comfort level  Description: Interventions:  - Encourage patient to monitor pain and request assistance  - Assess pain using appropriate pain scale  - Administer analgesics based on type and severity of pain and evaluate response  - Implement non-pharmacological measures as appropriate and evaluate response  - Consider cultural and social influences on pain and pain management  - Notify physician/advanced practitioner if interventions unsuccessful or patient reports new pain  Outcome: Progressing     Problem: INFECTION - ADULT  Goal: Absence or prevention of progression during hospitalization  Description: INTERVENTIONS:  - Assess and monitor for signs and symptoms of infection  - Monitor lab/diagnostic results  - Monitor all insertion sites, i.e. indwelling lines, tubes, and drains  - Monitor endotracheal if appropriate and nasal secretions for changes in amount and color  - [place] appropriate cooling/warming therapies per order  - Administer medications as ordered  - Instruct and encourage patient and family to use good hand hygiene  technique  - Identify and instruct in appropriate isolation precautions for identified infection/condition  Outcome: Progressing  Goal: Absence of fever/infection during neutropenic period  Description: INTERVENTIONS:  - Monitor WBC    Outcome: Progressing     Problem: SAFETY ADULT  Goal: Patient will remain free of falls  Description: INTERVENTIONS:  - Educate patient/family on patient safety including physical limitations  - Instruct patient to call for assistance with activity   - Consult OT/PT to assist with strengthening/mobility   - Keep Call bell within reach  - Keep bed low and locked with side rails adjusted as appropriate  - Keep care items and personal belongings within reach  - Initiate and maintain comfort rounds  - Make Fall Risk Sign visible to staff  - Offer Toileting every 2 Hours, in advance of need  - Initiate/Maintain bed alarm  - Obtain necessary fall risk management equipment:   - Apply yellow socks and bracelet for high fall risk patients  - Consider moving patient to room near nurses station  Outcome: Progressing  Goal: Maintain or return to baseline ADL function  Description: INTERVENTIONS:  -  Assess patient's ability to carry out ADLs; assess patient's baseline for ADL function and identify physical deficits which impact ability to perform ADLs (bathing, care of mouth/teeth, toileting, grooming, dressing, etc.)  - Assess/evaluate cause of self-care deficits   - Assess range of motion  - Assess patient's mobility; develop plan if impaired  - Assess patient's need for assistive devices and provide as appropriate  - Encourage maximum independence but intervene and supervise when necessary  - Involve family in performance of ADLs  - Assess for home care needs following discharge   - Consider OT consult to assist with ADL evaluation and planning for discharge  - Provide patient education as appropriate  Outcome: Progressing  Goal: Maintains/Returns to pre admission functional  level  Description: INTERVENTIONS:  - Perform AM-PAC 6 Click Basic Mobility/ Daily Activity assessment daily.  - Set and communicate daily mobility goal to care team and patient/family/caregiver.   - Collaborate with rehabilitation services on mobility goals if consulted  - Record patient progress and toleration of activity level   Outcome: Progressing     Problem: DISCHARGE PLANNING  Goal: Discharge to home or other facility with appropriate resources  Description: INTERVENTIONS:  - Identify barriers to discharge w/patient and caregiver  - Arrange for needed discharge resources and transportation as appropriate  - Identify discharge learning needs (meds, wound care, etc.)  - Arrange for interpretive services to assist at discharge as needed  - Refer to Case Management Department for coordinating discharge planning if the patient needs post-hospital services based on physician/advanced practitioner order or complex needs related to functional status, cognitive ability, or social support system  Outcome: Progressing     Problem: Knowledge Deficit  Goal: Patient/family/caregiver demonstrates understanding of disease process, treatment plan, medications, and discharge instructions  Description: Complete learning assessment and assess knowledge base.  Interventions:  - Provide teaching at level of understanding  - Provide teaching via preferred learning methods  Outcome: Progressing     Problem: RESPIRATORY - ADULT  Goal: Achieves optimal ventilation and oxygenation  Description: INTERVENTIONS:  - Assess for changes in respiratory status  - Assess for changes in mentation and behavior  - Position to facilitate oxygenation and minimize respiratory effort  - Oxygen administered by appropriate delivery if ordered  - Initiate smoking cessation education as indicated  - Encourage broncho-pulmonary hygiene including cough, deep breathe, Incentive Spirometry  - Assess the need for suctioning and aspirate as needed  - Assess  and instruct to report SOB or any respiratory difficulty  - Respiratory Therapy support as indicated  Outcome: Progressing     Problem: Nutrition/Hydration-ADULT  Goal: Nutrient/Hydration intake appropriate for improving, restoring or maintaining nutritional needs  Description: Monitor and assess patient's nutrition/hydration status for malnutrition. Collaborate with interdisciplinary team and initiate plan and interventions as ordered.  Monitor patient's weight and dietary intake as ordered or per policy. Utilize nutrition screening tool and intervene as necessary. Determine patient's food preferences and provide high-protein, high-caloric foods as appropriate.     INTERVENTIONS:  - Monitor oral intake, urinary output, labs, and treatment plans  - Assess nutrition and hydration status and recommend course of action  - Evaluate amount of meals eaten  - Assist patient with eating if necessary   - Allow adequate time for meals  - Recommend/ encourage appropriate diets, oral nutritional supplements, and vitamin/mineral supplements  - Order, calculate, and assess calorie counts as needed  - Recommend, monitor, and adjust tube feedings and TPN/PPN based on assessed needs  - Assess need for intravenous fluids  - Provide specific nutrition/hydration education as appropriate  - Include patient/family/caregiver in decisions related to nutrition  Outcome: Progressing     Problem: Prexisting or High Potential for Compromised Skin Integrity  Goal: Skin integrity is maintained or improved  Description: INTERVENTIONS:  - Identify patients at risk for skin breakdown  - Assess and monitor skin integrity  - Assess and monitor nutrition and hydration status  - Monitor labs   - Assess for incontinence   - Turn and reposition patient  - Assist with mobility/ambulation  - Relieve pressure over bony prominences  - Avoid friction and shearing  - Provide appropriate hygiene as needed including keeping skin clean and dry  - Evaluate need  for skin moisturizer/barrier cream  - Collaborate with interdisciplinary team   - Patient/family teaching  - Consider wound care consult   Outcome: Progressing

## 2024-09-19 NOTE — ASSESSMENT & PLAN NOTE
Wt Readings from Last 3 Encounters:   09/19/24 74 kg (163 lb 2.3 oz)   09/07/24 77.8 kg (171 lb 8.3 oz)   08/21/24 80.7 kg (178 lb)     Patient with known chronic diastolic CHF with grade 2 diastolic dysfunction  Presenting with worsening shortness of breath  Chest x-ray/CT concerning for pleural effusions  Patient is net -6 L throughout hospitalization  Given alkalosis will initiate amiloride, although large component is related to respiratory issues  Transition to oral diuretics- pt remains stable

## 2024-09-19 NOTE — CASE MANAGEMENT
Case Management Discharge Planning Note    Patient name Claire Doherty  [facility] -* MRN [id number]  : 1950 Date 2024       Current Admission Date: 9/10/2024  Current Admission Diagnosis:Acute on chronic diastolic CHF (congestive heart failure) ([facility])   Patient Active Problem List    Diagnosis Date Noted Date Diagnosed    Paroxysmal A-fib ([facility]) 2024     Acute on chronic diastolic CHF (congestive heart failure) ([facility]) 09/10/2024     Acute on chronic respiratory failure with hypoxia and hypercapnia ([facility]) 09/10/2024     Severe protein-calorie malnutrition ([facility]) 2024     DINA (acute kidney injury) ([facility]) 2024     Nausea 2024     Pleural effusion 2024     Pericardial effusion 2024     Infected wound 2024     Chest pain 2024     History of Idiopathic pericarditis 2024     Cellulitis of right foot 2024     Chronic left shoulder pain 2024     Shoulder joint dysfunction 2024     COPD (chronic obstructive pulmonary disease) ([facility]) 06/15/2023     Bilateral hand pain 2023     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[facility]) 2022     Gastroparesis 2021     Aortic valve sclerosis 2021     Tricuspid valve insufficiency 2021     Mitral annular calcification 2021     Former smoker 2021     On home oxygen therapy 2021     Colostomy status ([facility])      Ductal carcinoma in situ (DCIS) of left breast 03/15/2021     Insomnia 10/21/2020     Depression 10/06/2020     Anemia 10/02/2020     Chronic hypoxic respiratory failure ([facility]) 2020     Bladder injury 2020     Thrombocytosis, unspecified 2020     Leukocytosis 2020     Difficult intubation      Dystrophy, muscular, 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[facility]) 10/12/2017     Ambulatory dysfunction 10/12/2017     Urinary incontinence 10/03/2017     Osteoporosis 2016     Allergic rhinitis 10/09/2013     Restrictive lung disease due to  muscular dystrophy ([facility]) 10/04/2012     GERD without esophagitis 10/04/2012       LOS (days): 9  Geometric Mean LOS (GMLOS) (days): 3.9  Days to GMLOS:-5     OBJECTIVE:  Risk of Unplanned Readmission Score: 27.46         Current admission status: Inpatient   Preferred Pharmacy:   [id number] [address] [address]  Phone: [phone] Fax: [phone]    Primary Care Provider: Julienne Tucker DO    Primary Insurance: [facility] REP  Secondary Insurance:     DISCHARGE DETAILS:                                                                                                 Additional Comments: TAD received a call from the sister, Karolina- [phone].  TAD had called her back and informed her that [facility] was only facility available.  She said she was certain the pt would not want to return to [place], but she would leave that to her to make the final decision.  She also stated her their frist choice was for [facility].  TAD spoke to the pt and she confirmed that she did not want to return to [place].  She did agree to go to  [facility].  She alsi stated [facility] was still her frist choice, and CM told her that she could request transfer to  SNF once she gets to [place].  She verbalized undertstanding.  Sister, Karolina was called and informed.  [facility] reserved in [facility].

## 2024-09-19 NOTE — PLAN OF CARE
Problem: PHYSICAL THERAPY ADULT  Goal: Performs mobility at highest level of function for planned discharge setting.  See evaluation for individualized goals.  Description: Treatment/Interventions: Functional transfer training, LE strengthening/ROM, Therapeutic exercise, Patient/family training, Equipment eval/education, Bed mobility, Compensatory technique education, Continued evaluation, Spoke to nursing, OT          See flowsheet documentation for full assessment, interventions and recommendations.  Outcome: Progressing  Note: Prognosis: Fair  Problem List: Decreased strength, Decreased endurance, Impaired balance, Decreased mobility, Decreased safety awareness, Decreased skin integrity, Obesity  Assessment: Pt seen for PT treatment. Pt exhibits significant weakness BLE, grossly 3-3+/5. Pt is slowly progressing with mobility and able to achieve about 50% upright position today. Pt also able to tolerated mechanical lift to OOB. The patient's AM-PAC Basic Mobility Inpatient Short Form Raw Score is 6. A Raw score of less than or equal to 16 suggests the patient may benefit from discharge to post-acute rehabilitation services. Please also refer to the recommendation of the Physical Therapist for safe discharge planning. Continue to recommend Level 2 rehab intensity to maximize safe, functional mobility.  Barriers to Discharge: Decreased caregiver support  Barriers to Discharge Comments: occ home alone- requires A for mobility  Rehab Resource Intensity Level, PT: II (Moderate Resource Intensity)    See flowsheet documentation for full assessment.

## 2024-09-19 NOTE — RESTORATIVE TECHNICIAN NOTE
Restorative Technician Note      Patient Name: Claire Doherty     Restorative Tech Visit Date: 09/19/24  Note Type: Mobility  Patient Position Upon Consult: Supine  Assistive Device: Other (Comment) (puma lift)  Patient Position at End of Consult: Bedside chair; All needs within reach; Bed/Chair alarm activated            ns

## 2024-09-19 NOTE — PLAN OF CARE
Problem: Potential for Falls  Goal: Patient will remain free of falls  Description: INTERVENTIONS:  - Educate patient/family on patient safety including physical limitations  - Instruct patient to call for assistance with activity   - Consult OT/PT to assist with strengthening/mobility   - Keep Call bell within reach  - Keep bed low and locked with side rails adjusted as appropriate  - Keep care items and personal belongings within reach  - Initiate and maintain comfort rounds  - Make Fall Risk Sign visible to staff  - Offer Toileting every 2 Hours, in advance of need  - Initiate/Maintain bed alarm  - Obtain necessary fall risk management equipment:   - Apply yellow socks and bracelet for high fall risk patients  - Consider moving patient to room near nurses station  Outcome: Progressing     Problem: PAIN - ADULT  Goal: Verbalizes/displays adequate comfort level or baseline comfort level  Description: Interventions:  - Encourage patient to monitor pain and request assistance  - Assess pain using appropriate pain scale  - Administer analgesics based on type and severity of pain and evaluate response  - Implement non-pharmacological measures as appropriate and evaluate response  - Consider cultural and social influences on pain and pain management  - Notify physician/advanced practitioner if interventions unsuccessful or patient reports new pain  Outcome: Progressing     Problem: INFECTION - ADULT  Goal: Absence or prevention of progression during hospitalization  Description: INTERVENTIONS:  - Assess and monitor for signs and symptoms of infection  - Monitor lab/diagnostic results  - Monitor all insertion sites, i.e. indwelling lines, tubes, and drains  - Monitor endotracheal if appropriate and nasal secretions for changes in amount and color  - [place] appropriate cooling/warming therapies per order  - Administer medications as ordered  - Instruct and encourage patient and family to use good hand hygiene  technique  - Identify and instruct in appropriate isolation precautions for identified infection/condition  Outcome: Progressing  Goal: Absence of fever/infection during neutropenic period  Description: INTERVENTIONS:  - Monitor WBC    Outcome: Progressing     Problem: SAFETY ADULT  Goal: Patient will remain free of falls  Description: INTERVENTIONS:  - Educate patient/family on patient safety including physical limitations  - Instruct patient to call for assistance with activity   - Consult OT/PT to assist with strengthening/mobility   - Keep Call bell within reach  - Keep bed low and locked with side rails adjusted as appropriate  - Keep care items and personal belongings within reach  - Initiate and maintain comfort rounds  - Make Fall Risk Sign visible to staff  - Offer Toileting every 2 Hours, in advance of need  - Initiate/Maintain bed alarm  - Obtain necessary fall risk management equipment:   - Apply yellow socks and bracelet for high fall risk patients  - Consider moving patient to room near nurses station  Outcome: Progressing  Goal: Maintain or return to baseline ADL function  Description: INTERVENTIONS:  -  Assess patient's ability to carry out ADLs; assess patient's baseline for ADL function and identify physical deficits which impact ability to perform ADLs (bathing, care of mouth/teeth, toileting, grooming, dressing, etc.)  - Assess/evaluate cause of self-care deficits   - Assess range of motion  - Assess patient's mobility; develop plan if impaired  - Assess patient's need for assistive devices and provide as appropriate  - Encourage maximum independence but intervene and supervise when necessary  - Involve family in performance of ADLs  - Assess for home care needs following discharge   - Consider OT consult to assist with ADL evaluation and planning for discharge  - Provide patient education as appropriate  Outcome: Progressing  Goal: Maintains/Returns to pre admission functional  level  Description: INTERVENTIONS:  - Perform AM-PAC 6 Click Basic Mobility/ Daily Activity assessment daily.  - Set and communicate daily mobility goal to care team and patient/family/caregiver.   - Collaborate with rehabilitation services on mobility goals if consulted  - Perform Range of Motion 3 times a day.  - Reposition patient every 2 hours.  - Dangle patient 3 times a day  - Stand patient 3 times a day  - Ambulate patient 3 times a day  - Out of bed to chair 3 times a day   - Out of bed for meals 3 times a day  - Out of bed for toileting  - Record patient progress and toleration of activity level   Outcome: Progressing     Problem: DISCHARGE PLANNING  Goal: Discharge to home or other facility with appropriate resources  Description: INTERVENTIONS:  - Identify barriers to discharge w/patient and caregiver  - Arrange for needed discharge resources and transportation as appropriate  - Identify discharge learning needs (meds, wound care, etc.)  - Arrange for interpretive services to assist at discharge as needed  - Refer to Case Management Department for coordinating discharge planning if the patient needs post-hospital services based on physician/advanced practitioner order or complex needs related to functional status, cognitive ability, or social support system  Outcome: Progressing     Problem: Knowledge Deficit  Goal: Patient/family/caregiver demonstrates understanding of disease process, treatment plan, medications, and discharge instructions  Description: Complete learning assessment and assess knowledge base.  Interventions:  - Provide teaching at level of understanding  - Provide teaching via preferred learning methods  Outcome: Progressing     Problem: RESPIRATORY - ADULT  Goal: Achieves optimal ventilation and oxygenation  Description: INTERVENTIONS:  - Assess for changes in respiratory status  - Assess for changes in mentation and behavior  - Position to facilitate oxygenation and minimize  respiratory effort  - Oxygen administered by appropriate delivery if ordered  - Initiate smoking cessation education as indicated  - Encourage broncho-pulmonary hygiene including cough, deep breathe, Incentive Spirometry  - Assess the need for suctioning and aspirate as needed  - Assess and instruct to report SOB or any respiratory difficulty  - Respiratory Therapy support as indicated  Outcome: Progressing     Problem: Nutrition/Hydration-ADULT  Goal: Nutrient/Hydration intake appropriate for improving, restoring or maintaining nutritional needs  Description: Monitor and assess patient's nutrition/hydration status for malnutrition. Collaborate with interdisciplinary team and initiate plan and interventions as ordered.  Monitor patient's weight and dietary intake as ordered or per policy. Utilize nutrition screening tool and intervene as necessary. Determine patient's food preferences and provide high-protein, high-caloric foods as appropriate.     INTERVENTIONS:  - Monitor oral intake, urinary output, labs, and treatment plans  - Assess nutrition and hydration status and recommend course of action  - Evaluate amount of meals eaten  - Assist patient with eating if necessary   - Allow adequate time for meals  - Recommend/ encourage appropriate diets, oral nutritional supplements, and vitamin/mineral supplements  - Order, calculate, and assess calorie counts as needed  - Recommend, monitor, and adjust tube feedings and TPN/PPN based on assessed needs  - Assess need for intravenous fluids  - Provide specific nutrition/hydration education as appropriate  - Include patient/family/caregiver in decisions related to nutrition  Outcome: Progressing     Problem: Prexisting or High Potential for Compromised Skin Integrity  Goal: Skin integrity is maintained or improved  Description: INTERVENTIONS:  - Identify patients at risk for skin breakdown  - Assess and monitor skin integrity  - Assess and monitor nutrition and hydration  status  - Monitor labs   - Assess for incontinence   - Turn and reposition patient  - Assist with mobility/ambulation  - Relieve pressure over bony prominences  - Avoid friction and shearing  - Provide appropriate hygiene as needed including keeping skin clean and dry  - Evaluate need for skin moisturizer/barrier cream  - Collaborate with interdisciplinary team   - Patient/family teaching  - Consider wound care consult   Outcome: Progressing

## 2024-09-19 NOTE — CASE MANAGEMENT
[facility] received request for authorization from Care Manager.  Authorization request submitted for: [facility]  Facility Name: [facility]  NPI:[id number]  Facility MD: pamela jimenes     NPI: [id number]  Authorization initiated by contacting insurance:  [employer]   Via: Fax  Clinicals submitted via fax # [phone]  Pending Reference #:n/a    Care Manager notified: lino gunter     Updates to authorization status will be noted in chart. Please reach out to CM for updates on any clinical information.

## 2024-09-19 NOTE — PLAN OF CARE
Problem: OCCUPATIONAL THERAPY ADULT  Goal: Performs self-care activities at highest level of function for planned discharge setting.  See evaluation for individualized goals.  Description: Treatment Interventions: ADL retraining, UE strengthening/ROM, Functional transfer training, Endurance training, Cognitive reorientation, Patient/family training, Equipment evaluation/education, Compensatory technique education, Energy conservation, Activityengagement          See flowsheet documentation for full assessment, interventions and recommendations.   Outcome: Progressing  Note: Limitation: Decreased UE ROM, Decreased ADL status, Decreased UE strength, Decreased cognition, Decreased Safe judgement during ADL, Decreased endurance, Decreased self-care trans, Decreased high-level ADLs  Prognosis: Fair  Assessment: Pt seen for 44min tx session with focus on functional balance, functional mobility, ADL status, transfer safety, b/l UE strengthening, and education. Pt able to tolerate EOB sitting, sitting balance=f+/f, for 10-15mins. Pt performed standing x 2, requiring heavy assistance. Pt demonstrating need for assistance with her UE and LE ADLs. Pt tolerated b/l UE AAROM exercises well; limited L shr AROM. Pt able to demonstrate good cognition(i.e.orientation, problem-solving). Pt continues to demonstrate appropriateness for inpt rehab to improve her overalll level of independence. Pt pleasant and cooperative with tx session. The patient's raw score on the AM-PAC Daily Activity Inpatient Short Form is 15. A raw score of less than 19 suggests the patient may benefit from discharge to post-acute rehabilitation services. Please refer to the recommendation of the Occupational Therapist for safe discharge planning.     Rehab Resource Intensity Level, OT: II (Moderate Resource Intensity)

## 2024-09-19 NOTE — PHYSICAL THERAPY NOTE
"                                                                                  PHYSICAL THERAPY NOTE          Patient Name: Claire Doherty  Today's Date: 24 1043   PT Last Visit   PT Visit Date 24   Note Type   Note Type Treatment   Pain Assessment   Pain Assessment Tool 0-10   Pain Score No Pain   Restrictions/Precautions   Weight Bearing Precautions Per Order No   Other Precautions Chair Alarm;Bed Alarm;O2;Fall Risk;Multiple lines;Limb alert   General   Chart Reviewed Yes   Response to Previous Treatment Patient with no complaints from previous session.   Family/Caregiver Present No   Cognition   Overall Cognitive Status WFL   Arousal/Participation Alert;Cooperative   Attention Attends with cues to redirect   Memory Within functional limits   Following Commands Follows one step commands without difficulty   Subjective   Subjective Pt agreeable to PT   Bed Mobility   Supine to Sit 2  Maximal assistance   Additional items Assist x 2;Increased time required;Verbal cues;LE management   Sit to Supine 3  Moderate assistance   Additional items Assist x 2;Increased time required;Verbal cues;LE management   Additional Comments BPs in sittin/80, 112/66, 113/68   Transfers   Sit to Stand 2  Maximal assistance   Additional items Assist x 2;Increased time required;Verbal cues  (plus assist of another to block knees)   Stand to Sit 2  Maximal assistance   Additional items Assist x 2;Increased time required;Verbal cues   Mechanical lift 1  Dependent   Additional items   (Pt transferred to recliner chair via puma lift.)   Additional Comments 2 repetitions; Pt able to achieve about 50% upright position with rear end clearing the bed. Pt reported \"That was good\" and noted close to upright position she usually achieves on her own at baseline.   Ambulation/Elevation   Gait pattern Not appropriate   Balance   Static Sitting Fair +   Dynamic Sitting Fair   Static Standing Poor -   Ambulatory " Zero   Endurance Deficit   Endurance Deficit Yes   Endurance Deficit Description fatigue   Activity Tolerance   Activity Tolerance Patient tolerated treatment well   Medical Staff Made Aware Chris OT; Pt seen for Co-treatment with Occupational Therapist due to pt's medical complexity, functional limitations and limited activity tolerance.   Nurse Made Aware yes   Exercises   Glute Sets Sitting;10 reps;AROM;Bilateral   Knee AROM Long Arc Quad Sitting;10 reps;AROM;Bilateral   Assessment   Prognosis Fair   Problem List Decreased strength;Decreased endurance;Impaired balance;Decreased mobility;Decreased safety awareness;Decreased skin integrity;Obesity   Assessment Pt seen for PT treatment. Pt exhibits significant weakness BLE, grossly 3-3+/5. Pt is slowly progressing with mobility and able to achieve about 50% upright position today. Pt also able to tolerated mechanical lift to OOB. The patient's [facility] Basic Mobility Inpatient Short Form Raw Score is 6. A Raw score of less than or equal to 16 suggests the patient may benefit from discharge to post-acute rehabilitation services. Please also refer to the recommendation of the Physical Therapist for safe discharge planning. Continue to recommend Level 2 rehab intensity to maximize safe, functional mobility.   Barriers to Discharge Decreased caregiver support   Goals   PT Treatment Day 2   Plan   Treatment/Interventions Functional transfer training;LE strengthening/ROM;Therapeutic exercise;Endurance training;Patient/family training;Bed mobility;Compensatory technique education;Spoke to nursing;OT   Progress Progressing toward goals   PT Frequency 3-5x/wk   Discharge Recommendation   Rehab Resource Intensity Level, PT II (Moderate Resource Intensity)   AM-PAC Basic Mobility Inpatient   Turning in Flat Bed Without Bedrails 1   Lying on Back to Sitting on Edge of Flat Bed Without Bedrails 1   Moving Bed to Chair 1   Standing Up From Chair Using Arms 1   Walk in Room 1    Climb 3-5 Stairs With Railing 1   Basic Mobility Inpatient Raw Score 6   Turning Head Towards Sound 4   Follow Simple Instructions 4   Low Function Basic Mobility Raw Score  14   Low Function Basic Mobility Standardized Score  22.01   [facility] Highest Level Of Mobility   -[facility] [place] Goal 2: Bed activities/Dependent transfer   -HL Achieved 3: Sit at edge of bed   Education   Education Provided Mobility training;Home exercise program   Patient Demonstrates acceptance/verbal understanding   End of Consult   Patient Position at End of Consult Bedside chair;Bed/Chair alarm activated;All needs within reach     Kylah Forbes

## 2024-09-19 NOTE — PROGRESS NOTES
Progress Note - Hospitalist   Name: Claire Doherty 74 y.o. female I MRN: [id number]  Unit/Bed#: E4 -01 I Date of Admission: 9/10/2024   Date of Service: 9/19/2024 I Hospital Day: 9    Assessment & Plan  Acute on chronic respiratory failure with hypoxia and hypercapnia (HCC)  EMS was called for patient at nursing facility for shortness of breath  Found to have CO2 of 70 on VBG with pH 7.336  Patient does utilize BiPAP at night, does have history of muscular dystrophy with restrictive lung disease  Improved  Continue nebulizers  Acute on chronic diastolic CHF (congestive heart failure) ([facility])  Wt Readings from Last 3 Encounters:   09/19/24 74 kg (163 lb 2.3 oz)   09/07/24 77.8 kg (171 lb 8.3 oz)   08/21/24 80.7 kg (178 lb)     Patient with known chronic diastolic CHF with grade 2 diastolic dysfunction  Presenting with worsening shortness of breath  Chest x-ray/CT concerning for pleural effusions  Patient is net -6 L throughout hospitalization  Given alkalosis will initiate amiloride, although large component is related to respiratory issues  Transition to oral diuretics- pt remains stable   Dystrophy, muscular, 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HCC)  He was doing well history of muscular dystrophy  Was previously living alone in an apartment with home health care, able to stand and pivot to wheelchair prior to hospital stay last week  Was discharged to rehab on 9/7/2024  Utilizes BiPAP at night, continue  PT/OT while inpatient  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)  Patient follows outpatient with pulmonology for restrictive lung disease due to muscular dystrophy  Uses BiPAP at night and 2-3 L nasal cannula during the day  Continue nebulizers  Pulmonology consultation reviewed  GERD without esophagitis  Continue PPI  Thrombocytosis, unspecified  Significant thrombocytosis with platelet count 739  Likely reactive although does have history of similar  recommend outpatient with hematology  History of  Idiopathic pericarditis  Patient previously hospitalized for chest discomfort, diagnosed with idiopathic pericarditis  Continue colchicine  Outpatient follow-up with cardiology on 2024  COPD (chronic obstructive pulmonary disease) ([facility])  Continue respiratory protocol  BiPAP with all naps  Paroxysmal A-fib ([facility])  Ventricular rate controlled  Continue Eliquis    VTE Pharmacologic Prophylaxis: VTE Score: 8 eliquis     Mobility:   Basic Mobility Inpatient Raw Score: 6  -HLM Goal: 2: Bed activities/Dependent transfer  -HLM Achieved: 3: Sit at edge of bed      Patient Centered Rounds:  will discuss with her nurse       Education and Discussions with Family / Patient: family aware of plan     Current Length of Stay: 9 day(s)  Current Patient Status: Inpatient     Discharge Plan: anticipate d/c tomorrow to [facility]     Code Status: Level 2 - DNAR: but accepts endotracheal intubation    Subjective   Pt seen and examined. She reports she feels a little nauseated but no vomiting. She is drinking ginger ale and that is helping her. No f/c no cp no sob     Objective     Vitals:   Temp (24hrs), Av.9 °F (36.6 °C), Min:97.5 °F (36.4 °C), Max:98.6 °F (37 °C)    Temp:  [97.5 °F (36.4 °C)-98.6 °F (37 °C)] 98.6 °F (37 °C)  HR:  [] 79  Resp:  [17] 17  BP: (107-140)/(62-72) 107/62  SpO2:  [98 %-100 %] 98 %  Body mass index is 31.86 kg/m².     Input and Output Summary (last 24 hours):     Intake/Output Summary (Last 24 hours) at 2024 1635  Last data filed at 2024 2200  Gross per 24 hour   Intake 236 ml   Output 900 ml   Net -664 ml       Physical Exam  Constitutional:       Appearance: Normal appearance.   HENT:      Head: Normocephalic and atraumatic.   Eyes:      Extraocular Movements: Extraocular movements intact.      Pupils: Pupils are equal, round, and reactive to light.   Cardiovascular:      Rate and Rhythm: Normal rate and regular rhythm.      Heart sounds: No murmur heard.     No friction rub. No  gallop.   Pulmonary:      Effort: Pulmonary effort is normal. No respiratory distress.      Breath sounds: Normal breath sounds. No stridor. No wheezing, rhonchi or rales.   Abdominal:      General: Bowel sounds are normal. There is no distension.      Palpations: Abdomen is soft.      Tenderness: There is no abdominal tenderness. There is no guarding or rebound.   Neurological:      Mental Status: She is alert and oriented to person, place, and time.          Lines/Drains:  Lines/Drains/Airways       Active Status       Name Placement date Placement time Site Days    Colostomy LUQ 09/10/20  1200  LUQ  1470    Ureteral Drain/Stent Left ureter 5 Fr. 09/30/20  1730  Left ureter  1449    Ureteral Drain/Stent Right ureter 5 Fr. 09/30/20  1730  Right ureter  1449    External Urinary Catheter 09/10/24  1600  -- 9                            Lab Results:   Results from last 7 days   Lab Units 09/17/24  1043 09/14/24  0545 09/13/24  0625   WBC Thousand/uL 17.16*   < > 12.56*   HEMOGLOBIN g/dL 11.3*   < > 10.7*   HEMATOCRIT % 36.8   < > 35.0   PLATELETS Thousands/uL 529*   < > 669*   LYMPHO PCT %  --   --  20   MONO PCT %  --   --  12   EOS PCT %  --   --  0    < > = values in this interval not displayed.     Results from last 7 days   Lab Units 09/18/24  0516   SODIUM mmol/L 139   POTASSIUM mmol/L 4.7   CHLORIDE mmol/L 83*   CO2 mmol/L >45*   BUN mg/dL 40*   CREATININE mg/dL 0.42*   CALCIUM mg/dL 9.8   GLUCOSE RANDOM mg/dL 126         Results from last 7 days   Lab Units 09/19/24  1611   POC GLUCOSE mg/dl 154*               Recent Cultures (last 7 days):         Imaging Review: No pertinent imaging studies reviewed.  Other Studies: No additional pertinent studies reviewed.    Last 24 Hours Medication List:     Current Facility-Administered Medications:     acetaminophen (TYLENOL) tablet 650 mg, Q6H PRN    albuterol inhalation solution 2.5 mg, Q4H PRN    AMILoride tablet 5 mg, Daily    apixaban (ELIQUIS) tablet 5 mg, BID     bisacodyl (DULCOLAX) rectal suppository 10 mg, Daily PRN    budesonide (PULMICORT) inhalation solution 0.5 mg, Q12H    buPROPion (WELLBUTRIN) tablet 75 mg, QAM    calcium carbonate (TUMS) chewable tablet 1,000 mg, Daily PRN    chlorhexidine (PERIDEX) 0.12 % oral rinse 15 mL, Q12H NANCY    citalopram (CeleXA) tablet 10 mg, Daily    colchicine (COLCRYS) tablet 0.6 mg, Daily    dextromethorphan-guaiFENesin (ROBITUSSIN DM) oral syrup 10 mL, Q4H PRN    diazepam (VALIUM) tablet 5 mg, Q8H PRN    fluticasone (FLONASE) 50 mcg/act nasal spray 2 spray, Daily    formoterol (PERFOROMIST) nebulizer solution 20 mcg, Q12H    guaiFENesin (MUCINEX) 12 hr tablet 600 mg, Q12H NANCY    ondansetron (ZOFRAN) injection 4 mg, Q6H PRN    oxybutynin (DITROPAN-XL) 24 hr tablet 10 mg, HS    pantoprazole (PROTONIX) EC tablet 40 mg, Early Morning    saccharomyces boulardii (FLORASTOR) capsule 250 mg, Daily    torsemide (DEMADEX) tablet 20 mg, Daily    Administrative Statements   Today, Patient Was Seen By: Marla Mayo DO

## 2024-09-20 VITALS
RESPIRATION RATE: 20 BRPM | HEART RATE: 92 BPM | HEIGHT: 60 IN | OXYGEN SATURATION: 98 % | DIASTOLIC BLOOD PRESSURE: 72 MMHG | WEIGHT: 166.01 LBS | TEMPERATURE: 97.9 F | SYSTOLIC BLOOD PRESSURE: 149 MMHG | BODY MASS INDEX: 32.59 KG/M2

## 2024-09-20 PROBLEM — E87.5 HYPERKALEMIA: Status: ACTIVE | Noted: 2024-09-20

## 2024-09-20 LAB
ALBUMIN SERPL BCG-MCNC: 3.9 G/DL (ref 3.5–5)
ALP SERPL-CCNC: 98 U/L (ref 34–104)
ALT SERPL W P-5'-P-CCNC: 36 U/L (ref 7–52)
AST SERPL W P-5'-P-CCNC: 34 U/L (ref 13–39)
BILIRUB SERPL-MCNC: 0.63 MG/DL (ref 0.2–1)
BUN SERPL-MCNC: 60 MG/DL (ref 5–25)
CALCIUM SERPL-MCNC: 10.6 MG/DL (ref 8.4–10.2)
CHLORIDE SERPL-SCNC: 82 MMOL/L (ref 96–108)
CO2 SERPL-SCNC: >45 MMOL/L (ref 21–32)
CREAT SERPL-MCNC: 0.55 MG/DL (ref 0.6–1.3)
ERYTHROCYTE [DISTWIDTH] IN BLOOD BY AUTOMATED COUNT: 17.9 % (ref 11.6–15.1)
GFR SERPL CREATININE-BSD FRML MDRD: 92 ML/MIN/1.73SQ M
GLUCOSE SERPL-MCNC: 126 MG/DL (ref 65–140)
HCT VFR BLD AUTO: 42.1 % (ref 34.8–46.1)
HGB BLD-MCNC: 13.3 G/DL (ref 11.5–15.4)
MAGNESIUM SERPL-MCNC: 2 MG/DL (ref 1.9–2.7)
MCH RBC QN AUTO: 26.3 PG (ref 26.8–34.3)
MCHC RBC AUTO-ENTMCNC: 31.6 G/DL (ref 31.4–37.4)
MCV RBC AUTO: 83 FL (ref 82–98)
PHOSPHATE SERPL-MCNC: 6.2 MG/DL (ref 2.3–4.1)
PLATELET # BLD AUTO: 448 THOUSANDS/UL (ref 149–390)
PMV BLD AUTO: 11 FL (ref 8.9–12.7)
POTASSIUM SERPL-SCNC: 5.5 MMOL/L (ref 3.5–5.3)
POTASSIUM SERPL-SCNC: 5.9 MMOL/L (ref 3.5–5.3)
PROT SERPL-MCNC: 7.9 G/DL (ref 6.4–8.4)
RBC # BLD AUTO: 5.05 MILLION/UL (ref 3.81–5.12)
SODIUM SERPL-SCNC: 136 MMOL/L (ref 135–147)
WBC # BLD AUTO: 16.28 THOUSAND/UL (ref 4.31–10.16)

## 2024-09-20 PROCEDURE — 84100 ASSAY OF PHOSPHORUS: CPT

## 2024-09-20 PROCEDURE — 83735 ASSAY OF MAGNESIUM: CPT

## 2024-09-20 PROCEDURE — 80053 COMPREHEN METABOLIC PANEL: CPT

## 2024-09-20 PROCEDURE — NC001 PR NO CHARGE: Performed by: INTERNAL MEDICINE

## 2024-09-20 PROCEDURE — 85027 COMPLETE CBC AUTOMATED: CPT

## 2024-09-20 PROCEDURE — 84132 ASSAY OF SERUM POTASSIUM: CPT | Performed by: INTERNAL MEDICINE

## 2024-09-20 PROCEDURE — 94760 N-INVAS EAR/PLS OXIMETRY 1: CPT

## 2024-09-20 PROCEDURE — 94640 AIRWAY INHALATION TREATMENT: CPT

## 2024-09-20 PROCEDURE — 99239 HOSP IP/OBS DSCHRG MGMT >30: CPT | Performed by: INTERNAL MEDICINE

## 2024-09-20 RX ORDER — TORSEMIDE 20 MG/1
20 TABLET ORAL DAILY
Start: 2024-09-21

## 2024-09-20 RX ORDER — GUAIFENESIN 600 MG/1
600 TABLET, EXTENDED RELEASE ORAL EVERY 12 HOURS SCHEDULED
Start: 2024-09-20

## 2024-09-20 RX ORDER — FORMOTEROL FUMARATE DIHYDRATE 20 UG/2ML
20 SOLUTION RESPIRATORY (INHALATION)
Start: 2024-09-20

## 2024-09-20 RX ORDER — BUDESONIDE 0.5 MG/2ML
0.5 INHALANT ORAL
Start: 2024-09-20

## 2024-09-20 RX ADMIN — BUDESONIDE INHALATION 0.5 MG: 0.5 SUSPENSION RESPIRATORY (INHALATION) at 07:20

## 2024-09-20 RX ADMIN — APIXABAN 5 MG: 5 TABLET, FILM COATED ORAL at 08:36

## 2024-09-20 RX ADMIN — PANTOPRAZOLE SODIUM 40 MG: 40 TABLET, DELAYED RELEASE ORAL at 05:13

## 2024-09-20 RX ADMIN — GUAIFENESIN 600 MG: 600 TABLET, EXTENDED RELEASE ORAL at 08:35

## 2024-09-20 RX ADMIN — FORMOTEROL FUMARATE 20 MCG: 20 SOLUTION RESPIRATORY (INHALATION) at 07:20

## 2024-09-20 RX ADMIN — FLUTICASONE PROPIONATE 2 SPRAY: 50 SPRAY, METERED NASAL at 08:37

## 2024-09-20 RX ADMIN — COLCHICINE 0.6 MG: 0.6 TABLET ORAL at 08:35

## 2024-09-20 RX ADMIN — Medication 250 MG: at 08:35

## 2024-09-20 RX ADMIN — CHLORHEXIDINE GLUCONATE 15 ML: 1.2 RINSE ORAL at 08:37

## 2024-09-20 RX ADMIN — AMILORIDE HYDROCLORIDE 5 MG: 5 TABLET ORAL at 08:36

## 2024-09-20 RX ADMIN — TORSEMIDE 20 MG: 20 TABLET ORAL at 08:34

## 2024-09-20 RX ADMIN — BUPROPION HYDROCHLORIDE 75 MG: 75 TABLET, FILM COATED ORAL at 08:36

## 2024-09-20 NOTE — PROGRESS NOTES
Progress Note - Hospitalist   Name: Claire Doherty 74 y.o. female I MRN: [id number]  Unit/Bed#: E4 -01 I Date of Admission: 9/10/2024   Date of Service: 9/20/2024 I Hospital Day: 10    Assessment & Plan  Acute on chronic respiratory failure with hypoxia and hypercapnia (HCC)  EMS was called for patient at nursing facility for shortness of breath  Found to have CO2 of 70 on VBG with pH 7.336  Patient does utilize BiPAP at night, does have history of muscular dystrophy with restrictive lung disease  Improved  D/c amiloride due to hyperkalemia  Continue nebulizers  Acute on chronic diastolic CHF (congestive heart failure) (HCC)  Wt Readings from Last 3 Encounters:   09/20/24 75.3 kg (166 lb 0.1 oz)   09/07/24 77.8 kg (171 lb 8.3 oz)   08/21/24 80.7 kg (178 lb)     Patient with known chronic diastolic CHF with grade 2 diastolic dysfunction  Presenting with worsening shortness of breath  Chest x-ray/CT concerning for pleural effusions  Patient is net -6 L throughout hospitalization  Given alkalosis had been started on amiloride- however this am she had hyperkalemia in which this medication was d/naresh as it can cause hyperkalemia.   Pt was able to bring in home bipap to help with hypercapnia. 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)  Patient follows outpatient with pulmonology for restrictive lung disease due to muscular dystrophy  Uses BiPAP at night and 2-3 L nasal cannula during the day  Continue nebulizers  Pulmonology consultation reviewed  GERD  without esophagitis  Continue PPI  Thrombocytosis, unspecified  Significant thrombocytosis with platelet count 739  Likely reactive although does have history of similar  recommend outpatient with hematology  History of Idiopathic pericarditis  Patient previously hospitalized for chest discomfort, diagnosed with idiopathic pericarditis  Continue colchicine  Outpatient follow-up with cardiology on 2024  COPD (chronic obstructive pulmonary disease) ([facility])  Continue respiratory protocol  BiPAP with all naps  Paroxysmal A-fib ([facility])  Ventricular rate controlled  Continue Eliquis  Hyperkalemia  Potassium this am was 5.9  She was on amiloride which was d/naresh  She was given her diuretic of 20mg of torsemide   Started on low K diet  Repeat potassium improved to 5.5  Anticipate Potassium will continue to improve once off of amiloride for 48 hours  Would repeat bmp in short interval       VTE Pharmacologic Prophylaxis: VTE Score: 8 eliqius     Mobility:   Basic Mobility Inpatient Raw Score: 6  -HLM Goal: 2: Bed activities/Dependent transfer  -HLM Achieved: 4: Move to chair/commode    Patient Centered Rounds:  discussed with her nurse       Education and Discussions with Family / Patient: family aware of plan     Current Length of Stay: 10 day(s)  Current Patient Status: Inpatient     Discharge Plan: possible d/c to hometown today vs tomorrow     Code Status: Level 2 - DNAR: but accepts endotracheal intubation    Subjective   Pt seen and examined. She denies nausea this am. No f/c no cp no sob no n/v/d no abd pain     Objective     Vitals:   Temp (24hrs), Av.8 °F (36.6 °C), Min:96.9 °F (36.1 °C), Max:98.6 °F (37 °C)    Temp:  [96.9 °F (36.1 °C)-98.6 °F (37 °C)] 97.9 °F (36.6 °C)  HR:  [58-92] 92  Resp:  [17-20] 20  BP: (107-149)/(62-72) 149/72  SpO2:  [93 %-100 %] 98 %  Body mass index is 32.42 kg/m².     Input and Output Summary (last 24 hours):     Intake/Output Summary (Last 24 hours) at 2024 1238  Last data  filed at 9/20/2024 1000  Gross per 24 hour   Intake 280 ml   Output 1800 ml   Net -1520 ml       Physical Exam  Constitutional:       Appearance: Normal appearance.   HENT:      Head: Normocephalic and atraumatic.   Eyes:      Extraocular Movements: Extraocular movements intact.      Pupils: Pupils are equal, round, and reactive to light.   Cardiovascular:      Rate and Rhythm: Normal rate and regular rhythm.      Heart sounds: No murmur heard.     No friction rub. No gallop.   Pulmonary:      Effort: Pulmonary effort is normal. No respiratory distress.      Breath sounds: Normal breath sounds. No wheezing, rhonchi or rales.   Abdominal:      General: Bowel sounds are normal. There is no distension.      Palpations: Abdomen is soft.      Tenderness: There is no abdominal tenderness. There is no guarding or rebound.   Musculoskeletal:      Right lower leg: No edema.      Left lower leg: No edema.   Neurological:      Mental Status: She is alert and oriented to person, place, and time.          Lines/Drains:  Lines/Drains/Airways       Active Status       Name Placement date Placement time Site Days    Colostomy LUQ 09/10/20  1200  LUQ  1471    Ureteral Drain/Stent Left ureter 5 Fr. 09/30/20  1730  Left ureter  1450    Ureteral Drain/Stent Right ureter 5 Fr. 09/30/20  1730  Right ureter  1450    External Urinary Catheter 09/10/24  1600  -- 9                    Lab Results:  Results from last 7 days   Lab Units 09/20/24  0529   WBC Thousand/uL 16.28*   HEMOGLOBIN g/dL 13.3   HEMATOCRIT % 42.1   PLATELETS Thousands/uL 448*     Results from last 7 days   Lab Units 09/20/24  1122 09/20/24  0529   SODIUM mmol/L  --  136   POTASSIUM mmol/L 5.5* 5.9*   CHLORIDE mmol/L  --  82*   CO2 mmol/L  --  >45*   BUN mg/dL  --  60*   CREATININE mg/dL  --  0.55*   CALCIUM mg/dL  --  10.6*   ALBUMIN g/dL  --  3.9   TOTAL BILIRUBIN mg/dL  --  0.63   ALK PHOS U/L  --  98   ALT U/L  --  36   AST U/L  --  34   GLUCOSE RANDOM mg/dL  --  126          Results from last 7 days   Lab Units 09/19/24  1611   POC GLUCOSE mg/dl 154*               Recent Cultures (last 7 days):         Imaging Review: No pertinent imaging studies reviewed.  Other Studies: No additional pertinent studies reviewed.    Last 24 Hours Medication List:     Current Facility-Administered Medications:     acetaminophen (TYLENOL) tablet 650 mg, Q6H PRN    albuterol inhalation solution 2.5 mg, Q4H PRN    apixaban (ELIQUIS) tablet 5 mg, BID    bisacodyl (DULCOLAX) rectal suppository 10 mg, Daily PRN    budesonide (PULMICORT) inhalation solution 0.5 mg, Q12H    buPROPion (WELLBUTRIN) tablet 75 mg, QAM    calcium carbonate (TUMS) chewable tablet 1,000 mg, Daily PRN    citalopram (CeleXA) tablet 10 mg, Daily    colchicine (COLCRYS) tablet 0.6 mg, Daily    dextromethorphan-guaiFENesin (ROBITUSSIN DM) oral syrup 10 mL, Q4H PRN    diazepam (VALIUM) tablet 5 mg, Q8H PRN    fluticasone (FLONASE) 50 mcg/act nasal spray 2 spray, Daily    formoterol (PERFOROMIST) nebulizer solution 20 mcg, Q12H    guaiFENesin (MUCINEX) 12 hr tablet 600 mg, Q12H NANCY    ondansetron (ZOFRAN) injection 4 mg, Q6H PRN    oxybutynin (DITROPAN-XL) 24 hr tablet 10 mg, HS    pantoprazole (PROTONIX) EC tablet 40 mg, Early Morning    saccharomyces boulardii (FLORASTOR) capsule 250 mg, Daily    torsemide (DEMADEX) tablet 20 mg, Daily    Administrative Statements   Today, Patient Was Seen By: Marla Mayo DO

## 2024-09-20 NOTE — ASSESSMENT & PLAN NOTE
EMS was called for patient at nursing facility for shortness of breath  Found to have CO2 of 70 on VBG with pH 7.336  Patient does utilize BiPAP at night, does have history of muscular dystrophy with restrictive lung disease  Improved  D/c amiloride due to hyperkalemia  Continue nebulizers

## 2024-09-20 NOTE — CASE MANAGEMENT
[facility] has received APPROVED authorization.  Insurance:   [employer] [facility]  Called in by Rep:Bella THEODORE#  [phone]  Authorization received for: [facility]  Facility: [facility]   Authorization #:[id number]  Start of Care:9/20  Next Review Date:9/30  Continued Stay Care Coordinator:  n/a  Submit next review to: [phone]     Care Manager notified: bella rojas     Please reach out to CM for updates on any clinical information.

## 2024-09-20 NOTE — CASE MANAGEMENT
Case Management Discharge Planning Note    Patient name Claire Doherty  Location [facility] /E4 -* MRN [id number]  : 1950 Date 2024       Current Admission Date: 9/10/2024  Current Admission Diagnosis:Acute on chronic diastolic CHF (congestive heart failure) ([facility])   Patient Active Problem List    Diagnosis Date Noted Date Diagnosed    Paroxysmal A-fib ([facility]) 2024     Acute on chronic diastolic CHF (congestive heart failure) ([facility]) 09/10/2024     Acute on chronic respiratory failure with hypoxia and hypercapnia ([facility]) 09/10/2024     Severe protein-calorie malnutrition ([facility]) 2024     DINA (acute kidney injury) ([facility]) 2024     Nausea 2024     Pleural effusion 2024     Pericardial effusion 2024     Infected wound 2024     Chest pain 2024     History of Idiopathic pericarditis 2024     Cellulitis of right foot 2024     Chronic left shoulder pain 2024     Shoulder joint dysfunction 2024     COPD (chronic obstructive pulmonary disease) ([facility]) 06/15/2023     Bilateral hand pain 2023     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[facility]) 2022     Gastroparesis 2021     Aortic valve sclerosis 2021     Tricuspid valve insufficiency 2021     Mitral annular calcification 2021     Former smoker 2021     On home oxygen therapy 2021     Colostomy status ([facility])      Ductal carcinoma in situ (DCIS) of left breast 03/15/2021     Insomnia 10/21/2020     Depression 10/06/2020     Anemia 10/02/2020     Chronic hypoxic respiratory failure ([facility]) 2020     Bladder injury 2020     Thrombocytosis, unspecified 2020     Leukocytosis 2020     Difficult intubation      Dystrophy, muscular, 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[facility]) 10/12/2017     Ambulatory dysfunction 10/12/2017     Urinary incontinence 10/03/2017     Osteoporosis 2016     Allergic rhinitis 10/09/2013     Restrictive lung disease due to  muscular dystrophy (HCC) 10/04/2012     GERD without esophagitis 10/04/2012       LOS (days): 10  Geometric Mean LOS (GMLOS) (days): 3.9  Days to GMLOS:-6     OBJECTIVE:  Risk of Unplanned Readmission Score: 27.8         Current admission status: Inpatient   Preferred Pharmacy:   [id number] [address] [address]  Phone: [phone] Fax: [phone]    Primary Care Provider: Julienne Tucker DO    Primary Insurance: [facility] RE[facility]  Secondary Insurance:     DISCHARGE DETAILS:                                                                                                               Facility Insurance Auth Number: [id number]

## 2024-09-20 NOTE — ASSESSMENT & PLAN NOTE
Potassium this am was 5.9  She was on amiloride which was d/naresh  She was given her diuretic of 20mg of torsemide   Started on low K diet  Repeat potassium improved to 5.5  Anticipate Potassium will continue to improve once off of amiloride for 48 hours  Would repeat bmp in short interval

## 2024-09-20 NOTE — PLAN OF CARE
Problem: Potential for Falls  Goal: Patient will remain free of falls  Description: INTERVENTIONS:  - Educate patient/family on patient safety including physical limitations  - Instruct patient to call for assistance with activity   - Consult OT/PT to assist with strengthening/mobility   - Keep Call bell within reach  - Keep bed low and locked with side rails adjusted as appropriate  - Keep care items and personal belongings within reach  - Initiate and maintain comfort rounds  - Make Fall Risk Sign visible to staff  - Offer Toileting every 2 Hours, in advance of need  - Initiate/Maintain bed alarm  - Obtain necessary fall risk management equipment:   - Apply yellow socks and bracelet for high fall risk patients  - Consider moving patient to room near nurses station  Outcome: Progressing     Problem: PAIN - ADULT  Goal: Verbalizes/displays adequate comfort level or baseline comfort level  Description: Interventions:  - Encourage patient to monitor pain and request assistance  - Assess pain using appropriate pain scale  - Administer analgesics based on type and severity of pain and evaluate response  - Implement non-pharmacological measures as appropriate and evaluate response  - Consider cultural and social influences on pain and pain management  - Notify physician/advanced practitioner if interventions unsuccessful or patient reports new pain  Outcome: Progressing     Problem: INFECTION - ADULT  Goal: Absence or prevention of progression during hospitalization  Description: INTERVENTIONS:  - Assess and monitor for signs and symptoms of infection  - Monitor lab/diagnostic results  - Monitor all insertion sites, i.e. indwelling lines, tubes, and drains  - Monitor endotracheal if appropriate and nasal secretions for changes in amount and color  - [place] appropriate cooling/warming therapies per order  - Administer medications as ordered  - Instruct and encourage patient and family to use good hand hygiene  technique  - Identify and instruct in appropriate isolation precautions for identified infection/condition  Outcome: Progressing  Goal: Absence of fever/infection during neutropenic period  Description: INTERVENTIONS:  - Monitor WBC    Outcome: Progressing     Problem: SAFETY ADULT  Goal: Patient will remain free of falls  Description: INTERVENTIONS:  - Educate patient/family on patient safety including physical limitations  - Instruct patient to call for assistance with activity   - Consult OT/PT to assist with strengthening/mobility   - Keep Call bell within reach  - Keep bed low and locked with side rails adjusted as appropriate  - Keep care items and personal belongings within reach  - Initiate and maintain comfort rounds  - Make Fall Risk Sign visible to staff  - Offer Toileting every 2 Hours, in advance of need  - Initiate/Maintain bed alarm  - Obtain necessary fall risk management equipment:   - Apply yellow socks and bracelet for high fall risk patients  - Consider moving patient to room near nurses station  Outcome: Progressing  Goal: Maintain or return to baseline ADL function  Description: INTERVENTIONS:  -  Assess patient's ability to carry out ADLs; assess patient's baseline for ADL function and identify physical deficits which impact ability to perform ADLs (bathing, care of mouth/teeth, toileting, grooming, dressing, etc.)  - Assess/evaluate cause of self-care deficits   - Assess range of motion  - Assess patient's mobility; develop plan if impaired  - Assess patient's need for assistive devices and provide as appropriate  - Encourage maximum independence but intervene and supervise when necessary  - Involve family in performance of ADLs  - Assess for home care needs following discharge   - Consider OT consult to assist with ADL evaluation and planning for discharge  - Provide patient education as appropriate  Outcome: Progressing  Goal: Maintains/Returns to pre admission functional  level  Description: INTERVENTIONS:  - Perform AM-PAC 6 Click Basic Mobility/ Daily Activity assessment daily.  - Set and communicate daily mobility goal to care team and patient/family/caregiver.   - Collaborate with rehabilitation services on mobility goals if consulted  - Perform Range of Motion 3 times a day.  - Reposition patient every 2 hours.  - Dangle patient 3 times a day  - Stand patient 3 times a day  - Ambulate patient 3 times a day  - Out of bed to chair 3 times a day   - Out of bed for meals 3 times a day  - Out of bed for toileting  - Record patient progress and toleration of activity level   Outcome: Progressing     Problem: DISCHARGE PLANNING  Goal: Discharge to home or other facility with appropriate resources  Description: INTERVENTIONS:  - Identify barriers to discharge w/patient and caregiver  - Arrange for needed discharge resources and transportation as appropriate  - Identify discharge learning needs (meds, wound care, etc.)  - Arrange for interpretive services to assist at discharge as needed  - Refer to Case Management Department for coordinating discharge planning if the patient needs post-hospital services based on physician/advanced practitioner order or complex needs related to functional status, cognitive ability, or social support system  Outcome: Progressing     Problem: Knowledge Deficit  Goal: Patient/family/caregiver demonstrates understanding of disease process, treatment plan, medications, and discharge instructions  Description: Complete learning assessment and assess knowledge base.  Interventions:  - Provide teaching at level of understanding  - Provide teaching via preferred learning methods  Outcome: Progressing     Problem: RESPIRATORY - ADULT  Goal: Achieves optimal ventilation and oxygenation  Description: INTERVENTIONS:  - Assess for changes in respiratory status  - Assess for changes in mentation and behavior  - Position to facilitate oxygenation and minimize  respiratory effort  - Oxygen administered by appropriate delivery if ordered  - Initiate smoking cessation education as indicated  - Encourage broncho-pulmonary hygiene including cough, deep breathe, Incentive Spirometry  - Assess the need for suctioning and aspirate as needed  - Assess and instruct to report SOB or any respiratory difficulty  - Respiratory Therapy support as indicated  Outcome: Progressing     Problem: Nutrition/Hydration-ADULT  Goal: Nutrient/Hydration intake appropriate for improving, restoring or maintaining nutritional needs  Description: Monitor and assess patient's nutrition/hydration status for malnutrition. Collaborate with interdisciplinary team and initiate plan and interventions as ordered.  Monitor patient's weight and dietary intake as ordered or per policy. Utilize nutrition screening tool and intervene as necessary. Determine patient's food preferences and provide high-protein, high-caloric foods as appropriate.     INTERVENTIONS:  - Monitor oral intake, urinary output, labs, and treatment plans  - Assess nutrition and hydration status and recommend course of action  - Evaluate amount of meals eaten  - Assist patient with eating if necessary   - Allow adequate time for meals  - Recommend/ encourage appropriate diets, oral nutritional supplements, and vitamin/mineral supplements  - Order, calculate, and assess calorie counts as needed  - Recommend, monitor, and adjust tube feedings and TPN/PPN based on assessed needs  - Assess need for intravenous fluids  - Provide specific nutrition/hydration education as appropriate  - Include patient/family/caregiver in decisions related to nutrition  Outcome: Progressing     Problem: Prexisting or High Potential for Compromised Skin Integrity  Goal: Skin integrity is maintained or improved  Description: INTERVENTIONS:  - Identify patients at risk for skin breakdown  - Assess and monitor skin integrity  - Assess and monitor nutrition and hydration  status  - Monitor labs   - Assess for incontinence   - Turn and reposition patient  - Assist with mobility/ambulation  - Relieve pressure over bony prominences  - Avoid friction and shearing  - Provide appropriate hygiene as needed including keeping skin clean and dry  - Evaluate need for skin moisturizer/barrier cream  - Collaborate with interdisciplinary team   - Patient/family teaching  - Consider wound care consult   Outcome: Progressing

## 2024-09-20 NOTE — ASSESSMENT & PLAN NOTE
Wt Readings from Last 3 Encounters:   09/20/24 75.3 kg (166 lb 0.1 oz)   09/07/24 77.8 kg (171 lb 8.3 oz)   08/21/24 80.7 kg (178 lb)     Patient with known chronic diastolic CHF with grade 2 diastolic dysfunction  Presenting with worsening shortness of breath  Chest x-ray/CT concerning for pleural effusions  Patient is net -6 L throughout hospitalization  Given alkalosis had been started on amiloride- however this am she had hyperkalemia in which this medication was d/naresh as it can cause hyperkalemia.   Pt was able to bring in home bipap to help with hypercapnia. She is not a candidate for diamox given her restrictive lung disease   Transition to oral diuretics- pt remains stable

## 2024-09-20 NOTE — ASSESSMENT & PLAN NOTE
He was doing well history of muscular dystrophy  Was previously living alone in an apartment with home health care, able to stand and pivot to wheelchair prior to hospital stay last week  Was discharged to rehab on 9/7/2024  Utilizes BiPAP at night, continue  PT/OT while inpatient   Noted.  Copy of approval faxed to pharmacy.

## 2024-09-20 NOTE — INCIDENTAL FINDINGS
The following findings require follow up:  Radiographic finding   Findin.4 right hepatic lesion    Follow up required: nonemergent contrast enhanced mri    Follow up should be done within 1-2 months      Please notify the following clinician to assist with the follow up:   Dr. Tucker

## 2024-09-20 NOTE — PLAN OF CARE
Problem: Potential for Falls  Goal: Patient will remain free of falls  Description: INTERVENTIONS:  - Educate patient/family on patient safety including physical limitations  - Instruct patient to call for assistance with activity   - Consult OT/PT to assist with strengthening/mobility   - Keep Call bell within reach  - Keep bed low and locked with side rails adjusted as appropriate  - Keep care items and personal belongings within reach  - Initiate and maintain comfort rounds  - Make Fall Risk Sign visible to staff  - Offer Toileting every 2 Hours, in advance of need  - Initiate/Maintain bed alarm  - Obtain necessary fall risk management equipment:   - Apply yellow socks and bracelet for high fall risk patients  - Consider moving patient to room near nurses station  Outcome: Progressing     Problem: PAIN - ADULT  Goal: Verbalizes/displays adequate comfort level or baseline comfort level  Description: Interventions:  - Encourage patient to monitor pain and request assistance  - Assess pain using appropriate pain scale  - Administer analgesics based on type and severity of pain and evaluate response  - Implement non-pharmacological measures as appropriate and evaluate response  - Consider cultural and social influences on pain and pain management  - Notify physician/advanced practitioner if interventions unsuccessful or patient reports new pain  Outcome: Progressing     Problem: INFECTION - ADULT  Goal: Absence or prevention of progression during hospitalization  Description: INTERVENTIONS:  - Assess and monitor for signs and symptoms of infection  - Monitor lab/diagnostic results  - Monitor all insertion sites, i.e. indwelling lines, tubes, and drains  - Monitor endotracheal if appropriate and nasal secretions for changes in amount and color  - Potlatch appropriate cooling/warming therapies per order  - Administer medications as ordered  - Instruct and encourage patient and family to use good hand hygiene  technique  - Identify and instruct in appropriate isolation precautions for identified infection/condition  Outcome: Progressing  Goal: Absence of fever/infection during neutropenic period  Description: INTERVENTIONS:  - Monitor WBC    Outcome: Progressing     Problem: SAFETY ADULT  Goal: Patient will remain free of falls  Description: INTERVENTIONS:  - Educate patient/family on patient safety including physical limitations  - Instruct patient to call for assistance with activity   - Consult OT/PT to assist with strengthening/mobility   - Keep Call bell within reach  - Keep bed low and locked with side rails adjusted as appropriate  - Keep care items and personal belongings within reach  - Initiate and maintain comfort rounds  - Make Fall Risk Sign visible to staff  - Offer Toileting every 2 Hours, in advance of need  - Initiate/Maintain bed alarm  - Obtain necessary fall risk management equipment:   - Apply yellow socks and bracelet for high fall risk patients  - Consider moving patient to room near nurses station  Outcome: Progressing  Goal: Maintain or return to baseline ADL function  Description: INTERVENTIONS:  -  Assess patient's ability to carry out ADLs; assess patient's baseline for ADL function and identify physical deficits which impact ability to perform ADLs (bathing, care of mouth/teeth, toileting, grooming, dressing, etc.)  - Assess/evaluate cause of self-care deficits   - Assess range of motion  - Assess patient's mobility; develop plan if impaired  - Assess patient's need for assistive devices and provide as appropriate  - Encourage maximum independence but intervene and supervise when necessary  - Involve family in performance of ADLs  - Assess for home care needs following discharge   - Consider OT consult to assist with ADL evaluation and planning for discharge  - Provide patient education as appropriate  Outcome: Progressing  Goal: Maintains/Returns to pre admission functional  level  Description: INTERVENTIONS:  - Perform AM-PAC 6 Click Basic Mobility/ Daily Activity assessment daily.  - Set and communicate daily mobility goal to care team and patient/family/caregiver.   - Collaborate with rehabilitation services on mobility goals if consulted  - Record patient progress and toleration of activity level   Outcome: Progressing     Problem: DISCHARGE PLANNING  Goal: Discharge to home or other facility with appropriate resources  Description: INTERVENTIONS:  - Identify barriers to discharge w/patient and caregiver  - Arrange for needed discharge resources and transportation as appropriate  - Identify discharge learning needs (meds, wound care, etc.)  - Arrange for interpretive services to assist at discharge as needed  - Refer to Case Management Department for coordinating discharge planning if the patient needs post-hospital services based on physician/advanced practitioner order or complex needs related to functional status, cognitive ability, or social support system  Outcome: Progressing     Problem: Knowledge Deficit  Goal: Patient/family/caregiver demonstrates understanding of disease process, treatment plan, medications, and discharge instructions  Description: Complete learning assessment and assess knowledge base.  Interventions:  - Provide teaching at level of understanding  - Provide teaching via preferred learning methods  Outcome: Progressing     Problem: RESPIRATORY - ADULT  Goal: Achieves optimal ventilation and oxygenation  Description: INTERVENTIONS:  - Assess for changes in respiratory status  - Assess for changes in mentation and behavior  - Position to facilitate oxygenation and minimize respiratory effort  - Oxygen administered by appropriate delivery if ordered  - Initiate smoking cessation education as indicated  - Encourage broncho-pulmonary hygiene including cough, deep breathe, Incentive Spirometry  - Assess the need for suctioning and aspirate as needed  - Assess  and instruct to report SOB or any respiratory difficulty  - Respiratory Therapy support as indicated  Outcome: Progressing     Problem: Nutrition/Hydration-ADULT  Goal: Nutrient/Hydration intake appropriate for improving, restoring or maintaining nutritional needs  Description: Monitor and assess patient's nutrition/hydration status for malnutrition. Collaborate with interdisciplinary team and initiate plan and interventions as ordered.  Monitor patient's weight and dietary intake as ordered or per policy. Utilize nutrition screening tool and intervene as necessary. Determine patient's food preferences and provide high-protein, high-caloric foods as appropriate.     INTERVENTIONS:  - Monitor oral intake, urinary output, labs, and treatment plans  - Assess nutrition and hydration status and recommend course of action  - Evaluate amount of meals eaten  - Assist patient with eating if necessary   - Allow adequate time for meals  - Recommend/ encourage appropriate diets, oral nutritional supplements, and vitamin/mineral supplements  - Order, calculate, and assess calorie counts as needed  - Recommend, monitor, and adjust tube feedings and TPN/PPN based on assessed needs  - Assess need for intravenous fluids  - Provide specific nutrition/hydration education as appropriate  - Include patient/family/caregiver in decisions related to nutrition  Outcome: Progressing     Problem: Prexisting or High Potential for Compromised Skin Integrity  Goal: Skin integrity is maintained or improved  Description: INTERVENTIONS:  - Identify patients at risk for skin breakdown  - Assess and monitor skin integrity  - Assess and monitor nutrition and hydration status  - Monitor labs   - Assess for incontinence   - Turn and reposition patient  - Assist with mobility/ambulation  - Relieve pressure over bony prominences  - Avoid friction and shearing  - Provide appropriate hygiene as needed including keeping skin clean and dry  - Evaluate need  for skin moisturizer/barrier cream  - Collaborate with interdisciplinary team   - Patient/family teaching  - Consider wound care consult   Outcome: Progressing

## 2024-09-20 NOTE — DISCHARGE SUMMARY
Discharge Summary - Hospitalist   Name: Claier Doherty 74 y.o. female I MRN: [id number]  Unit/Bed#: E4 -01 I Date of Admission: 9/10/2024   Date of Service: 9/20/2024 I Hospital Day: 10     Assessment & Plan  Acute on chronic respiratory failure with hypoxia and hypercapnia (HCC)  EMS was called for patient at nursing facility for shortness of breath  Found to have CO2 of 70 on VBG with pH 7.336  Patient does utilize BiPAP at night, does have history of muscular dystrophy with restrictive lung disease  Improved  D/c amiloride due to hyperkalemia  Continue nebulizers  Acute on chronic diastolic CHF (congestive heart failure) ([facility])  Wt Readings from Last 3 Encounters:   09/20/24 75.3 kg (166 lb 0.1 oz)   09/07/24 77.8 kg (171 lb 8.3 oz)   08/21/24 80.7 kg (178 lb)     Patient with known chronic diastolic CHF with grade 2 diastolic dysfunction  Presenting with worsening shortness of breath  Chest x-ray/CT concerning for pleural effusions  Patient is net -6 L throughout hospitalization  Given alkalosis had been started on amiloride- however this am she had hyperkalemia in which this medication was d/naresh as it can cause hyperkalemia.   Pt was able to bring in home bipap to help with hypercapnia. 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)  Patient follows outpatient with pulmonology for restrictive lung disease due to muscular dystrophy  Uses BiPAP at night and 2-3 L nasal cannula during the day  Continue nebulizers  Pulmonology consultation reviewed  GERD  without esophagitis  Continue PPI  Thrombocytosis, unspecified  Significant thrombocytosis with platelet count 739  Likely reactive although does have history of similar  recommend outpatient with hematology  History of Idiopathic pericarditis  Patient previously hospitalized for chest discomfort, diagnosed with idiopathic pericarditis  Continue colchicine  Outpatient follow-up with cardiology on 9/20/2024  COPD (chronic obstructive pulmonary disease) ([facility])  Continue respiratory protocol  BiPAP with all naps  Paroxysmal A-fib ([facility])  Ventricular rate controlled  Continue Eliquis  Hyperkalemia  Potassium this am was 5.9  She was on amiloride which was d/naresh  She was given her diuretic of 20mg of torsemide   Started on low K diet  Repeat potassium improved to 5.5  Anticipate Potassium will continue to improve once off of amiloride for 48 hours  Would repeat bmp in short interval        Medical Problems       Resolved Problems  Date Reviewed: 9/20/2024            Resolved    Sepsis ([facility]) 9/13/2024     Resolved by  Ander Hitchcock DO        Discharging Physician / Practitioner: Marla Mayo DO  PCP: Julienne Tucker DO  Admission Date:   Admission Orders (From admission, onward)       Ordered        09/10/24 1437  INPATIENT ADMISSION  Once                          Discharge Date: 09/20/24    Consultations During Hospital Stay:  Cardiology       Procedures Performed:   none    Significant Findings / Test Results:   CT chest wo contrast  Result Date: 9/10/2024  Impression: 1.  Large bilateral nonspecific pleural effusions and compressive atelectasis. 2.  Mild manifestations of underlying centrilobular emphysema.     XR chest 1 view portable  Result Date: 9/10/2024  Impression: At least moderate bilateral pleural effusions with bibasilar atelectasis. Superimposed bilateral airspace opacities, possibly on the basis of pulmonary edema versus infection.       US kidney and bladder  Result Date: 9/4/2024  Impression: No  acute findings.       Incidental Findings:   1.4mm right hepatic lesion- will need non emergent contrast enhanced hepatic mri     Test Results Pending at Discharge (will require follow up):   none     Outpatient Tests Requested:  [facility] 9/23[facility]    Reason for Admission: acute/chronic diastolic chf     Hospital Course:   Claire Doherty is a 74 y.o. female patient who originally presented to the hospital on 9/10/2024 due to acute shortness of breath in which pt pt was found to be hypercapnic and in acute chf. She was treated with iv lasix and bipap. Pt improved and diuresed well. She was changed to torsemide 20mg daily with a fluid restriction. She was started on amiloride but this was discontinued due to hyperkalemia in which her potassium was 5.9 but improved off of this medication.     She also has copd with restrictive lung disease from muscular dystrophy. She was treated with bronchodilators.     Finally she has thrombocytosis in which she should be evaluated by hematology outpt.     She was discharged to [facility].       Please see above list of diagnoses and related plan for additional information.     Discharge Day Visit / Exam:   Please see progress note from today     Discharge instructions/Information to patient and family:   See after visit summary for information provided to patient and family.      Provisions for Follow-Up Care:  See after visit summary for information related to follow-up care and any pertinent home health orders.      Planned Readmission: no    Discharge Medications:  See after visit summary for reconciled discharge medications provided to patient and/or family.      Administrative Statements   Discharge Statement:  I have spent a total time of 50 minutes in caring for this patient on the day of the visit/encounter. >30 minutes of time was spent on: Reviewing / ordering tests, medicine, procedures  .

## 2024-09-20 NOTE — CASE MANAGEMENT
Case Management Discharge Planning Note    Patient name Claire Doherty  Location [facility] /E4 -* MRN [id number]  : 1950 Date 2024       Current Admission Date: 9/10/2024  Current Admission Diagnosis:Acute on chronic diastolic CHF (congestive heart failure) ([facility])   Patient Active Problem List    Diagnosis Date Noted Date Diagnosed    Hyperkalemia 2024     Paroxysmal A-fib ([facility]) 2024     Acute on chronic diastolic CHF (congestive heart failure) ([facility]) 09/10/2024     Acute on chronic respiratory failure with hypoxia and hypercapnia ([facility]) 09/10/2024     Severe protein-calorie malnutrition ([facility]) 2024     DINA (acute kidney injury) ([facility]) 2024     Nausea 2024     Pleural effusion 2024     Pericardial effusion 2024     Infected wound 2024     Chest pain 2024     History of Idiopathic pericarditis 2024     Cellulitis of right foot 2024     Chronic left shoulder pain 2024     Shoulder joint dysfunction 2024     COPD (chronic obstructive pulmonary disease) ([facility]) 06/15/2023     Bilateral hand pain 2023     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[facility]) 2022     Gastroparesis 2021     Aortic valve sclerosis 2021     Tricuspid valve insufficiency 2021     Mitral annular calcification 2021     Former smoker 2021     On home oxygen therapy 2021     Colostomy status ([facility])      Ductal carcinoma in situ (DCIS) of left breast 03/15/2021     Insomnia 10/21/2020     Depression 10/06/2020     Anemia 10/02/2020     Chronic hypoxic respiratory failure ([facility]) 2020     Bladder injury 2020     Thrombocytosis, unspecified 2020     Leukocytosis 2020     Difficult intubation      Dystrophy, muscular, 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) 10/04/2012     GERD without esophagitis 10/04/2012       LOS (days): 10  Geometric Mean LOS (GMLOS) (days): 3.9  Days to GMLOS:-6.1     OBJECTIVE:  Risk of Unplanned Readmission Score: 27.36         Current admission status: Inpatient   Preferred Pharmacy:   [id number] [address] [address]  Phone: [phone] Fax: [phone]    Primary Care Provider: Julienne Tucker DO    Primary Insurance: [facility]  Secondary Insurance:     DISCHARGE DETAILS:    Discharge planning discussed with:: Pt  Freedom of Choice: Yes  Comments - Freedom of Choice: Pt reports that prior CMs ahve been in contact with pt's sister Hillary. Pt aware that she will be going to [facility] to which pt is agreeable.  CM contacted family/caregiver?: Yes             Contacts  Patient Contacts: Hillary Bessy, sister  Relationship to Patient:: Family  Contact Method: Phone  Phone Number: [phone]  Reason/Outcome: Discharge Planning    Requested Home Health Care         Is the patient interested in HHC at discharge?: No    DME Referral Provided  Referral made for DME?: No    Other Referral/Resources/Interventions Provided:  Interventions: Short Term Rehab, Transportation         Treatment Team Recommendation: Short Term Rehab  Discharge Destination Plan:: Short Term Rehab ([facility])  Transport at Discharge : [facility]  Dispatcher Contacted: Yes  Number/Name of Dispatcher: Roundtrip     ETA of Transport (Date): 09/20/24  ETA of Transport (Time): 1430              IMM Given (Date):: 09/20/24 (Pt does not plan to appeal d/c.)  IMM Given to:: Patient (Original placed in scan bin.)           Dr. Mayo and primary RN aware of requested p/u time. Facility aware of requested p/u time via Aidin.

## 2024-09-20 NOTE — ASSESSMENT & PLAN NOTE
ENDORSED PATIENT TO [facility] FOR CONTINUITY OF CARE. PATIENT IS IN BED SLEEPING. MNURPH1 Ventricular rate controlled  Continue Eliquis

## 2024-09-23 ENCOUNTER — TELEPHONE (OUTPATIENT)
Dept: MAMMOGRAPHY | Facility: CLINIC | Age: 74
End: 2024-09-23

## 2024-09-23 ENCOUNTER — TRANSITIONAL CARE MANAGEMENT (OUTPATIENT)
Dept: FAMILY MEDICINE CLINIC | Facility: CLINIC | Age: 74
End: 2024-09-23

## 2024-09-23 NOTE — TELEPHONE ENCOUNTER
Called pt regarding her need to cancel biopsy for tomorrow at [place], pt states that she has been hospitalized and is currently in rehab. Pt given contact information for Monica Lao RN to call when she will be released and can reschedule appt. Pt had no further questions.

## 2024-09-24 ENCOUNTER — PATIENT OUTREACH (OUTPATIENT)
Dept: CASE MANAGEMENT | Facility: OTHER | Age: 74
End: 2024-09-24

## 2024-09-24 NOTE — UTILIZATION REVIEW
NOTIFICATION OF ADMISSION DISCHARGE   This is a Notification of Discharge from [facility]. Please be advised that this patient has been discharge from our facility. Below you will find the admission and discharge date and time including the patient’s disposition.   UTILIZATION REVIEW CONTACT:  Asya Edward MA  Utilization   Network Utilization Review Department  Phone: [phone] x carefully listen to the prompts. All voicemails are confidential.  Email: [email or web address]     ADMISSION INFORMATION  PRESENTATION DATE: 9/10/2024 11:27 AM  OBERVATION ADMISSION DATE: N/A  INPATIENT ADMISSION DATE: 9/10/24  2:37 PM   DISCHARGE DATE: 9/20/2024  5:17 PM   DISPOSITION:Non [facility] SNF/TCU/SNU    Network Utilization Review Department  ATTENTION: Please call with any questions or concerns to [phone] and carefully listen to the prompts so that you are directed to the right person. All voicemails are confidential.   For Discharge needs, contact Care Management DC Support Team at [phone] opt. 2  Send all requests for admission clinical reviews, approved or denied determinations and any other requests to dedicated fax number below belonging to the campus where the patient is receiving treatment. List of dedicated fax numbers for the Facilities:  FACILITY NAME UR FAX NUMBER   ADMISSION DENIALS (Administrative/Medical Necessity) [phone]   DISCHARGE SUPPORT TEAM ([facility]) [phone]   PARENT CHILD HEALTH (Maternity/NICU/Pediatrics) [phone]   [facility] [phone]   [facility] [phone]   [facility] [phone]   [facility] [phone]   [facility] [phone]   [facility] [phone]   [facility] [phone]   [facility]  [phone]   [facility] [phone]   [facility] [phone]   [facility] [phone]   [facility] [phone]

## 2024-09-24 NOTE — PROGRESS NOTES
Outpatient Care management referral via HF  Discharge report 9/23/24. Discharged from [place] on 9/20/24 to [facility] . Email sent to facility to inform them I will be following the patient during their skilled stay.  This Admin Coordinator will continue to monitor via chart review.

## 2024-10-01 ENCOUNTER — PATIENT OUTREACH (OUTPATIENT)
Dept: CASE MANAGEMENT | Facility: OTHER | Age: 74
End: 2024-10-01

## 2024-10-09 ENCOUNTER — PATIENT OUTREACH (OUTPATIENT)
Dept: CASE MANAGEMENT | Facility: OTHER | Age: 74
End: 2024-10-09

## 2024-10-17 ENCOUNTER — PATIENT OUTREACH (OUTPATIENT)
Dept: CASE MANAGEMENT | Facility: OTHER | Age: 74
End: 2024-10-17

## 2024-10-22 ENCOUNTER — PATIENT OUTREACH (OUTPATIENT)
Dept: CASE MANAGEMENT | Facility: OTHER | Age: 74
End: 2024-10-22

## 2024-10-22 NOTE — PROGRESS NOTES
Chart review complete update obtained from Point click care the patient is currently admitted to SNF for STR. It has been 30 days since SNF/STR Surveillance episode was opened I will no longer be following the patient per protocol. I have removed myself from the care team, updated the Care Coordination note, and closed the episode.   .

## 2024-10-23 ENCOUNTER — TELEPHONE (OUTPATIENT)
Dept: MAMMOGRAPHY | Facility: CLINIC | Age: 74
End: 2024-10-23

## 2024-11-05 ENCOUNTER — TELEPHONE (OUTPATIENT)
Dept: PULMONOLOGY | Facility: CLINIC | Age: 74
End: 2024-11-05

## 2024-11-05 NOTE — TELEPHONE ENCOUNTER
Called patient to schedule appointment for the 6M NO TEST from the Recall List and patient stated that at this time she is in a Rehabilitation Nursing Home called [facility]   Address: [address] [place] [address]   Contact:  Phone: [phone] .    Patient will call back when she is out of Nursing Home Facility.

## 2024-11-21 ENCOUNTER — TELEPHONE (OUTPATIENT)
Dept: MAMMOGRAPHY | Facility: CLINIC | Age: 74
End: 2024-11-21

## 2024-11-22 ENCOUNTER — HOSPITAL ENCOUNTER (EMERGENCY)
Facility: HOSPITAL | Age: 74
Discharge: HOME | DRG: 189 | End: 2024-11-22
Attending: EMERGENCY MEDICINE
Payer: COMMERCIAL

## 2024-11-22 ENCOUNTER — APPOINTMENT (EMERGENCY)
Dept: CT IMAGING | Facility: HOSPITAL | Age: 74
DRG: 189 | End: 2024-11-22
Payer: COMMERCIAL

## 2024-11-22 ENCOUNTER — APPOINTMENT (EMERGENCY)
Dept: RADIOLOGY | Facility: HOSPITAL | Age: 74
DRG: 189 | End: 2024-11-22
Payer: COMMERCIAL

## 2024-11-22 VITALS
HEART RATE: 93 BPM | DIASTOLIC BLOOD PRESSURE: 67 MMHG | TEMPERATURE: 97.8 F | RESPIRATION RATE: 16 BRPM | SYSTOLIC BLOOD PRESSURE: 109 MMHG | OXYGEN SATURATION: 97 %

## 2024-11-22 DIAGNOSIS — I31.39 PERICARDIAL EFFUSION: ICD-10-CM

## 2024-11-22 DIAGNOSIS — R11.0 NAUSEA: ICD-10-CM

## 2024-11-22 DIAGNOSIS — R53.83 FATIGUE: Primary | ICD-10-CM

## 2024-11-22 DIAGNOSIS — R93.429 ABNORMAL CT SCAN, KIDNEY: ICD-10-CM

## 2024-11-22 DIAGNOSIS — J90 PLEURAL EFFUSION: ICD-10-CM

## 2024-11-22 LAB
ALBUMIN SERPL BCG-MCNC: 3.7 G/DL (ref 3.5–5)
ALP SERPL-CCNC: 83 U/L (ref 34–104)
ALT SERPL W P-5'-P-CCNC: 21 U/L (ref 7–52)
ANION GAP SERPL CALCULATED.3IONS-SCNC: 12 MMOL/L (ref 4–13)
AST SERPL W P-5'-P-CCNC: 17 U/L (ref 13–39)
BASOPHILS # BLD AUTO: 0.02 THOUSANDS/ÂΜL (ref 0–0.1)
BASOPHILS NFR BLD AUTO: 0 % (ref 0–1)
BILIRUB SERPL-MCNC: 0.38 MG/DL (ref 0.2–1)
BUN SERPL-MCNC: 26 MG/DL (ref 5–25)
CALCIUM SERPL-MCNC: 9.5 MG/DL (ref 8.4–10.2)
CHLORIDE SERPL-SCNC: 85 MMOL/L (ref 96–108)
CO2 SERPL-SCNC: 39 MMOL/L (ref 21–32)
CREAT SERPL-MCNC: 0.47 MG/DL (ref 0.6–1.3)
EOSINOPHIL # BLD AUTO: 0.03 THOUSAND/ÂΜL (ref 0–0.61)
EOSINOPHIL NFR BLD AUTO: 0 % (ref 0–6)
ERYTHROCYTE [DISTWIDTH] IN BLOOD BY AUTOMATED COUNT: 15.6 % (ref 11.6–15.1)
FLUAV AG UPPER RESP QL IA.RAPID: NEGATIVE
FLUBV AG UPPER RESP QL IA.RAPID: NEGATIVE
GFR SERPL CREATININE-BSD FRML MDRD: 97 ML/MIN/1.73SQ M
GLUCOSE SERPL-MCNC: 110 MG/DL (ref 65–140)
HCT VFR BLD AUTO: 37.9 % (ref 34.8–46.1)
HGB BLD-MCNC: 12.4 G/DL (ref 11.5–15.4)
IMM GRANULOCYTES # BLD AUTO: 0.06 THOUSAND/UL (ref 0–0.2)
IMM GRANULOCYTES NFR BLD AUTO: 0 % (ref 0–2)
LACTATE SERPL-SCNC: 0.8 MMOL/L (ref 0.5–2)
LIPASE SERPL-CCNC: 14 U/L (ref 11–82)
LYMPHOCYTES # BLD AUTO: 1.92 THOUSANDS/ÂΜL (ref 0.6–4.47)
LYMPHOCYTES NFR BLD AUTO: 11 % (ref 14–44)
MAGNESIUM SERPL-MCNC: 1.7 MG/DL (ref 1.9–2.7)
MCH RBC QN AUTO: 27.9 PG (ref 26.8–34.3)
MCHC RBC AUTO-ENTMCNC: 32.7 G/DL (ref 31.4–37.4)
MCV RBC AUTO: 85 FL (ref 82–98)
MONOCYTES # BLD AUTO: 1.87 THOUSAND/ÂΜL (ref 0.17–1.22)
MONOCYTES NFR BLD AUTO: 11 % (ref 4–12)
NEUTROPHILS # BLD AUTO: 13.56 THOUSANDS/ÂΜL (ref 1.85–7.62)
NEUTS SEG NFR BLD AUTO: 78 % (ref 43–75)
NRBC BLD AUTO-RTO: 0 /100 WBCS
PLATELET # BLD AUTO: 427 THOUSANDS/UL (ref 149–390)
PMV BLD AUTO: 11.4 FL (ref 8.9–12.7)
POTASSIUM SERPL-SCNC: 3.2 MMOL/L (ref 3.5–5.3)
PROT SERPL-MCNC: 7.2 G/DL (ref 6.4–8.4)
RBC # BLD AUTO: 4.45 MILLION/UL (ref 3.81–5.12)
SARS-COV+SARS-COV-2 AG RESP QL IA.RAPID: NEGATIVE
SODIUM SERPL-SCNC: 136 MMOL/L (ref 135–147)
WBC # BLD AUTO: 17.46 THOUSAND/UL (ref 4.31–10.16)

## 2024-11-22 PROCEDURE — 80053 COMPREHEN METABOLIC PANEL: CPT | Performed by: EMERGENCY MEDICINE

## 2024-11-22 PROCEDURE — 96365 THER/PROPH/DIAG IV INF INIT: CPT

## 2024-11-22 PROCEDURE — 83735 ASSAY OF MAGNESIUM: CPT | Performed by: EMERGENCY MEDICINE

## 2024-11-22 PROCEDURE — 99284 EMERGENCY DEPT VISIT MOD MDM: CPT | Performed by: EMERGENCY MEDICINE

## 2024-11-22 PROCEDURE — 85025 COMPLETE CBC W/AUTO DIFF WBC: CPT | Performed by: EMERGENCY MEDICINE

## 2024-11-22 PROCEDURE — 99285 EMERGENCY DEPT VISIT HI MDM: CPT

## 2024-11-22 PROCEDURE — 87811 SARS-COV-2 COVID19 W/OPTIC: CPT | Performed by: EMERGENCY MEDICINE

## 2024-11-22 PROCEDURE — 74177 CT ABD & PELVIS W/CONTRAST: CPT

## 2024-11-22 PROCEDURE — 71260 CT THORAX DX C+: CPT

## 2024-11-22 PROCEDURE — 36415 COLL VENOUS BLD VENIPUNCTURE: CPT | Performed by: EMERGENCY MEDICINE

## 2024-11-22 PROCEDURE — 71045 X-RAY EXAM CHEST 1 VIEW: CPT

## 2024-11-22 PROCEDURE — 87804 INFLUENZA ASSAY W/OPTIC: CPT | Performed by: EMERGENCY MEDICINE

## 2024-11-22 PROCEDURE — 83605 ASSAY OF LACTIC ACID: CPT | Performed by: EMERGENCY MEDICINE

## 2024-11-22 PROCEDURE — 83690 ASSAY OF LIPASE: CPT | Performed by: EMERGENCY MEDICINE

## 2024-11-22 RX ORDER — METOCLOPRAMIDE 5 MG/1
5 TABLET ORAL EVERY 8 HOURS PRN
Qty: 30 TABLET | Refills: 0 | Status: SHIPPED | OUTPATIENT
Start: 2024-11-22

## 2024-11-22 RX ORDER — METOCLOPRAMIDE 10 MG/1
5 TABLET ORAL ONCE
Status: COMPLETED | OUTPATIENT
Start: 2024-11-22 | End: 2024-11-22

## 2024-11-22 RX ORDER — MAGNESIUM SULFATE HEPTAHYDRATE 40 MG/ML
2 INJECTION, SOLUTION INTRAVENOUS ONCE
Status: COMPLETED | OUTPATIENT
Start: 2024-11-22 | End: 2024-11-22

## 2024-11-22 RX ORDER — POTASSIUM CHLORIDE 1500 MG/1
40 TABLET, EXTENDED RELEASE ORAL ONCE
Status: COMPLETED | OUTPATIENT
Start: 2024-11-22 | End: 2024-11-22

## 2024-11-22 RX ADMIN — MAGNESIUM SULFATE HEPTAHYDRATE 2 G: 40 INJECTION, SOLUTION INTRAVENOUS at 17:22

## 2024-11-22 RX ADMIN — POTASSIUM CHLORIDE 40 MEQ: 1500 TABLET, EXTENDED RELEASE ORAL at 17:22

## 2024-11-22 RX ADMIN — SODIUM CHLORIDE 500 ML: 0.9 INJECTION, SOLUTION INTRAVENOUS at 17:22

## 2024-11-22 RX ADMIN — METOCLOPRAMIDE 5 MG: 10 TABLET ORAL at 18:46

## 2024-11-22 RX ADMIN — IOHEXOL 100 ML: 350 INJECTION, SOLUTION INTRAVENOUS at 16:41

## 2024-11-22 NOTE — ED PROVIDER NOTES
ED Disposition       None          Assessment & Plan       Medical Decision Making  74-year-old female presents for evaluation of fatigue, leukocytosis appears on outpatient lab testing.  Patient additionally states she tested negative for COVID-19 then positive yesterday.  Patient mainly complains of lower abdominal pain and a bloating station, she did have diarrhea yesterday however the seem to resolve today.  She does have history of perforated diverticulum with colectomy and colostomy.  She appears well although has reproducible tenderness in her abdomen, will assess for intra-abdominal etiology of her symptoms such as appendicitis, UTI, nephrolithiasis, colitis, diverticulitis, bowel obstruction.  Will additionally obtain a chest x-ray to assess for occult pneumonia in the setting of positive COVID-19 test, she has normal work of breathing as well as mentation.  She was previously admitted in September for volume overload as well as hypercapnia requiring BiPAP therapy, this does not seem to be the clinical picture today as she is in no respiratory distress and mentating normally, no significant lower extremity edema.    Amount and/or Complexity of Data Reviewed  Labs: ordered. Decision-making details documented in ED Course.  Radiology: ordered. Decision-making details documented in ED Course.    Risk  Prescription drug management.        ED Course as of 11/22/24 2158 Fri Nov 22, 2024   1419 Blood Pressure: 129/67   1419 Temperature: 98.9 °F (37.2 °C)   1419 Temp Source: Temporal   1419 Pulse: 95   1419 Respirations: 17   1419 SpO2: 98 %   1517 XR chest 1 view portable  On my interpretation, stable cardiac silhouette, likely small remaining pleural effusion to L    1535 XR chest 1 view portable  IMPRESSION:     Possible small effusion with adjacent left lung base parenchymal process.     1602 Labs not drawn yet   1612 CBC and differential(!)  Leukocytosis, was 20   1628 SARS COV Rapid Antigen:  Negative  Uncertain if she had a true infection, states she was negative and then [facility] tested her with fatigue and tested positive   1631 LACTIC ACID: 0.8  negative   1636 Comprehensive metabolic panel(!)  Mild hypokalemia and will plan to repeat, bicarb 39 consistent with previous and likely baseline with lung disorder; electrolyte disturbances may be related to dehydration from diarrhea and decreased PO intake, will provide IVF for rehydration   1637 MAGNESIUM(!): 1.7  Slight hypomagnesemia, will replete   1637 LIPASE: 14  negative   1802 Discussed labs with patient, on my read through CT, no significant/surgical findings. She mentioned unofficial diagnosis of gastroparesis and nausea on going for few months, will trial with reglan. She has a documented allergy but no reaction and patient is unsure what the reaction/allergy is. She has previously received IV reglan, possibly dystonic reaction but should be unlikely given PO.    1830 CT chest abdomen pelvis w contrast  IMPRESSION:     CHEST:     1) Small bilateral pleural effusions, decreased from 9/10/2024.     2) No findings to suggest pneumonia. Some compressive atelectasis in the lower lobes.     3) Small pericardial effusion, overall similar to studies from August and September 2024.     4) Some retained ingested material in the distal esophagus. This may indicate gastroesophageal dysmotility. Recommend clinical correlation.     ABDOMEN/PELVIS:     5) No acute abdominal or pelvic pathology.     6) No bowel obstruction. Evaluation for bowel inflammation somewhat limited by underdistention and lack of oral contrast, however no convincing inflammatory changes. Please note mild inflammation may not be apparent.     7) 1.1 cm hypodense lesion in the midpole of left kidney anteriorly, measuring higher than simple fluid in density. This likely represents a hemorrhagic or proteinaceous cyst, however a solid lesion such as renal cell carcinoma is not excluded.     Recommend further evaluation with nonemergent renal protocol abdominal CT or abdominal MRI if there are no contraindications (both studies with and without IV contrast).     8) Additional findings as above.     1922 We discussed CT findings including renal abnormality, her PCP can help with advanced imaging/screening. She may additionally benefit from swallow studies, gastric emptying studies. For now, can trial reglan for motility. She has tolerated water and cracker here in ED.       Medications - No data to display    ED Risk Strat Scores                           SBIRT 22yo+      Flowsheet Row Most Recent Value   Initial Alcohol Screen: US AUDIT-C     1. How often do you have a drink containing alcohol? 0 Filed at: 11/22/2024 1418   2. How many drinks containing alcohol do you have on a typical day you are drinking?  0 Filed at: 11/22/2024 1418   3a. Male UNDER 65: How often do you have five or more drinks on one occasion? 0 Filed at: 11/22/2024 1418   3b. FEMALE Any Age, or MALE 65+: How often do you have 4 or more drinks on one occassion? 0 Filed at: 11/22/2024 1418   Audit-C Score 0 Filed at: 11/22/2024 1418   BAKARI: How many times in the past year have you...    Used an illegal drug or used a prescription medication for non-medical reasons? Never Filed at: 11/22/2024 1418                            History of Present Illness       Chief Complaint   Patient presents with    Fatigue     Pt staets that she has been feeling run down and not herself. Pt states she feels weak and nauseous. States her WBC count is elevated and the skilled nursing facility wanted her to be evaluated         Past Medical History:   Diagnosis Date    Acute kidney failure (HCC)     Acute nonspecific idiopathic pericarditis     Anxiety     Atrial fibrillation (HCC)     Breast cancer (HCC) 2007    Cellulitis of right lower extremity     CHF (congestive heart failure) (HCC)     Chronic pain     Chronic respiratory failure with hypoxia  (HCC)     Colitis     Colostomy status ([facility])     Dependence on supplemental oxygen     Depression     Difficult intubation     Excessive daytime sleepiness     Gastroesophageal reflux disease without esophagitis     Gastroparesis     GERD (gastroesophageal reflux disease)     Hyperlipidemia     Ischemic colitis ([facility]) 01/21/2019    Leukocytosis 03/28/2020    Muscular dystrophy ([facility])     Limb-girdle    Osteoporosis     Other nonrheumatic aortic valve disorders     Overactive bladder     Perforated diverticulum 03/28/2020    Pericardial effusion (noninflammatory)     Pericarditis     Pneumonitis     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[facility])     Rheumatic tricuspid insufficiency     Skin cancer     Skin disorder     Tachycardia 06/02/2021    Vitamin D deficiency       Past Surgical History:   Procedure Laterality Date    CARDIAC CATHETERIZATION N/A 8/21/2024    Procedure: Cardiac pci;  Surgeon: Juan Fuller MD;  [facility]: [facility];  Service: Cardiology    CARDIAC CATHETERIZATION N/A 8/21/2024    Procedure: Cardiac PCI Stent;  Surgeon: Juan Fuller MD;  [facility];  Service: Cardiology    CARDIAC CATHETERIZATION N/A 8/21/2024    Procedure: Cardiac Coronary Angiogram;  Surgeon: Juan Fuller MD;  [facility]: [facility];  Service: Cardiology    COLONOSCOPY N/A 1/22/2019    Procedure: COLONOSCOPY;  Surgeon: Anthony Mejía MD;  Location: [facility];  Service: Colorectal    CYSTOSCOPY N/A 9/30/2020    Procedure: CYSTOSCOPY; BILATERAL URETERAL CATHETER PLACEMENT, CYSTOTOMY;  Surgeon: Zach Tyler MD;  Location: [facility];  Service: Urology    FL CYSTOGRAM  10/15/2020    HARTMANS PROCEDURE N/A 9/30/2020    Procedure: EXPLORATORY LAPAROTOMY; LYSIS OF ADHESIONS; WASHOUT PELVIC ABSCESS; COMPLEX SIGMOID COLON RESECTION; LOOP TRANSVERSE COLOSTOMY;  Surgeon: Thania Jaffe MD;  Location: [facility];  Service: General    IR DRAINAGE TUBE PLACEMENT  9/24/2020     MASTECTOMY Left 2007    left breast mastectomy     TONSILLECTOMY      TOOTH EXTRACTION      TUBAL LIGATION        Family History   Problem Relation Age of Onset    Other Mother         bone loss    Arthritis Mother     Skin cancer Father     Hypertension Father         benign     Other Father         bone loss    Muscular dystrophy Father     Hypertension Sister     No Known Problems Sister     Muscular dystrophy Brother         of the limb gridle     Parkinsonism Brother     No Known Problems Brother     No Known Problems Maternal Grandmother     No Known Problems Paternal Grandmother     No Known Problems Maternal Aunt     Muscular dystrophy Family         of the limb girdle      Social History     Tobacco Use    Smoking status: Former     Current packs/day: 0.00     Average packs/day: 1.5 packs/day for 37.0 years (55.5 ttl pk-yrs)     Types: Cigarettes     Start date:      Quit date:      Years since quittin.9    Smokeless tobacco: Never   Vaping Use    Vaping status: Never Used   Substance Use Topics    Alcohol use: Not Currently     Comment: social drinker per [employer]scripts     Drug use: Yes     Types: Marijuana     Comment: medical marijuana      E-Cigarette/Vaping    E-Cigarette Use Never User       E-Cigarette/Vaping Substances    Nicotine No     THC No     CBD No     Flavoring No     Other No     Unknown No       I have reviewed and agree with the history as documented.     74-year-old female presents from nursing facility for evaluation of fatigue, leukocytosis.  Patient reports several people at her facility tested positive for COVID-19, she had a negative test a few days ago however yesterday retested and was positive.  Patient reports intermittent headaches however states they are not significant.  She denies other significant congestion, cough.  She does report lower abdominal pain and a bloating sensation.  She notes a significant abdominal surgery a few years ago after perforated  diverticulum which left her with a ostomy, yesterday she had significant diarrhea from her ostomy however today has had normal to decreased output.  She denies dark or bloody stools.  She reports some nausea without vomiting, states that decreased appetite over the last few months however significantly over the past few days.  She denies dysuria, and room has a few burps although states this is normal for her.  She denies fevers, dyspnea.  Patient has history of muscular dystrophy and restrictive lung disease due to the dystrophy, she uses BiPAP at night and 2 to 3 L nasal cannula during the day; she has not had to increase her oxygen requirements and is maintained on eliquis. Patient had outpatient labs drawn yesterday and today showing leukocytosis 15 -> 20.        Review of Systems   Constitutional:  Positive for appetite change. Negative for fever.   HENT:  Negative for congestion.    Respiratory:  Negative for cough and shortness of breath.    Cardiovascular:  Negative for chest pain.   Gastrointestinal:  Positive for abdominal pain, diarrhea and nausea. Negative for blood in stool, constipation and vomiting.   Genitourinary:  Negative for dysuria.   All other systems reviewed and are negative.          Objective       ED Triage Vitals [11/22/24 1416]   Temperature Pulse Blood Pressure Respirations SpO2 Patient Position - Orthostatic VS   98.9 °F (37.2 °C) 95 129/67 17 98 % Sitting      Temp Source Heart Rate Source BP Location FiO2 (%) Pain Score    Temporal Monitor Right arm -- 3      Vitals      Date and Time Temp Pulse SpO2 Resp BP Pain Score FACES Pain Rating User   11/22/24 1416 98.9 °F (37.2 °C) 95 98 % 17 129/67 3 -- AB            Physical Exam  Vitals reviewed.   Constitutional:       General: She is not in acute distress.     Appearance: Normal appearance. She is not ill-appearing or toxic-appearing.   HENT:      Head: Normocephalic and atraumatic.      Right Ear: External ear normal.      Left Ear:  External ear normal.      Mouth/Throat:      Mouth: Mucous membranes are dry.   Eyes:      General: No scleral icterus.        Right eye: No discharge.         Left eye: No discharge.   Cardiovascular:      Rate and Rhythm: Normal rate and regular rhythm.   Pulmonary:      Effort: Pulmonary effort is normal. No respiratory distress.      Comments: On 2LNC  Abdominal:      General: There is no distension.      Palpations: Abdomen is soft.      Tenderness: There is abdominal tenderness (lower abd). There is no right CVA tenderness, left CVA tenderness, guarding or rebound.      Comments: Ostomy in place on L mid abd and appears well   Musculoskeletal:         General: No deformity or signs of injury.      Right lower leg: No edema.      Left lower leg: No edema.   Skin:     General: Skin is warm.      Coloration: Skin is not jaundiced.   Neurological:      General: No focal deficit present.      Mental Status: She is alert. Mental status is at baseline.         Results Reviewed       Procedure Component Value Units Date/Time    Comprehensive metabolic panel [[id number]]     Lab Status: No result Specimen: Blood     Lactic acid, plasma (w/reflex if result > 2.0) [[id number]]     Lab Status: No result Specimen: Blood     Lipase [[id number]]     Lab Status: No result Specimen: Blood     Magnesium [[id number]]     Lab Status: No result Specimen: Blood     UA w Reflex to Microscopic w Reflex to Culture [[id number]]     Lab Status: No result Specimen: Urine     FLU/COVID Rapid Antigen (30 min. TAT) - Preferred screening test in ED [[id number]]     Lab Status: No result Specimen: Nares from Nose     CBC and differential [[id number]]     Lab Status: No result Specimen: Blood             XR chest 1 view portable    (Results Pending)       Procedures    ED Medication and Procedure Management   Prior to Admission Medications   Prescriptions Last Dose Informant Patient Reported? Taking?   acetaminophen (TYLENOL) 325 mg tablet   No No    Sig: Take 2 tablets (650 mg total) by mouth every 4 (four) hours as needed for mild pain   albuterol (2.5 mg/3 mL) 0.083 % nebulizer solution   No No   Sig: Take 3 mL (2.5 mg total) by nebulization every 4 (four) hours as needed for wheezing or shortness of breath   apixaban (ELIQUIS) 5 mg   No No   Sig: Take 1 tablet (5 mg total) by mouth 2 (two) times a day   bisacodyl (FLEET) 10 MG/30ML ENEM   Yes No   Sig: Insert 10 mg into the rectum daily as needed for constipation   buPROPion (WELLBUTRIN) 75 mg tablet   No No   Sig: take 1 tablet by mouth every morning   budesonide (Pulmicort) 0.5 mg/2 mL nebulizer solution   No No   Sig: Take 2 mL (0.5 mg total) by nebulization every 12 (twelve) hours Rinse mouth after use.   citalopram (CeleXA) 10 mg tablet   No No   Sig: Take 1 tablet (10 mg total) by mouth daily   colchicine (COLCRYS) 0.6 mg tablet   No No   Sig: Take 1 tablet (0.6 mg total) by mouth daily   diazepam (VALIUM) 5 mg tablet   No No   Sig: Take 1 tablet (5 mg total) by mouth every 8 (eight) hours as needed for anxiety or muscle spasms for up to 10 days   fluticasone (FLONASE) 50 mcg/act nasal spray   No No   Si sprays into each nostril daily   formoterol (PERFOROMIST) 20 MCG/2ML nebulizer solution   No No   Sig: Take 2 mL (20 mcg total) by nebulization every 12 (twelve) hours   guaiFENesin (MUCINEX) 600 mg 12 hr tablet   No No   Sig: Take 1 tablet (600 mg total) by mouth every 12 (twelve) hours   oxybutynin (DITROPAN-XL) 10 MG 24 hr tablet   Yes No   Sig: Take 10 mg by mouth daily at bedtime   pantoprazole (PROTONIX) 40 mg tablet   No No   Sig: Take 1 tablet (40 mg total) by mouth daily in the early morning   saccharomyces boulardii (FLORASTOR) 250 mg capsule   Yes No   Sig: Take 250 mg by mouth daily   torsemide (DEMADEX) 20 mg tablet   No No   Sig: Take 1 tablet (20 mg total) by mouth daily      Facility-Administered Medications: None     Patient's Medications   Discharge Prescriptions    No  medications on file     No discharge procedures on file.  ED SEPSIS DOCUMENTATION            Alessandra Woodall,   11/22/24 [id number]

## 2024-11-23 NOTE — ED NOTES
Spoke w/Cj TOVAR from [place] - provided discharge instructions w/opportunity for questions     Ela De La Cruz RN  11/22/24 1948

## 2024-11-24 ENCOUNTER — APPOINTMENT (EMERGENCY)
Dept: RADIOLOGY | Facility: HOSPITAL | Age: 74
DRG: 189 | End: 2024-11-24
Payer: COMMERCIAL

## 2024-11-24 ENCOUNTER — HOSPITAL ENCOUNTER (INPATIENT)
Facility: HOSPITAL | Age: 74
LOS: 5 days | Discharge: NURSING, REHAB OR LONG-TERM CARE | DRG: 189 | End: 2024-11-29
Attending: EMERGENCY MEDICINE | Admitting: HOSPITALIST
Payer: COMMERCIAL

## 2024-11-24 DIAGNOSIS — J96.21 ACUTE ON CHRONIC RESPIRATORY FAILURE WITH HYPOXIA (HCC): ICD-10-CM

## 2024-11-24 DIAGNOSIS — R06.00 DYSPNEA: Primary | ICD-10-CM

## 2024-11-24 DIAGNOSIS — Z22.322 MRSA COLONIZATION: ICD-10-CM

## 2024-11-24 DIAGNOSIS — U07.1 COVID-19: ICD-10-CM

## 2024-11-24 DIAGNOSIS — R93.2 ABNORMAL LIVER CT: ICD-10-CM

## 2024-11-24 DIAGNOSIS — I31.39 PERICARDIAL EFFUSION: ICD-10-CM

## 2024-11-24 DIAGNOSIS — I50.32 CHRONIC DIASTOLIC HEART FAILURE (HCC): ICD-10-CM

## 2024-11-24 DIAGNOSIS — N28.1 RENAL CYST: ICD-10-CM

## 2024-11-24 DIAGNOSIS — N17.9 AKI (ACUTE KIDNEY INJURY) (HCC): ICD-10-CM

## 2024-11-24 PROBLEM — J90 PLEURAL EFFUSION, BILATERAL: Status: ACTIVE | Noted: 2024-11-24

## 2024-11-24 PROBLEM — R65.10 SIRS (SYSTEMIC INFLAMMATORY RESPONSE SYNDROME) (HCC): Status: ACTIVE | Noted: 2024-11-24

## 2024-11-24 LAB
ANION GAP SERPL CALCULATED.3IONS-SCNC: 9 MMOL/L (ref 4–13)
BASE EX.OXY STD BLDV CALC-SCNC: 88.4 % (ref 60–80)
BASE EXCESS BLDV CALC-SCNC: 10.9 MMOL/L
BASOPHILS # BLD AUTO: 0.02 THOUSANDS/ΜL (ref 0–0.1)
BASOPHILS NFR BLD AUTO: 0 % (ref 0–1)
BNP SERPL-MCNC: 168 PG/ML (ref 0–100)
BUN SERPL-MCNC: 50 MG/DL (ref 5–25)
CALCIUM SERPL-MCNC: 9.5 MG/DL (ref 8.4–10.2)
CARDIAC TROPONIN I PNL SERPL HS: 14 NG/L (ref ?–50)
CHLORIDE SERPL-SCNC: 86 MMOL/L (ref 96–108)
CO2 SERPL-SCNC: 41 MMOL/L (ref 21–32)
CREAT SERPL-MCNC: 1.09 MG/DL (ref 0.6–1.3)
EOSINOPHIL # BLD AUTO: 0.09 THOUSAND/ΜL (ref 0–0.61)
EOSINOPHIL NFR BLD AUTO: 1 % (ref 0–6)
ERYTHROCYTE [DISTWIDTH] IN BLOOD BY AUTOMATED COUNT: 15.7 % (ref 11.6–15.1)
FLUAV AG UPPER RESP QL IA.RAPID: NEGATIVE
FLUBV AG UPPER RESP QL IA.RAPID: NEGATIVE
GFR SERPL CREATININE-BSD FRML MDRD: 50 ML/MIN/1.73SQ M
GLUCOSE SERPL-MCNC: 162 MG/DL (ref 65–140)
HCO3 BLDV-SCNC: 36.8 MMOL/L (ref 24–30)
HCT VFR BLD AUTO: 39.1 % (ref 34.8–46.1)
HGB BLD-MCNC: 12.5 G/DL (ref 11.5–15.4)
IMM GRANULOCYTES # BLD AUTO: 0.05 THOUSAND/UL (ref 0–0.2)
IMM GRANULOCYTES NFR BLD AUTO: 0 % (ref 0–2)
LACTATE SERPL-SCNC: 1.1 MMOL/L (ref 0.5–2)
LYMPHOCYTES # BLD AUTO: 1.59 THOUSANDS/ΜL (ref 0.6–4.47)
LYMPHOCYTES NFR BLD AUTO: 12 % (ref 14–44)
MCH RBC QN AUTO: 27.3 PG (ref 26.8–34.3)
MCHC RBC AUTO-ENTMCNC: 32 G/DL (ref 31.4–37.4)
MCV RBC AUTO: 85 FL (ref 82–98)
MONOCYTES # BLD AUTO: 1.33 THOUSAND/ΜL (ref 0.17–1.22)
MONOCYTES NFR BLD AUTO: 10 % (ref 4–12)
NEUTROPHILS # BLD AUTO: 10.03 THOUSANDS/ΜL (ref 1.85–7.62)
NEUTS SEG NFR BLD AUTO: 77 % (ref 43–75)
NRBC BLD AUTO-RTO: 0 /100 WBCS
O2 CT BLDV-SCNC: 16.3 ML/DL
PCO2 BLDV: 53.6 MM HG (ref 42–50)
PH BLDV: 7.45 [PH] (ref 7.3–7.4)
PLATELET # BLD AUTO: 444 THOUSANDS/UL (ref 149–390)
PMV BLD AUTO: 11.3 FL (ref 8.9–12.7)
PO2 BLDV: 60.6 MM HG (ref 35–45)
POTASSIUM SERPL-SCNC: 4.2 MMOL/L (ref 3.5–5.3)
RBC # BLD AUTO: 4.58 MILLION/UL (ref 3.81–5.12)
SARS-COV+SARS-COV-2 AG RESP QL IA.RAPID: NEGATIVE
SODIUM SERPL-SCNC: 136 MMOL/L (ref 135–147)
WBC # BLD AUTO: 13.11 THOUSAND/UL (ref 4.31–10.16)

## 2024-11-24 PROCEDURE — 36415 COLL VENOUS BLD VENIPUNCTURE: CPT | Performed by: EMERGENCY MEDICINE

## 2024-11-24 PROCEDURE — 93005 ELECTROCARDIOGRAM TRACING: CPT

## 2024-11-24 PROCEDURE — 87811 SARS-COV-2 COVID19 W/OPTIC: CPT | Performed by: EMERGENCY MEDICINE

## 2024-11-24 PROCEDURE — 99285 EMERGENCY DEPT VISIT HI MDM: CPT | Performed by: EMERGENCY MEDICINE

## 2024-11-24 PROCEDURE — 85025 COMPLETE CBC W/AUTO DIFF WBC: CPT | Performed by: EMERGENCY MEDICINE

## 2024-11-24 PROCEDURE — 80048 BASIC METABOLIC PNL TOTAL CA: CPT | Performed by: EMERGENCY MEDICINE

## 2024-11-24 PROCEDURE — 83605 ASSAY OF LACTIC ACID: CPT | Performed by: EMERGENCY MEDICINE

## 2024-11-24 PROCEDURE — 84484 ASSAY OF TROPONIN QUANT: CPT | Performed by: EMERGENCY MEDICINE

## 2024-11-24 PROCEDURE — 83880 ASSAY OF NATRIURETIC PEPTIDE: CPT | Performed by: EMERGENCY MEDICINE

## 2024-11-24 PROCEDURE — 87804 INFLUENZA ASSAY W/OPTIC: CPT | Performed by: EMERGENCY MEDICINE

## 2024-11-24 PROCEDURE — 71045 X-RAY EXAM CHEST 1 VIEW: CPT

## 2024-11-24 PROCEDURE — 99222 1ST HOSP IP/OBS MODERATE 55: CPT | Performed by: PHYSICIAN ASSISTANT

## 2024-11-24 PROCEDURE — 99285 EMERGENCY DEPT VISIT HI MDM: CPT

## 2024-11-24 PROCEDURE — 82805 BLOOD GASES W/O2 SATURATION: CPT | Performed by: EMERGENCY MEDICINE

## 2024-11-24 RX ORDER — SACCHAROMYCES BOULARDII 250 MG
250 CAPSULE ORAL DAILY
Status: DISCONTINUED | OUTPATIENT
Start: 2024-11-25 | End: 2024-11-29 | Stop reason: HOSPADM

## 2024-11-24 RX ORDER — BUDESONIDE 0.5 MG/2ML
0.5 INHALANT ORAL
Status: DISCONTINUED | OUTPATIENT
Start: 2024-11-25 | End: 2024-11-25

## 2024-11-24 RX ORDER — OXYBUTYNIN CHLORIDE 5 MG/1
10 TABLET, EXTENDED RELEASE ORAL
Status: DISCONTINUED | OUTPATIENT
Start: 2024-11-25 | End: 2024-11-29 | Stop reason: HOSPADM

## 2024-11-24 RX ORDER — CITALOPRAM HYDROBROMIDE 20 MG/1
10 TABLET ORAL DAILY
Status: DISCONTINUED | OUTPATIENT
Start: 2024-11-25 | End: 2024-11-29 | Stop reason: HOSPADM

## 2024-11-24 RX ORDER — BUPROPION HYDROCHLORIDE 75 MG/1
75 TABLET ORAL EVERY MORNING
Status: DISCONTINUED | OUTPATIENT
Start: 2024-11-25 | End: 2024-11-29 | Stop reason: HOSPADM

## 2024-11-24 RX ORDER — FLUTICASONE PROPIONATE 50 MCG
2 SPRAY, SUSPENSION (ML) NASAL DAILY
Status: DISCONTINUED | OUTPATIENT
Start: 2024-11-25 | End: 2024-11-29 | Stop reason: HOSPADM

## 2024-11-24 RX ORDER — METOCLOPRAMIDE 10 MG/1
5 TABLET ORAL EVERY 8 HOURS PRN
Status: DISCONTINUED | OUTPATIENT
Start: 2024-11-24 | End: 2024-11-29 | Stop reason: HOSPADM

## 2024-11-24 RX ORDER — BISACODYL 10 MG
10 SUPPOSITORY, RECTAL RECTAL DAILY PRN
Status: DISCONTINUED | OUTPATIENT
Start: 2024-11-24 | End: 2024-11-29 | Stop reason: HOSPADM

## 2024-11-24 RX ORDER — GUAIFENESIN 600 MG/1
600 TABLET, EXTENDED RELEASE ORAL EVERY 12 HOURS SCHEDULED
Status: DISCONTINUED | OUTPATIENT
Start: 2024-11-25 | End: 2024-11-29 | Stop reason: HOSPADM

## 2024-11-24 RX ORDER — FORMOTEROL FUMARATE DIHYDRATE 20 UG/2ML
20 SOLUTION RESPIRATORY (INHALATION)
Status: DISCONTINUED | OUTPATIENT
Start: 2024-11-25 | End: 2024-11-25

## 2024-11-24 RX ORDER — TORSEMIDE 20 MG/1
20 TABLET ORAL DAILY
Status: DISCONTINUED | OUTPATIENT
Start: 2024-11-25 | End: 2024-11-29 | Stop reason: HOSPADM

## 2024-11-24 RX ORDER — PANTOPRAZOLE SODIUM 40 MG/1
40 TABLET, DELAYED RELEASE ORAL
Status: DISCONTINUED | OUTPATIENT
Start: 2024-11-25 | End: 2024-11-29 | Stop reason: HOSPADM

## 2024-11-24 RX ORDER — ALBUTEROL SULFATE 0.83 MG/ML
2.5 SOLUTION RESPIRATORY (INHALATION) EVERY 4 HOURS PRN
Status: DISCONTINUED | OUTPATIENT
Start: 2024-11-24 | End: 2024-11-29 | Stop reason: HOSPADM

## 2024-11-24 RX ORDER — ACETAMINOPHEN 325 MG/1
650 TABLET ORAL EVERY 6 HOURS PRN
Status: DISCONTINUED | OUTPATIENT
Start: 2024-11-24 | End: 2024-11-29 | Stop reason: HOSPADM

## 2024-11-25 ENCOUNTER — APPOINTMENT (INPATIENT)
Dept: CT IMAGING | Facility: HOSPITAL | Age: 74
DRG: 189 | End: 2024-11-25
Payer: COMMERCIAL

## 2024-11-25 ENCOUNTER — APPOINTMENT (INPATIENT)
Dept: NON INVASIVE DIAGNOSTICS | Facility: HOSPITAL | Age: 74
DRG: 189 | End: 2024-11-25
Payer: COMMERCIAL

## 2024-11-25 PROBLEM — I50.32 CHRONIC DIASTOLIC HEART FAILURE (HCC): Status: ACTIVE | Noted: 2024-11-25

## 2024-11-25 PROBLEM — J44.1 COPD EXACERBATION (HCC): Status: ACTIVE | Noted: 2024-11-25

## 2024-11-25 PROBLEM — J96.22 ACUTE ON CHRONIC RESPIRATORY FAILURE WITH HYPOXIA AND HYPERCAPNIA (HCC): Status: ACTIVE | Noted: 2024-09-10

## 2024-11-25 PROBLEM — J96.21 ACUTE ON CHRONIC RESPIRATORY FAILURE WITH HYPOXIA (HCC): Status: ACTIVE | Noted: 2024-09-10

## 2024-11-25 PROBLEM — R79.89 ELEVATED D-DIMER: Status: ACTIVE | Noted: 2024-11-25

## 2024-11-25 LAB
ALBUMIN SERPL BCG-MCNC: 3.4 G/DL (ref 3.5–5)
ALP SERPL-CCNC: 77 U/L (ref 34–104)
ALT SERPL W P-5'-P-CCNC: 15 U/L (ref 7–52)
ANION GAP SERPL CALCULATED.3IONS-SCNC: 11 MMOL/L (ref 4–13)
AST SERPL W P-5'-P-CCNC: 15 U/L (ref 13–39)
B PARAP IS1001 DNA NPH QL NAA+NON-PROBE: NOT DETECTED
B PERT.PT PRMT NPH QL NAA+NON-PROBE: NOT DETECTED
BASOPHILS # BLD AUTO: 0.03 THOUSANDS/ΜL (ref 0–0.1)
BASOPHILS NFR BLD AUTO: 0 % (ref 0–1)
BILIRUB SERPL-MCNC: 0.34 MG/DL (ref 0.2–1)
BUN SERPL-MCNC: 45 MG/DL (ref 5–25)
C PNEUM DNA NPH QL NAA+NON-PROBE: NOT DETECTED
CALCIUM ALBUM COR SERPL-MCNC: 9.7 MG/DL (ref 8.3–10.1)
CALCIUM SERPL-MCNC: 9.2 MG/DL (ref 8.4–10.2)
CHLORIDE SERPL-SCNC: 88 MMOL/L (ref 96–108)
CO2 SERPL-SCNC: 38 MMOL/L (ref 21–32)
CREAT SERPL-MCNC: 1 MG/DL (ref 0.6–1.3)
D DIMER PPP FEU-MCNC: 3.38 UG/ML FEU
EOSINOPHIL # BLD AUTO: 0.09 THOUSAND/ΜL (ref 0–0.61)
EOSINOPHIL NFR BLD AUTO: 1 % (ref 0–6)
ERYTHROCYTE [DISTWIDTH] IN BLOOD BY AUTOMATED COUNT: 15.5 % (ref 11.6–15.1)
FLUAV RNA NPH QL NAA+NON-PROBE: NOT DETECTED
FLUBV RNA NPH QL NAA+NON-PROBE: NOT DETECTED
GFR SERPL CREATININE-BSD FRML MDRD: 55 ML/MIN/1.73SQ M
GLUCOSE SERPL-MCNC: 110 MG/DL (ref 65–140)
HADV DNA NPH QL NAA+NON-PROBE: NOT DETECTED
HCOV 229E RNA NPH QL NAA+NON-PROBE: NOT DETECTED
HCOV HKU1 RNA NPH QL NAA+NON-PROBE: NOT DETECTED
HCOV NL63 RNA NPH QL NAA+NON-PROBE: NOT DETECTED
HCOV OC43 RNA NPH QL NAA+NON-PROBE: NOT DETECTED
HCT VFR BLD AUTO: 36.3 % (ref 34.8–46.1)
HGB BLD-MCNC: 11.7 G/DL (ref 11.5–15.4)
HMPV RNA NPH QL NAA+NON-PROBE: NOT DETECTED
HPIV1 RNA NPH QL NAA+NON-PROBE: NOT DETECTED
HPIV2 RNA NPH QL NAA+NON-PROBE: NOT DETECTED
HPIV3 RNA NPH QL NAA+NON-PROBE: NOT DETECTED
HPIV4 RNA NPH QL NAA+NON-PROBE: NOT DETECTED
IMM GRANULOCYTES # BLD AUTO: 0.03 THOUSAND/UL (ref 0–0.2)
IMM GRANULOCYTES NFR BLD AUTO: 0 % (ref 0–2)
LYMPHOCYTES # BLD AUTO: 1.71 THOUSANDS/ΜL (ref 0.6–4.47)
LYMPHOCYTES NFR BLD AUTO: 14 % (ref 14–44)
M PNEUMO DNA NPH QL NAA+NON-PROBE: NOT DETECTED
MAGNESIUM SERPL-MCNC: 2 MG/DL (ref 1.9–2.7)
MCH RBC QN AUTO: 27.3 PG (ref 26.8–34.3)
MCHC RBC AUTO-ENTMCNC: 32.2 G/DL (ref 31.4–37.4)
MCV RBC AUTO: 85 FL (ref 82–98)
MONOCYTES # BLD AUTO: 1.2 THOUSAND/ΜL (ref 0.17–1.22)
MONOCYTES NFR BLD AUTO: 10 % (ref 4–12)
NEUTROPHILS # BLD AUTO: 9.3 THOUSANDS/ΜL (ref 1.85–7.62)
NEUTS SEG NFR BLD AUTO: 75 % (ref 43–75)
NRBC BLD AUTO-RTO: 0 /100 WBCS
PHOSPHATE SERPL-MCNC: 4.4 MG/DL (ref 2.3–4.1)
PLATELET # BLD AUTO: 541 THOUSANDS/UL (ref 149–390)
PMV BLD AUTO: 11 FL (ref 8.9–12.7)
POTASSIUM SERPL-SCNC: 3.3 MMOL/L (ref 3.5–5.3)
PROCALCITONIN SERPL-MCNC: 0.27 NG/ML
PROT SERPL-MCNC: 6.6 G/DL (ref 6.4–8.4)
RBC # BLD AUTO: 4.29 MILLION/UL (ref 3.81–5.12)
RSV RNA NPH QL NAA+NON-PROBE: NOT DETECTED
RV+EV RNA NPH QL NAA+NON-PROBE: NOT DETECTED
SARS-COV-2 RNA NPH QL NAA+NON-PROBE: DETECTED
SODIUM SERPL-SCNC: 137 MMOL/L (ref 135–147)
WBC # BLD AUTO: 12.36 THOUSAND/UL (ref 4.31–10.16)

## 2024-11-25 PROCEDURE — 93970 EXTREMITY STUDY: CPT | Performed by: SURGERY

## 2024-11-25 PROCEDURE — 0W9B3ZZ DRAINAGE OF LEFT PLEURAL CAVITY, PERCUTANEOUS APPROACH: ICD-10-PCS | Performed by: INTERNAL MEDICINE

## 2024-11-25 PROCEDURE — 94668 MNPJ CHEST WALL SBSQ: CPT

## 2024-11-25 PROCEDURE — 99232 SBSQ HOSP IP/OBS MODERATE 35: CPT

## 2024-11-25 PROCEDURE — 87081 CULTURE SCREEN ONLY: CPT | Performed by: PHYSICIAN ASSISTANT

## 2024-11-25 PROCEDURE — 85025 COMPLETE CBC W/AUTO DIFF WBC: CPT | Performed by: PHYSICIAN ASSISTANT

## 2024-11-25 PROCEDURE — 99223 1ST HOSP IP/OBS HIGH 75: CPT

## 2024-11-25 PROCEDURE — 94640 AIRWAY INHALATION TREATMENT: CPT

## 2024-11-25 PROCEDURE — 94760 N-INVAS EAR/PLS OXIMETRY 1: CPT

## 2024-11-25 PROCEDURE — 36415 COLL VENOUS BLD VENIPUNCTURE: CPT | Performed by: PHYSICIAN ASSISTANT

## 2024-11-25 PROCEDURE — 85379 FIBRIN DEGRADATION QUANT: CPT | Performed by: PHYSICIAN ASSISTANT

## 2024-11-25 PROCEDURE — 71275 CT ANGIOGRAPHY CHEST: CPT

## 2024-11-25 PROCEDURE — 84145 PROCALCITONIN (PCT): CPT | Performed by: PHYSICIAN ASSISTANT

## 2024-11-25 PROCEDURE — 94664 DEMO&/EVAL PT USE INHALER: CPT

## 2024-11-25 PROCEDURE — 87147 CULTURE TYPE IMMUNOLOGIC: CPT | Performed by: PHYSICIAN ASSISTANT

## 2024-11-25 PROCEDURE — 93970 EXTREMITY STUDY: CPT

## 2024-11-25 PROCEDURE — 84100 ASSAY OF PHOSPHORUS: CPT | Performed by: PHYSICIAN ASSISTANT

## 2024-11-25 PROCEDURE — 0202U NFCT DS 22 TRGT SARS-COV-2: CPT | Performed by: INTERNAL MEDICINE

## 2024-11-25 PROCEDURE — 83735 ASSAY OF MAGNESIUM: CPT | Performed by: PHYSICIAN ASSISTANT

## 2024-11-25 PROCEDURE — 80053 COMPREHEN METABOLIC PANEL: CPT | Performed by: PHYSICIAN ASSISTANT

## 2024-11-25 RX ORDER — FUROSEMIDE 10 MG/ML
20 INJECTION INTRAMUSCULAR; INTRAVENOUS ONCE
Status: COMPLETED | OUTPATIENT
Start: 2024-11-25 | End: 2024-11-25

## 2024-11-25 RX ORDER — POTASSIUM CHLORIDE 1500 MG/1
20 TABLET, EXTENDED RELEASE ORAL ONCE
Status: DISCONTINUED | OUTPATIENT
Start: 2024-11-25 | End: 2024-11-29 | Stop reason: HOSPADM

## 2024-11-25 RX ORDER — BUDESONIDE 0.5 MG/2ML
0.5 INHALANT ORAL
Status: DISCONTINUED | OUTPATIENT
Start: 2024-11-25 | End: 2024-11-29 | Stop reason: HOSPADM

## 2024-11-25 RX ORDER — POTASSIUM CHLORIDE 1500 MG/1
20 TABLET, EXTENDED RELEASE ORAL ONCE
Status: COMPLETED | OUTPATIENT
Start: 2024-11-25 | End: 2024-11-25

## 2024-11-25 RX ORDER — PREDNISONE 20 MG/1
40 TABLET ORAL DAILY
Status: DISCONTINUED | OUTPATIENT
Start: 2024-11-26 | End: 2024-11-25

## 2024-11-25 RX ORDER — METHYLPREDNISOLONE SODIUM SUCCINATE 40 MG/ML
40 INJECTION, POWDER, LYOPHILIZED, FOR SOLUTION INTRAMUSCULAR; INTRAVENOUS EVERY 12 HOURS SCHEDULED
Status: DISCONTINUED | OUTPATIENT
Start: 2024-11-25 | End: 2024-11-25

## 2024-11-25 RX ORDER — BUDESONIDE 0.5 MG/2ML
0.5 INHALANT ORAL 2 TIMES DAILY PRN
Status: DISCONTINUED | OUTPATIENT
Start: 2024-11-25 | End: 2024-11-25

## 2024-11-25 RX ORDER — METHYLPREDNISOLONE SODIUM SUCCINATE 40 MG/ML
40 INJECTION, POWDER, LYOPHILIZED, FOR SOLUTION INTRAMUSCULAR; INTRAVENOUS EVERY 24 HOURS
Status: DISCONTINUED | OUTPATIENT
Start: 2024-11-25 | End: 2024-11-26

## 2024-11-25 RX ORDER — AZITHROMYCIN 250 MG/1
500 TABLET, FILM COATED ORAL EVERY 24 HOURS
Status: COMPLETED | OUTPATIENT
Start: 2024-11-25 | End: 2024-11-26

## 2024-11-25 RX ADMIN — APIXABAN 5 MG: 5 TABLET, FILM COATED ORAL at 18:10

## 2024-11-25 RX ADMIN — CITALOPRAM HYDROBROMIDE 10 MG: 20 TABLET ORAL at 08:13

## 2024-11-25 RX ADMIN — IOHEXOL 85 ML: 350 INJECTION, SOLUTION INTRAVENOUS at 11:54

## 2024-11-25 RX ADMIN — METHYLPREDNISOLONE SODIUM SUCCINATE 40 MG: 40 INJECTION, POWDER, FOR SOLUTION INTRAMUSCULAR; INTRAVENOUS at 08:14

## 2024-11-25 RX ADMIN — OXYBUTYNIN CHLORIDE 10 MG: 5 TABLET, EXTENDED RELEASE ORAL at 20:34

## 2024-11-25 RX ADMIN — PANTOPRAZOLE SODIUM 40 MG: 40 TABLET, DELAYED RELEASE ORAL at 05:11

## 2024-11-25 RX ADMIN — AZITHROMYCIN DIHYDRATE 500 MG: 250 TABLET ORAL at 20:34

## 2024-11-25 RX ADMIN — FUROSEMIDE 20 MG: 10 INJECTION, SOLUTION INTRAMUSCULAR; INTRAVENOUS at 16:39

## 2024-11-25 RX ADMIN — REMDESIVIR 200 MG: 100 INJECTION, POWDER, LYOPHILIZED, FOR SOLUTION INTRAVENOUS at 23:55

## 2024-11-25 RX ADMIN — FLUTICASONE PROPIONATE 2 SPRAY: 50 SPRAY, METERED NASAL at 08:17

## 2024-11-25 RX ADMIN — Medication 250 MG: at 08:13

## 2024-11-25 RX ADMIN — APIXABAN 5 MG: 5 TABLET, FILM COATED ORAL at 08:13

## 2024-11-25 RX ADMIN — SODIUM CHLORIDE 250 ML: 0.9 INJECTION, SOLUTION INTRAVENOUS at 00:24

## 2024-11-25 RX ADMIN — POTASSIUM CHLORIDE 20 MEQ: 1500 TABLET, EXTENDED RELEASE ORAL at 10:16

## 2024-11-25 RX ADMIN — AZITHROMYCIN MONOHYDRATE 500 MG: 500 INJECTION, POWDER, LYOPHILIZED, FOR SOLUTION INTRAVENOUS at 02:31

## 2024-11-25 RX ADMIN — BUPROPION HYDROCHLORIDE 75 MG: 75 TABLET, FILM COATED ORAL at 08:13

## 2024-11-25 RX ADMIN — BUDESONIDE 0.5 MG: 0.5 INHALANT RESPIRATORY (INHALATION) at 19:37

## 2024-11-25 RX ADMIN — METHYLPREDNISOLONE SODIUM SUCCINATE 40 MG: 40 INJECTION, POWDER, FOR SOLUTION INTRAMUSCULAR; INTRAVENOUS at 23:54

## 2024-11-25 NOTE — ED PROVIDER NOTES
Final Diagnosis:  1. Dyspnea    2. DINA (acute kidney injury) (HCC)        MDM:  -Will evaluate for signs of infection, pneumonia, ACS or arrhythmia.  Anticipate admission given her history of muscular dystrophy and worsening respiratory status.  - Laboratory analysis shows DINA as patient's creatinine today is 1.09 from approximately 0.52 days ago.  Chronically elevated white count.  Given that she is having continued dyspnea and intermittent conversational dyspnea will review with medical team.  - Reviewed with medical team.  Admit to her service.    Chief Complaint   Patient presents with    Shortness of Breath     Patient reports to this ED via EMS from SNF, reports SOB and feeling like she is drowning when lying flat. Patient has hx of COPD and muscular dystrophy and is 2L chronic NC     HPI  Patient presents for evaluation of shortness of breath.  Patient states that she has been having progressive shortness of breath because her underlying muscular dystrophy.  She states that she feels like her breathing's been getting progressively worse over the last week or so.  Review of records show that she was seen here approximately 2 days ago.  At that time she had an extensive workup including CT imaging that showed small effusion but no other acute pathology.  Patient returned to a nursing facility at that point.  Over the last day or 2 she feels like her breathing is continued to get worse.  This is especially prominent when she is laying down.  She is on 2 L nasal cannula at baseline.  She was recently diagnosed with COVID as well.      Unless otherwise specified:  - No language barrier.   - History obtained from patient.   - There are no limitations to the history obtained.  - Previous charting was reviewed    PMH:   has a past medical history of Acute kidney failure (HCC), Acute nonspecific idiopathic pericarditis, Anxiety, Atrial fibrillation (HCC), Breast cancer (HCC) (2007), Cellulitis of right lower  extremity, CHF (congestive heart failure) ([facility]), Chronic pain, Chronic respiratory failure with hypoxia (HCC), Colitis, Colostomy status ([facility]), Dependence on supplemental oxygen, Depression, Difficult intubation, Excessive daytime sleepiness, Gastroesophageal reflux disease without esophagitis, Gastroparesis, GERD (gastroesophageal reflux disease), Hyperlipidemia, Ischemic colitis (HCC) (01/21/2019), Leukocytosis (03/28/2020), Muscular dystrophy ([facility]), Osteoporosis, Other nonrheumatic aortic valve disorders, Overactive bladder, Perforated diverticulum (03/28/2020), Pericardial effusion (noninflammatory), Pericarditis, Pneumonitis, 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    PSH:   has a past surgical history that includes Mastectomy (Left, 2007); Tubal ligation; Tonsillectomy; Tooth extraction; Colonoscopy (N/A, 1/22/2019); IR drainage tube placement (9/24/2020); HARTMANS PROCEDURE (N/A, 9/30/2020); CYSTOSCOPY (N/A, 9/30/2020); [place] cystogram (10/15/2020); Cardiac catheterization (N/A, 8/21/2024); Cardiac catheterization (N/A, 8/21/2024); and Cardiac catheterization (N/A, 8/21/2024).       ROS:  Review of Systems   - 13 point ROS was performed and all are normal unless stated in the history above.   - Nursing note reviewed. Vitals reviewed.   - Orders placed by myself and/or advanced practitioner / resident.        PE:   Vitals:    11/24/24 1958 11/24/24 1959   BP: 169/71    BP Location: Right arm    Pulse: 59    Resp:  21   Temp:  (!) 97 °F (36.1 °C)   TempSrc:  Tympanic   SpO2: 97%      Vitals reviewed by me.     Patient is nondistressed, sitting up in bed.  On 3 L nasal cannula which is around her baseline.  No obvious tachypnea or actual work of breathing.  Air fields are clear.  Unless otherwise specified above:    General: VS reviewed  Appears in NAD    Head: Normocephalic,  atraumatic  Eyes: EOM-I.     ENT: Atraumatic external nose and ears.    No drooling.     Neck: No JVD.    CV: No pallor noted  Lungs:   No tachypnea  No respiratory distress    Abdomen:  Soft, non-tender, non-distended    MSK:   No obvious deformity    Skin: Dry, intact. No obvious rash.    Psychiatric/Behavioral: Appropriate mood and affect   Exam: deferred    Physical Exam     Procedures   - Nursing note reviewed.                      XR chest 1 view portable   ED Interpretation   Persistent effusion on left with surrounding lung field changes.  Overall appears similar to prior 2 days ago, may be slightly worse        Orders Placed This Encounter   Procedures    FLU/COVID Rapid Antigen (30 min. TAT) - Preferred screening test in ED    XR chest 1 view portable    CBC and differential    Basic metabolic panel    Lactic acid, plasma (w/reflex if result > 2.0)    HS Troponin 0hr (reflex protocol)    B-Type Natriuretic Peptide(BNP)    Blood gas, venous    Insert peripheral IV    ECG 12 lead    INPATIENT ADMISSION     Labs Reviewed   CBC AND DIFFERENTIAL - Abnormal       Result Value Ref Range Status    WBC 13.11 (*) 4.31 - 10.16 Thousand/uL Final    RBC 4.58  3.81 - 5.12 Million/uL Final    Hemoglobin 12.5  11.5 - 15.4 g/dL Final    Hematocrit 39.1  34.8 - 46.1 % Final    MCV 85  82 - 98 fL Final    MCH 27.3  26.8 - 34.3 pg Final    MCHC 32.0  31.4 - 37.4 g/dL Final    RDW 15.7 (*) 11.6 - 15.1 % Final    MPV 11.3  8.9 - 12.7 fL Final    Platelets 444 (*) 149 - 390 Thousands/uL Final    nRBC 0  /100 WBCs Final    Segmented % 77 (*) 43 - 75 % Final    Immature Grans % 0  0 - 2 % Final    Lymphocytes % 12 (*) 14 - 44 % Final    Monocytes % 10  4 - 12 % Final    Eosinophils Relative 1  0 - 6 % Final    Basophils Relative 0  0 - 1 % Final    Absolute Neutrophils 10.03 (*) 1.85 - 7.62 Thousands/µL Final    Absolute Immature Grans 0.05  0.00 - 0.20 Thousand/uL Final    Absolute Lymphocytes 1.59  0.60 - 4.47 Thousands/µL  Final    Absolute Monocytes 1.33 (*) 0.17 - 1.22 Thousand/µL Final    Eosinophils Absolute 0.09  0.00 - 0.61 Thousand/µL Final    Basophils Absolute 0.02  0.00 - 0.10 Thousands/µL Final   BASIC METABOLIC PANEL - Abnormal    Sodium 136  135 - 147 mmol/L Final    Potassium 4.2  3.5 - 5.3 mmol/L Final    Comment: Slightly Hemolyzed:Results may be affected.    Chloride 86 (*) 96 - 108 mmol/L Final    CO2 41 (*) 21 - 32 mmol/L Final    ANION GAP 9  4 - 13 mmol/L Final    BUN 50 (*) 5 - 25 mg/dL Final    Creatinine 1.09  0.60 - 1.30 mg/dL Final    Comment: Standardized to IDMS reference method    Glucose 162 (*) 65 - 140 mg/dL Final    Comment: If the patient is fasting, the ADA then defines impaired fasting glucose as > 100 mg/dL and diabetes as > or equal to 123 mg/dL.    Calcium 9.5  8.4 - 10.2 mg/dL Final    eGFR 50  ml/min/1.73sq m Final    Narrative:     National Kidney Disease Foundation guidelines for Chronic Kidney Disease (CKD):     Stage 1 with normal or high GFR (GFR > 90 mL/min/1.73 square meters)    Stage 2 Mild CKD (GFR = 60-89 mL/min/1.73 square meters)    Stage 3A Moderate CKD (GFR = 45-59 mL/min/1.73 square meters)    Stage 3B Moderate CKD (GFR = 30-44 mL/min/1.73 square meters)    Stage 4 Severe CKD (GFR = 15-29 mL/min/1.73 square meters)    Stage 5 End Stage CKD (GFR <15 mL/min/1.73 square meters)  Note: GFR calculation is accurate only with a steady state creatinine   B-TYPE NATRIURETIC PEPTIDE (BNP) - Abnormal     (*) 0 - 100 pg/mL Final   BLOOD GAS, VENOUS - Abnormal    pH, Rodo 7.450 (*) 7.300 - 7.400 Final    pCO2, Rodo 53.6 (*) 42.0 - 50.0 mm Hg Final    pO2, Rodo 60.6 (*) 35.0 - 45.0 mm Hg Final    HCO3, Rodo 36.8 (*) 24 - 30 mmol/L Final    Base Excess, Rodo 10.9  mmol/L Final    O2 Content, Rodo 16.3  ml/dL Final    O2 HGB, VENOUS 88.4 (*) 60.0 - 80.0 % Final   COVID-19/INFLUENZA A/B RAPID ANTIGEN (30 MIN.TAT) - Normal    SARS COV Rapid Antigen Negative  Negative Final    Influenza A Rapid  "Antigen Negative  Negative Final    Influenza B Rapid Antigen Negative  Negative Final    Narrative:     This test has been performed using the Quidel Altagracia 2 FLU+SARS Antigen test under the Emergency Use Authorization (EUA). This test has been validated by the  and verified by the performing laboratory. The Altagracia uses lateral flow immunofluorescent sandwich assay to detect SARS-COV, Influenza A and Influenza B Antigen.     The Quidel Altagracia 2 SARS Antigen test does not differentiate between SARS-CoV and SARS-CoV-2.     Negative results are presumptive and may be confirmed with a molecular assay, if necessary, for patient management. Negative results do not rule out SARS-CoV-2 or influenza infection and should not be used as the sole basis for treatment or patient management decisions. A negative test result may occur if the level of antigen in a sample is below the limit of detection of this test.     Positive results are indicative of the presence of viral antigens, but do not rule out bacterial infection or co-infection with other viruses.     All test results should be used as an adjunct to clinical observations and other information available to the provider.    FOR PEDIATRIC PATIENTS - copy/paste COVID Guidelines URL to browser: [email or web address]   LACTIC ACID, PLASMA (W/REFLEX IF RESULT > 2.0) - Normal    LACTIC ACID 1.1  0.5 - 2.0 mmol/L Final    Narrative:     Result may be elevated if tourniquet was used during collection.   HS TROPONIN I 0HR - Normal    hs TnI 0hr 14  \"Refer to ACS Flowchart\"- see link ng/L Final    Comment:                                              Initial (time 0) result  If >=50 ng/L, Myocardial injury suggested ;  Type of myocardial injury and treatment strategy  to be determined.  If 5-49 ng/L, a delta result at 2 hours and or 4 hours will be needed to further evaluate.  If <4 ng/L, and chest pain has been >3 " hours since onset, patient may qualify for discharge based on the HEART score in the ED.  If <5 ng/L and <3hours since onset of chest pain, a delta result at 2 hours will be needed to further evaluate.    HS Troponin 99th Percentile URL of a Health Population=12 ng/L with a 95% Confidence Interval of 8-18 ng/L.    Second Troponin (time 2 hours)  If calculated delta >= 20 ng/L,  Myocardial injury suggested ; Type of myocardial injury and treatment strategy to be determined.  If 5-49 ng/L and the calculated delta is 5-19 ng/L, consult medical service for evaluation.  Continue evaluation for ischemia on ecg and other possible etiology and repeat hs troponin at 4 hours.  If delta is <5 ng/L at 2 hours, consider discharge based on risk stratification via the HEART score (if in ED), or REAGAN risk score in IP/Observation.    HS Troponin 99th Percentile URL of a Health Population=12 ng/L with a 95% Confidence Interval of 8-18 ng/L.         Final Diagnosis:  1. Dyspnea    2. DINA (acute kidney injury) (HCC)              Unless otherwise noted the patient's medications were reviewed and they should continue as directed.    Unless otherwise specified:  CC is exclusive from any separately billable procedures  CC is exclusive of treating other patients  CC is exclusive of teaching time   All splints are static    Medications - No data to display  Time reflects when diagnosis was documented in both MDM as applicable and the Disposition within this note       Time User Action Codes Description Comment    11/24/2024 10:13 PM Jay Carrion Add [R06.00] Dyspnea     11/24/2024 10:13 PM Jay Carrion Add [N17.9] DINA (acute kidney injury) (HCC)           ED Disposition       ED Disposition   Admit    Condition   Stable    Date/Time   Sun Nov 24, 2024 10:13 PM    Comment   Case was discussed with ISABELA and the patient's admission status was agreed to be Admission Status: inpatient status to the service of Dr. Flores .                Follow-up Information    None       Patient's Medications   Discharge Prescriptions    No medications on file     No discharge procedures on file.  Prior to Admission Medications   Prescriptions Last Dose Informant Patient Reported? Taking?   acetaminophen (TYLENOL) 325 mg tablet   No No   Sig: Take 2 tablets (650 mg total) by mouth every 4 (four) hours as needed for mild pain   albuterol (2.5 mg/3 mL) 0.083 % nebulizer solution   No No   Sig: Take 3 mL (2.5 mg total) by nebulization every 4 (four) hours as needed for wheezing or shortness of breath   apixaban (ELIQUIS) 5 mg   No No   Sig: Take 1 tablet (5 mg total) by mouth 2 (two) times a day   bisacodyl (FLEET) 10 MG/30ML ENEM   Yes No   Sig: Insert 10 mg into the rectum daily as needed for constipation   buPROPion (WELLBUTRIN) 75 mg tablet   No No   Sig: take 1 tablet by mouth every morning   budesonide (Pulmicort) 0.5 mg/2 mL nebulizer solution   No No   Sig: Take 2 mL (0.5 mg total) by nebulization every 12 (twelve) hours Rinse mouth after use.   citalopram (CeleXA) 10 mg tablet   No No   Sig: Take 1 tablet (10 mg total) by mouth daily   colchicine (COLCRYS) 0.6 mg tablet   No No   Sig: Take 1 tablet (0.6 mg total) by mouth daily   diazepam (VALIUM) 5 mg tablet   No No   Sig: Take 1 tablet (5 mg total) by mouth every 8 (eight) hours as needed for anxiety or muscle spasms for up to 10 days   fluticasone (FLONASE) 50 mcg/act nasal spray   No No   Si sprays into each nostril daily   formoterol (PERFOROMIST) 20 MCG/2ML nebulizer solution   No No   Sig: Take 2 mL (20 mcg total) by nebulization every 12 (twelve) hours   guaiFENesin (MUCINEX) 600 mg 12 hr tablet   No No   Sig: Take 1 tablet (600 mg total) by mouth every 12 (twelve) hours   metoclopramide (Reglan) 5 mg tablet   No No   Sig: Take 1 tablet (5 mg total) by mouth every 8 (eight) hours as needed (Nausea, abdominal pain from dysmotility)   oxybutynin (DITROPAN-XL) 10 MG 24 hr tablet   Yes No   Sig:  "Take 10 mg by mouth daily at bedtime   pantoprazole (PROTONIX) 40 mg tablet   No No   Sig: Take 1 tablet (40 mg total) by mouth daily in the early morning   saccharomyces boulardii (FLORASTOR) 250 mg capsule   Yes No   Sig: Take 250 mg by mouth daily   torsemide (DEMADEX) 20 mg tablet   No No   Sig: Take 1 tablet (20 mg total) by mouth daily      Facility-Administered Medications: None       Portions of the record may have been created with voice recognition software. Occasional wrong word or \"sound a like\" substitutions may have occurred due to the inherent limitations of voice recognition software. Read the chart carefully and recognize, using context, where substitutions have occurred.    Electronically signed by:  MD Jay Betancourt MD  11/24/24 1078    "

## 2024-11-25 NOTE — ASSESSMENT & PLAN NOTE
SIRS criteria 3/4 (leukocytosis, tachycardia, tachypnea)  No clear source of infection  COVID-19/flu/rsv negative   --Will monitor off antibiotic coverage for now  --Procal slightly elevated at 0.27, will repeat  --Monitor for new evidence of infection  --Am labs

## 2024-11-25 NOTE — RESPIRATORY THERAPY NOTE
11/25/24 1500   Respiratory Assessment   Assessment Type Assess only   General Appearance Alert;Awake   Respiratory Pattern Tachypneic   Chest Assessment Chest expansion symmetrical;Trachea midline   Bilateral Breath Sounds Diminished   Resp Comments Patient seen on 2 lpm nasal cannula, her base oxygen. When I went to see her she was getting anxious, she had too many blankets on her, the tray was too close to her, me and the nurse helped sort her out. BS: diminished, she is ordered for prn Albuteral and knows to ask for it. I have ordered an incentive spirometer for the patient to be used Q1H to help promote better bronchial hygiene, I have left it in her room, nurse was busy with her. Patient has her home auto bipap at the bedside, it is set 20-6, she has 2 lpm oxygen to be bled into it, water is full   Oxygen Therapy/Pulse Ox   O2 Device Nasal cannula   O2 Therapy Oxygen   Nasal Cannula O2 Flow Rate (L/min) 2 L/min   Calculated FIO2 (%) - Nasal Cannula 28   SpO2 95 %   SpO2 Activity At Rest   $ Pulse Oximetry Spot Check Charge Completed

## 2024-11-25 NOTE — ASSESSMENT & PLAN NOTE
Creatinine level at 1.09  Baseline appears to be between 0.5 ans 0.6  --Avoid nephrotoxic agents  --S/P small fluid bolus  --Monitor urinary output,creatinine electrolyte levels   --Monitor renal function  --Nephrology consult if no improvement

## 2024-11-25 NOTE — ASSESSMENT & PLAN NOTE
History of paroxysmal A-fib  Anticoagulated on Eliquis  --Will continue eliquis  --Continue outpatient cardiology follow up

## 2024-11-25 NOTE — ASSESSMENT & PLAN NOTE
Bilateral pleural effusion noted on CT completed on 11/22  Patient currently on torsemide 20  mg daily  --Will hold torsemide for now   --Repeat imaging to evaluated for improvement/resolution  --Pulmonary consult ordered

## 2024-11-25 NOTE — CONSULTS
Consultation - Pulmonology   Name: Claire Doherty 74 y.o. female I MRN: [id number]  Unit/Bed#: RM07 I Date of Admission: 11/24/2024   Date of Service: 11/25/2024 I Hospital Day: 1   Inpatient consult to Pulmonology  Consult performed by: Alberto Faust PA-C  Consult ordered by: Dylan Patton PA-C        Physician Requesting Evaluation: Markell Miller DO   Reason for Evaluation / Principal Problem: COPD exacerbation    Assessment & Plan  Acute on chronic respiratory failure with hypoxia and hypercapnia (HCC)  Baseline requirement is 2 L nasal cannula with BiPAP  Currently requiring 2 L nasal cannula continuously.  Titrate oxygen to maintain SpO2 greater than or equal to 88%.  Continue BiPAP during all hours of sleep.  Continue home auto BiPAP max IPAP 20, min EPAP 6, pressure support 6  Dystrophy, muscular, 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HCC)  Management per primary team   Pleural effusion, bilateral  Seen on CT imaging 11/22 however repeat CTA chest PE study today shows worsening left-sided pleural effusion  Trial Lasix 20 mg IV once  Plan for IR thoracentesis for diagnostic and therapeutic purposes tomorrow  Monitor I/Os and daily weights   COPD exacerbation (HCC)  Continue budesonide and Xopenex  Transition to prednisone 40 mg p.o. daily tomorrow.  Recommend 5-day course of systemic steroids.  Recommend 3 days of azithromycin   Check RP 2 panel for completeness sake.  I have discussed the above management plan in detail with the primary service.     History of Present Illness   Claire Doherty is a 74 y.o. female who presents with breath.  She has a past medical is positive for restrictive lung disease secondary to muscular dystrophy, COPD, paroxysmal A-fib, GERD, CHF, nicotine dependence in remission.  Patient was at short-term rehab when she developed worsening shortness of breath prompting ED visit last night.  Patient states symptoms have been progressing over the last several days to the point where she had  conversational dyspnea yesterday.  In the ED chronic hypercapnia on VBG, elevated D-dimer, elevated BNP, mild leukocytosis, and mildly elevated procalcitonin although improved compared to September.  Patient was admitted for COPD exacerbation but CTA chest PE study done today shows worsening left-sided pleural effusion.  [facility] consulted to help manage this.  Presently, patient states that she still feels more short of breath than her baseline but improved compared to yesterday.    Patient follows with Dr. Mandujano from a pulmonary standpoint.  She is chronic hypoxic and hypercapnic respiratory failure maintained on BiPAP with 2 L bled in nightly.  She has COPD but is not maintained on regular inhalers/nebulizers.  Instead, she uses Pulmicort and albuterol as needed when sick.  She is a former smoker with a 40-pack-year smoking history and a quit date in 2004.  Denies occupational exposures.  Patient does have progressive muscular dystrophy with limited mobility at baseline.     Review of Systems   All other systems reviewed and are negative.      Historical Information   I have reviewed the patient's PMH, PSH, Social History, Family History, Meds, and Allergies  Tobacco History: former smoker with 40 pack year history  Occupational History: noncontributory  Family History:  Family History   Problem Relation Age of Onset    Other Mother         bone loss    Arthritis Mother     Skin cancer Father     Hypertension Father         benign     Other Father         bone loss    Muscular dystrophy Father     Hypertension Sister     No Known Problems Sister     Muscular dystrophy Brother         of the limb gridle     Parkinsonism Brother     No Known Problems Brother     No Known Problems Maternal Grandmother     No Known Problems Paternal Grandmother     No Known Problems Maternal Aunt     Muscular dystrophy Family         of the limb girdle       Objective :  Temp:  [97 °F (36.1 °C)] 97 °F (36.1 °C)  HR:  [] 88  BP:  (115-169)/(60-76) 117/62  Resp:  [17-22] 17  SpO2:  [97 %-100 %] 99 %  O2 Device: Nasal cannula  Nasal Cannula O2 Flow Rate (L/min):  [2 L/min] 2 L/min    Physical Exam  Vitals reviewed.   Constitutional:       Appearance: Normal appearance. She is well-developed.   HENT:      Head: Normocephalic and atraumatic.      Nose: Nose normal.      Mouth/Throat:      Mouth: Mucous membranes are moist.   Eyes:      Extraocular Movements: Extraocular movements intact.   Cardiovascular:      Rate and Rhythm: Normal rate and regular rhythm.      Heart sounds: Normal heart sounds.   Pulmonary:      Effort: Pulmonary effort is normal. No respiratory distress.      Breath sounds: Rales (bibasilarly) present. No wheezing or rhonchi.   Musculoskeletal:         General: No swelling.   Skin:     General: Skin is warm and dry.   Neurological:      Mental Status: She is alert. Mental status is at baseline.   Psychiatric:         Mood and Affect: Mood normal.         Behavior: Behavior normal.         Lab Results: I have reviewed the following results:  .     11/24/24 2026 11/24/24 2048 11/25/24  0516   WBC 13.11*  --  12.36*   HGB 12.5  --  11.7   HCT 39.1  --  36.3   *  --  541*   SODIUM 136  --  137   K 4.2  --  3.3*   CL 86*  --  88*   CO2 41*  --  38*   BUN 50*  --  45*   CREATININE 1.09  --  1.00   GLUC 162*  --  110   MG  --   --  2.0   PHOS  --   --  4.4*   AST  --   --  15   ALT  --   --  15   ALB  --   --  3.4*   TBILI  --   --  0.34   ALKPHOS  --   --  77   HSTNI0 14  --   --    *  --   --    LACTICACID  --  1.1  --      ABG: No new results in last 24 hours.    Imaging Results Review: I personally reviewed the following image studies in PACS and associated radiology reports: CT chest. My interpretation of the radiology images/reports is: CTA chest PE study shows worsening left pleural effusion compared to 11/20.  No large PE identified but await official radiology read.  Other Study Results Review: No  additional pertinent studies reviewed.  PFT Results Reviewed: reviewed

## 2024-11-25 NOTE — ASSESSMENT & PLAN NOTE
History of muscular dystrophy  Currently at SNF for rehab  Reports that she had not been able to transfer from bed to chair on her own  --Continue bipap at bedtime  --Likely return to rehab post discharge   --Continue outpatient Neurology follow up

## 2024-11-25 NOTE — ASSESSMENT & PLAN NOTE
Creatinine level at 1.09  Baseline appears to be between 0.5 ans 0.6  --Avoid nephrotoxic agents  --IV fluid bolus  --Monitor urinary output,creatinine electrolyte levels   --Check AM CMP  --Nephrology consult if no improvement

## 2024-11-25 NOTE — ASSESSMENT & PLAN NOTE
Presented to the emergency room for evaluation of complaint of shortness of breath  Patient reports SOB has been ongoing for several days getting progressively worst.   Patient reports SOB is worst upon exertion.  Patient also reports having conversational dyspnea  Denies chest pain, cough. Admits to chest tightness  COVID-19/FLU/RSV negative   Likely secondary to combination of COPD exacerbation, pleural effusions  --Admit to med surg  --Respiratory protocol  --Continue PTA COPD medication  --Continue Oxygen at 3 lpm   --BIPAP at bedtime  --Monitor respiratory status, SPO2   --Will check CTA chest PE study to evaluate for acute PE   --Pulmonary consult   --Supportive care

## 2024-11-25 NOTE — ASSESSMENT & PLAN NOTE
Presented to the ER for evaluation of complaint of ongoing shortness of breath getting progressively worse over the past few days  Possible COPD exacerbation  Currently on home oxygen at 2 L/min  CXR shows   Small left basilar effusion with probable compressive atelectasis   --Respiratory protocol  --Incentive spirometry  --Continue CTA COPD medications  --Received solumedrol 40mg, now transition to PO prednisone 40mg for 5 days per Pulm  --Azithromycin 500 mg IV daily X 3 days  -- Check sputum culture and Gram stain  --Check procalcitonin  --Continue oxygen at 2 lpm via NC  --Continue bipap at bed time  --Pulmonary consult

## 2024-11-25 NOTE — ASSESSMENT & PLAN NOTE
Continue budesonide and Xopenex  Transition to prednisone 40 mg p.o. daily tomorrow.  Recommend 5-day course of systemic steroids.  Recommend 3 days of azithromycin   Check RP 2 panel for completeness sake.

## 2024-11-25 NOTE — ASSESSMENT & PLAN NOTE
D-dimer level at 3.38  No documented history DVT/PE  CTA chest PE negative  --Vas duplex negative for DVT  --Continue Eliquis   --Continue to monitor

## 2024-11-25 NOTE — PROGRESS NOTES
Progress Note - Hospitalist   Name: Claire Doherty 74 y.o. female I MRN: [id number]  Unit/Bed#: 419-01 I Date of Admission: 11/24/2024   Date of Service: 11/25/2024 I Hospital Day: 1    Assessment & Plan  Acute on chronic respiratory failure with hypoxia and hypercapnia (HCC)  Presented to the emergency room for evaluation of complaint of shortness of breath  Patient reports SOB has been ongoing for several days getting progressively worst.   Patient reports SOB is worst upon exertion.  Patient also reports having conversational dyspnea  Has history of restrictive lung dur to muscular dystrophy   On 2L supplemental O2 at baseline  Denies chest pain, cough. Admits to chest tightness  COVID-19/FLU/RSV negative   Likely secondary to combination of COPD exacerbation, pleural effusions  --Admit to med surg  --Respiratory protocol  --Continue PTA COPD medication  --Continue Oxygen at 3 lpm   --BIPAP at bedtime  --Monitor respiratory status, SPO2   --CTA negative for PE  --Pulmonary consult   --Supportive care   DINA (acute kidney injury) ([facility])  Creatinine level at 1.09  Baseline appears to be between 0.5 ans 0.6  --Avoid nephrotoxic agents  --S/P small fluid bolus  --Monitor urinary output,creatinine electrolyte levels   --Monitor renal function  --Nephrology consult if no improvement     SIRS (systemic inflammatory response syndrome) ([facility])  SIRS criteria 3/4 (leukocytosis, tachycardia, tachypnea)  No clear source of infection  COVID-19/flu/rsv negative   --Will monitor off antibiotic coverage for now  --Procal slightly elevated at 0.27, will repeat  --Monitor for new evidence of infection  --Am labs  Pleural effusion, bilateral  Bilateral pleural effusion noted on CT completed on 11/22  Patient currently on torsemide 20  mg daily  --Will hold torsemide for now, transition to daily lasix for now  --Pulmonology following   Plan for thoracentesis tomorrow     Dystrophy, muscular, 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HCC)  History of  muscular dystrophy  Currently at [facility] for rehab  Reports that she had not been able to transfer from bed to chair on her own  --Continue bipap at bedtime  --Likely return to rehab post discharge, return to [place]  --Continue outpatient Neurology follow up  GERD without esophagitis  Stable  --Continue Protonix  Colostomy status ([facility])  S/P colostomy  --Intact with normal output  --Monitor while admitted   Pericardial effusion  Noted on CT completed on 11/22  --Consider cardiology consult  Elevated d-dimer  D-dimer level at 3.38  No documented history DVT/PE  CTA chest PE negative  --Vas duplex negative for DVT  --Continue Eliquis   --Continue to monitor   COPD exacerbation ([facility])  Presented to the ER for evaluation of complaint of ongoing shortness of breath getting progressively worse over the past few days  Possible COPD exacerbation  Currently on home oxygen at 2 L/min  CXR shows   Small left basilar effusion with probable compressive atelectasis   --Respiratory protocol  --Incentive spirometry  --Continue CTA COPD medications  --Received solumedrol 40mg, now transition to PO prednisone 40mg for 5 days per Pulm  --Azithromycin 500 mg IV daily X 3 days  -- Check sputum culture and Gram stain  --Check procalcitonin  --Continue oxygen at 2 lpm via NC  --Continue bipap at bed time  --Pulmonary consult     Paroxysmal A-fib ([facility])  History of paroxysmal A-fib  Anticoagulated on Eliquis  --Will continue eliquis  --Continue outpatient cardiology follow up    Chronic diastolic heart failure ([facility])  Wt Readings from Last 3 Encounters:   11/25/24 72 kg (158 lb 11.7 oz)   09/20/24 75.3 kg (166 lb 0.1 oz)   09/07/24 77.8 kg (171 lb 8.3 oz)     Appears close to euvolemic on exam  On Torsemide at home, will continue with lasix for now as patient with pleural effusions  Monitor I&O  Daily Weights  Last echo 9/11  EF 65%          VTE Pharmacologic Prophylaxis: VTE Score: 7 High Risk (Score >/= 5) - Pharmacological DVT Prophylaxis  Ordered: apixaban (Eliquis). Sequential Compression Devices Ordered.    Mobility:      Assessment pending    Patient Centered Rounds: I performed bedside rounds with nursing staff today.   Discussions with Specialists or Other Care Team Provider:   Pulmonology    Education and Discussions with Family / Patient: Attempted to update  (sister) via phone. Left voicemail.     Current Length of Stay: 1 day(s)  Current Patient Status: Inpatient   Certification Statement: The patient will continue to require additional inpatient hospital stay due to respiratory failure with hypoxia  Discharge Plan: Anticipate discharge in 24-48 hrs to rehab facility.    Code Status: Level 3 - DNAR and DNI    Subjective   Patient seen and examined resting comfortably in bed. Reports some chest tightness no additional complaints at this time. Agreeable to current plan of care.    Objective :  Temp:  [97 °F (36.1 °C)-97.9 °F (36.6 °C)] 97.9 °F (36.6 °C)  HR:  [] 103  BP: (115-169)/() 151/102  Resp:  [17-22] 17  SpO2:  [93 %-100 %] 93 %  O2 Device: Nasal cannula  Nasal Cannula O2 Flow Rate (L/min):  [2 L/min] 2 L/min    Body mass index is 31 kg/m².     Input and Output Summary (last 24 hours):     Intake/Output Summary (Last 24 hours) at 11/25/2024 1410  Last data filed at 11/25/2024 0412  Gross per 24 hour   Intake 500 ml   Output --   Net 500 ml       Physical Exam  Vitals and nursing note reviewed.   Constitutional:       General: She is not in acute distress.     Appearance: Normal appearance.   HENT:      Head: Normocephalic.      Mouth/Throat:      Mouth: Mucous membranes are moist.   Eyes:      Conjunctiva/sclera: Conjunctivae normal.   Cardiovascular:      Rate and Rhythm: Regular rhythm. Tachycardia present.      Pulses: Normal pulses.      Heart sounds: Normal heart sounds.   Pulmonary:      Effort: Pulmonary effort is normal.      Breath sounds: Normal breath sounds.   Abdominal:      General: There is no  distension.      Palpations: Abdomen is soft.      Tenderness: There is abdominal tenderness.   Musculoskeletal:         General: No swelling.   Skin:     General: Skin is warm.   Neurological:      Mental Status: She is alert and oriented to person, place, and time.      Motor: Weakness present.   Psychiatric:         Mood and Affect: Mood normal.         Behavior: Behavior normal.         Thought Content: Thought content normal.           Lines/Drains:              Lab Results: I have reviewed the following results:   Results from last 7 days   Lab Units 11/25/24  0516   WBC Thousand/uL 12.36*   HEMOGLOBIN g/dL 11.7   HEMATOCRIT % 36.3   PLATELETS Thousands/uL 541*   SEGS PCT % 75   LYMPHO PCT % 14   MONO PCT % 10   EOS PCT % 1     Results from last 7 days   Lab Units 11/25/24  0516   SODIUM mmol/L 137   POTASSIUM mmol/L 3.3*   CHLORIDE mmol/L 88*   CO2 mmol/L 38*   BUN mg/dL 45*   CREATININE mg/dL 1.00   ANION GAP mmol/L 11   CALCIUM mg/dL 9.2   ALBUMIN g/dL 3.4*   TOTAL BILIRUBIN mg/dL 0.34   ALK PHOS U/L 77   ALT U/L 15   AST U/L 15   GLUCOSE RANDOM mg/dL 110                 Results from last 7 days   Lab Units 11/25/24  0516 11/24/24  2048 11/22/24  1604   LACTIC ACID mmol/L  --  1.1 0.8   PROCALCITONIN ng/ml 0.27*  --   --        Recent Cultures (last 7 days):         Imaging Results Review: No pertinent imaging studies reviewed.  Other Study Results Review: No additional pertinent studies reviewed.    Last 24 Hours Medication List:     Current Facility-Administered Medications:     acetaminophen (TYLENOL) tablet 650 mg, Q6H PRN    albuterol inhalation solution 2.5 mg, Q4H PRN    apixaban (ELIQUIS) tablet 5 mg, BID    azithromycin (ZITHROMAX) 500 mg in sodium chloride 0.9% 250mL IVPB 500 mg, Q24H, Last Rate: Stopped (11/25/24 0412)    bisacodyl (DULCOLAX) rectal suppository 10 mg, Daily PRN    budesonide (PULMICORT) inhalation solution 0.5 mg, BID PRN    buPROPion (WELLBUTRIN) tablet 75 mg, QAM    citalopram  (CeleXA) tablet 10 mg, Daily    fluticasone (FLONASE) 50 mcg/act nasal spray 2 spray, Daily    furosemide (LASIX) injection 20 mg, Once    guaiFENesin (MUCINEX) 12 hr tablet 600 mg, Q12H NANCY    metoclopramide (REGLAN) tablet 5 mg, Q8H PRN    oxybutynin (DITROPAN-XL) 24 hr tablet 10 mg, HS    pantoprazole (PROTONIX) EC tablet 40 mg, Early Morning    potassium chloride (Klor-Con M20) CR tablet 20 mEq, Once    [START ON 11/26/2024] predniSONE tablet 40 mg, Daily    saccharomyces boulardii (FLORASTOR) capsule 250 mg, Daily    [Held by provider] torsemide (DEMADEX) tablet 20 mg, Daily    Administrative Statements   Today, Patient Was Seen By: SHAKIRA Jackson  I have spent a total time of 32 minutes in caring for this patient on the day of the visit/encounter including Prognosis, Instructions for management, Patient and family education, Importance of tx compliance, Risk factor reductions, Impressions, Documenting in the medical record, Reviewing / ordering tests, medicine, procedures  , Obtaining or reviewing history  , and Communicating with other healthcare professionals .    **Please Note: This note may have been constructed using a voice recognition system.**

## 2024-11-25 NOTE — UTILIZATION REVIEW
NOTIFICATION OF INPATIENT ADMISSION   AUTHORIZATION REQUEST   SERVICING FACILITY:   [place] [address]  Tax ID:  [id number]  NPI: [id number] ATTENDING PROVIDER:  Attending Name and NPI#: Markell Miller Do [[id number]]  Address: [address] [place] [address]  Phone: [phone]     ADMISSION INFORMATION:  Place of Service: Inpatient [facility]  Place of Service Code: 21  Inpatient Admission Date/Time: 11/24/24 10:14 PM  Discharge Date/Time: No discharge date for patient encounter.  Admitting Diagnosis Code/Description:  SOB (shortness of breath) [R06.02]     UTILIZATION REVIEW CONTACT:  Refugio Galan Utilization   Network Utilization Review Department  Phone: [phone]  Fax [phone]  Email: [email or web address]  Contact for approvals/pending authorizations, clinical reviews, and discharge.     PHYSICIAN ADVISORY SERVICES:  Medical Necessity Denial & Fzac-mc-Zfvq Review  Phone: [phone]  Fax: [phone]  Email: [email or web address]     DISCHARGE SUPPORT TEAM:  For Patients Discharge Needs & Updates  Phone: [phone] opt. 2 Fax: [phone]  Email: [email or web address]

## 2024-11-25 NOTE — ASSESSMENT & PLAN NOTE
Seen on CT imaging 11/22 however repeat CTA chest PE study today shows worsening left-sided pleural effusion  Trial Lasix 20 mg IV once  Plan for IR thoracentesis for diagnostic and therapeutic purposes tomorrow  Monitor I/Os and daily weights

## 2024-11-25 NOTE — RESPIRATORY THERAPY NOTE
RT Protocol Note  Claire Doherty 74 y.o. female MRN: [id number]  Unit/Bed#: RM07 Encounter: [id number]    Assessment    Principal Problem:    Acute on chronic respiratory failure with hypoxia ([facility])  Active Problems:    Dystrophy, muscular, 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HCC)    GERD without esophagitis    Colostomy status ([facility])    Pericardial effusion    DINA (acute kidney injury) ([facility])    SIRS (systemic inflammatory response syndrome) ([facility])    Pleural effusion, bilateral      Home Pulmonary Medications: Albuterol, Pulmicort  Home Devices/Therapy: Home O2, BiPAP/CPAP (2LNC)    Past Medical History:   Diagnosis Date    Acute kidney failure (HCC)     Acute nonspecific idiopathic pericarditis     Anxiety     Atrial fibrillation ([facility])     Breast cancer ([facility]) 2007    Cellulitis of right lower extremity     CHF (congestive heart failure) ([facility])     Chronic pain     Chronic respiratory failure with hypoxia ([facility])     Colitis     Colostomy status ([facility])     Dependence on supplemental oxygen     Depression     Difficult intubation     Excessive daytime sleepiness     Gastroesophageal reflux disease without esophagitis     Gastroparesis     GERD (gastroesophageal reflux disease)     Hyperlipidemia     Ischemic colitis ([facility]) 01/21/2019    Leukocytosis 03/28/2020    Muscular dystrophy ([facility])     Limb-girdle    Osteoporosis     Other nonrheumatic aortic valve disorders     Overactive bladder     Perforated diverticulum 03/28/2020    Pericardial effusion (noninflammatory)     Pericarditis     Pneumonitis     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[facility])     Rheumatic tricuspid insufficiency     Skin cancer     Skin disorder     Tachycardia 06/02/2021    Vitamin D deficiency      Social History     Socioeconomic History    Marital status:      Spouse name: None    Number of children: None    Years of education: None    Highest education level: None   Occupational History    None   Tobacco Use    Smoking status: Former      Current packs/day: 0.00     Average packs/day: 1.5 packs/day for 37.0 years (55.5 ttl pk-yrs)     Types: Cigarettes     Start date:      Quit date: 2004     Years since quittin.9    Smokeless tobacco: Never   Vaping Use    Vaping status: Never Used   Substance and Sexual Activity    Alcohol use: Not Currently     Comment: social drinker per allscripts     Drug use: Yes     Types: Marijuana     Comment: medical marijuana    Sexual activity: Not Currently   Other Topics Concern    None   Social History Narrative    Currently on permanent disability due to musc dystrophy    Denied daily coffee consumption     Wheelchair dependent since about     No children     Social Drivers of Health     Financial Resource Strain: Low Risk  (2023)    Overall Financial Resource Strain (CARDIA)     Difficulty of Paying Living Expenses: Not hard at all   Food Insecurity: No Food Insecurity (2024)    Nursing - Inadequate Food Risk Classification     Worried About Running Out of Food in the Last Year: Never true     Ran Out of Food in the Last Year: Never true     Ran Out of Food in the Last Year: Not on file   Transportation Needs: No Transportation Needs (2024)    PRAPARE - Transportation     Lack of Transportation (Medical): No     Lack of Transportation (Non-Medical): No   Physical Activity: Not on file   Stress: Not on file   Social Connections: Unknown (2024)    Received from [employer]    Social [employer]     How often do you feel lonely or isolated from those around you? (Adult - for ages 18 years and over): Not on file   Intimate Partner Violence: Not on file   Housing Stability: Low Risk  (2024)    Housing Stability Vital Sign     Unable to Pay for Housing in the Last Year: No     Number of Times Moved in the Last Year: 1     Homeless in the Last Year: No       Subjective         Objective    Physical Exam:   Assessment Type: Assess only  General Appearance: Alert, Awake  Respiratory  Pattern: Dyspnea with exertion  Chest Assessment: Chest expansion symmetrical  Bilateral Breath Sounds: Diminished  Cough: None  O2 Device: HS CPAP Home Unit with 2L    Vitals:  Blood pressure 123/72, pulse 100, temperature (!) 97 °F (36.1 °C), temperature source Tympanic, resp. rate 18, SpO2 97%, not currently breastfeeding.          Imaging and other studies: Results Review Statement: I reviewed radiology reports from this admission including: chest xray.    O2 Device: HS CPAP Home Unit with 2L     Plan    Respiratory Plan: Mild Distress pathway, Home Bronchodilator Patient pathway, Vent/NIV/HFNC  Airway Clearance Plan: Incentive Spirometer, Discontinue Protocol     Resp Comments: Pt refusing scheduled nebulzers. Pt requesting PRN only.\

## 2024-11-25 NOTE — PROGRESS NOTES
The azithromycin has / have been converted to Oral per [facility] IV-to-PO Auto-Conversion Protocol for Adults as approved by the Pharmacy and Therapeutics Committee. The patient met all eligible criteria:  1) Age = 18 years old   2) Received at least one dose of the IV form   3) Receiving at least one other scheduled oral/enteral medication   4) Tolerating an oral/enteral diet   and did not have any exclusions:   1) Critical care patient   2) Active GI bleed (IF assessing H2RAs or PPIs)   3) Continuous tube feeding (IF assessing cipro, doxycycline, levofloxacin, minocycline, rifampin, or voriconazole)   4) Receiving PO vancomycin (IF assessing metronidazole)   5) Persistent nausea and/or vomiting   6) Ileus or gastrointestinal obstruction   7) Fadi/nasogastric tube set for continuous suction   8) Specific order not to automatically convert to PO (in the order's comments or if discussed in the most recent Infectious Disease or primary team's progress notes).

## 2024-11-25 NOTE — H&P
H&P - Hospitalist   Name: Claire Doherty 74 y.o. female I MRN: [id number]  Unit/Bed#: RM07 I Date of Admission: 11/24/2024   Date of Service: 11/25/2024 I Hospital Day: 1     Assessment & Plan  Acute on chronic respiratory failure with hypoxia (HCC)  Presented to the emergency room for evaluation of complaint of shortness of breath  Patient reports SOB has been ongoing for several days getting progressively worst.   Patient reports SOB is worst upon exertion.  Patient also reports having conversational dyspnea  Denies chest pain, cough. Admits to chest tightness  COVID-19/FLU/RSV negative   Likely secondary to combination of COPD exacerbation, pleural effusions  --Admit to med surg  --Respiratory protocol  --Continue PTA COPD medication  --Continue Oxygen at 3 lpm   --BIPAP at bedtime  --Monitor respiratory status, SPO2   --Will check CTA chest PE study to evaluate for acute PE   --Pulmonary consult   --Supportive care   DINA (acute kidney injury) ([facility])  Creatinine level at 1.09  Baseline appears to be between 0.5 ans 0.6  --Avoid nephrotoxic agents  --IV fluid bolus  --Monitor urinary output,creatinine electrolyte levels   --Check AM CMP  --Nephrology consult if no improvement     SIRS (systemic inflammatory response syndrome) ([facility])  SIRS criteria 3/4 (leukocytosis, tachycardia, tachypnea)  No clear source of infection  COVID-19/flu/rsv negative   --Will monitor off antibiotic coverage for now  --Check Procalcitonin  --Monitor for new evidence of infection  --Am labs  Pleural effusion, bilateral  Bilateral pleural effusion noted on CT completed on 11/22  Patient currently on torsemide 20  mg daily  --Will hold torsemide for now   --Repeat imaging to evaluated for improvement/resolution  --Pulmonary consult ordered   Dystrophy, muscular, 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[facility])  History of muscular dystrophy  Currently at [facility] for rehab  Reports that she had not been able to transfer from bed to chair on her own  --Continue bipap  at bedtime  --Likely return to rehab post discharge   --Continue outpatient Neurology follow up  GERD without esophagitis  Stable  --Continue Protonix  Colostomy status ([facility])  S/P colostomy  --Intact with normal output  --Monitor while admitted   Pericardial effusion  Noted on CT completed on 11/22  --Consider cardiology consult  Elevated d-dimer  D-dimer level at 3.38  No documented history DVT/PE  CTA chest PE study deferred due to DINA  --Will check Vas lower limb venous duplex study  --Continue Eliquis   --Will check PE study as DINA improves   --Continue to monitor   COPD exacerbation ([facility])  Presented to the ER for evaluation of complaint of ongoing shortness of breath getting progressively worse over the past few days  Possible COPD exacerbation  Currently on home oxygen at 2 L/min  Chest X-ray completed official report pending  --Respiratory protocol  --Incentive spirometry  --Continue CTA COPD medications  --Solu-Medrol 40 mg every 12 hours pending pulmonary input  --Azithromycin 500 mg IV daily X 3 days  -- Check sputum culture and Gram stain  --Check procalcitonin  --Continue oxygen at 3 lpm via NC  --Continue bipap at bed time  --Pulmonary consult     Paroxysmal A-fib ([facility])  History of paroxysmal A-fib  Anticoagulated on Eliquis  --Will continue eliquis  --Continue outpatient cardiology follow up        VTE Pharmacologic Prophylaxis:   Moderate Risk (Score 3-4) - Pharmacological DVT Prophylaxis Ordered: apixaban (Eliquis).  Code Status: Level 3 - DNAR and DNI   Discussion with family: Patient declined call to .     Anticipated Length of Stay: Patient will be admitted on an inpatient basis with an anticipated length of stay of greater than 2 midnights secondary to acute on chronic respiratory failure with hypoxia, COPD exacerbation, acute kidney injury, SIRS, elevated D-dimer ,bilateral pleural effusion, pericardial effusion.    History of Present Illness   Chief Complaint: Shortness of  rae Doherty is a 74 y.o. female with a PMH of COPD, muscular dystrophy, paroxysmal A-fib, GERD, CHF who presents to the emergency room via EMS from [facility] for evaluation of complaint of shortness of breath over the past several days getting progressively worse.  Patient has history of COPD currently on oxygen at 2 L/min.  Patient reports that shortness of breath is worse upon exertion when she lays flat.  She also reports some conversational dyspnea.  Patient denies chest pain, cough, fever, chills, nausea, vomiting, diarrhea, abdominal pain.     Work up in the ER included labs significant for ABG showing a pH of 7.45, pCO2 of 53.6, pO2 of 60.6, bicarb of 36.8, glucose of 162, creatinine of 1.09, BUN of 50 0-hour troponin of 14, BNP of 168 and a lactic acid of 1.1, WBC of 13.11, platelets of 444, D-dimer of 3.38.  COVID-19/flu/RSV negative.  Chest x-ray completed official report pending.    Patient is being admitted on Inpatient status [facility] level care for further workup and management of acute on chronic respiratory failure with hypoxia, COPD exacerbation, acute kidney injury, SIRS, elevated D-dimer, bilateral pleural effusion, pericardial effusion.    Review of Systems   Constitutional:  Positive for activity change and appetite change. Negative for chills, fatigue and fever.   Eyes:  Negative for photophobia and visual disturbance.   Respiratory:  Positive for cough, chest tightness and shortness of breath. Negative for wheezing.    Cardiovascular:  Negative for chest pain, palpitations and leg swelling.   Gastrointestinal:  Negative for abdominal pain, diarrhea, nausea and vomiting.   Genitourinary:  Negative for difficulty urinating, dysuria, flank pain and hematuria.   Musculoskeletal:  Positive for gait problem. Negative for arthralgias, back pain, neck pain and neck stiffness.   Skin:  Negative for rash and wound.   Neurological:  Negative for dizziness, tremors, syncope,  weakness and headaches.   Psychiatric/Behavioral:  Negative for agitation and confusion. The patient is not nervous/anxious.        Historical Information   Past Medical History:   Diagnosis Date    Acute kidney failure ([facility])     Acute nonspecific idiopathic pericarditis     Anxiety     Atrial fibrillation ([facility])     Breast cancer ([facility]) 2007    Cellulitis of right lower extremity     CHF (congestive heart failure) ([facility])     Chronic pain     Chronic respiratory failure with hypoxia ([facility])     Colitis     Colostomy status ([facility])     Dependence on supplemental oxygen     Depression     Difficult intubation     Excessive daytime sleepiness     Gastroesophageal reflux disease without esophagitis     Gastroparesis     GERD (gastroesophageal reflux disease)     Hyperlipidemia     Ischemic colitis ([facility]) 01/21/2019    Leukocytosis 03/28/2020    Muscular dystrophy ([facility])     Limb-girdle    Osteoporosis     Other nonrheumatic aortic valve disorders     Overactive bladder     Perforated diverticulum 03/28/2020    Pericardial effusion (noninflammatory)     Pericarditis     Pneumonitis     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[facility])     Rheumatic tricuspid insufficiency     Skin cancer     Skin disorder     Tachycardia 06/02/2021    Vitamin D deficiency      Past Surgical History:   Procedure Laterality Date    CARDIAC CATHETERIZATION N/A 8/21/2024    Procedure: Cardiac pci;  Surgeon: Juan Fuller MD;  [facility]: [facility];  Service: Cardiology    CARDIAC CATHETERIZATION N/A 8/21/2024    Procedure: Cardiac PCI Stent;  Surgeon: Juan Fuller MD;  [facility];  Service: Cardiology    CARDIAC CATHETERIZATION N/A 8/21/2024    Procedure: Cardiac Coronary Angiogram;  Surgeon: Juan Fuller MD;  [facility];  Service: Cardiology    COLONOSCOPY N/A 1/22/2019    Procedure: COLONOSCOPY;  Surgeon: Anthony Mejía MD;  Location: [facility];  Service: Colorectal    CYSTOSCOPY  N/A 2020    Procedure: CYSTOSCOPY; BILATERAL URETERAL CATHETER PLACEMENT, CYSTOTOMY;  Surgeon: Zach Tyler MD;  Location: [facility];  Service: Urology    FL CYSTOGRAM  10/15/2020    HARTMANS PROCEDURE N/A 2020    Procedure: EXPLORATORY LAPAROTOMY; LYSIS OF ADHESIONS; WASHOUT PELVIC ABSCESS; COMPLEX SIGMOID COLON RESECTION; LOOP TRANSVERSE COLOSTOMY;  Surgeon: Thania Jaffe MD;  Location: [facility];  Service: General    IR DRAINAGE TUBE PLACEMENT  2020    MASTECTOMY Left 2007    left breast mastectomy     TONSILLECTOMY      TOOTH EXTRACTION      TUBAL LIGATION       Social History     Tobacco Use    Smoking status: Former     Current packs/day: 0.00     Average packs/day: 1.5 packs/day for 37.0 years (55.5 ttl pk-yrs)     Types: Cigarettes     Start date:      Quit date:      Years since quittin.9    Smokeless tobacco: Never   Vaping Use    Vaping status: Never Used   Substance and Sexual Activity    Alcohol use: Not Currently     Comment: social drinker per allscripts     Drug use: Yes     Types: Marijuana     Comment: medical marijuana    Sexual activity: Not Currently     E-Cigarette/Vaping    E-Cigarette Use Never User      E-Cigarette/Vaping Substances    Nicotine No     THC No     CBD No     Flavoring No     Other No     Unknown No      Family History   Problem Relation Age of Onset    Other Mother         bone loss    Arthritis Mother     Skin cancer Father     Hypertension Father         benign     Other Father         bone loss    Muscular dystrophy Father     Hypertension Sister     No Known Problems Sister     Muscular dystrophy Brother         of the limb gridle     Parkinsonism Brother     No Known Problems Brother     No Known Problems Maternal Grandmother     No Known Problems Paternal Grandmother     No Known Problems Maternal Aunt     Muscular dystrophy Family         of the limb girdle     Social History:  Marital Status:    Occupation:   Patient  Pre-hospital Living Situation: Long Term Care Facility  Patient Pre-hospital Level of Mobility: non-ambulatory/bed bound  Patient Pre-hospital Diet Restrictions: None reported    Meds/Allergies   I have reviewed home medications using recent Epic encounter.  Prior to Admission medications    Medication Sig Start Date End Date Taking? Authorizing Provider   acetaminophen (TYLENOL) 325 mg tablet Take 2 tablets (650 mg total) by mouth every 4 (four) hours as needed for mild pain 8/22/24   Charly Carvajal   albuterol (2.5 mg/3 mL) 0.083 % nebulizer solution Take 3 mL (2.5 mg total) by nebulization every 4 (four) hours as needed for wheezing or shortness of breath 6/15/23   Julienne Tucker DO   apixaban (ELIQUIS) 5 mg Take 1 tablet (5 mg total) by mouth 2 (two) times a day 9/11/24   Rod Singleton MD   bisacodyl (FLEET) 10 MG/30ML ENEM Insert 10 mg into the rectum daily as needed for constipation    Historical Provider, MD   budesonide (Pulmicort) 0.5 mg/2 mL nebulizer solution Take 2 mL (0.5 mg total) by nebulization every 12 (twelve) hours Rinse mouth after use. 9/20/24   Marla Mayo,    buPROPion (WELLBUTRIN) 75 mg tablet take 1 tablet by mouth every morning 7/22/24   Julienne Tucker DO   citalopram (CeleXA) 10 mg tablet Take 1 tablet (10 mg total) by mouth daily 4/6/23   Julienne Tucker DO   colchicine (COLCRYS) 0.6 mg tablet Take 1 tablet (0.6 mg total) by mouth daily 8/22/24 9/21/24  Charly Carvajal   diazepam (VALIUM) 5 mg tablet Take 1 tablet (5 mg total) by mouth every 8 (eight) hours as needed for anxiety or muscle spasms for up to 10 days 9/7/24 9/17/24  Sosa Tang, DO   fluticasone (FLONASE) 50 mcg/act nasal spray 2 sprays into each nostril daily 6/15/23   Julienne Tucker DO   formoterol (PERFOROMIST) 20 MCG/2ML nebulizer solution Take 2 mL (20 mcg total) by nebulization every 12 (twelve) hours 9/20/24   DO nell PoweraiFENesin (MUCINEX) 600 mg 12 hr tablet Take 1 tablet  (600 mg total) by mouth every 12 (twelve) hours 9/20/24   Marla Mayo DO   metoclopramide (Reglan) 5 mg tablet Take 1 tablet (5 mg total) by mouth every 8 (eight) hours as needed (Nausea, abdominal pain from dysmotility) 11/22/24   Alessandra Woodall DO   oxybutynin (DITROPAN-XL) 10 MG 24 hr tablet Take 10 mg by mouth daily at bedtime    Historical Provider, MD   pantoprazole (PROTONIX) 40 mg tablet Take 1 tablet (40 mg total) by mouth daily in the early morning 8/23/24   Charly Carvajal   saccharomyces boulardii (FLORASTOR) 250 mg capsule Take 250 mg by mouth daily    Historical Provider, MD   torsemide (DEMADEX) 20 mg tablet Take 1 tablet (20 mg total) by mouth daily 9/21/24   Marla Mayo DO     Allergies   Allergen Reactions    Amoxicillin      Vague rash in the 90s, tolerated zosyn on 9/2020 admission     Codeine      Other reaction(s): Other (See Comments)  n/v    Medical Tape Itching     bandaids    Metoclopramide Hcl Other (See Comments)     Reaction info not provided from facility    Ropinirole Other (See Comments)     Reaction info not provided from facility       Objective :  Temp:  [97 °F (36.1 °C)] 97 °F (36.1 °C)  HR:  [] 100  BP: (123-169)/(60-72) 123/72  Resp:  [18-22] 18  SpO2:  [97 %-100 %] 97 %  O2 Device: CPAP  Nasal Cannula O2 Flow Rate (L/min):  [2 L/min] 2 L/min    Physical Exam  Constitutional:       General: She is in acute distress.      Appearance: She is ill-appearing.   HENT:      Head: Normocephalic and atraumatic.      Mouth/Throat:      Mouth: Mucous membranes are moist.      Pharynx: Oropharynx is clear.   Eyes:      General:         Right eye: No discharge.         Left eye: No discharge.   Cardiovascular:      Rate and Rhythm: Regular rhythm. Tachycardia present.      Pulses: Normal pulses.   Pulmonary:      Breath sounds: No wheezing, rhonchi or rales.      Comments: Tachypneic  Abdominal:      General: There is no distension.      Palpations: Abdomen is soft. There  "is no mass.      Tenderness: There is no abdominal tenderness.      Hernia: No hernia is present.      Comments: Intact colostomy, normal output   Musculoskeletal:      Cervical back: Neck supple. No rigidity or tenderness.      Right lower leg: No edema.      Left lower leg: No edema.   Skin:     General: Skin is warm and dry.      Capillary Refill: Capillary refill takes less than 2 seconds.      Coloration: Skin is not jaundiced or pale.      Findings: No erythema, lesion or rash.   Neurological:      Mental Status: She is alert and oriented to person, place, and time.          Lines/Drains:            Lab Results: I have reviewed the following results:  Results from last 7 days   Lab Units 11/24/24 2026   WBC Thousand/uL 13.11*   HEMOGLOBIN g/dL 12.5   HEMATOCRIT % 39.1   PLATELETS Thousands/uL 444*   SEGS PCT % 77*   LYMPHO PCT % 12*   MONO PCT % 10   EOS PCT % 1     Results from last 7 days   Lab Units 11/24/24 2026 11/22/24  1604   SODIUM mmol/L 136 136   POTASSIUM mmol/L 4.2 3.2*   CHLORIDE mmol/L 86* 85*   CO2 mmol/L 41* 39*   BUN mg/dL 50* 26*   CREATININE mg/dL 1.09 0.47*   ANION GAP mmol/L 9 12   CALCIUM mg/dL 9.5 9.5   ALBUMIN g/dL  --  3.7   TOTAL BILIRUBIN mg/dL  --  0.38   ALK PHOS U/L  --  83   ALT U/L  --  21   AST U/L  --  17   GLUCOSE RANDOM mg/dL 162* 110             No results found for: \"HGBA1C\"  Results from last 7 days   Lab Units 11/24/24 2048 11/22/24  1604   LACTIC ACID mmol/L 1.1 0.8       Imaging Results Review: I reviewed radiology reports from this admission including: chest xray.  Other Study Results Review: EKG was reviewed.     Administrative Statements   I have spent a total time of 40 minutes in caring for this patient on the day of the visit/encounter including Documenting in the medical record, Reviewing / ordering tests, medicine, procedures  , Obtaining or reviewing history  , and Communicating with other healthcare professionals .    ** Please Note: This note has been " constructed using a voice recognition system. **

## 2024-11-25 NOTE — ASSESSMENT & PLAN NOTE
History of muscular dystrophy  Currently at SNF for rehab  Reports that she had not been able to transfer from bed to chair on her own  --Continue bipap at bedtime  --Likely return to rehab post discharge, return to [facility]  --Continue outpatient Neurology follow up

## 2024-11-25 NOTE — ASSESSMENT & PLAN NOTE
Wt Readings from Last 3 Encounters:   11/25/24 72 kg (158 lb 11.7 oz)   09/20/24 75.3 kg (166 lb 0.1 oz)   09/07/24 77.8 kg (171 lb 8.3 oz)     Appears close to euvolemic on exam  On Torsemide at home, will continue with lasix for now as patient with pleural effusions  Monitor I&O  Daily Weights  Last echo 9/11  EF 65%

## 2024-11-25 NOTE — UTILIZATION REVIEW
Initial Clinical Review    Admission: Date/Time/Statement:   Admission Orders (From admission, onward)       Ordered        11/24/24 2214  INPATIENT ADMISSION  Once                          Orders Placed This Encounter   Procedures    INPATIENT ADMISSION     Standing Status:   Standing     Number of Occurrences:   1     Level of Care:   Med Surg [16]     Estimated length of stay:   More than 2 Midnights     Certification:   I certify that inpatient services are medically necessary for this patient for a duration of greater than two midnights. See H&P and MD Progress Notes for additional information about the patient's course of treatment.     ED Arrival Information       Expected   -    Arrival   11/24/2024 19:54    Acuity   Emergent              Means of arrival   Ambulance    Escorted by   [facility]    Service   Hospitalist    Admission type   Emergency              Arrival complaint   -             Chief Complaint   Patient presents with    Shortness of Breath     Patient reports to this ED via EMS from SNF, reports SOB and feeling like she is drowning when lying flat. Patient has hx of COPD and muscular dystrophy and is 2L chronic NC       Initial Presentation: 74 y.o. female w/hx COPD on 2li o2, muscular dystrophy, paroxysmal A-fib, GERD, CHF to ED from SNF by EMS admitted as inpatient due to acute on chronic hypoxic respiratory failure, COPD exacerbation, DINA, SIRS, elevated D dimer, bilat pleural effusion, pericardial effusion.  Presented due to several days progressively worsening sob, more pronounced on exertion and when laying flat. C/o conversational dyspnea. ED workup significant for ABG pH of 7.45, pCO2 of 53.6, pO2 of 60.6, bicarb of 36.8, creatinine of 1.09, BUN of 50, BNP of 168 and a lactic acid of 1.1, WBC of 13.11, D-dimer of 3.38.  COVID-19/flu/RSV negative. Exam revealed tachycardia, tachypnea, colostomy, ill appearance.   Plan for o2 3li, bipap HS, check PE study, cons pulm,  avoid nephrotoxins, give IVF bolus, serial labs, consult renal if no improvement in DINA, consult cardio for pericardial effusion, bilat doppler, keep on eliquis. IV steroid, IV antbx, sputum cultures.     Anticipated Length of Stay/Certification Statement: Patient will be admitted on an inpatient basis with an anticipated length of stay of greater than 2 midnights secondary to acute on chronic respiratory failure with hypoxia, COPD exacerbation, acute kidney injury, SIRS, elevated D-dimer ,bilateral pleural effusion, pericardial effusion.       Date:  11/25   Day 2: c/o chest tightness, persistent respiratory symptoms. Tachycardic, abd tender, weak. Diminished lung sounds per respiratory therapist.  baseline creat between .5 and  .6. Procalcitonin slightly elevated, will recheck. Plan thoracentesis tomorrow for bilat pleural effusions. Doppler negative for DVT. PE study negative. IV steroids, continue IV antbx and 2li o2.   The patient will continue to require additional inpatient hospital stay due to respiratory failure with hypoxia   Per pulm @1259: Dx acute on chronic hypoxic/hypercapneic resp failure, bilat pleural effusion, copd excerbation; baseline o2 2li with bipap, now requiring continuous 2li o2. Titrate to keep sat>88%. Continue bipap during all sleep hours. PE study shows worsening L pleural effusion. Give IV lasix 20mg x 1, plan for diagnostic/therapeutic thoracentesis tomorrow. Continue IV steroid, transition to PO tomorrow, give total 5 days. Continue budesonide/xopenex.   PM Update: now COVID positive per respiratory panel. Initial rapid test was negative. Started IV remdesivir.      Date: 11/26  Day 3: Has surpassed a 2nd midnight with active treatments and services. Resp failure combination from copd exac, pleural effusions, and covid. Pt now telling staff she tested covid positive at [facility] a week ago. Multiple sick contacts. Persistent chest tightness, lungs decreased at bases, persistent  respiratory symptoms, although cites breathing is improving. Weak. Creat improved to .99. Remains on 2li o2. 97% sat. Continues on scheduled nebulizers, IV steroid changed to decadron 6mg daily, IV zithromax. IV remdesivir day 2. Went for thoracentesis, no drainable fluid found.   Speech eval-moderate aspiration risk, regular diet w/thin liquids recommended, meds with liquids/puree.     ED Treatment-Medication Administration from 11/24/2024 1944 to 11/25/2024 1330         Date/Time Order Dose Route Action     11/25/2024 0813 apixaban (ELIQUIS) tablet 5 mg 5 mg Oral Given     11/25/2024 0813 buPROPion (WELLBUTRIN) tablet 75 mg 75 mg Oral Given     11/25/2024 0813 citalopram (CeleXA) tablet 10 mg 10 mg Oral Given     11/25/2024 0817 fluticasone (FLONASE) 50 mcg/act nasal spray 2 spray 2 spray Nasal Given     11/25/2024 0511 pantoprazole (PROTONIX) EC tablet 40 mg 40 mg Oral Given     11/25/2024 0813 saccharomyces boulardii (FLORASTOR) capsule 250 mg 250 mg Oral Given     11/25/2024 0900 torsemide (DEMADEX) tablet 20 mg -- Oral Held Dose     11/25/2024 0024 sodium chloride 0.9 % bolus 250 mL 250 mL Intravenous New Bag     11/25/2024 0231 azithromycin (ZITHROMAX) 500 mg in sodium chloride 0.9% 250mL IVPB 500 mg 500 mg Intravenous New Bag     11/25/2024 0814 methylPREDNISolone sodium succinate (Solu-MEDROL) injection 40 mg 40 mg Intravenous Given     11/25/2024 1016 potassium chloride (Klor-Con M20) CR tablet 20 mEq 20 mEq Oral Given     11/25/2024 1154 iohexol (OMNIPAQUE) 350 MG/ML injection (MULTI-DOSE) 85 mL 85 mL Intravenous Given            Scheduled Medications:  apixaban, 5 mg, Oral, BID  azithromycin, 500 mg, Oral, Q24H  buPROPion, 75 mg, Oral, QAM  citalopram, 10 mg, Oral, Daily  fluticasone, 2 spray, Nasal, Daily  furosemide, 20 mg, Intravenous, Once 11/25  guaiFENesin, 600 mg, Oral, Q12H NANCY  oxybutynin, 10 mg, Oral, HS  pantoprazole, 40 mg, Oral, Early Morning  saccharomyces boulardii, 250 mg, Oral,  Daily    budesonide (PULMICORT) inhalation solution 0.5 mg  Dose: 0.5 mg  Freq: 2 times daily (RESP) Route: NEBULIZATION  Start: 11/25/24 2000    methylPREDNISolone sodium succinate (Solu-MEDROL) injection 40 mg  Dose: 40 mg  Freq: Every 12 hours scheduled Route: IV  Start: 11/25/24 0900 End: 11/25/24 1317    dexamethasone (PF) (DECADRON) injection 6 mg  Dose: 6 mg  Freq: Daily Route: IV  Start: 11/26/24 1358    potassium chloride (Klor-Con M20) CR tablet 20 mEq  Dose: 20 mEq  Freq: Once Route: PO  Start: 11/25/24 0845 End: 11/25/24 1016     remdesivir (Veklury) 200 mg in sodium chloride 0.9 % 290 mL IVPB  Dose: 200 mg  Freq: Every 24 hours Route: IV  Indications of Use: INFECTION CAUSED BY 2019 NOVEL CORONAVIRUS  Last Dose: Stopped (11/26/24 0155)  Start: 11/25/24 2215 End: 11/26/24 0155  torsemide (DEMADEX) tablet 20 mg  Dose: 20 mg  Freq: Daily Route: PO  Start: 11/25/24 0900    Continuous IV Infusions: none     PRN Meds: none thus far  acetaminophen, 650 mg, Oral, Q6H PRN  albuterol, 2.5 mg, Nebulization, Q4H PRN  bisacodyl, 10 mg, Rectal, Daily PRN  budesonide, 0.5 mg, Nebulization, BID PRN  metoclopramide, 5 mg, Oral, Q8H PRN      ED Triage Vitals   Temperature Pulse Respirations Blood Pressure SpO2 Pain Score   11/24/24 1959 11/24/24 1958 11/24/24 1959 11/24/24 1958 11/24/24 1958 11/24/24 1958   (!) 97 °F (36.1 °C) 59 21 169/71 97 % No Pain     Weight (last 2 days)       Date/Time Weight    11/26/24 0548 76.3 (168.21)    11/26/24 0521 76.3 (168.21)    11/25/24 13:39:26 72 (158.73)    11/25/24 0836 72 (158.73)            Vital Signs (last 3 days)       Date/Time Temp Pulse Resp BP MAP (mmHg) SpO2 Calculated FIO2 (%) - Nasal Cannula O2 Flow Rate (L/min) Nasal Cannula O2 Flow Rate (L/min) O2 Device Patient Position - Orthostatic VS Pain    11/26/24 15:02:21 97.5 °F (36.4 °C) 93 16 137/79 98 98 % 28 -- 2 L/min Nasal cannula Lying --    11/26/24 14:35:25 -- 93 18 137/80 -- 96 % -- -- -- -- -- --    11/26/24 1156  -- -- -- -- -- -- -- -- -- -- -- No Pain    11/26/24 0930 -- -- -- -- -- -- -- -- -- -- -- No Pain    11/26/24 0929 -- -- -- -- -- -- -- -- -- -- -- No Pain    11/26/24 07:39:57 98 °F (36.7 °C) 83 18 145/94 111 97 % 28 -- 2 L/min Nasal cannula Lying --    11/26/24 0721 -- -- -- -- -- 97 % 28 -- 2 L/min Nasal cannula -- --    11/25/24 21:37:41 97.7 °F (36.5 °C) 97 20 143/98 113 97 % -- -- -- -- -- --    11/25/24 1940 -- -- -- -- -- 96 % 28 -- 2 L/min Nasal cannula -- No Pain    11/25/24 16:38:46 -- 100 -- 147/99 115 97 % -- -- -- -- -- --    11/25/24 1500 -- -- -- -- -- 95 % 28 -- 2 L/min Nasal cannula -- --    11/25/24 1400 -- -- -- -- -- -- -- -- -- -- -- No Pain    11/25/24 13:39:26 97.9 °F (36.6 °C) 103 -- 151/102 118 93 % 28 -- 2 L/min Nasal cannula Lying No Pain    11/25/24 0917 -- 88 -- 117/62 -- 99 % -- 2 L/min -- Nasal cannula Lying --    11/25/24 0815 -- 101 17 117/66 86 98 % 28 -- 2 L/min Nasal cannula Lying --    11/25/24 0415 -- 99 18 115/76 90 98 % -- -- -- CPAP Lying No Pain    11/25/24 0001 -- 100 18 -- -- 97 % -- 2 L/min -- CPAP -- --    11/24/24 2324 -- 100 22 123/72 91 100 % 28 -- 2 L/min Nasal cannula Lying No Pain    11/24/24 2308 -- 100 22 -- -- 99 % -- -- -- -- -- --    11/24/24 2212 -- 103 -- 127/60 87 -- -- -- -- -- -- --    11/24/24 1959 97 °F (36.1 °C) -- 21 -- -- -- -- -- -- -- -- --    11/24/24 1958 -- 59 -- 169/71 102 97 % -- -- -- None (Room air) Sitting No Pain              Pertinent Labs/Diagnostic Test Results:   Radiology:  CTA chest pe study   Final Interpretation by Wai Pradhan MD (11/25 1321)      No evidence for pulmonary embolism.      Small to moderate left pleural effusion with subjacent compressive atelectasis is stable since the prior exam.      Note is made of an esophageal diverticulum distally and containing debris. Findings are unchanged since prior exam.      Workstation performed: [id number]          VAS VENOUS DUPLEX - LOWER LIMB BILATERAL   Final Interpretation by  Chino Goldberg MD (11/25 1303)      XR chest 1 view portable   ED Interpretation by Jay Carrion MD (11/24 2114)   Persistent effusion on left with surrounding lung field changes.  Overall appears similar to prior 2 days ago, may be slightly worse      Final Interpretation by Solomon Borden MD (11/25 8470)   Small left basilar effusion with probable compressive atelectasis.      Workstation performed: [id number]         IR procedure not performed    (Results Pending)      Latest Reference Range & Units 11/25/24 13:43   ADENOVIRUS GROUP ANTIBODIES, QUAL Not Detected  Not Detected   Bordetella pertussis Not Detected  Not Detected   Bordetella parapertussis Not Detected  Not Detected   Chlamydia pneumoniae Not detected  Not Detected   CORONAVIRUS 229E Not Detected  Not Detected   Coronavirus HKU1 Not Detected  Not Detected   CORONAVIRUSU NL63 Not Detected  Not Detected   CORONAVIRUS OC43 Not Detected  Not Detected   METAPNEUMOVIRUS Not Detected  Not Detected   Parainfluenza 1 Not Detected  Not Detected   Parainfluenza 2 Not Detected  Not Detected   Parainfluenza 3 Not Detected  Not Detected   Parainfluenza 4 Not Detected  Not Detected   Rhinovirus Not Detected  Not Detected   MYCOPLASMA PNEUMONIAE Not Detected  Not Detected   Respiratory Syncytial Virus Not Detected  Not Detected   SARS-COV-2 Not Detected  Detected !       Results from last 7 days   Lab Units 11/25/24  1343   SARS-COV-2  Detected*     Results from last 7 days   Lab Units 11/26/24  0509 11/25/24  0516 11/24/24 2026 11/22/24  1604   WBC Thousand/uL 10.01 12.36* 13.11* 17.46*   HEMOGLOBIN g/dL 11.4* 11.7 12.5 12.4   HEMATOCRIT % 34.5* 36.3 39.1 37.9   PLATELETS Thousands/uL 593* 541* 444* 427*   TOTAL NEUT ABS Thousands/µL  --  9.30* 10.03* 13.56*         Results from last 7 days   Lab Units 11/26/24  0509 11/25/24  0516 11/24/24 2026 11/22/24  1604 11/21/24  0629   SODIUM mmol/L 135 137 136 136 136   POTASSIUM mmol/L 4.0 3.3* 4.2  3.2* 3.4*   CHLORIDE mmol/L 89* 88* 86* 85* 91*   CO2 mmol/L 33* 38* 41* 39* 36*   ANION GAP mmol/L 13 11 9 12 9   BUN mg/dL 43* 45* 50* 26* 18   CREATININE mg/dL 0.99 1.00 1.09 0.47* 0.4*   EGFR ml/min/1.73sq m 56 55 50 97 >90   CALCIUM mg/dL 9.3 9.2 9.5 9.5 9.1   MAGNESIUM mg/dL  --  2.0  --  1.7*  --    PHOSPHORUS mg/dL  --  4.4*  --   --   --      Results from last 7 days   Lab Units 11/25/24  0516 11/22/24  1604   AST U/L 15 17   ALT U/L 15 21   ALK PHOS U/L 77 83   TOTAL PROTEIN g/dL 6.6 7.2   ALBUMIN g/dL 3.4* 3.7   TOTAL BILIRUBIN mg/dL 0.34 0.38         Results from last 7 days   Lab Units 11/26/24  0509 11/25/24  0516 11/24/24 2026 11/22/24  1604 11/21/24  0629   GLUCOSE RANDOM mg/dL 149* 110 162* 110 127*           Results from last 7 days   Lab Units 11/24/24 2048   PH SIDNEY  7.450*   PCO2 SIDNEY mm Hg 53.6*   PO2 SIDNEY mm Hg 60.6*   HCO3 SIDNEY mmol/L 36.8*   BASE EXC SIDNEY mmol/L 10.9   O2 CONTENT SIDNEY ml/dL 16.3   O2 HGB, VENOUS % 88.4*             Results from last 7 days   Lab Units 11/24/24 2026   HS TNI 0HR ng/L 14     Results from last 7 days   Lab Units 11/25/24  0001   D-DIMER QUANTITATIVE ug/ml FEU 3.38*             Results from last 7 days   Lab Units 11/26/24  0509 11/25/24  0516   PROCALCITONIN ng/ml 0.18 0.27*     Results from last 7 days   Lab Units 11/24/24 2048 11/22/24  1604   LACTIC ACID mmol/L 1.1 0.8             Results from last 7 days   Lab Units 11/24/24 2026   BNP pg/mL 168*       Results from last 7 days   Lab Units 11/22/24  1604   LIPASE u/L 14     Past Medical History:   Diagnosis Date    Acute kidney failure (HCC)     Acute nonspecific idiopathic pericarditis     Anxiety     Atrial fibrillation (HCC)     Breast cancer (HCC) 2007    Cellulitis of right lower extremity     CHF (congestive heart failure) (HCC)     Chronic pain     Chronic respiratory failure with hypoxia (HCC)     Colitis     Colostomy status (HCC)     Dependence on supplemental oxygen     Depression     Difficult  intubation     Excessive daytime sleepiness     Gastroesophageal reflux disease without esophagitis     Gastroparesis     GERD (gastroesophageal reflux disease)     Hyperlipidemia     Ischemic colitis (HCC) 01/21/2019    Leukocytosis 03/28/2020    Muscular dystrophy (HCC)     Limb-girdle    Osteoporosis     Other nonrheumatic aortic valve disorders     Overactive bladder     Perforated diverticulum 03/28/2020    Pericardial effusion (noninflammatory)     Pericarditis     Pneumonitis     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)     Rheumatic tricuspid insufficiency     Skin cancer     Skin disorder     Tachycardia 06/02/2021    Vitamin D deficiency      Present on Admission:   Dystrophy, muscular, 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HCC)   DINA (acute kidney injury) (HCC)   GERD without esophagitis   SIRS (systemic inflammatory response syndrome) (HCC)   Acute on chronic respiratory failure with hypoxia and hypercapnia ([facility])   Pleural effusion, bilateral   Pericardial effusion   Elevated d-dimer   COPD exacerbation ([facility])   Paroxysmal A-fib ([facility])   Chronic diastolic heart failure ([facility])      Admitting Diagnosis: Dyspnea [R06.00]  SOB (shortness of breath) [R06.02]  DINA (acute kidney injury) ([facility]) [N17.9]  Acute on chronic respiratory failure with hypoxia ([facility]) [J96.21]  Age/Sex: 74 y.o. female    Network Utilization Review Department  ATTENTION: Please call with any questions or concerns to [phone] and carefully listen to the prompts so that you are directed to the right person. All voicemails are confidential.   For Discharge needs, contact Care Management DC Support Team at [phone] opt. [phone]  Send all requests for admission clinical reviews, approved or denied determinations and any other requests to dedicated fax number below belonging to the campus where the patient is receiving treatment. List of dedicated fax numbers for the Facilities:  FACILITY NAME UR FAX NUMBER   ADMISSION DENIALS  (Administrative/Medical Necessity) [phone]   DISCHARGE SUPPORT TEAM (NETWORK) [phone]   PARENT CHILD HEALTH (Maternity/NICU/Pediatrics) [phone]   [facility] [phone]   [facility] [phone]   [facility] [phone]   [facility] [phone]   [facility] [phone]   [facility] [phone]   [facility] [phone]   [facility] [phone]   [facility] [phone]   [facility] [phone]   [facility] [phone]   [facility] [phone]

## 2024-11-25 NOTE — ASSESSMENT & PLAN NOTE
Presented to the ER for evaluation of complaint of ongoing shortness of breath getting progressively worse over the past few days  Possible COPD exacerbation  Currently on home oxygen at 2 L/min  Chest X-ray completed official report pending  --Respiratory protocol  --Incentive spirometry  --Continue CTA COPD medications  --Solu-Medrol 40 mg every 12 hours pending pulmonary input  --Azithromycin 500 mg IV daily X 3 days  -- Check sputum culture and Gram stain  --Check procalcitonin  --Continue oxygen at 3 lpm via NC  --Continue bipap at bed time  --Pulmonary consult

## 2024-11-25 NOTE — ASSESSMENT & PLAN NOTE
Presented to the emergency room for evaluation of complaint of shortness of breath  Patient reports SOB has been ongoing for several days getting progressively worst.   Patient reports SOB is worst upon exertion.  Patient also reports having conversational dyspnea  Has history of restrictive lung dur to muscular dystrophy   On 2L supplemental O2 at baseline  Denies chest pain, cough. Admits to chest tightness  COVID-19/FLU/RSV negative   Likely secondary to combination of COPD exacerbation, pleural effusions  --Admit to med surg  --Respiratory protocol  --Continue PTA COPD medication  --Continue Oxygen at 3 lpm   --BIPAP at bedtime  --Monitor respiratory status, SPO2   --CTA negative for PE  --Pulmonary consult   --Supportive care

## 2024-11-25 NOTE — ASSESSMENT & PLAN NOTE
Baseline requirement is 2 L nasal cannula with BiPAP  Currently requiring 2 L nasal cannula continuously.  Titrate oxygen to maintain SpO2 greater than or equal to 88%.  Continue BiPAP during all hours of sleep.  Continue home auto BiPAP max IPAP 20, min EPAP 6, pressure support 6

## 2024-11-25 NOTE — ASSESSMENT & PLAN NOTE
D-dimer level at 3.38  No documented history DVT/PE  CTA chest PE study deferred due to DINA  --Will check Vas lower limb venous duplex study  --Continue Eliquis   --Will check PE study as DINA improves   --Continue to monitor

## 2024-11-25 NOTE — ASSESSMENT & PLAN NOTE
SIRS criteria 3/4 (leukocytosis, tachycardia, tachypnea)  No clear source of infection  COVID-19/flu/rsv negative   --Will monitor off antibiotic coverage for now  --Check Procalcitonin  --Monitor for new evidence of infection  --Am labs

## 2024-11-25 NOTE — ASSESSMENT & PLAN NOTE
Bilateral pleural effusion noted on CT completed on 11/22  Patient currently on torsemide 20  mg daily  --Will hold torsemide for now, transition to daily lasix for now  --Pulmonology following   Plan for thoracentesis tomorrow

## 2024-11-26 ENCOUNTER — APPOINTMENT (INPATIENT)
Dept: INTERVENTIONAL RADIOLOGY/VASCULAR | Facility: HOSPITAL | Age: 74
DRG: 189 | End: 2024-11-26
Payer: COMMERCIAL

## 2024-11-26 PROBLEM — U07.1 COVID: Status: ACTIVE | Noted: 2024-11-26

## 2024-11-26 PROBLEM — Z22.322 MRSA COLONIZATION: Status: ACTIVE | Noted: 2024-11-26

## 2024-11-26 LAB
ANION GAP SERPL CALCULATED.3IONS-SCNC: 13 MMOL/L (ref 4–13)
ATRIAL RATE: 106 BPM
BUN SERPL-MCNC: 43 MG/DL (ref 5–25)
CALCIUM SERPL-MCNC: 9.3 MG/DL (ref 8.4–10.2)
CHLORIDE SERPL-SCNC: 89 MMOL/L (ref 96–108)
CO2 SERPL-SCNC: 33 MMOL/L (ref 21–32)
CREAT SERPL-MCNC: 0.99 MG/DL (ref 0.6–1.3)
ERYTHROCYTE [DISTWIDTH] IN BLOOD BY AUTOMATED COUNT: 15.5 % (ref 11.6–15.1)
GFR SERPL CREATININE-BSD FRML MDRD: 56 ML/MIN/1.73SQ M
GLUCOSE SERPL-MCNC: 149 MG/DL (ref 65–140)
HCT VFR BLD AUTO: 34.5 % (ref 34.8–46.1)
HGB BLD-MCNC: 11.4 G/DL (ref 11.5–15.4)
MCH RBC QN AUTO: 27.7 PG (ref 26.8–34.3)
MCHC RBC AUTO-ENTMCNC: 33 G/DL (ref 31.4–37.4)
MCV RBC AUTO: 84 FL (ref 82–98)
MRSA NOSE QL CULT: ABNORMAL
MRSA NOSE QL CULT: ABNORMAL
P AXIS: -6 DEGREES
PLATELET # BLD AUTO: 593 THOUSANDS/UL (ref 149–390)
PMV BLD AUTO: 10.9 FL (ref 8.9–12.7)
POTASSIUM SERPL-SCNC: 4 MMOL/L (ref 3.5–5.3)
PR INTERVAL: 122 MS
PROCALCITONIN SERPL-MCNC: 0.18 NG/ML
QRS AXIS: 31 DEGREES
QRSD INTERVAL: 82 MS
QT INTERVAL: 342 MS
QTC INTERVAL: 454 MS
RBC # BLD AUTO: 4.11 MILLION/UL (ref 3.81–5.12)
SODIUM SERPL-SCNC: 135 MMOL/L (ref 135–147)
T WAVE AXIS: 69 DEGREES
VENTRICULAR RATE: 106 BPM
WBC # BLD AUTO: 10.01 THOUSAND/UL (ref 4.31–10.16)

## 2024-11-26 PROCEDURE — 99232 SBSQ HOSP IP/OBS MODERATE 35: CPT

## 2024-11-26 PROCEDURE — 76604 US EXAM CHEST: CPT | Performed by: RADIOLOGY

## 2024-11-26 PROCEDURE — 85027 COMPLETE CBC AUTOMATED: CPT

## 2024-11-26 PROCEDURE — 94664 DEMO&/EVAL PT USE INHALER: CPT

## 2024-11-26 PROCEDURE — 94668 MNPJ CHEST WALL SBSQ: CPT

## 2024-11-26 PROCEDURE — 94760 N-INVAS EAR/PLS OXIMETRY 1: CPT

## 2024-11-26 PROCEDURE — 80048 BASIC METABOLIC PNL TOTAL CA: CPT

## 2024-11-26 PROCEDURE — 93010 ELECTROCARDIOGRAM REPORT: CPT | Performed by: INTERNAL MEDICINE

## 2024-11-26 PROCEDURE — 76937 US GUIDE VASCULAR ACCESS: CPT

## 2024-11-26 PROCEDURE — 84145 PROCALCITONIN (PCT): CPT

## 2024-11-26 PROCEDURE — 97167 OT EVAL HIGH COMPLEX 60 MIN: CPT

## 2024-11-26 PROCEDURE — 97530 THERAPEUTIC ACTIVITIES: CPT

## 2024-11-26 PROCEDURE — NC001 PR NO CHARGE: Performed by: RADIOLOGY

## 2024-11-26 PROCEDURE — 92610 EVALUATE SWALLOWING FUNCTION: CPT

## 2024-11-26 PROCEDURE — 97163 PT EVAL HIGH COMPLEX 45 MIN: CPT

## 2024-11-26 PROCEDURE — 97535 SELF CARE MNGMENT TRAINING: CPT

## 2024-11-26 PROCEDURE — 94640 AIRWAY INHALATION TREATMENT: CPT

## 2024-11-26 RX ORDER — DEXAMETHASONE SODIUM PHOSPHATE 10 MG/ML
6 INJECTION, SOLUTION INTRAMUSCULAR; INTRAVENOUS DAILY
Status: DISCONTINUED | OUTPATIENT
Start: 2024-11-26 | End: 2024-11-29 | Stop reason: HOSPADM

## 2024-11-26 RX ORDER — DIAZEPAM 5 MG/1
5 TABLET ORAL EVERY 12 HOURS PRN
Status: DISCONTINUED | OUTPATIENT
Start: 2024-11-26 | End: 2024-11-29 | Stop reason: HOSPADM

## 2024-11-26 RX ADMIN — DEXAMETHASONE SODIUM PHOSPHATE 6 MG: 10 INJECTION, SOLUTION INTRAMUSCULAR; INTRAVENOUS at 14:23

## 2024-11-26 RX ADMIN — BUPROPION HYDROCHLORIDE 75 MG: 75 TABLET, FILM COATED ORAL at 09:27

## 2024-11-26 RX ADMIN — REMDESIVIR 100 MG: 100 INJECTION, POWDER, LYOPHILIZED, FOR SOLUTION INTRAVENOUS at 21:25

## 2024-11-26 RX ADMIN — DIAZEPAM 5 MG: 5 TABLET ORAL at 21:44

## 2024-11-26 RX ADMIN — Medication 3 MG: at 21:25

## 2024-11-26 RX ADMIN — BUDESONIDE 0.5 MG: 0.5 INHALANT RESPIRATORY (INHALATION) at 19:49

## 2024-11-26 RX ADMIN — OXYBUTYNIN CHLORIDE 10 MG: 5 TABLET, EXTENDED RELEASE ORAL at 21:24

## 2024-11-26 RX ADMIN — CITALOPRAM HYDROBROMIDE 10 MG: 20 TABLET ORAL at 21:25

## 2024-11-26 RX ADMIN — BUDESONIDE 0.5 MG: 0.5 INHALANT RESPIRATORY (INHALATION) at 07:18

## 2024-11-26 RX ADMIN — GUAIFENESIN 600 MG: 600 TABLET ORAL at 21:25

## 2024-11-26 RX ADMIN — APIXABAN 5 MG: 5 TABLET, FILM COATED ORAL at 09:27

## 2024-11-26 RX ADMIN — Medication 3 MG: at 01:53

## 2024-11-26 RX ADMIN — PANTOPRAZOLE SODIUM 40 MG: 40 TABLET, DELAYED RELEASE ORAL at 04:29

## 2024-11-26 RX ADMIN — TORSEMIDE 20 MG: 20 TABLET ORAL at 09:26

## 2024-11-26 RX ADMIN — FLUTICASONE PROPIONATE 2 SPRAY: 50 SPRAY, METERED NASAL at 09:30

## 2024-11-26 RX ADMIN — APIXABAN 5 MG: 5 TABLET, FILM COATED ORAL at 17:42

## 2024-11-26 RX ADMIN — AZITHROMYCIN DIHYDRATE 500 MG: 250 TABLET ORAL at 21:24

## 2024-11-26 NOTE — SPEECH THERAPY NOTE
Speech Language/Pathology  Speech-Language Pathology Bedside Swallow Evaluation    Patient Name: Claire Doherty  Today's Date: 11/26/2024     Problem List  Principal Problem:    Acute on chronic respiratory failure with hypoxia and hypercapnia (HCC)  Active Problems:    Dystrophy, muscular, 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HCC)    GERD without esophagitis    Colostomy status (HCC)    Pericardial effusion    DINA (acute kidney injury) (HCC)    Paroxysmal A-fib (HCC)    SIRS (systemic inflammatory response syndrome) (HCC)    Pleural effusion, bilateral    Elevated d-dimer    COPD exacerbation (HCC)    Chronic diastolic heart failure (HCC)    Past Medical History  Past Medical History:   Diagnosis Date    Acute kidney failure (HCC)     Acute nonspecific idiopathic pericarditis     Anxiety     Atrial fibrillation (HCC)     Breast cancer (HCC) 2007    Cellulitis of right lower extremity     CHF (congestive heart failure) ([facility])     Chronic pain     Chronic respiratory failure with hypoxia (HCC)     Colitis     Colostomy status (HCC)     Dependence on supplemental oxygen     Depression     Difficult intubation     Excessive daytime sleepiness     Gastroesophageal reflux disease without esophagitis     Gastroparesis     GERD (gastroesophageal reflux disease)     Hyperlipidemia     Ischemic colitis ([facility]) 01/21/2019    Leukocytosis 03/28/2020    Muscular dystrophy (HCC)     Limb-girdle    Osteoporosis     Other nonrheumatic aortic valve disorders     Overactive bladder     Perforated diverticulum 03/28/2020    Pericardial effusion (noninflammatory)     Pericarditis     Pneumonitis     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)     Rheumatic tricuspid insufficiency     Skin cancer     Skin disorder     Tachycardia 06/02/2021    Vitamin D deficiency      Past Surgical History  Past Surgical History:   Procedure Laterality Date    CARDIAC CATHETERIZATION N/A 8/21/2024    Procedure: Cardiac pci;  Surgeon: Juan Fuller MD;   Location: [facility];  Service: Cardiology    CARDIAC CATHETERIZATION N/A 8/21/2024    Procedure: Cardiac PCI Stent;  Surgeon: Juan Fuller MD;  [facility];  Service: Cardiology    CARDIAC CATHETERIZATION N/A 8/21/2024    Procedure: Cardiac Coronary Angiogram;  Surgeon: Juan Fuller MD;  [facility];  Service: Cardiology    COLONOSCOPY N/A 1/22/2019    Procedure: COLONOSCOPY;  Surgeon: Anthony Mejía MD;  Location: [facility] GI [facility];  Service: Colorectal    CYSTOSCOPY N/A 9/30/2020    Procedure: CYSTOSCOPY; BILATERAL URETERAL CATHETER PLACEMENT, CYSTOTOMY;  Surgeon: Zach Tyler MD;  Location: [facility];  Service: Urology    FL CYSTOGRAM  10/15/2020    HARTMANS PROCEDURE N/A 9/30/2020    Procedure: EXPLORATORY LAPAROTOMY; LYSIS OF ADHESIONS; WASHOUT PELVIC ABSCESS; COMPLEX SIGMOID COLON RESECTION; LOOP TRANSVERSE COLOSTOMY;  Surgeon: Thania Jaffe MD;  Location: [facility];  Service: General    IR DRAINAGE TUBE PLACEMENT  9/24/2020    MASTECTOMY Left 2007    left breast mastectomy     TONSILLECTOMY      TOOTH EXTRACTION      TUBAL LIGATION       Summary   Pt presented with s/s suggestive of minimal oral and suspected mild pharyngeal dysphagia.  Symptoms or concerns included oral residue with dry solids and mildly reduced respiratory-deglutition coordination .  Patient reports self-monitoring swallow function and implementing compensatory strategies to ensure more of a conscious oral and pharyngeal phase of swallow. Resident reports slow rate, small bolus sizes, and alternation of solids and liquids implemented independently which have minimized s/s asp/pen.     Risk/s for Aspiration: min-mod     Recommended Diet: regular diet and thin liquids   Recommended Form of Meds: whole with liquid and whole with puree   Aspiration precautions and swallowing strategies: upright posture, small bites/sips, and alternating bites and sips  Other Recommendations:  Continue frequent oral care    Current Medical Status  Per 11/24/24 H&P - Pt is a 74 y.o. female with a PMH of COPD, muscular dystrophy, paroxysmal A-fib, GERD, CHF who presents to the emergency room via EMS from [facility] for evaluation of complaint of shortness of breath over the past several days getting progressively worse.  Patient has history of COPD currently on oxygen at 2 L/min.  Patient reports that shortness of breath is worse upon exertion when she lays flat.  She also reports some conversational dyspnea.  Patient denies chest pain, cough, fever, chills, nausea, vomiting, diarrhea, abdominal pain.      Work up in the ER included labs significant for ABG showing a pH of 7.45, pCO2 of 53.6, pO2 of 60.6, bicarb of 36.8, glucose of 162, creatinine of 1.09, BUN of 50 0-hour troponin of 14, BNP of 168 and a lactic acid of 1.1, WBC of 13.11, platelets of 444, D-dimer of 3.38.  COVID-19/flu/RSV negative.  Chest x-ray completed official report pending.     Patient is being admitted on Inpatient status [facility] level care for further workup and management of acute on chronic respiratory failure with hypoxia, COPD exacerbation, acute kidney injury, SIRS, elevated D-dimer, bilateral pleural effusion, pericardial effusion.     Current Precautions:  Fall    Aspiration  Contact    Airborne    Difficult airway    Allergies:  No known food allergies    Special Studies:  11/24/24 Chest XR impression - Small left basilar effusion with probable compressive atelectasis.     11/25/24 CTA chest pe study impression - No evidence for pulmonary embolism.     Small to moderate left pleural effusion with subjacent compressive atelectasis is stable since the prior exam.     Note is made of an esophageal diverticulum distally and containing debris. Findings are unchanged since prior exam.    Social/Education/Vocational Hx:  Pt lives in SNF/ECF    Swallow Information   Current Risks for Dysphagia & Aspiration:  change  "in respiratory status  Current Symptoms/Concerns: change in respiratory status  Current Diet: regular diet and thin liquids   Baseline Diet: regular diet and thin liquids    Baseline Assessment   Behavior/Cognition: alert and oriented x3  Speech/Language Status: able to participate in conversation and able to follow commands  Patient Positioning: upright in recliner  Pain Status/Interventions/Response to Interventions: No report of or nonverbal indications of pain.    Swallow Mechanism Exam  Facial: symmetrical  Labial: WFL  Lingual: WFL  Velum: symmetrical  Mandible: adequate ROM  Dentition: adequate  Vocal quality:clear/adequate   Volitional Cough: strong/productive   Respiratory Status: on 2L O2   Tracheostomy: n/a    Consistencies Assessed and Performance   Consistencies Administered: thin liquids, puree, and hard solids  Materials administered included: water via cup and straw, applesauce, and ale cracker    Oral Stage: minimal  Mastication was adequate with the materials administered today.  Bolus formation and transfer were functional with mild  oral residue noted.  No overt s/s reduced oral control.    Pharyngeal Stage: suspected mild  Swallow Mechanics:  Swallowing initiation appeared prompt.  Laryngeal rise was palpated and judged to be within functional limits.  No coughing, throat clearing, change in vocal quality or respiratory status noted today. Mildly reduced respiratory-deglutition coordination noted and reported by patient.    Esophageal Concerns: none reported    Strategies and Efficacy: small sips, slow rate, and alternation of solids and liquids utilized by patient    Summary and Recommendations (see above)    Results Reviewed with: patient and RN     Treatment Recommended: yes     Frequency of treatment: 1-2x while hospitalized    Patient Stated Goal: \"I don't want some modified food\"    Dysphagia LTG  -Patient will demonstrate safe and effective oral intake (without overt s/s significant " oral/pharyngeal dysphagia including s/s penetration or aspiration) for the highest appropriate diet level.     Short Term Goals:  -Pt will tolerate regular diet and thin liquid with no significant s/s oral or pharyngeal dysphagia across 1-3 diagnostic session/s.    -Patient will utilize trained compensatory strategies (eg.  controlled bite/sip size, controlled rate, ”prep set”, neck flexion, multiple swallows, alternation of food with liquid,head turn etc.) with 80% accuracy to eliminate overt s/s penetration/aspiration with the least restrictive food/liquid consistencies    Speech Therapy Prognosis   Prognosis: good    Prognosis Considerations: age, medical status, prior medical history, cognitive status, and respiratory status

## 2024-11-26 NOTE — PLAN OF CARE
Problem: Prexisting or High Potential for Compromised Skin Integrity  Goal: Skin integrity is maintained or improved  Description: INTERVENTIONS:  - Identify patients at risk for skin breakdown  - Assess and monitor skin integrity  - Assess and monitor nutrition and hydration status  - Monitor labs   - Assess for incontinence   - Turn and reposition patient  - Assist with mobility/ambulation  - Relieve pressure over bony prominences  - Avoid friction and shearing  - Provide appropriate hygiene as needed including keeping skin clean and dry  - Evaluate need for skin moisturizer/barrier cream  - Collaborate with interdisciplinary team   - Patient/family teaching  - Consider wound care consult   Outcome: Progressing     Problem: PAIN - ADULT  Goal: Verbalizes/displays adequate comfort level or baseline comfort level  Description: Interventions:  - Encourage patient to monitor pain and request assistance  - Assess pain using appropriate pain scale  - Administer analgesics based on type and severity of pain and evaluate response  - Implement non-pharmacological measures as appropriate and evaluate response  - Consider cultural and social influences on pain and pain management  - Notify physician/advanced practitioner if interventions unsuccessful or patient reports new pain  Outcome: Progressing     Problem: INFECTION - ADULT  Goal: Absence or prevention of progression during hospitalization  Description: INTERVENTIONS:  - Assess and monitor for signs and symptoms of infection  - Monitor lab/diagnostic results  - Monitor all insertion sites, i.e. indwelling lines, tubes, and drains  - [place] appropriate cooling/warming therapies per order  - Administer medications as ordered  - Instruct and encourage patient and family to use good hand hygiene technique  - Identify and instruct in appropriate isolation precautions for identified infection/condition  Outcome: Progressing     Problem: SAFETY ADULT  Goal: Patient will  remain free of falls  Description: INTERVENTIONS:  - Educate patient/family on patient safety including physical limitations  - Instruct patient to call for assistance with activity   - Consult OT/PT to assist with strengthening/mobility   - Keep Call bell within reach  - Keep bed low and locked with side rails adjusted as appropriate  - Keep care items and personal belongings within reach  - Initiate and maintain comfort rounds  - Make Fall Risk Sign visible to staff  - Offer Toileting every 2 Hours, in advance of need  - Initiate/Maintain bed/chair alarm  - Obtain necessary fall risk management equipment: daily  - Apply yellow socks and bracelet for high fall risk patients  - Consider moving patient to room near nurses station  Outcome: Progressing  Goal: Maintain or return to baseline ADL function  Description: INTERVENTIONS:  -  Assess patient's ability to carry out ADLs; assess patient's baseline for ADL function and identify physical deficits which impact ability to perform ADLs (bathing, care of mouth/teeth, toileting, grooming, dressing, etc.)  - Assess/evaluate cause of self-care deficits   - Assess range of motion  - Assess patient's mobility; develop plan if impaired  - Assess patient's need for assistive devices and provide as appropriate  - Encourage maximum independence but intervene and supervise when necessary  - Involve family in performance of ADLs  - Assess for home care needs following discharge   - Consider OT consult to assist with ADL evaluation and planning for discharge  - Provide patient education as appropriate  Outcome: Progressing  Goal: Maintains/Returns to pre admission functional level  Description: INTERVENTIONS:  - Perform AM-PAC 6 Click Basic Mobility/ Daily Activity assessment daily.  - Set and communicate daily mobility goal to care team and patient/family/caregiver.   - Collaborate with rehabilitation services on mobility goals if consulted  - Perform Range of Motion 3 times a  day.  - Reposition patient every 2 hours.  - Dangle patient 3 times a day  - Out of bed to chair 3 times a day   - Out of bed for meals 3 times a day  - Out of bed for toileting  - Record patient progress and toleration of activity level   Outcome: Progressing     Problem: DISCHARGE PLANNING  Goal: Discharge to home or other facility with appropriate resources  Description: INTERVENTIONS:  - Identify barriers to discharge w/patient and caregiver  - Arrange for needed discharge resources and transportation as appropriate  - Identify discharge learning needs (meds, wound care, etc  - Refer to Case Management Department for coordinating discharge planning if the patient needs post-hospital services based on physician/advanced practitioner order or complex needs related to functional status, cognitive ability, or social support system  Outcome: Progressing     Problem: Knowledge Deficit  Goal: Patient/family/caregiver demonstrates understanding of disease process, treatment plan, medications, and discharge instructions  Description: Complete learning assessment and assess knowledge base.  Interventions:  - Provide teaching at level of understanding  - Provide teaching via preferred learning methods  Outcome: Progressing     Problem: RESPIRATORY - ADULT  Goal: Achieves optimal ventilation and oxygenation  Description: INTERVENTIONS:  - Assess for changes in respiratory status  - Assess for changes in mentation and behavior  - Position to facilitate oxygenation and minimize respiratory effort  - Oxygen administered by appropriate delivery if ordered  - Encourage broncho-pulmonary hygiene including cough, deep breathe, Incentive Spirometry  - Assess the need for suctioning and aspirate as needed  - Assess and instruct to report SOB or any respiratory difficulty  - Respiratory Therapy support as indicated  Outcome: Progressing     Problem: Nutrition/Hydration-ADULT  Goal: Nutrient/Hydration intake appropriate for improving,  restoring or maintaining nutritional needs  Description: Monitor and assess patient's nutrition/hydration status for malnutrition. Collaborate with interdisciplinary team and initiate plan and interventions as ordered.  Monitor patient's weight and dietary intake as ordered or per policy. Utilize nutrition screening tool and intervene as necessary. Determine patient's food preferences and provide high-protein, high-caloric foods as appropriate.     INTERVENTIONS:  - Monitor oral intake, urinary output, labs, and treatment plans  - Assess nutrition and hydration status and recommend course of action  - Evaluate amount of meals eaten  - Assist patient with eating if necessary   - Allow adequate time for meals  - Recommend/ encourage appropriate diets, oral nutritional supplements, and vitamin/mineral supplements  - Order, calculate, and assess calorie counts as needed  - Recommend, monitor, and adjust tube feedings and TPN/PPN based on assessed needs  - Assess need for intravenous fluids  - Provide specific nutrition/hydration education as appropriate  - Include patient/family/caregiver in decisions related to nutrition  Outcome: Progressing

## 2024-11-26 NOTE — PLAN OF CARE
Problem: PHYSICAL THERAPY ADULT  Goal: Performs mobility at highest level of function for planned discharge setting.  See evaluation for individualized goals.  Description: Treatment/Interventions: Functional transfer training, LE strengthening/ROM, Therapeutic exercise, Endurance training, Bed mobility          See flowsheet documentation for full assessment, interventions and recommendations.  Note: Prognosis: Good  Problem List: Decreased strength, Decreased endurance, Impaired balance, Decreased mobility  Assessment: Patient is a 74 y.o. female evaluated by Physical Therapy s/p admit to [facility] on 11/24/2024 with admitting diagnosis of: Dyspnea, SOB (shortness of breath), DINA (acute kidney injury), Acute on chronic respiratory failure with hypoxia, and principal problem of: Acute on chronic respiratory failure with hypoxia and hypercapnia. PT was consulted to assess patient's functional mobility and discharge needs. Ordered are PT Evaluation and treatment with activity level of: up and OOB as tolerated. Comorbidities affecting patient's physical performance at time of assessment include:  muscular dystrophy, GERD, colostomy status, PAF, COPD, chronic diastolic heart failure, restrictive lung disease, osteoporosis . Personal factors affecting the patient at time of IE include: inability to navigate community distances, inability/difficulty performing IADLs, and inability/difficulty performing ADLs. Please locate objective findings from PT assessment regarding body systems outlined above. Upon evaluation, pt able to perform all functional mobility with [place] x 2, Quick Move, and increased time. Occasional verbal cuing provided for safety awareness and sequencing. At rehab, pt has been working on transferring with sit to stand lift, so Quick Move utilized to transfer from bed to recliner; pt is non-ambulatory at baseline. No LOB experienced and pt tolerated transfer well. HR and SpO2 remained WFL  on 2L O2 throughout. The patient's AM-PAC Basic Mobility Inpatient Short Form Raw Score is 10. A Raw score of less than or equal to 16 suggests the patient may benefit from discharge to post-acute rehabilitation services. Please also refer to the recommendation of the Physical Therapist for safe discharge planning. Co treatment with OT secondary to complex medical condition of pt, possible A of 2 required to achieve and maintain transitional movements, requiring the need of skilled therapeutic intervention of 2 therapists to achieve delivery of services. Pt will benefit from continued PT intervention during LOS to address current deficits, increase LOF, and facilitate safe d/c to next level of care when medically appropriate. D/c recommendation at this time is rehabilitation resource intensity level I.        Rehab Resource Intensity Level, PT: I (Maximum Resource Intensity)    See flowsheet documentation for full assessment.

## 2024-11-26 NOTE — CASE MANAGEMENT
Case Management Discharge Planning Note    Patient name Claire Doherty  Location /419-01 MRN [id number]  : 1950 Date 2024       Current Admission Date: 2024  Current Admission Diagnosis:Acute on chronic respiratory failure with hypoxia and hypercapnia ([facility])   Patient Active Problem List    Diagnosis Date Noted Date Diagnosed    Elevated d-dimer 2024     COPD exacerbation ([facility]) 2024     Chronic diastolic heart failure ([facility]) 2024     SIRS (systemic inflammatory response syndrome) ([facility]) 2024     Pleural effusion, bilateral 2024     Hyperkalemia 2024     Paroxysmal A-fib ([facility]) 2024     Acute on chronic diastolic CHF (congestive heart failure) ([facility]) 09/10/2024     Acute on chronic respiratory failure with hypoxia and hypercapnia ([facility]) 09/10/2024     Severe protein-calorie malnutrition ([facility]) 2024     DINA (acute kidney injury) ([facility]) 2024     Nausea 2024     Pleural effusion 2024     Pericardial effusion 2024     Infected wound 2024     Chest pain 2024     History of Idiopathic pericarditis 2024     Cellulitis of right foot 2024     Chronic left shoulder pain 2024     Shoulder joint dysfunction 2024     COPD (chronic obstructive pulmonary disease) ([facility]) 06/15/2023     Bilateral hand pain 2023     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     Gastroparesis 2021     Aortic valve sclerosis 2021     Tricuspid valve insufficiency 2021     Mitral annular calcification 2021     Former smoker 2021     On home oxygen therapy 2021     Colostomy status ([facility])      Ductal carcinoma in situ (DCIS) of left breast 03/15/2021     Insomnia 10/21/2020     Depression 10/06/2020     Anemia 10/02/2020     Chronic hypoxic respiratory failure ([facility]) 2020     Bladder injury 2020     Thrombocytosis, unspecified 2020     Leukocytosis 2020      Difficult intubation      Dystrophy, muscular, 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HCC) 10/12/2017     Ambulatory dysfunction 10/12/2017     Urinary incontinence 10/03/2017     Osteoporosis 12/07/2016     Allergic rhinitis 10/09/2013     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) 10/04/2012     GERD without esophagitis 10/04/2012       LOS (days): 2  Geometric Mean LOS (GMLOS) (days): 3.5  Days to GMLOS:2     OBJECTIVE:  Risk of Unplanned Readmission Score: 40.1         Current admission status: Inpatient   Preferred Pharmacy:   [id number] - [address]  [address] [address]  Phone: [phone] Fax: [phone]    [address] [address]  Phone: [phone] Fax: [phone]    Primary Care Provider: Julienne Tucker DO    Primary Insurance: [facility]  Secondary Insurance:     DISCHARGE DETAILS:    Discharge planning discussed with:: Hillary Doherty (Sister)      [phone] (M)  Freedom of Choice: Yes  Comments - Freedom of Choice: requesting referral to [facility] SNF, if they cannot accept pt then pt to return to [facility] and rehab on dc  CM contacted family/caregiver?: Yes  Were Treatment Team discharge recommendations reviewed with patient/caregiver?: Yes  Did patient/caregiver verbalize understanding of patient care needs?: Yes  Were patient/caregiver advised of the risks associated with not following Treatment Team discharge recommendations?: Yes

## 2024-11-26 NOTE — ASSESSMENT & PLAN NOTE
Wt Readings from Last 3 Encounters:   11/26/24 76.3 kg (168 lb 3.4 oz)   09/20/24 75.3 kg (166 lb 0.1 oz)   09/07/24 77.8 kg (171 lb 8.3 oz)     Appears close to euvolemic on exam  On Torsemide at home, will continue with lasix for now as patient with pleural effusions  Monitor I&O  Daily Weights  Last echo 9/11  EF 65%

## 2024-11-26 NOTE — ASSESSMENT & PLAN NOTE
Patient found to be +COVID with RP2 on 11/25, initially negative with rapid test  Continue O2 supplement as needed, goal >92% O2 sat  Start IV remdesivir, day 2 of 5  Continue solumedrol 40mg daily  Procal 0.27, continue to trend  On eliquis for AC  Encourage patient self-prone, incentive spirometer, OOB as appropriate  Current oxygenation 97% on 2 L   Monitor inflammatory markers q 2-3 days  D-dimer 3.38,  Procalcitonin 0.27,   CRP, BNP pending  CTA negative for PE

## 2024-11-26 NOTE — ASSESSMENT & PLAN NOTE
RP2 panel + for SARS- CoV-2  Given underlying lung disease recommend adding remdesivir  Change steroid to Decadron 6 mg IV daily per mild protocol.

## 2024-11-26 NOTE — PROGRESS NOTES
Progress Note - Hospitalist   Name: Claire Doherty 74 y.o. female I MRN: [id number]  Unit/Bed#: 419-01 I Date of Admission: 11/24/2024   Date of Service: 11/26/2024 I Hospital Day: 2    Assessment & Plan  Acute on chronic respiratory failure with hypoxia and hypercapnia (HCC)  Presented to the emergency room for evaluation of complaint of shortness of breath Patient reports SOB has been ongoing for several days getting progressively worst. Patient reports SOB is worst upon exertion.  Patient also reports having conversational dyspnea  Has history of restrictive lung disease due to muscular dystrophy   Initial Rapid Flu/RSV/COVID-negative  However +COVID noted on RP2 panel  On 2L supplemental O2 at baseline  Denies chest pain, cough. Admits to chest tightness  Likely secondary to combination of COPD exacerbation, pleural effusions and COVID  Continue Respiratory protocol  Bipap at night   CTA Chest negative for PE  Pulmonology following    COPD exacerbation (HCC)  Presented with complaints of SOB which became progressively worse over the last few days. Found to be +COVID, denies fever, chills, cough  Currently SpO2: 97 % on Nasal Cannula O2 Flow Rate (L/min): 2 L/min    Plan:  Procal 0.27, will continue to trend  Follow CBC  Azithromycin 500mg x3 days  Scheduled nebs, respiratory protocol  IV Solu-Medrol 40mg daily   Encouraged ISB 10x per hour while awake, ambulation  Monitor O2, supplement as needed, maintain O2 >88%  Pulmonology following      COVID  Patient found to be +COVID with RP2 on 11/25, initially negative with rapid test  Continue O2 supplement as needed, goal >92% O2 sat  Start IV remdesivir, day 2 of 5  Continue solumedrol 40mg daily  Procal 0.27, continue to trend  On eliquis for AC  Encourage patient self-prone, incentive spirometer, OOB as appropriate  Current oxygenation 97% on 2 L   Monitor inflammatory markers q 2-3 days  D-dimer 3.38,  Procalcitonin 0.27,   CRP, BNP pending  CTA negative for  PE    Pleural effusion, bilateral  Bilateral pleural effusion noted on CT completed on 11/22  Patient currently on torsemide 20  mg daily  --continue home dose torsemide  --Pulmonology following   Plan for thoracentesis today     DINA (acute kidney injury) ([facility])  Lab Results   Component Value Date    CREATININE 0.99 11/26/2024    CREATININE 1.00 11/25/2024    CREATININE 1.09 11/24/2024       Creatinine level at 1.09, now improving  Baseline appears to be between 0.5 ans 0.6  --Avoid nephrotoxic agents  --S/P small fluid bolus  --Monitor urinary output,creatinine electrolyte levels   --Monitor renal function  --Nephrology consult if no improvement     SIRS (systemic inflammatory response syndrome) ([facility])  SIRS criteria 3/4 (leukocytosis, tachycardia, tachypnea)  Leukocytosis now resolved  Lactic 1.1 on admission  COVID-19/flu/rsv initially negative, now +COVID with RP2  Procal slightly elevated at 0.27, will repeat  Am labs  Dystrophy, muscular, 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[facility])  History of muscular dystrophy  Currently at [facility] for rehab  Reports that she had not been able to transfer from bed to chair on her own  --Continue bipap at bedtime  --Likely return to rehab post discharge, return to [facility]  --Continue outpatient Neurology follow up  GERD without esophagitis  Stable  --Continue Protonix  Colostomy status ([facility])  S/P colostomy  --Intact with normal output  --Monitor while admitted   Pericardial effusion  Noted on CT completed on 11/22  Cardiology consult, appreciate input  Elevated d-dimer  D-dimer level at 3.38  No documented history DVT/PE  CTA chest PE negative  --Vas duplex negative for DVT  --Continue Eliquis   --Continue to monitor   Paroxysmal A-fib ([facility])  History of paroxysmal A-fib  Anticoagulated on Eliquis  --Will continue eliquis  --Continue outpatient cardiology follow up    Chronic diastolic heart failure ([facility])  Wt Readings from Last 3 Encounters:   11/26/24 76.3 kg (168 lb 3.4 oz)   09/20/24 75.3 kg  (166 lb 0.1 oz)   09/07/24 77.8 kg (171 lb 8.3 oz)     Appears close to euvolemic on exam  On Torsemide at home, will continue with lasix for now as patient with pleural effusions  Monitor I&O  Daily Weights  Last echo 9/11  EF 65%        MRSA colonization      VTE Pharmacologic Prophylaxis: VTE Score: 7 High Risk (Score >/= 5) - Pharmacological DVT Prophylaxis Ordered: apixaban (Eliquis). Sequential Compression Devices Ordered.    Mobility:   Basic Mobility Inpatient Raw Score: 10  -HLM Goal: 4: Move to chair/commode  JH-HLM Achieved: 4: Move to chair/commode (via Quick Move)  JH-HLM Goal achieved. Continue to encourage appropriate mobility.    Patient Centered Rounds: I performed bedside rounds with nursing staff today.   Discussions with Specialists or Other Care Team Provider:       Education and Discussions with Family / Patient: Updated  (sister) via phone.    Current Length of Stay: 2 day(s)  Current Patient Status: Inpatient   Certification Statement: The patient will continue to require additional inpatient hospital stay due to shortness of breath  Discharge Plan: Anticipate discharge in 24-48 hrs to rehab facility.    Code Status: Level 3 - DNAR and DNI    Subjective   Patient seen and examined sitting up in chair. Reports breathing has improved, but reports continued chest tightness. No additional complaints to offer at this time.     Objective :  Temp:  [97.7 °F (36.5 °C)-98 °F (36.7 °C)] 98 °F (36.7 °C)  HR:  [] 83  BP: (143-151)/() 145/94  Resp:  [18-20] 18  SpO2:  [93 %-97 %] 97 %  O2 Device: Nasal cannula  Nasal Cannula O2 Flow Rate (L/min):  [2 L/min] 2 L/min    Body mass index is 32.85 kg/m².     Input and Output Summary (last 24 hours):     Intake/Output Summary (Last 24 hours) at 11/26/2024 1336  Last data filed at 11/26/2024 1259  Gross per 24 hour   Intake 150 ml   Output --   Net 150 ml       Physical Exam  Vitals and nursing note reviewed.   Constitutional:        General: She is not in acute distress.  HENT:      Head: Normocephalic.      Mouth/Throat:      Mouth: Mucous membranes are moist.   Eyes:      General: No scleral icterus.  Cardiovascular:      Rate and Rhythm: Normal rate and regular rhythm.      Pulses: Normal pulses.      Heart sounds: Normal heart sounds. No murmur heard.  Pulmonary:      Effort: Pulmonary effort is normal. No respiratory distress.      Breath sounds: Normal breath sounds. No wheezing.   Abdominal:      General: There is no distension.      Palpations: Abdomen is soft.      Tenderness: There is no abdominal tenderness.   Musculoskeletal:         General: No swelling.   Skin:     General: Skin is warm.   Neurological:      Mental Status: She is alert and oriented to person, place, and time.      Motor: Weakness present.   Psychiatric:         Mood and Affect: Mood normal.         Behavior: Behavior normal.         Thought Content: Thought content normal.           Lines/Drains:  Lines/Drains/Airways       Active Status       Name Placement date Placement time Site Days    Colostomy LUQ 09/10/20  1200  LUQ  1538                            Lab Results: I have reviewed the following results:   Results from last 7 days   Lab Units 11/26/24  0509 11/25/24  0516   WBC Thousand/uL 10.01 12.36*   HEMOGLOBIN g/dL 11.4* 11.7   HEMATOCRIT % 34.5* 36.3   PLATELETS Thousands/uL 593* 541*   SEGS PCT %  --  75   LYMPHO PCT %  --  14   MONO PCT %  --  10   EOS PCT %  --  1     Results from last 7 days   Lab Units 11/26/24  0509 11/25/24  0516   SODIUM mmol/L 135 137   POTASSIUM mmol/L 4.0 3.3*   CHLORIDE mmol/L 89* 88*   CO2 mmol/L 33* 38*   BUN mg/dL 43* 45*   CREATININE mg/dL 0.99 1.00   ANION GAP mmol/L 13 11   CALCIUM mg/dL 9.3 9.2   ALBUMIN g/dL  --  3.4*   TOTAL BILIRUBIN mg/dL  --  0.34   ALK PHOS U/L  --  77   ALT U/L  --  15   AST U/L  --  15   GLUCOSE RANDOM mg/dL 149* 110                 Results from last 7 days   Lab Units 11/25/24  0516  11/24/24 2048 11/22/24  1604   LACTIC ACID mmol/L  --  1.1 0.8   PROCALCITONIN ng/ml 0.27*  --   --        Recent Cultures (last 7 days):         Imaging Results Review: No pertinent imaging studies reviewed.  Other Study Results Review: No additional pertinent studies reviewed.    Last 24 Hours Medication List:     Current Facility-Administered Medications:     acetaminophen (TYLENOL) tablet 650 mg, Q6H PRN    albuterol inhalation solution 2.5 mg, Q4H PRN    apixaban (ELIQUIS) tablet 5 mg, BID    azithromycin (ZITHROMAX) tablet 500 mg, Q24H    bisacodyl (DULCOLAX) rectal suppository 10 mg, Daily PRN    budesonide (PULMICORT) inhalation solution 0.5 mg, BID    buPROPion (WELLBUTRIN) tablet 75 mg, QAM    citalopram (CeleXA) tablet 10 mg, Daily    fluticasone (FLONASE) 50 mcg/act nasal spray 2 spray, Daily    guaiFENesin (MUCINEX) 12 hr tablet 600 mg, Q12H NANCY    melatonin tablet 3 mg, HS    methylPREDNISolone sodium succinate (Solu-MEDROL) injection 40 mg, Q24H    metoclopramide (REGLAN) tablet 5 mg, Q8H PRN    oxybutynin (DITROPAN-XL) 24 hr tablet 10 mg, HS    pantoprazole (PROTONIX) EC tablet 40 mg, Early Morning    potassium chloride (Klor-Con M20) CR tablet 20 mEq, Once    [COMPLETED] remdesivir (Veklury) 200 mg in sodium chloride 0.9 % 290 mL IVPB, Q24H, Last Rate: Stopped (11/26/24 0155) **FOLLOWED BY** remdesivir (Veklury) 100 mg in sodium chloride 0.9 % 270 mL IVPB, Q24H    saccharomyces boulardii (FLORASTOR) capsule 250 mg, Daily    torsemide (DEMADEX) tablet 20 mg, Daily    Administrative Statements   Today, Patient Was Seen By: SHAKIRA Jackson  I have spent a total time of 32 minutes in caring for this patient on the day of the visit/encounter including Diagnostic results, Risks and benefits of tx options, Instructions for management, Patient and family education, Importance of tx compliance, Risk factor reductions, Impressions, Counseling / Coordination of care, Reviewing / ordering tests,  medicine, procedures  , Obtaining or reviewing history  , and Communicating with other healthcare professionals .    **Please Note: This note may have been constructed using a voice recognition system.**

## 2024-11-26 NOTE — PLAN OF CARE
Problem: Prexisting or High Potential for Compromised Skin Integrity  Goal: Skin integrity is maintained or improved  Description: INTERVENTIONS:  - Identify patients at risk for skin breakdown  - Assess and monitor skin integrity  - Assess and monitor nutrition and hydration status  - Monitor labs   - Assess for incontinence   - Turn and reposition patient  - Assist with mobility/ambulation  - Relieve pressure over bony prominences  - Avoid friction and shearing  - Provide appropriate hygiene as needed including keeping skin clean and dry  - Evaluate need for skin moisturizer/barrier cream  - Collaborate with interdisciplinary team   - Patient/family teaching  - Consider wound care consult   Outcome: Progressing     Problem: PAIN - ADULT  Goal: Verbalizes/displays adequate comfort level or baseline comfort level  Description: Interventions:  - Encourage patient to monitor pain and request assistance  - Assess pain using appropriate pain scale  - Administer analgesics based on type and severity of pain and evaluate response  - Implement non-pharmacological measures as appropriate and evaluate response  - Consider cultural and social influences on pain and pain management  - Notify physician/advanced practitioner if interventions unsuccessful or patient reports new pain  Outcome: Progressing     Problem: INFECTION - ADULT  Goal: Absence or prevention of progression during hospitalization  Description: INTERVENTIONS:  - Assess and monitor for signs and symptoms of infection  - Monitor lab/diagnostic results  - Monitor all insertion sites, i.e. indwelling lines, tubes, and drains  - [place] appropriate cooling/warming therapies per order  - Administer medications as ordered  - Instruct and encourage patient and family to use good hand hygiene technique  - Identify and instruct in appropriate isolation precautions for identified infection/condition  Outcome: Progressing     Problem: SAFETY ADULT  Goal: Patient will  remain free of falls  Description: INTERVENTIONS:  - Educate patient/family on patient safety including physical limitations  - Instruct patient to call for assistance with activity   - Consult OT/PT to assist with strengthening/mobility   - Keep Call bell within reach  - Keep bed low and locked with side rails adjusted as appropriate  - Keep care items and personal belongings within reach  - Initiate and maintain comfort rounds  - Make Fall Risk Sign visible to staff  - Offer Toileting every 2 Hours, in advance of need  - Initiate/Maintain bed/chair alarm  - Obtain necessary fall risk management equipment: daily  - Apply yellow socks and bracelet for high fall risk patients  - Consider moving patient to room near nurses station  Outcome: Progressing  Goal: Maintain or return to baseline ADL function  Description: INTERVENTIONS:  -  Assess patient's ability to carry out ADLs; assess patient's baseline for ADL function and identify physical deficits which impact ability to perform ADLs (bathing, care of mouth/teeth, toileting, grooming, dressing, etc.)  - Assess/evaluate cause of self-care deficits   - Assess range of motion  - Assess patient's mobility; develop plan if impaired  - Assess patient's need for assistive devices and provide as appropriate  - Encourage maximum independence but intervene and supervise when necessary  - Involve family in performance of ADLs  - Assess for home care needs following discharge   - Consider OT consult to assist with ADL evaluation and planning for discharge  - Provide patient education as appropriate  Outcome: Progressing  Goal: Maintains/Returns to pre admission functional level  Description: INTERVENTIONS:  - Perform AM-PAC 6 Click Basic Mobility/ Daily Activity assessment daily.  - Set and communicate daily mobility goal to care team and patient/family/caregiver.   - Collaborate with rehabilitation services on mobility goals if consulted  - Perform Range of Motion 3 times a  day.  - Reposition patient every 2 hours.  - Dangle patient 3 times a day  - Out of bed to chair 3 times a day   - Out of bed for meals 3 times a day  - Out of bed for toileting  - Record patient progress and toleration of activity level   Outcome: Progressing     Problem: DISCHARGE PLANNING  Goal: Discharge to home or other facility with appropriate resources  Description: INTERVENTIONS:  - Identify barriers to discharge w/patient and caregiver  - Arrange for needed discharge resources and transportation as appropriate  - Identify discharge learning needs (meds, wound care, etc  - Refer to Case Management Department for coordinating discharge planning if the patient needs post-hospital services based on physician/advanced practitioner order or complex needs related to functional status, cognitive ability, or social support system  Outcome: Progressing     Problem: Knowledge Deficit  Goal: Patient/family/caregiver demonstrates understanding of disease process, treatment plan, medications, and discharge instructions  Description: Complete learning assessment and assess knowledge base.  Interventions:  - Provide teaching at level of understanding  - Provide teaching via preferred learning methods  Outcome: Progressing     Problem: Knowledge Deficit  Goal: Patient/family/caregiver demonstrates understanding of disease process, treatment plan, medications, and discharge instructions  Description: Complete learning assessment and assess knowledge base.  Interventions:  - Provide teaching at level of understanding  - Provide teaching via preferred learning methods  Outcome: Progressing

## 2024-11-26 NOTE — ASSESSMENT & PLAN NOTE
Seen on CT imaging 11/22 however repeat CTA chest PE study today shows worsening left-sided pleural effusion  Trialed Lasix 20 mg IV once yesterday, further diuretics per primary team.   Plan for IR thoracentesis for diagnostic and therapeutic purposes today.  Monitor I/Os and daily weights

## 2024-11-26 NOTE — PROGRESS NOTES
Progress Note - Pulmonology   Name: Claire Doherty 74 y.o. female I MRN: [id number]  Unit/Bed#: 419-01 I Date of Admission: 11/24/2024   Date of Service: 11/26/2024 I Hospital Day: 2    Assessment & Plan  Acute on chronic respiratory failure with hypoxia and hypercapnia (HCC)  Baseline requirement is 2 L nasal cannula with BiPAP  Currently requiring 2 L nasal cannula continuously.  Titrate oxygen to maintain SpO2 greater than or equal to 88%.  Continue BiPAP during all hours of sleep.  Continue home auto BiPAP max IPAP 20, min EPAP 6, pressure support 6  Dystrophy, muscular, 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HCC)  Management per primary team   Pleural effusion, bilateral  Seen on CT imaging 11/22 however repeat CTA chest PE study today shows worsening left-sided pleural effusion  Trialed Lasix 20 mg IV once yesterday, further diuretics per primary team.   Plan for IR thoracentesis for diagnostic and therapeutic purposes today.  Monitor I/Os and daily weights   COPD exacerbation (HCC)  Continue budesonide and Xopenex  Transition to Decadron 6 mg IV daily per COVID, see below  Recommend 3 days of azithromycin   COVID-19  RP2 panel + for SARS- CoV-2  Given underlying lung disease recommend adding remdesivir  Change steroid to Decadron 6 mg IV daily per mild protocol.     24 Hour Events : none reported  Subjective : patient states that her breathing is somewhat improved compared to yesterday.     Objective :  Temp:  [97.7 °F (36.5 °C)-98 °F (36.7 °C)] 98 °F (36.7 °C)  HR:  [] 83  BP: (143-147)/(94-99) 145/94  Resp:  [18-20] 18  SpO2:  [95 %-97 %] 97 %  O2 Device: Nasal cannula  Nasal Cannula O2 Flow Rate (L/min):  [2 L/min] 2 L/min    Physical Exam  Vitals reviewed.   Constitutional:       Appearance: Normal appearance. She is well-developed.   HENT:      Head: Normocephalic and atraumatic.      Nose: Nose normal.      Mouth/Throat:      Mouth: Mucous membranes are moist.   Eyes:      Extraocular Movements: Extraocular  movements intact.   Cardiovascular:      Rate and Rhythm: Normal rate and regular rhythm.      Heart sounds: Normal heart sounds.   Pulmonary:      Effort: Pulmonary effort is normal. No respiratory distress.      Breath sounds: No wheezing, rhonchi or rales.      Comments: Decreased at bases  Musculoskeletal:         General: No swelling.   Skin:     General: Skin is warm and dry.   Neurological:      Mental Status: She is alert. Mental status is at baseline.   Psychiatric:         Mood and Affect: Mood normal.         Behavior: Behavior normal.         Lab Results: I have reviewed the following results:   .     11/26/24  0509   WBC 10.01   HGB 11.4*   HCT 34.5*   *   SODIUM 135   K 4.0   CL 89*   CO2 33*   BUN 43*   CREATININE 0.99   GLUC 149*     ABG: No new results in last 24 hours.

## 2024-11-26 NOTE — ASSESSMENT & PLAN NOTE
SIRS criteria 3/4 (leukocytosis, tachycardia, tachypnea)  Leukocytosis now resolved  Lactic 1.1 on admission  COVID-19/flu/rsv initially negative, now +COVID with RP2  Procal slightly elevated at 0.27, will repeat  Am labs

## 2024-11-26 NOTE — OCCUPATIONAL THERAPY NOTE
Occupational Therapy Evaluation     Patient Name: Claire Doherty  Today's Date: 11/26/2024  Problem List  Principal Problem:    Acute on chronic respiratory failure with hypoxia and hypercapnia ([facility])  Active Problems:    Dystrophy, muscular, 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HCC)    GERD without esophagitis    Colostomy status (HCC)    Pericardial effusion    DINA (acute kidney injury) (HCC)    Paroxysmal A-fib (HCC)    SIRS (systemic inflammatory response syndrome) (HCC)    Pleural effusion, bilateral    Elevated d-dimer    COPD exacerbation (HCC)    Chronic diastolic heart failure (HCC)    Past Medical History  Past Medical History:   Diagnosis Date    Acute kidney failure (HCC)     Acute nonspecific idiopathic pericarditis     Anxiety     Atrial fibrillation (HCC)     Breast cancer ([facility]) 2007    Cellulitis of right lower extremity     CHF (congestive heart failure) ([facility])     Chronic pain     Chronic respiratory failure with hypoxia (HCC)     Colitis     Colostomy status ([facility])     Dependence on supplemental oxygen     Depression     Difficult intubation     Excessive daytime sleepiness     Gastroesophageal reflux disease without esophagitis     Gastroparesis     GERD (gastroesophageal reflux disease)     Hyperlipidemia     Ischemic colitis ([facility]) 01/21/2019    Leukocytosis 03/28/2020    Muscular dystrophy ([facility])     Limb-girdle    Osteoporosis     Other nonrheumatic aortic valve disorders     Overactive bladder     Perforated diverticulum 03/28/2020    Pericardial effusion (noninflammatory)     Pericarditis     Pneumonitis     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)     Rheumatic tricuspid insufficiency     Skin cancer     Skin disorder     Tachycardia 06/02/2021    Vitamin D deficiency      Past Surgical History  Past Surgical History:   Procedure Laterality Date    CARDIAC CATHETERIZATION N/A 8/21/2024    Procedure: Cardiac pci;  Surgeon: Juan Fuller MD;  [facility];  Service:  Cardiology    CARDIAC CATHETERIZATION N/A 8/21/2024    Procedure: Cardiac PCI Stent;  Surgeon: Juan Fuller MD;  [facility];  Service: Cardiology    CARDIAC CATHETERIZATION N/A 8/21/2024    Procedure: Cardiac Coronary Angiogram;  Surgeon: Juan Fuller MD;  [facility];  Service: Cardiology    COLONOSCOPY N/A 1/22/2019    Procedure: COLONOSCOPY;  Surgeon: Anthony Mejía MD;  Location: [facility];  Service: Colorectal    CYSTOSCOPY N/A 9/30/2020    Procedure: CYSTOSCOPY; BILATERAL URETERAL CATHETER PLACEMENT, CYSTOTOMY;  Surgeon: Zach Tyler MD;  [facility];  Service: Urology    FL CYSTOGRAM  10/15/2020    HARTMANS PROCEDURE N/A 9/30/2020    Procedure: EXPLORATORY LAPAROTOMY; LYSIS OF ADHESIONS; WASHOUT PELVIC ABSCESS; COMPLEX SIGMOID COLON RESECTION; LOOP TRANSVERSE COLOSTOMY;  Surgeon: Thania Jaffe MD;  [facility];  Service: General    IR DRAINAGE TUBE PLACEMENT  9/24/2020    MASTECTOMY Left 2007    left breast mastectomy     TONSILLECTOMY      TOOTH EXTRACTION      TUBAL LIGATION               11/26/24 0929   OT Last Visit   OT Visit Date 11/26/24   Note Type   Note type Evaluation   Pain Assessment   Pain Score No Pain   Restrictions/Precautions   Weight Bearing Precautions Per Order No   Other Precautions Contact/isolation;Airborne/isolation;Chair Alarm;Bed Alarm;Multiple lines;O2;Fall Risk   Home Living   Type of Home SNF   Additional Comments [facility]- pt on therapy caseload for PT/OT currently at facility with goals for functional transfers and use of sit to stand lift for OOB mobility   Prior Function   Level of [place] Needs assistance with ADLs;Needs assistance with functional mobility;Needs assistance with IADLS   Lives With Facility staff   Receives Help From Personal care attendant   IADLs Family/Friend/Other provides transportation;Family/Friend/Other provides meals;Family/Friend/Other provides medication  "management   Falls in the last 6 months 0   Vocational On disability   Subjective   Subjective \"I didn't know I was going to see PT/OT here\"   ADL   Where Assessed Edge of bed   Grooming Assistance 5  Supervision/Setup   Grooming Deficit Teeth care;Brushing hair  (completes seatedEOB-teeth brushing and hair care)   LB Dressing Assistance 2  Maximal Assistance   LB Dressing Deficit Don/doff R sock;Don/doff L sock;Thread RLE into underwear;Pull up over hips;Thread LLE into underwear   Additional Comments pt seated EOB to perform UB grooming tasks with (S) and setup-requires max (A) for sock donning as well as donning pullup in sitting and over hips in standing with use of quick move   Bed Mobility   Supine to Sit 4  Minimal assistance   Additional items Assist x 2;Bedrails;Increased time required;Verbal cues   Sit to Supine   (seated in chair at end of session)   Additional Comments pt on 2L O2 with SPO2 WFL throughout session and mild complaints of SOB   Transfers   Sit to Stand 4  Minimal assistance   Additional items Assist x 2;Verbal cues   Stand to Sit 4  Minimal assistance   Additional items Assist x 2;Verbal cues   Additional Comments pt utilizes min (A) x2 as well as quick move for functional transfers; quick move then utilized to mobilize pt to recliner chair   Functional Mobility   Additional Comments pt does not perform functional mobility; nonambulatory at baseline currently   Balance   Static Sitting Fair +   Dynamic Sitting Fair +   Static Standing Fair   Dynamic Standing Fair -   Activity Tolerance   Activity Tolerance Patient limited by fatigue   RUE Assessment   RUE Assessment X  (4-/5 grossly; WFL AROM)   LUE Assessment   LUE Assessment X  (4-/5 grossly; WFL AROM)   Hand Function   Gross Motor Coordination Impaired   Fine Motor Coordination Impaired   Hand Function Comments impaired bilaterally-history of MD   Sensation   Light Touch No apparent deficits   Sharp/Dull No apparent deficits "   Psychosocial   Psychosocial (WDL) WDL   Cognition   Overall Cognitive Status WFL   Arousal/Participation Alert   Attention Within functional limits   Orientation Level Oriented X4   Memory Within functional limits   Following Commands Follows all commands and directions without difficulty   Assessment   Limitation Decreased ADL status;Decreased UE strength;Decreased Safe judgement during ADL;Decreased endurance;Decreased self-care trans;Decreased high-level ADLs;Decreased fine motor control   Assessment Pt is a 74 y.o. female seen for OT evaluation s/p admit to [facility] on 11/24/2024 w/ Acute on chronic respiratory failure with hypoxia and hypercapnia (HCC).  Comorbidities affecting pt's functional performance at time of assessment include:  COVID, MD-progressive, GERD< colostomy, pericardial effusion, DINA, a-fib, COPD, CHF, elevated d-dimer . Personal factors affecting pt at time of IE include:difficulty performing ADLS, difficulty performing IADLS , decreased initiation and engagement , and health management . Prior to admission, pt was (A) with ADLs and IADLs with use of sit to stand for OOB mobility at SNF. Upon evaluation: Pt requires min-max (A) x1-2 with use of quick move for OOB mobility and transfers 2* the following deficits impacting occupational performance: weakness, decreased strength, decreased balance, decreased tolerance, impaired initiation, and decreased safety awareness. Pt to benefit from continued skilled OT tx while in the hospital to address deficits as defined above and maximize level of functional independence w ADL's and functional mobility. Occupational Performance areas to address include: grooming, bathing/shower, toilet hygiene, dressing, functional mobility, community mobility, and clothing management. The patient's raw score on the -PAC Daily Activity Inpatient Short Form is 15. A raw score of less than 19 suggests the patient may benefit from discharge to post-acute rehabilitation  services. Discharge recommendation at this time is level I maximum resource intensity.  Pt benefited from co-evaluation of skilled OT and PT therapists in order to most appropriately address functional deficits d/t extensive assistance required for safe functional mobility, decreased activity tolerance, and regression from functioning level prior to admission and/or onset of present illness. OT/PT objectives were addressed separately; please see PT note for specific goal areas targeted.   Goals   Patient Goals to feel bettter   Short Term Goal  pt will perform UE strengthening exercises   Long Term Goal #1 pt will demonstrate toilet transfers and hygiene with min (A) level with use of quick move for functional transfers   Long Term Goal #2 pt will perform UB bathing and grooming tasks at min (A) level   Long Term Goal pt will perform bed mobility with (S) level   Plan   Treatment Interventions ADL retraining;Functional transfer training;UE strengthening/ROM;Endurance training;Patient/family training;Equipment evaluation/education;Activityengagement;Compensatory technique education   Goal Expiration Date 12/10/24   OT Frequency 3-5x/wk   Discharge Recommendation   Rehab Resource Intensity Level, OT I (Maximum Resource Intensity)   AM-PAC Daily Activity Inpatient   Lower Body Dressing 1   Bathing 2   Toileting 2   Upper Body Dressing 3   Grooming 3   Eating 4   Daily Activity Raw Score 15   Daily Activity Standardized Score (Calc for Raw Score >=11) 34.69   AM-PAC Applied Cognition Inpatient   Following a Speech/Presentation 4   Understanding Ordinary Conversation 4   Taking Medications 4   Remembering Where Things Are Placed or Put Away 4   Remembering List of 4-5 Errands 4   Taking Care of Complicated Tasks 3   Applied Cognition Raw Score 23   Applied Cognition Standardized Score 53.08

## 2024-11-26 NOTE — PHYSICAL THERAPY NOTE
Physical Therapy Evaluation    Patient Name: Claire Doherty    Today's Date: 11/26/2024     Problem List  Principal Problem:    Acute on chronic respiratory failure with hypoxia and hypercapnia ([facility])  Active Problems:    Dystrophy, muscular, 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HCC)    GERD without esophagitis    Colostomy status (HCC)    Pericardial effusion    DINA (acute kidney injury) (HCC)    Paroxysmal A-fib (HCC)    SIRS (systemic inflammatory response syndrome) (HCC)    Pleural effusion, bilateral    Elevated d-dimer    COPD exacerbation (HCC)    Chronic diastolic heart failure (HCC)       Past Medical History  Past Medical History:   Diagnosis Date    Acute kidney failure (HCC)     Acute nonspecific idiopathic pericarditis     Anxiety     Atrial fibrillation (HCC)     Breast cancer (HCC) 2007    Cellulitis of right lower extremity     CHF (congestive heart failure) ([facility])     Chronic pain     Chronic respiratory failure with hypoxia (HCC)     Colitis     Colostomy status (HCC)     Dependence on supplemental oxygen     Depression     Difficult intubation     Excessive daytime sleepiness     Gastroesophageal reflux disease without esophagitis     Gastroparesis     GERD (gastroesophageal reflux disease)     Hyperlipidemia     Ischemic colitis (HCC) 01/21/2019    Leukocytosis 03/28/2020    Muscular dystrophy (HCC)     Limb-girdle    Osteoporosis     Other nonrheumatic aortic valve disorders     Overactive bladder     Perforated diverticulum 03/28/2020    Pericardial effusion (noninflammatory)     Pericarditis     Pneumonitis     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)     Rheumatic tricuspid insufficiency     Skin cancer     Skin disorder     Tachycardia 06/02/2021    Vitamin D deficiency         Past Surgical History  Past Surgical History:   Procedure Laterality Date    CARDIAC CATHETERIZATION N/A 8/21/2024    Procedure: Cardiac pci;  Surgeon: Juan  MD Alina;  [facility];  Service: Cardiology    CARDIAC CATHETERIZATION N/A 8/21/2024    Procedure: Cardiac PCI Stent;  Surgeon: Juan Fuller MD;  [facility];  Service: Cardiology    CARDIAC CATHETERIZATION N/A 8/21/2024    Procedure: Cardiac Coronary Angiogram;  Surgeon: Juan Fuller MD;  [facility];  Service: Cardiology    COLONOSCOPY N/A 1/22/2019    Procedure: COLONOSCOPY;  Surgeon: Anthony Mejía MD;  [facility];  Service: Colorectal    CYSTOSCOPY N/A 9/30/2020    Procedure: CYSTOSCOPY; BILATERAL URETERAL CATHETER PLACEMENT, CYSTOTOMY;  Surgeon: Zach Tyler MD;  [facility];  Service: Urology    FL CYSTOGRAM  10/15/2020    HARTMANS PROCEDURE N/A 9/30/2020    Procedure: EXPLORATORY LAPAROTOMY; LYSIS OF ADHESIONS; WASHOUT PELVIC ABSCESS; COMPLEX SIGMOID COLON RESECTION; LOOP TRANSVERSE COLOSTOMY;  Surgeon: Thania Jaffe MD;  [facility];  Service: General    IR DRAINAGE TUBE PLACEMENT  9/24/2020    MASTECTOMY Left 2007    left breast mastectomy     TONSILLECTOMY      TOOTH EXTRACTION      TUBAL LIGATION             11/26/24 0930   PT Last Visit   PT Visit Date 11/26/24   Note Type   Note type Evaluation   Pain Assessment   Pain Assessment Tool 0-10   Pain Score No Pain   Restrictions/Precautions   Weight Bearing Precautions Per Order No   Other Precautions Contact/isolation;Airborne/isolation;Chair Alarm;Bed Alarm;Multiple lines;O2;Fall Risk   Home Living   Type of Home SNF   Additional Comments pt currently residing at [facility]   Prior Function   Level of [place] Needs assistance with functional mobility;Needs assistance with ADLs;Needs assistance with IADLS   Lives With Facility staff   Receives Help From Personal care attendant   IADLs Family/Friend/Other provides transportation;Family/Friend/Other provides meals;Family/Friend/Other provides medication management   Falls in the last 6 months  "0   Vocational On disability   General   Family/Caregiver Present No   Cognition   Overall Cognitive Status WFL   Arousal/Participation Alert   Attention Within functional limits   Orientation Level Oriented X4   Following Commands Follows all commands and directions without difficulty   Subjective   Subjective \"I lost a week\"   RLE Assessment   RLE Assessment X  (3-/5 grossly)   LLE Assessment   LLE Assessment X  (3-/5 grossly)   Bed Mobility   Supine to Sit 4  Minimal assistance   Additional items Assist x 2;Increased time required;Verbal cues   Sit to Supine   (pt OOB at end of session)   Transfers   Sit to Stand 4  Minimal assistance   Additional items Assist x 2;Increased time required;Verbal cues   Stand to Sit 4  Minimal assistance   Additional items Assist x 2;Increased time required;Verbal cues   Stand pivot   (performed with Quick Move)   Ambulation/Elevation   Gait pattern Not appropriate   Balance   Static Sitting Fair +   Dynamic Sitting Fair +   Static Standing Fair   Dynamic Standing Fair -   Endurance Deficit   Endurance Deficit Yes   Endurance Deficit Description pt reports fatiguing easily with mobility   Activity Tolerance   Activity Tolerance Patient limited by fatigue   Assessment   Prognosis Good   Problem List Decreased strength;Decreased endurance;Impaired balance;Decreased mobility   Assessment Patient is a 74 y.o. female evaluated by Physical Therapy s/p admit to [facility] on 11/24/2024 with admitting diagnosis of: Dyspnea, SOB (shortness of breath), DINA (acute kidney injury), Acute on chronic respiratory failure with hypoxia, and principal problem of: Acute on chronic respiratory failure with hypoxia and hypercapnia. PT was consulted to assess patient's functional mobility and discharge needs. Ordered are PT Evaluation and treatment with activity level of: up and OOB as tolerated. Comorbidities affecting patient's physical performance at time of assessment include:  muscular " dystrophy, GERD, colostomy status, PAF, COPD, chronic diastolic heart failure, restrictive lung disease, osteoporosis . Personal factors affecting the patient at time of IE include: inability to navigate community distances, inability/difficulty performing IADLs, and inability/difficulty performing ADLs. Please locate objective findings from PT assessment regarding body systems outlined above. Upon evaluation, pt able to perform all functional mobility with [place] x 2, Quick Move, and increased time. Occasional verbal cuing provided for safety awareness and sequencing. At rehab, pt has been working on transferring with sit to stand lift, so Quick Move utilized to transfer from bed to recliner; pt is non-ambulatory at baseline. No LOB experienced and pt tolerated transfer well. HR and SpO2 remained WFL on 2L O2 throughout. The patient's AM-PAC Basic Mobility Inpatient Short Form Raw Score is 10. A Raw score of less than or equal to 16 suggests the patient may benefit from discharge to post-acute rehabilitation services. Please also refer to the recommendation of the Physical Therapist for safe discharge planning. Co treatment with OT secondary to complex medical condition of pt, possible A of 2 required to achieve and maintain transitional movements, requiring the need of skilled therapeutic intervention of 2 therapists to achieve delivery of services. Pt will benefit from continued PT intervention during LOS to address current deficits, increase LOF, and facilitate safe d/c to next level of care when medically appropriate. D/c recommendation at this time is rehabilitation resource intensity level I.   Goals   Patient Goals to go home   LTG Expiration Date 12/10/24   Long Term Goal #1 Pt will participate in B LE strengthening exercises to facilitate improved functional activity tolerance. Pt will perform all functional transfers and bed mobility mod(I) with good safety awareness.   Plan   Treatment/Interventions  Functional transfer training;LE strengthening/ROM;Therapeutic exercise;Endurance training;Bed mobility   PT Frequency 3-5x/wk   Discharge Recommendation   Rehab Resource Intensity Level, PT I (Maximum Resource Intensity)   AM-PAC Basic Mobility Inpatient   Turning in Flat Bed Without Bedrails 2   Lying on Back to Sitting on Edge of Flat Bed Without Bedrails 2   Moving Bed to Chair 2   Standing Up From Chair Using Arms 2   Walk in Room 1   Climb 3-5 Stairs With Railing 1   Basic Mobility Inpatient Raw Score 10   Turning Head Towards Sound 4   Follow Simple Instructions 4   Low Function Basic Mobility Raw Score  18   Low Function Basic Mobility Standardized Score  29.25   [facility] Highest Level Of Mobility   -[facility] Goal 4: Move to chair/commode   -[facility] Achieved 4: Move to chair/commode  (via Quick Move)   End of Consult   Patient Position at End of Consult Bedside chair;Bed/Chair alarm activated;All needs within reach

## 2024-11-26 NOTE — PROGRESS NOTES
Patient:    MRN:  [id number]    Inés Request ID:  [id number]    Level of care reserved:  Skilled Nursing Facility    Partner Reserved:  [facility] [place] [address] [phone]    Clinical needs requested:    Geography searched:  10 miles around [id number]    Start of Service:    Request sent:  8:23am EST on 11/26/2024 by Barbara Chan    Partner reserved:  2:53pm EST on 11/26/2024 by Barbara Chan    Choice list shared:  2:53pm EST on 11/26/2024 by Barbara Chan

## 2024-11-26 NOTE — ASSESSMENT & PLAN NOTE
Presented with complaints of SOB which became progressively worse over the last few days. Found to be +COVID, denies fever, chills, cough  Currently SpO2: 97 % on Nasal Cannula O2 Flow Rate (L/min): 2 L/min    Plan:  Procal 0.27, will continue to trend  Follow CBC  Azithromycin 500mg x3 days  Scheduled nebs, respiratory protocol  IV Solu-Medrol 40mg daily   Encouraged ISB 10x per hour while awake, ambulation  Monitor O2, supplement as needed, maintain O2 >88%  Pulmonology following

## 2024-11-26 NOTE — CASE MANAGEMENT
Case Management Discharge Planning Note    Patient name Claire Doherty  Location /419-01 MRN [id number]  : 1950 Date 2024       Current Admission Date: 2024  Current Admission Diagnosis:Acute on chronic respiratory failure with hypoxia and hypercapnia ([facility])   Patient Active Problem List    Diagnosis Date Noted Date Diagnosed    Elevated d-dimer 2024     COPD exacerbation ([facility]) 2024     Chronic diastolic heart failure ([facility]) 2024     SIRS (systemic inflammatory response syndrome) ([facility]) 2024     Pleural effusion, bilateral 2024     Hyperkalemia 2024     Paroxysmal A-fib ([facility]) 2024     Acute on chronic diastolic CHF (congestive heart failure) ([facility]) 09/10/2024     Acute on chronic respiratory failure with hypoxia and hypercapnia ([facility]) 09/10/2024     Severe protein-calorie malnutrition ([facility]) 2024     DINA (acute kidney injury) ([facility]) 2024     Nausea 2024     Pleural effusion 2024     Pericardial effusion 2024     Infected wound 2024     Chest pain 2024     History of Idiopathic pericarditis 2024     Cellulitis of right foot 2024     Chronic left shoulder pain 2024     Shoulder joint dysfunction 2024     COPD (chronic obstructive pulmonary disease) ([facility]) 06/15/2023     Bilateral hand pain 2023     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     Gastroparesis 2021     Aortic valve sclerosis 2021     Tricuspid valve insufficiency 2021     Mitral annular calcification 2021     Former smoker 2021     On home oxygen therapy 2021     Colostomy status ([facility])      Ductal carcinoma in situ (DCIS) of left breast 03/15/2021     Insomnia 10/21/2020     Depression 10/06/2020     Anemia 10/02/2020     Chronic hypoxic respiratory failure ([facility]) 2020     Bladder injury 2020     Thrombocytosis, unspecified 2020     Leukocytosis 2020      Difficult intubation      Dystrophy, muscular, 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HCC) 10/12/2017     Ambulatory dysfunction 10/12/2017     Urinary incontinence 10/03/2017     Osteoporosis 12/07/2016     Allergic rhinitis 10/09/2013     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) 10/04/2012     GERD without esophagitis 10/04/2012       LOS (days): 2  Geometric Mean LOS (GMLOS) (days): 3.5  Days to GMLOS:2     OBJECTIVE:  Risk of Unplanned Readmission Score: 40.1         Current admission status: Inpatient   Preferred Pharmacy:   [id number] [place] - [address] [address]  Phone: [phone] Fax: [phone]    [address] [address]  Phone: [phone] Fax: [phone]    Primary Care Provider: Julienne Tucker DO    Primary Insurance: [facility]  Secondary Insurance:     DISCHARGE DETAILS:  [facility]. If no bed or unable to accept, family plans on pt returning to [facility] and rehab.

## 2024-11-26 NOTE — NURSING NOTE
Patient stated she tested positive for covid-19 at the nursing home she came from on 11/21.    Patient also stated she takes, Celexa, Wellbutrin, melatonin at night as well as trazodone at night which is prescribed by a psychiatrist per patient.

## 2024-11-26 NOTE — ASSESSMENT & PLAN NOTE
Bilateral pleural effusion noted on CT completed on 11/22  Patient currently on torsemide 20  mg daily  --continue home dose torsemide  --Pulmonology following   Plan for thoracentesis today

## 2024-11-26 NOTE — ASSESSMENT & PLAN NOTE
Lab Results   Component Value Date    CREATININE 0.99 11/26/2024    CREATININE 1.00 11/25/2024    CREATININE 1.09 11/24/2024       Creatinine level at 1.09, now improving  Baseline appears to be between 0.5 ans 0.6  --Avoid nephrotoxic agents  --S/P small fluid bolus  --Monitor urinary output,creatinine electrolyte levels   --Monitor renal function  --Nephrology consult if no improvement

## 2024-11-26 NOTE — ASSESSMENT & PLAN NOTE
Presented to the emergency room for evaluation of complaint of shortness of breath Patient reports SOB has been ongoing for several days getting progressively worst. Patient reports SOB is worst upon exertion.  Patient also reports having conversational dyspnea  Has history of restrictive lung disease due to muscular dystrophy   Initial Rapid Flu/RSV/COVID-negative  However +COVID noted on RP2 panel  On 2L supplemental O2 at baseline  Denies chest pain, cough. Admits to chest tightness  Likely secondary to combination of COPD exacerbation, pleural effusions and COVID  Continue Respiratory protocol  Bipap at night   CTA Chest negative for PE  Pulmonology following

## 2024-11-26 NOTE — RESPIRATORY THERAPY NOTE
11/26/24 0721   Inhalation Therapy Tx   $ Inhalation Therapy Performed Yes   $ Pulse Oximetry Spot Check Charge Completed   SpO2 97 %   Pre-Treatment Pulse 86   Pre-Treatment Respirations 20   Duration 20   Breath Sounds Pre-Treatment Bilateral Diminished   Delivery Source Air;UDN   Position Semi [employer]   Treatment Tolerance Tolerated well   Resp Comments patient was on her home bipap machine, at this time she is on 2 lpm nasal cannula, flutter performed, weak, npc on command

## 2024-11-26 NOTE — ASSESSMENT & PLAN NOTE
History of muscular dystrophy  Currently at SNF for rehab  Reports that she had not been able to transfer from bed to chair on her own  --Continue bipap at bedtime  --Likely return to rehab post discharge, return to [facility]  --Continue outpatient Neurology follow up

## 2024-11-26 NOTE — ASSESSMENT & PLAN NOTE
Continue budesonide and Xopenex  Transition to Decadron 6 mg IV daily per COVID, see below  Recommend 3 days of azithromycin

## 2024-11-26 NOTE — PLAN OF CARE
Problem: OCCUPATIONAL THERAPY ADULT  Goal: Performs self-care activities at highest level of function for planned discharge setting.  See evaluation for individualized goals.  Description: Treatment Interventions: ADL retraining, Functional transfer training, UE strengthening/ROM, Endurance training, Patient/family training, Equipment evaluation/education, Activityengagement, Compensatory technique education          See flowsheet documentation for full assessment, interventions and recommendations.   Note: Limitation: Decreased ADL status, Decreased UE strength, Decreased Safe judgement during ADL, Decreased endurance, Decreased self-care trans, Decreased high-level ADLs, Decreased fine motor control     Assessment: Pt is a 74 y.o. female seen for OT evaluation s/p admit to [facility] on 11/24/2024 w/ Acute on chronic respiratory failure with hypoxia and hypercapnia (HCC).  Comorbidities affecting pt's functional performance at time of assessment include:  COVID, MD-progressive, GERD< colostomy, pericardial effusion, DINA, a-fib, COPD, CHF, elevated d-dimer . Personal factors affecting pt at time of IE include:difficulty performing ADLS, difficulty performing IADLS , decreased initiation and engagement , and health management . Prior to admission, pt was (A) with ADLs and IADLs with use of sit to stand for OOB mobility at SNF. Upon evaluation: Pt requires min-max (A) x1-2 with use of quick move for OOB mobility and transfers 2* the following deficits impacting occupational performance: weakness, decreased strength, decreased balance, decreased tolerance, impaired initiation, and decreased safety awareness. Pt to benefit from continued skilled OT tx while in the hospital to address deficits as defined above and maximize level of functional independence w ADL's and functional mobility. Occupational Performance areas to address include: grooming, bathing/shower, toilet hygiene, dressing, functional mobility, community  mobility, and clothing management. The patient's raw score on the AM-PAC Daily Activity Inpatient Short Form is 15. A raw score of less than 19 suggests the patient may benefit from discharge to post-acute rehabilitation services. Discharge recommendation at this time is level I maximum resource intensity.  Pt benefited from co-evaluation of skilled OT and PT therapists in order to most appropriately address functional deficits d/t extensive assistance required for safe functional mobility, decreased activity tolerance, and regression from functioning level prior to admission and/or onset of present illness. OT/PT objectives were addressed separately; please see PT note for specific goal areas targeted.     Rehab Resource Intensity Level, OT: I (Maximum Resource Intensity)

## 2024-11-27 ENCOUNTER — APPOINTMENT (INPATIENT)
Dept: ULTRASOUND IMAGING | Facility: HOSPITAL | Age: 74
DRG: 189 | End: 2024-11-27
Payer: COMMERCIAL

## 2024-11-27 PROBLEM — N28.1 RENAL CYST: Status: ACTIVE | Noted: 2024-11-27

## 2024-11-27 LAB
ANION GAP SERPL CALCULATED.3IONS-SCNC: 15 MMOL/L (ref 4–13)
BNP SERPL-MCNC: 87 PG/ML (ref 0–100)
BUN SERPL-MCNC: 53 MG/DL (ref 5–25)
CALCIUM SERPL-MCNC: 9.5 MG/DL (ref 8.4–10.2)
CHLORIDE SERPL-SCNC: 89 MMOL/L (ref 96–108)
CO2 SERPL-SCNC: 32 MMOL/L (ref 21–32)
CREAT SERPL-MCNC: 1.13 MG/DL (ref 0.6–1.3)
CRP SERPL QL: 88.1 MG/L
ERYTHROCYTE [DISTWIDTH] IN BLOOD BY AUTOMATED COUNT: 15.9 % (ref 11.6–15.1)
GFR SERPL CREATININE-BSD FRML MDRD: 47 ML/MIN/1.73SQ M
GLUCOSE SERPL-MCNC: 123 MG/DL (ref 65–140)
HCT VFR BLD AUTO: 39.8 % (ref 34.8–46.1)
HGB BLD-MCNC: 12.6 G/DL (ref 11.5–15.4)
MCH RBC QN AUTO: 27.4 PG (ref 26.8–34.3)
MCHC RBC AUTO-ENTMCNC: 31.7 G/DL (ref 31.4–37.4)
MCV RBC AUTO: 87 FL (ref 82–98)
PLATELET # BLD AUTO: 611 THOUSANDS/UL (ref 149–390)
PMV BLD AUTO: 10.7 FL (ref 8.9–12.7)
POTASSIUM SERPL-SCNC: 3.7 MMOL/L (ref 3.5–5.3)
PROCALCITONIN SERPL-MCNC: 0.15 NG/ML
RBC # BLD AUTO: 4.6 MILLION/UL (ref 3.81–5.12)
SODIUM SERPL-SCNC: 136 MMOL/L (ref 135–147)
WBC # BLD AUTO: 10.63 THOUSAND/UL (ref 4.31–10.16)

## 2024-11-27 PROCEDURE — 94640 AIRWAY INHALATION TREATMENT: CPT

## 2024-11-27 PROCEDURE — 94668 MNPJ CHEST WALL SBSQ: CPT

## 2024-11-27 PROCEDURE — 94664 DEMO&/EVAL PT USE INHALER: CPT

## 2024-11-27 PROCEDURE — 99232 SBSQ HOSP IP/OBS MODERATE 35: CPT

## 2024-11-27 PROCEDURE — 86140 C-REACTIVE PROTEIN: CPT

## 2024-11-27 PROCEDURE — 92526 ORAL FUNCTION THERAPY: CPT

## 2024-11-27 PROCEDURE — 76775 US EXAM ABDO BACK WALL LIM: CPT

## 2024-11-27 PROCEDURE — 85027 COMPLETE CBC AUTOMATED: CPT

## 2024-11-27 PROCEDURE — 80048 BASIC METABOLIC PNL TOTAL CA: CPT

## 2024-11-27 PROCEDURE — 99232 SBSQ HOSP IP/OBS MODERATE 35: CPT | Performed by: PHYSICIAN ASSISTANT

## 2024-11-27 PROCEDURE — 83880 ASSAY OF NATRIURETIC PEPTIDE: CPT

## 2024-11-27 PROCEDURE — 84145 PROCALCITONIN (PCT): CPT

## 2024-11-27 PROCEDURE — 94760 N-INVAS EAR/PLS OXIMETRY 1: CPT

## 2024-11-27 RX ADMIN — APIXABAN 5 MG: 5 TABLET, FILM COATED ORAL at 08:40

## 2024-11-27 RX ADMIN — BUDESONIDE 0.5 MG: 0.5 INHALANT RESPIRATORY (INHALATION) at 09:10

## 2024-11-27 RX ADMIN — FLUTICASONE PROPIONATE 2 SPRAY: 50 SPRAY, METERED NASAL at 08:44

## 2024-11-27 RX ADMIN — ALBUTEROL SULFATE 2.5 MG: 2.5 SOLUTION RESPIRATORY (INHALATION) at 09:10

## 2024-11-27 RX ADMIN — BUDESONIDE 0.5 MG: 0.5 INHALANT RESPIRATORY (INHALATION) at 19:35

## 2024-11-27 RX ADMIN — BUPROPION HYDROCHLORIDE 75 MG: 75 TABLET, FILM COATED ORAL at 08:41

## 2024-11-27 RX ADMIN — CITALOPRAM HYDROBROMIDE 10 MG: 20 TABLET ORAL at 21:52

## 2024-11-27 RX ADMIN — MUPIROCIN 1 APPLICATION: 20 OINTMENT TOPICAL at 21:52

## 2024-11-27 RX ADMIN — TORSEMIDE 20 MG: 20 TABLET ORAL at 08:40

## 2024-11-27 RX ADMIN — DEXAMETHASONE SODIUM PHOSPHATE 6 MG: 10 INJECTION, SOLUTION INTRAMUSCULAR; INTRAVENOUS at 09:29

## 2024-11-27 RX ADMIN — Medication 3 MG: at 21:52

## 2024-11-27 RX ADMIN — REMDESIVIR 100 MG: 100 INJECTION, POWDER, LYOPHILIZED, FOR SOLUTION INTRAVENOUS at 21:52

## 2024-11-27 RX ADMIN — PANTOPRAZOLE SODIUM 40 MG: 40 TABLET, DELAYED RELEASE ORAL at 05:29

## 2024-11-27 RX ADMIN — APIXABAN 5 MG: 5 TABLET, FILM COATED ORAL at 17:21

## 2024-11-27 RX ADMIN — Medication 250 MG: at 08:40

## 2024-11-27 RX ADMIN — OXYBUTYNIN CHLORIDE 10 MG: 5 TABLET, EXTENDED RELEASE ORAL at 21:52

## 2024-11-27 RX ADMIN — DIAZEPAM 5 MG: 5 TABLET ORAL at 21:52

## 2024-11-27 RX ADMIN — GUAIFENESIN 600 MG: 600 TABLET ORAL at 08:40

## 2024-11-27 NOTE — PROGRESS NOTES
Progress Note - Pulmonology   Name: Claire Doherty 74 y.o. female I MRN: [id number]  Unit/Bed#: 419-01 I Date of Admission: 11/24/2024   Date of Service: 11/27/2024 I Hospital Day: 3    Assessment & Plan  Acute on chronic respiratory failure with hypoxia and hypercapnia (HCC)  Baseline is 2 L O2 nightly with BiPAP and as needed, currently she is wearing 2 L O2, but saturations are in the mid to high 90s, would recommend weaning for SpO2 goal >88%.  Continue with BiPAP nightly (home settings: max IPAP 20, min EPAP 6, pressure support 6)  Dystrophy, muscular, 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HCC)  Management per primary team   Pleural effusion, bilateral  Initially appeared to have worsened on the left compared to prior CT, but during IR evaluation for thoracentesis fluid had resolved.  Further diuretics as per primary team, currently appears euvolemic.  Monitor I/O's and daily weights  COPD exacerbation (HCC)  Continue budesonide and Xopenex  Continue Decadron 6 mg daily for COVID as below  Complete 3 days of azithromycin   COVID-19  RP2 panel + for SARS- CoV-2  Continue with Decadron and remdesivir    Pulmonary will sign off, please call with questions.    24 Hour Events : No acute overnight events.  Subjective : She reports feeling well this morning with improvement in her dyspnea from prior.    Objective :  Temp:  [97.5 °F (36.4 °C)-97.8 °F (36.6 °C)] 97.8 °F (36.6 °C)  HR:  [85-97] 93  BP: (124-137)/(73-80) 124/73  Resp:  [16-18] 18  SpO2:  [93 %-98 %] 98 %  O2 Device: Nasal cannula  Nasal Cannula O2 Flow Rate (L/min):  [2 L/min] 2 L/min    Physical Exam  Constitutional:       Comments: Sitting up in bed   HENT:      Head: Normocephalic.      Mouth/Throat:      Mouth: Mucous membranes are moist.   Cardiovascular:      Rate and Rhythm: Normal rate and regular rhythm.      Heart sounds: No murmur heard.     No friction rub. No gallop.   Pulmonary:      Effort: Pulmonary effort is normal.      Breath sounds: Normal breath  sounds.   Musculoskeletal:      Right lower leg: No edema.      Left lower leg: No edema.   Skin:     General: Skin is warm and dry.   Neurological:      Mental Status: She is alert.         Lab Results: I have reviewed the following results:   .     11/27/24  0449 11/27/24  0938   WBC 10.63*  --    HGB 12.6  --    HCT 39.8  --    *  --    SODIUM 136  --    K 3.7  --    CL 89*  --    CO2 32  --    BUN 53*  --    CREATININE 1.13  --    GLUC 123  --    BNP  --  87     ABG: No new results in last 24 hours.

## 2024-11-27 NOTE — ASSESSMENT & PLAN NOTE
Patient found to be +COVID with RP2 on 11/25, initially negative with rapid test  Continue O2 supplement as needed, goal >92% O2 sat  Start IV remdesivir, day 3 of 5 > will complete entire course here  Continue solumedrol 40mg daily  Procal 0.27 > 0.15  On eliquis for AC  Encourage patient self-prone, incentive spirometer, OOB as appropriate  Current oxygenation 97% on 2 L which she utilizes at home PRN  Monitor inflammatory markers q 2-3 days > downtrending nicely  CTA negative for PE

## 2024-11-27 NOTE — ASSESSMENT & PLAN NOTE
There is a 1.1 cm hypodense lesion in the midpole of the left kidney anteriorly (series 2 image 97), which measures higher than simple fluid in density. It was not seen on the renal ultrasound from September 2024, however is present on older CTs.   Renal US ordered for further characterization

## 2024-11-27 NOTE — SPEECH THERAPY NOTE
"Speech Language/Pathology  Speech-Language Pathology Progress Note    Patient Name: Claire Doherty  Today's Date: 11/27/2024    Subjective:  Pt was awake, alert, fully oriented, and pleasant/cooperative. She was sitting upright in bed. Patient stated \"I usually eat breakfast in bed, but I make sure I'm sitting up good enough\".    Objective:  Pt was seen today for dysphagia therapy. Current diet is regular with thin liquids. Pt was on 2L O2 via nasal cannula. Oral care had already been completed. Focus of today's session was to maximize PO intake safety and improve pt's self monitoring. Textures offered today included regular solid and [facility] solids and thin liquids.  Swallow function:   Bolus retrieval, control, manipulation, mastication, formation, and AP transfer were adequate. Mild oral retention noted, clearing effectively with liquid wash with independent implementation. Pharyngeal swallow initiation was timely. Hyolaryngeal excursion was adequate. No s/s aspiration occurred during session today.  SLP provided min cueing/feedback throughout session. Pt demonstrated good understanding and follow through.    Assessment:  Swallow function is stable with current diet. Diet texture and liquid consistency remains appropriate at this time. Patient independently utilizing compensatory strategies and self monitoring tolerance of diet and use of strategies.    Plan:  Continue regular and thin liquids. No additional ST f/u needed at this time. Please re consult with any new changes or concerns.   "

## 2024-11-27 NOTE — ASSESSMENT & PLAN NOTE
Initially appeared to have worsened on the left compared to prior CT, but during IR evaluation for thoracentesis fluid had resolved.  Further diuretics as per primary team, currently appears euvolemic.  Monitor I/O's and daily weights

## 2024-11-27 NOTE — ASSESSMENT & PLAN NOTE
Baseline is 2 L O2 nightly with BiPAP and as needed, currently she is wearing 2 L O2, but saturations are in the mid to high 90s, would recommend weaning for SpO2 goal >88%.  Continue with BiPAP nightly (home settings: max IPAP 20, min EPAP 6, pressure support 6)

## 2024-11-27 NOTE — ASSESSMENT & PLAN NOTE
Noted on CT completed on 11/22, no longer seen on CTA on 11/25  Discussed lex with cardiology, this small effusion was seen on images in the past and did not correlate on ECHO  No need for further work up at this time, she has no clinical signs of cardiac tamponade

## 2024-11-27 NOTE — ASSESSMENT & PLAN NOTE
D-dimer level at 3.38  No documented history DVT/PE  CTA chest PE negative  Vas duplex negative for DVT

## 2024-11-27 NOTE — ASSESSMENT & PLAN NOTE
Bilateral pleural effusion noted on CT completed on 11/22  Patient currently on torsemide 20 mg daily  Not enough fluid present for thoracentesis

## 2024-11-27 NOTE — ASSESSMENT & PLAN NOTE
Lab Results   Component Value Date    CREATININE 1.13 11/27/2024    CREATININE 0.99 11/26/2024    CREATININE 1.00 11/25/2024       Creatinine level at 1.09, now improving  Baseline appears to be between 0.5 ans 0.6  Urinary retention protocol  Hold torsemide

## 2024-11-27 NOTE — PLAN OF CARE
Problem: Prexisting or High Potential for Compromised Skin Integrity  Goal: Skin integrity is maintained or improved  Description: INTERVENTIONS:  - Identify patients at risk for skin breakdown  - Assess and monitor skin integrity  - Assess and monitor nutrition and hydration status  - Monitor labs   - Assess for incontinence   - Turn and reposition patient  - Assist with mobility/ambulation  - Relieve pressure over bony prominences  - Avoid friction and shearing  - Provide appropriate hygiene as needed including keeping skin clean and dry  - Evaluate need for skin moisturizer/barrier cream  - Collaborate with interdisciplinary team   - Patient/family teaching  - Consider wound care consult   Outcome: Progressing     Problem: PAIN - ADULT  Goal: Verbalizes/displays adequate comfort level or baseline comfort level  Description: Interventions:  - Encourage patient to monitor pain and request assistance  - Assess pain using appropriate pain scale  - Administer analgesics based on type and severity of pain and evaluate response  - Implement non-pharmacological measures as appropriate and evaluate response  - Consider cultural and social influences on pain and pain management  - Notify physician/advanced practitioner if interventions unsuccessful or patient reports new pain  Outcome: Progressing     Problem: INFECTION - ADULT  Goal: Absence or prevention of progression during hospitalization  Description: INTERVENTIONS:  - Assess and monitor for signs and symptoms of infection  - Monitor lab/diagnostic results  - Monitor all insertion sites, i.e. indwelling lines, tubes, and drains  - Sayre appropriate cooling/warming therapies per order  - Administer medications as ordered  - Instruct and encourage patient and family to use good hand hygiene technique  - Identify and instruct in appropriate isolation precautions for identified infection/condition  Outcome: Progressing     Problem: SAFETY ADULT  Goal: Patient will  remain free of falls  Description: INTERVENTIONS:  - Educate patient/family on patient safety including physical limitations  - Instruct patient to call for assistance with activity   - Consult OT/PT to assist with strengthening/mobility   - Keep Call bell within reach  - Keep bed low and locked with side rails adjusted as appropriate  - Keep care items and personal belongings within reach  - Initiate and maintain comfort rounds  - Make Fall Risk Sign visible to staff  - Offer Toileting every 2 Hours, in advance of need  - Initiate/Maintain bed/chair alarm  - Obtain necessary fall risk management equipment: daily  - Apply yellow socks and bracelet for high fall risk patients  - Consider moving patient to room near nurses station  Outcome: Progressing  Goal: Maintain or return to baseline ADL function  Description: INTERVENTIONS:  -  Assess patient's ability to carry out ADLs; assess patient's baseline for ADL function and identify physical deficits which impact ability to perform ADLs (bathing, care of mouth/teeth, toileting, grooming, dressing, etc.)  - Assess/evaluate cause of self-care deficits   - Assess range of motion  - Assess patient's mobility; develop plan if impaired  - Assess patient's need for assistive devices and provide as appropriate  - Encourage maximum independence but intervene and supervise when necessary  - Involve family in performance of ADLs  - Assess for home care needs following discharge   - Consider OT consult to assist with ADL evaluation and planning for discharge  - Provide patient education as appropriate  Outcome: Progressing  Goal: Maintains/Returns to pre admission functional level  Description: INTERVENTIONS:  - Perform AM-PAC 6 Click Basic Mobility/ Daily Activity assessment daily.  - Set and communicate daily mobility goal to care team and patient/family/caregiver.   - Collaborate with rehabilitation services on mobility goals if consulted  - Perform Range of Motion 3 times a  day.  - Reposition patient every 2 hours.  - Dangle patient 3 times a day  - Out of bed to chair 3 times a day   - Out of bed for meals 3 times a day  - Out of bed for toileting  - Record patient progress and toleration of activity level   Outcome: Progressing     Problem: DISCHARGE PLANNING  Goal: Discharge to home or other facility with appropriate resources  Description: INTERVENTIONS:  - Identify barriers to discharge w/patient and caregiver  - Arrange for needed discharge resources and transportation as appropriate  - Identify discharge learning needs (meds, wound care, etc  - Refer to Case Management Department for coordinating discharge planning if the patient needs post-hospital services based on physician/advanced practitioner order or complex needs related to functional status, cognitive ability, or social support system  Outcome: Progressing     Problem: Knowledge Deficit  Goal: Patient/family/caregiver demonstrates understanding of disease process, treatment plan, medications, and discharge instructions  Description: Complete learning assessment and assess knowledge base.  Interventions:  - Provide teaching at level of understanding  - Provide teaching via preferred learning methods  Outcome: Progressing     Problem: RESPIRATORY - ADULT  Goal: Achieves optimal ventilation and oxygenation  Description: INTERVENTIONS:  - Assess for changes in respiratory status  - Assess for changes in mentation and behavior  - Position to facilitate oxygenation and minimize respiratory effort  - Oxygen administered by appropriate delivery if ordered  - Encourage broncho-pulmonary hygiene including cough, deep breathe, Incentive Spirometry  - Assess the need for suctioning and aspirate as needed  - Assess and instruct to report SOB or any respiratory difficulty  - Respiratory Therapy support as indicated  Outcome: Progressing     Problem: Nutrition/Hydration-ADULT  Goal: Nutrient/Hydration intake appropriate for improving,  restoring or maintaining nutritional needs  Description: Monitor and assess patient's nutrition/hydration status for malnutrition. Collaborate with interdisciplinary team and initiate plan and interventions as ordered.  Monitor patient's weight and dietary intake as ordered or per policy. Utilize nutrition screening tool and intervene as necessary. Determine patient's food preferences and provide high-protein, high-caloric foods as appropriate.     INTERVENTIONS:  - Monitor oral intake, urinary output, labs, and treatment plans  - Assess nutrition and hydration status and recommend course of action  - Evaluate amount of meals eaten  - Assist patient with eating if necessary   - Allow adequate time for meals  - Recommend/ encourage appropriate diets, oral nutritional supplements, and vitamin/mineral supplements  - Order, calculate, and assess calorie counts as needed  - Recommend, monitor, and adjust tube feedings and TPN/PPN based on assessed needs  - Assess need for intravenous fluids  - Provide specific nutrition/hydration education as appropriate  - Include patient/family/caregiver in decisions related to nutrition  Outcome: Progressing

## 2024-11-27 NOTE — PROGRESS NOTES
Progress Note - Hospitalist   Name: Claire Doherty 74 y.o. female I MRN: [id number]  Unit/Bed#: 419-01 I Date of Admission: 11/24/2024   Date of Service: 11/27/2024 I Hospital Day: 3    Assessment & Plan  Acute on chronic respiratory failure with hypoxia and hypercapnia (HCC)  Presented to the emergency room for evaluation of complaint of shortness of breath Patient reports SOB has been ongoing for several days getting progressively worst. Patient reports SOB is worst upon exertion.  Patient also reports having conversational dyspnea  Has history of restrictive lung disease due to muscular dystrophy   Initial Rapid Flu/RSV/COVID-negative  However +COVID noted on RP2 panel  Utilizes 2L O2 PRN and is currently on 2L    COPD exacerbation (HCC)  Presented with complaints of SOB which became progressively worse over the last few days. Found to be +COVID, denies fever, chills, cough  Currently SpO2: 94 % on Nasal Cannula O2 Flow Rate (L/min): 2 L/min  Azithromycin 500mg x3 days  Scheduled nebs, respiratory protocol  IV Solu-Medrol 40mg daily   Encouraged ISB 10x per hour while awake, ambulation  Monitor O2, supplement as needed, maintain O2 >88%  Pulmonology following      COVID-19  Patient found to be +COVID with RP2 on 11/25, initially negative with rapid test  Continue O2 supplement as needed, goal >92% O2 sat  Start IV remdesivir, day 3 of 5 > will complete entire course here  Continue solumedrol 40mg daily  Procal 0.27 > 0.15  On eliquis for AC  Encourage patient self-prone, incentive spirometer, OOB as appropriate  Current oxygenation 97% on 2 L which she utilizes at home PRN  Monitor inflammatory markers q 2-3 days > downtrending nicely  CTA negative for PE    Pleural effusion, bilateral  Bilateral pleural effusion noted on CT completed on 11/22  Patient currently on torsemide 20 mg daily  Not enough fluid present for thoracentesis     DINA (acute kidney injury) ([facility])  Lab Results   Component Value Date     CREATININE 1.13 11/27/2024    CREATININE 0.99 11/26/2024    CREATININE 1.00 11/25/2024       Creatinine level at 1.09, now improving  Baseline appears to be between 0.5 ans 0.6  Urinary retention protocol  Hold torsemide    SIRS (systemic inflammatory response syndrome) ([facility])  SIRS criteria 3/4 (leukocytosis, tachycardia, tachypnea)  Leukocytosis now resolved  Lactic 1.1 on admission  COVID-19/flu/rsv initially negative, now +COVID with RP2  Procal slightly elevated at 0.27, will repeat  Am labs  Dystrophy, muscular, 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[facility])  History of muscular dystrophy  Currently at [facility] for [facility] - will return there on discharge, likely Friday  Reports that she had not been able to transfer from bed to chair on her own  Cont outpatient neuro follow up  GERD without esophagitis  Continue on home protonix  Colostomy status ([facility])  S/P colostomy  Maintenance per nursing protocol  Pericardial effusion  Noted on CT completed on 11/22, no longer seen on CTA on 11/25  Discussed curbside with cardiology, this small effusion was seen on images in the past and did not correlate on ECHO  No need for further work up at this time, she has no clinical signs of cardiac tamponade    Elevated d-dimer  D-dimer level at 3.38  No documented history DVT/PE  CTA chest PE negative  Vas duplex negative for DVT    Paroxysmal A-fib ([facility])  History of paroxysmal A-fib, not on rate controlling meds  Anticoagulated on Eliquis      Chronic diastolic heart failure ([facility])  Wt Readings from Last 3 Encounters:   11/27/24 76.2 kg (167 lb 15.9 oz)   09/20/24 75.3 kg (166 lb 0.1 oz)   09/07/24 77.8 kg (171 lb 8.3 oz)     Appears close to euvolemic on exam  On Torsemide at home - hold with DINA present  Monitor I&O  Daily Weights  Last echo 9/11 EF 65%        MRSA colonization  Decolonization protocol  Renal cyst  There is a 1.1 cm hypodense lesion in the midpole of the left kidney anteriorly (series 2 image 97), which measures higher than simple  fluid in density. It was not seen on the renal ultrasound from September 2024, however is present on older CTs.   Renal US ordered for further characterization    VTE Pharmacologic Prophylaxis: VTE Score: 7 High Risk (Score >/= 5) - Pharmacological DVT Prophylaxis Ordered: apixaban (Eliquis). Sequential Compression Devices Ordered.    Mobility:   Basic Mobility Inpatient Raw Score: 9  JH-HLM Goal: 3: Sit at edge of bed  JH-HLM Achieved: 2: Bed activities/Dependent transfer  JH-HLM Goal NOT achieved. Continue with multidisciplinary rounding and encourage appropriate mobility to improve upon JH-HLM goals.    Patient Centered Rounds: I performed bedside rounds with nursing staff today.   Discussions with Specialists or Other Care Team Provider: CM    Education and Discussions with Family / Patient: Updated  (sister) via phone.    Current Length of Stay: 3 day(s)  Current Patient Status: Inpatient   Certification Statement: The patient will continue to require additional inpatient hospital stay due to COVID treatments  Discharge Plan: Anticipate discharge in 48 hrs to rehab facility.    Code Status: Level 3 - DNAR and DNI    Subjective   Patient reports she feels 60% better today. Denies significant CP or palpitations today    Objective :  Temp:  [97.4 °F (36.3 °C)-97.8 °F (36.6 °C)] 97.4 °F (36.3 °C)  HR:  [85-97] 95  BP: (124-133)/(73-79) 133/74  Resp:  [18] 18  SpO2:  [93 %-98 %] 94 %  O2 Device: Nasal cannula  Nasal Cannula O2 Flow Rate (L/min):  [2 L/min] 2 L/min    Body mass index is 32.81 kg/m².     Input and Output Summary (last 24 hours):     Intake/Output Summary (Last 24 hours) at 11/27/2024 1510  Last data filed at 11/26/2024 1740  Gross per 24 hour   Intake 100 ml   Output --   Net 100 ml       Physical Exam  Vitals and nursing note reviewed.   Constitutional:       General: She is not in acute distress.     Appearance: She is ill-appearing.   Cardiovascular:      Rate and Rhythm: Normal rate  and regular rhythm.      Pulses: Normal pulses.      Heart sounds: Murmur heard.   Pulmonary:      Effort: Pulmonary effort is normal.      Breath sounds: No wheezing, rhonchi or rales.   Abdominal:      General: Bowel sounds are normal.      Palpations: Abdomen is soft.      Tenderness: There is no abdominal tenderness. There is no guarding.   Musculoskeletal:      Right lower leg: No edema.      Left lower leg: No edema.   Neurological:      Mental Status: She is alert and oriented to person, place, and time. Mental status is at baseline.   Psychiatric:         Mood and Affect: Mood normal.         Behavior: Behavior normal.           Lines/Drains:  Lines/Drains/Airways       Active Status       Name Placement date Placement time Site Days    Colostomy LUQ 09/10/20  1200  LUQ  1539                            Lab Results: I have reviewed the following results:   Results from last 7 days   Lab Units 11/27/24 0449 11/26/24 0509 11/25/24  0516   WBC Thousand/uL 10.63*   < > 12.36*   HEMOGLOBIN g/dL 12.6   < > 11.7   HEMATOCRIT % 39.8   < > 36.3   PLATELETS Thousands/uL 611*   < > 541*   SEGS PCT %  --   --  75   LYMPHO PCT %  --   --  14   MONO PCT %  --   --  10   EOS PCT %  --   --  1    < > = values in this interval not displayed.     Results from last 7 days   Lab Units 11/27/24 0449 11/26/24 0509 11/25/24  0516   SODIUM mmol/L 136   < > 137   POTASSIUM mmol/L 3.7   < > 3.3*   CHLORIDE mmol/L 89*   < > 88*   CO2 mmol/L 32   < > 38*   BUN mg/dL 53*   < > 45*   CREATININE mg/dL 1.13   < > 1.00   ANION GAP mmol/L 15*   < > 11   CALCIUM mg/dL 9.5   < > 9.2   ALBUMIN g/dL  --   --  3.4*   TOTAL BILIRUBIN mg/dL  --   --  0.34   ALK PHOS U/L  --   --  77   ALT U/L  --   --  15   AST U/L  --   --  15   GLUCOSE RANDOM mg/dL 123   < > 110    < > = values in this interval not displayed.                 Results from last 7 days   Lab Units 11/27/24 0449 11/26/24 0509 11/25/24  0516 11/24/24 2048 11/22/24  1604   LACTIC  ACID mmol/L  --   --   --  1.1 0.8   PROCALCITONIN ng/ml 0.15 0.18 0.27*  --   --        Recent Cultures (last 7 days):         Imaging Results Review: No pertinent imaging studies reviewed.  Other Study Results Review: No additional pertinent studies reviewed.    Last 24 Hours Medication List:     Current Facility-Administered Medications:     acetaminophen (TYLENOL) tablet 650 mg, Q6H PRN    albuterol inhalation solution 2.5 mg, Q4H PRN    apixaban (ELIQUIS) tablet 5 mg, BID    bisacodyl (DULCOLAX) rectal suppository 10 mg, Daily PRN    budesonide (PULMICORT) inhalation solution 0.5 mg, BID    buPROPion (WELLBUTRIN) tablet 75 mg, QAM    citalopram (CeleXA) tablet 10 mg, Daily    dexamethasone (PF) (DECADRON) injection 6 mg, Daily    diazepam (VALIUM) tablet 5 mg, Q12H PRN    fluticasone (FLONASE) 50 mcg/act nasal spray 2 spray, Daily    guaiFENesin (MUCINEX) 12 hr tablet 600 mg, Q12H NANCY    melatonin tablet 3 mg, HS    metoclopramide (REGLAN) tablet 5 mg, Q8H PRN    mupirocin (BACTROBAN) 2 % nasal ointment 1 Application, Q12H NANCY    oxybutynin (DITROPAN-XL) 24 hr tablet 10 mg, HS    pantoprazole (PROTONIX) EC tablet 40 mg, Early Morning    potassium chloride (Klor-Con M20) CR tablet 20 mEq, Once    [COMPLETED] remdesivir (Veklury) 200 mg in sodium chloride 0.9 % 290 mL IVPB, Q24H, Last Rate: Stopped (11/26/24 0155) **FOLLOWED BY** remdesivir (Veklury) 100 mg in sodium chloride 0.9 % 270 mL IVPB, Q24H    saccharomyces boulardii (FLORASTOR) capsule 250 mg, Daily    [Held by provider] torsemide (DEMADEX) tablet 20 mg, Daily    Administrative Statements   Today, Patient Was Seen By: Mary Arango PA-C  I have spent a total time of 48 minutes in caring for this patient on the day of the visit/encounter including Diagnostic results, Prognosis, Risks and benefits of tx options, Instructions for management, Patient and family education, Importance of tx compliance, Risk factor reductions, Impressions, Counseling /  Coordination of care, Documenting in the medical record, Reviewing / ordering tests, medicine, procedures  , Obtaining or reviewing history  , and Communicating with other healthcare professionals .    **Please Note: This note may have been constructed using a voice recognition system.**

## 2024-11-27 NOTE — ASSESSMENT & PLAN NOTE
History of muscular dystrophy  Currently at SNF for rehab - will return there on discharge, likely Friday  Reports that she had not been able to transfer from bed to chair on her own  Cont outpatient neuro follow up

## 2024-11-27 NOTE — ASSESSMENT & PLAN NOTE
Presented to the emergency room for evaluation of complaint of shortness of breath Patient reports SOB has been ongoing for several days getting progressively worst. Patient reports SOB is worst upon exertion.  Patient also reports having conversational dyspnea  Has history of restrictive lung disease due to muscular dystrophy   Initial Rapid Flu/RSV/COVID-negative  However +COVID noted on RP2 panel  Utilizes 2L O2 PRN and is currently on 2L

## 2024-11-27 NOTE — ASSESSMENT & PLAN NOTE
Presented with complaints of SOB which became progressively worse over the last few days. Found to be +COVID, denies fever, chills, cough  Currently SpO2: 94 % on Nasal Cannula O2 Flow Rate (L/min): 2 L/min  Azithromycin 500mg x3 days  Scheduled nebs, respiratory protocol  IV Solu-Medrol 40mg daily   Encouraged ISB 10x per hour while awake, ambulation  Monitor O2, supplement as needed, maintain O2 >88%  Pulmonology following

## 2024-11-27 NOTE — ASSESSMENT & PLAN NOTE
Continue budesonide and Xopenex  Continue Decadron 6 mg daily for COVID as below  Complete 3 days of azithromycin

## 2024-11-28 LAB
ALBUMIN SERPL BCG-MCNC: 3.6 G/DL (ref 3.5–5)
ALP SERPL-CCNC: 71 U/L (ref 34–104)
ALT SERPL W P-5'-P-CCNC: 14 U/L (ref 7–52)
ANION GAP SERPL CALCULATED.3IONS-SCNC: 9 MMOL/L (ref 4–13)
AST SERPL W P-5'-P-CCNC: 14 U/L (ref 13–39)
BASOPHILS # BLD AUTO: 0.02 THOUSANDS/ΜL (ref 0–0.1)
BASOPHILS NFR BLD AUTO: 0 % (ref 0–1)
BILIRUB SERPL-MCNC: 0.19 MG/DL (ref 0.2–1)
BUN SERPL-MCNC: 56 MG/DL (ref 5–25)
CALCIUM SERPL-MCNC: 9 MG/DL (ref 8.4–10.2)
CHLORIDE SERPL-SCNC: 89 MMOL/L (ref 96–108)
CO2 SERPL-SCNC: 40 MMOL/L (ref 21–32)
CREAT SERPL-MCNC: 1.04 MG/DL (ref 0.6–1.3)
EOSINOPHIL # BLD AUTO: 0.09 THOUSAND/ΜL (ref 0–0.61)
EOSINOPHIL NFR BLD AUTO: 1 % (ref 0–6)
ERYTHROCYTE [DISTWIDTH] IN BLOOD BY AUTOMATED COUNT: 15.7 % (ref 11.6–15.1)
GFR SERPL CREATININE-BSD FRML MDRD: 53 ML/MIN/1.73SQ M
GLUCOSE SERPL-MCNC: 104 MG/DL (ref 65–140)
HCT VFR BLD AUTO: 38.6 % (ref 34.8–46.1)
HGB BLD-MCNC: 12.5 G/DL (ref 11.5–15.4)
IMM GRANULOCYTES # BLD AUTO: 0.09 THOUSAND/UL (ref 0–0.2)
IMM GRANULOCYTES NFR BLD AUTO: 1 % (ref 0–2)
LYMPHOCYTES # BLD AUTO: 3.55 THOUSANDS/ΜL (ref 0.6–4.47)
LYMPHOCYTES NFR BLD AUTO: 20 % (ref 14–44)
MCH RBC QN AUTO: 27.6 PG (ref 26.8–34.3)
MCHC RBC AUTO-ENTMCNC: 32.4 G/DL (ref 31.4–37.4)
MCV RBC AUTO: 85 FL (ref 82–98)
MONOCYTES # BLD AUTO: 2.4 THOUSAND/ΜL (ref 0.17–1.22)
MONOCYTES NFR BLD AUTO: 13 % (ref 4–12)
NEUTROPHILS # BLD AUTO: 11.83 THOUSANDS/ΜL (ref 1.85–7.62)
NEUTS SEG NFR BLD AUTO: 65 % (ref 43–75)
NRBC BLD AUTO-RTO: 0 /100 WBCS
PLATELET # BLD AUTO: 704 THOUSANDS/UL (ref 149–390)
PMV BLD AUTO: 10 FL (ref 8.9–12.7)
POTASSIUM SERPL-SCNC: 2.8 MMOL/L (ref 3.5–5.3)
PROT SERPL-MCNC: 6.6 G/DL (ref 6.4–8.4)
RBC # BLD AUTO: 4.53 MILLION/UL (ref 3.81–5.12)
SODIUM SERPL-SCNC: 138 MMOL/L (ref 135–147)
WBC # BLD AUTO: 17.98 THOUSAND/UL (ref 4.31–10.16)

## 2024-11-28 PROCEDURE — 85025 COMPLETE CBC W/AUTO DIFF WBC: CPT | Performed by: PHYSICIAN ASSISTANT

## 2024-11-28 PROCEDURE — 99232 SBSQ HOSP IP/OBS MODERATE 35: CPT | Performed by: PHYSICIAN ASSISTANT

## 2024-11-28 PROCEDURE — 94640 AIRWAY INHALATION TREATMENT: CPT

## 2024-11-28 PROCEDURE — 94664 DEMO&/EVAL PT USE INHALER: CPT

## 2024-11-28 PROCEDURE — 80053 COMPREHEN METABOLIC PANEL: CPT | Performed by: PHYSICIAN ASSISTANT

## 2024-11-28 PROCEDURE — 94760 N-INVAS EAR/PLS OXIMETRY 1: CPT

## 2024-11-28 PROCEDURE — 94668 MNPJ CHEST WALL SBSQ: CPT

## 2024-11-28 RX ADMIN — BUDESONIDE 0.5 MG: 0.5 INHALANT RESPIRATORY (INHALATION) at 19:27

## 2024-11-28 RX ADMIN — BUDESONIDE 0.5 MG: 0.5 INHALANT RESPIRATORY (INHALATION) at 07:23

## 2024-11-28 RX ADMIN — BUPROPION HYDROCHLORIDE 75 MG: 75 TABLET, FILM COATED ORAL at 10:06

## 2024-11-28 RX ADMIN — MUPIROCIN 1 APPLICATION: 20 OINTMENT TOPICAL at 21:48

## 2024-11-28 RX ADMIN — CITALOPRAM HYDROBROMIDE 10 MG: 20 TABLET ORAL at 21:47

## 2024-11-28 RX ADMIN — OXYBUTYNIN CHLORIDE 10 MG: 5 TABLET, EXTENDED RELEASE ORAL at 21:46

## 2024-11-28 RX ADMIN — Medication 250 MG: at 10:06

## 2024-11-28 RX ADMIN — MUPIROCIN 1 APPLICATION: 20 OINTMENT TOPICAL at 10:10

## 2024-11-28 RX ADMIN — APIXABAN 5 MG: 5 TABLET, FILM COATED ORAL at 10:06

## 2024-11-28 RX ADMIN — FLUTICASONE PROPIONATE 2 SPRAY: 50 SPRAY, METERED NASAL at 10:10

## 2024-11-28 RX ADMIN — DEXAMETHASONE SODIUM PHOSPHATE 6 MG: 10 INJECTION, SOLUTION INTRAMUSCULAR; INTRAVENOUS at 12:14

## 2024-11-28 RX ADMIN — PANTOPRAZOLE SODIUM 40 MG: 40 TABLET, DELAYED RELEASE ORAL at 05:23

## 2024-11-28 RX ADMIN — REMDESIVIR 100 MG: 100 INJECTION, POWDER, LYOPHILIZED, FOR SOLUTION INTRAVENOUS at 21:47

## 2024-11-28 RX ADMIN — Medication 3 MG: at 21:46

## 2024-11-28 RX ADMIN — APIXABAN 5 MG: 5 TABLET, FILM COATED ORAL at 17:33

## 2024-11-28 NOTE — ASSESSMENT & PLAN NOTE
Presented with complaints of SOB which became progressively worse over the last few days. Found to be +COVID, denies fever, chills, cough  Currently SpO2: 98 % on Nasal Cannula O2 Flow Rate (L/min): 2 L/min  Azithromycin 500mg x3 days  Scheduled nebs, respiratory protocol  IV decadron per protocol  Encouraged ISB 10x per hour while awake, ambulation  Monitor O2, supplement as needed, maintain O2 >88%  Pulmonology following

## 2024-11-28 NOTE — ASSESSMENT & PLAN NOTE
There is a 1.1 cm hypodense lesion in the midpole of the left kidney anteriorly (series 2 image 97), which measures higher than simple fluid in density. It was not seen on the renal ultrasound from September 2024, however is present on older CTs.   Renal US ordered for further characterization - could not be seen on US  Consider outpatient MRI

## 2024-11-28 NOTE — ASSESSMENT & PLAN NOTE
Wt Readings from Last 3 Encounters:   11/27/24 76.2 kg (167 lb 15.9 oz)   09/20/24 75.3 kg (166 lb 0.1 oz)   09/07/24 77.8 kg (171 lb 8.3 oz)     Appears close to euvolemic on exam  On Torsemide at home - hold with DINA present  Monitor I&O  Daily Weights  Last echo 9/11 EF 65%

## 2024-11-28 NOTE — PLAN OF CARE
Problem: Prexisting or High Potential for Compromised Skin Integrity  Goal: Skin integrity is maintained or improved  Description: INTERVENTIONS:  - Identify patients at risk for skin breakdown  - Assess and monitor skin integrity  - Assess and monitor nutrition and hydration status  - Monitor labs   - Assess for incontinence   - Turn and reposition patient  - Assist with mobility/ambulation  - Relieve pressure over bony prominences  - Avoid friction and shearing  - Provide appropriate hygiene as needed including keeping skin clean and dry  - Evaluate need for skin moisturizer/barrier cream  - Collaborate with interdisciplinary team   - Patient/family teaching  - Consider wound care consult   Outcome: Progressing     Problem: PAIN - ADULT  Goal: Verbalizes/displays adequate comfort level or baseline comfort level  Description: Interventions:  - Encourage patient to monitor pain and request assistance  - Assess pain using appropriate pain scale  - Administer analgesics based on type and severity of pain and evaluate response  - Implement non-pharmacological measures as appropriate and evaluate response  - Consider cultural and social influences on pain and pain management  - Notify physician/advanced practitioner if interventions unsuccessful or patient reports new pain  Outcome: Progressing     Problem: INFECTION - ADULT  Goal: Absence or prevention of progression during hospitalization  Description: INTERVENTIONS:  - Assess and monitor for signs and symptoms of infection  - Monitor lab/diagnostic results  - Monitor all insertion sites, i.e. indwelling lines, tubes, and drains  - [place] appropriate cooling/warming therapies per order  - Administer medications as ordered  - Instruct and encourage patient and family to use good hand hygiene technique  - Identify and instruct in appropriate isolation precautions for identified infection/condition  Outcome: Progressing     Problem: SAFETY ADULT  Goal: Patient will  remain free of falls  Description: INTERVENTIONS:  - Educate patient/family on patient safety including physical limitations  - Instruct patient to call for assistance with activity   - Consult OT/PT to assist with strengthening/mobility   - Keep Call bell within reach  - Keep bed low and locked with side rails adjusted as appropriate  - Keep care items and personal belongings within reach  - Initiate and maintain comfort rounds  - Make Fall Risk Sign visible to staff  - Offer Toileting every 2 Hours, in advance of need  - Initiate/Maintain bed/chair alarm  - Obtain necessary fall risk management equipment: daily  - Apply yellow socks and bracelet for high fall risk patients  - Consider moving patient to room near nurses station  Outcome: Progressing  Goal: Maintain or return to baseline ADL function  Description: INTERVENTIONS:  -  Assess patient's ability to carry out ADLs; assess patient's baseline for ADL function and identify physical deficits which impact ability to perform ADLs (bathing, care of mouth/teeth, toileting, grooming, dressing, etc.)  - Assess/evaluate cause of self-care deficits   - Assess range of motion  - Assess patient's mobility; develop plan if impaired  - Assess patient's need for assistive devices and provide as appropriate  - Encourage maximum independence but intervene and supervise when necessary  - Involve family in performance of ADLs  - Assess for home care needs following discharge   - Consider OT consult to assist with ADL evaluation and planning for discharge  - Provide patient education as appropriate  Outcome: Progressing  Goal: Maintains/Returns to pre admission functional level  Description: INTERVENTIONS:  - Perform AM-PAC 6 Click Basic Mobility/ Daily Activity assessment daily.  - Set and communicate daily mobility goal to care team and patient/family/caregiver.   - Collaborate with rehabilitation services on mobility goals if consulted  - Perform Range of Motion 3 times a  day.  - Reposition patient every 2 hours.  - Dangle patient 3 times a day  - Out of bed to chair 3 times a day   - Out of bed for meals 3 times a day  - Out of bed for toileting  - Record patient progress and toleration of activity level   Outcome: Progressing     Problem: DISCHARGE PLANNING  Goal: Discharge to home or other facility with appropriate resources  Description: INTERVENTIONS:  - Identify barriers to discharge w/patient and caregiver  - Arrange for needed discharge resources and transportation as appropriate  - Identify discharge learning needs (meds, wound care, etc  - Refer to Case Management Department for coordinating discharge planning if the patient needs post-hospital services based on physician/advanced practitioner order or complex needs related to functional status, cognitive ability, or social support system  Outcome: Progressing     Problem: Knowledge Deficit  Goal: Patient/family/caregiver demonstrates understanding of disease process, treatment plan, medications, and discharge instructions  Description: Complete learning assessment and assess knowledge base.  Interventions:  - Provide teaching at level of understanding  - Provide teaching via preferred learning methods  Outcome: Progressing     Problem: RESPIRATORY - ADULT  Goal: Achieves optimal ventilation and oxygenation  Description: INTERVENTIONS:  - Assess for changes in respiratory status  - Assess for changes in mentation and behavior  - Position to facilitate oxygenation and minimize respiratory effort  - Oxygen administered by appropriate delivery if ordered  - Encourage broncho-pulmonary hygiene including cough, deep breathe, Incentive Spirometry  - Assess the need for suctioning and aspirate as needed  - Assess and instruct to report SOB or any respiratory difficulty  - Respiratory Therapy support as indicated  Outcome: Progressing     Problem: Nutrition/Hydration-ADULT  Goal: Nutrient/Hydration intake appropriate for improving,  restoring or maintaining nutritional needs  Description: Monitor and assess patient's nutrition/hydration status for malnutrition. Collaborate with interdisciplinary team and initiate plan and interventions as ordered.  Monitor patient's weight and dietary intake as ordered or per policy. Utilize nutrition screening tool and intervene as necessary. Determine patient's food preferences and provide high-protein, high-caloric foods as appropriate.     INTERVENTIONS:  - Monitor oral intake, urinary output, labs, and treatment plans  - Assess nutrition and hydration status and recommend course of action  - Evaluate amount of meals eaten  - Assist patient with eating if necessary   - Allow adequate time for meals  - Recommend/ encourage appropriate diets, oral nutritional supplements, and vitamin/mineral supplements  - Order, calculate, and assess calorie counts as needed  - Recommend, monitor, and adjust tube feedings and TPN/PPN based on assessed needs  - Assess need for intravenous fluids  - Provide specific nutrition/hydration education as appropriate  - Include patient/family/caregiver in decisions related to nutrition  Outcome: Progressing

## 2024-11-28 NOTE — ASSESSMENT & PLAN NOTE
Bilateral pleural effusion noted on CT completed on 11/22  Patient currently on torsemide 20 mg daily - held for DINA  Not enough fluid present for thoracentesis

## 2024-11-28 NOTE — ASSESSMENT & PLAN NOTE
Patient found to be +COVID with RP2 on 11/25, initially negative with rapid test  Continue O2 supplement as needed, goal >92% O2 sat  Start IV remdesivir, day 4 of 5 > will complete entire course here  Continue decadron  Procal 0.27 > 0.15  On eliquis for AC  Plan is to return to SNF for rehab tomorrow

## 2024-11-28 NOTE — PLAN OF CARE
Problem: Prexisting or High Potential for Compromised Skin Integrity  Goal: Skin integrity is maintained or improved  Description: INTERVENTIONS:  - Identify patients at risk for skin breakdown  - Assess and monitor skin integrity  - Assess and monitor nutrition and hydration status  - Monitor labs   - Assess for incontinence   - Turn and reposition patient  - Assist with mobility/ambulation  - Relieve pressure over bony prominences  - Avoid friction and shearing  - Provide appropriate hygiene as needed including keeping skin clean and dry  - Evaluate need for skin moisturizer/barrier cream  - Collaborate with interdisciplinary team   - Patient/family teaching  - Consider wound care consult   Outcome: Progressing     Problem: PAIN - ADULT  Goal: Verbalizes/displays adequate comfort level or baseline comfort level  Description: Interventions:  - Encourage patient to monitor pain and request assistance  - Assess pain using appropriate pain scale  - Administer analgesics based on type and severity of pain and evaluate response  - Implement non-pharmacological measures as appropriate and evaluate response  - Consider cultural and social influences on pain and pain management  - Notify physician/advanced practitioner if interventions unsuccessful or patient reports new pain  Outcome: Progressing     Problem: INFECTION - ADULT  Goal: Absence or prevention of progression during hospitalization  Description: INTERVENTIONS:  - Assess and monitor for signs and symptoms of infection  - Monitor lab/diagnostic results  - Monitor all insertion sites, i.e. indwelling lines, tubes, and drains  - [place] appropriate cooling/warming therapies per order  - Administer medications as ordered  - Instruct and encourage patient and family to use good hand hygiene technique  - Identify and instruct in appropriate isolation precautions for identified infection/condition  Outcome: Progressing     Problem: SAFETY ADULT  Goal: Patient will  remain free of falls  Description: INTERVENTIONS:  - Educate patient/family on patient safety including physical limitations  - Instruct patient to call for assistance with activity   - Consult OT/PT to assist with strengthening/mobility   - Keep Call bell within reach  - Keep bed low and locked with side rails adjusted as appropriate  - Keep care items and personal belongings within reach  - Initiate and maintain comfort rounds  - Make Fall Risk Sign visible to staff  - Offer Toileting every 2 Hours, in advance of need  - Initiate/Maintain bed/chair alarm  - Obtain necessary fall risk management equipment: daily  - Apply yellow socks and bracelet for high fall risk patients  - Consider moving patient to room near nurses station  Outcome: Progressing  Goal: Maintain or return to baseline ADL function  Description: INTERVENTIONS:  -  Assess patient's ability to carry out ADLs; assess patient's baseline for ADL function and identify physical deficits which impact ability to perform ADLs (bathing, care of mouth/teeth, toileting, grooming, dressing, etc.)  - Assess/evaluate cause of self-care deficits   - Assess range of motion  - Assess patient's mobility; develop plan if impaired  - Assess patient's need for assistive devices and provide as appropriate  - Encourage maximum independence but intervene and supervise when necessary  - Involve family in performance of ADLs  - Assess for home care needs following discharge   - Consider OT consult to assist with ADL evaluation and planning for discharge  - Provide patient education as appropriate  Outcome: Progressing  Goal: Maintains/Returns to pre admission functional level  Description: INTERVENTIONS:  - Perform AM-PAC 6 Click Basic Mobility/ Daily Activity assessment daily.  - Set and communicate daily mobility goal to care team and patient/family/caregiver.   - Collaborate with rehabilitation services on mobility goals if consulted  - Perform Range of Motion 3 times a  day.  - Reposition patient every 2 hours.  - Dangle patient 3 times a day  - Out of bed to chair 3 times a day   - Out of bed for meals 3 times a day  - Out of bed for toileting  - Record patient progress and toleration of activity level   Outcome: Progressing

## 2024-11-28 NOTE — ASSESSMENT & PLAN NOTE
SIRS criteria 3/4 (leukocytosis, tachycardia, tachypnea)  All resolved  Lactic 1.1 on admission  COVID-19/flu/rsv initially negative, now +COVID with RP2  Procal slightly elevated at 0.27, will repeat

## 2024-11-28 NOTE — PROGRESS NOTES
Progress Note - Hospitalist   Name: Claire Doherty 74 y.o. female I MRN: [id number]  Unit/Bed#: 419-01 I Date of Admission: 11/24/2024   Date of Service: 11/28/2024 I Hospital Day: 4    Assessment & Plan  Acute on chronic respiratory failure with hypoxia and hypercapnia (HCC)  Presented to the emergency room for evaluation of complaint of shortness of breath Patient reports SOB has been ongoing for several days getting progressively worst. Patient reports SOB is worst upon exertion.  Patient also reports having conversational dyspnea  Has history of restrictive lung disease due to muscular dystrophy   Initial Rapid Flu/RSV/COVID-negative  However +COVID noted on RP2 panel  Utilizes 2L O2 PRN and is currently on 2L    COPD exacerbation (HCC)  Presented with complaints of SOB which became progressively worse over the last few days. Found to be +COVID, denies fever, chills, cough  Currently SpO2: 98 % on Nasal Cannula O2 Flow Rate (L/min): 2 L/min  Azithromycin 500mg x3 days  Scheduled nebs, respiratory protocol  IV decadron per protocol  Encouraged ISB 10x per hour while awake, ambulation  Monitor O2, supplement as needed, maintain O2 >88%  Pulmonology following      COVID-19  Patient found to be +COVID with RP2 on 11/25, initially negative with rapid test  Continue O2 supplement as needed, goal >92% O2 sat  Start IV remdesivir, day 4 of 5 > will complete entire course here  Continue decadron  Procal 0.27 > 0.15  On eliquis for AC  Plan is to return to SNF for rehab tomorrow      Pleural effusion, bilateral  Bilateral pleural effusion noted on CT completed on 11/22  Patient currently on torsemide 20 mg daily - held for DINA  Not enough fluid present for thoracentesis     DINA (acute kidney injury) ([facility])  Lab Results   Component Value Date    CREATININE 1.13 11/27/2024    CREATININE 0.99 11/26/2024    CREATININE 1.00 11/25/2024       Creatinine level at 1.09, now improving  Baseline appears to be between 0.5 ans  0.6  Urinary retention protocol  Hold torsemide    SIRS (systemic inflammatory response syndrome) ([facility])  SIRS criteria 3/4 (leukocytosis, tachycardia, tachypnea)  All resolved  Lactic 1.1 on admission  COVID-19/flu/rsv initially negative, now +COVID with RP2  Procal slightly elevated at 0.27, will repeat  Dystrophy, muscular, 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[facility])  History of muscular dystrophy  Currently at SNF for rehab - will return there on discharge, likely Friday  Reports that she had not been able to transfer from bed to chair on her own  Cont outpatient neuro follow up  GERD without esophagitis  Continue on home protonix  Colostomy status ([facility])  S/P colostomy  Maintenance per nursing protocol  Pericardial effusion  Noted on CT completed on 11/22, no longer seen on CTA on 11/25  Discussed curbside with cardiology, this small effusion was seen on images in the past and did not correlate on ECHO  No need for further work up at this time, she has no clinical signs of cardiac tamponade    Elevated d-dimer  D-dimer level at 3.38  No documented history DVT/PE  CTA chest PE negative  Vas duplex negative for DVT    Paroxysmal A-fib ([facility])  History of paroxysmal A-fib, not on rate controlling meds  Anticoagulated on Eliquis      Chronic diastolic heart failure ([facility])  Wt Readings from Last 3 Encounters:   11/27/24 76.2 kg (167 lb 15.9 oz)   09/20/24 75.3 kg (166 lb 0.1 oz)   09/07/24 77.8 kg (171 lb 8.3 oz)     Appears close to euvolemic on exam  On Torsemide at home - hold with DINA present  Monitor I&O  Daily Weights  Last echo 9/11 EF 65%  MRSA colonization  Decolonization protocol  Renal cyst  There is a 1.1 cm hypodense lesion in the midpole of the left kidney anteriorly (series 2 image 97), which measures higher than simple fluid in density. It was not seen on the renal ultrasound from September 2024, however is present on older CTs.   Renal US ordered for further characterization - could not be seen on US  Consider  outpatient MRI     VTE Pharmacologic Prophylaxis: VTE Score: 7 High Risk (Score >/= 5) - Pharmacological DVT Prophylaxis Ordered: apixaban (Eliquis). Sequential Compression Devices Ordered.    Mobility:   Basic Mobility Inpatient Raw Score: 9  JH-HLM Goal: 3: Sit at edge of bed  JH-HLM Achieved: 2: Bed activities/Dependent transfer  JH-HLM Goal NOT achieved. Continue with multidisciplinary rounding and encourage appropriate mobility to improve upon JH-HLM goals.    Patient Centered Rounds: I performed bedside rounds with nursing staff today.   Discussions with Specialists or Other Care Team Provider: none    Education and Discussions with Family / Patient: Patient declined call to .     Current Length of Stay: 4 day(s)  Current Patient Status: Inpatient   Certification Statement: The patient will continue to require additional inpatient hospital stay due to monitoring labs and receiving IV remdesivir therapy  Discharge Plan: Anticipate discharge tomorrow to rehab facility.    Code Status: Level 3 - DNAR and DNI    Subjective   Patient reports continued improvement in her current status and she feels she will be ready for discharge to rehab tomorrow    Objective :  Temp:  [97.3 °F (36.3 °C)-97.4 °F (36.3 °C)] 97.3 °F (36.3 °C)  HR:  [76-95] 76  BP: (132-133)/(72-74) 133/72  Resp:  [16-20] 16  SpO2:  [94 %-98 %] 98 %  O2 Device: Nasal cannula  Nasal Cannula O2 Flow Rate (L/min):  [2 L/min] 2 L/min    Body mass index is 32.81 kg/m².     Input and Output Summary (last 24 hours):     Intake/Output Summary (Last 24 hours) at 11/28/2024 1119  Last data filed at 11/27/2024 1811  Gross per 24 hour   Intake 360 ml   Output --   Net 360 ml       Physical Exam  Vitals and nursing note reviewed.   Constitutional:       General: She is not in acute distress.  Cardiovascular:      Rate and Rhythm: Normal rate and regular rhythm.      Pulses: Normal pulses.      Heart sounds: Normal heart sounds.   Pulmonary:       Effort: Pulmonary effort is normal.      Breath sounds: Normal breath sounds. No wheezing, rhonchi or rales.   Abdominal:      General: Bowel sounds are normal.      Palpations: Abdomen is soft.   Musculoskeletal:      Right lower leg: No edema.      Left lower leg: No edema.   Neurological:      Mental Status: She is alert and oriented to person, place, and time. Mental status is at baseline.   Psychiatric:         Mood and Affect: Mood normal.         Behavior: Behavior normal.           Lines/Drains:  Lines/Drains/Airways       Active Status       Name Placement date Placement time Site Days    Colostomy LUQ 09/10/20  1200  LUQ  1540                            Lab Results: I have reviewed the following results:   Results from last 7 days   Lab Units 11/28/24  1107   WBC Thousand/uL 17.98*   HEMOGLOBIN g/dL 12.5   HEMATOCRIT % 38.6   PLATELETS Thousands/uL 704*   SEGS PCT % 65   LYMPHO PCT % 20   MONO PCT % 13*   EOS PCT % 1     Results from last 7 days   Lab Units 11/27/24  0449 11/26/24  0509 11/25/24  0516   SODIUM mmol/L 136   < > 137   POTASSIUM mmol/L 3.7   < > 3.3*   CHLORIDE mmol/L 89*   < > 88*   CO2 mmol/L 32   < > 38*   BUN mg/dL 53*   < > 45*   CREATININE mg/dL 1.13   < > 1.00   ANION GAP mmol/L 15*   < > 11   CALCIUM mg/dL 9.5   < > 9.2   ALBUMIN g/dL  --   --  3.4*   TOTAL BILIRUBIN mg/dL  --   --  0.34   ALK PHOS U/L  --   --  77   ALT U/L  --   --  15   AST U/L  --   --  15   GLUCOSE RANDOM mg/dL 123   < > 110    < > = values in this interval not displayed.                 Results from last 7 days   Lab Units 11/27/24  0449 11/26/24  0509 11/25/24  0516 11/24/24  2048 11/22/24  1604   LACTIC ACID mmol/L  --   --   --  1.1 0.8   PROCALCITONIN ng/ml 0.15 0.18 0.27*  --   --        Recent Cultures (last 7 days):         Imaging Results Review: No pertinent imaging studies reviewed.  Other Study Results Review: No additional pertinent studies reviewed.    Last 24 Hours Medication List:     Current  Facility-Administered Medications:     acetaminophen (TYLENOL) tablet 650 mg, Q6H PRN    albuterol inhalation solution 2.5 mg, Q4H PRN    apixaban (ELIQUIS) tablet 5 mg, BID    bisacodyl (DULCOLAX) rectal suppository 10 mg, Daily PRN    budesonide (PULMICORT) inhalation solution 0.5 mg, BID    buPROPion (WELLBUTRIN) tablet 75 mg, QAM    citalopram (CeleXA) tablet 10 mg, Daily    dexamethasone (PF) (DECADRON) injection 6 mg, Daily    diazepam (VALIUM) tablet 5 mg, Q12H PRN    fluticasone (FLONASE) 50 mcg/act nasal spray 2 spray, Daily    guaiFENesin (MUCINEX) 12 hr tablet 600 mg, Q12H NANCY    melatonin tablet 3 mg, HS    metoclopramide (REGLAN) tablet 5 mg, Q8H PRN    mupirocin (BACTROBAN) 2 % nasal ointment 1 Application, Q12H NANCY    oxybutynin (DITROPAN-XL) 24 hr tablet 10 mg, HS    pantoprazole (PROTONIX) EC tablet 40 mg, Early Morning    potassium chloride (Klor-Con M20) CR tablet 20 mEq, Once    [COMPLETED] remdesivir (Veklury) 200 mg in sodium chloride 0.9 % 290 mL IVPB, Q24H, Last Rate: Stopped (11/26/24 0155) **FOLLOWED BY** remdesivir (Veklury) 100 mg in sodium chloride 0.9 % 270 mL IVPB, Q24H    saccharomyces boulardii (FLORASTOR) capsule 250 mg, Daily    [Held by provider] torsemide (DEMADEX) tablet 20 mg, Daily    Administrative Statements   Today, Patient Was Seen By: Mary Arango PA-C  I have spent a total time of 35 minutes in caring for this patient on the day of the visit/encounter including Diagnostic results, Instructions for management, Patient and family education, Counseling / Coordination of care, Documenting in the medical record, Reviewing / ordering tests, medicine, procedures  , and Obtaining or reviewing history  .    **Please Note: This note may have been constructed using a voice recognition system.**

## 2024-11-28 NOTE — RESPIRATORY THERAPY NOTE
11/28/24 0729   Inhalation Therapy Tx   $ Inhalation Therapy Performed Yes   $ Pulse Oximetry Spot Check Charge Completed   SpO2 98 %   Pre-Treatment Pulse 83   Pre-Treatment Respirations 20   Duration 20   Breath Sounds Pre-Treatment Bilateral Diminished   Delivery Source Air;UDN   Position Semi [employer]   Treatment Tolerance Tolerated well   Resp Comments patient was on the bipap therapy, she is now on the 2lpm nasal cannula

## 2024-11-29 VITALS
TEMPERATURE: 98 F | SYSTOLIC BLOOD PRESSURE: 165 MMHG | HEART RATE: 93 BPM | WEIGHT: 165.79 LBS | BODY MASS INDEX: 32.55 KG/M2 | OXYGEN SATURATION: 97 % | DIASTOLIC BLOOD PRESSURE: 92 MMHG | RESPIRATION RATE: 20 BRPM | HEIGHT: 60 IN

## 2024-11-29 LAB
ALBUMIN SERPL BCG-MCNC: 3.3 G/DL (ref 3.5–5)
ALP SERPL-CCNC: 64 U/L (ref 34–104)
ALT SERPL W P-5'-P-CCNC: 15 U/L (ref 7–52)
ANION GAP SERPL CALCULATED.3IONS-SCNC: 10 MMOL/L (ref 4–13)
AST SERPL W P-5'-P-CCNC: 18 U/L (ref 13–39)
BILIRUB SERPL-MCNC: 0.22 MG/DL (ref 0.2–1)
BUN SERPL-MCNC: 50 MG/DL (ref 5–25)
CALCIUM ALBUM COR SERPL-MCNC: 9.3 MG/DL (ref 8.3–10.1)
CALCIUM SERPL-MCNC: 8.7 MG/DL (ref 8.4–10.2)
CHLORIDE SERPL-SCNC: 94 MMOL/L (ref 96–108)
CO2 SERPL-SCNC: 36 MMOL/L (ref 21–32)
CREAT SERPL-MCNC: 0.85 MG/DL (ref 0.6–1.3)
GFR SERPL CREATININE-BSD FRML MDRD: 67 ML/MIN/1.73SQ M
GLUCOSE SERPL-MCNC: 85 MG/DL (ref 65–140)
POTASSIUM SERPL-SCNC: 3.3 MMOL/L (ref 3.5–5.3)
PROT SERPL-MCNC: 6.3 G/DL (ref 6.4–8.4)
SODIUM SERPL-SCNC: 140 MMOL/L (ref 135–147)

## 2024-11-29 PROCEDURE — 97535 SELF CARE MNGMENT TRAINING: CPT

## 2024-11-29 PROCEDURE — 97530 THERAPEUTIC ACTIVITIES: CPT

## 2024-11-29 PROCEDURE — 94640 AIRWAY INHALATION TREATMENT: CPT

## 2024-11-29 PROCEDURE — 80053 COMPREHEN METABOLIC PANEL: CPT | Performed by: PHYSICIAN ASSISTANT

## 2024-11-29 PROCEDURE — 94760 N-INVAS EAR/PLS OXIMETRY 1: CPT

## 2024-11-29 PROCEDURE — 94668 MNPJ CHEST WALL SBSQ: CPT

## 2024-11-29 PROCEDURE — 99239 HOSP IP/OBS DSCHRG MGMT >30: CPT | Performed by: PHYSICIAN ASSISTANT

## 2024-11-29 PROCEDURE — 94664 DEMO&/EVAL PT USE INHALER: CPT

## 2024-11-29 RX ORDER — POTASSIUM CHLORIDE 20MEQ/15ML
40 LIQUID (ML) ORAL ONCE
Status: COMPLETED | OUTPATIENT
Start: 2024-11-29 | End: 2024-11-29

## 2024-11-29 RX ORDER — DEXAMETHASONE 4 MG/1
4 TABLET ORAL
Qty: 5 TABLET | Refills: 0 | Status: SHIPPED | OUTPATIENT
Start: 2024-11-29 | End: 2024-12-04

## 2024-11-29 RX ORDER — MUPIROCIN 20 MG/G
OINTMENT TOPICAL 2 TIMES DAILY
Start: 2024-11-29 | End: 2024-12-06

## 2024-11-29 RX ADMIN — BUDESONIDE 0.5 MG: 0.5 INHALANT RESPIRATORY (INHALATION) at 09:13

## 2024-11-29 RX ADMIN — MUPIROCIN 1 APPLICATION: 20 OINTMENT TOPICAL at 09:43

## 2024-11-29 RX ADMIN — Medication 250 MG: at 09:41

## 2024-11-29 RX ADMIN — APIXABAN 5 MG: 5 TABLET, FILM COATED ORAL at 09:41

## 2024-11-29 RX ADMIN — BUPROPION HYDROCHLORIDE 75 MG: 75 TABLET, FILM COATED ORAL at 09:41

## 2024-11-29 RX ADMIN — FLUTICASONE PROPIONATE 2 SPRAY: 50 SPRAY, METERED NASAL at 09:42

## 2024-11-29 RX ADMIN — POTASSIUM CHLORIDE 40 MEQ: 20 SOLUTION ORAL at 09:48

## 2024-11-29 RX ADMIN — DEXAMETHASONE SODIUM PHOSPHATE 6 MG: 10 INJECTION, SOLUTION INTRAMUSCULAR; INTRAVENOUS at 09:41

## 2024-11-29 RX ADMIN — PANTOPRAZOLE SODIUM 40 MG: 40 TABLET, DELAYED RELEASE ORAL at 05:24

## 2024-11-29 NOTE — PLAN OF CARE
Problem: OCCUPATIONAL THERAPY ADULT  Goal: Performs self-care activities at highest level of function for planned discharge setting.  See evaluation for individualized goals.  Description: Treatment Interventions: ADL retraining, Functional transfer training, UE strengthening/ROM, Endurance training, Patient/family training, Equipment evaluation/education, Activityengagement, Compensatory technique education          See flowsheet documentation for full assessment, interventions and recommendations.   Outcome: Progressing  Note: Limitation: Decreased ADL status, Decreased UE strength, Decreased Safe judgement during ADL, Decreased endurance, Decreased self-care trans, Decreased high-level ADLs, Decreased fine motor control     Assessment: Patient participated in Skilled OT session this date with interventions consisting of ADL re training with the use of correct body mechnaics, Energy Conservation techniques, safety awareness and fall prevention techniques,  therapeutic activities to: increase activity tolerance, increase cardiovascular endurance , and increase dynamic sit/ stand balance during functional activity  . Co treatment with PTA secondary to complex medical condition of pt, mod-max A of 2 required to achieve and maintain transitional movements, requiring the need of skilled therapeutic intervention of 2 therapists to achieve delivery of services. Patient agreeable to OT treatment session, upon arrival patient was found supine in bed. Patient requiring verbal cues for safety and frequent rest periods. Patient continues to be functioning below baseline level, occupational performance remains limited secondary to factors listed above and increased risk for falls and injury. The patient's raw score on the AM-PAC Daily Activity Inpatient Short Form is 14. A raw score of less than 19 suggests the patient may benefit from discharge to post-acute rehabilitation services. Please refer to the recommendation of the  Occupational Therapist for safe discharge planning. From OT standpoint, recommendation at time of d/c would be level 1, max resource intensity.     Rehab Resource Intensity Level, OT: I (Maximum Resource Intensity)

## 2024-11-29 NOTE — ASSESSMENT & PLAN NOTE
There is a 1.1 cm hypodense lesion in the midpole of the left kidney anteriorly (series 2 image 97), which measures higher than simple fluid in density. It was not seen on the renal ultrasound from September 2024, however is present on older CTs.   Renal US ordered for further characterization - could not be seen on US  Needs outpatient MRI for follow up

## 2024-11-29 NOTE — ASSESSMENT & PLAN NOTE
Lab Results   Component Value Date    CREATININE 0.85 11/29/2024    CREATININE 1.04 11/28/2024    CREATININE 1.13 11/27/2024       Creatinine level at 1.09, now improving  Baseline appears to be between 0.5 ans 0.6  Urinary retention protocol  Hold torsemide

## 2024-11-29 NOTE — ASSESSMENT & PLAN NOTE
Presented to the emergency room for evaluation of complaint of shortness of breath Patient reports SOB has been ongoing for several days getting progressively worst. Patient reports SOB is worst upon exertion.  Patient also reports having conversational dyspnea  Has history of restrictive lung disease due to muscular dystrophy   Initial Rapid Flu/RSV/COVID-negative  However +COVID noted on RP2 panel  Utilizes 2L O2 PRN and is currently on 2L  All presenting symptoms are resolved

## 2024-11-29 NOTE — PLAN OF CARE
Problem: Prexisting or High Potential for Compromised Skin Integrity  Goal: Skin integrity is maintained or improved  Description: INTERVENTIONS:  - Identify patients at risk for skin breakdown  - Assess and monitor skin integrity  - Assess and monitor nutrition and hydration status  - Monitor labs   - Assess for incontinence   - Turn and reposition patient  - Assist with mobility/ambulation  - Relieve pressure over bony prominences  - Avoid friction and shearing  - Provide appropriate hygiene as needed including keeping skin clean and dry  - Evaluate need for skin moisturizer/barrier cream  - Collaborate with interdisciplinary team   - Patient/family teaching  - Consider wound care consult   Outcome: Progressing     Problem: PAIN - ADULT  Goal: Verbalizes/displays adequate comfort level or baseline comfort level  Description: Interventions:  - Encourage patient to monitor pain and request assistance  - Assess pain using appropriate pain scale  - Administer analgesics based on type and severity of pain and evaluate response  - Implement non-pharmacological measures as appropriate and evaluate response  - Consider cultural and social influences on pain and pain management  - Notify physician/advanced practitioner if interventions unsuccessful or patient reports new pain  Outcome: Progressing     Problem: INFECTION - ADULT  Goal: Absence or prevention of progression during hospitalization  Description: INTERVENTIONS:  - Assess and monitor for signs and symptoms of infection  - Monitor lab/diagnostic results  - Monitor all insertion sites, i.e. indwelling lines, tubes, and drains  - [place] appropriate cooling/warming therapies per order  - Administer medications as ordered  - Instruct and encourage patient and family to use good hand hygiene technique  - Identify and instruct in appropriate isolation precautions for identified infection/condition  Outcome: Progressing     Problem: SAFETY ADULT  Goal: Patient will  remain free of falls  Description: INTERVENTIONS:  - Educate patient/family on patient safety including physical limitations  - Instruct patient to call for assistance with activity   - Consult OT/PT to assist with strengthening/mobility   - Keep Call bell within reach  - Keep bed low and locked with side rails adjusted as appropriate  - Keep care items and personal belongings within reach  - Initiate and maintain comfort rounds  - Make Fall Risk Sign visible to staff  - Offer Toileting every 2 Hours, in advance of need  - Initiate/Maintain bed/chair alarm  - Obtain necessary fall risk management equipment: daily  - Apply yellow socks and bracelet for high fall risk patients  - Consider moving patient to room near nurses station  Outcome: Progressing  Goal: Maintain or return to baseline ADL function  Description: INTERVENTIONS:  -  Assess patient's ability to carry out ADLs; assess patient's baseline for ADL function and identify physical deficits which impact ability to perform ADLs (bathing, care of mouth/teeth, toileting, grooming, dressing, etc.)  - Assess/evaluate cause of self-care deficits   - Assess range of motion  - Assess patient's mobility; develop plan if impaired  - Assess patient's need for assistive devices and provide as appropriate  - Encourage maximum independence but intervene and supervise when necessary  - Involve family in performance of ADLs  - Assess for home care needs following discharge   - Consider OT consult to assist with ADL evaluation and planning for discharge  - Provide patient education as appropriate  Outcome: Progressing  Goal: Maintains/Returns to pre admission functional level  Description: INTERVENTIONS:  - Perform AM-PAC 6 Click Basic Mobility/ Daily Activity assessment daily.  - Set and communicate daily mobility goal to care team and patient/family/caregiver.   - Collaborate with rehabilitation services on mobility goals if consulted  - Perform Range of Motion 3 times a  day.  - Reposition patient every 2 hours.  - Dangle patient 3 times a day  - Out of bed to chair 3 times a day   - Out of bed for meals 3 times a day  - Out of bed for toileting  - Record patient progress and toleration of activity level   Outcome: Progressing     Problem: DISCHARGE PLANNING  Goal: Discharge to home or other facility with appropriate resources  Description: INTERVENTIONS:  - Identify barriers to discharge w/patient and caregiver  - Arrange for needed discharge resources and transportation as appropriate  - Identify discharge learning needs (meds, wound care, etc  - Refer to Case Management Department for coordinating discharge planning if the patient needs post-hospital services based on physician/advanced practitioner order or complex needs related to functional status, cognitive ability, or social support system  Outcome: Progressing     Problem: Knowledge Deficit  Goal: Patient/family/caregiver demonstrates understanding of disease process, treatment plan, medications, and discharge instructions  Description: Complete learning assessment and assess knowledge base.  Interventions:  - Provide teaching at level of understanding  - Provide teaching via preferred learning methods  Outcome: Progressing     Problem: RESPIRATORY - ADULT  Goal: Achieves optimal ventilation and oxygenation  Description: INTERVENTIONS:  - Assess for changes in respiratory status  - Assess for changes in mentation and behavior  - Position to facilitate oxygenation and minimize respiratory effort  - Oxygen administered by appropriate delivery if ordered  - Encourage broncho-pulmonary hygiene including cough, deep breathe, Incentive Spirometry  - Assess the need for suctioning and aspirate as needed  - Assess and instruct to report SOB or any respiratory difficulty  - Respiratory Therapy support as indicated  Outcome: Progressing     Problem: Nutrition/Hydration-ADULT  Goal: Nutrient/Hydration intake appropriate for improving,  restoring or maintaining nutritional needs  Description: Monitor and assess patient's nutrition/hydration status for malnutrition. Collaborate with interdisciplinary team and initiate plan and interventions as ordered.  Monitor patient's weight and dietary intake as ordered or per policy. Utilize nutrition screening tool and intervene as necessary. Determine patient's food preferences and provide high-protein, high-caloric foods as appropriate.     INTERVENTIONS:  - Monitor oral intake, urinary output, labs, and treatment plans  - Assess nutrition and hydration status and recommend course of action  - Evaluate amount of meals eaten  - Assist patient with eating if necessary   - Allow adequate time for meals  - Recommend/ encourage appropriate diets, oral nutritional supplements, and vitamin/mineral supplements  - Order, calculate, and assess calorie counts as needed  - Recommend, monitor, and adjust tube feedings and TPN/PPN based on assessed needs  - Assess need for intravenous fluids  - Provide specific nutrition/hydration education as appropriate  - Include patient/family/caregiver in decisions related to nutrition  Outcome: Progressing

## 2024-11-29 NOTE — PLAN OF CARE
Problem: Prexisting or High Potential for Compromised Skin Integrity  Goal: Skin integrity is maintained or improved  Description: INTERVENTIONS:  - Identify patients at risk for skin breakdown  - Assess and monitor skin integrity  - Assess and monitor nutrition and hydration status  - Monitor labs   - Assess for incontinence   - Turn and reposition patient  - Assist with mobility/ambulation  - Relieve pressure over bony prominences  - Avoid friction and shearing  - Provide appropriate hygiene as needed including keeping skin clean and dry  - Evaluate need for skin moisturizer/barrier cream  - Collaborate with interdisciplinary team   - Patient/family teaching  - Consider wound care consult   Outcome: Progressing     Problem: PAIN - ADULT  Goal: Verbalizes/displays adequate comfort level or baseline comfort level  Description: Interventions:  - Encourage patient to monitor pain and request assistance  - Assess pain using appropriate pain scale  - Administer analgesics based on type and severity of pain and evaluate response  - Implement non-pharmacological measures as appropriate and evaluate response  - Consider cultural and social influences on pain and pain management  - Notify physician/advanced practitioner if interventions unsuccessful or patient reports new pain  Outcome: Progressing     Problem: INFECTION - ADULT  Goal: Absence or prevention of progression during hospitalization  Description: INTERVENTIONS:  - Assess and monitor for signs and symptoms of infection  - Monitor lab/diagnostic results  - Monitor all insertion sites, i.e. indwelling lines, tubes, and drains  - [place] appropriate cooling/warming therapies per order  - Administer medications as ordered  - Instruct and encourage patient and family to use good hand hygiene technique  - Identify and instruct in appropriate isolation precautions for identified infection/condition  Outcome: Progressing     Problem: SAFETY ADULT  Goal: Patient will  remain free of falls  Description: INTERVENTIONS:  - Educate patient/family on patient safety including physical limitations  - Instruct patient to call for assistance with activity   - Consult OT/PT to assist with strengthening/mobility   - Keep Call bell within reach  - Keep bed low and locked with side rails adjusted as appropriate  - Keep care items and personal belongings within reach  - Initiate and maintain comfort rounds  - Make Fall Risk Sign visible to staff  - Offer Toileting every 2 Hours, in advance of need  - Initiate/Maintain bed/chair alarm  - Obtain necessary fall risk management equipment: daily  - Apply yellow socks and bracelet for high fall risk patients  - Consider moving patient to room near nurses station  Outcome: Progressing  Goal: Maintain or return to baseline ADL function  Description: INTERVENTIONS:  -  Assess patient's ability to carry out ADLs; assess patient's baseline for ADL function and identify physical deficits which impact ability to perform ADLs (bathing, care of mouth/teeth, toileting, grooming, dressing, etc.)  - Assess/evaluate cause of self-care deficits   - Assess range of motion  - Assess patient's mobility; develop plan if impaired  - Assess patient's need for assistive devices and provide as appropriate  - Encourage maximum independence but intervene and supervise when necessary  - Involve family in performance of ADLs  - Assess for home care needs following discharge   - Consider OT consult to assist with ADL evaluation and planning for discharge  - Provide patient education as appropriate  Outcome: Progressing  Goal: Maintains/Returns to pre admission functional level  Description: INTERVENTIONS:  - Perform AM-PAC 6 Click Basic Mobility/ Daily Activity assessment daily.  - Set and communicate daily mobility goal to care team and patient/family/caregiver.   - Collaborate with rehabilitation services on mobility goals if consulted  - Perform Range of Motion 3 times a  day.  - Reposition patient every 2 hours.  - Dangle patient 3 times a day  - Out of bed to chair 3 times a day   - Out of bed for meals 3 times a day  - Out of bed for toileting  - Record patient progress and toleration of activity level   Outcome: Progressing     Problem: DISCHARGE PLANNING  Goal: Discharge to home or other facility with appropriate resources  Description: INTERVENTIONS:  - Identify barriers to discharge w/patient and caregiver  - Arrange for needed discharge resources and transportation as appropriate  - Identify discharge learning needs (meds, wound care, etc  - Refer to Case Management Department for coordinating discharge planning if the patient needs post-hospital services based on physician/advanced practitioner order or complex needs related to functional status, cognitive ability, or social support system  Outcome: Progressing     Problem: Knowledge Deficit  Goal: Patient/family/caregiver demonstrates understanding of disease process, treatment plan, medications, and discharge instructions  Description: Complete learning assessment and assess knowledge base.  Interventions:  - Provide teaching at level of understanding  - Provide teaching via preferred learning methods  Outcome: Progressing     Problem: RESPIRATORY - ADULT  Goal: Achieves optimal ventilation and oxygenation  Description: INTERVENTIONS:  - Assess for changes in respiratory status  - Assess for changes in mentation and behavior  - Position to facilitate oxygenation and minimize respiratory effort  - Oxygen administered by appropriate delivery if ordered  - Encourage broncho-pulmonary hygiene including cough, deep breathe, Incentive Spirometry  - Assess the need for suctioning and aspirate as needed  - Assess and instruct to report SOB or any respiratory difficulty  - Respiratory Therapy support as indicated  Outcome: Progressing     Problem: Nutrition/Hydration-ADULT  Goal: Nutrient/Hydration intake appropriate for improving,  restoring or maintaining nutritional needs  Description: Monitor and assess patient's nutrition/hydration status for malnutrition. Collaborate with interdisciplinary team and initiate plan and interventions as ordered.  Monitor patient's weight and dietary intake as ordered or per policy. Utilize nutrition screening tool and intervene as necessary. Determine patient's food preferences and provide high-protein, high-caloric foods as appropriate.     INTERVENTIONS:  - Monitor oral intake, urinary output, labs, and treatment plans  - Assess nutrition and hydration status and recommend course of action  - Evaluate amount of meals eaten  - Assist patient with eating if necessary   - Allow adequate time for meals  - Recommend/ encourage appropriate diets, oral nutritional supplements, and vitamin/mineral supplements  - Order, calculate, and assess calorie counts as needed  - Recommend, monitor, and adjust tube feedings and TPN/PPN based on assessed needs  - Assess need for intravenous fluids  - Provide specific nutrition/hydration education as appropriate  - Include patient/family/caregiver in decisions related to nutrition  Outcome: Progressing

## 2024-11-29 NOTE — RESPIRATORY THERAPY NOTE
11/29/24 0917   Inhalation Therapy Tx   $ Inhalation Therapy Performed Yes   $ Pulse Oximetry Spot Check Charge Completed   SpO2 97 %  (2)   Pre-Treatment Pulse 86   Pre-Treatment Respirations 20   Duration 15   Breath Sounds Pre-Treatment Bilateral Diminished;Crackles  (inspiratory crackles)   Breath Sounds Post-Treatment Bilateral Diminished;Crackles   Post-Treatment Pulse 83   Post-Treatment Respirations 20   Delivery Source Air;UDN   Position High [employer]   Treatment Tolerance Tolerated well

## 2024-11-29 NOTE — ASSESSMENT & PLAN NOTE
Presented with complaints of SOB which became progressively worse over the last few days. Found to be +COVID, denies fever, chills, cough  Currently SpO2: 97 % (2) on Nasal Cannula O2 Flow Rate (L/min): 2 L/min  Azithromycin 500mg x3 days  Scheduled nebs, respiratory protocol  IV decadron per protocol - cont on PO decadron to complete 10 days  Encouraged IS 10x per hour while awake, ambulation  Monitor O2, supplement as needed, maintain O2 >88%  Pulmonology following     labor/delivery

## 2024-11-29 NOTE — CASE MANAGEMENT
Case Management Discharge Planning Note    Patient name Claire Doherty  Location /419-01 MRN [id number]  : 1950 Date 2024       Current Admission Date: 2024  Current Admission Diagnosis:Acute on chronic respiratory failure with hypoxia and hypercapnia ([facility])   Patient Active Problem List    Diagnosis Date Noted Date Diagnosed    Renal cyst 2024     MRSA colonization 2024     COVID-19 2024     Elevated d-dimer 2024     COPD exacerbation ([facility]) 2024     Chronic diastolic heart failure ([facility]) 2024     SIRS (systemic inflammatory response syndrome) ([facility]) 2024     Pleural effusion, bilateral 2024     Hyperkalemia 2024     Paroxysmal A-fib ([facility]) 2024     Acute on chronic diastolic CHF (congestive heart failure) ([facility]) 09/10/2024     Acute on chronic respiratory failure with hypoxia and hypercapnia ([facility]) 09/10/2024     Severe protein-calorie malnutrition ([facility]) 2024     DINA (acute kidney injury) ([facility]) 2024     Nausea 2024     Pleural effusion 2024     Pericardial effusion 2024     Infected wound 2024     Chest pain 2024     History of Idiopathic pericarditis 2024     Cellulitis of right foot 2024     Chronic left shoulder pain 2024     Shoulder joint dysfunction 2024     COPD (chronic obstructive pulmonary disease) ([facility]) 06/15/2023     Bilateral hand pain 2023     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     Gastroparesis 2021     Aortic valve sclerosis 2021     Tricuspid valve insufficiency 2021     Mitral annular calcification 2021     Former smoker 2021     On home oxygen therapy 2021     Colostomy status ([facility])      Ductal carcinoma in situ (DCIS) of left breast 03/15/2021     Insomnia 10/21/2020     Depression 10/06/2020     Anemia 10/02/2020     Chronic hypoxic respiratory failure ([facility]) 2020     Bladder injury  09/30/2020     Thrombocytosis, unspecified 09/28/2020     Leukocytosis 03/28/2020     Difficult intubation      Dystrophy, muscular, 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HCC) 10/12/2017     Ambulatory dysfunction 10/12/2017     Urinary incontinence 10/03/2017     Osteoporosis 12/07/2016     Allergic rhinitis 10/09/2013     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) 10/04/2012     GERD without esophagitis 10/04/2012       LOS (days): 5  Geometric Mean LOS (GMLOS) (days): 3.5  Days to GMLOS:-1.1     OBJECTIVE:  Risk of Unplanned Readmission Score: 35.35         Current admission status: Inpatient   Preferred Pharmacy:   [id number] [place], [place] - [address]  [address] [address]  Phone: [phone] Fax: [phone]    [address] [address]  Phone: [phone] Fax: [phone]    Primary Care Provider: Julienne Tucker DO    Primary Insurance: [facility] REP  Secondary Insurance:     DISCHARGE DETAILS:       Discharge Destination Plan:: SNF ([facility])  Transport at Discharge : [facility] Ambulance        Transported by (Company and Unit #): [facility]  ETA of Transport (Date): 11/29/24  ETA of Transport (Time): 1400

## 2024-11-29 NOTE — NURSING NOTE
Patient discharged via stretcher with transport team to [place]. Report called in to Liana. Vitals stable.

## 2024-11-29 NOTE — PHYSICAL THERAPY NOTE
PHYSICAL THERAPY NOTE          Patient Name: Claire Doherty  Today's Date: 11/29/2024 11/29/24 1126   PT Last Visit   PT Visit Date 11/29/24   Note Type   Note Type Treatment for insurance authorization   Pain Assessment   Pain Assessment Tool 0-10   Pain Score No Pain   Restrictions/Precautions   Weight Bearing Precautions Per Order No   Other Precautions Airborne/isolation;Contact/isolation;Bed Alarm;O2;Fall Risk   General   Chart Reviewed Yes   Family/Caregiver Present No   Cognition   Overall Cognitive Status WFL   Arousal/Participation Alert;Cooperative   Attention Within functional limits   Orientation Level Oriented X4   Memory Within functional limits   Following Commands Follows all commands and directions without difficulty   Subjective   Subjective Reports looking forward to returning to rehab. Agreeable to therapy.   Bed Mobility   Rolling R 4  Minimal assistance   Additional items Assist x 1;Bedrails;Increased time required;Verbal cues   Rolling L 4  Minimal assistance   Additional items Assist x 1;Bedrails;Increased time required;Verbal cues   Supine to Sit 4  Minimal assistance   Additional items Assist x 2;HOB elevated;Increased time required;Verbal cues;LE management   Sit to Supine 3  Moderate assistance   Additional items Assist x 2;Increased time required;LE management   Additional Comments Sat EOB with fair+ sitting balance. c/o dizziness initially with transitional movement.   Transfers   Sit to Stand 4  Minimal assistance   Additional items Assist x 2;Increased time required;Verbal cues   Stand to Sit 4  Minimal assistance   Additional items Assist x 2;Increased time required;Verbal cues   Additional Comments Pt declines OOB to recliner this session. Performed sit<> stand tx utilizing  QuickMove x approx 30 sec with Min A x 2.   Ambulation/Elevation   Gait pattern Not appropriate;Not tested   Balance   Static  Sitting Fair +   Dynamic Sitting Fair +   Static Standing Fair   Dynamic Standing Fair -  (with support of quick move)   Endurance Deficit   Endurance Deficit Yes   Activity Tolerance   Activity Tolerance Patient limited by fatigue   Assessment   Prognosis Good   Problem List Decreased strength;Decreased endurance;Impaired balance;Decreased mobility   Assessment Pt. seen for PT treatment session this date with interventions consisting of  bed mobility and transfers to improve strength, endurance, balance and functional mobility. In comparison to previous session, Pt. With no change in activity tolerance.   Pt is in need of continued activity in PT to improve strength balance endurance mobility transfers and ambulation with return to maximize LOF. From PT/mobility standpoint, recommendation at time of d/c would be level I: max resource intensity in order to promote return to PLOF and independence.   The patient's [facility] Basic Mobility Inpatient Short Form Raw Score is 9. A Raw score of less than or equal to 16 suggests the patient may benefit from discharge to post-acute rehabilitation services.  Please also refer to physical therapist recommendation for safe DC planning.     Of note, pt had episode choking on water lasting ~ 2 min. Nurse made aware of episode.   Goals   Patient Goals to go home   LTG Expiration Date 12/10/24   PT Treatment Day 1   Plan   Treatment/Interventions Functional transfer training;LE strengthening/ROM;Therapeutic exercise;Endurance training;Bed mobility   Progress Slow progress, decreased activity tolerance   PT Frequency 3-5x/wk   Discharge Recommendation   Rehab Resource Intensity Level, PT I (Maximum Resource Intensity)   AM-PAC Basic Mobility Inpatient   Turning in Flat Bed Without Bedrails 2   Lying on Back to Sitting on Edge of Flat Bed Without Bedrails 2   Moving Bed to Chair 1   Standing Up From Chair Using Arms 2   Walk in Room 1   Climb 3-5 Stairs With Railing 1   Basic Mobility  Inpatient Raw Score 9   Turning Head Towards Sound 4   Follow Simple Instructions 4   Low Function Basic Mobility Raw Score  17   Low Function Basic Mobility Standardized Score  27.46   [facility] Level Of Mobility   -HL Goal 3: Sit at edge of bed   -HL Achieved 3: Sit at edge of bed   Education   Education Provided Mobility training   Patient Demonstrates verbal understanding;Reinforcement needed   End of Consult   Patient Position at End of Consult Supine;Bed/Chair alarm activated;All needs within reach   End of Consult Comments discussed POC with PT   Co-treat with OT this session due to complexity of pt and requires A x 2 to provided skilled interventions.

## 2024-11-29 NOTE — DISCHARGE SUMMARY
Discharge Summary - Hospitalist   Name: Claire Doherty 74 y.o. female I MRN: [id number]  Unit/Bed#: 419-01 I Date of Admission: 11/24/2024   Date of Service: 11/29/2024 I Hospital Day: 5     Assessment & Plan  Acute on chronic respiratory failure with hypoxia and hypercapnia (HCC)  Presented to the emergency room for evaluation of complaint of shortness of breath Patient reports SOB has been ongoing for several days getting progressively worst. Patient reports SOB is worst upon exertion.  Patient also reports having conversational dyspnea  Has history of restrictive lung disease due to muscular dystrophy   Initial Rapid Flu/RSV/COVID-negative  However +COVID noted on RP2 panel  Utilizes 2L O2 PRN and is currently on 2L  All presenting symptoms are resolved    COPD exacerbation (HCC)  Presented with complaints of SOB which became progressively worse over the last few days. Found to be +COVID, denies fever, chills, cough  Currently SpO2: 97 % (2) on Nasal Cannula O2 Flow Rate (L/min): 2 L/min  Azithromycin 500mg x3 days  Scheduled nebs, respiratory protocol  IV decadron per protocol - cont on PO decadron to complete 10 days  Encouraged IS 10x per hour while awake, ambulation  Monitor O2, supplement as needed, maintain O2 >88%  Pulmonology following    COVID-19  Patient found to be +COVID with RP2 on 11/25, initially negative with rapid test  Continue O2 supplement as needed, goal >92% O2 sat  Start IV remdesivir, day 4 of 5 > will complete entire course here  Continue decadron  Procal 0.27 > 0.15  On eliquis for AC  Return to SNF for rehab today    Pleural effusion, bilateral  Bilateral pleural effusion noted on CT completed on 11/22  Patient currently on torsemide 20 mg daily - held for DINA  Not enough fluid present for thoracentesis     DINA (acute kidney injury) ([facility])  Lab Results   Component Value Date    CREATININE 0.85 11/29/2024    CREATININE 1.04 11/28/2024    CREATININE 1.13 11/27/2024       Creatinine level  at 1.09, now improving  Baseline appears to be between 0.5 ans 0.6  Urinary retention protocol  Hold torsemide    SIRS (systemic inflammatory response syndrome) ([facility])  SIRS criteria 3/4 (leukocytosis, tachycardia, tachypnea)  All resolved  Lactic 1.1 on admission  COVID-19/flu/rsv initially negative, now +COVID with RP2  Procal slightly elevated at 0.27, will repeat  Dystrophy, muscular, 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[facility])  History of muscular dystrophy  Currently at SNF for rehab - will return there on discharge, likely Friday  Reports that she had not been able to transfer from bed to chair on her own  Cont outpatient neuro follow up  GERD without esophagitis  Continue on home protonix  Colostomy status ([facility])  S/P colostomy  Maintenance per nursing protocol  Pericardial effusion  Noted on CT completed on 11/22, no longer seen on CTA on 11/25  Discussed curbside with cardiology, this small effusion was seen on images in the past and did not correlate on ECHO  No need for further work up at this time, she has no clinical signs of cardiac tamponade    Elevated d-dimer  D-dimer level at 3.38  No documented history DVT/PE  CTA chest PE negative  Vas duplex negative for DVT    Paroxysmal A-fib ([facility])  History of paroxysmal A-fib, not on rate controlling meds  Anticoagulated on Eliquis  Chronic diastolic heart failure ([facility])  Wt Readings from Last 3 Encounters:   11/29/24 75.2 kg (165 lb 12.6 oz)   09/20/24 75.3 kg (166 lb 0.1 oz)   09/07/24 77.8 kg (171 lb 8.3 oz)     Appears close to euvolemic on exam  Monitor I&O  Daily Weights  Last echo 9/11 EF 65%  MRSA colonization  Decolonization protocol  Renal cyst  There is a 1.1 cm hypodense lesion in the midpole of the left kidney anteriorly (series 2 image 97), which measures higher than simple fluid in density. It was not seen on the renal ultrasound from September 2024, however is present on older CTs.   Renal US ordered for further characterization - could not be seen on  US  Needs outpatient MRI for follow up     Medical Problems       Resolved Problems  Date Reviewed: 11/24/2024   None       Discharging Physician / Practitioner: Mary Arango PA-C  PCP: Julienne Tucker DO  Admission Date:   Admission Orders (From admission, onward)       Ordered        11/24/24 2214  INPATIENT ADMISSION  Once                          Discharge Date: 11/29/24    Consultations During Hospital Stay:  none    Procedures Performed:   none    Significant Findings / Test Results:     US kidney and bladder   Final Result      Unfortunately, left renal lesion seen on recent CT is not clearly visible by ultrasound therefore cannot be reliably assessed on this modality. Consider follow-up MRI abdomen on an outpatient basis.            Workstation performed: [id number]         IR procedure not performed   Final Result   Technically unsuccessful ultrasound-guided thoracentesis.  Images from limited ultrasound of the chest show no significant fluid      Workstation performed: [id number]         CTA chest pe study   Final Result      No evidence for pulmonary embolism.      Small to moderate left pleural effusion with subjacent compressive atelectasis is stable since the prior exam.      Note is made of an esophageal diverticulum distally and containing debris. Findings are unchanged since prior exam.      Workstation performed: [id number]          VAS VENOUS DUPLEX - LOWER LIMB BILATERAL   Final Result      XR chest 1 view portable   ED Interpretation   Persistent effusion on left with surrounding lung field changes.  Overall appears similar to prior 2 days ago, may be slightly worse      Final Result   Small left basilar effusion with probable compressive atelectasis.      Workstation performed: [id number]         MRI abdomen w wo contrast    (Results Pending)         Incidental Findings:   As above    Test Results Pending at Discharge (will require follow up):   none     Outpatient Tests  Requested:  none    Complications:  none    Reason for Admission:     Hospital Course:   Claire Doherty is a 74 y.o. female patient who originally presented to the hospital on 11/24/2024 due to   shortness of breath over the past several days getting progressively worse.  Patient has history of COPD currently on oxygen at 2 L/min.  Patient reports that shortness of breath is worse upon exertion when she lays flat.  She also reports some conversational dyspnea.  Patient denies chest pain, cough, fever, chills, nausea, vomiting, diarrhea, abdominal pain.      Work up in the ER included labs significant for ABG showing a pH of 7.45, pCO2 of 53.6, pO2 of 60.6, bicarb of 36.8, glucose of 162, creatinine of 1.09, BUN of 50 0-hour troponin of 14, BNP of 168 and a lactic acid of 1.1, WBC of 13.11, platelets of 444, D-dimer of 3.38.  COVID-19/flu/RSV negative.  Chest x-ray completed official report pending.     Patient is being admitted on Inpatient status [facility] level care for further workup and management of acute on chronic respiratory failure with hypoxia, COPD exacerbation, acute kidney injury, SIRS, elevated D-dimer, bilateral pleural effusion, pericardial effusion.    Please see above list of diagnoses and related plan for additional information.     Condition at Discharge: good    Discharge Day Visit / Exam:   Subjective:  patient reports feeling about 75-80% improved from admission, feels ready to go back to rehab today  Vitals: Blood Pressure: 165/92 (11/28/24 2336)  Pulse: 93 (11/28/24 2336)  Temperature: 98 °F (36.7 °C) (11/28/24 2336)  Temp Source: Oral (11/26/24 1502)  Respirations: 20 (11/28/24 2336)  Height: 5' (152.4 cm) (11/25/24 1339)  Weight - Scale: 75.2 kg (165 lb 12.6 oz) (11/29/24 0600)  SpO2: 97 % (2) (11/29/24 0917)  Physical Exam  Vitals and nursing note reviewed.   Constitutional:       General: She is not in acute distress.  Cardiovascular:      Rate and Rhythm: Normal rate and regular rhythm.       Pulses: Normal pulses.      Heart sounds: Normal heart sounds. No murmur heard.     No gallop.   Pulmonary:      Effort: Pulmonary effort is normal.      Breath sounds: Normal breath sounds. No stridor. No wheezing or rales.   Abdominal:      General: Bowel sounds are normal.      Palpations: Abdomen is soft.      Tenderness: There is no abdominal tenderness. There is no guarding.   Musculoskeletal:      Right lower leg: No edema.      Left lower leg: No edema.   Neurological:      Mental Status: She is alert. Mental status is at baseline.   Psychiatric:         Mood and Affect: Mood normal.         Behavior: Behavior normal.          Discussion with Family: Updated  (sister) via phone.    Discharge instructions/Information to patient and family:   See after visit summary for information provided to patient and family.      Provisions for Follow-Up Care:  See after visit summary for information related to follow-up care and any pertinent home health orders.      Mobility at time of Discharge:   Basic Mobility Inpatient Raw Score: 9  JH-HLM Goal: 3: Sit at edge of bed  JH-HLM Achieved: 3: Sit at edge of bed  HLM Goal achieved. Continue to encourage appropriate mobility.     Disposition:   Other Skilled Nursing Facility at [place]    Planned Readmission: none    Discharge Medications:  See after visit summary for reconciled discharge medications provided to patient and/or family.      Administrative Statements   Discharge Statement:  I have spent a total time of 50 minutes in caring for this patient on the day of the visit/encounter. >30 minutes of time was spent on: Risks and benefits of tx options, Instructions for management, Patient and family education, Importance of tx compliance, Risk factor reductions, Impressions, Counseling / Coordination of care, Documenting in the medical record, Reviewing / ordering tests, medicine, procedures  , and Communicating with other healthcare professionals  .    **Please Note: This note may have been constructed using a voice recognition system**

## 2024-11-29 NOTE — CASE MANAGEMENT
[facility] received request for transport authorization from Care Manager.   Date of transport: 11/29  Type of transport: BLS ()   Transport company: [employer] NPI: [id number]  Start Location: [facility]   End Location: [facility] NPI: [id number]  Authorization initiated by contacting insurance: [employer]  Via F#: [phone]    Care Manager notified: Barbara MCFARLAND     Please reach out to  for updates on any clinical information.

## 2024-11-29 NOTE — OCCUPATIONAL THERAPY NOTE
Occupational Therapy Progress Note     Patient Name: Claire Doherty  Today's Date: 11/29/2024  Problem List  Principal Problem:    Acute on chronic respiratory failure with hypoxia and hypercapnia (HCC)  Active Problems:    Dystrophy, muscular, 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HCC)    GERD without esophagitis    Colostomy status (HCC)    Pericardial effusion    DINA (acute kidney injury) (HCC)    Paroxysmal A-fib (HCC)    SIRS (systemic inflammatory response syndrome) (HCC)    Pleural effusion, bilateral    Elevated d-dimer    COPD exacerbation (HCC)    Chronic diastolic heart failure (HCC)    MRSA colonization    COVID-19    Renal cyst            11/29/24 1127   OT Last Visit   OT Visit Date 11/29/24   Pain Assessment   Pain Assessment Tool 0-10   Pain Score No Pain   Restrictions/Precautions   Weight Bearing Precautions Per Order No   Other Precautions Airborne/isolation;Contact/isolation;Bed Alarm;O2;Fall Risk   ADL   Where Assessed Supine, bed   Grooming Assistance 4  Minimal Assistance   Grooming Deficit Brushing hair  (limited by pain at IV on R wrist with movement)   UB Bathing Assistance 5  Supervision/Setup   LB Bathing Assistance 2  Maximal Assistance   UB Dressing Assistance 4  Minimal Assistance   LB Dressing Assistance 2  Maximal Assistance   Toileting Assistance  2  Maximal Assistance   Toileting Comments Pt requests bed pan at start of session. Socks donned for pt while supine- pt reporting inability. All other ADL tasks graded using clinical judgement - based on pt's strength, balance, ROM, and endurance deficits.   Bed Mobility   Rolling L 4  Minimal assistance   Additional items Assist x 1   Supine to Sit 4  Minimal assistance   Additional items Assist x 2;HOB elevated;Increased time required;Verbal cues   Sit to Supine 3  Moderate assistance   Additional items Assist x 2;Increased time required;Verbal cues;LE management   Additional Comments Pt requests to return to supine in bed at end of session.  "  Transfers   Sit to Stand 4  Minimal assistance   Additional items Assist x 2;Increased time required;Verbal cues   Stand to Sit 4  Minimal assistance   Additional items Assist x 2;Increased time required;Verbal cues   Additional Comments Pt declines OOB activity this date, willing to attempt standing. Pt stands with support of QuickMove x approx 30 sec with Min A x 2.   Functional Mobility   Additional Comments not appropriate - non ambulatory at baseline   Subjective   Subjective \"I am thinking I will be home maybe by the end of January. Hopefully.\"   Cognition   Overall Cognitive Status WFL   Arousal/Participation Alert;Cooperative   Attention Within functional limits   Orientation Level Oriented X4   Memory Within functional limits   Following Commands Follows all commands and directions without difficulty   Activity Tolerance   Activity Tolerance Patient limited by fatigue   Assessment   Assessment Patient participated in Skilled OT session this date with interventions consisting of ADL re training with the use of correct body mechnaics, Energy Conservation techniques, safety awareness and fall prevention techniques,  therapeutic activities to: increase activity tolerance, increase cardiovascular endurance , and increase dynamic sit/ stand balance during functional activity  . Co treatment with PTA secondary to complex medical condition of pt, mod-max A of 2 required to achieve and maintain transitional movements, requiring the need of skilled therapeutic intervention of 2 therapists to achieve delivery of services. Patient agreeable to OT treatment session, upon arrival patient was found supine in bed. Patient requiring verbal cues for safety and frequent rest periods. Patient continues to be functioning below baseline level, occupational performance remains limited secondary to factors listed above and increased risk for falls and injury. The patient's raw score on the AM-PAC Daily Activity Inpatient Short " Form is 14. A raw score of less than 19 suggests the patient may benefit from discharge to post-acute rehabilitation services. Please refer to the recommendation of the Occupational Therapist for safe discharge planning. From OT standpoint, recommendation at time of d/c would be level 1, max resource intensity.   Plan   Treatment Interventions ADL retraining;Functional transfer training;UE strengthening/ROM;Endurance training;Compensatory technique education;Energy conservation;Activityengagement   Goal Expiration Date 12/10/24   OT Treatment Day 1   OT Frequency 3-5x/wk   Discharge Recommendation   Rehab Resource Intensity Level, OT I (Maximum Resource Intensity)   AM-PAC Daily Activity Inpatient   Lower Body Dressing 1   Bathing 2   Toileting 2   Upper Body Dressing 2   Grooming 3   Eating 4   Daily Activity Raw Score 14   Daily Activity Standardized Score (Calc for Raw Score >=11) 33.39   AM-PAC Applied Cognition Inpatient   Following a Speech/Presentation 4   Understanding Ordinary Conversation 4   Taking Medications 4   Remembering Where Things Are Placed or Put Away 4   Remembering List of 4-5 Errands 4   Taking Care of Complicated Tasks 3   Applied Cognition Raw Score 23   Applied Cognition Standardized Score 53.08       Pt will benefit from continued OT services in order to maximize (I) c ADL performance and improve overall endurance/strength required to complete functional tasks in preparation for d/c.    Pt benefited from co-treatment of skilled OT and PTA in order to most appropriately address functional deficits d/t extensive assistance required for safe functional mobility, decreased activity tolerance, and regression from functioning level prior to admission and/or onset of present illness. OT/PT objectives were addressed separately; please see PT note for specific goal areas targeted.    Pt left supine in bed at end of session; all needs within reach; all lines intact.    KIRA Remy/L

## 2024-11-29 NOTE — DISCHARGE INSTR - AVS FIRST PAGE
Please monitor patient's intake of food and provide with nausea medications as needed. Would benefit from protein shakes.

## 2024-11-29 NOTE — PLAN OF CARE
Problem: PHYSICAL THERAPY ADULT  Goal: Performs mobility at highest level of function for planned discharge setting.  See evaluation for individualized goals.  Description: Treatment/Interventions: Functional transfer training, LE strengthening/ROM, Therapeutic exercise, Endurance training, Bed mobility          See flowsheet documentation for full assessment, interventions and recommendations.  Outcome: Progressing  Note: Prognosis: Good  Problem List: Decreased strength, Decreased endurance, Impaired balance, Decreased mobility  Assessment: Pt. seen for PT treatment session this date with interventions consisting of  bed mobility and transfers to improve strength, endurance, balance and functional mobility. In comparison to previous session, Pt. With no change in activity tolerance.   Pt is in need of continued activity in PT to improve strength balance endurance mobility transfers and ambulation with return to maximize LOF. From PT/mobility standpoint, recommendation at time of d/c would be level I: max resource intensity in order to promote return to PLOF and independence.   The patient's AM-PAC Basic Mobility Inpatient Short Form Raw Score is 9. A Raw score of less than or equal to 16 suggests the patient may benefit from discharge to post-acute rehabilitation services.  Please also refer to physical therapist recommendation for safe DC planning.     Of note, pt had episode choking on water lasting ~ 2 min. Nurse made aware of episode.        Rehab Resource Intensity Level, PT: I (Maximum Resource Intensity)    See flowsheet documentation for full assessment.

## 2024-11-29 NOTE — ASSESSMENT & PLAN NOTE
Wt Readings from Last 3 Encounters:   11/29/24 75.2 kg (165 lb 12.6 oz)   09/20/24 75.3 kg (166 lb 0.1 oz)   09/07/24 77.8 kg (171 lb 8.3 oz)     Appears close to euvolemic on exam  Monitor I&O  Daily Weights  Last echo 9/11 EF 65%

## 2024-11-29 NOTE — ASSESSMENT & PLAN NOTE
Patient found to be +COVID with RP2 on 11/25, initially negative with rapid test  Continue O2 supplement as needed, goal >92% O2 sat  Start IV remdesivir, day 4 of 5 > will complete entire course here  Continue decadron  Procal 0.27 > 0.15  On eliquis for AC  Return to SNF for rehab today

## 2024-11-30 NOTE — CASE MANAGEMENT
No Fax or voicemail/call received with any updates on transport auth. Auth still pending at this time. [facility] is closed on weekends. CM notified: Barbara Chan

## 2024-12-02 ENCOUNTER — TRANSITIONAL CARE MANAGEMENT (OUTPATIENT)
Dept: FAMILY MEDICINE CLINIC | Facility: CLINIC | Age: 74
End: 2024-12-02

## 2024-12-02 NOTE — CASE MANAGEMENT
12/02 @ 8am     Received call from Barbara @ [facility] (P#: [phone]) regarding submitted transport auth request. Looking to confirm auth details. VM left for rep with transport details. Case pended.

## 2024-12-02 NOTE — CASE MANAGEMENT
12/02 @ 4pm     Received call from Elsa @ [facility] (P#: [phone]) regarding submitted SNF auth. Stated patient previously had an auth from 09/20 to 11/26. Rep stated a new auth would not be required if patient is still at facility. Called insurance back to clarify auth submissions and spoke with Christianne.Confirmed with rep that patient admitted back into hospital on 11/24 and dc'd to SNF on 11/29.     Per rep, transport from Inpatient Hospital to Skilled Nursing auth is not required as it is included under the Inpatient Hospital Stay auth for [employer] plan type.     Stated SNF auth is still pended and it was noted that patient left SNF and readmitted into hospital. Confirmed with rep details of both auth requests. SNF Pended Ref#: [id number] (provided by rep Viviana). Call Ref#: [id number].

## 2024-12-02 NOTE — UTILIZATION REVIEW
NOTIFICATION OF ADMISSION DISCHARGE   This is a Notification of Discharge from [facility]. Please be advised that this patient has been discharge from our facility. Below you will find the admission and discharge date and time including the patient’s disposition.   UTILIZATION REVIEW CONTACT:  Refugio Galan  Utilization   Network Utilization Review Department  Phone: [phone] x carefully listen to the prompts. All voicemails are confidential.  Email: [email or web address]     ADMISSION INFORMATION  PRESENTATION DATE: 11/24/2024  7:54 PM  OBERVATION ADMISSION DATE: N/A  INPATIENT ADMISSION DATE: 11/24/24 10:14 PM   DISCHARGE DATE: 11/29/2024  5:40 PM   [facility]    Network Utilization Review Department  ATTENTION: Please call with any questions or concerns to [phone] and carefully listen to the prompts so that you are directed to the right person. All voicemails are confidential.   For Discharge needs, contact Care Management DC Support Team at [phone] opt. 2  Send all requests for admission clinical reviews, approved or denied determinations and any other requests to dedicated fax number below belonging to the campus where the patient is receiving treatment. List of dedicated fax numbers for the Facilities:  FACILITY NAME UR FAX NUMBER   ADMISSION DENIALS (Administrative/Medical Necessity) [phone]   DISCHARGE SUPPORT TEAM ([facility]) [phone]   PARENT CHILD HEALTH (Maternity/NICU/Pediatrics) [phone]   [facility] [phone]   [facility] [phone]   [facility] [phone]   [facility] [phone]   [facility] [phone]   [facility] [phone]   [facility] [phone]   [facility]  [phone]   [facility] [phone]   formerly [facility] [phone]   [facility] [phone]   [facility] [phone]

## 2024-12-02 NOTE — CASE MANAGEMENT
12/02 @ 1010     DC Support Center received request for authorization from Care Manager.  Authorization request submitted for: SNF  Facility Name: [facility] NPI: [id number]  Facility MD: Radha Roberson NPI: [id number]  Authorization initiated by contacting insurance:    Via: Fax  Clinicals submitted via fax # [phone]    Patient discharged to SNF on 11/29. DCS received SNF task from  today 12/02. Noted on auth request of dc/admit date since notes are not current.     Care Manager notified: Barbara Chan     Updates to authorization status will be noted in chart. Please reach out to CM for updates on any clinical information.

## 2024-12-03 NOTE — CASE MANAGEMENT
Called insurance to check status of pended SNF auth request (P#: [phone]). Spoke with Mena. Stated case is currently still pended. Confirmed with rep SOC of 11/29 for SNF.     SNF Pended Ref#: [id number]   Care Manager notified: Barbara Chan

## 2024-12-03 NOTE — CASE MANAGEMENT
12/03 @ 1145am    [facility] has received APPROVED authorization.  Insurance: [facility] [facility]    Auth obtained via Insurance Rep: Vianey THEODORE#: [phone]  Authorization received for: SNF  Facility: [facility]   Authorization #: [id number]   Start of Care: 11/29  Next Review Date: 12/05  Submit next review to #: [phone]     Per Rep, patient has used 68 out of 100 SNF days. Rep stated patient was denied her SNF continued stay on 11/20 due to a lack of progress. Stated next review clinicals cannot be earlier than 12/04 due to the previous denial and lack of progress noted.     Care Manager notified: Barbara Chan     Please reach out to CM for updates on any clinical information.

## 2024-12-03 NOTE — CASE MANAGEMENT
Case Management Discharge Planning Note    Patient name Claire Doherty  Location /419-01 MRN [id number]  : 1950 Date 12/3/2024       Current Admission Date: 2024  Current Admission Diagnosis:Acute on chronic respiratory failure with hypoxia and hypercapnia (Formerly [facility])   Patient Active Problem List    Diagnosis Date Noted Date Diagnosed    Renal cyst 2024     MRSA colonization 2024     COVID-19 2024     Elevated d-dimer 2024     COPD exacerbation (Formerly [facility]) 2024     Chronic diastolic heart failure (Formerly [facility]) 2024     SIRS (systemic inflammatory response syndrome) (Formerly [facility]) 2024     Pleural effusion, bilateral 2024     Hyperkalemia 2024     Paroxysmal A-fib (Formerly [facility]) 2024     Acute on chronic diastolic CHF (congestive heart failure) (Formerly [facility]) 09/10/2024     Acute on chronic respiratory failure with hypoxia and hypercapnia (Formerly [facility]) 09/10/2024     Severe protein-calorie malnutrition (Formerly [facility]) 2024     DINA (acute kidney injury) (Formerly [facility]) 2024     Nausea 2024     Pleural effusion 2024     Pericardial effusion 2024     Infected wound 2024     Chest pain 2024     History of Idiopathic pericarditis 2024     Cellulitis of right foot 2024     Chronic left shoulder pain 2024     Shoulder joint dysfunction 2024     COPD (chronic obstructive pulmonary disease) (Formerly [facility]) 06/15/2023     Bilateral hand pain 2023     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[facility]ly [facility]) 2022     Gastroparesis 2021     Aortic valve sclerosis 2021     Tricuspid valve insufficiency 2021     Mitral annular calcification 2021     Former smoker 2021     On home oxygen therapy 2021     Colostomy status (Formerly [facility])      Ductal carcinoma in situ (DCIS) of left breast 03/15/2021     Insomnia 10/21/2020     Depression 10/06/2020     Anemia 10/02/2020     Chronic hypoxic respiratory failure (Formerly [facility]) 2020     Bladder injury  09/30/2020     Thrombocytosis, unspecified 09/28/2020     Leukocytosis 03/28/2020     Difficult intubation      Dystrophy, muscular, 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HCC) 10/12/2017     Ambulatory dysfunction 10/12/2017     Urinary incontinence 10/03/2017     Osteoporosis 12/07/2016     Allergic rhinitis 10/09/2013     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) 10/04/2012     GERD without esophagitis 10/04/2012       LOS (days): 5  Geometric Mean LOS (GMLOS) (days): 3.5  Days to GMLOS:-1.3     OBJECTIVE:  Risk of Unplanned Readmission Score: 36.38         Current admission status: Inpatient   Preferred Pharmacy:   [id number] [place], [place] - [address] [place]  [address] [address]  Phone: [phone] Fax: [phone]    [address] [address]  Phone: [phone] Fax: [phone]    Primary Care Provider: Julienne Tucker DO    Primary Insurance: [facility] REP  Secondary Insurance: [facility]    DISCHARGE DETAILS:                                                                                                               Facility Insurance Auth Number: [id number]

## 2024-12-16 ENCOUNTER — TELEPHONE (OUTPATIENT)
Dept: MAMMOGRAPHY | Facility: CLINIC | Age: 74
End: 2024-12-16

## 2024-12-16 NOTE — TELEPHONE ENCOUNTER
m rec'd from patient - Yes, this is Claire Doherty. Bessy, [phone]. I'm about 3 months later in my biopsy. I was in the hospital and then in the nursing home and then I got covered and I was back in the hospital. Anyhow, I'm well enough now to actually come and get my breast biopsy and you've given me your number a couple months ago, so I'm trying to get it scheduled as soon as possible. OK, thank you. Bye.      Multiple attempts made to contact patient and message states that pt phone is not in service, will send patient My Chart message to attempt to schedule

## 2024-12-18 NOTE — PROGRESS NOTES
Call rec'd from facility regarding recommendation for;    ___X__ RIGHT ______LEFT      __X___Ultrasound guided  ______Stereotactic breast biopsy.    Reviewed clip placement with patient, pt states understanding: Yes: ____ No: ____  Comments:    Blood thinners: No: __X___ Yes: _____ What:     Biopsy teaching sheet given:  _____yes __X__no (All teaching points discussed during call, pt with no questions at this time, pt adv to arrive at 1330 for 1400 biopsy)    Nurse given name/# for any further questions/needs

## 2024-12-18 NOTE — PROGRESS NOTES
Call received from facility regarding recommendation for;    ___X__ RIGHT ______LEFT      __X___Ultrasound guided  ______Stereotactic breast biopsy.    Blood thinners: No: __X___ Yes: _____ What:     Biopsy teaching sheet given:  _____yes __X__no (All teaching points discussed during call, pt with no questions at this time, pt adv to arrive at 1330 for 1400 biopsy)    Facility given name/# for any further questions/needs

## 2024-12-27 ENCOUNTER — HOSPITAL ENCOUNTER (OUTPATIENT)
Dept: MRI IMAGING | Facility: HOSPITAL | Age: 74
Discharge: HOME | End: 2024-12-27

## 2024-12-27 DIAGNOSIS — N28.1 RENAL CYST: ICD-10-CM

## 2024-12-27 DIAGNOSIS — R93.2 ABNORMAL LIVER CT: ICD-10-CM

## 2025-01-01 ENCOUNTER — APPOINTMENT (INPATIENT)
Dept: RADIOLOGY | Facility: HOSPITAL | Age: 75
DRG: 871 | End: 2025-01-01
Payer: COMMERCIAL

## 2025-01-01 ENCOUNTER — HOSPITAL ENCOUNTER (INPATIENT)
Facility: HOSPITAL | Age: 75
LOS: 7 days | DRG: 871 | End: 2025-01-12
Attending: INTERNAL MEDICINE | Admitting: INTERNAL MEDICINE
Payer: COMMERCIAL

## 2025-01-01 ENCOUNTER — APPOINTMENT (EMERGENCY)
Dept: CT IMAGING | Facility: HOSPITAL | Age: 75
End: 2025-01-01
Payer: COMMERCIAL

## 2025-01-01 ENCOUNTER — HOSPITAL ENCOUNTER (EMERGENCY)
Facility: HOSPITAL | Age: 75
End: 2025-01-05
Attending: EMERGENCY MEDICINE
Payer: COMMERCIAL

## 2025-01-01 ENCOUNTER — TELEPHONE (OUTPATIENT)
Dept: FAMILY MEDICINE CLINIC | Facility: CLINIC | Age: 75
End: 2025-01-01

## 2025-01-01 ENCOUNTER — HOSPITAL ENCOUNTER (OUTPATIENT)
Dept: ULTRASOUND IMAGING | Facility: HOSPITAL | Age: 75
Discharge: HOME | End: 2025-01-09

## 2025-01-01 ENCOUNTER — APPOINTMENT (EMERGENCY)
Dept: RADIOLOGY | Facility: HOSPITAL | Age: 75
End: 2025-01-01
Payer: COMMERCIAL

## 2025-01-01 VITALS
HEART RATE: 103 BPM | TEMPERATURE: 98.3 F | RESPIRATION RATE: 21 BRPM | BODY MASS INDEX: 31.38 KG/M2 | WEIGHT: 159.83 LBS | SYSTOLIC BLOOD PRESSURE: 202 MMHG | HEIGHT: 60 IN | OXYGEN SATURATION: 95 % | DIASTOLIC BLOOD PRESSURE: 89 MMHG

## 2025-01-01 VITALS
OXYGEN SATURATION: 94 % | HEIGHT: 60 IN | HEART RATE: 104 BPM | SYSTOLIC BLOOD PRESSURE: 117 MMHG | DIASTOLIC BLOOD PRESSURE: 66 MMHG | TEMPERATURE: 99.1 F | WEIGHT: 165.34 LBS | RESPIRATION RATE: 14 BRPM | BODY MASS INDEX: 32.46 KG/M2

## 2025-01-01 DIAGNOSIS — J98.11 ATELECTASIS OF RIGHT LUNG: ICD-10-CM

## 2025-01-01 DIAGNOSIS — J96.21 ACUTE ON CHRONIC RESPIRATORY FAILURE WITH HYPOXIA AND HYPERCAPNIA (HCC): ICD-10-CM

## 2025-01-01 DIAGNOSIS — J44.1 COPD EXACERBATION (HCC): Primary | ICD-10-CM

## 2025-01-01 DIAGNOSIS — M25.9 SHOULDER JOINT DYSFUNCTION: ICD-10-CM

## 2025-01-01 DIAGNOSIS — M25.512 CHRONIC LEFT SHOULDER PAIN: ICD-10-CM

## 2025-01-01 DIAGNOSIS — G89.29 CHRONIC LEFT SHOULDER PAIN: ICD-10-CM

## 2025-01-01 DIAGNOSIS — J21.0 RSV (ACUTE BRONCHIOLITIS DUE TO RESPIRATORY SYNCYTIAL VIRUS): ICD-10-CM

## 2025-01-01 DIAGNOSIS — R07.9 CHEST PAIN: ICD-10-CM

## 2025-01-01 DIAGNOSIS — J96.22 ACUTE ON CHRONIC RESPIRATORY FAILURE WITH HYPOXIA AND HYPERCAPNIA (HCC): ICD-10-CM

## 2025-01-01 DIAGNOSIS — L03.115 CELLULITIS OF RIGHT FOOT: ICD-10-CM

## 2025-01-01 DIAGNOSIS — R06.02 SHORTNESS OF BREATH: ICD-10-CM

## 2025-01-01 DIAGNOSIS — E43 SEVERE PROTEIN-CALORIE MALNUTRITION (HCC): ICD-10-CM

## 2025-01-01 DIAGNOSIS — Z86.79 HISTORY OF PERICARDITIS: ICD-10-CM

## 2025-01-01 DIAGNOSIS — D64.9 ANEMIA: ICD-10-CM

## 2025-01-01 LAB
ALBUMIN SERPL BCG-MCNC: 3.6 G/DL (ref 3.5–5)
ALBUMIN SERPL BCG-MCNC: 3.6 G/DL (ref 3.5–5)
ALP SERPL-CCNC: 63 U/L (ref 34–104)
ALP SERPL-CCNC: 76 U/L (ref 34–104)
ALT SERPL W P-5'-P-CCNC: 16 U/L (ref 7–52)
ALT SERPL W P-5'-P-CCNC: 22 U/L (ref 7–52)
ANION GAP SERPL CALCULATED.3IONS-SCNC: 0 MMOL/L (ref 4–13)
ANION GAP SERPL CALCULATED.3IONS-SCNC: 11 MMOL/L (ref 4–13)
ANION GAP SERPL CALCULATED.3IONS-SCNC: 5 MMOL/L (ref 4–13)
ANION GAP SERPL CALCULATED.3IONS-SCNC: 8 MMOL/L (ref 4–13)
ANION GAP SERPL CALCULATED.3IONS-SCNC: 8 MMOL/L (ref 4–13)
ANION GAP SERPL CALCULATED.3IONS-SCNC: 9 MMOL/L (ref 4–13)
ANISOCYTOSIS BLD QL SMEAR: PRESENT
APTT PPP: 42 SECONDS (ref 23–34)
ARTERIAL PATENCY WRIST A: NO
AST SERPL W P-5'-P-CCNC: 17 U/L (ref 13–39)
AST SERPL W P-5'-P-CCNC: 23 U/L (ref 13–39)
ATRIAL RATE: 100 BPM
ATRIAL RATE: 102 BPM
B PARAP IS1001 DNA NPH QL NAA+NON-PROBE: NOT DETECTED
B PERT.PT PRMT NPH QL NAA+NON-PROBE: NOT DETECTED
BACTERIA BLD CULT: NORMAL
BACTERIA UR QL AUTO: NORMAL /HPF
BASE EX.OXY STD BLDV CALC-SCNC: 77.2 % (ref 60–80)
BASE EX.OXY STD BLDV CALC-SCNC: 91.1 % (ref 60–80)
BASE EX.OXY STD BLDV CALC-SCNC: 93.4 % (ref 60–80)
BASE EXCESS BLDA CALC-SCNC: 10 MMOL/L (ref -2–3)
BASE EXCESS BLDV CALC-SCNC: 3.9 MMOL/L
BASE EXCESS BLDV CALC-SCNC: 4.4 MMOL/L
BASE EXCESS BLDV CALC-SCNC: 7.4 MMOL/L
BASOPHILS # BLD AUTO: 0.01 THOUSANDS/ΜL (ref 0–0.1)
BASOPHILS # BLD AUTO: 0.05 THOUSANDS/ΜL (ref 0–0.1)
BASOPHILS # BLD AUTO: 0.06 THOUSANDS/ΜL (ref 0–0.1)
BASOPHILS # BLD MANUAL: 0 THOUSAND/UL (ref 0–0.1)
BASOPHILS NFR BLD AUTO: 0 % (ref 0–1)
BASOPHILS NFR MAR MANUAL: 0 % (ref 0–1)
BILIRUB SERPL-MCNC: 0.21 MG/DL (ref 0.2–1)
BILIRUB SERPL-MCNC: 0.23 MG/DL (ref 0.2–1)
BILIRUB UR QL STRIP: NEGATIVE
BNP SERPL-MCNC: 53 PG/ML (ref 0–100)
BODY TEMPERATURE: 97.6 DEGREES FEHRENHEIT
BUN SERPL-MCNC: 15 MG/DL (ref 5–25)
BUN SERPL-MCNC: 17 MG/DL (ref 5–25)
BUN SERPL-MCNC: 21 MG/DL (ref 5–25)
BUN SERPL-MCNC: 21 MG/DL (ref 5–25)
BUN SERPL-MCNC: 24 MG/DL (ref 5–25)
BUN SERPL-MCNC: 27 MG/DL (ref 5–25)
BUN SERPL-MCNC: 28 MG/DL (ref 5–25)
BUN SERPL-MCNC: 28 MG/DL (ref 5–25)
C PNEUM DNA NPH QL NAA+NON-PROBE: NOT DETECTED
CA-I BLD-SCNC: 0.99 MMOL/L (ref 1.12–1.32)
CA-I BLD-SCNC: 1.22 MMOL/L (ref 1.12–1.32)
CA-I BLD-SCNC: 1.22 MMOL/L (ref 1.12–1.32)
CA-I BLD-SCNC: 1.25 MMOL/L (ref 1.12–1.32)
CALCIUM SERPL-MCNC: 8.9 MG/DL (ref 8.4–10.2)
CALCIUM SERPL-MCNC: 8.9 MG/DL (ref 8.4–10.2)
CALCIUM SERPL-MCNC: 9.2 MG/DL (ref 8.4–10.2)
CALCIUM SERPL-MCNC: 9.2 MG/DL (ref 8.4–10.2)
CALCIUM SERPL-MCNC: 9.6 MG/DL (ref 8.4–10.2)
CALCIUM SERPL-MCNC: 9.7 MG/DL (ref 8.4–10.2)
CALCIUM SERPL-MCNC: 9.7 MG/DL (ref 8.4–10.2)
CALCIUM SERPL-MCNC: 9.9 MG/DL (ref 8.4–10.2)
CHLORIDE SERPL-SCNC: 91 MMOL/L (ref 96–108)
CHLORIDE SERPL-SCNC: 93 MMOL/L (ref 96–108)
CHLORIDE SERPL-SCNC: 94 MMOL/L (ref 96–108)
CHLORIDE SERPL-SCNC: 94 MMOL/L (ref 96–108)
CHLORIDE SERPL-SCNC: 95 MMOL/L (ref 96–108)
CHLORIDE SERPL-SCNC: 95 MMOL/L (ref 96–108)
CHLORIDE SERPL-SCNC: 96 MMOL/L (ref 96–108)
CHLORIDE SERPL-SCNC: 97 MMOL/L (ref 96–108)
CLARITY UR: CLEAR
CO2 SERPL-SCNC: 34 MMOL/L (ref 21–32)
CO2 SERPL-SCNC: 35 MMOL/L (ref 21–32)
CO2 SERPL-SCNC: 35 MMOL/L (ref 21–32)
CO2 SERPL-SCNC: 36 MMOL/L (ref 21–32)
CO2 SERPL-SCNC: 40 MMOL/L (ref 21–32)
CO2 SERPL-SCNC: 41 MMOL/L (ref 21–32)
CO2 SERPL-SCNC: >45 MMOL/L (ref 21–32)
CO2 SERPL-SCNC: >45 MMOL/L (ref 21–32)
COLOR UR: YELLOW
CREAT SERPL-MCNC: 0.24 MG/DL (ref 0.6–1.3)
CREAT SERPL-MCNC: 0.34 MG/DL (ref 0.6–1.3)
CREAT SERPL-MCNC: 0.35 MG/DL (ref 0.6–1.3)
CREAT SERPL-MCNC: 0.36 MG/DL (ref 0.6–1.3)
CREAT SERPL-MCNC: 0.42 MG/DL (ref 0.6–1.3)
CREAT SERPL-MCNC: 0.43 MG/DL (ref 0.6–1.3)
CREAT SERPL-MCNC: 0.46 MG/DL (ref 0.6–1.3)
CREAT SERPL-MCNC: 0.47 MG/DL (ref 0.6–1.3)
EOSINOPHIL # BLD AUTO: 0.01 THOUSAND/ΜL (ref 0–0.61)
EOSINOPHIL # BLD AUTO: 0.03 THOUSAND/ΜL (ref 0–0.61)
EOSINOPHIL # BLD AUTO: 0.13 THOUSAND/ΜL (ref 0–0.61)
EOSINOPHIL # BLD AUTO: 0.15 THOUSAND/ΜL (ref 0–0.61)
EOSINOPHIL # BLD AUTO: 0.38 THOUSAND/ΜL (ref 0–0.61)
EOSINOPHIL # BLD MANUAL: 0 THOUSAND/UL (ref 0–0.4)
EOSINOPHIL NFR BLD AUTO: 0 % (ref 0–6)
EOSINOPHIL NFR BLD AUTO: 0 % (ref 0–6)
EOSINOPHIL NFR BLD AUTO: 1 % (ref 0–6)
EOSINOPHIL NFR BLD MANUAL: 0 % (ref 0–6)
ERYTHROCYTE [DISTWIDTH] IN BLOOD BY AUTOMATED COUNT: 14.6 % (ref 11.6–15.1)
ERYTHROCYTE [DISTWIDTH] IN BLOOD BY AUTOMATED COUNT: 14.7 % (ref 11.6–15.1)
ERYTHROCYTE [DISTWIDTH] IN BLOOD BY AUTOMATED COUNT: 14.7 % (ref 11.6–15.1)
ERYTHROCYTE [DISTWIDTH] IN BLOOD BY AUTOMATED COUNT: 14.9 % (ref 11.6–15.1)
ERYTHROCYTE [DISTWIDTH] IN BLOOD BY AUTOMATED COUNT: 15 % (ref 11.6–15.1)
FERRITIN SERPL-MCNC: 18 NG/ML (ref 11–307)
FIO2 GAS DIL.REBREATH: 50 L
FLUAV AG UPPER RESP QL IA.RAPID: NEGATIVE
FLUAV RNA NPH QL NAA+NON-PROBE: NOT DETECTED
FLUBV AG UPPER RESP QL IA.RAPID: NEGATIVE
FLUBV RNA NPH QL NAA+NON-PROBE: NOT DETECTED
FOLATE SERPL-MCNC: 7.3 NG/ML
GFR SERPL CREATININE-BSD FRML MDRD: 100 ML/MIN/1.73SQ M
GFR SERPL CREATININE-BSD FRML MDRD: 101 ML/MIN/1.73SQ M
GFR SERPL CREATININE-BSD FRML MDRD: 106 ML/MIN/1.73SQ M
GFR SERPL CREATININE-BSD FRML MDRD: 107 ML/MIN/1.73SQ M
GFR SERPL CREATININE-BSD FRML MDRD: 108 ML/MIN/1.73SQ M
GFR SERPL CREATININE-BSD FRML MDRD: 121 ML/MIN/1.73SQ M
GFR SERPL CREATININE-BSD FRML MDRD: 97 ML/MIN/1.73SQ M
GFR SERPL CREATININE-BSD FRML MDRD: 98 ML/MIN/1.73SQ M
GLUCOSE SERPL-MCNC: 106 MG/DL (ref 65–140)
GLUCOSE SERPL-MCNC: 118 MG/DL (ref 65–140)
GLUCOSE SERPL-MCNC: 127 MG/DL (ref 65–140)
GLUCOSE SERPL-MCNC: 153 MG/DL (ref 65–140)
GLUCOSE SERPL-MCNC: 202 MG/DL (ref 65–140)
GLUCOSE SERPL-MCNC: 211 MG/DL (ref 65–140)
GLUCOSE SERPL-MCNC: 233 MG/DL (ref 65–140)
GLUCOSE SERPL-MCNC: 87 MG/DL (ref 65–140)
GLUCOSE SERPL-MCNC: 88 MG/DL (ref 65–140)
GLUCOSE SERPL-MCNC: 91 MG/DL (ref 65–140)
GLUCOSE UR STRIP-MCNC: NEGATIVE MG/DL
HADV DNA NPH QL NAA+NON-PROBE: NOT DETECTED
HCO3 BLDA-SCNC: 37.4 MMOL/L (ref 22–28)
HCO3 BLDV-SCNC: 32.6 MMOL/L (ref 24–30)
HCO3 BLDV-SCNC: 33.5 MMOL/L (ref 24–30)
HCO3 BLDV-SCNC: 33.6 MMOL/L (ref 24–30)
HCOV 229E RNA NPH QL NAA+NON-PROBE: NOT DETECTED
HCOV HKU1 RNA NPH QL NAA+NON-PROBE: NOT DETECTED
HCOV NL63 RNA NPH QL NAA+NON-PROBE: NOT DETECTED
HCOV OC43 RNA NPH QL NAA+NON-PROBE: NOT DETECTED
HCT VFR BLD AUTO: 26.9 % (ref 34.8–46.1)
HCT VFR BLD AUTO: 27.7 % (ref 34.8–46.1)
HCT VFR BLD AUTO: 29.8 % (ref 34.8–46.1)
HCT VFR BLD AUTO: 31.8 % (ref 34.8–46.1)
HCT VFR BLD AUTO: 32.8 % (ref 34.8–46.1)
HCT VFR BLD AUTO: 33.1 % (ref 34.8–46.1)
HCT VFR BLD AUTO: 36.3 % (ref 34.8–46.1)
HCT VFR BLD CALC: 28 % (ref 34.8–46.1)
HCT VFR BLD CALC: 34 % (ref 34.8–46.1)
HGB BLD-MCNC: 10 G/DL (ref 11.5–15.4)
HGB BLD-MCNC: 10.6 G/DL (ref 11.5–15.4)
HGB BLD-MCNC: 7.9 G/DL (ref 11.5–15.4)
HGB BLD-MCNC: 8.2 G/DL (ref 11.5–15.4)
HGB BLD-MCNC: 9.1 G/DL (ref 11.5–15.4)
HGB BLD-MCNC: 9.7 G/DL (ref 11.5–15.4)
HGB BLD-MCNC: 9.8 G/DL (ref 11.5–15.4)
HGB BLDA-MCNC: 11.6 G/DL (ref 11.5–15.4)
HGB BLDA-MCNC: 9.5 G/DL (ref 11.5–15.4)
HGB UR QL STRIP.AUTO: ABNORMAL
HMPV RNA NPH QL NAA+NON-PROBE: NOT DETECTED
HPIV1 RNA NPH QL NAA+NON-PROBE: NOT DETECTED
HPIV2 RNA NPH QL NAA+NON-PROBE: NOT DETECTED
HPIV3 RNA NPH QL NAA+NON-PROBE: NOT DETECTED
HPIV4 RNA NPH QL NAA+NON-PROBE: NOT DETECTED
HYPERCHROMIA BLD QL SMEAR: PRESENT
IMM GRANULOCYTES # BLD AUTO: 0.05 THOUSAND/UL (ref 0–0.2)
IMM GRANULOCYTES # BLD AUTO: 0.06 THOUSAND/UL (ref 0–0.2)
IMM GRANULOCYTES # BLD AUTO: 0.07 THOUSAND/UL (ref 0–0.2)
IMM GRANULOCYTES # BLD AUTO: 0.08 THOUSAND/UL (ref 0–0.2)
IMM GRANULOCYTES # BLD AUTO: 0.26 THOUSAND/UL (ref 0–0.2)
IMM GRANULOCYTES NFR BLD AUTO: 0 % (ref 0–2)
IMM GRANULOCYTES NFR BLD AUTO: 1 % (ref 0–2)
INR PPP: 1.28 (ref 0.85–1.19)
IPAP: 15
IRON SATN MFR SERPL: 4 % (ref 15–50)
IRON SERPL-MCNC: 12 UG/DL (ref 50–212)
KETONES UR STRIP-MCNC: NEGATIVE MG/DL
L PNEUMO1 AG UR QL IA.RAPID: NEGATIVE
L PNEUMO1 AG UR QL IA.RAPID: NEGATIVE
LACTATE SERPL-SCNC: 0.4 MMOL/L (ref 0.5–2)
LACTATE SERPL-SCNC: 0.9 MMOL/L (ref 0.5–2)
LACTATE SERPL-SCNC: 5.2 MMOL/L (ref 0.5–2)
LEUKOCYTE ESTERASE UR QL STRIP: NEGATIVE
LYMPHOCYTES # BLD AUTO: 0.79 THOUSANDS/ΜL (ref 0.6–4.47)
LYMPHOCYTES # BLD AUTO: 0.95 THOUSANDS/ΜL (ref 0.6–4.47)
LYMPHOCYTES # BLD AUTO: 1.08 THOUSAND/UL (ref 0.6–4.47)
LYMPHOCYTES # BLD AUTO: 1.09 THOUSANDS/ΜL (ref 0.6–4.47)
LYMPHOCYTES # BLD AUTO: 10.07 THOUSANDS/ΜL (ref 0.6–4.47)
LYMPHOCYTES # BLD AUTO: 12 % (ref 14–44)
LYMPHOCYTES # BLD AUTO: 4.92 THOUSANDS/ΜL (ref 0.6–4.47)
LYMPHOCYTES NFR BLD AUTO: 10 % (ref 14–44)
LYMPHOCYTES NFR BLD AUTO: 31 % (ref 14–44)
LYMPHOCYTES NFR BLD AUTO: 31 % (ref 14–44)
LYMPHOCYTES NFR BLD AUTO: 7 % (ref 14–44)
LYMPHOCYTES NFR BLD AUTO: 8 % (ref 14–44)
M PNEUMO DNA NPH QL NAA+NON-PROBE: NOT DETECTED
MAGNESIUM SERPL-MCNC: 1.9 MG/DL (ref 1.9–2.7)
MAGNESIUM SERPL-MCNC: 2 MG/DL (ref 1.9–2.7)
MCH RBC QN AUTO: 26.4 PG (ref 26.8–34.3)
MCH RBC QN AUTO: 26.4 PG (ref 26.8–34.3)
MCH RBC QN AUTO: 26.5 PG (ref 26.8–34.3)
MCH RBC QN AUTO: 26.7 PG (ref 26.8–34.3)
MCH RBC QN AUTO: 26.9 PG (ref 26.8–34.3)
MCH RBC QN AUTO: 27 PG (ref 26.8–34.3)
MCH RBC QN AUTO: 27.2 PG (ref 26.8–34.3)
MCHC RBC AUTO-ENTMCNC: 29.2 G/DL (ref 31.4–37.4)
MCHC RBC AUTO-ENTMCNC: 29.4 G/DL (ref 31.4–37.4)
MCHC RBC AUTO-ENTMCNC: 29.6 G/DL (ref 31.4–37.4)
MCHC RBC AUTO-ENTMCNC: 29.6 G/DL (ref 31.4–37.4)
MCHC RBC AUTO-ENTMCNC: 30.2 G/DL (ref 31.4–37.4)
MCHC RBC AUTO-ENTMCNC: 30.5 G/DL (ref 31.4–37.4)
MCHC RBC AUTO-ENTMCNC: 30.8 G/DL (ref 31.4–37.4)
MCV RBC AUTO: 87 FL (ref 82–98)
MCV RBC AUTO: 88 FL (ref 82–98)
MCV RBC AUTO: 88 FL (ref 82–98)
MCV RBC AUTO: 89 FL (ref 82–98)
MCV RBC AUTO: 90 FL (ref 82–98)
MCV RBC AUTO: 91 FL (ref 82–98)
MCV RBC AUTO: 93 FL (ref 82–98)
MONOCYTES # BLD AUTO: 0.18 THOUSAND/UL (ref 0–1.22)
MONOCYTES # BLD AUTO: 0.37 THOUSAND/ΜL (ref 0.17–1.22)
MONOCYTES # BLD AUTO: 1.13 THOUSAND/ΜL (ref 0.17–1.22)
MONOCYTES # BLD AUTO: 1.23 THOUSAND/ΜL (ref 0.17–1.22)
MONOCYTES # BLD AUTO: 1.61 THOUSAND/ΜL (ref 0.17–1.22)
MONOCYTES # BLD AUTO: 2.23 THOUSAND/ΜL (ref 0.17–1.22)
MONOCYTES NFR BLD AUTO: 10 % (ref 4–12)
MONOCYTES NFR BLD AUTO: 12 % (ref 4–12)
MONOCYTES NFR BLD AUTO: 3 % (ref 4–12)
MONOCYTES NFR BLD AUTO: 7 % (ref 4–12)
MONOCYTES NFR BLD AUTO: 9 % (ref 4–12)
MONOCYTES NFR BLD: 2 % (ref 4–12)
MRSA NOSE QL CULT: NORMAL
MRSA NOSE QL CULT: NORMAL
NEUTROPHILS # BLD AUTO: 10.44 THOUSANDS/ΜL (ref 1.85–7.62)
NEUTROPHILS # BLD AUTO: 19.94 THOUSANDS/ΜL (ref 1.85–7.62)
NEUTROPHILS # BLD AUTO: 7.14 THOUSANDS/ΜL (ref 1.85–7.62)
NEUTROPHILS # BLD AUTO: 8.96 THOUSANDS/ΜL (ref 1.85–7.62)
NEUTROPHILS # BLD AUTO: 9.68 THOUSANDS/ΜL (ref 1.85–7.62)
NEUTROPHILS # BLD MANUAL: 7.71 THOUSAND/UL (ref 1.85–7.62)
NEUTS BAND NFR BLD MANUAL: 1 % (ref 0–8)
NEUTS SEG NFR BLD AUTO: 58 % (ref 43–75)
NEUTS SEG NFR BLD AUTO: 60 % (ref 43–75)
NEUTS SEG NFR BLD AUTO: 77 % (ref 43–75)
NEUTS SEG NFR BLD AUTO: 81 % (ref 43–75)
NEUTS SEG NFR BLD AUTO: 85 % (ref 43–75)
NEUTS SEG NFR BLD AUTO: 89 % (ref 43–75)
NITRITE UR QL STRIP: NEGATIVE
NON VENT- BIPAP: ABNORMAL
NON VENT- BIPAP: ABNORMAL
NON-SQ EPI CELLS URNS QL MICRO: NORMAL /HPF
NRBC BLD AUTO-RTO: 0 /100 WBCS
O2 CT BLDV-SCNC: 11.6 ML/DL
O2 CT BLDV-SCNC: 14 ML/DL
O2 CT BLDV-SCNC: 9.6 ML/DL
P AXIS: -2 DEGREES
P AXIS: 48 DEGREES
PCO2 BLD: 39 MMOL/L (ref 21–32)
PCO2 BLD: 66.9 MM HG (ref 36–44)
PCO2 BLD: >103 MM HG (ref 36–44)
PCO2 BLDV: 50 MM HG (ref 42–50)
PCO2 BLDV: 83.7 MM HG (ref 42–50)
PCO2 BLDV: 84.1 MM HG (ref 42–50)
PEEP MAX SETTING VENT: 7 CM[H2O]
PH BLD: 7.01 [PH] (ref 7.35–7.45)
PH BLD: 7.36 [PH] (ref 7.35–7.45)
PH BLDV: 7.22 [PH] (ref 7.3–7.4)
PH BLDV: 7.22 [PH] (ref 7.3–7.4)
PH BLDV: 7.43 [PH] (ref 7.3–7.4)
PH UR STRIP.AUTO: 6 [PH]
PHOSPHATE SERPL-MCNC: 2.8 MG/DL (ref 2.3–4.1)
PHOSPHATE SERPL-MCNC: 3 MG/DL (ref 2.3–4.1)
PHOSPHATE SERPL-MCNC: 3.1 MG/DL (ref 2.3–4.1)
PHOSPHATE SERPL-MCNC: 3.8 MG/DL (ref 2.3–4.1)
PHOSPHATE SERPL-MCNC: 4.8 MG/DL (ref 2.3–4.1)
PLATELET # BLD AUTO: 521 THOUSANDS/UL (ref 149–390)
PLATELET # BLD AUTO: 525 THOUSANDS/UL (ref 149–390)
PLATELET # BLD AUTO: 610 THOUSANDS/UL (ref 149–390)
PLATELET # BLD AUTO: 617 THOUSANDS/UL (ref 149–390)
PLATELET # BLD AUTO: 624 THOUSANDS/UL (ref 149–390)
PLATELET # BLD AUTO: 641 THOUSANDS/UL (ref 149–390)
PLATELET # BLD AUTO: 653 THOUSANDS/UL (ref 149–390)
PLATELET BLD QL SMEAR: ABNORMAL
PMV BLD AUTO: 10 FL (ref 8.9–12.7)
PMV BLD AUTO: 10.1 FL (ref 8.9–12.7)
PMV BLD AUTO: 10.2 FL (ref 8.9–12.7)
PMV BLD AUTO: 10.3 FL (ref 8.9–12.7)
PMV BLD AUTO: 10.4 FL (ref 8.9–12.7)
PO2 BLD: 61 MM HG (ref 75–129)
PO2 BLD: 94 MM HG (ref 75–129)
PO2 BLDV: 188.1 MM HG (ref 35–45)
PO2 BLDV: 46.7 MM HG (ref 35–45)
PO2 BLDV: 72.6 MM HG (ref 35–45)
POTASSIUM BLD-SCNC: 4.4 MMOL/L (ref 3.5–5.3)
POTASSIUM BLD-SCNC: 4.9 MMOL/L (ref 3.5–5.3)
POTASSIUM SERPL-SCNC: 3.5 MMOL/L (ref 3.5–5.3)
POTASSIUM SERPL-SCNC: 3.8 MMOL/L (ref 3.5–5.3)
POTASSIUM SERPL-SCNC: 3.9 MMOL/L (ref 3.5–5.3)
POTASSIUM SERPL-SCNC: 3.9 MMOL/L (ref 3.5–5.3)
POTASSIUM SERPL-SCNC: 4.4 MMOL/L (ref 3.5–5.3)
POTASSIUM SERPL-SCNC: 4.6 MMOL/L (ref 3.5–5.3)
POTASSIUM SERPL-SCNC: 4.9 MMOL/L (ref 3.5–5.3)
POTASSIUM SERPL-SCNC: 5.4 MMOL/L (ref 3.5–5.3)
PR INTERVAL: 120 MS
PR INTERVAL: 142 MS
PROCALCITONIN SERPL-MCNC: 0.1 NG/ML
PROCALCITONIN SERPL-MCNC: 0.12 NG/ML
PROCALCITONIN SERPL-MCNC: 0.12 NG/ML
PROCALCITONIN SERPL-MCNC: <0.05 NG/ML
PROT SERPL-MCNC: 6.3 G/DL (ref 6.4–8.4)
PROT SERPL-MCNC: 6.7 G/DL (ref 6.4–8.4)
PROT UR STRIP-MCNC: NEGATIVE MG/DL
PROTHROMBIN TIME: 16.5 SECONDS (ref 12.3–15)
QRS AXIS: 22 DEGREES
QRS AXIS: 59 DEGREES
QRSD INTERVAL: 66 MS
QRSD INTERVAL: 68 MS
QT INTERVAL: 326 MS
QT INTERVAL: 340 MS
QTC INTERVAL: 424 MS
QTC INTERVAL: 438 MS
RBC # BLD AUTO: 2.99 MILLION/UL (ref 3.81–5.12)
RBC # BLD AUTO: 3.05 MILLION/UL (ref 3.81–5.12)
RBC # BLD AUTO: 3.43 MILLION/UL (ref 3.81–5.12)
RBC # BLD AUTO: 3.63 MILLION/UL (ref 3.81–5.12)
RBC # BLD AUTO: 3.68 MILLION/UL (ref 3.81–5.12)
RBC # BLD AUTO: 3.75 MILLION/UL (ref 3.81–5.12)
RBC # BLD AUTO: 3.9 MILLION/UL (ref 3.81–5.12)
RBC #/AREA URNS AUTO: NORMAL /HPF
RBC MORPH BLD: PRESENT
RSV RNA NPH QL NAA+NON-PROBE: DETECTED
RV+EV RNA NPH QL NAA+NON-PROBE: NOT DETECTED
S PNEUM AG UR QL: NEGATIVE
S PNEUM AG UR QL: NEGATIVE
SAO2 % BLD FROM PO2: 97 % (ref 60–85)
SARS-COV+SARS-COV-2 AG RESP QL IA.RAPID: NEGATIVE
SARS-COV-2 RNA NPH QL NAA+NON-PROBE: NOT DETECTED
SODIUM BLD-SCNC: 134 MMOL/L (ref 136–145)
SODIUM BLD-SCNC: 144 MMOL/L (ref 136–145)
SODIUM SERPL-SCNC: 134 MMOL/L (ref 135–147)
SODIUM SERPL-SCNC: 136 MMOL/L (ref 135–147)
SODIUM SERPL-SCNC: 137 MMOL/L (ref 135–147)
SODIUM SERPL-SCNC: 139 MMOL/L (ref 135–147)
SODIUM SERPL-SCNC: 140 MMOL/L (ref 135–147)
SODIUM SERPL-SCNC: 143 MMOL/L (ref 135–147)
SODIUM SERPL-SCNC: 144 MMOL/L (ref 135–147)
SODIUM SERPL-SCNC: 145 MMOL/L (ref 135–147)
SP GR UR STRIP.AUTO: >=1.03
SPECIMEN SOURCE: ABNORMAL
SPECIMEN SOURCE: ABNORMAL
T WAVE AXIS: 67 DEGREES
T WAVE AXIS: 91 DEGREES
TIBC SERPL-MCNC: 334.6 UG/DL (ref 250–450)
TRANSFERRIN SERPL-MCNC: 239 MG/DL (ref 203–362)
UIBC SERPL-MCNC: 323 UG/DL (ref 155–355)
UROBILINOGEN UR QL STRIP.AUTO: 0.2 E.U./DL
VENT BIPAP FIO2: 40 %
VENTRICULAR RATE: 100 BPM
VENTRICULAR RATE: 102 BPM
VIT B12 SERPL-MCNC: 484 PG/ML (ref 180–914)
WBC # BLD AUTO: 10.83 THOUSAND/UL (ref 4.31–10.16)
WBC # BLD AUTO: 10.91 THOUSAND/UL (ref 4.31–10.16)
WBC # BLD AUTO: 13.03 THOUSAND/UL (ref 4.31–10.16)
WBC # BLD AUTO: 15.69 THOUSAND/UL (ref 4.31–10.16)
WBC # BLD AUTO: 32.94 THOUSAND/UL (ref 4.31–10.16)
WBC # BLD AUTO: 8.96 THOUSAND/UL (ref 4.31–10.16)
WBC # BLD AUTO: 9.34 THOUSAND/UL (ref 4.31–10.16)
WBC #/AREA URNS AUTO: NORMAL /HPF

## 2025-01-01 PROCEDURE — 83550 IRON BINDING TEST: CPT | Performed by: NURSE PRACTITIONER

## 2025-01-01 PROCEDURE — 84100 ASSAY OF PHOSPHORUS: CPT | Performed by: NURSE PRACTITIONER

## 2025-01-01 PROCEDURE — 82803 BLOOD GASES ANY COMBINATION: CPT

## 2025-01-01 PROCEDURE — 80048 BASIC METABOLIC PNL TOTAL CA: CPT | Performed by: NURSE PRACTITIONER

## 2025-01-01 PROCEDURE — 92526 ORAL FUNCTION THERAPY: CPT

## 2025-01-01 PROCEDURE — 94640 AIRWAY INHALATION TREATMENT: CPT

## 2025-01-01 PROCEDURE — 93010 ELECTROCARDIOGRAM REPORT: CPT | Performed by: INTERNAL MEDICINE

## 2025-01-01 PROCEDURE — 82330 ASSAY OF CALCIUM: CPT

## 2025-01-01 PROCEDURE — 96375 TX/PRO/DX INJ NEW DRUG ADDON: CPT

## 2025-01-01 PROCEDURE — 87449 NOS EACH ORGANISM AG IA: CPT | Performed by: NURSE PRACTITIONER

## 2025-01-01 PROCEDURE — 85025 COMPLETE CBC W/AUTO DIFF WBC: CPT | Performed by: EMERGENCY MEDICINE

## 2025-01-01 PROCEDURE — 94664 DEMO&/EVAL PT USE INHALER: CPT

## 2025-01-01 PROCEDURE — 94760 N-INVAS EAR/PLS OXIMETRY 1: CPT

## 2025-01-01 PROCEDURE — 99232 SBSQ HOSP IP/OBS MODERATE 35: CPT | Performed by: INTERNAL MEDICINE

## 2025-01-01 PROCEDURE — 0202U NFCT DS 22 TRGT SARS-COV-2: CPT | Performed by: NURSE PRACTITIONER

## 2025-01-01 PROCEDURE — 87040 BLOOD CULTURE FOR BACTERIA: CPT | Performed by: EMERGENCY MEDICINE

## 2025-01-01 PROCEDURE — 82805 BLOOD GASES W/O2 SATURATION: CPT | Performed by: EMERGENCY MEDICINE

## 2025-01-01 PROCEDURE — 97167 OT EVAL HIGH COMPLEX 60 MIN: CPT

## 2025-01-01 PROCEDURE — 84295 ASSAY OF SERUM SODIUM: CPT

## 2025-01-01 PROCEDURE — 83880 ASSAY OF NATRIURETIC PEPTIDE: CPT | Performed by: EMERGENCY MEDICINE

## 2025-01-01 PROCEDURE — 94003 VENT MGMT INPAT SUBQ DAY: CPT

## 2025-01-01 PROCEDURE — 83735 ASSAY OF MAGNESIUM: CPT

## 2025-01-01 PROCEDURE — 97110 THERAPEUTIC EXERCISES: CPT

## 2025-01-01 PROCEDURE — 83605 ASSAY OF LACTIC ACID: CPT | Performed by: EMERGENCY MEDICINE

## 2025-01-01 PROCEDURE — 94002 VENT MGMT INPAT INIT DAY: CPT

## 2025-01-01 PROCEDURE — 82746 ASSAY OF FOLIC ACID SERUM: CPT | Performed by: NURSE PRACTITIONER

## 2025-01-01 PROCEDURE — 99223 1ST HOSP IP/OBS HIGH 75: CPT | Performed by: INTERNAL MEDICINE

## 2025-01-01 PROCEDURE — 83605 ASSAY OF LACTIC ACID: CPT | Performed by: NURSE PRACTITIONER

## 2025-01-01 PROCEDURE — 74230 X-RAY XM SWLNG FUNCJ C+: CPT

## 2025-01-01 PROCEDURE — 87081 CULTURE SCREEN ONLY: CPT | Performed by: NURSE PRACTITIONER

## 2025-01-01 PROCEDURE — 99285 EMERGENCY DEPT VISIT HI MDM: CPT

## 2025-01-01 PROCEDURE — 85610 PROTHROMBIN TIME: CPT | Performed by: EMERGENCY MEDICINE

## 2025-01-01 PROCEDURE — 99291 CRITICAL CARE FIRST HOUR: CPT | Performed by: EMERGENCY MEDICINE

## 2025-01-01 PROCEDURE — 99223 1ST HOSP IP/OBS HIGH 75: CPT | Performed by: NURSE PRACTITIONER

## 2025-01-01 PROCEDURE — 82607 VITAMIN B-12: CPT | Performed by: NURSE PRACTITIONER

## 2025-01-01 PROCEDURE — 83540 ASSAY OF IRON: CPT | Performed by: NURSE PRACTITIONER

## 2025-01-01 PROCEDURE — 84145 PROCALCITONIN (PCT): CPT | Performed by: INTERNAL MEDICINE

## 2025-01-01 PROCEDURE — 84132 ASSAY OF SERUM POTASSIUM: CPT

## 2025-01-01 PROCEDURE — 85730 THROMBOPLASTIN TIME PARTIAL: CPT | Performed by: EMERGENCY MEDICINE

## 2025-01-01 PROCEDURE — 83735 ASSAY OF MAGNESIUM: CPT | Performed by: NURSE PRACTITIONER

## 2025-01-01 PROCEDURE — 80048 BASIC METABOLIC PNL TOTAL CA: CPT | Performed by: INTERNAL MEDICINE

## 2025-01-01 PROCEDURE — 85025 COMPLETE CBC W/AUTO DIFF WBC: CPT | Performed by: INTERNAL MEDICINE

## 2025-01-01 PROCEDURE — 71045 X-RAY EXAM CHEST 1 VIEW: CPT

## 2025-01-01 PROCEDURE — 85007 BL SMEAR W/DIFF WBC COUNT: CPT | Performed by: NURSE PRACTITIONER

## 2025-01-01 PROCEDURE — 94668 MNPJ CHEST WALL SBSQ: CPT

## 2025-01-01 PROCEDURE — 85014 HEMATOCRIT: CPT

## 2025-01-01 PROCEDURE — 94669 MECHANICAL CHEST WALL OSCILL: CPT

## 2025-01-01 PROCEDURE — 82330 ASSAY OF CALCIUM: CPT | Performed by: NURSE PRACTITIONER

## 2025-01-01 PROCEDURE — 82947 ASSAY GLUCOSE BLOOD QUANT: CPT

## 2025-01-01 PROCEDURE — 82728 ASSAY OF FERRITIN: CPT | Performed by: NURSE PRACTITIONER

## 2025-01-01 PROCEDURE — 36600 WITHDRAWAL OF ARTERIAL BLOOD: CPT

## 2025-01-01 PROCEDURE — 87081 CULTURE SCREEN ONLY: CPT | Performed by: INTERNAL MEDICINE

## 2025-01-01 PROCEDURE — 94660 CPAP INITIATION&MGMT: CPT

## 2025-01-01 PROCEDURE — 85027 COMPLETE CBC AUTOMATED: CPT

## 2025-01-01 PROCEDURE — 80048 BASIC METABOLIC PNL TOTAL CA: CPT

## 2025-01-01 PROCEDURE — 87449 NOS EACH ORGANISM AG IA: CPT | Performed by: INTERNAL MEDICINE

## 2025-01-01 PROCEDURE — 99233 SBSQ HOSP IP/OBS HIGH 50: CPT | Performed by: EMERGENCY MEDICINE

## 2025-01-01 PROCEDURE — 36415 COLL VENOUS BLD VENIPUNCTURE: CPT | Performed by: EMERGENCY MEDICINE

## 2025-01-01 PROCEDURE — 94644 CONT INHLJ TX 1ST HOUR: CPT

## 2025-01-01 PROCEDURE — 84100 ASSAY OF PHOSPHORUS: CPT

## 2025-01-01 PROCEDURE — 97530 THERAPEUTIC ACTIVITIES: CPT

## 2025-01-01 PROCEDURE — 82805 BLOOD GASES W/O2 SATURATION: CPT | Performed by: NURSE PRACTITIONER

## 2025-01-01 PROCEDURE — 87804 INFLUENZA ASSAY W/OPTIC: CPT | Performed by: EMERGENCY MEDICINE

## 2025-01-01 PROCEDURE — 85025 COMPLETE CBC W/AUTO DIFF WBC: CPT | Performed by: NURSE PRACTITIONER

## 2025-01-01 PROCEDURE — 85027 COMPLETE CBC AUTOMATED: CPT | Performed by: NURSE PRACTITIONER

## 2025-01-01 PROCEDURE — 80053 COMPREHEN METABOLIC PANEL: CPT | Performed by: NURSE PRACTITIONER

## 2025-01-01 PROCEDURE — 96367 TX/PROPH/DG ADDL SEQ IV INF: CPT

## 2025-01-01 PROCEDURE — 92611 MOTION FLUOROSCOPY/SWALLOW: CPT

## 2025-01-01 PROCEDURE — 71250 CT THORAX DX C-: CPT

## 2025-01-01 PROCEDURE — 84145 PROCALCITONIN (PCT): CPT | Performed by: EMERGENCY MEDICINE

## 2025-01-01 PROCEDURE — 80053 COMPREHEN METABOLIC PANEL: CPT | Performed by: EMERGENCY MEDICINE

## 2025-01-01 PROCEDURE — 93005 ELECTROCARDIOGRAM TRACING: CPT

## 2025-01-01 PROCEDURE — 87040 BLOOD CULTURE FOR BACTERIA: CPT | Performed by: NURSE PRACTITIONER

## 2025-01-01 PROCEDURE — 81001 URINALYSIS AUTO W/SCOPE: CPT | Performed by: INTERNAL MEDICINE

## 2025-01-01 PROCEDURE — 84145 PROCALCITONIN (PCT): CPT | Performed by: NURSE PRACTITIONER

## 2025-01-01 PROCEDURE — NC001 PR NO CHARGE: Performed by: NURSE PRACTITIONER

## 2025-01-01 PROCEDURE — 96365 THER/PROPH/DIAG IV INF INIT: CPT

## 2025-01-01 PROCEDURE — 87811 SARS-COV-2 COVID19 W/OPTIC: CPT | Performed by: EMERGENCY MEDICINE

## 2025-01-01 PROCEDURE — 97163 PT EVAL HIGH COMPLEX 45 MIN: CPT

## 2025-01-01 PROCEDURE — 92610 EVALUATE SWALLOWING FUNCTION: CPT

## 2025-01-01 RX ORDER — ONDANSETRON 2 MG/ML
4 INJECTION INTRAMUSCULAR; INTRAVENOUS EVERY 6 HOURS PRN
Status: DISCONTINUED | OUTPATIENT
Start: 2025-01-01 | End: 2025-01-01

## 2025-01-01 RX ORDER — METHYLPREDNISOLONE SODIUM SUCCINATE 40 MG/ML
40 INJECTION, POWDER, LYOPHILIZED, FOR SOLUTION INTRAMUSCULAR; INTRAVENOUS EVERY 8 HOURS SCHEDULED
Status: DISCONTINUED | OUTPATIENT
Start: 2025-01-01 | End: 2025-01-01

## 2025-01-01 RX ORDER — LORAZEPAM 2 MG/ML
2 INJECTION INTRAMUSCULAR
Status: DISCONTINUED | OUTPATIENT
Start: 2025-01-01 | End: 2025-01-01 | Stop reason: HOSPADM

## 2025-01-01 RX ORDER — BUDESONIDE 0.5 MG/2ML
0.5 INHALANT ORAL
Status: DISCONTINUED | OUTPATIENT
Start: 2025-01-01 | End: 2025-01-01

## 2025-01-01 RX ORDER — ALBUTEROL SULFATE 0.83 MG/ML
2.5 SOLUTION RESPIRATORY (INHALATION)
Status: DISCONTINUED | OUTPATIENT
Start: 2025-01-01 | End: 2025-01-01 | Stop reason: HOSPADM

## 2025-01-01 RX ORDER — ECHINACEA PURPUREA EXTRACT 125 MG
1 TABLET ORAL
Status: DISCONTINUED | OUTPATIENT
Start: 2025-01-01 | End: 2025-01-01

## 2025-01-01 RX ORDER — CITALOPRAM HYDROBROMIDE 10 MG/1
10 TABLET ORAL DAILY
Status: DISCONTINUED | OUTPATIENT
Start: 2025-01-01 | End: 2025-01-01 | Stop reason: HOSPADM

## 2025-01-01 RX ORDER — SACCHAROMYCES BOULARDII 250 MG
250 CAPSULE ORAL DAILY
Status: DISCONTINUED | OUTPATIENT
Start: 2025-01-01 | End: 2025-01-01

## 2025-01-01 RX ORDER — HYDRALAZINE HYDROCHLORIDE 20 MG/ML
10 INJECTION INTRAMUSCULAR; INTRAVENOUS EVERY 6 HOURS PRN
Status: DISCONTINUED | OUTPATIENT
Start: 2025-01-01 | End: 2025-01-01

## 2025-01-01 RX ORDER — METHYLPREDNISOLONE SODIUM SUCCINATE 125 MG/2ML
60 INJECTION, POWDER, LYOPHILIZED, FOR SOLUTION INTRAMUSCULAR; INTRAVENOUS ONCE
Status: COMPLETED | OUTPATIENT
Start: 2025-01-01 | End: 2025-01-01

## 2025-01-01 RX ORDER — IPRATROPIUM BROMIDE AND ALBUTEROL SULFATE .5; 3 MG/3ML; MG/3ML
1 SOLUTION RESPIRATORY (INHALATION) ONCE
Status: COMPLETED | OUTPATIENT
Start: 2025-01-01 | End: 2025-01-01

## 2025-01-01 RX ORDER — SODIUM CHLORIDE FOR INHALATION 3 %
4 VIAL, NEBULIZER (ML) INHALATION 3 TIMES DAILY
Status: DISCONTINUED | OUTPATIENT
Start: 2025-01-01 | End: 2025-01-01

## 2025-01-01 RX ORDER — VANCOMYCIN HYDROCHLORIDE 1 G/200ML
15 INJECTION, SOLUTION INTRAVENOUS EVERY 12 HOURS
Status: DISCONTINUED | OUTPATIENT
Start: 2025-01-01 | End: 2025-01-01

## 2025-01-01 RX ORDER — FLUTICASONE PROPIONATE 50 MCG
2 SPRAY, SUSPENSION (ML) NASAL DAILY
Status: DISCONTINUED | OUTPATIENT
Start: 2025-01-01 | End: 2025-01-01

## 2025-01-01 RX ORDER — PREDNISONE 20 MG/1
40 TABLET ORAL DAILY
Status: COMPLETED | OUTPATIENT
Start: 2025-01-01 | End: 2025-01-01

## 2025-01-01 RX ORDER — ALBUMIN HUMAN 50 G/1000ML
25 SOLUTION INTRAVENOUS ONCE
Status: COMPLETED | OUTPATIENT
Start: 2025-01-01 | End: 2025-01-01

## 2025-01-01 RX ORDER — METHYLPREDNISOLONE SODIUM SUCCINATE 40 MG/ML
40 INJECTION, POWDER, LYOPHILIZED, FOR SOLUTION INTRAMUSCULAR; INTRAVENOUS DAILY
Status: DISCONTINUED | OUTPATIENT
Start: 2025-01-01 | End: 2025-01-01

## 2025-01-01 RX ORDER — DIAZEPAM 10 MG/2ML
2.5 INJECTION, SOLUTION INTRAMUSCULAR; INTRAVENOUS ONCE
Status: COMPLETED | OUTPATIENT
Start: 2025-01-01 | End: 2025-01-01

## 2025-01-01 RX ORDER — AMOXICILLIN 250 MG
2 CAPSULE ORAL
Status: DISCONTINUED | OUTPATIENT
Start: 2025-01-01 | End: 2025-01-01

## 2025-01-01 RX ORDER — SODIUM CHLORIDE FOR INHALATION 0.9 %
3 VIAL, NEBULIZER (ML) INHALATION
Status: DISCONTINUED | OUTPATIENT
Start: 2025-01-01 | End: 2025-01-01

## 2025-01-01 RX ORDER — ALBUTEROL SULFATE 0.83 MG/ML
2.5 SOLUTION RESPIRATORY (INHALATION)
Status: DISCONTINUED | OUTPATIENT
Start: 2025-01-01 | End: 2025-01-01

## 2025-01-01 RX ORDER — CALCIUM GLUCONATE 20 MG/ML
2 INJECTION, SOLUTION INTRAVENOUS ONCE
Status: COMPLETED | OUTPATIENT
Start: 2025-01-01 | End: 2025-01-01

## 2025-01-01 RX ORDER — SACCHAROMYCES BOULARDII 250 MG
250 CAPSULE ORAL DAILY
Status: DISCONTINUED | OUTPATIENT
Start: 2025-01-01 | End: 2025-01-01 | Stop reason: HOSPADM

## 2025-01-01 RX ORDER — DIAZEPAM 5 MG/1
5 TABLET ORAL EVERY 12 HOURS PRN
Status: DISCONTINUED | OUTPATIENT
Start: 2025-01-01 | End: 2025-01-01

## 2025-01-01 RX ORDER — CEFTRIAXONE 1 G/50ML
1000 INJECTION, SOLUTION INTRAVENOUS EVERY 24 HOURS
Status: DISCONTINUED | OUTPATIENT
Start: 2025-01-01 | End: 2025-01-01

## 2025-01-01 RX ORDER — MIRTAZAPINE 15 MG/1
7.5 TABLET, FILM COATED ORAL
Status: DISCONTINUED | OUTPATIENT
Start: 2025-01-01 | End: 2025-01-01

## 2025-01-01 RX ORDER — BUDESONIDE 0.5 MG/2ML
0.5 INHALANT ORAL
Status: DISCONTINUED | OUTPATIENT
Start: 2025-01-01 | End: 2025-01-01 | Stop reason: HOSPADM

## 2025-01-01 RX ORDER — ACETAMINOPHEN 325 MG/1
650 TABLET ORAL EVERY 6 HOURS PRN
Status: DISCONTINUED | OUTPATIENT
Start: 2025-01-01 | End: 2025-01-01

## 2025-01-01 RX ORDER — GLYCOPYRROLATE 0.2 MG/ML
0.1 INJECTION INTRAMUSCULAR; INTRAVENOUS EVERY 4 HOURS PRN
Status: DISCONTINUED | OUTPATIENT
Start: 2025-01-01 | End: 2025-01-01 | Stop reason: HOSPADM

## 2025-01-01 RX ORDER — CEFEPIME HYDROCHLORIDE 2 G/50ML
2000 INJECTION, SOLUTION INTRAVENOUS ONCE
Status: COMPLETED | OUTPATIENT
Start: 2025-01-01 | End: 2025-01-01

## 2025-01-01 RX ORDER — ONDANSETRON 2 MG/ML
4 INJECTION INTRAMUSCULAR; INTRAVENOUS ONCE
Status: COMPLETED | OUTPATIENT
Start: 2025-01-01 | End: 2025-01-01

## 2025-01-01 RX ORDER — OXYBUTYNIN CHLORIDE 5 MG/1
10 TABLET, EXTENDED RELEASE ORAL
Status: DISCONTINUED | OUTPATIENT
Start: 2025-01-01 | End: 2025-01-01

## 2025-01-01 RX ORDER — METHYLPREDNISOLONE SODIUM SUCCINATE 40 MG/ML
40 INJECTION, POWDER, LYOPHILIZED, FOR SOLUTION INTRAMUSCULAR; INTRAVENOUS EVERY 12 HOURS SCHEDULED
Status: DISCONTINUED | OUTPATIENT
Start: 2025-01-01 | End: 2025-01-01

## 2025-01-01 RX ORDER — GUAIFENESIN 600 MG/1
1200 TABLET, EXTENDED RELEASE ORAL EVERY 12 HOURS SCHEDULED
Status: DISCONTINUED | OUTPATIENT
Start: 2025-01-01 | End: 2025-01-01

## 2025-01-01 RX ORDER — ALBUTEROL SULFATE 5 MG/ML
10 SOLUTION RESPIRATORY (INHALATION) ONCE
Status: COMPLETED | OUTPATIENT
Start: 2025-01-01 | End: 2025-01-01

## 2025-01-01 RX ORDER — FORMOTEROL FUMARATE DIHYDRATE 20 UG/2ML
20 SOLUTION RESPIRATORY (INHALATION)
Status: DISCONTINUED | OUTPATIENT
Start: 2025-01-01 | End: 2025-01-01 | Stop reason: HOSPADM

## 2025-01-01 RX ORDER — CHLORHEXIDINE GLUCONATE ORAL RINSE 1.2 MG/ML
15 SOLUTION DENTAL EVERY 12 HOURS SCHEDULED
Status: DISCONTINUED | OUTPATIENT
Start: 2025-01-01 | End: 2025-01-01

## 2025-01-01 RX ORDER — CALCIUM CHLORIDE 100 MG/ML
SYRINGE (ML) INTRAVENOUS CODE/TRAUMA/SEDATION MEDICATION
Status: COMPLETED | OUTPATIENT
Start: 2025-01-01 | End: 2025-01-01

## 2025-01-01 RX ORDER — SODIUM CHLORIDE FOR INHALATION 0.9 %
12 VIAL, NEBULIZER (ML) INHALATION ONCE
Status: COMPLETED | OUTPATIENT
Start: 2025-01-01 | End: 2025-01-01

## 2025-01-01 RX ORDER — OXYBUTYNIN CHLORIDE 5 MG/1
10 TABLET, EXTENDED RELEASE ORAL
Status: DISCONTINUED | OUTPATIENT
Start: 2025-01-01 | End: 2025-01-01 | Stop reason: HOSPADM

## 2025-01-01 RX ORDER — GUAIFENESIN 600 MG/1
600 TABLET, EXTENDED RELEASE ORAL EVERY 12 HOURS SCHEDULED
Status: DISCONTINUED | OUTPATIENT
Start: 2025-01-01 | End: 2025-01-01

## 2025-01-01 RX ORDER — BUPROPION HYDROCHLORIDE 75 MG/1
75 TABLET ORAL EVERY MORNING
Status: DISCONTINUED | OUTPATIENT
Start: 2025-01-01 | End: 2025-01-01 | Stop reason: HOSPADM

## 2025-01-01 RX ORDER — CEFEPIME HYDROCHLORIDE 2 G/50ML
2000 INJECTION, SOLUTION INTRAVENOUS EVERY 12 HOURS
Status: DISCONTINUED | OUTPATIENT
Start: 2025-01-01 | End: 2025-01-01

## 2025-01-01 RX ORDER — PANTOPRAZOLE SODIUM 40 MG/10ML
40 INJECTION, POWDER, LYOPHILIZED, FOR SOLUTION INTRAVENOUS
Status: DISCONTINUED | OUTPATIENT
Start: 2025-01-01 | End: 2025-01-01

## 2025-01-01 RX ORDER — CITALOPRAM HYDROBROMIDE 20 MG/1
10 TABLET ORAL DAILY
Status: DISCONTINUED | OUTPATIENT
Start: 2025-01-01 | End: 2025-01-01

## 2025-01-01 RX ORDER — HYDROMORPHONE HCL/PF 1 MG/ML
1 SYRINGE (ML) INJECTION
Status: DISCONTINUED | OUTPATIENT
Start: 2025-01-01 | End: 2025-01-01 | Stop reason: HOSPADM

## 2025-01-01 RX ORDER — SODIUM CHLORIDE, SODIUM GLUCONATE, SODIUM ACETATE, POTASSIUM CHLORIDE, MAGNESIUM CHLORIDE, SODIUM PHOSPHATE, DIBASIC, AND POTASSIUM PHOSPHATE .53; .5; .37; .037; .03; .012; .00082 G/100ML; G/100ML; G/100ML; G/100ML; G/100ML; G/100ML; G/100ML
75 INJECTION, SOLUTION INTRAVENOUS ONCE
Status: COMPLETED | OUTPATIENT
Start: 2025-01-01 | End: 2025-01-01

## 2025-01-01 RX ORDER — LEVALBUTEROL INHALATION SOLUTION 1.25 MG/3ML
1.25 SOLUTION RESPIRATORY (INHALATION)
Status: DISCONTINUED | OUTPATIENT
Start: 2025-01-01 | End: 2025-01-01

## 2025-01-01 RX ORDER — PANTOPRAZOLE SODIUM 40 MG/1
40 TABLET, DELAYED RELEASE ORAL
Status: DISCONTINUED | OUTPATIENT
Start: 2025-01-01 | End: 2025-01-01

## 2025-01-01 RX ORDER — PANTOPRAZOLE SODIUM 40 MG/1
40 TABLET, DELAYED RELEASE ORAL
Status: DISCONTINUED | OUTPATIENT
Start: 2025-01-01 | End: 2025-01-01 | Stop reason: HOSPADM

## 2025-01-01 RX ORDER — BUPROPION HYDROCHLORIDE 75 MG/1
75 TABLET ORAL EVERY MORNING
Status: DISCONTINUED | OUTPATIENT
Start: 2025-01-01 | End: 2025-01-01

## 2025-01-01 RX ADMIN — METHYLPREDNISOLONE SODIUM SUCCINATE 40 MG: 40 INJECTION, POWDER, FOR SOLUTION INTRAMUSCULAR; INTRAVENOUS at 21:37

## 2025-01-01 RX ADMIN — SODIUM CHLORIDE SOLN NEBU 3% 4 ML: 3 NEBU SOLN at 13:05

## 2025-01-01 RX ADMIN — SODIUM CHLORIDE, SODIUM GLUCONATE, SODIUM ACETATE, POTASSIUM CHLORIDE, MAGNESIUM CHLORIDE, SODIUM PHOSPHATE, DIBASIC, AND POTASSIUM PHOSPHATE 75 ML/HR: .53; .5; .37; .037; .03; .012; .00082 INJECTION, SOLUTION INTRAVENOUS at 15:28

## 2025-01-01 RX ADMIN — APIXABAN 5 MG: 5 TABLET, FILM COATED ORAL at 18:22

## 2025-01-01 RX ADMIN — LEVALBUTEROL HYDROCHLORIDE 1.25 MG: 1.25 SOLUTION RESPIRATORY (INHALATION) at 20:35

## 2025-01-01 RX ADMIN — METHYLPREDNISOLONE SODIUM SUCCINATE 40 MG: 40 INJECTION, POWDER, FOR SOLUTION INTRAMUSCULAR; INTRAVENOUS at 08:15

## 2025-01-01 RX ADMIN — METHYLPREDNISOLONE SODIUM SUCCINATE 40 MG: 40 INJECTION, POWDER, FOR SOLUTION INTRAMUSCULAR; INTRAVENOUS at 21:53

## 2025-01-01 RX ADMIN — CHLORHEXIDINE GLUCONATE 15 ML: 1.2 RINSE ORAL at 21:09

## 2025-01-01 RX ADMIN — LEVALBUTEROL HYDROCHLORIDE 1.25 MG: 1.25 SOLUTION RESPIRATORY (INHALATION) at 14:04

## 2025-01-01 RX ADMIN — PANTOPRAZOLE SODIUM 40 MG: 40 INJECTION, POWDER, FOR SOLUTION INTRAVENOUS at 08:22

## 2025-01-01 RX ADMIN — VANCOMYCIN HYDROCHLORIDE 1500 MG: 1 INJECTION, POWDER, LYOPHILIZED, FOR SOLUTION INTRAVENOUS at 21:15

## 2025-01-01 RX ADMIN — APIXABAN 5 MG: 5 TABLET, FILM COATED ORAL at 08:11

## 2025-01-01 RX ADMIN — METHYLPREDNISOLONE SODIUM SUCCINATE 60 MG: 125 INJECTION, POWDER, FOR SOLUTION INTRAMUSCULAR; INTRAVENOUS at 05:05

## 2025-01-01 RX ADMIN — CALCIUM CHLORIDE 1 G: 100 INJECTION INTRAVENOUS; INTRAVENTRICULAR at 01:06

## 2025-01-01 RX ADMIN — APIXABAN 5 MG: 5 TABLET, FILM COATED ORAL at 08:24

## 2025-01-01 RX ADMIN — ALBUTEROL SULFATE 2.5 MG: 2.5 SOLUTION RESPIRATORY (INHALATION) at 12:13

## 2025-01-01 RX ADMIN — CHLORHEXIDINE GLUCONATE 15 ML: 1.2 RINSE ORAL at 08:25

## 2025-01-01 RX ADMIN — FLUTICASONE PROPIONATE 2 SPRAY: 50 SPRAY, METERED NASAL at 08:52

## 2025-01-01 RX ADMIN — CEFEPIME HYDROCHLORIDE 2000 MG: 2 INJECTION, SOLUTION INTRAVENOUS at 09:02

## 2025-01-01 RX ADMIN — AZITHROMYCIN MONOHYDRATE 500 MG: 500 INJECTION, POWDER, LYOPHILIZED, FOR SOLUTION INTRAVENOUS at 18:21

## 2025-01-01 RX ADMIN — CEFTRIAXONE 1000 MG: 1 INJECTION, SOLUTION INTRAVENOUS at 15:28

## 2025-01-01 RX ADMIN — LEVALBUTEROL HYDROCHLORIDE 1.25 MG: 1.25 SOLUTION RESPIRATORY (INHALATION) at 20:11

## 2025-01-01 RX ADMIN — BUPROPION HYDROCHLORIDE 75 MG: 75 TABLET, FILM COATED ORAL at 10:15

## 2025-01-01 RX ADMIN — HYDROMORPHONE HYDROCHLORIDE 1 MG: 1 INJECTION, SOLUTION INTRAMUSCULAR; INTRAVENOUS; SUBCUTANEOUS at 02:49

## 2025-01-01 RX ADMIN — DIAZEPAM 2.5 MG: 5 INJECTION INTRAMUSCULAR; INTRAVENOUS at 22:01

## 2025-01-01 RX ADMIN — CITALOPRAM HYDROBROMIDE 10 MG: 10 TABLET ORAL at 10:14

## 2025-01-01 RX ADMIN — APIXABAN 5 MG: 5 TABLET, FILM COATED ORAL at 18:00

## 2025-01-01 RX ADMIN — ONDANSETRON 4 MG: 2 INJECTION INTRAMUSCULAR; INTRAVENOUS at 05:57

## 2025-01-01 RX ADMIN — SODIUM CHLORIDE SOLN NEBU 3% 4 ML: 3 NEBU SOLN at 13:20

## 2025-01-01 RX ADMIN — PREDNISONE 40 MG: 20 TABLET ORAL at 08:52

## 2025-01-01 RX ADMIN — DIAZEPAM 5 MG: 5 TABLET ORAL at 21:09

## 2025-01-01 RX ADMIN — APIXABAN 5 MG: 5 TABLET, FILM COATED ORAL at 08:54

## 2025-01-01 RX ADMIN — OXYBUTYNIN CHLORIDE 10 MG: 5 TABLET, EXTENDED RELEASE ORAL at 22:10

## 2025-01-01 RX ADMIN — PANTOPRAZOLE SODIUM 40 MG: 40 INJECTION, POWDER, FOR SOLUTION INTRAVENOUS at 08:52

## 2025-01-01 RX ADMIN — SODIUM CHLORIDE SOLN NEBU 3% 4 ML: 3 NEBU SOLN at 07:09

## 2025-01-01 RX ADMIN — METHYLPREDNISOLONE SODIUM SUCCINATE 40 MG: 40 INJECTION, POWDER, FOR SOLUTION INTRAMUSCULAR; INTRAVENOUS at 15:27

## 2025-01-01 RX ADMIN — ISODIUM CHLORIDE 12 ML: 0.03 SOLUTION RESPIRATORY (INHALATION) at 03:39

## 2025-01-01 RX ADMIN — SODIUM CHLORIDE SOLN NEBU 3% 4 ML: 3 NEBU SOLN at 19:43

## 2025-01-01 RX ADMIN — PANTOPRAZOLE SODIUM 40 MG: 40 INJECTION, POWDER, FOR SOLUTION INTRAVENOUS at 08:11

## 2025-01-01 RX ADMIN — LORAZEPAM 2 MG: 2 INJECTION INTRAMUSCULAR; INTRAVENOUS at 02:49

## 2025-01-01 RX ADMIN — GUAIFENESIN 1200 MG: 600 TABLET ORAL at 21:53

## 2025-01-01 RX ADMIN — PANTOPRAZOLE SODIUM 40 MG: 40 INJECTION, POWDER, FOR SOLUTION INTRAVENOUS at 09:44

## 2025-01-01 RX ADMIN — MIRTAZAPINE 7.5 MG: 15 TABLET, FILM COATED ORAL at 22:45

## 2025-01-01 RX ADMIN — FORMOTEROL FUMARATE DIHYDRATE 20 MCG: 20 SOLUTION RESPIRATORY (INHALATION) at 09:22

## 2025-01-01 RX ADMIN — LEVALBUTEROL HYDROCHLORIDE 1.25 MG: 1.25 SOLUTION RESPIRATORY (INHALATION) at 07:20

## 2025-01-01 RX ADMIN — LEVALBUTEROL HYDROCHLORIDE 1.25 MG: 1.25 SOLUTION RESPIRATORY (INHALATION) at 13:44

## 2025-01-01 RX ADMIN — LEVALBUTEROL HYDROCHLORIDE 1.25 MG: 1.25 SOLUTION RESPIRATORY (INHALATION) at 13:05

## 2025-01-01 RX ADMIN — LEVALBUTEROL HYDROCHLORIDE 1.25 MG: 1.25 SOLUTION RESPIRATORY (INHALATION) at 07:27

## 2025-01-01 RX ADMIN — FLUTICASONE PROPIONATE 2 SPRAY: 50 SPRAY, METERED NASAL at 08:15

## 2025-01-01 RX ADMIN — ISODIUM CHLORIDE 3 ML: 0.03 SOLUTION RESPIRATORY (INHALATION) at 06:28

## 2025-01-01 RX ADMIN — LEVALBUTEROL HYDROCHLORIDE 1.25 MG: 1.25 SOLUTION RESPIRATORY (INHALATION) at 07:40

## 2025-01-01 RX ADMIN — IPRATROPIUM BROMIDE 0.5 MG: 0.5 SOLUTION RESPIRATORY (INHALATION) at 20:11

## 2025-01-01 RX ADMIN — LEVALBUTEROL HYDROCHLORIDE 1.25 MG: 1.25 SOLUTION RESPIRATORY (INHALATION) at 07:16

## 2025-01-01 RX ADMIN — ALBUTEROL SULFATE 2.5 MG: 2.5 SOLUTION RESPIRATORY (INHALATION) at 06:29

## 2025-01-01 RX ADMIN — LEVALBUTEROL HYDROCHLORIDE 1.25 MG: 1.25 SOLUTION RESPIRATORY (INHALATION) at 13:20

## 2025-01-01 RX ADMIN — LEVALBUTEROL HYDROCHLORIDE 1.25 MG: 1.25 SOLUTION RESPIRATORY (INHALATION) at 09:01

## 2025-01-01 RX ADMIN — GUAIFENESIN 1200 MG: 600 TABLET ORAL at 08:54

## 2025-01-01 RX ADMIN — FLUTICASONE PROPIONATE 2 SPRAY: 50 SPRAY, METERED NASAL at 08:22

## 2025-01-01 RX ADMIN — METHYLPREDNISOLONE SODIUM SUCCINATE 40 MG: 40 INJECTION, POWDER, FOR SOLUTION INTRAMUSCULAR; INTRAVENOUS at 05:16

## 2025-01-01 RX ADMIN — ALBUMIN (HUMAN) 25 G: 12.5 INJECTION, SOLUTION INTRAVENOUS at 21:53

## 2025-01-01 RX ADMIN — SODIUM CHLORIDE SOLN NEBU 3% 4 ML: 3 NEBU SOLN at 20:35

## 2025-01-01 RX ADMIN — APIXABAN 5 MG: 5 TABLET, FILM COATED ORAL at 17:45

## 2025-01-01 RX ADMIN — SODIUM CHLORIDE SOLN NEBU 3% 4 ML: 3 NEBU SOLN at 07:27

## 2025-01-01 RX ADMIN — CITALOPRAM HYDROBROMIDE 10 MG: 20 TABLET ORAL at 08:54

## 2025-01-01 RX ADMIN — AZITHROMYCIN MONOHYDRATE 500 MG: 500 INJECTION, POWDER, LYOPHILIZED, FOR SOLUTION INTRAVENOUS at 05:12

## 2025-01-01 RX ADMIN — CHLORHEXIDINE GLUCONATE 15 ML: 1.2 RINSE ORAL at 22:10

## 2025-01-01 RX ADMIN — SALINE NASAL SPRAY 1 SPRAY: 1.5 SOLUTION NASAL at 08:25

## 2025-01-01 RX ADMIN — SODIUM CHLORIDE SOLN NEBU 3% 4 ML: 3 NEBU SOLN at 20:22

## 2025-01-01 RX ADMIN — GUAIFENESIN 600 MG: 600 TABLET ORAL at 22:26

## 2025-01-01 RX ADMIN — METHYLPREDNISOLONE SODIUM SUCCINATE 40 MG: 40 INJECTION, POWDER, FOR SOLUTION INTRAMUSCULAR; INTRAVENOUS at 05:17

## 2025-01-01 RX ADMIN — APIXABAN 5 MG: 5 TABLET, FILM COATED ORAL at 17:08

## 2025-01-01 RX ADMIN — PANTOPRAZOLE SODIUM 40 MG: 40 INJECTION, POWDER, FOR SOLUTION INTRAVENOUS at 08:16

## 2025-01-01 RX ADMIN — Medication 250 MG: at 10:15

## 2025-01-01 RX ADMIN — SODIUM CHLORIDE SOLN NEBU 3% 4 ML: 3 NEBU SOLN at 19:35

## 2025-01-01 RX ADMIN — CHLORHEXIDINE GLUCONATE 15 ML: 1.2 RINSE ORAL at 22:45

## 2025-01-01 RX ADMIN — ACETAMINOPHEN 650 MG: 325 TABLET ORAL at 06:41

## 2025-01-01 RX ADMIN — CHLORHEXIDINE GLUCONATE 15 ML: 1.2 RINSE ORAL at 08:15

## 2025-01-01 RX ADMIN — APIXABAN 5 MG: 5 TABLET, FILM COATED ORAL at 09:45

## 2025-01-01 RX ADMIN — ACETAMINOPHEN 650 MG: 325 TABLET ORAL at 19:27

## 2025-01-01 RX ADMIN — CHLORHEXIDINE GLUCONATE 15 ML: 1.2 RINSE ORAL at 21:37

## 2025-01-01 RX ADMIN — GLYCOPYRROLATE 0.1 MG: 0.2 INJECTION, SOLUTION INTRAMUSCULAR; INTRAVENOUS at 02:42

## 2025-01-01 RX ADMIN — VANCOMYCIN HYDROCHLORIDE 1000 MG: 1 INJECTION, SOLUTION INTRAVENOUS at 04:23

## 2025-01-01 RX ADMIN — SODIUM CHLORIDE SOLN NEBU 3% 4 ML: 3 NEBU SOLN at 07:16

## 2025-01-01 RX ADMIN — Medication 250 MG: at 08:54

## 2025-01-01 RX ADMIN — LEVALBUTEROL HYDROCHLORIDE 1.25 MG: 1.25 SOLUTION RESPIRATORY (INHALATION) at 19:17

## 2025-01-01 RX ADMIN — APIXABAN 5 MG: 5 TABLET, FILM COATED ORAL at 17:18

## 2025-01-01 RX ADMIN — METHYLPREDNISOLONE SODIUM SUCCINATE 40 MG: 40 INJECTION, POWDER, FOR SOLUTION INTRAMUSCULAR; INTRAVENOUS at 22:10

## 2025-01-01 RX ADMIN — SALINE NASAL SPRAY 1 SPRAY: 1.5 SOLUTION NASAL at 20:35

## 2025-01-01 RX ADMIN — FLUTICASONE PROPIONATE 2 SPRAY: 50 SPRAY, METERED NASAL at 09:46

## 2025-01-01 RX ADMIN — SODIUM CHLORIDE SOLN NEBU 3% 4 ML: 3 NEBU SOLN at 13:44

## 2025-01-01 RX ADMIN — SENNOSIDES AND DOCUSATE SODIUM 2 TABLET: 8.6; 5 TABLET ORAL at 21:53

## 2025-01-01 RX ADMIN — CALCIUM GLUCONATE 2 G: 20 INJECTION, SOLUTION INTRAVENOUS at 04:23

## 2025-01-01 RX ADMIN — METHYLPREDNISOLONE SODIUM SUCCINATE 40 MG: 40 INJECTION, POWDER, FOR SOLUTION INTRAMUSCULAR; INTRAVENOUS at 15:58

## 2025-01-01 RX ADMIN — CHLORHEXIDINE GLUCONATE 15 ML: 1.2 RINSE ORAL at 20:57

## 2025-01-01 RX ADMIN — APIXABAN 5 MG: 5 TABLET, FILM COATED ORAL at 17:40

## 2025-01-01 RX ADMIN — BUPROPION HYDROCHLORIDE TABLETS 75 MG: 75 TABLET, FILM COATED ORAL at 08:58

## 2025-01-01 RX ADMIN — APIXABAN 5 MG: 5 TABLET, FILM COATED ORAL at 08:52

## 2025-01-01 RX ADMIN — LEVALBUTEROL HYDROCHLORIDE 1.25 MG: 1.25 SOLUTION RESPIRATORY (INHALATION) at 20:22

## 2025-01-01 RX ADMIN — CEFEPIME HYDROCHLORIDE 2000 MG: 2 INJECTION, SOLUTION INTRAVENOUS at 23:45

## 2025-01-01 RX ADMIN — METHYLPREDNISOLONE SODIUM SUCCINATE 40 MG: 40 INJECTION, POWDER, FOR SOLUTION INTRAMUSCULAR; INTRAVENOUS at 14:56

## 2025-01-01 RX ADMIN — CHLORHEXIDINE GLUCONATE 15 ML: 1.2 RINSE ORAL at 09:44

## 2025-01-01 RX ADMIN — APIXABAN 5 MG: 5 TABLET, FILM COATED ORAL at 10:14

## 2025-01-01 RX ADMIN — FLUTICASONE PROPIONATE 2 SPRAY: 50 SPRAY, METERED NASAL at 08:59

## 2025-01-01 RX ADMIN — CEFEPIME HYDROCHLORIDE 2000 MG: 2 INJECTION, SOLUTION INTRAVENOUS at 03:54

## 2025-01-01 RX ADMIN — LEVALBUTEROL HYDROCHLORIDE 1.25 MG: 1.25 SOLUTION RESPIRATORY (INHALATION) at 13:32

## 2025-01-01 RX ADMIN — PANTOPRAZOLE SODIUM 40 MG: 40 TABLET, DELAYED RELEASE ORAL at 05:57

## 2025-01-01 RX ADMIN — LEVALBUTEROL HYDROCHLORIDE 1.25 MG: 1.25 SOLUTION RESPIRATORY (INHALATION) at 19:35

## 2025-01-01 RX ADMIN — LEVALBUTEROL HYDROCHLORIDE 1.25 MG: 1.25 SOLUTION RESPIRATORY (INHALATION) at 19:43

## 2025-01-01 RX ADMIN — IPRATROPIUM BROMIDE 1 MG: 0.5 SOLUTION RESPIRATORY (INHALATION) at 03:39

## 2025-01-01 RX ADMIN — OXYBUTYNIN CHLORIDE 10 MG: 5 TABLET, EXTENDED RELEASE ORAL at 21:53

## 2025-01-01 RX ADMIN — LEVALBUTEROL HYDROCHLORIDE 1.25 MG: 1.25 SOLUTION RESPIRATORY (INHALATION) at 13:33

## 2025-01-01 RX ADMIN — LEVALBUTEROL HYDROCHLORIDE 1.25 MG: 1.25 SOLUTION RESPIRATORY (INHALATION) at 07:09

## 2025-01-01 RX ADMIN — CHLORHEXIDINE GLUCONATE 15 ML: 1.2 RINSE ORAL at 21:53

## 2025-01-01 RX ADMIN — DIAZEPAM 2.5 MG: 5 INJECTION INTRAMUSCULAR; INTRAVENOUS at 02:24

## 2025-01-01 RX ADMIN — CHLORHEXIDINE GLUCONATE 15 ML: 1.2 RINSE ORAL at 08:54

## 2025-01-01 RX ADMIN — BUDESONIDE 0.5 MG: 0.5 INHALANT RESPIRATORY (INHALATION) at 09:21

## 2025-01-01 RX ADMIN — PREDNISONE 40 MG: 20 TABLET ORAL at 08:11

## 2025-01-01 RX ADMIN — SODIUM CHLORIDE SOLN NEBU 3% 4 ML: 3 NEBU SOLN at 07:20

## 2025-01-01 RX ADMIN — ALBUTEROL SULFATE 10 MG: 2.5 SOLUTION RESPIRATORY (INHALATION) at 03:39

## 2025-01-01 RX ADMIN — CHLORHEXIDINE GLUCONATE 15 ML: 1.2 RINSE ORAL at 08:10

## 2025-01-01 RX ADMIN — SODIUM CHLORIDE SOLN NEBU 3% 4 ML: 3 NEBU SOLN at 20:11

## 2025-01-01 RX ADMIN — SODIUM CHLORIDE SOLN NEBU 3% 4 ML: 3 NEBU SOLN at 13:32

## 2025-01-01 RX ADMIN — FLUTICASONE PROPIONATE 2 SPRAY: 50 SPRAY, METERED NASAL at 08:11

## 2025-01-01 RX ADMIN — APIXABAN 5 MG: 5 TABLET, FILM COATED ORAL at 17:16

## 2025-01-05 PROBLEM — R65.20 SEVERE SEPSIS (HCC): Status: ACTIVE | Noted: 2020-09-28

## 2025-01-05 PROBLEM — I30.0 ACUTE NONSPECIFIC IDIOPATHIC PERICARDITIS: Status: ACTIVE | Noted: 2024-01-01

## 2025-01-05 PROBLEM — R41.841 COGNITIVE COMMUNICATION DEFICIT: Status: ACTIVE | Noted: 2024-01-01

## 2025-01-05 PROBLEM — J98.4 OTHER DISORDERS OF LUNG: Status: ACTIVE | Noted: 2024-01-01

## 2025-01-05 PROBLEM — I35.8 OTHER NONRHEUMATIC AORTIC VALVE DISORDERS: Status: ACTIVE | Noted: 2024-01-01

## 2025-01-05 NOTE — RESPIRATORY THERAPY NOTE
01/05/25 1030   Respiratory Assessment   General Appearance Lethargic   Resp Comments  called from ER and requested  pt  be placed on bipap. pt initially started with 18/6,16,40%  but pt Vt's low 100-200's and georges metzger, titrated  pressures  up until pt's volume in 300-400's.   Non-Invasive Information   O2 Interface Device Full face mask   Non-Invasive Ventilation Mode BiPAP   $ Continous NIV Initial   $ Pulse Oximetry Spot Check Charge Completed   Non-Invasive Settings   IPAP (cm) 22 cm   EPAP (cm) 7 cm   Rate (Set) 16   FiO2 (%) 40   Pressure Support (cm H2O) 15   Rise Time 3   Inspiratory Time (Set) 1.1   Non-Invasive Readings   Total Rate 18   MV (Mech) 6.3   Peak Pressure (Obs) 22   Spontaneous Vt (mL) 352   I/E Ratio (Obs) 1:2.4   Leak (lpm) 7   Skin Intervention Skin barrier applied   Non-Invasive Alarms   Insp Pressure High (cm H20) 25   Insp Pressure Low (cm H20) 6   Low Insp Pressure Time (sec) 20 sec   MV Low (L/min) 3.5   Vt High (mL) 900   Vt Low (mL) 200   High Resp Rate (BPM) 45 BPM   Low Resp Rate (BPM) 8 BPM   Apnea Interval (sec) 20  (alarm volume 10)   Apnea Rate 8

## 2025-01-05 NOTE — EMTALA/ACUTE CARE TRANSFER
[facility] EMERGENCY DEPARTMENT  [address] [address]  Dept: [phone]      EMTALA TRANSFER CONSENT    NAME Claire GOINS 1950                              MRN [id number]    I have been informed of my rights regarding examination, treatment, and transfer   by Dr. Ronny Mendosa*    Benefits: Specialized equipment and/or services available at the receiving facility (Include comment)________________________ (Pulm, bronchoscopy)    Risks: Potential for delay in receiving treatment, Potential deterioration of medical condition, Loss of IV, Possible worsening of condition or death during transfer, Increased discomfort during transfer      Consent for Transfer:  I acknowledge that my medical condition has been evaluated and explained to me by the emergency department physician or other qualified medical person and/or my attending physician, who has recommended that I be transferred to the service of  Accepting Physician: Tomi at Accepting Facility Name, City & State : [place]. The above potential benefits of such transfer, the potential risks associated with such transfer, and the probable risks of not being transferred have been explained to me, and I fully understand them.  The doctor has explained that, in my case, the benefits of transfer outweigh the risks.  I agree to be transferred.    I authorize the performance of emergency medical procedures and treatments upon me in both transit and upon arrival at the receiving facility.  Additionally, I authorize the release of any and all medical records to the receiving facility and request they be transported with me, if possible.  I understand that the safest mode of transportation during a medical emergency is an ambulance and that the Hospital advocates the use of this mode of transport. Risks of traveling to the receiving facility by car, including absence of medical  control, life sustaining equipment, such as oxygen, and medical personnel has been explained to me and I fully understand them.    (ELIOT CORRECT BOX BELOW)  [  ]  I consent to the stated transfer and to be transported by ambulance/helicopter.  [  ]  I consent to the stated transfer, but refuse transportation by ambulance and accept full responsibility for my transportation by car.  I understand the risks of non-ambulance transfers and I exonerate the Hospital and its staff from any deterioration in my condition that results from this refusal.    X___________________________________________    DATE  25  TIME________  Signature of patient or legally responsible individual signing on patient behalf           RELATIONSHIP TO PATIENT_________________________          Provider Certification    NAME Claire Doherty                                         1950                              MRN [id number]    A medical screening exam was performed on the above named patient.  Based on the examination:    Condition Necessitating Transfer The primary encounter diagnosis was COPD exacerbation (HCC). Diagnoses of Shortness of breath and Atelectasis of right lung were also pertinent to this visit.    Patient Condition: The patient has been stabilized such that within reasonable medical probability, no material deterioration of the patient condition or the condition of the unborn child(quynh) is likely to result from the transfer    Reason for Transfer: Level of Care needed not available at this facility    Transfer Requirements: [facility] [place]   Space available and qualified personnel available for treatment as acknowledged by    Agreed to accept transfer and to provide appropriate medical treatment as acknowledged by       Tomi  Appropriate medical records of the examination and treatment of the patient are provided at the time of transfer   STAFF INITIAL WHEN COMPLETED _______  Transfer will be performed by  qualified personnel from    and appropriate transfer equipment as required, including the use of necessary and appropriate life support measures.    Provider Certification: I have examined the patient and explained the following risks and benefits of being transferred/refusing transfer to the patient/family:  General risk, such as traffic hazards, adverse weather conditions, rough terrain or turbulence, possible failure of equipment (including vehicle or aircraft), or consequences of actions of persons outside the control of the transport personnel, Unanticipated needs of medical equipment and personnel during transport, Risk of worsening condition, The possibility of a transport vehicle being unavailable      Based on these reasonable risks and benefits to the patient and/or the unborn child(quynh), and based upon the information available at the time of the patient’s examination, I certify that the medical benefits reasonably to be expected from the provision of appropriate medical treatments at another medical facility outweigh the increasing risks, if any, to the individual’s medical condition, and in the case of labor to the unborn child, from effecting the transfer.    X____________________________________________ DATE 01/05/25        TIME_______      ORIGINAL - SEND TO MEDICAL RECORDS   COPY - SEND WITH PATIENT DURING TRANSFER

## 2025-01-05 NOTE — ASSESSMENT & PLAN NOTE
In exacerbation in the setting of positive RSV and pneumonia  Continue scheduled nebs, steroids, azithromycin  On continuous BiPAP

## 2025-01-05 NOTE — ASSESSMENT & PLAN NOTE
ABG with hypercapnic respiratory failure noted  Continue Solu-Medrol, antibiotics  Currently on BiPAP, will follow-up repeat ABG  Pulmonology consulted

## 2025-01-05 NOTE — ASSESSMENT & PLAN NOTE
As evidenced by leukocytosis, tachycardia, tachypnea with need for continuous BiPAP and imaging significant for pneumonia  Repeat blood cultures x 2 pending  Urine strep and Legionella pending  Sputum cultures pending  Lactic acid normal  Continue antibiotics of ceftriaxone/azithromycin  Trend WBCs Pro-Panda

## 2025-01-05 NOTE — H&P
H&P - Hospitalist   Name: Claire Doherty 74 y.o. female I MRN: [id number]  Unit/Bed#: ICU 12-01 I Date of Admission: 1/5/2025   Date of Service: 1/5/2025 I Hospital Day: 0     Assessment & Plan  Sepsis (Formerly [facility])  Pneumonia with leukocytosis, tachycardia, tachypnea noted on admission  Patient transferred from [facility] for pulmonary evaluation and possible bronchoscopy  Continue antibiotics with ceftriaxone  Check strep pneumo/Legionella  Trend procalcitonin  Initial lactate normal.  BP currently stable  IV fluids as needed  Sputum culture  Depression  Continue Celexa, Wellbutrin  COPD (chronic obstructive pulmonary disease) (Formerly [facility])  Possible COPD exacerbation  Nebulizers with ipratropium/Xopenex  Solu-Medrol 40 mg every 8 hours  Continue oxygen, titrate as tolerated  Pulmonology consulted  Paroxysmal A-fib (Formerly [facility])  Currently rate controlled  Eliquis for anticoagulation  Acute on chronic respiratory failure with hypoxia and hypercapnia (Formerly [facility])  ABG with hypercapnic respiratory failure noted  Continue Solu-Medrol, antibiotics  Currently on BiPAP, will follow-up repeat ABG  Pulmonology consulted      VTE Pharmacologic Prophylaxis:   Moderate Risk (Score 3-4) - Pharmacological DVT Prophylaxis Ordered: apixaban (Eliquis).  Code Status: Level 3 - DNAR and DNI   Discussion with family:  Updated patient regarding plan of care.     Anticipated Length of Stay: Patient will be admitted on an inpatient basis with an anticipated length of stay of greater than 2 midnights secondary to sepsis.    History of Present Illness   Chief Complaint: Shortness of breath    Claire Doherty is a 74 y.o. female with a PMH of COPD on home oxygen, chronic respiratory failure, paroxysmal atrial fibrillation, depression presents with shortness of breath.  Patient was sent to our campus from [facility] for pulmonology evaluation and possible need for bronchoscopy.  Patient currently on BiPAP.  Unable to give history given acuity of  patient.    Review of Systems   Constitutional:  Positive for activity change and fatigue.   Respiratory:  Positive for cough and shortness of breath.    All other systems reviewed and are negative.      Historical Information   Past Medical History:   Diagnosis Date    Acute kidney failure (Formerly [facility])     Acute nonspecific idiopathic pericarditis     Anxiety     Atrial fibrillation (Formerly [facility])     Breast cancer (Formerly [facility]) 2007    Cellulitis of right lower extremity     CHF (congestive heart failure) (Formerly [facility])     Chronic pain     Chronic respiratory failure with hypoxia (Formerly [facility])     Colitis     Colostomy status (Formerly [facility])     Dependence on supplemental oxygen     Depression     Difficult intubation     Excessive daytime sleepiness     Gastroesophageal reflux disease without esophagitis     Gastroparesis     GERD (gastroesophageal reflux disease)     Hyperlipidemia     Ischemic colitis ([facility]ly [facility]) 01/21/2019    Leukocytosis 03/28/2020    Muscular dystrophy (Formerly [facility])     Limb-girdle    Osteoporosis     Other nonrheumatic aortic valve disorders     Overactive bladder     Perforated diverticulum 03/28/2020    Pericardial effusion (noninflammatory)     Pericarditis     Pneumonitis     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Formerly [facility])     Rheumatic tricuspid insufficiency     Skin cancer     Skin disorder     Tachycardia 06/02/2021    Vitamin D deficiency      Past Surgical History:   Procedure Laterality Date    CARDIAC CATHETERIZATION N/A 8/21/2024    Procedure: Cardiac pci;  Surgeon: Juan Fuller MD;  [facility];  Service: Cardiology    CARDIAC CATHETERIZATION N/A 8/21/2024    Procedure: Cardiac PCI Stent;  Surgeon: Juan Fuller MD;  [facility];  Service: Cardiology    CARDIAC CATHETERIZATION N/A 8/21/2024    Procedure: Cardiac Coronary Angiogram;  Surgeon: Juan Fuller MD;  [facility];  Service: Cardiology    COLONOSCOPY N/A 1/22/2019    Procedure: COLONOSCOPY;  Surgeon: Anthony  Kristian Mejía MD;  Location: [facility];  Service: Colorectal    CYSTOSCOPY N/A 2020    Procedure: CYSTOSCOPY; BILATERAL URETERAL CATHETER PLACEMENT, CYSTOTOMY;  Surgeon: Zach Tyler MD;  Location: [facility];  Service: Urology    FL CYSTOGRAM  10/15/2020    HARTMANS PROCEDURE N/A 2020    Procedure: EXPLORATORY LAPAROTOMY; LYSIS OF ADHESIONS; WASHOUT PELVIC ABSCESS; COMPLEX SIGMOID COLON RESECTION; LOOP TRANSVERSE COLOSTOMY;  Surgeon: Thania Jaffe MD;  Location: [facility];  Service: General    IR DRAINAGE TUBE PLACEMENT  2020    MASTECTOMY Left 2007    left breast mastectomy     TONSILLECTOMY      TOOTH EXTRACTION      TUBAL LIGATION       Social History     Tobacco Use    Smoking status: Former     Current packs/day: 0.00     Average packs/day: 1.5 packs/day for 37.0 years (55.5 ttl pk-yrs)     Types: Cigarettes     Start date:      Quit date:      Years since quittin.0    Smokeless tobacco: Never   Vaping Use    Vaping status: Never Used   Substance and Sexual Activity    Alcohol use: Not Currently     Comment: social drinker per allscripts     Drug use: Not Currently     Types: Marijuana     Comment: medical marijuana    Sexual activity: Not Currently     E-Cigarette/Vaping    E-Cigarette Use Never User      E-Cigarette/Vaping Substances    Nicotine No     THC No     CBD No     Flavoring No     Other No     Unknown No      Family History   Problem Relation Age of Onset    Other Mother         bone loss    Arthritis Mother     Skin cancer Father     Hypertension Father         benign     Other Father         bone loss    Muscular dystrophy Father     Hypertension Sister     No Known Problems Sister     Muscular dystrophy Brother         of the limb gridle     Parkinsonism Brother     No Known Problems Brother     No Known Problems Maternal Grandmother     No Known Problems Paternal Grandmother     No Known Problems Maternal Aunt     Muscular dystrophy Family         of  the limb girdle     Social History:  Marital Status:    Patient Pre-hospital Living Situation: Assisted Living  Patient Pre-hospital Level of Mobility: manual wheelchair  Patient Pre-hospital Diet Restrictions: none    Meds/Allergies   I have reviewed home medications with patient personally.  Prior to Admission medications    Medication Sig Start Date End Date Taking? Authorizing Provider   acetaminophen (TYLENOL) 325 mg tablet Take 2 tablets (650 mg total) by mouth every 4 (four) hours as needed for mild pain 8/22/24   Charly Carvajal   albuterol (2.5 mg/3 mL) 0.083 % nebulizer solution Take 3 mL (2.5 mg total) by nebulization every 4 (four) hours as needed for wheezing or shortness of breath 6/15/23   Julienne Tucker DO   apixaban (ELIQUIS) 5 mg Take 1 tablet (5 mg total) by mouth 2 (two) times a day 9/11/24   Rod Singleton MD   bisacodyl (FLEET) 10 MG/30ML ENEM Insert 10 mg into the rectum daily as needed for constipation    Historical Provider, MD   budesonide (Pulmicort) 0.5 mg/2 mL nebulizer solution Take 2 mL (0.5 mg total) by nebulization every 12 (twelve) hours Rinse mouth after use. 9/20/24   Marla Mayo DO   buPROPion (WELLBUTRIN) 75 mg tablet take 1 tablet by mouth every morning 7/22/24   Julienne Tucker DO   citalopram (CeleXA) 10 mg tablet Take 1 tablet (10 mg total) by mouth daily 4/6/23   Julienne Tucker,    diazepam (VALIUM) 5 mg tablet Take 1 tablet (5 mg total) by mouth every 8 (eight) hours as needed for anxiety or muscle spasms for up to 10 days 9/7/24 9/17/24  Sosa Tang, DO   fluticasone (FLONASE) 50 mcg/act nasal spray 2 sprays into each nostril daily 6/15/23   Julienne Tucker,    formoterol (PERFOROMIST) 20 MCG/2ML nebulizer solution Take 2 mL (20 mcg total) by nebulization every 12 (twelve) hours 9/20/24   Marla Mayo DO   guaiFENesin (MUCINEX) 600 mg 12 hr tablet Take 1 tablet (600 mg total) by mouth every 12 (twelve) hours 9/20/24   Marla Mayo  DO   metoclopramide (Reglan) 5 mg tablet Take 1 tablet (5 mg total) by mouth every 8 (eight) hours as needed (Nausea, abdominal pain from dysmotility) 11/22/24   Alessandra Woodall DO   mupirocin (BACTROBAN) 2 % ointment Apply topically 2 (two) times a day for 7 days To nares BID for decolonization 11/29/24 12/6/24  Mary Arango PA-C   oxybutynin (DITROPAN-XL) 10 MG 24 hr tablet Take 10 mg by mouth daily at bedtime    Historical Provider, MD   pantoprazole (PROTONIX) 40 mg tablet Take 1 tablet (40 mg total) by mouth daily in the early morning 8/23/24   Charly Carvajal   saccharomyces boulardii (FLORASTOR) 250 mg capsule Take 250 mg by mouth daily    Historical Provider, MD     Allergies   Allergen Reactions    Amoxicillin      Vague rash in the 90s, tolerated zosyn on 9/2020 admission     Codeine      Other reaction(s): Other (See Comments)  n/v    Medical Tape Itching     bandaids    Metoclopramide Hcl Other (See Comments)     Reaction info not provided from facility    Ropinirole Other (See Comments)     Reaction info not provided from facility       Objective :  Temp:  [97.5 °F (36.4 °C)-99.6 °F (37.6 °C)] 98.8 °F (37.1 °C)  HR:  [] 102  BP: (106-147)/(58-71) 127/58  Resp:  [14-41] 37  SpO2:  [77 %-97 %] 97 %  O2 Device: Nasal cannula  Nasal Cannula O2 Flow Rate (L/min):  [4 L/min-6 L/min] 6 L/min    Physical Exam  Constitutional:       Appearance: She is ill-appearing.   HENT:      Head: Normocephalic and atraumatic.      Nose: Nose normal.      Mouth/Throat:      Mouth: Mucous membranes are dry.   Eyes:      Extraocular Movements: Extraocular movements intact.      Conjunctiva/sclera: Conjunctivae normal.   Cardiovascular:      Rate and Rhythm: Tachycardia present. Rhythm irregular.   Pulmonary:      Breath sounds: Wheezing and rhonchi present.      Comments: Currently on BiPAP, tachypnea noted  Abdominal:      Palpations: Abdomen is soft.      Tenderness: There is no abdominal tenderness.  "  Musculoskeletal:         General: No tenderness. Normal range of motion.      Cervical back: Normal range of motion and neck supple.   Skin:     General: Skin is warm and dry.   Neurological:      Mental Status: She is alert.      Comments: Patient awake and alert, following simple commands.  Moving all 4 extremities   Psychiatric:         Mood and Affect: Mood normal.         Behavior: Behavior normal.          Lines/Drains:            Lab Results: I have reviewed the following results:  Results from last 7 days   Lab Units 01/05/25  0323   WBC Thousand/uL 13.03*   HEMOGLOBIN g/dL 9.8*   HEMATOCRIT % 31.8*   PLATELETS Thousands/uL 653*   SEGS PCT % 81*   LYMPHO PCT % 8*   MONO PCT % 9   EOS PCT % 1     Results from last 7 days   Lab Units 01/05/25  0323   SODIUM mmol/L 136   POTASSIUM mmol/L 3.8   CHLORIDE mmol/L 91*   CO2 mmol/L 36*   BUN mg/dL 15   CREATININE mg/dL 0.43*   ANION GAP mmol/L 9   CALCIUM mg/dL 9.2   ALBUMIN g/dL 3.6   TOTAL BILIRUBIN mg/dL 0.23   ALK PHOS U/L 76   ALT U/L 16   AST U/L 17   GLUCOSE RANDOM mg/dL 118     Results from last 7 days   Lab Units 01/05/25  0323   INR  1.28*         No results found for: \"HGBA1C\"  Results from last 7 days   Lab Units 01/05/25  0323   LACTIC ACID mmol/L 0.9   PROCALCITONIN ng/ml 0.12       Imaging Results Review: I reviewed radiology reports from this admission including: CT chest.  Other Study Results Review: No additional pertinent studies reviewed.    Administrative Statements       ** Please Note: This note has been constructed using a voice recognition system. **    "

## 2025-01-05 NOTE — UTILIZATION REVIEW
NOTIFICATION OF INPATIENT ADMISSION   AUTHORIZATION REQUEST   SERVICING FACILITY:   [place] [address]  Tax ID: [id number]  NPI: [id number]   ATTENDING PROVIDER:  Attending Name and NPI#: Rohini Krishna Md [[id number]]  Address: [address] [place] [address]  Phone: [phone]     ADMISSION INFORMATION:  Place of Service: Inpatient Acute [facility]  Place of Service Code: 21  Inpatient Admission Date/Time: 1/5/25  1:22 PM  Discharge Date/Time: No discharge date for patient encounter.  Admitting Diagnosis Code/Description:  COPD exacerbation (HCC) [J44.1]     UTILIZATION REVIEW CONTACT:  Kenia Hebert, Utilization   Network Utilization Review Department  Phone: [phone]  Fax: [phone]  Email: [email or web address]  Contact for approvals/pending authorizations, clinical reviews, and discharge.     PHYSICIAN ADVISORY SERVICES:  Medical Necessity Denial & [id number]-[id number] Review  Phone: [phone]  Fax: [phone]  Email: [email or web address]     DISCHARGE SUPPORT TEAM:  For Patients Discharge Needs & Updates  Phone: [phone] opt. 2 Fax: [phone]  Email: [email or web address]

## 2025-01-05 NOTE — ED NOTES
Ke TOVAR attempted ultrasound IV - not successful, difficult IV access and limited to VICKI AMBRIZ is a limb alert hx breast ca - ED physician aware       Ela De La rCuz RN  01/05/25 2280     Bladder non-tender and non-distended. Urine clear yellow.

## 2025-01-05 NOTE — CONSULTS
"Consultation - Critical Care/ICU   Name: Claire Doherty 74 y.o. female I MRN: [id number]  Unit/Bed#: ICU 12-01 I Date of Admission: 1/5/2025   Date of Service: 1/5/2025 I Hospital Day: 0   Consults  Physician Requesting Evaluation: Dr. Krishna  Reason for Evaluation / Principal Problem: Acute hypoxic respiratory failure    Assessment & Plan  Sepsis (HCC)  As evidenced by leukocytosis, tachycardia, tachypnea with need for continuous BiPAP and imaging significant for pneumonia  Repeat blood cultures x 2 pending  Urine strep and Legionella pending  Sputum cultures pending  Lactic acid normal  Continue antibiotics of ceftriaxone/azithromycin  Trend WBCs Pro-Panda    COPD (chronic obstructive pulmonary disease) ([facility])  In exacerbation in the setting of positive RSV and pneumonia  Continue scheduled nebs, steroids, azithromycin  On continuous BiPAP  Depression  Continue home Celexa and Wellbutrin  Paroxysmal A-fib ([facility])  Currently in Sinus tachycardia in low 100\"s  Continue home Eliquis    Acute on chronic respiratory failure with hypoxia and hypercapnia ([facility])  History of restrictive lung disease secondary to muscular dystrophy  In the setting of + RSV, pneumonia and COPD exacerbation  Chronically wears 2 L nasal cannula and uses BiPAP at night  Currently on continuous BiPAP  Wean FiO2 as able for SpO2 greater than 88%  Aggressive pulmonary toileting    GERD without esophagitis  Continue home PPI    Disposition: Stepdown Level 1    History of Present Illness   lCaire Doherty is a 74 y.o. with a past medical history of restrictive lung disease secondary to muscular dystrophy, chronic respiratory failure on 2 L nasal cannula and BiPAP at night ,PAF, depression, COPD who presented to [facility] for dyspnea.  She was found to be RSV positive and imaging revealed postobstructive atelectasis of the right middle and lower lobe with possible underlying pneumonia.  She was transferred to [facility] for pulmonary " evaluation for possible need for bronchoscopy.  Prior to arrival she had escalating oxygen requirements and is on continuous BiPAP.  Patient was unable to be weaned off of continuous BiPAP and critical care was consulted.  Patient upgraded to level 1 stepdown for further workup and management.    History obtained from chart review and the patient.    Review of Systems: Review of Systems   Constitutional:  Positive for fatigue.   HENT:  Negative for sore throat.    Respiratory:  Positive for cough and shortness of breath. Negative for wheezing.    Cardiovascular:  Negative for chest pain.   Gastrointestinal:  Positive for nausea. Negative for constipation, diarrhea and vomiting.   Genitourinary:  Negative for difficulty urinating.   Musculoskeletal:  Negative for back pain and myalgias.   Skin:  Negative for color change.   Neurological:  Negative for dizziness, weakness and light-headedness.   Psychiatric/Behavioral:  The patient is nervous/anxious.    All other systems reviewed and are negative.      Historical Information   Past Medical History:  No date: Acute kidney failure ([facility])  No date: Acute nonspecific idiopathic pericarditis  No date: Anxiety  No date: Atrial fibrillation ([facility])  2007: Breast cancer ([facility])  No date: Cellulitis of right lower extremity  No date: CHF (congestive heart failure) ([facility])  No date: Chronic pain  No date: Chronic respiratory failure with hypoxia ([facility])  No date: Colitis  No date: Colostomy status ([facility])  No date: Dependence on supplemental oxygen  No date: Depression  No date: Difficult intubation  No date: Excessive daytime sleepiness  No date: Gastroesophageal reflux disease without esophagitis  No date: Gastroparesis  No date: GERD (gastroesophageal reflux disease)  No date: Hyperlipidemia  01/21/2019: Ischemic colitis ([facility])  03/28/2020: Leukocytosis  No date: Muscular dystrophy ([facility])      Comment:  Limb-girdle  No date: Osteoporosis  No date: Other nonrheumatic aortic valve  disorders  No date: Overactive bladder  03/28/2020: Perforated diverticulum  No date: Pericardial effusion (noninflammatory)  No date: Pericarditis  No date: Pneumonitis  No date: 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)  No date: Rheumatic tricuspid insufficiency  No date: Skin cancer  No date: Skin disorder  06/02/2021: Tachycardia  No date: Vitamin D deficiency Past Surgical History:  8/21/2024: CARDIAC CATHETERIZATION; N/A      Comment:  Procedure: Cardiac pci;  Surgeon: Juan Fuller MD;                 Location: [facility];  Service: Cardiology  8/21/2024: CARDIAC CATHETERIZATION; N/A      Comment:  Procedure: Cardiac PCI Stent;  Surgeon: Juan Fuller MD;  [facility];  Service: Cardiology  8/21/2024: CARDIAC CATHETERIZATION; N/A      Comment:  Procedure: Cardiac Coronary Angiogram;  Surgeon: Juan Fuller MD;  [facility];  Service:                Cardiology  1/22/2019: COLONOSCOPY; N/A      Comment:  Procedure: COLONOSCOPY;  Surgeon: Anthony Mejía MD;  Location: [facility];  Service: Colorectal  9/30/2020: CYSTOSCOPY; N/A      Comment:  Procedure: CYSTOSCOPY; BILATERAL URETERAL CATHETER                PLACEMENT, CYSTOTOMY;  Surgeon: Zach Tyler MD;               Location: [facility];  Service: Urology  10/15/2020: FL CYSTOGRAM  9/30/2020: HARTMANS PROCEDURE; N/A      Comment:  Procedure: EXPLORATORY LAPAROTOMY; LYSIS OF ADHESIONS;                WASHOUT PELVIC ABSCESS; COMPLEX SIGMOID COLON RESECTION;                LOOP TRANSVERSE COLOSTOMY;  Surgeon: Thania Jaffe MD;               Location: [facility];  Service: General  9/24/2020: IR DRAINAGE TUBE PLACEMENT  2007: MASTECTOMY; Left      Comment:  left breast mastectomy   No date: TONSILLECTOMY  No date: TOOTH EXTRACTION  No date: TUBAL LIGATION   Current Outpatient Medications   Medication Instructions    acetaminophen  (TYLENOL) 650 mg, Oral, Every 4 hours PRN    albuterol 2.5 mg, Nebulization, Every 4 hours PRN    apixaban (ELIQUIS) 5 mg, Oral, 2 times daily    bisacodyl (FLEET) 10 mg, Rectal, Daily PRN    budesonide (PULMICORT) 0.5 mg, Nebulization, Every 12 hours (RESP), Rinse mouth after use.    buPROPion (WELLBUTRIN) 75 mg, Oral, Every morning    citalopram (CELEXA) 10 mg, Oral, Daily    diazepam (VALIUM) 5 mg, Oral, Every 8 hours PRN    fluticasone (FLONASE) 50 mcg/act nasal spray 2 sprays, Nasal, Daily    formoterol (PERFOROMIST) 20 mcg, Nebulization, Every 12 hours (RESP)    guaiFENesin (MUCINEX) 600 mg, Oral, Every 12 hours scheduled    metoclopramide (REGLAN) 5 mg, Oral, Every 8 hours PRN    mupirocin (BACTROBAN) 2 % ointment Topical, 2 times daily, To nares BID for decolonization    oxybutynin (DITROPAN-XL) 10 mg, Daily at bedtime    pantoprazole (PROTONIX) 40 mg, Oral, Daily (early morning)    saccharomyces boulardii (FLORASTOR) 250 mg, Daily    Allergies   Allergen Reactions    Amoxicillin      Vague rash in the 90s, tolerated zosyn on 2020 admission     Codeine      Other reaction(s): Other (See Comments)  n/v    Medical Tape Itching     bandaids    Metoclopramide Hcl Other (See Comments)     Reaction info not provided from facility    Ropinirole Other (See Comments)     Reaction info not provided from facility      Social History     Tobacco Use    Smoking status: Former     Current packs/day: 0.00     Average packs/day: 1.5 packs/day for 37.0 years (55.5 ttl pk-yrs)     Types: Cigarettes     Start date:      Quit date:      Years since quittin.0    Smokeless tobacco: Never   Vaping Use    Vaping status: Never Used   Substance Use Topics    Alcohol use: Not Currently     Comment: social drinker per allscripts     Drug use: Not Currently     Types: Marijuana     Comment: medical marijuana    Family History   Problem Relation Age of Onset    Other Mother         bone loss    Arthritis Mother     Skin  cancer Father     Hypertension Father         benign     Other Father         bone loss    Muscular dystrophy Father     Hypertension Sister     No Known Problems Sister     Muscular dystrophy Brother         of the limb gridle     Parkinsonism Brother     No Known Problems Brother     No Known Problems Maternal Grandmother     No Known Problems Paternal Grandmother     No Known Problems Maternal Aunt     Muscular dystrophy Family         of the limb girdle          Objective :                   Vitals I/O      Most Recent Min/Max in 24hrs   Temp (!) 100.6 °F (38.1 °C) Temp  Min: 97.5 °F (36.4 °C)  Max: 100.6 °F (38.1 °C)   Pulse 93 Pulse  Min: 93  Max: 110   Resp (!) 32 Resp  Min: 14  Max: 41   /56 BP  Min: 106/58  Max: 147/67   O2 Sat 92 % SpO2  Min: 77 %  Max: 99 %    No intake or output data in the 24 hours ending 01/05/25 1639    Diet NPO    Invasive Monitoring           Physical Exam   Physical Exam  Eyes:      Extraocular Movements: Extraocular movements intact.      Pupils: Pupils are equal, round, and reactive to light.   Skin:     General: Skin is warm and dry.   HENT:      Head: Normocephalic and atraumatic.      Mouth/Throat:      Mouth: Mucous membranes are dry.   Cardiovascular:      Rate and Rhythm: Regular rhythm. Tachycardia present.   Abdominal: General: An ostomy site is present. Bowel sounds are normal. There is ostomy site.There is no distension.      Palpations: Abdomen is soft.      Tenderness: There is no abdominal tenderness.   Constitutional:       Appearance: She is ill-appearing. She is not toxic-appearing.   Pulmonary:      Effort: Tachypnea present.      Comments: Diminished with occasional rhonchi  Neurological:      Mental Status: She is alert and oriented to person, place and time.      Motor: Strength full and intact in all extremities.          Diagnostic Studies        Lab Results: I have reviewed the following results:  CXR-Complete consolidation of the right middle and  right lower lobes   CT chest-Postobstructive atelectasis of the right middle and lower lobe. Underlying pneumonia not excluded. Left lower lobe mucous plugging with distal consolidative airspace disease compatible with an and acute infiltrate secondary to aspiration.       Medications:  Scheduled PRN   apixaban, 5 mg, BID  budesonide, 0.5 mg, Q12H  [START ON 1/6/2025] buPROPion, 75 mg, QAM  cefTRIAXone, 1,000 mg, Q24H  [START ON 1/6/2025] citalopram, 10 mg, Daily  ipratropium, 0.5 mg, TID  levalbuterol, 1.25 mg, TID  methylPREDNISolone sodium succinate, 40 mg, Q8H NANCY  oxybutynin, 10 mg, HS  [START ON 1/6/2025] pantoprazole, 40 mg, Early Morning  [START ON 1/6/2025] saccharomyces boulardii, 250 mg, Daily      acetaminophen, 650 mg, Q6H PRN  ondansetron, 4 mg, Q6H PRN       Continuous          Labs:   CBC    Recent Labs     01/05/25 0323 01/05/25  1539   WBC 13.03*  --    HGB 9.8* 9.5*   HCT 31.8* 28*   *  --      BMP    Recent Labs     01/05/25  0323 01/05/25  1539   SODIUM 136  --    K 3.8  --    CL 91*  --    CO2 36* 39*   AGAP 9  --    BUN 15  --    CREATININE 0.43*  --    CALCIUM 9.2  --        Coags    Recent Labs     01/05/25  0323   INR 1.28*   PTT 42*        Additional Electrolytes  Recent Labs     01/05/25  1539   CAIONIZED 1.22          Blood Gas    No recent results  Recent Labs     01/05/25  1011   PHVEN 7.221*   [id number] 83.7*   PO2VEN 72.6*   [id number] 33.6*   BEVEN 3.9   [id number] 91.1*    LFTs  Recent Labs     01/05/25  0323   ALT 16   AST 17   ALKPHOS 76   ALB 3.6   TBILI 0.23       Infectious  Recent Labs     01/05/25  0323   PROCALCITONI 0.12     Glucose  Recent Labs     01/05/25  0323   GLUC 118

## 2025-01-05 NOTE — ASSESSMENT & PLAN NOTE
History of restrictive lung disease secondary to muscular dystrophy  In the setting of + RSV, pneumonia and COPD exacerbation  Chronically wears 2 L nasal cannula and uses BiPAP at night  Currently on continuous BiPAP  Wean FiO2 as able for SpO2 greater than 88%  Aggressive pulmonary toileting

## 2025-01-05 NOTE — PLAN OF CARE
Problem: PAIN - ADULT  Goal: Verbalizes/displays adequate comfort level or baseline comfort level  Description: Interventions:  - Encourage patient to monitor pain and request assistance  - Assess pain using appropriate pain scale  - Administer analgesics based on type and severity of pain and evaluate response  - Implement non-pharmacological measures as appropriate and evaluate response  - Consider cultural and social influences on pain and pain management  - Notify physician/advanced practitioner if interventions unsuccessful or patient reports new pain  Outcome: Progressing     Problem: INFECTION - ADULT  Goal: Absence or prevention of progression during hospitalization  Description: INTERVENTIONS:  - Assess and monitor for signs and symptoms of infection  - Monitor lab/diagnostic results  - Monitor all insertion sites, i.e. indwelling lines, tubes, and drains  - Monitor endotracheal if appropriate and nasal secretions for changes in amount and color  - [place] appropriate cooling/warming therapies per order  - Administer medications as ordered  - Instruct and encourage patient and family to use good hand hygiene technique  - Identify and instruct in appropriate isolation precautions for identified infection/condition  Outcome: Progressing

## 2025-01-05 NOTE — ASSESSMENT & PLAN NOTE
Possible COPD exacerbation  Nebulizers with ipratropium/Xopenex  Solu-Medrol 40 mg every 8 hours  Continue oxygen, titrate as tolerated  Pulmonology consulted

## 2025-01-05 NOTE — RESPIRATORY THERAPY NOTE
01/05/25 1214   Inhalation Therapy Tx   $ Inhalation Therapy Performed Yes   $ Pulse Oximetry Spot Check Charge Completed   SpO2 94 %  (pt on bipap in line with aerogen)   Pre-Treatment Pulse 102   Pre-Treatment Respirations 16   Duration 15   Breath Sounds Pre-Treatment Bilateral Diminished;Expiratory wheeze   Breath Sounds Post-Treatment Bilateral Diminished   Post-Treatment Pulse 104   Post-Treatment Respirations 18   Delivery Source Aerogen   Position Semi [employer]   Treatment Tolerance Tolerated well   Resp Comments gave albuterol tx early before transfer

## 2025-01-05 NOTE — RESPIRATORY THERAPY NOTE
01/05/25 0922   Inhalation Therapy Tx   $ Inhalation Therapy Performed Yes   SpO2 95 %   Pre-Treatment Pulse 104   Pre-Treatment Respirations 23   Duration 10   Breath Sounds Pre-Treatment Bilateral Diminished;Rhonchi  (few rhonchi  with cough)   Breath Sounds Post-Treatment Bilateral Diminished   Post-Treatment Pulse 109   Post-Treatment Respirations 23   Delivery Source Air;UDN   Position Semi [employer]   Treatment Tolerance Tolerated well   Resp Comments Pulmicort, pt lethargic,requested VBG

## 2025-01-05 NOTE — ED PROVIDER NOTES
Time reflects when diagnosis was documented in both MDM as applicable and the Disposition within this note       Time User Action Codes Description Comment    1/5/2025  4:52 AM Ronny Quiroz [J44.1] COPD exacerbation (HCC)     1/5/2025  4:53 AM Ronny Quiroz [R06.02] Shortness of breath     1/5/2025  4:53 AM Ronny Quiroz [J98.11] Atelectasis of right lung     1/5/2025  4:53 AM Ronny Quiroz Add [R91.8] Infiltrate of lower lobe of left lung present on imaging study     1/5/2025  4:53 AM Ronny Quiroz Remove [R91.8] Infiltrate of lower lobe of left lung present on imaging study     1/5/2025  5:06 AM Ronny Quiroz [D64.9] Anemia     1/5/2025  6:05 AM Ronny Quiroz [J21.0] RSV (acute bronchiolitis due to respiratory syncytial virus)           ED Disposition       ED Disposition   Transfer to Another Facility    Condition   --    Date/Time   Sun Jan 5, 2025  1:05 PM    Comment   Claire Doherty should be transferred out to [facility].               Assessment & Plan       Medical Decision Making  I reviewed the patient's medical chart, PMHx, prior encounters, medications. Reviewed Care Everywhere, patient was found to be RSV positive.    My independent interpretation of CXR: R and L infiltrates appreciated, worse on right.    My DDx includes: COPD exacerbation, CHF exacerbation, ACS, PE, dissection, PTX, arrhythmia, electrolyte abnormality, costochondritis. At risk for pneumonia.    Will perform cardiac workup. CMP to evaluate electrolytes, CBC to evaluate for anemia, troponin to evaluate for cardiac ischemia, ECG. Imaging of chest. Will reassess symptoms.     0336 WBC(!): 13.03  leukocytosis  0351 LACTIC ACID: 0.9  Negative lactic acidosis.  0353 FLU/COVID Rapid Antigen (30 min. TAT) - Preferred screening test in ED  Negative viral panel.  0353 Hemoglobin(!): 9.8  Anemia  0454 cary  0604 Was just notified of a RP done at [facility] on 1/4,  patient found to be RSV positive on this panel    Patient's labs demonstrate leukocytosis of 13, no lactic acidosis.  Patient did have an increased O2 requirement from baseline, baseline is 2 L O2 nasal cannula, initially she was on 4 however this was eventually raised to 6.  She did have improvement with this.  CT scan demonstrated significant atelectasis involving the right lung, I reached out to pulmonology on-call to discuss the possibility of needing bronchoscopy.  They do agree that she would benefit from bronchoscopy, and that she would benefit from transfer.  Patient was treated with a heart nebulizer to help improve atelectasis, as well as Solu-Medrol, treating for COPD exacerbation.  Patient was also treated empirically with cefepime. Was notified by nursing home that she tested positive for RSV on a panel performed at 1/4.    Patient was signed out to Dr. Porter, upon reviewing this chart, patient declined later in encounter with worsening respiratory acidosis, was placed on BiPAP, and transferred to [facility].    Amount and/or Complexity of Data Reviewed  Labs: ordered. Decision-making details documented in ED Course.  Radiology: ordered and independent interpretation performed.    Risk  Prescription drug management.        ED Course as of 01/05/25 2140   Sun Jan 05, 2025   0336 WBC(!): 13.03  leukocytosis   0351 LACTIC ACID: 0.9  Negative lactic acidosis.   0353 FLU/COVID Rapid Antigen (30 min. TAT) - Preferred screening test in ED  Negative viral panel.   0353 Hemoglobin(!): 9.8  Anemia   0454 cary   0604 Was just notified of a RP done at [facility] on 1/4, patient found to be RSV positive on this panel       Medications   ipratropium-albuterol (FOR EMS ONLY) (DUO-NEB) 0.5-2.5 mg/3 mL inhalation solution 3 mL (0 mL Does not apply Given to EMS 1/5/25 0235)   albuterol inhalation solution 10 mg (10 mg Nebulization Given 1/5/25 7082)   ipratropium (ATROVENT) 0.02 % inhalation solution 1 mg (1 mg  Nebulization Given 1/5/25 0339)   sodium chloride 0.9 % inhalation solution 12 mL (12 mL Nebulization Given 1/5/25 0339)   cefepime (MAXIPIME) IVPB (premix in dextrose) 2,000 mg 50 mL (0 mg Intravenous Stopped 1/5/25 0516)   azithromycin (ZITHROMAX) 500 mg in sodium chloride 0.9% 250mL IVPB 500 mg (0 mg Intravenous Stopped 1/5/25 0615)   methylPREDNISolone sodium succinate (Solu-MEDROL) injection 60 mg (60 mg Intravenous Given 1/5/25 4105)   ondansetron (ZOFRAN) injection 4 mg (4 mg Intravenous Given 1/5/25 0557)       ED Risk Strat Scores                          SBIRT 22yo+      Flowsheet Row Most Recent Value   Initial Alcohol Screen: US AUDIT-C     1. How often do you have a drink containing alcohol? 0 Filed at: 01/05/2025 0244   2. How many drinks containing alcohol do you have on a typical day you are drinking?  0 Filed at: 01/05/2025 0244   3b. FEMALE Any Age, or MALE 65+: How often do you have 4 or more drinks on one occassion? 0 Filed at: 01/05/2025 0244   Audit-C Score 0 Filed at: 01/05/2025 0244   BAKARI: How many times in the past year have you...    Used an illegal drug or used a prescription medication for non-medical reasons? Never Filed at: 01/05/2025 0244                            History of Present Illness       Chief Complaint   Patient presents with    Shortness of Breath     Patient presents to ED via ambulance from [facility] w/c/o increased SOB worsened the last 2 days, s/s x 1 week, productive cough w/yellow sputum, low grad temps, denies n/v/d, wheezing 88%2L(dep) - EMS admin duoneb 90%4L, denies CP, reports tightness/straining to breathe       Past Medical History:   Diagnosis Date    Acute kidney failure (HCC)     Acute nonspecific idiopathic pericarditis     Anxiety     Atrial fibrillation (HCC)     Breast cancer (HCC) 2007    Cellulitis of right lower extremity     CHF (congestive heart failure) (HCC)     Chronic pain     Chronic respiratory failure with hypoxia (HCC)     Colitis      Colostomy status ([facility])     Dependence on supplemental oxygen     Depression     Difficult intubation     Excessive daytime sleepiness     Gastroesophageal reflux disease without esophagitis     Gastroparesis     GERD (gastroesophageal reflux disease)     Hyperlipidemia     Ischemic colitis ([facility]) 01/21/2019    Leukocytosis 03/28/2020    Muscular dystrophy (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)     Rheumatic tricuspid insufficiency     Skin cancer     Skin disorder     Tachycardia 06/02/2021    Vitamin D deficiency       Past Surgical History:   Procedure Laterality Date    CARDIAC CATHETERIZATION N/A 8/21/2024    Procedure: Cardiac pci;  Surgeon: Juan Fuller MD;  [facility]: [facility];  Service: Cardiology    CARDIAC CATHETERIZATION N/A 8/21/2024    Procedure: Cardiac PCI Stent;  Surgeon: Juan Fuller MD;  [facility]: [facility];  Service: Cardiology    CARDIAC CATHETERIZATION N/A 8/21/2024    Procedure: Cardiac Coronary Angiogram;  Surgeon: Juan Fuller MD;  Location: [facility];  Service: Cardiology    COLONOSCOPY N/A 1/22/2019    Procedure: COLONOSCOPY;  Surgeon: Anthony Mejía MD;  Location: [facility] GI [facility];  Service: Colorectal    CYSTOSCOPY N/A 9/30/2020    Procedure: CYSTOSCOPY; BILATERAL URETERAL CATHETER PLACEMENT, CYSTOTOMY;  Surgeon: Zach Tyler MD;  Location: [facility];  Service: Urology    FL CYSTOGRAM  10/15/2020    HARTMANS PROCEDURE N/A 9/30/2020    Procedure: EXPLORATORY LAPAROTOMY; LYSIS OF ADHESIONS; WASHOUT PELVIC ABSCESS; COMPLEX SIGMOID COLON RESECTION; LOOP TRANSVERSE COLOSTOMY;  Surgeon: Thania Jaffe MD;  Location: [facility];  Service: General    IR DRAINAGE TUBE PLACEMENT  9/24/2020    MASTECTOMY Left  2007    left breast mastectomy     TONSILLECTOMY      TOOTH EXTRACTION      TUBAL LIGATION        Family History   Problem Relation Age of Onset    Other Mother         bone loss    Arthritis Mother     Skin cancer Father     Hypertension Father         benign     Other Father         bone loss    Muscular dystrophy Father     Hypertension Sister     No Known Problems Sister     Muscular dystrophy Brother         of the limb gridle     Parkinsonism Brother     No Known Problems Brother     No Known Problems Maternal Grandmother     No Known Problems Paternal Grandmother     No Known Problems Maternal Aunt     Muscular dystrophy Family         of the limb girdle      Social History     Tobacco Use    Smoking status: Former     Current packs/day: 0.00     Average packs/day: 1.5 packs/day for 37.0 years (55.5 ttl pk-yrs)     Types: Cigarettes     Start date:      Quit date:      Years since quittin.0    Smokeless tobacco: Never   Vaping Use    Vaping status: Never Used   Substance Use Topics    Alcohol use: Not Currently     Comment: social drinker per allscripts     Drug use: Not Currently     Types: Marijuana     Comment: medical marijuana      E-Cigarette/Vaping    E-Cigarette Use Never User       E-Cigarette/Vaping Substances    Nicotine No     THC No     CBD No     Flavoring No     Other No     Unknown No       I have reviewed and agree with the history as documented.     74-year-old female with a past medical history of COPD, CHF, who presents for shortness of breath, cough.  This is been worsening over the past several days, she is chronically on 3 L nasal cannula however they did have to increase it to 4 L nasal cannula due to hypoxia.  No fevers.  She is coughing up white sputum.  Denies chest pain.  No nausea or vomiting or diarrhea.  No other complaints.  Review of systems otherwise negative.        Review of Systems   Constitutional:  Negative for chills and fever.   HENT:  Negative for  congestion, rhinorrhea and sore throat.    Respiratory:  Positive for cough and shortness of breath.    Cardiovascular:  Negative for chest pain and palpitations.   Gastrointestinal:  Negative for abdominal pain, constipation, diarrhea, nausea and vomiting.   Genitourinary:  Negative for difficulty urinating and flank pain.   Musculoskeletal:  Negative for arthralgias.   Neurological:  Negative for dizziness, weakness, light-headedness and headaches.   Psychiatric/Behavioral:  Negative for agitation, behavioral problems and confusion.    All other systems reviewed and are negative.          Objective       ED Triage Vitals [01/05/25 0239]   Temperature Pulse Blood Pressure Respirations SpO2 Patient Position - Orthostatic VS   98.1 °F (36.7 °C) 98 138/61 22 92 % --      Temp Source Heart Rate Source BP Location FiO2 (%) Pain Score    Temporal Monitor -- -- No Pain      Vitals      Date and Time Temp Pulse SpO2 Resp BP Pain Score FACES Pain Rating User   01/05/25 1214 -- -- 94 % pt on bipap in line with aerogen -- -- -- -- [facility]   01/05/25 1214 99.1 °F (37.3 °C) 104 -- 14 117/66 No Pain -- [facility]   01/05/25 1100 99.2 °F (37.3 °C) 102 92 % 26 123/67 No Pain -- [facility]   01/05/25 1030 -- -- 92 % -- -- -- -- [facility]   01/05/25 1000 99.1 °F (37.3 °C) 104 93 % 30 140/65 No Pain -- [facility]   01/05/25 0922 -- -- 95 % -- -- -- -- [facility]   01/05/25 0900 99.3 °F (37.4 °C) 106 96 % 30 143/71 No Pain -- [facility]   01/05/25 0836 -- 105 94 % 31 -- -- -- [facility]   01/05/25 0829 -- 109 90 % 35 -- -- -- [facility]   01/05/25 0813 99.6 °F (37.6 °C) 110 86 % 33 137/63 No Pain -- [facility]   01/05/25 0810 -- 109 77 % laying supine to change patient 41 -- -- -- [facility]   01/05/25 0700 97.6 °F (36.4 °C) 107 90 % 22 147/67 No Pain -- [facility]   01/05/25 0631 -- -- 93 % -- -- -- -- [facility]   01/05/25 0614 -- -- 90 % -- -- -- -- [facility]   01/05/25 0600 97.5 °F (36.4 °C) 106 84 % 22 106/58 No Pain -- [facility]   01/05/25 0530 98 °F (36.7 °C) 105 93 % 22 109/64 No Pain -- [facility]   01/05/25 0430 98.6 °F (37 °C) 101  95 % 22 -- patient refuses at this time No Pain -- [facility]   01/05/25 0330 98.3 °F (36.8 °C) 99 90 % 22 -- patient refuses BP at this time No Pain -- [facility]   01/05/25 0242 -- -- 92 % 4LNC -- -- -- --    01/05/25 0239 98.1 °F (36.7 °C) 98 92 % 22 138/61 No Pain -- [facility]            Physical Exam  Constitutional:       Appearance: She is well-developed.   HENT:      Head: Normocephalic and atraumatic.   Cardiovascular:      Rate and Rhythm: Normal rate and regular rhythm.      Heart sounds: Normal heart sounds. No murmur heard.     No friction rub.   Pulmonary:      Effort: Pulmonary effort is normal. Tachypnea present. No respiratory distress.      Breath sounds: Examination of the right-upper field reveals rhonchi. Examination of the left-upper field reveals rhonchi. Examination of the right-middle field reveals rhonchi. Examination of the left-middle field reveals rhonchi. Examination of the right-lower field reveals rhonchi. Examination of the left-lower field reveals rhonchi. Rhonchi present. No wheezing or rales.   Abdominal:      General: Bowel sounds are normal. There is no distension.      Palpations: Abdomen is soft.      Tenderness: There is no abdominal tenderness.   Musculoskeletal:         General: Normal range of motion.      Cervical back: Normal range of motion and neck supple.   Skin:     General: Skin is warm.   Neurological:      Mental Status: She is alert and oriented to person, place, and time.      Coordination: Coordination normal.   Psychiatric:         Behavior: Behavior normal.         Thought Content: Thought content normal.         Judgment: Judgment normal.         Results Reviewed       Procedure Component Value Units Date/Time    Blood culture #1 [[id number]] Collected: 01/05/25 0323    Lab Status: Preliminary result Specimen: Blood from Arm, Right Updated: 01/05/25 1501     Blood Culture Received in Microbiology Lab. Culture in Progress.    Blood culture #2 [[id number]] Collected: 01/05/25 0323     Lab Status: Preliminary result Specimen: Blood from Arm, Right Updated: 01/05/25 1501     Blood Culture Received in Microbiology Lab. Culture in Progress.    Blood gas, venous [[id number]]  (Abnormal) Collected: 01/05/25 1011    Lab Status: Final result Specimen: Blood from Arm, Right Updated: 01/05/25 1020     pH, Rodo 7.221     pCO2, Rodo 83.7 mm Hg      pO2, Rodo 72.6 mm Hg      HCO3, Rodo 33.6 mmol/L      Base Excess, Rodo 3.9 mmol/L      O2 Content, Rodo 14.0 ml/dL      O2 HGB, VENOUS 91.1 %     Procalcitonin [[id number]]  (Normal) Collected: 01/05/25 0323    Lab Status: Final result Specimen: Blood from Arm, Right Updated: 01/05/25 0401     Procalcitonin 0.12 ng/ml     B-Type Natriuretic Peptide(BNP) [[id number]]  (Normal) Collected: 01/05/25 0323    Lab Status: Final result Specimen: Blood from Arm, Right Updated: 01/05/25 0357     BNP 53 pg/mL     APTT [[id number]]  (Abnormal) Collected: 01/05/25 0323    Lab Status: Final result Specimen: Blood from Arm, Right Updated: 01/05/25 0356     PTT 42 seconds     Protime-INR [[id number]]  (Abnormal) Collected: 01/05/25 0323    Lab Status: Final result Specimen: Blood from Arm, Right Updated: 01/05/25 0356     Protime 16.5 seconds      INR 1.28    Narrative:      INR Therapeutic Range    Indication                                             INR Range      Atrial Fibrillation                                               2.0-3.0  Hypercoagulable State                                    2.0.2.3  Left Ventricular Asist Device                            2.0-3.0  Mechanical Heart Valve                                  -    Aortic(with afib, MI, embolism, HF, LA enlargement,    and/or coagulopathy)                                     2.0-3.0 (2.5-3.5)     Mitral                                                             2.5-3.5  Prosthetic/Bioprosthetic Heart Valve               2.0-3.0  Venous thromboembolism (VTE: VT, PE        2.0-3.0    Comprehensive metabolic panel  [[id number]]  (Abnormal) Collected: 01/05/25 0323    Lab Status: Final result Specimen: Blood from Arm, Right Updated: 01/05/25 0354     Sodium 136 mmol/L      Potassium 3.8 mmol/L      Chloride 91 mmol/L      CO2 36 mmol/L      ANION GAP 9 mmol/L      BUN 15 mg/dL      Creatinine 0.43 mg/dL      Glucose 118 mg/dL      Calcium 9.2 mg/dL      AST 17 U/L      ALT 16 U/L      Alkaline Phosphatase 76 U/L      Total Protein 6.7 g/dL      Albumin 3.6 g/dL      Total Bilirubin 0.23 mg/dL      eGFR 100 ml/min/1.73sq m     Narrative:      National Kidney Disease Foundation guidelines for Chronic Kidney Disease (CKD):     Stage 1 with normal or high GFR (GFR > 90 mL/min/1.73 square meters)    Stage 2 Mild CKD (GFR = 60-89 mL/min/1.73 square meters)    Stage 3A Moderate CKD (GFR = 45-59 mL/min/1.73 square meters)    Stage 3B Moderate CKD (GFR = 30-44 mL/min/1.73 square meters)    Stage 4 Severe CKD (GFR = 15-29 mL/min/1.73 square meters)    Stage 5 End Stage CKD (GFR <15 mL/min/1.73 square meters)  Note: GFR calculation is accurate only with a steady state creatinine    FLU/COVID Rapid Antigen (30 min. TAT) - Preferred screening test in ED [[id number]]  (Normal) Collected: 01/05/25 0323    Lab Status: Final result Specimen: Nares from Nose Updated: 01/05/25 0353     SARS COV Rapid Antigen Negative     Influenza A Rapid Antigen Negative     Influenza B Rapid Antigen Negative    Narrative:      This test has been performed using the Quidel Altagracia 2 FLU+SARS Antigen test under the Emergency Use Authorization (EUA). This test has been validated by the  and verified by the performing laboratory. The Altagracia uses lateral flow immunofluorescent sandwich assay to detect SARS-COV, Influenza A and Influenza B Antigen.     The Quidel Altagracia 2 SARS Antigen test does not differentiate between SARS-CoV and SARS-CoV-2.     Negative results are presumptive and may be confirmed with a molecular assay, if necessary, for patient  management. Negative results do not rule out SARS-CoV-2 or influenza infection and should not be used as the sole basis for treatment or patient management decisions. A negative test result may occur if the level of antigen in a sample is below the limit of detection of this test.     Positive results are indicative of the presence of viral antigens, but do not rule out bacterial infection or co-infection with other viruses.     All test results should be used as an adjunct to clinical observations and other information available to the provider.    FOR PEDIATRIC PATIENTS - copy/paste COVID Guidelines URL to browser: [email or web address]    Lactic acid, plasma (w/reflex if result > 2.0) [[id number]]  (Normal) Collected: 01/05/25 0323    Lab Status: Final result Specimen: Blood from Arm, Right Updated: 01/05/25 0351     LACTIC ACID 0.9 mmol/L     Narrative:      Result may be elevated if tourniquet was used during collection.    CBC and differential [[id number]]  (Abnormal) Collected: 01/05/25 0323    Lab Status: Final result Specimen: Blood from Arm, Right Updated: 01/05/25 0335     WBC 13.03 Thousand/uL      RBC 3.63 Million/uL      Hemoglobin 9.8 g/dL      Hematocrit 31.8 %      MCV 88 fL      MCH 27.0 pg      MCHC 30.8 g/dL      RDW 14.9 %      MPV 10.1 fL      Platelets 653 Thousands/uL      nRBC 0 /100 WBCs      Segmented % 81 %      Immature Grans % 1 %      Lymphocytes % 8 %      Monocytes % 9 %      Eosinophils Relative 1 %      Basophils Relative 0 %      Absolute Neutrophils 10.44 Thousands/µL      Absolute Immature Grans 0.07 Thousand/uL      Absolute Lymphocytes 1.09 Thousands/µL      Absolute Monocytes 1.23 Thousand/µL      Eosinophils Absolute 0.15 Thousand/µL      Basophils Absolute 0.05 Thousands/µL             CT chest without contrast   Final Interpretation by Ana Bruno MD (01/05 0344)      Postobstructive atelectasis of the right middle and  lower lobe. Underlying pneumonia not excluded      Left lower lobe mucous plugging with distal consolidative airspace disease compatible with an and acute infiltrate secondary to aspiration         Workstation performed: [id number]         XR chest 1 view portable   ED Interpretation by Ronny Quiroz MD (01/05 0310)   Abnormal   R and left pleural effusions?      Final Interpretation by Jazlyn Echols MD (01/05 5775)      Complete consolidation of the right middle and right lower lobes. See subsequent chest CT.            Workstation performed: [id number]             ECG 12 Lead Documentation Only    Date/Time: 1/5/2025 9:34 PM    Performed by: Ronny Quiroz MD  Authorized by: Ronny Quiroz MD    Indications / Diagnosis:  Sob  ECG reviewed by me, the ED Provider: yes    Patient location:  ED  Previous ECG:     Previous ECG:  Unavailable  Interpretation:     Interpretation: abnormal    Rate:     ECG rate:  100    ECG rate assessment: normal    Rhythm:     Rhythm: sinus rhythm    Ectopy:     Ectopy: none    QRS:     QRS axis:  Normal    QRS intervals:  Normal  Conduction:     Conduction: normal    ST segments:     ST segments:  Non-specific  T waves:     T waves: non-specific    Other findings:     Other findings: SAULO    CriticalCare Time    Date/Time: 1/5/2025 6:30 AM    Performed by: Ronny Quiroz MD  Authorized by: Ronny Quiroz MD    Critical care provider statement:     Critical care time (minutes):  30    Critical care start time:  1/5/2025 6:00 AM    Critical care end time:  1/5/2025 6:30 AM    Critical care time was exclusive of:  Separately billable procedures and treating other patients and teaching time    Critical care was necessary to treat or prevent imminent or life-threatening deterioration of the following conditions:  Respiratory failure    Critical care was time spent personally by me on the following activities:   Obtaining history from patient or surrogate, development of treatment plan with patient or surrogate, discussions with consultants, evaluation of patient's response to treatment, examination of patient, ordering and performing treatments and interventions, ordering and review of laboratory studies, ordering and review of radiographic studies, re-evaluation of patient's condition and review of old charts    I assumed direction of critical care for this patient from another provider in my specialty: no        ED Medication and Procedure Management   Prior to Admission Medications   Prescriptions Last Dose Informant Patient Reported? Taking?   acetaminophen (TYLENOL) 325 mg tablet   No No   Sig: Take 2 tablets (650 mg total) by mouth every 4 (four) hours as needed for mild pain   albuterol (2.5 mg/3 mL) 0.083 % nebulizer solution   No No   Sig: Take 3 mL (2.5 mg total) by nebulization every 4 (four) hours as needed for wheezing or shortness of breath   apixaban (ELIQUIS) 5 mg   No No   Sig: Take 1 tablet (5 mg total) by mouth 2 (two) times a day   bisacodyl (FLEET) 10 MG/30ML ENEM   Yes No   Sig: Insert 10 mg into the rectum daily as needed for constipation   buPROPion (WELLBUTRIN) 75 mg tablet   No No   Sig: take 1 tablet by mouth every morning   budesonide (Pulmicort) 0.5 mg/2 mL nebulizer solution   No No   Sig: Take 2 mL (0.5 mg total) by nebulization every 12 (twelve) hours Rinse mouth after use.   citalopram (CeleXA) 10 mg tablet   No No   Sig: Take 1 tablet (10 mg total) by mouth daily   diazepam (VALIUM) 5 mg tablet   No No   Sig: Take 1 tablet (5 mg total) by mouth every 8 (eight) hours as needed for anxiety or muscle spasms for up to 10 days   fluticasone (FLONASE) 50 mcg/act nasal spray   No No   Si sprays into each nostril daily   formoterol (PERFOROMIST) 20 MCG/2ML nebulizer solution   No No   Sig: Take 2 mL (20 mcg total) by nebulization every 12 (twelve) hours   guaiFENesin (MUCINEX) 600 mg 12 hr  tablet   No No   Sig: Take 1 tablet (600 mg total) by mouth every 12 (twelve) hours   metoclopramide (Reglan) 5 mg tablet   No No   Sig: Take 1 tablet (5 mg total) by mouth every 8 (eight) hours as needed (Nausea, abdominal pain from dysmotility)   mupirocin (BACTROBAN) 2 % ointment   No No   Sig: Apply topically 2 (two) times a day for 7 days To nares BID for decolonization   oxybutynin (DITROPAN-XL) 10 MG 24 hr tablet   Yes No   Sig: Take 10 mg by mouth daily at bedtime   pantoprazole (PROTONIX) 40 mg tablet   No No   Sig: Take 1 tablet (40 mg total) by mouth daily in the early morning   saccharomyces boulardii (FLORASTOR) 250 mg capsule   Yes No   Sig: Take 250 mg by mouth daily      Facility-Administered Medications: None     Discharge Medication List as of 1/5/2025  1:05 PM        CONTINUE these medications which have NOT CHANGED    Details   acetaminophen (TYLENOL) 325 mg tablet Take 2 tablets (650 mg total) by mouth every 4 (four) hours as needed for mild pain, Starting Thu 8/22/2024, No Print      albuterol (2.5 mg/3 mL) 0.083 % nebulizer solution Take 3 mL (2.5 mg total) by nebulization every 4 (four) hours as needed for wheezing or shortness of breath, Starting Thu 6/15/2023, Normal      apixaban (ELIQUIS) 5 mg Take 1 tablet (5 mg total) by mouth 2 (two) times a day, Starting Wed 9/11/2024, Normal      bisacodyl (FLEET) 10 MG/30ML ENEM Insert 10 mg into the rectum daily as needed for constipation, Historical Med      budesonide (Pulmicort) 0.5 mg/2 mL nebulizer solution Take 2 mL (0.5 mg total) by nebulization every 12 (twelve) hours Rinse mouth after use., Starting Fri 9/20/2024, No Print      buPROPion (WELLBUTRIN) 75 mg tablet take 1 tablet by mouth every morning, Starting Mon 7/22/2024, Normal      citalopram (CeleXA) 10 mg tablet Take 1 tablet (10 mg total) by mouth daily, Starting Thu 4/6/2023, Normal      diazepam (VALIUM) 5 mg tablet Take 1 tablet (5 mg total) by mouth every 8 (eight) hours as  needed for anxiety or muscle spasms for up to 10 days, Starting Sat 9/7/2024, Until Tue 9/17/2024 at 2359, Print      fluticasone (FLONASE) 50 mcg/act nasal spray 2 sprays into each nostril daily, Starting Thu 6/15/2023, Normal      formoterol (PERFOROMIST) 20 MCG/2ML nebulizer solution Take 2 mL (20 mcg total) by nebulization every 12 (twelve) hours, Starting Fri 9/20/2024, No Print      guaiFENesin (MUCINEX) 600 mg 12 hr tablet Take 1 tablet (600 mg total) by mouth every 12 (twelve) hours, Starting Fri 9/20/2024, No Print      metoclopramide (Reglan) 5 mg tablet Take 1 tablet (5 mg total) by mouth every 8 (eight) hours as needed (Nausea, abdominal pain from dysmotility), Starting Fri 11/22/2024, Normal      mupirocin (BACTROBAN) 2 % ointment Apply topically 2 (two) times a day for 7 days To nares BID for decolonization, Starting Fri 11/29/2024, Until Fri 12/6/2024, No Print      oxybutynin (DITROPAN-XL) 10 MG 24 hr tablet Take 10 mg by mouth daily at bedtime, Historical Med      pantoprazole (PROTONIX) 40 mg tablet Take 1 tablet (40 mg total) by mouth daily in the early morning, Starting Fri 8/23/2024, Normal      saccharomyces boulardii (FLORASTOR) 250 mg capsule Take 250 mg by mouth daily, Historical Med           No discharge procedures on file.  ED SEPSIS DOCUMENTATION   Time reflects when diagnosis was documented in both MDM as applicable and the Disposition within this note       Time User Action Codes Description Comment    1/5/2025  4:52 AM Ronny Quiroz [J44.1] COPD exacerbation (HCC)     1/5/2025  4:53 AM Ronny Quiroz [R06.02] Shortness of breath     1/5/2025  4:53 AM Ronny Quiroz [J98.11] Atelectasis of right lung     1/5/2025  4:53 AM Ronny Quiroz [R91.8] Infiltrate of lower lobe of left lung present on imaging study     1/5/2025  4:53 AM Ronny Quiroz Remove [R91.8] Infiltrate of lower lobe of left lung present on imaging study     1/5/2025  5:06  AM Ronny Quiroz [D64.9] Anemia     1/5/2025  6:05 AM Ronny Quiroz [J21.0] RSV (acute bronchiolitis due to respiratory syncytial virus)                  Ronny Quiroz MD  01/05/25 2356

## 2025-01-05 NOTE — ASSESSMENT & PLAN NOTE
Pneumonia with leukocytosis, tachycardia, tachypnea noted on admission  Patient transferred from [facility] for pulmonary evaluation and possible bronchoscopy  Continue antibiotics with ceftriaxone  Check strep pneumo/Legionella  Trend procalcitonin  Initial lactate normal.  BP currently stable  IV fluids as needed  Sputum culture

## 2025-01-06 PROBLEM — B33.8 RSV INFECTION: Status: ACTIVE | Noted: 2025-01-01

## 2025-01-06 PROBLEM — J98.11 ATELECTASIS: Status: ACTIVE | Noted: 2025-01-01

## 2025-01-06 NOTE — ASSESSMENT & PLAN NOTE
Present in [facility] ED AEB tachycardia, tachypnea, leukocytosis, hypoxia requiring HFNC  Suspect this is 2/2 RSV infection w/? postobstructive pneumonia   RSV (+) on 01/04  Blood cultures pending  UA w/out evidence of infection  Urine strep/legionella pending  MRSA swab pending  Sputum culture pending  Procalcitonin 0.12  Is s/p colloid replacement  Continue IV Cefepime/Vancomycin/Azithromycin D#2  Monitor fever and WBC curve

## 2025-01-06 NOTE — ASSESSMENT & PLAN NOTE
RML/RLL collapse/atelectasis with V/Q mismatch.  Possible aspiration  Currently on HFNT  I recommend wean O2 for SPO2 >88%, incentive spirometry. I recommend Chest PT with theravest, flutter valve. Also continue xopenex/atrovent/3% saline nebs.

## 2025-01-06 NOTE — PROGRESS NOTES
Claire FELECIA Doherty is a 74 y.o. female who is currently ordered Vancomycin IV with management by the Pharmacy Consult service.  Relevant clinical data and objective / subjective history reviewed.  Vancomycin Assessment:  Indication and Goal AUC/Trough: Pneumonia (goal -600, trough >10)  Clinical Status: stable  Micro:     Renal Function:  SCr: 0.47 mg/dL  CrCl: 92.7 mL/min  Renal replacement: Not on dialysis  Days of Therapy: 2  Current Dose: 1000 mg iv q 12 hrs  Vancomycin Plan:  New Dosing: continue 1000 mg iv q 12 hrs  Estimated AUC: 465 mcg*hr/mL  Estimated Trough: 12.2 mcg/mL  Next Level: 1/7/25 @ 0600  Renal Function Monitoring: Daily BMP and UOP  Pharmacy will continue to follow closely for s/sx of nephrotoxicity, infusion reactions and appropriateness of therapy.  BMP and CBC will be ordered per protocol. We will continue to follow the patient’s culture results and clinical progress daily.    Claire Rush, Pharmacist

## 2025-01-06 NOTE — ASSESSMENT & PLAN NOTE
Noted to have wheezing w/increased WOB in [facility] ED  Suspect exacerbation related to RSV infection and ? postobstructive pneumonia  Follows w/Pulmonology as OP  Prescribed Pulmicort and PRN Albuterol nebs as OP - continue scheduled Xopenex/Atrovent/HTS nebs while here  Continue IV Solu Medrol 40 mg Q8H and would plan to start taper today  Continue Azithromycin both for potential atypical pneumonia and COPD exacerbation  Aggressive pulmonary hygiene   Problem: Discharge Planning:  Goal: Participates in care planning  Description: Participates in care planning  Outcome: Ongoing  Goal: Discharged to appropriate level of care  Description: Discharged to appropriate level of care  Outcome: Ongoing     Problem: Anxiety/Stress:  Goal: Level of anxiety will decrease  Description: Level of anxiety will decrease  Outcome: Ongoing     Problem: Cardiac Output - Decreased:  Goal: Hemodynamic stability will improve  Description: Hemodynamic stability will improve  Outcome: Ongoing     Problem: Gas Exchange - Impaired:  Goal: Levels of oxygenation will improve  Description: Levels of oxygenation will improve  Outcome: Ongoing     Problem: Pain:  Goal: Pain level will decrease  Description: Pain level will decrease  Outcome: Ongoing  Goal: Recognizes and communicates pain  Description: Recognizes and communicates pain  Outcome: Ongoing  Goal: Control of acute pain  Description: Control of acute pain  Outcome: Ongoing  Goal: Control of chronic pain  Description: Control of chronic pain  Outcome: Ongoing     Problem: Sleep Pattern Disturbance:  Goal: Appears well-rested  Description: Appears well-rested  Outcome: Ongoing     Problem: Tissue Perfusion, Altered:  Goal: Circulatory function within specified parameters  Description: Circulatory function within specified parameters  Outcome: Ongoing     Problem: Tissue Perfusion - Cardiopulmonary, Altered:  Goal: Absence of angina  Description: Absence of angina  Outcome: Ongoing  Goal: Hemodynamic stability will improve  Description: Hemodynamic stability will improve  Outcome: Ongoing     Problem: Falls - Risk of:  Goal: Will remain free from falls  Description: Will remain free from falls  Outcome: Ongoing  Goal: Absence of physical injury  Description: Absence of physical injury  Outcome: Ongoing     Problem: Impaired respiratory status  Goal: Clear lung sounds  Outcome: Ongoing    Care plan reviewed with patient and family. Patient and family verbalize understanding of the plan of care and contribute to goal setting.

## 2025-01-06 NOTE — PLAN OF CARE
Problem: PAIN - ADULT  Goal: Verbalizes/displays adequate comfort level or baseline comfort level  Description: Interventions:  - Encourage patient to monitor pain and request assistance  - Assess pain using appropriate pain scale  - Administer analgesics based on type and severity of pain and evaluate response  - Implement non-pharmacological measures as appropriate and evaluate response  - Consider cultural and social influences on pain and pain management  - Notify physician/advanced practitioner if interventions unsuccessful or patient reports new pain  Outcome: Progressing     Problem: INFECTION - ADULT  Goal: Absence or prevention of progression during hospitalization  Description: INTERVENTIONS:  - Assess and monitor for signs and symptoms of infection  - Monitor lab/diagnostic results  - Monitor all insertion sites, i.e. indwelling lines, tubes, and drains  - Monitor endotracheal if appropriate and nasal secretions for changes in amount and color  - [place] appropriate cooling/warming therapies per order  - Administer medications as ordered  - Instruct and encourage patient and family to use good hand hygiene technique  - Identify and instruct in appropriate isolation precautions for identified infection/condition  Outcome: Progressing     Problem: SAFETY ADULT  Goal: Patient will remain free of falls  Description: INTERVENTIONS:  - Educate patient/family on patient safety including physical limitations  - Instruct patient to call for assistance with activity   - Consult OT/PT to assist with strengthening/mobility   - Keep Call bell within reach  - Keep bed low and locked with side rails adjusted as appropriate  - Keep care items and personal belongings within reach  - Initiate and maintain comfort rounds  - Make Fall Risk Sign visible to staff  - Offer Toileting every 2 Hours, in advance of need  - Initiate/Maintain bed alarm  - Obtain necessary fall risk management equipment  - Apply yellow socks and  bracelet for high fall risk patients  - Consider moving patient to room near nurses station  Outcome: Progressing  Goal: Maintain or return to baseline ADL function  Description: INTERVENTIONS:  -  Assess patient's ability to carry out ADLs; assess patient's baseline for ADL function and identify physical deficits which impact ability to perform ADLs (bathing, care of mouth/teeth, toileting, grooming, dressing, etc.)  - Assess/evaluate cause of self-care deficits   - Assess range of motion  - Assess patient's mobility; develop plan if impaired  - Assess patient's need for assistive devices and provide as appropriate  - Encourage maximum independence but intervene and supervise when necessary  - Involve family in performance of ADLs  - Assess for home care needs following discharge   - Consider OT consult to assist with ADL evaluation and planning for discharge  - Provide patient education as appropriate  Outcome: Progressing  Goal: Maintains/Returns to pre admission functional level  Description: INTERVENTIONS:  - Perform AM-PAC 6 Click Basic Mobility/ Daily Activity assessment daily.  - Set and communicate daily mobility goal to care team and patient/family/caregiver.   - Collaborate with rehabilitation services on mobility goals if consulted  - Perform Range of Motion 2 times a day.  - Reposition patient every 2 hours.  - Out of bed to chair 2 times a day   - Out of bed for meals 2 times a day  - Out of bed for toileting  - Record patient progress and toleration of activity level   Outcome: Progressing     Problem: DISCHARGE PLANNING  Goal: Discharge to home or other facility with appropriate resources  Description: INTERVENTIONS:  - Identify barriers to discharge w/patient and caregiver  - Arrange for needed discharge resources and transportation as appropriate  - Identify discharge learning needs (meds, wound care, etc.)  - Arrange for interpretive services to assist at discharge as needed  - Refer to Case  Management Department for coordinating discharge planning if the patient needs post-hospital services based on physician/advanced practitioner order or complex needs related to functional status, cognitive ability, or social support system  Outcome: Progressing     Problem: Knowledge Deficit  Goal: Patient/family/caregiver demonstrates understanding of disease process, treatment plan, medications, and discharge instructions  Description: Complete learning assessment and assess knowledge base.  Interventions:  - Provide teaching at level of understanding  - Provide teaching via preferred learning methods  Outcome: Progressing     Problem: RESPIRATORY - ADULT  Goal: Achieves optimal ventilation and oxygenation  Description: INTERVENTIONS:  - Assess for changes in respiratory status  - Assess for changes in mentation and behavior  - Position to facilitate oxygenation and minimize respiratory effort  - Oxygen administered by appropriate delivery if ordered  - Initiate smoking cessation education as indicated  - Encourage broncho-pulmonary hygiene including cough, deep breathe, Incentive Spirometry  - Assess the need for suctioning and aspirate as needed  - Assess and instruct to report SOB or any respiratory difficulty  - Respiratory Therapy support as indicated  Outcome: Progressing     Problem: SKIN/TISSUE INTEGRITY - ADULT  Goal: Skin Integrity remains intact(Skin Breakdown Prevention)  Description: Assess:  -Perform Collins assessment every shift  -Clean and moisturize skin every shift  -Inspect skin when repositioning, toileting, and assisting with ADLS  -Assess under medical devices such as mepilex every shift  -Assess extremities for adequate circulation and sensation     Bed Management:  -Have minimal linens on bed & keep smooth, unwrinkled  -Change linens as needed when moist or perspiring  -Avoid sitting or lying in one position for more than 2 hours while in bed  -Keep HOB at 30 degrees     Toileting:  -Offer  bedside commode  -Assess for incontinence every 2 hours  -Use incontinent care products after each incontinent episode    Activity:  -Mobilize patient 1 times a day  -Encourage activity and walks on unit  -Encourage or provide ROM exercises   -Turn and reposition patient every 2 Hours  -Use appropriate equipment to lift or move patient in bed  -Instruct/ Assist with weight shifting every 2 hours when out of bed in chair  -Consider limitation of chair time 2 hour intervals    Skin Care:  -Avoid use of baby powder, tape, friction and shearing, hot water or constrictive clothing  -Relieve pressure over bony prominences using mepilex  -Do not massage red bony areas    Next Steps:  -Teach patient strategies to minimize risks such as repositioning   -Consider consults to  interdisciplinary teams such as PT/OT  Outcome: Progressing     Problem: MUSCULOSKELETAL - ADULT  Goal: Maintain or return mobility to safest level of function  Description: INTERVENTIONS:  - Assess patient's ability to carry out ADLs; assess patient's baseline for ADL function and identify physical deficits which impact ability to perform ADLs (bathing, care of mouth/teeth, toileting, grooming, dressing, etc.)  - Assess/evaluate cause of self-care deficits   - Assess range of motion  - Assess patient's mobility  - Assess patient's need for assistive devices and provide as appropriate  - Encourage maximum independence but intervene and supervise when necessary  - Involve family in performance of ADLs  - Assess for home care needs following discharge   - Consider OT consult to assist with ADL evaluation and planning for discharge  - Provide patient education as appropriate  Outcome: Progressing     Problem: Prexisting or High Potential for Compromised Skin Integrity  Goal: Skin integrity is maintained or improved  Description: INTERVENTIONS:  - Identify patients at risk for skin breakdown  - Assess and monitor skin integrity  - Assess and monitor  nutrition and hydration status  - Monitor labs   - Assess for incontinence   - Turn and reposition patient  - Assist with mobility/ambulation  - Relieve pressure over bony prominences  - Avoid friction and shearing  - Provide appropriate hygiene as needed including keeping skin clean and dry  - Evaluate need for skin moisturizer/barrier cream  - Collaborate with interdisciplinary team   - Patient/family teaching  - Consider wound care consult   Outcome: Progressing

## 2025-01-06 NOTE — ASSESSMENT & PLAN NOTE
Wt Readings from Last 3 Encounters:   01/05/25 71.2 kg (156 lb 15.5 oz)   01/05/25 75 kg (165 lb 5.5 oz)   11/29/24 75.2 kg (165 lb 12.6 oz)     TTE from 09/2024 revealed EF 65% w/mild TR and mildly elevated RVSP at 45  Does not appear to be on diuretic as OP  Monitor I/O and daily weights

## 2025-01-06 NOTE — ASSESSMENT & PLAN NOTE
In setting of RSV infection with wheezing and RML/RLL collapse/atelectasis with V/Q mismatch.  Possible aspiration  Currently on HFNT  I recommend wean O2 for SPO2 >88%, incentive spirometry. I recommend Chest PT with theravest, flutter valve. Also continue xopenex/atrovent/3% saline nebs.  She is DNR/DNI  Aspiration precautions/SLP eval  She has RP2 panel with RSV, negative procal x 2. Consider stopping antibiotics but defer to primary team  Regarding steroids her lung disease is primarily related to restrictive lung disease/muscular dystrophy, and currently has hypoxemia related to her RML/RLL atelectasis.  She is wheezing in the setting of the viral infection.  Consider reducing solumedrol to a prednisone course with improvement.  Continue BiPAP qHS

## 2025-01-06 NOTE — PHYSICAL THERAPY NOTE
Physical Therapy Cancellation Note       01/06/25 1147   PT Last Visit   PT Visit Date 01/06/25   Note Type   Note type Cancelled Session   Cancel Reasons Medical status     Amanda Chamberlain

## 2025-01-06 NOTE — ASSESSMENT & PLAN NOTE
Presented to [facility] ED from [facility] w/worsening SOB x2D, audible wheezing, low grade fevers, and cough w/yellow sputum production  Recently hospitalized from 11/24-11/29 for COVID-19 infection/COPD exacerbation  Wears 2L NC at baseline and BIPAP qHS - currently on HFNC  Suspect this is 2/2 COPD exacerbation and RSV infection w/? postobstructive pneumonia vs mucus plugging w/subsequent atelectasis in setting of underlying RLD  Imaging revealed postobstructive atelectasis of right middle and lower lobe - underlying pneumonia not excluded; Left lower lobe mucous plugging with distal consolidative airspace disease compatible with an and acute infiltrate secondary to aspiration  RSV (+) on 01/04  Urine strep/legionella pending  Sputum culture pending  Continue IV Cefepime/Vancomycin/Azithromycin D#2 given concern for resistance/TOMAS in setting of recent hospitalization/COVID-19/current RSV infection   Continue IV Solu Medrol 40 mg Q8H for COPD exacerbation  Continue scheduled Xopenex/Atrovent/HTS nebs   Continue Mucinex and Flutter Valve to aid in secretion mobilization  Transferred from [facility] for possible bronch today - may not require if AM CXR improved w/aggressive pulmonary toilet   Aggressive pulmonary hygiene  Titrate FiO2 for SpO2 > 88%

## 2025-01-06 NOTE — PLAN OF CARE
Problem: PAIN - ADULT  Goal: Verbalizes/displays adequate comfort level or baseline comfort level  Description: Interventions:  - Encourage patient to monitor pain and request assistance  - Assess pain using appropriate pain scale  - Administer analgesics based on type and severity of pain and evaluate response  - Implement non-pharmacological measures as appropriate and evaluate response  - Consider cultural and social influences on pain and pain management  - Notify physician/advanced practitioner if interventions unsuccessful or patient reports new pain  Outcome: Progressing     Problem: INFECTION - ADULT  Goal: Absence or prevention of progression during hospitalization  Description: INTERVENTIONS:  - Assess and monitor for signs and symptoms of infection  - Monitor lab/diagnostic results  - Monitor all insertion sites, i.e. indwelling lines, tubes, and drains  - Monitor endotracheal if appropriate and nasal secretions for changes in amount and color  - [place] appropriate cooling/warming therapies per order  - Administer medications as ordered  - Instruct and encourage patient and family to use good hand hygiene technique  - Identify and instruct in appropriate isolation precautions for identified infection/condition  Outcome: Progressing     Problem: SAFETY ADULT  Goal: Patient will remain free of falls  Description: INTERVENTIONS:  - Educate patient/family on patient safety including physical limitations  - Instruct patient to call for assistance with activity   - Consult OT/PT to assist with strengthening/mobility   - Keep Call bell within reach  - Keep bed low and locked with side rails adjusted as appropriate  - Keep care items and personal belongings within reach  - Initiate and maintain comfort rounds  - Make Fall Risk Sign visible to staff  - Offer Toileting every 2 Hours, in advance of need  - Initiate/Maintain bed alarm  - Obtain necessary fall risk management equipment:   - Apply yellow socks and  bracelet for high fall risk patients  - Consider moving patient to room near nurses station  Outcome: Progressing  Goal: Maintain or return to baseline ADL function  Description: INTERVENTIONS:  -  Assess patient's ability to carry out ADLs; assess patient's baseline for ADL function and identify physical deficits which impact ability to perform ADLs (bathing, care of mouth/teeth, toileting, grooming, dressing, etc.)  - Assess/evaluate cause of self-care deficits   - Assess range of motion  - Assess patient's mobility; develop plan if impaired  - Assess patient's need for assistive devices and provide as appropriate  - Encourage maximum independence but intervene and supervise when necessary  - Involve family in performance of ADLs  - Assess for home care needs following discharge   - Consider OT consult to assist with ADL evaluation and planning for discharge  - Provide patient education as appropriate  Outcome: Progressing  Goal: Maintains/Returns to pre admission functional level  Description: INTERVENTIONS:  - Perform AM-PAC 6 Click Basic Mobility/ Daily Activity assessment daily.  - Set and communicate daily mobility goal to care team and patient/family/caregiver.   - Collaborate with rehabilitation services on mobility goals if consulted  - Perform Range of Motion 3 times a day.  - Reposition patient every 2 hours.  - Dangle patient 3 times a day  - Stand patient 3 times a day  - Ambulate patient 3 times a day  - Out of bed to chair 3 times a day   - Out of bed for meals 3 times a day  - Out of bed for toileting  - Record patient progress and toleration of activity level   Outcome: Progressing     Problem: DISCHARGE PLANNING  Goal: Discharge to home or other facility with appropriate resources  Description: INTERVENTIONS:  - Identify barriers to discharge w/patient and caregiver  - Arrange for needed discharge resources and transportation as appropriate  - Identify discharge learning needs (meds, wound care,  etc.)  - Arrange for interpretive services to assist at discharge as needed  - Refer to Case Management Department for coordinating discharge planning if the patient needs post-hospital services based on physician/advanced practitioner order or complex needs related to functional status, cognitive ability, or social support system  Outcome: Progressing     Problem: Knowledge Deficit  Goal: Patient/family/caregiver demonstrates understanding of disease process, treatment plan, medications, and discharge instructions  Description: Complete learning assessment and assess knowledge base.  Interventions:  - Provide teaching at level of understanding  - Provide teaching via preferred learning methods  Outcome: Progressing     Problem: RESPIRATORY - ADULT  Goal: Achieves optimal ventilation and oxygenation  Description: INTERVENTIONS:  - Assess for changes in respiratory status  - Assess for changes in mentation and behavior  - Position to facilitate oxygenation and minimize respiratory effort  - Oxygen administered by appropriate delivery if ordered  - Initiate smoking cessation education as indicated  - Encourage broncho-pulmonary hygiene including cough, deep breathe, Incentive Spirometry  - Assess the need for suctioning and aspirate as needed  - Assess and instruct to report SOB or any respiratory difficulty  - Respiratory Therapy support as indicated  Outcome: Progressing     Problem: SKIN/TISSUE INTEGRITY - ADULT  Goal: Skin Integrity remains intact(Skin Breakdown Prevention)  Description: Assess:  -Perform Collins assessment every shift  -Clean and moisturize skin every shift  -Inspect skin when repositioning, toileting, and assisting with ADLS  -Assess under medical devices such as mepilex every shift  -Assess extremities for adequate circulation and sensation     Bed Management:  -Have minimal linens on bed & keep smooth, unwrinkled  -Change linens as needed when moist or perspiring  -Avoid sitting or lying in one  position for more than 2 hours while in bed  -Keep HOB at 30 degrees     Toileting:  -Offer bedside commode  -Assess for incontinence every 2  -Use incontinent care products after each incontinent episode such as moisture barrier  Moisture barrier  Activity:  -Mobilize patient 3 times a day  -Encourage activity and walks on unit  -Encourage or provide ROM exercises   -Turn and reposition patient every 2 Hours  -Use appropriate equipment to lift or move patient in bed  -Instruct/ Assist with weight shifting every 15 mins. when out of bed in chair  -Consider limitation of chair time 2 hour intervals    Skin Care:  -Avoid use of baby powder, tape, friction and shearing, hot water or constrictive clothing  -Relieve pressure over bony prominences using mepilex  -Do not massage red bony areas    Next Steps:  -Teach patient strategies to minimize risks such as good nutrition   -Consider consults to  interdisciplinary teams such as nutrition  Outcome: Progressing     Problem: MUSCULOSKELETAL - ADULT  Goal: Maintain or return mobility to safest level of function  Description: INTERVENTIONS:  - Assess patient's ability to carry out ADLs; assess patient's baseline for ADL function and identify physical deficits which impact ability to perform ADLs (bathing, care of mouth/teeth, toileting, grooming, dressing, etc.)  - Assess/evaluate cause of self-care deficits   - Assess range of motion  - Assess patient's mobility  - Assess patient's need for assistive devices and provide as appropriate  - Encourage maximum independence but intervene and supervise when necessary  - Involve family in performance of ADLs  - Assess for home care needs following discharge   - Consider OT consult to assist with ADL evaluation and planning for discharge  - Provide patient education as appropriate  Outcome: Progressing     Problem: Prexisting or High Potential for Compromised Skin Integrity  Goal: Skin integrity is maintained or  improved  Description: INTERVENTIONS:  - Identify patients at risk for skin breakdown  - Assess and monitor skin integrity  - Assess and monitor nutrition and hydration status  - Monitor labs   - Assess for incontinence   - Turn and reposition patient  - Assist with mobility/ambulation  - Relieve pressure over bony prominences  - Avoid friction and shearing  - Provide appropriate hygiene as needed including keeping skin clean and dry  - Evaluate need for skin moisturizer/barrier cream  - Collaborate with interdisciplinary team   - Patient/family teaching  - Consider wound care consult   Outcome: Progressing

## 2025-01-06 NOTE — SPEECH THERAPY NOTE
Speech Language/Pathology  Speech/Language Pathology  Assessment    Patient Name: Claire Doherty  Today's Date: 1/6/2025     Problem List  Principal Problem:    Acute on chronic respiratory failure with hypoxia and hypercapnia (HCC)  Active Problems:    Ambulatory dysfunction    Urinary incontinence    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)    GERD without esophagitis    Severe sepsis (HCC)    Anemia    Depression    Ductal carcinoma in situ (DCIS) of left breast    Colostomy status (HCC)    COPD with acute exacerbation (HCC)    Severe protein-calorie malnutrition (HCC)    Paroxysmal A-fib (HCC)    Chronic diastolic heart failure (HCC)    Past Medical History  Past Medical History:   Diagnosis Date    Acute kidney failure (HCC)     Acute nonspecific idiopathic pericarditis     Anxiety     Atrial fibrillation (HCC)     Breast cancer (HCC) 2007    Cellulitis of right lower extremity     CHF (congestive heart failure) (HCC)     Chronic pain     Chronic respiratory failure with hypoxia (HCC)     Colitis     Colostomy status (HCC)     Dependence on supplemental oxygen     Depression     Difficult intubation     Excessive daytime sleepiness     Gastroesophageal reflux disease without esophagitis     Gastroparesis     GERD (gastroesophageal reflux disease)     Hyperlipidemia     Ischemic colitis (HCC) 01/21/2019    Leukocytosis 03/28/2020    Muscular dystrophy (HCC)     Limb-girdle    Osteoporosis     Other nonrheumatic aortic valve disorders     Overactive bladder     Perforated diverticulum 03/28/2020    Pericardial effusion (noninflammatory)     Pericarditis     Pneumonitis     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)     Rheumatic tricuspid insufficiency     Skin cancer     Skin disorder     Tachycardia 06/02/2021    Vitamin D deficiency      Past Surgical History  Past Surgical History:   Procedure Laterality Date    CARDIAC CATHETERIZATION N/A 8/21/2024    Procedure: Cardiac pci;   Surgeon: Juan Fuller MD;  [facility];  Service: Cardiology    CARDIAC CATHETERIZATION N/A 8/21/2024    Procedure: Cardiac PCI Stent;  Surgeon: Juan Fuller MD;  [facility];  Service: Cardiology    CARDIAC CATHETERIZATION N/A 8/21/2024    Procedure: Cardiac Coronary Angiogram;  Surgeon: Juan Fuller MD;  [facility];  Service: Cardiology    COLONOSCOPY N/A 1/22/2019    Procedure: COLONOSCOPY;  Surgeon: Anthony Mejía MD;  Location: [facility];  Service: Colorectal    CYSTOSCOPY N/A 9/30/2020    Procedure: CYSTOSCOPY; BILATERAL URETERAL CATHETER PLACEMENT, CYSTOTOMY;  Surgeon: Zach Tyler MD;  Location: [facility];  Service: Urology    FL CYSTOGRAM  10/15/2020    HARTMANS PROCEDURE N/A 9/30/2020    Procedure: EXPLORATORY LAPAROTOMY; LYSIS OF ADHESIONS; WASHOUT PELVIC ABSCESS; COMPLEX SIGMOID COLON RESECTION; LOOP TRANSVERSE COLOSTOMY;  Surgeon: Thania Jaffe MD;  Location: [facility];  Service: General    IR DRAINAGE TUBE PLACEMENT  9/24/2020    MASTECTOMY Left 2007    left breast mastectomy     TONSILLECTOMY      TOOTH EXTRACTION      TUBAL LIGATION        Bedside Swallow Evaluation:    Summary:  Pt presented w/ mild oral dysphagia and suspect pharyngeal dysphagia WNL. Positioned upright and alert. Pt accepted trials of ice chips, puree, soft solids, and thin liquids via straw with single/successive sips. Mastication was mildly prolonged however eventual functional breakdown. Bolus control, formation, and transfer were WNL. Swallows appeared prompt. No immediate cough or throat clear. Pt did have intermittent dry cough. Denied difficulties with chewing and swallowing prior to admission. Reported typically takes medications whole with thin liquids if large prefers crushed. SpO2 88- 93%and RR 20-41 varied throughout assessment, pt very verbose verbal cues to keep conversing at a minimum while chewing and swallowing. ST reviewed current  diet recommendations and safe swallow strategies as listed below pt understood.     Recommendations:  Diet: Regular with softer selections  Liquid:Thin   Meds: As tolerated   Supervision: Intermittent   Positioning:Upright  Strategies: slow rate, alternate liquids with solids, swallow prior to additional po.   Pt to take PO/Meds only when fully alert and upright.   Oral care  Aspiration precautions  Reflux precautions  Therapy Prognosis:fair   Prognosis considerations:age, medical status  Frequency:2-5 times weekly as indicated    Consider consult w/:  Rehab  Nutrition  Pulmonary following     Goal(s):  Dysphagia LTG  -Patient will demonstrate safe and effective oral intake (without overt s/s significant oral/pharyngeal dysphagia including s/s penetration or aspiration) for the highest appropriate diet level.     1.Pt will tolerate least restrictive diet w/out s/s aspiration or oral/pharyngeal difficulties.   2.Pt will will effectively manipulate/masticate and transfer purees/solids w/out s/s dysphagia/aspiration.   3.Pt will tolerate thin liquids w/out s/s aspiration.   -If indicated, patient will comply with a Video/Modified Barium Swallow study for more complete assessment of swallowing anatomy/physiology/aspiration risk and to assess efficacy of treatment techniques so as to best guide treatment plan     H&P/Admit info/ pertinent provider notes: (PMH noted above)  Claire Doherty is a 74 y.o. female with a PMH of COPD on home oxygen, chronic respiratory failure, paroxysmal atrial fibrillation, depression presents with shortness of breath.  Patient was sent to our campus from [facility] for pulmonology evaluation and possible need for bronchoscopy.  Patient currently on BiPAP.  Unable to give history given acuity of patient.     Special Studies:  XR CHEST PORTABLE ICU 01/06/2025  IMPRESSION:  Persistent right lower lung atelectasis.  CT CHEST WITHOUT IV CONTRAST 01/05/2025  Postobstructive  "atelectasis of the right middle and lower lobe. Underlying pneumonia not excluded  Left lower lobe mucous plugging with distal consolidative airspace disease compatible with an and acute infiltrate secondary to aspiration    Procalcitonin: 0.10                          01/06/2025   WBC: 3.05                         01/06/2025     Code Status : Level 3 DNR/DNI    Previous MBS: none     Patient's goal:\" I really don't want to be on that [facility] soft diet \"    Did the pt report pain? no  If yes, was nursing notified/was it addressed? N/a    Reason for consult:  R/o aspiration  Determine safest and least restrictive diet  respiratory compromise  Suspected current pna  h/o dysphagia     Precautions:  Contact  Droplet    Food Allergies: No known    Current Diet: NPO sips with meds    Premorbid diet: Regular and thin    O2 requirement: HFNC 35   Social/Prior living Lives in facility    Voice/Speech: WNL   Follows commands: Basic    Cognitive status: Alert      Oral [facility] exam:  Dentition:Natural adequate   Lips (VII):WNL  Tongue (XII): midline   Mandible (V):adequate ROM  Face/oral sensation (V):symmetrical   Velum (X):WNL    Items administered:  Ice chips, puree, soft solids, and thin liquids via straw with single/successive sips     Oral stage:  Lip closure:WNL  Mastication: WFL  Bolus formation: WNL  Bolus control:WNL  Transfer:WNL    Pharyngeal stage:  Swallow promptness: prompt   Laryngeal rise:adequate   No overt s/s aspiration    Esophageal stage:  No s/s reported  H/o GERD    Aspiration precautions posted    Results d/w:  Pt, nursing, physician,            "

## 2025-01-06 NOTE — PROGRESS NOTES
Progress Note - Critical Care/ICU   Name: Claire Doherty 74 y.o. female I MRN: [id number]  Unit/Bed#: ICU 12-01 I Date of Admission: 1/5/2025   Date of Service: 1/6/2025 I Hospital Day: 1      Assessment & Plan  Acute on chronic respiratory failure with hypoxia and hypercapnia (HCC)  Presented to [facility] ED from [facility] w/worsening SOB x2D, audible wheezing, low grade fevers, and cough w/yellow sputum production  Recently hospitalized from 11/24-11/29 for COVID-19 infection/COPD exacerbation  Wears 2L NC at baseline and BIPAP qHS - currently on HFNC  Suspect this is 2/2 COPD exacerbation and RSV infection w/? postobstructive pneumonia vs mucus plugging w/subsequent atelectasis in setting of underlying RLD  Imaging revealed postobstructive atelectasis of right middle and lower lobe - underlying pneumonia not excluded; Left lower lobe mucous plugging with distal consolidative airspace disease compatible with an and acute infiltrate secondary to aspiration  RSV (+) on 01/04  Urine strep/legionella pending  Sputum culture pending  Continue IV Cefepime/Vancomycin/Azithromycin D#2 given concern for resistance/TOMAS in setting of recent hospitalization/COVID-19/current RSV infection   Continue IV Solu Medrol 40 mg Q8H for COPD exacerbation  Continue scheduled Xopenex/Atrovent/HTS nebs   Continue Mucinex and Flutter Valve to aid in secretion mobilization  Transferred from [facility] for possible bronch today - may not require if AM CXR improved w/aggressive pulmonary toilet   Aggressive pulmonary hygiene  Titrate FiO2 for SpO2 > 88%  Severe sepsis (HCC)  Present in [facility] ED AEB tachycardia, tachypnea, leukocytosis, hypoxia requiring HFNC  Suspect this is 2/2 RSV infection w/? postobstructive pneumonia   RSV (+) on 01/04  Blood cultures pending  UA w/out evidence of infection  Urine strep/legionella pending  MRSA swab pending  Sputum culture pending  Procalcitonin 0.12  Is s/p colloid replacement  Continue IV  Cefepime/Vancomycin/Azithromycin D#2  Monitor fever and WBC curve    COPD with acute exacerbation (HCC)  Noted to have wheezing w/increased WOB in [facility] ED  Suspect exacerbation related to RSV infection and ? postobstructive pneumonia  Follows w/Pulmonology as OP  Prescribed Pulmicort and PRN Albuterol nebs as OP - continue scheduled Xopenex/Atrovent/HTS nebs while here  Continue IV Solu Medrol 40 mg Q8H and would plan to start taper today  Continue Azithromycin both for potential atypical pneumonia and COPD exacerbation  Aggressive pulmonary hygiene  Anemia  Hgb 9.8 on admission  Baseline Hgb appears to be around 11-12  Suspect this is 2/2 severe sepsis/pneumonia w/likely underlying chronic disease  Iron panel, folate, B12 pending  No evidence of active bleeding  Trend Hgb and transfuse for Hgb < 7  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)  Follows w/Pulmonology as OP  Aggressive pulmonary hygiene  Depression  Continue home Celexa and Wellbutrin  Paroxysmal A-fib (HCC)  Currently in sinus tachycardia (low 100s)  Does not appear to be on rate-controlling agents  Continue home Eliquis  Continue cardiac monitoring    GERD without esophagitis  Continue home PPI    Ambulatory dysfunction  WCB at baseline   PT/OT  Urinary incontinence  Continue home Oxybutynin  Ductal carcinoma in situ (DCIS) of left breast  Is s/p L mastectomy in 2007  Now w/R breast mass concerning for malignancy per review of notes from 08/2024  Will need continued follow-up as OP  Colostomy status (HCC)  Is s/p Wynn's procedure w/colostomy creation in 2020  Wound care consulted to assist w/management  Monitor output  Severe protein-calorie malnutrition (HCC)  Malnutrition Findings:                                 BMI Findings:           Body mass index is 30.66 kg/m².     Nutrition consulted - appreciate recommendations  Chronic diastolic heart failure (HCC)  Wt Readings from Last 3 Encounters:   01/05/25 71.2 kg (156 lb 15.5 oz)    01/05/25 75 kg (165 lb 5.5 oz)   11/29/24 75.2 kg (165 lb 12.6 oz)     TTE from 09/2024 revealed EF 65% w/mild TR and mildly elevated RVSP at 45  Does not appear to be on diuretic as OP  Monitor I/O and daily weights    Disposition: Stepdown Level 1    ICU Core Measures     A: Assess, Prevent, and Manage Pain Has pain been assessed? Yes  Need for changes to pain regimen? No   B: Both SAT/SAT  N/A   C: Choice of Sedation RASS Goal: N/A patient not on sedation  Need for changes to sedation or analgesia regimen? NA   D: Delirium CAM-ICU: Negative   E: Early Mobility  Plan for early mobility? Yes   F: Family Engagement Plan for family engagement today? Yes       Antibiotic Review: Patient on appropriate coverage based on culture data.  and Continue broad spectrum secondary to severity of illness.       Prophylaxis:  VTE VTE covered by:  apixaban, Oral, 5 mg at 01/05/25 1822       Stress Ulcer  covered bypantoprazole (PROTONIX) 40 mg tablet [[id number]] (Long-Term Med), pantoprazole (PROTONIX) EC tablet 40 mg [[id number]]         24 Hour Events :     Received 5% Albumin bolus for low UO/appeared hypovolemic on exam  Transitioned from HFNC back to BIPAP early this AM in setting of intermittent confusion/concern for hypercapnia (wears BIPAP qHS as OP)  No acute events overnight      Subjective   Review of Systems: Review of Systems   Constitutional:  Negative for chills and fever.   HENT:  Positive for congestion.    Eyes:  Negative for visual disturbance.   Respiratory:  Positive for shortness of breath.         HANSON   Cardiovascular:  Negative for chest pain.   Gastrointestinal:  Negative for abdominal pain, nausea and vomiting.   Musculoskeletal:  Negative for back pain.   Neurological:  Negative for dizziness, light-headedness and headaches.   Psychiatric/Behavioral:  Positive for confusion.         Intermittent confusion overnight       Objective :                   Vitals I/O      Most Recent Min/Max in 24hrs   Temp  98.6 °F (37 °C) Temp  Min: 97.5 °F (36.4 °C)  Max: 100.6 °F (38.1 °C)   Pulse 95 Pulse  Min: 93  Max: 113   Resp (!) 24 Resp  Min: 14  Max: 48   /63 BP  Min: 106/58  Max: 156/75   O2 Sat 99 % SpO2  Min: 77 %  Max: 99 %      Intake/Output Summary (Last 24 hours) at 1/6/2025 0109  Last data filed at 1/5/2025 2345  Gross per 24 hour   Intake 1550 ml   Output 0 ml   Net 1550 ml       Diet NPO; Sips with meds    Invasive Monitoring           Physical Exam   Physical Exam  Vitals and nursing note reviewed.   Eyes:      Pupils: Pupils are equal, round, and reactive to light.   Skin:     General: Skin is warm and dry.      Capillary Refill: Capillary refill takes 2 to 3 seconds.      Coloration: Skin is pale.   Cardiovascular:      Rate and Rhythm: Normal rate and regular rhythm.      Pulses:           Radial pulses are 2+ on the right side and 2+ on the left side.        Dorsalis pedis pulses are 1+ on the right side and 1+ on the left side.      Heart sounds: Normal heart sounds.   Musculoskeletal:      Right lower leg: Trace Edema present.      Left lower leg: Trace Edema present.   Abdominal: General: An ostomy site is present. Bowel sounds are normal. There is ostomy site.     Palpations: Abdomen is soft.      Tenderness: There is no abdominal tenderness.   Constitutional:       General: She is awake.      Appearance: She is ill-appearing.      Interventions: Face mask in place.      Comments: BIPAP   Pulmonary:      Effort: Pulmonary effort is normal.      Breath sounds: Decreased breath sounds and rhonchi present.   Psychiatric:         Behavior: Behavior is cooperative.   Neurological:      Comments: Intermittently confused/disoriented; follows commands          Diagnostic Studies        Lab Results: I have reviewed the following results:     Medications:  Scheduled PRN   apixaban, 5 mg, BID  azithromycin, 500 mg, Q24H  buPROPion, 75 mg, QAM  cefepime, 2,000 mg, Q12H  chlorhexidine, 15 mL, Q12H  NANCY  citalopram, 10 mg, Daily  fluticasone, 2 spray, Daily  guaiFENesin, 1,200 mg, Q12H NANCY  ipratropium, 0.5 mg, TID  levalbuterol, 1.25 mg, TID  methylPREDNISolone sodium succinate, 40 mg, Q8H NANCY  oxybutynin, 10 mg, HS  pantoprazole, 40 mg, Early Morning  saccharomyces boulardii, 250 mg, Daily  senna-docusate sodium, 2 tablet, HS  sodium chloride, 4 mL, TID  vancomycin, 15 mg/kg, Q12H      acetaminophen, 650 mg, Q6H PRN  ondansetron, 4 mg, Q6H PRN  sodium chloride, 1 spray, Q1H PRN       Continuous          Labs:   CBC    Recent Labs     01/05/25 0323 01/05/25 1539   WBC 13.03*  --    HGB 9.8* 9.5*   HCT 31.8* 28*   *  --      BMP    Recent Labs     01/05/25 0323 01/05/25 1539 01/05/25 2027   SODIUM 136  --  137   K 3.8  --  5.4*   CL 91*  --  95*   CO2 36* 39* 34*   AGAP 9  --  8   BUN 15  --  21   CREATININE 0.43*  --  0.46*   CALCIUM 9.2  --  8.9       Coags    Recent Labs     01/05/25 0323   INR 1.28*   PTT 42*        Additional Electrolytes  Recent Labs     01/05/25 1539 01/05/25 2027   MG  --  1.9   PHOS  --  4.8*   CAIONIZED 1.22 0.99*          Blood Gas    No recent results  Recent Labs     01/05/25  1011   PHVEN 7.221*   XLR4TMM 83.7*   PO2VEN 72.6*   NAP6YUQ 33.6*   BEVEN 3.9   J3UVAQC 91.1*    LFTs  Recent Labs     01/05/25 0323   ALT 16   AST 17   ALKPHOS 76   ALB 3.6   TBILI 0.23       Infectious  Recent Labs     01/05/25 0323 01/05/25 2027   PROCALCITONI 0.12 0.12     Glucose  Recent Labs     01/05/25 0323 01/05/25 2027   GLUC 118 153*

## 2025-01-06 NOTE — ASSESSMENT & PLAN NOTE
Wt Readings from Last 3 Encounters:   01/06/25 73.6 kg (162 lb 4.1 oz)   01/05/25 75 kg (165 lb 5.5 oz)   11/29/24 75.2 kg (165 lb 12.6 oz)   Does not appear volume overloaded

## 2025-01-06 NOTE — ASSESSMENT & PLAN NOTE
Is s/p Wynn's procedure w/colostomy creation in 2020  Wound care consulted to assist w/management  Monitor output

## 2025-01-06 NOTE — CONSULTS
Consultation - Pulmonology   Name: Claire Doherty 74 y.o. female I MRN: [id number]  Unit/Bed#: ICU 12-01 I Date of Admission: 1/5/2025   Date of Service: 1/6/2025 I Hospital Day: 1   Inpatient consult to Pulmonology  Consult performed by: Jesus Meek MD  Consult ordered by: Rohini Krishna MD        Physician Requesting Evaluation: Sundeep Diaz DO   Reason for Evaluation / Principal Problem: COPD exacerbation    Assessment & Plan  Acute on chronic respiratory failure with hypoxia and hypercapnia (HCC)  In setting of RSV infection with wheezing and RML/RLL collapse/atelectasis with V/Q mismatch.  Possible aspiration  Currently on HFNT  I recommend wean O2 for SPO2 >88%, incentive spirometry. I recommend Chest PT with theravest, flutter valve. Also continue xopenex/atrovent/3% saline nebs.  She is DNR/DNI  Aspiration precautions/SLP eval  She has RP2 panel with RSV, negative procal x 2. Consider stopping antibiotics but defer to primary team  Regarding steroids her lung disease is primarily related to restrictive lung disease/muscular dystrophy, and currently has hypoxemia related to her RML/RLL atelectasis.  She is wheezing in the setting of the viral infection.  Consider reducing solumedrol to a prednisone course with improvement.  Continue BiPAP qHS  Atelectasis  RML/RLL collapse/atelectasis with V/Q mismatch.  Possible aspiration  Currently on HFNT  I recommend wean O2 for SPO2 >88%, incentive spirometry. I recommend Chest PT with theravest, flutter valve. Also continue xopenex/atrovent/3% saline nebs.  RSV infection  Supportive care and see under acute on chronic respiratory failure above  Ambulatory dysfunction  In setting of muscular dystrophy  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)  Chronically on BiPAP with 2 lpm ox oxygen qHS  Follows with Dr Mandujano in pulmonary clinic  Chronic diastolic heart failure (HCC)  Wt Readings from Last 3 Encounters:   01/06/25 73.6 kg (162  lb 4.1 oz)   01/05/25 75 kg (165 lb 5.5 oz)   11/29/24 75.2 kg (165 lb 12.6 oz)   Does not appear volume overloaded    I have discussed the above management plan in detail with the primary service.     History of Present Illness   Claire Doherty is a 74 y.o. female who presents on 1/5/25 with shortness of breath and found to have sepsis, pneumonia Started on antibiotics, IV glucocorticoids, bronchodialtors.  She was placed on BiPAP. Upgraded from SLIM to CC service last night.  As an outpatient follows with Dr Mandujano for restrictive lung disease, chronic hypoxemic/hypercapnic resp failure due to muscular dystrophy.  She wears BiPAP with supplemental oxygen qHS (2 lpm of oxygen).    Currently on 40 mg q8 solumedrol, xopenex/atrovent nebs TID, 3% NS nebs TID  Tmax is 100.6 on 1/5. Currently afebrile  Got Ceft/azithro initially 1/5. Currently on vancomycin/Cefepime.  Micro- RSV positive  Procal negative x 2    She reports she is a bit better but still short of breath. She has a weak cough. She reports adherence to BiPAP and nocturnal oxygen at home. She denies aspiration  She has not been home since September 2024 due to her illnesses.    Multiple hospital stays including November 2024 with COVID, respiratory failure.    She carries a diagnosis in her chart of COPD but her PFTs are restrictive and I doubt this is a contributor. She has been on/off nebs/inhalers over the years- does not consistently get them prescribed.      Review of Systems   Constitutional:  Negative for chills and fever.   HENT:  Negative for ear pain and sore throat.    Eyes:  Negative for pain and visual disturbance.   Respiratory:  Positive for cough and shortness of breath.    Cardiovascular:  Negative for chest pain and palpitations.   Gastrointestinal:  Negative for abdominal pain and vomiting.   Genitourinary:  Negative for dysuria and hematuria.   Musculoskeletal:  Negative for arthralgias and back pain.   Skin:  Negative for color change and  rash.   Neurological:  Negative for seizures and syncope.   All other systems reviewed and are negative.      Historical Information   I have reviewed the patient's PMH, PSH, Social History, Family History, Meds, and Allergies  Tobacco History: Former smoker quit over 15 years ago  Occupational History: Non-contributory  Family History: brother with muscular dystrophy as well    Objective :  Temp:  [97.2 °F (36.2 °C)-99.6 °F (37.6 °C)] 97.2 °F (36.2 °C)  HR:  [] 93  BP: (123-169)/(58-83) 143/83  Resp:  [12-51] 30  SpO2:  [84 %-99 %] 91 %  O2 Device: BiPAP  Nasal Cannula O2 Flow Rate (L/min):  [6 L/min] 6 L/min  FiO2 (%):  [70-90] 70    Physical Exam  Vitals and nursing note reviewed.   Constitutional:       General: She is not in acute distress.     Appearance: She is well-developed.   HENT:      Head: Normocephalic and atraumatic.   Eyes:      Conjunctiva/sclera: Conjunctivae normal.   Cardiovascular:      Rate and Rhythm: Normal rate and regular rhythm.      Heart sounds: No murmur heard.  Pulmonary:      Effort: Pulmonary effort is normal. No respiratory distress.      Breath sounds: Wheezing present.      Comments: Diminished R base, wheeze diffusely  Abdominal:      Palpations: Abdomen is soft.      Tenderness: There is no abdominal tenderness.      Comments: +ostomy   Musculoskeletal:         General: No swelling.      Cervical back: Neck supple.      Right lower leg: No edema.      Left lower leg: No edema.   Skin:     General: Skin is warm and dry.      Capillary Refill: Capillary refill takes less than 2 seconds.   Neurological:      Mental Status: She is alert.   Psychiatric:         Mood and Affect: Mood normal.           Lab Results: I have reviewed the following results:  .     01/05/25 2027 01/06/25  0642   WBC  --  9.34   HGB  --  8.2*   HCT  --  27.7*   PLT  --  521*   SODIUM 137 134*   K 5.4* 4.9   CL 95* 94*   CO2 34* 40*   BUN 21 24   CREATININE 0.46* 0.47*   GLUC 153* 202*   CAIONIZED  0.99* 1.22   MG 1.9 2.0   PHOS 4.8* 3.8   LACTICACID 0.4*  --      ABG:  Component      Latest Ref Rng 1/5/2025   pH, Art i-STAT      7.350 - 7.450  7.355    pCO2, Art i-STAT      36.0 - 44.0 mm HG 66.9 (H)    pO2, ART i-STAT      75.0 - 129.0 mm HG 94.0    Base Exc      -2 - 3 mmol/L 10 (H)    HCO3, Art i-STAT      22.0 - 28.0 mmol/L 37.4 (HH)    CO2, i-STAT      21 - 32 mmol/L 39 (H)    O2 Sat, Istat      60 - 85 % 97 (H)    SODIUM, I-STAT      136 - 145 mmol/l 134 (L)    POTASSIUM,I-STAT      3.5 - 5.3 mmol/L 4.9    CALCIUM, IONIZED ISTAT      1.12 - 1.32 mmol/L 1.22    Hematocrit ISTAT      34.8 - 46.1 % 28 (L)    Hemoglobin, Istat      11.5 - 15.4 g/dl 9.5 (L)    Glucose, i-STAT      65 - 140 mg/dl 127    POC FIO2      L 50    SPECIMEN TYPE ARTERIAL         Imaging Results Review: I personally reviewed the following image studies in PACS and associated radiology reports: chest xray and CT chest.     CXR 1/6/26  Persistent right lower lung atelectasis.     CT Chest 1/5/25  Postobstructive atelectasis of the right middle and lower lobe. Underlying pneumonia not excluded     Left lower lobe mucous plugging with distal consolidative airspace disease compatible with an and acute infiltrate secondary to aspiration    Other Study Results Review: Other studies reviewed include:   TTE 9/2024    Left Ventricle: Left ventricular cavity size is normal. Wall thickness is normal. The left ventricular ejection fraction is 65%. Systolic function is normal. Wall motion is normal.    Mitral Valve: There is moderate annular calcification.    Tricuspid Valve: There is mild regurgitation. The right ventricular systolic pressure is mildly elevated. The estimated right ventricular systolic pressure is 45.00 mmHg.    IVC/SVC: The right atrial pressure is estimated at 8.0 mmHg. The inferior vena cava is normal in size. Respirophasic changes were blunted (less than 50% variation).  PFT Results Reviewed: reviewed    PFT  2021  Interpretation:     Restrictive pattern on spirometry      Normal Spirometry     Lung volumes indicative of restrictive lung disease     Moderate decrease in diffusion capacity     Restrictive pattern on flow volume loops       VTE Prophylaxis: VTE covered by:  apixaban, Oral, 5 mg at 01/06/25 0854

## 2025-01-06 NOTE — ASSESSMENT & PLAN NOTE
Malnutrition Findings:                                 BMI Findings:           Body mass index is 31.69 kg/m².

## 2025-01-06 NOTE — PROGRESS NOTES
Claire Doherty is a 74 y.o. female who is currently ordered Vancomycin IV with management by the Pharmacy Consult service.  Relevant clinical data and objective / subjective history reviewed.  Vancomycin Assessment:  Indication and Goal AUC/Trough: Pneumonia (goal -600, trough >10)  Clinical Status: worsening  Micro:   Cx pending  Renal Function:  SCr: 0.43 mg/dL  CrCl: 101.1 mL/min  Renal replacement: Not on dialysis  Days of Therapy: 1  Current Dose: new start  Vancomycin Plan:  New Dosin mg LD then 1000 mg IV q12  Estimated AUC: 430 mcg*hr/mL  Estimated Trough: 10.7 mcg/mL  Next Level: 1/7 am  Renal Function Monitoring: Daily BMP and UOP  Pharmacy will continue to follow closely for s/sx of nephrotoxicity, infusion reactions and appropriateness of therapy.  BMP and CBC will be ordered per protocol. We will continue to follow the patient’s culture results and clinical progress daily.    Sanjay Fontanez, Pharmacist

## 2025-01-06 NOTE — CASE MANAGEMENT
Case Management Assessment & Discharge Planning Note    Patient name Claire Khalil ICU 12/ICU  MRN [id number]  : 1950 Date 2025       Current Admission Date: 2025  Current Admission Diagnosis:Acute on chronic respiratory failure with hypoxia and hypercapnia ([facility])   Patient Active Problem List    Diagnosis Date Noted Date Diagnosed    Renal cyst 2024     MRSA colonization 2024     COVID-19 2024     Elevated d-dimer 2024     COPD exacerbation ([facility]) 2024     Chronic diastolic heart failure ([facility]) 2024     SIRS (systemic inflammatory response syndrome) ([facility]) 2024     Pleural effusion, bilateral 2024     Hyperkalemia 2024     Paroxysmal A-fib ([facility]) 2024     Acute on chronic diastolic CHF (congestive heart failure) ([facility]) 09/10/2024     Acute on chronic respiratory failure with hypoxia and hypercapnia ([facility]) 09/10/2024     Acute nonspecific idiopathic pericarditis 2024     Cognitive communication deficit 2024     Other disorders of lung 2024     Other nonrheumatic aortic valve disorders 2024     Severe protein-calorie malnutrition ([facility]) 2024     DINA (acute kidney injury) ([facility]) 2024     Nausea 2024     Pleural effusion 2024     Pericardial effusion 2024     Infected wound 2024     Chest pain 2024     History of Idiopathic pericarditis 2024     Cellulitis of right foot 2024     Chronic left shoulder pain 2024     Shoulder joint dysfunction 2024     COPD with acute exacerbation ([facility]) 06/15/2023     Bilateral hand pain 2023     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     Gastroparesis 2021     Aortic valve sclerosis 2021     Tricuspid valve insufficiency 2021     Mitral annular calcification 2021     Former smoker 2021     On home oxygen therapy 2021     Colostomy status ([facility])      Ductal  carcinoma in situ (DCIS) of left breast 03/15/2021     Insomnia 10/21/2020     Depression 10/06/2020     Anemia 10/02/2020     Chronic hypoxic respiratory failure (HCC) 09/30/2020     Bladder injury 09/30/2020     Severe sepsis (HCC) 09/28/2020     Thrombocytosis, unspecified 09/28/2020     Leukocytosis 03/28/2020     Difficult intubation      Dystrophy, muscular, 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  [address] [address]  Phone: [phone] Fax: [phone]    [facility] [address] [place] [address]  Phone: [phone] Fax: [phone]    Primary Care Provider: Julienne Tucker DO    Primary Insurance: [facility]  Secondary Insurance: [employer] [facility]    ASSESSMENT:  Active Health Care Proxies       [facility] - [facility]   Primary Phone: [phone] (Mobile)  Home Phone: [phone]                 Advance Directives  Does patient have a Health Care POA?: Yes  Does patient have Advance Directives?: Yes  Advance Directives: Living will, Power of  for health care  Primary Contact: Hillary balderas    Patient Information  Admitted from:: Facility ([facility])  Mental Status: Alert  Primary Caregiver: Other (Comment)    DISCHARGE DETAILS:    Discharge planning discussed with:: Pt  Freedom of Choice: Yes  Comments - Freedom of Choice:  Pt states she was at [facility] for STR and would like to return to the facility.  A referral was amde via [facility]    Contacts  Patient Contacts: Hillary Doherty, sister  Relationship to Patient:: Family  Contact Method: Phone  Phone Number: [phone]  Reason/Outcome: Discharge Planning    Treatment Team Recommendation: Short Term Rehab  Discharge Destination Plan:: Short Term Rehab   Pt states she went to [facility] 11/24/24 for STR.  Would like to return to complete STR.  A referral was made via [employer].  Will need authorization for same.

## 2025-01-06 NOTE — ASSESSMENT & PLAN NOTE
Malnutrition Findings:                                 BMI Findings:           Body mass index is 30.66 kg/m².     Nutrition consulted - appreciate recommendations

## 2025-01-06 NOTE — ASSESSMENT & PLAN NOTE
Currently in sinus tachycardia (low 100s)  Does not appear to be on rate-controlling agents  Continue home Eliquis  Continue cardiac monitoring

## 2025-01-06 NOTE — NUTRITION
01/06/25 1428   Biochemical Data,Medical Tests, and Procedures   Biochemical Data/Medical Tests/Procedures Lab values reviewed;Meds reviewed   Labs (Comment) 1/6/2025 sodium 134, chloride 94, creatinine 0.47, glucose 202   Meds (Comment) Eliquis, Peridex, Solu-Medrol, Ditropan, Protonix   Nutrition-Focused Physical Exam   Nutrition-Focused Physical Exam Findings RN skin assessment reviewed;No edema documented;No skin issues documented;Ostomy  (Colostomy)   Medical-Related Concerns acute on chronic respiratory failure, GERD, anemia, breast cancer, colostomy, malnutrition, CHF, muscular dystrophy, gastroparesis   Adequacy of Intake   Nutrition Modality PO   Feeding Route   PO Independent   Current PO Intake   Current Diet Order CCD 3 diet, thin liquids   Current Meal Intake Suboptimal, but improving   Estimated calorie intake compared to estimated need Anticipate nutrient needs are not met   PES Statement   Problem Intake   Energy Balance (1) Predicted suboptimal energy intake NI-1.4   Related to Decreased appetite;Other (Comment)  (Acute on chronic respiratory failure)   As evidenced by: Per patient/family interview;Intake < estimated needs   Recommendations/Interventions   Malnutrition/BMI Present No  (Does not meet 2 criteria at present, will continue to monitor)   Summary Consult-malnutrition; wound care RN consulted; ST consulted.  Presents from [place] with SOB.  Requiring BiPAP oxygen therapy, now on HFNC.  Past medical history significant for acute on chronic respiratory failure, GERD, anemia, breast cancer, colostomy, malnutrition, CHF, muscular dystrophy, gastroparesis.  Weight history reviewed.  No significant changes.  No edema.  No pressure areas.  Colostomy present.  Diet advanced from NPO to CCD 3 this afternoon.  Hyperglycemia noted, recommend continuing carb controlled diet for now.  Evaluated by ST 1/6, recommending regular diet with softer selections, thin liquids.  Patient reports her appetite  is better.  States she has experienced a decrease in p.o. intake in past 4 months.  Usually has 2 meals with 1 snack daily.  Receives Meals on Wheels.  NKFA.  Denies dysphagia.  Her home aids cook and grocery shop.  Reports weight loss in past 4 months, not intentional.  Will monitor patient's intake at meals and reassess need for nutrition supplement at follow-up.  Patient is at risk for malnutrition however not meeting 2 criteria at present.  RD to follow.   Interventions/Recommendations Continue current diet order;Monitor I & O's   Education Assessment   Education Education not indicated at this time   Patient Nutrition Goals   Goal Adequate hydration;Adequate intake   Goal Status Initiated   Timeframe to complete goal by next f/u   Nutrition Complexity Risk   Nutrition complexity level High risk   Follow up date 01/09/25

## 2025-01-06 NOTE — ASSESSMENT & PLAN NOTE
Is s/p L mastectomy in 2007  Now w/R breast mass concerning for malignancy per review of notes from 08/2024  Will need continued follow-up as OP

## 2025-01-06 NOTE — ASSESSMENT & PLAN NOTE
Hgb 9.8 on admission  Baseline Hgb appears to be around 11-12  Suspect this is 2/2 severe sepsis/pneumonia w/likely underlying chronic disease  Iron panel, folate, B12 pending  No evidence of active bleeding  Trend Hgb and transfuse for Hgb < 7

## 2025-01-07 NOTE — PLAN OF CARE
Problem: PAIN - ADULT  Goal: Verbalizes/displays adequate comfort level or baseline comfort level  Description: Interventions:  - Encourage patient to monitor pain and request assistance  - Assess pain using appropriate pain scale  - Administer analgesics based on type and severity of pain and evaluate response  - Implement non-pharmacological measures as appropriate and evaluate response  - Consider cultural and social influences on pain and pain management  - Notify physician/advanced practitioner if interventions unsuccessful or patient reports new pain  Outcome: Progressing     Problem: INFECTION - ADULT  Goal: Absence or prevention of progression during hospitalization  Description: INTERVENTIONS:  - Assess and monitor for signs and symptoms of infection  - Monitor lab/diagnostic results  - Monitor all insertion sites, i.e. indwelling lines, tubes, and drains  - Monitor endotracheal if appropriate and nasal secretions for changes in amount and color  - [place] appropriate cooling/warming therapies per order  - Administer medications as ordered  - Instruct and encourage patient and family to use good hand hygiene technique  - Identify and instruct in appropriate isolation precautions for identified infection/condition  Outcome: Progressing     Problem: SAFETY ADULT  Goal: Patient will remain free of falls  Description: INTERVENTIONS:  - Educate patient/family on patient safety including physical limitations  - Instruct patient to call for assistance with activity   - Consult OT/PT to assist with strengthening/mobility   - Keep Call bell within reach  - Keep bed low and locked with side rails adjusted as appropriate  - Keep care items and personal belongings within reach  - Initiate and maintain comfort rounds  - Make Fall Risk Sign visible to staff  - Offer Toileting every Hours, in advance of need  - Initiate/Maintain alarm  - Obtain necessary fall risk management equipment:   - Apply yellow socks and  bracelet for high fall risk patients  - Consider moving patient to room near nurses station  Outcome: Progressing  Goal: Maintain or return to baseline ADL function  Description: INTERVENTIONS:  -  Assess patient's ability to carry out ADLs; assess patient's baseline for ADL function and identify physical deficits which impact ability to perform ADLs (bathing, care of mouth/teeth, toileting, grooming, dressing, etc.)  - Assess/evaluate cause of self-care deficits   - Assess range of motion  - Assess patient's mobility; develop plan if impaired  - Assess patient's need for assistive devices and provide as appropriate  - Encourage maximum independence but intervene and supervise when necessary  - Involve family in performance of ADLs  - Assess for home care needs following discharge   - Consider OT consult to assist with ADL evaluation and planning for discharge  - Provide patient education as appropriate  Outcome: Progressing  Goal: Maintains/Returns to pre admission functional level  Description: INTERVENTIONS:  - Perform AM-PAC 6 Click Basic Mobility/ Daily Activity assessment daily.  - Set and communicate daily mobility goal to care team and patient/family/caregiver.   - Collaborate with rehabilitation services on mobility goals if consulted  - Perform Range of Motion  times a day.  - Reposition patient every  hours.  - Dangle patient  times a day  - Stand patient  times a day  - Ambulate patient  times a day  - Out of bed to chair  times a day   - Out of bed for meals  times a day  - Out of bed for toileting  - Record patient progress and toleration of activity level   Outcome: Progressing     Problem: DISCHARGE PLANNING  Goal: Discharge to home or other facility with appropriate resources  Description: INTERVENTIONS:  - Identify barriers to discharge w/patient and caregiver  - Arrange for needed discharge resources and transportation as appropriate  - Identify discharge learning needs (meds, wound care, etc.)  -  Arrange for interpretive services to assist at discharge as needed  - Refer to Case Management Department for coordinating discharge planning if the patient needs post-hospital services based on physician/advanced practitioner order or complex needs related to functional status, cognitive ability, or social support system  Outcome: Progressing     Problem: Knowledge Deficit  Goal: Patient/family/caregiver demonstrates understanding of disease process, treatment plan, medications, and discharge instructions  Description: Complete learning assessment and assess knowledge base.  Interventions:  - Provide teaching at level of understanding  - Provide teaching via preferred learning methods  Outcome: Progressing     Problem: SKIN/TISSUE INTEGRITY - ADULT  Goal: Skin Integrity remains intact(Skin Breakdown Prevention)  Description: Assess:  -Perform Collins assessment every   -Clean and moisturize skin every   -Inspect skin when repositioning, toileting, and assisting with ADLS  -Assess under medical devices such as  every   -Assess extremities for adequate circulation and sensation     Bed Management:  -Have minimal linens on bed & keep smooth, unwrinkled  -Change linens as needed when moist or perspiring  -Avoid sitting or lying in one position for more than  hours while in bed  -Keep HOB at degrees     Toileting:  -Offer bedside commode  -Assess for incontinence every   -Use incontinent care products after each incontinent episode such as     Activity:  -Mobilize patient  times a day  -Encourage activity and walks on unit  -Encourage or provide ROM exercises   -Turn and reposition patient every  Hours  -Use appropriate equipment to lift or move patient in bed  -Instruct/ Assist with weight shifting every  when out of bed in chair  -Consider limitation of chair time  hour intervals    Skin Care:  -Avoid use of baby powder, tape, friction and shearing, hot water or constrictive clothing  -Relieve pressure over bony  prominences using   -Do not massage red bony areas    Next Steps:  -Teach patient strategies to minimize risks such as    -Consider consults to  interdisciplinary teams such as   Outcome: Progressing     Problem: Prexisting or High Potential for Compromised Skin Integrity  Goal: Skin integrity is maintained or improved  Description: INTERVENTIONS:  - Identify patients at risk for skin breakdown  - Assess and monitor skin integrity  - Assess and monitor nutrition and hydration status  - Monitor labs   - Assess for incontinence   - Turn and reposition patient  - Assist with mobility/ambulation  - Relieve pressure over bony prominences  - Avoid friction and shearing  - Provide appropriate hygiene as needed including keeping skin clean and dry  - Evaluate need for skin moisturizer/barrier cream  - Collaborate with interdisciplinary team   - Patient/family teaching  - Consider wound care consult   Outcome: Progressing     Problem: Nutrition/Hydration-ADULT  Goal: Nutrient/Hydration intake appropriate for improving, restoring or maintaining nutritional needs  Description: Monitor and assess patient's nutrition/hydration status for malnutrition. Collaborate with interdisciplinary team and initiate plan and interventions as ordered.  Monitor patient's weight and dietary intake as ordered or per policy. Utilize nutrition screening tool and intervene as necessary. Determine patient's food preferences and provide high-protein, high-caloric foods as appropriate.     INTERVENTIONS:  - Monitor oral intake, urinary output, labs, and treatment plans  - Assess nutrition and hydration status and recommend course of action  - Evaluate amount of meals eaten  - Assist patient with eating if necessary   - Allow adequate time for meals  - Recommend/ encourage appropriate diets, oral nutritional supplements, and vitamin/mineral supplements  - Order, calculate, and assess calorie counts as needed  - Recommend, monitor, and adjust tube  feedings and TPN/PPN based on assessed needs  - Assess need for intravenous fluids  - Provide specific nutrition/hydration education as appropriate  - Include patient/family/caregiver in decisions related to nutrition  Outcome: Progressing

## 2025-01-07 NOTE — WOUND OSTOMY CARE
Progress Note- Ostomy  Claire Doherty 74 y.o. female  [id number]  ICU 12-ICU 12-01      History and Present Illness:  Patient is 74 year old female admitted to [facility] ICU with acute on chronic respiratory failure with hypoxia and hypercapnia.  Patient history significant for anemia, depression, muscular dystrophy, left breast CA, colostomy, COPD, severe protein-calorie malnutrition, a-fib, CHF, RSV infection, MRSA, COVID-19 and cognitive communication deficit. Wound care consulted for colostomy.     Assessment:  Patient in bed for assessment, she is awake and alert. She has not issues with her colostomy, reviewed ostomy care and pouch emptying. Patient is independent with ostomy care. Pouch was not changed for at least 3 days. Pouch removed with primary RN, stoma is pink, budded, oval, functioning for firm hard stool with soft stool, patient states she has not been drinking much water lately.     1- Left upper quadrant loop colostomy, stoma pink, budded, and peristomal skin intact    Skin and Ostomy Care Plan:  1- Ehob offloading cushion to chair when OOB  2- Elevate heels off the bed and while in the recliner with pillows to offload heels  3- Turn and reposition every two hours, use green wedges to assist with offloading  4- Mepilex bordered foam dressing to the sacrum, yesi with P, date, peel back daily for skin assessment, reapply and change every 3 days and PRN  5- Skin nourishing cream daily  6- Empty pouch when 1/3-1/2 full.  Encourage patient to participate in ostomy care.  Pouch change every 3-4 days & with signs of leakage:  Remove pouch using push/pull method. Cleanse stoma & surrounding skin with warm water only (do not use foam cleanser or bath wipes--ok to use sting free adhesive remover), pat dry.  Measure stoma and cut/mold pouch barrier to appropriate size.  Apply skin prep to skin around stoma.  Apply pouch to skin and hold gentle pressure with warm hand for 2-5 minutes for best  adherence.      Colostomy LUQ (Active)   Stomal Appliance 1 piece;Changed;Intact 01/06/25 1700   Stoma Assessment Budded;[place]ne 01/06/25 1700   Stoma size (in) 1.5 in 01/06/25 1700   Stoma Shape Oval 01/06/25 1700   Peristomal Assessment Intact;Clean 01/06/25 1700   Treatment Bag change;Site care 01/06/25 1700   Number of days: 1579           Reviewed plan of care with primary RN Layla  Recommendations written as orders  Wound care team to sign off  Questions or concerns Secure Chat Wound Care Nurse    Shahla Montero BSN, RN, CWON

## 2025-01-07 NOTE — PLAN OF CARE
Problem: OCCUPATIONAL THERAPY ADULT  Goal: Performs self-care activities at highest level of function for planned discharge setting.  See evaluation for individualized goals.  Description: Treatment Interventions: ADL retraining, Functional transfer training, UE strengthening/ROM, Endurance training, Patient/family training, Compensatory technique education, Activityengagement, Energy conservation     See flowsheet documentation for full assessment, interventions and recommendations.   Note: Limitation: Decreased ADL status, Decreased UE strength, Decreased Safe judgement during ADL, Decreased endurance, Decreased self-care trans, Decreased high-level ADLs  Prognosis: Good  Assessment: Pt is a 74 y.o. female seen for OT evaluation s/p admit to [facility] on 1/5/2025 w/ Acute on chronic respiratory failure with hypoxia and hypercapnia (HCC).  Comorbidities affecting pt's functional performance at time of assessment include: ambulatory dysfunction, GERD, anemia, depression, COPD, a-fib, SIRS. Personal factors affecting pt at time of IE include:limited insight into deficits, decreased initiation and engagement , health management , and environment. Prior to admission, pt requires A with ADLs and IADLs. Upon evaluation: the following deficits impact occupational performance: weakness, decreased strength, decreased balance, decreased tolerance, impaired problem solving, and decreased safety awareness. Pt to benefit from continued skilled OT tx while in the hospital to address deficits as defined above and maximize level of functional independence w ADL's and functional mobility. Occupational Performance areas to address include: grooming, bathing/shower, toilet hygiene, dressing, functional mobility, community mobility, and clothing management. From OT standpoint, recommendation at time of d/c would with moderate intensity OT resources.     Rehab Resource Intensity Level, OT: II (Moderate Resource Intensity) (RTF)      Juani Willams OTR/L

## 2025-01-07 NOTE — PLAN OF CARE
Problem: PAIN - ADULT  Goal: Verbalizes/displays adequate comfort level or baseline comfort level  Description: Interventions:  - Encourage patient to monitor pain and request assistance  - Assess pain using appropriate pain scale  - Administer analgesics based on type and severity of pain and evaluate response  - Implement non-pharmacological measures as appropriate and evaluate response  - Consider cultural and social influences on pain and pain management  - Notify physician/advanced practitioner if interventions unsuccessful or patient reports new pain  Outcome: Progressing     Problem: INFECTION - ADULT  Goal: Absence or prevention of progression during hospitalization  Description: INTERVENTIONS:  - Assess and monitor for signs and symptoms of infection  - Monitor lab/diagnostic results  - Monitor all insertion sites, i.e. indwelling lines, tubes, and drains  - Monitor endotracheal if appropriate and nasal secretions for changes in amount and color  - [place] appropriate cooling/warming therapies per order  - Administer medications as ordered  - Instruct and encourage patient and family to use good hand hygiene technique  - Identify and instruct in appropriate isolation precautions for identified infection/condition  Outcome: Progressing     Problem: SAFETY ADULT  Goal: Patient will remain free of falls  Description: INTERVENTIONS:  - Educate patient/family on patient safety including physical limitations  - Instruct patient to call for assistance with activity   - Consult OT/PT to assist with strengthening/mobility   - Keep Call bell within reach  - Keep bed low and locked with side rails adjusted as appropriate  - Keep care items and personal belongings within reach  - Initiate and maintain comfort rounds  - Make Fall Risk Sign visible to staff  - Offer Toileting every 2 Hours, in advance of need  - Initiate/Maintain bed alarm  - Obtain necessary fall risk management equipment  - Apply yellow socks and  bracelet for high fall risk patients  - Consider moving patient to room near nurses station  Outcome: Progressing  Goal: Maintain or return to baseline ADL function  Description: INTERVENTIONS:  -  Assess patient's ability to carry out ADLs; assess patient's baseline for ADL function and identify physical deficits which impact ability to perform ADLs (bathing, care of mouth/teeth, toileting, grooming, dressing, etc.)  - Assess/evaluate cause of self-care deficits   - Assess range of motion  - Assess patient's mobility; develop plan if impaired  - Assess patient's need for assistive devices and provide as appropriate  - Encourage maximum independence but intervene and supervise when necessary  - Involve family in performance of ADLs  - Assess for home care needs following discharge   - Consider OT consult to assist with ADL evaluation and planning for discharge  - Provide patient education as appropriate  Outcome: Progressing  Goal: Maintains/Returns to pre admission functional level  Description: INTERVENTIONS:  - Perform AM-PAC 6 Click Basic Mobility/ Daily Activity assessment daily.  - Set and communicate daily mobility goal to care team and patient/family/caregiver.   - Collaborate with rehabilitation services on mobility goals if consulted  - Out of bed for toileting  - Record patient progress and toleration of activity level   Outcome: Progressing       Problem: DISCHARGE PLANNING  Goal: Discharge to home or other facility with appropriate resources  Description: INTERVENTIONS:  - Identify barriers to discharge w/patient and caregiver  - Arrange for needed discharge resources and transportation as appropriate  - Identify discharge learning needs (meds, wound care, etc.)  - Arrange for interpretive services to assist at discharge as needed  - Refer to Case Management Department for coordinating discharge planning if the patient needs post-hospital services based on physician/advanced practitioner order or  complex needs related to functional status, cognitive ability, or social support system  Outcome: Progressing     Problem: Knowledge Deficit  Goal: Patient/family/caregiver demonstrates understanding of disease process, treatment plan, medications, and discharge instructions  Description: Complete learning assessment and assess knowledge base.  Interventions:  - Provide teaching at level of understanding  - Provide teaching via preferred learning methods  Outcome: Progressing     Problem: RESPIRATORY - ADULT  Goal: Achieves optimal ventilation and oxygenation  Description: INTERVENTIONS:  - Assess for changes in respiratory status  - Assess for changes in mentation and behavior  - Position to facilitate oxygenation and minimize respiratory effort  - Oxygen administered by appropriate delivery if ordered  - Initiate smoking cessation education as indicated  - Encourage broncho-pulmonary hygiene including cough, deep breathe, Incentive Spirometry  - Assess the need for suctioning and aspirate as needed  - Assess and instruct to report SOB or any respiratory difficulty  - Respiratory Therapy support as indicated  Outcome: Progressing       Problem: Prexisting or High Potential for Compromised Skin Integrity  Goal: Skin integrity is maintained or improved  Description: INTERVENTIONS:  - Identify patients at risk for skin breakdown  - Assess and monitor skin integrity  - Assess and monitor nutrition and hydration status  - Monitor labs   - Assess for incontinence   - Turn and reposition patient  - Assist with mobility/ambulation  - Relieve pressure over bony prominences  - Avoid friction and shearing  - Provide appropriate hygiene as needed including keeping skin clean and dry  - Evaluate need for skin moisturizer/barrier cream  - Collaborate with interdisciplinary team   - Patient/family teaching  - Consider wound care consult   Outcome: Progressing     Problem: Nutrition/Hydration-ADULT  Goal: Nutrient/Hydration intake  appropriate for improving, restoring or maintaining nutritional needs  Description: Monitor and assess patient's nutrition/hydration status for malnutrition. Collaborate with interdisciplinary team and initiate plan and interventions as ordered.  Monitor patient's weight and dietary intake as ordered or per policy. Utilize nutrition screening tool and intervene as necessary. Determine patient's food preferences and provide high-protein, high-caloric foods as appropriate.     INTERVENTIONS:  - Monitor oral intake, urinary output, labs, and treatment plans  - Assess nutrition and hydration status and recommend course of action  - Evaluate amount of meals eaten  - Assist patient with eating if necessary   - Allow adequate time for meals  - Recommend/ encourage appropriate diets, oral nutritional supplements, and vitamin/mineral supplements  - Order, calculate, and assess calorie counts as needed  - Recommend, monitor, and adjust tube feedings and TPN/PPN based on assessed needs  - Assess need for intravenous fluids  - Provide specific nutrition/hydration education as appropriate  - Include patient/family/caregiver in decisions related to nutrition  Outcome: Progressing

## 2025-01-07 NOTE — PLAN OF CARE
Problem: PHYSICAL THERAPY ADULT  Goal: Performs mobility at highest level of function for planned discharge setting.  See evaluation for individualized goals.  Description: Treatment/Interventions: LE strengthening/ROM, Therapeutic exercise, Bed mobility, Functional transfer training, Equipment eval/education          See flowsheet documentation for full assessment, interventions and recommendations.  Outcome: Progressing  Note: Prognosis: Fair     Assessment: Pt is 74 y.o. female seen for PT evaluation s/p admit to [facility] on 1/5/2025 w/ Acute on chronic respiratory failure with hypoxia and hypercapnia (HCC). PT consulted to assess pt's functional mobility and d/c needs. Order placed for PT eval and tx, w/ out of bed to chair order. Pt agreeable to PT  session upon arrival, pt found supine in bed.  PTA, pt was requiring A for mobility and resident of the SNF .  Pt to benefit from continued PT tx to address deficits and maximize level of functional independent mobility and consistency. Upon conclusion pt  supine in bed. Complexity: Comorbidities affecting pt's physical performance at time of assessment include: a fib, depression, and malnutrition . Personal factors affecting pt at time of IE include: limited mobility, limited insight into impairments, and depression. Please find objective findings from PT assessment regarding body systems outlined above with impairments and limitations including weakness, impaired balance, decreased endurance, decreased activity tolerance, decreased functional mobility tolerance, altered sensation, and fall risk.  Pt's clinical presentation is currently unstable/unpredictable seen in pt's presentation of abnormal renal values, abnormal H&H, abnormal WBC count, and abnormal Co2 levels. The patient's AM-PAC Basic Mobility Inpatient Short Form Raw Score is 6 .  Based on patient presentations and impairments, pt would most appropriately benefit from Level 3 resource  intensity upon discharge. A Raw score of less than or equal to 16 suggests the patient may benefit from discharge to post-acute rehabilitation services. Please also refer to the recommendation of the Physical Therapist for safe discharge planning. RN verbalized pt appropriate for PT session. Pt seen as a co-eval with OT due to the patient's co-morbidities, clinically unstable presentation, and present impairments which are a regression from the patient's baseline.  Barriers to Discharge: None     Rehab Resource Intensity Level, PT: III (Minimum Resource Intensity)    See flowsheet documentation for full assessment.

## 2025-01-07 NOTE — ASSESSMENT & PLAN NOTE
Wt Readings from Last 3 Encounters:   01/06/25 73.6 kg (162 lb 4.1 oz)   01/05/25 75 kg (165 lb 5.5 oz)   11/29/24 75.2 kg (165 lb 12.6 oz)     TTE from 09/2024 revealed EF 65% w/mild TR and mildly elevated RVSP at 45  Does not appear to be on diuretic as OP  Monitor I/O and daily weights

## 2025-01-07 NOTE — PHYSICAL THERAPY NOTE
PHYSICAL THERAPY EVALUATION  NAME:  Claire Doherty  DATE: 01/07/25    AGE:   74 y.o.  Mrn:   [id number]  ADMIT DX:  COPD exacerbation ([facility]) [J44.1]  Problem List:   Patient Active Problem List   Diagnosis    Dystrophy, muscular, 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HCC)    Ambulatory dysfunction    Urinary incontinence    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)    Osteoporosis    GERD without esophagitis    Allergic rhinitis    Difficult intubation    Leukocytosis    Severe sepsis (HCC)    Thrombocytosis, unspecified    Chronic hypoxic respiratory failure (HCC)    Bladder injury    Anemia    Depression    Insomnia    Gastroparesis    Aortic valve sclerosis    Tricuspid valve insufficiency    Mitral annular calcification    Ductal carcinoma in situ (DCIS) of left breast    Colostomy status ([facility])    Former smoker    On home oxygen therapy    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HCC)    Bilateral hand pain    COPD with acute exacerbation ([facility])    Chronic left shoulder pain    Shoulder joint dysfunction    Cellulitis of right foot    History of Idiopathic pericarditis    Chest pain    Pleural effusion    Pericardial effusion    Infected wound    Nausea    DINA (acute kidney injury) ([facility])    Severe protein-calorie malnutrition (HCC)    Acute on chronic diastolic CHF (congestive heart failure) ([facility])    Paroxysmal A-fib ([facility])    Hyperkalemia    SIRS (systemic inflammatory response syndrome) ([facility])    Acute on chronic respiratory failure with hypoxia and hypercapnia (HCC)    Pleural effusion, bilateral    Elevated d-dimer    COPD exacerbation (HCC)    Chronic diastolic heart failure (HCC)    MRSA colonization    COVID-19    Renal cyst    Acute nonspecific idiopathic pericarditis    Cognitive communication deficit    Other disorders of lung    Other nonrheumatic aortic valve disorders    Atelectasis    RSV infection       Past Medical History  Past Medical History:   Diagnosis Date    Acute kidney failure (HCC)     Acute  nonspecific idiopathic pericarditis     Anxiety     Atrial fibrillation (HCC)     Breast cancer ([facility]) 2007    Cellulitis of right lower extremity     CHF (congestive heart failure) (HCC)     Chronic pain     Chronic respiratory failure with hypoxia (HCC)     Colitis     Colostomy status ([facility])     Dependence on supplemental oxygen     Depression     Difficult intubation     Excessive daytime sleepiness     Gastroesophageal reflux disease without esophagitis     Gastroparesis     GERD (gastroesophageal reflux disease)     Hyperlipidemia     Ischemic colitis ([facility]) 01/21/2019    Leukocytosis 03/28/2020    Muscular dystrophy ([facility])     Limb-girdle    Osteoporosis     Other nonrheumatic aortic valve disorders     Overactive bladder     Perforated diverticulum 03/28/2020    Pericardial effusion (noninflammatory)     Pericarditis     Pneumonitis     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[facility])     Rheumatic tricuspid insufficiency     Skin cancer     Skin disorder     Tachycardia 06/02/2021    Vitamin D deficiency        Past Surgical History  Past Surgical History:   Procedure Laterality Date    CARDIAC CATHETERIZATION N/A 8/21/2024    Procedure: Cardiac pci;  Surgeon: Juan Fuller MD;  [facility]: [facility];  Service: Cardiology    CARDIAC CATHETERIZATION N/A 8/21/2024    Procedure: Cardiac PCI Stent;  Surgeon: Juan Fuller MD;  [facility]: [facility];  Service: Cardiology    CARDIAC CATHETERIZATION N/A 8/21/2024    Procedure: Cardiac Coronary Angiogram;  Surgeon: Juan Fuller MD;  [facility];  Service: Cardiology    COLONOSCOPY N/A 1/22/2019    Procedure: COLONOSCOPY;  Surgeon: Anthony Mejía MD;  Location: [facility];  Service: Colorectal    CYSTOSCOPY N/A 9/30/2020    Procedure: CYSTOSCOPY; BILATERAL URETERAL CATHETER PLACEMENT, CYSTOTOMY;  Surgeon: Zach Tyler MD;  Location: [facility];  Service: Urology    FL CYSTOGRAM  10/15/2020    [facility]  PROCEDURE N/A 9/30/2020    Procedure: EXPLORATORY LAPAROTOMY; LYSIS OF ADHESIONS; WASHOUT PELVIC ABSCESS; COMPLEX SIGMOID COLON RESECTION; LOOP TRANSVERSE COLOSTOMY;  Surgeon: Thania Jaffe MD;  Location: [facility];  Service: General    IR DRAINAGE TUBE PLACEMENT  9/24/2020    MASTECTOMY Left 2007    left breast mastectomy     TONSILLECTOMY      TOOTH EXTRACTION      TUBAL LIGATION         Length Of Stay: 2  Performed at least 2 patient identifiers during session: Name and ID bracelet       01/07/25 0923   PT Last Visit   PT Visit Date 01/07/25   Note Type   Note type Evaluation   Pain Assessment   Pain Assessment Tool 0-10   Pain Score No Pain   Restrictions/Precautions   Weight Bearing Precautions Per Order No   Other Precautions O2;Fall Risk  (40 L/min)   Home Living   Type of Home SNF  ([place])   Home Equipment   (puma or STS mechanical lift)   Additional Comments getting PT/OT at facility- pt reports she was able to stand but not take a step or pivot   Prior Function   Level of [place] Needs assistance with ADLs;Needs assistance with functional mobility;Needs assistance with IADLS   Lives With Facility staff   Receives Help From Personal care attendant   IADLs Family/Friend/Other provides transportation;Family/Friend/Other provides meals;Family/Friend/Other provides medication management   Falls in the last 6 months 1 to 4   Comments wears O2 prn at baseline   General   Family/Caregiver Present No   Cognition   Overall Cognitive Status Impaired   Arousal/Participation Alert   Orientation Level Oriented X4   Following Commands Follows one step commands without difficulty   Comments unrealistic regarding functional status   RLE Assessment   RLE Assessment X   LLE Assessment   LLE Assessment X   Vision-Basic Assessment   Current Vision Wears glasses all the time   Light Touch   RLE Light Touch Impaired   LLE Light Touch Impaired   Bed Mobility   Supine to Sit 2  Maximal assistance   Additional items  Assist x 2;HOB elevated;Increased time required;LE management;Verbal cues   Sit to Supine 2  Maximal assistance   Additional items Assist x 2;HOB elevated;Increased time required;LE management;Verbal cues   Additional Comments pt reported dizziness with transitional movement  (pt sat EOB x 4 mins with Min A to maintain seated position)   Ambulation/Elevation   Ambulation/Elevation Additional Comments safety awareness noted   Balance   Static Sitting Fair   Dynamic Sitting Fair -   Endurance Deficit   Endurance Deficit Yes   Endurance Deficit Description pt reported fatigue with activity   Activity Tolerance   Activity Tolerance Patient limited by fatigue   Nurse Made Aware RN made aware of session outcomes   Assessment   Prognosis Fair   Assessment Pt is 74 y.o. female seen for PT evaluation s/p admit to [facility] on 1/5/2025 w/ Acute on chronic respiratory failure with hypoxia and hypercapnia (HCC). PT consulted to assess pt's functional mobility and d/c needs. Order placed for PT eval and tx, w/ out of bed to chair order. Pt agreeable to PT  session upon arrival, pt found supine in bed.  PTA, pt was requiring A for mobility and resident of the SNF .  Pt to benefit from continued PT tx to address deficits and maximize level of functional independent mobility and consistency. Upon conclusion pt  supine in bed. Complexity: Comorbidities affecting pt's physical performance at time of assessment include: a fib, depression, and malnutrition . Personal factors affecting pt at time of IE include: limited mobility, limited insight into impairments, and depression. Please find objective findings from PT assessment regarding body systems outlined above with impairments and limitations including weakness, impaired balance, decreased endurance, decreased activity tolerance, decreased functional mobility tolerance, altered sensation, and fall risk.  Pt's clinical presentation is currently unstable/unpredictable seen in  pt's presentation of abnormal renal values, abnormal H&H, abnormal WBC count, and abnormal Co2 levels. The patient's AM-PAC Basic Mobility Inpatient Short Form Raw Score is 6 .  Based on patient presentations and impairments, pt would most appropriately benefit from Level 3 resource intensity upon discharge. A Raw score of less than or equal to 16 suggests the patient may benefit from discharge to post-acute rehabilitation services. Please also refer to the recommendation of the Physical Therapist for safe discharge planning. RN verbalized pt appropriate for PT session. Pt seen as a co-eval with OT due to the patient's co-morbidities, clinically unstable presentation, and present impairments which are a regression from the patient's baseline.   Barriers to Discharge None   Goals   Patient Goals to get to her apt by her birthday   LTG Expiration Date 01/17/25   Long Term Goal #1 Pt will: Perform bed mobility tasks to consistent min A of 1 to improve ease of bed mobility. Perform STS transfer to consistent mod A of 1 to improve ease of transfers. Increase dynamic standing balance to P+ to decrease fall risk.  Increase OOB activity tolerance to 10 minutes without s/s of exertion to decrease fall risk.   Plan   Treatment/Interventions LE strengthening/ROM;Therapeutic exercise;Bed mobility;Functional transfer training;Equipment eval/education   PT Frequency 2-3x/wk   Discharge Recommendation   Rehab Resource Intensity Level, PT III (Minimum Resource Intensity)   AM-PAC Basic Mobility Inpatient   Turning in Flat Bed Without Bedrails 1   Lying on Back to Sitting on Edge of Flat Bed Without Bedrails 1   Moving Bed to Chair 1   Standing Up From Chair Using Arms 1   Walk in Room 1   Climb 3-5 Stairs With Railing 1   Basic Mobility Inpatient Raw Score 6   Turning Head Towards Sound 4   Follow Simple Instructions 4   Low Function Basic Mobility Raw Score  14   Low Function Basic Mobility Standardized Score  22.01   [facility]  Highest Level Of Mobility   JH-HLM Goal 2: Bed activities/Dependent transfer   JH-HLM Achieved 3: Sit at edge of bed         Time In: 0919  Time Out: 943  Total Evaluation Minutes: 24    Amanda Chamberlain, PT

## 2025-01-07 NOTE — UTILIZATION REVIEW
Initial Clinical Review    The patient was transferred to [facility] on 1/5/25 from [facility], where care began on 1/5/25.      Admission: Date/Time/Statement:   Admission Orders (From admission, onward)       Ordered        01/05/25 1402  Inpatient Admission  Once                          Orders Placed This Encounter   Procedures    Inpatient Admission     Standing Status:   Standing     Number of Occurrences:   1     Level of Care:   Level 2 Stepdown / HOT [14]     Estimated length of stay:   More than 2 Midnights     Certification:   I certify that inpatient services are medically necessary for this patient for a duration of greater than two midnights. See H&P and MD Progress Notes for additional information about the patient's course of treatment.     ED Arrival Information       Patient not seen in ED                       1/5/25 Initial Presentation: 74 y.o. female presents to [facility] from [facility] today for complaints of worsening shortness of breath. Pt sent to [facility] from Pulmonary evaluation as well as possible need for bronchoscopy. Pt arrived tachypneic , + sob,with RR 39, placed on BIPAP. + wheezing and Rhonchi present in lungs on exam, + dry mucous membranes. At baseline pt is on 2 liters oxygen at home. PMH: Muscular dystrophy, COPD on home oxygen, chronic respiratory failure, paroxysmal atrial fibrillation, depression  In the ED at [facility] pt given Nebulizers Albuterol, Atrovent, IV cefepime, Zithromax, IV solumedrol and IV zofran then transferred to [facility] once stabilized. Abnormal labs/imaging: RSV + on 1/4 done at [facility], Had chest Chest xray which revealed Rt and Left infiltrates worse on the right. WBC 13. Plan: admit to inpatient in the ICU for Sepsis, Pneumonia, IV BAX, BIPAP, IVF's, trend labs, telemetry, IV steroids, Nebulizers, Pulmonary consult.     Critical Care/ICU consult: Acute on chronic respiratory  "failure with hypoxia. Exam Currently on Continuous BIPAP, aggressive pulmonary toileting. Nervous/ancious, SOB, Temp 100.6 F, RR 32, HR 92. Spo2 92% on BIPAP. VBG PH 7.221, Pco2 83.7, Po2 72.6, HCO3 33.6, O2 hgb 91.1. CXR-Complete consolidation of the right middle and right lower lobes. CT chest-Postobstructive atelectasis of the right middle and lower lobe. Underlying pneumonia not excluded. Left lower lobe mucous plugging with distal consolidative airspace disease compatible with an and acute infiltrate secondary to aspiration. Plan: continued scheduled nebulizers, IV steroids, IV azithromycin, F/u blood cultures,urine cultures, wean BIPAP as tolerated. Pulmonary consult pending.     Anticipated Length of Stay/Certification Statement: Patient will be admitted on an inpatient basis with an anticipated length of stay of greater than 2 midnights secondary to sepsis.     Date: 1/6/25   Day 2: Critical Care/ICU: Transferred from [facility] for possible bronch today - may not require if AM CXR improved w/aggressive pulmonary toilet. Currently on HFNC for oxygen needs,decreased breath sounds, rhonchi present, trace edema in LE's.  desaturates quickly when nasal prongs removed to the 80's.  NPO. Plan: continue aggressive pulmonary toileting measures, repeat Chest xry today, stop ABX and monitor off, ContinueTelemetry, Nebulizers, IV steroids Q8, mucinex, flutter valve, titrate oxygen for SPO2 >88%. Repeat labs, continue IVF's, daily weights, I/O's, ordered ST/PT/OT eval.     ST Eval: Recommendations for Regular soft diet, thin liquids.     Pulmonary consult: In setting of RSV infection with wheezing and RML/RLL collapse/atelectasis with V/Q mismatch. Possible aspiration. Exam\" Wheezing, diminished right base. + cough and pt reports still SOB but feels better. Afebrile today. Plan: continue Nebulizers, IV steroids, flutter valve, mucinex, BIPAP Qhs, wean HFNC as tolerated >88%. To remain in ICU/stepdown level 1.      Wound care: " Wound care nurse consulted for ostomy management. 1- Left upper quadrant loop colostomy, stoma pink, budded, and peristomal skin intact   Skin and Ostomy Care Plan:  1- Ehob offloading cushion to chair when OOB  2- Elevate heels off the bed and while in the recliner with pillows to offload heels  3- Turn and reposition every two hours, use green wedges to assist with offloading  4- Mepilex bordered foam dressing to the sacrum, yesi with P, date, peel back daily for skin assessment, reapply and change every 3 days and PRN  5- Skin nourishing cream daily  6- Empty pouch when 1/3-1/2 full.  Encourage patient to participate in ostomy care.  Pouch change every 3-4 days & with signs of leakage:  Remove pouch using push/pull method. Cleanse stoma & surrounding skin with warm water only (do not use foam cleanser or bath wipes--ok to use sting free adhesive remover), pat dry.  Measure stoma and cut/mold pouch barrier to appropriate size.  Apply skin prep to skin around stoma.  Apply pouch to skin and hold gentle pressure with warm hand for 2-5 minutes for best adherence.       Date: 1/7/25  Day 3: Has surpassed a 2nd midnight with active treatments and services. Critical Care/ICU: + intermittent confusion overnight, yellow sputum production with cough. IV valium x1 dose given. Decreased breath sounds/rhonchi. Remains on HFNC/BIPAP at HS.  lbs 4.1 oz. ml in the past 24 hrs. Not on IVF's.  Urine strep/legionella negative. Plan: continue in ICU/stepdown, HFNC, scheduled Nebulizers TID, IV steroids, Telemetry, Neuro checks for confusion, mucinex, flutter valve. Remains NPO. Contiue daily weights, I/o's, IV protonix Q24.          ED Treatment-Medication Administration - No Administrations Displayed (No Start Event Found)       None            Scheduled Medications:  apixaban, 5 mg, Oral, BID  chlorhexidine, 15 mL, Mouth/Throat, Q12H NANCY  fluticasone, 2 spray, Nasal, Daily  levalbuterol, 1.25 mg, Nebulization,  TID  methylPREDNISolone sodium succinate, 40 mg, Intravenous, Q8H NANCY  pantoprazole, 40 mg, Intravenous, Q24H NANCY  sodium chloride, 4 mL, Nebulization, TID      Continuous IV Infusions: none      PRN Meds:  acetaminophen, 650 mg, Oral, Q6H PRN  ondansetron, 4 mg, Intravenous, Q6H PRN  sodium chloride, 1 spray, Each Nare, Q1H PRN      ED Triage Vitals [01/05/25 1326]   Temperature Pulse Respirations Blood Pressure SpO2 Pain Score   98.8 °F (37.1 °C) 100 (!) 39 127/58 97 % No Pain     Weight (last 2 days)       Date/Time Weight    01/06/25 0542 73.6 (162.26)    01/05/25 2013 71.2 (156.97)    01/05/25 2000 --    Comment rows:    OBSERV: Patient sitting up in bed able to hold a conversation at 01/05/25 2000 01/05/25 1326 71.2 (156.97)            Vital Signs (last 3 days)       Date/Time Temp Pulse Resp BP MAP (mmHg) SpO2 FiO2 (%) Calculated FIO2 (%) - Nasal Cannula O2 Flow Rate (L/min) Nasal Cannula O2 Flow Rate (L/min) O2 Device O2 Interface Device Patient Position - Orthostatic VS José Miguel Coma Scale Score Pain    01/07/25 0800 -- 90 36 139/67 96 92 % -- -- -- -- -- -- -- -- --    01/07/25 0736 -- -- -- -- -- -- 40 -- 30 L/min -- High flow nasal cannula -- -- -- --    01/07/25 0730 -- -- -- -- -- 95 % -- -- -- -- -- HFNC prongs -- -- --    01/07/25 0700 -- 92 29 145/69 99 93 % -- -- -- -- -- -- -- -- --    01/07/25 0600 -- 93 40 147/65 94 96 % -- -- -- -- -- -- -- -- --    01/07/25 0500 -- 90 48 144/68 98 94 % -- -- -- -- -- -- -- -- --    01/07/25 0400 -- 94 37 143/65 94 95 % -- -- -- -- -- -- -- 15 --    01/07/25 0300 -- 91 47 149/66 95 93 % -- -- -- -- -- -- -- -- --    01/07/25 0200 -- 92 60 127/71 92 93 % -- -- -- -- -- -- -- -- --    01/07/25 0144 -- -- -- -- -- -- -- -- -- -- -- Nasal mask -- -- --    01/07/25 0108 -- -- -- -- -- -- -- -- -- -- -- Full face mask -- -- --    01/07/25 0100 -- 96 48 160/72 103 93 % -- -- -- -- -- -- -- -- --    01/07/25 0000 98 °F (36.7 °C) 95 38 125/61 88 95 % -- -- -- -- -- --  Lying 15 --    01/06/25 2318 -- -- -- -- -- -- -- -- -- -- -- HFNC prongs -- -- --    01/06/25 2300 -- 93 38 110/58 78 96 % -- -- -- -- -- -- -- -- --    01/06/25 2200 -- 93 37 113/52 75 96 % -- -- -- -- -- -- -- -- --    01/06/25 2100 -- 91 28 131/62 89 97 % -- -- -- -- -- -- -- -- --    01/06/25 2035 -- -- -- -- -- -- -- -- -- -- -- HFNC prongs -- -- --    01/06/25 2000 97.6 °F (36.4 °C) 91 34 139/75 101 98 % -- -- -- -- -- -- Lying 15 No Pain    01/06/25 1927 -- -- -- -- -- -- -- -- -- -- -- -- -- -- 6    01/06/25 1900 -- 93 34 164/78 106 97 % -- -- -- -- -- -- -- -- --    01/06/25 1800 -- 93 26 163/78 108 97 % -- -- -- -- -- -- -- -- --    01/06/25 1700 -- 96 28 145/63 91 97 % -- -- -- -- -- -- -- -- --    01/06/25 1600 -- 97 42 187/89 115 92 % -- -- -- -- -- -- -- 15 No Pain    01/06/25 1542 97.2 °F (36.2 °C) -- -- -- -- -- -- -- -- -- -- -- -- -- --    01/06/25 1506 -- -- -- -- -- 91 % -- -- -- -- -- HFNC prongs -- -- --    01/06/25 1500 -- 93 28 155/67 97 93 % -- -- -- -- -- -- -- -- --    01/06/25 1400 -- 93 30 143/83 105 94 % -- -- -- -- -- -- -- -- --    01/06/25 1332 -- -- -- -- -- 94 % -- -- -- -- -- -- -- -- --    01/06/25 1300 -- 87 51 131/60 86 95 % -- -- -- -- -- -- -- -- --    01/06/25 1200 -- 84 23 154/72 103 94 % -- -- -- -- -- -- -- 15 No Pain    01/06/25 1135 -- -- -- -- -- 96 % -- -- -- -- -- HFNC prongs -- -- --    01/06/25 1100 97.2 °F (36.2 °C) 85 27 136/66 95 97 % -- -- -- -- -- -- Lying -- --    01/06/25 1000 -- 91 25 133/61 88 -- -- -- -- -- -- -- -- -- --    01/06/25 0900 -- 92 31 150/68 98 90 % -- -- -- -- -- -- -- -- --    01/06/25 0830 -- -- -- -- -- 94 % -- -- -- -- -- HFNC prongs -- -- --    01/06/25 0800 -- 76 21 123/58 84 96 % -- -- -- -- -- -- -- 15 --    01/06/25 0732 -- -- -- -- -- 96 % -- -- -- -- -- Full face mask -- -- --    01/06/25 0700 98.1 °F (36.7 °C) 86 20 138/63 90 92 % -- -- -- -- BiPAP -- Lying -- --    01/06/25 0600 -- 84 18 137/63 90 94 % -- -- -- -- BiPAP --  Lying -- --    01/06/25 0542 -- 81 19 142/65 94 92 % -- -- -- -- BiPAP -- Lying -- --    01/06/25 0500 -- 81 12 134/63 91 95 % -- -- -- -- BiPAP -- Lying -- --    01/06/25 0400 98.5 °F (36.9 °C) 84 17 123/61 85 95 % -- -- -- -- BiPAP -- Lying 15 --    01/06/25 0325 -- -- -- -- -- -- -- -- -- -- -- Full face mask -- -- --    01/06/25 0308 -- 97 37 158/70 101 94 % 70  -- 30 L/min -- High flow nasal cannula -- Lying -- --    01/06/25 0300 -- 98 44 158/70 101 91 % 75 -- 30 L/min -- High flow nasal cannula -- Lying -- --    01/06/25 0252 -- -- -- -- -- 92 % 75 -- 30 L/min -- High flow nasal cannula HFNC prongs -- -- --    01/06/25 0200 -- 97 30 169/80 115 98 % 75 -- 30 L/min -- High flow nasal cannula -- Lying -- --    01/06/25 0100 -- 92 21 143/64 92 98 % 75 -- 30 L/min -- High flow nasal cannula -- Lying -- --    01/06/25 0000 98.6 °F (37 °C) 95 24 139/63 90 99 % 75 -- 30 L/min -- High flow nasal cannula -- Lying 15 No Pain    01/05/25 2300 -- 100 25 148/66 95 99 % 75 -- 30 L/min -- High flow nasal cannula -- Lying -- --    01/05/25 2243 -- 101 25 147/67 96 99 % 75  -- 30 L/min -- High flow nasal cannula HFNC prongs Lying -- --    01/05/25 2200 -- 106 25 143/67 97 98 % 85 -- 30 L/min -- High flow nasal cannula -- Lying -- --    01/05/25 2100 -- 107 25 150/70 100 98 % 85 -- 30 L/min -- High flow nasal cannula -- Lying -- --    01/05/25 2053 -- 107 43 -- -- 95 % 85  -- 30 L/min -- High flow nasal cannula -- Lying -- --    01/05/25 2011 -- 105 33 140/62 89 99 % 90  -- 30 L/min -- High flow nasal cannula HFNC prongs Lying -- --    01/05/25 2000 99.6 °F (37.6 °C) 103 41 140/62 89 98 % 90 -- 30 L/min -- High flow nasal cannula -- Lying 15 No Pain    OBSERV: Patient sitting up in bed able to hold a conversation at 01/05/25 2000 01/05/25 1900 -- 106 37 143/65 94 99 % -- -- -- -- High flow nasal cannula -- Lying -- --    01/05/25 1800 -- 113 45 156/75 108 97 % -- -- -- -- High flow nasal cannula -- Lying -- --    01/05/25  1709 -- -- -- -- -- 96 % -- -- -- -- -- HFNC prongs -- -- --    01/05/25 1700 -- 100 39 132/70 92 95 % -- -- -- -- -- -- Lying -- --    01/05/25 1600 -- 110 48 129/61 88 84 % -- 44 -- 6 L/min Nasal cannula -- Lying -- --    01/05/25 1545 -- -- -- -- -- 92 % -- 44 -- 6 L/min Nasal cannula -- -- -- --    01/05/25 1529 -- -- -- -- -- 99 % -- -- -- -- -- Full face mask -- -- --    01/05/25 1500 100.6 °F (38.1 °C) 93 32 110/56 80 95 % -- -- -- -- BiPAP -- Lying -- --    01/05/25 1400 -- 102 37 113/67 85 97 % -- -- -- -- BiPAP -- Lying 15 No Pain    01/05/25 1359 -- -- -- -- -- 97 % -- -- -- -- -- -- -- -- --    01/05/25 1330 -- 102 37 -- -- 96 % -- -- -- -- -- -- -- -- --    01/05/25 1326 98.8 °F (37.1 °C) 100 39 127/58 83 97 % -- -- -- -- -- Full face mask Lying -- No Pain              Pertinent Labs/Diagnostic Test Results:   Radiology:  XR chest portable ICU   Final Interpretation by Anthony Ross MD (01/06 0816)      Persistent right lower lung atelectasis.            Workstation performed: [id number]           Cardiology:  ECG 12 lead   Final Result by Aidan Gutierrez MD (01/05 1716)   Sinus tachycardia   ST elevation consider inferior injury or acute infarct   Concavity is less suggestive    When compared with ECG of 05-Jan-2025 02:37, (unconfirmed)   ST elevation in lead 3 and avF is new    Confirmed by Aidan Gutierrez ([id number]) on 1/5/2025 5:16:11 PM              Results from last 7 days   Lab Units 01/05/25 2027   SARS-COV-2  Not Detected     Results from last 7 days   Lab Units 01/07/25  0442 01/06/25  0642 01/05/25  1539 01/05/25  0323   WBC Thousand/uL 10.91* 9.34  --  13.03*   HEMOGLOBIN g/dL 7.9* 8.2*  --  9.8*   I STAT HEMOGLOBIN g/dl  --   --  9.5*  --    HEMATOCRIT % 26.9* 27.7*  --  31.8*   HEMATOCRIT, ISTAT %  --   --  28*  --    PLATELETS Thousands/uL 525* 521*  --  653*   TOTAL NEUT ABS Thousands/µL 9.68* 7.14  --  10.44*         Results from last 7 days   Lab Units 01/07/25 0442  01/06/25  0642 01/05/25 2027 01/05/25  1539 01/05/25  0323   SODIUM mmol/L 139 134* 137  --  136   POTASSIUM mmol/L 4.6 4.9 5.4*  --  3.8   CHLORIDE mmol/L 96 94* 95*  --  91*   CO2 mmol/L 35* 40* 34*  --  36*   CO2, I-STAT mmol/L  --   --   --  39*  --    ANION GAP mmol/L 8 0* 8  --  9   BUN mg/dL 27* 24 21  --  15   CREATININE mg/dL 0.36* 0.47* 0.46*  --  0.43*   EGFR ml/min/1.73sq m 106 97 98  --  100   CALCIUM mg/dL 9.7 9.6 8.9  --  9.2   CALCIUM, IONIZED mmol/L  --  1.22 0.99*  --   --    CALCIUM, IONIZED, ISTAT mmol/L  --   --   --  1.22  --    MAGNESIUM mg/dL 1.9 2.0 1.9  --   --    PHOSPHORUS mg/dL 2.8 3.8 4.8*  --   --      Results from last 7 days   Lab Units 01/07/25 0442 01/05/25  0323   AST U/L 23 17   ALT U/L 22 16   ALK PHOS U/L 63 76   TOTAL PROTEIN g/dL 6.3* 6.7   ALBUMIN g/dL 3.6 3.6   TOTAL BILIRUBIN mg/dL 0.21 0.23         Results from last 7 days   Lab Units 01/07/25  0442 01/06/25  0642 01/05/25 2027 01/05/25  0323   GLUCOSE RANDOM mg/dL 87 202* 153* 118             Results from last 7 days   Lab Units 01/06/25  0809 01/06/25  0613 01/05/25  1011   PH SIDNEY  7.432* 7.218* 7.221*   PCO2 SIDNEY mm Hg 50.0 84.1* 83.7*   PO2 SIDNEY mm Hg 188.1* 46.7* 72.6*   HCO3 SIDNEY mmol/L 32.6* 33.5* 33.6*   BASE EXC SIDNEY mmol/L 7.4 4.4 3.9   O2 CONTENT SIDNEY ml/dL 11.6 9.6 14.0   O2 HGB, VENOUS % 93.4* 77.2 91.1*     Results from last 7 days   Lab Units 01/05/25  1539   I STAT BASE EXC mmol/L 10*   I STAT O2 SAT % 97*   ISTAT PH ART  7.355   I STAT ART PCO2 mm HG 66.9*   I STAT ART PO2 mm HG 94.0   I STAT ART HCO3 mmol/L 37.4*                 Results from last 7 days   Lab Units 01/05/25  0323   PROTIME seconds 16.5*   INR  1.28*   PTT seconds 42*         Results from last 7 days   Lab Units 01/06/25  0642 01/05/25 2027 01/05/25  0323   PROCALCITONIN ng/ml 0.10 0.12 0.12     Results from last 7 days   Lab Units 01/05/25 2027 01/05/25  0323   LACTIC ACID mmol/L 0.4* 0.9             Results from last 7 days   Lab Units  01/05/25  0323   BNP pg/mL 53                         Results from last 7 days   Lab Units 01/05/25  1801   CLARITY UA  Clear   COLOR UA  Yellow   SPEC GRAV UA  >=1.030*   PH UA  6.0   GLUCOSE UA mg/dl Negative   KETONES UA mg/dl Negative   BLOOD UA  Trace-Intact*   PROTEIN UA mg/dl Negative   NITRITE UA  Negative   BILIRUBIN UA  Negative   UROBILINOGEN UA E.U./dl 0.2   LEUKOCYTES UA  Negative   WBC UA /hpf 0-1   RBC UA /hpf 0-1   BACTERIA UA /hpf None Seen   EPITHELIAL CELLS WET PREP /hpf Occasional     Results from last 7 days   Lab Units 01/06/25  0840 01/05/25 2027 01/05/25  1800   STREP PNEUMONIAE ANTIGEN, URINE  Negative  --  Negative   LEGIONELLA URINARY ANTIGEN  Negative  --  Negative   INFLUENZA B   --  Not Detected  --    RESPIRATORY SYNCYTIAL VIRUS   --  Detected*  --      Results from last 7 days   Lab Units 01/05/25 2027   ADENOVIRUS  Not Detected   BORDETELLA PARAPERTUSSIS  Not Detected   BORDETELLA PERTUSSIS  Not Detected   CHLAMYDIA PNEUMONIAE  Not Detected   CORONAVIRUS 229E  Not Detected   CORONAVIRUS HKU1  Not Detected   CORONAVIRUS NL63  Not Detected   CORONAVIRUS OC43  Not Detected   METAPNEUMOVIRUS  Not Detected   RHINOVIRUS  Not Detected   MYCOPLASMA PNEUMONIAE  Not Detected   PARAINFLUENZA 1  Not Detected   PARAINFLUENZA 2  Not Detected   PARAINFLUENZA 3  Not Detected   PARAINFLUENZA 4  Not Detected                         Results from last 7 days   Lab Units 01/05/25  1812 01/05/25  1811 01/05/25  0323   BLOOD CULTURE  No Growth at 24 hrs. No Growth at 24 hrs. No Growth at 24 hrs.  No Growth at 24 hrs.                   Past Medical History:   Diagnosis Date    Acute kidney failure (HCC)     Acute nonspecific idiopathic pericarditis     Anxiety     Atrial fibrillation (HCC)     Breast cancer (HCC) 2007    Cellulitis of right lower extremity     CHF (congestive heart failure) (HCC)     Chronic pain     Chronic respiratory failure with hypoxia (HCC)     Colitis     Colostomy status ([facility])      Dependence on supplemental oxygen     Depression     Difficult intubation     Excessive daytime sleepiness     Gastroesophageal reflux disease without esophagitis     Gastroparesis     GERD (gastroesophageal reflux disease)     Hyperlipidemia     Ischemic colitis (HCC) 01/21/2019    Leukocytosis 03/28/2020    Muscular dystrophy (HCC)     Limb-girdle    Osteoporosis     Other nonrheumatic aortic valve disorders     Overactive bladder     Perforated diverticulum 03/28/2020    Pericardial effusion (noninflammatory)     Pericarditis     Pneumonitis     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)     Rheumatic tricuspid insufficiency     Skin cancer     Skin disorder     Tachycardia 06/02/2021    Vitamin D deficiency      Present on Admission:   COPD with acute exacerbation (HCC)   Depression   Acute on chronic respiratory failure with hypoxia and hypercapnia (HCC)   Paroxysmal A-fib (HCC)   GERD without esophagitis   Chronic diastolic heart failure (HCC)   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)   Ambulatory dysfunction   Anemia   Severe protein-calorie malnutrition (HCC)   Ductal carcinoma in situ (DCIS) of left breast   Urinary incontinence   SIRS (systemic inflammatory response syndrome) (HCC)      Admitting Diagnosis: COPD exacerbation (HCC) [J44.1]  Age/Sex: 74 y.o. female    Network Utilization Review Department  ATTENTION: Please call with any questions or concerns to [phone] and carefully listen to the prompts so that you are directed to the right person. All voicemails are confidential.   For Discharge needs, contact Care Management DC Support Team at [phone] opt. 2  Send all requests for admission clinical reviews, approved or denied determinations and any other requests to dedicated fax number below belonging to the campus where the patient is receiving treatment. List of dedicated fax numbers for the Facilities:  FACILITY NAME UR FAX NUMBER   ADMISSION DENIALS  (Administrative/Medical Necessity) [phone]   DISCHARGE SUPPORT TEAM (NETWORK) [phone]   PARENT CHILD HEALTH (Maternity/NICU/Pediatrics) [phone]   [facility] [phone]   [facility] [phone]   [facility] [phone]   [facility] [phone]   [facility] [phone]   [facility] [phone]   [facility] [phone]   [facility] [phone]   [facility] [phone]   [facility] [phone]   [facility] [phone]   [facility] [phone]

## 2025-01-07 NOTE — ASSESSMENT & PLAN NOTE
Wt Readings from Last 3 Encounters:   01/06/25 73.6 kg (162 lb 4.1 oz)   01/05/25 75 kg (165 lb 5.5 oz)   11/29/24 75.2 kg (165 lb 12.6 oz)

## 2025-01-07 NOTE — ASSESSMENT & PLAN NOTE
RML/RLL collapse/atelectasis with V/Q mismatch.  Possible aspiration  Currently on HFNT  I recommend wean O2 for SPO2 >88%, incentive spirometry. I recommend Chest PT with theravest, flutter valve. Also continue xopenex/atrovent/3% saline nebs.  Add incentive spirometry

## 2025-01-07 NOTE — PROGRESS NOTES
Progress Note - Pulmonology   Name: Claire Doherty 74 y.o. female I MRN: [id number]  Unit/Bed#: ICU 12-01 I Date of Admission: 1/5/2025   Date of Service: 1/7/2025 I Hospital Day: 2    Assessment & Plan  Acute on chronic respiratory failure with hypoxia and hypercapnia (HCC)  In setting of RSV infection with wheezing and RML/RLL collapse/atelectasis with V/Q mismatch.  Possible aspiration  Currently on HFNT  I recommend wean O2 for SPO2 >88%, incentive spirometry. I recommend Chest PT with theravest, flutter valve. Also continue xopenex/atrovent/3% saline nebs.  She is DNR/DNI  Aspiration precautions/SLP eval  She has RP2 panel with RSV, negative procal x 2.Antibiotics were stopped  Regarding steroids her lung disease is primarily related to restrictive lung disease/muscular dystrophy, and currently has hypoxemia related to her RML/RLL atelectasis.  She is wheezing in the setting of the viral infection.  Consider reducing solumedrol to a prednisone course with improvement. Continue dosing as is for now (40 mg solumedrol q8)  Continue BiPAP qHS  Atelectasis  RML/RLL collapse/atelectasis with V/Q mismatch.  Possible aspiration  Currently on HFNT  I recommend wean O2 for SPO2 >88%, incentive spirometry. I recommend Chest PT with theravest, flutter valve. Also continue xopenex/atrovent/3% saline nebs.  Add incentive spirometry  RSV infection  Supportive care and see under acute on chronic respiratory failure above  Ambulatory dysfunction  In setting of muscular dystrophy  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)  Chronically on BiPAP with 2 lpm ox oxygen qHS  Follows with Dr Mandujano in pulmonary clinic  Chronic diastolic heart failure (HCC)  Wt Readings from Last 3 Encounters:   01/06/25 73.6 kg (162 lb 4.1 oz)   01/05/25 75 kg (165 lb 5.5 oz)   11/29/24 75.2 kg (165 lb 12.6 oz)     SIRS (systemic inflammatory response syndrome) (HCC)      24 Hour Events : No acute events  Subjective : Feels about the same.   Some shortness of breath. Remains on HFNT. FiO2 0.4    Objective :  Temp:  [97.2 °F (36.2 °C)-98 °F (36.7 °C)] 98 °F (36.7 °C)  HR:  [90-97] 91  BP: (110-187)/(52-89) 135/65  Resp:  [26-60] 32  SpO2:  [91 %-98 %] 91 %  O2 Device: High flow nasal cannula  FiO2 (%):  [40] 40    Physical Exam  Vitals and nursing note reviewed.   Constitutional:       General: She is not in acute distress.     Appearance: She is well-developed. She is not ill-appearing.   HENT:      Head: Normocephalic and atraumatic.   Eyes:      Conjunctiva/sclera: Conjunctivae normal.   Cardiovascular:      Rate and Rhythm: Normal rate and regular rhythm.      Heart sounds: No murmur heard.  Pulmonary:      Effort: Pulmonary effort is normal. No respiratory distress.      Breath sounds: Wheezing and rhonchi present.   Abdominal:      Palpations: Abdomen is soft.      Tenderness: There is no abdominal tenderness.      Comments: + ostomy   Musculoskeletal:         General: No swelling.      Cervical back: Neck supple.      Right lower leg: Edema present.      Left lower leg: Edema present.   Skin:     General: Skin is warm and dry.      Capillary Refill: Capillary refill takes less than 2 seconds.   Neurological:      Mental Status: She is alert.   Psychiatric:         Mood and Affect: Mood normal.           Lab Results: I have reviewed the following results:   .     01/07/25  0442   WBC 10.91*   HGB 7.9*   HCT 26.9*   *   SODIUM 139   K 4.6   CL 96   CO2 35*   BUN 27*   CREATININE 0.36*   GLUC 87   MG 1.9   PHOS 2.8   AST 23   ALT 22   ALB 3.6   TBILI 0.21   ALKPHOS 63     ABG: No new results in last 24 hours.    Imaging Results Review: I personally reviewed the following image studies in PACS and associated radiology reports: chest xray and CT chest.      CXR 1/6/26  Persistent right lower lung atelectasis.      CT Chest 1/5/25  Postobstructive atelectasis of the right middle and lower lobe. Underlying pneumonia not excluded     Left lower  lobe mucous plugging with distal consolidative airspace disease compatible with an and acute infiltrate secondary to aspiration     Other Study Results Review: Other studies reviewed include:   TTE 9/2024    Left Ventricle: Left ventricular cavity size is normal. Wall thickness is normal. The left ventricular ejection fraction is 65%. Systolic function is normal. Wall motion is normal.    Mitral Valve: There is moderate annular calcification.    Tricuspid Valve: There is mild regurgitation. The right ventricular systolic pressure is mildly elevated. The estimated right ventricular systolic pressure is 45.00 mmHg.    IVC/SVC: The right atrial pressure is estimated at 8.0 mmHg. The inferior vena cava is normal in size. Respirophasic changes were blunted (less than 50% variation).  PFT Results Reviewed: reviewed     PFT 2021  Interpretation:     Restrictive pattern on spirometry      Normal Spirometry     Lung volumes indicative of restrictive lung disease     Moderate decrease in diffusion capacity     Restrictive pattern on flow volume loops

## 2025-01-07 NOTE — ASSESSMENT & PLAN NOTE
Malnutrition Findings:                                 BMI Findings:           Body mass index is 31.69 kg/m².     Nutrition consulted - appreciate recommendations

## 2025-01-07 NOTE — OCCUPATIONAL THERAPY NOTE
Occupational Therapy Evaluation     Patient Name: Claire Doherty  Today's Date: 1/7/2025  Problem List  Principal Problem:    Acute on chronic respiratory failure with hypoxia and hypercapnia (HCC)  Active Problems:    Ambulatory dysfunction    Urinary incontinence    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)    GERD without esophagitis    Anemia    Depression    Ductal carcinoma in situ (DCIS) of left breast    Colostomy status (HCC)    COPD with acute exacerbation (HCC)    Severe protein-calorie malnutrition (HCC)    Paroxysmal A-fib (HCC)    SIRS (systemic inflammatory response syndrome) (HCC)    Chronic diastolic heart failure (HCC)    Atelectasis    RSV infection    Past Medical History  Past Medical History:   Diagnosis Date    Acute kidney failure (HCC)     Acute nonspecific idiopathic pericarditis     Anxiety     Atrial fibrillation (HCC)     Breast cancer (HCC) 2007    Cellulitis of right lower extremity     CHF (congestive heart failure) (HCC)     Chronic pain     Chronic respiratory failure with hypoxia (HCC)     Colitis     Colostomy status (HCC)     Dependence on supplemental oxygen     Depression     Difficult intubation     Excessive daytime sleepiness     Gastroesophageal reflux disease without esophagitis     Gastroparesis     GERD (gastroesophageal reflux disease)     Hyperlipidemia     Ischemic colitis (HCC) 01/21/2019    Leukocytosis 03/28/2020    Muscular dystrophy (HCC)     Limb-girdle    Osteoporosis     Other nonrheumatic aortic valve disorders     Overactive bladder     Perforated diverticulum 03/28/2020    Pericardial effusion (noninflammatory)     Pericarditis     Pneumonitis     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)     Rheumatic tricuspid insufficiency     Skin cancer     Skin disorder     Tachycardia 06/02/2021    Vitamin D deficiency      Past Surgical History  Past Surgical History:   Procedure Laterality Date    CARDIAC CATHETERIZATION N/A  8/21/2024    Procedure: Cardiac pci;  Surgeon: Juan Fuller MD;  [facility];  Service: Cardiology    CARDIAC CATHETERIZATION N/A 8/21/2024    Procedure: Cardiac PCI Stent;  Surgeon: Juan Fuller MD;  [facility];  Service: Cardiology    CARDIAC CATHETERIZATION N/A 8/21/2024    Procedure: Cardiac Coronary Angiogram;  Surgeon: Juan Fuller MD;  [facility];  Service: Cardiology    COLONOSCOPY N/A 1/22/2019    Procedure: COLONOSCOPY;  Surgeon: Anthony Mejía MD;  Location: [facility];  Service: Colorectal    CYSTOSCOPY N/A 9/30/2020    Procedure: CYSTOSCOPY; BILATERAL URETERAL CATHETER PLACEMENT, CYSTOTOMY;  Surgeon: Zach Tyler MD;  Location: [facility];  Service: Urology    FL CYSTOGRAM  10/15/2020    HARTMANS PROCEDURE N/A 9/30/2020    Procedure: EXPLORATORY LAPAROTOMY; LYSIS OF ADHESIONS; WASHOUT PELVIC ABSCESS; COMPLEX SIGMOID COLON RESECTION; LOOP TRANSVERSE COLOSTOMY;  Surgeon: Thania Jaffe MD;  Location: [facility];  Service: General    IR DRAINAGE TUBE PLACEMENT  9/24/2020    MASTECTOMY Left 2007    left breast mastectomy     TONSILLECTOMY      TOOTH EXTRACTION      TUBAL LIGATION          01/07/25 0944   OT Last Visit   OT Visit Date 01/07/25   Note Type   Note type Evaluation   Additional Comments Pt seen as a co-eval with PT due to the patient's co-morbidities, clinically unstable presentation, and present impairments which are a regression from the patient's baseline.   Pain Assessment   Pain Assessment Tool 0-10   Pain Score No Pain   Restrictions/Precautions   Weight Bearing Precautions Per Order No   Other Precautions Fall Risk;O2  (40L high flow; wears O2 PRN at baseline)   Home Living   Type of Home SNF  ([facility])   Home Equipment Walker;Wheelchair-electric  (Pt reports utilizing puma or STS mechanical lift to get OOB - pt reports utilizing power chair at [facility] for community mobility)   Additional Comments Pt  "reports receiving PT/OT services at facility   Prior Function   Level of [place] Needs assistance with ADLs;Needs assistance with functional mobility;Needs assistance with IADLS   Lives With Facility staff   Receives Help From Personal care attendant   IADLs Family/Friend/Other provides transportation;Family/Friend/Other provides meals;Family/Friend/Other provides medication management   Falls in the last 6 months 1 to 4   Vocational Retired   Lifestyle   Autonomy Pt resides at [facility]; requires A with ADLs, IADLs and all aspects of care   Reciprocal Relationships facility staff   Subjective   Subjective \"I want to be back home in my apartment\"   ADL   Where Assessed Edge of bed   Eating Assistance 5  Supervision/Setup   Grooming Assistance 4  Minimal Assistance   UB Bathing Assistance 3  Moderate Assistance   LB Bathing Assistance 2  Maximal Assistance   UB Dressing Assistance 3  Moderate Assistance   LB Dressing Assistance 2  Maximal Assistance   Toileting Assistance  1  Total Assistance   Bed Mobility   Supine to Sit 2  Maximal assistance   Additional items Assist x 2;HOB elevated;Bedrails;Increased time required;Verbal cues;LE management   Sit to Supine 2  Maximal assistance   Additional items Assist x 2;Increased time required;Verbal cues;LE management   Additional Comments VC for bedrail utilization and proper body mechanics; + dizziness with transitional movement - pt tolerated sitting EOB ~4 mins with min A x1 for postural control and support. Deferred further mobility d/t pt's baseline mobility status and safety   Balance   Static Sitting Fair -   Dynamic Sitting Poor +   Activity Tolerance   Activity Tolerance Patient limited by fatigue   Medical Staff Made Aware Yes, CM made aware of d/c recs   Nurse Made Aware Yes, nursing staff made aware of session outcomes   RUE Assessment   RUE Assessment WFL   RUE Strength   RUE Overall Strength   (3/5)   LUE Assessment   LUE Assessment WFL   LUE Strength "   LUE Overall Strength   (3/5)   Hand Function   Gross Motor Coordination Functional   Fine Motor Coordination Functional   Vision-Basic Assessment   Current Vision Wears glasses all the time   Cognition   Overall Cognitive Status WFL   Arousal/Participation Alert;Responsive;Cooperative   Attention Within functional limits   Orientation Level Oriented X4   Memory Decreased recall of precautions;Decreased recall of recent events   Following Commands Follows one step commands without difficulty   Comments Pt agreeable to OT evaluation; unrealistic regarding functional status   Assessment   Limitation Decreased ADL status;Decreased UE strength;Decreased Safe judgement during ADL;Decreased endurance;Decreased self-care trans;Decreased high-level ADLs   Prognosis Good   Assessment Pt is a 74 y.o. female seen for OT evaluation s/p admit to [facility] on 1/5/2025 w/ Acute on chronic respiratory failure with hypoxia and hypercapnia (HCC).  Comorbidities affecting pt's functional performance at time of assessment include: ambulatory dysfunction, GERD, anemia, depression, COPD, a-fib, SIRS. Personal factors affecting pt at time of IE include:limited insight into deficits, decreased initiation and engagement , health management , and environment. Prior to admission, pt requires A with ADLs and IADLs. Upon evaluation: the following deficits impact occupational performance: weakness, decreased strength, decreased balance, decreased tolerance, impaired problem solving, and decreased safety awareness. Pt to benefit from continued skilled OT tx while in the hospital to address deficits as defined above and maximize level of functional independence w ADL's and functional mobility. Occupational Performance areas to address include: grooming, bathing/shower, toilet hygiene, dressing, functional mobility, community mobility, and clothing management. From OT standpoint, recommendation at time of d/c would with moderate intensity OT  resources.   Goals   Patient Goals to go home by her birthday   Plan   Treatment Interventions ADL retraining;Functional transfer training;UE strengthening/ROM;Endurance training;Patient/family training;Compensatory technique education;Activityengagement;Energy conservation   Goal Expiration Date 01/17/25   OT Treatment Day 0   OT Frequency 1-2x/wk   Discharge Recommendation   Rehab Resource Intensity Level, OT II (Moderate Resource Intensity)  (RTF)   Additional Comments  The patient's raw score on the AM-PAC Daily Activity inpatient short form is 12, standardized score is 30.6, less than 39.4. Patients at this level are likely to benefit from discharge with moderate intensity OT resources. Please refer to the recommendation of the Occupational Therapist for safe discharge planning.   AM-PAC Daily Activity Inpatient   Lower Body Dressing 2   Bathing 2   Toileting 1   Upper Body Dressing 2   Grooming 2   Eating 3   Daily Activity Raw Score 12   Daily Activity Standardized Score (Calc for Raw Score >=11) 30.6   [facility] Applied Cognition Inpatient   Following a Speech/Presentation 3   Understanding Ordinary Conversation 4   Taking Medications 3   Remembering Where Things Are Placed or Put Away 2   Remembering List of 4-5 Errands 2   Taking Care of Complicated Tasks 2   Applied Cognition Raw Score 16   Applied Cognition Standardized Score 35.03     GOALS:    Pt will achieve the following within specified time frame: LTG  Pt will achieve the following goals within 10 days    *ADL transfers with Min (A) for inc'd independence with ADLs/purposeful tasks    *UB ADL with Min (A) for inc'd independence with self cares    *LB ADL with Min (A) using AE prn for inc'd independence with self cares    *Toileting with Min (A) for clothing management and hygiene for return to PLOF with personal care    *Increase sitting tolerance x3 m for inc'd tolerance with standing purposeful tasks    *Bed mobility- Min (A) for inc'd independence  to manage own comfort and initiate EOB & OOB purposeful tasks    *Participate in 10-15m UE therex to increase overall stamina/activity tolerance for purposeful tasks      Juani Willams MS, OTR/L

## 2025-01-07 NOTE — ASSESSMENT & PLAN NOTE
Presented to [facility] ED from [facility] w/worsening SOB x2D, audible wheezing, low grade fevers, and cough w/yellow sputum production  Recently hospitalized from 11/24-11/29 for COVID-19 infection/COPD exacerbation  Wears 2L NC at baseline and BIPAP qHS - currently on HFNC  Suspect this is 2/2 COPD exacerbation and RSV infection w/postobstructive atelectasis and mucus plugging vs aspiration pneumonitis w/underlying RLD  Imaging revealed postobstructive atelectasis of right middle and lower lobe - underlying pneumonia not excluded; Left lower lobe mucous plugging with distal consolidative airspace disease compatible with an and acute infiltrate secondary to aspiration  RSV (+) on 01/04  Urine strep/legionella negative  Monitor off antibiotics given persistently negative procalcitonin   Continue IV Solu Medrol 40 mg Q8H for COPD exacerbation - can likely begin weaning today  Continue scheduled Xopenex/HTS nebs   Continue Mucinex and Flutter Valve to aid in secretion mobilization  Currently NPO - will need VBS  Aggressive pulmonary hygiene  Titrate FiO2 for SpO2 > 88%

## 2025-01-07 NOTE — ASSESSMENT & PLAN NOTE
Noted to have wheezing w/increased WOB in [facility] ED  Suspect exacerbation related to RSV infection and ? postobstructive pneumonia  Follows w/Pulmonology as OP  Prescribed Pulmicort and PRN Albuterol nebs as OP - continue scheduled Xopenex/HTS nebs while here  Continue IV Solu Medrol 40 mg Q8H and would plan to start taper today  Aggressive pulmonary hygiene

## 2025-01-07 NOTE — ASSESSMENT & PLAN NOTE
Currently in sinus tachycardia (low 100s)  Does not appear to be on rate-controlling agents  Prescribed Eliquis as OP - if fails speech eval this AM, will need initiation of Heparin gtt  Continue cardiac monitoring

## 2025-01-07 NOTE — PROGRESS NOTES
Progress Note - Critical Care/ICU   Name: Claire Doherty 74 y.o. female I MRN: [id number]  Unit/Bed#: ICU 12-01 I Date of Admission: 1/5/2025   Date of Service: 1/7/2025 I Hospital Day: 2      Assessment & Plan  Acute on chronic respiratory failure with hypoxia and hypercapnia (HCC)  Presented to [facility] ED from [facility] w/worsening SOB x2D, audible wheezing, low grade fevers, and cough w/yellow sputum production  Recently hospitalized from 11/24-11/29 for COVID-19 infection/COPD exacerbation  Wears 2L NC at baseline and BIPAP qHS - currently on HFNC  Suspect this is 2/2 COPD exacerbation and RSV infection w/postobstructive atelectasis and mucus plugging vs aspiration pneumonitis w/underlying RLD  Imaging revealed postobstructive atelectasis of right middle and lower lobe - underlying pneumonia not excluded; Left lower lobe mucous plugging with distal consolidative airspace disease compatible with an and acute infiltrate secondary to aspiration  RSV (+) on 01/04  Urine strep/legionella negative  Monitor off antibiotics given persistently negative procalcitonin   Continue IV Solu Medrol 40 mg Q8H for COPD exacerbation - can likely begin weaning today  Continue scheduled Xopenex/HTS nebs   Continue Mucinex and Flutter Valve to aid in secretion mobilization  Currently NPO - will need VBS  Aggressive pulmonary hygiene  Titrate FiO2 for SpO2 > 88%  COPD with acute exacerbation (HCC)  Noted to have wheezing w/increased WOB in [facility] ED  Suspect exacerbation related to RSV infection and ? postobstructive pneumonia  Follows w/Pulmonology as OP  Prescribed Pulmicort and PRN Albuterol nebs as OP - continue scheduled Xopenex/HTS nebs while here  Continue IV Solu Medrol 40 mg Q8H and would plan to start taper today  Aggressive pulmonary hygiene  SIRS (systemic inflammatory response syndrome) (HCC)  Present in [facility] ED AEB tachycardia, tachypnea, leukocytosis, hypoxia requiring HFNC  Suspect this is 2/2 RSV infection w/?  postobstructive pneumonia   RSV (+) on 01/04  Blood cultures pending  UA w/out evidence of infection  Urine strep/legionella pending  MRSA swab pending  Is s/p colloid replacement  Monitor off antibiotics given negative procalcitonin x3  Monitor fever and WBC curve  Anemia  Hgb 9.8 on admission  Baseline Hgb appears to be around 11-12  Suspect this is 2/2 severe sepsis/pneumonia w/likely underlying chronic disease  Iron panel, folate, B12 pending  No evidence of active bleeding  Trend Hgb and transfuse for Hgb < 7  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)  Follows w/Pulmonology as OP  Aggressive pulmonary hygiene  Depression  Continue home Celexa and Wellbutrin when able to safely take PO  Paroxysmal A-fib (HCC)  Currently in sinus tachycardia (low 100s)  Does not appear to be on rate-controlling agents  Prescribed Eliquis as OP - if fails speech eval this AM, will need initiation of Heparin gtt  Continue cardiac monitoring    GERD without esophagitis  Continue home PPI as IV for now    Ambulatory dysfunction  WCB at baseline   PT/OT  Urinary incontinence  Continue home Oxybutynin when able to safely take PO  Ductal carcinoma in situ (DCIS) of left breast  Is s/p L mastectomy in 2007  Now w/R breast mass concerning for malignancy per review of notes from 08/2024  Will need continued follow-up as OP  Colostomy status (HCC)  Is s/p Wynn's procedure w/colostomy creation in 2020  Wound care consulted to assist w/management  Monitor output  Severe protein-calorie malnutrition (HCC)  Malnutrition Findings:                                 BMI Findings:           Body mass index is 31.69 kg/m².     Nutrition consulted - appreciate recommendations  Chronic diastolic heart failure (HCC)  Wt Readings from Last 3 Encounters:   01/06/25 73.6 kg (162 lb 4.1 oz)   01/05/25 75 kg (165 lb 5.5 oz)   11/29/24 75.2 kg (165 lb 12.6 oz)     TTE from 09/2024 revealed EF 65% w/mild TR and mildly elevated RVSP at 45  Does  not appear to be on diuretic as OP  Monitor I/O and daily weights    Disposition: Stepdown Level 1    ICU Core Measures     A: Assess, Prevent, and Manage Pain Has pain been assessed? Yes  Need for changes to pain regimen? No   B: Both SAT/SAT  N/A   C: Choice of Sedation RASS Goal: N/A patient not on sedation  Need for changes to sedation or analgesia regimen? NA   D: Delirium CAM-ICU: Positive   E: Early Mobility  Plan for early mobility? Yes   F: Family Engagement Plan for family engagement today? Yes         Prophylaxis:  VTE VTE covered by:  apixaban, Oral, 5 mg at 01/06/25 1708       Stress Ulcer  covered bypantoprazole (PROTONIX) 40 mg tablet [[id number]] (Long-Term Med), pantoprazole (PROTONIX) injection 40 mg [[id number]]         24 Hour Events :     Received low dose IV Valium overnight for anxiety  Tolerated home BIPAP, however had difficulty sleeping  No acute events overnight      Subjective   Review of Systems: Review of Systems   Constitutional:  Negative for chills and fever.   HENT:  Positive for congestion.         Yellow sputum production   Eyes:  Negative for visual disturbance.   Respiratory:  Positive for cough. Negative for shortness of breath.    Cardiovascular:  Negative for chest pain.   Gastrointestinal:  Negative for abdominal distention and abdominal pain.   Genitourinary:  Negative for difficulty urinating.   Musculoskeletal:  Negative for back pain.   Neurological:  Negative for dizziness, light-headedness and headaches.   Psychiatric/Behavioral:  Positive for confusion.         Intermittent confusion overnight       Objective :                   Vitals I/O      Most Recent Min/Max in 24hrs   Temp 98 °F (36.7 °C) Temp  Min: 97.2 °F (36.2 °C)  Max: 98.5 °F (36.9 °C)   Pulse 96 Pulse  Min: 76  Max: 97   Resp (!) 48 Resp  Min: 12  Max: 51   /72 BP  Min: 110/58  Max: 187/89   O2 Sat 93 % SpO2  Min: 90 %  Max: 98 %      Intake/Output Summary (Last 24 hours) at 1/7/2025 1140  Last data  filed at 1/7/2025 0000  Gross per 24 hour   Intake 957 ml   Output 775 ml   Net 182 ml       Diet NPO    Invasive Monitoring           Physical Exam   Physical Exam  Vitals and nursing note reviewed.   Eyes:      Pupils: Pupils are equal, round, and reactive to light.   Skin:     General: Skin is warm and dry.      Capillary Refill: Capillary refill takes 2 to 3 seconds.      Coloration: Skin is pale.   Cardiovascular:      Rate and Rhythm: Normal rate and regular rhythm.      Pulses:           Radial pulses are 2+ on the right side and 2+ on the left side.        Dorsalis pedis pulses are 1+ on the right side and 1+ on the left side.      Heart sounds: Normal heart sounds.   Musculoskeletal:      Right lower leg: Trace Edema present.      Left lower leg: Trace Edema present.   Abdominal: General: An ostomy site is present. Bowel sounds are normal. There is ostomy site.There is no distension.      Palpations: Abdomen is soft.      Tenderness: There is no abdominal tenderness.   Constitutional:       General: She is awake.      Appearance: She is ill-appearing.      Interventions: Face mask in place.      Comments: BIPAP (home)   Pulmonary:      Effort: Pulmonary effort is normal.      Breath sounds: Decreased breath sounds and rhonchi present.   Psychiatric:         Behavior: Behavior is cooperative.   Neurological:      Mental Status: She is disoriented to situation.      Comments: Intermittently confused overnight          Diagnostic Studies        Lab Results: I have reviewed the following results:     Medications:  Scheduled PRN   apixaban, 5 mg, BID  chlorhexidine, 15 mL, Q12H NANCY  fluticasone, 2 spray, Daily  levalbuterol, 1.25 mg, TID  methylPREDNISolone sodium succinate, 40 mg, Q8H NANCY  pantoprazole, 40 mg, Q24H NANCY  sodium chloride, 4 mL, TID      acetaminophen, 650 mg, Q6H PRN  ondansetron, 4 mg, Q6H PRN  sodium chloride, 1 spray, Q1H PRN       Continuous          Labs:   CBC    Recent Labs      01/05/25  1539 01/06/25  0642   WBC  --  9.34   HGB 9.5* 8.2*   HCT 28* 27.7*   PLT  --  521*     BMP    Recent Labs     01/05/25 2027 01/06/25  0642   SODIUM 137 134*   K 5.4* 4.9   CL 95* 94*   CO2 34* 40*   AGAP 8 0*   BUN 21 24   CREATININE 0.46* 0.47*   CALCIUM 8.9 9.6       Coags    No recent results     Additional Electrolytes  Recent Labs     01/05/25 2027 01/06/25  0642   MG 1.9 2.0   PHOS 4.8* 3.8   CAIONIZED 0.99* 1.22          Blood Gas    No recent results  Recent Labs     01/06/25  0809   PHVEN 7.432*   BBR5YTX 50.0   PO2VEN 188.1*   VWV2JBD 32.6*   BEVEN 7.4   I1OVDDB 93.4*    LFTs  No recent results    Infectious  Recent Labs     01/05/25 2027 01/06/25  0642   PROCALCITONI 0.12 0.10     Glucose  Recent Labs     01/05/25 2027 01/06/25  0642   GLUC 153* 202*

## 2025-01-07 NOTE — ASSESSMENT & PLAN NOTE
In setting of RSV infection with wheezing and RML/RLL collapse/atelectasis with V/Q mismatch.  Possible aspiration  Currently on HFNT  I recommend wean O2 for SPO2 >88%, incentive spirometry. I recommend Chest PT with theravest, flutter valve. Also continue xopenex/atrovent/3% saline nebs.  She is DNR/DNI  Aspiration precautions/SLP eval  She has RP2 panel with RSV, negative procal x 2.Antibiotics were stopped  Regarding steroids her lung disease is primarily related to restrictive lung disease/muscular dystrophy, and currently has hypoxemia related to her RML/RLL atelectasis.  She is wheezing in the setting of the viral infection.  Consider reducing solumedrol to a prednisone course with improvement. Continue dosing as is for now (40 mg solumedrol q8)  Continue BiPAP qHS

## 2025-01-07 NOTE — ASSESSMENT & PLAN NOTE
Present in [facility] ED AEB tachycardia, tachypnea, leukocytosis, hypoxia requiring HFNC  Suspect this is 2/2 RSV infection w/? postobstructive pneumonia   RSV (+) on 01/04  Blood cultures pending  UA w/out evidence of infection  Urine strep/legionella pending  MRSA swab pending  Is s/p colloid replacement  Monitor off antibiotics given negative procalcitonin x3  Monitor fever and WBC curve

## 2025-01-07 NOTE — SPEECH THERAPY NOTE
Speech Language/Pathology    Speech/Language Pathology Progress Note    Patient Name: Claire Doherty  Today's Date: 1/7/2025     Problem List  Principal Problem:    Acute on chronic respiratory failure with hypoxia and hypercapnia (HCC)  Active Problems:    Ambulatory dysfunction    Urinary incontinence    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)    GERD without esophagitis    Anemia    Depression    Ductal carcinoma in situ (DCIS) of left breast    Colostomy status (HCC)    COPD with acute exacerbation (HCC)    Severe protein-calorie malnutrition (HCC)    Paroxysmal A-fib (HCC)    SIRS (systemic inflammatory response syndrome) (HCC)    Chronic diastolic heart failure (HCC)    Atelectasis    RSV infection       Past Medical History  Past Medical History:   Diagnosis Date    Acute kidney failure (HCC)     Acute nonspecific idiopathic pericarditis     Anxiety     Atrial fibrillation (HCC)     Breast cancer (HCC) 2007    Cellulitis of right lower extremity     CHF (congestive heart failure) (HCC)     Chronic pain     Chronic respiratory failure with hypoxia (HCC)     Colitis     Colostomy status (HCC)     Dependence on supplemental oxygen     Depression     Difficult intubation     Excessive daytime sleepiness     Gastroesophageal reflux disease without esophagitis     Gastroparesis     GERD (gastroesophageal reflux disease)     Hyperlipidemia     Ischemic colitis (HCC) 01/21/2019    Leukocytosis 03/28/2020    Muscular dystrophy (HCC)     Limb-girdle    Osteoporosis     Other nonrheumatic aortic valve disorders     Overactive bladder     Perforated diverticulum 03/28/2020    Pericardial effusion (noninflammatory)     Pericarditis     Pneumonitis     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)     Rheumatic tricuspid insufficiency     Skin cancer     Skin disorder     Tachycardia 06/02/2021    Vitamin D deficiency         Past Surgical History  Past Surgical History:   Procedure Laterality  "Date    CARDIAC CATHETERIZATION N/A 8/21/2024    Procedure: Cardiac pci;  Surgeon: Juan Fuller MD;  [facility];  Service: Cardiology    CARDIAC CATHETERIZATION N/A 8/21/2024    Procedure: Cardiac PCI Stent;  Surgeon: Juan Fuller MD;  [facility];  Service: Cardiology    CARDIAC CATHETERIZATION N/A 8/21/2024    Procedure: Cardiac Coronary Angiogram;  Surgeon: Juan Fuller MD;  [facility];  Service: Cardiology    COLONOSCOPY N/A 1/22/2019    Procedure: COLONOSCOPY;  Surgeon: Anthony Mejía MD;  Location: [facility];  Service: Colorectal    CYSTOSCOPY N/A 9/30/2020    Procedure: CYSTOSCOPY; BILATERAL URETERAL CATHETER PLACEMENT, CYSTOTOMY;  Surgeon: Zach Tyler MD;  [facility]: [facility];  Service: Urology    FL CYSTOGRAM  10/15/2020    HARTMANS PROCEDURE N/A 9/30/2020    Procedure: EXPLORATORY LAPAROTOMY; LYSIS OF ADHESIONS; WASHOUT PELVIC ABSCESS; COMPLEX SIGMOID COLON RESECTION; LOOP TRANSVERSE COLOSTOMY;  Surgeon: Thania Jaffe MD;  [facility];  Service: General    IR DRAINAGE TUBE PLACEMENT  9/24/2020    MASTECTOMY Left 2007    left breast mastectomy     TONSILLECTOMY      TOOTH EXTRACTION      TUBAL LIGATION           Subjective:  Pt was seen for f/u dysphagia therapy.   Objective:  Pt was alert and positioned upright. Downgraded to NPO as increased work of breathing day prior. Currently on 30 liters NC. Prior to trials SpO2 90%, RR varied between 26-48. Pt benefited from encouragement to sit fully upright very particular about bed placement this am. Appeared more confused questioned ST if she is on hospice care, ST informed pt of current code status, pt then stated \"so I am on hospice\". Provided redirection. Trialed ice chips, thin via straw, puree, and soft solids. Mastication was min prolonged however appeared purposeful. Bolus control, formation, and transfer were WNL. Swallows appeared prompt. X2 dry cough however " no liquids of solids presented at time. Pt reported bringing up some phlegm stated its deep. Spo2 during intake 90-93%  RR 25-46. Pts RR status does increase while talking or increased anxiousness, benefits from frequent verbal cues and redirection. ST reviewed safe swallow strategies with pt including slow rate, alternate liquids with solids, and swallow prior to additional po. Also reviewed importance of sitting fully upright during and after a meal. Min intake of trials overall pt stated she was hungry for an omelet though she doubts she will eat very much. Reviewed session with nursing, CRNP, and DO.   Assessment:  Pt increased confusion compared to day prior. More anxious. Benefited from frequent verbal cues and redirection. Demonstrated functional oral and pharyngeal stages. RR continues to fluctuate.   Plan/Recommendations:  Recommend Regular w/ softer selections and thin as RR and mental status improves . Full liquids/Ensure for now.   Meds if small whole with thin liquids, break or crush if large.   Encourage OOB/activity as tolerated, deep breathing exercises as appropriate, and incentive spirometry  Ensure good oral care   GOC discussion  Aspiration precautions  ST continue to f/u as indicated.

## 2025-01-08 NOTE — QUICK NOTE
Called and updated patient's sister Betty on improvement of oxygen requirements and VBS results. Current plan of care and downgrade to Med/Surg discussed. All questions answered to her satisfaction.

## 2025-01-08 NOTE — ASSESSMENT & PLAN NOTE
Present in [facility] ED AEB tachycardia, tachypnea, leukocytosis, hypoxia requiring HFNC  Suspect this is 2/2 RSV infection w/? postobstructive pneumonia   RSV (+) on 01/04  Blood cultures pending  UA w/out evidence of infection  Urine strep/legionella negative  MRSA swab negative  Is s/p colloid replacement  Monitor off antibiotics given negative procalcitonin x3  Monitor fever and WBC curve

## 2025-01-08 NOTE — ASSESSMENT & PLAN NOTE
Presented to [facility] ED from [facility] w/worsening SOB x2D, audible wheezing, low grade fevers, and cough w/yellow sputum production  Recently hospitalized from 11/24-11/29 for COVID-19 infection/COPD exacerbation  Wears 2L NC at baseline and BIPAP qHS - currently on HFNC  Suspect this is 2/2 COPD exacerbation and RSV infection w/postobstructive atelectasis and mucus plugging vs aspiration pneumonitis w/underlying RLD  Imaging revealed postobstructive atelectasis of right middle and lower lobe - underlying pneumonia not excluded; Left lower lobe mucous plugging with distal consolidative airspace disease compatible with an and acute infiltrate secondary to aspiration  RSV (+) on 01/04  Urine strep/legionella negative  Monitor off antibiotics given persistently negative procalcitonin   Continue IV Solu Medrol 40 mg Q8H for COPD exacerbation - can likely begin weaning today  Continue scheduled Xopenex/HTS nebs   Continue Mucinex and Flutter Valve to aid in secretion mobilization  Currently NPO - will need VBS  Aggressive pulmonary hygiene  Titrate FiO2 for SpO2 > 88%

## 2025-01-08 NOTE — PROCEDURES
Video Swallow Study      Patient Name: Claire Doherty  Today's Date: 1/8/2025        Past Medical History  Past Medical History:   Diagnosis Date    Acute kidney failure (HCC)     Acute nonspecific idiopathic pericarditis     Anxiety     Atrial fibrillation (HCC)     Breast cancer ([facility]) 2007    Cellulitis of right lower extremity     CHF (congestive heart failure) ([facility])     Chronic pain     Chronic respiratory failure with hypoxia (HCC)     Colitis     Colostomy status ([facility])     Dependence on supplemental oxygen     Depression     Difficult intubation     Excessive daytime sleepiness     Gastroesophageal reflux disease without esophagitis     Gastroparesis     GERD (gastroesophageal reflux disease)     Hyperlipidemia     Ischemic colitis ([facility]) 01/21/2019    Leukocytosis 03/28/2020    Muscular dystrophy ([facility])     Limb-girdle    Osteoporosis     Other nonrheumatic aortic valve disorders     Overactive bladder     Perforated diverticulum 03/28/2020    Pericardial effusion (noninflammatory)     Pericarditis     Pneumonitis     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[facility])     Rheumatic tricuspid insufficiency     Skin cancer     Skin disorder     Tachycardia 06/02/2021    Vitamin D deficiency         Past Surgical History  Past Surgical History:   Procedure Laterality Date    CARDIAC CATHETERIZATION N/A 8/21/2024    Procedure: Cardiac pci;  Surgeon: Juan Fuller MD;  [facility];  Service: Cardiology    CARDIAC CATHETERIZATION N/A 8/21/2024    Procedure: Cardiac PCI Stent;  Surgeon: Juan Fuller MD;  [facility];  Service: Cardiology    CARDIAC CATHETERIZATION N/A 8/21/2024    Procedure: Cardiac Coronary Angiogram;  Surgeon: Juan Fuller MD;  [facility];  Service: Cardiology    COLONOSCOPY N/A 1/22/2019    Procedure: COLONOSCOPY;  Surgeon: Anthony Mejía MD;  [facility];  Service: Colorectal     CYSTOSCOPY N/A 9/30/2020    Procedure: CYSTOSCOPY; BILATERAL URETERAL CATHETER PLACEMENT, CYSTOTOMY;  Surgeon: Zach Tyler MD;  [facility];  Service: Urology    FL CYSTOGRAM  10/15/2020    HARTMANS PROCEDURE N/A 9/30/2020    Procedure: EXPLORATORY LAPAROTOMY; LYSIS OF ADHESIONS; WASHOUT PELVIC ABSCESS; COMPLEX SIGMOID COLON RESECTION; LOOP TRANSVERSE COLOSTOMY;  Surgeon: Thania Jaffe MD;  [facility];  Service: General    IR DRAINAGE TUBE PLACEMENT  9/24/2020    MASTECTOMY Left 2007    left breast mastectomy     TONSILLECTOMY      TOOTH EXTRACTION      TUBAL LIGATION         Summary:  Images are on PACS for review.     Oral Phase : Pt presented with oral phase of swallow WFL. Mastication was min prolonged however eventual functional breakdown. Bolus control and formation were WNL. Transfer was piecemeal however WFL. Trace posterior base of tongue residue.     Pharyngeal Phase : Pt presented with pharyngeal phase of swallow WFL. Swallow initiaton was prompt. Hyoid elevation, laryngeal excursion, and pharyngeal constriction were WNL. Epiglottic inversion was mostly fully complete. Mild/mod vallecular retention with solids pt independently utilized secondary swallow to reduce residue.  Trace pyriform retention with liquids. No penetration or aspiration.      Per Esophageal screen   Slow motility with solids and liquids. Brief stasis with half 13 mm pill with puree in middle of esophagus, cleared with liquids wash. Reflux observed with liquids, noted pt belching at time of reflux. Pt reported she often belches due to gastroparesis.     Recommendations:  Diet: Regular   Liquids:thin   Meds:if small whole with thin liquids, break/crush if large   Strategies:Slow rate, alternate liquids with solids, swallow prior to additional po   Upright position  F/u ST tx:yes  Therapy Prognosis:fair   Prognosis considerations:age, medical status   Full/Intermittent Supervision  Aspiration  "Precautions  Reflux Precautions  Consider consult with: GI, continue to f/u with pulmonary   Results reviewed with: pt, nursing,   If a dedicated assessment of the esophagus is desired, consider esophagram/barium swallow or EGD.    Goals:  Dysphagia LTG  -Patient will demonstrate safe and effective oral intake (without overt s/s significant oral/pharyngeal dysphagia including s/s penetration or aspiration) for the highest appropriate diet level.     Short Term Goals    -Pt will tolerate Regular diet and thin liquid with no significant s/s oral or pharyngeal dysphagia across 1-3 diagnostic session/s.    -Patient will tolerate trials of upgraded food and/or liquid texture with no significant s/s of oral or pharyngeal dysphagia including aspiration across 1-3 diagnostic sessions     -Patient will demonstrate independent use of recommended safe swallowing strategies during a clinician assessed meal across 1-3 diagnostic sessions.    Patient's goal:\" I really want to eat\"    H&P/Admit info, pertinent provider notes/procedures/studies/PMH:(copied and placed in this report)  Claire Doherty is a 74 y.o. female with a PMH of COPD on home oxygen, chronic respiratory failure, paroxysmal atrial fibrillation, depression presents with shortness of breath. Patient was sent to our campus from [facility] for pulmonology evaluation and possible need for bronchoscopy. Patient currently on BiPAP. Unable to give history given acuity of patient.     Special Studies   XR CHEST PORTABLE ICU 01/08/2025  IMPRESSION:  Persistent right lower lung atelectasis.  CT CHEST WITHOUT IV CONTRAST 01/05/2025  IMPRESSION:  Postobstructive atelectasis of the right middle and lower lobe. Underlying pneumonia not excluded  Left lower lobe mucous plugging with distal consolidative airspace disease compatible with an and acute infiltrate secondary to aspiration    Code Status: Level 3 DNR/DNI    Previous VBS: none    Precautions : Contact and " Droplet     Food allergies:  No known    Current diet:  NPO    Premorbid diet:  Regular and thin liquids    Dentition  Adequate    Oral Mech  WNL   O2 requirements:  4 liters NC   Vocal Quality/Speech WNL   Cognitive status:  Alert      Consistencies administered: Barium laden applesauce, nutri grain bar, hard cookie, sandwich bite, and half 13mm barium pill. Liquids were administered by cup and straw.     Pt was seated laterally at 90 degrees.   Pt  unable to be viewed in AP position due to transfer status.

## 2025-01-08 NOTE — PLAN OF CARE
Problem: INFECTION - ADULT  Goal: Absence or prevention of progression during hospitalization  Description: INTERVENTIONS:  - Assess and monitor for signs and symptoms of infection  - Monitor lab/diagnostic results  - Monitor all insertion sites, i.e. indwelling lines, tubes, and drains  - Monitor endotracheal if appropriate and nasal secretions for changes in amount and color  - [place] appropriate cooling/warming therapies per order  - Administer medications as ordered  - Instruct and encourage patient and family to use good hand hygiene technique  - Identify and instruct in appropriate isolation precautions for identified infection/condition  Outcome: Progressing     Problem: RESPIRATORY - ADULT  Goal: Achieves optimal ventilation and oxygenation  Description: INTERVENTIONS:  - Assess for changes in respiratory status  - Assess for changes in mentation and behavior  - Position to facilitate oxygenation and minimize respiratory effort  - Oxygen administered by appropriate delivery if ordered  - Initiate smoking cessation education as indicated  - Encourage broncho-pulmonary hygiene including cough, deep breathe, Incentive Spirometry  - Assess the need for suctioning and aspirate as needed  - Assess and instruct to report SOB or any respiratory difficulty  - Respiratory Therapy support as indicated  Outcome: Progressing     Problem: MUSCULOSKELETAL - ADULT  Goal: Maintain or return mobility to safest level of function  Description: INTERVENTIONS:  - Assess patient's ability to carry out ADLs; assess patient's baseline for ADL function and identify physical deficits which impact ability to perform ADLs (bathing, care of mouth/teeth, toileting, grooming, dressing, etc.)  - Assess/evaluate cause of self-care deficits   - Assess range of motion  - Assess patient's mobility  - Assess patient's need for assistive devices and provide as appropriate  - Encourage maximum independence but intervene and supervise when  necessary  - Involve family in performance of ADLs  - Assess for home care needs following discharge   - Consider OT consult to assist with ADL evaluation and planning for discharge  - Provide patient education as appropriate  Outcome: Progressing

## 2025-01-08 NOTE — PLAN OF CARE
Problem: PAIN - ADULT  Goal: Verbalizes/displays adequate comfort level or baseline comfort level  Description: Interventions:  - Encourage patient to monitor pain and request assistance  - Assess pain using appropriate pain scale  - Administer analgesics based on type and severity of pain and evaluate response  - Implement non-pharmacological measures as appropriate and evaluate response  - Consider cultural and social influences on pain and pain management  - Notify physician/advanced practitioner if interventions unsuccessful or patient reports new pain  Outcome: Progressing     Problem: INFECTION - ADULT  Goal: Absence or prevention of progression during hospitalization  Description: INTERVENTIONS:  - Assess and monitor for signs and symptoms of infection  - Monitor lab/diagnostic results  - Monitor all insertion sites, i.e. indwelling lines, tubes, and drains  - Monitor endotracheal if appropriate and nasal secretions for changes in amount and color  - [place] appropriate cooling/warming therapies per order  - Administer medications as ordered  - Instruct and encourage patient and family to use good hand hygiene technique  - Identify and instruct in appropriate isolation precautions for identified infection/condition  Outcome: Progressing     Problem: SAFETY ADULT  Goal: Patient will remain free of falls  Description: INTERVENTIONS:  - Educate patient/family on patient safety including physical limitations  - Instruct patient to call for assistance with activity   - Consult OT/PT to assist with strengthening/mobility   - Keep Call bell within reach  - Keep bed low and locked with side rails adjusted as appropriate  - Keep care items and personal belongings within reach  - Initiate and maintain comfort rounds  - Make Fall Risk Sign visible to staff  - Offer Toileting every 2 Hours, in advance of need  - Initiate/Maintain bed alarm  - Obtain necessary fall risk management equipment:   - Apply yellow socks and  bracelet for high fall risk patients  - Consider moving patient to room near nurses station  Outcome: Progressing  Goal: Maintain or return to baseline ADL function  Description: INTERVENTIONS:  -  Assess patient's ability to carry out ADLs; assess patient's baseline for ADL function and identify physical deficits which impact ability to perform ADLs (bathing, care of mouth/teeth, toileting, grooming, dressing, etc.)  - Assess/evaluate cause of self-care deficits   - Assess range of motion  - Assess patient's mobility; develop plan if impaired  - Assess patient's need for assistive devices and provide as appropriate  - Encourage maximum independence but intervene and supervise when necessary  - Involve family in performance of ADLs  - Assess for home care needs following discharge   - Consider OT consult to assist with ADL evaluation and planning for discharge  - Provide patient education as appropriate  Outcome: Progressing  Goal: Maintains/Returns to pre admission functional level  Description: INTERVENTIONS:  - Perform AM-PAC 6 Click Basic Mobility/ Daily Activity assessment daily.  - Set and communicate daily mobility goal to care team and patient/family/caregiver.   - Collaborate with rehabilitation services on mobility goals if consulted  - Perform Range of Motion 3 times a day.  - Reposition patient every 2 hours.  - Out of bed for meals   - Out of bed for toileting  - Record patient progress and toleration of activity level   Outcome: Progressing     Problem: DISCHARGE PLANNING  Goal: Discharge to home or other facility with appropriate resources  Description: INTERVENTIONS:  - Identify barriers to discharge w/patient and caregiver  - Arrange for needed discharge resources and transportation as appropriate  - Identify discharge learning needs (meds, wound care, etc.)  - Arrange for interpretive services to assist at discharge as needed  - Refer to Case Management Department for coordinating discharge  planning if the patient needs post-hospital services based on physician/advanced practitioner order or complex needs related to functional status, cognitive ability, or social support system  Outcome: Progressing     Problem: Knowledge Deficit  Goal: Patient/family/caregiver demonstrates understanding of disease process, treatment plan, medications, and discharge instructions  Description: Complete learning assessment and assess knowledge base.  Interventions:  - Provide teaching at level of understanding  - Provide teaching via preferred learning methods  Outcome: Progressing     Problem: RESPIRATORY - ADULT  Goal: Achieves optimal ventilation and oxygenation  Description: INTERVENTIONS:  - Assess for changes in respiratory status  - Assess for changes in mentation and behavior  - Position to facilitate oxygenation and minimize respiratory effort  - Oxygen administered by appropriate delivery if ordered  - Initiate smoking cessation education as indicated  - Encourage broncho-pulmonary hygiene including cough, deep breathe, Incentive Spirometry  - Assess the need for suctioning and aspirate as needed  - Assess and instruct to report SOB or any respiratory difficulty  - Respiratory Therapy support as indicated  Outcome: Progressing     Problem: SKIN/TISSUE INTEGRITY - ADULT  Goal: Skin Integrity remains intact(Skin Breakdown Prevention)  Description: Assess:  -Perform Collins assessment every shift  -Clean and moisturize skin every shift  -Inspect skin when repositioning, toileting, and assisting with ADLS  -Assess extremities for adequate circulation and sensation     Bed Management:  -Have minimal linens on bed & keep smooth, unwrinkled  -Change linens as needed when moist or perspiring  -Avoid sitting or lying in one position for more than 2 hours while in bed  -Keep HOB at 30 degrees     Toileting:  -Offer bedside commode  -Assess for incontinence   -Use incontinent care products after each incontinent episode      Activity:  -Mobilize patient 3 times a day  -Encourage activity and walks on unit  -Encourage or provide ROM exercises   -Turn and reposition patient every 2 Hours  -Use appropriate equipment to lift or move patient in bed    Skin Care:  -Avoid use of baby powder, tape, friction and shearing, hot water or constrictive clothing  -Relieve pressure over bony prominences using Mepilex  -Do not massage red bony areas    Outcome: Progressing     Problem: MUSCULOSKELETAL - ADULT  Goal: Maintain or return mobility to safest level of function  Description: INTERVENTIONS:  - Assess patient's ability to carry out ADLs; assess patient's baseline for ADL function and identify physical deficits which impact ability to perform ADLs (bathing, care of mouth/teeth, toileting, grooming, dressing, etc.)  - Assess/evaluate cause of self-care deficits   - Assess range of motion  - Assess patient's mobility  - Assess patient's need for assistive devices and provide as appropriate  - Encourage maximum independence but intervene and supervise when necessary  - Involve family in performance of ADLs  - Assess for home care needs following discharge   - Consider OT consult to assist with ADL evaluation and planning for discharge  - Provide patient education as appropriate  Outcome: Progressing     Problem: Prexisting or High Potential for Compromised Skin Integrity  Goal: Skin integrity is maintained or improved  Description: INTERVENTIONS:  - Identify patients at risk for skin breakdown  - Assess and monitor skin integrity  - Assess and monitor nutrition and hydration status  - Monitor labs   - Assess for incontinence   - Turn and reposition patient  - Assist with mobility/ambulation  - Relieve pressure over bony prominences  - Avoid friction and shearing  - Provide appropriate hygiene as needed including keeping skin clean and dry  - Evaluate need for skin moisturizer/barrier cream  - Collaborate with interdisciplinary team   -  Patient/family teaching  - Consider wound care consult   Outcome: Progressing     Problem: Nutrition/Hydration-ADULT  Goal: Nutrient/Hydration intake appropriate for improving, restoring or maintaining nutritional needs  Description: Monitor and assess patient's nutrition/hydration status for malnutrition. Collaborate with interdisciplinary team and initiate plan and interventions as ordered.  Monitor patient's weight and dietary intake as ordered or per policy. Utilize nutrition screening tool and intervene as necessary. Determine patient's food preferences and provide high-protein, high-caloric foods as appropriate.     INTERVENTIONS:  - Monitor oral intake, urinary output, labs, and treatment plans  - Assess nutrition and hydration status and recommend course of action  - Evaluate amount of meals eaten  - Assist patient with eating if necessary   - Allow adequate time for meals  - Recommend/ encourage appropriate diets, oral nutritional supplements, and vitamin/mineral supplements  - Order, calculate, and assess calorie counts as needed  - Recommend, monitor, and adjust tube feedings and TPN/PPN based on assessed needs  - Assess need for intravenous fluids  - Provide specific nutrition/hydration education as appropriate  - Include patient/family/caregiver in decisions related to nutrition  Outcome: Progressing

## 2025-01-08 NOTE — ASSESSMENT & PLAN NOTE
Is s/p Wynn's procedure w/ colostomy creation in 2020  Wound care consulted to assist w/management  Monitor output

## 2025-01-08 NOTE — PROGRESS NOTES
Progress Note - Critical Care/ICU   Name: Claire Doherty 74 y.o. female I MRN: [id number]  Unit/Bed#: ICU 12-01 I Date of Admission: 1/5/2025   Date of Service: 1/8/2025 I Hospital Day: 3       Critical Care Interval Transfer Note:    Brief Hospital Summary: 73 yo female with pmh restrictive lung disease, chronic hypoxic respiratory failure on 2 L NC, muscular dystrophy transferred to [facility] from [facility] for acute hypoxic respiratory failure requiring continuous bipap. She was found to be RSV +. Treated with steroid taper, scheduled nebs and aggressive pulmonary toileting. She currently is on 3L NC and is hemodynamically stable for Med/surge.    Barriers to discharge:   Wean of steroids  Insurance acceptence back to [facility] for completion of her IP rehab     Consults: IP CONSULT TO PULMONOLOGY  IP CONSULT TO CASE MANAGEMENT  IP CONSULT TO PHARMACY  IP CONSULT TO NUTRITION SERVICES    Recommended to review admission imaging for incidental findings and document in discharge navigator: Chart reviewed, no known incidental findings noted at this time.      Discharge Plan: Anticipate discharge in 24-48 hrs to rehab facility.       Patient seen and evaluated by Critical Care today and deemed to be appropriate for transfer to Med Surg. Spoke to Tomi from [facility] to accept transfer. Critical care can be contacted via SecureChat with any questions or concerns. Please use the Critical Care AP Role in Secure Chat for any provider inquires until the patient is transferred out of the ICU or until tomorrow at 0600.

## 2025-01-08 NOTE — ASSESSMENT & PLAN NOTE
Noted to have wheezing w/ increased WOB in [facility] ED  Suspect exacerbation related to RSV infection and ? postobstructive pneumonia  Follows w/Pulmonology as OP  Prescribed Pulmicort and PRN Albuterol nebs as OP - continue scheduled Xopenex/HTS nebs while here  Continue IV Solu Medrol 40 mg Q8H and would plan to start taper today  Aggressive pulmonary hygiene

## 2025-01-08 NOTE — PROGRESS NOTES
Progress Note - Critical Care/ICU   Name: Claire Doherty 74 y.o. female I MRN: [id number]  Unit/Bed#: ICU 12-01 I Date of Admission: 1/5/2025   Date of Service: 1/8/2025 I Hospital Day: 3      Assessment & Plan  Acute on chronic respiratory failure with hypoxia and hypercapnia (HCC)  Presented to [facility] ED from [facility] w/worsening SOB x2D, audible wheezing, low grade fevers, and cough w/yellow sputum production  Recently hospitalized from 11/24-11/29 for COVID-19 infection/COPD exacerbation  Wears 2L NC at baseline and BIPAP qHS - currently on HFNC  Suspect this is 2/2 COPD exacerbation and RSV infection w/postobstructive atelectasis and mucus plugging vs aspiration pneumonitis w/underlying RLD  Imaging revealed postobstructive atelectasis of right middle and lower lobe - underlying pneumonia not excluded; Left lower lobe mucous plugging with distal consolidative airspace disease compatible with an and acute infiltrate secondary to aspiration  RSV (+) on 01/04  Urine strep/legionella negative  Monitor off antibiotics given persistently negative procalcitonin   Continue IV Solu Medrol 40 mg Q8H for COPD exacerbation - can likely begin weaning today  Continue scheduled Xopenex/HTS nebs   Continue Mucinex and Flutter Valve to aid in secretion mobilization  Currently NPO - will need VBS  Aggressive pulmonary hygiene  Titrate FiO2 for SpO2 > 88%  COPD with acute exacerbation (HCC)  Noted to have wheezing w/ increased WOB in [facility] ED  Suspect exacerbation related to RSV infection and ? postobstructive pneumonia  Follows w/Pulmonology as OP  Prescribed Pulmicort and PRN Albuterol nebs as OP - continue scheduled Xopenex/HTS nebs while here  Continue IV Solu Medrol 40 mg Q8H and would plan to start taper today  Aggressive pulmonary hygiene  SIRS (systemic inflammatory response syndrome) (HCC)  Present in [facility] ED AEB tachycardia, tachypnea, leukocytosis, hypoxia requiring HFNC  Suspect this is 2/2 RSV infection w/?  postobstructive pneumonia   RSV (+) on 01/04  Blood cultures pending  UA w/out evidence of infection  Urine strep/legionella negative  MRSA swab negative  Is s/p colloid replacement  Monitor off antibiotics given negative procalcitonin x3  Monitor fever and WBC curve  Anemia  Hgb 9.8 on admission  Baseline Hgb appears to be around 11-12  Suspect this is 2/2 severe sepsis/pneumonia w/likely underlying chronic disease  Iron panel, folate, B12 pending  No evidence of active bleeding  Trend Hgb and transfuse for Hgb < 7  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)  Follows w/Pulmonology as OP  Aggressive pulmonary hygiene  Depression  Continue home Celexa and Wellbutrin when able to safely take PO  Paroxysmal A-fib (HCC)  Currently in sinus tachycardia (low 100s)  Does not appear to be on rate-controlling agents  Prescribed Eliquis as OP, patient tolerating taking eliquis crushed in puree  Continue cardiac monitoring    GERD without esophagitis  Continue home PPI as IV for now    Ambulatory dysfunction  WCB at baseline   PT/OT  Urinary incontinence  Continue home Oxybutynin when able to safely take PO  Ductal carcinoma in situ (DCIS) of left breast  Is s/p L mastectomy in 2007  Now w/R breast mass concerning for malignancy per review of notes from 08/2024  Will need continued follow-up as OP  Colostomy status (HCC)  Is s/p Wynn's procedure w/ colostomy creation in 2020  Wound care consulted to assist w/management  Monitor output  Severe protein-calorie malnutrition (HCC)  Malnutrition Findings:                                 BMI Findings:           Body mass index is 31.69 kg/m².     Nutrition consulted - appreciate recommendations  Chronic diastolic heart failure (HCC)  Wt Readings from Last 3 Encounters:   01/06/25 73.6 kg (162 lb 4.1 oz)   01/05/25 75 kg (165 lb 5.5 oz)   11/29/24 75.2 kg (165 lb 12.6 oz)     TTE from 09/2024 revealed EF 65% w/mild TR and mildly elevated RVSP at 45  Does not appear  to be on diuretic as OP  Monitor I/O and daily weights  Disposition: Stepdown Level 1    ICU Core Measures     A: Assess, Prevent, and Manage Pain Has pain been assessed? Yes  Need for changes to pain regimen? No   B: Both SAT/SAT  N/A   C: Choice of Sedation RASS Goal: N/A patient not on sedation  Need for changes to sedation or analgesia regimen? NA   D: Delirium CAM-ICU: Negative   E: Early Mobility  Plan for early mobility? Yes   F: Family Engagement Plan for family engagement today? Yes         Prophylaxis:  VTE VTE covered by:  apixaban, Oral, 5 mg at 01/07/25 1718       Stress Ulcer  covered bypantoprazole (PROTONIX) 40 mg tablet [[id number]] (Long-Term Med), pantoprazole (PROTONIX) injection 40 mg [[id number]]         24 Hour Events : Patient reports feeling improved this morning. Wheezing has resolved  Subjective   Review of Systems: Review of Systems   Constitutional:  Positive for fatigue (poor sleep).   Respiratory:  Negative for shortness of breath and wheezing.         Reports improvement in SOB   All other systems reviewed and are negative.       Objective :                   Vitals I/O      Most Recent Min/Max in 24hrs   Temp 98 °F (36.7 °C) Temp  Min: 98 °F (36.7 °C)  Max: 98.3 °F (36.8 °C)   Pulse 97 Pulse  Min: 90  Max: 100   Resp (!) 26 Resp  Min: 25  Max: 58   /68 BP  Min: 135/65  Max: 170/71   O2 Sat 95 % SpO2  Min: 91 %  Max: 96 %      Intake/Output Summary (Last 24 hours) at 1/8/2025 0545  Last data filed at 1/7/2025 1501  Gross per 24 hour   Intake --   Output 275 ml   Net -275 ml       Diet NPO    Invasive Monitoring           Physical Exam   Physical Exam  Vitals reviewed.   Eyes:      Pupils: Pupils are equal, round, and reactive to light.   Skin:     General: Skin is warm and dry.      Capillary Refill: Capillary refill takes 2 to 3 seconds.      Coloration: Skin is pale.   HENT:      Head: Normocephalic and atraumatic.      Mouth/Throat:      Mouth: Mucous membranes are moist.    Cardiovascular:      Rate and Rhythm: Normal rate and regular rhythm.      Pulses:           Dorsalis pedis pulses are 1+ on the right side and 1+ on the left side.      Heart sounds: Normal heart sounds.   Musculoskeletal:      Right lower leg: Trace Edema present.      Left lower leg: Trace Edema present.   Abdominal: General: An ostomy site is present. Bowel sounds are normal. There is ostomy site.There is no distension.      Palpations: Abdomen is soft.      Tenderness: There is no abdominal tenderness.   Constitutional:       General: She is not in acute distress.     Appearance: She is obese. She is ill-appearing.   Pulmonary:      Effort: Pulmonary effort is normal.      Breath sounds: Decreased breath sounds present. No wheezing, rhonchi or rales.   Psychiatric:         Behavior: Behavior is cooperative.   Neurological:      Mental Status: She is alert and oriented to person, place and time. Mental status is at baseline.      Comments: Intermittently confused overnight          Diagnostic Studies        Lab Results: I have reviewed the following results:     Medications:  Scheduled PRN   apixaban, 5 mg, BID  chlorhexidine, 15 mL, Q12H NANCY  fluticasone, 2 spray, Daily  levalbuterol, 1.25 mg, TID  methylPREDNISolone sodium succinate, 40 mg, Q8H NANCY  pantoprazole, 40 mg, Q24H NANCY  sodium chloride, 4 mL, TID      acetaminophen, 650 mg, Q6H PRN  hydrALAZINE, 10 mg, Q6H PRN  ondansetron, 4 mg, Q6H PRN  sodium chloride, 1 spray, Q1H PRN       Continuous          Labs:   CBC    Recent Labs     01/06/25  0642 01/07/25  0442   WBC 9.34 10.91*   HGB 8.2* 7.9*   HCT 27.7* 26.9*   * 525*     BMP    Recent Labs     01/06/25  0642 01/07/25  0442   SODIUM 134* 139   K 4.9 4.6   CL 94* 96   CO2 40* 35*   AGAP 0* 8   BUN 24 27*   CREATININE 0.47* 0.36*   CALCIUM 9.6 9.7       Coags    No recent results     Additional Electrolytes  Recent Labs     01/06/25  0642 01/07/25  0442   MG 2.0 1.9   PHOS 3.8 2.8   CAIONIZED  1.22  --           Blood Gas    No recent results  Recent Labs     01/06/25  0809   PHVEN 7.432*   KQU4YHK 50.0   PO2VEN 188.1*   JSD8IYH 32.6*   BEVEN 7.4   Y3PTARM 93.4*    LFTs  Recent Labs     01/07/25  0442   ALT 22   AST 23   ALKPHOS 63   ALB 3.6   TBILI 0.21       Infectious  Recent Labs     01/06/25  0642   PROCALCITONI 0.10     Glucose  Recent Labs     01/06/25  0642 01/07/25  0442   GLUC 202* 87

## 2025-01-08 NOTE — ASSESSMENT & PLAN NOTE
Currently in sinus tachycardia (low 100s)  Does not appear to be on rate-controlling agents  Prescribed Eliquis as OP, patient tolerating taking eliquis crushed in puree  Continue cardiac monitoring

## 2025-01-08 NOTE — CASE MANAGEMENT
Case Management Discharge Planning Note    Patient name Claire Khalil ICU 12/ICU  MRN [id number]  : 1950 Date 2025       Current Admission Date: 2025  Current Admission Diagnosis:Acute on chronic respiratory failure with hypoxia and hypercapnia (Formerly [facility])   Patient Active Problem List    Diagnosis Date Noted Date Diagnosed    Atelectasis 2025     RSV infection 2025     Renal cyst 2024     MRSA colonization 2024     COVID-19 2024     Elevated d-dimer 2024     COPD exacerbation (Formerly [facility]) 2024     Chronic diastolic heart failure (Formerly [facility]) 2024     SIRS (systemic inflammatory response syndrome) (Formerly [facility]) 2024     Pleural effusion, bilateral 2024     Hyperkalemia 2024     Paroxysmal A-fib (Formerly [facility]) 2024     Acute on chronic diastolic CHF (congestive heart failure) (Formerly [facility]) 09/10/2024     Acute on chronic respiratory failure with hypoxia and hypercapnia (Formerly [facility]) 09/10/2024     Acute nonspecific idiopathic pericarditis 2024     Cognitive communication deficit 2024     Other disorders of lung 2024     Other nonrheumatic aortic valve disorders 2024     Severe protein-calorie malnutrition (Formerly [facility]) 2024     DINA (acute kidney injury) (Formerly [facility]) 2024     Nausea 2024     Pleural effusion 2024     Pericardial effusion 2024     Infected wound 2024     Chest pain 2024     History of Idiopathic pericarditis 2024     Cellulitis of right foot 2024     Chronic left shoulder pain 2024     Shoulder joint dysfunction 2024     COPD with acute exacerbation (Formerly [facility]) 06/15/2023     Bilateral hand pain 2023     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Formerly [facility]) 2022     Gastroparesis 2021     Aortic valve sclerosis 2021     Tricuspid valve insufficiency 2021     Mitral annular calcification 2021     Former smoker 2021     On home oxygen therapy 2021      Colostomy status (HCC)      Ductal carcinoma in situ (DCIS) of left breast 03/15/2021     Insomnia 10/21/2020     Depression 10/06/2020     Anemia 10/02/2020     Chronic hypoxic respiratory failure (HCC) 09/30/2020     Bladder injury 09/30/2020     Severe sepsis (HCC) 09/28/2020     Thrombocytosis, unspecified 09/28/2020     Leukocytosis 03/28/2020     Difficult intubation      Dystrophy, muscular, 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HCC) 10/12/2017     Ambulatory dysfunction 10/12/2017     Urinary incontinence 10/03/2017     Osteoporosis 12/07/2016     Allergic rhinitis 10/09/2013     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) 10/04/2012     GERD without esophagitis 10/04/2012       LOS (days): 3  Geometric Mean LOS (GMLOS) (days): 4.9  Days to GMLOS:1.9     OBJECTIVE:  Risk of Unplanned Readmission Score: 43.21         Current admission status: Inpatient   Preferred Pharmacy:   [id number] [address] [address]  Phone: [phone] Fax: [phone]    [address] [address]  Phone: [phone] Fax: [phone]    Primary Care Provider: Julienne Tucker DO    Primary Insurance: [facility] REP  Secondary Insurance:     DISCHARGE DETAILS:  As per provider, may submit for authorization as the pt will be ready for DC Friday.  Notified [place] of same.  Pt is a LTC resident of the facility, however will attempt to get authorization for same.  TC to pt sister Betty who states she would like to be able at some point to get her sister to [facility] closer to her.  Did advise she would have to contact [facility] admissions to see if any long term beds are available.  Pt is on MA, sister would have to speak to [facility].  Pt will go back to [place] by Friday.   Sister is in agreement with same.

## 2025-01-09 NOTE — PLAN OF CARE
Problem: PHYSICAL THERAPY ADULT  Goal: Performs mobility at highest level of function for planned discharge setting.  See evaluation for individualized goals.  Description: Treatment/Interventions: LE strengthening/ROM, Therapeutic exercise, Bed mobility, Functional transfer training, Equipment eval/education          See flowsheet documentation for full assessment, interventions and recommendations.  Outcome: Progressing  Note: Prognosis: Fair     Assessment: Pt seen for PT treatment session this date with interventions consisting of therapeutic exercise to improve strength to improve functional mobility and therapeutic activity to improve transfers and increase activity tolerance with functional mobility to decrease fall risk. Pt agreeable to PT treatment session upon arrival, pt found supine in bed, in no apparent distress. Since previous session, pt has made fair progress as evidenced by ability to mobilize OOB  Barriers during this session include fatigue.  Pt continues to be functioning below baseline level, and remains limited 2* factors listed above and including impaired activity tolerance and decreased mobility.  Pt prognosis for achieving goals is fair, pending pt progress with hospitalization/medical status improvements, and indicated by continued required assistance. PT will continue to see pt during current hospitalization in order to address the deficits listed above and provide interventions consistent w/ POC in effort to achieve goals. Current goals and POC remain appropriate, pt continues to have rehab potential  Upon conclusion pt seated OOB in recliner. The patient's AM-PAC Basic Mobility Inpatient Short Form Raw Score is 7.  A Raw score of less than or equal to 16 suggests the patient may benefit from discharge to post-acute rehabilitation services. Based on patient presentations and impairments, pt would most appropriately benefit from Level 3 resource intensity upon discharge.  Please also  refer to the recommendation of the Physical Therapist for safe discharge planning. RN verbalized pt appropriate for PT session.  Barriers to Discharge: None     Rehab Resource Intensity Level, PT: III (Minimum Resource Intensity)    See flowsheet documentation for full assessment.

## 2025-01-09 NOTE — PLAN OF CARE
Problem: PAIN - ADULT  Goal: Verbalizes/displays adequate comfort level or baseline comfort level  Description: Interventions:  - Encourage patient to monitor pain and request assistance  - Assess pain using appropriate pain scale  - Administer analgesics based on type and severity of pain and evaluate response  - Implement non-pharmacological measures as appropriate and evaluate response  - Consider cultural and social influences on pain and pain management  - Notify physician/advanced practitioner if interventions unsuccessful or patient reports new pain  Outcome: Progressing     Problem: INFECTION - ADULT  Goal: Absence or prevention of progression during hospitalization  Description: INTERVENTIONS:  - Assess and monitor for signs and symptoms of infection  - Monitor lab/diagnostic results  - Monitor all insertion sites, i.e. indwelling lines, tubes, and drains  - Monitor endotracheal if appropriate and nasal secretions for changes in amount and color  - [place] appropriate cooling/warming therapies per order  - Administer medications as ordered  - Instruct and encourage patient and family to use good hand hygiene technique  - Identify and instruct in appropriate isolation precautions for identified infection/condition  Outcome: Progressing     Problem: SAFETY ADULT  Goal: Patient will remain free of falls  Description: INTERVENTIONS:  - Educate patient/family on patient safety including physical limitations  - Instruct patient to call for assistance with activity   - Consult OT/PT to assist with strengthening/mobility   - Keep Call bell within reach  - Keep bed low and locked with side rails adjusted as appropriate  - Keep care items and personal belongings within reach  - Initiate and maintain comfort rounds  - Make Fall Risk Sign visible to staff  - Offer Toileting every 2 Hours, in advance of need  - Initiate/Maintain bed alarm  - Obtain necessary fall risk management equipment:   - Apply yellow socks and  bracelet for high fall risk patients  - Consider moving patient to room near nurses station  Outcome: Progressing  Goal: Maintain or return to baseline ADL function  Description: INTERVENTIONS:  -  Assess patient's ability to carry out ADLs; assess patient's baseline for ADL function and identify physical deficits which impact ability to perform ADLs (bathing, care of mouth/teeth, toileting, grooming, dressing, etc.)  - Assess/evaluate cause of self-care deficits   - Assess range of motion  - Assess patient's mobility; develop plan if impaired  - Assess patient's need for assistive devices and provide as appropriate  - Encourage maximum independence but intervene and supervise when necessary  - Involve family in performance of ADLs  - Assess for home care needs following discharge   - Consider OT consult to assist with ADL evaluation and planning for discharge  - Provide patient education as appropriate  Outcome: Progressing  Goal: Maintains/Returns to pre admission functional level  Description: INTERVENTIONS:  - Perform AM-PAC 6 Click Basic Mobility/ Daily Activity assessment daily.  - Set and communicate daily mobility goal to care team and patient/family/caregiver.   - Collaborate with rehabilitation services on mobility goals if consulted  - Perform Range of Motion 3 times a day.  - Reposition patient every 2 hours.  - Out of bed for meals   - Out of bed for toileting  - Record patient progress and toleration of activity level   Outcome: Progressing     Problem: DISCHARGE PLANNING  Goal: Discharge to home or other facility with appropriate resources  Description: INTERVENTIONS:  - Identify barriers to discharge w/patient and caregiver  - Arrange for needed discharge resources and transportation as appropriate  - Identify discharge learning needs (meds, wound care, etc.)  - Arrange for interpretive services to assist at discharge as needed  - Refer to Case Management Department for coordinating discharge  planning if the patient needs post-hospital services based on physician/advanced practitioner order or complex needs related to functional status, cognitive ability, or social support system  Outcome: Progressing     Problem: Knowledge Deficit  Goal: Patient/family/caregiver demonstrates understanding of disease process, treatment plan, medications, and discharge instructions  Description: Complete learning assessment and assess knowledge base.  Interventions:  - Provide teaching at level of understanding  - Provide teaching via preferred learning methods  Outcome: Progressing     Problem: RESPIRATORY - ADULT  Goal: Achieves optimal ventilation and oxygenation  Description: INTERVENTIONS:  - Assess for changes in respiratory status  - Assess for changes in mentation and behavior  - Position to facilitate oxygenation and minimize respiratory effort  - Oxygen administered by appropriate delivery if ordered  - Initiate smoking cessation education as indicated  - Encourage broncho-pulmonary hygiene including cough, deep breathe, Incentive Spirometry  - Assess the need for suctioning and aspirate as needed  - Assess and instruct to report SOB or any respiratory difficulty  - Respiratory Therapy support as indicated  Outcome: Progressing     Problem: SKIN/TISSUE INTEGRITY - ADULT  Goal: Skin Integrity remains intact(Skin Breakdown Prevention)  Description: Assess:  -Perform Collins assessment every shift  -Clean and moisturize skin every shift  -Inspect skin when repositioning, toileting, and assisting with ADLS  -Assess extremities for adequate circulation and sensation     Bed Management:  -Have minimal linens on bed & keep smooth, unwrinkled  -Change linens as needed when moist or perspiring  -Avoid sitting or lying in one position for more than 2 hours while in bed  -Keep HOB at 30 degrees     Toileting:  -Offer bedside commode  -Assess for incontinence   -Use incontinent care products after each incontinent episode      Activity:  -Mobilize patient 3 times a day  -Encourage activity and walks on unit  -Encourage or provide ROM exercises   -Turn and reposition patient every 2 Hours  -Use appropriate equipment to lift or move patient in bed    Skin Care:  -Avoid use of baby powder, tape, friction and shearing, hot water or constrictive clothing  -Relieve pressure over bony prominences using Mepilex  -Do not massage red bony areas    Outcome: Progressing     Problem: MUSCULOSKELETAL - ADULT  Goal: Maintain or return mobility to safest level of function  Description: INTERVENTIONS:  - Assess patient's ability to carry out ADLs; assess patient's baseline for ADL function and identify physical deficits which impact ability to perform ADLs (bathing, care of mouth/teeth, toileting, grooming, dressing, etc.)  - Assess/evaluate cause of self-care deficits   - Assess range of motion  - Assess patient's mobility  - Assess patient's need for assistive devices and provide as appropriate  - Encourage maximum independence but intervene and supervise when necessary  - Involve family in performance of ADLs  - Assess for home care needs following discharge   - Consider OT consult to assist with ADL evaluation and planning for discharge  - Provide patient education as appropriate  Outcome: Progressing     Problem: Prexisting or High Potential for Compromised Skin Integrity  Goal: Skin integrity is maintained or improved  Description: INTERVENTIONS:  - Identify patients at risk for skin breakdown  - Assess and monitor skin integrity  - Assess and monitor nutrition and hydration status  - Monitor labs   - Assess for incontinence   - Turn and reposition patient  - Assist with mobility/ambulation  - Relieve pressure over bony prominences  - Avoid friction and shearing  - Provide appropriate hygiene as needed including keeping skin clean and dry  - Evaluate need for skin moisturizer/barrier cream  - Collaborate with interdisciplinary team   -  Patient/family teaching  - Consider wound care consult   Outcome: Progressing     Problem: Nutrition/Hydration-ADULT  Goal: Nutrient/Hydration intake appropriate for improving, restoring or maintaining nutritional needs  Description: Monitor and assess patient's nutrition/hydration status for malnutrition. Collaborate with interdisciplinary team and initiate plan and interventions as ordered.  Monitor patient's weight and dietary intake as ordered or per policy. Utilize nutrition screening tool and intervene as necessary. Determine patient's food preferences and provide high-protein, high-caloric foods as appropriate.     INTERVENTIONS:  - Monitor oral intake, urinary output, labs, and treatment plans  - Assess nutrition and hydration status and recommend course of action  - Evaluate amount of meals eaten  - Assist patient with eating if necessary   - Allow adequate time for meals  - Recommend/ encourage appropriate diets, oral nutritional supplements, and vitamin/mineral supplements  - Order, calculate, and assess calorie counts as needed  - Recommend, monitor, and adjust tube feedings and TPN/PPN based on assessed needs  - Assess need for intravenous fluids  - Provide specific nutrition/hydration education as appropriate  - Include patient/family/caregiver in decisions related to nutrition  Outcome: Progressing

## 2025-01-09 NOTE — ASSESSMENT & PLAN NOTE
Wt Readings from Last 3 Encounters:   01/09/25 74 kg (163 lb 2.3 oz)   01/05/25 75 kg (165 lb 5.5 oz)   11/29/24 75.2 kg (165 lb 12.6 oz)

## 2025-01-09 NOTE — ASSESSMENT & PLAN NOTE
Wt Readings from Last 3 Encounters:   01/09/25 74 kg (163 lb 2.3 oz)   01/05/25 75 kg (165 lb 5.5 oz)   11/29/24 75.2 kg (165 lb 12.6 oz)     TTE from 09/2024 revealed EF 65% w/mild TR and mildly elevated RVSP at 45  Does not appear to be on diuretic as OP  Monitor I/O and daily weights

## 2025-01-09 NOTE — PHYSICAL THERAPY NOTE
PT Treatment Note    NAME:  Claire Doherty  DATE: 01/09/25    AGE:   74 y.o.  Mrn:   [id number]  ADMIT DX:  COPD exacerbation (HCC) [J44.1]  Performed at least 2 patient identifiers during session: Name and ID bracelet       01/09/25 1103   PT Last Visit   PT Visit Date 01/09/25   Note Type   Note Type Treatment   Pain Assessment   Pain Assessment Tool 0-10   Pain Score No Pain   Restrictions/Precautions   Weight Bearing Precautions Per Order No   Other Precautions Fall Risk;Contact/isolation;Droplet precautions;Telemetry   General   Chart Reviewed Yes   Family/Caregiver Present No   Cognition   Overall Cognitive Status WFL   Arousal/Participation Alert   Attention Within functional limits   Orientation Level Oriented X4   Memory Within functional limits   Following Commands Follows one step commands without difficulty   Bed Mobility   Rolling R 2  Maximal assistance   Additional items Assist x 1;Bedrails;Increased time required;Verbal cues   Rolling L 2  Maximal assistance   Additional items Assist x 1;Bedrails;Increased time required;Verbal cues   Additional Comments 4 person transfer from bed to recliner using backboard   Transfers   Additional Comments pt denied dizziness with transitional movement   Activity Tolerance   Activity Tolerance Patient tolerated treatment well   Exercises   Glute Sets Supine;15 reps;AROM;Bilateral   Hip Flexion Supine;15 reps;AROM;Bilateral   Hip Adduction Supine;15 reps;AROM;Bilateral   Ankle Pumps Supine;15 reps;AROM;Bilateral   Assessment   Prognosis Fair   Assessment Pt seen for PT treatment session this date with interventions consisting of therapeutic exercise to improve strength to improve functional mobility and therapeutic activity to improve transfers and increase activity tolerance with functional mobility to decrease fall risk. Pt agreeable to PT treatment session upon arrival, pt found supine in bed, in no apparent distress. Since previous session, pt has made fair  progress as evidenced by ability to mobilize OOB  Barriers during this session include fatigue.  Pt continues to be functioning below baseline level, and remains limited 2* factors listed above and including impaired activity tolerance and decreased mobility.  Pt prognosis for achieving goals is fair, pending pt progress with hospitalization/medical status improvements, and indicated by continued required assistance. PT will continue to see pt during current hospitalization in order to address the deficits listed above and provide interventions consistent w/ POC in effort to achieve goals. Current goals and POC remain appropriate, pt continues to have rehab potential  Upon conclusion pt seated OOB in recliner. The patient's AM-[facility] Basic Mobility Inpatient Short Form Raw Score is 7.  A Raw score of less than or equal to 16 suggests the patient may benefit from discharge to post-acute rehabilitation services. Based on patient presentations and impairments, pt would most appropriately benefit from Level 3 resource intensity upon discharge.  Please also refer to the recommendation of the Physical Therapist for safe discharge planning. RN verbalized pt appropriate for PT session.   Barriers to Discharge None   Goals   Patient Goals to go back to [place] tomorrow   [facility] Expiration Date 01/17/25   PT Treatment Day 1   Plan   Treatment/Interventions LE strengthening/ROM;Bed mobility   Progress   (slow progres due to weakness)   PT Frequency 2-3x/wk   Discharge Recommendation   Rehab Resource Intensity Level, PT III (Minimum Resource Intensity)   AM-PAC Basic Mobility Inpatient   Turning in Flat Bed Without Bedrails 2   Lying on Back to Sitting on Edge of Flat Bed Without Bedrails 1   Moving Bed to Chair 1   Standing Up From Chair Using Arms 1   Walk in Room 1   Climb 3-5 Stairs With Railing 1   Basic Mobility Inpatient Raw Score 7   Turning Head Towards Sound 4   Follow Simple Instructions 4   Low Function Basic Mobility  Raw Score  15   Low Function Basic Mobility Standardized Score  23.9   [facility] Highest Level Of Mobility   -HL Goal 2: Bed activities/Dependent transfer   -HL Achieved 4: Move to chair/commode         Time In: 1035  Time Out: 1103  Total Treatment Minutes: 28    Amanda Chamberlain, PT

## 2025-01-09 NOTE — CASE MANAGEMENT
[facility] [facility] received request for authorization from Care Manager.  Authorization request submitted for: [facility]  Facility Name: [place]   NPI: [id number]  Facility MD: Dr Radha Roberson  NPI: [id number]  Authorization initiated by contacting insurance:  [email or web address]  Via: Fax  Clinicals submitted via fax # [phone]  Pending Reference #: n/a    Care Manager notified: nidhi omer     Updates to authorization status will be noted in chart. Please reach out to CM for updates on any clinical information.

## 2025-01-09 NOTE — ASSESSMENT & PLAN NOTE
RML/RLL collapse/atelectasis with V/Q mismatch.    Wean O2 for SPO2 >88%, incentive spirometry  Continue Chest PT with theravest, flutter valve. Also continue xopenex/atrovent/3% saline nebs.    Incentive spirometry

## 2025-01-09 NOTE — NURSING NOTE
0700- Assumed care of pt. Pt currently resting comfortably in bed on Cpap    0800- Assessment complete. Pt now on 4 L nasal cannula. Lungs diminished. Dry non-productive cough. Alert and oriented. NPO until barium swallow later today. Moves all extremities, weakness generalized. Ostomy device clean dry and intact. Pt has on brief per her request. Dry at this time.     [id number]- Pt taken down for Barium swallow. Stayed w/ pt through procedure, pt stayed on monitor. O2 sats 92-95% throughout study    1118- Pt transferred from transport wheelchair to recliner w/ difficulty.    1200- Pt remains OOB in recliner. NO complaints. Ate 50% of lunch.     1317- Pt downgraded to [facility]Sur level of care    1700- Pt remains OOB in chair. Set-up with dinner tray at this time    1800- Pt transferred back in to bed at this time per her request

## 2025-01-09 NOTE — ASSESSMENT & PLAN NOTE
01/31/22 1030   Final Note   Assessment Type Final Discharge Note   Anticipated Discharge Disposition Home   What phone number can be called within the next 1-3 days to see how you are doing after discharge? [phone]   Post-Acute Status   Discharge Delays None known at this time   Discharge orders reviewed.  No case management needs noted.       Supportive care and see under acute on chronic respiratory failure above

## 2025-01-09 NOTE — NUTRITION
01/09/25 1354   Biochemical Data,Medical Tests, and Procedures   Biochemical Data/Medical Tests/Procedures Lab values reviewed;Meds reviewed   Labs (Comment) 1/9/2025 BUN 28, creatinine 0.35, hemoglobin 10, hematocrit 33.1   Meds (Comment) Eliquis, Protonix, prednisone, Valium   Nutrition-Focused Physical Exam   Nutrition-Focused Physical Exam Findings RN skin assessment reviewed;Edema;No skin issues documented;Ostomy  (Trace bilateral lower extremity edema, colostomy)   Medical-Related Concerns PMH reviewed   Adequacy of Intake   Nutrition Modality PO   Feeding Route   PO With assistance   Current PO Intake   Current Diet Order Regular, 4 g sodium diet, thin liquids   Current Meal Intake 0-25%;25-50%   Estimated calorie intake compared to estimated need Nutrient needs are not met   PES Statement   Problem Continue previous diagnosis   Recommendations/Interventions   Malnutrition/BMI Present No  (Does not meet 2 criteria.)   Summary Nutrition follow-up assessment.  VBS with ST 1/8, recommending regular diet with thin liquids.  Currently prescribed a regular, 4 g sodium diet, thin liquids.  Meal completion 0-50%.  Nutrient needs are not always met. Patient reports her appetite has improved. States she is consuming more than 2 bites of each food item on her tray. Denies dysphagia. RD discussed diet as prescribed. Discussed ONS - agreeable to fruit punch Gelatein and vanilla Ensure. Will monitor acceptance at follow up. RD continues following.   Interventions/Recommendations Continue current diet order;Supplement initiate;Monitor I & O's   Education Assessment   Education Education not indicated at this time   Patient Nutrition Goals   Goal Adequate hydration;Adequate intake   Goal Status Not met;Extended   Timeframe to complete goal by next f/u   Nutrition Complexity Risk   Nutrition complexity level High risk   Follow up date 01/13/25

## 2025-01-09 NOTE — ASSESSMENT & PLAN NOTE
Present in [facility] ED AEB tachycardia, tachypnea, leukocytosis, hypoxia requiring HFNC  Suspect this is 2/2 RSV infection w/? postobstructive pneumonia   RSV (+) on 01/04  Blood cultures negative to date  Urine strep/legionella negative  MRSA swab negative  Patient has been stable off antibiotics

## 2025-01-09 NOTE — OCCUPATIONAL THERAPY NOTE
Occupational Therapy Treatment Note      Claire FELECIA Doherty    1/9/2025    Principal Problem:    Acute on chronic respiratory failure with hypoxia and hypercapnia (HCC)  Active Problems:    Ambulatory dysfunction    Urinary incontinence    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)    GERD without esophagitis    Anemia    Depression    Ductal carcinoma in situ (DCIS) of left breast    Colostomy status (HCC)    COPD with acute exacerbation (HCC)    Severe protein-calorie malnutrition (HCC)    Paroxysmal A-fib (HCC)    SIRS (systemic inflammatory response syndrome) (HCC)    Chronic diastolic heart failure (HCC)    Atelectasis    RSV infection      Past Medical History:   Diagnosis Date    Acute kidney failure (HCC)     Acute nonspecific idiopathic pericarditis     Anxiety     Atrial fibrillation (HCC)     Breast cancer (HCC) 2007    Cellulitis of right lower extremity     CHF (congestive heart failure) (HCC)     Chronic pain     Chronic respiratory failure with hypoxia (HCC)     Colitis     Colostomy status (HCC)     Dependence on supplemental oxygen     Depression     Difficult intubation     Excessive daytime sleepiness     Gastroesophageal reflux disease without esophagitis     Gastroparesis     GERD (gastroesophageal reflux disease)     Hyperlipidemia     Ischemic colitis (HCC) 01/21/2019    Leukocytosis 03/28/2020    Muscular dystrophy (HCC)     Limb-girdle    Osteoporosis     Other nonrheumatic aortic valve disorders     Overactive bladder     Perforated diverticulum 03/28/2020    Pericardial effusion (noninflammatory)     Pericarditis     Pneumonitis     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)     Rheumatic tricuspid insufficiency     Skin cancer     Skin disorder     Tachycardia 06/02/2021    Vitamin D deficiency        Past Surgical History:   Procedure Laterality Date    CARDIAC CATHETERIZATION N/A 8/21/2024    Procedure: Cardiac pci;  Surgeon: Juan Fuller MD;  Location:   CARDIAC CATH LAB;  Service: Cardiology    CARDIAC CATHETERIZATION N/A 8/21/2024    Procedure: Cardiac PCI Stent;  Surgeon: Juan Fuller MD;  Location: [facility];  Service: Cardiology    CARDIAC CATHETERIZATION N/A 8/21/2024    Procedure: Cardiac Coronary Angiogram;  Surgeon: Juan Fuller MD;  Location: [facility];  Service: Cardiology    COLONOSCOPY N/A 1/22/2019    Procedure: COLONOSCOPY;  Surgeon: Anthony Mejía MD;  Location: [facility];  Service: Colorectal    CYSTOSCOPY N/A 9/30/2020    Procedure: CYSTOSCOPY; BILATERAL URETERAL CATHETER PLACEMENT, CYSTOTOMY;  Surgeon: Zach Tyler MD;  Location: [facility];  Service: Urology    FL CYSTOGRAM  10/15/2020    HARTMANS PROCEDURE N/A 9/30/2020    Procedure: EXPLORATORY LAPAROTOMY; LYSIS OF ADHESIONS; WASHOUT PELVIC ABSCESS; COMPLEX SIGMOID COLON RESECTION; LOOP TRANSVERSE COLOSTOMY;  Surgeon: Thania Jaffe MD;  Location: [facility];  Service: General    IR DRAINAGE TUBE PLACEMENT  9/24/2020    MASTECTOMY Left 2007    left breast mastectomy     TONSILLECTOMY      TOOTH EXTRACTION      TUBAL LIGATION          01/09/25 1318   OT Last Visit   OT Visit Date 01/09/25   Note Type   Note Type Treatment   Pain Assessment   Pain Assessment Tool 0-10   Pain Score 3   Pain Location/Orientation Orientation: Right;Location: Arm  (iv site)   Pain Radiating Towards n/a   Pain Onset/Description Frequency: Intermittent   Effect of Pain on Daily Activities refusal to utilize RUE during exercises   Patient's Stated Pain Goal No pain   Hospital Pain Intervention(s) Rest   Multiple Pain Sites No   Restrictions/Precautions   Weight Bearing Precautions Per Order No   Other Precautions Droplet precautions;Contact/isolation;Telemetry;Fall Risk   Lifestyle   Autonomy Pt resides at [facility]; requires A with ADLs, IADLs and all aspects of care   Reciprocal Relationships facility staff   ADL   Where Assessed Chair   Grooming Assistance 3  Moderate  "Assistance   Grooming Deficit Wash/dry face   Grooming Comments mod A to wipe face and repositioning glasses while seated in recliner d/t limited ROM in UEs, per patient report. Therapist encouraged patient to complete independently   Transfers   Additional Comments max A x1 to repositioning in recliner for comfort and ease   Therapeutic Exercise - ROM   UE-ROM Yes   ROM- Right Upper Extremities   RUE ROM Comment Pt refused to complete R UE exercises d/t IV site   ROM - Left Upper Extremities    L Shoulder AAROM;Flexion;Extension  (& protraction/retraction)   L Elbow AAROM;Elbow flexion;Elbow extension   L Wrist AAROM;Wrist flexion;Wrist extension   L Position Seated   L Weight/Reps/Sets 2x15 reps each   Subjective   Subjective \"I look forward to going back tomorrow\"   Cognition   Overall Cognitive Status WFL   Arousal/Participation Alert   Attention Within functional limits   Orientation Level Oriented X4   Memory Within functional limits   Following Commands Follows one step commands without difficulty   Comments Pt agreeable to OT treatment, pleasant   Activity Tolerance   Activity Tolerance Patient limited by pain;Patient limited by fatigue   Medical Staff Made Aware Yes, nursing staff made aware of session outcomes   Assessment   Assessment Patient participated in Skilled OT session this date with interventions consisting of ADL re training with the use of correct body mechnaics, safety awareness and fall prevention techniques, and therapeutic exercise to: increase functional use of BUEs, increase BUE muscle strength  . Patient agreeable to OT treatment session, upon arrival patient was found seated OOB to Recliner.  In comparison to previous session, patient with improvements in UE exercise tolerance . Patient requiring verbal cues for correct technique and frequent rest periods. Patient continues to be functioning below baseline level, occupational performance remains limited secondary to factors listed above " and increased risk for falls and injury.   From OT standpoint, recommendation at time of d/c would with moderate intensity OT resources.   Patient to benefit from continued Occupational Therapy treatment while in the hospital to address deficits as defined above and maximize level of functional independence with ADLs and functional mobility.   Plan   Treatment Interventions Functional transfer training;UE strengthening/ROM;Patient/family training   Goal Expiration Date 01/17/25   OT Treatment Day 1   OT Frequency 1-2x/wk   Discharge Recommendation   Rehab Resource Intensity Level, OT II (Moderate Resource Intensity)   Additional Comments  The patient's raw score on the AM-PAC Daily Activity inpatient short form is 11, standardized score is 29.04, less than 39.4. Patients at this level are likely to benefit from discharge with moderate intensity OT resources. Please refer to the recommendation of the Occupational Therapist for safe discharge planning.   AM-PAC Daily Activity Inpatient   Lower Body Dressing 1   Bathing 1   Toileting 1   Upper Body Dressing 2   Grooming 3   Eating 3   Daily Activity Raw Score 11   Daily Activity Standardized Score (Calc for Raw Score >=11) 29.04   AM-PAC Applied Cognition Inpatient   Following a Speech/Presentation 3   Understanding Ordinary Conversation 4   Taking Medications 3   Remembering Where Things Are Placed or Put Away 2   Remembering List of 4-5 Errands 2   Taking Care of Complicated Tasks 2   Applied Cognition Raw Score 16   Applied Cognition Standardized Score 35.03     All needs met, chair alarm and bed alarm activated   Juani Willams OTR/L

## 2025-01-09 NOTE — ASSESSMENT & PLAN NOTE
In setting of RSV infection RML/RLL collapse/atelectasis with V/Q mismatch.  Possible aspiration  Much improved back to 3 lpm   I recommend wean O2 for SPO2 >88%, incentive spirometry.   Continue Chest PT and bronchodilators while hospitalized  She is DNR/DNI  Aspiration precautions/SLP eval done and no issues  She has RP2 panel with RSV, negative procal x 2.Antibiotics were stopped  Complete a course of prednisone 1/10  Continue BiPAP qHS

## 2025-01-09 NOTE — PROGRESS NOTES
Progress Note - Hospitalist   Name: Claire Doherty 74 y.o. female I MRN: [id number]  Unit/Bed#: ICU 12-01 I Date of Admission: 1/5/2025   Date of Service: 1/9/2025 I Hospital Day: 4    Assessment & Plan  COPD with acute exacerbation (HCC)  Noted to have wheezing w/ increased WOB in [facility] ED  Suspect exacerbation related to RSV infection  Follows w/Pulmonology as OP  Prescribed Pulmicort and PRN Albuterol nebs as OP - continue scheduled Xopenex/HTS nebs while here  Patient was initially on IV steroids, now transition to oral prednisone  Continue oxygen, titrate as tolerated  Paroxysmal A-fib (HCC)  Currently rate controlled  Eliquis for anticoagulation  Acute on chronic respiratory failure with hypoxia and hypercapnia ([facility])  ABG with hypercapnic respiratory failure noted requiring BiPAP therapy  Patient was transferred to critical care service initially on now back to medical service on 1/9/2025  Completed IV steroids and now on prednisone x 2 more days of therapy  Positive for RSV, continue supportive care  Patient chronically on 2 L nasal cannula  Continue to wean oxygen as tolerated  Respiratory protocol  Ambulatory dysfunction  WCB at baseline   PT/OT  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)  Follows w/Pulmonology as OP  Aggressive pulmonary hygiene  Colostomy status (HCC)  Is s/p Wynn's procedure w/ colostomy creation in 2020  Wound care consulted to assist w/management  Monitor output  SIRS (systemic inflammatory response syndrome) (HCC)  Present in [facility] ED AEB tachycardia, tachypnea, leukocytosis, hypoxia requiring HFNC  Suspect this is 2/2 RSV infection w/? postobstructive pneumonia   RSV (+) on 01/04  Blood cultures negative to date  Urine strep/legionella negative  MRSA swab negative  Patient has been stable off antibiotics  Chronic diastolic heart failure (HCC)  Wt Readings from Last 3 Encounters:   01/09/25 74 kg (163 lb 2.3 oz)   01/05/25 75 kg (165 lb 5.5 oz)   11/29/24 75.2 kg (165  lb 12.6 oz)     TTE from 09/2024 revealed EF 65% w/mild TR and mildly elevated RVSP at 45  Does not appear to be on diuretic as OP  Monitor I/O and daily weights  RSV infection  Continue treatment as above    VTE Pharmacologic Prophylaxis: VTE Score: 8 Moderate Risk (Score 3-4) - Pharmacological DVT Prophylaxis Ordered: apixaban (Eliquis).    Mobility:   Basic Mobility Inpatient Raw Score: 6  JH-HLM Goal: 2: Bed activities/Dependent transfer  JH-HLM Achieved: 2: Bed activities/Dependent transfer  JH-HLM Goal achieved. Continue to encourage appropriate mobility.    Patient Centered Rounds: I performed bedside rounds with nursing staff today.   Discussions with Specialists or Other Care Team Provider: yes    Education and Discussions with Family / Patient: Updated  (sister) via phone.    Current Length of Stay: 4 day(s)  Current Patient Status: Inpatient   Certification Statement: The patient will continue to require additional inpatient hospital stay due to RSV infection  Discharge Plan: Anticipate discharge tomorrow to rehab facility.    Code Status: Level 3 - DNAR and DNI    Subjective   No overnight events noted.  Patient down to 2 to 3 L nasal cannula oxygen    Objective :  Temp:  [96.9 °F (36.1 °C)-97.9 °F (36.6 °C)] 96.9 °F (36.1 °C)  HR:  [] 87  BP: (107-176)/(53-78) 107/55  Resp:  [20-66] 26  SpO2:  [88 %-99 %] 96 %  O2 Device: Nasal cannula  Nasal Cannula O2 Flow Rate (L/min):  [3 L/min-4 L/min] 3 L/min    Body mass index is 31.86 kg/m².     Input and Output Summary (last 24 hours):     Intake/Output Summary (Last 24 hours) at 1/9/2025 0920  Last data filed at 1/9/2025 0828  Gross per 24 hour   Intake 627 ml   Output 200 ml   Net 427 ml       Physical Exam  Constitutional:       General: She is not in acute distress.  HENT:      Head: Normocephalic and atraumatic.      Nose: Nose normal.      Mouth/Throat:      Mouth: Mucous membranes are moist.   Eyes:      Extraocular Movements:  Extraocular movements intact.      Conjunctiva/sclera: Conjunctivae normal.   Cardiovascular:      Rate and Rhythm: Normal rate and regular rhythm.   Pulmonary:      Effort: Pulmonary effort is normal. No respiratory distress.      Comments: Decreased breath sounds bilateral lung bases  Abdominal:      Palpations: Abdomen is soft.      Tenderness: There is no abdominal tenderness.   Musculoskeletal:         General: Normal range of motion.      Cervical back: Normal range of motion and neck supple.      Comments: Generalized weakness   Skin:     General: Skin is warm and dry.   Neurological:      General: No focal deficit present.      Mental Status: She is alert. Mental status is at baseline.      Cranial Nerves: No cranial nerve deficit.   Psychiatric:         Mood and Affect: Mood normal.         Behavior: Behavior normal.           Lines/Drains:  Lines/Drains/Airways       Active Status       Name Placement date Placement time Site Days    Colostomy LUQ 09/10/20  1200  LUQ  1581                            Lab Results: I have reviewed the following results:   Results from last 7 days   Lab Units 01/09/25  0416 01/08/25  0536 01/07/25  0442   WBC Thousand/uL 10.83* 8.96 10.91*   HEMOGLOBIN g/dL 10.0* 9.1* 7.9*   HEMATOCRIT % 33.1* 29.8* 26.9*   PLATELETS Thousands/uL 641* 610* 525*   BANDS PCT %  --  1  --    SEGS PCT %  --   --  89*   LYMPHO PCT %  --  12* 7*   MONO PCT %  --  2* 3*   EOS PCT %  --  0 0     Results from last 7 days   Lab Units 01/09/25  0416 01/08/25  0536 01/07/25  0442   SODIUM mmol/L 143   < > 139   POTASSIUM mmol/L 3.9   < > 4.6   CHLORIDE mmol/L 97   < > 96   CO2 mmol/L 41*   < > 35*   BUN mg/dL 28*   < > 27*   CREATININE mg/dL 0.35*   < > 0.36*   ANION GAP mmol/L 5   < > 8   CALCIUM mg/dL 9.7   < > 9.7   ALBUMIN g/dL  --   --  3.6   TOTAL BILIRUBIN mg/dL  --   --  0.21   ALK PHOS U/L  --   --  63   ALT U/L  --   --  22   AST U/L  --   --  23   GLUCOSE RANDOM mg/dL 106   < > 87    < > =  values in this interval not displayed.     Results from last 7 days   Lab Units 01/05/25  0323   INR  1.28*             Results from last 7 days   Lab Units 01/06/25  0642 01/05/25  2027 01/05/25  0323   LACTIC ACID mmol/L  --  0.4* 0.9   PROCALCITONIN ng/ml 0.10 0.12 0.12       Recent Cultures (last 7 days):   Results from last 7 days   Lab Units 01/06/25  0840 01/05/25  1812 01/05/25  1811 01/05/25  1800 01/05/25  0323   BLOOD CULTURE   --  No Growth at 72 hrs. No Growth at 72 hrs.  --  No Growth at 72 hrs.  No Growth at 72 hrs.   LEGIONELLA URINARY ANTIGEN  Negative  --   --  Negative  --        Imaging Results Review: No pertinent imaging studies reviewed.  Other Study Results Review: No additional pertinent studies reviewed.    Last 24 Hours Medication List:     Current Facility-Administered Medications:     acetaminophen (TYLENOL) tablet 650 mg, Q6H PRN    apixaban (ELIQUIS) tablet 5 mg, BID    chlorhexidine (PERIDEX) 0.12 % oral rinse 15 mL, Q12H NANCY    diazepam (VALIUM) tablet 5 mg, Q12H PRN    fluticasone (FLONASE) 50 mcg/act nasal spray 2 spray, Daily    hydrALAZINE (APRESOLINE) injection 10 mg, Q6H PRN    levalbuterol (XOPENEX) inhalation solution 1.25 mg, TID    ondansetron (ZOFRAN) injection 4 mg, Q6H PRN    pantoprazole (PROTONIX) injection 40 mg, Q24H NANCY    predniSONE tablet 40 mg, Daily    sodium chloride (OCEAN) 0.65 % nasal spray 1 spray, Q1H PRN    sodium chloride 3 % inhalation solution 4 mL, TID    Administrative Statements   Today, Patient Was Seen By: Rohini Krishna MD      **Please Note: This note may have been constructed using a voice recognition system.**

## 2025-01-09 NOTE — NURSING NOTE
0700- Assumed care of pt. Pt resting comfortably in bed w/ no complaints at this time    0815- Pt set up w/ breakfast. Medications administered. Pt tolerated pills whole in applesauce w/ difficulty    0900- Assessment complete. See flowsheets. Pt on 3 L nasal cannula, lungs diminished and coarse. Pt has dry non-productive cough. Pt alert and oriented, follows commands, generalized weakness and limited range of motion in all extremities due to muscular dystrophy.       1045- Pt assisted OOB to chair w/ slide board w/o difficulty. PT remains in room to work w/ pt    1318- OT in to work w/ pt        1500- Pt remains OOB in chair, no complaints. On 3 L nasal cannula. VS stable    1800- Pt assisted back to bed w/ slide board w/o out difficulty

## 2025-01-09 NOTE — PLAN OF CARE
Problem: OCCUPATIONAL THERAPY ADULT  Goal: Performs self-care activities at highest level of function for planned discharge setting.  See evaluation for individualized goals.  Description: Treatment Interventions: ADL retraining, Functional transfer training, UE strengthening/ROM, Endurance training, Patient/family training, Compensatory technique education, Activityengagement, Energy conservation     See flowsheet documentation for full assessment, interventions and recommendations.   Note: Limitation: Decreased ADL status, Decreased UE strength, Decreased Safe judgement during ADL, Decreased endurance, Decreased self-care trans, Decreased high-level ADLs  Prognosis: Good  Assessment: Patient participated in Skilled OT session this date with interventions consisting of ADL re training with the use of correct body mechnaics, safety awareness and fall prevention techniques, and therapeutic exercise to: increase functional use of BUEs, increase BUE muscle strength  . Patient agreeable to OT treatment session, upon arrival patient was found seated OOB to Recliner.  In comparison to previous session, patient with improvements in UE exercise tolerance . Patient requiring verbal cues for correct technique and frequent rest periods. Patient continues to be functioning below baseline level, occupational performance remains limited secondary to factors listed above and increased risk for falls and injury.   From OT standpoint, recommendation at time of d/c would with moderate intensity OT resources.   Patient to benefit from continued Occupational Therapy treatment while in the hospital to address deficits as defined above and maximize level of functional independence with ADLs and functional mobility.     Rehab Resource Intensity Level, OT: II (Moderate Resource Intensity)     Juani Willams OTR/L

## 2025-01-09 NOTE — ASSESSMENT & PLAN NOTE
Noted to have wheezing w/ increased WOB in [facility] ED  Suspect exacerbation related to RSV infection  Follows w/Pulmonology as OP  Prescribed Pulmicort and PRN Albuterol nebs as OP - continue scheduled Xopenex/HTS nebs while here  Patient was initially on IV steroids, now transition to oral prednisone  Continue oxygen, titrate as tolerated

## 2025-01-09 NOTE — PROGRESS NOTES
Progress Note - Pulmonology   Name: Claire Doherty 74 y.o. female I MRN: [id number]  Unit/Bed#: ICU 12-01 I Date of Admission: 1/5/2025   Date of Service: 1/9/2025 I Hospital Day: 4    Assessment & Plan  Acute on chronic respiratory failure with hypoxia and hypercapnia (HCC)  In setting of RSV infection RML/RLL collapse/atelectasis with V/Q mismatch.  Possible aspiration  Much improved back to 3 lpm   I recommend wean O2 for SPO2 >88%, incentive spirometry.   Continue Chest PT and bronchodilators while hospitalized  She is DNR/DNI  Aspiration precautions/SLP eval done and no issues  She has RP2 panel with RSV, negative procal x 2.Antibiotics were stopped  Complete a course of prednisone 1/10  Continue BiPAP qHS  Atelectasis  RML/RLL collapse/atelectasis with V/Q mismatch.    Wean O2 for SPO2 >88%, incentive spirometry  Continue Chest PT with theravest, flutter valve. Also continue xopenex/atrovent/3% saline nebs.    Incentive spirometry  RSV infection  Supportive care and see under acute on chronic respiratory failure above  Ambulatory dysfunction  In setting of muscular dystrophy  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)  Chronically on BiPAP with 2 lpm ox oxygen qHS  Follows with Dr Mandujano in pulmonary clinic  Chronic diastolic heart failure (HCC)  Wt Readings from Last 3 Encounters:   01/09/25 74 kg (163 lb 2.3 oz)   01/05/25 75 kg (165 lb 5.5 oz)   11/29/24 75.2 kg (165 lb 12.6 oz)       Pulmonary will sign off.  On discharge can resume prior pulmonary regimen and stop aggressive bronchodilators and Chest PT.  She knows to follow-up in our office she wishes to make the appointment on her own when she finds out where she is living (either [place] office with Dr Mandujano or [place] with me).  Discussed with ISABELA Krishna    24 Hour Events : None  Subjective : Feeling better    Objective :  Temp:  [96.9 °F (36.1 °C)-97.4 °F (36.3 °C)] 97.4 °F (36.3 °C)  HR:  [73-93] 90  BP: (107-152)/(53-66)  133/63  Resp:  [20-66] 20  SpO2:  [88 %-99 %] 90 %  O2 Device: Nasal cannula  Nasal Cannula O2 Flow Rate (L/min):  [3 L/min] 3 L/min    Physical Exam  Vitals and nursing note reviewed.   Constitutional:       General: She is not in acute distress.     Appearance: She is well-developed.   HENT:      Head: Normocephalic and atraumatic.   Eyes:      Conjunctiva/sclera: Conjunctivae normal.   Cardiovascular:      Rate and Rhythm: Normal rate and regular rhythm.      Heart sounds: No murmur heard.  Pulmonary:      Effort: Pulmonary effort is normal. No respiratory distress.      Breath sounds: Normal breath sounds.   Abdominal:      Palpations: Abdomen is soft.      Tenderness: There is no abdominal tenderness.      Comments: ostomy   Musculoskeletal:         General: No swelling.      Cervical back: Neck supple.      Right lower leg: Edema (trace) present.      Left lower leg: Edema present.   Skin:     General: Skin is warm and dry.      Capillary Refill: Capillary refill takes less than 2 seconds.   Neurological:      Mental Status: She is alert.   Psychiatric:         Mood and Affect: Mood normal.           Lab Results: I have reviewed the following results:   .     01/09/25  0416   WBC 10.83*   HGB 10.0*   HCT 33.1*   *   SODIUM 143   K 3.9   CL 97   CO2 41*   BUN 28*   CREATININE 0.35*   GLUC 106   MG 1.9   PHOS 3.1     ABG: No new results in last 24 hours.  CXR 1/6/26  Persistent right lower lung atelectasis.      CT Chest 1/5/25  Postobstructive atelectasis of the right middle and lower lobe. Underlying pneumonia not excluded     Left lower lobe mucous plugging with distal consolidative airspace disease compatible with an and acute infiltrate secondary to aspiration     Other Study Results Review: Other studies reviewed include:   TTE 9/2024    Left Ventricle: Left ventricular cavity size is normal. Wall thickness is normal. The left ventricular ejection fraction is 65%. Systolic function is normal. Wall  motion is normal.    Mitral Valve: There is moderate annular calcification.    Tricuspid Valve: There is mild regurgitation. The right ventricular systolic pressure is mildly elevated. The estimated right ventricular systolic pressure is 45.00 mmHg.    IVC/SVC: The right atrial pressure is estimated at 8.0 mmHg. The inferior vena cava is normal in size. Respirophasic changes were blunted (less than 50% variation).  PFT Results Reviewed: reviewed     PFT 2021  Interpretation:     Restrictive pattern on spirometry      Normal Spirometry     Lung volumes indicative of restrictive lung disease     Moderate decrease in diffusion capacity     Restrictive pattern on flow volume loops

## 2025-01-09 NOTE — CASE MANAGEMENT
Case Management Discharge Planning Note    Patient name Claire Khalil ICU 12/ICU  MRN [id number]  : 1950 Date 2025       Current Admission Date: 2025  Current Admission Diagnosis:Acute on chronic respiratory failure with hypoxia and hypercapnia ([facility])   Patient Active Problem List    Diagnosis Date Noted Date Diagnosed    Atelectasis 2025     RSV infection 2025     Renal cyst 2024     MRSA colonization 2024     COVID-19 2024     Elevated d-dimer 2024     COPD exacerbation ([facility]) 2024     Chronic diastolic heart failure ([facility]) 2024     SIRS (systemic inflammatory response syndrome) ([facility]) 2024     Pleural effusion, bilateral 2024     Hyperkalemia 2024     Paroxysmal A-fib ([facility]) 2024     Acute on chronic diastolic CHF (congestive heart failure) ([facility]) 09/10/2024     Acute on chronic respiratory failure with hypoxia and hypercapnia ([facility]) 09/10/2024     Acute nonspecific idiopathic pericarditis 2024     Cognitive communication deficit 2024     Other disorders of lung 2024     Other nonrheumatic aortic valve disorders 2024     Severe protein-calorie malnutrition ([facility]) 2024     DINA (acute kidney injury) ([facility]) 2024     Nausea 2024     Pleural effusion 2024     Pericardial effusion 2024     Infected wound 2024     Chest pain 2024     History of Idiopathic pericarditis 2024     Cellulitis of right foot 2024     Chronic left shoulder pain 2024     Shoulder joint dysfunction 2024     COPD with acute exacerbation ([facility]) 06/15/2023     Bilateral hand pain 2023     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[facility]) 2022     Gastroparesis 2021     Aortic valve sclerosis 2021     Tricuspid valve insufficiency 2021     Mitral annular calcification 2021     Former smoker 2021     On home oxygen therapy 2021      Colostomy status (HCC)      Ductal carcinoma in situ (DCIS) of left breast 03/15/2021     Insomnia 10/21/2020     Depression 10/06/2020     Anemia 10/02/2020     Chronic hypoxic respiratory failure (HCC) 09/30/2020     Bladder injury 09/30/2020     Severe sepsis (HCC) 09/28/2020     Thrombocytosis, unspecified 09/28/2020     Leukocytosis 03/28/2020     Difficult intubation      Dystrophy, muscular, 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HCC) 10/12/2017     Ambulatory dysfunction 10/12/2017     Urinary incontinence 10/03/2017     Osteoporosis 12/07/2016     Allergic rhinitis 10/09/2013     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) 10/04/2012     GERD without esophagitis 10/04/2012       LOS (days): 4  Geometric Mean LOS (GMLOS) (days): 4.9  Days to GMLOS:0.9     OBJECTIVE:  Risk of Unplanned Readmission Score: 42.1         Current admission status: Inpatient   Preferred Pharmacy:   [id number] [place], [place] - [address]  [address] [address]  Phone: [phone] Fax: [phone]    [address] [address]  Phone: [phone] Fax: [phone]    Primary Care Provider: Julienne Tucker DO    Primary Insurance: [facility] REP  Secondary Insurance:     DISCHARGE DETAILS:  Submitted for authorization for the pt to return to [place].

## 2025-01-09 NOTE — ASSESSMENT & PLAN NOTE
ABG with hypercapnic respiratory failure noted requiring BiPAP therapy  Patient was transferred to critical care service initially on now back to medical service on 1/9/2025  Completed IV steroids and now on prednisone x 2 more days of therapy  Positive for RSV, continue supportive care  Patient chronically on 2 L nasal cannula  Continue to wean oxygen as tolerated  Respiratory protocol

## 2025-01-09 NOTE — PLAN OF CARE
Problem: PAIN - ADULT  Goal: Verbalizes/displays adequate comfort level or baseline comfort level  Description: Interventions:  - Encourage patient to monitor pain and request assistance  - Assess pain using appropriate pain scale  - Administer analgesics based on type and severity of pain and evaluate response  - Implement non-pharmacological measures as appropriate and evaluate response  - Consider cultural and social influences on pain and pain management  - Notify physician/advanced practitioner if interventions unsuccessful or patient reports new pain  Outcome: Progressing     Problem: INFECTION - ADULT  Goal: Absence or prevention of progression during hospitalization  Description: INTERVENTIONS:  - Assess and monitor for signs and symptoms of infection  - Monitor lab/diagnostic results  - Monitor all insertion sites, i.e. indwelling lines, tubes, and drains  - Monitor endotracheal if appropriate and nasal secretions for changes in amount and color  - [place] appropriate cooling/warming therapies per order  - Administer medications as ordered  - Instruct and encourage patient and family to use good hand hygiene technique  - Identify and instruct in appropriate isolation precautions for identified infection/condition  Outcome: Progressing     Problem: SAFETY ADULT  Goal: Patient will remain free of falls  Description: INTERVENTIONS:  - Educate patient/family on patient safety including physical limitations  - Instruct patient to call for assistance with activity   - Consult OT/PT to assist with strengthening/mobility   - Keep Call bell within reach  - Keep bed low and locked with side rails adjusted as appropriate  - Keep care items and personal belongings within reach  - Initiate and maintain comfort rounds  - Make Fall Risk Sign visible to staff  - Offer Toileting every 2 Hours, in advance of need  - Initiate/Maintain bed alarm  - Obtain necessary fall risk management equipment:   - Apply yellow socks and  bracelet for high fall risk patients  - Consider moving patient to room near nurses station  Outcome: Progressing  Goal: Maintain or return to baseline ADL function  Description: INTERVENTIONS:  -  Assess patient's ability to carry out ADLs; assess patient's baseline for ADL function and identify physical deficits which impact ability to perform ADLs (bathing, care of mouth/teeth, toileting, grooming, dressing, etc.)  - Assess/evaluate cause of self-care deficits   - Assess range of motion  - Assess patient's mobility; develop plan if impaired  - Assess patient's need for assistive devices and provide as appropriate  - Encourage maximum independence but intervene and supervise when necessary  - Involve family in performance of ADLs  - Assess for home care needs following discharge   - Consider OT consult to assist with ADL evaluation and planning for discharge  - Provide patient education as appropriate  Outcome: Progressing  Goal: Maintains/Returns to pre admission functional level  Description: INTERVENTIONS:  - Perform AM-PAC 6 Click Basic Mobility/ Daily Activity assessment daily.  - Set and communicate daily mobility goal to care team and patient/family/caregiver.   - Collaborate with rehabilitation services on mobility goals if consulted  - Perform Range of Motion 3 times a day.  - Reposition patient every 2 hours.  - Out of bed for meals   - Out of bed for toileting  - Record patient progress and toleration of activity level   Outcome: Progressing     Problem: DISCHARGE PLANNING  Goal: Discharge to home or other facility with appropriate resources  Description: INTERVENTIONS:  - Identify barriers to discharge w/patient and caregiver  - Arrange for needed discharge resources and transportation as appropriate  - Identify discharge learning needs (meds, wound care, etc.)  - Arrange for interpretive services to assist at discharge as needed  - Refer to Case Management Department for coordinating discharge  planning if the patient needs post-hospital services based on physician/advanced practitioner order or complex needs related to functional status, cognitive ability, or social support system  Outcome: Progressing     Problem: Knowledge Deficit  Goal: Patient/family/caregiver demonstrates understanding of disease process, treatment plan, medications, and discharge instructions  Description: Complete learning assessment and assess knowledge base.  Interventions:  - Provide teaching at level of understanding  - Provide teaching via preferred learning methods  Outcome: Progressing     Problem: RESPIRATORY - ADULT  Goal: Achieves optimal ventilation and oxygenation  Description: INTERVENTIONS:  - Assess for changes in respiratory status  - Assess for changes in mentation and behavior  - Position to facilitate oxygenation and minimize respiratory effort  - Oxygen administered by appropriate delivery if ordered  - Initiate smoking cessation education as indicated  - Encourage broncho-pulmonary hygiene including cough, deep breathe, Incentive Spirometry  - Assess the need for suctioning and aspirate as needed  - Assess and instruct to report SOB or any respiratory difficulty  - Respiratory Therapy support as indicated  Outcome: Progressing     Problem: SKIN/TISSUE INTEGRITY - ADULT  Goal: Skin Integrity remains intact(Skin Breakdown Prevention)  Description: Assess:  -Perform Collins assessment every shift  -Clean and moisturize skin every shift  -Inspect skin when repositioning, toileting, and assisting with ADLS  -Assess extremities for adequate circulation and sensation     Bed Management:  -Have minimal linens on bed & keep smooth, unwrinkled  -Change linens as needed when moist or perspiring  -Avoid sitting or lying in one position for more than 2 hours while in bed  -Keep HOB at 30 degrees     Toileting:  -Offer bedside commode  -Assess for incontinence   -Use incontinent care products after each incontinent episode      Activity:  -Mobilize patient 3 times a day  -Encourage activity and walks on unit  -Encourage or provide ROM exercises   -Turn and reposition patient every 2 Hours  -Use appropriate equipment to lift or move patient in bed    Skin Care:  -Avoid use of baby powder, tape, friction and shearing, hot water or constrictive clothing  -Relieve pressure over bony prominences using Mepilex  -Do not massage red bony areas    Outcome: Progressing     Problem: MUSCULOSKELETAL - ADULT  Goal: Maintain or return mobility to safest level of function  Description: INTERVENTIONS:  - Assess patient's ability to carry out ADLs; assess patient's baseline for ADL function and identify physical deficits which impact ability to perform ADLs (bathing, care of mouth/teeth, toileting, grooming, dressing, etc.)  - Assess/evaluate cause of self-care deficits   - Assess range of motion  - Assess patient's mobility  - Assess patient's need for assistive devices and provide as appropriate  - Encourage maximum independence but intervene and supervise when necessary  - Involve family in performance of ADLs  - Assess for home care needs following discharge   - Consider OT consult to assist with ADL evaluation and planning for discharge  - Provide patient education as appropriate  Outcome: Progressing     Problem: Prexisting or High Potential for Compromised Skin Integrity  Goal: Skin integrity is maintained or improved  Description: INTERVENTIONS:  - Identify patients at risk for skin breakdown  - Assess and monitor skin integrity  - Assess and monitor nutrition and hydration status  - Monitor labs   - Assess for incontinence   - Turn and reposition patient  - Assist with mobility/ambulation  - Relieve pressure over bony prominences  - Avoid friction and shearing  - Provide appropriate hygiene as needed including keeping skin clean and dry  - Evaluate need for skin moisturizer/barrier cream  - Collaborate with interdisciplinary team   -  Patient/family teaching  - Consider wound care consult   Outcome: Progressing     Problem: Nutrition/Hydration-ADULT  Goal: Nutrient/Hydration intake appropriate for improving, restoring or maintaining nutritional needs  Description: Monitor and assess patient's nutrition/hydration status for malnutrition. Collaborate with interdisciplinary team and initiate plan and interventions as ordered.  Monitor patient's weight and dietary intake as ordered or per policy. Utilize nutrition screening tool and intervene as necessary. Determine patient's food preferences and provide high-protein, high-caloric foods as appropriate.     INTERVENTIONS:  - Monitor oral intake, urinary output, labs, and treatment plans  - Assess nutrition and hydration status and recommend course of action  - Evaluate amount of meals eaten  - Assist patient with eating if necessary   - Allow adequate time for meals  - Recommend/ encourage appropriate diets, oral nutritional supplements, and vitamin/mineral supplements  - Order, calculate, and assess calorie counts as needed  - Recommend, monitor, and adjust tube feedings and TPN/PPN based on assessed needs  - Assess need for intravenous fluids  - Provide specific nutrition/hydration education as appropriate  - Include patient/family/caregiver in decisions related to nutrition  Outcome: Progressing

## 2025-01-10 PROBLEM — G93.41 METABOLIC ENCEPHALOPATHY: Status: ACTIVE | Noted: 2025-01-01

## 2025-01-10 PROBLEM — A41.9 SEPSIS (HCC): Status: ACTIVE | Noted: 2024-01-01

## 2025-01-10 NOTE — SPEECH THERAPY NOTE
Speech Language/Pathology    Speech/Language Pathology Progress Note    Patient Name: Claire Doherty  Today's Date: 1/10/2025     Problem List  Principal Problem:    Acute on chronic respiratory failure with hypoxia and hypercapnia (HCC)  Active Problems:    Ambulatory dysfunction    Urinary incontinence    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)    GERD without esophagitis    Anemia    Depression    Ductal carcinoma in situ (DCIS) of left breast    Colostomy status (HCC)    COPD with acute exacerbation (HCC)    Severe protein-calorie malnutrition (HCC)    Paroxysmal A-fib (HCC)    SIRS (systemic inflammatory response syndrome) (HCC)    Chronic diastolic heart failure (HCC)    Atelectasis    RSV infection       Past Medical History  Past Medical History:   Diagnosis Date    Acute kidney failure (HCC)     Acute nonspecific idiopathic pericarditis     Anxiety     Atrial fibrillation (HCC)     Breast cancer (HCC) 2007    Cellulitis of right lower extremity     CHF (congestive heart failure) (HCC)     Chronic pain     Chronic respiratory failure with hypoxia (HCC)     Colitis     Colostomy status (HCC)     Dependence on supplemental oxygen     Depression     Difficult intubation     Excessive daytime sleepiness     Gastroesophageal reflux disease without esophagitis     Gastroparesis     GERD (gastroesophageal reflux disease)     Hyperlipidemia     Ischemic colitis (HCC) 01/21/2019    Leukocytosis 03/28/2020    Muscular dystrophy (HCC)     Limb-girdle    Osteoporosis     Other nonrheumatic aortic valve disorders     Overactive bladder     Perforated diverticulum 03/28/2020    Pericardial effusion (noninflammatory)     Pericarditis     Pneumonitis     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)     Rheumatic tricuspid insufficiency     Skin cancer     Skin disorder     Tachycardia 06/02/2021    Vitamin D deficiency         Past Surgical History  Past Surgical History:   Procedure Laterality  Date    CARDIAC CATHETERIZATION N/A 8/21/2024    Procedure: Cardiac pci;  Surgeon: Juan Fuller MD;  [facility]: [facility];  Service: Cardiology    CARDIAC CATHETERIZATION N/A 8/21/2024    Procedure: Cardiac PCI Stent;  Surgeon: Juan Fuller MD;  [facility]: [facility];  Service: Cardiology    CARDIAC CATHETERIZATION N/A 8/21/2024    Procedure: Cardiac Coronary Angiogram;  Surgeon: Juan Fuller MD;  Location: [facility];  Service: Cardiology    COLONOSCOPY N/A 1/22/2019    Procedure: COLONOSCOPY;  Surgeon: Anthony Mejía MD;  Location: [facility];  Service: Colorectal    CYSTOSCOPY N/A 9/30/2020    Procedure: CYSTOSCOPY; BILATERAL URETERAL CATHETER PLACEMENT, CYSTOTOMY;  Surgeon: Zach Tyler MD;  Location: [facility];  Service: Urology    FL CYSTOGRAM  10/15/2020    HARTMANS PROCEDURE N/A 9/30/2020    Procedure: EXPLORATORY LAPAROTOMY; LYSIS OF ADHESIONS; WASHOUT PELVIC ABSCESS; COMPLEX SIGMOID COLON RESECTION; LOOP TRANSVERSE COLOSTOMY;  Surgeon: Thania Jaffe MD;  Location: [facility];  Service: General    IR DRAINAGE TUBE PLACEMENT  9/24/2020    MASTECTOMY Left 2007    left breast mastectomy     TONSILLECTOMY      TOOTH EXTRACTION      TUBAL LIGATION           Subjective:  Pt was positioned upright and alert.   Objective:  Pt was seen for f/u dysphagia therapy at breakfast. Now on 3 liters NC. Positioned fully upright and alert. She fed herself scrambled eggs, blue berry muffing, scrambled eggs, banana, and thin liquids via straw with single/successive sips. Mastication was timely for current diet level. Bolus control, formation, and transfer were WNL. Swallows appeared prompt. No overt s/s of aspiration. Pt reported continues with poor appetite, however breathing has been better. Appears min anxious at times as utilizing pulse ox to frequently check SpO2. ST reviewed safe swallow strategies including slow rate, alternation liquids with solids, swallow prior to  additional po, and sit fully upright during/after a meal for approx 30-45 minutes. Pt understood.Reported continues with intermittent belching as hx of GERD.  Assessment:  Pt appears to be tolerating current diet level. No overt s/s of aspiration.   Plan/Recommendations:  Recommend continue Regular and thin liquids.  Recommend f/u with pulmonary and GI   Ensure good oral care  ST will d/c as pt appears to be at baseline.

## 2025-01-10 NOTE — CASE MANAGEMENT
[facility] received request for transport authorization from Care Manager.   Date of transport: 1/10  Type of transport: [facility]  Transport company: [facility]   NPI: [id number]  [place] [facility]  End Location: [facility]  Med Necessity: pt bedbound & on 3L of oxygen   Authorization initiated by contacting insurance: [employer]  Via: Fax [phone]    Care Manager notified: Elva Calle    Please reach out to  for updates on any clinical information.

## 2025-01-10 NOTE — ASSESSMENT & PLAN NOTE
Wt Readings from Last 3 Encounters:   01/10/25 74 kg (163 lb 2.3 oz)   01/05/25 75 kg (165 lb 5.5 oz)   11/29/24 75.2 kg (165 lb 12.6 oz)     TTE from 09/2024 revealed EF 65% w/mild TR and mildly elevated RVSP at 45  Does not appear to be on diuretic as OP  Monitor I/O and daily weights

## 2025-01-10 NOTE — CASE MANAGEMENT
Called [facility] ([phone]) and spoke to Viviana who stated that the auth is pending and decision is due by 1/12 . Pending auth # [id number].  notified nidhi omer

## 2025-01-10 NOTE — CASE MANAGEMENT
Case Management Discharge Planning Note    Patient name Claire Doherty  Location /-01 MRN [id number]  : 1950 Date 1/10/2025       Current Admission Date: 2025  Current Admission Diagnosis:Acute on chronic respiratory failure with hypoxia and hypercapnia ([facility])   Patient Active Problem List    Diagnosis Date Noted Date Diagnosed    Metabolic encephalopathy 01/10/2025     Atelectasis 2025     RSV infection 2025     Renal cyst 2024     MRSA colonization 2024     COVID-19 2024     Elevated d-dimer 2024     COPD exacerbation ([facility]) 2024     Chronic diastolic heart failure ([facility]) 2024     Sepsis ([facility]) 2024     Pleural effusion, bilateral 2024     Hyperkalemia 2024     Paroxysmal A-fib ([facility]) 2024     Acute on chronic diastolic CHF (congestive heart failure) ([facility]) 09/10/2024     Acute on chronic respiratory failure with hypoxia and hypercapnia ([facility]) 09/10/2024     Acute nonspecific idiopathic pericarditis 2024     Cognitive communication deficit 2024     Other disorders of lung 2024     Other nonrheumatic aortic valve disorders 2024     Severe protein-calorie malnutrition ([facility]) 2024     DINA (acute kidney injury) ([facility]) 2024     Nausea 2024     Pleural effusion 2024     Pericardial effusion 2024     Infected wound 2024     Chest pain 2024     History of Idiopathic pericarditis 2024     Cellulitis of right foot 2024     Chronic left shoulder pain 2024     Shoulder joint dysfunction 2024     COPD with acute exacerbation ([facility]) 06/15/2023     Bilateral hand pain 2023     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[facility]) 2022     Gastroparesis 2021     Aortic valve sclerosis 2021     Tricuspid valve insufficiency 2021     Mitral annular calcification 2021     Former smoker 2021     On home oxygen therapy 2021      Colostomy status (HCC)      Ductal carcinoma in situ (DCIS) of left breast 03/15/2021     Insomnia 10/21/2020     Depression 10/06/2020     Anemia 10/02/2020     Chronic hypoxic respiratory failure (HCC) 09/30/2020     Bladder injury 09/30/2020     Severe sepsis (HCC) 09/28/2020     Thrombocytosis, unspecified 09/28/2020     Leukocytosis 03/28/2020     Difficult intubation      Dystrophy, muscular, 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HCC) 10/12/2017     Ambulatory dysfunction 10/12/2017     Urinary incontinence 10/03/2017     Osteoporosis 12/07/2016     Allergic rhinitis 10/09/2013     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) 10/04/2012     GERD without esophagitis 10/04/2012       LOS (days): 5  Geometric Mean LOS (GMLOS) (days): 4.9  Days to GMLOS:-0.1     OBJECTIVE:  Risk of Unplanned Readmission Score: 42.53         Current admission status: Inpatient   Preferred Pharmacy:   [id number] [place], [place] - [address] [facility]  [address] [facility] [address]  Phone: [phone] Fax: [phone]    [address] [address]  Phone: [phone] Fax: [phone]    Primary Care Provider: Julienne Tucker DO    Primary Insurance: [facility] REP  Secondary Insurance:     DISCHARGE DETAILS:  Received authorization for [place], however [facility] feels she is not ready for DC today.  Notified [place] of same.  The facility is unable to accept over the weekend.  Sent for transport via [facility].  Pt will be transported via [facility] at 1000 on Monday.  Notified [place] of same.  TC to pt sister Hillary who was amde aware the tp will be DC on Monday.

## 2025-01-10 NOTE — CASE MANAGEMENT
Case Management Discharge Planning Note    Patient name Claire Doherty  Location /-01 MRN [id number]  : 1950 Date 1/10/2025       Current Admission Date: 2025  Current Admission Diagnosis:Acute on chronic respiratory failure with hypoxia and hypercapnia ([facility])   Patient Active Problem List    Diagnosis Date Noted Date Diagnosed    Atelectasis 2025     RSV infection 2025     Renal cyst 2024     MRSA colonization 2024     COVID-19 2024     Elevated d-dimer 2024     COPD exacerbation ([facility]) 2024     Chronic diastolic heart failure ([facility]) 2024     SIRS (systemic inflammatory response syndrome) ([facility]) 2024     Pleural effusion, bilateral 2024     Hyperkalemia 2024     Paroxysmal A-fib ([facility]) 2024     Acute on chronic diastolic CHF (congestive heart failure) ([facility]) 09/10/2024     Acute on chronic respiratory failure with hypoxia and hypercapnia ([facility]) 09/10/2024     Acute nonspecific idiopathic pericarditis 2024     Cognitive communication deficit 2024     Other disorders of lung 2024     Other nonrheumatic aortic valve disorders 2024     Severe protein-calorie malnutrition ([facility]) 2024     DINA (acute kidney injury) ([facility]) 2024     Nausea 2024     Pleural effusion 2024     Pericardial effusion 2024     Infected wound 2024     Chest pain 2024     History of Idiopathic pericarditis 2024     Cellulitis of right foot 2024     Chronic left shoulder pain 2024     Shoulder joint dysfunction 2024     COPD with acute exacerbation ([facility]) 06/15/2023     Bilateral hand pain 2023     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[facility]) 2022     Gastroparesis 2021     Aortic valve sclerosis 2021     Tricuspid valve insufficiency 2021     Mitral annular calcification 2021     Former smoker 2021     On home oxygen therapy 2021      Colostomy status (HCC)      Ductal carcinoma in situ (DCIS) of left breast 03/15/2021     Insomnia 10/21/2020     Depression 10/06/2020     Anemia 10/02/2020     Chronic hypoxic respiratory failure (HCC) 09/30/2020     Bladder injury 09/30/2020     Severe sepsis ([facility]) 09/28/2020     Thrombocytosis, unspecified 09/28/2020     Leukocytosis 03/28/2020     Difficult intubation      Dystrophy, muscular, 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HCC) 10/12/2017     Ambulatory dysfunction 10/12/2017     Urinary incontinence 10/03/2017     Osteoporosis 12/07/2016     Allergic rhinitis 10/09/2013     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) 10/04/2012     GERD without esophagitis 10/04/2012       LOS (days): 5  Geometric Mean LOS (GMLOS) (days): 4.9  Days to GMLOS:-0.1     OBJECTIVE:  Risk of Unplanned Readmission Score: 42.53         Current admission status: Inpatient   Preferred Pharmacy:   [id number] [place], [place] - [address] [facility]  [address] [facility] [address]  Phone: [phone] Fax: [phone]    [address] [address]  Phone: [phone] Fax: [phone]    Primary Care Provider: Julienne Tucker DO    Primary Insurance: [facility] REP  Secondary Insurance:     DISCHARGE DETAILS:                                                                                                               Facility Insurance Auth Number: [id number]

## 2025-01-10 NOTE — RESPIRATORY THERAPY NOTE
01/10/25 0309   Respiratory Assessment   Resp Comments pt transfered to 310 from icu. pt currently on 3lnc. pt declining use of own preset machine. RN aware

## 2025-01-10 NOTE — ASSESSMENT & PLAN NOTE
Malnutrition Findings:                                 BMI Findings:           Body mass index is 31.86 kg/m².

## 2025-01-10 NOTE — ASSESSMENT & PLAN NOTE
Noted to have wheezing w/ increased WOB in [facility] ED  Suspect exacerbation related to RSV infection  Follows w/Pulmonology as OP  Prescribed Pulmicort and PRN Albuterol nebs as OP - continue scheduled Xopenex/HTS nebs while here  Patient was initially on IV steroids, now transition to oral prednisone  Continue oxygen, titrate as tolerated

## 2025-01-10 NOTE — PLAN OF CARE
Problem: PAIN - ADULT  Goal: Verbalizes/displays adequate comfort level or baseline comfort level  Description: Interventions:  - Encourage patient to monitor pain and request assistance  - Assess pain using appropriate pain scale  - Administer analgesics based on type and severity of pain and evaluate response  - Implement non-pharmacological measures as appropriate and evaluate response  - Consider cultural and social influences on pain and pain management  - Notify physician/advanced practitioner if interventions unsuccessful or patient reports new pain  Outcome: Progressing     Problem: INFECTION - ADULT  Goal: Absence or prevention of progression during hospitalization  Description: INTERVENTIONS:  - Assess and monitor for signs and symptoms of infection  - Monitor lab/diagnostic results  - Monitor all insertion sites, i.e. indwelling lines, tubes, and drains  - Monitor endotracheal if appropriate and nasal secretions for changes in amount and color  - [place] appropriate cooling/warming therapies per order  - Administer medications as ordered  - Instruct and encourage patient and family to use good hand hygiene technique  - Identify and instruct in appropriate isolation precautions for identified infection/condition  Outcome: Progressing     Problem: Knowledge Deficit  Goal: Patient/family/caregiver demonstrates understanding of disease process, treatment plan, medications, and discharge instructions  Description: Complete learning assessment and assess knowledge base.  Interventions:  - Provide teaching at level of understanding  - Provide teaching via preferred learning methods  Outcome: Progressing     Problem: RESPIRATORY - ADULT  Goal: Achieves optimal ventilation and oxygenation  Description: INTERVENTIONS:  - Assess for changes in respiratory status  - Assess for changes in mentation and behavior  - Position to facilitate oxygenation and minimize respiratory effort  - Oxygen administered by  appropriate delivery if ordered  - Initiate smoking cessation education as indicated  - Encourage broncho-pulmonary hygiene including cough, deep breathe, Incentive Spirometry  - Assess the need for suctioning and aspirate as needed  - Assess and instruct to report SOB or any respiratory difficulty  - Respiratory Therapy support as indicated  Outcome: Progressing     Problem: Nutrition/Hydration-ADULT  Goal: Nutrient/Hydration intake appropriate for improving, restoring or maintaining nutritional needs  Description: Monitor and assess patient's nutrition/hydration status for malnutrition. Collaborate with interdisciplinary team and initiate plan and interventions as ordered.  Monitor patient's weight and dietary intake as ordered or per policy. Utilize nutrition screening tool and intervene as necessary. Determine patient's food preferences and provide high-protein, high-caloric foods as appropriate.     INTERVENTIONS:  - Monitor oral intake, urinary output, labs, and treatment plans  - Assess nutrition and hydration status and recommend course of action  - Evaluate amount of meals eaten  - Assist patient with eating if necessary   - Allow adequate time for meals  - Recommend/ encourage appropriate diets, oral nutritional supplements, and vitamin/mineral supplements  - Order, calculate, and assess calorie counts as needed  - Recommend, monitor, and adjust tube feedings and TPN/PPN based on assessed needs  - Assess need for intravenous fluids  - Provide specific nutrition/hydration education as appropriate  - Include patient/family/caregiver in decisions related to nutrition  Outcome: Progressing

## 2025-01-10 NOTE — PROGRESS NOTES
Progress Note - Hospitalist   Name: Claire Doherty 74 y.o. female I MRN: [id number]  Unit/Bed#: -01 I Date of Admission: 1/5/2025   Date of Service: 1/10/2025 I Hospital Day: 5    Assessment & Plan  COPD with acute exacerbation (HCC)  Noted to have wheezing w/ increased WOB in [facility] ED  Suspect exacerbation related to RSV infection  Follows w/Pulmonology as OP  Prescribed Pulmicort and PRN Albuterol nebs as OP - continue scheduled Xopenex/HTS nebs while here  Patient was initially on IV steroids, now transition to oral prednisone  Continue oxygen, titrate as tolerated  Paroxysmal A-fib (HCC)  Currently rate controlled  Eliquis for anticoagulation  Acute on chronic respiratory failure with hypoxia and hypercapnia (HCC)  ABG with hypercapnic respiratory failure noted requiring BiPAP therapy  Patient was transferred to critical care service initially on now back to medical service on 1/9/2025  Completed IV steroids and now on prednisone x 2 more days of therapy  Positive for RSV, continue supportive care  Patient chronically on 2 L nasal cannula  Continue to wean oxygen as tolerated  Respiratory protocol  Ambulatory dysfunction  WCB at baseline   PT/OT  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)  Follows w/Pulmonology as OP  Aggressive pulmonary hygiene  Colostomy status (HCC)  Is s/p Wynn's procedure w/ colostomy creation in 2020  Wound care consulted to assist w/management  Monitor output  Sepsis (HCC)  Present in [facility] ED AEB tachycardia, tachypnea, leukocytosis, hypoxia requiring HFNC  Suspect this is 2/2 RSV infection w/? postobstructive pneumonia   RSV (+) on 01/04  Blood cultures negative to date  Urine strep/legionella negative  MRSA swab negative  Patient has been stable off antibiotics  Chronic diastolic heart failure (HCC)  Wt Readings from Last 3 Encounters:   01/10/25 74 kg (163 lb 2.3 oz)   01/05/25 75 kg (165 lb 5.5 oz)   11/29/24 75.2 kg (165 lb 12.6 oz)     TTE from 09/2024  revealed EF 65% w/mild TR and mildly elevated RVSP at 45  Does not appear to be on diuretic as OP  Monitor I/O and daily weights  RSV infection  Continue treatment as above  Urinary incontinence    GERD without esophagitis    Anemia  Anemia stable we will continue to monitor  Depression  Continue Celexa, Wellbutrin  Ductal carcinoma in situ (DCIS) of left breast    Severe protein-calorie malnutrition (HCC)  Malnutrition Findings:                                 BMI Findings:           Body mass index is 31.86 kg/m².     Atelectasis    Metabolic encephalopathy  Boggy flap as he has evidenced by disorientation and confusion improved with antibiotic treatment    VTE Pharmacologic Prophylaxis: VTE Score: 8 High Risk (Score >/= 5) - Pharmacological DVT Prophylaxis Ordered: apixaban (Eliquis). Sequential Compression Devices Ordered.    Mobility:   Basic Mobility Inpatient Raw Score: 6  JH-HLM Goal: 2: Bed activities/Dependent transfer  JH-HLM Achieved: 2: Bed activities/Dependent transfer  JH-HLM Goal achieved. Continue to encourage appropriate mobility.    Patient Centered Rounds: I performed bedside rounds with nursing staff today.   Discussions with Specialists or Other Care Team Provider:     Education and Discussions with Family / Patient: Patient declined call to .     Current Length of Stay: 5 day(s)  Current Patient Status: Inpatient   Certification Statement: The patient will continue to require additional inpatient hospital stay due to monitoring 24 hours  Discharge Plan: Anticipate discharge tomorrow to facility    Code Status: Level 3 - DNAR and DNI    Subjective   Patient reports some shortness of breath and fatigue but overall improved since her admission    Objective :  Temp:  [97.5 °F (36.4 °C)-98.5 °F (36.9 °C)] 98.1 °F (36.7 °C)  HR:  [] 82  BP: (112-151)/(52-82) 125/52  Resp:  [14-28] 14  SpO2:  [90 %-97 %] 96 %  O2 Device: Nasal cannula  Nasal Cannula O2 Flow Rate (L/min):  [3  L/min] 3 L/min    Body mass index is 31.86 kg/m².     Input and Output Summary (last 24 hours):     Intake/Output Summary (Last 24 hours) at 1/10/2025 1300  Last data filed at 1/10/2025 0901  Gross per 24 hour   Intake 120 ml   Output 0 ml   Net 120 ml       Physical Exam  Vitals and nursing note reviewed.   Constitutional:       General: She is not in acute distress.     Appearance: She is well-developed. She is not toxic-appearing or diaphoretic.   HENT:      Head: Normocephalic and atraumatic.   Eyes:      General: No scleral icterus.     Conjunctiva/sclera: Conjunctivae normal.   Cardiovascular:      Rate and Rhythm: Normal rate and regular rhythm.      Heart sounds: No murmur heard.     No friction rub. No gallop.   Pulmonary:      Effort: Pulmonary effort is normal. No respiratory distress.      Breath sounds: No stridor. Rales present. No wheezing or rhonchi.   Chest:      Chest wall: No tenderness.   Abdominal:      General: There is no distension.      Palpations: Abdomen is soft. There is no mass.      Tenderness: There is no abdominal tenderness. There is no guarding or rebound.      Hernia: No hernia is present.      Comments: Ostomy    Musculoskeletal:         General: No swelling or tenderness.      Cervical back: Neck supple.   Skin:     General: Skin is warm and dry.      Capillary Refill: Capillary refill takes less than 2 seconds.   Neurological:      Mental Status: She is alert and oriented to person, place, and time.   Psychiatric:         Mood and Affect: Mood normal.           Lines/Drains:  Lines/Drains/Airways       Active Status       Name Placement date Placement time Site Days    Colostomy LUQ 09/10/20  1200  LUQ  1583                            Lab Results: I have reviewed the following results:   Results from last 7 days   Lab Units 01/09/25  0416 01/08/25  0536 01/07/25  0442   WBC Thousand/uL 10.83* 8.96 10.91*   HEMOGLOBIN g/dL 10.0* 9.1* 7.9*   HEMATOCRIT % 33.1* 29.8* 26.9*    PLATELETS Thousands/uL 641* 610* 525*   BANDS PCT %  --  1  --    SEGS PCT %  --   --  89*   LYMPHO PCT %  --  12* 7*   MONO PCT %  --  2* 3*   EOS PCT %  --  0 0     Results from last 7 days   Lab Units 01/09/25  0416 01/08/25  0536 01/07/25  0442   SODIUM mmol/L 143   < > 139   POTASSIUM mmol/L 3.9   < > 4.6   CHLORIDE mmol/L 97   < > 96   CO2 mmol/L 41*   < > 35*   BUN mg/dL 28*   < > 27*   CREATININE mg/dL 0.35*   < > 0.36*   ANION GAP mmol/L 5   < > 8   CALCIUM mg/dL 9.7   < > 9.7   ALBUMIN g/dL  --   --  3.6   TOTAL BILIRUBIN mg/dL  --   --  0.21   ALK PHOS U/L  --   --  63   ALT U/L  --   --  22   AST U/L  --   --  23   GLUCOSE RANDOM mg/dL 106   < > 87    < > = values in this interval not displayed.     Results from last 7 days   Lab Units 01/05/25  0323   INR  1.28*             Results from last 7 days   Lab Units 01/06/25  0642 01/05/25  2027 01/05/25  0323   LACTIC ACID mmol/L  --  0.4* 0.9   PROCALCITONIN ng/ml 0.10 0.12 0.12       Recent Cultures (last 7 days):   Results from last 7 days   Lab Units 01/06/25  0840 01/05/25  1812 01/05/25  1811 01/05/25  1800 01/05/25  0323   BLOOD CULTURE   --  No Growth After 4 Days. No Growth After 4 Days.  --  No Growth After 4 Days.  No Growth After 4 Days.   LEGIONELLA URINARY ANTIGEN  Negative  --   --  Negative  --        Imaging Results Review: No pertinent imaging studies reviewed.  Other Study Results Review: No additional pertinent studies reviewed.    Last 24 Hours Medication List:     Current Facility-Administered Medications:     acetaminophen (TYLENOL) tablet 650 mg, Q6H PRN    apixaban (ELIQUIS) tablet 5 mg, BID    chlorhexidine (PERIDEX) 0.12 % oral rinse 15 mL, Q12H NANCY    diazepam (VALIUM) tablet 5 mg, Q12H PRN    fluticasone (FLONASE) 50 mcg/act nasal spray 2 spray, Daily    hydrALAZINE (APRESOLINE) injection 10 mg, Q6H PRN    levalbuterol (XOPENEX) inhalation solution 1.25 mg, TID    mirtazapine (REMERON) tablet 7.5 mg, HS    ondansetron (ZOFRAN)  injection 4 mg, Q6H PRN    pantoprazole (PROTONIX) injection 40 mg, Q24H NANCY    sodium chloride (OCEAN) 0.65 % nasal spray 1 spray, Q1H PRN    Administrative Statements   Today, Patient Was Seen By: Kaden Toth DO      **Please Note: This note may have been constructed using a voice recognition system.**

## 2025-01-10 NOTE — CASE MANAGEMENT
[facility] has received APPROVED authorization.  Insurance:   [employer]   Auth obtained via Insurance Rep: Velvet   Authorization received for: [facility]  Facility: [place]     Authorization #:[id number]   Start of Care:1/10  Next Review Date:1/17  Continued Stay Care Coordinator:  n/a  Submit next review to: fax [phone]     Care Manager notified:nidhi omer     Please reach out to  for updates on any clinical information.

## 2025-01-10 NOTE — PLAN OF CARE
Problem: PAIN - ADULT  Goal: Verbalizes/displays adequate comfort level or baseline comfort level  Description: Interventions:  - Encourage patient to monitor pain and request assistance  - Assess pain using appropriate pain scale  - Administer analgesics based on type and severity of pain and evaluate response  - Implement non-pharmacological measures as appropriate and evaluate response  - Consider cultural and social influences on pain and pain management  - Notify physician/advanced practitioner if interventions unsuccessful or patient reports new pain  Outcome: Progressing     Problem: INFECTION - ADULT  Goal: Absence or prevention of progression during hospitalization  Description: INTERVENTIONS:  - Assess and monitor for signs and symptoms of infection  - Monitor lab/diagnostic results  - Monitor all insertion sites, i.e. indwelling lines, tubes, and drains  - Monitor endotracheal if appropriate and nasal secretions for changes in amount and color  - [place] appropriate cooling/warming therapies per order  - Administer medications as ordered  - Instruct and encourage patient and family to use good hand hygiene technique  - Identify and instruct in appropriate isolation precautions for identified infection/condition  Outcome: Progressing     Problem: SAFETY ADULT  Goal: Patient will remain free of falls  Description: INTERVENTIONS:  - Educate patient/family on patient safety including physical limitations  - Instruct patient to call for assistance with activity   - Consult OT/PT to assist with strengthening/mobility   - Keep Call bell within reach  - Keep bed low and locked with side rails adjusted as appropriate  - Keep care items and personal belongings within reach  - Initiate and maintain comfort rounds  - Make Fall Risk Sign visible to staff  - Offer Toileting every 2 Hours, in advance of need  - Initiate/Maintain bed alarm  - Obtain necessary fall risk management equipment:   - Apply yellow socks and  bracelet for high fall risk patients  - Consider moving patient to room near nurses station  Outcome: Progressing  Goal: Maintain or return to baseline ADL function  Description: INTERVENTIONS:  -  Assess patient's ability to carry out ADLs; assess patient's baseline for ADL function and identify physical deficits which impact ability to perform ADLs (bathing, care of mouth/teeth, toileting, grooming, dressing, etc.)  - Assess/evaluate cause of self-care deficits   - Assess range of motion  - Assess patient's mobility; develop plan if impaired  - Assess patient's need for assistive devices and provide as appropriate  - Encourage maximum independence but intervene and supervise when necessary  - Involve family in performance of ADLs  - Assess for home care needs following discharge   - Consider OT consult to assist with ADL evaluation and planning for discharge  - Provide patient education as appropriate  Outcome: Progressing  Goal: Maintains/Returns to pre admission functional level  Description: INTERVENTIONS:  - Perform AM-PAC 6 Click Basic Mobility/ Daily Activity assessment daily.  - Set and communicate daily mobility goal to care team and patient/family/caregiver.   - Collaborate with rehabilitation services on mobility goals if consulted  - Perform Range of Motion 3 times a day.  - Reposition patient every 2 hours.  - Out of bed for meals   - Out of bed for toileting  - Record patient progress and toleration of activity level   Outcome: Progressing     Problem: DISCHARGE PLANNING  Goal: Discharge to home or other facility with appropriate resources  Description: INTERVENTIONS:  - Identify barriers to discharge w/patient and caregiver  - Arrange for needed discharge resources and transportation as appropriate  - Identify discharge learning needs (meds, wound care, etc.)  - Arrange for interpretive services to assist at discharge as needed  - Refer to Case Management Department for coordinating discharge  planning if the patient needs post-hospital services based on physician/advanced practitioner order or complex needs related to functional status, cognitive ability, or social support system  Outcome: Progressing

## 2025-01-11 NOTE — ASSESSMENT & PLAN NOTE
Wt Readings from Last 3 Encounters:   01/11/25 72.5 kg (159 lb 13.3 oz)   01/05/25 75 kg (165 lb 5.5 oz)   11/29/24 75.2 kg (165 lb 12.6 oz)     TTE from 09/2024 revealed EF 65% w/mild TR and mildly elevated RVSP at 45  Does not appear to be on diuretic as OP  Monitor I/O and daily weights

## 2025-01-11 NOTE — ASSESSMENT & PLAN NOTE
Malnutrition Findings:                                 BMI Findings:           Body mass index is 31.22 kg/m².

## 2025-01-11 NOTE — ASSESSMENT & PLAN NOTE
Noted to have wheezing w/ increased WOB in [facility] ED  Suspect exacerbation related to RSV infection  Follows w/Pulmonology as OP  Prescribed Pulmicort and PRN Albuterol nebs as OP - continue scheduled Xopenex/HTS nebs while here  Patient was initially on IV steroids, now transition to oral prednisone  Continue oxygen, titrate as tolerated

## 2025-01-11 NOTE — PLAN OF CARE
"Called to bedside b/c pt reported she was SOB. Upon arrival pt stated she wasn't necessarily SOB but her nose was \"stuffed up\" Told her I had her scheduled karen tx for her which wouldn't necessarily help her stuffed up nose but could possibly make it run a bit because of the aerosol which in turn may help a little and she got very angry and said \"why would she take something that may make her nose possibly run?\" She took the neb treatment off and said if her lungs seem clear then there is no need to take a medication for them and that I needed to go find someone that knew what they were doing to help her stuffed up nose. I told her I would go get the nurse and see if she had anything for her. I relayed this to her nurse.   Kimberlee Lake, RRT  1/10/2025 8:40 PM     "

## 2025-01-11 NOTE — PLAN OF CARE
Problem: PAIN - ADULT  Goal: Verbalizes/displays adequate comfort level or baseline comfort level  Description: Interventions:  - Encourage patient to monitor pain and request assistance  - Assess pain using appropriate pain scale  - Administer analgesics based on type and severity of pain and evaluate response  - Implement non-pharmacological measures as appropriate and evaluate response  - Consider cultural and social influences on pain and pain management  - Notify physician/advanced practitioner if interventions unsuccessful or patient reports new pain  Outcome: Progressing     Problem: INFECTION - ADULT  Goal: Absence or prevention of progression during hospitalization  Description: INTERVENTIONS:  - Assess and monitor for signs and symptoms of infection  - Monitor lab/diagnostic results  - Monitor all insertion sites, i.e. indwelling lines, tubes, and drains  - Monitor endotracheal if appropriate and nasal secretions for changes in amount and color  - [place] appropriate cooling/warming therapies per order  - Administer medications as ordered  - Instruct and encourage patient and family to use good hand hygiene technique  - Identify and instruct in appropriate isolation precautions for identified infection/condition  Outcome: Progressing     Problem: SAFETY ADULT  Goal: Patient will remain free of falls  Description: INTERVENTIONS:  - Educate patient/family on patient safety including physical limitations  - Instruct patient to call for assistance with activity   - Consult OT/PT to assist with strengthening/mobility   - Keep Call bell within reach  - Keep bed low and locked with side rails adjusted as appropriate  - Keep care items and personal belongings within reach  - Initiate and maintain comfort rounds  - Make Fall Risk Sign visible to staff  - Offer Toileting every 2 Hours, in advance of need  - Initiate/Maintain bed alarm  - Obtain necessary fall risk management equipment:   - Apply yellow socks and  bracelet for high fall risk patients  - Consider moving patient to room near nurses station  Outcome: Progressing  Goal: Maintain or return to baseline ADL function  Description: INTERVENTIONS:  -  Assess patient's ability to carry out ADLs; assess patient's baseline for ADL function and identify physical deficits which impact ability to perform ADLs (bathing, care of mouth/teeth, toileting, grooming, dressing, etc.)  - Assess/evaluate cause of self-care deficits   - Assess range of motion  - Assess patient's mobility; develop plan if impaired  - Assess patient's need for assistive devices and provide as appropriate  - Encourage maximum independence but intervene and supervise when necessary  - Involve family in performance of ADLs  - Assess for home care needs following discharge   - Consider OT consult to assist with ADL evaluation and planning for discharge  - Provide patient education as appropriate  Outcome: Progressing  Goal: Maintains/Returns to pre admission functional level  Description: INTERVENTIONS:  - Perform AM-PAC 6 Click Basic Mobility/ Daily Activity assessment daily.  - Set and communicate daily mobility goal to care team and patient/family/caregiver.   - Collaborate with rehabilitation services on mobility goals if consulted  - Perform Range of Motion 3 times a day.  - Reposition patient every 2 hours.  - Out of bed for meals   - Out of bed for toileting  - Record patient progress and toleration of activity level   Outcome: Progressing     Problem: DISCHARGE PLANNING  Goal: Discharge to home or other facility with appropriate resources  Description: INTERVENTIONS:  - Identify barriers to discharge w/patient and caregiver  - Arrange for needed discharge resources and transportation as appropriate  - Identify discharge learning needs (meds, wound care, etc.)  - Arrange for interpretive services to assist at discharge as needed  - Refer to Case Management Department for coordinating discharge  planning if the patient needs post-hospital services based on physician/advanced practitioner order or complex needs related to functional status, cognitive ability, or social support system  Outcome: Progressing     Problem: Knowledge Deficit  Goal: Patient/family/caregiver demonstrates understanding of disease process, treatment plan, medications, and discharge instructions  Description: Complete learning assessment and assess knowledge base.  Interventions:  - Provide teaching at level of understanding  - Provide teaching via preferred learning methods  Outcome: Progressing     Problem: RESPIRATORY - ADULT  Goal: Achieves optimal ventilation and oxygenation  Description: INTERVENTIONS:  - Assess for changes in respiratory status  - Assess for changes in mentation and behavior  - Position to facilitate oxygenation and minimize respiratory effort  - Oxygen administered by appropriate delivery if ordered  - Initiate smoking cessation education as indicated  - Encourage broncho-pulmonary hygiene including cough, deep breathe, Incentive Spirometry  - Assess the need for suctioning and aspirate as needed  - Assess and instruct to report SOB or any respiratory difficulty  - Respiratory Therapy support as indicated  Outcome: Progressing     Problem: SKIN/TISSUE INTEGRITY - ADULT  Goal: Skin Integrity remains intact(Skin Breakdown Prevention)  Description: Assess:  -Perform Collins assessment every shift  -Clean and moisturize skin every shift  -Inspect skin when repositioning, toileting, and assisting with ADLS  -Assess extremities for adequate circulation and sensation     Bed Management:  -Have minimal linens on bed & keep smooth, unwrinkled  -Change linens as needed when moist or perspiring  -Avoid sitting or lying in one position for more than 2 hours while in bed  -Keep HOB at 30 degrees     Toileting:  -Offer bedside commode  -Assess for incontinence   -Use incontinent care products after each incontinent episode      Activity:  -Mobilize patient 3 times a day  -Encourage activity and walks on unit  -Encourage or provide ROM exercises   -Turn and reposition patient every 2 Hours  -Use appropriate equipment to lift or move patient in bed    Skin Care:  -Avoid use of baby powder, tape, friction and shearing, hot water or constrictive clothing  -Relieve pressure over bony prominences using Mepilex  -Do not massage red bony areas    Outcome: Progressing     Problem: MUSCULOSKELETAL - ADULT  Goal: Maintain or return mobility to safest level of function  Description: INTERVENTIONS:  - Assess patient's ability to carry out ADLs; assess patient's baseline for ADL function and identify physical deficits which impact ability to perform ADLs (bathing, care of mouth/teeth, toileting, grooming, dressing, etc.)  - Assess/evaluate cause of self-care deficits   - Assess range of motion  - Assess patient's mobility  - Assess patient's need for assistive devices and provide as appropriate  - Encourage maximum independence but intervene and supervise when necessary  - Involve family in performance of ADLs  - Assess for home care needs following discharge   - Consider OT consult to assist with ADL evaluation and planning for discharge  - Provide patient education as appropriate  Outcome: Progressing     Problem: Prexisting or High Potential for Compromised Skin Integrity  Goal: Skin integrity is maintained or improved  Description: INTERVENTIONS:  - Identify patients at risk for skin breakdown  - Assess and monitor skin integrity  - Assess and monitor nutrition and hydration status  - Monitor labs   - Assess for incontinence   - Turn and reposition patient  - Assist with mobility/ambulation  - Relieve pressure over bony prominences  - Avoid friction and shearing  - Provide appropriate hygiene as needed including keeping skin clean and dry  - Evaluate need for skin moisturizer/barrier cream  - Collaborate with interdisciplinary team   -  Patient/family teaching  - Consider wound care consult   Outcome: Progressing     Problem: Nutrition/Hydration-ADULT  Goal: Nutrient/Hydration intake appropriate for improving, restoring or maintaining nutritional needs  Description: Monitor and assess patient's nutrition/hydration status for malnutrition. Collaborate with interdisciplinary team and initiate plan and interventions as ordered.  Monitor patient's weight and dietary intake as ordered or per policy. Utilize nutrition screening tool and intervene as necessary. Determine patient's food preferences and provide high-protein, high-caloric foods as appropriate.     INTERVENTIONS:  - Monitor oral intake, urinary output, labs, and treatment plans  - Assess nutrition and hydration status and recommend course of action  - Evaluate amount of meals eaten  - Assist patient with eating if necessary   - Allow adequate time for meals.  - Recommend/ encourage appropriate diets, oral nutritional supplements, and vitamin/mineral supplements.  - Order, calculate, and assess calorie counts as needed.  - Recommend, monitor, and adjust tube feedings and TPN/PPN based on assessed needs.  - Assess need for intravenous fluids.  - Provide specific nutrition/hydration education as appropriate  - Include patient/family/caregiver in decisions related to nutrition  Outcome: Progressing

## 2025-01-11 NOTE — PROGRESS NOTES
Progress Note - Hospitalist   Name: Claire Doherty 74 y.o. female I MRN: [id number]  Unit/Bed#: -01 I Date of Admission: 1/5/2025   Date of Service: 1/11/2025 I Hospital Day: 6    Assessment & Plan  COPD with acute exacerbation (HCC)  Noted to have wheezing w/ increased WOB in [facility] ED  Suspect exacerbation related to RSV infection  Follows w/Pulmonology as OP  Prescribed Pulmicort and PRN Albuterol nebs as OP - continue scheduled Xopenex/HTS nebs while here  Patient was initially on IV steroids, now transition to oral prednisone  Continue oxygen, titrate as tolerated  Paroxysmal A-fib (HCC)  Currently rate controlled  Eliquis for anticoagulation  Acute on chronic respiratory failure with hypoxia and hypercapnia (HCC)  ABG with hypercapnic respiratory failure noted requiring BiPAP therapy  Patient was transferred to critical care service initially on now back to medical service on 1/9/2025  Completed IV steroids and now on prednisone x 2 more days of therapy  Positive for RSV, continue supportive care  Patient chronically on 2 L nasal cannula  Continue to wean oxygen as tolerated  Respiratory protocol  Ambulatory dysfunction  WCB at baseline   PT/OT  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)  Follows w/Pulmonology as OP  Aggressive pulmonary hygiene  Colostomy status (HCC)  Is s/p Wynn's procedure w/ colostomy creation in 2020  Wound care consulted to assist w/management  Monitor output  Sepsis (HCC)  Present in [facility] ED AEB tachycardia, tachypnea, leukocytosis, hypoxia requiring HFNC  Suspect this is 2/2 RSV infection w/? postobstructive pneumonia   RSV (+) on 01/04  Blood cultures negative to date  Urine strep/legionella negative  MRSA swab negative  Patient has been stable off antibiotics  Chronic diastolic heart failure (HCC)  Wt Readings from Last 3 Encounters:   01/11/25 72.5 kg (159 lb 13.3 oz)   01/05/25 75 kg (165 lb 5.5 oz)   11/29/24 75.2 kg (165 lb 12.6 oz)     TTE from 09/2024  revealed EF 65% w/mild TR and mildly elevated RVSP at 45  Does not appear to be on diuretic as OP  Monitor I/O and daily weights  RSV infection  Continue treatment as above  Urinary incontinence    GERD without esophagitis    Anemia  Anemia stable we will continue to monitor  Depression  Continue Celexa, Wellbutrin  Ductal carcinoma in situ (DCIS) of left breast    Severe protein-calorie malnutrition (HCC)  Malnutrition Findings:                                 BMI Findings:           Body mass index is 31.22 kg/m².     Atelectasis    Metabolic encephalopathy  Boggy flap as he has evidenced by disorientation and confusion improved with antibiotic treatment    VTE Pharmacologic Prophylaxis: VTE Score: 8 High Risk (Score >/= 5) - Pharmacological DVT Prophylaxis Ordered: apixaban (Eliquis). Sequential Compression Devices Ordered.    Mobility:   Basic Mobility Inpatient Raw Score: 6  JH-HLM Goal: 2: Bed activities/Dependent transfer  JH-HLM Achieved: 2: Bed activities/Dependent transfer (puma)  JH-HLM Goal achieved. Continue to encourage appropriate mobility.    Patient Centered Rounds: I performed bedside rounds with nursing staff today.   Discussions with Specialists or Other Care Team Provider:     Education and Discussions with Family / Patient: Patient declined call to .     Current Length of Stay: 6 day(s)  Current Patient Status: Inpatient   Certification Statement: The patient will continue to require additional inpatient hospital stay due to placement  Discharge Plan: Anticipate discharge in 24-48 hrs to rehab facility.    Code Status: Level 3 - DNAR and DNI    Subjective   Patient denies any acute complaints, insomnia improved able to sleep well last night    Objective :  Temp:  [97.8 °F (36.6 °C)-98.6 °F (37 °C)] 98 °F (36.7 °C)  HR:  [96-98] 96  BP: (121-145)/(60-67) 121/65  Resp:  [16-18] 18  SpO2:  [93 %-98 %] 98 %  O2 Device: Nasal cannula  Nasal Cannula O2 Flow Rate (L/min):  [3 L/min-3.5  L/min] 3.5 L/min    Body mass index is 31.22 kg/m².     Input and Output Summary (last 24 hours):     Intake/Output Summary (Last 24 hours) at 1/11/2025 1326  Last data filed at 1/11/2025 1201  Gross per 24 hour   Intake 120 ml   Output 449 ml   Net -329 ml       Physical Exam  Vitals and nursing note reviewed.   Constitutional:       General: She is not in acute distress.     Appearance: She is well-developed. She is not toxic-appearing or diaphoretic.   HENT:      Head: Normocephalic and atraumatic.   Eyes:      General: No scleral icterus.     Conjunctiva/sclera: Conjunctivae normal.   Cardiovascular:      Rate and Rhythm: Normal rate and regular rhythm.      Heart sounds: No murmur heard.     No friction rub. No gallop.   Pulmonary:      Effort: Pulmonary effort is normal. No respiratory distress.      Breath sounds: No stridor. Rales present. No wheezing or rhonchi.   Chest:      Chest wall: No tenderness.   Abdominal:      General: There is no distension.      Palpations: Abdomen is soft. There is no mass.      Tenderness: There is no abdominal tenderness. There is no guarding or rebound.      Hernia: No hernia is present.      Comments: ostomy   Musculoskeletal:         General: No swelling or tenderness.      Cervical back: Neck supple.   Skin:     General: Skin is warm and dry.      Capillary Refill: Capillary refill takes less than 2 seconds.   Neurological:      Mental Status: She is alert and oriented to person, place, and time.   Psychiatric:         Mood and Affect: Mood normal.           Lines/Drains:  Lines/Drains/Airways       Active Status       Name Placement date Placement time Site Days    Colostomy LUQ 09/10/20  1200  LUQ  1584                            Lab Results: I have reviewed the following results:   Results from last 7 days   Lab Units 01/11/25  0537 01/09/25  0416 01/08/25  0536   WBC Thousand/uL 15.69*   < > 8.96   HEMOGLOBIN g/dL 9.7*   < > 9.1*   HEMATOCRIT % 32.8*   < > 29.8*    PLATELETS Thousands/uL 617*   < > 610*   BANDS PCT %  --   --  1   SEGS PCT % 58  --   --    LYMPHO PCT % 31  --  12*   MONO PCT % 10  --  2*   EOS PCT % 1  --  0    < > = values in this interval not displayed.     Results from last 7 days   Lab Units 01/11/25  0537 01/09/25  0416 01/08/25  0536 01/07/25  0442   SODIUM mmol/L 144 143   < > 139   POTASSIUM mmol/L 3.5 3.9   < > 4.6   CHLORIDE mmol/L 95* 97   < > 96   CO2 mmol/L >45* 41*   < > 35*   BUN mg/dL 17 28*   < > 27*   CREATININE mg/dL 0.24* 0.35*   < > 0.36*   ANION GAP mmol/L  --  5   < > 8   CALCIUM mg/dL 9.2 9.7   < > 9.7   ALBUMIN g/dL  --   --   --  3.6   TOTAL BILIRUBIN mg/dL  --   --   --  0.21   ALK PHOS U/L  --   --   --  63   ALT U/L  --   --   --  22   AST U/L  --   --   --  23   GLUCOSE RANDOM mg/dL 91 106   < > 87    < > = values in this interval not displayed.     Results from last 7 days   Lab Units 01/05/25  0323   INR  1.28*             Results from last 7 days   Lab Units 01/11/25  0537 01/06/25  0642 01/05/25 2027 01/05/25  0323   LACTIC ACID mmol/L  --   --  0.4* 0.9   PROCALCITONIN ng/ml <0.05 0.10 0.12 0.12       Recent Cultures (last 7 days):   Results from last 7 days   Lab Units 01/06/25  0840 01/05/25  1812 01/05/25  1811 01/05/25  1800 01/05/25  0323   BLOOD CULTURE   --  No Growth After 5 Days. No Growth After 5 Days.  --  No Growth After 5 Days.  No Growth After 5 Days.   LEGIONELLA URINARY ANTIGEN  Negative  --   --  Negative  --        Imaging Results Review: No pertinent imaging studies reviewed.  Other Study Results Review: No additional pertinent studies reviewed.    Last 24 Hours Medication List:     Current Facility-Administered Medications:     acetaminophen (TYLENOL) tablet 650 mg, Q6H PRN    apixaban (ELIQUIS) tablet 5 mg, BID    chlorhexidine (PERIDEX) 0.12 % oral rinse 15 mL, Q12H NANCY    diazepam (VALIUM) tablet 5 mg, Q12H PRN    fluticasone (FLONASE) 50 mcg/act nasal spray 2 spray, Daily    hydrALAZINE  (APRESOLINE) injection 10 mg, Q6H PRN    levalbuterol (XOPENEX) inhalation solution 1.25 mg, TID    mirtazapine (REMERON) tablet 7.5 mg, HS    ondansetron (ZOFRAN) injection 4 mg, Q6H PRN    pantoprazole (PROTONIX) injection 40 mg, Q24H NANCY    sodium chloride (OCEAN) 0.65 % nasal spray 1 spray, Q1H PRN    Administrative Statements   Today, Patient Was Seen By: Kaden Toth DO      **Please Note: This note may have been constructed using a voice recognition system.**

## 2025-01-11 NOTE — PLAN OF CARE
Problem: PAIN - ADULT  Goal: Verbalizes/displays adequate comfort level or baseline comfort level  Description: Interventions:  - Encourage patient to monitor pain and request assistance  - Assess pain using appropriate pain scale  - Administer analgesics based on type and severity of pain and evaluate response  - Implement non-pharmacological measures as appropriate and evaluate response  - Consider cultural and social influences on pain and pain management  - Notify physician/advanced practitioner if interventions unsuccessful or patient reports new pain  Outcome: Progressing     Problem: INFECTION - ADULT  Goal: Absence or prevention of progression during hospitalization  Description: INTERVENTIONS:  - Assess and monitor for signs and symptoms of infection  - Monitor lab/diagnostic results  - Monitor all insertion sites, i.e. indwelling lines, tubes, and drains  - Monitor endotracheal if appropriate and nasal secretions for changes in amount and color  - [place] appropriate cooling/warming therapies per order  - Administer medications as ordered  - Instruct and encourage patient and family to use good hand hygiene technique  - Identify and instruct in appropriate isolation precautions for identified infection/condition  Outcome: Progressing     Problem: SAFETY ADULT  Goal: Patient will remain free of falls  Description: INTERVENTIONS:  - Educate patient/family on patient safety including physical limitations  - Instruct patient to call for assistance with activity   - Consult OT/PT to assist with strengthening/mobility   - Keep Call bell within reach  - Keep bed low and locked with side rails adjusted as appropriate  - Keep care items and personal belongings within reach  - Initiate and maintain comfort rounds  - Make Fall Risk Sign visible to staff  - Offer Toileting every 2 Hours, in advance of need  - Initiate/Maintain bed alarm  - Obtain necessary fall risk management equipment:   - Apply yellow socks and  bracelet for high fall risk patients  - Consider moving patient to room near nurses station  Outcome: Progressing

## 2025-01-12 NOTE — RESPIRATORY THERAPY NOTE
Rapid response called, arrived to room pt dusky, agonal breathing, unresposnsive, Pt DNR DNI confirmed, pox not detectable, pt ventilated via AMBu bag by Np, than placed on bipap, pt continued agonal breaths on Bipap, Abg done see rapid note

## 2025-01-12 NOTE — RAPID RESPONSE
Rapid Response Note  Claire Doherty 74 y.o. female MRN: [id number]  Unit/Bed#: -01 Encounter: [id number]    Rapid Response Notification(s):   Response called date/time:  1/12/2025 12:57 AM  Response team arrival date/time:  1/12/2025 1:00 AM  Response end date/time:  1/12/2025 1:42 AM  Level of care:  Medr  Rapid response location:  [facility] unit  Primary reason for rapid response call:  Acute change in neuro status, acute change in O2 sat and acute change in RR    Rapid Response Intervention(s):   Airway:  Positioning and oral airway  Breathing:  Oxygen and assisted ventilation  Fluids administered:  None  Medications administered:  Sodium bicarbonate       Assessment:   Respiratory arrest with underlying chronic hypoxic and hypercapnic respiratory failure in the setting of MD   Severe respiratory acidosis   Acute encephalopathy likely toxic metabolic in the setting of hypercapnia, also concern for anoxia post respiratory arrest     Plan:   Unclear etiology, possibly mucous plugging given nursing noted cough prior to event vs acute on chronic hypercapnia as patient refused BIPAP last evening    Provided manual bag mask ventilation for acute hypoxia and agonal respirations, SpO2 30s on monitor she was transitioned to BIPAP once SpO2 reached 85%   ABG revealed ph 7.0 CO2 >103 PaO2 61 with undetectable bicarb - will send CBC, CMP, lactic  BIPAP 22/8 - spontaneous RR 30 -175 - poor minute ventilation is not sufficient for adequate ventilation   CXR per my interpretation with concern for deep sulcus sign on the L, however sats and TV improving on BIPAP and she has b/l breath sounds. , improved from 160 during event and BP stable - will call reading room for stat read  Call placed to patients sister Hillary - she will come to bedside for further discussion of goals of care. Patient is DNR/DNI at this time. If family chooses to continue full treatment will transfer to ICU and consider CTH. I discussed  with Hillary that I am unsure if Claire will survive the next minutes to hours. Hillary verbalized understanding and is on her way to the hospital now.      Rapid Response Outcome:   Transfer:  Other (comment) (Pending family goals of care discussion)  Primary service notified of transfer: Yes    Code Status: Level 3 (DNAR and DNI)      Family notified: Yes, Name of Family member contacted Hillary          Background/Situation:   Claire Doherty is a 74 y.o. female who has PMH MD, chronic hypoxic and hypercapnic respiratory failure on chronic O2 and HS BIPAP, restrictive lung disease. Currently admitted for acute on chronic respiratory failure related to RSV. RR called for acute episode of hypoxia to the 30s and acute encephalopathy after episode of coughing. On my arrival patient was noted to be agonally breathing and cyanotic. Confirmed DNR/DNI. Per RT she had refused HS BIPAP earlier this evening. The patient was placed on the monitor and manual bagging with BVM was initiated. Noted to be ST vs SVT rate 180 on the monitor which is likely related to hypoxia. Once SpO2 reached 85% she was placed on BIPAP 22/8 100%. Tv 100, RR set at 20, spontaneous RR up to 30 after several minutes on BIPAP. SpO2 95%, ABG shows severe respiratory acidosis.     Review of Systems   Unable to perform ROS: Mental status change       Objective:   Vitals:    01/11/25 2311 01/11/25 2311 01/12/25 0105 01/12/25 0133   BP: (!) 179/86 (!) 179/86 118/57 (!) 202/89   Pulse: (!) 110 (!) 106 64 103   Resp:  21     Temp:   (!) 118 °F (47.8 °C)    TempSrc:       SpO2: 91% 93% (!) 25% 95%   Weight:       Height:         Physical Exam  Constitutional:       General: She is in acute distress.      Appearance: She is ill-appearing and toxic-appearing.   HENT:      Head: Normocephalic and atraumatic.      Mouth/Throat:      Mouth: Mucous membranes are dry.   Eyes:      Pupils: Pupils are equal, round, and reactive to light.   Cardiovascular:      Rate  and Rhythm: Regular rhythm. Tachycardia present.      Pulses: Normal pulses.   Pulmonary:      Effort: Respiratory distress present.      Breath sounds: No wheezing, rhonchi or rales.   Abdominal:      General: There is no distension.      Palpations: Abdomen is soft.      Tenderness: There is no abdominal tenderness.   Musculoskeletal:         General: No swelling.   Skin:     General: Skin is dry.   Neurological:      Comments: Unresponsive to verbal and painful stimuli

## 2025-01-12 NOTE — DISCHARGE SUMMARY
Discharge Summary - Critical Care/ICU   Name: Claire Doherty 74 y.o. female I MRN: [id number]  Unit/Bed#: -01 I Date of Admission: 1/5/2025   Date of Service: 1/12/2025 I Hospital Day: 7    Admission Date: 1/5/2025   Admitting Diagnosis: COPD exacerbation (HCC) [J44.1]  Discharge Date: 01/12/25    HPI: Claire Doherty is a 74 y.o. with a past medical history of restrictive lung disease secondary to muscular dystrophy, chronic respiratory failure on 2 L nasal cannula and BiPAP at night ,PAF, depression, COPD who presented to [facility] for dyspnea.  She was found to be RSV positive and imaging revealed postobstructive atelectasis of the right middle and lower lobe with possible underlying pneumonia.  She was transferred to [facility] for pulmonary evaluation for possible need for bronchoscopy.  Prior to arrival she had escalating oxygen requirements and is on continuous BiPAP.  Patient was unable to be weaned off of continuous BiPAP and critical care was consulted.     Procedures Performed: No orders of the defined types were placed in this encounter.      Summary of Hospital Course:   Patient was treated for RSV and COPD exacerbation. Able to titrate down O2 to baseline 2L with HS BIPAP. Overnight 1/12 RR called for hypoxia and altered mental status with agonal respirations. Unfortunately patient did not recover despite bag mask ventilation and BIPAP. Family meeting held with patients sister Hillary. Given history of restrictive lung disease with underlying MD and clinical decline over the past year, decision made to transition to comfort care. Patient passed away peacefully surrounded by family at 0319.    Significant Findings, Care, Treatment and Services Provided:   XR chest portable   Final Result      No acute cardiopulmonary disease on this examination, which is somewhat limited secondary to low lung volumes.            Workstation performed: [id number]         FL barium swallow video w  speech   Final Result      FL barium swallow video w speech   Final Result      XR chest portable ICU   Final Result      Persistent right lower lung atelectasis.            Workstation performed: [id number]             RSV +   BC NGTD    Disposition:      Final Diagnosis: Acute on chronic respiratory failure with hypoxia and hypercapnia     Medical Problems       Resolved Problems  Date Reviewed: 1/10/2025   None         Condition at Time of Death: critical   Date, Time and Cause of Death    Date of Death: 25  Time of Death:  03:19 AM   Preliminary Cause of Death: Acute on chronic respiratory failure with hypoxia and hypercapnia (HCC)  Entered by: SHAKIRA Mayes[EW1.1]       Attribution       EW1.1 SHAKIRA Rodriguez 25 03:27            Death Note:  INPATIENT DEATH NOTE  Claire Doherty 74 y.o. female MRN: [id number]  Unit/Bed#: -01 Encounter: [id number]    Date, Time and Cause of Death    Date of Death: 25  Time of Death:  03:19 AM  Preliminary Cause of Death: Acute on chronic respiratory failure with hypoxia and hypercapnia (HCC)  Entered by: SHAKIRA Mayes[EW1.1]       Attribution       EW1.1 SHAKIRA Rodriguez 25 03:27             Patient's Information  Pronounced by: SHAKIRA Mayes  Did the patient's death occur in the ED?: No  Did the patient's death occur in the OR?: No  Did the patient's death occur less than 10 days post-op?: No  Did the patient's death occur within 24 hours of admission?: No  Was code status DNR at the time of death?: Yes    PHYSICAL EXAM:  Unresponsive to noxious stimuli, Spontaneous respirations absent, Breath sounds absent, Carotid pulse absent, Heart sounds absent, Pupillary light reflex absent, and Corneal blink reflex absent    Medical Examiner notification criteria:  NONE APPLICABLE       Family Notification  Was the family notified?: Yes  Date Notified: 25  Time Notified: 319  Notified by: Gaby  SHAKIRA Schulte  Name of Family Notified of Death: Hillary   Relationship to Patient: Sister  Family Notification Route: At bedside  Was the family told to contact a  home?: Yes  Name of  Home:: Per family, body to be donated for scientific research    Autopsy Options:  Post-mortem examination declined by next of kin    Primary Service Attending Physician notified?:  yes - Attending:  Kaden Toth,     Physician/Resident responsible for completing Discharge Summary:  SHAKIRA Mayes

## 2025-01-12 NOTE — ACP (ADVANCE CARE PLANNING)
Family meeting held with patients sister Hillary. We discussed that despite BIPAP patient remains unresponsive and with poor respiratory effort. Hillary states that the patient has been declining over the past several months and she has been preparing herself and her family for something like this. We discussed the option of comfort care for which we would give Claire medications for pain and anxiety before removing BIPAP and allowing her to pass away peacefully. Hillary would like to transition her sister to comfort measures only. All questions answered. Comfort care orders placed. Edith escalera RANJAN made aware.

## 2025-01-12 NOTE — NURSING NOTE
Pt rang call bell 00:45 says she is unable to cough up her phlegm and that she can't breath, SpO2 stats dropping, pt pale, respirations agonal, called rapid response.

## 2025-01-12 NOTE — DEATH NOTE
INPATIENT DEATH NOTE  Claire Doherty 74 y.o. female MRN: [id number]  Unit/Bed#: -01 Encounter: [id number]    Date, Time and Cause of Death    Date of Death: 25  Time of Death:  03:19 AM   Preliminary Cause of Death: Acute on chronic respiratory failure with hypoxia and hypercapnia (HCC)  Entered by: SHAKIRA Mayes[EW1.1]       Attribution       EW1.1 SHAKIRA Rodriguez 25 03:27             Patient's Information  Pronounced by: SHAKIRA Mayes  Did the patient's death occur in the ED?: No  Did the patient's death occur in the OR?: No  Did the patient's death occur less than 10 days post-op?: No  Did the patient's death occur within 24 hours of admission?: No  Was code status DNR at the time of death?: Yes    PHYSICAL EXAM:  Unresponsive to noxious stimuli, Spontaneous respirations absent, Breath sounds absent, Carotid pulse absent, Heart sounds absent, Pupillary light reflex absent, and Corneal blink reflex absent    Medical Examiner notification criteria:  NONE APPLICABLE       Family Notification  Was the family notified?: Yes  Date Notified: 25  Time Notified: 319  Notified by: SHAKIRA Mayes  Name of Family Notified of Death: Hillary   Relationship to Patient: Sister  Family Notification Route: At bedside  Was the family told to contact a  home?: Yes  Name of  Home:: Per family, body to be donated for scientific research    Autopsy Options:  Post-mortem examination declined by next of kin    Primary Service Attending Physician notified?:  yes - Attending:  Kaden Toth DO    Physician/Resident responsible for completing Discharge Summary:  SHAKIRA Mayes

## 2025-01-12 NOTE — PLAN OF CARE
Problem: PAIN - ADULT  Goal: Verbalizes/displays adequate comfort level or baseline comfort level  Description: Interventions:  - Encourage patient to monitor pain and request assistance  - Assess pain using appropriate pain scale  - Administer analgesics based on type and severity of pain and evaluate response  - Implement non-pharmacological measures as appropriate and evaluate response  - Consider cultural and social influences on pain and pain management  - Notify physician/advanced practitioner if interventions unsuccessful or patient reports new pain  Outcome: Progressing     Problem: INFECTION - ADULT  Goal: Absence or prevention of progression during hospitalization  Description: INTERVENTIONS:  - Assess and monitor for signs and symptoms of infection  - Monitor lab/diagnostic results  - Monitor all insertion sites, i.e. indwelling lines, tubes, and drains  - Monitor endotracheal if appropriate and nasal secretions for changes in amount and color  - [place] appropriate cooling/warming therapies per order  - Administer medications as ordered  - Instruct and encourage patient and family to use good hand hygiene technique  - Identify and instruct in appropriate isolation precautions for identified infection/condition  Outcome: Progressing     Problem: SAFETY ADULT  Goal: Maintain or return to baseline ADL function  Description: INTERVENTIONS:  -  Assess patient's ability to carry out ADLs; assess patient's baseline for ADL function and identify physical deficits which impact ability to perform ADLs (bathing, care of mouth/teeth, toileting, grooming, dressing, etc.)  - Assess/evaluate cause of self-care deficits   - Assess range of motion  - Assess patient's mobility; develop plan if impaired  - Assess patient's need for assistive devices and provide as appropriate  - Encourage maximum independence but intervene and supervise when necessary  - Involve family in performance of ADLs  - Assess for home care  needs following discharge   - Consider OT consult to assist with ADL evaluation and planning for discharge  - Provide patient education as appropriate  Outcome: Progressing     Problem: DISCHARGE PLANNING  Goal: Discharge to home or other facility with appropriate resources  Description: INTERVENTIONS:  - Identify barriers to discharge w/patient and caregiver  - Arrange for needed discharge resources and transportation as appropriate  - Identify discharge learning needs (meds, wound care, etc.)  - Arrange for interpretive services to assist at discharge as needed  - Refer to Case Management Department for coordinating discharge planning if the patient needs post-hospital services based on physician/advanced practitioner order or complex needs related to functional status, cognitive ability, or social support system  Outcome: Progressing     Problem: Knowledge Deficit  Goal: Patient/family/caregiver demonstrates understanding of disease process, treatment plan, medications, and discharge instructions  Description: Complete learning assessment and assess knowledge base.  Interventions:  - Provide teaching at level of understanding  - Provide teaching via preferred learning methods  Outcome: Progressing     Problem: RESPIRATORY - ADULT  Goal: Achieves optimal ventilation and oxygenation  Description: INTERVENTIONS:  - Assess for changes in respiratory status  - Assess for changes in mentation and behavior  - Position to facilitate oxygenation and minimize respiratory effort  - Oxygen administered by appropriate delivery if ordered  - Initiate smoking cessation education as indicated  - Encourage broncho-pulmonary hygiene including cough, deep breathe, Incentive Spirometry  - Assess the need for suctioning and aspirate as needed  - Assess and instruct to report SOB or any respiratory difficulty  - Respiratory Therapy support as indicated  Outcome: Progressing     Problem: SKIN/TISSUE INTEGRITY - ADULT  Goal: Skin  Integrity remains intact(Skin Breakdown Prevention)  Description: Assess:  -Perform [place] assessment every shift  -Clean and moisturize skin every shift  -Inspect skin when repositioning, toileting, and assisting with ADLS  -Assess extremities for adequate circulation and sensation     Bed Management:  -Have minimal linens on bed & keep smooth, unwrinkled  -Change linens as needed when moist or perspiring  -Avoid sitting or lying in one position for more than 2 hours while in bed  -Keep HOB at 30 degrees     Toileting:  -Offer bedside commode  -Assess for incontinence   -Use incontinent care products after each incontinent episode     Activity:  -Mobilize patient 3 times a day  -Encourage activity and walks on unit  -Encourage or provide ROM exercises   -Turn and reposition patient every 2 Hours  -Use appropriate equipment to lift or move patient in bed    Skin Care:  -Avoid use of baby powder, tape, friction and shearing, hot water or constrictive clothing  -Relieve pressure over bony prominences using Mepilex  -Do not massage red bony areas    Outcome: Progressing     Problem: MUSCULOSKELETAL - ADULT  Goal: Maintain or return mobility to safest level of function  Description: INTERVENTIONS:  - Assess patient's ability to carry out ADLs; assess patient's baseline for ADL function and identify physical deficits which impact ability to perform ADLs (bathing, care of mouth/teeth, toileting, grooming, dressing, etc.)  - Assess/evaluate cause of self-care deficits   - Assess range of motion  - Assess patient's mobility  - Assess patient's need for assistive devices and provide as appropriate  - Encourage maximum independence but intervene and supervise when necessary  - Involve family in performance of ADLs  - Assess for home care needs following discharge   - Consider OT consult to assist with ADL evaluation and planning for discharge  - Provide patient education as appropriate  Outcome: Progressing     Problem:  Prexisting or High Potential for Compromised Skin Integrity  Goal: Skin integrity is maintained or improved  Description: INTERVENTIONS:  - Identify patients at risk for skin breakdown  - Assess and monitor skin integrity  - Assess and monitor nutrition and hydration status  - Monitor labs   - Assess for incontinence   - Turn and reposition patient  - Assist with mobility/ambulation  - Relieve pressure over bony prominences  - Avoid friction and shearing  - Provide appropriate hygiene as needed including keeping skin clean and dry  - Evaluate need for skin moisturizer/barrier cream  - Collaborate with interdisciplinary team   - Patient/family teaching  - Consider wound care consult   Outcome: Progressing     Problem: Nutrition/Hydration-ADULT  Goal: Nutrient/Hydration intake appropriate for improving, restoring or maintaining nutritional needs  Description: Monitor and assess patient's nutrition/hydration status for malnutrition. Collaborate with interdisciplinary team and initiate plan and interventions as ordered.  Monitor patient's weight and dietary intake as ordered or per policy. Utilize nutrition screening tool and intervene as necessary. Determine patient's food preferences and provide high-protein, high-caloric foods as appropriate.     INTERVENTIONS:  - Monitor oral intake, urinary output, labs, and treatment plans  - Assess nutrition and hydration status and recommend course of action  - Evaluate amount of meals eaten  - Assist patient with eating if necessary   - Allow adequate time for meals  - Recommend/ encourage appropriate diets, oral nutritional supplements, and vitamin/mineral supplements  - Order, calculate, and assess calorie counts as needed  - Recommend, monitor, and adjust tube feedings and TPN/PPN based on assessed needs  - Assess need for intravenous fluids  - Provide specific nutrition/hydration education as appropriate  - Include patient/family/caregiver in decisions related to  nutrition  Outcome: Progressing

## 2025-01-13 NOTE — CASE MANAGEMENT
Call made into [facility] P# [phone] to cancel auth. Spoke to Marcie who cancelled SNF auth and stated that unable to locate transport auth.     Call made back to [facility] P# [phone], spoke to Mackenzie RAMIREZ Who stated she isn't able to locate transport auth request on file.     Auths cancelled d/t patient expiring in hospital.

## 2025-01-15 LAB
ATRIAL RATE: 100 BPM
P AXIS: 48 DEGREES
PR INTERVAL: 142 MS
QRS AXIS: 22 DEGREES
QRSD INTERVAL: 68 MS
QT INTERVAL: 340 MS
QTC INTERVAL: 438 MS
T WAVE AXIS: 67 DEGREES
VENTRICULAR RATE: 100 BPM

## 2025-01-15 PROCEDURE — 93010 ELECTROCARDIOGRAM REPORT: CPT | Performed by: INTERNAL MEDICINE

## 2025-04-08 NOTE — PROGRESS NOTES
Ear cerumen removal  Date/Time: 10/24/2018 10:01 AM  Performed by: Analia Leroy by: Bobby Gamboa     Patient location:  Clinic  Consent:     Consent obtained:  Verbal    Consent given by:  Patient    Risks discussed:  Dizziness    Alternatives discussed:  No treatment  Universal protocol:     Procedure explained and questions answered to patient or proxy's satisfaction: yes      Relevant documents present and verified: yes    Procedure details:     Local anesthetic:  None    Location:  R ear    Procedure type: irrigation      Approach:  External  Post-procedure details:     Complication:  None    Hearing quality:  Normal    Patient tolerance of procedure:   Tolerated well, no immediate complications Patient's INR is therapeutic at 2.0. Patient reports no changes. Instructions given: Continue warfarin 7.5 mg on Tuesdays, Thursdays and Saturdays; and 5 mg all other days. Recheck on 5/8/25. Calendar reviewed with patient. Patient verbalizes understanding.

## (undated) DEVICE — GAUZE SPONGES,16 PLY: Brand: CURITY

## (undated) DEVICE — CATH FOLEY 22FR 5ML 2 WAY SILICONE ELASTIMER

## (undated) DEVICE — JACKSON-PRATT 100CC BULB RESERVOIR: Brand: CARDINAL HEALTH

## (undated) DEVICE — SUT VICRYL 3-0 SH 27 IN J416H

## (undated) DEVICE — JP PERF DRN SIL FLT 10MM FULL: Brand: CARDINAL HEALTH

## (undated) DEVICE — TELFA NON-ADHERENT ABSORBENT DRESSING: Brand: TELFA

## (undated) DEVICE — GLOVE SRG BIOGEL 7

## (undated) DEVICE — CONTOUR CURVED CUTTER STAPLER RELOAD: Brand: CONTOUR

## (undated) DEVICE — UNDER BUTTOCKS DRAPE: Brand: CONVERTORS

## (undated) DEVICE — SUT VICRYL 2-0 CT-1 36 IN J945H

## (undated) DEVICE — DRAPE LAPAROTOMY W/POUCHES

## (undated) DEVICE — CATH F6INF TL JR 4 100CM: Brand: INFINITI

## (undated) DEVICE — SUT PDS II 1 CT-1 27 IN Z347H

## (undated) DEVICE — WOUND RETRACTOR AND PROTECTOR: Brand: ALEXIS O WOUND PROTECTOR-RETRACTOR

## (undated) DEVICE — UMBILICAL TAPE: Brand: DEROYAL

## (undated) DEVICE — CATH DIAG 6FR IMPULSE 100CM FL3.5

## (undated) DEVICE — GLOVE SRG BIOGEL 8

## (undated) DEVICE — PACK TUR

## (undated) DEVICE — SUT VICRYL 2-0 REEL 54 IN J286G

## (undated) DEVICE — GLIDESHEATH BASIC HYDROPHILIC COATED INTRODUCER SHEATH: Brand: GLIDESHEATH

## (undated) DEVICE — GLOVE PI ULTRA TOUCH SZ.6.5

## (undated) DEVICE — SUT PDS II 1 TP-1 96 IN Z880G

## (undated) DEVICE — GLOVE INDICATOR PI UNDERGLOVE SZ 8.5 BLUE

## (undated) DEVICE — POOLE SUCTION HANDLE: Brand: CARDINAL HEALTH

## (undated) DEVICE — SPECIMEN CONTAINER STERILE PEEL PACK

## (undated) DEVICE — DRAPE FLUID WARMER (BIRD BATH)

## (undated) DEVICE — SUT ETHILON 3-0 PS-1 18 IN 1663G

## (undated) DEVICE — DGW .035 FC J3MM 150CM TEF: Brand: EMERALD

## (undated) DEVICE — ASTOUND STANDARD SURGICAL GOWN, XL: Brand: CONVERTORS

## (undated) DEVICE — PROXIMATE SKIN STAPLERS (35 WIDE) CONTAINS 35 STAINLESS STEEL STAPLES (FIXED HEAD): Brand: PROXIMATE

## (undated) DEVICE — GLOVE INDICATOR PI UNDERGLOVE SZ 6.5 BLUE

## (undated) DEVICE — SPONGE LAP 18 X 18 IN STRL RFD

## (undated) DEVICE — DRAPE EQUIPMENT RF WAND

## (undated) DEVICE — SUT SILK 3-0 SH CR/8 18 IN C013D

## (undated) DEVICE — INVIEW CLEAR LEGGINGS: Brand: CONVERTORS

## (undated) DEVICE — TUBING SUCTION 5MM X 12 FT

## (undated) DEVICE — SUT CHROMIC 2-0 SH 27 IN G123H

## (undated) DEVICE — MAYO STAND COVER: Brand: CONVERTORS

## (undated) DEVICE — ELECTRODE BLADE E-Z CLEAN 6.5IN -0014

## (undated) DEVICE — CULTURE TUBE ANAEROBIC

## (undated) DEVICE — CATH URETERAL 5FR X 70 CM FLEX TIP POLYUR BARD

## (undated) DEVICE — 2963 MEDIPORE SOFT CLOTH TAPE 3 IN X 10 YD 12 RLS/CS: Brand: 3M™ MEDIPORE™

## (undated) DEVICE — CONTOUR CURVED CUTTER STAPLER: Brand: CONTOUR

## (undated) DEVICE — SUT VICRYL 2-0 SH 27 IN UNDYED J417H

## (undated) DEVICE — GUIDEWIRE WHOLEY HI TORQUE INTERM MOD J .035 145CM

## (undated) DEVICE — ENSEAL 20 CM SHAFT, LARGE JAW: Brand: ENSEAL X1

## (undated) DEVICE — UTILITY MARKER,BLACK WITH LABELS: Brand: DEVON

## (undated) DEVICE — DGW .035 FC J3MM 260CM TEF: Brand: EMERALD

## (undated) DEVICE — INTENDED FOR TISSUE SEPARATION, AND OTHER PROCEDURES THAT REQUIRE A SHARP SURGICAL BLADE TO PUNCTURE OR CUT.: Brand: BARD-PARKER SAFETY BLADES SIZE 10, STERILE

## (undated) DEVICE — SUT ETHILON 2-0 FS 18 IN 664H

## (undated) DEVICE — CATH DIAG 6FR IMPULSE 100CM FL4

## (undated) DEVICE — GLOVE INDICATOR PI UNDERGLOVE SZ 7 BLUE

## (undated) DEVICE — PINNACLE INTRODUCER SHEATH: Brand: PINNACLE

## (undated) DEVICE — SUT PROLENE 2-0 V7 36 IN 8977H

## (undated) DEVICE — CULTURE TUBE AEROBIC

## (undated) DEVICE — CHLORAPREP HI-LITE 26ML ORANGE

## (undated) DEVICE — BETHLEHEM UNIVERSAL LAPAROTOMY: Brand: CARDINAL HEALTH

## (undated) DEVICE — BOWL: 16OZ PEELPOUCH 75/CS 16/PLT: Brand: MEDEGEN MEDICAL PRODUCTS, LLC

## (undated) DEVICE — PLUMEPEN PRO 10FT

## (undated) DEVICE — GLOVE SRG BIOGEL 6.5